# Patient Record
Sex: FEMALE | Race: BLACK OR AFRICAN AMERICAN | NOT HISPANIC OR LATINO | Employment: OTHER | URBAN - METROPOLITAN AREA
[De-identification: names, ages, dates, MRNs, and addresses within clinical notes are randomized per-mention and may not be internally consistent; named-entity substitution may affect disease eponyms.]

---

## 2017-01-24 ENCOUNTER — HOSPITAL ENCOUNTER (OUTPATIENT)
Dept: RADIOLOGY | Facility: HOSPITAL | Age: 67
Discharge: HOME | End: 2017-01-24
Attending: FAMILY MEDICINE
Payer: MEDICARE

## 2017-01-24 DIAGNOSIS — I26.90: ICD-10-CM

## 2017-01-24 PROCEDURE — 93971 EXTREMITY STUDY: CPT | Mod: 26,,, | Performed by: RADIOLOGY

## 2017-01-24 PROCEDURE — 93971 EXTREMITY STUDY: CPT | Mod: TC

## 2017-04-10 ENCOUNTER — HOSPITAL ENCOUNTER (INPATIENT)
Facility: HOSPITAL | Age: 67
LOS: 5 days | Discharge: HOME HEALTH | DRG: 208 | End: 2017-04-15
Attending: EMERGENCY MEDICINE | Admitting: HOSPITALIST
Payer: MEDICARE

## 2017-04-10 DIAGNOSIS — R10.9 FLANK PAIN: ICD-10-CM

## 2017-04-10 DIAGNOSIS — R06.02 SHORTNESS OF BREATH: ICD-10-CM

## 2017-04-10 DIAGNOSIS — J96.02 ACUTE RESPIRATORY FAILURE WITH HYPERCAPNIA: Primary | ICD-10-CM

## 2017-04-10 PROBLEM — Z99.11 ON MECHANICALLY ASSISTED VENTILATION: Status: ACTIVE | Noted: 2017-04-10

## 2017-04-10 PROBLEM — Z86.718 HISTORY OF DEEP VEIN THROMBOSIS: Chronic | Status: ACTIVE | Noted: 2017-04-10

## 2017-04-10 PROBLEM — Z86.711 HISTORY OF PULMONARY EMBOLISM: Chronic | Status: ACTIVE | Noted: 2017-04-10

## 2017-04-10 PROBLEM — Z79.01 CHRONIC ANTICOAGULATION: Chronic | Status: ACTIVE | Noted: 2017-04-10

## 2017-04-10 PROBLEM — J44.1 COPD WITH ACUTE EXACERBATION: Status: ACTIVE | Noted: 2017-04-10

## 2017-04-10 PROBLEM — J96.12 CHRONIC RESPIRATORY FAILURE WITH HYPOXIA AND HYPERCAPNIA: Chronic | Status: ACTIVE | Noted: 2017-04-10

## 2017-04-10 PROBLEM — J96.11 CHRONIC RESPIRATORY FAILURE WITH HYPOXIA AND HYPERCAPNIA: Chronic | Status: ACTIVE | Noted: 2017-04-10

## 2017-04-10 LAB
ALBUMIN SERPL BCP-MCNC: 3.8 G/DL
ALLENS TEST: ABNORMAL
ALLENS TEST: ABNORMAL
ALP SERPL-CCNC: 104 U/L
ALT SERPL W/O P-5'-P-CCNC: 13 U/L
ANION GAP SERPL CALC-SCNC: 11 MMOL/L
ANISOCYTOSIS BLD QL SMEAR: SLIGHT
APTT BLDCRRT: 28.5 SEC
AST SERPL-CCNC: 19 U/L
BACTERIA #/AREA URNS HPF: NORMAL /HPF
BASOPHILS # BLD AUTO: 0.03 K/UL
BASOPHILS NFR BLD: 0.2 %
BILIRUB SERPL-MCNC: 0.4 MG/DL
BILIRUB UR QL STRIP: NEGATIVE
BNP SERPL-MCNC: 1371 PG/ML
BUN SERPL-MCNC: 9 MG/DL
CALCIUM SERPL-MCNC: 10.1 MG/DL
CHLORIDE SERPL-SCNC: 100 MMOL/L
CLARITY UR: CLEAR
CO2 SERPL-SCNC: 32 MMOL/L
COLOR UR: YELLOW
CREAT SERPL-MCNC: 0.7 MG/DL
DELSYS: ABNORMAL
DELSYS: ABNORMAL
DIFFERENTIAL METHOD: ABNORMAL
EOSINOPHIL # BLD AUTO: 0.1 K/UL
EOSINOPHIL NFR BLD: 0.5 %
ERYTHROCYTE [DISTWIDTH] IN BLOOD BY AUTOMATED COUNT: 17.8 %
EST. GFR  (AFRICAN AMERICAN): >60 ML/MIN/1.73 M^2
EST. GFR  (NON AFRICAN AMERICAN): >60 ML/MIN/1.73 M^2
FLOW: 3
FLOW: 3
GLUCOSE SERPL-MCNC: 124 MG/DL
GLUCOSE UR QL STRIP: ABNORMAL
HCO3 UR-SCNC: 30.8 MMOL/L (ref 24–28)
HCO3 UR-SCNC: 32.8 MMOL/L (ref 24–28)
HCT VFR BLD AUTO: 45.1 %
HGB BLD-MCNC: 13.3 G/DL
HGB UR QL STRIP: NEGATIVE
HYALINE CASTS #/AREA URNS LPF: 1 /LPF
INR PPP: 1.1
KETONES UR QL STRIP: ABNORMAL
LEUKOCYTE ESTERASE UR QL STRIP: NEGATIVE
LYMPHOCYTES # BLD AUTO: 1.4 K/UL
LYMPHOCYTES NFR BLD: 10.8 %
MAGNESIUM SERPL-MCNC: 1.8 MG/DL
MCH RBC QN AUTO: 25.3 PG
MCHC RBC AUTO-ENTMCNC: 29.5 %
MCV RBC AUTO: 86 FL
MICROSCOPIC COMMENT: NORMAL
MODE: ABNORMAL
MODE: ABNORMAL
MONOCYTES # BLD AUTO: 0.9 K/UL
MONOCYTES NFR BLD: 7 %
NEUTROPHILS # BLD AUTO: 10.7 K/UL
NEUTROPHILS NFR BLD: 81.8 %
NITRITE UR QL STRIP: NEGATIVE
PCO2 BLDA: 62.5 MMHG (ref 35–45)
PCO2 BLDA: 72.8 MMHG (ref 35–45)
PH SMN: 7.23 [PH] (ref 7.35–7.45)
PH SMN: 7.33 [PH] (ref 7.35–7.45)
PH UR STRIP: 8 [PH] (ref 5–8)
PLATELET # BLD AUTO: 408 K/UL
PMV BLD AUTO: 9.5 FL
PO2 BLDA: 83 MMHG (ref 80–100)
PO2 BLDA: 87 MMHG (ref 80–100)
POC BE: 1 MMOL/L
POC BE: 5 MMOL/L
POC SATURATED O2: 93 % (ref 95–100)
POC SATURATED O2: 95 % (ref 95–100)
POC TCO2: 33 MMOL/L (ref 23–27)
POC TCO2: 35 MMOL/L (ref 23–27)
POCT GLUCOSE: 186 MG/DL (ref 70–110)
POLYCHROMASIA BLD QL SMEAR: ABNORMAL
POTASSIUM SERPL-SCNC: 3.9 MMOL/L
PROT SERPL-MCNC: 9.5 G/DL
PROT UR QL STRIP: ABNORMAL
PROTHROMBIN TIME: 11.6 SEC
RBC # BLD AUTO: 5.25 M/UL
RBC #/AREA URNS HPF: 2 /HPF (ref 0–4)
SAMPLE: ABNORMAL
SAMPLE: ABNORMAL
SITE: ABNORMAL
SITE: ABNORMAL
SODIUM SERPL-SCNC: 143 MMOL/L
SP GR UR STRIP: 1.01 (ref 1–1.03)
SP02: 95
SP02: 98
TROPONIN I SERPL DL<=0.01 NG/ML-MCNC: 0.04 NG/ML
URN SPEC COLLECT METH UR: ABNORMAL
UROBILINOGEN UR STRIP-ACNC: NEGATIVE EU/DL
WBC # BLD AUTO: 13.08 K/UL
WBC #/AREA URNS HPF: 2 /HPF (ref 0–5)

## 2017-04-10 PROCEDURE — 80053 COMPREHEN METABOLIC PANEL: CPT

## 2017-04-10 PROCEDURE — 25000242 PHARM REV CODE 250 ALT 637 W/ HCPCS: Performed by: HOSPITALIST

## 2017-04-10 PROCEDURE — 99900035 HC TECH TIME PER 15 MIN (STAT)

## 2017-04-10 PROCEDURE — 25000242 PHARM REV CODE 250 ALT 637 W/ HCPCS: Performed by: EMERGENCY MEDICINE

## 2017-04-10 PROCEDURE — 36600 WITHDRAWAL OF ARTERIAL BLOOD: CPT

## 2017-04-10 PROCEDURE — 25000003 PHARM REV CODE 250: Performed by: EMERGENCY MEDICINE

## 2017-04-10 PROCEDURE — 63600175 PHARM REV CODE 636 W HCPCS: Performed by: EMERGENCY MEDICINE

## 2017-04-10 PROCEDURE — 99291 CRITICAL CARE FIRST HOUR: CPT | Mod: 25

## 2017-04-10 PROCEDURE — 96376 TX/PRO/DX INJ SAME DRUG ADON: CPT

## 2017-04-10 PROCEDURE — 63600175 PHARM REV CODE 636 W HCPCS: Performed by: INTERNAL MEDICINE

## 2017-04-10 PROCEDURE — 25500020 PHARM REV CODE 255: Performed by: EMERGENCY MEDICINE

## 2017-04-10 PROCEDURE — 84484 ASSAY OF TROPONIN QUANT: CPT

## 2017-04-10 PROCEDURE — 96374 THER/PROPH/DIAG INJ IV PUSH: CPT

## 2017-04-10 PROCEDURE — 27000221 HC OXYGEN, UP TO 24 HOURS

## 2017-04-10 PROCEDURE — 20000000 HC ICU ROOM

## 2017-04-10 PROCEDURE — 85730 THROMBOPLASTIN TIME PARTIAL: CPT

## 2017-04-10 PROCEDURE — 93005 ELECTROCARDIOGRAM TRACING: CPT

## 2017-04-10 PROCEDURE — 5A1945Z RESPIRATORY VENTILATION, 24-96 CONSECUTIVE HOURS: ICD-10-PCS | Performed by: EMERGENCY MEDICINE

## 2017-04-10 PROCEDURE — 96375 TX/PRO/DX INJ NEW DRUG ADDON: CPT

## 2017-04-10 PROCEDURE — 31500 INSERT EMERGENCY AIRWAY: CPT

## 2017-04-10 PROCEDURE — 83735 ASSAY OF MAGNESIUM: CPT

## 2017-04-10 PROCEDURE — 94002 VENT MGMT INPAT INIT DAY: CPT

## 2017-04-10 PROCEDURE — 82803 BLOOD GASES ANY COMBINATION: CPT

## 2017-04-10 PROCEDURE — 85610 PROTHROMBIN TIME: CPT

## 2017-04-10 PROCEDURE — 81000 URINALYSIS NONAUTO W/SCOPE: CPT

## 2017-04-10 PROCEDURE — 94640 AIRWAY INHALATION TREATMENT: CPT

## 2017-04-10 PROCEDURE — 0BH17EZ INSERTION OF ENDOTRACHEAL AIRWAY INTO TRACHEA, VIA NATURAL OR ARTIFICIAL OPENING: ICD-10-PCS | Performed by: EMERGENCY MEDICINE

## 2017-04-10 PROCEDURE — 99900026 HC AIRWAY MAINTENANCE (STAT)

## 2017-04-10 PROCEDURE — 83880 ASSAY OF NATRIURETIC PEPTIDE: CPT

## 2017-04-10 PROCEDURE — 85025 COMPLETE CBC W/AUTO DIFF WBC: CPT

## 2017-04-10 PROCEDURE — 51702 INSERT TEMP BLADDER CATH: CPT

## 2017-04-10 PROCEDURE — 94761 N-INVAS EAR/PLS OXIMETRY MLT: CPT

## 2017-04-10 RX ORDER — HYDRALAZINE HYDROCHLORIDE 20 MG/ML
20 INJECTION INTRAMUSCULAR; INTRAVENOUS
Status: COMPLETED | OUTPATIENT
Start: 2017-04-10 | End: 2017-04-10

## 2017-04-10 RX ORDER — LISINOPRIL 20 MG/1
40 TABLET ORAL DAILY
Status: DISCONTINUED | OUTPATIENT
Start: 2017-04-11 | End: 2017-04-15 | Stop reason: HOSPADM

## 2017-04-10 RX ORDER — ZOLPIDEM TARTRATE 5 MG/1
5 TABLET ORAL NIGHTLY PRN
Status: ON HOLD | COMMUNITY
End: 2017-08-14 | Stop reason: HOSPADM

## 2017-04-10 RX ORDER — GLUCAGON 1 MG
1 KIT INJECTION
Status: DISCONTINUED | OUTPATIENT
Start: 2017-04-10 | End: 2017-04-15 | Stop reason: HOSPADM

## 2017-04-10 RX ORDER — SODIUM CHLORIDE 9 MG/ML
INJECTION, SOLUTION INTRAVENOUS CONTINUOUS
Status: DISCONTINUED | OUTPATIENT
Start: 2017-04-10 | End: 2017-04-10

## 2017-04-10 RX ORDER — IPRATROPIUM BROMIDE AND ALBUTEROL SULFATE 2.5; .5 MG/3ML; MG/3ML
3 SOLUTION RESPIRATORY (INHALATION)
Status: COMPLETED | OUTPATIENT
Start: 2017-04-10 | End: 2017-04-10

## 2017-04-10 RX ORDER — PROPOFOL 10 MG/ML
20 INJECTION, EMULSION INTRAVENOUS CONTINUOUS
Status: DISCONTINUED | OUTPATIENT
Start: 2017-04-10 | End: 2017-04-10

## 2017-04-10 RX ORDER — ACETAMINOPHEN 500 MG
500 TABLET ORAL EVERY 6 HOURS PRN
Status: DISCONTINUED | OUTPATIENT
Start: 2017-04-10 | End: 2017-04-15 | Stop reason: HOSPADM

## 2017-04-10 RX ORDER — CIPROFLOXACIN AND DEXAMETHASONE 3; 1 MG/ML; MG/ML
1 SUSPENSION/ DROPS AURICULAR (OTIC) EVERY 4 HOURS
COMMUNITY
End: 2017-08-16 | Stop reason: ALTCHOICE

## 2017-04-10 RX ORDER — IPRATROPIUM BROMIDE AND ALBUTEROL SULFATE 2.5; .5 MG/3ML; MG/3ML
3 SOLUTION RESPIRATORY (INHALATION) EVERY 4 HOURS
Status: DISCONTINUED | OUTPATIENT
Start: 2017-04-10 | End: 2017-04-11

## 2017-04-10 RX ORDER — HYDRALAZINE HYDROCHLORIDE 20 MG/ML
10 INJECTION INTRAMUSCULAR; INTRAVENOUS
Status: DISCONTINUED | OUTPATIENT
Start: 2017-04-10 | End: 2017-04-15 | Stop reason: HOSPADM

## 2017-04-10 RX ORDER — METHYLPREDNISOLONE SOD SUCC 125 MG
125 VIAL (EA) INJECTION EVERY 8 HOURS
Status: COMPLETED | OUTPATIENT
Start: 2017-04-11 | End: 2017-04-13

## 2017-04-10 RX ORDER — DIPHENHYDRAMINE HYDROCHLORIDE 50 MG/ML
25 INJECTION INTRAMUSCULAR; INTRAVENOUS
Status: COMPLETED | OUTPATIENT
Start: 2017-04-10 | End: 2017-04-10

## 2017-04-10 RX ORDER — PROPOFOL 10 MG/ML
20 INJECTION, EMULSION INTRAVENOUS
Status: COMPLETED | OUTPATIENT
Start: 2017-04-10 | End: 2017-04-10

## 2017-04-10 RX ORDER — INSULIN ASPART 100 [IU]/ML
1-10 INJECTION, SOLUTION INTRAVENOUS; SUBCUTANEOUS
Status: DISCONTINUED | OUTPATIENT
Start: 2017-04-10 | End: 2017-04-15 | Stop reason: HOSPADM

## 2017-04-10 RX ORDER — ONDANSETRON 2 MG/ML
4 INJECTION INTRAMUSCULAR; INTRAVENOUS
Status: COMPLETED | OUTPATIENT
Start: 2017-04-10 | End: 2017-04-10

## 2017-04-10 RX ORDER — HYDRALAZINE HYDROCHLORIDE 20 MG/ML
20 INJECTION INTRAMUSCULAR; INTRAVENOUS
Status: DISCONTINUED | OUTPATIENT
Start: 2017-04-10 | End: 2017-04-10

## 2017-04-10 RX ORDER — PROPOFOL 10 MG/ML
5 INJECTION, EMULSION INTRAVENOUS CONTINUOUS
Status: DISCONTINUED | OUTPATIENT
Start: 2017-04-10 | End: 2017-04-13

## 2017-04-10 RX ORDER — ALBUTEROL SULFATE 2.5 MG/.5ML
5 SOLUTION RESPIRATORY (INHALATION)
Status: COMPLETED | OUTPATIENT
Start: 2017-04-10 | End: 2017-04-10

## 2017-04-10 RX ORDER — CHLORHEXIDINE GLUCONATE ORAL RINSE 1.2 MG/ML
15 SOLUTION DENTAL 2 TIMES DAILY
Status: DISCONTINUED | OUTPATIENT
Start: 2017-04-11 | End: 2017-04-12

## 2017-04-10 RX ORDER — LORAZEPAM 2 MG/ML
1 INJECTION INTRAMUSCULAR
Status: COMPLETED | OUTPATIENT
Start: 2017-04-10 | End: 2017-04-10

## 2017-04-10 RX ORDER — LISINOPRIL 40 MG/1
40 TABLET ORAL DAILY
Status: ON HOLD | COMMUNITY
End: 2017-08-14

## 2017-04-10 RX ORDER — DILTIAZEM HYDROCHLORIDE 30 MG/1
90 TABLET, FILM COATED ORAL EVERY 8 HOURS
Status: DISCONTINUED | OUTPATIENT
Start: 2017-04-11 | End: 2017-04-15 | Stop reason: HOSPADM

## 2017-04-10 RX ORDER — ONDANSETRON 2 MG/ML
8 INJECTION INTRAMUSCULAR; INTRAVENOUS EVERY 8 HOURS PRN
Status: DISCONTINUED | OUTPATIENT
Start: 2017-04-11 | End: 2017-04-15 | Stop reason: HOSPADM

## 2017-04-10 RX ORDER — IPRATROPIUM BROMIDE AND ALBUTEROL SULFATE 2.5; .5 MG/3ML; MG/3ML
3 SOLUTION RESPIRATORY (INHALATION) EVERY 4 HOURS
Status: DISCONTINUED | OUTPATIENT
Start: 2017-04-10 | End: 2017-04-10 | Stop reason: ALTCHOICE

## 2017-04-10 RX ORDER — TRAZODONE HYDROCHLORIDE 50 MG/1
50 TABLET ORAL NIGHTLY
Status: ON HOLD | COMMUNITY
End: 2017-08-14 | Stop reason: HOSPADM

## 2017-04-10 RX ORDER — ONDANSETRON 2 MG/ML
4 INJECTION INTRAMUSCULAR; INTRAVENOUS
Status: DISCONTINUED | OUTPATIENT
Start: 2017-04-10 | End: 2017-04-10

## 2017-04-10 RX ORDER — NAPROXEN 375 MG/1
375 TABLET ORAL EVERY 12 HOURS PRN
Status: ON HOLD | COMMUNITY
End: 2018-07-05 | Stop reason: HOSPADM

## 2017-04-10 RX ORDER — PREDNISOLONE ACETATE 10 MG/ML
1 SUSPENSION/ DROPS OPHTHALMIC EVERY 4 HOURS
COMMUNITY
End: 2017-08-16 | Stop reason: ALTCHOICE

## 2017-04-10 RX ORDER — HYDROMORPHONE HYDROCHLORIDE 2 MG/ML
0.5 INJECTION, SOLUTION INTRAMUSCULAR; INTRAVENOUS; SUBCUTANEOUS
Status: COMPLETED | OUTPATIENT
Start: 2017-04-10 | End: 2017-04-10

## 2017-04-10 RX ORDER — PANTOPRAZOLE SODIUM 40 MG/1
40 FOR SUSPENSION ORAL DAILY
Status: DISCONTINUED | OUTPATIENT
Start: 2017-04-11 | End: 2017-04-15 | Stop reason: HOSPADM

## 2017-04-10 RX ORDER — INSULIN ASPART 100 [IU]/ML
1-10 INJECTION, SOLUTION INTRAVENOUS; SUBCUTANEOUS EVERY 6 HOURS PRN
Status: DISCONTINUED | OUTPATIENT
Start: 2017-04-10 | End: 2017-04-10

## 2017-04-10 RX ORDER — FLUMAZENIL 0.1 MG/ML
0.1 INJECTION INTRAVENOUS ONCE
Status: DISCONTINUED | OUTPATIENT
Start: 2017-04-10 | End: 2017-04-10

## 2017-04-10 RX ORDER — NALOXONE HCL 0.4 MG/ML
1 VIAL (ML) INJECTION
Status: DISCONTINUED | OUTPATIENT
Start: 2017-04-10 | End: 2017-04-10

## 2017-04-10 RX ORDER — CLONIDINE HYDROCHLORIDE 0.1 MG/1
0.2 TABLET ORAL 3 TIMES DAILY
Status: DISCONTINUED | OUTPATIENT
Start: 2017-04-11 | End: 2017-04-15 | Stop reason: HOSPADM

## 2017-04-10 RX ORDER — PRAVASTATIN SODIUM 40 MG/1
40 TABLET ORAL DAILY
COMMUNITY
End: 2018-02-12

## 2017-04-10 RX ORDER — ENOXAPARIN SODIUM 100 MG/ML
1 INJECTION SUBCUTANEOUS
Status: DISCONTINUED | OUTPATIENT
Start: 2017-04-10 | End: 2017-04-10

## 2017-04-10 RX ORDER — IBUPROFEN 200 MG
1 TABLET ORAL DAILY
Status: DISCONTINUED | OUTPATIENT
Start: 2017-04-11 | End: 2017-04-15 | Stop reason: HOSPADM

## 2017-04-10 RX ORDER — PROMETHAZINE HYDROCHLORIDE 12.5 MG/1
12.5 TABLET ORAL EVERY 6 HOURS PRN
Status: ON HOLD | COMMUNITY
End: 2017-08-14 | Stop reason: HOSPADM

## 2017-04-10 RX ADMIN — LORAZEPAM 1 MG: 2 INJECTION, SOLUTION INTRAMUSCULAR; INTRAVENOUS at 03:04

## 2017-04-10 RX ADMIN — PROPOFOL 25 MCG/KG/MIN: 10 INJECTION, EMULSION INTRAVENOUS at 08:04

## 2017-04-10 RX ADMIN — ALBUTEROL SULFATE 5 MG: 2.5 SOLUTION RESPIRATORY (INHALATION) at 11:04

## 2017-04-10 RX ADMIN — SODIUM CHLORIDE: 0.9 INJECTION, SOLUTION INTRAVENOUS at 06:04

## 2017-04-10 RX ADMIN — ENOXAPARIN SODIUM 90 MG: 100 INJECTION SUBCUTANEOUS at 04:04

## 2017-04-10 RX ADMIN — IPRATROPIUM BROMIDE AND ALBUTEROL SULFATE 3 ML: .5; 3 SOLUTION RESPIRATORY (INHALATION) at 07:04

## 2017-04-10 RX ADMIN — ONDANSETRON 4 MG: 2 INJECTION INTRAMUSCULAR; INTRAVENOUS at 01:04

## 2017-04-10 RX ADMIN — IOHEXOL 100 ML: 350 INJECTION, SOLUTION INTRAVENOUS at 04:04

## 2017-04-10 RX ADMIN — PROPOFOL 20 MCG/KG/MIN: 10 INJECTION, EMULSION INTRAVENOUS at 04:04

## 2017-04-10 RX ADMIN — PROPOFOL 25 MCG/KG/MIN: 10 INJECTION, EMULSION INTRAVENOUS at 11:04

## 2017-04-10 RX ADMIN — HYDRALAZINE HYDROCHLORIDE 20 MG: 20 INJECTION INTRAMUSCULAR; INTRAVENOUS at 12:04

## 2017-04-10 RX ADMIN — IPRATROPIUM BROMIDE AND ALBUTEROL SULFATE 3 ML: .5; 3 SOLUTION RESPIRATORY (INHALATION) at 11:04

## 2017-04-10 RX ADMIN — HYDROMORPHONE HYDROCHLORIDE 0.5 MG: 2 INJECTION INTRAMUSCULAR; INTRAVENOUS; SUBCUTANEOUS at 01:04

## 2017-04-10 RX ADMIN — HYDRALAZINE HYDROCHLORIDE 20 MG: 20 INJECTION INTRAMUSCULAR; INTRAVENOUS at 03:04

## 2017-04-10 RX ADMIN — DIPHENHYDRAMINE HYDROCHLORIDE 25 MG: 50 INJECTION, SOLUTION INTRAMUSCULAR; INTRAVENOUS at 03:04

## 2017-04-10 NOTE — NURSING
1850: Received patient from ER , Dx acute resp failure with hypercapnea. On portable vent for transport. Afsaneh RT at bedside to hook up servo vent. Settings unchanged from ER. PRVC 450 TV, 60 %, Rate 20, Peep 5. Sats 100%.  Placed on CM, SR-ST 90-100s. . Arrived on Diprivan at 20 mcg./kg/min from ER. NS at 75 ml/hr initiated per admit orders. Remains in soft wrist restraints for safety, treatment interference and pulling at lines in ER.  in waiting area, only belongings noted upon arrival are silver colored round hoop earrings.

## 2017-04-10 NOTE — ED PROVIDER NOTES
"Encounter Date: 4/10/2017    SCRIBE #1 NOTE: I, Joeyaldo Mccray, am scribing for, and in the presence of,  Braden Matos MD. I have scribed the following portions of the note - Other sections scribed: HPI, ROS.       History     Chief Complaint   Patient presents with    Shortness of Breath     pt states " I've been SOB and feeling bad for the last 3 days"     Review of patient's allergies indicates:  No Known Allergies  HPI Comments: CC: SOB    HPI: This 67 y.o. female with a PMHx of COPD, CHF, HTN, DM, GERD, depression presents to the ED c/o SOB, nausea, and a productive cough that began 3 days ago. Pt also c/o a slight fever and chills. Pt reports a mid-sternal chest pain that started last night. Pt states she is on 4 O2 L at home and uses her inhaler daily. Pt repots Anoro works well but Breo does not help. Pt also reports increased urinary frequency. No other symptoms reported. Pt denies vomiting, diarrhea, headache, abdominal pain, dysuria, or any other associated symptoms.       The history is provided by the patient.     Past Medical History:   Diagnosis Date    CHF (congestive heart failure)     COPD (chronic obstructive pulmonary disease)     Depression     Diabetes mellitus     GERD (gastroesophageal reflux disease)     Hypertension      Past Surgical History:   Procedure Laterality Date    ABDOMINAL SURGERY      CARDIAC SURGERY       Family History   Problem Relation Age of Onset    Heart disease Mother     Hypertension Mother     Diabetes Father      Social History   Substance Use Topics    Smoking status: Current Every Day Smoker     Packs/day: 0.50     Years: 25.00     Types: Cigarettes    Smokeless tobacco: Not on file    Alcohol use No     Review of Systems   Constitutional: Positive for chills and fever.   HENT: Negative for congestion and sore throat.    Eyes: Negative for visual disturbance.   Respiratory: Positive for cough and shortness of breath.    Cardiovascular: Positive for " chest pain.   Gastrointestinal: Negative for abdominal pain, diarrhea, nausea and vomiting.   Genitourinary: Positive for frequency. Negative for dysuria.   Musculoskeletal: Negative for back pain.   Skin: Negative for rash.   Neurological: Negative for weakness and headaches.   Hematological: Does not bruise/bleed easily.       Physical Exam   Initial Vitals   BP Pulse Resp Temp SpO2   04/10/17 0939 04/10/17 0939 04/10/17 0939 04/10/17 0939 04/10/17 0939   153/74 100 24 98.1 °F (36.7 °C) 84 %     Physical Exam  The patient was examined specifically for the following:   General:No significant distress, Good color, Warm and dry. Head and neck:Scalp atraumatic, Neck supple. Neurological:Appropriate conversation, Gross motor deficits. Eyes:Conjugate gaze, Clear corneas. ENT: No epistaxis. Cardiac: Regular rate and rhythm, Grossly normal heart tones. Pulmonary: Wheezing, Rales. Gastrointestinal: Abdominal tenderness, Abdominal distention. Musculoskeletal: Extremity deformity, Apparent pain with range of motion of the joints. Skin: Rash.   The findings on examination were normal except for the following: The patient's oxygen saturations are 99% on 3 L nasal cannula.  The lungs are clear and free wheezing rales of the rhonchi.  Heart tones remarkable for an irregularly irregular rhythm.  The patient's heart rate is 100.  There is no ankle edema.  The abdomen is soft.  The patient is awake alert bright.  She does look slightly breathless.  ED Course   Intubation  Date/Time: 4/10/2017 5:47 PM  Performed by: BALA ISAACS  Authorized by: BALA ISAACS   Indications: respiratory distress,  respiratory failure and  hypercapnia  Intubation method: direct  Patient status: awake  Preoxygenation: mask  Pretreatment medications: none  Laryngoscope size: Mac 4  Tube size: 7.5 mm  Tube type: cuffed  Number of attempts: 1  Cricoid pressure: no  Post-procedure assessment: CO2 detector  Cuff inflated: yes  Tube secured with: ETT  maher  Chest x-ray interpreted by me.  Chest x-ray findings: endotracheal tube in appropriate position  Patient tolerance: Patient tolerated the procedure well with no immediate complications  Complications: No    Critical Care  Date/Time: 4/10/2017 5:48 PM  Performed by: BALA ISAACS  Authorized by: BALA ISAACS   Direct patient critical care time: 30 minutes  Additional history critical care time: 11 minutes  Ordering / reviewing critical care time: 9 minutes  Documentation critical care time: 17 minutes  Consulting other physicians critical care time: 9 minutes  Consult with family critical care time: 5 minutes  Total critical care time (exclusive of procedural time) : 81 minutes  Critical care time was exclusive of separately billable procedures and treating other patients and teaching time.  Critical care was necessary to treat or prevent imminent or life-threatening deterioration of the following conditions: respiratory failure.  Critical care was time spent personally by me on the following activities: development of treatment plan with patient or surrogate, examination of patient, ordering and review of laboratory studies, re-evaluation of patient's condition, pulse oximetry, ordering and performing treatments and interventions, evaluation of patient's response to treatment, discussions with primary provider, review of old charts, ordering and review of radiographic studies and obtaining history from patient or surrogate.        Labs Reviewed   COMPREHENSIVE METABOLIC PANEL - Abnormal; Notable for the following:        Result Value    CO2 32 (*)     Glucose 124 (*)     Total Protein 9.5 (*)     All other components within normal limits   TROPONIN I - Abnormal; Notable for the following:     Troponin I 0.043 (*)     All other components within normal limits   B-TYPE NATRIURETIC PEPTIDE - Abnormal; Notable for the following:     BNP 1371 (*)     All other components within normal limits   URINALYSIS -  Abnormal; Notable for the following:     Protein, UA 2+ (*)     Glucose, UA 1+ (*)     Ketones, UA Trace (*)     All other components within normal limits   CBC W/ AUTO DIFFERENTIAL - Abnormal; Notable for the following:     WBC 13.08 (*)     MCH 25.3 (*)     MCHC 29.5 (*)     RDW 17.8 (*)     Platelets 408 (*)     Gran # 10.7 (*)     Gran% 81.8 (*)     Lymph% 10.8 (*)     All other components within normal limits   ISTAT PROCEDURE - Abnormal; Notable for the following:     POC PH 7.327 (*)     POC PCO2 62.5 (*)     POC HCO3 32.8 (*)     POC TCO2 35 (*)     All other components within normal limits   POCT GLUCOSE - Abnormal; Notable for the following:     POCT Glucose 186 (*)     All other components within normal limits   ISTAT PROCEDURE - Abnormal; Notable for the following:     POC PH 7.234 (*)     POC PCO2 72.8 (*)     POC HCO3 30.8 (*)     POC SATURATED O2 93 (*)     POC TCO2 33 (*)     All other components within normal limits   APTT   PROTIME-INR   MAGNESIUM   URINALYSIS MICROSCOPIC     EKG Readings: (Independently Interpreted)   This patient is in a normal sinus rhythm with a heart rate in 96.  The ND QRS and QT intervals are normal.  There is no evidence of acute myocardial infarction or malignant arrhythmia.       X-Rays:   Independently Interpreted Readings:   Other Readings:  CTA of the chest for PE reveals improvement in the patient's pulmonary emboli.  Chest x-ray failed to reveal evidence of pulmonary edema or pneumonia.  The endotracheal tube was in good position  CT of the abdomen fails to reveal peritonitis bowel obstruction or other significant abnormalities.    Medical decision making: Given the above, this patient presents to the emergency room with shortness of breath.  She was found to have a COPD exacerbation.  She was found to be hypercarbic.r flank.  She was treated for pain.  I believe hehypercarbia worsens after treatment with hydromorphone for flank pain.  She was also treated with  Phenergan for nausea.  She was diaphoretic.  Repeat blood gases revealed a more severe hypercarbia.  She had mental status changes.  She was intubated for hypoventilation.  She was sedated on propofol.  She'll be admitted to the ICU.  We will continue nebulizer treatments.  We will consult pulmonology.  We will hold to extubate this patient.  She continues to smoke cigarettes while she is on 4 L of home oxygen.               Scribe Attestation:   Scribe #1: I performed the above scribed service and the documentation accurately describes the services I performed. I attest to the accuracy of the note.    Attending Attestation:           Physician Attestation for Scribe:  Physician Attestation Statement for Scribe #1: I, Braden Matos MD, reviewed documentation, as scribed by Joey Mccray  in my presence, and it is both accurate and complete.                 ED Course     Clinical Impression:   The primary encounter diagnosis was Acute respiratory failure with hypercapnia. Diagnoses of Shortness of breath and Flank pain were also pertinent to this visit.          Braden Matos MD  04/10/17 9422

## 2017-04-10 NOTE — IP AVS SNAPSHOT
[place] [address] [place] [address]  Phone: [phone]           Patient Discharge Instructions   Our goal is to set you up for success. This packet includes information on your condition, medications, and your home care.  It will help you care for yourself to prevent having to return to the hospital.     Please ask your nurse if you have any questions.      There are many details to remember when preparing to leave the hospital. Here is what you will need to do:    1. Take your medicine. If you are prescribed medications, review your Medication List on the following pages. You may have new medications to  at the pharmacy and others that you'll need to stop taking. Review the instructions for how and when to take your medications. Talk with your doctor or nurses if you are unsure of what to do.     2. Go to your follow-up appointments. Specific follow-up information is listed in the following pages. Your may be contacted by a nurse or clinical provider about future appointments. Be sure we have all of the phone numbers to reach you. Please contact your provider's office if you are unable to make an appointment.     3. Watch for warning signs. Your doctor or nurse will give you detailed warning signs to watch for and when to call for assistance. These instructions may also include educational information about your condition. If you experience any of warning signs to your health, call your doctor.           [facility]  Unless otherwise directed by your provider, please   contact [facility], our nurse care line   that is available for 24/7 assistance.     [phone] (toll-free)     Registered nurses in the [facility]   provide: appointment scheduling, clinical advisement, health education, and other advisory services.                  ** Verify the list of medication(s) below is accurate and up to date. Carry this with you in case of emergency.  If your medications have changed, please notify your healthcare provider.             Medication List      START taking these medications        Additional Info                      predniSONE 20 MG tablet   Commonly known as:  DELTASONE   Quantity:  18 tablet   Refills:  0    Last time this was given:  60 mg on 4/15/2017  8:58 AM   Instructions:  3 tablets po daily for 3 days 2 tablets po daily for 3 days 1 tablet po daily for 3 days     Begin Date    AM    Noon    PM    Bedtime         CONTINUE taking these medications        Additional Info                      acetaminophen 500 MG tablet   Commonly known as:  TYLENOL   Refills:  0   Dose:  500 mg    Last time this was given:  500 mg on 4/14/2017  2:27 PM   Instructions:  Take 1 tablet (500 mg total) by mouth every 8 (eight) hours as needed.     Begin Date    AM    Noon    PM    Bedtime       aspirin 81 MG EC tablet   Commonly known as:  ECOTRIN   Refills:  0   Dose:  81 mg    Last time this was given:  81 mg on 4/15/2017  8:57 AM   Instructions:  Take 81 mg by mouth once daily.     Begin Date    AM    Noon    PM    Bedtime       BRINTELLIX 5 mg Tab   Refills:  0   Dose:  5 mg   Generic drug:  vortioxetine    Instructions:  Take 5 mg by mouth 2 (two) times daily.     Begin Date    AM    Noon    PM    Bedtime       ciprofloxacin-dexamethasone 0.3-0.1% 0.3-0.1 % Drps   Commonly known as:  CIPRODEX   Refills:  0   Dose:  1 drop    Instructions:  Place 1 drop into the right eye every 4 (four) hours.     Begin Date    AM    Noon    PM    Bedtime       cloNIDine 0.1 MG tablet   Commonly known as:  CATAPRES   Quantity:  180 tablet   Refills:  11   Dose:  0.2 mg    Last time this was given:  0.2 mg on 4/15/2017  1:33 PM   Instructions:  Take 2 tablets (0.2 mg total) by mouth 3 (three) times daily.     Begin Date    AM    Noon    PM    Bedtime       diltiaZEM 90 MG tablet   Commonly known as:  CARDIZEM   Refills:  0   Dose:  90 mg    Last time this was given:  90 mg on  4/15/2017  1:33 PM   Instructions:  Take 90 mg by mouth 3 (three) times daily.     Begin Date    AM    Noon    PM    Bedtime       furosemide 40 MG tablet   Commonly known as:  LASIX   Refills:  0   Dose:  40 mg    Last time this was given:  40 mg on 4/15/2017  8:57 AM   Instructions:  Take 40 mg by mouth once daily.     Begin Date    AM    Noon    PM    Bedtime       lisinopril 40 MG tablet   Commonly known as:  PRINIVIL,ZESTRIL   Refills:  0   Dose:  40 mg    Last time this was given:  40 mg on 4/15/2017  8:57 AM   Instructions:  Take 40 mg by mouth once daily.     Begin Date    AM    Noon    PM    Bedtime       metformin 500 MG tablet   Commonly known as:  GLUCOPHAGE   Refills:  0   Dose:  500 mg    Instructions:  Take 500 mg by mouth 2 (two) times daily with meals.     Begin Date    AM    Noon    PM    Bedtime       MYRBETRIQ 25 mg Tb24 ER tablet   Refills:  0   Dose:  25 mg   Generic drug:  mirabegron    Instructions:  Take 25 mg by mouth once daily.     Begin Date    AM    Noon    PM    Bedtime       naproxen 375 MG tablet   Commonly known as:  NAPROSYN   Refills:  0   Dose:  375 mg    Instructions:  Take 375 mg by mouth every 12 (twelve) hours as needed.     Begin Date    AM    Noon    PM    Bedtime       nitroGLYCERIN 0.3 MG SL tablet   Commonly known as:  NITROSTAT   Refills:  0   Dose:  0.3 mg    Instructions:  Place 0.3 mg under the tongue every 5 (five) minutes as needed for Chest pain.     Begin Date    AM    Noon    PM    Bedtime       pantoprazole 40 MG tablet   Commonly known as:  PROTONIX   Quantity:  45 tablet   Refills:  0   Dose:  40 mg    Instructions:  Take 1 tablet (40 mg total) by mouth once daily.     Begin Date    AM    Noon    PM    Bedtime       pravastatin 40 MG tablet   Commonly known as:  PRAVACHOL   Refills:  0   Dose:  40 mg    Instructions:  Take 40 mg by mouth once daily.     Begin Date    AM    Noon    PM    Bedtime       prednisoLONE acetate 1 % Drps   Commonly known as:  PRED  FORTE   Refills:  0   Dose:  1 drop    Instructions:  Place 1 drop into the right eye every 4 (four) hours.     Begin Date    AM    Noon    PM    Bedtime       pregabalin 50 MG capsule   Commonly known as:  LYRICA   Refills:  0   Dose:  50 mg    Last time this was given:  50 mg on 4/15/2017  1:33 PM   Instructions:  Take 50 mg by mouth 3 (three) times daily.     Begin Date    AM    Noon    PM    Bedtime       promethazine 12.5 MG Tab   Commonly known as:  PHENERGAN   Refills:  0   Dose:  12.5 mg    Instructions:  Take 12.5 mg by mouth every 6 (six) hours as needed.     Begin Date    AM    Noon    PM    Bedtime       sotalol 80 MG tablet   Commonly known as:  BETAPACE   Refills:  0   Dose:  80 mg    Last time this was given:  80 mg on 4/15/2017  8:57 AM   Instructions:  Take 80 mg by mouth 2 (two) times daily.     Begin Date    AM    Noon    PM    Bedtime       tramadol 50 mg tablet   Commonly known as:  ULTRAM   Quantity:  20 tablet   Refills:  0   Dose:  50 mg    Last time this was given:  50 mg on 4/15/2017  2:36 PM   Instructions:  Take 1 tablet (50 mg total) by mouth every 6 (six) hours as needed for Pain.     Begin Date    AM    Noon    PM    Bedtime       trazodone 50 MG tablet   Commonly known as:  DESYREL   Refills:  0   Dose:  50 mg    Last time this was given:  50 mg on 4/14/2017 10:00 PM   Instructions:  Take 50 mg by mouth every evening.     Begin Date    AM    Noon    PM    Bedtime       XARELTO 15 mg Tab   Refills:  0   Dose:  15 mg   Generic drug:  rivaroxaban    Instructions:  Take 15 mg by mouth once daily.     Begin Date    AM    Noon    PM    Bedtime       zolpidem 5 MG Tab   Commonly known as:  AMBIEN   Refills:  0   Dose:  5 mg    Instructions:  Take 5 mg by mouth nightly as needed. Take 1-2 tabs at bedtime     Begin Date    AM    Noon    PM    Bedtime         STOP taking these medications     warfarin 10 MG tablet   Commonly known as:  COUMADIN            Where to Get Your Medications      You  can get these medications from any pharmacy     Bring a paper prescription for each of these medications     predniSONE 20 MG tablet                  Please bring to all follow up appointments:    1. A copy of your discharge instructions.  2. All medicines you are currently taking in their original bottles.  3. Identification and insurance card.    Please arrive 15 minutes ahead of scheduled appointment time.    Please call 24 hours in advance if you must reschedule your appointment and/or time.        Follow-up Information     Follow up with Angel Orourke Jr, MD. Schedule an appointment as soon as possible for a visit in 1 week.    Specialty:  Family Medicine    Contact information:    [address]  [facility] [id number] [phone]          Follow up with [facility] On 4/16/2017.    Specialty:  Home Health Services    Why:  Home Health    Contact information:    [address] [address]  [phone]          Follow up with [facility].    Specialty:  DME Provider    Why:  Need portable oxygen tank for transport home, dropped to 86%.   to bring from home.    Contact information:    [address]  [place] [address] [phone]          Discharge Instructions     Future Orders    Activity as tolerated     Diet general     Questions:    Total calories:  1800 Calorie    Fat restriction, if any:      Protein restriction, if any:      Na restriction, if any:  2gNa    Fluid restriction:      Additional restrictions:          Primary Diagnosis     Your primary diagnosis was:  Chronic Bronchitis      Admission Information     Date & Time Provider Department CSN    4/10/2017 10:09 AM Tremayne Kong MD [facility] [id number]      Care Providers     Provider Role Specialty Primary office phone    Tremayne Kong MD Attending Provider Hospitalist [phone]      Your Vitals Were     BP Pulse Temp Resp Height  "Weight    140/67 75 97.3 °F (36.3 °C) (Axillary) 20 5' 7" (1.702 m) 86.8 kg (191 lb 5.8 oz)    SpO2 BMI             100% 29.97 kg/m2         Recent Lab Values        11/16/2010 12/15/2015 9/28/2016 4/11/2017                  5:25 AM  5:39 AM  5:32 AM  1:59 AM        A1C 7.6 (H) 7.2 (H) 7.5 (H) 7.1 (H)        Comment for A1C at  5:32 AM on 9/28/2016:  According to ADA guidelines, hemoglobin A1C <7.0% represents  optimal control in non-pregnant diabetic patients.  Different  metrics may apply to specific populations.   Standards of Medical Care in Diabetes - 2016.  For the purpose of screening for the presence of diabetes:  <5.7%     Consistent with the absence of diabetes  5.7-6.4%  Consistent with increasing risk for diabetes   (prediabetes)  >or=6.5%  Consistent with diabetes  Currently no consensus exists for use of hemoglobin A1C  for diagnosis of diabetes for children.      Comment for A1C at  1:59 AM on 4/11/2017:  According to ADA guidelines, hemoglobin A1C <7.0% represents  optimal control in non-pregnant diabetic patients.  Different  metrics may apply to specific populations.   Standards of Medical Care in Diabetes - 2016.  For the purpose of screening for the presence of diabetes:  <5.7%     Consistent with the absence of diabetes  5.7-6.4%  Consistent with increasing risk for diabetes   (prediabetes)  >or=6.5%  Consistent with diabetes  Currently no consensus exists for use of hemoglobin A1C  for diagnosis of diabetes for children.        Allergies as of 4/15/2017     No Known Allergies      Advance Directives     An advance directive is a document which, in the event you are no longer able to make decisions for yourself, tells your healthcare team what kind of treatment you do or do not want to receive, or who you would like to make those decisions for you.  If you do not currently have an advance directive, [facility] encourages you to create one.  For more information call:  [phone] ([phone]), " [phone] ([phone]),  or log on to [email or web address].        Smoking Cessation     If you would like to quit smoking:   You may be eligible for free services if you are a [place] resident and started smoking cigarettes before September 1, 1988.  Call the Smoking Cessation Trust (SCT) toll free at (557) 183-6800 or (076) 799-5387.   Call 1-800-QUIT-NOW if you do not meet the above criteria.   Contact us via email: [email or web address]   View our website for more information: [email or web address]        Language Assistance Services     ATTENTION: Language assistance services are available, free of charge. Please call [phone].      ATENCIÓN: Si habla español, tiene a ortez disposición servicios gratuitos de asistencia lingüística. Llame al [phone].     CHÚ Ý: N?u b?n nói Ti?ng Vi?t, có các d?ch v? h? tr? ngôn ng? mi?n phí dành cho b?n. G?i s? [phone].        Diabetes Discharge Instructions                                   [facility] Information           [facility] Sign-Up     Activating your [facility] account is as easy as 1-2-3!     1) Visit [email or web address], select Sign Up Now, enter this activation code and your date of birth, then select Next.  Activation code not generated  Current Patient Portal Status: Account disabled      2) Create a username and password to use when you visit [facility] in the future and select a security question in case you lose your password and select Next.    3) Enter your e-mail address and click Sign Up!    Additional Information  If you have questions, please e-mail [email or web address] or call [phone] to talk to our [facility] staff. Remember, [facility] is NOT to be used for urgent needs. For medical emergencies, dial 911.          [facility] complies with applicable Federal civil rights laws and does not discriminate on the basis of race, color, national origin, age, disability, or sex.

## 2017-04-10 NOTE — PROGRESS NOTES
ABG results reported to Dr. Matos   Results for ANSLEY RICHMOND (MRN [id number]) as of 4/10/2017 11:50   Ref. Range 4/10/2017 11:10   POC PH Latest Ref Range: 7.35 - 7.45  7.327 (L)   POC PCO2 Latest Ref Range: 35 - 45 mmHg 62.5 (HH)   POC PO2 Latest Ref Range: 80 - 100 mmHg 87   POC BE Latest Ref Range: -2 to 2 mmol/L 5   POC HCO3 Latest Ref Range: 24 - 28 mmol/L 32.8 (H)   POC SATURATED O2 Latest Ref Range: 95 - 100 % 95   POC TCO2 Latest Ref Range: 23 - 27 mmol/L 35 (H)   Flow Unknown 3   Sample Unknown ARTERIAL   DelSys Unknown Nasal Can   Allens Test Unknown N/A   Site Unknown RR   Mode Unknown SPONT

## 2017-04-10 NOTE — PROGRESS NOTES
Pt intubated and placed on the Servo-i vent # 7 on PRVC 20/ 450/ +5/ 60%. sats are 98%. will continue to monitor. 7.5 @ 23 at the lip secured on the right side of the mouth.

## 2017-04-10 NOTE — PROGRESS NOTES
ABG dine results reported to Dr. Matos. The decision was made to intubate.   Results for ANSLEY RICHMOND (MRN [id number]) as of 4/10/2017 16:14   Ref. Range 4/10/2017 15:54   POC PH Latest Ref Range: 7.35 - 7.45  7.234 (LL)   POC PCO2 Latest Ref Range: 35 - 45 mmHg 72.8 (HH)   POC PO2 Latest Ref Range: 80 - 100 mmHg 83   POC BE Latest Ref Range: -2 to 2 mmol/L 1   POC HCO3 Latest Ref Range: 24 - 28 mmol/L 30.8 (H)   POC SATURATED O2 Latest Ref Range: 95 - 100 % 93 (L)   POC TCO2 Latest Ref Range: 23 - 27 mmol/L 33 (H)   Flow Unknown 3   Sample Unknown ARTERIAL   DelSys Unknown Nasal Can   Allens Test Unknown Pass   Site Unknown RR   Mode Unknown SPONT

## 2017-04-10 NOTE — ED TRIAGE NOTES
Pt presents to ED c/o sob and chest pains with a  Hx of copd.  Pt states it gets worse upon exertion.  Denies any ha, nvd.

## 2017-04-11 LAB
ALBUMIN SERPL BCP-MCNC: 3.4 G/DL
ALLENS TEST: ABNORMAL
ALP SERPL-CCNC: 92 U/L
ALT SERPL W/O P-5'-P-CCNC: 10 U/L
ANION GAP SERPL CALC-SCNC: 17 MMOL/L
AST SERPL-CCNC: 19 U/L
BASOPHILS # BLD AUTO: 0.02 K/UL
BASOPHILS NFR BLD: 0.1 %
BILIRUB SERPL-MCNC: 0.5 MG/DL
BUN SERPL-MCNC: 16 MG/DL
CALCIUM SERPL-MCNC: 9.4 MG/DL
CHLORIDE SERPL-SCNC: 100 MMOL/L
CO2 SERPL-SCNC: 26 MMOL/L
CREAT SERPL-MCNC: 1.1 MG/DL
DELSYS: ABNORMAL
DIFFERENTIAL METHOD: ABNORMAL
EOSINOPHIL # BLD AUTO: 0 K/UL
EOSINOPHIL NFR BLD: 0 %
ERYTHROCYTE [DISTWIDTH] IN BLOOD BY AUTOMATED COUNT: 18.2 %
ERYTHROCYTE [SEDIMENTATION RATE] IN BLOOD BY WESTERGREN METHOD: 20 MM/H
EST. GFR  (AFRICAN AMERICAN): >60 ML/MIN/1.73 M^2
EST. GFR  (NON AFRICAN AMERICAN): 53 ML/MIN/1.73 M^2
ESTIMATED AVG GLUCOSE: 157 MG/DL
FIO2: 60
GLUCOSE SERPL-MCNC: 100 MG/DL
HBA1C MFR BLD HPLC: 7.1 %
HCO3 UR-SCNC: 29.3 MMOL/L (ref 24–28)
HCT VFR BLD AUTO: 42.6 %
HGB BLD-MCNC: 12.6 G/DL
LYMPHOCYTES # BLD AUTO: 1.5 K/UL
LYMPHOCYTES NFR BLD: 8.5 %
MAGNESIUM SERPL-MCNC: 1.5 MG/DL
MCH RBC QN AUTO: 25 PG
MCHC RBC AUTO-ENTMCNC: 29.6 %
MCV RBC AUTO: 85 FL
MODE: ABNORMAL
MONOCYTES # BLD AUTO: 2.1 K/UL
MONOCYTES NFR BLD: 12 %
NEUTROPHILS # BLD AUTO: 14 K/UL
NEUTROPHILS NFR BLD: 79.4 %
PCO2 BLDA: 43.3 MMHG (ref 35–45)
PEEP: 5
PH SMN: 7.44 [PH] (ref 7.35–7.45)
PHOSPHATE SERPL-MCNC: 3.1 MG/DL
PLATELET # BLD AUTO: 352 K/UL
PMV BLD AUTO: 10 FL
PO2 BLDA: 156 MMHG (ref 80–100)
POC BE: 5 MMOL/L
POC SATURATED O2: 99 % (ref 95–100)
POC TCO2: 31 MMOL/L (ref 23–27)
POCT GLUCOSE: 182 MG/DL (ref 70–110)
POCT GLUCOSE: 195 MG/DL (ref 70–110)
POCT GLUCOSE: 88 MG/DL (ref 70–110)
POCT GLUCOSE: 91 MG/DL (ref 70–110)
POTASSIUM SERPL-SCNC: 4 MMOL/L
PROT SERPL-MCNC: 7.6 G/DL
RBC # BLD AUTO: 5.04 M/UL
SAMPLE: ABNORMAL
SITE: ABNORMAL
SODIUM SERPL-SCNC: 143 MMOL/L
SP02: 100
VT: 450
WBC # BLD AUTO: 17.61 K/UL

## 2017-04-11 PROCEDURE — 63600175 PHARM REV CODE 636 W HCPCS: Performed by: INTERNAL MEDICINE

## 2017-04-11 PROCEDURE — 85025 COMPLETE CBC W/AUTO DIFF WBC: CPT

## 2017-04-11 PROCEDURE — 25000242 PHARM REV CODE 250 ALT 637 W/ HCPCS: Performed by: INTERNAL MEDICINE

## 2017-04-11 PROCEDURE — 36415 COLL VENOUS BLD VENIPUNCTURE: CPT

## 2017-04-11 PROCEDURE — 27200966 HC CLOSED SUCTION SYSTEM

## 2017-04-11 PROCEDURE — 94761 N-INVAS EAR/PLS OXIMETRY MLT: CPT

## 2017-04-11 PROCEDURE — 25000242 PHARM REV CODE 250 ALT 637 W/ HCPCS: Performed by: HOSPITALIST

## 2017-04-11 PROCEDURE — 80053 COMPREHEN METABOLIC PANEL: CPT

## 2017-04-11 PROCEDURE — 83735 ASSAY OF MAGNESIUM: CPT

## 2017-04-11 PROCEDURE — 94003 VENT MGMT INPAT SUBQ DAY: CPT

## 2017-04-11 PROCEDURE — 84100 ASSAY OF PHOSPHORUS: CPT

## 2017-04-11 PROCEDURE — 83036 HEMOGLOBIN GLYCOSYLATED A1C: CPT

## 2017-04-11 PROCEDURE — 99900035 HC TECH TIME PER 15 MIN (STAT)

## 2017-04-11 PROCEDURE — 94640 AIRWAY INHALATION TREATMENT: CPT

## 2017-04-11 PROCEDURE — 36600 WITHDRAWAL OF ARTERIAL BLOOD: CPT

## 2017-04-11 PROCEDURE — 25000003 PHARM REV CODE 250: Performed by: INTERNAL MEDICINE

## 2017-04-11 PROCEDURE — 99900026 HC AIRWAY MAINTENANCE (STAT)

## 2017-04-11 PROCEDURE — 27000221 HC OXYGEN, UP TO 24 HOURS

## 2017-04-11 PROCEDURE — 20000000 HC ICU ROOM

## 2017-04-11 RX ORDER — IPRATROPIUM BROMIDE AND ALBUTEROL SULFATE 2.5; .5 MG/3ML; MG/3ML
3 SOLUTION RESPIRATORY (INHALATION) EVERY 6 HOURS
Status: DISCONTINUED | OUTPATIENT
Start: 2017-04-11 | End: 2017-04-15 | Stop reason: HOSPADM

## 2017-04-11 RX ADMIN — CHLORHEXIDINE GLUCONATE 15 ML: 1.2 RINSE ORAL at 09:04

## 2017-04-11 RX ADMIN — PROPOFOL 30 MCG/KG/MIN: 10 INJECTION, EMULSION INTRAVENOUS at 04:04

## 2017-04-11 RX ADMIN — METHYLPREDNISOLONE SODIUM SUCCINATE 125 MG: 125 INJECTION, POWDER, FOR SOLUTION INTRAMUSCULAR; INTRAVENOUS at 01:04

## 2017-04-11 RX ADMIN — AZITHROMYCIN MONOHYDRATE 500 MG: 500 INJECTION, POWDER, LYOPHILIZED, FOR SOLUTION INTRAVENOUS at 12:04

## 2017-04-11 RX ADMIN — DILTIAZEM HYDROCHLORIDE 90 MG: 30 TABLET, FILM COATED ORAL at 09:04

## 2017-04-11 RX ADMIN — ACETAMINOPHEN 500 MG: 500 TABLET ORAL at 07:04

## 2017-04-11 RX ADMIN — DILTIAZEM HYDROCHLORIDE 90 MG: 30 TABLET, FILM COATED ORAL at 01:04

## 2017-04-11 RX ADMIN — IPRATROPIUM BROMIDE AND ALBUTEROL SULFATE 3 ML: .5; 3 SOLUTION RESPIRATORY (INHALATION) at 10:04

## 2017-04-11 RX ADMIN — ACETAMINOPHEN 500 MG: 500 TABLET ORAL at 03:04

## 2017-04-11 RX ADMIN — PANTOPRAZOLE SODIUM 40 MG: 40 GRANULE, DELAYED RELEASE ORAL at 09:04

## 2017-04-11 RX ADMIN — INSULIN DETEMIR 10 UNITS: 100 INJECTION, SOLUTION SUBCUTANEOUS at 09:04

## 2017-04-11 RX ADMIN — PROPOFOL 35 MCG/KG/MIN: 10 INJECTION, EMULSION INTRAVENOUS at 10:04

## 2017-04-11 RX ADMIN — CLONIDINE HYDROCHLORIDE 0.2 MG: 0.1 TABLET ORAL at 10:04

## 2017-04-11 RX ADMIN — INSULIN ASPART 2 UNITS: 100 INJECTION, SOLUTION INTRAVENOUS; SUBCUTANEOUS at 05:04

## 2017-04-11 RX ADMIN — IPRATROPIUM BROMIDE AND ALBUTEROL SULFATE 3 ML: .5; 3 SOLUTION RESPIRATORY (INHALATION) at 03:04

## 2017-04-11 RX ADMIN — METHYLPREDNISOLONE SODIUM SUCCINATE 125 MG: 125 INJECTION, POWDER, FOR SOLUTION INTRAMUSCULAR; INTRAVENOUS at 09:04

## 2017-04-11 RX ADMIN — DILTIAZEM HYDROCHLORIDE 90 MG: 30 TABLET, FILM COATED ORAL at 06:04

## 2017-04-11 RX ADMIN — CLONIDINE HYDROCHLORIDE 0.2 MG: 0.1 TABLET ORAL at 06:04

## 2017-04-11 RX ADMIN — PROPOFOL 25 MCG/KG/MIN: 10 INJECTION, EMULSION INTRAVENOUS at 03:04

## 2017-04-11 RX ADMIN — IPRATROPIUM BROMIDE AND ALBUTEROL SULFATE 3 ML: .5; 3 SOLUTION RESPIRATORY (INHALATION) at 07:04

## 2017-04-11 RX ADMIN — INSULIN ASPART 2 UNITS: 100 INJECTION, SOLUTION INTRAVENOUS; SUBCUTANEOUS at 12:04

## 2017-04-11 RX ADMIN — METHYLPREDNISOLONE SODIUM SUCCINATE 125 MG: 125 INJECTION, POWDER, FOR SOLUTION INTRAMUSCULAR; INTRAVENOUS at 06:04

## 2017-04-11 RX ADMIN — NICOTINE 1 PATCH: 21 PATCH, EXTENDED RELEASE TRANSDERMAL at 09:04

## 2017-04-11 RX ADMIN — PROPOFOL 25 MCG/KG/MIN: 10 INJECTION, EMULSION INTRAVENOUS at 10:04

## 2017-04-11 RX ADMIN — IPRATROPIUM BROMIDE AND ALBUTEROL SULFATE 3 ML: .5; 3 SOLUTION RESPIRATORY (INHALATION) at 01:04

## 2017-04-11 NOTE — PLAN OF CARE
04/11/17 1639   Discharge Assessment   Assessment Type Discharge Planning Assessment   Confirmed/corrected address and phone number on facesheet? Yes   Assessment information obtained from? Other  (Spouse - Getachewfelipa Palm)   Expected Length of Stay (days) [phone]   Prior to hospitilization cognitive status: Alert/Oriented   Prior to hospitalization functional status: Needs Assistance   Current cognitive status: Coma/Sedated/Intubated   Current Functional Status: Needs Assistance   Arrived From home or self-care   Lives With spouse   Able to Return to Prior Arrangements yes   Is patient able to care for self after discharge? Unable to determine at this time (comments)   How many people do you have in your home that can help with your care after discharge? 1   Who are your caregiver(s) and their phone number(s)? Spouse - Getachewfelipa Palm [phone] and lissette Montoya [phone]   Patient's perception of discharge disposition admitted as an inpatient   Readmission Within The Last 30 Days no previous admission in last 30 days   Patient currently being followed by outpatient case management? No   Patient currently receives home health services? Yes   Patient currently receives private duty nursing? No   Patient currently receives any other outside agency services? No   Equipment Currently Used at Home oxygen;shower chair  (Patient uses  nebulizer and O2)   Do you have any problems affording any of your prescribed medications? No   Is the patient taking medications as prescribed? yes   Do you have any financial concerns preventing you from receiving the healthcare you need? No   Does the patient have transportation to healthcare appointments? Yes   Transportation Available family or friend will provide;car   On Dialysis? No   Does the patient receive services at the [facility]? No   Are there any open cases? No   Discharge Plan A Home with family   Patient/Family In Agreement With Plan yes

## 2017-04-11 NOTE — PROGRESS NOTES
Pt remains on servoi vent.Pt weaned to SIMV rate 14/450 tv/+5 peep/10 ps/ and 40% fio2.Pt tolerated respiratory treatment with MARGARET.Will continue to wean as tolerated.

## 2017-04-11 NOTE — PLAN OF CARE
Attempted several times to call pt's home number [phone] and a relative Amado Montoya at [phone] to complete the discharge assessment and no answer. Left voice mail message with both phones. Also, went to pt's room. Pt has a oxygen mask on and unable to speak.)     04/11/17 1626   Discharge Assessment   Assessment Type Discharge Planning Assessment   Assessment information obtained from? Other

## 2017-04-11 NOTE — PLAN OF CARE
Problem: Patient Care Overview  Goal: Plan of Care Review  Outcome: Ongoing (interventions implemented as appropriate)  Pt remains on vent SIMV with FIO2 at 40 % rate 12 tidal volume 450 and PEEP 5. Pt remains on diprivan at 30 mcg. Pt has no new skin breakdown noted. Pt has remained safe and free of injury today. Pt has been repositioned q 2hrs. Pt has had low urinary out put thru out day. Dr. Olguin aware. Pt in NSR at times .

## 2017-04-11 NOTE — ASSESSMENT & PLAN NOTE
Patient will be counseled on smoking cessation and she will be provided a nicotine transdermal patch applied while inpatient.  Will provide additional smoking cessation counseling prior to discharge.

## 2017-04-11 NOTE — CONSULTS
[facility] [place]  Consult Note    SUMMARY     Recommendations    Recommendation/Intervention: 1) TF recs:  Peptamen VHP (Bariatric) @ 55 cc/hr, provides 1320 calories (1687 with propofol), 121 g protein, 1109 cc free water 2) Initiate at 15 cc/hr and advance by 10 cc Q4 hours 3) Flush with 100 cc Q4 hours or per MD. 4) Check residuals Q4 hours. Hold if > 250 cc. Use Reglan if needed.  Goals: 1) Patient will meet >=85 - 115% of EEN & EPN within 72 hours  Nutrition Goal Status: new  Communication of RD Recs:  (physician's sticky note)    Continuum of Care Plan    D/C Planning:  Too soon to determine. Will monitor/follow-up.     Diagnosis  1. Acute respiratory failure with hypercapnia    2. Shortness of breath    3. Flank pain      Reason for Assessment    Reason for Assessment: new tube feeding, physician consult  Relevent Medical History: DM, CHF, HTN   General Information Comments: Patient intubated and sedated. Patient's  at bedside. States patient lost a significant amount of weight over the past year due to high stress levels and decreased appetite.     Nutrition Prescription Ordered    Current Diet Order: NPO    Evaluation of Received Nutrients/Fluid Intake    Other Calories (kcal): 367 (propofol)  Energy Calories Required: not meeting needs    Protein Required: not meeting needs     Fluid Required: exceed needs (Net I/O +368.6)    Nutrition Risk Screen      no indicators present    Nutrition/Diet History    Patient Reported Diet/Restrictions/Preferences: general     Food Preferences: No cultural, Rastafarian or ethnic food preferences.    Factors Affecting Nutritional Intake: NPO, on mechanical ventilation    Labs/Tests/Procedures/Meds    Pertinent Labs Reviewed: reviewed, pertinent  Pertinent Labs Comments:  - 124  BMP  Lab Results   Component Value Date     04/11/2017    K 4.0 04/11/2017     04/11/2017    CO2 26 04/11/2017    BUN 16 04/11/2017    CREATININE  1.1 04/11/2017    CALCIUM 9.4 04/11/2017    ANIONGAP 17 (H) 04/11/2017    ESTGFRAFRICA >60 04/11/2017    EGFRNONAA 53 (A) 04/11/2017     Lab Results   Component Value Date    ALBUMIN 3.4 (L) 04/11/2017       Pertinent Medications Reviewed: reviewed, pertinent  Scheduled Meds:   albuterol-ipratropium 2.5mg-0.5mg/3mL  3 mL Nebulization Q6H    azithromycin  500 mg Intravenous Q24H    chlorhexidine  15 mL Mouth/Throat BID    cloNIDine  0.2 mg Oral TID    diltiaZEM  90 mg Oral Q8H    insulin detemir  10 Units Subcutaneous QHS    lisinopril  40 mg Oral Daily    methylPREDNISolone sodium succinate  125 mg Intravenous Q8H    nicotine  1 patch Transdermal Daily    pantoprazole  40 mg Per NG tube Daily     Continuous Infusions:   propofol 25 mcg/kg/min (04/11/17 1045)     PRN Meds:.acetaminophen, dextrose 50%, glucagon (human recombinant), hydrALAZINE, insulin aspart, ondansetron, promethazine (PHENERGAN) IVPB    Physical Findings    Overall Physical Appearance: obese, on ventilator support  Tubes:  (-)  Skin: intact    Anthropometrics    Height (inches): 67.01 in  Weight Method: Bed Scale  Weight (kg): 88.3 kg  Ideal Body Weight (IBW), Female: 135.05 lb  % Ideal Body Weight, Female (lb): 144.15 lb  BMI (kg/m2): 30.48  BMI Grade: 30 - 34.9- obesity - grade I    Weight Loss: unintentional    Estimated/Assessed Needs    Weight Used For Calorie Calculations: 88.3 kg (194 lb 10.7 oz)   Height (cm): 170.2 cm  Energy Need Method: [facility] (modified) (1676)  RMR (Hastings-St. Jeor Equation): 1465.07  Weight Used For Protein Calculations: 61.4 kg (135 lb 5.8 oz) (Ideal Body Weight )  Protein Requirements: 122.7 g/day  Fluid Need Method: RDA Method, other (see comments) (or per MD)  Fiber Requirement: CHO requirement = 225 - 250 g/day    Nutrition Diagnosis  Nutrition Problem: Inadequate energy intake  Etiology: No tube feeding orders   Signs/Symptoms: NPO, intubated  Status: new    Monitor and Evaluation    Food and  Nutrient Intake: energy intake, food and beverage intake, enteral nutrition intake  Food and Nutrient Adminstration: diet order, enteral and parenteral nutrition administration  Anthropometric Measurements: weight, weight change, body mass index  Biochemical Data, Medical Tests and Procedures: electrolyte and renal panel, gastrointestinal profile, glucose/endocrine profile, lipid profile  Nutrition-Focused Physical Findings: overall appearance    Nutrition Risk    Level of Risk: other (see comments) (f/u 2x weekly)    Nutrition Follow-Up    RD Follow-up?: Yes    Assessment and Plan    No new Assessment & Plan notes have been filed under this hospital service since the last note was generated.  Service: Nutrition

## 2017-04-11 NOTE — SUBJECTIVE & OBJECTIVE
Past Medical History:   Diagnosis Date    CHF (congestive heart failure)     COPD (chronic obstructive pulmonary disease)     Depression     Diabetes mellitus     GERD (gastroesophageal reflux disease)     Hypertension        Past Surgical History:   Procedure Laterality Date    ABDOMINAL SURGERY      CARDIAC SURGERY         Review of patient's allergies indicates:  No Known Allergies    No current facility-administered medications on file prior to encounter.      Current Outpatient Prescriptions on File Prior to Encounter   Medication Sig    acetaminophen (TYLENOL) 500 MG tablet Take 1 tablet (500 mg total) by mouth every 8 (eight) hours as needed.    aspirin (ECOTRIN) 81 MG EC tablet Take 81 mg by mouth once daily.    cloNIDine (CATAPRES) 0.1 MG tablet Take 2 tablets (0.2 mg total) by mouth 3 (three) times daily.    diltiazem (CARDIZEM) 90 MG tablet Take 90 mg by mouth 3 (three) times daily.    furosemide (LASIX) 40 MG tablet Take 40 mg by mouth 2 (two) times daily.    metformin (GLUCOPHAGE) 500 MG tablet Take 500 mg by mouth 2 (two) times daily with meals.    nitroGLYCERIN (NITROSTAT) 0.3 MG SL tablet Place 0.3 mg under the tongue every 5 (five) minutes as needed for Chest pain.    pantoprazole (PROTONIX) 40 MG tablet Take 1 tablet (40 mg total) by mouth once daily.    pregabalin (LYRICA) 50 MG capsule Take 50 mg by mouth 3 (three) times daily.    sotalol (BETAPACE) 80 MG tablet Take 80 mg by mouth 2 (two) times daily.    tramadol (ULTRAM) 50 mg tablet Take 1 tablet (50 mg total) by mouth every 6 (six) hours as needed for Pain.    warfarin (COUMADIN) 10 MG tablet Take 1 tablet (10 mg total) by mouth Daily.     Family History     Problem Relation (Age of Onset)    Diabetes Father    Heart disease Mother    Hypertension Mother        Social History Main Topics    Smoking status: Current Every Day Smoker     Packs/day: 0.50     Years: 25.00     Types: Cigarettes    Smokeless tobacco: Not on  file    Alcohol use No    Drug use: No    Sexual activity: Not Currently     Review of Systems   Unable to perform ROS: Intubated     Objective:     Vital Signs (Most Recent):  Temp: 97.1 °F (36.2 °C) (04/10/17 1850)  Pulse: 97 (04/10/17 1934)  Resp: 20 (04/10/17 1934)  BP: 130/64 (04/10/17 1915)  SpO2: 100 % (04/10/17 1934) Vital Signs (24h Range):  Temp:  [97.1 °F (36.2 °C)-99.1 °F (37.3 °C)] 97.1 °F (36.2 °C)  Pulse:  [] 97  Resp:  [10-24] 20  SpO2:  [84 %-100 %] 100 %  BP: ()/() 130/64     Weight: 86 kg (189 lb 9.5 oz)  Body mass index is 29.69 kg/(m^2).    Physical Exam   Constitutional: She appears well-developed and well-nourished. No distress.   HENT:   Head: Normocephalic and atraumatic.   Right Ear: External ear normal.   Left Ear: External ear normal.   Nose: Nose normal.   ETT & OGT in place   Eyes: Right eye exhibits no discharge. Left eye exhibits no discharge.   Neck: Normal range of motion.   Cardiovascular: Normal rate, regular rhythm, normal heart sounds and intact distal pulses.  Exam reveals no gallop and no friction rub.    No murmur heard.  Pulmonary/Chest:   Mechanically ventilated with prolonged expiratory phase but no appreciable wheezes or crackles   Abdominal: Soft. Bowel sounds are normal. She exhibits no distension. There is no tenderness. There is no rebound and no guarding.   Musculoskeletal: Normal range of motion. She exhibits no edema.   Neurological:   sedated   Skin: Skin is warm and dry. She is not diaphoretic. No erythema.   Nursing note and vitals reviewed.       Significant Labs: All pertinent labs within the past 24 hours have been reviewed.    Significant Imaging: I have reviewed and interpreted all pertinent imaging results/findings within the past 24 hours.

## 2017-04-11 NOTE — CONSULTS
Consult Note  Pulmonology    Consult Requested By: Meredith Olguin MD  Reason for Consult : vent management    SUBJECTIVE:admitted with acute respiratory failure     History of Present Illness:  Patient unresponsive;information obtained from chart      Review of patient's allergies indicates:  No Known Allergies    Past Medical History:   Diagnosis Date    CHF (congestive heart failure)     COPD (chronic obstructive pulmonary disease)     Depression     Diabetes mellitus     GERD (gastroesophageal reflux disease)     Hypertension      Past Surgical History:   Procedure Laterality Date    ABDOMINAL SURGERY      CARDIAC SURGERY       Family History   Problem Relation Age of Onset    Heart disease Mother     Hypertension Mother     Diabetes Father        Review of systems not obtained due to patient factors ; patient intubated and unable to communicate.    OBJECTIVE:     Vital Signs (Most Recent)  Temp: 100 °F (37.8 °C) (04/11/17 0430)  Pulse: 98 (04/11/17 0856)  Resp: 14 (04/11/17 0856)  BP: 136/74 (04/11/17 0700)  SpO2: 97 % (04/11/17 0856)    Vital Signs Range (Last 24H):  Temp:  [97.1 °F (36.2 °C)-100 °F (37.8 °C)]   Pulse:  []   Resp:  [10-23]   BP: ()/()   SpO2:  [91 %-100 %]     Physical Exam:    General: no distress,.on the ventilator  Neck: no jugular venous distention and no carotid bruit  Lungs:  diminished breath sounds bilaterally and 7.5 ett in place and rhonchi bilaterally  Heart: regular rate and rhythm and no murmur  Abdomen: soft, non-tender non-distended; bowel sounds normal and right NGT in place  Extremities: no cyanosis or edema, or clubbing  Neurologic: sedated.  Skin-warm, moist. No rash      Laboratory:  Recent Results (from the past 24 hour(s))   ISTAT PROCEDURE    Collection Time: 04/10/17 11:10 AM   Result Value Ref Range    POC PH 7.327 (L) 7.35 - 7.45    POC PCO2 62.5 (HH) 35 - 45 mmHg    POC PO2 87 80 - 100 mmHg    POC HCO3 32.8 (H) 24 - 28 mmol/L    POC BE 5 -2  to 2 mmol/L    POC SATURATED O2 95 95 - 100 %    POC TCO2 35 (H) 23 - 27 mmol/L    Sample ARTERIAL     Site RR     Allens Test N/A     DelSys Nasal Can     Mode SPONT     Flow 3     Sp02 98    Comprehensive metabolic panel    Collection Time: 04/10/17 11:29 AM   Result Value Ref Range    Sodium 143 136 - 145 mmol/L    Potassium 3.9 3.5 - 5.1 mmol/L    Chloride 100 95 - 110 mmol/L    CO2 32 (H) 23 - 29 mmol/L    Glucose 124 (H) 70 - 110 mg/dL    BUN, Bld 9 8 - 23 mg/dL    Creatinine 0.7 0.5 - 1.4 mg/dL    Calcium 10.1 8.7 - 10.5 mg/dL    Total Protein 9.5 (H) 6.0 - 8.4 g/dL    Albumin 3.8 3.5 - 5.2 g/dL    Total Bilirubin 0.4 0.1 - 1.0 mg/dL    Alkaline Phosphatase 104 55 - 135 U/L    AST 19 10 - 40 U/L    ALT 13 10 - 44 U/L    Anion Gap 11 8 - 16 mmol/L    eGFR if African American >60 >60 mL/min/1.73 m^2    eGFR if non African American >60 >60 mL/min/1.73 m^2   APTT    Collection Time: 04/10/17 11:29 AM   Result Value Ref Range    aPTT 28.5 21.0 - 32.0 sec   Protime-INR    Collection Time: 04/10/17 11:29 AM   Result Value Ref Range    Prothrombin Time 11.6 9.0 - 12.5 sec    INR 1.1 0.8 - 1.2   Troponin I    Collection Time: 04/10/17 11:29 AM   Result Value Ref Range    Troponin I 0.043 (H) 0.000 - 0.026 ng/mL   Brain natriuretic peptide    Collection Time: 04/10/17 11:29 AM   Result Value Ref Range    BNP 1371 (H) 0 - 99 pg/mL   Magnesium    Collection Time: 04/10/17 11:29 AM   Result Value Ref Range    Magnesium 1.8 1.6 - 2.6 mg/dL   CBC auto differential    Collection Time: 04/10/17 11:29 AM   Result Value Ref Range    WBC 13.08 (H) 3.90 - 12.70 K/uL    RBC 5.25 4.00 - 5.40 M/uL    Hemoglobin 13.3 12.0 - 16.0 g/dL    Hematocrit 45.1 37.0 - 48.5 %    MCV 86 82 - 98 fL    MCH 25.3 (L) 27.0 - 31.0 pg    MCHC 29.5 (L) 32.0 - 36.0 %    RDW 17.8 (H) 11.5 - 14.5 %    Platelets 408 (H) 150 - 350 K/uL    MPV 9.5 9.2 - 12.9 fL    Gran # 10.7 (H) 1.8 - 7.7 K/uL    Lymph # 1.4 1.0 - 4.8 K/uL    Mono # 0.9 0.3 - 1.0 K/uL     Eos # 0.1 0.0 - 0.5 K/uL    Baso # 0.03 0.00 - 0.20 K/uL    Gran% 81.8 (H) 38.0 - 73.0 %    Lymph% 10.8 (L) 18.0 - 48.0 %    Mono% 7.0 4.0 - 15.0 %    Eosinophil% 0.5 0.0 - 8.0 %    Basophil% 0.2 0.0 - 1.9 %    Aniso Slight     Poly Occasional     Differential Method Automated    Urinalysis    Collection Time: 04/10/17  3:15 PM   Result Value Ref Range    Specimen UA Urine, Clean Catch     Color, UA Yellow Yellow, Straw, Kerri    Appearance, UA Clear Clear    pH, UA 8.0 5.0 - 8.0    Specific Gravity, UA 1.015 1.005 - 1.030    Protein, UA 2+ (A) Negative    Glucose, UA 1+ (A) Negative    Ketones, UA Trace (A) Negative    Bilirubin (UA) Negative Negative    Occult Blood UA Negative Negative    Nitrite, UA Negative Negative    Urobilinogen, UA Negative <2.0 EU/dL    Leukocytes, UA Negative Negative   Urinalysis Microscopic    Collection Time: 04/10/17  3:15 PM   Result Value Ref Range    RBC, UA 2 0 - 4 /hpf    WBC, UA 2 0 - 5 /hpf    Bacteria, UA None None-Occ /hpf    Hyaline Casts, UA 1 0-1/lpf /lpf    Microscopic Comment SEE COMMENT    POCT glucose    Collection Time: 04/10/17  3:49 PM   Result Value Ref Range    POCT Glucose 186 (H) 70 - 110 mg/dL   ISTAT PROCEDURE    Collection Time: 04/10/17  3:54 PM   Result Value Ref Range    POC PH 7.234 (LL) 7.35 - 7.45    POC PCO2 72.8 (HH) 35 - 45 mmHg    POC PO2 83 80 - 100 mmHg    POC HCO3 30.8 (H) 24 - 28 mmol/L    POC BE 1 -2 to 2 mmol/L    POC SATURATED O2 93 (L) 95 - 100 %    POC TCO2 33 (H) 23 - 27 mmol/L    Sample ARTERIAL     Site RR     Allens Test Pass     DelSys Nasal Can     Mode SPONT     Flow 3     Sp02 95    POCT glucose    Collection Time: 04/10/17 11:58 PM   Result Value Ref Range    POCT Glucose 91 70 - 110 mg/dL   CBC auto differential    Collection Time: 04/11/17  1:59 AM   Result Value Ref Range    WBC 17.61 (H) 3.90 - 12.70 K/uL    RBC 5.04 4.00 - 5.40 M/uL    Hemoglobin 12.6 12.0 - 16.0 g/dL    Hematocrit 42.6 37.0 - 48.5 %    MCV 85 82 - 98 fL     MCH 25.0 (L) 27.0 - 31.0 pg    MCHC 29.6 (L) 32.0 - 36.0 %    RDW 18.2 (H) 11.5 - 14.5 %    Platelets 352 (H) 150 - 350 K/uL    MPV 10.0 9.2 - 12.9 fL    Gran # 14.0 (H) 1.8 - 7.7 K/uL    Lymph # 1.5 1.0 - 4.8 K/uL    Mono # 2.1 (H) 0.3 - 1.0 K/uL    Eos # 0.0 0.0 - 0.5 K/uL    Baso # 0.02 0.00 - 0.20 K/uL    Gran% 79.4 (H) 38.0 - 73.0 %    Lymph% 8.5 (L) 18.0 - 48.0 %    Mono% 12.0 4.0 - 15.0 %    Eosinophil% 0.0 0.0 - 8.0 %    Basophil% 0.1 0.0 - 1.9 %    Differential Method Automated    Magnesium    Collection Time: 04/11/17  2:00 AM   Result Value Ref Range    Magnesium 1.5 (L) 1.6 - 2.6 mg/dL   Phosphorus    Collection Time: 04/11/17  2:00 AM   Result Value Ref Range    Phosphorus 3.1 2.7 - 4.5 mg/dL   Comprehensive metabolic panel    Collection Time: 04/11/17  2:00 AM   Result Value Ref Range    Sodium 143 136 - 145 mmol/L    Potassium 4.0 3.5 - 5.1 mmol/L    Chloride 100 95 - 110 mmol/L    CO2 26 23 - 29 mmol/L    Glucose 100 70 - 110 mg/dL    BUN, Bld 16 8 - 23 mg/dL    Creatinine 1.1 0.5 - 1.4 mg/dL    Calcium 9.4 8.7 - 10.5 mg/dL    Total Protein 7.6 6.0 - 8.4 g/dL    Albumin 3.4 (L) 3.5 - 5.2 g/dL    Total Bilirubin 0.5 0.1 - 1.0 mg/dL    Alkaline Phosphatase 92 55 - 135 U/L    AST 19 10 - 40 U/L    ALT 10 10 - 44 U/L    Anion Gap 17 (H) 8 - 16 mmol/L    eGFR if African American >60 >60 mL/min/1.73 m^2    eGFR if non African American 53 (A) >60 mL/min/1.73 m^2   ISTAT PROCEDURE    Collection Time: 04/11/17  4:38 AM   Result Value Ref Range    POC PH 7.439 7.35 - 7.45    POC PCO2 43.3 35 - 45 mmHg    POC PO2 156 (H) 80 - 100 mmHg    POC HCO3 29.3 (H) 24 - 28 mmol/L    POC BE 5 -2 to 2 mmol/L    POC SATURATED O2 99 95 - 100 %    POC TCO2 31 (H) 23 - 27 mmol/L    Rate 20     Sample ARTERIAL     Site RR     Allens Test Pass     DelSys Adult Vent     Mode AC/PRVC     Vt 450     PEEP 5     FiO2 60     Sp02 100    POCT glucose    Collection Time: 04/11/17  5:54 AM   Result Value Ref Range    POCT Glucose 88 70 -  110 mg/dL     CBC:   Recent Labs  Lab 04/11/17  0159   WBC 17.61*   RBC 5.04   HGB 12.6   HCT 42.6   *   MCV 85   MCH 25.0*   MCHC 29.6*     BMP:   Recent Labs  Lab 04/11/17  0200         K 4.0      CO2 26   BUN 16   CREATININE 1.1   CALCIUM 9.4   MG 1.5*     CMP:   Recent Labs  Lab 04/11/17  0200      CALCIUM 9.4   ALBUMIN 3.4*   PROT 7.6      K 4.0   CO2 26      BUN 16   CREATININE 1.1   ALKPHOS 92   ALT 10   AST 19   BILITOT 0.5     LFTs:   Recent Labs  Lab 04/11/17  0200   ALT 10   AST 19   ALKPHOS 92   BILITOT 0.5   PROT 7.6   ALBUMIN 3.4*     Coagulation:   Recent Labs  Lab 04/10/17  1129   INR 1.1   APTT 28.5     Cardiac markers: No results for input(s): CKMB, TROPONINT, MYOGLOBIN in the last 168 hours.  Microbiology Results (last 7 days)     ** No results found for the last 168 hours. **        ABGs:   Recent Labs  Lab 04/11/17  0438   PH 7.439   PCO2 43.3   PO2 156*   HCO3 29.3*   POCSATURATED 99   BE 5       Ventilator Setting:  Vent Mode: PRVC  Oxygen Concentration (%):  [40-60] 40  Resp Rate Total:  [19 br/min-21 br/min] 20 br/min  Vt Set:  [450 mL] 450 mL  PEEP/CPAP:  [5 cmH20] 5 cmH20  Mean Airway Pressure:  [8.6 ayF90-12.3 cmH20] 9.5 cmH20     Diagnostic Results:   Imaging Results         X-Ray Chest AP Portable (Final result) Result time:  04/11/17 08:28:43    Final result by Shade Kennedy MD (04/11/17 08:28:43)    Impression:      ET tube and enteric tube remain in satisfactory position.  Overall, there has been little interval change when compared to 4/10/17.      Electronically signed by: SHADE KENNEDY MD  Date:     04/11/17  Time:    08:28     Narrative:    Comparison is made to prior examination dated 4/10/17.    A single portable AP radiograph of chest was obtained. The ET tube and enteric tube remain in satisfactory position. The cardiac silhouette and mediastinal structures are unremarkable. Atherosclerotic calcification is present within the  thoracic aorta. Pulmonary vasculature is within normal limits. The lungs are free of focal consolidations. There is no evidence for pneumothorax or large pleural effusions. Bony structures are grossly intact.            X-Ray Chest 1 View (Final result) Result time:  04/10/17 17:58:23    Final result by Chloe Castro MD (04/10/17 17:58:23)    Impression:        As above.      Electronically signed by: CHLOE CASTRO MD, MD  Date:     04/10/17  Time:    17:58     Narrative:    COMPARISON: Chest radiograph earlier same day at 1047 hrs and CT abdomen and pelvis earlier same day at 16 40 hours.    FINDINGS: AP portable supine view of the chest. Monitoring leads and tubing overlie the chest. Interval placement of endotracheal tube with tip terminating approximately 7.4 cm above the paco. Interval placement of enteric tube with port in tip below the diaphragm projected over the left upper quadrant likely within the proximal stomach. No large pneumothorax. Interval vascular crowding at the left lung base suggesting subsegmental atelectasis given the short time interval, with early aspiration or pneumonia not excluded. Otherwise, grossly stable radiographic appearance of the chest.            CTA Chest Non-Coronary (PE Study) (In process)         CT Abdomen Pelvis With Contrast (Final result)    Abnormal Result time:  04/10/17 17:24:30    Final result by Omaira Kirk MD (04/10/17 17:24:30)    Impression:        1.  Improvement in the appearance of pulmonary thromboemboli compared to previous exam.  Most of the emboli have resolved.  There is persistent nonfilling of a pulmonary artery branch supplying the superior segment of the left lower lobe, which no longer appears expanded, consistent with chronic thrombus.  2.  Peribronchial thickening suggesting bronchitis.  3.  Endotracheal tube in satisfactory position.  Dependent fluid within the distal trachea and proximal mainstem bronchi consistent with secretions or  aspirated material.  4.  3.3 cm cavitary lung lesion in the superior segment of the left upper lobe is somewhat peripheral and wedge-shaped and is favored to reflect evolving pulmonary infarct.  Followup is recommended.  5.  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  6.  Low density right renal lesion incompletely evaluated but most likely reflects a cyst.  Ultrasound could be considered for further evaluation.      Epic notification system activated.       Electronically signed by: KATHIE MORRIS MD  Date:     04/10/17  Time:    17:24     Narrative:    04/10/17 16:13:59    Accession # [id number]    CLINICAL INDICATION: 67 year old F with Flank pain     TECHNIQUE: Axial CT images obtained at the region of the chest, abdomen and pelvis following the administration of oral contrast.  Images of the chest were obtained using PE protocol.  Axial, sagittal and coronal reconstructions were performed.    COMPARISON:  Prior thoracic CT dated 9/27/16    FINDINGS: Study is limited by motion artifact.      Endotracheal tube is present in satisfactory position.  Dependent areas of opacity in the distal trachea and proximal mainstem bronchi with air or fluid levels consistent with secretions or aspirated material.  A nasogastric tube tip lies in the stomach.    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  A 2nd saccular aneurysm is noted in the descending thoracic aorta measuring approximately 3.9 cm in greatest transaxial dimension.      There is adequate opacification of the pulmonary arterial system to evaluate for thromboembolism to the level of the segmental vessels.  There is  been resolution of most of the previously described thromboemboli with persistent opacity within a vessel supplying the superior segment of the left lower lobe consistent with chronic thrombus.  The vessel is decreased in size compared to previous exam and no longer appears expanded.    The mediastinal structures are midline.  The heart is not enlarged.  There is no mediastinal or hilar lymph node enlargement.  No pericardial fluid is seen.    The airways are patent.  Bilateral peribronchial cuffing most pronounced in the right lower lobe suggest bronchitis.  There is bibasilar atelectasis.  A cavitary lung lesion is present in the superior segment of the left lower lobe measuring approximately 3.3 x 1.4 cm.  This lies in the area where previous sizable pulmonary emboli were seen and lesion is somewhat peripheral and wedge-shaped, raising the possibility of evolving pulmonary infarct..  No pleural fluid is seen.        The liver is normal in size and attenuation.  There is no evidence of biliary dilatation.  The gallbladder has normal appearance.  The portal vein is patent.    The pancreas, spleen, and adrenal glands have a normal appearance.    Stomach and small bowel are non-dilated.  There is no evidence of colonic thickening or obstruction.     The kidneys concentrate and excrete contrast appropriately.  A low density right adrenal lesion measuring approximately 3 cm in long axis is favored to reflect a cyst but is poorly evaluated due to motion artifact and may be septated.  Ultrasound should be considered for further evaluation.  The aorta tapers distally.    The bladder is collapsed around a Clark catheter.  Uterus and adnexa have a normal CT appearance.    There is no free air or free fluid in the abdomen or pelvis.    There is degenerative change of the spine..  There is postoperative change consistent with anterior abdominal wall hernia repair.            X-Ray Chest AP Portable (Final result) Result time:   04/10/17 11:42:34    Final result by Charles Marsh MD (04/10/17 11:42:34)    Impression:       No acute intrathoracic process.          Electronically signed by: CHARLES MARSH MD  Date:     04/10/17  Time:    11:42     Narrative:    [id number]  Accession # [id number]      Study:  XR CHEST AP PORTABLE    Indication: chest pain.    Comparison: The chest radiograph from 10/02/2016.    Findings:     XR CHEST AP PORTABLE.    Cardiac silhouette is normal in appearance.  Calcification of the aortic arch.  Pulmonary vasculature is within normal limits.    Lungs well-aerated.  No focal consolidation.   No pleural effusion.  Osseous structures demonstrate no significant abnormalities.                  ASSESSMENT/PLAN:     1. Acute respiratory failure with hypercapnia    2. Shortness of breath    3. Flank pain    4.     Acute respiratory failure  5.     Vent management  7.     Hypoxemia  8.     Hypercapnea      Plan:admitted with acute resp failure ; Wean vent as permitted by events

## 2017-04-11 NOTE — ASSESSMENT & PLAN NOTE
Patient was complaining of dyspnea, a productive cough, and chest pain for the past three days and was felt to be with an exacerbation of COPD, which she has had in the past.  Mrs. Palm was hypercapnic on arrival with an ABG significant for 7.327  62.5  87  32.8 on nasal cannula at 3 liters/minute.  I have reviewed the chest X-ray and it reveals no infiltrates or pleural effusions.  Per ED notes she was complaining of right flank pain and was given hydromorphone.  She became more lethargic with a repeat ABG revealing worsening hypercapnia: 7.234  72.8  83  30.8 on the same supplemental oxygen settings.  She was intubated for acute respiratory failure with hypercapnia, presumably worsened by the hydromorphone.  Her lung exam is clear at this time.  Will provide frequent nebulized JORDAN/LAMA; intravenous corticosteroids; empiric antibiotics; and consult Pulmonology for ventilator management.      Of note, she has a negative CT-A study for pulmonary embolism and a benign abdominal study.  There was mention of a lesion on the left upper lobe: 3.3 cm cavitary lung lesion in the superior segment of the left upper lobe is somewhat peripheral and wedge-shaped and is favored to reflect evolving pulmonary infarct.  Will await further recommendations from Pulmonology.

## 2017-04-11 NOTE — ASSESSMENT & PLAN NOTE
Patient's blood pressure is well-controlled; will continue home regimen of diltiazem, clonidine, furosemide, and lisinopril, and provide as-needed hydralazine.  Will hold sotalol for now.

## 2017-04-11 NOTE — PROGRESS NOTES
Pt on servoi vent in PRVC rate 20/450 tv/+5 peep/ and 40% fio2 sats 100%the patient has clear bs.Pt has 7.5 et tube taped 23 cm moved to left lip.Pt alarms on and functioning.Pt ambu bag and mask at [facility].Will continue to wean as tolerated.

## 2017-04-11 NOTE — PLAN OF CARE
Problem: Patient Care Overview  Goal: Plan of Care Review  04/11/2017     Recommendations     Recommendation/Intervention: 1) TF recs:  Peptamen VHP (Bariatric) @ 55 cc/hr, provides 1320 calories (1687 with propofol), 121 g protein, 1109 cc free water 2) Initiate at 15 cc/hr and advance by 10 cc Q4 hours 3) Flush with 100 cc Q4 hours or per MD. 4) Check residuals Q4 hours. Hold if > 250 cc. Use Reglan if needed.  Goals: 1) Patient will meet >=85 - 115% of EEN & EPN within 72 hours  Nutrition Goal Status: new  Communication of RD Recs:  (physician's sticky note)     Leny Iqbal, MPH, RD, LDN

## 2017-04-11 NOTE — H&P
[facility] Hospital Medicine  History & Physical    Patient Name: Yasmeen Palm  MRN: [id number]  Admission Date: 4/10/2017  Attending Physician: Meredith Olguin MD   Primary Care Provider: Angel Orourke Jr, MD         Patient information was obtained from patient's  and ER records.     Subjective:     Principal Problem:COPD with acute exacerbation    Chief Complaint: Shortness of breath and weakness for three days.    HPI: Mrs. Yasmeen Palm is a 67 y.o. female known to me with essential hypertension, type 2 diabetes mellitus (HbA1c 7.5% Sept 2016), COPD, chronic respiratory failure with hypoxia and hypercapnia, GERD, anemia of chronic disease, mild protein malnutrition, tobacco abuse, and history of PE/DVT on chronic anticoagulation who presents to [facility] ED with complaints of weakness for three days.  Her  reports that the weakness has been progressively worsening in that timeframe and was associated with nausea and vomiting.  She was also noted to be very lightheaded and had a couple episodes of near-syncope.  He also reports that she had been short of breath and continues to smoke.  She has also had a productive cough.  He also reports that she was complaining of chest pain.  He denies any new medications.  Further history is limited at this time.    Chart Review:  Previous Hospitalizations  Date Hospital Diagnosis   Sept 2016 [facility] PE/DVT   Dec 2015 [facility] Chest pain - normal nuclear stress test   Nov 2010 [facility] COPD exacerbation    Aug 2010 [facility] Chest pain - negative work-up     Past Medical History:   Diagnosis Date    CHF (congestive heart failure)     COPD (chronic obstructive pulmonary disease)     Depression     Diabetes mellitus     GERD (gastroesophageal reflux disease)     Hypertension        Past Surgical History:   Procedure Laterality Date    ABDOMINAL SURGERY      CARDIAC SURGERY         Review of patient's allergies indicates:  No Known  Allergies    No current facility-administered medications on file prior to encounter.      Current Outpatient Prescriptions on File Prior to Encounter   Medication Sig    acetaminophen (TYLENOL) 500 MG tablet Take 1 tablet (500 mg total) by mouth every 8 (eight) hours as needed.    aspirin (ECOTRIN) 81 MG EC tablet Take 81 mg by mouth once daily.    cloNIDine (CATAPRES) 0.1 MG tablet Take 2 tablets (0.2 mg total) by mouth 3 (three) times daily.    diltiazem (CARDIZEM) 90 MG tablet Take 90 mg by mouth 3 (three) times daily.    furosemide (LASIX) 40 MG tablet Take 40 mg by mouth 2 (two) times daily.    metformin (GLUCOPHAGE) 500 MG tablet Take 500 mg by mouth 2 (two) times daily with meals.    nitroGLYCERIN (NITROSTAT) 0.3 MG SL tablet Place 0.3 mg under the tongue every 5 (five) minutes as needed for Chest pain.    pantoprazole (PROTONIX) 40 MG tablet Take 1 tablet (40 mg total) by mouth once daily.    pregabalin (LYRICA) 50 MG capsule Take 50 mg by mouth 3 (three) times daily.    sotalol (BETAPACE) 80 MG tablet Take 80 mg by mouth 2 (two) times daily.    tramadol (ULTRAM) 50 mg tablet Take 1 tablet (50 mg total) by mouth every 6 (six) hours as needed for Pain.    warfarin (COUMADIN) 10 MG tablet Take 1 tablet (10 mg total) by mouth Daily.     Family History     Problem Relation (Age of Onset)    Diabetes Father    Heart disease Mother    Hypertension Mother        Social History Main Topics    Smoking status: Current Every Day Smoker     Packs/day: 0.50     Years: 25.00     Types: Cigarettes    Smokeless tobacco: Not on file    Alcohol use No    Drug use: No    Sexual activity: Not Currently     Review of Systems   Unable to perform ROS: Intubated     Objective:     Vital Signs (Most Recent):  Temp: 97.1 °F (36.2 °C) (04/10/17 1850)  Pulse: 97 (04/10/17 1934)  Resp: 20 (04/10/17 1934)  BP: 130/64 (04/10/17 1915)  SpO2: 100 % (04/10/17 1934) Vital Signs (24h Range):  Temp:  [97.1 °F (36.2 °C)-99.1 °F  (37.3 °C)] 97.1 °F (36.2 °C)  Pulse:  [] 97  Resp:  [10-24] 20  SpO2:  [84 %-100 %] 100 %  BP: ()/() 130/64     Weight: 86 kg (189 lb 9.5 oz)  Body mass index is 29.69 kg/(m^2).    Physical Exam   Constitutional: She appears well-developed and well-nourished. No distress.   HENT:   Head: Normocephalic and atraumatic.   Right Ear: External ear normal.   Left Ear: External ear normal.   Nose: Nose normal.   ETT & OGT in place   Eyes: Right eye exhibits no discharge. Left eye exhibits no discharge.   Neck: Normal range of motion.   Cardiovascular: Normal rate, regular rhythm, normal heart sounds and intact distal pulses.  Exam reveals no gallop and no friction rub.    No murmur heard.  Pulmonary/Chest:   Mechanically ventilated with prolonged expiratory phase but no appreciable wheezes or crackles   Abdominal: Soft. Bowel sounds are normal. She exhibits no distension. There is no tenderness. There is no rebound and no guarding.   Musculoskeletal: Normal range of motion. She exhibits no edema.   Neurological:   sedated   Skin: Skin is warm and dry. She is not diaphoretic. No erythema.   Nursing note and vitals reviewed.       Significant Labs: All pertinent labs within the past 24 hours have been reviewed.    Significant Imaging: I have reviewed and interpreted all pertinent imaging results/findings within the past 24 hours.    Assessment/Plan:     * COPD with acute exacerbation  Patient was complaining of dyspnea, a productive cough, and chest pain for the past three days and was felt to be with an exacerbation of COPD, which she has had in the past.  Mrs. Palm was hypercapnic on arrival with an ABG significant for 7.327  62.5  87  32.8 on nasal cannula at 3 liters/minute.  I have reviewed the chest X-ray and it reveals no infiltrates or pleural effusions.  Per ED notes she was complaining of right flank pain and was given hydromorphone.  She became more lethargic with a repeat ABG revealing  worsening hypercapnia: 7.234  72.8  83  30.8 on the same supplemental oxygen settings.  She was intubated for acute respiratory failure with hypercapnia, presumably worsened by the hydromorphone.  Her lung exam is clear at this time.  Will provide frequent nebulized JORDAN/LAMA; intravenous corticosteroids; empiric antibiotics; and consult Pulmonology for ventilator management.      Of note, she has a negative CT-A study for pulmonary embolism and a benign abdominal study.  There was mention of a lesion on the left upper lobe: 3.3 cm cavitary lung lesion in the superior segment of the left upper lobe is somewhat peripheral and wedge-shaped and is favored to reflect evolving pulmonary infarct.  Will await further recommendations from Pulmonology.      COPD (chronic obstructive pulmonary disease)  As addressed above.    Type 2 diabetes mellitus, controlled  Well-controlled on a home regimen of metformin; will provide basal insulin along with insulin sliding scale.    Gastroesophageal reflux disease without esophagitis  Will continue her home regimen of pantoprazole while she is inpatient.    Tobacco abuse  Patient will be counseled on smoking cessation and she will be provided a nicotine transdermal patch applied while inpatient.  Will provide additional smoking cessation counseling prior to discharge.    Essential hypertension  Patient's blood pressure is well-controlled; will continue home regimen of diltiazem, clonidine, furosemide, and lisinopril, and provide as-needed hydralazine.  Will hold sotalol for now.    Anemia of chronic disease  Her anemia is much improved at this time.  There are no acute issues.    Mild protein malnutrition  Will consult Dietary for tube feeding recommendations.    Acute respiratory failure with hypercapnia  As addressed above.    On mechanically assisted ventilation  As addressed above.    History of pulmonary embolism  Stable; will continue her home regimen of  rivaroxaban.    History of deep vein thrombosis  Stable; will continue her home regimen of rivaroxaban.    Chronic anticoagulation  Stable; will continue her home regimen of rivaroxaban.    Chronic respiratory failure with hypoxia and hypercapnia  As addressed above.    VTE Risk Mitigation         Ordered     Medium Risk of VTE  Once      04/10/17 1843            Critical Care Time: 60 minutes.        Gaby Childers M.D.  Staff Nocturnist  Department of Hospital Medicine  [facility]  Pager: [phone]

## 2017-04-12 LAB
ALLENS TEST: ABNORMAL
DELSYS: ABNORMAL
FIO2: 40
HCO3 UR-SCNC: 29.4 MMOL/L (ref 24–28)
HCT VFR BLD CALC: 41 %PCV (ref 36–54)
MIN VOL: 9.1
MODE: ABNORMAL
PCO2 BLDA: 53.2 MMHG (ref 35–45)
PEEP: 5
PH SMN: 7.35 [PH] (ref 7.35–7.45)
PO2 BLDA: 101 MMHG (ref 80–100)
POC BE: 3 MMOL/L
POC IONIZED CALCIUM: 1.16 MMOL/L (ref 1.06–1.42)
POC SATURATED O2: 97 % (ref 95–100)
POC TCO2: 31 MMOL/L (ref 23–27)
POCT GLUCOSE: 154 MG/DL (ref 70–110)
POCT GLUCOSE: 175 MG/DL (ref 70–110)
POCT GLUCOSE: 188 MG/DL (ref 70–110)
POCT GLUCOSE: 238 MG/DL (ref 70–110)
POCT GLUCOSE: 253 MG/DL (ref 70–110)
POTASSIUM BLD-SCNC: 3.5 MMOL/L (ref 3.5–5.1)
PS: 10
SAMPLE: ABNORMAL
SITE: ABNORMAL
SODIUM BLD-SCNC: 136 MMOL/L (ref 136–145)
SP02: 98
SPONT RATE: 22

## 2017-04-12 PROCEDURE — 94640 AIRWAY INHALATION TREATMENT: CPT

## 2017-04-12 PROCEDURE — 20000000 HC ICU ROOM

## 2017-04-12 PROCEDURE — 82803 BLOOD GASES ANY COMBINATION: CPT

## 2017-04-12 PROCEDURE — 25000242 PHARM REV CODE 250 ALT 637 W/ HCPCS: Performed by: INTERNAL MEDICINE

## 2017-04-12 PROCEDURE — 25000003 PHARM REV CODE 250: Performed by: INTERNAL MEDICINE

## 2017-04-12 PROCEDURE — 99900026 HC AIRWAY MAINTENANCE (STAT)

## 2017-04-12 PROCEDURE — 99900035 HC TECH TIME PER 15 MIN (STAT)

## 2017-04-12 PROCEDURE — 63600175 PHARM REV CODE 636 W HCPCS: Performed by: INTERNAL MEDICINE

## 2017-04-12 PROCEDURE — 94003 VENT MGMT INPAT SUBQ DAY: CPT

## 2017-04-12 PROCEDURE — 94761 N-INVAS EAR/PLS OXIMETRY MLT: CPT

## 2017-04-12 PROCEDURE — 36600 WITHDRAWAL OF ARTERIAL BLOOD: CPT

## 2017-04-12 PROCEDURE — 99900017 HC EXTUBATION W/PARAMETERS (STAT)

## 2017-04-12 PROCEDURE — 25000003 PHARM REV CODE 250: Performed by: HOSPITALIST

## 2017-04-12 PROCEDURE — 27000221 HC OXYGEN, UP TO 24 HOURS

## 2017-04-12 RX ORDER — ASPIRIN 81 MG/1
81 TABLET ORAL DAILY
Status: DISCONTINUED | OUTPATIENT
Start: 2017-04-13 | End: 2017-04-15 | Stop reason: HOSPADM

## 2017-04-12 RX ORDER — PREGABALIN 50 MG/1
50 CAPSULE ORAL 3 TIMES DAILY
Status: DISCONTINUED | OUTPATIENT
Start: 2017-04-12 | End: 2017-04-15 | Stop reason: HOSPADM

## 2017-04-12 RX ORDER — SOTALOL HYDROCHLORIDE 80 MG/1
80 TABLET ORAL 2 TIMES DAILY
Status: DISCONTINUED | OUTPATIENT
Start: 2017-04-12 | End: 2017-04-15 | Stop reason: HOSPADM

## 2017-04-12 RX ORDER — TRAZODONE HYDROCHLORIDE 50 MG/1
50 TABLET ORAL NIGHTLY
Status: DISCONTINUED | OUTPATIENT
Start: 2017-04-12 | End: 2017-04-15 | Stop reason: HOSPADM

## 2017-04-12 RX ORDER — TRAMADOL HYDROCHLORIDE 50 MG/1
50 TABLET ORAL EVERY 6 HOURS PRN
Status: DISCONTINUED | OUTPATIENT
Start: 2017-04-12 | End: 2017-04-15 | Stop reason: HOSPADM

## 2017-04-12 RX ORDER — FUROSEMIDE 40 MG/1
40 TABLET ORAL DAILY
Status: DISCONTINUED | OUTPATIENT
Start: 2017-04-13 | End: 2017-04-15 | Stop reason: HOSPADM

## 2017-04-12 RX ADMIN — DILTIAZEM HYDROCHLORIDE 90 MG: 30 TABLET, FILM COATED ORAL at 02:04

## 2017-04-12 RX ADMIN — INSULIN DETEMIR 10 UNITS: 100 INJECTION, SOLUTION SUBCUTANEOUS at 09:04

## 2017-04-12 RX ADMIN — INSULIN ASPART 1 UNITS: 100 INJECTION, SOLUTION INTRAVENOUS; SUBCUTANEOUS at 12:04

## 2017-04-12 RX ADMIN — CLONIDINE HYDROCHLORIDE 0.2 MG: 0.1 TABLET ORAL at 02:04

## 2017-04-12 RX ADMIN — INSULIN ASPART 4 UNITS: 100 INJECTION, SOLUTION INTRAVENOUS; SUBCUTANEOUS at 04:04

## 2017-04-12 RX ADMIN — DILTIAZEM HYDROCHLORIDE 90 MG: 30 TABLET, FILM COATED ORAL at 09:04

## 2017-04-12 RX ADMIN — INSULIN ASPART 3 UNITS: 100 INJECTION, SOLUTION INTRAVENOUS; SUBCUTANEOUS at 09:04

## 2017-04-12 RX ADMIN — PREGABALIN 50 MG: 50 CAPSULE ORAL at 09:04

## 2017-04-12 RX ADMIN — DILTIAZEM HYDROCHLORIDE 90 MG: 30 TABLET, FILM COATED ORAL at 05:04

## 2017-04-12 RX ADMIN — ACETAMINOPHEN 500 MG: 500 TABLET ORAL at 02:04

## 2017-04-12 RX ADMIN — SOTALOL HYDROCHLORIDE 80 MG: 80 TABLET ORAL at 10:04

## 2017-04-12 RX ADMIN — AZITHROMYCIN MONOHYDRATE 500 MG: 500 INJECTION, POWDER, LYOPHILIZED, FOR SOLUTION INTRAVENOUS at 11:04

## 2017-04-12 RX ADMIN — INSULIN ASPART 2 UNITS: 100 INJECTION, SOLUTION INTRAVENOUS; SUBCUTANEOUS at 12:04

## 2017-04-12 RX ADMIN — NICOTINE 1 PATCH: 21 PATCH, EXTENDED RELEASE TRANSDERMAL at 08:04

## 2017-04-12 RX ADMIN — CHLORHEXIDINE GLUCONATE 15 ML: 1.2 RINSE ORAL at 08:04

## 2017-04-12 RX ADMIN — METHYLPREDNISOLONE SODIUM SUCCINATE 125 MG: 125 INJECTION, POWDER, FOR SOLUTION INTRAMUSCULAR; INTRAVENOUS at 02:04

## 2017-04-12 RX ADMIN — TRAZODONE HYDROCHLORIDE 50 MG: 50 TABLET ORAL at 09:04

## 2017-04-12 RX ADMIN — IPRATROPIUM BROMIDE AND ALBUTEROL SULFATE 3 ML: .5; 3 SOLUTION RESPIRATORY (INHALATION) at 08:04

## 2017-04-12 RX ADMIN — CLONIDINE HYDROCHLORIDE 0.2 MG: 0.1 TABLET ORAL at 05:04

## 2017-04-12 RX ADMIN — IPRATROPIUM BROMIDE AND ALBUTEROL SULFATE 3 ML: .5; 3 SOLUTION RESPIRATORY (INHALATION) at 12:04

## 2017-04-12 RX ADMIN — PANTOPRAZOLE SODIUM 40 MG: 40 GRANULE, DELAYED RELEASE ORAL at 08:04

## 2017-04-12 RX ADMIN — METHYLPREDNISOLONE SODIUM SUCCINATE 125 MG: 125 INJECTION, POWDER, FOR SOLUTION INTRAMUSCULAR; INTRAVENOUS at 09:04

## 2017-04-12 RX ADMIN — PROPOFOL 35 MCG/KG/MIN: 10 INJECTION, EMULSION INTRAVENOUS at 03:04

## 2017-04-12 RX ADMIN — INSULIN ASPART 2 UNITS: 100 INJECTION, SOLUTION INTRAVENOUS; SUBCUTANEOUS at 05:04

## 2017-04-12 RX ADMIN — METHYLPREDNISOLONE SODIUM SUCCINATE 125 MG: 125 INJECTION, POWDER, FOR SOLUTION INTRAMUSCULAR; INTRAVENOUS at 05:04

## 2017-04-12 RX ADMIN — ACETAMINOPHEN 500 MG: 500 TABLET ORAL at 03:04

## 2017-04-12 RX ADMIN — IPRATROPIUM BROMIDE AND ALBUTEROL SULFATE 3 ML: .5; 3 SOLUTION RESPIRATORY (INHALATION) at 02:04

## 2017-04-12 RX ADMIN — AZITHROMYCIN MONOHYDRATE 500 MG: 500 INJECTION, POWDER, LYOPHILIZED, FOR SOLUTION INTRAVENOUS at 12:04

## 2017-04-12 NOTE — PLAN OF CARE
Problem: Patient Care Overview  Goal: Plan of Care Review  Outcome: Ongoing (interventions implemented as appropriate)  Pt remains free of falls/injury this shift, pt turned Q2 hrs, weaned to CPAP this AM tolerating well, VSS, afebrile, accu checks Q6, krishna intact urine output 20-30cc/hr

## 2017-04-12 NOTE — PLAN OF CARE
PATIENT IS MAINTAIN ON VENT ON CPAP OF +5/10/40%. PATIENT TOLERATED HER TREATMENTS WELL WITH NADR.

## 2017-04-12 NOTE — PLAN OF CARE
Problem: Patient Care Overview  Goal: Plan of Care Review  Outcome: Ongoing (interventions implemented as appropriate)  Pt extubated today at approx 1040. Pt now with O2 at 4l/nc with O2 sats 91-95%. Pt tolerating cardiac Diabetic diet this evening.Pt able to reposition herself with minimal assist. Pt has remained safe and free of injury today and has no skin breakdown noted.

## 2017-04-12 NOTE — PROGRESS NOTES
Progress Note  Pulmonology    Admit Date: 4/10/2017   LOS: 2 days       SUBJECTIVE: acute respiratory failure  On vent     History of Present Illness: 67 y.o. female known to me with essential hypertension, type 2 diabetes mellitus (HbA1c 7.5% Sept 2016), COPD, chronic respiratory failure with hypoxia and hypercapnia, GERD, anemia of chronic disease, mild protein malnutrition, tobacco abuse, and history of PE/DVT on chronic anticoagulation who presents to [facility] ED with complaints of weakness for three days. Her  reports that the weakness has been progressively worsening in that timeframe and was associated with nausea and vomiting. She was also noted to be very lightheaded and had a couple episodes of near-syncope. He also reports that she had been short of breath and continues to smoke. She has also had a productive cough. He also reports that she was complaining of chest pain. He denies any new medications. Further history is limited at this time.      Review of patient's allergies indicates:  No Known Allergies    Past Medical History:   Diagnosis Date    CHF (congestive heart failure)     COPD (chronic obstructive pulmonary disease)     Depression     Diabetes mellitus     GERD (gastroesophageal reflux disease)     Hypertension      Past Surgical History:   Procedure Laterality Date    ABDOMINAL SURGERY      CARDIAC SURGERY       Family History   Problem Relation Age of Onset    Heart disease Mother     Hypertension Mother     Diabetes Father        ROS:patient  unable to communicate because intubated, on vent, and sedated     OBJECTIVE:     Vital Signs (Most Recent)  Temp: 99.1 °F (37.3 °C) (04/12/17 0316)  Pulse: 80 (04/12/17 0834)  Resp: (!) 24 (04/12/17 0834)  BP: (!) 97/54 (04/12/17 0833)  SpO2: 99 % (04/12/17 0834)    Vital Signs Range (Last 24H):  Temp:  [98.5 °F (36.9 °C)-99.1 °F (37.3 °C)]   Pulse:  []   Resp:  [4-29]   BP: ()/(54-82)   SpO2:  [93 %-99 %]     Physical  Exam:  Intubated on vent  Head: normocephalic, atraumatic  Eyes:  conjunctivae/corneas clear. PERRL.  Throat: lips, mucosa, and tongue normal; teeth and gums normal and no throat erythema  Neck: no jugular venous distention, no adenopathy, no carotid bruit and thyroid not enlarged, symmetric, no tenderness/mass/nodules, trachea midline  Lungs:  rales bilaterally and rhonchi bilaterally  Chest Wall: no tenderness  Heart: regular rate and rhythm and no murmur  Abdomen: soft, non-tender non-distended; bowel sounds normal  Extremities: no cyanosis or edema, or clubbing  Skin: No rashes or lesions or good skin turgor  Neurologic: alert, oriented, thought content appropriate    Laboratory:  Recent Results (from the past 24 hour(s))   POCT glucose    Collection Time: 04/11/17 11:57 AM   Result Value Ref Range    POCT Glucose 182 (H) 70 - 110 mg/dL   POCT glucose    Collection Time: 04/11/17  5:55 PM   Result Value Ref Range    POCT Glucose 195 (H) 70 - 110 mg/dL   POCT glucose    Collection Time: 04/12/17 12:03 AM   Result Value Ref Range    POCT Glucose 175 (H) 70 - 110 mg/dL   ISTAT PROCEDURE    Collection Time: 04/12/17  4:26 AM   Result Value Ref Range    POC PH 7.351 7.35 - 7.45    POC PCO2 53.2 (H) 35 - 45 mmHg    POC PO2 101 (H) 80 - 100 mmHg    POC HCO3 29.4 (H) 24 - 28 mmol/L    POC BE 3 -2 to 2 mmol/L    POC SATURATED O2 97 95 - 100 %    POC Sodium 136 136 - 145 mmol/L    POC Potassium 3.5 3.5 - 5.1 mmol/L    POC TCO2 31 (H) 23 - 27 mmol/L    POC Ionized Calcium 1.16 1.06 - 1.42 mmol/L    POC Hematocrit 41 36 - 54 %PCV    Sample ARTERIAL     Site RR     Allens Test Pass     DelSys Adult Vent     Mode CPAP     PEEP 5     PS 10     FiO2 40     Spont Rate 22     Min Vol 9.1     Sp02 98    POCT glucose    Collection Time: 04/12/17  5:35 AM   Result Value Ref Range    POCT Glucose 188 (H) 70 - 110 mg/dL     CBC:   Recent Labs  Lab 04/11/17  0159 04/12/17  0426   WBC 17.61*  --    RBC 5.04  --    HGB 12.6  --    HCT  42.6 41   *  --    MCV 85  --    MCH 25.0*  --    MCHC 29.6*  --      BMP:   Recent Labs  Lab 04/11/17  0200         K 4.0      CO2 26   BUN 16   CREATININE 1.1   CALCIUM 9.4   MG 1.5*     CMP:   Recent Labs  Lab 04/11/17  0200      CALCIUM 9.4   ALBUMIN 3.4*   PROT 7.6      K 4.0   CO2 26      BUN 16   CREATININE 1.1   ALKPHOS 92   ALT 10   AST 19   BILITOT 0.5     LFTs:   Recent Labs  Lab 04/11/17  0200   ALT 10   AST 19   ALKPHOS 92   BILITOT 0.5   PROT 7.6   ALBUMIN 3.4*     Coagulation:   Recent Labs  Lab 04/10/17  1129   INR 1.1   APTT 28.5     Cardiac markers: No results for input(s): CKMB, TROPONINT, MYOGLOBIN in the last 168 hours.  Microbiology Results (last 7 days)     ** No results found for the last 168 hours. **        ABGs:   Recent Labs  Lab 04/12/17  0426   PH 7.351   PCO2 53.2*   PO2 101*   HCO3 29.4*   POCSATURATED 97   BE 3       Ventilator Setting:  Vent Mode: PS/CPAP  Oxygen Concentration (%):  [] 100  Resp Rate Total:  [10 br/min-28 br/min] 27 br/min  Vt Set:  [450 mL] 450 mL  PEEP/CPAP:  [5 cmH20-10 cmH20] 10 cmH20  Pressure Support:  [5 cmH20-10 cmH20] 5 cmH20  Mean Airway Pressure:  [7.5 xdM16-93.9 cmH20] 11.4 cmH20     Diagnostic Results:  Tip of the endotracheal tube remains in satisfactory position.  There is an enteric catheter the tip of which is not included on radiograph.  Lungs are well-inflated.  No pelvic consolidations or pneumothorax.  There is atherosclerosis of the aortic arch.             ASSESSMENT/PLAN:     1. Acute respiratory failure with hypercapnia    2. Shortness of breath    3. Flank pain    4.      Vent management    Plan:continue weaning as tolerated.

## 2017-04-13 LAB
POCT GLUCOSE: 211 MG/DL (ref 70–110)
POCT GLUCOSE: 226 MG/DL (ref 70–110)
POCT GLUCOSE: 306 MG/DL (ref 70–110)
POCT GLUCOSE: 363 MG/DL (ref 70–110)

## 2017-04-13 PROCEDURE — 94761 N-INVAS EAR/PLS OXIMETRY MLT: CPT

## 2017-04-13 PROCEDURE — 94640 AIRWAY INHALATION TREATMENT: CPT

## 2017-04-13 PROCEDURE — 25000242 PHARM REV CODE 250 ALT 637 W/ HCPCS: Performed by: INTERNAL MEDICINE

## 2017-04-13 PROCEDURE — 27000221 HC OXYGEN, UP TO 24 HOURS

## 2017-04-13 PROCEDURE — 63600175 PHARM REV CODE 636 W HCPCS: Performed by: INTERNAL MEDICINE

## 2017-04-13 PROCEDURE — 25000003 PHARM REV CODE 250: Performed by: HOSPITALIST

## 2017-04-13 PROCEDURE — 25000003 PHARM REV CODE 250: Performed by: INTERNAL MEDICINE

## 2017-04-13 PROCEDURE — 12000002 HC ACUTE/MED SURGE SEMI-PRIVATE ROOM

## 2017-04-13 RX ORDER — METHYLPREDNISOLONE SOD SUCC 125 MG
60 VIAL (EA) INJECTION EVERY 8 HOURS
Status: DISCONTINUED | OUTPATIENT
Start: 2017-04-14 | End: 2017-04-14

## 2017-04-13 RX ADMIN — INSULIN ASPART 10 UNITS: 100 INJECTION, SOLUTION INTRAVENOUS; SUBCUTANEOUS at 09:04

## 2017-04-13 RX ADMIN — IPRATROPIUM BROMIDE AND ALBUTEROL SULFATE 3 ML: .5; 3 SOLUTION RESPIRATORY (INHALATION) at 07:04

## 2017-04-13 RX ADMIN — FUROSEMIDE 40 MG: 40 TABLET ORAL at 08:04

## 2017-04-13 RX ADMIN — PANTOPRAZOLE SODIUM 40 MG: 40 GRANULE, DELAYED RELEASE ORAL at 08:04

## 2017-04-13 RX ADMIN — ONDANSETRON 8 MG: 2 INJECTION INTRAMUSCULAR; INTRAVENOUS at 01:04

## 2017-04-13 RX ADMIN — TRAZODONE HYDROCHLORIDE 50 MG: 50 TABLET ORAL at 09:04

## 2017-04-13 RX ADMIN — IPRATROPIUM BROMIDE AND ALBUTEROL SULFATE 3 ML: .5; 3 SOLUTION RESPIRATORY (INHALATION) at 01:04

## 2017-04-13 RX ADMIN — METHYLPREDNISOLONE SODIUM SUCCINATE 125 MG: 125 INJECTION, POWDER, FOR SOLUTION INTRAMUSCULAR; INTRAVENOUS at 05:04

## 2017-04-13 RX ADMIN — INSULIN ASPART 8 UNITS: 100 INJECTION, SOLUTION INTRAVENOUS; SUBCUTANEOUS at 11:04

## 2017-04-13 RX ADMIN — METHYLPREDNISOLONE SODIUM SUCCINATE 125 MG: 125 INJECTION, POWDER, FOR SOLUTION INTRAMUSCULAR; INTRAVENOUS at 09:04

## 2017-04-13 RX ADMIN — INSULIN ASPART 4 UNITS: 100 INJECTION, SOLUTION INTRAVENOUS; SUBCUTANEOUS at 06:04

## 2017-04-13 RX ADMIN — PREGABALIN 50 MG: 50 CAPSULE ORAL at 05:04

## 2017-04-13 RX ADMIN — CLONIDINE HYDROCHLORIDE 0.2 MG: 0.1 TABLET ORAL at 09:04

## 2017-04-13 RX ADMIN — INSULIN ASPART 4 UNITS: 100 INJECTION, SOLUTION INTRAVENOUS; SUBCUTANEOUS at 08:04

## 2017-04-13 RX ADMIN — ASPIRIN 81 MG: 81 TABLET, COATED ORAL at 08:04

## 2017-04-13 RX ADMIN — INSULIN ASPART 10 UNITS: 100 INJECTION, SOLUTION INTRAVENOUS; SUBCUTANEOUS at 06:04

## 2017-04-13 RX ADMIN — NICOTINE 1 PATCH: 21 PATCH, EXTENDED RELEASE TRANSDERMAL at 08:04

## 2017-04-13 RX ADMIN — PREGABALIN 50 MG: 50 CAPSULE ORAL at 09:04

## 2017-04-13 RX ADMIN — DILTIAZEM HYDROCHLORIDE 90 MG: 30 TABLET, FILM COATED ORAL at 01:04

## 2017-04-13 RX ADMIN — PREGABALIN 50 MG: 50 CAPSULE ORAL at 01:04

## 2017-04-13 RX ADMIN — METHYLPREDNISOLONE SODIUM SUCCINATE 125 MG: 125 INJECTION, POWDER, FOR SOLUTION INTRAMUSCULAR; INTRAVENOUS at 01:04

## 2017-04-13 RX ADMIN — LISINOPRIL 40 MG: 20 TABLET ORAL at 08:04

## 2017-04-13 RX ADMIN — DILTIAZEM HYDROCHLORIDE 90 MG: 30 TABLET, FILM COATED ORAL at 05:04

## 2017-04-13 RX ADMIN — CLONIDINE HYDROCHLORIDE 0.2 MG: 0.1 TABLET ORAL at 01:04

## 2017-04-13 RX ADMIN — DILTIAZEM HYDROCHLORIDE 90 MG: 30 TABLET, FILM COATED ORAL at 09:04

## 2017-04-13 RX ADMIN — INSULIN DETEMIR 10 UNITS: 100 INJECTION, SOLUTION SUBCUTANEOUS at 09:04

## 2017-04-13 RX ADMIN — CLONIDINE HYDROCHLORIDE 0.2 MG: 0.1 TABLET ORAL at 05:04

## 2017-04-13 NOTE — PLAN OF CARE
Problem: Patient Care Overview  Goal: Plan of Care Review  Outcome: Ongoing (interventions implemented as appropriate)  Patient transferred to [address]. Vitals stable, no apparent distress, oxygen 3LNC. Afebrile, 1800 ADA diet, accuchecks with s/s insulin. Plan of care discussed with patient and  at bedside.

## 2017-04-13 NOTE — PLAN OF CARE
Problem: Patient Care Overview  Goal: Plan of Care Review  Outcome: Ongoing (interventions implemented as appropriate)  Patient AAO x 4. Patient remains on O2 4L NC; sats >93%. Vitals stable. Urine output adequate.  No skin breakdown noted. Patient remains free from falls and other hospital related injuries this shift.

## 2017-04-13 NOTE — PROGRESS NOTES
Progress Note  Pulmonology    Admit Date: 4/10/2017   LOS: 3 days       SUBJECTIVE: acute respiratory failure  On vent     History of Present Illness: 67 y.o. female known to me with essential hypertension, type 2 diabetes mellitus (HbA1c 7.5% Sept 2016), COPD, chronic respiratory failure with hypoxia and hypercapnia, GERD, anemia of chronic disease, mild protein malnutrition, tobacco abuse, and history of PE/DVT on chronic anticoagulation who presents to [facility] ED with complaints of weakness for three days. Her  reports that the weakness has been progressively worsening in that timeframe and was associated with nausea and vomiting. She was also noted to be very lightheaded and had a couple episodes of near-syncope. He also reports that she had been short of breath and continues to smoke. She has also had a productive cough. He also reports that she was complaining of chest pain. He denies any new medications. Further history is limited at this time.      Review of patient's allergies indicates:  No Known Allergies    Past Medical History:   Diagnosis Date    CHF (congestive heart failure)     COPD (chronic obstructive pulmonary disease)     Depression     Diabetes mellitus     GERD (gastroesophageal reflux disease)     Hypertension      Past Surgical History:   Procedure Laterality Date    ABDOMINAL SURGERY      CARDIAC SURGERY       Family History   Problem Relation Age of Onset    Heart disease Mother     Hypertension Mother     Diabetes Father        ROS:patient  unable to communicate because intubated, on vent, and sedated     OBJECTIVE:     Vital Signs (Most Recent)  Temp: 97.7 °F (36.5 °C) (04/13/17 0815)  Pulse: 60 (04/13/17 0800)  Resp: 20 (04/13/17 0800)  BP: (!) 127/58 (04/13/17 0800)  SpO2: (!) 93 % (04/13/17 0800)    Vital Signs Range (Last 24H):  Temp:  [97.7 °F (36.5 °C)-98.6 °F (37 °C)]   Pulse:  [55-94]   Resp:  [10-39]   BP: (100-149)/(50-76)   SpO2:  [90 %-100 %]     Physical  Exam:  Intubated on vent  Head: normocephalic, atraumatic  Eyes:  conjunctivae/corneas clear. PERRL.  Throat: lips, mucosa, and tongue normal; teeth and gums normal and no throat erythema  Neck: no jugular venous distention, no adenopathy, no carotid bruit and thyroid not enlarged, symmetric, no tenderness/mass/nodules, trachea midline  Lungs:  rales bilaterally and rhonchi bilaterally  Chest Wall: no tenderness  Heart: regular rate and rhythm and no murmur  Abdomen: soft, non-tender non-distended; bowel sounds normal  Extremities: no cyanosis or edema, or clubbing  Skin: No rashes or lesions or good skin turgor  Neurologic: alert, oriented, thought content appropriate    Laboratory:  Recent Results (from the past 24 hour(s))   POCT glucose    Collection Time: 04/12/17 12:07 PM   Result Value Ref Range    POCT Glucose 154 (H) 70 - 110 mg/dL   POCT glucose    Collection Time: 04/12/17  4:12 PM   Result Value Ref Range    POCT Glucose 238 (H) 70 - 110 mg/dL   POCT glucose    Collection Time: 04/12/17  9:47 PM   Result Value Ref Range    POCT Glucose 253 (H) 70 - 110 mg/dL   POCT glucose    Collection Time: 04/13/17  6:23 AM   Result Value Ref Range    POCT Glucose 211 (H) 70 - 110 mg/dL     CBC:     Recent Labs  Lab 04/11/17  0159 04/12/17  0426   WBC 17.61*  --    RBC 5.04  --    HGB 12.6  --    HCT 42.6 41   *  --    MCV 85  --    MCH 25.0*  --    MCHC 29.6*  --      BMP:     Recent Labs  Lab 04/11/17  0200         K 4.0      CO2 26   BUN 16   CREATININE 1.1   CALCIUM 9.4   MG 1.5*     CMP:     Recent Labs  Lab 04/11/17  0200      CALCIUM 9.4   ALBUMIN 3.4*   PROT 7.6      K 4.0   CO2 26      BUN 16   CREATININE 1.1   ALKPHOS 92   ALT 10   AST 19   BILITOT 0.5     LFTs:     Recent Labs  Lab 04/11/17  0200   ALT 10   AST 19   ALKPHOS 92   BILITOT 0.5   PROT 7.6   ALBUMIN 3.4*     Coagulation:     Recent Labs  Lab 04/10/17  1129   INR 1.1   APTT 28.5     Cardiac markers: No  results for input(s): CKMB, TROPONINT, MYOGLOBIN in the last 168 hours.  Microbiology Results (last 7 days)     ** No results found for the last 168 hours. **        ABGs:     Recent Labs  Lab 04/12/17  0426   PH 7.351   PCO2 53.2*   PO2 101*   HCO3 29.4*   POCSATURATED 97   BE 3       Ventilator Setting:  Vent Mode: PS/CPAP  Oxygen Concentration (%):  [40] 40  Resp Rate Total:  [20 br/min-27 br/min] 25 br/min  PEEP/CPAP:  [10 cmH20] 10 cmH20  Pressure Support:  [5 cmH20] 5 cmH20  Mean Airway Pressure:  [11.6 cmH20] 11.6 cmH20     Diagnostic Results:  Tip of the endotracheal tube remains in satisfactory position.  There is an enteric catheter the tip of which is not included on radiograph.  Lungs are well-inflated.  No pelvic consolidations or pneumothorax.  There is atherosclerosis of the aortic arch.             ASSESSMENT/PLAN:     1. Acute respiratory failure with hypercapnia    2. Shortness of breath    3. Flank pain    4.      Vent management    Plan:niow extubated and doing well.

## 2017-04-13 NOTE — NURSING TRANSFER
Nursing Transfer Note      4/13/2017     Transfer To: [id number]    Transfer via bed    Transfer with O2    Transported by Eugenio, Transport    Medicines sent: Insulin pens, azithromycin, sotalol    Chart send with patient: Yes    Notified: attempted to call , [place] but no answer and no voicemail available

## 2017-04-14 PROBLEM — R53.1 WEAKNESS: Status: ACTIVE | Noted: 2017-04-14

## 2017-04-14 PROBLEM — E66.9 OBESITY: Status: ACTIVE | Noted: 2017-04-14

## 2017-04-14 LAB
POCT GLUCOSE: 252 MG/DL (ref 70–110)
POCT GLUCOSE: 303 MG/DL (ref 70–110)
POCT GLUCOSE: 305 MG/DL (ref 70–110)
POCT GLUCOSE: 334 MG/DL (ref 70–110)
POCT GLUCOSE: 424 MG/DL (ref 70–110)
POCT GLUCOSE: 469 MG/DL (ref 70–110)

## 2017-04-14 PROCEDURE — 97165 OT EVAL LOW COMPLEX 30 MIN: CPT

## 2017-04-14 PROCEDURE — 27000221 HC OXYGEN, UP TO 24 HOURS

## 2017-04-14 PROCEDURE — 25000242 PHARM REV CODE 250 ALT 637 W/ HCPCS: Performed by: INTERNAL MEDICINE

## 2017-04-14 PROCEDURE — G8978 MOBILITY CURRENT STATUS: HCPCS | Mod: CJ

## 2017-04-14 PROCEDURE — 25000003 PHARM REV CODE 250: Performed by: HOSPITALIST

## 2017-04-14 PROCEDURE — G8987 SELF CARE CURRENT STATUS: HCPCS | Mod: CJ

## 2017-04-14 PROCEDURE — G8988 SELF CARE GOAL STATUS: HCPCS | Mod: CI

## 2017-04-14 PROCEDURE — 94640 AIRWAY INHALATION TREATMENT: CPT

## 2017-04-14 PROCEDURE — 63600175 PHARM REV CODE 636 W HCPCS: Performed by: HOSPITALIST

## 2017-04-14 PROCEDURE — G8979 MOBILITY GOAL STATUS: HCPCS | Mod: CI

## 2017-04-14 PROCEDURE — G8989 SELF CARE D/C STATUS: HCPCS | Mod: CJ

## 2017-04-14 PROCEDURE — 12000002 HC ACUTE/MED SURGE SEMI-PRIVATE ROOM

## 2017-04-14 PROCEDURE — 97161 PT EVAL LOW COMPLEX 20 MIN: CPT

## 2017-04-14 PROCEDURE — 25000003 PHARM REV CODE 250: Performed by: INTERNAL MEDICINE

## 2017-04-14 RX ORDER — PREDNISONE 20 MG/1
60 TABLET ORAL DAILY
Status: DISCONTINUED | OUTPATIENT
Start: 2017-04-14 | End: 2017-04-15 | Stop reason: HOSPADM

## 2017-04-14 RX ADMIN — ACETAMINOPHEN 500 MG: 500 TABLET ORAL at 02:04

## 2017-04-14 RX ADMIN — DILTIAZEM HYDROCHLORIDE 90 MG: 30 TABLET, FILM COATED ORAL at 05:04

## 2017-04-14 RX ADMIN — INSULIN ASPART 8 UNITS: 100 INJECTION, SOLUTION INTRAVENOUS; SUBCUTANEOUS at 05:04

## 2017-04-14 RX ADMIN — TRAMADOL HYDROCHLORIDE 50 MG: 50 TABLET, FILM COATED ORAL at 06:04

## 2017-04-14 RX ADMIN — LISINOPRIL 40 MG: 20 TABLET ORAL at 08:04

## 2017-04-14 RX ADMIN — DILTIAZEM HYDROCHLORIDE 90 MG: 30 TABLET, FILM COATED ORAL at 01:04

## 2017-04-14 RX ADMIN — ASPIRIN 81 MG: 81 TABLET, COATED ORAL at 08:04

## 2017-04-14 RX ADMIN — NICOTINE 1 PATCH: 21 PATCH, EXTENDED RELEASE TRANSDERMAL at 08:04

## 2017-04-14 RX ADMIN — INSULIN ASPART 6 UNITS: 100 INJECTION, SOLUTION INTRAVENOUS; SUBCUTANEOUS at 10:04

## 2017-04-14 RX ADMIN — FUROSEMIDE 40 MG: 40 TABLET ORAL at 08:04

## 2017-04-14 RX ADMIN — IPRATROPIUM BROMIDE AND ALBUTEROL SULFATE 3 ML: .5; 3 SOLUTION RESPIRATORY (INHALATION) at 08:04

## 2017-04-14 RX ADMIN — PREGABALIN 50 MG: 50 CAPSULE ORAL at 01:04

## 2017-04-14 RX ADMIN — DILTIAZEM HYDROCHLORIDE 90 MG: 30 TABLET, FILM COATED ORAL at 09:04

## 2017-04-14 RX ADMIN — AZITHROMYCIN MONOHYDRATE 500 MG: 500 INJECTION, POWDER, LYOPHILIZED, FOR SOLUTION INTRAVENOUS at 01:04

## 2017-04-14 RX ADMIN — ACETAMINOPHEN 500 MG: 500 TABLET ORAL at 06:04

## 2017-04-14 RX ADMIN — IPRATROPIUM BROMIDE AND ALBUTEROL SULFATE 3 ML: .5; 3 SOLUTION RESPIRATORY (INHALATION) at 12:04

## 2017-04-14 RX ADMIN — INSULIN DETEMIR 10 UNITS: 100 INJECTION, SOLUTION SUBCUTANEOUS at 10:04

## 2017-04-14 RX ADMIN — IPRATROPIUM BROMIDE AND ALBUTEROL SULFATE 3 ML: .5; 3 SOLUTION RESPIRATORY (INHALATION) at 07:04

## 2017-04-14 RX ADMIN — PANTOPRAZOLE SODIUM 40 MG: 40 GRANULE, DELAYED RELEASE ORAL at 08:04

## 2017-04-14 RX ADMIN — CLONIDINE HYDROCHLORIDE 0.2 MG: 0.1 TABLET ORAL at 05:04

## 2017-04-14 RX ADMIN — CLONIDINE HYDROCHLORIDE 0.2 MG: 0.1 TABLET ORAL at 09:04

## 2017-04-14 RX ADMIN — CLONIDINE HYDROCHLORIDE 0.2 MG: 0.1 TABLET ORAL at 01:04

## 2017-04-14 RX ADMIN — SOTALOL HYDROCHLORIDE 80 MG: 80 TABLET ORAL at 08:04

## 2017-04-14 RX ADMIN — TRAZODONE HYDROCHLORIDE 50 MG: 50 TABLET ORAL at 10:04

## 2017-04-14 RX ADMIN — PREGABALIN 50 MG: 50 CAPSULE ORAL at 10:04

## 2017-04-14 RX ADMIN — INSULIN ASPART 8 UNITS: 100 INJECTION, SOLUTION INTRAVENOUS; SUBCUTANEOUS at 08:04

## 2017-04-14 RX ADMIN — SOTALOL HYDROCHLORIDE 80 MG: 80 TABLET ORAL at 09:04

## 2017-04-14 RX ADMIN — PREGABALIN 50 MG: 50 CAPSULE ORAL at 05:04

## 2017-04-14 RX ADMIN — PREDNISONE 60 MG: 20 TABLET ORAL at 08:04

## 2017-04-14 RX ADMIN — INSULIN ASPART 8 UNITS: 100 INJECTION, SOLUTION INTRAVENOUS; SUBCUTANEOUS at 01:04

## 2017-04-14 RX ADMIN — IPRATROPIUM BROMIDE AND ALBUTEROL SULFATE 3 ML: .5; 3 SOLUTION RESPIRATORY (INHALATION) at 01:04

## 2017-04-14 NOTE — ASSESSMENT & PLAN NOTE
Well-controlled on a home regimen of metformin; on basal insulin along with insulin sliding scale. Anticipate improving glucose control with transition to PO steroids.  No other known manifestations

## 2017-04-14 NOTE — NURSING
Dr. Childers phoned and notified of pt blood glucose level of 424 at this time. MD verbalized understanding. Pt is awake and alert. No distress noted. No new orders received. Will continue to monitor pt.

## 2017-04-14 NOTE — PROGRESS NOTES
[facility] Hospital Medicine  Progress Note    Patient Name: Yasmeen Palm  MRN: [id number]  Patient Class: IP- Inpatient   Admission Date: 4/10/2017  Length of Stay: 4 days  Attending Physician: Tremayne Kong MD  Primary Care Provider: Angel Orourke Jr, MD        Subjective:     Principal Problem:COPD with acute exacerbation    HPI:  Mrs. Yasmeen Palm is a 67 y.o. female known to me with essential hypertension, type 2 diabetes mellitus (HbA1c 7.5% Sept 2016), COPD, chronic respiratory failure with hypoxia and hypercapnia, GERD, anemia of chronic disease, mild protein malnutrition, tobacco abuse, and history of PE/DVT on chronic anticoagulation who presents to [facility] ED with complaints of weakness for three days.  Her  reports that the weakness has been progressively worsening in that timeframe and was associated with nausea and vomiting.  She was also noted to be very lightheaded and had a couple episodes of near-syncope.  He also reports that she had been short of breath and continues to smoke.  She has also had a productive cough.  He also reports that she was complaining of chest pain.  He denies any new medications.  Further history is limited at this time.    Hospital Course:  Ms. Palm was admitted with COPD exacerbation that resulted in acute respiratory failure that required intubation and vent support. Pt was treated with IV steroids, Azithromycin, NEBS and followed by Pulmonary. CTA was negative for PE and patient already on Xarelto. Pt did well and was extubated on 4/12. She was sent to the floor on 4/13. PT/OT eval on 4/14.     Interval History: Doing well from respiratory stand point. States she is unsteady on her feet.     Review of Systems   Constitutional: Positive for activity change.   Respiratory: Negative for cough, chest tightness, shortness of breath, wheezing and stridor.    Cardiovascular: Negative for chest pain.   Gastrointestinal: Negative for  abdominal pain.     Objective:     Vital Signs (Most Recent):  Temp: 97.9 °F (36.6 °C) (04/13/17 2339)  Pulse: 60 (04/14/17 0801)  Resp: 18 (04/14/17 0801)  BP: 121/77 (04/13/17 2339)  SpO2: 97 % (04/14/17 0801) Vital Signs (24h Range):  Temp:  [97.9 °F (36.6 °C)-98.2 °F (36.8 °C)] 97.9 °F (36.6 °C)  Pulse:  [60-82] 60  Resp:  [17-34] 18  SpO2:  [87 %-99 %] 97 %  BP: (115-150)/(56-77) 121/77     Weight: 86.8 kg (191 lb 5.8 oz)  Body mass index is 29.97 kg/(m^2).    Intake/Output Summary (Last 24 hours) at 04/14/17 0904  Last data filed at 04/14/17 0600   Gross per 24 hour   Intake              850 ml   Output              770 ml   Net               80 ml      Physical Exam   Constitutional: She is oriented to person, place, and time. She appears well-developed and well-nourished.   Cardiovascular: Normal rate.    Pulmonary/Chest: Breath sounds normal. No respiratory distress. She has no wheezes. She has no rales.   Abdominal: Bowel sounds are normal.   Neurological: She is alert and oriented to person, place, and time.   Vitals reviewed.      Significant Labs: BMP: No results for input(s): GLU, NA, K, CL, CO2, BUN, CREATININE, CALCIUM, MG in the last 48 hours.  CBC: No results for input(s): WBC, HGB, HCT, PLT in the last 48 hours.    Significant Imaging:     Assessment/Plan:      * COPD with acute exacerbation  Pt was hypercapnic on arrival with an ABG significant for 7.327  62.5  87  32.8 on nasal cannula at 3 liters/minute.  She became more lethargic with a repeat ABG revealing worsening hypercapnia: 7.234  72.8  83  30.8.  She was intubated for acute respiratory failure with hypercapnia, presumably worsened by the hydromorphone.  Pt was treated with frequent nebulized JORDAN/LAMA; intravenous corticosteroids; empiric antibiotics; Pulmonary followed and she as extubated on 4/12 without difficulty to NC.   Doing well. Does not wear 02 at home.      COPD (chronic obstructive pulmonary disease)  As addressed  above.    Type 2 diabetes mellitus, controlled  Well-controlled on a home regimen of metformin; on basal insulin along with insulin sliding scale. Anticipate improving glucose control with transition to PO steroids.  No other known manifestations    Gastroesophageal reflux disease without esophagitis  Will continue her home regimen of pantoprazole while she is inpatient.    Tobacco abuse  Patient will be counseled on smoking cessation and she will be provided a nicotine transdermal patch applied while inpatient.  Will provide additional smoking cessation counseling prior to discharge.    Essential hypertension  Patient's blood pressure is well-controlled; will continue home regimen of diltiazem, clonidine, furosemide, and lisinopril, and provide as-needed hydralazine.  Will hold sotalol for now.    Anemia of chronic disease  Her anemia is much improved at this time.  There are no acute issues.    Mild protein malnutrition  Advance to 1800 ADA.    Acute respiratory failure with hypercapnia  Resolved. As addressed above.    On mechanically assisted ventilation  Resolved. As addressed above.    History of pulmonary embolism  Stable; will continue her home regimen of rivaroxaban.    History of deep vein thrombosis  Stable; will continue her home regimen of rivaroxaban.    Chronic anticoagulation  Stable; will continue her home regimen of rivaroxaban.    Chronic respiratory failure with hypoxia and hypercapnia  As addressed above.    Obesity  Body mass index is 29.97 kg/(m^2).  Weight loss as out patient       Weakness  PT/OT consult pending. Likely will need H/H. (patient already had H/H with nurse/aid only).       VTE Risk Mitigation         Ordered     Medium Risk of VTE  Once      04/10/17 2302     Reason for No Pharmacological VTE Prophylaxis  Once      04/10/17 2302        PT/OT consult. Wean 02. Home hopefully on 4/15.     Tremayne Morin MD  Department of Hospital Medicine   [facility]

## 2017-04-14 NOTE — SUBJECTIVE & OBJECTIVE
Interval History: Pt did well overnight, breathing back to normal. No acute events overnight.    Review of Systems   Constitutional: Negative for chills.   Respiratory: Positive for cough. Negative for shortness of breath.    Cardiovascular: Negative for chest pain.     Objective:     Vital Signs (Most Recent):  Temp: 97.9 °F (36.6 °C) (04/13/17 2339)  Pulse: 66 (04/14/17 0044)  Resp: 18 (04/14/17 0044)  BP: 121/77 (04/13/17 2339)  SpO2: 95 % (04/14/17 0044) Vital Signs (24h Range):  Temp:  [97.7 °F (36.5 °C)-98.2 °F (36.8 °C)] 97.9 °F (36.6 °C)  Pulse:  [54-82] 66  Resp:  [14-34] 18  SpO2:  [87 %-100 %] 95 %  BP: (115-150)/(56-77) 121/77     Weight: 86.8 kg (191 lb 5.8 oz)  Body mass index is 29.97 kg/(m^2).    Intake/Output Summary (Last 24 hours) at 04/14/17 0103  Last data filed at 04/14/17 0000   Gross per 24 hour   Intake              180 ml   Output             1075 ml   Net             -895 ml      Physical Exam   Constitutional: She is oriented to person, place, and time. She appears well-developed and well-nourished.   HENT:   Head: Normocephalic and atraumatic.   Eyes: EOM are normal. Pupils are equal, round, and reactive to light.   Neck: Normal range of motion. Neck supple.   Cardiovascular: Normal rate and regular rhythm.    Pulmonary/Chest: Effort normal and breath sounds normal. She has no wheezes.   Prolonged expiratory time.   Abdominal: Soft. Bowel sounds are normal.   Musculoskeletal: Normal range of motion.   Neurological: She is alert and oriented to person, place, and time.   Skin: Skin is warm and dry.       Significant Labs: All pertinent labs within the past 24 hours have been reviewed.    Significant Imaging: I have reviewed and interpreted all pertinent imaging results/findings within the past 24 hours.

## 2017-04-14 NOTE — SUBJECTIVE & OBJECTIVE
Interval History: Doing well from respiratory stand point. States she is unsteady on her feet.     Review of Systems   Constitutional: Positive for activity change.   Respiratory: Negative for cough, chest tightness, shortness of breath, wheezing and stridor.    Cardiovascular: Negative for chest pain.   Gastrointestinal: Negative for abdominal pain.     Objective:     Vital Signs (Most Recent):  Temp: 97.9 °F (36.6 °C) (04/13/17 2339)  Pulse: 60 (04/14/17 0801)  Resp: 18 (04/14/17 0801)  BP: 121/77 (04/13/17 2339)  SpO2: 97 % (04/14/17 0801) Vital Signs (24h Range):  Temp:  [97.9 °F (36.6 °C)-98.2 °F (36.8 °C)] 97.9 °F (36.6 °C)  Pulse:  [60-82] 60  Resp:  [17-34] 18  SpO2:  [87 %-99 %] 97 %  BP: (115-150)/(56-77) 121/77     Weight: 86.8 kg (191 lb 5.8 oz)  Body mass index is 29.97 kg/(m^2).    Intake/Output Summary (Last 24 hours) at 04/14/17 0904  Last data filed at 04/14/17 0600   Gross per 24 hour   Intake              850 ml   Output              770 ml   Net               80 ml      Physical Exam   Constitutional: She is oriented to person, place, and time. She appears well-developed and well-nourished.   Cardiovascular: Normal rate.    Pulmonary/Chest: Breath sounds normal. No respiratory distress. She has no wheezes. She has no rales.   Abdominal: Bowel sounds are normal.   Neurological: She is alert and oriented to person, place, and time.   Vitals reviewed.      Significant Labs: BMP: No results for input(s): GLU, NA, K, CL, CO2, BUN, CREATININE, CALCIUM, MG in the last 48 hours.  CBC: No results for input(s): WBC, HGB, HCT, PLT in the last 48 hours.    Significant Imaging:

## 2017-04-14 NOTE — PROGRESS NOTES
[facility] Hospital Medicine  Progress Note    Patient Name: Yasmeen Palm  MRN: [id number]  Patient Class: IP- Inpatient   Admission Date: 4/10/2017  Length of Stay: 3 days  Attending Physician: Meredith Olguin MD  Primary Care Provider: Angel Orourke Jr, MD        Subjective:     Principal Problem:COPD with acute exacerbation    HPI:  Mrs. Yasmeen Palm is a 67 y.o. female known to me with essential hypertension, type 2 diabetes mellitus (HbA1c 7.5% Sept 2016), COPD, chronic respiratory failure with hypoxia and hypercapnia, GERD, anemia of chronic disease, mild protein malnutrition, tobacco abuse, and history of PE/DVT on chronic anticoagulation who presents to [facility] ED with complaints of weakness for three days.  Her  reports that the weakness has been progressively worsening in that timeframe and was associated with nausea and vomiting.  She was also noted to be very lightheaded and had a couple episodes of near-syncope.  He also reports that she had been short of breath and continues to smoke.  She has also had a productive cough.  He also reports that she was complaining of chest pain.  He denies any new medications.  Further history is limited at this time.    Hospital Course:  Ms. Palm was admitted with COPD exacerbation that resulted in acute respiratory failure that required intubation and vent support. Pt was treated with IV steroids, Azithromycin, NEBS and followed by Pulmonary. CTA was negative for PE and patient already on Xarelto. Pt did well and was extubated on 4/12.    Interval History: Pt did well overnight, breathing back to normal. No acute events overnight.    Review of Systems   Constitutional: Negative for chills.   Respiratory: Positive for cough. Negative for shortness of breath.    Cardiovascular: Negative for chest pain.     Objective:     Vital Signs (Most Recent):  Temp: 97.9 °F (36.6 °C) (04/13/17 2339)  Pulse: 82 (04/13/17 2339)  Resp: 17 (04/13/17  2339)  BP: 121/77 (04/13/17 2339)  SpO2: 95 % (04/13/17 2339) Vital Signs (24h Range):  Temp:  [97.7 °F (36.5 °C)-98.2 °F (36.8 °C)] 97.9 °F (36.6 °C)  Pulse:  [54-82] 82  Resp:  [14-34] 17  SpO2:  [87 %-100 %] 95 %  BP: (115-150)/(56-77) 121/77     Weight: 86.8 kg (191 lb 5.8 oz)  Body mass index is 29.97 kg/(m^2).    Intake/Output Summary (Last 24 hours) at 04/14/17 0031  Last data filed at 04/14/17 0000   Gross per 24 hour   Intake              180 ml   Output             1125 ml   Net             -945 ml      Physical Exam   Constitutional: She is oriented to person, place, and time. She appears well-developed and well-nourished.   HENT:   Head: Normocephalic and atraumatic.   Eyes: EOM are normal. Pupils are equal, round, and reactive to light.   Neck: Normal range of motion. Neck supple.   Cardiovascular: Normal rate and regular rhythm.    Pulmonary/Chest: Effort normal and breath sounds normal. She has no wheezes.   Prolonged expiratory time.   Abdominal: Soft. Bowel sounds are normal.   Musculoskeletal: Normal range of motion.   Neurological: She is alert and oriented to person, place, and time.   Skin: Skin is warm and dry.       Significant Labs: All pertinent labs within the past 24 hours have been reviewed.    Significant Imaging: I have reviewed and interpreted all pertinent imaging results/findings within the past 24 hours.    Assessment/Plan:      * COPD with acute exacerbation  Pt was hypercapnic on arrival with an ABG significant for 7.327  62.5  87  32.8 on nasal cannula at 3 liters/minute.  She became more lethargic with a repeat ABG revealing worsening hypercapnia: 7.234  72.8  83  30.8.  She was intubated for acute respiratory failure with hypercapnia, presumably worsened by the hydromorphone.  Pt was treated with frequent nebulized JORDAN/LAMA; intravenous corticosteroids; empiric antibiotics; Pulmonary followed and she as extubated on 4/12 without difficulty to NC. Her respiratory status  was stable and she is ready to transfer to the floor. Will plan to wean IV steroids to PO by tomorrow, initiate therapy. If patient does well, possible discharge tomorrow with steroid taper.    Acute respiratory failure with hypercapnia  Resolved. As addressed above.    COPD (chronic obstructive pulmonary disease)  As addressed above.    Type 2 diabetes mellitus, controlled  Well-controlled on a home regimen of metformin; on basal insulin along with insulin sliding scale. Anticipate improving glucose control with transition to PO steroids.    Gastroesophageal reflux disease without esophagitis  Will continue her home regimen of pantoprazole while she is inpatient.    Tobacco abuse  Patient will be counseled on smoking cessation and she will be provided a nicotine transdermal patch applied while inpatient.  Will provide additional smoking cessation counseling prior to discharge.    Essential hypertension  Patient's blood pressure is well-controlled; will continue home regimen of diltiazem, clonidine, furosemide, and lisinopril, and provide as-needed hydralazine.  Will hold sotalol for now.    Anemia of chronic disease  Her anemia is much improved at this time.  There are no acute issues.    Mild protein malnutrition  Advance to 1800 ADA.    On mechanically assisted ventilation  Resolved. As addressed above.    History of pulmonary embolism  Stable; will continue her home regimen of rivaroxaban.    History of deep vein thrombosis  Stable; will continue her home regimen of rivaroxaban.    Chronic anticoagulation  Stable; will continue her home regimen of rivaroxaban.    Chronic respiratory failure with hypoxia and hypercapnia  As addressed above.    VTE Risk Mitigation         Ordered     Medium Risk of VTE  Once      04/10/17 2302     Reason for No Pharmacological VTE Prophylaxis  Once      04/10/17 2302          Critical care time spent: 35 minutes.    Meredith Olguin MD  Department of Hospital Medicine   [facility] [place]

## 2017-04-14 NOTE — ASSESSMENT & PLAN NOTE
Well-controlled on a home regimen of metformin; on basal insulin along with insulin sliding scale. Anticipate improving glucose control with transition to PO steroids.

## 2017-04-14 NOTE — ASSESSMENT & PLAN NOTE
Pt was hypercapnic on arrival with an ABG significant for 7.327  62.5  87  32.8 on nasal cannula at 3 liters/minute.  She became more lethargic with a repeat ABG revealing worsening hypercapnia: 7.234  72.8  83  30.8.  She was intubated for acute respiratory failure with hypercapnia, presumably worsened by the hydromorphone.  Pt was treated with frequent nebulized JORDAN/LAMA; intravenous corticosteroids; empiric antibiotics; Pulmonary followed and she as extubated on 4/12 without difficulty to NC. Her respiratory status was stable and she is ready to transfer to the floor. Will plan to wean IV steroids to PO by tomorrow, initiate therapy. If patient does well, possible discharge tomorrow with steroid taper.

## 2017-04-14 NOTE — SUBJECTIVE & OBJECTIVE
Interval History: Pt did well overnight, breathing back to normal. No acute events overnight.    Review of Systems   Constitutional: Negative for chills.   Respiratory: Positive for cough. Negative for shortness of breath.    Cardiovascular: Negative for chest pain.     Objective:     Vital Signs (Most Recent):  Temp: 97.9 °F (36.6 °C) (04/13/17 2339)  Pulse: 82 (04/13/17 2339)  Resp: 17 (04/13/17 2339)  BP: 121/77 (04/13/17 2339)  SpO2: 95 % (04/13/17 2339) Vital Signs (24h Range):  Temp:  [97.7 °F (36.5 °C)-98.2 °F (36.8 °C)] 97.9 °F (36.6 °C)  Pulse:  [54-82] 82  Resp:  [14-34] 17  SpO2:  [87 %-100 %] 95 %  BP: (115-150)/(56-77) 121/77     Weight: 86.8 kg (191 lb 5.8 oz)  Body mass index is 29.97 kg/(m^2).    Intake/Output Summary (Last 24 hours) at 04/14/17 0031  Last data filed at 04/14/17 0000   Gross per 24 hour   Intake              180 ml   Output             1125 ml   Net             -945 ml      Physical Exam   Constitutional: She is oriented to person, place, and time. She appears well-developed and well-nourished.   HENT:   Head: Normocephalic and atraumatic.   Eyes: EOM are normal. Pupils are equal, round, and reactive to light.   Neck: Normal range of motion. Neck supple.   Cardiovascular: Normal rate and regular rhythm.    Pulmonary/Chest: Effort normal and breath sounds normal. She has no wheezes.   Prolonged expiratory time.   Abdominal: Soft. Bowel sounds are normal.   Musculoskeletal: Normal range of motion.   Neurological: She is alert and oriented to person, place, and time.   Skin: Skin is warm and dry.       Significant Labs: All pertinent labs within the past 24 hours have been reviewed.    Significant Imaging: I have reviewed and interpreted all pertinent imaging results/findings within the past 24 hours.

## 2017-04-14 NOTE — PROGRESS NOTES
[facility] Hospital Medicine  Progress Note     Patient Name: Yasmeen Palm  MRN: [id number]  Patient Class: IP- Inpatient   Admission Date: 4/10/2017  Attending Physician: Meredith Olguin MD  Primary Care Provider: Angel Orourke Jr, MD        Subjective:      Principal Problem:COPD with acute exacerbation     HPI:  Mrs. Yasmeen Palm is a 67 y.o. female known to me with essential hypertension, type 2 diabetes mellitus (HbA1c 7.5% Sept 2016), COPD, chronic respiratory failure with hypoxia and hypercapnia, GERD, anemia of chronic disease, mild protein malnutrition, tobacco abuse, and history of PE/DVT on chronic anticoagulation who presents to [facility] ED with complaints of weakness for three days. Her  reports that the weakness has been progressively worsening in that timeframe and was associated with nausea and vomiting. She was also noted to be very lightheaded and had a couple episodes of near-syncope. He also reports that she had been short of breath and continues to smoke. She has also had a productive cough. He also reports that she was complaining of chest pain. He denies any new medications. Further history is limited at this time.     Hospital Course:  Ms. Palm was admitted with COPD exacerbation that resulted in acute respiratory failure that required intubation and vent support. Pt was treated with IV steroids, Azithromycin, NEBS and followed by Pulmonary. CTA was negative for PE and patient already on Xarelto.      Interval History: Pt did well overnight, no acute changes, tolerating weaning of vent.     Review of Systems   Constitutional: Negative for chills.   Respiratory: Positive for cough. Negative for shortness of breath.   Cardiovascular: Negative for chest pain.      Objective:      Vital Signs: Reviewed  Weight: 86.8 kg (191 lb 5.8 oz)  Body mass index is 29.97 kg/(m^2).     Intake/Output Summary (Last 24 hours): Reviewed   Physical Exam   Constitutional: Sedated on  vent.  HENT: OG and ET in place  Head: Normocephalic and atraumatic.   Eyes: EOM are normal. Pupils are equal, round, and reactive to light.   Neck: Normal range of motion. Neck supple.   Cardiovascular: Normal rate and regular rhythm.   Pulmonary/Chest: Effort normal and breath sounds normal. She has no wheezes.   Prolonged expiratory time.   Abdominal: Soft. Bowel sounds are normal.   Musculoskeletal: Normal range of motion.   Neurological: Sedated.  Skin: Skin is warm and dry.         Significant Labs: All pertinent labs within the past 24 hours have been reviewed.     Significant Imaging: I have reviewed and interpreted all pertinent imaging results/findings within the past 24 hours.     Assessment/Plan:       * COPD with acute exacerbation  Pt was hypercapnic on arrival with an ABG significant for 7.327  62.5  87  32.8 on nasal cannula at 3 liters/minute. She became more lethargic with a repeat ABG revealing worsening hypercapnia: 7.234  72.8  83  30.8. She was intubated for acute respiratory failure with hypercapnia, presumably worsened by the hydromorphone. Pt was treated with frequent nebulized JORDAN/LAMA; intravenous corticosteroids; empiric antibiotics; Pulmonary following. and managing vent.     Acute respiratory failure with hypercapnia  As addressed above.     COPD (chronic obstructive pulmonary disease)  As addressed above.     Type 2 diabetes mellitus, controlled  Well-controlled on a home regimen of metformin; on basal insulin along with insulin sliding scale. Anticipate improving glucose control with weaning of steroids.     Gastroesophageal reflux disease without esophagitis  Will continue her home regimen of pantoprazole while she is inpatient.     Tobacco abuse  Patient will be counseled on smoking cessation and she will be provided a nicotine transdermal patch applied while inpatient. Will provide additional smoking cessation counseling prior to discharge.     Essential  hypertension  Patient's blood pressure is well-controlled; will continue home regimen of diltiazem, clonidine, furosemide, and lisinopril, and provide as-needed hydralazine. Will hold sotalol for now.     Anemia of chronic disease  Her anemia is much improved at this time. There are no acute issues.     Mild protein malnutrition  Tube feedings     On mechanically assisted ventilation  As addressed above.     History of pulmonary embolism  Stable; will continue her home regimen of rivaroxaban.     History of deep vein thrombosis  Stable; will continue her home regimen of rivaroxaban.     Chronic anticoagulation  Stable; will continue her home regimen of rivaroxaban.     Chronic respiratory failure with hypoxia and hypercapnia  As addressed above.     VTE Risk Mitigation           Ordered       Medium Risk of VTE Once      04/10/17 2302       Reason for No Pharmacological VTE Prophylaxis Once      04/10/17 2302         Critical care time spent: 35 minutes.     Meredith Olguin MD  Department of Hospital Medicine   [facility]

## 2017-04-14 NOTE — PROGRESS NOTES
[facility] Hospital Medicine  Progress Note    Patient Name: Yasmeen Palm  MRN: [id number]  Patient Class: IP- Inpatient   Admission Date: 4/10/2017  Attending Physician: Meredith Olguin MD  Primary Care Provider: Angel Orourke Jr, MD      Subjective:     Principal Problem:COPD with acute exacerbation    HPI:  Mrs. Yasmeen Palm is a 67 y.o. female known to me with essential hypertension, type 2 diabetes mellitus (HbA1c 7.5% Sept 2016), COPD, chronic respiratory failure with hypoxia and hypercapnia, GERD, anemia of chronic disease, mild protein malnutrition, tobacco abuse, and history of PE/DVT on chronic anticoagulation who presents to [facility] ED with complaints of weakness for three days.  Her  reports that the weakness has been progressively worsening in that timeframe and was associated with nausea and vomiting.  She was also noted to be very lightheaded and had a couple episodes of near-syncope.  He also reports that she had been short of breath and continues to smoke.  She has also had a productive cough.  He also reports that she was complaining of chest pain.  He denies any new medications.  Further history is limited at this time.    Hospital Course:  Ms. Palm was admitted with COPD exacerbation that resulted in acute respiratory failure that required intubation and vent support. Pt was treated with IV steroids, Azithromycin, NEBS and followed by Pulmonary. CTA was negative for PE and patient already on Xarelto. Pt did well and was extubated today.    Interval History: Pt did well overnight, extubated this morning.    Review of Systems   Constitutional: Negative for chills.   Respiratory: Positive for cough. Negative for shortness of breath.    Cardiovascular: Negative for chest pain.     Objective:     Vital Signs: Reviewed  Weight: 86.8 kg (191 lb 5.8 oz)  Body mass index is 29.97 kg/(m^2).    Intake/Output Summary (Last 24 hours): Reviewed    Physical Exam   Constitutional:  She is oriented to person, place, and time. She appears well-developed and well-nourished.   HENT:   Head: Normocephalic and atraumatic.   Eyes: EOM are normal. Pupils are equal, round, and reactive to light.   Neck: Normal range of motion. Neck supple.   Cardiovascular: Normal rate and regular rhythm.    Pulmonary/Chest: Effort normal and breath sounds normal. She has no wheezes.   Prolonged expiratory time.   Abdominal: Soft. Bowel sounds are normal.   Musculoskeletal: Normal range of motion.   Neurological: She is alert and oriented to person, place, and time.   Skin: Skin is warm and dry.       Significant Labs: All pertinent labs within the past 24 hours have been reviewed.    Significant Imaging: I have reviewed and interpreted all pertinent imaging results/findings within the past 24 hours.    Assessment/Plan:      * COPD with acute exacerbation  Pt was hypercapnic on arrival with an ABG significant for 7.327  62.5  87  32.8 on nasal cannula at 3 liters/minute.  She became more lethargic with a repeat ABG revealing worsening hypercapnia: 7.234  72.8  83  30.8.  She was intubated for acute respiratory failure with hypercapnia, presumably worsened by the hydromorphone.  Pt was treated with frequent nebulized JORDAN/LAMA; intravenous corticosteroids; empiric antibiotics; Pulmonary followed and she as extubated this morning. Plan to monitor in ICU for another day and possibly transfer to floor tomorrow.    Acute respiratory failure with hypercapnia  Resolved. As addressed above.    COPD (chronic obstructive pulmonary disease)  As addressed above.    Type 2 diabetes mellitus, controlled  Well-controlled on a home regimen of metformin; on basal insulin along with insulin sliding scale. Anticipate improving glucose control with weaning of steroids.    Gastroesophageal reflux disease without esophagitis  Will continue her home regimen of pantoprazole while she is inpatient.    Tobacco abuse  Patient will be  counseled on smoking cessation and she will be provided a nicotine transdermal patch applied while inpatient.  Will provide additional smoking cessation counseling prior to discharge.    Essential hypertension  Patient's blood pressure is well-controlled; will continue home regimen of diltiazem, clonidine, furosemide, and lisinopril, and provide as-needed hydralazine.  Will hold sotalol for now.    Anemia of chronic disease  Her anemia is much improved at this time.  There are no acute issues.    Mild protein malnutrition  Advance to 1800 ADA.    On mechanically assisted ventilation  Resolved. As addressed above.    History of pulmonary embolism  Stable; will continue her home regimen of rivaroxaban.    History of deep vein thrombosis  Stable; will continue her home regimen of rivaroxaban.    Chronic anticoagulation  Stable; will continue her home regimen of rivaroxaban.    Chronic respiratory failure with hypoxia and hypercapnia  As addressed above.    VTE Risk Mitigation         Ordered     Medium Risk of VTE  Once      04/10/17 2302     Reason for No Pharmacological VTE Prophylaxis  Once      04/10/17 2302        Critical care time spent: 35 minutes.    Meredith Olguin MD  Department of Hospital Medicine   [facility]

## 2017-04-14 NOTE — PLAN OF CARE
Problem: Patient Care Overview  Goal: Plan of Care Review  04/14/2017     Recommendations     Recommendation/Intervention: 1) Add Mechanical chopped/Dental Soft restriction to current diet 2) If po intake <50%, recommend oral supplement 3) Monitor po intake 4) Monitor tolerance  Goals: 1) Patient will consume >=85% of EEN 2) Patient will tolerate oral diet  Nutrition Goal Status: new  Communication of RD Recs: reviewed with FABY Iqbal, MPH, RD, LDN

## 2017-04-14 NOTE — PROGRESS NOTES
[facility]  Adult Nutrition  Progress Note    SUMMARY     Recommendations    Recommendation/Intervention: 1) Add Mechanical chopped/Dental Soft restriction to current diet 2) If po intake <50%, recommend oral supplement 3) Monitor po intake 4) Monitor tolerance  Goals: 1) Patient will consume >=85% of EEN 2) Patient will tolerate oral diet  Nutrition Goal Status: new  Communication of RD Recs: reviewed with RN    Continuum of Care Plan     D/C Planning:  Patient will discharge on a Diabetic, Cardiac, Dental soft Diet.     Reason for Assessment    Reason for Assessment: RD follow-up  Relevent Medical History: DM, CHF, HTN  General Information Comments: Patient extubated. Diet advanced. Patient states she is eating 25 - 30% of meal. Stated food she does not eat is because she has trouble swallowing. She typically has a good appetite.     Nutrition Prescription Ordered    Current Diet Order: Diabetic 1800 kcal, Cardiac    Evaluation of Received Nutrients/Fluid Intake    Energy Calories Required: meeting needs  Protein Required: meeting needs  Fluid Required: meeting needs     Tolerance: other (see comments) (tolerates soft, chopped foods)     Nutrition Risk Screen     Nutrition Risk Screen: no indicators present    Nutrition/Diet History    Patient Reported Diet/Restrictions/Preferences: general     Food Preferences: No cultural, Congregational or ethnic food preferences.    Factors Affecting Nutritional Intake: difficulty/impaired swallowing    Labs/Tests/Procedures/Meds       Pertinent Labs Reviewed: reviewed    Lab Results   Component Value Date    ALBUMIN 3.4 (L) 04/11/2017     Pertinent Medications Reviewed: reviewed, pertinent  Pertinent Medications Comments: insulin, lasix, aspirin, lisinopril    Physical Findings    Overall Physical Appearance: obese  Tubes:  (-)  Oral/Mouth Cavity: tooth/teeth missing  Skin: intact    Anthropometrics    Height (inches): 67.01 in  Weight Method: Bed Scale  Weight  (kg): 86.8 kg  Ideal Body Weight (IBW), Female: 135.05 lb  % Ideal Body Weight, Female (lb): 141.7 lb  BMI (kg/m2): 29.96  BMI Grade: 25 - 29.9 - overweight    Weight Loss: unintentional    Estimated/Assessed Needs    Weight Used For Calorie Calculations: 86.8 kg (191 lb 5.8 oz)   Height (cm): 170.2 cm  Energy Need Method: [place] Jeor (4834 - 8609 )  RMR ([place] Jeor Equation): 1450.08  Weight Used For Protein Calculations: 86.8 kg (191 lb 5.8 oz)  Protein Requirements: 69 - 86.8   Fluid Need Method: RDA Method, other (see comments) (or per MD)      CHO Requirement: CHO requirement = 225 - 250 g/day     Nutrition Diagnosis  Nutrition Problem:  Inadequate energy intake  Etiology:  Swallowing difficulty  Signs/Symptoms:  Pt states cannot tolerate regular foods, 25-30% meal intake  Status:  new    Monitor and Evaluation    Food and Nutrient Intake: energy intake, food and beverage intake  Food and Nutrient Adminstration: diet order  Anthropometric Measurements: weight, weight change, body mass index  Biochemical Data, Medical Tests and Procedures: electrolyte and renal panel, gastrointestinal profile, glucose/endocrine profile, lipid profile  Nutrition-Focused Physical Findings: overall appearance    Nutrition Risk    Level of Risk: other (see comments) (f/u 2x weekly)    Nutrition Follow-Up    RD Follow-up?: Yes

## 2017-04-14 NOTE — PLAN OF CARE
Problem: Physical Therapy Goal  Goal: Physical Therapy Goal  Goals to be met by: 17    Patient will increase functional independence with mobility by performin. Sit to stand transfer with Supervision  2. Gait x 250 feet with Supervision using Rolling Walker  3. Ascend/descend 4 stair with bilateral Handrails Supervision   4. Lower extremity exercise program x30 reps per handout, with supervision     Patient would benefit from PT while in the hospital in order to get back to PLOF.

## 2017-04-14 NOTE — PT/OT/SLP EVAL
"Occupational Therapy  Evaluation    Yasmeen Palm   MRN: [id number]   Admitting Diagnosis: COPD with acute exacerbation    OT Date of Treatment: 04/14/17   OT Start Time: 1106  OT Stop Time: 1122  OT Total Time (min): 16 min    Billable Minutes:  Evaluation 16 (co-tx with PT)    Diagnosis: COPD with acute exacerbation       Past Medical History:   Diagnosis Date    CHF (congestive heart failure)     COPD (chronic obstructive pulmonary disease)     Depression     Diabetes mellitus     GERD (gastroesophageal reflux disease)     Hypertension       Past Surgical History:   Procedure Laterality Date    ABDOMINAL SURGERY      CARDIAC SURGERY         Referring physician: Sharif  Date referred to OT: 4/14/17    General Precautions: Standard, fall  Orthopedic Precautions: N/A  Braces: N/A    Do you have any cultural, spiritual, Zoroastrianism conflicts, given your current situation?: none     Patient History:  Living Environment  Lives With: spouse  Living Arrangements: house  Home Accessibility: stairs to enter home  Number of Stairs to Enter Home: 4  Stair Railings at Home: outside, present at both sides  Transportation Available: family or friend will provide  Equipment Currently Used at Home: bedside commode, shower chair, rollator, wheelchair, walker, rolling    Prior level of function:   Bed Mobility/Transfers: needs device  Grooming: independent  Bathing: needs device  Upper Body Dressing: independent  Lower Body Dressing: independent  Toileting: needs device  Home Management Skills: needs assist  IADL Comments: The patient states she amb short distances in her house but gets weakfatigued..The patient states she cooks when she "feels like it". The patient has assist  from her niece to get groceries from the store.     Dominant hand: right    Subjective:  Communicated with nurseIsai prior to session.    Chief Complaint: hasn't been out of bed  Patient/Family stated goals: be able to do things again    Pain Rating: " 0/10                   Objective:  Patient found with: peripheral IV    Cognitive Exam:  Oriented to: Person, Place, Time and Situation  Follows Commands/attention: Follows two-step commands  Communication: clear/fluent  Memory:  No Deficits noted  Safety awareness/insight to disability: impaired  Coping skills/emotional control: Appropriate to situation    Visual/perceptual:  Intact    Physical Exam:  Postural examination/scapula alignment: No postural abnormalities identified  Skin integrity: Visible skin intact  Edema: None noted     Sensation:   Intact    Upper Extremity Range of Motion:  Right Upper Extremity: WFL  Left Upper Extremity: WFL    Upper Extremity Strength:  Right Upper Extremity: WFL  Left Upper Extremity: WFL   Strength: WFL    Fine motor coordination:   Intact    Gross motor coordination: WFL    Functional Mobility:  Bed Mobility:  Scooting/Bridging: Stand by Assistance  Supine to Sit: Stand by Assistance (with HOB elevated)    Transfers:  Sit <> Stand Assistance: Contact Guard Assistance  Sit <> Stand Assistive Device: Rolling Walker    Functional Ambulation:Patient amb using a RW with CGA    Activities of Daily Living:  UE Dressing Level of Assistance: Contact guard  LE Dressing Level of Assistance: Stand by assistance      Balance:   Static Sit: FAIR+: Able to take MINIMAL challenges from all directions  Dynamic Sit: FAIR+: Maintains balance through MINIMAL excursions of active trunk motion  Static Stand: FAIR+: Takes MINIMAL challenges from all directions  Dynamic stand: FAIR: Needs CONTACT GUARD during gait    Therapeutic Activities and Exercises:  Patient was educated re: need to request assist from nursing to amb to the bathroom.    AM-PAC 6 CLICK ADL  How much help from another person does this patient currently need?  1 = Unable, Total/Dependent Assistance  2 = A lot, Maximum/Moderate Assistance  3 = A little, Minimum/Contact Guard/Supervision  4 = None, Modified  "[place]/Independent    Putting on and taking off regular lower body clothing? : 3  Bathing (including washing, rinsing, drying)?: 3  Toileting, which includes using toilet, bedpan, or urinal? : 4  Putting on and taking off regular upper body clothing?: 4  Taking care of personal grooming such as brushing teeth?: 4  Eating meals?: 4  Total Score: 22    AM-PAC Raw Score CMS "G-Code Modifier Level of Impairment Assistance   6 % Total / Unable   7 - 9 CM 80 - 100% Maximal Assist   10 - 14 CL 60 - 80% Moderate Assist   15 - 19 CK 40 - 60% Moderate Assist   20 - 22 CJ 20 - 40% Minimal Assist   23 CI 1-20% SBA / CGA   24 CH 0% Independent/ Mod I       Patient left up in chair with all lines intact, call button in reach and nurseIsai notified    Assessment:  Yasmeen Palm is a 67 y.o. female with a medical diagnosis of COPD with acute exacerbation.    Rehab identified problem list/impairments: Rehab identified problem list/impairments: weakness, impaired endurance, impaired self care skills, impaired balance, impaired functional mobilty, gait instability, decreased safety awareness    Rehab potential is good.    Activity tolerance: Good    Discharge recommendations: Discharge Facility/Level Of Care Needs: home health OT (with family assist)     Barriers to discharge: Barriers to Discharge: Inaccessible home environment    Equipment recommendations: none     GOALS:   Occupational Therapy Goals        Problem: Occupational Therapy Goal    Goal Priority Disciplines Outcome Interventions   Occupational Therapy Goal     OT, PT/OT Ongoing (interventions implemented as appropriate)    Description:  Goals to be met by: 4/28/17    Patient will increase functional independence with ADLs by performing:    UE Dressing with Modified [place].  LE Dressing with Modified [place].  Grooming while standing at sink with Supervision.  Supine to sit with Modified [place].  Stand pivot transfers with Stand-by " Assistance.  Toilet transfer to toilet with Stand-by Assistance.  Upper extremity exercise program x10 reps per handout, with assistance as needed.                PLAN:  Patient to be seen 3 x/week to address the above listed problems via self-care/home management, therapeutic activities, therapeutic exercises  Plan of Care expires: 04/28/17  Plan of Care reviewed with: patient    OT G-codes  Functional Assessment Tool Used: AM PAC  Score: 22  Functional Limitation: Self care  Self Care Current Status (): ALICIA  Self Care Goal Status (): VINNY Barrios OT  04/14/2017

## 2017-04-14 NOTE — PLAN OF CARE
Problem: Occupational Therapy Goal  Goal: Occupational Therapy Goal  Goals to be met by: 4/28/17    Patient will increase functional independence with ADLs by performing:    UE Dressing with Modified [place].  LE Dressing with Modified [place].  Grooming while standing at sink with Supervision.  Supine to sit with Modified [place].  Stand pivot transfers with Stand-by Assistance.  Toilet transfer to toilet with Stand-by Assistance.  Upper extremity exercise program x10 reps per handout, with assistance as needed.  Outcome: Ongoing (interventions implemented as appropriate)  Patient will benefit from OT to address functional deficits.

## 2017-04-14 NOTE — PT/OT/SLP EVAL
Physical Therapy  Evaluation    Yasmeen Palm   MRN: [id number]   Admitting Diagnosis: COPD with acute exacerbation    PT Received On: 17  PT Start Time: 1106     PT Stop Time: 1122    PT Total Time (min): 16 min       Billable Minutes:  Evaluation  16 with OT present     Diagnosis: COPD with acute exacerbation    Past Medical History:   Diagnosis Date    CHF (congestive heart failure)     COPD (chronic obstructive pulmonary disease)     Depression     Diabetes mellitus     GERD (gastroesophageal reflux disease)     Hypertension       Past Surgical History:   Procedure Laterality Date    ABDOMINAL SURGERY      CARDIAC SURGERY       Referring physician: Sharif  Date referred to PT: 17    General Precautions: Standard, fall  Orthopedic Precautions: N/A   Braces: N/A       Patient History:  Lives With: spouse  Living Arrangements: house  Home Accessibility: stairs to enter home  Number of Stairs to Enter Home: 4  Stair Railings at Home: outside, present at both sides  Equipment Currently Used at Home: wheelchair, walker, rolling, rollator, shower chair, bedside commode  Patient has a home health nurse currently.     Previous Level of Function:  Ambulation Skills: needs device  Transfer Skills: needs device    Subjective:  Communicated with nurse Alex prior to session.  Patient agreeable to participate in PT session.   Chief Complaint: SOB with ambulation prior to coming into the hospital. Also, she had 2 near falls but never actually fell out.   Patient goals: To increase gait distance.     Pain Ratin/10     Objective:   Patient found with: peripheral IV     Cognitive Exam:  Oriented to: Person, Place and Situation    Follows Commands/attention: Follows one-step commands  Communication: clear/fluent  Safety awareness/insight to disability: impaired    Physical Exam:  Postural examination/scapula alignment: No postural abnormalities identified    Skin integrity: Visible skin intact  Edema: None  noted     Sensation: impaired in bilateral feet due to neuropathy     Lower Extremity Range of Motion:  Right Lower Extremity: WFL  Left Lower Extremity: WFL    Lower Extremity Strength:  Right Lower Extremity: WFL  Left Lower Extremity: WFL     Fine motor coordination:Intact    Gross motor coordination: WFL    Functional Mobility:  Bed Mobility:  Supine to Sit: Stand by Assistance, With side rail (HOB elevated)    Transfers:  Sit <> Stand Assistance: Contact Guard Assistance  Sit <> Stand Assistive Device: Rolling Walker    Gait:   Gait Distance: ~58ft   Assistance 1: Contact Guard Assistance  Gait Assistive Device: Rolling walker  Gait Pattern: 3-point gait  Gait Deviation(s): decreased dyllan, increased time in double stance, decreased step length    Balance:   Static Sit: GOOD+: Takes MAXIMAL challenges from all directions.    Dynamic Sit: GOOD: Maintains balance through MODERATE excursions of active trunk movement  Static Stand: GOOD-: Takes MODERATE challenges from all directions inconsistently  Dynamic stand: FAIR: Needs CONTACT GUARD during gait    AM-PAC 6 CLICK MOBILITY  How much help from another person does this patient currently need?   1 = Unable, Total/Dependent Assistance  2 = A lot, Maximum/Moderate Assistance  3 = A little, Minimum/Contact Guard/Supervision  4 = None, Modified [place]/Independent    Turning over in bed (including adjusting bedclothes, sheets and blankets)?: 4  Sitting down on and standing up from a chair with arms (e.g., wheelchair, bedside commode, etc.): 4  Moving from lying on back to sitting on the side of the bed?: 4  Moving to and from a bed to a chair (including a wheelchair)?: 3  Need to walk in hospital room?: 3  Climbing 3-5 steps with a railing?: 3  Total Score: 21     AM-PAC Raw Score CMS G-Code Modifier Level of Impairment Assistance   6 % Total / Unable   7 - 9 CM 80 - 100% Maximal Assist   10 - 14 CL 60 - 80% Moderate Assist   15 - 19 CK 40 - 60%  Moderate Assist   20 - 22 CJ 20 - 40% Minimal Assist   23 CI 1-20% SBA / CGA   24 CH 0% Independent/ Mod I     Patient left up in chair with all lines intact, call button in reach and nurse notified.    Assessment:   Yasmeen Palm is a 67 y.o. female with a medical diagnosis of COPD with acute exacerbation and presents with decreased strength and endurance for functional mobility. She is okay to ambulate with nursing staff assistance and rolling walker. She would continue to benefit from PT while in the hospital in order to address deficits listed below and get patient back to OF.     Rehab identified problem list/impairments: Rehab identified problem list/impairments: weakness, impaired endurance, impaired functional mobilty, gait instability, impaired balance, decreased safety awareness, decreased lower extremity function, impaired cardiopulmonary response to activity    Rehab potential is good.    Activity tolerance: Fair    Discharge recommendations: Discharge Facility/Level Of Care Needs: home health PT (with assistance from family as needed)     Barriers to discharge: Barriers to Discharge: Inaccessible home environment    Equipment recommendations: Equipment Needed After Discharge: none     GOALS:   Physical Therapy Goals        Problem: Physical Therapy Goal    Goal Priority Disciplines Outcome Goal Variances Interventions   Physical Therapy Goal     PT/OT, PT      Description:  Goals to be met by: 17    Patient will increase functional independence with mobility by performin. Sit to stand transfer with Supervision  2. Gait  x 250 feet with Supervision using Rolling Walker  3. Ascend/descend 4 stair with bilateral Handrails Supervision   4. Lower extremity exercise program x30 reps per handout, with supervision            PLAN:    Patient to be seen 6 x/week to address the above listed problems via gait training, therapeutic activities, therapeutic exercises  Plan of Care expires:  04/28/17  Plan of Care reviewed with: patient    Functional Assessment Tool Used: AM PAC   Score: 21  Functional Limitation: Mobility: Walking and moving around  Mobility: Walking and Moving Around Current Status (): ALICIA  Mobility: Walking and Moving Around Goal Status (): VINNY Wilson, PT, MOT  04/14/2017

## 2017-04-14 NOTE — ASSESSMENT & PLAN NOTE
Pt was hypercapnic on arrival with an ABG significant for 7.327  62.5  87  32.8 on nasal cannula at 3 liters/minute.  She became more lethargic with a repeat ABG revealing worsening hypercapnia: 7.234  72.8  83  30.8.  She was intubated for acute respiratory failure with hypercapnia, presumably worsened by the hydromorphone.  Pt was treated with frequent nebulized JORDAN/LAMA; intravenous corticosteroids; empiric antibiotics; Pulmonary followed and she as extubated on 4/12 without difficulty to NC.   Doing well. Does not wear 02 at home.

## 2017-04-14 NOTE — PROGRESS NOTES
Transfer Summary:    66 y/o female who was admitted with COPD exacerbation that resulted in acute respiratory failure that required intubation and vent support. Pt was treated with IV steroids, Azithromycin, NEBS and followed by Pulmonary. CTA was negative for PE and patient already on Xarelto. Pt did well and was extubated on 4/12. Pt to have steroid tapered to PO, therapy ordered to mobilize and SW for discharge planning. Pt likely can discharge tomorrow if she continues to do well.    FARRUKH Olguin MD

## 2017-04-15 VITALS
OXYGEN SATURATION: 100 % | DIASTOLIC BLOOD PRESSURE: 67 MMHG | WEIGHT: 191.38 LBS | RESPIRATION RATE: 20 BRPM | BODY MASS INDEX: 30.04 KG/M2 | HEIGHT: 67 IN | TEMPERATURE: 97 F | SYSTOLIC BLOOD PRESSURE: 140 MMHG | HEART RATE: 75 BPM

## 2017-04-15 PROBLEM — J96.02 ACUTE RESPIRATORY FAILURE WITH HYPERCAPNIA: Status: RESOLVED | Noted: 2017-04-10 | Resolved: 2017-04-15

## 2017-04-15 PROBLEM — Z99.11 ON MECHANICALLY ASSISTED VENTILATION: Status: RESOLVED | Noted: 2017-04-10 | Resolved: 2017-04-15

## 2017-04-15 PROBLEM — J44.1 COPD WITH ACUTE EXACERBATION: Status: RESOLVED | Noted: 2017-04-10 | Resolved: 2017-04-15

## 2017-04-15 LAB
POCT GLUCOSE: 168 MG/DL (ref 70–110)
POCT GLUCOSE: 251 MG/DL (ref 70–110)
POCT GLUCOSE: 299 MG/DL (ref 70–110)

## 2017-04-15 PROCEDURE — 94640 AIRWAY INHALATION TREATMENT: CPT

## 2017-04-15 PROCEDURE — 25000003 PHARM REV CODE 250: Performed by: INTERNAL MEDICINE

## 2017-04-15 PROCEDURE — 25000242 PHARM REV CODE 250 ALT 637 W/ HCPCS: Performed by: INTERNAL MEDICINE

## 2017-04-15 PROCEDURE — 25000003 PHARM REV CODE 250: Performed by: HOSPITALIST

## 2017-04-15 PROCEDURE — 94761 N-INVAS EAR/PLS OXIMETRY MLT: CPT

## 2017-04-15 PROCEDURE — 63600175 PHARM REV CODE 636 W HCPCS: Performed by: HOSPITALIST

## 2017-04-15 PROCEDURE — 63600175 PHARM REV CODE 636 W HCPCS: Performed by: INTERNAL MEDICINE

## 2017-04-15 PROCEDURE — 27000221 HC OXYGEN, UP TO 24 HOURS

## 2017-04-15 RX ORDER — PREDNISONE 20 MG/1
TABLET ORAL
Qty: 18 TABLET | Refills: 0 | Status: ON HOLD | OUTPATIENT
Start: 2017-04-15 | End: 2017-08-14 | Stop reason: HOSPADM

## 2017-04-15 RX ORDER — LACTULOSE 10 G/15ML
30 SOLUTION ORAL ONCE AS NEEDED
Status: COMPLETED | OUTPATIENT
Start: 2017-04-15 | End: 2017-04-15

## 2017-04-15 RX ADMIN — DILTIAZEM HYDROCHLORIDE 90 MG: 30 TABLET, FILM COATED ORAL at 06:04

## 2017-04-15 RX ADMIN — PREGABALIN 50 MG: 50 CAPSULE ORAL at 01:04

## 2017-04-15 RX ADMIN — PREGABALIN 50 MG: 50 CAPSULE ORAL at 06:04

## 2017-04-15 RX ADMIN — PREDNISONE 60 MG: 20 TABLET ORAL at 08:04

## 2017-04-15 RX ADMIN — IPRATROPIUM BROMIDE AND ALBUTEROL SULFATE 3 ML: .5; 3 SOLUTION RESPIRATORY (INHALATION) at 01:04

## 2017-04-15 RX ADMIN — TRAMADOL HYDROCHLORIDE 50 MG: 50 TABLET, FILM COATED ORAL at 02:04

## 2017-04-15 RX ADMIN — AZITHROMYCIN MONOHYDRATE 500 MG: 500 INJECTION, POWDER, LYOPHILIZED, FOR SOLUTION INTRAVENOUS at 12:04

## 2017-04-15 RX ADMIN — INSULIN ASPART 6 UNITS: 100 INJECTION, SOLUTION INTRAVENOUS; SUBCUTANEOUS at 04:04

## 2017-04-15 RX ADMIN — DILTIAZEM HYDROCHLORIDE 90 MG: 30 TABLET, FILM COATED ORAL at 01:04

## 2017-04-15 RX ADMIN — NICOTINE 1 PATCH: 21 PATCH, EXTENDED RELEASE TRANSDERMAL at 08:04

## 2017-04-15 RX ADMIN — LACTULOSE 30 G: 20 SOLUTION ORAL at 10:04

## 2017-04-15 RX ADMIN — ASPIRIN 81 MG: 81 TABLET, COATED ORAL at 08:04

## 2017-04-15 RX ADMIN — INSULIN ASPART 2 UNITS: 100 INJECTION, SOLUTION INTRAVENOUS; SUBCUTANEOUS at 08:04

## 2017-04-15 RX ADMIN — LISINOPRIL 40 MG: 20 TABLET ORAL at 08:04

## 2017-04-15 RX ADMIN — CLONIDINE HYDROCHLORIDE 0.2 MG: 0.1 TABLET ORAL at 01:04

## 2017-04-15 RX ADMIN — SOTALOL HYDROCHLORIDE 80 MG: 80 TABLET ORAL at 08:04

## 2017-04-15 RX ADMIN — IPRATROPIUM BROMIDE AND ALBUTEROL SULFATE 3 ML: .5; 3 SOLUTION RESPIRATORY (INHALATION) at 07:04

## 2017-04-15 RX ADMIN — PANTOPRAZOLE SODIUM 40 MG: 40 GRANULE, DELAYED RELEASE ORAL at 08:04

## 2017-04-15 RX ADMIN — INSULIN ASPART 6 UNITS: 100 INJECTION, SOLUTION INTRAVENOUS; SUBCUTANEOUS at 12:04

## 2017-04-15 RX ADMIN — CLONIDINE HYDROCHLORIDE 0.2 MG: 0.1 TABLET ORAL at 06:04

## 2017-04-15 RX ADMIN — IPRATROPIUM BROMIDE AND ALBUTEROL SULFATE 3 ML: .5; 3 SOLUTION RESPIRATORY (INHALATION) at 12:04

## 2017-04-15 RX ADMIN — FUROSEMIDE 40 MG: 40 TABLET ORAL at 08:04

## 2017-04-15 NOTE — PROGRESS NOTES
Patient has [employer] oxygen,  to bring portable tank from [facility] for discharged pt...Drea Rg RN, BSN, STN [facility]  4/15/2017

## 2017-04-15 NOTE — PLAN OF CARE
Problem: Patient Care Overview  Goal: Plan of Care Review  Outcome: Ongoing (interventions implemented as appropriate)  Pt is free from falls. AAOx4. Pt ambulates in room with standby assist.  at bedside early in shift and then left. No edema noted. No breakdown noted. PIV is patent and intact. POC reviewed with patient and pt verbalized understanding. Bed in low position and siderails up x2. Pt oriented to call light. Will continue to monitor pt.

## 2017-04-15 NOTE — PROGRESS NOTES
[facility] Hospital Medicine  Progress Note    Patient Name: Yasmeen Palm  MRN: [id number]  Patient Class: IP- Inpatient   Admission Date: 4/10/2017  Length of Stay: 5 days  Attending Physician: Tremayne Kong MD  Primary Care Provider: Angel Orourke Jr, MD        Subjective:     Principal Problem:COPD with acute exacerbation    HPI:  Mrs. Yasmeen Palm is a 67 y.o. female known to me with essential hypertension, type 2 diabetes mellitus (HbA1c 7.5% Sept 2016), COPD, chronic respiratory failure with hypoxia and hypercapnia, GERD, anemia of chronic disease, mild protein malnutrition, tobacco abuse, and history of PE/DVT on chronic anticoagulation who presents to [facility] ED with complaints of weakness for three days.  Her  reports that the weakness has been progressively worsening in that timeframe and was associated with nausea and vomiting.  She was also noted to be very lightheaded and had a couple episodes of near-syncope.  He also reports that she had been short of breath and continues to smoke.  She has also had a productive cough.  He also reports that she was complaining of chest pain.  He denies any new medications.  Further history is limited at this time.    Hospital Course:  Ms. Palm was admitted with COPD exacerbation that resulted in acute respiratory failure that required intubation and vent support. Pt was treated with IV steroids, Azithromycin, NEBS and followed by Pulmonary. CTA was negative for PE and patient already on Xarelto. Pt did well and was extubated on 4/12. She was sent to the floor on 4/13. PT/OT eval on 4/14 and recommend home with H/H. The rest of the hospital course was unremarkable. The patient's CC of SOB and weakness has resolved.  The patient is requesting discharge.      Interval History: Doing well from respiratory stand point.    Review of Systems   Constitutional: Positive for activity change.   Respiratory: Negative for cough, chest  tightness, shortness of breath, wheezing and stridor.    Cardiovascular: Negative for chest pain.   Gastrointestinal: Negative for abdominal pain.     Objective:     Vital Signs (Most Recent):  Temp: 98.3 °F (36.8 °C) (04/15/17 0001)  Pulse: 62 (04/15/17 0012)  Resp: 14 (04/15/17 0012)  BP: (!) 146/66 (04/15/17 0001)  SpO2: 96 % (04/15/17 0012) Vital Signs (24h Range):  Temp:  [97.8 °F (36.6 °C)-98.3 °F (36.8 °C)] 98.3 °F (36.8 °C)  Pulse:  [60-75] 62  Resp:  [14-20] 14  SpO2:  [94 %-97 %] 96 %  BP: (125-146)/(63-87) 146/66     Weight: 86.8 kg (191 lb 5.8 oz)  Body mass index is 29.97 kg/(m^2).    Intake/Output Summary (Last 24 hours) at 04/15/17 0647  Last data filed at 04/15/17 0600   Gross per 24 hour   Intake              830 ml   Output              400 ml   Net              430 ml      Physical Exam   Constitutional: She is oriented to person, place, and time. She appears well-developed and well-nourished.   Cardiovascular: Normal rate.    Pulmonary/Chest: Breath sounds normal. No respiratory distress. She has no wheezes. She has no rales.   Abdominal: Bowel sounds are normal.   Neurological: She is alert and oriented to person, place, and time.   Vitals reviewed.      Significant Labs: BMP: No results for input(s): GLU, NA, K, CL, CO2, BUN, CREATININE, CALCIUM, MG in the last 48 hours.  CBC: No results for input(s): WBC, HGB, HCT, PLT in the last 48 hours.    Significant Imaging:     Assessment/Plan:      * COPD with acute exacerbation  Pt was hypercapnic on arrival with an ABG significant for 7.327  62.5  87  32.8 on nasal cannula at 3 liters/minute.  She became more lethargic with a repeat ABG revealing worsening hypercapnia: 7.234  72.8  83  30.8.  She was intubated for acute respiratory failure with hypercapnia, presumably worsened by the hydromorphone.  Pt was treated with frequent nebulized JORDAN/LAMA; intravenous corticosteroids; empiric antibiotics; Pulmonary followed and she as extubated on 4/12  without difficulty to NC.   Doing well.   Would like to go home. Has 02 and nebulizer according to the patient     COPD (chronic obstructive pulmonary disease)  As addressed above.    Type 2 diabetes mellitus, controlled  Well-controlled on a home regimen of metformin; on basal insulin along with insulin sliding scale. Anticipate improving glucose control with transition to PO steroids.  No other known manifestations    Gastroesophageal reflux disease without esophagitis  Will continue her home regimen of pantoprazole while she is inpatient.    Tobacco abuse  Patient will be counseled on smoking cessation and she will be provided a nicotine transdermal patch applied while inpatient.  Will provide additional smoking cessation counseling prior to discharge.    Essential hypertension  Patient's blood pressure is well-controlled; will continue home regimen of diltiazem, clonidine, furosemide, and lisinopril, and provide as-needed hydralazine.  Will hold sotalol for now.    Anemia of chronic disease  Her anemia is much improved at this time.  There are no acute issues.    Mild protein malnutrition  Advance to 1800 ADA.    Acute respiratory failure with hypercapnia  Resolved. As addressed above.    On mechanically assisted ventilation  Resolved. As addressed above.    History of pulmonary embolism  Stable; will continue her home regimen of rivaroxaban.    History of deep vein thrombosis  Stable; will continue her home regimen of rivaroxaban.    Chronic anticoagulation  Stable; will continue her home regimen of rivaroxaban.    Chronic respiratory failure with hypoxia and hypercapnia  As addressed above.    Obesity  Body mass index is 29.97 kg/(m^2).  Weight loss as out patient       Weakness  PT/OT consult - H/H .       VTE Risk Mitigation         Ordered     Medium Risk of VTE  Once      04/10/17 2302     Reason for No Pharmacological VTE Prophylaxis  Once      04/10/17 2302        Will d/c to home.     Tremayne SPARROW  MD Mikel  Department of Hospital Medicine   [facility]

## 2017-04-15 NOTE — DISCHARGE SUMMARY
[facility] Hospital Medicine  Discharge Summary      Patient Name: Yasmeen Palm  MRN: [id number]  Admission Date: 4/10/2017  Hospital Length of Stay: 5 days  Discharge Date and Time:  04/15/2017 6:56 AM  Attending Physician: Tremayne Kong MD   Discharging Provider: Tremayne Kong MD  Primary Care Provider: Angel Orourke Jr, MD      HPI:   Mrs. Yasmeen Palm is a 67 y.o. female known to me with essential hypertension, type 2 diabetes mellitus (HbA1c 7.5% Sept 2016), COPD, chronic respiratory failure with hypoxia and hypercapnia, GERD, anemia of chronic disease, mild protein malnutrition, tobacco abuse, and history of PE/DVT on chronic anticoagulation who presents to [facility] ED with complaints of weakness for three days.  Her  reports that the weakness has been progressively worsening in that timeframe and was associated with nausea and vomiting.  She was also noted to be very lightheaded and had a couple episodes of near-syncope.  He also reports that she had been short of breath and continues to smoke.  She has also had a productive cough.  He also reports that she was complaining of chest pain.  He denies any new medications.  Further history is limited at this time.    * No surgery found *      Indwelling Lines/Drains at time of discharge:   Lines/Drains/Airways          No matching active lines, drains, or airways        Hospital Course:   Ms. Palm was admitted with COPD exacerbation that resulted in acute respiratory failure that required intubation and vent support. Pt was treated with IV steroids, Azithromycin, NEBS and followed by Pulmonary. CTA was negative for PE and patient already on Xarelto. Pt did well and was extubated on 4/12. She was sent to the floor on 4/13. PT/OT eval on 4/14 and recommend home with H/H. The rest of the hospital course was unremarkable. The patient's CC of SOB and weakness has resolved.  The patient is requesting discharge.  Activity as  tolerated. Diet- low NA, ADA 1800 amanda diet. Follow up with PCP in one week.     New medication:   Prednisone taper.        Consults:   Consults         Status Ordering Provider     Inpatient consult to Pulmonology  Once     Provider:  Ho Torres MD    Completed BALA ISAACS     Inpatient consult to Social Work/Case Management  Once     Provider:  (Not yet assigned)    Acknowledged ALLIE KIM     IP consult to dietary  Once     Provider:  (Not yet assigned)    Completed BARBIE HOFF          Significant Diagnostic Studies:     Pending Diagnostic Studies:     Procedure Component Value Units Date/Time    CTA Chest Non-Coronary (PE Study) [[id number]] Resulted:  04/10/17 1614    Order Status:  Sent Lab Status:  In process Updated:  04/10/17 1642        Final Active Diagnoses:    Diagnosis Date Noted POA    Obesity [E66.9] 04/14/2017 Yes    Weakness [R53.1] 04/14/2017 No    History of pulmonary embolism [Z86.711] 04/10/2017 Yes     Chronic    History of deep vein thrombosis [Z86.718] 04/10/2017 Not Applicable     Chronic    Chronic anticoagulation [Z79.01] 04/10/2017 Not Applicable     Chronic    Chronic respiratory failure with hypoxia and hypercapnia [J96.11, J96.12] 04/10/2017 Yes     Chronic    Essential hypertension [I10] 09/27/2016 Yes     Chronic    Anemia of chronic disease [D63.8] 09/27/2016 Yes     Chronic    Mild protein malnutrition [E44.1] 09/27/2016 Yes     Chronic    Type 2 diabetes mellitus, controlled [E11.9] 12/14/2015 Yes     Chronic    COPD (chronic obstructive pulmonary disease) [J44.9] 12/14/2015 Yes     Chronic    Gastroesophageal reflux disease without esophagitis [K21.9] 12/14/2015 Yes     Chronic    Tobacco abuse [Z72.0] 12/14/2015 Yes     Chronic      Problems Resolved During this Admission:    Diagnosis Date Noted Date Resolved POA    PRINCIPAL PROBLEM:  COPD with acute exacerbation [J44.1] 04/10/2017 04/15/2017 Yes    Acute respiratory failure with hypercapnia  [J96.02] 04/10/2017 04/15/2017 Yes    On mechanically assisted ventilation [Z99.11] 04/10/2017 04/15/2017 Not Applicable      No new Assessment & Plan notes have been filed under this hospital service since the last note was generated.  Service: Hospital Medicine      Discharged Condition: good    Disposition: Home or Self Care with H/H    Follow Up:  Follow-up Information     Follow up with Angel Orourke Jr, MD In 1 week.    Specialty:  Family Medicine    Contact information:    [address] [facility] [id number] [phone]          Patient Instructions:     Diet general   Order Specific Question Answer Comments   Total calories: 1800 Calorie    Na restriction, if any: 2gNa      Activity as tolerated       Medications:  Reconciled Home Medications:   Current Discharge Medication List      START taking these medications    Details   predniSONE (DELTASONE) 20 MG tablet 3 tablets po daily for 3 days  2 tablets po daily for 3 days  1 tablet po daily for 3 days  Qty: 18 tablet, Refills: 0         CONTINUE these medications which have NOT CHANGED    Details   aspirin (ECOTRIN) 81 MG EC tablet Take 81 mg by mouth once daily.      ciprofloxacin-dexamethasone 0.3-0.1% (CIPRODEX) 0.3-0.1 % DrpS Place 1 drop into the right eye every 4 (four) hours.      cloNIDine (CATAPRES) 0.1 MG tablet Take 2 tablets (0.2 mg total) by mouth 3 (three) times daily.  Qty: 180 tablet, Refills: 11      diltiazem (CARDIZEM) 90 MG tablet Take 90 mg by mouth 3 (three) times daily.      furosemide (LASIX) 40 MG tablet Take 40 mg by mouth once daily.       lisinopril (PRINIVIL,ZESTRIL) 40 MG tablet Take 40 mg by mouth once daily.      metformin (GLUCOPHAGE) 500 MG tablet Take 500 mg by mouth 2 (two) times daily with meals.      mirabegron (MYRBETRIQ) 25 mg Tb24 ER tablet Take 25 mg by mouth once daily.      naproxen (NAPROSYN) 375 MG tablet Take 375 mg by mouth every 12 (twelve) hours as needed.      prednisoLONE  acetate (PRED FORTE) 1 % DrpS Place 1 drop into the right eye every 4 (four) hours.      pregabalin (LYRICA) 50 MG capsule Take 50 mg by mouth 3 (three) times daily.      promethazine (PHENERGAN) 12.5 MG Tab Take 12.5 mg by mouth every 6 (six) hours as needed.      rivaroxaban (XARELTO) 15 mg Tab Take 15 mg by mouth once daily.      sotalol (BETAPACE) 80 MG tablet Take 80 mg by mouth 2 (two) times daily.      trazodone (DESYREL) 50 MG tablet Take 50 mg by mouth every evening.      vortioxetine (BRINTELLIX) 5 mg Tab Take 5 mg by mouth 2 (two) times daily.      zolpidem (AMBIEN) 5 MG Tab Take 5 mg by mouth nightly as needed. Take 1-2 tabs at bedtime      acetaminophen (TYLENOL) 500 MG tablet Take 1 tablet (500 mg total) by mouth every 8 (eight) hours as needed.  Refills: 0      nitroGLYCERIN (NITROSTAT) 0.3 MG SL tablet Place 0.3 mg under the tongue every 5 (five) minutes as needed for Chest pain.      pantoprazole (PROTONIX) 40 MG tablet Take 1 tablet (40 mg total) by mouth once daily.  Qty: 45 tablet, Refills: 0      pravastatin (PRAVACHOL) 40 MG tablet Take 40 mg by mouth once daily.      tramadol (ULTRAM) 50 mg tablet Take 1 tablet (50 mg total) by mouth every 6 (six) hours as needed for Pain.  Qty: 20 tablet, Refills: 0         STOP taking these medications       warfarin (COUMADIN) 10 MG tablet Comments:   Reason for Stopping:             Time spent on the discharge of patient:  > 30 minutes    Tremayne Morin MD  Department of Hospital Medicine  [facility]

## 2017-04-15 NOTE — ASSESSMENT & PLAN NOTE
Pt was hypercapnic on arrival with an ABG significant for 7.327  62.5  87  32.8 on nasal cannula at 3 liters/minute.  She became more lethargic with a repeat ABG revealing worsening hypercapnia: 7.234  72.8  83  30.8.  She was intubated for acute respiratory failure with hypercapnia, presumably worsened by the hydromorphone.  Pt was treated with frequent nebulized JORDAN/LAMA; intravenous corticosteroids; empiric antibiotics; Pulmonary followed and she as extubated on 4/12 without difficulty to NC.   Doing well.   Would like to go home. Has 02 and nebulizer according to the patient

## 2017-04-15 NOTE — PLAN OF CARE
[facility]    HOME HEALTH ORDERS  FACE TO FACE ENCOUNTER    Patient Name: Yasmeen Palm  YOB: 1950    PCP: Angel Orourke Jr, MD   PCP [facility] [address] [facility] [address]  PCP Phone Number: [phone]  PCP Fax: [phone]    Encounter Date: 04/15/2017    Admit to Home Health    Diagnoses:  Active Hospital Problems    Diagnosis  POA    Obesity [E66.9]  Yes    Weakness [R53.1]  No    History of pulmonary embolism [Z86.711]  Yes     Chronic    History of deep vein thrombosis [Z86.718]  Not Applicable     Chronic    Chronic anticoagulation [Z79.01]  Not Applicable     Chronic    Chronic respiratory failure with hypoxia and hypercapnia [J96.11, J96.12]  Yes     Chronic    Essential hypertension [I10]  Yes     Chronic    Anemia of chronic disease [D63.8]  Yes     Chronic    Mild protein malnutrition [E44.1]  Yes     Chronic    Type 2 diabetes mellitus, controlled [E11.9]  Yes     Chronic    COPD (chronic obstructive pulmonary disease) [J44.9]  Yes     Chronic    Gastroesophageal reflux disease without esophagitis [K21.9]  Yes     Chronic    Tobacco abuse [Z72.0]  Yes     Chronic      Resolved Hospital Problems    Diagnosis Date Resolved POA    *COPD with acute exacerbation [J44.1] 04/15/2017 Yes     Priority: 1 - High    Acute respiratory failure with hypercapnia [J96.02] 04/15/2017 Yes    On mechanically assisted ventilation [Z99.11] 04/15/2017 Not Applicable       No future appointments.  Follow-up Information     Follow up with Angel Orourke Jr, MD In 1 week.    Specialty:  Family Medicine    Contact information:    [facility] [address] [facility] [address]  [phone]              I have seen and examined this patient face to face today. My clinical findings that support the need for the home health skilled services and home bound status are the following:  Weakness/numbness causing balance and gait  disturbance due to Weakness/Debility making it taxing to leave home.    Allergies:Review of patient's allergies indicates:  No Known Allergies    Diet: diabetic diet: 1800 calorie and 2 gram sodium diet    Activities: activity as tolerated    Nursing:   SN to complete comprehensive assessment including routine vital signs. Instruct on disease process and s/s of complications to report to MD. Review/verify medication list sent home with the patient at time of discharge  and instruct patient/caregiver as needed. Frequency may be adjusted depending on start of care date.    Notify MD if SBP > 160 or < 90; DBP > 90 or < 50; HR > 120 or < 50; Temp > 101; Other:      CONSULTS:    Physical Therapy to evaluate and treat. Evaluate for home safety and equipment needs; Establish/upgrade home exercise program. Perform / instruct on therapeutic exercises, gait training, transfer training, and Range of Motion.  Occupational Therapy to evaluate and treat. Evaluate home environment for safety and equipment needs. Perform/Instruct on transfers, ADL training, ROM, and therapeutic exercises.  Aide to provide assistance with personal care, ADLs, and vital signs.      Medications: Review discharge medications with patient and family and provide education.      Current Discharge Medication List      START taking these medications    Details   predniSONE (DELTASONE) 20 MG tablet 3 tablets po daily for 3 days  2 tablets po daily for 3 days  1 tablet po daily for 3 days  Qty: 18 tablet, Refills: 0         CONTINUE these medications which have NOT CHANGED    Details   aspirin (ECOTRIN) 81 MG EC tablet Take 81 mg by mouth once daily.      ciprofloxacin-dexamethasone 0.3-0.1% (CIPRODEX) 0.3-0.1 % DrpS Place 1 drop into the right eye every 4 (four) hours.      cloNIDine (CATAPRES) 0.1 MG tablet Take 2 tablets (0.2 mg total) by mouth 3 (three) times daily.  Qty: 180 tablet, Refills: 11      diltiazem (CARDIZEM) 90 MG tablet Take 90 mg by mouth 3  (three) times daily.      furosemide (LASIX) 40 MG tablet Take 40 mg by mouth once daily.       lisinopril (PRINIVIL,ZESTRIL) 40 MG tablet Take 40 mg by mouth once daily.      metformin (GLUCOPHAGE) 500 MG tablet Take 500 mg by mouth 2 (two) times daily with meals.      mirabegron (MYRBETRIQ) 25 mg Tb24 ER tablet Take 25 mg by mouth once daily.      naproxen (NAPROSYN) 375 MG tablet Take 375 mg by mouth every 12 (twelve) hours as needed.      prednisoLONE acetate (PRED FORTE) 1 % DrpS Place 1 drop into the right eye every 4 (four) hours.      pregabalin (LYRICA) 50 MG capsule Take 50 mg by mouth 3 (three) times daily.      promethazine (PHENERGAN) 12.5 MG Tab Take 12.5 mg by mouth every 6 (six) hours as needed.      rivaroxaban (XARELTO) 15 mg Tab Take 15 mg by mouth once daily.      sotalol (BETAPACE) 80 MG tablet Take 80 mg by mouth 2 (two) times daily.      trazodone (DESYREL) 50 MG tablet Take 50 mg by mouth every evening.      vortioxetine (BRINTELLIX) 5 mg Tab Take 5 mg by mouth 2 (two) times daily.      zolpidem (AMBIEN) 5 MG Tab Take 5 mg by mouth nightly as needed. Take 1-2 tabs at bedtime      acetaminophen (TYLENOL) 500 MG tablet Take 1 tablet (500 mg total) by mouth every 8 (eight) hours as needed.  Refills: 0      nitroGLYCERIN (NITROSTAT) 0.3 MG SL tablet Place 0.3 mg under the tongue every 5 (five) minutes as needed for Chest pain.      pantoprazole (PROTONIX) 40 MG tablet Take 1 tablet (40 mg total) by mouth once daily.  Qty: 45 tablet, Refills: 0      pravastatin (PRAVACHOL) 40 MG tablet Take 40 mg by mouth once daily.      tramadol (ULTRAM) 50 mg tablet Take 1 tablet (50 mg total) by mouth every 6 (six) hours as needed for Pain.  Qty: 20 tablet, Refills: 0         STOP taking these medications       warfarin (COUMADIN) 10 MG tablet Comments:   Reason for Stopping:               I certify that this patient is confined to her home and needs intermittent skilled nursing care, physical therapy and  occupational therapy.

## 2017-04-15 NOTE — PLAN OF CARE
04/15/17 1609   Final Note   Assessment Type Final Discharge Note   Discharge Disposition Home   Discharge planning education complete? Yes   What phone number can be called within the next 1-3 days to see how you are doing after discharge? (See chart)   Hospital Follow Up  Appt(s) scheduled? Yes   Discharge plans and expectations educations in teach back method with documentation complete? Yes   Offered [facility] -- Bedside Delivery? n/a   Discharge/Hospital Encounter Summary to (non[facility]) PCP n/a   Referral to Outpatient Case Management complete? n/a   Referral to / orders for Home Health Complete? Yes   30 day supply of medicines given at discharge, if documented non-compliance / non-adherence? n/a   Any social issues identified prior to discharge? No   Did you assess the readiness or willingness of the family or caregiver to support self management of care? Yes   Right Care Referral Info   Post Acute Recommendation Home-care

## 2017-04-15 NOTE — PROGRESS NOTES
TN informed med surg nurse that pt is ready for d/c from [facility].Drea Rg RN, BSN, [facility]  4/15/2017  '

## 2017-04-15 NOTE — PROGRESS NOTES
O2 turned off and patient O2 sats dropped to 86%. O2 reapplied, sats back above 90% and care management notified that patient will need portable O2 tank to transport home. Patient doesn;t remember company name she uses and will try to find out but seems unsure that she will be able to do so.

## 2017-04-15 NOTE — PROGRESS NOTES
IV discontinued. Patient's  arrived with O2 tank and patient states that she is not leaving until after supper.

## 2017-04-15 NOTE — PROGRESS NOTES
Discharge instructions given to patient with new prescription. Patient verbalized an understanding that she needs to call her PCP on Monday to schedule follow-up appointment.

## 2017-04-15 NOTE — SUBJECTIVE & OBJECTIVE
Interval History: Doing well from respiratory stand point.    Review of Systems   Constitutional: Positive for activity change.   Respiratory: Negative for cough, chest tightness, shortness of breath, wheezing and stridor.    Cardiovascular: Negative for chest pain.   Gastrointestinal: Negative for abdominal pain.     Objective:     Vital Signs (Most Recent):  Temp: 98.3 °F (36.8 °C) (04/15/17 0001)  Pulse: 62 (04/15/17 0012)  Resp: 14 (04/15/17 0012)  BP: (!) 146/66 (04/15/17 0001)  SpO2: 96 % (04/15/17 0012) Vital Signs (24h Range):  Temp:  [97.8 °F (36.6 °C)-98.3 °F (36.8 °C)] 98.3 °F (36.8 °C)  Pulse:  [60-75] 62  Resp:  [14-20] 14  SpO2:  [94 %-97 %] 96 %  BP: (125-146)/(63-87) 146/66     Weight: 86.8 kg (191 lb 5.8 oz)  Body mass index is 29.97 kg/(m^2).    Intake/Output Summary (Last 24 hours) at 04/15/17 0647  Last data filed at 04/15/17 0600   Gross per 24 hour   Intake              830 ml   Output              400 ml   Net              430 ml      Physical Exam   Constitutional: She is oriented to person, place, and time. She appears well-developed and well-nourished.   Cardiovascular: Normal rate.    Pulmonary/Chest: Breath sounds normal. No respiratory distress. She has no wheezes. She has no rales.   Abdominal: Bowel sounds are normal.   Neurological: She is alert and oriented to person, place, and time.   Vitals reviewed.      Significant Labs: BMP: No results for input(s): GLU, NA, K, CL, CO2, BUN, CREATININE, CALCIUM, MG in the last 48 hours.  CBC: No results for input(s): WBC, HGB, HCT, PLT in the last 48 hours.    Significant Imaging:

## 2017-04-15 NOTE — PROGRESS NOTES
[facility]  WRITTEN DISCHARGE INFORMATION:  Follow-up Information     Follow up with Angel Orourke Jr, MD. Schedule an appointment as soon as possible for a visit in 1 week.    Specialty:  Family Medicine    Contact information:    [address]  [address]  [facility] [address]  [phone]          Follow up with [facility] On 4/16/2017.    Specialty:  Home Health Services    Why:  Home Health    Contact information:    [address] [address]  [phone]          Follow up with [facility].    Specialty:  DME Provider    Why:  Need portable oxygen tank for transport home, dropped to 86%.   to bring from home.    Contact information:    [address]  [place] [id number] [phone]                                                               Help at Home  After discharge for assistance [facility] On [facility] Line 24/7 assistance  [phone]     Thank you for choosing [facility] for your care.  Please answer any calls you may receive from [facility] we want to continue to support you as you manage your healthcare needs. [facility] is happy to have the opportunity to serve you.   Sincerely, Your [facility] Healthcare Manager is,  Drea Rg RN [facility] [phone]

## 2017-04-16 NOTE — PT/OT/SLP DISCHARGE
Physical Therapy Discharge Summary    Yasmeen Palm  MRN: [id number]   COPD with acute exacerbation   Patient Discharged from acute Physical Therapy on 2017.  Please refer to prior PT noted date on 04/15/2017 for functional status.     Assessment:   Patient appropriate for care in another setting.  GOALS:   Physical Therapy Goals     Not on file      Multidisciplinary Problems (Resolved)        Problem: Physical Therapy Goal    Goal Priority Disciplines Outcome Goal Variances Interventions   Physical Therapy Goal   (Resolved)     PT/OT, PT Outcome(s) achieved     Description:  Goals to be met by: 17    Patient will increase functional independence with mobility by performin. Sit to stand transfer with Supervision  2. Gait  x 250 feet with Supervision using Rolling Walker  3. Ascend/descend 4 stair with bilateral Handrails Supervision   4. Lower extremity exercise program x30 reps per handout, with supervision            Reasons for Discontinuation of Therapy Services  Transfer to alternate level of care.      Plan:  Patient Discharged to: Home with Home Health Service.    Devin Pollock,PT  2017

## 2017-08-12 ENCOUNTER — HOSPITAL ENCOUNTER (INPATIENT)
Facility: HOSPITAL | Age: 67
LOS: 2 days | Discharge: HOME | DRG: 189 | End: 2017-08-14
Attending: EMERGENCY MEDICINE | Admitting: INTERNAL MEDICINE
Payer: MEDICARE

## 2017-08-12 DIAGNOSIS — I50.9 HEART FAILURE: ICD-10-CM

## 2017-08-12 DIAGNOSIS — Z79.01 CHRONIC ANTICOAGULATION: Primary | Chronic | ICD-10-CM

## 2017-08-12 DIAGNOSIS — J96.11 CHRONIC RESPIRATORY FAILURE WITH HYPOXIA AND HYPERCAPNIA: Chronic | ICD-10-CM

## 2017-08-12 DIAGNOSIS — I50.9 CHF (CONGESTIVE HEART FAILURE): ICD-10-CM

## 2017-08-12 DIAGNOSIS — J96.12 CHRONIC RESPIRATORY FAILURE WITH HYPOXIA AND HYPERCAPNIA: Chronic | ICD-10-CM

## 2017-08-12 DIAGNOSIS — I10 ESSENTIAL HYPERTENSION: Chronic | ICD-10-CM

## 2017-08-12 PROBLEM — R79.89 ELEVATED BRAIN NATRIURETIC PEPTIDE (BNP) LEVEL: Status: ACTIVE | Noted: 2017-08-12

## 2017-08-12 PROBLEM — R79.89 ELEVATED TROPONIN I LEVEL: Status: ACTIVE | Noted: 2017-08-12

## 2017-08-12 PROBLEM — J96.20 ACUTE ON CHRONIC RESPIRATORY FAILURE: Status: ACTIVE | Noted: 2017-08-12

## 2017-08-12 PROBLEM — I70.0 THORACIC AORTA ATHEROSCLEROSIS: Status: ACTIVE | Noted: 2017-08-12

## 2017-08-12 PROBLEM — J96.21 ACUTE ON CHRONIC RESPIRATORY FAILURE WITH HYPOXIA AND HYPERCAPNIA: Status: ACTIVE | Noted: 2017-08-12

## 2017-08-12 PROBLEM — R06.02 SHORTNESS OF BREATH: Status: ACTIVE | Noted: 2017-08-12

## 2017-08-12 PROBLEM — J96.22 ACUTE ON CHRONIC RESPIRATORY FAILURE WITH HYPOXIA AND HYPERCAPNIA: Status: ACTIVE | Noted: 2017-08-12

## 2017-08-12 PROBLEM — I50.33 ACUTE ON CHRONIC DIASTOLIC CONGESTIVE HEART FAILURE: Status: ACTIVE | Noted: 2017-08-12

## 2017-08-12 PROBLEM — R93.89 ABNORMAL CT SCAN, CHEST: Status: ACTIVE | Noted: 2017-08-12

## 2017-08-12 PROBLEM — R07.1 CHEST PAIN ON BREATHING: Status: ACTIVE | Noted: 2017-08-12

## 2017-08-12 PROBLEM — E87.4 METABOLIC ALKALOSIS WITH RESPIRATORY ACIDOSIS: Status: ACTIVE | Noted: 2017-08-12

## 2017-08-12 LAB
ALBUMIN SERPL BCP-MCNC: 3.6 G/DL
ALLENS TEST: ABNORMAL
ALLENS TEST: ABNORMAL
ALP SERPL-CCNC: 93 U/L
ALT SERPL W/O P-5'-P-CCNC: 11 U/L
ANION GAP SERPL CALC-SCNC: 10 MMOL/L
AST SERPL-CCNC: 18 U/L
BASOPHILS # BLD AUTO: 0.03 K/UL
BASOPHILS NFR BLD: 0.3 %
BILIRUB SERPL-MCNC: 0.2 MG/DL
BNP SERPL-MCNC: 895 PG/ML
BUN SERPL-MCNC: 9 MG/DL
CALCIUM SERPL-MCNC: 9.9 MG/DL
CHLORIDE SERPL-SCNC: 102 MMOL/L
CHOLEST/HDLC SERPL: 5.7 {RATIO}
CO2 SERPL-SCNC: 32 MMOL/L
CREAT SERPL-MCNC: 0.7 MG/DL
DELSYS: ABNORMAL
DELSYS: ABNORMAL
DIFFERENTIAL METHOD: ABNORMAL
EOSINOPHIL # BLD AUTO: 0.2 K/UL
EOSINOPHIL NFR BLD: 2.1 %
EP: 5
ERYTHROCYTE [DISTWIDTH] IN BLOOD BY AUTOMATED COUNT: 17.8 %
ERYTHROCYTE [SEDIMENTATION RATE] IN BLOOD BY WESTERGREN METHOD: 14 MM/H
EST. GFR  (AFRICAN AMERICAN): >60 ML/MIN/1.73 M^2
EST. GFR  (NON AFRICAN AMERICAN): >60 ML/MIN/1.73 M^2
ESTIMATED AVG GLUCOSE: 134 MG/DL
ESTIMATED PA SYSTOLIC PRESSURE: 42.34
FIO2: 35
FLOW: 3
GLOBAL PERICARDIAL EFFUSION: ABNORMAL
GLUCOSE SERPL-MCNC: 147 MG/DL
HBA1C MFR BLD HPLC: 6.3 %
HCO3 UR-SCNC: 36.5 MMOL/L (ref 24–28)
HCO3 UR-SCNC: 37.2 MMOL/L (ref 24–28)
HCT VFR BLD AUTO: 41.5 %
HDL/CHOLESTEROL RATIO: 17.7 %
HDLC SERPL-MCNC: 243 MG/DL
HDLC SERPL-MCNC: 43 MG/DL
HGB BLD-MCNC: 12 G/DL
INR PPP: 1.2
IP: 10
LDLC SERPL CALC-MCNC: 181.4 MG/DL
LYMPHOCYTES # BLD AUTO: 1.7 K/UL
LYMPHOCYTES NFR BLD: 17 %
MCH RBC QN AUTO: 25.4 PG
MCHC RBC AUTO-ENTMCNC: 28.9 G/DL
MCV RBC AUTO: 88 FL
MITRAL VALVE MOBILITY: NORMAL
MODE: ABNORMAL
MODE: ABNORMAL
MONOCYTES # BLD AUTO: 0.9 K/UL
MONOCYTES NFR BLD: 9.1 %
NEUTROPHILS # BLD AUTO: 7 K/UL
NEUTROPHILS NFR BLD: 71.7 %
NONHDLC SERPL-MCNC: 200 MG/DL
PCO2 BLDA: 69.1 MMHG (ref 35–45)
PCO2 BLDA: 69.7 MMHG (ref 35–45)
PH SMN: 7.33 [PH] (ref 7.35–7.45)
PH SMN: 7.33 [PH] (ref 7.35–7.45)
PLATELET # BLD AUTO: 389 K/UL
PMV BLD AUTO: 10 FL
PO2 BLDA: 111 MMHG (ref 80–100)
PO2 BLDA: 74 MMHG (ref 80–100)
POC BE: 8 MMOL/L
POC BE: 8 MMOL/L
POC SATURATED O2: 93 % (ref 95–100)
POC SATURATED O2: 98 % (ref 95–100)
POC TCO2: 39 MMOL/L (ref 23–27)
POC TCO2: 39 MMOL/L (ref 23–27)
POCT GLUCOSE: 130 MG/DL (ref 70–110)
POCT GLUCOSE: 139 MG/DL (ref 70–110)
POCT GLUCOSE: 155 MG/DL (ref 70–110)
POCT GLUCOSE: 169 MG/DL (ref 70–110)
POTASSIUM SERPL-SCNC: 3.7 MMOL/L
PROT SERPL-MCNC: 8.1 G/DL
PROTHROMBIN TIME: 12.2 SEC
RBC # BLD AUTO: 4.73 M/UL
RETIRED EF AND QEF - SEE NOTES: 70 (ref 55–65)
SAMPLE: ABNORMAL
SAMPLE: ABNORMAL
SITE: ABNORMAL
SITE: ABNORMAL
SODIUM SERPL-SCNC: 144 MMOL/L
SPONT RATE: 24
TRICUSPID VALVE REGURGITATION: ABNORMAL
TRIGL SERPL-MCNC: 93 MG/DL
TROPONIN I SERPL DL<=0.01 NG/ML-MCNC: 0.03 NG/ML
TROPONIN I SERPL DL<=0.01 NG/ML-MCNC: 0.29 NG/ML
TROPONIN I SERPL DL<=0.01 NG/ML-MCNC: 0.29 NG/ML
WBC # BLD AUTO: 9.84 K/UL

## 2017-08-12 PROCEDURE — 99900035 HC TECH TIME PER 15 MIN (STAT)

## 2017-08-12 PROCEDURE — 36415 COLL VENOUS BLD VENIPUNCTURE: CPT

## 2017-08-12 PROCEDURE — 85025 COMPLETE CBC W/AUTO DIFF WBC: CPT

## 2017-08-12 PROCEDURE — 93306 TTE W/DOPPLER COMPLETE: CPT | Mod: 26,,, | Performed by: INTERNAL MEDICINE

## 2017-08-12 PROCEDURE — 93306 TTE W/DOPPLER COMPLETE: CPT

## 2017-08-12 PROCEDURE — 25000003 PHARM REV CODE 250: Performed by: INTERNAL MEDICINE

## 2017-08-12 PROCEDURE — 21400001 HC TELEMETRY ROOM

## 2017-08-12 PROCEDURE — 99285 EMERGENCY DEPT VISIT HI MDM: CPT | Mod: 25

## 2017-08-12 PROCEDURE — 96374 THER/PROPH/DIAG INJ IV PUSH: CPT

## 2017-08-12 PROCEDURE — 80053 COMPREHEN METABOLIC PANEL: CPT

## 2017-08-12 PROCEDURE — 82803 BLOOD GASES ANY COMBINATION: CPT

## 2017-08-12 PROCEDURE — 25000003 PHARM REV CODE 250: Performed by: EMERGENCY MEDICINE

## 2017-08-12 PROCEDURE — 84484 ASSAY OF TROPONIN QUANT: CPT

## 2017-08-12 PROCEDURE — 80061 LIPID PANEL: CPT

## 2017-08-12 PROCEDURE — 96375 TX/PRO/DX INJ NEW DRUG ADDON: CPT

## 2017-08-12 PROCEDURE — 84484 ASSAY OF TROPONIN QUANT: CPT | Mod: 91

## 2017-08-12 PROCEDURE — 36600 WITHDRAWAL OF ARTERIAL BLOOD: CPT

## 2017-08-12 PROCEDURE — 83880 ASSAY OF NATRIURETIC PEPTIDE: CPT

## 2017-08-12 PROCEDURE — 93005 ELECTROCARDIOGRAM TRACING: CPT

## 2017-08-12 PROCEDURE — 94640 AIRWAY INHALATION TREATMENT: CPT

## 2017-08-12 PROCEDURE — 85610 PROTHROMBIN TIME: CPT

## 2017-08-12 PROCEDURE — 25500020 PHARM REV CODE 255: Performed by: INTERNAL MEDICINE

## 2017-08-12 PROCEDURE — 82962 GLUCOSE BLOOD TEST: CPT

## 2017-08-12 PROCEDURE — 27000221 HC OXYGEN, UP TO 24 HOURS

## 2017-08-12 PROCEDURE — 63600175 PHARM REV CODE 636 W HCPCS: Performed by: EMERGENCY MEDICINE

## 2017-08-12 PROCEDURE — 93010 ELECTROCARDIOGRAM REPORT: CPT | Mod: ,,, | Performed by: INTERNAL MEDICINE

## 2017-08-12 PROCEDURE — 27000190 HC CPAP FULL FACE MASK W/VALVE

## 2017-08-12 PROCEDURE — 94660 CPAP INITIATION&MGMT: CPT

## 2017-08-12 PROCEDURE — 83036 HEMOGLOBIN GLYCOSYLATED A1C: CPT

## 2017-08-12 PROCEDURE — 25000242 PHARM REV CODE 250 ALT 637 W/ HCPCS: Performed by: INTERNAL MEDICINE

## 2017-08-12 RX ORDER — PREGABALIN 50 MG/1
50 CAPSULE ORAL 3 TIMES DAILY
Status: DISCONTINUED | OUTPATIENT
Start: 2017-08-12 | End: 2017-08-14 | Stop reason: HOSPADM

## 2017-08-12 RX ORDER — DILTIAZEM HYDROCHLORIDE 30 MG/1
120 TABLET, FILM COATED ORAL 3 TIMES DAILY
Status: DISCONTINUED | OUTPATIENT
Start: 2017-08-13 | End: 2017-08-12

## 2017-08-12 RX ORDER — ENALAPRILAT 1.25 MG/ML
1.25 INJECTION INTRAVENOUS
Status: COMPLETED | OUTPATIENT
Start: 2017-08-12 | End: 2017-08-12

## 2017-08-12 RX ORDER — SOTALOL HYDROCHLORIDE 80 MG/1
80 TABLET ORAL 2 TIMES DAILY
Status: DISCONTINUED | OUTPATIENT
Start: 2017-08-12 | End: 2017-08-12

## 2017-08-12 RX ORDER — GLUCAGON 1 MG
1 KIT INJECTION
Status: DISCONTINUED | OUTPATIENT
Start: 2017-08-12 | End: 2017-08-14 | Stop reason: HOSPADM

## 2017-08-12 RX ORDER — IBUPROFEN 200 MG
16 TABLET ORAL
Status: DISCONTINUED | OUTPATIENT
Start: 2017-08-12 | End: 2017-08-14 | Stop reason: HOSPADM

## 2017-08-12 RX ORDER — DILTIAZEM HYDROCHLORIDE 30 MG/1
90 TABLET, FILM COATED ORAL 3 TIMES DAILY
Status: DISCONTINUED | OUTPATIENT
Start: 2017-08-12 | End: 2017-08-12

## 2017-08-12 RX ORDER — DILTIAZEM HYDROCHLORIDE 30 MG/1
120 TABLET, FILM COATED ORAL 3 TIMES DAILY
Status: DISCONTINUED | OUTPATIENT
Start: 2017-08-12 | End: 2017-08-13

## 2017-08-12 RX ORDER — LORAZEPAM 2 MG/ML
INJECTION INTRAMUSCULAR
Status: DISCONTINUED
Start: 2017-08-12 | End: 2017-08-12 | Stop reason: WASHOUT

## 2017-08-12 RX ORDER — DIAZEPAM 5 MG/1
5 TABLET ORAL
Status: COMPLETED | OUTPATIENT
Start: 2017-08-12 | End: 2017-08-12

## 2017-08-12 RX ORDER — SOTALOL HYDROCHLORIDE 80 MG/1
80 TABLET ORAL 2 TIMES DAILY
Status: DISCONTINUED | OUTPATIENT
Start: 2017-08-12 | End: 2017-08-14 | Stop reason: HOSPADM

## 2017-08-12 RX ORDER — PRAVASTATIN SODIUM 40 MG/1
40 TABLET ORAL DAILY
Status: DISCONTINUED | OUTPATIENT
Start: 2017-08-12 | End: 2017-08-14 | Stop reason: HOSPADM

## 2017-08-12 RX ORDER — NITROGLYCERIN 0.4 MG/1
0.4 TABLET SUBLINGUAL EVERY 5 MIN PRN
Status: DISCONTINUED | OUTPATIENT
Start: 2017-08-12 | End: 2017-08-14 | Stop reason: HOSPADM

## 2017-08-12 RX ORDER — TRAMADOL HYDROCHLORIDE 50 MG/1
50 TABLET ORAL EVERY 6 HOURS PRN
Status: DISCONTINUED | OUTPATIENT
Start: 2017-08-12 | End: 2017-08-12

## 2017-08-12 RX ORDER — FUROSEMIDE 40 MG/1
40 TABLET ORAL DAILY
Status: DISCONTINUED | OUTPATIENT
Start: 2017-08-12 | End: 2017-08-14 | Stop reason: HOSPADM

## 2017-08-12 RX ORDER — CLONIDINE HYDROCHLORIDE 0.1 MG/1
0.2 TABLET ORAL
Status: COMPLETED | OUTPATIENT
Start: 2017-08-12 | End: 2017-08-12

## 2017-08-12 RX ORDER — NAPROXEN SODIUM 220 MG/1
162 TABLET, FILM COATED ORAL
Status: DISCONTINUED | OUTPATIENT
Start: 2017-08-12 | End: 2017-08-12

## 2017-08-12 RX ORDER — FAMOTIDINE 20 MG/1
20 TABLET, FILM COATED ORAL DAILY
Status: DISCONTINUED | OUTPATIENT
Start: 2017-08-13 | End: 2017-08-14 | Stop reason: HOSPADM

## 2017-08-12 RX ORDER — FUROSEMIDE 10 MG/ML
40 INJECTION INTRAMUSCULAR; INTRAVENOUS
Status: COMPLETED | OUTPATIENT
Start: 2017-08-12 | End: 2017-08-12

## 2017-08-12 RX ORDER — NITROGLYCERIN 0.3 MG/1
0.3 TABLET SUBLINGUAL EVERY 5 MIN PRN
Status: DISCONTINUED | OUTPATIENT
Start: 2017-08-12 | End: 2017-08-12

## 2017-08-12 RX ORDER — NICARDIPINE HYDROCHLORIDE 0.2 MG/ML
2.5 INJECTION INTRAVENOUS CONTINUOUS
Status: DISCONTINUED | OUTPATIENT
Start: 2017-08-12 | End: 2017-08-12

## 2017-08-12 RX ORDER — TIOTROPIUM BROMIDE 18 UG/1
1 CAPSULE ORAL; RESPIRATORY (INHALATION) DAILY
Status: DISCONTINUED | OUTPATIENT
Start: 2017-08-12 | End: 2017-08-14 | Stop reason: HOSPADM

## 2017-08-12 RX ORDER — DILTIAZEM HYDROCHLORIDE 30 MG/1
90 TABLET, FILM COATED ORAL
Status: COMPLETED | OUTPATIENT
Start: 2017-08-12 | End: 2017-08-12

## 2017-08-12 RX ORDER — IBUPROFEN 200 MG
24 TABLET ORAL
Status: DISCONTINUED | OUTPATIENT
Start: 2017-08-12 | End: 2017-08-14 | Stop reason: HOSPADM

## 2017-08-12 RX ORDER — CLONIDINE HYDROCHLORIDE 0.1 MG/1
0.2 TABLET ORAL 3 TIMES DAILY
Status: DISCONTINUED | OUTPATIENT
Start: 2017-08-12 | End: 2017-08-14 | Stop reason: HOSPADM

## 2017-08-12 RX ORDER — INSULIN ASPART 100 [IU]/ML
0-5 INJECTION, SOLUTION INTRAVENOUS; SUBCUTANEOUS
Status: DISCONTINUED | OUTPATIENT
Start: 2017-08-12 | End: 2017-08-14 | Stop reason: HOSPADM

## 2017-08-12 RX ORDER — ACETAMINOPHEN 325 MG/1
650 TABLET ORAL EVERY 8 HOURS PRN
Status: DISCONTINUED | OUTPATIENT
Start: 2017-08-12 | End: 2017-08-12

## 2017-08-12 RX ORDER — ASPIRIN 81 MG/1
81 TABLET ORAL DAILY
Status: DISCONTINUED | OUTPATIENT
Start: 2017-08-12 | End: 2017-08-14 | Stop reason: HOSPADM

## 2017-08-12 RX ADMIN — IOHEXOL 75 ML: 350 INJECTION, SOLUTION INTRAVENOUS at 07:08

## 2017-08-12 RX ADMIN — CLONIDINE HYDROCHLORIDE 0.2 MG: 0.1 TABLET ORAL at 03:08

## 2017-08-12 RX ADMIN — DILTIAZEM HYDROCHLORIDE 90 MG: 30 TABLET, FILM COATED ORAL at 03:08

## 2017-08-12 RX ADMIN — SOTALOL HYDROCHLORIDE 80 MG: 80 TABLET ORAL at 09:08

## 2017-08-12 RX ADMIN — PREGABALIN 50 MG: 50 CAPSULE ORAL at 09:08

## 2017-08-12 RX ADMIN — PRAVASTATIN SODIUM 40 MG: 40 TABLET ORAL at 08:08

## 2017-08-12 RX ADMIN — FUROSEMIDE 40 MG: 10 INJECTION, SOLUTION INTRAMUSCULAR; INTRAVENOUS at 02:08

## 2017-08-12 RX ADMIN — ASPIRIN 81 MG: 81 TABLET, COATED ORAL at 08:08

## 2017-08-12 RX ADMIN — ENALAPRILAT 1.25 MG: 1.25 INJECTION, SOLUTION INTRAVENOUS at 03:08

## 2017-08-12 RX ADMIN — RIVAROXABAN 15 MG: 15 TABLET, FILM COATED ORAL at 08:08

## 2017-08-12 RX ADMIN — TIOTROPIUM BROMIDE 18 MCG: 18 CAPSULE ORAL; RESPIRATORY (INHALATION) at 12:08

## 2017-08-12 RX ADMIN — CLONIDINE HYDROCHLORIDE 0.2 MG: 0.1 TABLET ORAL at 09:08

## 2017-08-12 RX ADMIN — DILTIAZEM HYDROCHLORIDE 120 MG: 30 TABLET, FILM COATED ORAL at 03:08

## 2017-08-12 RX ADMIN — CLONIDINE HYDROCHLORIDE 0.2 MG: 0.1 TABLET ORAL at 02:08

## 2017-08-12 RX ADMIN — DIAZEPAM 5 MG: 5 TABLET ORAL at 04:08

## 2017-08-12 RX ADMIN — DILTIAZEM HYDROCHLORIDE 120 MG: 30 TABLET, FILM COATED ORAL at 09:08

## 2017-08-12 RX ADMIN — FUROSEMIDE 40 MG: 40 TABLET ORAL at 08:08

## 2017-08-12 RX ADMIN — PREGABALIN 50 MG: 50 CAPSULE ORAL at 03:08

## 2017-08-12 NOTE — NURSING TRANSFER
Nursing Transfer Note      8/12/2017     Transfer To: 335    Transfer via wheelchair    Transfer with  to O2, cardiac monitoring    Transported by transport, RN Letty    Medicines sent: inhaler    Chart send with patient: Yes    Notified: spouse    Patient reassessed at:  (08/12/17, 1230)    Upon arrival to floor: cardiac monitor applied, patient oriented to room, call bell in reach and bed in lowest position

## 2017-08-12 NOTE — ASSESSMENT & PLAN NOTE
Well controlled at 7.1% and BG currently at goal. Is on metformin at home. Will hold off while inpatient and use low dose SSI instead. Goal < 180

## 2017-08-12 NOTE — ASSESSMENT & PLAN NOTE
Strongly encouraged cessation as it is negatively impacting her health. Patient aware. Will offer nicotine patch

## 2017-08-12 NOTE — HPI
"67 y.o. Female with uncontrolled essential hypertension, type 2 diabetes mellitus (HbA1c 7.1% April 2017), COPD, chronic respiratory failure with hypoxia and hypercapnia on supplemental O2 via NC (2L) PRN, GERD, anemia of chronic disease, mild protein malnutrition, tobacco abuse, and history of PE/DVT on chronic anticoagulation who presented with one day of worsening shortness of breath. Patient stated she had chest pain involving left upper chest just underneath shoulder and right upper chest just underneath shoulder. Pain is worse with deep inspiration. Reported no resolution with SL nitro. Reported daily cigarette smoking of 1-2 cigarettes. Also with a chronic cough, but not changed in frequency nor change in sputum quality or quantity. Also reported compliance with anticoagulation and antihypertensive regimen. Stated her blood pressure is usually high at "160s-170s". Has been intubated before.     In the ED, patient arrived in no distress with SaO2 of 88% at room air. Patient was placed on low flow nasal cannula at 3L. Shortly after patient complained of chest pain (same pattern as described above) and shortness of breath. Was placed on BiPAP, quickly becoming in distress. Was given diazepam and cardene infusion ordered for significant high blood pressure. Patient then became lethargic and relatively hypotensive. ABG showed a mild respiratory acidosis with partial metabolic compensation. Patient shortly after became alert, awake and oriented. Home antihypertensives given and de-escalated back to low flow nasal cannula at 3L. IV lasix given, resulting in unquantified output but per ED nurse, it was significant amount. Chest pain did not recur. Trop of 0.030 (0.043 four months ago), EKG with no T wave inversion, ST segment elevation/depression and with similar appearance to EKG done four months ago,  (rom 1300 four months ago). Is afebrile. No leukocytosis, severe anemia and renal/hepatic function stable. " Mild hyperglycemia of 147. Patient admitted to ICU for close monitoring. CTA ordered.

## 2017-08-12 NOTE — ED NOTES
Dr. Childers notified of pts status. Pt lethargic/sleepy, but easily aroused. BP dropped to normal limits. Ativan and cardene held. MD stated to continue with the ICU admit. In NAD, will continue to monitor.

## 2017-08-12 NOTE — PROGRESS NOTES
Patient placed on NC 3lpm for cat scan.  Patient tolerated well with no shortness of breath noted.  Upon arrival to ICU patient remains comfortable.  Left on NC 3lpm at this time

## 2017-08-12 NOTE — ED NOTES
Pt placed on BIPAP, pt anxious and continuously pulling mask off of face. Pt instructed on the importance of keeping the bipap on. Dr. Childers notified of pts status and elevated Bp. Orders given for Cardene drip and IV Ativan. Will continue to monitor.

## 2017-08-12 NOTE — H&P
"[facility] Medicine  History & Physical    Patient Name: Yasmeen Palm  MRN: [id number]  Admission Date: 8/12/2017  Attending Physician: Layla Villegas MD   Primary Care Provider: Angel Orourke Jr, MD         Patient information was obtained from patient and ER records.     Subjective:     Principal Problem:Acute on chronic respiratory failure with hypoxia and hypercapnia    Chief Complaint:   Chief Complaint   Patient presents with    Shortness of Breath     hx of CHF, has SOB associated with CP        HPI: 67 y.o. Female with uncontrolled essential hypertension, type 2 diabetes mellitus (HbA1c 7.1% April 2017), COPD, chronic respiratory failure with hypoxia and hypercapnia on supplemental O2 via NC (2L) PRN, GERD, anemia of chronic disease, mild protein malnutrition, tobacco abuse, and history of PE/DVT on chronic anticoagulation who presented with one day of worsening shortness of breath. Patient stated she had chest pain involving left upper chest just underneath shoulder and right upper chest just underneath shoulder. Pain is worse with deep inspiration. Reported no resolution with SL nitro. Reported daily cigarette smoking of 1-2 cigarettes. Also with a chronic cough, but not changed in frequency nor change in sputum quality or quantity. Also reported compliance with anticoagulation and antihypertensive regimen. Stated her blood pressure is usually high at "160s-170s". Has been intubated before.     In the ED, patient arrived in no distress with SaO2 of 88% at room air. Patient was placed on low flow nasal cannula at 3L. Shortly after patient complained of chest pain (same pattern as described above) and shortness of breath. Was placed on BiPAP, quickly becoming in distress. Was given diazepam and cardene infusion ordered for significant high blood pressure. Patient then became lethargic and relatively hypotensive. ABG showed a mild respiratory acidosis with partial metabolic " compensation. Patient shortly after became alert, awake and oriented. Home antihypertensives given and de-escalated back to low flow nasal cannula at 3L. IV lasix given, resulting in unquantified output but per ED nurse, it was significant amount. Chest pain did not recur. Trop of 0.030 (0.043 four months ago), EKG with no T wave inversion, ST segment elevation/depression and with similar appearance to EKG done four months ago,  (rom 1300 four months ago). Is afebrile. No leukocytosis, severe anemia and renal/hepatic function stable. Mild hyperglycemia of 147. Patient admitted to ICU for close monitoring. CTA ordered.    Past Medical History:   Diagnosis Date    CHF (congestive heart failure)     COPD (chronic obstructive pulmonary disease)     Depression     Diabetes mellitus     GERD (gastroesophageal reflux disease)     Hypertension        Past Surgical History:   Procedure Laterality Date    ABDOMINAL SURGERY      CARDIAC SURGERY         Review of patient's allergies indicates:  No Known Allergies    No current facility-administered medications on file prior to encounter.      Current Outpatient Prescriptions on File Prior to Encounter   Medication Sig    acetaminophen (TYLENOL) 500 MG tablet Take 1 tablet (500 mg total) by mouth every 8 (eight) hours as needed.    aspirin (ECOTRIN) 81 MG EC tablet Take 81 mg by mouth once daily.    ciprofloxacin-dexamethasone 0.3-0.1% (CIPRODEX) 0.3-0.1 % DrpS Place 1 drop into the right eye every 4 (four) hours.    cloNIDine (CATAPRES) 0.1 MG tablet Take 2 tablets (0.2 mg total) by mouth 3 (three) times daily.    diltiazem (CARDIZEM) 90 MG tablet Take 90 mg by mouth 3 (three) times daily.    furosemide (LASIX) 40 MG tablet Take 40 mg by mouth once daily.     lisinopril (PRINIVIL,ZESTRIL) 40 MG tablet Take 40 mg by mouth once daily.    metformin (GLUCOPHAGE) 500 MG tablet Take 500 mg by mouth 2 (two) times daily with meals.    mirabegron (MYRBETRIQ) 25  mg Tb24 ER tablet Take 25 mg by mouth once daily.    naproxen (NAPROSYN) 375 MG tablet Take 375 mg by mouth every 12 (twelve) hours as needed.    nitroGLYCERIN (NITROSTAT) 0.3 MG SL tablet Place 0.3 mg under the tongue every 5 (five) minutes as needed for Chest pain.    pantoprazole (PROTONIX) 40 MG tablet Take 1 tablet (40 mg total) by mouth once daily.    pravastatin (PRAVACHOL) 40 MG tablet Take 40 mg by mouth once daily.    prednisoLONE acetate (PRED FORTE) 1 % DrpS Place 1 drop into the right eye every 4 (four) hours.    predniSONE (DELTASONE) 20 MG tablet 3 tablets po daily for 3 days  2 tablets po daily for 3 days  1 tablet po daily for 3 days    pregabalin (LYRICA) 50 MG capsule Take 50 mg by mouth 3 (three) times daily.    promethazine (PHENERGAN) 12.5 MG Tab Take 12.5 mg by mouth every 6 (six) hours as needed.    rivaroxaban (XARELTO) 15 mg Tab Take 15 mg by mouth once daily.    sotalol (BETAPACE) 80 MG tablet Take 80 mg by mouth 2 (two) times daily.    tramadol (ULTRAM) 50 mg tablet Take 1 tablet (50 mg total) by mouth every 6 (six) hours as needed for Pain.    trazodone (DESYREL) 50 MG tablet Take 50 mg by mouth every evening.    vortioxetine (BRINTELLIX) 5 mg Tab Take 5 mg by mouth 2 (two) times daily.    zolpidem (AMBIEN) 5 MG Tab Take 5 mg by mouth nightly as needed. Take 1-2 tabs at bedtime     Family History     Problem Relation (Age of Onset)    Diabetes Father    Heart disease Mother    Hypertension Mother        Social History Main Topics    Smoking status: Current Every Day Smoker     Packs/day: 0.50     Years: 25.00     Types: Cigarettes    Smokeless tobacco: Never Used    Alcohol use No    Drug use: No    Sexual activity: Not Currently     Review of Systems   Constitutional: Negative.    HENT: Negative.    Eyes: Negative.    Respiratory: Positive for cough and shortness of breath. Negative for wheezing.    Cardiovascular: Positive for chest pain.   Gastrointestinal:  Negative.    Endocrine: Negative.    Genitourinary: Negative.    Musculoskeletal: Negative.    Skin: Negative.    Neurological: Negative.    Hematological: Negative.    Psychiatric/Behavioral: The patient is nervous/anxious.      Objective:     Vital Signs (Most Recent):  Temp: 98.6 °F (37 °C) (08/12/17 0800)  Pulse: 91 (08/12/17 0800)  Resp: 20 (08/12/17 0023)  BP: (!) 188/92 (08/12/17 0800)  SpO2: 98 % (08/12/17 0800) Vital Signs (24h Range):  Temp:  [98.6 °F (37 °C)-98.7 °F (37.1 °C)] 98.6 °F (37 °C)  Pulse:  [] 91  Resp:  [20] 20  SpO2:  [87 %-100 %] 98 %  BP: (102-271)/() 188/92     Weight: 92.5 kg (204 lb)  Body mass index is 31.95 kg/m².    Physical Exam   Constitutional: She is oriented to person, place, and time. She appears well-developed. No distress.   HENT:   Head: Normocephalic and atraumatic.   Mouth/Throat: Oropharynx is clear and moist.   Eyes: Conjunctivae and EOM are normal.   Neck: Normal range of motion. Neck supple. No thyromegaly present.   Cardiovascular: Normal rate, regular rhythm and intact distal pulses.    Murmur heard.  Pulmonary/Chest: Effort normal. No respiratory distress. She has no wheezes. She has no rales. She exhibits no tenderness.   On low flow nasal cannula 3L  Slightly using accessory muscles when speaking.   Productive cough of white sputum, moderate amount   Abdominal: Soft. Bowel sounds are normal. She exhibits no distension. There is no tenderness.   Musculoskeletal: Normal range of motion. She exhibits no edema, tenderness or deformity.   Neurological: She is alert and oriented to person, place, and time. No cranial nerve deficit. She exhibits normal muscle tone.   Skin: Skin is warm and dry. She is not diaphoretic.   Psychiatric: She has a normal mood and affect. Her behavior is normal. Judgment and thought content normal.   Nursing note and vitals reviewed.       Significant Labs: All pertinent labs within the past 24 hours have been  "reviewed.    Significant Imaging: I have reviewed all pertinent imaging results/findings within the past 24 hours.  I have reviewed and interpreted all pertinent imaging results/findings within the past 24 hours.    Assessment/Plan:     * Acute on chronic respiratory failure with hypoxia and hypercapnia    Although presenting with chest pain, ACS is less likely given initial workup, however I will continue to trend troponin and monitor closely for signs of ACS. On ASA and statin. Will need better blood pressure control fpr goal BP < 130/90 mmHg. Resume home furosemide for maintenance diuresis and repeat 2D Echo. Hold off on cardiology consult for now (patient follows with Dr Sheppard). CTA without signs of new or active pulmonary embolism but did show a " 1.6 cm noncalcified pulmonary nodule within the superior segment of the left upper lobe with some adjacent stranding.  Neoplasm is the diagnosis of exclusion.  No enlarged mediastinal or hilar lymph nodes" and "Focal outpouchings associated with the descending thoracic aorta.  The possibility of penetrating atherosclerotic ulcers cannot be excluded". Again, BP control, rate and maintenance anticoagulation are most important. Tobacco cessation, add tiotropium and continue low flow supplemental O2 for goal SaO2 88-92%.           Acute on chronic diastolic congestive heart failure    As above          Elevated troponin I level    As above          Chest pain on breathing    As above          Elevated brain natriuretic peptide (BNP) level    As above          Shortness of breath    As above          Metabolic alkalosis with respiratory acidosis    As above          Malignant hypertension    As above          Thoracic aorta atherosclerosis    As above          Tobacco abuse    Strongly encouraged cessation as it is negatively impacting her health. Patient aware. Will offer nicotine patch          History of pulmonary embolism    On chronic anticoagulation and compliant " with regimen          History of deep vein thrombosis    On chronic anticoagulation and compliant with regimen          Abnormal CT scan, chest    This was previously described as progression of pulmonary infarct secondary to pulmonary embolism at the time however neoplasm cannot be excluded given current use of cigarettes. This needs to be addressed as outpatient           Obesity    Needs weight loss. Outpatient nutrition consult.           Chronic respiratory failure with hypoxia and hypercapnia    As above          Chronic anticoagulation    As above           Anemia of chronic disease    Not severe anemia. Currently normocytic and at appropriate level at 12 g/dL. No signs of active bleed and no  indications for blood transfusion at this time.           Gastroesophageal reflux disease without esophagitis    Famotidine          Type 2 diabetes mellitus, controlled    Well controlled at 7.1% and BG currently at goal. Is on metformin at home. Will hold off while inpatient and use low dose SSI instead. Goal < 180          Coronary artery disease involving native coronary artery of native heart with angina pectoris    As above            VTE Risk Mitigation         Ordered     rivaroxaban tablet 15 mg  Daily     Route:  Oral        08/12/17 0800        Patient is stable for telemetry floor.     Critical care time spent on the evaluation and treatment of severe organ dysfunction, review of pertinent labs and imaging studies, discussions with consulting providers and discussions with patient/family: > 45 minutes.     Layla Linda MD  Department of Hospital Medicine   [facility]

## 2017-08-12 NOTE — SUBJECTIVE & OBJECTIVE
Past Medical History:   Diagnosis Date    CHF (congestive heart failure)     COPD (chronic obstructive pulmonary disease)     Depression     Diabetes mellitus     GERD (gastroesophageal reflux disease)     Hypertension        Past Surgical History:   Procedure Laterality Date    ABDOMINAL SURGERY      CARDIAC SURGERY         Review of patient's allergies indicates:  No Known Allergies    No current facility-administered medications on file prior to encounter.      Current Outpatient Prescriptions on File Prior to Encounter   Medication Sig    acetaminophen (TYLENOL) 500 MG tablet Take 1 tablet (500 mg total) by mouth every 8 (eight) hours as needed.    aspirin (ECOTRIN) 81 MG EC tablet Take 81 mg by mouth once daily.    ciprofloxacin-dexamethasone 0.3-0.1% (CIPRODEX) 0.3-0.1 % DrpS Place 1 drop into the right eye every 4 (four) hours.    cloNIDine (CATAPRES) 0.1 MG tablet Take 2 tablets (0.2 mg total) by mouth 3 (three) times daily.    diltiazem (CARDIZEM) 90 MG tablet Take 90 mg by mouth 3 (three) times daily.    furosemide (LASIX) 40 MG tablet Take 40 mg by mouth once daily.     lisinopril (PRINIVIL,ZESTRIL) 40 MG tablet Take 40 mg by mouth once daily.    metformin (GLUCOPHAGE) 500 MG tablet Take 500 mg by mouth 2 (two) times daily with meals.    mirabegron (MYRBETRIQ) 25 mg Tb24 ER tablet Take 25 mg by mouth once daily.    naproxen (NAPROSYN) 375 MG tablet Take 375 mg by mouth every 12 (twelve) hours as needed.    nitroGLYCERIN (NITROSTAT) 0.3 MG SL tablet Place 0.3 mg under the tongue every 5 (five) minutes as needed for Chest pain.    pantoprazole (PROTONIX) 40 MG tablet Take 1 tablet (40 mg total) by mouth once daily.    pravastatin (PRAVACHOL) 40 MG tablet Take 40 mg by mouth once daily.    prednisoLONE acetate (PRED FORTE) 1 % DrpS Place 1 drop into the right eye every 4 (four) hours.    predniSONE (DELTASONE) 20 MG tablet 3 tablets po daily for 3 days  2 tablets po daily for 3 days  1  tablet po daily for 3 days    pregabalin (LYRICA) 50 MG capsule Take 50 mg by mouth 3 (three) times daily.    promethazine (PHENERGAN) 12.5 MG Tab Take 12.5 mg by mouth every 6 (six) hours as needed.    rivaroxaban (XARELTO) 15 mg Tab Take 15 mg by mouth once daily.    sotalol (BETAPACE) 80 MG tablet Take 80 mg by mouth 2 (two) times daily.    tramadol (ULTRAM) 50 mg tablet Take 1 tablet (50 mg total) by mouth every 6 (six) hours as needed for Pain.    trazodone (DESYREL) 50 MG tablet Take 50 mg by mouth every evening.    vortioxetine (BRINTELLIX) 5 mg Tab Take 5 mg by mouth 2 (two) times daily.    zolpidem (AMBIEN) 5 MG Tab Take 5 mg by mouth nightly as needed. Take 1-2 tabs at bedtime     Family History     Problem Relation (Age of Onset)    Diabetes Father    Heart disease Mother    Hypertension Mother        Social History Main Topics    Smoking status: Current Every Day Smoker     Packs/day: 0.50     Years: 25.00     Types: Cigarettes    Smokeless tobacco: Never Used    Alcohol use No    Drug use: No    Sexual activity: Not Currently     Review of Systems   Constitutional: Negative.    HENT: Negative.    Eyes: Negative.    Respiratory: Positive for cough and shortness of breath. Negative for wheezing.    Cardiovascular: Positive for chest pain.   Gastrointestinal: Negative.    Endocrine: Negative.    Genitourinary: Negative.    Musculoskeletal: Negative.    Skin: Negative.    Neurological: Negative.    Hematological: Negative.    Psychiatric/Behavioral: The patient is nervous/anxious.      Objective:     Vital Signs (Most Recent):  Temp: 98.6 °F (37 °C) (08/12/17 0800)  Pulse: 91 (08/12/17 0800)  Resp: 20 (08/12/17 0023)  BP: (!) 188/92 (08/12/17 0800)  SpO2: 98 % (08/12/17 0800) Vital Signs (24h Range):  Temp:  [98.6 °F (37 °C)-98.7 °F (37.1 °C)] 98.6 °F (37 °C)  Pulse:  [] 91  Resp:  [20] 20  SpO2:  [87 %-100 %] 98 %  BP: (102-271)/() 188/92     Weight: 92.5 kg (204 lb)  Body mass  index is 31.95 kg/m².    Physical Exam   Constitutional: She is oriented to person, place, and time. She appears well-developed. No distress.   HENT:   Head: Normocephalic and atraumatic.   Mouth/Throat: Oropharynx is clear and moist.   Eyes: Conjunctivae and EOM are normal.   Neck: Normal range of motion. Neck supple. No thyromegaly present.   Cardiovascular: Normal rate, regular rhythm and intact distal pulses.    Murmur heard.  Pulmonary/Chest: Effort normal. No respiratory distress. She has no wheezes. She has no rales. She exhibits no tenderness.   On low flow nasal cannula 3L  Slightly using accessory muscles when speaking.   Productive cough of white sputum, moderate amount   Abdominal: Soft. Bowel sounds are normal. She exhibits no distension. There is no tenderness.   Musculoskeletal: Normal range of motion. She exhibits no edema, tenderness or deformity.   Neurological: She is alert and oriented to person, place, and time. No cranial nerve deficit. She exhibits normal muscle tone.   Skin: Skin is warm and dry. She is not diaphoretic.   Psychiatric: She has a normal mood and affect. Her behavior is normal. Judgment and thought content normal.   Nursing note and vitals reviewed.       Significant Labs: All pertinent labs within the past 24 hours have been reviewed.    Significant Imaging: I have reviewed all pertinent imaging results/findings within the past 24 hours.  I have reviewed and interpreted all pertinent imaging results/findings within the past 24 hours.

## 2017-08-12 NOTE — ED NOTES
Spoke with Dr. Linda. MD stated to continue with current treatment. MD wants CTA done before brought to floor. CAT notified, stated they will not be to get to it until 0700.

## 2017-08-12 NOTE — ED PROVIDER NOTES
"Encounter Date: 8/12/2017    SCRIBE #1 NOTE: I, Shivani Montes De Oca, am scribing for, and in the presence of,  Blank Hanson MD . I have scribed the following portions of the note - Other sections scribed: HPI and ROS .       History     Chief Complaint   Patient presents with    Shortness of Breath     hx of CHF, has SOB associated with CP     CC: Shortness of Breath.   History obtained from patient.  Pt arrived via personal transportation.     HPI: This 67 y.o. Female, who has COPD, CHF, Depression, DM, GERD, and HTN, presents to the ED for evaluation of worsening chronic SOB with associated intermittent and non-radiating L side chest pain. She also complains of nausea and a cough productive of "clear, white phlegm." Oxygen Saturation was 88% on RA upon arrival to the ED. The patient uses 3L home oxygen therapy, but notes that this provided no relief of SOB today. She denies vomiting or abdominal pain. She has pain in her L lower leg that is consistent with diagnosis of blood clot and is not worsening. She is anticoagulated on 20 Mg Xarelto and endorses full compliance with this medication. The patient has an IVC filter in place. The patient smokes cigarettes. She reports no further symptoms at this time.           Review of patient's allergies indicates:  No Known Allergies  Past Medical History:   Diagnosis Date    CHF (congestive heart failure)     COPD (chronic obstructive pulmonary disease)     Depression     Diabetes mellitus     GERD (gastroesophageal reflux disease)     Hypertension      Past Surgical History:   Procedure Laterality Date    ABDOMINAL SURGERY      CARDIAC SURGERY       Family History   Problem Relation Age of Onset    Heart disease Mother     Hypertension Mother     Diabetes Father      Social History   Substance Use Topics    Smoking status: Current Every Day Smoker     Packs/day: 0.50     Years: 25.00     Types: Cigarettes    Smokeless tobacco: Never Used    Alcohol use No "     Review of Systems   Constitutional: Negative for chills and fever.   HENT: Negative for rhinorrhea.    Eyes: Negative for redness.   Respiratory: Positive for cough and shortness of breath.    Cardiovascular: Positive for chest pain.   Gastrointestinal: Positive for nausea. Negative for abdominal pain and vomiting.   Genitourinary: Negative for dysuria.   Musculoskeletal: Negative for myalgias.   Skin: Negative for rash.   Neurological: Negative for headaches.       Physical Exam     Initial Vitals [08/12/17 0023]   BP Pulse Resp Temp SpO2   (!) 191/96 100 20 98.7 °F (37.1 °C) (!) 87 %      MAP       127.67         Physical Exam    Nursing note and vitals reviewed.  Constitutional: She appears well-developed and well-nourished. She is cooperative.  Non-toxic appearance. No distress.   HENT:   Head: Normocephalic and atraumatic.   Mouth/Throat: Oropharynx is clear and moist.   Eyes: Conjunctivae and EOM are normal. Pupils are equal, round, and reactive to light.   Neck: Normal range of motion and full passive range of motion without pain. Neck supple. No thyromegaly present. No JVD present.   Cardiovascular: Normal rate, regular rhythm, normal heart sounds and normal pulses.   No murmur heard.  There is no JVD.    Pulmonary/Chest: Effort normal and breath sounds normal. No respiratory distress.   There is a nasal cannula in place.    Abdominal: Soft. Normal appearance and bowel sounds are normal. She exhibits no distension and no mass. There is no tenderness.   Musculoskeletal: Normal range of motion. She exhibits edema (2+ pitting edema to left calf. ).   There is no edema to Right lower extremity.    Neurological: She is alert and oriented to person, place, and time. She has normal strength. No cranial nerve deficit or sensory deficit.   Skin: Skin is warm, dry and intact. No rash noted.   Psychiatric: She has a normal mood and affect. Her speech is normal and behavior is normal. Judgment and thought content  "normal.         ED Course   Procedures  Labs Reviewed   CBC W/ AUTO DIFFERENTIAL - Abnormal; Notable for the following:        Result Value    MCH 25.4 (*)     MCHC 28.9 (*)     RDW 17.8 (*)     Platelets 389 (*)     Lymph% 17.0 (*)     All other components within normal limits   COMPREHENSIVE METABOLIC PANEL - Abnormal; Notable for the following:     CO2 32 (*)     Glucose 147 (*)     All other components within normal limits   TROPONIN I - Abnormal; Notable for the following:     Troponin I 0.030 (*)     All other components within normal limits   B-TYPE NATRIURETIC PEPTIDE - Abnormal; Notable for the following:      (*)     All other components within normal limits   PROTIME-INR     EKG Readings: (Independently Interpreted)   Initial Reading: No STEMI. Previous EKG: Compared with most recent EKG   Sinus tachycardia, heart rate 101, left axis deviation, Q waves 3, aVF, V1 through V6       X-Rays:   Independently Interpreted Readings:   Chest X-Ray: No acute abnormalities. Cardiomegaly present.     Medical Decision Making:   Initial Assessment:   Urgent evaluation of 67-year-old female with diabetes, COPD- on home oxygen and known hypercapnia with prior intubation on last admission 4/15/17, tobacco abuse, anemia, and history of DVT on Coumadin with IVC filter in place (neg CTA during last admission) presenting with vague complaints of feeling "not right".  Patient denies chest pain, endorses white sputum production, endorses medication compliance. Pt has elevated bp 229/107 and L LE edema without resp distress on supplemental oxygen. Ddx copd exaccerbation, htn emergency, renal failure, PE.     Clinical Tests:   Lab Tests: Ordered and Reviewed  Radiological Study: Ordered and Reviewed  Medical Tests: Ordered and Reviewed  ED Management:  Pt admitted for diuresis and management of htn emergency. Asa given, lasix and clonidine, acei ordered. D/w Scot, awaiting response to meds and abg.             Scribe " Attestation:   Scribe #1: I performed the above scribed service and the documentation accurately describes the services I performed. I attest to the accuracy of the note.    Attending Attestation:           Physician Attestation for Scribe:  Physician Attestation Statement for Scribe #1: I, Blank Hanson MD, reviewed documentation, as scribed by Shivani Montes De Oca  in my presence, and it is both accurate and complete.                 ED Course     Clinical Impression:   The primary encounter diagnosis was Chronic anticoagulation. Diagnoses of Essential hypertension, Chronic respiratory failure with hypoxia and hypercapnia, and CHF (congestive heart failure) were also pertinent to this visit.    Disposition:   Disposition: Admitted  Condition: Fair                         Blank Hanson MD  08/12/17 0336

## 2017-08-12 NOTE — PLAN OF CARE
Problem: Hypertensive Disease/Crisis (Arterial) (Adult)  Goal: Signs and Symptoms of Listed Potential Problems Will be Absent, Minimized or Managed (Hypertensive Disease/Crisis)  Signs and symptoms of listed potential problems will be absent, minimized or managed by discharge/transition of care (reference Hypertensive Disease/Crisis (Arterial) (Adult) [facility]).    08/12/17 5417   Hypertensive Disease/Crisis (Arterial)   Problems Assessed (Hypertensive Disease/Crisis (Arterial)) chronic vascular complications;severe hypertension/hypertensive crisis;situational response

## 2017-08-12 NOTE — PLAN OF CARE
08/12/17 1630   Discharge Assessment   Assessment Type Discharge Planning Assessment   Confirmed/corrected address and phone number on facesheet? Yes   Assessment information obtained from? Patient   Communicated expected length of stay with patient/caregiver no   Type of Healthcare Directive Received Durable power of  for health care   If Healthcare Directive is received, is it scanned into Epic? no (comment)   Prior to hospitilization cognitive status: Alert/Oriented   Prior to hospitalization functional status: Independent  (Does uses walker as needed)   Current cognitive status: Alert/Oriented   Current Functional Status: Independent  (Does use walker)   Arrived From home or self-care   Lives With spouse   Able to Return to Prior Arrangements yes   Is patient able to care for self after discharge? Yes   How many people do you have in your home that can help with your care after discharge? 1   Who are your caregiver(s) and their phone number(s)? Spouse-Getachew: [phone]   Patient's perception of discharge disposition home or selfcare   Readmission Within The Last 30 Days no previous admission in last 30 days   Patient currently being followed by outpatient case management? No   Patient currently receives home health services? No   Does the patient currently use HME? Yes   Name and contact number for HME provider: Unknown   Patient currently receives private duty nursing? No   Patient currently receives any other outside agency services? No   Equipment Currently Used at Home walker, rolling   Do you have any problems affording any of your prescribed medications? No   Is the patient taking medications as prescribed? yes   Do you have any financial concerns preventing you from receiving the healthcare you need? No   Does the patient have transportation to healthcare appointments? Yes   Transportation Available car;family or friend will provide   On Dialysis? No   Does the patient receive services at the  [facility]? No   Are there any open cases? No   Discharge Plan A Home with family   Discharge Plan B Other  (TBD)   Patient/Family In Agreement With Plan yes     Pharmacy: [facility] on [address]    Patient independent; spouse to assist if/as needed; walker used as needed    Warning signs/symptoms reviewed with and provided to patient in blue folder

## 2017-08-12 NOTE — PLAN OF CARE
Problem: Patient Care Overview  Goal: Plan of Care Review  Outcome: Ongoing (interventions implemented as appropriate)  Pt arrived to unit on 3L NC. Satining in mid to high 90's no SOB reported. Pt BP high but MAP remains under 145 Bp gtt not started. Accu checks preformed and sliding scale used for insulin. All other vitals stable and safety maintained throughout shift.

## 2017-08-12 NOTE — ASSESSMENT & PLAN NOTE
This was previously described as progression of pulmonary infarct secondary to pulmonary embolism at the time however neoplasm cannot be excluded given current use of cigarettes. This needs to be addressed as outpatient

## 2017-08-12 NOTE — ASSESSMENT & PLAN NOTE
"Although presenting with chest pain, ACS is less likely given initial workup, however I will continue to trend troponin and monitor closely for signs of ACS. On ASA and statin. Will need better blood pressure control fpr goal BP < 130/90 mmHg. Resume home furosemide for maintenance diuresis and repeat 2D Echo. Hold off on cardiology consult for now (patient follows with Dr Sheppard). CTA without signs of new or active pulmonary embolism but did show a " 1.6 cm noncalcified pulmonary nodule within the superior segment of the left upper lobe with some adjacent stranding.  Neoplasm is the diagnosis of exclusion.  No enlarged mediastinal or hilar lymph nodes" and "Focal outpouchings associated with the descending thoracic aorta.  The possibility of penetrating atherosclerotic ulcers cannot be excluded". Again, BP control, rate and maintenance anticoagulation are most important. Tobacco cessation, add tiotropium and continue low flow supplemental O2 for goal SaO2 88-92%.     "

## 2017-08-12 NOTE — ASSESSMENT & PLAN NOTE
Not severe anemia. Currently normocytic and at appropriate level at 12 g/dL. No signs of active bleed and no  indications for blood transfusion at this time.

## 2017-08-13 PROBLEM — R07.1 CHEST PAIN ON BREATHING: Status: RESOLVED | Noted: 2017-08-12 | Resolved: 2017-08-13

## 2017-08-13 PROBLEM — J96.20 ACUTE ON CHRONIC RESPIRATORY FAILURE: Status: RESOLVED | Noted: 2017-08-12 | Resolved: 2017-08-13

## 2017-08-13 PROBLEM — R06.02 SHORTNESS OF BREATH: Status: RESOLVED | Noted: 2017-08-12 | Resolved: 2017-08-13

## 2017-08-13 LAB
ANION GAP SERPL CALC-SCNC: 10 MMOL/L
BUN SERPL-MCNC: 18 MG/DL
CALCIUM SERPL-MCNC: 10.1 MG/DL
CHLORIDE SERPL-SCNC: 98 MMOL/L
CO2 SERPL-SCNC: 32 MMOL/L
CREAT SERPL-MCNC: 0.8 MG/DL
EST. GFR  (AFRICAN AMERICAN): >60 ML/MIN/1.73 M^2
EST. GFR  (NON AFRICAN AMERICAN): >60 ML/MIN/1.73 M^2
GLUCOSE SERPL-MCNC: 114 MG/DL
POCT GLUCOSE: 145 MG/DL (ref 70–110)
POCT GLUCOSE: 178 MG/DL (ref 70–110)
POCT GLUCOSE: 181 MG/DL (ref 70–110)
POCT GLUCOSE: 181 MG/DL (ref 70–110)
POTASSIUM SERPL-SCNC: 4 MMOL/L
SODIUM SERPL-SCNC: 140 MMOL/L

## 2017-08-13 PROCEDURE — 21400001 HC TELEMETRY ROOM

## 2017-08-13 PROCEDURE — 25000003 PHARM REV CODE 250: Performed by: INTERNAL MEDICINE

## 2017-08-13 PROCEDURE — 94640 AIRWAY INHALATION TREATMENT: CPT

## 2017-08-13 PROCEDURE — 36415 COLL VENOUS BLD VENIPUNCTURE: CPT

## 2017-08-13 PROCEDURE — 25000003 PHARM REV CODE 250: Performed by: EMERGENCY MEDICINE

## 2017-08-13 PROCEDURE — 80048 BASIC METABOLIC PNL TOTAL CA: CPT

## 2017-08-13 PROCEDURE — 27000221 HC OXYGEN, UP TO 24 HOURS

## 2017-08-13 RX ORDER — ACETAMINOPHEN 500 MG
TABLET ORAL
Status: DISPENSED
Start: 2017-08-13 | End: 2017-08-13

## 2017-08-13 RX ORDER — DILTIAZEM HYDROCHLORIDE 30 MG/1
120 TABLET, FILM COATED ORAL EVERY 6 HOURS
Status: DISCONTINUED | OUTPATIENT
Start: 2017-08-13 | End: 2017-08-14

## 2017-08-13 RX ORDER — POLYETHYLENE GLYCOL 3350 17 G/17G
17 POWDER, FOR SOLUTION ORAL DAILY
Status: DISCONTINUED | OUTPATIENT
Start: 2017-08-13 | End: 2017-08-14 | Stop reason: HOSPADM

## 2017-08-13 RX ORDER — ACETAMINOPHEN 500 MG
500 TABLET ORAL EVERY 6 HOURS PRN
Status: DISCONTINUED | OUTPATIENT
Start: 2017-08-13 | End: 2017-08-14 | Stop reason: HOSPADM

## 2017-08-13 RX ORDER — DILTIAZEM HYDROCHLORIDE 120 MG/1
120 CAPSULE, COATED, EXTENDED RELEASE ORAL DAILY
Status: DISCONTINUED | OUTPATIENT
Start: 2017-08-13 | End: 2017-08-13

## 2017-08-13 RX ADMIN — TIOTROPIUM BROMIDE 18 MCG: 18 CAPSULE ORAL; RESPIRATORY (INHALATION) at 09:08

## 2017-08-13 RX ADMIN — POLYETHYLENE GLYCOL 3350 17 G: 17 POWDER, FOR SOLUTION ORAL at 08:08

## 2017-08-13 RX ADMIN — DILTIAZEM HYDROCHLORIDE 120 MG: 30 TABLET, FILM COATED ORAL at 05:08

## 2017-08-13 RX ADMIN — CLONIDINE HYDROCHLORIDE 0.2 MG: 0.1 TABLET ORAL at 05:08

## 2017-08-13 RX ADMIN — PREGABALIN 50 MG: 50 CAPSULE ORAL at 03:08

## 2017-08-13 RX ADMIN — PREGABALIN 50 MG: 50 CAPSULE ORAL at 09:08

## 2017-08-13 RX ADMIN — PREGABALIN 50 MG: 50 CAPSULE ORAL at 05:08

## 2017-08-13 RX ADMIN — ACETAMINOPHEN 500 MG: 500 TABLET ORAL at 12:08

## 2017-08-13 RX ADMIN — FAMOTIDINE 20 MG: 20 TABLET, FILM COATED ORAL at 08:08

## 2017-08-13 RX ADMIN — ACETAMINOPHEN 500 MG: 500 TABLET ORAL at 05:08

## 2017-08-13 RX ADMIN — SOTALOL HYDROCHLORIDE 80 MG: 80 TABLET ORAL at 09:08

## 2017-08-13 RX ADMIN — SOTALOL HYDROCHLORIDE 80 MG: 80 TABLET ORAL at 08:08

## 2017-08-13 RX ADMIN — CLONIDINE HYDROCHLORIDE 0.2 MG: 0.1 TABLET ORAL at 09:08

## 2017-08-13 RX ADMIN — ASPIRIN 81 MG: 81 TABLET, COATED ORAL at 08:08

## 2017-08-13 RX ADMIN — FUROSEMIDE 40 MG: 40 TABLET ORAL at 08:08

## 2017-08-13 RX ADMIN — DILTIAZEM HYDROCHLORIDE 120 MG: 30 TABLET, FILM COATED ORAL at 03:08

## 2017-08-13 RX ADMIN — PRAVASTATIN SODIUM 40 MG: 40 TABLET ORAL at 08:08

## 2017-08-13 RX ADMIN — CLONIDINE HYDROCHLORIDE 0.2 MG: 0.1 TABLET ORAL at 03:08

## 2017-08-13 NOTE — PROGRESS NOTES
"[facility] Medicine  Progress Note    Patient Name: Yasmeen Palm  MRN: [id number]  Patient Class: IP- Inpatient   Admission Date: 8/12/2017  Length of Stay: 1 days  Attending Physician: Layla Villegas MD  Primary Care Provider: Angel Orourke Jr, MD        Subjective:     Principal Problem:Acute on chronic respiratory failure with hypoxia and hypercapnia    HPI:  67 y.o. Female with uncontrolled essential hypertension, type 2 diabetes mellitus (HbA1c 7.1% April 2017), COPD, chronic respiratory failure with hypoxia and hypercapnia on supplemental O2 via NC (2L) PRN, GERD, anemia of chronic disease, mild protein malnutrition, tobacco abuse, and history of PE/DVT on chronic anticoagulation who presented with one day of worsening shortness of breath. Patient stated she had chest pain involving left upper chest just underneath shoulder and right upper chest just underneath shoulder. Pain is worse with deep inspiration. Reported no resolution with SL nitro. Reported daily cigarette smoking of 1-2 cigarettes. Also with a chronic cough, but not changed in frequency nor change in sputum quality or quantity. Also reported compliance with anticoagulation and antihypertensive regimen. Stated her blood pressure is usually high at "160s-170s". Has been intubated before.     In the ED, patient arrived in no distress with SaO2 of 88% at room air. Patient was placed on low flow nasal cannula at 3L. Shortly after patient complained of chest pain (same pattern as described above) and shortness of breath. Was placed on BiPAP, quickly becoming in distress. Was given diazepam and cardene infusion ordered for significant high blood pressure. Patient then became lethargic and relatively hypotensive. ABG showed a mild respiratory acidosis with partial metabolic compensation. Patient shortly after became alert, awake and oriented. Home antihypertensives given and de-escalated back to low flow nasal cannula at " 3L. IV lasix given, resulting in unquantified output but per ED nurse, it was significant amount. Chest pain did not recur. Trop of 0.030 (0.043 four months ago), EKG with no T wave inversion, ST segment elevation/depression and with similar appearance to EKG done four months ago,  (rom 1300 four months ago). Is afebrile. No leukocytosis, severe anemia and renal/hepatic function stable. Mild hyperglycemia of 147. Patient admitted to ICU for close monitoring. CTA ordered.    Hospital Course:  Ms Palm presented with gradual onset of atypical chest pain and worsening shortness of breath from baseline. Differential diagnosis broad as she is a current cigarette smoker with chronic resp failure with hypoxia/hypercapnia on supplemental O2 PRN, has coronary artery disease, diabetic, malignant hypertension, recent Hx of PE with lung infarct and significant aortic artery atherosclerosis. Endorsed dietary non compliance and still smoking. In the ED, patient arrived in no distress with SaO2 of 88% at room air. Patient was placed on low flow nasal cannula at 3L. Shortly after patient complained of chest pain (same pattern as described above) and shortness of breath. Was placed on BiPAP, quickly becoming in distress. Was given diazepam and cardene infusion ordered for significant high blood pressure. Patient then became lethargic and relatively hypotensive. Cardene infusion held. ABG showed a mild respiratory acidosis with partial metabolic compensation. Patient shortly after became alert, awake and oriented. Home antihypertensives given and de-escalated back to low flow nasal cannula at 3L. IV lasix given, resulting in unquantified output but per ED nurse, it was significant amount. Chest pain did not recur. Trop of 0.030 (0.043 four months ago), EKG with no T wave inversion, ST segment elevation/depression and with similar appearance to EKG done four months ago,  (rom 1300 four months ago). Mild hyperglycemia of  "147. Is afebrile. No leukocytosis or severe anemia and with stable renal/hepatic function. No further chest pain or shortness of breath since. Patient admitted briefly to ICU for close monitoring, then stepped down later that day. On ASA and statin. LDL elevated to 180s. Troponin remained grossly unchanged. CTA showed no signs of new or active pulmonary embolism but did show a " 1.6 cm noncalcified pulmonary nodule within the superior segment of the left upper lobe with some adjacent stranding.  Neoplasm is the diagnosis of exclusion.  No enlarged mediastinal or hilar lymph nodes" and "Focal outpouchings associated with the descending thoracic aorta. The possibility of penetrating atherosclerotic ulcers cannot be excluded". Again, BP control, rate and maintenance anticoagulation are most important. Tobacco cessation, add tiotropium and continue low flow supplemental O2 for goal SaO2 88-92%.       Interval History: feels well. Denied chest pain or shortness of breath. Complained of constipation. BP at goal    Review of Systems   Constitutional: Negative.    HENT: Negative.    Eyes: Negative.    Respiratory: Negative for cough, shortness of breath and wheezing.    Cardiovascular: Negative for chest pain.   Gastrointestinal: Negative.    Endocrine: Negative.    Genitourinary: Negative.    Musculoskeletal: Negative.    Skin: Negative.    Neurological: Negative.    Hematological: Negative.    Psychiatric/Behavioral: The patient is not nervous/anxious.      Objective:     Vital Signs (Most Recent):  Temp: 97.3 °F (36.3 °C) (08/13/17 0805)  Pulse: 64 (08/13/17 0907)  Resp: (!) 24 (08/13/17 0907)  BP: (!) 123/58 (08/13/17 0805)  SpO2: (!) 93 % (08/13/17 0907) Vital Signs (24h Range):  Temp:  [97.3 °F (36.3 °C)-98.7 °F (37.1 °C)] 97.3 °F (36.3 °C)  Pulse:  [63-87] 64  Resp:  [18-27] 24  SpO2:  [93 %-99 %] 93 %  BP: (105-196)/(58-89) 123/58     Weight: 89.8 kg (198 lb)  Body mass index is 33.99 kg/m².    Intake/Output Summary " (Last 24 hours) at 08/13/17 1110  Last data filed at 08/13/17 0900   Gross per 24 hour   Intake              570 ml   Output                3 ml   Net              567 ml      Physical Exam   Constitutional: She is oriented to person, place, and time. She appears well-developed. No distress.   HENT:   Head: Normocephalic and atraumatic.   Mouth/Throat: Oropharynx is clear and moist.   Eyes: Conjunctivae and EOM are normal.   Neck: Normal range of motion. Neck supple. No thyromegaly present.   Cardiovascular: Normal rate, regular rhythm and intact distal pulses.    Murmur heard.  Pulmonary/Chest: Effort normal. No respiratory distress. She has no wheezes. She has no rales. She exhibits no tenderness.   On low flow nasal cannula 3L     Abdominal: Soft. Bowel sounds are normal. She exhibits no distension. There is no tenderness.   Musculoskeletal: Normal range of motion. She exhibits no edema, tenderness or deformity.   Neurological: She is alert and oriented to person, place, and time. No cranial nerve deficit. She exhibits normal muscle tone.   Skin: Skin is warm and dry. She is not diaphoretic.   Psychiatric: She has a normal mood and affect. Her behavior is normal. Judgment and thought content normal.   Nursing note and vitals reviewed.      Significant Labs: All pertinent labs within the past 24 hours have been reviewed.    Significant Imaging: I have reviewed all pertinent imaging results/findings within the past 24 hours.  I have reviewed and interpreted all pertinent imaging results/findings within the past 24 hours.    Assessment/Plan:      * Acute on chronic respiratory failure with hypoxia and hypercapnia    Although presenting with chest pain, ACS is less likely given initial workup, however I will continue to trend troponin and monitor closely for signs of ACS. On ASA and statin. Will need better blood pressure control fpr goal BP < 130/90 mmHg. Resume home furosemide for maintenance diuresis and repeat 2D  "Echo. Hold off on cardiology consult for now (patient follows with Dr Sheppard). CTA without signs of new or active pulmonary embolism but did show a " 1.6 cm noncalcified pulmonary nodule within the superior segment of the left upper lobe with some adjacent stranding.  Neoplasm is the diagnosis of exclusion.  No enlarged mediastinal or hilar lymph nodes" and "Focal outpouchings associated with the descending thoracic aorta.  The possibility of penetrating atherosclerotic ulcers cannot be excluded". Again, BP control, rate and maintenance anticoagulation are most important. Increased dose of diltiazem to 120 mg TID. Will monitor for orthostatic symptoms. Tobacco cessation, add tiotropium and continue low flow supplemental O2 for goal SaO2 88-92%.           Acute on chronic diastolic congestive heart failure    As above          Elevated troponin I level    Grossly unchanged and no further chest pain. Unlikely ACS. On secondary prevention with ASA, statin and BP control.            Elevated brain natriuretic peptide (BNP) level    As above          Metabolic alkalosis with respiratory acidosis    As above          Malignant hypertension    As above          Thoracic aorta atherosclerosis    As above          Tobacco abuse    Strongly encouraged cessation as it is negatively impacting her health. Patient aware. Offered nicotine patch          History of pulmonary embolism    On chronic anticoagulation and compliant with regimen          History of deep vein thrombosis    On chronic anticoagulation and compliant with regimen          Abnormal CT scan, chest    This was previously described as progression of pulmonary infarct secondary to pulmonary embolism at the time however neoplasm cannot be excluded given current use of cigarettes. This needs to be addressed as outpatient           Obesity    Needs weight loss. Outpatient nutrition consult.           Chronic respiratory failure with hypoxia and hypercapnia    As " "above          Chronic anticoagulation    As above           Anemia of chronic disease    Not severe anemia. Currently normocytic and at appropriate level at 12 g/dL. No signs of active bleed and no  indications for blood transfusion at this time.           Gastroesophageal reflux disease without esophagitis    Famotidine          Type 2 diabetes mellitus, controlled    Well controlled at 7.1% and BG currently at goal. Is on metformin at home. Will hold off while inpatient and use low dose SSI instead. Goal < 180          Coronary artery disease involving native coronary artery of native heart with angina pectoris    As above            VTE Risk Mitigation         Ordered     rivaroxaban tablet 20 mg  with dinner     Route:  Oral        08/12/17 0920        Will need to monitor one more day for orthostatic signs on new anti-hypertensive regimen as patient needs good BP control in setting of significant aortic artery atherosclerosis, "outpouching" and possible ulceration, which increase risk for rupture. Patient also on anticoagulation. Continue cardiac monitoring. Miralax for constipation. Likely discharge tomorrow. Needs PCP f/u    Layla Linda MD  Department of Hospital Medicine   [facility]  "

## 2017-08-13 NOTE — HOSPITAL COURSE
"Ms Palm presented with gradual onset of atypical chest pain and worsening shortness of breath from baseline. Differential diagnosis broad as she is a current cigarette smoker with chronic resp failure with hypoxia/hypercapnia on supplemental O2 PRN, has coronary artery disease, diabetic, malignant hypertension, recent Hx of PE with lung infarct and significant aortic artery atherosclerosis. Endorsed dietary non compliance and still smoking. In the ED, patient arrived in no distress with SaO2 of 88% at room air. Patient was placed on low flow nasal cannula at 3L. Shortly after patient complained of chest pain (same pattern as described above) and shortness of breath. Was placed on BiPAP, quickly becoming in distress. Was given diazepam and cardene infusion ordered for significant high blood pressure. Patient then became lethargic and relatively hypotensive. Cardene infusion held. ABG showed a mild respiratory acidosis with partial metabolic compensation. Patient shortly after became alert, awake and oriented. Home antihypertensives given and de-escalated back to low flow nasal cannula at 3L. IV lasix given, resulting in unquantified output but per ED nurse, it was significant amount. Chest pain did not recur. Trop of 0.030 (0.043 four months ago), EKG with no T wave inversion, ST segment elevation/depression and with similar appearance to EKG done four months ago,  (rom 1300 four months ago). Mild hyperglycemia of 147. Is afebrile. No leukocytosis or severe anemia and with stable renal/hepatic function. No further chest pain or shortness of breath since. Patient admitted briefly to ICU for close monitoring, then stepped down later that day. On ASA and statin. LDL elevated to 180s. Troponin remained grossly unchanged. CTA showed no signs of new or active pulmonary embolism but did show a " 1.6 cm noncalcified pulmonary nodule within the superior segment of the left upper lobe with some adjacent stranding. " " Neoplasm is the diagnosis of exclusion.  No enlarged mediastinal or hilar lymph nodes" and "Focal outpouchings associated with the descending thoracic aorta. The possibility of penetrating atherosclerotic ulcers cannot be excluded". Again, BP control, rate and maintenance anticoagulation are most important. Tobacco cessation, add tiotropium and continue low flow supplemental O2 for goal SaO2 88-92%.     "

## 2017-08-13 NOTE — NURSING
Patient noted to present with an 11 beat run of Ventricular Tachycardia. Patient noted to be resting quietly in bed with eyes closed at time of assessment.  Easily aroused to verbal stimuli.  Patient denies pain at this time.  Dr. Childers notified.  No new orders received or noted at this time

## 2017-08-13 NOTE — SUBJECTIVE & OBJECTIVE
Interval History: feels well. Denied chest pain or shortness of breath. Complained of constipation. BP at goal    Review of Systems   Constitutional: Negative.    HENT: Negative.    Eyes: Negative.    Respiratory: Negative for cough, shortness of breath and wheezing.    Cardiovascular: Negative for chest pain.   Gastrointestinal: Negative.    Endocrine: Negative.    Genitourinary: Negative.    Musculoskeletal: Negative.    Skin: Negative.    Neurological: Negative.    Hematological: Negative.    Psychiatric/Behavioral: The patient is not nervous/anxious.      Objective:     Vital Signs (Most Recent):  Temp: 97.3 °F (36.3 °C) (08/13/17 0805)  Pulse: 64 (08/13/17 0907)  Resp: (!) 24 (08/13/17 0907)  BP: (!) 123/58 (08/13/17 0805)  SpO2: (!) 93 % (08/13/17 0907) Vital Signs (24h Range):  Temp:  [97.3 °F (36.3 °C)-98.7 °F (37.1 °C)] 97.3 °F (36.3 °C)  Pulse:  [63-87] 64  Resp:  [18-27] 24  SpO2:  [93 %-99 %] 93 %  BP: (105-196)/(58-89) 123/58     Weight: 89.8 kg (198 lb)  Body mass index is 33.99 kg/m².    Intake/Output Summary (Last 24 hours) at 08/13/17 1110  Last data filed at 08/13/17 0900   Gross per 24 hour   Intake              570 ml   Output                3 ml   Net              567 ml      Physical Exam   Constitutional: She is oriented to person, place, and time. She appears well-developed. No distress.   HENT:   Head: Normocephalic and atraumatic.   Mouth/Throat: Oropharynx is clear and moist.   Eyes: Conjunctivae and EOM are normal.   Neck: Normal range of motion. Neck supple. No thyromegaly present.   Cardiovascular: Normal rate, regular rhythm and intact distal pulses.    Murmur heard.  Pulmonary/Chest: Effort normal. No respiratory distress. She has no wheezes. She has no rales. She exhibits no tenderness.   On low flow nasal cannula 3L     Abdominal: Soft. Bowel sounds are normal. She exhibits no distension. There is no tenderness.   Musculoskeletal: Normal range of motion. She exhibits no edema,  tenderness or deformity.   Neurological: She is alert and oriented to person, place, and time. No cranial nerve deficit. She exhibits normal muscle tone.   Skin: Skin is warm and dry. She is not diaphoretic.   Psychiatric: She has a normal mood and affect. Her behavior is normal. Judgment and thought content normal.   Nursing note and vitals reviewed.      Significant Labs: All pertinent labs within the past 24 hours have been reviewed.    Significant Imaging: I have reviewed all pertinent imaging results/findings within the past 24 hours.  I have reviewed and interpreted all pertinent imaging results/findings within the past 24 hours.

## 2017-08-13 NOTE — PROGRESS NOTES
Shift-change report given to FABY aSpp. Patient sitting up in bed, awake, alert and fully oriented. Patient denies pain/distress. Will continue to f/u.

## 2017-08-13 NOTE — PLAN OF CARE
Problem: Fall Risk (Adult)  Intervention: Reduce Risk/Promote Restraint Free Environment   17   Safety Interventions   Environmental Safety Modification assistive device/personal items within reach;clutter free environment maintained;lighting adjusted   Prevent [place] Drop/Fall   Safety/Security Measures bed alarm set     Intervention: Review Medications/Identify Contributors to Fall Risk   17   Safety Interventions   Medication Review/Management medications reviewed     Intervention: Patient Rounds   17   Safety Interventions   Patient Rounds bed in low position;bed wheels locked;call light in reach;clutter free environment maintained;ID band on;placement of personal items at bedside;toileting offered;visualized patient     Intervention: Safety Promotion/Fall Prevention   17   Safety Interventions   Safety Promotion/Fall Prevention assistive device/personal item within reach;nonskid shoes/socks when out of bed       Goal: Identify Related Risk Factors and Signs and Symptoms  Related risk factors and signs and symptoms are identified upon initiation of Human Response Clinical Practice Guideline (CPG)   Outcome: Ongoing (interventions implemented as appropriate)   17   Fall Risk   Related Risk Factors (Fall Risk) age-related changes;gait/mobility problems;environment unfamiliar   Signs and Symptoms (Fall Risk) presence of risk factors     Goal: Absence of Falls  Patient will demonstrate the desired outcomes by discharge/transition of care.   Outcome: Ongoing (interventions implemented as appropriate)   17   Fall Risk (Adult)   Absence of Falls making progress toward outcome       Problem: Patient Care Overview  Goal: Plan of Care Review  Outcome: Ongoing (interventions implemented as appropriate)   17   Coping/Psychosocial   Plan Of Care Reviewed With patient     Pt noted to rest quietly in bed throughout this shift. Pain controlled by  current plan of care.  Pt noted to remain free from falls and trauma this shift.  Purposeful hourly rounding in progress.  Call bell within reach.  Will continue to monitor.

## 2017-08-13 NOTE — CONSULTS
Advanced Directive information and procedures reviewed with patient; AD packet provided to patient.

## 2017-08-13 NOTE — NURSING
Resting quietly in bed.  Able to make needs known. Denies pain at this time. Encouraged to call with PRN assist.  Verbalized understanding.  Call bell within reach.  Will continue to monitor.

## 2017-08-13 NOTE — PROGRESS NOTES
Shift-change report received from Rebecca floyd. Patient in bed with eyes closed, chest rise/fall. Awakened, denies pain/distress. C/O constipation and gas. Will inform MD for laxative/stool softener for relief. Will continue to f/u.

## 2017-08-13 NOTE — ASSESSMENT & PLAN NOTE
Strongly encouraged cessation as it is negatively impacting her health. Patient aware. Offered nicotine patch

## 2017-08-13 NOTE — ASSESSMENT & PLAN NOTE
"Although presenting with chest pain, ACS is less likely given initial workup, however I will continue to trend troponin and monitor closely for signs of ACS. On ASA and statin. Will need better blood pressure control fpr goal BP < 130/90 mmHg. Resume home furosemide for maintenance diuresis and repeat 2D Echo. Hold off on cardiology consult for now (patient follows with Dr Sheppard). CTA without signs of new or active pulmonary embolism but did show a " 1.6 cm noncalcified pulmonary nodule within the superior segment of the left upper lobe with some adjacent stranding.  Neoplasm is the diagnosis of exclusion.  No enlarged mediastinal or hilar lymph nodes" and "Focal outpouchings associated with the descending thoracic aorta.  The possibility of penetrating atherosclerotic ulcers cannot be excluded". Again, BP control, rate and maintenance anticoagulation are most important. Increased dose of diltiazem to 120 mg TID. Will monitor for orthostatic symptoms. Tobacco cessation, add tiotropium and continue low flow supplemental O2 for goal SaO2 88-92%.     "

## 2017-08-13 NOTE — ASSESSMENT & PLAN NOTE
Grossly unchanged and no further chest pain. Unlikely ACS. On secondary prevention with ASA, statin and BP control.

## 2017-08-14 VITALS
SYSTOLIC BLOOD PRESSURE: 156 MMHG | OXYGEN SATURATION: 100 % | BODY MASS INDEX: 33.8 KG/M2 | DIASTOLIC BLOOD PRESSURE: 70 MMHG | RESPIRATION RATE: 18 BRPM | HEIGHT: 64 IN | WEIGHT: 198 LBS | HEART RATE: 57 BPM | TEMPERATURE: 98 F

## 2017-08-14 LAB
POCT GLUCOSE: 169 MG/DL (ref 70–110)
POCT GLUCOSE: 171 MG/DL (ref 70–110)

## 2017-08-14 PROCEDURE — 25000003 PHARM REV CODE 250: Performed by: INTERNAL MEDICINE

## 2017-08-14 PROCEDURE — 25000003 PHARM REV CODE 250: Performed by: EMERGENCY MEDICINE

## 2017-08-14 RX ORDER — TIOTROPIUM BROMIDE 18 UG/1
1 CAPSULE ORAL; RESPIRATORY (INHALATION) DAILY
Qty: 90 CAPSULE | Refills: 3 | Status: SHIPPED | OUTPATIENT
Start: 2017-08-14 | End: 2018-02-12

## 2017-08-14 RX ORDER — DILTIAZEM HYDROCHLORIDE 120 MG/1
120 TABLET, FILM COATED ORAL EVERY 8 HOURS
Qty: 270 TABLET | Refills: 3 | Status: ON HOLD | OUTPATIENT
Start: 2017-08-14 | End: 2018-07-23 | Stop reason: HOSPADM

## 2017-08-14 RX ORDER — DILTIAZEM HYDROCHLORIDE 30 MG/1
120 TABLET, FILM COATED ORAL EVERY 8 HOURS
Status: DISCONTINUED | OUTPATIENT
Start: 2017-08-14 | End: 2017-08-14 | Stop reason: HOSPADM

## 2017-08-14 RX ORDER — LISINOPRIL 20 MG/1
40 TABLET ORAL DAILY
Status: DISCONTINUED | OUTPATIENT
Start: 2017-08-15 | End: 2017-08-14 | Stop reason: HOSPADM

## 2017-08-14 RX ORDER — LISINOPRIL 40 MG/1
40 TABLET ORAL DAILY
Start: 2017-08-14

## 2017-08-14 RX ADMIN — POLYETHYLENE GLYCOL 3350 17 G: 17 POWDER, FOR SOLUTION ORAL at 09:08

## 2017-08-14 RX ADMIN — ASPIRIN 81 MG: 81 TABLET, COATED ORAL at 09:08

## 2017-08-14 RX ADMIN — DILTIAZEM HYDROCHLORIDE 120 MG: 30 TABLET, FILM COATED ORAL at 12:08

## 2017-08-14 RX ADMIN — CLONIDINE HYDROCHLORIDE 0.2 MG: 0.1 TABLET ORAL at 05:08

## 2017-08-14 RX ADMIN — PREGABALIN 50 MG: 50 CAPSULE ORAL at 05:08

## 2017-08-14 RX ADMIN — DILTIAZEM HYDROCHLORIDE 120 MG: 30 TABLET, FILM COATED ORAL at 05:08

## 2017-08-14 RX ADMIN — ACETAMINOPHEN 500 MG: 500 TABLET ORAL at 12:08

## 2017-08-14 RX ADMIN — PRAVASTATIN SODIUM 40 MG: 40 TABLET ORAL at 09:08

## 2017-08-14 RX ADMIN — SOTALOL HYDROCHLORIDE 80 MG: 80 TABLET ORAL at 09:08

## 2017-08-14 RX ADMIN — FUROSEMIDE 40 MG: 40 TABLET ORAL at 09:08

## 2017-08-14 RX ADMIN — FAMOTIDINE 20 MG: 20 TABLET, FILM COATED ORAL at 09:08

## 2017-08-14 NOTE — PLAN OF CARE
Problem: Hypertensive Disease/Crisis (Arterial) (Adult)  Goal: Signs and Symptoms of Listed Potential Problems Will be Absent, Minimized or Managed (Hypertensive Disease/Crisis)  Signs and symptoms of listed potential problems will be absent, minimized or managed by discharge/transition of care (reference Hypertensive Disease/Crisis (Arterial) (Adult) CPG).    08/13/17 1565   Hypertensive Disease/Crisis (Arterial)   Problems Assessed (Hypertensive Disease/Crisis (Arterial)) situational response;severe hypertension/hypertensive crisis       Comments: BP monitored every two hours and antihypertensives given as scheduled. Also note that patient reports worrying about family situation. Allowed patient to vent frustration to decrease frustration.

## 2017-08-14 NOTE — PROGRESS NOTES
SW attempted to call pt's PCP office several times (9:30am, 9:54am, 10:01am, 10:14am). Left two messages with answering service. Awaiting Call Back.

## 2017-08-14 NOTE — PLAN OF CARE
08/14/17 1105   Final Note   Assessment Type Final Discharge Note   Discharge Disposition Home   Offered [facility] -- Bedside Delivery? n/a   Discharge/Hospital Encounter Summary to (non-[facility]) PCP n/a   Referral to Outpatient Case Management complete? n/a   Referral to / orders for Home Health Complete? n/a   30 day supply of medicines given at discharge, if documented non-compliance / non-adherence? n/a   Any social issues identified prior to discharge? n/a   Did you assess the readiness or willingness of the family or caregiver to support self management of care? n/a   Right Care Referral Info   Post Acute Recommendation No Care

## 2017-08-14 NOTE — NURSING
Evaluated general patient appearance/condition. Patient resting quietly, easily aroused per verbal stimuli.  No complaints of pain or distress at this time. No apparent distress noted at this time.

## 2017-08-14 NOTE — PLAN OF CARE
Problem: Fall Risk (Adult)  Intervention: Reduce Risk/Promote Restraint Free Environment   08/13/17 0543   Safety Interventions   Environmental Safety Modification assistive device/personal items within reach;clutter free environment maintained;lighting adjusted     Intervention: Review Medications/Identify Contributors to Fall Risk   08/14/17 0529   Safety Interventions   Medication Review/Management medications reviewed     Intervention: Patient Rounds   08/14/17 0500   Safety Interventions   Patient Rounds bed in low position;bed wheels locked;call light in reach;clutter free environment maintained;ID band on;placement of personal items at bedside;toileting offered;visualized patient     Intervention: Safety Promotion/Fall Prevention   08/14/17 0529   Safety Interventions   Safety Promotion/Fall Prevention assistive device/personal item within reach;Fall Risk reviewed with patient/family;nonskid shoes/socks when out of bed;side rails raised x 2       Goal: Identify Related Risk Factors and Signs and Symptoms  Related risk factors and signs and symptoms are identified upon initiation of Human Response Clinical Practice Guideline (CPG)    08/14/17 0529   Fall Risk   Related Risk Factors (Fall Risk) age-related changes;bladder function altered;environment unfamiliar   Signs and Symptoms (Fall Risk) presence of risk factors     Goal: Absence of Falls  Patient will demonstrate the desired outcomes by discharge/transition of care.   Outcome: Ongoing (interventions implemented as appropriate)   08/14/17 0529   Fall Risk (Adult)   Absence of Falls making progress toward outcome       Problem: Patient Care Overview  Goal: Plan of Care Review  Outcome: Ongoing (interventions implemented as appropriate)   08/14/17 0529   Coping/Psychosocial   Plan Of Care Reviewed With patient     Patient noted to rest quietly in bed with eyes closed throughout this shift. Easily aroused to verbal stimuli.  Pain controlled by current plan  of care. Patient noted to remain free from falls and trauma this shift.  Purposeful hourly rounding in progress. Call bell within reach.  Will continue to monitor.

## 2017-08-15 NOTE — DISCHARGE SUMMARY
"[facility]  Discharge Summary      Patient Name: Yasmeen Palm  MRN: [id number]  Admission Date: 8/12/2017  Hospital Length of Stay: 2 days  Discharge Date and Time:  08/15/2017 8:27 AM  Attending Physician: Raina att. providers found   Discharging Provider: Layla Linda MD  Primary Care Provider: Angel Orourke Jr, MD      HPI:   67 y.o. Female with uncontrolled essential hypertension, type 2 diabetes mellitus (HbA1c 7.1% April 2017), COPD, chronic respiratory failure with hypoxia and hypercapnia on supplemental O2 via NC (2L) PRN, GERD, anemia of chronic disease, mild protein malnutrition, tobacco abuse, and history of PE/DVT on chronic anticoagulation who presented with one day of worsening shortness of breath. Patient stated she had chest pain involving left upper chest just underneath shoulder and right upper chest just underneath shoulder. Pain is worse with deep inspiration. Reported no resolution with SL nitro. Reported daily cigarette smoking of 1-2 cigarettes. Also with a chronic cough, but not changed in frequency nor change in sputum quality or quantity. Also reported compliance with anticoagulation and antihypertensive regimen. Stated her blood pressure is usually high at "160s-170s". Has been intubated before.     In the ED, patient arrived in no distress with SaO2 of 88% at room air. Patient was placed on low flow nasal cannula at 3L. Shortly after patient complained of chest pain (same pattern as described above) and shortness of breath. Was placed on BiPAP, quickly becoming in distress. Was given diazepam and cardene infusion ordered for significant high blood pressure. Patient then became lethargic and relatively hypotensive. ABG showed a mild respiratory acidosis with partial metabolic compensation. Patient shortly after became alert, awake and oriented. Home antihypertensives given and de-escalated back to low flow nasal cannula at 3L. IV lasix given, resulting " in unquantified output but per ED nurse, it was significant amount. Chest pain did not recur. Trop of 0.030 (0.043 four months ago), EKG with no T wave inversion, ST segment elevation/depression and with similar appearance to EKG done four months ago,  (rom 1300 four months ago). Is afebrile. No leukocytosis, severe anemia and renal/hepatic function stable. Mild hyperglycemia of 147. Patient admitted to ICU for close monitoring. CTA ordered.    * No surgery found *      Indwelling Lines/Drains at time of discharge:   Lines/Drains/Airways          No matching active lines, drains, or airways        Hospital Course:   Ms Palm presented with gradual onset of atypical chest pain and worsening shortness of breath from baseline. Differential diagnosis broad as she is a current cigarette smoker with chronic resp failure with hypoxia/hypercapnia on supplemental O2 PRN, has coronary artery disease, diabetic, malignant hypertension, recent Hx of PE with lung infarct and significant aortic artery atherosclerosis. Endorsed dietary non compliance and still smoking. In the ED, patient arrived in no distress with SaO2 of 88% at room air. Patient was placed on low flow nasal cannula at 3L. Shortly after patient complained of chest pain (same pattern as described above) and shortness of breath. Was placed on BiPAP, quickly becoming in distress. Was given diazepam and cardene infusion ordered for significant high blood pressure. Patient then became lethargic and relatively hypotensive. Cardene infusion held. ABG showed a mild respiratory acidosis with partial metabolic compensation. Patient shortly after became alert, awake and oriented. Home antihypertensives given and de-escalated back to low flow nasal cannula at 3L. IV lasix given, resulting in unquantified output but per ED nurse, it was significant amount. Chest pain did not recur. Trop of 0.030 (0.043 four months ago), EKG with no T wave inversion, ST segment  "elevation/depression and with similar appearance to EKG done four months ago,  (rom 1300 four months ago). Mild hyperglycemia of 147. Is afebrile. No leukocytosis or severe anemia and with stable renal/hepatic function. No further chest pain or shortness of breath since. Patient admitted briefly to ICU for close monitoring, then stepped down later that day. On ASA and statin. LDL elevated to 180s. Troponin remained grossly unchanged. CTA showed no signs of new or active pulmonary embolism but did show a " 1.6 cm noncalcified pulmonary nodule within the superior segment of the left upper lobe with some adjacent stranding.  Neoplasm is the diagnosis of exclusion.  No enlarged mediastinal or hilar lymph nodes" and "Focal outpouchings associated with the descending thoracic aorta. The possibility of penetrating atherosclerotic ulcers cannot be excluded". Again, BP control, rate control and maintenance anticoagulation are most important. Tobacco cessation, added tiotropium for chronic bronchitis and continude low flow supplemental O2 as needed for goal SaO2 88-92%. Already has portable O2 at home. Remained chest pain free since admission. BP and rate well controlled by continuing home clonidine 0.2 mg TID, sotalol 80 mg BID and increasing diltiazem to 120 mg TID. Held lisinopril to be used PRN, but will likely benefit from a low dose as she is a diabetic (well controlled). Also on maintenance diuresis with furosemide 40 mg daily. Renal function remained stable. Also noted under therapeutic dose of rivaroxaban. This was adjusted to right dose of 20 mg daily. Patient had no signs of bleed and had no orthostatic symptoms with new antihypertensive regimen. Low sodium/cardiac/diabetic diet. Activity as tolerated. F/u with PCP within the next 7 days to readdress abnormal findings in chest CT and BP/HR control.        Consults:   Consults         Status Ordering Provider     Inpatient consult to Social Work/Case " Management  Once     Provider:  (Not yet assigned)    Completed KOJO MARRUFO           Pending Diagnostic Studies:     None        Final Active Diagnoses:    Diagnosis Date Noted POA    PRINCIPAL PROBLEM:  Acute on chronic respiratory failure with hypoxia and hypercapnia [J96.21, J96.22] 08/12/2017 Yes    Acute on chronic diastolic congestive heart failure [I50.33] 08/12/2017 Yes    Elevated troponin I level [R74.8] 08/12/2017 Yes    Elevated brain natriuretic peptide (BNP) level [R79.89] 08/12/2017 Yes    Metabolic alkalosis with respiratory acidosis [E87.4] 08/12/2017 Yes    Malignant hypertension [I10] 09/27/2016 Yes    Thoracic aorta atherosclerosis [I70.0] 08/12/2017 Yes    Tobacco abuse [Z72.0] 12/14/2015 Yes     Chronic    History of pulmonary embolism [Z86.711] 04/10/2017 Yes     Chronic    History of deep vein thrombosis [Z86.718] 04/10/2017 Not Applicable     Chronic    Abnormal CT scan, chest [R93.8] 08/12/2017 Yes    Obesity [E66.9] 04/14/2017 Yes    Chronic anticoagulation [Z79.01] 04/10/2017 Not Applicable     Chronic    Chronic respiratory failure with hypoxia and hypercapnia [J96.11, J96.12] 04/10/2017 Yes     Chronic    Anemia of chronic disease [D63.8] 09/27/2016 Yes     Chronic    Type 2 diabetes mellitus, controlled [E11.9] 12/14/2015 Yes     Chronic    Gastroesophageal reflux disease without esophagitis [K21.9] 12/14/2015 Yes     Chronic    Coronary artery disease involving native coronary artery of native heart with angina pectoris [I25.119] 12/14/2015 Yes      Problems Resolved During this Admission:    Diagnosis Date Noted Date Resolved POA    Chest pain on breathing [R07.1] 08/12/2017 08/13/2017 Yes    Shortness of breath [R06.02] 08/12/2017 08/13/2017 Yes    Acute on chronic respiratory failure [J96.20] 08/12/2017 08/13/2017 Yes        Discharged Condition: good    Disposition: Home or Self Care    Follow Up:  Follow-up Information     Angel Orourke Jr, MD.  Schedule an appointment as soon as possible for a visit in 3 days.    Specialty:  Family Medicine  Contact information:  [address] [facility] [id number]  [phone]                 Medications:  Reconciled Home Medications:   Discharge Medication List as of 8/14/2017 12:20 PM      START taking these medications    Details   tiotropium (SPIRIVA) 18 mcg inhalation capsule Inhale 1 capsule (18 mcg total) into the lungs once daily. Controller, Starting Mon 8/14/2017, Until Tue 8/14/2018, Print         CONTINUE these medications which have CHANGED    Details   diltiaZEM (CARDIZEM) 120 MG tablet Take 1 tablet (120 mg total) by mouth every 8 (eight) hours., Starting Mon 8/14/2017, Until Tue 8/14/2018, Print      lisinopril (PRINIVIL,ZESTRIL) 40 MG tablet Take 1 tablet (40 mg total) by mouth once daily. Do not take this medication if blood pressure is below 130/80 mmHg and/or feeling dizzy after taking all other blood pressure meds, Starting Mon 8/14/2017, No Print      rivaroxaban (XARELTO) 20 mg Tab Take 1 tablet (20 mg total) by mouth before dinner., Starting Mon 8/14/2017, Until Sun 11/12/2017, Print         CONTINUE these medications which have NOT CHANGED    Details   acetaminophen (TYLENOL) 500 MG tablet Take 1 tablet (500 mg total) by mouth every 8 (eight) hours as needed., Starting 10/7/2016, Until Discontinued, OTC      aspirin (ECOTRIN) 81 MG EC tablet Take 81 mg by mouth once daily., Until Discontinued, Historical Med      ciprofloxacin-dexamethasone 0.3-0.1% (CIPRODEX) 0.3-0.1 % DrpS Place 1 drop into the right eye every 4 (four) hours., Until Discontinued, Historical Med      cloNIDine (CATAPRES) 0.1 MG tablet Take 2 tablets (0.2 mg total) by mouth 3 (three) times daily., Starting 10/7/2016, Until Sat 10/7/17, Print      furosemide (LASIX) 40 MG tablet Take 40 mg by mouth once daily. , Until Discontinued, Historical Med      metformin (GLUCOPHAGE) 500 MG tablet Take 500 mg by mouth  2 (two) times daily with meals., Until Discontinued, Historical Med      mirabegron (MYRBETRIQ) 25 mg Tb24 ER tablet Take 25 mg by mouth once daily., Until Discontinued, Historical Med      naproxen (NAPROSYN) 375 MG tablet Take 375 mg by mouth every 12 (twelve) hours as needed., Until Discontinued, Historical Med      nitroGLYCERIN (NITROSTAT) 0.3 MG SL tablet Place 0.3 mg under the tongue every 5 (five) minutes as needed for Chest pain., Until Discontinued, Historical Med      pravastatin (PRAVACHOL) 40 MG tablet Take 40 mg by mouth once daily., Until Discontinued, Historical Med      prednisoLONE acetate (PRED FORTE) 1 % DrpS Place 1 drop into the right eye every 4 (four) hours., Until Discontinued, Historical Med      pregabalin (LYRICA) 50 MG capsule Take 50 mg by mouth 3 (three) times daily., Until Discontinued, Historical Med      sotalol (BETAPACE) 80 MG tablet Take 80 mg by mouth 2 (two) times daily., Until Discontinued, Historical Med      tramadol (ULTRAM) 50 mg tablet Take 1 tablet (50 mg total) by mouth every 6 (six) hours as needed for Pain., Starting 10/7/2016, Until Discontinued, Print      vortioxetine (BRINTELLIX) 5 mg Tab Take 5 mg by mouth 2 (two) times daily., Until Discontinued, Historical Med         STOP taking these medications       pantoprazole (PROTONIX) 40 MG tablet Comments:   Reason for Stopping:         predniSONE (DELTASONE) 20 MG tablet Comments:   Reason for Stopping:         promethazine (PHENERGAN) 12.5 MG Tab Comments:   Reason for Stopping:         trazodone (DESYREL) 50 MG tablet Comments:   Reason for Stopping:         zolpidem (AMBIEN) 5 MG Tab Comments:   Reason for Stopping:             Time spent on the discharge of patient: > 45 minutes    HOS POC IP DISCHARGE SUMMARY    Layla Linda MD  Department of Hospital Medicine  [facility]

## 2017-08-16 ENCOUNTER — PATIENT OUTREACH (OUTPATIENT)
Dept: ADMINISTRATIVE | Facility: CLINIC | Age: 67
End: 2017-08-16

## 2017-08-16 RX ORDER — GABAPENTIN 300 MG/1
300 CAPSULE ORAL 3 TIMES DAILY
Status: ON HOLD | COMMUNITY
End: 2018-07-23 | Stop reason: HOSPADM

## 2017-08-16 NOTE — PROGRESS NOTES
Please forward this important TCC information to your provider in order to maximize the post discharge care delivery of this patient.    C3 nurse spoke with Yasmeen Palm  for a TCC post hospital discharge follow up call. The patient has a scheduled HOSFU appointment with Angel Orourke Jr, MD on 8\21 or 8\22 @ 0900 or 1000. She states they will call and remind her.  Respectfully,  Omaira Victor RN  [facility]    [email or web address]       Please do not reply to this message, as this inbox is not routinely monitored.

## 2017-08-16 NOTE — PATIENT INSTRUCTIONS

## 2017-08-18 ENCOUNTER — TELEPHONE (OUTPATIENT)
Dept: PULMONOLOGY | Facility: CLINIC | Age: 67
End: 2017-08-18

## 2017-08-19 ENCOUNTER — HOSPITAL ENCOUNTER (EMERGENCY)
Facility: HOSPITAL | Age: 67
Discharge: HOME | End: 2017-08-19
Attending: EMERGENCY MEDICINE
Payer: MEDICARE

## 2017-08-19 VITALS
RESPIRATION RATE: 24 BRPM | HEART RATE: 102 BPM | HEIGHT: 64 IN | DIASTOLIC BLOOD PRESSURE: 75 MMHG | TEMPERATURE: 98 F | WEIGHT: 200 LBS | SYSTOLIC BLOOD PRESSURE: 148 MMHG | OXYGEN SATURATION: 95 % | BODY MASS INDEX: 34.15 KG/M2

## 2017-08-19 DIAGNOSIS — R00.0 TACHYCARDIA: ICD-10-CM

## 2017-08-19 DIAGNOSIS — R53.83 FATIGUE, UNSPECIFIED TYPE: Primary | ICD-10-CM

## 2017-08-19 DIAGNOSIS — R06.00 DYSPNEA, UNSPECIFIED TYPE: ICD-10-CM

## 2017-08-19 LAB
ALBUMIN SERPL BCP-MCNC: 3.2 G/DL
ALP SERPL-CCNC: 92 U/L
ALT SERPL W/O P-5'-P-CCNC: 7 U/L
ANION GAP SERPL CALC-SCNC: 11 MMOL/L
AST SERPL-CCNC: 15 U/L
BASOPHILS # BLD AUTO: 0.06 K/UL
BASOPHILS NFR BLD: 0.5 %
BILIRUB SERPL-MCNC: 0.2 MG/DL
BNP SERPL-MCNC: 399 PG/ML
BUN SERPL-MCNC: 16 MG/DL
CALCIUM SERPL-MCNC: 10.2 MG/DL
CHLORIDE SERPL-SCNC: 102 MMOL/L
CO2 SERPL-SCNC: 32 MMOL/L
CREAT SERPL-MCNC: 0.8 MG/DL
DIFFERENTIAL METHOD: ABNORMAL
EOSINOPHIL # BLD AUTO: 0.1 K/UL
EOSINOPHIL NFR BLD: 0.7 %
ERYTHROCYTE [DISTWIDTH] IN BLOOD BY AUTOMATED COUNT: 17.8 %
EST. GFR  (AFRICAN AMERICAN): >60 ML/MIN/1.73 M^2
EST. GFR  (NON AFRICAN AMERICAN): >60 ML/MIN/1.73 M^2
GLUCOSE SERPL-MCNC: 189 MG/DL
HCT VFR BLD AUTO: 41 %
HGB BLD-MCNC: 11.9 G/DL
LYMPHOCYTES # BLD AUTO: 2 K/UL
LYMPHOCYTES NFR BLD: 17.3 %
MAGNESIUM SERPL-MCNC: 1.4 MG/DL
MCH RBC QN AUTO: 25.2 PG
MCHC RBC AUTO-ENTMCNC: 29 G/DL
MCV RBC AUTO: 87 FL
MONOCYTES # BLD AUTO: 1 K/UL
MONOCYTES NFR BLD: 8.2 %
NEUTROPHILS # BLD AUTO: 8.5 K/UL
NEUTROPHILS NFR BLD: 73.1 %
PLATELET # BLD AUTO: 390 K/UL
PMV BLD AUTO: 10.3 FL
POTASSIUM SERPL-SCNC: 3.9 MMOL/L
PROT SERPL-MCNC: 7.8 G/DL
RBC # BLD AUTO: 4.73 M/UL
SODIUM SERPL-SCNC: 145 MMOL/L
TROPONIN I SERPL DL<=0.01 NG/ML-MCNC: 0.04 NG/ML
WBC # BLD AUTO: 11.6 K/UL

## 2017-08-19 PROCEDURE — 80053 COMPREHEN METABOLIC PANEL: CPT

## 2017-08-19 PROCEDURE — 83880 ASSAY OF NATRIURETIC PEPTIDE: CPT

## 2017-08-19 PROCEDURE — 93010 ELECTROCARDIOGRAM REPORT: CPT | Mod: ,,, | Performed by: INTERNAL MEDICINE

## 2017-08-19 PROCEDURE — 25000003 PHARM REV CODE 250: Performed by: EMERGENCY MEDICINE

## 2017-08-19 PROCEDURE — 96375 TX/PRO/DX INJ NEW DRUG ADDON: CPT

## 2017-08-19 PROCEDURE — 83735 ASSAY OF MAGNESIUM: CPT

## 2017-08-19 PROCEDURE — 85025 COMPLETE CBC W/AUTO DIFF WBC: CPT

## 2017-08-19 PROCEDURE — 96374 THER/PROPH/DIAG INJ IV PUSH: CPT

## 2017-08-19 PROCEDURE — 63600175 PHARM REV CODE 636 W HCPCS: Performed by: EMERGENCY MEDICINE

## 2017-08-19 PROCEDURE — 84484 ASSAY OF TROPONIN QUANT: CPT

## 2017-08-19 PROCEDURE — 99284 EMERGENCY DEPT VISIT MOD MDM: CPT | Mod: 25

## 2017-08-19 PROCEDURE — 93005 ELECTROCARDIOGRAM TRACING: CPT

## 2017-08-19 RX ORDER — ONDANSETRON 2 MG/ML
4 INJECTION INTRAMUSCULAR; INTRAVENOUS
Status: COMPLETED | OUTPATIENT
Start: 2017-08-19 | End: 2017-08-19

## 2017-08-19 RX ORDER — CLONIDINE HYDROCHLORIDE 0.1 MG/1
0.2 TABLET ORAL
Status: COMPLETED | OUTPATIENT
Start: 2017-08-19 | End: 2017-08-19

## 2017-08-19 RX ORDER — HYDRALAZINE HYDROCHLORIDE 20 MG/ML
10 INJECTION INTRAMUSCULAR; INTRAVENOUS
Status: COMPLETED | OUTPATIENT
Start: 2017-08-19 | End: 2017-08-19

## 2017-08-19 RX ORDER — DILTIAZEM HYDROCHLORIDE 5 MG/ML
10 INJECTION INTRAVENOUS
Status: COMPLETED | OUTPATIENT
Start: 2017-08-19 | End: 2017-08-19

## 2017-08-19 RX ADMIN — CLONIDINE HYDROCHLORIDE 0.2 MG: 0.1 TABLET ORAL at 03:08

## 2017-08-19 RX ADMIN — HYDRALAZINE HYDROCHLORIDE 10 MG: 20 INJECTION INTRAMUSCULAR; INTRAVENOUS at 02:08

## 2017-08-19 RX ADMIN — DILTIAZEM HYDROCHLORIDE 10 MG: 5 INJECTION INTRAVENOUS at 04:08

## 2017-08-19 RX ADMIN — ONDANSETRON 4 MG: 2 INJECTION INTRAMUSCULAR; INTRAVENOUS at 02:08

## 2017-08-19 NOTE — ED PROVIDER NOTES
"Encounter Date: 8/19/2017    SCRIBE #1 NOTE: I, Char Godwin, am scribing for, and in the presence of, Tato Alarcon MD. Other sections scribed: HPI/ROS.       History     Chief Complaint   Patient presents with    Shortness of Breath     Was admitted x 1 week ago, discharged x 5 days ago.  Felt ok for 1-2 days now with SOB, nausea, and "feeling bad".     CC: Shortness of breath    Pt is a 67 y.o. female smoker w/ COPD, CHF, HTN, DM, GERD and depression and hx of abdominal surgery presenting to the ED c/o SOB, nausea, and "feeling off." Pt was admitted to the ICU x 1 week ago, discharged x 5 days ago. Pt states she now feels similar to her previous symptoms, but now also has nausea. Pt reports compliance with her 20 mg Xarelto qd.    Pt states she takes O2 nasal cannulae at home PRN. Pt denies pain or vomiting. Pt reports no further symptoms. No prior attempted treatment. No alleviating or aggravating factors.         The history is provided by the patient.     Review of patient's allergies indicates:  No Known Allergies  Past Medical History:   Diagnosis Date    CHF (congestive heart failure)     COPD (chronic obstructive pulmonary disease)     Depression     Diabetes mellitus     GERD (gastroesophageal reflux disease)     Hypertension      Past Surgical History:   Procedure Laterality Date    ABDOMINAL SURGERY      CARDIAC SURGERY       Family History   Problem Relation Age of Onset    Heart disease Mother     Hypertension Mother     Diabetes Father      Social History   Substance Use Topics    Smoking status: Current Every Day Smoker     Packs/day: 0.50     Years: 25.00     Types: Cigarettes    Smokeless tobacco: Never Used    Alcohol use No     Review of Systems   Constitutional: Negative for fever.   HENT: Negative for nosebleeds and sore throat.    Eyes: Negative for visual disturbance.   Respiratory: Positive for shortness of breath.    Cardiovascular: Negative for chest pain. "   Gastrointestinal: Positive for nausea. Negative for abdominal pain and vomiting.   Genitourinary: Negative for dysuria and hematuria.   Musculoskeletal: Negative for back pain, joint swelling and myalgias.   Skin: Negative for rash and wound.   Neurological: Negative for weakness and headaches.   Hematological: Does not bruise/bleed easily.       Physical Exam     Initial Vitals [08/19/17 1343]   BP Pulse Resp Temp SpO2   (!) 184/114 (!) 124 19 98.7 °F (37.1 °C) (!) 90 %      MAP       137.33         Physical Exam    Nursing note and vitals reviewed.  HENT:   Head: Atraumatic.   Eyes: Conjunctivae and EOM are normal.   Neck: Normal range of motion.   Cardiovascular: Exam reveals no gallop and no friction rub.    No murmur heard.  tachycardia   Pulmonary/Chest: Breath sounds normal. No respiratory distress. She has no wheezes. She has no rales.   Abdominal: Soft. There is no tenderness.   Musculoskeletal: Normal range of motion. She exhibits no edema.   Neurological: She is alert and oriented to person, place, and time.   Psychiatric: She has a normal mood and affect.         ED Course   Procedures  Labs Reviewed   CBC W/ AUTO DIFFERENTIAL - Abnormal; Notable for the following:        Result Value    Hemoglobin 11.9 (*)     MCH 25.2 (*)     MCHC 29.0 (*)     RDW 17.8 (*)     Platelets 390 (*)     Gran # 8.5 (*)     Gran% 73.1 (*)     Lymph% 17.3 (*)     All other components within normal limits   COMPREHENSIVE METABOLIC PANEL - Abnormal; Notable for the following:     CO2 32 (*)     Glucose 189 (*)     Albumin 3.2 (*)     ALT 7 (*)     All other components within normal limits   TROPONIN I - Abnormal; Notable for the following:     Troponin I 0.041 (*)     All other components within normal limits   MAGNESIUM - Abnormal; Notable for the following:     Magnesium 1.4 (*)     All other components within normal limits   B-TYPE NATRIURETIC PEPTIDE - Abnormal; Notable for the following:      (*)     All other  components within normal limits             Medical Decision Making:   Initial Assessment:   67-year-old female with a history of pulmonary embolism, chf and copd presenting with feeling bad and nausea for the past one to 2 days.  Patient was admitted a week ago for similar symptoms.  She has been compliant with her meds.  She has an IVC in place.  On exam she is tachycardic and hypertensive.  She does not appear to be in distress.  Abdomen is benign.  Lungs are clear.  No peripheral edema.  EKG reviewed and interpreted myself shows no evidence of acute ischemia.    Chest x-ray reviewed and interpreted myself shows no evidence of pneumothorax, pneumonia or pulmonary edema.  Labs show decrease troponin from recent admission and appears close to baseline.  Heart rate remains elevated in the 130s to 140s.  EKG shows sinus tachycardia.  She is on diltiazem.  I will give her a dose of IV diltiazem now.  Heart improved to 100.  Blood pressure stable.  I think she is stable for discharge.  I do not suspect an acute life-threatening condition.  I doubt pulmonary embolism.  I doubt acute MI.  Return instructions given.  Patient verbalized understanding and agreement with the plan.              Scribe Attestation:   Scribe #1: I performed the above scribed service and the documentation accurately describes the services I performed. I attest to the accuracy of the note.    Attending Attestation:           Physician Attestation for Scribe:  Physician Attestation Statement for Scribe #1: I, Tato Alarcon MD, reviewed documentation, as scribed by Char Godwin in my presence, and it is both accurate and complete.                 ED Course     Clinical Impression:   The primary encounter diagnosis was Fatigue, unspecified type. Diagnoses of Dyspnea, unspecified type and Tachycardia were also pertinent to this visit.                           Tato Alarcon MD  08/19/17 0687

## 2017-11-02 ENCOUNTER — HOSPITAL ENCOUNTER (INPATIENT)
Facility: HOSPITAL | Age: 67
LOS: 3 days | Discharge: HOME HEALTH | DRG: 291 | End: 2017-11-06
Attending: EMERGENCY MEDICINE | Admitting: HOSPITALIST
Payer: MEDICARE

## 2017-11-02 DIAGNOSIS — R06.02 SHORTNESS OF BREATH: Primary | ICD-10-CM

## 2017-11-02 DIAGNOSIS — I10 HYPERTENSION, UNSPECIFIED TYPE: ICD-10-CM

## 2017-11-02 DIAGNOSIS — J44.1 COPD WITH ACUTE EXACERBATION: ICD-10-CM

## 2017-11-02 DIAGNOSIS — R06.89 HYPERCARBIA: ICD-10-CM

## 2017-11-02 DIAGNOSIS — R00.0 TACHYCARDIA: ICD-10-CM

## 2017-11-02 DIAGNOSIS — J41.0 SIMPLE CHRONIC BRONCHITIS: Chronic | ICD-10-CM

## 2017-11-02 LAB
ALBUMIN SERPL BCP-MCNC: 3.4 G/DL
ALP SERPL-CCNC: 77 U/L
ALT SERPL W/O P-5'-P-CCNC: 7 U/L
ANION GAP SERPL CALC-SCNC: 10 MMOL/L
APTT BLDCRRT: 25.5 SEC
AST SERPL-CCNC: 13 U/L
BASOPHILS # BLD AUTO: 0.04 K/UL
BASOPHILS NFR BLD: 0.4 %
BILIRUB SERPL-MCNC: 0.2 MG/DL
BNP SERPL-MCNC: 334 PG/ML
BUN SERPL-MCNC: 9 MG/DL
CALCIUM SERPL-MCNC: 9.5 MG/DL
CHLORIDE SERPL-SCNC: 99 MMOL/L
CO2 SERPL-SCNC: 34 MMOL/L
CREAT SERPL-MCNC: 0.7 MG/DL
D DIMER PPP IA.FEU-MCNC: 0.61 MG/L FEU
DIFFERENTIAL METHOD: ABNORMAL
EOSINOPHIL # BLD AUTO: 0.6 K/UL
EOSINOPHIL NFR BLD: 6.4 %
ERYTHROCYTE [DISTWIDTH] IN BLOOD BY AUTOMATED COUNT: 18.2 %
EST. GFR  (AFRICAN AMERICAN): >60 ML/MIN/1.73 M^2
EST. GFR  (NON AFRICAN AMERICAN): >60 ML/MIN/1.73 M^2
GLUCOSE SERPL-MCNC: 121 MG/DL
HCT VFR BLD AUTO: 40.1 %
HGB BLD-MCNC: 11.3 G/DL
INR PPP: 1
LYMPHOCYTES # BLD AUTO: 1.7 K/UL
LYMPHOCYTES NFR BLD: 16.9 %
MCH RBC QN AUTO: 24.9 PG
MCHC RBC AUTO-ENTMCNC: 28.2 G/DL
MCV RBC AUTO: 88 FL
MONOCYTES # BLD AUTO: 0.7 K/UL
MONOCYTES NFR BLD: 7 %
NEUTROPHILS # BLD AUTO: 6.8 K/UL
NEUTROPHILS NFR BLD: 69.5 %
PLATELET # BLD AUTO: 312 K/UL
PMV BLD AUTO: 9.4 FL
POTASSIUM SERPL-SCNC: 3.5 MMOL/L
PROT SERPL-MCNC: 7.4 G/DL
PROTHROMBIN TIME: 11 SEC
RBC # BLD AUTO: 4.54 M/UL
SODIUM SERPL-SCNC: 143 MMOL/L
TROPONIN I SERPL DL<=0.01 NG/ML-MCNC: 0.02 NG/ML
WBC # BLD AUTO: 9.78 K/UL

## 2017-11-02 PROCEDURE — 96376 TX/PRO/DX INJ SAME DRUG ADON: CPT

## 2017-11-02 PROCEDURE — 85610 PROTHROMBIN TIME: CPT

## 2017-11-02 PROCEDURE — 85730 THROMBOPLASTIN TIME PARTIAL: CPT

## 2017-11-02 PROCEDURE — 80053 COMPREHEN METABOLIC PANEL: CPT

## 2017-11-02 PROCEDURE — 85379 FIBRIN DEGRADATION QUANT: CPT

## 2017-11-02 PROCEDURE — 93005 ELECTROCARDIOGRAM TRACING: CPT

## 2017-11-02 PROCEDURE — 83880 ASSAY OF NATRIURETIC PEPTIDE: CPT

## 2017-11-02 PROCEDURE — 85025 COMPLETE CBC W/AUTO DIFF WBC: CPT

## 2017-11-02 PROCEDURE — 99285 EMERGENCY DEPT VISIT HI MDM: CPT | Mod: 25

## 2017-11-02 PROCEDURE — 84484 ASSAY OF TROPONIN QUANT: CPT

## 2017-11-02 PROCEDURE — 93010 ELECTROCARDIOGRAM REPORT: CPT | Mod: ,,, | Performed by: INTERNAL MEDICINE

## 2017-11-02 PROCEDURE — 96374 THER/PROPH/DIAG INJ IV PUSH: CPT

## 2017-11-02 PROCEDURE — 96375 TX/PRO/DX INJ NEW DRUG ADDON: CPT

## 2017-11-02 RX ORDER — FUROSEMIDE 10 MG/ML
40 INJECTION INTRAMUSCULAR; INTRAVENOUS
Status: COMPLETED | OUTPATIENT
Start: 2017-11-02 | End: 2017-11-03

## 2017-11-02 RX ORDER — IPRATROPIUM BROMIDE AND ALBUTEROL SULFATE 2.5; .5 MG/3ML; MG/3ML
3 SOLUTION RESPIRATORY (INHALATION)
Status: COMPLETED | OUTPATIENT
Start: 2017-11-02 | End: 2017-11-03

## 2017-11-02 RX ORDER — METHYLPREDNISOLONE SOD SUCC 125 MG
125 VIAL (EA) INJECTION
Status: COMPLETED | OUTPATIENT
Start: 2017-11-02 | End: 2017-11-03

## 2017-11-03 PROBLEM — R06.02 SHORTNESS OF BREATH: Status: ACTIVE | Noted: 2017-11-03

## 2017-11-03 LAB
ALLENS TEST: ABNORMAL
DELSYS: ABNORMAL
ESTIMATED AVG GLUCOSE: 134 MG/DL
ESTIMATED AVG GLUCOSE: 134 MG/DL
FIO2: 32
FLOW: 3
HBA1C MFR BLD HPLC: 6.3 %
HBA1C MFR BLD HPLC: 6.3 %
HCO3 UR-SCNC: 38.4 MMOL/L (ref 24–28)
HCT VFR BLD CALC: 38 %PCV (ref 36–54)
MODE: ABNORMAL
PCO2 BLDA: 76.2 MMHG (ref 35–45)
PH SMN: 7.31 [PH] (ref 7.35–7.45)
PO2 BLDA: 102 MMHG (ref 80–100)
POC BE: 9 MMOL/L
POC IONIZED CALCIUM: 1.25 MMOL/L (ref 1.06–1.42)
POC SATURATED O2: 97 % (ref 95–100)
POC TCO2: 41 MMOL/L (ref 23–27)
POCT GLUCOSE: 283 MG/DL (ref 70–110)
POCT GLUCOSE: 288 MG/DL (ref 70–110)
POCT GLUCOSE: 297 MG/DL (ref 70–110)
POCT GLUCOSE: 325 MG/DL (ref 70–110)
POTASSIUM BLD-SCNC: 3.5 MMOL/L (ref 3.5–5.1)
SAMPLE: ABNORMAL
SITE: ABNORMAL
SODIUM BLD-SCNC: 141 MMOL/L (ref 136–145)
SP02: 99

## 2017-11-03 PROCEDURE — 99900035 HC TECH TIME PER 15 MIN (STAT)

## 2017-11-03 PROCEDURE — 82803 BLOOD GASES ANY COMBINATION: CPT

## 2017-11-03 PROCEDURE — 36600 WITHDRAWAL OF ARTERIAL BLOOD: CPT

## 2017-11-03 PROCEDURE — 25000003 PHARM REV CODE 250: Performed by: EMERGENCY MEDICINE

## 2017-11-03 PROCEDURE — 27000221 HC OXYGEN, UP TO 24 HOURS

## 2017-11-03 PROCEDURE — 94660 CPAP INITIATION&MGMT: CPT

## 2017-11-03 PROCEDURE — 36415 COLL VENOUS BLD VENIPUNCTURE: CPT

## 2017-11-03 PROCEDURE — 27000190 HC CPAP FULL FACE MASK W/VALVE

## 2017-11-03 PROCEDURE — 63600175 PHARM REV CODE 636 W HCPCS: Performed by: HOSPITALIST

## 2017-11-03 PROCEDURE — 83036 HEMOGLOBIN GLYCOSYLATED A1C: CPT

## 2017-11-03 PROCEDURE — 25000242 PHARM REV CODE 250 ALT 637 W/ HCPCS: Performed by: EMERGENCY MEDICINE

## 2017-11-03 PROCEDURE — 63600175 PHARM REV CODE 636 W HCPCS: Performed by: INTERNAL MEDICINE

## 2017-11-03 PROCEDURE — 21400001 HC TELEMETRY ROOM

## 2017-11-03 PROCEDURE — 63600175 PHARM REV CODE 636 W HCPCS: Performed by: EMERGENCY MEDICINE

## 2017-11-03 PROCEDURE — 25000003 PHARM REV CODE 250: Performed by: HOSPITALIST

## 2017-11-03 PROCEDURE — 94640 AIRWAY INHALATION TREATMENT: CPT

## 2017-11-03 PROCEDURE — 25000003 PHARM REV CODE 250: Performed by: INTERNAL MEDICINE

## 2017-11-03 PROCEDURE — 94761 N-INVAS EAR/PLS OXIMETRY MLT: CPT

## 2017-11-03 RX ORDER — IBUPROFEN 200 MG
24 TABLET ORAL
Status: DISCONTINUED | OUTPATIENT
Start: 2017-11-03 | End: 2017-11-06 | Stop reason: HOSPADM

## 2017-11-03 RX ORDER — ONDANSETRON 2 MG/ML
4 INJECTION INTRAMUSCULAR; INTRAVENOUS EVERY 4 HOURS PRN
Status: DISCONTINUED | OUTPATIENT
Start: 2017-11-03 | End: 2017-11-03

## 2017-11-03 RX ORDER — FLUTICASONE FUROATE AND VILANTEROL 100; 25 UG/1; UG/1
1 POWDER RESPIRATORY (INHALATION) DAILY
Status: DISCONTINUED | OUTPATIENT
Start: 2017-11-03 | End: 2017-11-06 | Stop reason: HOSPADM

## 2017-11-03 RX ORDER — TIOTROPIUM BROMIDE 18 UG/1
1 CAPSULE ORAL; RESPIRATORY (INHALATION) DAILY
Status: DISCONTINUED | OUTPATIENT
Start: 2017-11-03 | End: 2017-11-06 | Stop reason: HOSPADM

## 2017-11-03 RX ORDER — DILTIAZEM HYDROCHLORIDE 5 MG/ML
10 INJECTION INTRAVENOUS
Status: COMPLETED | OUTPATIENT
Start: 2017-11-03 | End: 2017-11-03

## 2017-11-03 RX ORDER — ALPRAZOLAM 0.5 MG/1
0.5 TABLET ORAL 3 TIMES DAILY PRN
Status: DISCONTINUED | OUTPATIENT
Start: 2017-11-03 | End: 2017-11-06 | Stop reason: HOSPADM

## 2017-11-03 RX ORDER — FUROSEMIDE 40 MG/1
40 TABLET ORAL DAILY
Status: DISCONTINUED | OUTPATIENT
Start: 2017-11-03 | End: 2017-11-03

## 2017-11-03 RX ORDER — FUROSEMIDE 10 MG/ML
40 INJECTION INTRAMUSCULAR; INTRAVENOUS 2 TIMES DAILY
Status: DISCONTINUED | OUTPATIENT
Start: 2017-11-03 | End: 2017-11-03 | Stop reason: SDUPTHER

## 2017-11-03 RX ORDER — ONDANSETRON 2 MG/ML
8 INJECTION INTRAMUSCULAR; INTRAVENOUS EVERY 8 HOURS PRN
Status: DISCONTINUED | OUTPATIENT
Start: 2017-11-03 | End: 2017-11-06 | Stop reason: HOSPADM

## 2017-11-03 RX ORDER — POLYETHYLENE GLYCOL 3350 17 G/17G
17 POWDER, FOR SOLUTION ORAL DAILY
Status: DISCONTINUED | OUTPATIENT
Start: 2017-11-03 | End: 2017-11-06 | Stop reason: HOSPADM

## 2017-11-03 RX ORDER — LORAZEPAM 2 MG/ML
1 INJECTION INTRAMUSCULAR
Status: COMPLETED | OUTPATIENT
Start: 2017-11-03 | End: 2017-11-03

## 2017-11-03 RX ORDER — ASPIRIN 81 MG/1
81 TABLET ORAL DAILY
Status: DISCONTINUED | OUTPATIENT
Start: 2017-11-03 | End: 2017-11-06 | Stop reason: HOSPADM

## 2017-11-03 RX ORDER — DILTIAZEM HYDROCHLORIDE 5 MG/ML
10 INJECTION INTRAVENOUS EVERY 4 HOURS PRN
Status: DISCONTINUED | OUTPATIENT
Start: 2017-11-03 | End: 2017-11-03

## 2017-11-03 RX ORDER — GABAPENTIN 300 MG/1
300 CAPSULE ORAL 3 TIMES DAILY
Status: DISCONTINUED | OUTPATIENT
Start: 2017-11-03 | End: 2017-11-06 | Stop reason: HOSPADM

## 2017-11-03 RX ORDER — LORAZEPAM 2 MG/ML
1 INJECTION INTRAMUSCULAR
Status: DISCONTINUED | OUTPATIENT
Start: 2017-11-03 | End: 2017-11-03

## 2017-11-03 RX ORDER — FUROSEMIDE 10 MG/ML
40 INJECTION INTRAMUSCULAR; INTRAVENOUS DAILY
Status: DISCONTINUED | OUTPATIENT
Start: 2017-11-03 | End: 2017-11-06 | Stop reason: HOSPADM

## 2017-11-03 RX ORDER — DILTIAZEM HYDROCHLORIDE 30 MG/1
120 TABLET, FILM COATED ORAL EVERY 8 HOURS
Status: DISCONTINUED | OUTPATIENT
Start: 2017-11-03 | End: 2017-11-06 | Stop reason: HOSPADM

## 2017-11-03 RX ORDER — ACETAMINOPHEN 325 MG/1
650 TABLET ORAL EVERY 8 HOURS PRN
Status: DISCONTINUED | OUTPATIENT
Start: 2017-11-03 | End: 2017-11-06 | Stop reason: HOSPADM

## 2017-11-03 RX ORDER — PRAVASTATIN SODIUM 40 MG/1
40 TABLET ORAL NIGHTLY
Status: DISCONTINUED | OUTPATIENT
Start: 2017-11-03 | End: 2017-11-06 | Stop reason: HOSPADM

## 2017-11-03 RX ORDER — INSULIN ASPART 100 [IU]/ML
0-5 INJECTION, SOLUTION INTRAVENOUS; SUBCUTANEOUS
Status: DISCONTINUED | OUTPATIENT
Start: 2017-11-03 | End: 2017-11-06 | Stop reason: HOSPADM

## 2017-11-03 RX ORDER — SOTALOL HYDROCHLORIDE 80 MG/1
80 TABLET ORAL 2 TIMES DAILY
Status: DISCONTINUED | OUTPATIENT
Start: 2017-11-03 | End: 2017-11-06 | Stop reason: HOSPADM

## 2017-11-03 RX ORDER — METHYLPREDNISOLONE SOD SUCC 125 MG
80 VIAL (EA) INJECTION EVERY 8 HOURS
Status: DISCONTINUED | OUTPATIENT
Start: 2017-11-03 | End: 2017-11-05

## 2017-11-03 RX ORDER — DILTIAZEM HYDROCHLORIDE 5 MG/ML
10 INJECTION INTRAVENOUS
Status: DISCONTINUED | OUTPATIENT
Start: 2017-11-03 | End: 2017-11-03

## 2017-11-03 RX ORDER — FAMOTIDINE 20 MG/1
20 TABLET, FILM COATED ORAL DAILY
Status: DISCONTINUED | OUTPATIENT
Start: 2017-11-03 | End: 2017-11-06 | Stop reason: HOSPADM

## 2017-11-03 RX ORDER — IPRATROPIUM BROMIDE AND ALBUTEROL SULFATE 2.5; .5 MG/3ML; MG/3ML
3 SOLUTION RESPIRATORY (INHALATION) EVERY 4 HOURS
Status: COMPLETED | OUTPATIENT
Start: 2017-11-03 | End: 2017-11-03

## 2017-11-03 RX ORDER — IBUPROFEN 200 MG
16 TABLET ORAL
Status: DISCONTINUED | OUTPATIENT
Start: 2017-11-03 | End: 2017-11-06 | Stop reason: HOSPADM

## 2017-11-03 RX ORDER — CLONIDINE HYDROCHLORIDE 0.1 MG/1
0.1 TABLET ORAL EVERY 8 HOURS PRN
Status: DISCONTINUED | OUTPATIENT
Start: 2017-11-03 | End: 2017-11-05

## 2017-11-03 RX ORDER — METHYLPREDNISOLONE SOD SUCC 125 MG
125 VIAL (EA) INJECTION EVERY 8 HOURS
Status: DISCONTINUED | OUTPATIENT
Start: 2017-11-03 | End: 2017-11-03

## 2017-11-03 RX ORDER — ENOXAPARIN SODIUM 100 MG/ML
40 INJECTION SUBCUTANEOUS EVERY 24 HOURS
Status: DISCONTINUED | OUTPATIENT
Start: 2017-11-03 | End: 2017-11-03

## 2017-11-03 RX ORDER — GLUCAGON 1 MG
1 KIT INJECTION
Status: DISCONTINUED | OUTPATIENT
Start: 2017-11-03 | End: 2017-11-06 | Stop reason: HOSPADM

## 2017-11-03 RX ORDER — LISINOPRIL 20 MG/1
40 TABLET ORAL DAILY
Status: DISCONTINUED | OUTPATIENT
Start: 2017-11-03 | End: 2017-11-06 | Stop reason: HOSPADM

## 2017-11-03 RX ADMIN — DILTIAZEM HYDROCHLORIDE 120 MG: 30 TABLET, FILM COATED ORAL at 05:11

## 2017-11-03 RX ADMIN — METHYLPREDNISOLONE SODIUM SUCCINATE 80 MG: 125 INJECTION, POWDER, FOR SOLUTION INTRAMUSCULAR; INTRAVENOUS at 10:11

## 2017-11-03 RX ADMIN — DILTIAZEM HYDROCHLORIDE 120 MG: 30 TABLET, FILM COATED ORAL at 10:11

## 2017-11-03 RX ADMIN — IPRATROPIUM BROMIDE AND ALBUTEROL SULFATE 3 ML: .5; 3 SOLUTION RESPIRATORY (INHALATION) at 12:11

## 2017-11-03 RX ADMIN — IPRATROPIUM BROMIDE AND ALBUTEROL SULFATE 3 ML: .5; 3 SOLUTION RESPIRATORY (INHALATION) at 03:11

## 2017-11-03 RX ADMIN — ASPIRIN 81 MG: 81 TABLET, COATED ORAL at 08:11

## 2017-11-03 RX ADMIN — INSULIN ASPART 1 UNITS: 100 INJECTION, SOLUTION INTRAVENOUS; SUBCUTANEOUS at 10:11

## 2017-11-03 RX ADMIN — CLONIDINE HYDROCHLORIDE 0.1 MG: 0.1 TABLET ORAL at 08:11

## 2017-11-03 RX ADMIN — DILTIAZEM HYDROCHLORIDE 120 MG: 30 TABLET, FILM COATED ORAL at 01:11

## 2017-11-03 RX ADMIN — METHYLPREDNISOLONE SODIUM SUCCINATE 125 MG: 125 INJECTION, POWDER, FOR SOLUTION INTRAMUSCULAR; INTRAVENOUS at 05:11

## 2017-11-03 RX ADMIN — INSULIN DETEMIR 7 UNITS: 100 INJECTION, SOLUTION SUBCUTANEOUS at 10:11

## 2017-11-03 RX ADMIN — FUROSEMIDE 40 MG: 10 INJECTION, SOLUTION INTRAMUSCULAR; INTRAVENOUS at 12:11

## 2017-11-03 RX ADMIN — RIVAROXABAN 20 MG: 20 TABLET, FILM COATED ORAL at 04:11

## 2017-11-03 RX ADMIN — GABAPENTIN 300 MG: 300 CAPSULE ORAL at 01:11

## 2017-11-03 RX ADMIN — METHYLPREDNISOLONE SODIUM SUCCINATE 125 MG: 125 INJECTION, POWDER, FOR SOLUTION INTRAMUSCULAR; INTRAVENOUS at 12:11

## 2017-11-03 RX ADMIN — IPRATROPIUM BROMIDE AND ALBUTEROL SULFATE 3 ML: .5; 3 SOLUTION RESPIRATORY (INHALATION) at 11:11

## 2017-11-03 RX ADMIN — FAMOTIDINE 20 MG: 20 TABLET, FILM COATED ORAL at 08:11

## 2017-11-03 RX ADMIN — FUROSEMIDE 40 MG: 10 INJECTION, SOLUTION INTRAVENOUS at 01:11

## 2017-11-03 RX ADMIN — LORAZEPAM 1 MG: 2 INJECTION INTRAMUSCULAR; INTRAVENOUS at 01:11

## 2017-11-03 RX ADMIN — DILTIAZEM HYDROCHLORIDE 10 MG: 5 INJECTION INTRAVENOUS at 12:11

## 2017-11-03 RX ADMIN — INSULIN ASPART 5 UNITS: 100 INJECTION, SOLUTION INTRAVENOUS; SUBCUTANEOUS at 04:11

## 2017-11-03 RX ADMIN — SOTALOL HYDROCHLORIDE 80 MG: 80 TABLET ORAL at 08:11

## 2017-11-03 RX ADMIN — DOXYCYCLINE 100 MG: 100 INJECTION, POWDER, LYOPHILIZED, FOR SOLUTION INTRAVENOUS at 04:11

## 2017-11-03 RX ADMIN — PRAVASTATIN SODIUM 40 MG: 40 TABLET ORAL at 10:11

## 2017-11-03 RX ADMIN — GABAPENTIN 300 MG: 300 CAPSULE ORAL at 10:11

## 2017-11-03 RX ADMIN — ACETAMINOPHEN 650 MG: 325 TABLET ORAL at 04:11

## 2017-11-03 RX ADMIN — POLYETHYLENE GLYCOL 3350 17 G: 17 POWDER, FOR SOLUTION ORAL at 08:11

## 2017-11-03 RX ADMIN — LISINOPRIL 40 MG: 20 TABLET ORAL at 08:11

## 2017-11-03 RX ADMIN — METHYLPREDNISOLONE SODIUM SUCCINATE 80 MG: 125 INJECTION, POWDER, FOR SOLUTION INTRAMUSCULAR; INTRAVENOUS at 01:11

## 2017-11-03 RX ADMIN — IPRATROPIUM BROMIDE AND ALBUTEROL SULFATE 3 ML: .5; 3 SOLUTION RESPIRATORY (INHALATION) at 07:11

## 2017-11-03 RX ADMIN — PROMETHAZINE HYDROCHLORIDE 12.5 MG: 25 INJECTION INTRAMUSCULAR; INTRAVENOUS at 10:11

## 2017-11-03 RX ADMIN — DOXYCYCLINE 100 MG: 100 INJECTION, POWDER, LYOPHILIZED, FOR SOLUTION INTRAVENOUS at 06:11

## 2017-11-03 RX ADMIN — FUROSEMIDE 40 MG: 40 TABLET ORAL at 08:11

## 2017-11-03 RX ADMIN — TIOTROPIUM BROMIDE 18 MCG: 18 CAPSULE ORAL; RESPIRATORY (INHALATION) at 07:11

## 2017-11-03 RX ADMIN — ALPRAZOLAM 0.5 MG: 0.5 TABLET ORAL at 08:11

## 2017-11-03 RX ADMIN — SOTALOL HYDROCHLORIDE 80 MG: 80 TABLET ORAL at 10:11

## 2017-11-03 RX ADMIN — INSULIN ASPART 3 UNITS: 100 INJECTION, SOLUTION INTRAVENOUS; SUBCUTANEOUS at 01:11

## 2017-11-03 RX ADMIN — GABAPENTIN 300 MG: 300 CAPSULE ORAL at 05:11

## 2017-11-03 NOTE — PLAN OF CARE
Problem: Diabetes, Type 2 (Adult)  Intervention: Support/Optimize Psychosocial Response to Condition   11/03/17 1419   Coping/Psychosocial Interventions   Supportive Measures active listening utilized;verbalization of feelings encouraged   Environmental Support calm environment promoted       Goal: Signs and Symptoms of Listed Potential Problems Will be Absent, Minimized or Managed (Diabetes, Type 2)  Signs and symptoms of listed potential problems will be absent, minimized or managed by discharge/transition of care (reference Diabetes, Type 2 (Adult) CPG).    11/03/17 1419   Diabetes, Type 2   Problems Assessed (Type 2 Diabetes) hyperglycemia;situational response   Problems Present (Type 2 Diabetes) hyperglycemia;situational response

## 2017-11-03 NOTE — PROGRESS NOTES
Note that patient soiled sheets and gown with urine; I gave her a bath and changed her sheets; Will continue monitor needs;

## 2017-11-03 NOTE — PLAN OF CARE
"TN to patient's room to discuss Helping the patient manage care at home.   TN/SW roll explained to pt.  Teach back method used.  TN's name and contact info placed on white board.  Pt/family encouraged to call for any problems/concerns with DC. "Discharge planning begins on Admission" pamphlet discussed and placed in "My Health Packet" and placed at bedside.     Preferred Appointment time / days:  afternoon    Preferred pharmacy: [facility]       11/03/17 1327   Discharge Assessment   Assessment Type Discharge Planning Assessment   Confirmed/corrected address and phone number on facesheet? Yes   Assessment information obtained from? Patient   Expected Length of Stay (days) 3   Communicated expected length of stay with patient/caregiver yes   Prior to hospitilization cognitive status: Alert/Oriented   Prior to hospitalization functional status: Needs Assistance   Current cognitive status: Alert/Oriented   Current Functional Status: Needs Assistance   Lives With spouse   Able to Return to Prior Arrangements yes   Is patient able to care for self after discharge? Unable to determine at this time (comments)   Who are your caregiver(s) and their phone number(s)?  able to help at home   Patient's perception of discharge disposition home health   Readmission Within The Last 30 Days no previous admission in last 30 days   Patient currently being followed by outpatient case management? No   Patient currently receives any other outside agency services? No   Equipment Currently Used at Home walker, rolling;bedside commode;shower chair;oxygen;nebulizer;glucometer   Do you have any problems affording any of your prescribed medications? No   Is the patient taking medications as prescribed? yes   Does the patient have transportation home? Yes   Transportation Available family or friend will provide   Does the patient receive services at the [facility]mad[facility] Clinic? No   Discharge Plan A Home Health   Discharge Plan B (tbd) "   Patient/Family In Agreement With Plan yes

## 2017-11-03 NOTE — PROGRESS NOTES
Results reported to Dr Seaman.   Results for ANSLEY RICHMOND (MRN [id number]) as of 11/3/2017 05:49   Ref. Range 11/3/2017 00:04   POC Sodium Latest Ref Range: 136 - 145 mmol/L 141   POC Potassium Latest Ref Range: 3.5 - 5.1 mmol/L 3.5   POC Ionized Calcium Latest Ref Range: 1.06 - 1.42 mmol/L 1.25   POC Hematocrit Latest Ref Range: 36 - 54 %PCV 38   POC PH Latest Ref Range: 7.35 - 7.45  7.310 (L)   POC PCO2 Latest Ref Range: 35 - 45 mmHg 76.2 (HH)   POC PO2 Latest Ref Range: 80 - 100 mmHg 102 (H)   POC BE Latest Ref Range: -2 to 2 mmol/L 9   POC HCO3 Latest Ref Range: 24 - 28 mmol/L 38.4 (H)   POC SATURATED O2 Latest Ref Range: 95 - 100 % 97   POC TCO2 Latest Ref Range: 23 - 27 mmol/L 41 (H)   FiO2 Unknown 32   Flow Unknown 3   Sample Unknown ARTERIAL   DelSys Unknown Nasal Can   Allens Test Unknown Pass   Site Unknown LR   Mode Unknown SPONT

## 2017-11-03 NOTE — HPI
66 y/o female with chronic respiratory failure and home oxygen dependent presents with shortness of breath.  Patient states that she was doing well until 2 days ago when she started complaining of shortness of breath.  Dyspnea worsened with exertion.  She also complains of cough productive of whitish sputum.  Symptoms have worsened over past 2 days.  She was using her home breathing treatments frequently without any relief.  She could barely breathe last night and presented to ER.  Denies any fever or chills.  She also denies any chest pain.  Patient also complains of bilateral LE swelling.  Her BP has also been uncontrolled.  She had a similar presentation and admission to hospital 3 months ago.  She presented to ER where she was placed on Bipap.  She denies any other complaints.

## 2017-11-03 NOTE — ED PROVIDER NOTES
Encounter Date: 11/2/2017    SCRIBE #1 NOTE: I, Charlesdileep Lynn, am scribing for, and in the presence of, Katie Seaman MD. Other sections scribed: HPI, ROS, PE.       History     Chief Complaint   Patient presents with    Shortness of Breath     x 3 days. Pt has hx of COPD and CHF. Pt is usually on home o2 2-3L but did not bring it with her tonight. Pt also c/o of nausea. Pt denies chest pain, blurred vision, numbness/tingling.     CC: Shortness of Breath, Cough    HPI: This 67 y.o. female smoker with Hx of COPD, CHF, continuous O2 dependence, CAD s/p stent, DM II, HTN, chronic anticoagulation on Xarelto, PE, DVT, presents to the ED c/o shortness of breath and productive cough (white sputum) that began 3 days ago. Pt states that she has been using her albuterol at home as needed, but in the past day it has stopped helping; she reports using it 4-5 times today prior to coming here. Pt states that she is usually on 2-2.5 L O2 NC continuously, but she has been wearing 3-3.5 L for the past few days. Pt has been admitted twice (April 2017 and August 2017) for similar, mixed COPD/CHF. She reports that she was last on steroids (Prednisone) approximately 3 months ago. Pt denies any recent medication changes; she states her blood pressure is usually somewhat elevated. She states that her L leg has been swollen ever since she developed a blood clot approximately 6 months ago; she does not think she has had a PE before. She is unsure if she has any hx of coronary artery disease, but she states she had an angiogram about 20 years ago done by Dr. Sheppard. She denies fever, chest pain, abdominal pain, N/V.      The history is provided by the patient.     Review of patient's allergies indicates:  No Known Allergies  Past Medical History:   Diagnosis Date    CHF (congestive heart failure)     COPD (chronic obstructive pulmonary disease)     Depression     Diabetes mellitus     GERD (gastroesophageal reflux disease)      Hypertension      Past Surgical History:   Procedure Laterality Date    ABDOMINAL SURGERY      CARDIAC SURGERY       Family History   Problem Relation Age of Onset    Heart disease Mother     Hypertension Mother     Diabetes Father      Social History   Substance Use Topics    Smoking status: Current Every Day Smoker     Packs/day: 0.50     Years: 25.00     Types: Cigarettes    Smokeless tobacco: Never Used    Alcohol use No     Review of Systems   Constitutional: Negative for chills and fever.   HENT: Negative for rhinorrhea and sore throat.    Eyes: Negative for pain and visual disturbance.   Respiratory: Positive for cough (thick white sputum) and shortness of breath.    Cardiovascular: Positive for leg swelling (L leg). Negative for chest pain.   Gastrointestinal: Negative for abdominal pain, diarrhea, nausea and vomiting.   Genitourinary: Negative for dysuria and flank pain.   Musculoskeletal: Negative for neck stiffness.   Skin: Negative for rash and wound.   Neurological: Negative for syncope.       Physical Exam     Initial Vitals [11/02/17 2142]   BP Pulse Resp Temp SpO2   (!) 174/81 83 -- 98 °F (36.7 °C) (!) 85 %      MAP       112         Physical Exam    Nursing note and vitals reviewed.  Constitutional: She appears well-developed and well-nourished. She is not diaphoretic.   HENT:   Head: Normocephalic and atraumatic.   Mouth/Throat: Oropharynx is clear and moist.   Eyes: Conjunctivae and EOM are normal. Pupils are equal, round, and reactive to light.   Neck: Normal range of motion.   Cardiovascular: Normal rate, regular rhythm and normal heart sounds.   Pulmonary/Chest: Tachypnea noted. She is in respiratory distress (mild). She has rales.   Diminished breath sounds w/ rales in bilateral bases. Speaking in short sentences. Expiratory wheezing in upper lobes.   Abdominal: Soft. Bowel sounds are normal. She exhibits no distension. There is no tenderness. There is no rebound and no guarding.    Musculoskeletal: Normal range of motion. She exhibits edema.   3+ pitting edema to L lower extremity, no edema to R lower extremity.    Neurological: She is alert and oriented to person, place, and time. She has normal strength.   Skin: Skin is warm and dry.   Psychiatric: Thought content normal.         ED Course   Procedures  Labs Reviewed - No data to display  EKG Readings: (Independently Interpreted)   21:53: NSR, HR 68. L axis. No STEMI.       X-Rays:   Independently Interpreted Readings:   Other Readings:  CXR NAD    Medical Decision Making:   History:   Old Medical Records: I decided to obtain old medical records.  Old Records Summarized: records from previous admission(s) and records from clinic visits.  Initial Assessment:   This is an emergent evaluation of 67-year-old female who presents with acute on chronic shortness of breath.  She has a history of both pulmonary hypertension and COPD. LVEF on echocardiogram 8/12/17 was normal.  Differential Diagnosis:   Ddx includes COPD exac, bronchitis, PNA, ACS equivalent, CHF exac, symptomatic anemia, other.  Independently Interpreted Test(s):   I have ordered and independently interpreted X-rays - see prior notes.  I have ordered and independently interpreted EKG Reading(s) - see prior notes  Clinical Tests:   Lab Tests: Ordered and Reviewed  Radiological Study: Ordered and Reviewed  Medical Tests: Ordered and Reviewed  ED Management:  EKG no STEMI.    CXR NAD. No significant pulmonary edema, no infiltrate c/w PNA, no PTX.    Labs with  (c/w prior), negative troponin, stable renal function. PH 7.31 on ABG, pCO2 76.2. Ddimer 0.61, below age-adjusted cut-off (0.65).     Patient initially treated for mixed COPD and CHF picture with IV Lasix, IV SoluMedrol, DuoNeb's ×3.  She was intermittently significantly hypertensive and tachycardic and received IV diltiazem.  She was ordered for BiPAP, but refused it until she was given IV Ativan for anxiety; she then  tolerated BiPAP well.  She initially had improvement of her vitals with IV diltiazem, but later she became hypertensive and tachycardic again, and she required an additional dose.    Given that patient is requiring BiPAP while she usually only requires NC and has significant hypercarbia on ABG, she requires admission for continuous respiratory support, repeat troponin, telemetry monitoring, further tx as necessary.    Discussed with Dr. Childers, nocturnist, for admission. Dr. Childers recommended abx to cover bronchitis empirically due to COPD hx so I have added those to inpatient regimen. I have written courtesy orders.  Other:   I have discussed this case with another health care provider.            Scribe Attestation:   Scribe #1: I performed the above scribed service and the documentation accurately describes the services I performed. I attest to the accuracy of the note.    Attending Attestation:           Physician Attestation for Scribe:  Physician Attestation Statement for Scribe #1: I, Katie Seaman MD, reviewed documentation, as scribed by Charles Lynn in my presence, and it is both accurate and complete.                 ED Course      Clinical Impression:   The primary encounter diagnosis was Shortness of breath. Diagnoses of Simple chronic bronchitis, Hypertension, unspecified type, Tachycardia, COPD with acute exacerbation, and Hypercarbia were also pertinent to this visit.                           Katie Seaman MD  11/03/17 0459

## 2017-11-03 NOTE — PROGRESS NOTES
1220: Pt states that she needs something to relax her before she will allow BIPAP to be placed on her.     0200: Pt placed on V60 BIPAP per Dr Seaman. 10/5, R 14, FIO2 40%. Medium full face mask. Tolerating well.

## 2017-11-03 NOTE — H&P
[facility] Hospital Medicine  History & Physical    Patient Name: Yasmeen Palm  MRN: [id number]  Admission Date: 11/2/2017  Attending Physician: Zenon Alfredo MD   Primary Care Provider: Angel Orourke Jr, MD         Patient information was obtained from patient.     Subjective:     Principal Problem:Acute on chronic respiratory failure with hypoxia and hypercapnia    Chief Complaint:   Chief Complaint   Patient presents with    Shortness of Breath     x 3 days. Pt has hx of COPD and CHF. Pt is usually on home o2 2-3L but did not bring it with her tonight. Pt also c/o of nausea. Pt denies chest pain, blurred vision, numbness/tingling.        HPI: 66 y/o female with chronic respiratory failure and home oxygen dependent presents with shortness of breath.  Patient states that she was doing well until 2 days ago when she started complaining of shortness of breath.  Dyspnea worsened with exertion.  She also complains of cough productive of whitish sputum.  Symptoms have worsened over past 2 days.  She was using her home breathing treatments frequently without any relief.  She could barely breathe last night and presented to ER.  Denies any fever or chills.  She also denies any chest pain.  Patient also complains of bilateral LE swelling.  Her BP has also been uncontrolled.  She had a similar presentation and admission to hospital 3 months ago.  She presented to ER where she was placed on Bipap.  She denies any other complaints.    Past Medical History:   Diagnosis Date    CHF (congestive heart failure)     COPD (chronic obstructive pulmonary disease)     Depression     Diabetes mellitus     GERD (gastroesophageal reflux disease)     Hypertension        Past Surgical History:   Procedure Laterality Date    ABDOMINAL SURGERY      CARDIAC SURGERY         Review of patient's allergies indicates:  No Known Allergies    No current facility-administered medications on file prior to encounter.       Current Outpatient Prescriptions on File Prior to Encounter   Medication Sig    acetaminophen (TYLENOL) 500 MG tablet Take 1 tablet (500 mg total) by mouth every 8 (eight) hours as needed.    aspirin (ECOTRIN) 81 MG EC tablet Take 81 mg by mouth once daily.    diltiaZEM (CARDIZEM) 120 MG tablet Take 1 tablet (120 mg total) by mouth every 8 (eight) hours.    furosemide (LASIX) 40 MG tablet Take 40 mg by mouth once daily.     gabapentin (NEURONTIN) 300 MG capsule Take 300 mg by mouth 3 (three) times daily.    lisinopril (PRINIVIL,ZESTRIL) 40 MG tablet Take 1 tablet (40 mg total) by mouth once daily. Do not take this medication if blood pressure is below 130/80 mmHg and/or feeling dizzy after taking all other blood pressure meds    metformin (GLUCOPHAGE) 500 MG tablet Take 500 mg by mouth 2 (two) times daily with meals.    mirabegron (MYRBETRIQ) 25 mg Tb24 ER tablet Take 25 mg by mouth once daily.    naproxen (NAPROSYN) 375 MG tablet Take 375 mg by mouth every 12 (twelve) hours as needed.    nitroGLYCERIN (NITROSTAT) 0.3 MG SL tablet Place 0.3 mg under the tongue every 5 (five) minutes as needed for Chest pain.    pravastatin (PRAVACHOL) 40 MG tablet Take 40 mg by mouth once daily.    rivaroxaban (XARELTO) 20 mg Tab Take 1 tablet (20 mg total) by mouth before dinner.    sotalol (BETAPACE) 80 MG tablet Take 80 mg by mouth 2 (two) times daily.    tiotropium (SPIRIVA) 18 mcg inhalation capsule Inhale 1 capsule (18 mcg total) into the lungs once daily. Controller    tramadol (ULTRAM) 50 mg tablet Take 1 tablet (50 mg total) by mouth every 6 (six) hours as needed for Pain.    UMECLIDINIUM BRM/VILANTEROL TR (ANORO ELLIPTA INHL) Inhale into the lungs daily as needed.    vortioxetine (BRINTELLIX) 5 mg Tab Take 5 mg by mouth 2 (two) times daily.    cloNIDine (CATAPRES) 0.1 MG tablet Take 2 tablets (0.2 mg total) by mouth 3 (three) times daily.     Family History     Problem Relation (Age of Onset)     Diabetes Father    Heart disease Mother    Hypertension Mother        Social History Main Topics    Smoking status: Current Every Day Smoker     Packs/day: 0.50     Years: 25.00     Types: Cigarettes    Smokeless tobacco: Current User    Alcohol use No    Drug use: No    Sexual activity: No     Review of Systems   Constitutional: Negative for chills and fever.   HENT: Negative for ear discharge and ear pain.    Eyes: Negative for pain and itching.   Respiratory: Positive for cough and shortness of breath.    Cardiovascular: Negative for chest pain and palpitations.   Gastrointestinal: Negative for abdominal distention and abdominal pain.   Endocrine: Negative for polyphagia and polyuria.   Genitourinary: Negative for difficulty urinating and dysuria.   Musculoskeletal: Negative for neck pain and neck stiffness.   Skin: Negative for rash and wound.   Neurological: Negative for seizures and syncope.   Psychiatric/Behavioral: Negative for agitation and hallucinations.     Objective:     Vital Signs (Most Recent):  Temp: 98.2 °F (36.8 °C) (11/03/17 0729)  Pulse: 94 (11/03/17 0745)  Resp: 19 (11/03/17 0745)  BP: (!) 188/88 (11/03/17 0729)  SpO2: 96 % (11/03/17 0745) Vital Signs (24h Range):  Temp:  [97.8 °F (36.6 °C)-98.4 °F (36.9 °C)] 98.2 °F (36.8 °C)  Pulse:  [] 94  Resp:  [16-26] 19  SpO2:  [85 %-100 %] 96 %  BP: (143-238)/() 188/88     Weight: 86.6 kg (190 lb 14.7 oz)  Body mass index is 32.77 kg/m².    Physical Exam   Constitutional: She is oriented to person, place, and time. She appears well-developed. No distress.   HENT:   Head: Normocephalic and atraumatic.   Eyes: Conjunctivae are normal. Right eye exhibits no discharge. Left eye exhibits no discharge.   Neck: Neck supple.   Cardiovascular: Normal rate, regular rhythm and normal heart sounds.    Pulmonary/Chest: Effort normal. No stridor.   Decreased BS bilaterally.  No expiratory wheezing.   Abdominal: Soft. Bowel sounds are normal.    Musculoskeletal: Edema: Decreased edema of LE.   Neurological: She is alert and oriented to person, place, and time.   Skin: Skin is warm and dry.        Significant Labs:   BMP:   Recent Labs  Lab 11/02/17 2211   *      K 3.5   CL 99   CO2 34*   BUN 9   CREATININE 0.7   CALCIUM 9.5     CBC:   Recent Labs  Lab 11/02/17 2211 11/03/17  0004   WBC 9.78  --    HGB 11.3*  --    HCT 40.1 38     --        Significant Imaging: I have reviewed all pertinent imaging results/findings within the past 24 hours.    Assessment/Plan:     * Acute on chronic respiratory failure with hypoxia and hypercapnia    Probably multifactorial.  COPD with acute exacerbation--nebs, oxygen (wean off Bipap), IV steroids and Doxy.  Acute on chronic diastolic CHF--IV Lasix.  Continue ACEI and B blocker.  Malignant HTN--poorly controlled BP.  Will resume home medications and make adjustments as needed.  Prn Clonidine.  Low Na diet.  Also believe some degree of anxiety contributing to dyspnea.          Acute on chronic diastolic congestive heart failure    As above.          COPD (chronic obstructive pulmonary disease)    With acute exacerbation.  Treatment as above.          Malignant hypertension    As above.          Chronic anticoagulation    Hx of DVT and PE.  Continue home Xarelto.          Type 2 diabetes mellitus, controlled    She may become hyperglycemic secondary to steroids.  Hold home Metformin.  Start nightly Levemir.  Diabetic diet and insulin sliding scale.          Coronary artery disease involving native coronary artery of native heart with angina pectoris    No chest pain.  Negative Troponin.            VTE Risk Mitigation         Ordered     rivaroxaban tablet 20 mg  Before dinner     Route:  Oral        11/03/17 0458     Medium Risk of VTE  Once      11/03/17 0458             Zenon Alfredo MD  Department of Hospital Medicine   [facility]

## 2017-11-03 NOTE — ASSESSMENT & PLAN NOTE
Probably multifactorial.  COPD with acute exacerbation--nebs, oxygen (wean off Bipap), IV steroids and Doxy.  Acute on chronic diastolic CHF--IV Lasix.  Continue ACEI and B blocker.  Malignant HTN--poorly controlled BP.  Will resume home medications and make adjustments as needed.  Prn Clonidine.  Low Na diet.  Also believe some degree of anxiety contributing to dyspnea.

## 2017-11-03 NOTE — SUBJECTIVE & OBJECTIVE
Past Medical History:   Diagnosis Date    CHF (congestive heart failure)     COPD (chronic obstructive pulmonary disease)     Depression     Diabetes mellitus     GERD (gastroesophageal reflux disease)     Hypertension        Past Surgical History:   Procedure Laterality Date    ABDOMINAL SURGERY      CARDIAC SURGERY         Review of patient's allergies indicates:  No Known Allergies    No current facility-administered medications on file prior to encounter.      Current Outpatient Prescriptions on File Prior to Encounter   Medication Sig    acetaminophen (TYLENOL) 500 MG tablet Take 1 tablet (500 mg total) by mouth every 8 (eight) hours as needed.    aspirin (ECOTRIN) 81 MG EC tablet Take 81 mg by mouth once daily.    diltiaZEM (CARDIZEM) 120 MG tablet Take 1 tablet (120 mg total) by mouth every 8 (eight) hours.    furosemide (LASIX) 40 MG tablet Take 40 mg by mouth once daily.     gabapentin (NEURONTIN) 300 MG capsule Take 300 mg by mouth 3 (three) times daily.    lisinopril (PRINIVIL,ZESTRIL) 40 MG tablet Take 1 tablet (40 mg total) by mouth once daily. Do not take this medication if blood pressure is below 130/80 mmHg and/or feeling dizzy after taking all other blood pressure meds    metformin (GLUCOPHAGE) 500 MG tablet Take 500 mg by mouth 2 (two) times daily with meals.    mirabegron (MYRBETRIQ) 25 mg Tb24 ER tablet Take 25 mg by mouth once daily.    naproxen (NAPROSYN) 375 MG tablet Take 375 mg by mouth every 12 (twelve) hours as needed.    nitroGLYCERIN (NITROSTAT) 0.3 MG SL tablet Place 0.3 mg under the tongue every 5 (five) minutes as needed for Chest pain.    pravastatin (PRAVACHOL) 40 MG tablet Take 40 mg by mouth once daily.    rivaroxaban (XARELTO) 20 mg Tab Take 1 tablet (20 mg total) by mouth before dinner.    sotalol (BETAPACE) 80 MG tablet Take 80 mg by mouth 2 (two) times daily.    tiotropium (SPIRIVA) 18 mcg inhalation capsule Inhale 1 capsule (18 mcg total) into the lungs  once daily. Controller    tramadol (ULTRAM) 50 mg tablet Take 1 tablet (50 mg total) by mouth every 6 (six) hours as needed for Pain.    UMECLIDINIUM BRM/VILANTEROL TR (ANORO ELLIPTA INHL) Inhale into the lungs daily as needed.    vortioxetine (BRINTELLIX) 5 mg Tab Take 5 mg by mouth 2 (two) times daily.    cloNIDine (CATAPRES) 0.1 MG tablet Take 2 tablets (0.2 mg total) by mouth 3 (three) times daily.     Family History     Problem Relation (Age of Onset)    Diabetes Father    Heart disease Mother    Hypertension Mother        Social History Main Topics    Smoking status: Current Every Day Smoker     Packs/day: 0.50     Years: 25.00     Types: Cigarettes    Smokeless tobacco: Current User    Alcohol use No    Drug use: No    Sexual activity: No     Review of Systems   Constitutional: Negative for chills and fever.   HENT: Negative for ear discharge and ear pain.    Eyes: Negative for pain and itching.   Respiratory: Positive for cough and shortness of breath.    Cardiovascular: Negative for chest pain and palpitations.   Gastrointestinal: Negative for abdominal distention and abdominal pain.   Endocrine: Negative for polyphagia and polyuria.   Genitourinary: Negative for difficulty urinating and dysuria.   Musculoskeletal: Negative for neck pain and neck stiffness.   Skin: Negative for rash and wound.   Neurological: Negative for seizures and syncope.   Psychiatric/Behavioral: Negative for agitation and hallucinations.     Objective:     Vital Signs (Most Recent):  Temp: 98.2 °F (36.8 °C) (11/03/17 0729)  Pulse: 94 (11/03/17 0745)  Resp: 19 (11/03/17 0745)  BP: (!) 188/88 (11/03/17 0729)  SpO2: 96 % (11/03/17 0745) Vital Signs (24h Range):  Temp:  [97.8 °F (36.6 °C)-98.4 °F (36.9 °C)] 98.2 °F (36.8 °C)  Pulse:  [] 94  Resp:  [16-26] 19  SpO2:  [85 %-100 %] 96 %  BP: (143-238)/() 188/88     Weight: 86.6 kg (190 lb 14.7 oz)  Body mass index is 32.77 kg/m².    Physical Exam   Constitutional: She  is oriented to person, place, and time. She appears well-developed. No distress.   HENT:   Head: Normocephalic and atraumatic.   Eyes: Conjunctivae are normal. Right eye exhibits no discharge. Left eye exhibits no discharge.   Neck: Neck supple.   Cardiovascular: Normal rate, regular rhythm and normal heart sounds.    Pulmonary/Chest: Effort normal. No stridor.   Decreased BS bilaterally.  No expiratory wheezing.   Abdominal: Soft. Bowel sounds are normal.   Musculoskeletal: Edema: Decreased edema of LE.   Neurological: She is alert and oriented to person, place, and time.   Skin: Skin is warm and dry.        Significant Labs:   BMP:   Recent Labs  Lab 11/02/17 2211   *      K 3.5   CL 99   CO2 34*   BUN 9   CREATININE 0.7   CALCIUM 9.5     CBC:   Recent Labs  Lab 11/02/17 2211 11/03/17  0004   WBC 9.78  --    HGB 11.3*  --    HCT 40.1 38     --        Significant Imaging: I have reviewed all pertinent imaging results/findings within the past 24 hours.

## 2017-11-03 NOTE — PROGRESS NOTES
"Note that patient has not gotten up to use the BR this am; States,"I've been going in my diaper." Says that she wears diapers at home as well; Will continue to monitor;   "

## 2017-11-03 NOTE — ED TRIAGE NOTES
Pt states that she was feeling bad, could not breathe feeling nauseous,  Harder and harder to pull air into lungs after 3 or 4 home breathing treatments.  Pt states that she only took her home medications.

## 2017-11-03 NOTE — PROGRESS NOTES
"Note that patient c/o,"I feel woozy, maybe it was the nausea medicine."  Vs:192/89, HR 96, SpO2: 96%, 98.4; Patient denies Davis, Denies C P; Dr. Alfredo informed of patient's dizziness after taking promethazine; Will continue to f/u;   "

## 2017-11-03 NOTE — ASSESSMENT & PLAN NOTE
She may become hyperglycemic secondary to steroids.  Hold home Metformin.  Start nightly Levemir.  Diabetic diet and insulin sliding scale.

## 2017-11-03 NOTE — PROGRESS NOTES
Note that patient completed promethazine and denies nausea; Also denies SOB; Consumed 100% of lunch; Will continue to f/u;

## 2017-11-04 LAB
ANION GAP SERPL CALC-SCNC: 11 MMOL/L
BASOPHILS # BLD AUTO: 0 K/UL
BASOPHILS NFR BLD: 0 %
BUN SERPL-MCNC: 24 MG/DL
CALCIUM SERPL-MCNC: 10.1 MG/DL
CHLORIDE SERPL-SCNC: 98 MMOL/L
CO2 SERPL-SCNC: 33 MMOL/L
CREAT SERPL-MCNC: 0.8 MG/DL
DIFFERENTIAL METHOD: ABNORMAL
EOSINOPHIL # BLD AUTO: 0 K/UL
EOSINOPHIL NFR BLD: 0 %
ERYTHROCYTE [DISTWIDTH] IN BLOOD BY AUTOMATED COUNT: 18.2 %
EST. GFR  (AFRICAN AMERICAN): >60 ML/MIN/1.73 M^2
EST. GFR  (NON AFRICAN AMERICAN): >60 ML/MIN/1.73 M^2
GLUCOSE SERPL-MCNC: 184 MG/DL
HCT VFR BLD AUTO: 41.1 %
HGB BLD-MCNC: 11.9 G/DL
LYMPHOCYTES # BLD AUTO: 0.9 K/UL
LYMPHOCYTES NFR BLD: 5 %
MCH RBC QN AUTO: 25.4 PG
MCHC RBC AUTO-ENTMCNC: 29 G/DL
MCV RBC AUTO: 88 FL
MONOCYTES # BLD AUTO: 0.3 K/UL
MONOCYTES NFR BLD: 1.4 %
NEUTROPHILS # BLD AUTO: 17.3 K/UL
NEUTROPHILS NFR BLD: 93.6 %
PLATELET # BLD AUTO: 319 K/UL
PMV BLD AUTO: 9.8 FL
POCT GLUCOSE: 259 MG/DL (ref 70–110)
POCT GLUCOSE: 282 MG/DL (ref 70–110)
POCT GLUCOSE: 300 MG/DL (ref 70–110)
POCT GLUCOSE: 358 MG/DL (ref 70–110)
POTASSIUM SERPL-SCNC: 3.8 MMOL/L
RBC # BLD AUTO: 4.68 M/UL
SODIUM SERPL-SCNC: 142 MMOL/L
TROPONIN I SERPL DL<=0.01 NG/ML-MCNC: 0.02 NG/ML
WBC # BLD AUTO: 18.46 K/UL

## 2017-11-04 PROCEDURE — 25000242 PHARM REV CODE 250 ALT 637 W/ HCPCS: Performed by: INTERNAL MEDICINE

## 2017-11-04 PROCEDURE — 63600175 PHARM REV CODE 636 W HCPCS: Performed by: HOSPITALIST

## 2017-11-04 PROCEDURE — 94761 N-INVAS EAR/PLS OXIMETRY MLT: CPT

## 2017-11-04 PROCEDURE — 99900035 HC TECH TIME PER 15 MIN (STAT)

## 2017-11-04 PROCEDURE — 94640 AIRWAY INHALATION TREATMENT: CPT

## 2017-11-04 PROCEDURE — 84484 ASSAY OF TROPONIN QUANT: CPT

## 2017-11-04 PROCEDURE — 27000221 HC OXYGEN, UP TO 24 HOURS

## 2017-11-04 PROCEDURE — 80048 BASIC METABOLIC PNL TOTAL CA: CPT

## 2017-11-04 PROCEDURE — 94660 CPAP INITIATION&MGMT: CPT

## 2017-11-04 PROCEDURE — 85025 COMPLETE CBC W/AUTO DIFF WBC: CPT

## 2017-11-04 PROCEDURE — 21400001 HC TELEMETRY ROOM

## 2017-11-04 PROCEDURE — 25000003 PHARM REV CODE 250: Performed by: HOSPITALIST

## 2017-11-04 PROCEDURE — 36415 COLL VENOUS BLD VENIPUNCTURE: CPT

## 2017-11-04 PROCEDURE — 25000242 PHARM REV CODE 250 ALT 637 W/ HCPCS: Performed by: HOSPITALIST

## 2017-11-04 PROCEDURE — 25000003 PHARM REV CODE 250: Performed by: EMERGENCY MEDICINE

## 2017-11-04 RX ORDER — IPRATROPIUM BROMIDE AND ALBUTEROL SULFATE 2.5; .5 MG/3ML; MG/3ML
3 SOLUTION RESPIRATORY (INHALATION) EVERY 6 HOURS PRN
Status: DISCONTINUED | OUTPATIENT
Start: 2017-11-04 | End: 2017-11-06 | Stop reason: HOSPADM

## 2017-11-04 RX ADMIN — METHYLPREDNISOLONE SODIUM SUCCINATE 80 MG: 125 INJECTION, POWDER, FOR SOLUTION INTRAMUSCULAR; INTRAVENOUS at 09:11

## 2017-11-04 RX ADMIN — GABAPENTIN 300 MG: 300 CAPSULE ORAL at 02:11

## 2017-11-04 RX ADMIN — DILTIAZEM HYDROCHLORIDE 120 MG: 30 TABLET, FILM COATED ORAL at 06:11

## 2017-11-04 RX ADMIN — SOTALOL HYDROCHLORIDE 80 MG: 80 TABLET ORAL at 08:11

## 2017-11-04 RX ADMIN — POLYETHYLENE GLYCOL 3350 17 G: 17 POWDER, FOR SOLUTION ORAL at 08:11

## 2017-11-04 RX ADMIN — DILTIAZEM HYDROCHLORIDE 120 MG: 30 TABLET, FILM COATED ORAL at 02:11

## 2017-11-04 RX ADMIN — GABAPENTIN 300 MG: 300 CAPSULE ORAL at 09:11

## 2017-11-04 RX ADMIN — INSULIN ASPART 2 UNITS: 100 INJECTION, SOLUTION INTRAVENOUS; SUBCUTANEOUS at 09:11

## 2017-11-04 RX ADMIN — FAMOTIDINE 20 MG: 20 TABLET, FILM COATED ORAL at 08:11

## 2017-11-04 RX ADMIN — DOXYCYCLINE 100 MG: 100 INJECTION, POWDER, LYOPHILIZED, FOR SOLUTION INTRAVENOUS at 05:11

## 2017-11-04 RX ADMIN — IPRATROPIUM BROMIDE AND ALBUTEROL SULFATE 3 ML: .5; 3 SOLUTION RESPIRATORY (INHALATION) at 05:11

## 2017-11-04 RX ADMIN — CLONIDINE HYDROCHLORIDE 0.1 MG: 0.1 TABLET ORAL at 09:11

## 2017-11-04 RX ADMIN — ALPRAZOLAM 0.5 MG: 0.5 TABLET ORAL at 09:11

## 2017-11-04 RX ADMIN — CLONIDINE HYDROCHLORIDE 0.1 MG: 0.1 TABLET ORAL at 06:11

## 2017-11-04 RX ADMIN — ACETAMINOPHEN 650 MG: 325 TABLET ORAL at 02:11

## 2017-11-04 RX ADMIN — ASPIRIN 81 MG: 81 TABLET, COATED ORAL at 08:11

## 2017-11-04 RX ADMIN — PRAVASTATIN SODIUM 40 MG: 40 TABLET ORAL at 09:11

## 2017-11-04 RX ADMIN — DILTIAZEM HYDROCHLORIDE 120 MG: 30 TABLET, FILM COATED ORAL at 09:11

## 2017-11-04 RX ADMIN — FLUTICASONE FUROATE AND VILANTEROL TRIFENATATE 1 PUFF: 100; 25 POWDER RESPIRATORY (INHALATION) at 08:11

## 2017-11-04 RX ADMIN — INSULIN ASPART 3 UNITS: 100 INJECTION, SOLUTION INTRAVENOUS; SUBCUTANEOUS at 05:11

## 2017-11-04 RX ADMIN — LISINOPRIL 40 MG: 20 TABLET ORAL at 08:11

## 2017-11-04 RX ADMIN — RIVAROXABAN 20 MG: 20 TABLET, FILM COATED ORAL at 05:11

## 2017-11-04 RX ADMIN — FUROSEMIDE 40 MG: 10 INJECTION, SOLUTION INTRAVENOUS at 08:11

## 2017-11-04 RX ADMIN — SOTALOL HYDROCHLORIDE 80 MG: 80 TABLET ORAL at 09:11

## 2017-11-04 RX ADMIN — METHYLPREDNISOLONE SODIUM SUCCINATE 80 MG: 125 INJECTION, POWDER, FOR SOLUTION INTRAMUSCULAR; INTRAVENOUS at 06:11

## 2017-11-04 RX ADMIN — INSULIN ASPART 5 UNITS: 100 INJECTION, SOLUTION INTRAVENOUS; SUBCUTANEOUS at 08:11

## 2017-11-04 RX ADMIN — INSULIN ASPART 3 UNITS: 100 INJECTION, SOLUTION INTRAVENOUS; SUBCUTANEOUS at 12:11

## 2017-11-04 RX ADMIN — GABAPENTIN 300 MG: 300 CAPSULE ORAL at 06:11

## 2017-11-04 RX ADMIN — TIOTROPIUM BROMIDE 18 MCG: 18 CAPSULE ORAL; RESPIRATORY (INHALATION) at 08:11

## 2017-11-04 RX ADMIN — METHYLPREDNISOLONE SODIUM SUCCINATE 80 MG: 125 INJECTION, POWDER, FOR SOLUTION INTRAMUSCULAR; INTRAVENOUS at 02:11

## 2017-11-04 NOTE — PROGRESS NOTES
[facility] Hospital Medicine  Progress Note    Patient Name: Yasmeen Palm  MRN: [id number]  Patient Class: IP- Inpatient   Admission Date: 11/2/2017  Length of Stay: 1 days  Attending Physician: Zenon Alfredo MD  Primary Care Provider: Angel Orourke Jr, MD        Subjective:     Principal Problem:Acute on chronic respiratory failure with hypoxia and hypercapnia    HPI:  66 y/o female with chronic respiratory failure and home oxygen dependent presents with shortness of breath.  Patient states that she was doing well until 2 days ago when she started complaining of shortness of breath.  Dyspnea worsened with exertion.  She also complains of cough productive of whitish sputum.  Symptoms have worsened over past 2 days.  She was using her home breathing treatments frequently without any relief.  She could barely breathe last night and presented to ER.  Denies any fever or chills.  She also denies any chest pain.  Patient also complains of bilateral LE swelling.  Her BP has also been uncontrolled.  She had a similar presentation and admission to hospital 3 months ago.  She presented to ER where she was placed on Bipap.  She denies any other complaints.    Hospital Course:  66 y/o female presented with worsening SOB.  Probably multifactorial from COPD, CHF and uncontrolled HTN.    Interval History: Episode of dyspnea this morning.  Now feeling better.    Review of Systems   HENT: Negative for ear discharge and ear pain.    Eyes: Negative for pain and itching.   Cardiovascular: Negative for chest pain and palpitations.   Neurological: Negative for seizures and syncope.     Objective:     Vital Signs (Most Recent):  Temp: 98.5 °F (36.9 °C) (11/04/17 0749)  Pulse: 79 (11/04/17 0816)  Resp: 16 (11/04/17 0816)  BP: (!) 163/74 (11/04/17 0749)  SpO2: (!) 90 % (11/04/17 0816) Vital Signs (24h Range):  Temp:  [97.8 °F (36.6 °C)-98.5 °F (36.9 °C)] 98.5 °F (36.9 °C)  Pulse:  [70-96] 79  Resp:  [16-21]  16  SpO2:  [90 %-96 %] 90 %  BP: (163-193)/(74-96) 163/74     Weight: 86.9 kg (191 lb 9.3 oz)  Body mass index is 32.88 kg/m².    Intake/Output Summary (Last 24 hours) at 11/04/17 1046  Last data filed at 11/04/17 0454   Gross per 24 hour   Intake              240 ml   Output              350 ml   Net             -110 ml      Physical Exam   Constitutional: She is oriented to person, place, and time. She appears well-developed. No distress.   HENT:   Head: Normocephalic and atraumatic.   Eyes: Conjunctivae are normal. Right eye exhibits no discharge. Left eye exhibits no discharge.   Neck: Neck supple.   Cardiovascular: Normal rate, regular rhythm and normal heart sounds.    Pulmonary/Chest: Effort normal. No stridor.   Decreased BS bilaterally.  No expiratory wheezing.   Abdominal: Soft. Bowel sounds are normal.   Musculoskeletal: Edema: Decreased edema of LE.   Neurological: She is alert and oriented to person, place, and time.   Skin: Skin is warm and dry.       Significant Labs:   BMP:   Recent Labs  Lab 11/04/17  0440   *      K 3.8   CL 98   CO2 33*   BUN 24*   CREATININE 0.8   CALCIUM 10.1     CBC:   Recent Labs  Lab 11/02/17  2211 11/03/17  0004 11/04/17  0440   WBC 9.78  --  18.46*   HGB 11.3*  --  11.9*   HCT 40.1 38 41.1     --  319       Significant Imaging: I have reviewed all pertinent imaging results/findings within the past 24 hours.    Assessment/Plan:      * Acute on chronic respiratory failure with hypoxia and hypercapnia    Probably multifactorial.  COPD with acute exacerbation--nebs, oxygen (weaned off Bipap), IV steroids and Doxy.  Acute on chronic diastolic CHF--IV Lasix.  Continue ACEI and B blocker.  Malignant HTN--poorly controlled BP.  Will resume home medications and make adjustments as needed.  Prn Clonidine.  Low Na diet.  Also believe some degree of anxiety contributing to dyspnea.  Improving.  Continue current management.        Acute on chronic diastolic  congestive heart failure    As above.          COPD (chronic obstructive pulmonary disease)    With acute exacerbation.  Treatment as above.          Malignant hypertension    As above.          Chronic anticoagulation    Hx of DVT and PE.  Continue home Xarelto.          Type 2 diabetes mellitus, controlled    Worsening hyperglycemia secondary to steroids.  Increase Levemir.        Coronary artery disease involving native coronary artery of native heart with angina pectoris    No chest pain.  Negative Troponin.            VTE Risk Mitigation         Ordered     rivaroxaban tablet 20 mg  Before dinner     Route:  Oral        11/03/17 0458     Medium Risk of VTE  Once      11/03/17 0458              Zenon Alfredo MD  Department of Hospital Medicine   [facility]

## 2017-11-04 NOTE — SUBJECTIVE & OBJECTIVE
Interval History: Episode of dyspnea this morning.  Now feeling better.    Review of Systems   HENT: Negative for ear discharge and ear pain.    Eyes: Negative for pain and itching.   Cardiovascular: Negative for chest pain and palpitations.   Neurological: Negative for seizures and syncope.     Objective:     Vital Signs (Most Recent):  Temp: 98.5 °F (36.9 °C) (11/04/17 0749)  Pulse: 79 (11/04/17 0816)  Resp: 16 (11/04/17 0816)  BP: (!) 163/74 (11/04/17 0749)  SpO2: (!) 90 % (11/04/17 0816) Vital Signs (24h Range):  Temp:  [97.8 °F (36.6 °C)-98.5 °F (36.9 °C)] 98.5 °F (36.9 °C)  Pulse:  [70-96] 79  Resp:  [16-21] 16  SpO2:  [90 %-96 %] 90 %  BP: (163-193)/(74-96) 163/74     Weight: 86.9 kg (191 lb 9.3 oz)  Body mass index is 32.88 kg/m².    Intake/Output Summary (Last 24 hours) at 11/04/17 1046  Last data filed at 11/04/17 0454   Gross per 24 hour   Intake              240 ml   Output              350 ml   Net             -110 ml      Physical Exam   Constitutional: She is oriented to person, place, and time. She appears well-developed. No distress.   HENT:   Head: Normocephalic and atraumatic.   Eyes: Conjunctivae are normal. Right eye exhibits no discharge. Left eye exhibits no discharge.   Neck: Neck supple.   Cardiovascular: Normal rate, regular rhythm and normal heart sounds.    Pulmonary/Chest: Effort normal. No stridor.   Decreased BS bilaterally.  No expiratory wheezing.   Abdominal: Soft. Bowel sounds are normal.   Musculoskeletal: Edema: Decreased edema of LE.   Neurological: She is alert and oriented to person, place, and time.   Skin: Skin is warm and dry.       Significant Labs:   BMP:   Recent Labs  Lab 11/04/17 0440   *      K 3.8   CL 98   CO2 33*   BUN 24*   CREATININE 0.8   CALCIUM 10.1     CBC:   Recent Labs  Lab 11/02/17  2211 11/03/17  0004 11/04/17 0440   WBC 9.78  --  18.46*   HGB 11.3*  --  11.9*   HCT 40.1 38 41.1     --  319       Significant Imaging: I have reviewed  all pertinent imaging results/findings within the past 24 hours.

## 2017-11-04 NOTE — PLAN OF CARE
Problem: Diabetes, Type 2 (Adult)  Goal: Signs and Symptoms of Listed Potential Problems Will be Absent, Minimized or Managed (Diabetes, Type 2)  Signs and symptoms of listed potential problems will be absent, minimized or managed by discharge/transition of care (reference Diabetes, Type 2 (Adult) CPG).   Outcome: Ongoing (interventions implemented as appropriate)   11/03/17 1419   Diabetes, Type 2   Problems Assessed (Type 2 Diabetes) hyperglycemia;situational response   Problems Present (Type 2 Diabetes) hyperglycemia;situational response       Problem: Fall Risk (Adult)  Goal: Identify Related Risk Factors and Signs and Symptoms  Related risk factors and signs and symptoms are identified upon initiation of Human Response Clinical Practice Guideline (CPG)   Outcome: Ongoing (interventions implemented as appropriate)   11/04/17 1713   Fall Risk   Related Risk Factors (Fall Risk) age-related changes;polypharmacy;environment unfamiliar;gait/mobility problems   Signs and Symptoms (Fall Risk) presence of risk factors     Goal: Absence of Falls  Patient will demonstrate the desired outcomes by discharge/transition of care.   Outcome: Ongoing (interventions implemented as appropriate)   11/04/17 1713   Fall Risk (Adult)   Absence of Falls making progress toward outcome       Problem: Patient Care Overview  Goal: Plan of Care Review  Outcome: Ongoing (interventions implemented as appropriate)   11/04/17 1713   Coping/Psychosocial   Plan Of Care Reviewed With patient       Problem: Pressure Ulcer Risk (Kevin Scale) (Adult,Obstetrics,Pediatric)  Goal: Identify Related Risk Factors and Signs and Symptoms  Related risk factors and signs and symptoms are identified upon initiation of Human Response Clinical Practice Guideline (CPG)   Outcome: Ongoing (interventions implemented as appropriate)   11/04/17 1713   Pressure Ulcer Risk (Kevin Scale)   Related Risk Factors (Pressure Ulcer Risk (Kevin Scale)) age extremes;body  weight extremes;mobility impaired     Goal: Skin Integrity  Patient will demonstrate the desired outcomes by discharge/transition of care.   Outcome: Ongoing (interventions implemented as appropriate)   11/04/17 2073   Pressure Ulcer Risk (Kevin Scale) (Adult,Obstetrics,Pediatric)   Skin Integrity making progress toward outcome

## 2017-11-04 NOTE — ASSESSMENT & PLAN NOTE
Probably multifactorial.  COPD with acute exacerbation--nebs, oxygen (weaned off Bipap), IV steroids and Doxy.  Acute on chronic diastolic CHF--IV Lasix.  Continue ACEI and B blocker.  Malignant HTN--poorly controlled BP.  Will resume home medications and make adjustments as needed.  Prn Clonidine.  Low Na diet.  Also believe some degree of anxiety contributing to dyspnea.  Improving.  Continue current management.

## 2017-11-04 NOTE — HOSPITAL COURSE
66 y/o female presented with worsening SOB.  Probably multifactorial from COPD, CHF and uncontrolled HTN.  Patient was started on nebs, IV steroids, Doxy and continued on oxygen.  She initially required Bipap, which was later weaned off to NC.  She was also started on IV Lasix.  Patient's symptoms improved during hospital stay.  BP remained poorly controlled on home medications.  Hydralazine was added to BP medication regimen.  Patient currently afebrile and hemodynamically stable.  Symptoms much improved from presentation.  She will be discharged home to continue nebulizer treatment, Prednisone taper and a few more days of PO Doxy.  Patient to follow up with PCP.

## 2017-11-05 LAB
POCT GLUCOSE: 240 MG/DL (ref 70–110)
POCT GLUCOSE: 296 MG/DL (ref 70–110)
POCT GLUCOSE: 312 MG/DL (ref 70–110)
POCT GLUCOSE: 403 MG/DL (ref 70–110)

## 2017-11-05 PROCEDURE — 21400001 HC TELEMETRY ROOM

## 2017-11-05 PROCEDURE — 25000003 PHARM REV CODE 250: Performed by: EMERGENCY MEDICINE

## 2017-11-05 PROCEDURE — 25000003 PHARM REV CODE 250: Performed by: HOSPITALIST

## 2017-11-05 PROCEDURE — 99900035 HC TECH TIME PER 15 MIN (STAT)

## 2017-11-05 PROCEDURE — 94761 N-INVAS EAR/PLS OXIMETRY MLT: CPT

## 2017-11-05 PROCEDURE — 94640 AIRWAY INHALATION TREATMENT: CPT

## 2017-11-05 PROCEDURE — 63600175 PHARM REV CODE 636 W HCPCS: Performed by: HOSPITALIST

## 2017-11-05 PROCEDURE — 27000221 HC OXYGEN, UP TO 24 HOURS

## 2017-11-05 RX ORDER — PREDNISONE 20 MG/1
60 TABLET ORAL DAILY
Status: DISCONTINUED | OUTPATIENT
Start: 2017-11-06 | End: 2017-11-06 | Stop reason: HOSPADM

## 2017-11-05 RX ORDER — CLONIDINE HYDROCHLORIDE 0.1 MG/1
0.2 TABLET ORAL EVERY 8 HOURS PRN
Status: DISCONTINUED | OUTPATIENT
Start: 2017-11-05 | End: 2017-11-06 | Stop reason: HOSPADM

## 2017-11-05 RX ORDER — HYDRALAZINE HYDROCHLORIDE 25 MG/1
50 TABLET, FILM COATED ORAL EVERY 12 HOURS
Status: DISCONTINUED | OUTPATIENT
Start: 2017-11-05 | End: 2017-11-06

## 2017-11-05 RX ADMIN — DOXYCYCLINE 100 MG: 100 INJECTION, POWDER, LYOPHILIZED, FOR SOLUTION INTRAVENOUS at 04:11

## 2017-11-05 RX ADMIN — INSULIN ASPART 5 UNITS: 100 INJECTION, SOLUTION INTRAVENOUS; SUBCUTANEOUS at 12:11

## 2017-11-05 RX ADMIN — RIVAROXABAN 20 MG: 20 TABLET, FILM COATED ORAL at 04:11

## 2017-11-05 RX ADMIN — ASPIRIN 81 MG: 81 TABLET, COATED ORAL at 09:11

## 2017-11-05 RX ADMIN — GABAPENTIN 300 MG: 300 CAPSULE ORAL at 05:11

## 2017-11-05 RX ADMIN — CLONIDINE HYDROCHLORIDE 0.1 MG: 0.1 TABLET ORAL at 02:11

## 2017-11-05 RX ADMIN — ALPRAZOLAM 0.5 MG: 0.5 TABLET ORAL at 09:11

## 2017-11-05 RX ADMIN — GABAPENTIN 300 MG: 300 CAPSULE ORAL at 09:11

## 2017-11-05 RX ADMIN — GABAPENTIN 300 MG: 300 CAPSULE ORAL at 02:11

## 2017-11-05 RX ADMIN — LISINOPRIL 40 MG: 20 TABLET ORAL at 09:11

## 2017-11-05 RX ADMIN — FLUTICASONE FUROATE AND VILANTEROL TRIFENATATE 1 PUFF: 100; 25 POWDER RESPIRATORY (INHALATION) at 07:11

## 2017-11-05 RX ADMIN — SOTALOL HYDROCHLORIDE 80 MG: 80 TABLET ORAL at 04:11

## 2017-11-05 RX ADMIN — ACETAMINOPHEN 650 MG: 325 TABLET ORAL at 05:11

## 2017-11-05 RX ADMIN — INSULIN ASPART 1 UNITS: 100 INJECTION, SOLUTION INTRAVENOUS; SUBCUTANEOUS at 09:11

## 2017-11-05 RX ADMIN — INSULIN ASPART 4 UNITS: 100 INJECTION, SOLUTION INTRAVENOUS; SUBCUTANEOUS at 09:11

## 2017-11-05 RX ADMIN — PRAVASTATIN SODIUM 40 MG: 40 TABLET ORAL at 09:11

## 2017-11-05 RX ADMIN — FAMOTIDINE 20 MG: 20 TABLET, FILM COATED ORAL at 09:11

## 2017-11-05 RX ADMIN — ACETAMINOPHEN 650 MG: 325 TABLET ORAL at 02:11

## 2017-11-05 RX ADMIN — INSULIN ASPART 2 UNITS: 100 INJECTION, SOLUTION INTRAVENOUS; SUBCUTANEOUS at 04:11

## 2017-11-05 RX ADMIN — DILTIAZEM HYDROCHLORIDE 120 MG: 30 TABLET, FILM COATED ORAL at 05:11

## 2017-11-05 RX ADMIN — DILTIAZEM HYDROCHLORIDE 120 MG: 30 TABLET, FILM COATED ORAL at 02:11

## 2017-11-05 RX ADMIN — CLONIDINE HYDROCHLORIDE 0.1 MG: 0.1 TABLET ORAL at 05:11

## 2017-11-05 RX ADMIN — METHYLPREDNISOLONE SODIUM SUCCINATE 80 MG: 125 INJECTION, POWDER, FOR SOLUTION INTRAMUSCULAR; INTRAVENOUS at 05:11

## 2017-11-05 RX ADMIN — POLYETHYLENE GLYCOL 3350 17 G: 17 POWDER, FOR SOLUTION ORAL at 09:11

## 2017-11-05 RX ADMIN — SOTALOL HYDROCHLORIDE 80 MG: 80 TABLET ORAL at 09:11

## 2017-11-05 RX ADMIN — FUROSEMIDE 40 MG: 10 INJECTION, SOLUTION INTRAVENOUS at 09:11

## 2017-11-05 RX ADMIN — HYDRALAZINE HYDROCHLORIDE 50 MG: 25 TABLET ORAL at 09:11

## 2017-11-05 RX ADMIN — DILTIAZEM HYDROCHLORIDE 120 MG: 30 TABLET, FILM COATED ORAL at 09:11

## 2017-11-05 RX ADMIN — TIOTROPIUM BROMIDE 18 MCG: 18 CAPSULE ORAL; RESPIRATORY (INHALATION) at 07:11

## 2017-11-05 NOTE — ASSESSMENT & PLAN NOTE
Probably multifactorial.  COPD with acute exacerbation--nebs, oxygen (weaned off Bipap), IV steroids and Doxy.  Acute on chronic diastolic CHF--IV Lasix.  Continue ACEI and B blocker.  Malignant HTN--poorly controlled BP.  Will resume home medications and make adjustments as needed.  Prn Clonidine.  Low Na diet.  Remains uncontrolled and adding Hydralazine.  Also believe some degree of anxiety contributing to dyspnea.  Slow improvement.  Will give one more day of treatment and hopefully home tomorrow.

## 2017-11-05 NOTE — NURSING
Patient bradycardic, HR 47-51. Asymptomatic. Dr. Alfredo notified. New orders received : Hold AM sotalol and diltiazem. OK to give hydralazine and lisinopril. Will continue to monitor.

## 2017-11-05 NOTE — PT/OT/SLP PROGRESS
Occupational Therapy      Yasmeen Palm  MRN: [id number]    Patient not seen today secondary to patient found on bedside commode on 1st attempt(1:41pm)  and declined OT evaluation on 2nd attempt due to abdominal pain. Nurse, Stephanei was notified of the patient's request for pain meds. OT will follow-up again tomorrow    Kendra Barrios OT  11/5/2017

## 2017-11-05 NOTE — PROGRESS NOTES
[facility] Hospital Medicine  Progress Note    Patient Name: Yasmeen Palm  MRN: [id number]  Patient Class: IP- Inpatient   Admission Date: 11/2/2017  Length of Stay: 2 days  Attending Physician: Zenon Alfredo MD  Primary Care Provider: Angel Orourke Jr, MD        Subjective:     Principal Problem:Acute on chronic respiratory failure with hypoxia and hypercapnia    HPI:  68 y/o female with chronic respiratory failure and home oxygen dependent presents with shortness of breath.  Patient states that she was doing well until 2 days ago when she started complaining of shortness of breath.  Dyspnea worsened with exertion.  She also complains of cough productive of whitish sputum.  Symptoms have worsened over past 2 days.  She was using her home breathing treatments frequently without any relief.  She could barely breathe last night and presented to ER.  Denies any fever or chills.  She also denies any chest pain.  Patient also complains of bilateral LE swelling.  Her BP has also been uncontrolled.  She had a similar presentation and admission to hospital 3 months ago.  She presented to ER where she was placed on Bipap.  She denies any other complaints.    Hospital Course:  68 y/o female presented with worsening SOB.  Probably multifactorial from COPD, CHF and uncontrolled HTN.  BP remains uncontrolled and adding Hydralazine.    Interval History: Feeling anxious.    Review of Systems   HENT: Negative for ear discharge and ear pain.    Eyes: Negative for pain and itching.   Cardiovascular: Negative for chest pain and palpitations.   Neurological: Negative for seizures and syncope.     Objective:     Vital Signs (Most Recent):  Temp: 98.4 °F (36.9 °C) (11/05/17 0733)  Pulse: 65 (11/05/17 0741)  Resp: 18 (11/05/17 0741)  BP: (!) 176/77 (11/05/17 0733)  SpO2: 95 % (11/05/17 0741) Vital Signs (24h Range):  Temp:  [97.4 °F (36.3 °C)-98.6 °F (37 °C)] 98.4 °F (36.9 °C)  Pulse:  [60-87] 65  Resp:  [17-20]  18  SpO2:  [94 %-99 %] 95 %  BP: (157-219)/() 176/77     Weight: 86.9 kg (191 lb 9.3 oz)  Body mass index is 32.88 kg/m².    Intake/Output Summary (Last 24 hours) at 11/05/17 1028  Last data filed at 11/05/17 0600   Gross per 24 hour   Intake              860 ml   Output             1600 ml   Net             -740 ml      Physical Exam   Constitutional: She is oriented to person, place, and time. She appears well-developed. No distress.   HENT:   Head: Normocephalic and atraumatic.   Eyes: Conjunctivae are normal. Right eye exhibits no discharge. Left eye exhibits no discharge.   Neck: Neck supple.   Cardiovascular: Normal rate, regular rhythm and normal heart sounds.    Pulmonary/Chest: Effort normal. No stridor.   Decreased BS bilaterally.  No expiratory wheezing.   Abdominal: Soft. Bowel sounds are normal.   Musculoskeletal: Edema: Decreased edema of LE.   Neurological: She is alert and oriented to person, place, and time.   Skin: Skin is warm and dry.       Significant Labs:   BMP:     Recent Labs  Lab 11/04/17  0440   *      K 3.8   CL 98   CO2 33*   BUN 24*   CREATININE 0.8   CALCIUM 10.1     CBC:     Recent Labs  Lab 11/04/17  0440   WBC 18.46*   HGB 11.9*   HCT 41.1          Significant Imaging: I have reviewed all pertinent imaging results/findings within the past 24 hours.    Assessment/Plan:      * Acute on chronic respiratory failure with hypoxia and hypercapnia    Probably multifactorial.  COPD with acute exacerbation--nebs, oxygen (weaned off Bipap), IV steroids and Doxy.  Acute on chronic diastolic CHF--IV Lasix.  Continue ACEI and B blocker.  Malignant HTN--poorly controlled BP.  Will resume home medications and make adjustments as needed.  Prn Clonidine.  Low Na diet.  Remains uncontrolled and adding Hydralazine.  Also believe some degree of anxiety contributing to dyspnea.  Slow improvement.  Will give one more day of treatment and hopefully home tomorrow.        Acute on  chronic diastolic congestive heart failure    As above.          COPD (chronic obstructive pulmonary disease)    With acute exacerbation.  Treatment as above.          Malignant hypertension    As above.          Chronic anticoagulation    Hx of DVT and PE.  Continue home Xarelto.          Type 2 diabetes mellitus, controlled    Worsening hyperglycemia secondary to steroids.  Increase Levemir.        Coronary artery disease involving native coronary artery of native heart with angina pectoris    No chest pain.  Negative Troponin.            VTE Risk Mitigation         Ordered     rivaroxaban tablet 20 mg  Before dinner     Route:  Oral        11/03/17 0458     Medium Risk of VTE  Once      11/03/17 0458              Zenon Alfredo MD  Department of Hospital Medicine   [facility]

## 2017-11-05 NOTE — PT/OT/SLP PROGRESS
Physical Therapy      Yasmeen Palm  MRN: [id number]    Patient not seen today secondary to patient found on bedside commode on 1st attempt and declined PT evaluation on 2nd attempt due to abdominal pain. Nurse Stephanie notified. Will follow-up again tomorrow.    Viv Wilson, PT, MOT

## 2017-11-05 NOTE — SUBJECTIVE & OBJECTIVE
Interval History: Feeling anxious.    Review of Systems   HENT: Negative for ear discharge and ear pain.    Eyes: Negative for pain and itching.   Cardiovascular: Negative for chest pain and palpitations.   Neurological: Negative for seizures and syncope.     Objective:     Vital Signs (Most Recent):  Temp: 98.4 °F (36.9 °C) (11/05/17 0733)  Pulse: 65 (11/05/17 0741)  Resp: 18 (11/05/17 0741)  BP: (!) 176/77 (11/05/17 0733)  SpO2: 95 % (11/05/17 0741) Vital Signs (24h Range):  Temp:  [97.4 °F (36.3 °C)-98.6 °F (37 °C)] 98.4 °F (36.9 °C)  Pulse:  [60-87] 65  Resp:  [17-20] 18  SpO2:  [94 %-99 %] 95 %  BP: (157-219)/() 176/77     Weight: 86.9 kg (191 lb 9.3 oz)  Body mass index is 32.88 kg/m².    Intake/Output Summary (Last 24 hours) at 11/05/17 1028  Last data filed at 11/05/17 0600   Gross per 24 hour   Intake              860 ml   Output             1600 ml   Net             -740 ml      Physical Exam   Constitutional: She is oriented to person, place, and time. She appears well-developed. No distress.   HENT:   Head: Normocephalic and atraumatic.   Eyes: Conjunctivae are normal. Right eye exhibits no discharge. Left eye exhibits no discharge.   Neck: Neck supple.   Cardiovascular: Normal rate, regular rhythm and normal heart sounds.    Pulmonary/Chest: Effort normal. No stridor.   Decreased BS bilaterally.  No expiratory wheezing.   Abdominal: Soft. Bowel sounds are normal.   Musculoskeletal: Edema: Decreased edema of LE.   Neurological: She is alert and oriented to person, place, and time.   Skin: Skin is warm and dry.       Significant Labs:   BMP:     Recent Labs  Lab 11/04/17  0440   *      K 3.8   CL 98   CO2 33*   BUN 24*   CREATININE 0.8   CALCIUM 10.1     CBC:     Recent Labs  Lab 11/04/17  0440   WBC 18.46*   HGB 11.9*   HCT 41.1          Significant Imaging: I have reviewed all pertinent imaging results/findings within the past 24 hours.

## 2017-11-05 NOTE — NURSING
Patient's blood pressure elevated, Dr. Aflredo notified. New orders received to give Sotalol now and change Clonidine to 0.2mg Q 8hrs. Patient asymptomatic, resting comfortably, MARIBELL

## 2017-11-05 NOTE — PLAN OF CARE
Problem: Diabetes, Type 2 (Adult)  Goal: Signs and Symptoms of Listed Potential Problems Will be Absent, Minimized or Managed (Diabetes, Type 2)  Signs and symptoms of listed potential problems will be absent, minimized or managed by discharge/transition of care (reference Diabetes, Type 2 (Adult) CPG).   Outcome: Ongoing (interventions implemented as appropriate)   11/03/17 1419   Diabetes, Type 2   Problems Assessed (Type 2 Diabetes) hyperglycemia;situational response   Problems Present (Type 2 Diabetes) hyperglycemia;situational response       Problem: Fall Risk (Adult)  Goal: Identify Related Risk Factors and Signs and Symptoms  Related risk factors and signs and symptoms are identified upon initiation of Human Response Clinical Practice Guideline (CPG)   Outcome: Ongoing (interventions implemented as appropriate)   11/04/17 1713   Fall Risk   Related Risk Factors (Fall Risk) age-related changes;polypharmacy;environment unfamiliar;gait/mobility problems   Signs and Symptoms (Fall Risk) presence of risk factors     Goal: Absence of Falls  Patient will demonstrate the desired outcomes by discharge/transition of care.   Outcome: Ongoing (interventions implemented as appropriate)   11/05/17 1601   Fall Risk (Adult)   Absence of Falls making progress toward outcome       Problem: Patient Care Overview  Goal: Plan of Care Review  Outcome: Ongoing (interventions implemented as appropriate)   11/05/17 1601   Coping/Psychosocial   Plan Of Care Reviewed With patient       Problem: Pressure Ulcer Risk (Kevin Scale) (Adult,Obstetrics,Pediatric)  Goal: Identify Related Risk Factors and Signs and Symptoms  Related risk factors and signs and symptoms are identified upon initiation of Human Response Clinical Practice Guideline (CPG)   Outcome: Ongoing (interventions implemented as appropriate)   11/04/17 1713   Pressure Ulcer Risk (Kevin Scale)   Related Risk Factors (Pressure Ulcer Risk (Kevin Scale)) age extremes;body  weight extremes;mobility impaired     Goal: Skin Integrity  Patient will demonstrate the desired outcomes by discharge/transition of care.   Outcome: Ongoing (interventions implemented as appropriate)   11/05/17 1601   Pressure Ulcer Risk (Kevin Scale) (Adult,Obstetrics,Pediatric)   Skin Integrity making progress toward outcome

## 2017-11-06 VITALS
HEART RATE: 60 BPM | BODY MASS INDEX: 32.7 KG/M2 | WEIGHT: 191.56 LBS | RESPIRATION RATE: 20 BRPM | SYSTOLIC BLOOD PRESSURE: 135 MMHG | DIASTOLIC BLOOD PRESSURE: 65 MMHG | OXYGEN SATURATION: 95 % | HEIGHT: 64 IN | TEMPERATURE: 99 F

## 2017-11-06 PROBLEM — I50.33 ACUTE ON CHRONIC DIASTOLIC CONGESTIVE HEART FAILURE: Status: RESOLVED | Noted: 2017-08-12 | Resolved: 2017-11-06

## 2017-11-06 PROBLEM — J96.21 ACUTE ON CHRONIC RESPIRATORY FAILURE WITH HYPOXIA AND HYPERCAPNIA: Status: RESOLVED | Noted: 2017-08-12 | Resolved: 2017-11-06

## 2017-11-06 PROBLEM — J96.22 ACUTE ON CHRONIC RESPIRATORY FAILURE WITH HYPOXIA AND HYPERCAPNIA: Status: RESOLVED | Noted: 2017-08-12 | Resolved: 2017-11-06

## 2017-11-06 LAB
POCT GLUCOSE: 219 MG/DL (ref 70–110)
POCT GLUCOSE: 226 MG/DL (ref 70–110)

## 2017-11-06 PROCEDURE — 94640 AIRWAY INHALATION TREATMENT: CPT

## 2017-11-06 PROCEDURE — G8978 MOBILITY CURRENT STATUS: HCPCS | Mod: CJ

## 2017-11-06 PROCEDURE — 97165 OT EVAL LOW COMPLEX 30 MIN: CPT

## 2017-11-06 PROCEDURE — 63600175 PHARM REV CODE 636 W HCPCS: Performed by: HOSPITALIST

## 2017-11-06 PROCEDURE — G8988 SELF CARE GOAL STATUS: HCPCS | Mod: CH

## 2017-11-06 PROCEDURE — 94761 N-INVAS EAR/PLS OXIMETRY MLT: CPT

## 2017-11-06 PROCEDURE — 25000003 PHARM REV CODE 250: Performed by: EMERGENCY MEDICINE

## 2017-11-06 PROCEDURE — G8979 MOBILITY GOAL STATUS: HCPCS | Mod: CI

## 2017-11-06 PROCEDURE — G8989 SELF CARE D/C STATUS: HCPCS | Mod: CH

## 2017-11-06 PROCEDURE — G8987 SELF CARE CURRENT STATUS: HCPCS | Mod: CH

## 2017-11-06 PROCEDURE — 97161 PT EVAL LOW COMPLEX 20 MIN: CPT

## 2017-11-06 PROCEDURE — 97535 SELF CARE MNGMENT TRAINING: CPT

## 2017-11-06 PROCEDURE — 25000003 PHARM REV CODE 250: Performed by: HOSPITALIST

## 2017-11-06 PROCEDURE — 27000221 HC OXYGEN, UP TO 24 HOURS

## 2017-11-06 RX ORDER — DOXYCYCLINE HYCLATE 100 MG
100 TABLET ORAL 2 TIMES DAILY
Qty: 10 TABLET | Refills: 0 | Status: SHIPPED | OUTPATIENT
Start: 2017-11-06 | End: 2017-11-11

## 2017-11-06 RX ORDER — HYDRALAZINE HYDROCHLORIDE 50 MG/1
50 TABLET, FILM COATED ORAL EVERY 8 HOURS
Qty: 90 TABLET | Refills: 11 | Status: ON HOLD | OUTPATIENT
Start: 2017-11-06 | End: 2018-07-23 | Stop reason: HOSPADM

## 2017-11-06 RX ORDER — PREDNISONE 20 MG/1
TABLET ORAL
Qty: 12 TABLET | Refills: 0 | Status: ON HOLD | OUTPATIENT
Start: 2017-11-06 | End: 2017-12-08 | Stop reason: CLARIF

## 2017-11-06 RX ORDER — HYDRALAZINE HYDROCHLORIDE 25 MG/1
50 TABLET, FILM COATED ORAL EVERY 8 HOURS
Status: DISCONTINUED | OUTPATIENT
Start: 2017-11-06 | End: 2017-11-06 | Stop reason: HOSPADM

## 2017-11-06 RX ADMIN — FUROSEMIDE 40 MG: 10 INJECTION, SOLUTION INTRAVENOUS at 08:11

## 2017-11-06 RX ADMIN — LISINOPRIL 40 MG: 20 TABLET ORAL at 08:11

## 2017-11-06 RX ADMIN — HYDRALAZINE HYDROCHLORIDE 50 MG: 25 TABLET ORAL at 02:11

## 2017-11-06 RX ADMIN — CLONIDINE HYDROCHLORIDE 0.2 MG: 0.1 TABLET ORAL at 06:11

## 2017-11-06 RX ADMIN — TIOTROPIUM BROMIDE 18 MCG: 18 CAPSULE ORAL; RESPIRATORY (INHALATION) at 08:11

## 2017-11-06 RX ADMIN — GABAPENTIN 300 MG: 300 CAPSULE ORAL at 02:11

## 2017-11-06 RX ADMIN — POLYETHYLENE GLYCOL 3350 17 G: 17 POWDER, FOR SOLUTION ORAL at 08:11

## 2017-11-06 RX ADMIN — DILTIAZEM HYDROCHLORIDE 120 MG: 30 TABLET, FILM COATED ORAL at 02:11

## 2017-11-06 RX ADMIN — DILTIAZEM HYDROCHLORIDE 120 MG: 30 TABLET, FILM COATED ORAL at 06:11

## 2017-11-06 RX ADMIN — DOXYCYCLINE 100 MG: 100 INJECTION, POWDER, LYOPHILIZED, FOR SOLUTION INTRAVENOUS at 05:11

## 2017-11-06 RX ADMIN — HYDRALAZINE HYDROCHLORIDE 50 MG: 25 TABLET ORAL at 08:11

## 2017-11-06 RX ADMIN — ACETAMINOPHEN 650 MG: 325 TABLET ORAL at 03:11

## 2017-11-06 RX ADMIN — GABAPENTIN 300 MG: 300 CAPSULE ORAL at 06:11

## 2017-11-06 RX ADMIN — SOTALOL HYDROCHLORIDE 80 MG: 80 TABLET ORAL at 08:11

## 2017-11-06 RX ADMIN — ASPIRIN 81 MG: 81 TABLET, COATED ORAL at 08:11

## 2017-11-06 RX ADMIN — FLUTICASONE FUROATE AND VILANTEROL TRIFENATATE 1 PUFF: 100; 25 POWDER RESPIRATORY (INHALATION) at 08:11

## 2017-11-06 RX ADMIN — FAMOTIDINE 20 MG: 20 TABLET, FILM COATED ORAL at 08:11

## 2017-11-06 RX ADMIN — PREDNISONE 60 MG: 20 TABLET ORAL at 08:11

## 2017-11-06 NOTE — DISCHARGE SUMMARY
[facility] Hospital Medicine  Discharge Summary      Patient Name: Yasmeen Palm  MRN: [id number]  Admission Date: 11/2/2017  Hospital Length of Stay: 3 days  Discharge Date and Time:  11/06/2017 12:17 PM  Attending Physician: Zenon Alfredo MD   Discharging Provider: Zenon Alfredo MD  Primary Care Provider: Angel Orourke Jr, MD      HPI:   68 y/o female with chronic respiratory failure and home oxygen dependent presents with shortness of breath.  Patient states that she was doing well until 2 days ago when she started complaining of shortness of breath.  Dyspnea worsened with exertion.  She also complains of cough productive of whitish sputum.  Symptoms have worsened over past 2 days.  She was using her home breathing treatments frequently without any relief.  She could barely breathe last night and presented to ER.  Denies any fever or chills.  She also denies any chest pain.  Patient also complains of bilateral LE swelling.  Her BP has also been uncontrolled.  She had a similar presentation and admission to hospital 3 months ago.  She presented to ER where she was placed on Bipap.  She denies any other complaints.    * No surgery found *      Hospital Course:   68 y/o female presented with worsening SOB.  Probably multifactorial from COPD, CHF and uncontrolled HTN.  Patient was started on nebs, IV steroids, Doxy and continued on oxygen.  She initially required Bipap, which was later weaned off to NC.  She was also started on IV Lasix.  Patient's symptoms improved during hospital stay.  BP remained poorly controlled on home medications.  Hydralazine was added to BP medication regimen.  Patient currently afebrile and hemodynamically stable.  Symptoms much improved from presentation.  She will be discharged home to continue nebulizer treatment, Prednisone taper and a few more days of PO Doxy.  Patient to follow up with PCP.     Consults:     No new Assessment & Plan notes have been filed  under this hospital service since the last note was generated.  Service: Hospital Medicine    Final Active Diagnoses:    Diagnosis Date Noted POA    COPD (chronic obstructive pulmonary disease) [J44.9] 12/14/2015 Yes     Chronic    Malignant hypertension [I10] 09/27/2016 Yes    Obesity [E66.9] 04/14/2017 Yes    Chronic anticoagulation [Z79.01] 04/10/2017 Not Applicable     Chronic    Anemia of chronic disease [D63.8] 09/27/2016 Yes     Chronic    Coronary artery disease involving native coronary artery of native heart with angina pectoris [I25.119] 12/14/2015 Yes    Type 2 diabetes mellitus, controlled [E11.9] 12/14/2015 Yes     Chronic      Problems Resolved During this Admission:    Diagnosis Date Noted Date Resolved POA    PRINCIPAL PROBLEM:  Acute on chronic respiratory failure with hypoxia and hypercapnia [J96.21, J96.22] 08/12/2017 11/06/2017 Yes    Acute on chronic diastolic congestive heart failure [I50.33] 08/12/2017 11/06/2017 Yes       Discharged Condition: stable    Disposition: Home-Health Care [facility]    Follow Up:  Follow-up Information     Angel Orourke Jr, MD In 1 week.    Specialty:  Family Medicine  Contact information:  [id number]  [facility] [id number] [phone]                 Patient Instructions:     Diet Diabetic 2000 Calories     Diet Cardiac     Activity as tolerated     Call MD for:  temperature >100.4     Call MD for:  persistent nausea and vomiting or diarrhea     Call MD for:  difficulty breathing or increased cough     Call MD for:  persistent dizziness, light-headedness, or visual disturbances     Call MD for:  increased confusion or weakness           Pending Diagnostic Studies:     None         Medications:  Reconciled Home Medications:   Current Discharge Medication List      START taking these medications    Details   doxycycline (VIBRA-TABS) 100 MG tablet Take 1 tablet (100 mg total) by mouth 2 (two) times daily.  Qty: 10 tablet, Refills: 0       hydrALAZINE (APRESOLINE) 50 MG tablet Take 1 tablet (50 mg total) by mouth every 8 (eight) hours.  Qty: 90 tablet, Refills: 11      predniSONE (DELTASONE) 20 MG tablet Take 3 tabs for 2 days, then 2 tabs for 2 days, then 1 tab for 2 days, then stop.  Qty: 12 tablet, Refills: 0         CONTINUE these medications which have NOT CHANGED    Details   acetaminophen (TYLENOL) 500 MG tablet Take 1 tablet (500 mg total) by mouth every 8 (eight) hours as needed.  Refills: 0      aspirin (ECOTRIN) 81 MG EC tablet Take 81 mg by mouth once daily.      diltiaZEM (CARDIZEM) 120 MG tablet Take 1 tablet (120 mg total) by mouth every 8 (eight) hours.  Qty: 270 tablet, Refills: 3      furosemide (LASIX) 40 MG tablet Take 40 mg by mouth once daily.       gabapentin (NEURONTIN) 300 MG capsule Take 300 mg by mouth 3 (three) times daily.      lisinopril (PRINIVIL,ZESTRIL) 40 MG tablet Take 1 tablet (40 mg total) by mouth once daily. Do not take this medication if blood pressure is below 130/80 mmHg and/or feeling dizzy after taking all other blood pressure meds      metformin (GLUCOPHAGE) 500 MG tablet Take 500 mg by mouth 2 (two) times daily with meals.      mirabegron (MYRBETRIQ) 25 mg Tb24 ER tablet Take 25 mg by mouth once daily.      naproxen (NAPROSYN) 375 MG tablet Take 375 mg by mouth every 12 (twelve) hours as needed.      nitroGLYCERIN (NITROSTAT) 0.3 MG SL tablet Place 0.3 mg under the tongue every 5 (five) minutes as needed for Chest pain.      pravastatin (PRAVACHOL) 40 MG tablet Take 40 mg by mouth once daily.      rivaroxaban (XARELTO) 20 mg Tab Take 1 tablet (20 mg total) by mouth before dinner.  Qty: 90 tablet, Refills: 0      sotalol (BETAPACE) 80 MG tablet Take 80 mg by mouth 2 (two) times daily.      tiotropium (SPIRIVA) 18 mcg inhalation capsule Inhale 1 capsule (18 mcg total) into the lungs once daily. Controller  Qty: 90 capsule, Refills: 3      tramadol (ULTRAM) 50 mg tablet Take 1 tablet (50 mg total) by  mouth every 6 (six) hours as needed for Pain.  Qty: 20 tablet, Refills: 0      UMECLIDINIUM BRM/VILANTEROL TR (ANORO ELLIPTA INHL) Inhale into the lungs daily as needed.      vortioxetine (BRINTELLIX) 5 mg Tab Take 5 mg by mouth 2 (two) times daily.      cloNIDine (CATAPRES) 0.1 MG tablet Take 2 tablets (0.2 mg total) by mouth 3 (three) times daily.  Qty: 180 tablet, Refills: 11             Indwelling Lines/Drains at time of discharge:   Lines/Drains/Airways          No matching active lines, drains, or airways          Time spent on the discharge of patient: >30 minutes  Patient was seen and examined on the date of discharge and determined to be suitable for discharge.         Zenon Alfredo MD  Department of Hospital Medicine  [facility]

## 2017-11-06 NOTE — PLAN OF CARE
Problem: Occupational Therapy Goal  Goal: Occupational Therapy Goal  Outcome: Outcome(s) achieved Date Met: 11/06/17  OT eval is complete. The patient is at her functional baseline and no OT is recommended. The patient is aware of her need to rest as needed during self care and mobility. The patient has theraband at home and is verbalized understanding of HEP for BUE.

## 2017-11-06 NOTE — PLAN OF CARE
[facility]    HOME HEALTH ORDERS  FACE TO FACE ENCOUNTER    Patient Name: Yasmeen Palm  YOB: 1950    PCP: Angel Orourke Jr, MD   PCP Address: [facility] / [facility] [address]  PCP Phone Number: [phone]  PCP Fax: [phone]       Encounter Date: 11/06/2017    Admit to Home Health    Diagnoses:  Active Hospital Problems    Diagnosis  POA    COPD (chronic obstructive pulmonary disease) [J44.9]  Yes     Priority: 3      Chronic    Malignant hypertension [I10]  Yes     Priority: 4     Obesity [E66.9]  Yes    Chronic anticoagulation [Z79.01]  Not Applicable     Chronic    Anemia of chronic disease [D63.8]  Yes     Chronic    Coronary artery disease involving native coronary artery of native heart with angina pectoris [I25.119]  Yes    Type 2 diabetes mellitus, controlled [E11.9]  Yes     Chronic      Resolved Hospital Problems    Diagnosis Date Resolved POA    *Acute on chronic respiratory failure with hypoxia and hypercapnia [J96.21, J96.22] 11/06/2017 Yes     Priority: 1 - High    Acute on chronic diastolic congestive heart failure [I50.33] 11/06/2017 Yes     Priority: 2        No future appointments.  Follow-up Information     Angel Orourke Jr, MD In 1 week.    Specialty:  Family Medicine  Contact information:  [facility] [id number] [phone]                     I have seen and examined this patient face to face today. My clinical findings that support the need for the home health skilled services and home bound status are the following:  Weakness/numbness causing balance and gait disturbance due to Acute on chronic respiratory failure making it taxing to leave home.    Allergies:Review of patient's allergies indicates:  No Known Allergies    Diet: cardiac diet and diabetic diet: 2000 calorie    Activities: activity as tolerated    Nursing:   SN to complete comprehensive assessment including  routine vital signs. Instruct on disease process and s/s of complications to report to MD. Review/verify medication list sent home with the patient at time of discharge  and instruct patient/caregiver as needed. Frequency may be adjusted depending on start of care date.    Notify MD if SBP > 160 or < 90; DBP > 90 or < 50; HR > 120 or < 50; Temp > 101      CONSULTS:    Physical Therapy to evaluate and treat. Evaluate for home safety and equipment needs; Establish/upgrade home exercise program. Perform / instruct on therapeutic exercises, gait training, transfer training, and Range of Motion.  Occupational Therapy to evaluate and treat. Evaluate home environment for safety and equipment needs. Perform/Instruct on transfers, ADL training, ROM, and therapeutic exercises.    MISCELLANEOUS CARE:  Home Oxygen:  No change      Medications: Review discharge medications with patient and family and provide education.      Current Discharge Medication List      START taking these medications    Details   doxycycline (VIBRA-TABS) 100 MG tablet Take 1 tablet (100 mg total) by mouth 2 (two) times daily.  Qty: 10 tablet, Refills: 0      hydrALAZINE (APRESOLINE) 50 MG tablet Take 1 tablet (50 mg total) by mouth every 8 (eight) hours.  Qty: 90 tablet, Refills: 11      predniSONE (DELTASONE) 20 MG tablet Take 3 tabs for 2 days, then 2 tabs for 2 days, then 1 tab for 2 days, then stop.  Qty: 12 tablet, Refills: 0         CONTINUE these medications which have NOT CHANGED    Details   acetaminophen (TYLENOL) 500 MG tablet Take 1 tablet (500 mg total) by mouth every 8 (eight) hours as needed.  Refills: 0      aspirin (ECOTRIN) 81 MG EC tablet Take 81 mg by mouth once daily.      diltiaZEM (CARDIZEM) 120 MG tablet Take 1 tablet (120 mg total) by mouth every 8 (eight) hours.  Qty: 270 tablet, Refills: 3      furosemide (LASIX) 40 MG tablet Take 40 mg by mouth once daily.       gabapentin (NEURONTIN) 300 MG capsule Take 300 mg by mouth 3  (three) times daily.      lisinopril (PRINIVIL,ZESTRIL) 40 MG tablet Take 1 tablet (40 mg total) by mouth once daily. Do not take this medication if blood pressure is below 130/80 mmHg and/or feeling dizzy after taking all other blood pressure meds      metformin (GLUCOPHAGE) 500 MG tablet Take 500 mg by mouth 2 (two) times daily with meals.      mirabegron (MYRBETRIQ) 25 mg Tb24 ER tablet Take 25 mg by mouth once daily.      naproxen (NAPROSYN) 375 MG tablet Take 375 mg by mouth every 12 (twelve) hours as needed.      nitroGLYCERIN (NITROSTAT) 0.3 MG SL tablet Place 0.3 mg under the tongue every 5 (five) minutes as needed for Chest pain.      pravastatin (PRAVACHOL) 40 MG tablet Take 40 mg by mouth once daily.      rivaroxaban (XARELTO) 20 mg Tab Take 1 tablet (20 mg total) by mouth before dinner.  Qty: 90 tablet, Refills: 0      sotalol (BETAPACE) 80 MG tablet Take 80 mg by mouth 2 (two) times daily.      tiotropium (SPIRIVA) 18 mcg inhalation capsule Inhale 1 capsule (18 mcg total) into the lungs once daily. Controller  Qty: 90 capsule, Refills: 3      tramadol (ULTRAM) 50 mg tablet Take 1 tablet (50 mg total) by mouth every 6 (six) hours as needed for Pain.  Qty: 20 tablet, Refills: 0      UMECLIDINIUM BRM/VILANTEROL TR (ANORO ELLIPTA INHL) Inhale into the lungs daily as needed.      vortioxetine (BRINTELLIX) 5 mg Tab Take 5 mg by mouth 2 (two) times daily.      cloNIDine (CATAPRES) 0.1 MG tablet Take 2 tablets (0.2 mg total) by mouth 3 (three) times daily.  Qty: 180 tablet, Refills: 11             I certify that this patient is confined to her home and needs intermittent skilled nursing care, physical therapy and occupational therapy.

## 2017-11-06 NOTE — PT/OT/SLP EVAL
Occupational Therapy  Evaluation/Treatment/Discharge    Yasmeen Palm   MRN: [id number]   Admitting Diagnosis: Acute on chronic respiratory failure with hypoxia and hypercapnia    OT Date of Treatment: 11/06/17   OT Start Time: 1059  OT Stop Time: 1127  OT Total Time (min): 28 min    Billable Minutes:  Evaluation 15  Self Care/Home Management 13  Total Time 28 (co-tx with PT)    Diagnosis: Acute on chronic respiratory failure with hypoxia and hypercapnia       Past Medical History:   Diagnosis Date    CHF (congestive heart failure)     COPD (chronic obstructive pulmonary disease)     Depression     Diabetes mellitus     GERD (gastroesophageal reflux disease)     Hypertension       Past Surgical History:   Procedure Laterality Date    ABDOMINAL SURGERY      CARDIAC SURGERY         Referring physician: Juni  Date referred to OT: 11/5/17    General Precautions: Standard, fall, respiratory  Orthopedic Precautions: N/A  Braces: N/A    Do you have any cultural, spiritual, Catholic conflicts, given your current situation?: no     Patient History:  Living Environment  Lives With: spouse  Living Arrangements: house  Home Accessibility: stairs to enter home  Number of Stairs to Enter Home: 4  Stair Railings at Home: outside, present at both sides  Living Environment Comment: THE PATIENT USES A SCOOTER IN THE [employer].  The patient staes her shower chair is cracked and she needs a new shower chair.  Equipment Currently Used at Home: bedside commode, wheelchair, walker, rolling, rollator, shower chair    Prior level of function:   Bed Mobility/Transfers: needs device  Grooming: independent  Bathing: needs device and assist  Upper Body Dressing: independent  Lower Body Dressing: independent  Toileting: independent  Home Management Skills: needs assist  Driving License: No (Patient spouse drives)  IADL Comments: The patient states she amb around the house via holding onto walls. The patient states she cooks  when she  feels well and goes to the grocery store with her spouse.  The patient  uses a shower chair in the tub to bathe with her spouse assist.     Dominant hand: right    Subjective:  Communicated with nurseBlanca prior to session.    Chief Complaint: gets tired when ambulating and needs to reset  Patient/Family stated goals: return home    Pain/Comfort  Pain Rating 1: 0/10  Pain Addressed 1: Pre-medicate for activity    Objective:  Patient found with: telemetry, oxygen, peripheral IV    Cognitive Exam:  Oriented to: Person, Place, Time and Situation  Follows Commands/attention: Follows two-step commands  Communication: clear/fluent  Memory:  No Deficits noted  Safety awareness/insight to disability: intact  Coping skills/emotional control: Appropriate to situation    Visual/perceptual:  Intact    Physical Exam:  Postural examination/scapula alignment: No postural abnormalities identified  Skin integrity: Visible skin intact  Edema: The patient states her BLE edema is improved    Sensation:   Intact    Upper Extremity Range of Motion:  Right Upper Extremity: WFL  Left Upper Extremity: WFL    Upper Extremity Strength:  Right Upper Extremity: WFL  Left Upper Extremity: WFL   Strength: WFL    Fine motor coordination:   Intact    Gross motor coordination: WFL    Functional Mobility:  Bed Mobility:  Supine to Sit:  (The patient was found seated on the shower chair beside the bed.)    Transfers:  Sit <> Stand Assistance: Stand By Assistance  Sit <> Stand Assistive Device: Rolling Walker  Toilet Transfer Technique: Stand Pivot  Toilet Transfer Assistance: Stand By Assistance  Toilet Transfer Assistive Device: bedside commode    Functional Ambulation: The patient amb using a RW in the hallway with PT    Activities of Daily Living:  Feeding Level of Assistance: Activity did not occur  UE Dressing Level of Assistance: Modified independent  LE Dressing Level of Assistance: Activity did not occur  Grooming Position: Seated at  "sink  Grooming Level of Assistance: Modified independent       Balance:   Static Sit: GOOD+: Takes MAXIMAL challenges from all directions.    Dynamic Sit: GOOD: Maintains balance through MODERATE excursions of active trunk movement  Static Stand: FAIR+: Takes MINIMAL challenges from all directions  Dynamic stand: FAIR: Needs CONTACT GUARD during gait    Therapeutic Activities and Exercises:  The patient was educated re: OT role. The patient verbalized understanding of Energy Conservation principals and is aware of her need to pace and rest throughout the day. The patient has yellow and red theraband at home. The patient verbalized awareness of HEP and performs HEP ad krista at home.    AM-PAC 6 CLICK ADL  How much help from another person does this patient currently need?  1 = Unable, Total/Dependent Assistance  2 = A lot, Maximum/Moderate Assistance  3 = A little, Minimum/Contact Guard/Supervision  4 = None, Modified [place]/Independent    Putting on and taking off regular lower body clothing? : 4  Bathing (including washing, rinsing, drying)?: 4  Toileting, which includes using toilet, bedpan, or urinal? : 4  Putting on and taking off regular upper body clothing?: 4  Taking care of personal grooming such as brushing teeth?: 4  Eating meals?: 4  Total Score: 24    AM-PAC Raw Score CMS "G-Code Modifier Level of Impairment Assistance   6 % Total / Unable   7 - 9 CM 80 - 100% Maximal Assist   10-14 CL 60 - 80% Moderate Assist   15 - 19 CK 40 - 60% Moderate Assist   20 - 22 CJ 20 - 40% Minimal Assist   23 CI 1-20% SBA / CGA   24 CH 0% Independent/ Mod I       Patient left seated on a shower chair in front of the sink with all lines intact, call button in reach and nurse notified    Assessment:  Yasmeen Palm is a 67 y.o. female with a medical diagnosis of Acute on chronic respiratory failure with hypoxia and hypercapnia.  The patient is at her functional baseline and no OT is recommended. The patient is aware " of her need to rest as needed during self care and mobility. The patient has theraband at home and is verbalized understanding of HEP for BUE.       Rehab identified problem list/impairments: Rehab identified problem list/impairments: impaired endurance, impaired functional mobilty, impaired cardiopulmonary response to activity, impaired balance    Rehab potential is good.    Activity tolerance: Good    Discharge recommendations: Discharge Facility/Level Of Care Needs: home (with family assist)     Barriers to discharge: Barriers to Discharge: None    Equipment recommendations: shower chair     GOALS:    Occupational Therapy Goals     Not on file          Multidisciplinary Problems (Resolved)        Problem: Occupational Therapy Goal    Goal Priority Disciplines Outcome Interventions   Occupational Therapy Goal   (Resolved)     OT, PT/OT Outcome(s) achieved                    PLAN:    (No OT is recommended)  Plan of Care expires:   N/A  Plan of Care reviewed with: patient    OT G-codes  Functional Assessment Tool Used: AM PAC  Score: 24  Functional Limitation: Self care  Self Care Current Status ():   Self Care Goal Status ():   Self Care Discharge Status (): KEITH Barrios OT  11/06/2017

## 2017-11-06 NOTE — PLAN OF CARE
Problem: Physical Therapy Goal  Goal: Physical Therapy Goal  Goals to be met by: 17    Patient will increase functional independence with mobility by performin. Sit to stand transfer with Modified [place]  2. Gait  x 250 feet with Modified [place] using Rolling Walker  3. Lower extremity exercise program x30 reps per handout, with independence      Patient okay to ambulate with nursing staff supervision and RW. She would benefit from a shower chair at discharge.

## 2017-11-06 NOTE — PROGRESS NOTES
"AAOX3  Denies any pain, sob or discomfort.  VSS.  IV and tele dc'd.  DC and Rx instructions given verbally and written.  Pt stated "My  will be late getting me.  He is my ride.  They just called him and the family to [facility] on [address].  My brother in law has stage 4 cancer and isn't doing well".  Saleem, Charge RN aware.  "

## 2017-11-06 NOTE — PT/OT/SLP EVAL
Physical Therapy  Evaluation    Yasmeen Palm   MRN: [id number]   Admitting Diagnosis: Acute on chronic respiratory failure with hypoxia and hypercapnia    PT Received On: 11/06/17  PT Start Time: 1106     PT Stop Time: 1121    PT Total Time (min): 15 min       Billable Minutes:  Evaluation  15 with OT present    Diagnosis: Acute on chronic respiratory failure with hypoxia and hypercapnia    Past Medical History:   Diagnosis Date    CHF (congestive heart failure)     COPD (chronic obstructive pulmonary disease)     Depression     Diabetes mellitus     GERD (gastroesophageal reflux disease)     Hypertension       Past Surgical History:   Procedure Laterality Date    ABDOMINAL SURGERY      CARDIAC SURGERY       Referring physician: Juni  Date referred to PT: 11/5/17    General Precautions: Standard, fall, respiratory  Orthopedic Precautions: N/A   Braces: N/A       Patient History:  Lives With: spouse  Living Arrangements: house  Home Accessibility: stairs to enter home  Number of Stairs to Enter Home: 4  Stair Railings at Home: outside, present at both sides  Living Environment Comment: Patient uses a scooter when ambulating long distances in the grocery store.   Equipment Currently Used at Home: bedside commode, walker, rolling, rollator (shower chair is broken )   Patient is a retired nurse. She states she knows her limitations due to her SOB and she is able to ambulate when she needs to.     Previous Level of Function:  Ambulation Skills: needs device (patient holds onto the walls or furniture at home while ambulating)  Transfer Skills: needs device    Subjective:  Communicated with nurse Rios prior to session.  Patient agreeable to participate in PT evaluation.   Chief Complaint: Waiting to get a wash up.   Patient goals: To walk.     Pain/Comfort  Pain Rating 1: 0/10    Objective:   Patient found with: peripheral IV, telemetry, oxygen     Cognitive Exam:  Oriented to: Person, Place, Time and  Situation    Follows Commands/attention: Follows one-step commands  Communication: clear/fluent  Safety awareness/insight to disability: intact    Physical Exam:  Postural examination/scapula alignment: Rounded shoulder and Head forward    Skin integrity: Visible skin intact  Edema: None noted     Lower Extremity Range of Motion:  Right Lower Extremity: WFL  Left Lower Extremity: WFL    Lower Extremity Strength:  Right Lower Extremity: WFL  Left Lower Extremity: WFL     Gross motor coordination: WFL    Functional Mobility: Patient found seated on shower chair waiting to get a wash up.     Transfers:  Sit <> Stand Assistance: Stand By Assistance  Sit <> Stand Assistive Device: Rolling Walker    Gait:   Gait Distance: ~120ft with several standing rest breaks due to fatigue/SOB. She ambulated on 4L NC O2.   Assistance 1: Stand by Assistance  Gait Assistive Device: Rolling walker  Gait Pattern: 3-point gait  Gait Deviation(s): decreased dyllan, decreased velocity of limb motion, decreased step length    Balance:   Static Sit: GOOD+: Takes MAXIMAL challenges from all directions.    Dynamic Sit: GOOD: Maintains balance through MODERATE excursions of active trunk movement  Static Stand: FAIR+: Takes MINIMAL challenges from all directions  Dynamic stand: FAIR+: Needs CLOSE SUPERVISION during gait and is able to right self with minor LOB    AM-PAC 6 CLICK MOBILITY  How much help from another person does this patient currently need?   1 = Unable, Total/Dependent Assistance  2 = A lot, Maximum/Moderate Assistance  3 = A little, Minimum/Contact Guard/Supervision  4 = None, Modified [place]/Independent    Turning over in bed (including adjusting bedclothes, sheets and blankets)?: 4  Sitting down on and standing up from a chair with arms (e.g., wheelchair, bedside commode, etc.): 4  Moving from lying on back to sitting on the side of the bed?: 4  Moving to and from a bed to a chair (including a wheelchair)?: 4  Need to walk  in hospital room?: 3  Climbing 3-5 steps with a railing?: 3  Total Score: 22     AM-PAC Raw Score CMS G-Code Modifier Level of Impairment Assistance   6 % Total / Unable   7 - 9 CM 80 - 100% Maximal Assist   10 - 14 CL 60 - 80% Moderate Assist   15 - 19 CK 40 - 60% Moderate Assist   20 - 22 CJ 20 - 40% Minimal Assist   23 CI 1-20% SBA / CGA   24 CH 0% Independent/ Mod I     Patient left seated on shower chair with all lines intact, call button in reach, and nurse/PCT notified.    Assessment:   Yasmeen Palm is a 67 y.o. female with a medical diagnosis of Acute on chronic respiratory failure with hypoxia and hypercapnia and presents with decreased endurance for functional mobility. She states she knows her limitations at home and is able to function within them. She holds onto the walls and furniture at home while ambulating. She needed several standing rest breaks while ambulating in hallway . She is okay to ambulate with nursing staff supervision and RW. She would continue to benefit from PT while in the hospital.     Rehab identified problem list/impairments: Rehab identified problem list/impairments: weakness, impaired endurance, gait instability, impaired balance, impaired functional mobilty, impaired cardiopulmonary response to activity    Rehab potential is good.    Activity tolerance: Fair    Discharge recommendations: Discharge Facility/Level Of Care Needs: home health PT     Barriers to discharge: Barriers to Discharge: None    Equipment recommendations: Equipment Needed After Discharge: shower chair     GOALS:    Physical Therapy Goals        Problem: Physical Therapy Goal    Goal Priority Disciplines Outcome Goal Variances Interventions   Physical Therapy Goal     PT/OT, PT      Description:  Goals to be met by: 17    Patient will increase functional independence with mobility by performin. Sit to stand transfer with Modified [place]  2. Gait  x 250 feet with Modified  [place] using Rolling Walker  3. Lower extremity exercise program x30 reps per handout, with independence                    PLAN:    Patient to be seen 3 x/week to address the above listed problems via gait training, therapeutic activities, therapeutic exercises  Plan of Care expires: 11/20/17  Plan of Care reviewed with: patient    Functional Assessment Tool Used: AM PAC   Score: 22  Functional Limitation: Mobility: Walking and moving around  Mobility: Walking and Moving Around Current Status (): CJ  Mobility: Walking and Moving Around Goal Status (): VINNY Wilson, PT, MOT  11/06/2017

## 2017-11-06 NOTE — PROGRESS NOTES
[facility]    WRITTEN HEALTHCARE AND DISCHARGE INFORMATION     Follow-up Information     Angel Orourke Jr, MD. Schedule an appointment as soon as possible for a visit in 1 week.    Specialty:  Family Medicine  Why:  Officce should call you in am to schedule follow up.appointment  If you dont't hear from them in the am please call at the above number  Contact information:  [address]  [address]  [facility] [address]  [phone]             [facility] [place].    Specialty:  Home Health Services  Why:  Home Health  Contact information:  [address]  [address]  [place] [address]  [phone]                                    [facility]           [phone]  After discharge for assistance [facility] On Call Nurse Care Line 24/7  Assistance    Things You are responsible For To Manage Your Care At Home:  1.    Getting your prescriptions filled   2.    Taking your medications as directed, DO NOT MISS ANY DOSES!  3.    Going to your follow-up doctor appointment. This is important because it  allow the doctor to monitor your progress and determine if  any changes need to made to your treatment plan.     Thank you for choosing [facility] for your care.  Please answer any calls you may receive from [facility] we want to continue to support you as you manage your healthcare needs. [facility] is happy to have the opportunity to serve you.     Sincerely,  Your [facility] Team,  FABY Black, ACM-RN; Senior Transition Navigator [phone]    Above information printed and presented to patient who verbalized understanding

## 2017-11-06 NOTE — PLAN OF CARE
Problem: Diabetes, Type 2 (Adult)  Intervention: Optimize Glycemic Control   11/06/17 0600   Nutrition Interventions   Glycemic Management blood glucose monitoring;carbohydrate replacement provided;oral hydration promoted;supplemental insulin given

## 2017-11-06 NOTE — PLAN OF CARE
11/06/17 1725   Final Note   Assessment Type Final Discharge Note   Discharge Disposition Home-Health   What phone number can be called within the next 1-3 days to see how you are doing after discharge? ([phone])   Hospital Follow Up  Appt(s) scheduled? No  (Rceived message at MD office.  Patient to call tomorrow.)   Discharge plans and expectations educations in teach back method with documentation complete? Yes   Right Care Referral Info   Post Acute Recommendation Home-care   Facility Name [facility]

## 2017-11-09 NOTE — PT/OT/SLP DISCHARGE
Physical Therapy Discharge Summary    Yasmeen Paml  MRN: [id number]   Acute on chronic respiratory failure with hypoxia and hypercapnia   Patient Discharged from acute Physical Therapy on 17.  Please refer to prior PT noted date on 17 for functional status.     Assessment:   Patient was discharge unexpectedly.  Information required to complete and accurate discharge summary is unknown.  Refer to therapy initial evaluation and last progress note for initial and most recent functional status and goal achievement.  Recommendations made may be found in medical record.  GOALS:    Physical Therapy Goals     Not on file          Multidisciplinary Problems (Resolved)        Problem: Physical Therapy Goal    Goal Priority Disciplines Outcome Goal Variances Interventions   Physical Therapy Goal   (Resolved)     PT/OT, PT Outcome(s) achieved     Description:  Goals to be met by: 17    Patient will increase functional independence with mobility by performin. Sit to stand transfer with Modified [place]  2. Gait  x 250 feet with Modified [place] using Rolling Walker  3. Lower extremity exercise program x30 reps per handout, with independence                    Reasons for Discontinuation of Therapy Services  Transfer to alternate level of care.      Plan:  Patient Discharged to: Home with Home Health Service.

## 2017-12-08 ENCOUNTER — HOSPITAL ENCOUNTER (OUTPATIENT)
Facility: HOSPITAL | Age: 67
Discharge: HOME | End: 2017-12-09
Attending: EMERGENCY MEDICINE | Admitting: INTERNAL MEDICINE
Payer: MEDICARE

## 2017-12-08 DIAGNOSIS — R68.84 JAW PAIN: ICD-10-CM

## 2017-12-08 DIAGNOSIS — Z79.01 CHRONIC ANTICOAGULATION: Chronic | ICD-10-CM

## 2017-12-08 DIAGNOSIS — J44.1 ACUTE EXACERBATION OF CHRONIC OBSTRUCTIVE PULMONARY DISEASE (COPD): Primary | ICD-10-CM

## 2017-12-08 DIAGNOSIS — R06.02 SHORTNESS OF BREATH: ICD-10-CM

## 2017-12-08 DIAGNOSIS — J96.12 CHRONIC RESPIRATORY FAILURE WITH HYPOXIA AND HYPERCAPNIA: Chronic | ICD-10-CM

## 2017-12-08 DIAGNOSIS — J41.0 SIMPLE CHRONIC BRONCHITIS: Chronic | ICD-10-CM

## 2017-12-08 DIAGNOSIS — E11.9 CONTROLLED TYPE 2 DIABETES MELLITUS WITHOUT COMPLICATION, WITHOUT LONG-TERM CURRENT USE OF INSULIN: Chronic | ICD-10-CM

## 2017-12-08 DIAGNOSIS — J96.11 CHRONIC RESPIRATORY FAILURE WITH HYPOXIA AND HYPERCAPNIA: Chronic | ICD-10-CM

## 2017-12-08 DIAGNOSIS — I70.0 THORACIC AORTA ATHEROSCLEROSIS: ICD-10-CM

## 2017-12-08 DIAGNOSIS — Z86.718 HISTORY OF DEEP VEIN THROMBOSIS: Chronic | ICD-10-CM

## 2017-12-08 DIAGNOSIS — I25.119 CORONARY ARTERY DISEASE INVOLVING NATIVE CORONARY ARTERY OF NATIVE HEART WITH ANGINA PECTORIS: ICD-10-CM

## 2017-12-08 PROBLEM — R00.0 TACHYCARDIA: Chronic | Status: ACTIVE | Noted: 2017-12-08

## 2017-12-08 LAB
ALBUMIN SERPL BCP-MCNC: 3.8 G/DL
ALP SERPL-CCNC: 114 U/L
ALT SERPL W/O P-5'-P-CCNC: 8 U/L
ANION GAP SERPL CALC-SCNC: 16 MMOL/L
AST SERPL-CCNC: 26 U/L
BACTERIA #/AREA URNS HPF: NORMAL /HPF
BASOPHILS # BLD AUTO: 0.04 K/UL
BASOPHILS NFR BLD: 0.3 %
BILIRUB SERPL-MCNC: 0.3 MG/DL
BILIRUB UR QL STRIP: NEGATIVE
BNP SERPL-MCNC: 354 PG/ML
BUN SERPL-MCNC: 5 MG/DL
CALCIUM SERPL-MCNC: 10.3 MG/DL
CHLORIDE SERPL-SCNC: 100 MMOL/L
CLARITY UR: CLEAR
CO2 SERPL-SCNC: 26 MMOL/L
COLOR UR: ABNORMAL
CREAT SERPL-MCNC: 0.7 MG/DL
DIFFERENTIAL METHOD: ABNORMAL
EOSINOPHIL # BLD AUTO: 0.2 K/UL
EOSINOPHIL NFR BLD: 1.8 %
ERYTHROCYTE [DISTWIDTH] IN BLOOD BY AUTOMATED COUNT: 19.9 %
EST. GFR  (AFRICAN AMERICAN): >60 ML/MIN/1.73 M^2
EST. GFR  (NON AFRICAN AMERICAN): >60 ML/MIN/1.73 M^2
GLUCOSE SERPL-MCNC: 149 MG/DL
GLUCOSE UR QL STRIP: ABNORMAL
HCT VFR BLD AUTO: 43 %
HGB BLD-MCNC: 12.5 G/DL
HGB UR QL STRIP: NEGATIVE
HYALINE CASTS #/AREA URNS LPF: 0 /LPF
INR PPP: 1
KETONES UR QL STRIP: ABNORMAL
LACTATE SERPL-SCNC: 1 MMOL/L
LEUKOCYTE ESTERASE UR QL STRIP: NEGATIVE
LIPASE SERPL-CCNC: 11 U/L
LYMPHOCYTES # BLD AUTO: 1.7 K/UL
LYMPHOCYTES NFR BLD: 13 %
MCH RBC QN AUTO: 25.6 PG
MCHC RBC AUTO-ENTMCNC: 29.1 G/DL
MCV RBC AUTO: 88 FL
MICROSCOPIC COMMENT: NORMAL
MONOCYTES # BLD AUTO: 1 K/UL
MONOCYTES NFR BLD: 7.7 %
NEUTROPHILS # BLD AUTO: 10.3 K/UL
NEUTROPHILS NFR BLD: 76.9 %
NITRITE UR QL STRIP: NEGATIVE
PH UR STRIP: 8 [PH] (ref 5–8)
PLATELET # BLD AUTO: 602 K/UL
PMV BLD AUTO: 9.1 FL
POCT GLUCOSE: 102 MG/DL (ref 70–110)
POCT GLUCOSE: 147 MG/DL (ref 70–110)
POCT GLUCOSE: 191 MG/DL (ref 70–110)
POCT GLUCOSE: 345 MG/DL (ref 70–110)
POTASSIUM SERPL-SCNC: 4.9 MMOL/L
PROT SERPL-MCNC: 8.6 G/DL
PROT UR QL STRIP: ABNORMAL
PROTHROMBIN TIME: 10.8 SEC
RBC # BLD AUTO: 4.88 M/UL
RBC #/AREA URNS HPF: 0 /HPF (ref 0–4)
SODIUM SERPL-SCNC: 142 MMOL/L
SP GR UR STRIP: 1.01 (ref 1–1.03)
TROPONIN I SERPL DL<=0.01 NG/ML-MCNC: 0.02 NG/ML
TROPONIN I SERPL DL<=0.01 NG/ML-MCNC: 0.05 NG/ML
TROPONIN I SERPL DL<=0.01 NG/ML-MCNC: 0.07 NG/ML
TROPONIN I SERPL DL<=0.01 NG/ML-MCNC: 0.07 NG/ML
URN SPEC COLLECT METH UR: ABNORMAL
UROBILINOGEN UR STRIP-ACNC: NEGATIVE EU/DL
WBC # BLD AUTO: 13.41 K/UL
WBC #/AREA URNS HPF: 5 /HPF (ref 0–5)

## 2017-12-08 PROCEDURE — 84484 ASSAY OF TROPONIN QUANT: CPT | Mod: 91

## 2017-12-08 PROCEDURE — 82962 GLUCOSE BLOOD TEST: CPT

## 2017-12-08 PROCEDURE — 96376 TX/PRO/DX INJ SAME DRUG ADON: CPT

## 2017-12-08 PROCEDURE — 83605 ASSAY OF LACTIC ACID: CPT

## 2017-12-08 PROCEDURE — 96375 TX/PRO/DX INJ NEW DRUG ADDON: CPT

## 2017-12-08 PROCEDURE — 63600175 PHARM REV CODE 636 W HCPCS: Performed by: EMERGENCY MEDICINE

## 2017-12-08 PROCEDURE — 80053 COMPREHEN METABOLIC PANEL: CPT

## 2017-12-08 PROCEDURE — 84484 ASSAY OF TROPONIN QUANT: CPT

## 2017-12-08 PROCEDURE — 83690 ASSAY OF LIPASE: CPT

## 2017-12-08 PROCEDURE — 25000242 PHARM REV CODE 250 ALT 637 W/ HCPCS: Performed by: NURSE PRACTITIONER

## 2017-12-08 PROCEDURE — 85025 COMPLETE CBC W/AUTO DIFF WBC: CPT

## 2017-12-08 PROCEDURE — G0378 HOSPITAL OBSERVATION PER HR: HCPCS

## 2017-12-08 PROCEDURE — 63600175 PHARM REV CODE 636 W HCPCS: Performed by: NURSE PRACTITIONER

## 2017-12-08 PROCEDURE — 94761 N-INVAS EAR/PLS OXIMETRY MLT: CPT

## 2017-12-08 PROCEDURE — 93005 ELECTROCARDIOGRAM TRACING: CPT

## 2017-12-08 PROCEDURE — 25000003 PHARM REV CODE 250: Performed by: EMERGENCY MEDICINE

## 2017-12-08 PROCEDURE — 93010 ELECTROCARDIOGRAM REPORT: CPT | Mod: ,,, | Performed by: INTERNAL MEDICINE

## 2017-12-08 PROCEDURE — 81000 URINALYSIS NONAUTO W/SCOPE: CPT

## 2017-12-08 PROCEDURE — 25000242 PHARM REV CODE 250 ALT 637 W/ HCPCS: Performed by: EMERGENCY MEDICINE

## 2017-12-08 PROCEDURE — 36415 COLL VENOUS BLD VENIPUNCTURE: CPT

## 2017-12-08 PROCEDURE — 99285 EMERGENCY DEPT VISIT HI MDM: CPT | Mod: 25

## 2017-12-08 PROCEDURE — 82962 GLUCOSE BLOOD TEST: CPT | Mod: 91

## 2017-12-08 PROCEDURE — 83880 ASSAY OF NATRIURETIC PEPTIDE: CPT

## 2017-12-08 PROCEDURE — 96374 THER/PROPH/DIAG INJ IV PUSH: CPT

## 2017-12-08 PROCEDURE — 85610 PROTHROMBIN TIME: CPT

## 2017-12-08 PROCEDURE — 27000221 HC OXYGEN, UP TO 24 HOURS

## 2017-12-08 PROCEDURE — 94640 AIRWAY INHALATION TREATMENT: CPT

## 2017-12-08 RX ORDER — METOCLOPRAMIDE HYDROCHLORIDE 10 MG/2ML
5 INJECTION, SOLUTION INTRAMUSCULAR; INTRAVENOUS EVERY 6 HOURS PRN
Status: DISCONTINUED | OUTPATIENT
Start: 2017-12-08 | End: 2017-12-09 | Stop reason: HOSPADM

## 2017-12-08 RX ORDER — INSULIN ASPART 100 [IU]/ML
1-10 INJECTION, SOLUTION INTRAVENOUS; SUBCUTANEOUS
Status: DISCONTINUED | OUTPATIENT
Start: 2017-12-08 | End: 2017-12-09 | Stop reason: HOSPADM

## 2017-12-08 RX ORDER — POLYETHYLENE GLYCOL 3350 17 G/17G
17 POWDER, FOR SOLUTION ORAL DAILY
Status: DISCONTINUED | OUTPATIENT
Start: 2017-12-08 | End: 2017-12-09 | Stop reason: HOSPADM

## 2017-12-08 RX ORDER — IBUPROFEN 200 MG
1 TABLET ORAL DAILY
Status: DISCONTINUED | OUTPATIENT
Start: 2017-12-08 | End: 2017-12-09 | Stop reason: HOSPADM

## 2017-12-08 RX ORDER — MORPHINE SULFATE 8 MG/ML
3 INJECTION INTRAMUSCULAR; INTRAVENOUS; SUBCUTANEOUS EVERY 4 HOURS PRN
Status: DISCONTINUED | OUTPATIENT
Start: 2017-12-08 | End: 2017-12-09 | Stop reason: HOSPADM

## 2017-12-08 RX ORDER — METHYLPREDNISOLONE SOD SUCC 125 MG
125 VIAL (EA) INJECTION EVERY 6 HOURS
Status: DISCONTINUED | OUTPATIENT
Start: 2017-12-08 | End: 2017-12-08

## 2017-12-08 RX ORDER — IPRATROPIUM BROMIDE AND ALBUTEROL SULFATE 2.5; .5 MG/3ML; MG/3ML
3 SOLUTION RESPIRATORY (INHALATION) EVERY 4 HOURS
Status: DISCONTINUED | OUTPATIENT
Start: 2017-12-08 | End: 2017-12-08

## 2017-12-08 RX ORDER — HYDRALAZINE HYDROCHLORIDE 20 MG/ML
10 INJECTION INTRAMUSCULAR; INTRAVENOUS
Status: COMPLETED | OUTPATIENT
Start: 2017-12-08 | End: 2017-12-08

## 2017-12-08 RX ORDER — HYDRALAZINE HYDROCHLORIDE 20 MG/ML
10 INJECTION INTRAMUSCULAR; INTRAVENOUS
Status: DISCONTINUED | OUTPATIENT
Start: 2017-12-08 | End: 2017-12-08

## 2017-12-08 RX ORDER — ONDANSETRON 2 MG/ML
4 INJECTION INTRAMUSCULAR; INTRAVENOUS EVERY 6 HOURS PRN
Status: DISCONTINUED | OUTPATIENT
Start: 2017-12-08 | End: 2017-12-09 | Stop reason: HOSPADM

## 2017-12-08 RX ORDER — LISINOPRIL 20 MG/1
40 TABLET ORAL DAILY
Status: DISCONTINUED | OUTPATIENT
Start: 2017-12-08 | End: 2017-12-09 | Stop reason: HOSPADM

## 2017-12-08 RX ORDER — LEVALBUTEROL INHALATION SOLUTION 0.63 MG/3ML
0.63 SOLUTION RESPIRATORY (INHALATION) EVERY 8 HOURS
Status: DISCONTINUED | OUTPATIENT
Start: 2017-12-08 | End: 2017-12-09 | Stop reason: HOSPADM

## 2017-12-08 RX ORDER — ONDANSETRON 2 MG/ML
4 INJECTION INTRAMUSCULAR; INTRAVENOUS
Status: COMPLETED | OUTPATIENT
Start: 2017-12-08 | End: 2017-12-08

## 2017-12-08 RX ORDER — IBUPROFEN 200 MG
24 TABLET ORAL
Status: DISCONTINUED | OUTPATIENT
Start: 2017-12-08 | End: 2017-12-09 | Stop reason: HOSPADM

## 2017-12-08 RX ORDER — ONDANSETRON 2 MG/ML
8 INJECTION INTRAMUSCULAR; INTRAVENOUS
Status: DISCONTINUED | OUTPATIENT
Start: 2017-12-08 | End: 2017-12-08

## 2017-12-08 RX ORDER — MORPHINE SULFATE 8 MG/ML
3 INJECTION INTRAMUSCULAR; INTRAVENOUS; SUBCUTANEOUS
Status: COMPLETED | OUTPATIENT
Start: 2017-12-08 | End: 2017-12-08

## 2017-12-08 RX ORDER — PRAVASTATIN SODIUM 40 MG/1
40 TABLET ORAL DAILY
Status: DISCONTINUED | OUTPATIENT
Start: 2017-12-08 | End: 2017-12-09 | Stop reason: HOSPADM

## 2017-12-08 RX ORDER — HYDRALAZINE HYDROCHLORIDE 20 MG/ML
20 INJECTION INTRAMUSCULAR; INTRAVENOUS
Status: DISCONTINUED | OUTPATIENT
Start: 2017-12-08 | End: 2017-12-08

## 2017-12-08 RX ORDER — IPRATROPIUM BROMIDE 0.5 MG/2.5ML
0.5 SOLUTION RESPIRATORY (INHALATION) EVERY 4 HOURS
Status: DISCONTINUED | OUTPATIENT
Start: 2017-12-08 | End: 2017-12-09 | Stop reason: HOSPADM

## 2017-12-08 RX ORDER — IBUPROFEN 200 MG
16 TABLET ORAL
Status: DISCONTINUED | OUTPATIENT
Start: 2017-12-08 | End: 2017-12-08

## 2017-12-08 RX ORDER — CLONIDINE HYDROCHLORIDE 0.1 MG/1
0.2 TABLET ORAL
Status: DISCONTINUED | OUTPATIENT
Start: 2017-12-08 | End: 2017-12-08

## 2017-12-08 RX ORDER — ACETAMINOPHEN 500 MG
500 TABLET ORAL EVERY 8 HOURS PRN
Status: DISCONTINUED | OUTPATIENT
Start: 2017-12-08 | End: 2017-12-09 | Stop reason: HOSPADM

## 2017-12-08 RX ORDER — INSULIN ASPART 100 [IU]/ML
1-10 INJECTION, SOLUTION INTRAVENOUS; SUBCUTANEOUS
Status: DISCONTINUED | OUTPATIENT
Start: 2017-12-08 | End: 2017-12-08

## 2017-12-08 RX ORDER — IPRATROPIUM BROMIDE AND ALBUTEROL SULFATE 2.5; .5 MG/3ML; MG/3ML
3 SOLUTION RESPIRATORY (INHALATION)
Status: COMPLETED | OUTPATIENT
Start: 2017-12-08 | End: 2017-12-08

## 2017-12-08 RX ORDER — GLUCAGON 1 MG
1 KIT INJECTION
Status: DISCONTINUED | OUTPATIENT
Start: 2017-12-08 | End: 2017-12-08

## 2017-12-08 RX ORDER — IBUPROFEN 200 MG
24 TABLET ORAL
Status: DISCONTINUED | OUTPATIENT
Start: 2017-12-08 | End: 2017-12-08

## 2017-12-08 RX ORDER — SOTALOL HYDROCHLORIDE 80 MG/1
80 TABLET ORAL 2 TIMES DAILY
Status: DISCONTINUED | OUTPATIENT
Start: 2017-12-08 | End: 2017-12-09 | Stop reason: HOSPADM

## 2017-12-08 RX ORDER — HYDRALAZINE HYDROCHLORIDE 20 MG/ML
10 INJECTION INTRAMUSCULAR; INTRAVENOUS EVERY 12 HOURS PRN
Status: DISCONTINUED | OUTPATIENT
Start: 2017-12-08 | End: 2017-12-09 | Stop reason: HOSPADM

## 2017-12-08 RX ORDER — HYDRALAZINE HYDROCHLORIDE 25 MG/1
50 TABLET, FILM COATED ORAL
Status: COMPLETED | OUTPATIENT
Start: 2017-12-08 | End: 2017-12-08

## 2017-12-08 RX ORDER — HYDRALAZINE HYDROCHLORIDE 25 MG/1
50 TABLET, FILM COATED ORAL EVERY 8 HOURS
Status: DISCONTINUED | OUTPATIENT
Start: 2017-12-08 | End: 2017-12-09 | Stop reason: HOSPADM

## 2017-12-08 RX ORDER — METHYLPREDNISOLONE SOD SUCC 125 MG
80 VIAL (EA) INJECTION EVERY 8 HOURS
Status: DISCONTINUED | OUTPATIENT
Start: 2017-12-08 | End: 2017-12-09 | Stop reason: HOSPADM

## 2017-12-08 RX ORDER — LISINOPRIL 20 MG/1
40 TABLET ORAL
Status: COMPLETED | OUTPATIENT
Start: 2017-12-08 | End: 2017-12-08

## 2017-12-08 RX ORDER — DILTIAZEM HYDROCHLORIDE 30 MG/1
120 TABLET, FILM COATED ORAL EVERY 8 HOURS
Status: DISCONTINUED | OUTPATIENT
Start: 2017-12-08 | End: 2017-12-09 | Stop reason: HOSPADM

## 2017-12-08 RX ORDER — CLONIDINE HYDROCHLORIDE 0.1 MG/1
0.2 TABLET ORAL
Status: COMPLETED | OUTPATIENT
Start: 2017-12-08 | End: 2017-12-08

## 2017-12-08 RX ORDER — HYDROCODONE BITARTRATE AND ACETAMINOPHEN 5; 325 MG/1; MG/1
1 TABLET ORAL EVERY 4 HOURS PRN
Status: DISCONTINUED | OUTPATIENT
Start: 2017-12-08 | End: 2017-12-09 | Stop reason: HOSPADM

## 2017-12-08 RX ORDER — IBUPROFEN 200 MG
16 TABLET ORAL
Status: DISCONTINUED | OUTPATIENT
Start: 2017-12-08 | End: 2017-12-09 | Stop reason: HOSPADM

## 2017-12-08 RX ORDER — GLUCAGON 1 MG
1 KIT INJECTION
Status: DISCONTINUED | OUTPATIENT
Start: 2017-12-08 | End: 2017-12-09 | Stop reason: HOSPADM

## 2017-12-08 RX ORDER — HYDRALAZINE HYDROCHLORIDE 20 MG/ML
20 INJECTION INTRAMUSCULAR; INTRAVENOUS
Status: COMPLETED | OUTPATIENT
Start: 2017-12-08 | End: 2017-12-08

## 2017-12-08 RX ORDER — DIPHENHYDRAMINE HYDROCHLORIDE 50 MG/ML
25 INJECTION INTRAMUSCULAR; INTRAVENOUS
Status: COMPLETED | OUTPATIENT
Start: 2017-12-08 | End: 2017-12-08

## 2017-12-08 RX ORDER — METOCLOPRAMIDE HYDROCHLORIDE 10 MG/2ML
5 INJECTION, SOLUTION INTRAMUSCULAR; INTRAVENOUS
Status: COMPLETED | OUTPATIENT
Start: 2017-12-08 | End: 2017-12-08

## 2017-12-08 RX ORDER — FUROSEMIDE 40 MG/1
40 TABLET ORAL DAILY
Status: DISCONTINUED | OUTPATIENT
Start: 2017-12-08 | End: 2017-12-09 | Stop reason: HOSPADM

## 2017-12-08 RX ORDER — FUROSEMIDE 10 MG/ML
40 INJECTION INTRAMUSCULAR; INTRAVENOUS ONCE
Status: DISCONTINUED | OUTPATIENT
Start: 2017-12-08 | End: 2017-12-08

## 2017-12-08 RX ORDER — ASPIRIN 81 MG/1
81 TABLET ORAL DAILY
Status: DISCONTINUED | OUTPATIENT
Start: 2017-12-08 | End: 2017-12-09 | Stop reason: HOSPADM

## 2017-12-08 RX ORDER — TIOTROPIUM BROMIDE 18 UG/1
1 CAPSULE ORAL; RESPIRATORY (INHALATION) DAILY
Status: DISCONTINUED | OUTPATIENT
Start: 2017-12-08 | End: 2017-12-08

## 2017-12-08 RX ORDER — GABAPENTIN 300 MG/1
300 CAPSULE ORAL 3 TIMES DAILY
Status: DISCONTINUED | OUTPATIENT
Start: 2017-12-08 | End: 2017-12-09 | Stop reason: HOSPADM

## 2017-12-08 RX ORDER — CLONIDINE HYDROCHLORIDE 0.1 MG/1
0.2 TABLET ORAL 3 TIMES DAILY
Status: DISCONTINUED | OUTPATIENT
Start: 2017-12-08 | End: 2017-12-09 | Stop reason: HOSPADM

## 2017-12-08 RX ORDER — NITROGLYCERIN 0.4 MG/1
0.4 TABLET SUBLINGUAL EVERY 5 MIN PRN
Status: DISCONTINUED | OUTPATIENT
Start: 2017-12-08 | End: 2017-12-09 | Stop reason: HOSPADM

## 2017-12-08 RX ORDER — DILTIAZEM HYDROCHLORIDE 120 MG/1
120 CAPSULE, COATED, EXTENDED RELEASE ORAL
Status: COMPLETED | OUTPATIENT
Start: 2017-12-08 | End: 2017-12-08

## 2017-12-08 RX ADMIN — PRAVASTATIN SODIUM 40 MG: 40 TABLET ORAL at 10:12

## 2017-12-08 RX ADMIN — LISINOPRIL 40 MG: 20 TABLET ORAL at 06:12

## 2017-12-08 RX ADMIN — ONDANSETRON 4 MG: 2 INJECTION INTRAMUSCULAR; INTRAVENOUS at 06:12

## 2017-12-08 RX ADMIN — TIOTROPIUM BROMIDE 18 MCG: 18 CAPSULE ORAL; RESPIRATORY (INHALATION) at 01:12

## 2017-12-08 RX ADMIN — CLONIDINE HYDROCHLORIDE 0.2 MG: 0.1 TABLET ORAL at 09:12

## 2017-12-08 RX ADMIN — LEVALBUTEROL HYDROCHLORIDE 0.63 MG: 0.63 SOLUTION RESPIRATORY (INHALATION) at 01:12

## 2017-12-08 RX ADMIN — INSULIN ASPART 5 UNITS: 100 INJECTION, SOLUTION INTRAVENOUS; SUBCUTANEOUS at 09:12

## 2017-12-08 RX ADMIN — MORPHINE SULFATE 3.04 MG: 8 INJECTION, SOLUTION INTRAMUSCULAR; INTRAVENOUS at 07:12

## 2017-12-08 RX ADMIN — HYDRALAZINE HYDROCHLORIDE 50 MG: 25 TABLET ORAL at 06:12

## 2017-12-08 RX ADMIN — HYDRALAZINE HYDROCHLORIDE 20 MG: 20 INJECTION INTRAMUSCULAR; INTRAVENOUS at 06:12

## 2017-12-08 RX ADMIN — HYDRALAZINE HYDROCHLORIDE 50 MG: 25 TABLET ORAL at 09:12

## 2017-12-08 RX ADMIN — IPRATROPIUM BROMIDE 0.5 MG: 0.5 SOLUTION RESPIRATORY (INHALATION) at 08:12

## 2017-12-08 RX ADMIN — METOCLOPRAMIDE HYDROCHLORIDE 5 MG: 10 INJECTION, SOLUTION INTRAMUSCULAR; INTRAVENOUS at 07:12

## 2017-12-08 RX ADMIN — METHYLPREDNISOLONE SODIUM SUCCINATE 80 MG: 125 INJECTION, POWDER, FOR SOLUTION INTRAMUSCULAR; INTRAVENOUS at 09:12

## 2017-12-08 RX ADMIN — GABAPENTIN 300 MG: 300 CAPSULE ORAL at 01:12

## 2017-12-08 RX ADMIN — METHYLPREDNISOLONE SODIUM SUCCINATE 125 MG: 125 INJECTION, POWDER, FOR SOLUTION INTRAMUSCULAR; INTRAVENOUS at 11:12

## 2017-12-08 RX ADMIN — CLONIDINE HYDROCHLORIDE 0.2 MG: 0.1 TABLET ORAL at 01:12

## 2017-12-08 RX ADMIN — HYDROCODONE BITARTRATE AND ACETAMINOPHEN 1 TABLET: 5; 325 TABLET ORAL at 05:12

## 2017-12-08 RX ADMIN — IPRATROPIUM BROMIDE AND ALBUTEROL SULFATE 3 ML: .5; 3 SOLUTION RESPIRATORY (INHALATION) at 06:12

## 2017-12-08 RX ADMIN — DILTIAZEM HYDROCHLORIDE 120 MG: 30 TABLET, FILM COATED ORAL at 01:12

## 2017-12-08 RX ADMIN — ASPIRIN 81 MG: 81 TABLET, COATED ORAL at 10:12

## 2017-12-08 RX ADMIN — DILTIAZEM HYDROCHLORIDE 120 MG: 120 CAPSULE, EXTENDED RELEASE ORAL at 06:12

## 2017-12-08 RX ADMIN — INSULIN ASPART 2 UNITS: 100 INJECTION, SOLUTION INTRAVENOUS; SUBCUTANEOUS at 04:12

## 2017-12-08 RX ADMIN — IPRATROPIUM BROMIDE 0.5 MG: 0.5 SOLUTION RESPIRATORY (INHALATION) at 04:12

## 2017-12-08 RX ADMIN — HYDRALAZINE HYDROCHLORIDE 50 MG: 25 TABLET ORAL at 01:12

## 2017-12-08 RX ADMIN — DILTIAZEM HYDROCHLORIDE 120 MG: 30 TABLET, FILM COATED ORAL at 09:12

## 2017-12-08 RX ADMIN — CLONIDINE HYDROCHLORIDE 0.2 MG: 0.1 TABLET ORAL at 06:12

## 2017-12-08 RX ADMIN — LEVALBUTEROL HYDROCHLORIDE 0.63 MG: 0.63 SOLUTION RESPIRATORY (INHALATION) at 11:12

## 2017-12-08 RX ADMIN — IPRATROPIUM BROMIDE 0.5 MG: 0.5 SOLUTION RESPIRATORY (INHALATION) at 11:12

## 2017-12-08 RX ADMIN — INSULIN DETEMIR 21 UNITS: 100 INJECTION, SOLUTION SUBCUTANEOUS at 09:12

## 2017-12-08 RX ADMIN — SOTALOL HYDROCHLORIDE 80 MG: 80 TABLET ORAL at 11:12

## 2017-12-08 RX ADMIN — RIVAROXABAN 20 MG: 20 TABLET, FILM COATED ORAL at 08:12

## 2017-12-08 RX ADMIN — HYDRALAZINE HYDROCHLORIDE 10 MG: 20 INJECTION INTRAMUSCULAR; INTRAVENOUS at 06:12

## 2017-12-08 RX ADMIN — DIPHENHYDRAMINE HYDROCHLORIDE 25 MG: 50 INJECTION, SOLUTION INTRAMUSCULAR; INTRAVENOUS at 07:12

## 2017-12-08 RX ADMIN — IPRATROPIUM BROMIDE 0.5 MG: 0.5 SOLUTION RESPIRATORY (INHALATION) at 01:12

## 2017-12-08 RX ADMIN — GABAPENTIN 300 MG: 300 CAPSULE ORAL at 09:12

## 2017-12-08 RX ADMIN — FUROSEMIDE 40 MG: 40 TABLET ORAL at 10:12

## 2017-12-08 NOTE — SUBJECTIVE & OBJECTIVE
Past Medical History:   Diagnosis Date    Anticoagulant long-term use     Arthritis     Asthma     CHF (congestive heart failure)     COPD (chronic obstructive pulmonary disease)     Coronary artery disease     Depression     Diabetes mellitus     GERD (gastroesophageal reflux disease)     Hypertension        Past Surgical History:   Procedure Laterality Date    ABDOMINAL SURGERY      CARDIAC SURGERY         Review of patient's allergies indicates:  No Known Allergies    No current facility-administered medications on file prior to encounter.      Current Outpatient Prescriptions on File Prior to Encounter   Medication Sig    acetaminophen (TYLENOL) 500 MG tablet Take 1 tablet (500 mg total) by mouth every 8 (eight) hours as needed.    aspirin (ECOTRIN) 81 MG EC tablet Take 81 mg by mouth once daily.    cloNIDine (CATAPRES) 0.1 MG tablet Take 2 tablets (0.2 mg total) by mouth 3 (three) times daily.    diltiaZEM (CARDIZEM) 120 MG tablet Take 1 tablet (120 mg total) by mouth every 8 (eight) hours.    furosemide (LASIX) 40 MG tablet Take 40 mg by mouth once daily.     gabapentin (NEURONTIN) 300 MG capsule Take 300 mg by mouth 3 (three) times daily.    hydrALAZINE (APRESOLINE) 50 MG tablet Take 1 tablet (50 mg total) by mouth every 8 (eight) hours. (Patient taking differently: Take 50 mg by mouth every 12 (twelve) hours. )    lisinopril (PRINIVIL,ZESTRIL) 40 MG tablet Take 1 tablet (40 mg total) by mouth once daily. Do not take this medication if blood pressure is below 130/80 mmHg and/or feeling dizzy after taking all other blood pressure meds    metformin (GLUCOPHAGE) 500 MG tablet Take 500 mg by mouth 2 (two) times daily with meals.    naproxen (NAPROSYN) 375 MG tablet Take 375 mg by mouth every 12 (twelve) hours as needed.    nitroGLYCERIN (NITROSTAT) 0.3 MG SL tablet Place 0.3 mg under the tongue every 5 (five) minutes as needed for Chest pain.    pravastatin (PRAVACHOL) 40 MG tablet Take 40  mg by mouth once daily.    sotalol (BETAPACE) 80 MG tablet Take 80 mg by mouth 2 (two) times daily.    tiotropium (SPIRIVA) 18 mcg inhalation capsule Inhale 1 capsule (18 mcg total) into the lungs once daily. Controller    tramadol (ULTRAM) 50 mg tablet Take 1 tablet (50 mg total) by mouth every 6 (six) hours as needed for Pain.    UMECLIDINIUM BRM/VILANTEROL TR (ANORO ELLIPTA INHL) Inhale into the lungs daily as needed.    [DISCONTINUED] mirabegron (MYRBETRIQ) 25 mg Tb24 ER tablet Take 25 mg by mouth once daily.    [DISCONTINUED] predniSONE (DELTASONE) 20 MG tablet Take 3 tabs for 2 days, then 2 tabs for 2 days, then 1 tab for 2 days, then stop.    [DISCONTINUED] vortioxetine (BRINTELLIX) 5 mg Tab Take 5 mg by mouth 2 (two) times daily.     Family History     Problem Relation (Age of Onset)    Diabetes Father    Heart disease Mother    Hypertension Mother        Social History Main Topics    Smoking status: Former Smoker     Packs/day: 0.50     Years: 25.00     Types: Cigarettes     Quit date: 12/3/2017    Smokeless tobacco: Never Used    Alcohol use No    Drug use: No    Sexual activity: No     Review of Systems   Constitutional: Negative for appetite change, chills, diaphoresis and fever.   HENT: Negative for congestion, hearing loss, sore throat, tinnitus and trouble swallowing.    Eyes: Negative for photophobia, discharge, itching and visual disturbance.   Respiratory: Positive for cough and shortness of breath. Negative for apnea, wheezing and stridor.    Cardiovascular: Negative for chest pain, palpitations and leg swelling.   Gastrointestinal: Negative for abdominal distention, abdominal pain, blood in stool, constipation, diarrhea and nausea.   Endocrine: Negative for polydipsia, polyphagia and polyuria.   Genitourinary: Negative for difficulty urinating, dysuria, flank pain and frequency.   Musculoskeletal: Positive for arthralgias. Negative for joint swelling and neck stiffness.   Skin:  Negative for color change, rash and wound.   Neurological: Negative for dizziness, tremors, seizures, light-headedness, numbness and headaches.   Hematological: Negative for adenopathy.   Psychiatric/Behavioral: Negative for hallucinations and self-injury.     Objective:     Vital Signs (Most Recent):  Temp: 99.8 °F (37.7 °C) (12/08/17 0937)  Pulse: 96 (12/08/17 1105)  Resp: 18 (12/08/17 0937)  BP: 120/61 (12/08/17 0937)  SpO2: 95 % (12/08/17 0937) Vital Signs (24h Range):  Temp:  [98.5 °F (36.9 °C)-99.8 °F (37.7 °C)] 99.8 °F (37.7 °C)  Pulse:  [] 96  Resp:  [18-24] 18  SpO2:  [90 %-100 %] 95 %  BP: (119-251)/() 120/61     Weight: 84.4 kg (186 lb 1.1 oz)  Body mass index is 29.14 kg/m².    Physical Exam   Constitutional: She is oriented to person, place, and time. She appears well-developed and well-nourished. She is cooperative.   HENT:   Head: Normocephalic and atraumatic.   Eyes: Conjunctivae and lids are normal.   Neck: Full passive range of motion without pain. Neck supple. No JVD present. No edema present. No thyroid mass present.   Cardiovascular: S1 normal, S2 normal and intact distal pulses.    No murmur heard.  Pulmonary/Chest: Effort normal and breath sounds normal.   Abdominal: Soft. Bowel sounds are normal. She exhibits no distension and no abdominal bruit. There is no splenomegaly or hepatomegaly. There is no tenderness. There is no CVA tenderness.   Musculoskeletal: Normal range of motion. She exhibits no edema or tenderness.   Lymphadenopathy:     She has no cervical adenopathy.     She has no axillary adenopathy.   Neurological: She is alert and oriented to person, place, and time. She has normal reflexes. She displays no tremor. She displays no seizure activity.   Skin: Skin is warm, dry and intact.   Psychiatric: She has a normal mood and affect. Her speech is normal. Thought content normal. Cognition and memory are normal.           Significant Labs:   BMP:   Recent Labs  Lab  12/08/17  0545   *      K 4.9      CO2 26   BUN 5*   CREATININE 0.7   CALCIUM 10.3     CBC:   Recent Labs  Lab 12/08/17  0545   WBC 13.41*   HGB 12.5   HCT 43.0   *     Cardiac Markers:   Recent Labs  Lab 12/08/17  0545   *     Coagulation:   Recent Labs  Lab 12/08/17  0545   INR 1.0     Lactic Acid:   Recent Labs  Lab 12/08/17  0550   LACTATE 1.0     Lipase:   Recent Labs  Lab 12/08/17  0545   LIPASE 11     Lipid Panel: No results for input(s): CHOL, HDL, LDLCALC, TRIG, CHOLHDL in the last 48 hours.  Magnesium: No results for input(s): MG in the last 48 hours.  Respiratory Culture: No results for input(s): GSRESP, RESPIRATORYC in the last 48 hours.  Troponin:   Recent Labs  Lab 12/08/17  0545 12/08/17  0951   TROPONINI 0.021 0.049*     TSH: No results for input(s): TSH in the last 4320 hours.  Urine Culture: No results for input(s): LABURIN in the last 48 hours.  Urine Studies:   Recent Labs  Lab 12/08/17  0702   COLORU Straw   APPEARANCEUA Clear   PHUR 8.0   SPECGRAV 1.010   PROTEINUA 2+*   GLUCUA 1+*   KETONESU Trace*   BILIRUBINUA Negative   OCCULTUA Negative   NITRITE Negative   UROBILINOGEN Negative   LEUKOCYTESUR Negative   RBCUA 0   WBCUA 5   BACTERIA None   HYALINECASTS 0       Significant Imaging:   Imaging Results          X-Ray Chest AP Portable (Final result)  Result time 12/08/17 07:03:11    Final result by Bo Lorenzana MD (12/08/17 07:03:11)                 Impression:        No radiographic evidence of acute intrathoracic process or detrimental interval change.      Electronically signed by: BO LORENZANA  Date:     12/08/17  Time:    07:03              Narrative:    Comparison:11/2/2017    Technique: Single AP portable chest radiograph.    Findings:     Cardiac monitoring leads overlie the chest. The cardiomediastinal silhouette appears stable from prior examination. There is tortuosity of the thoracic aorta with associated calcific atherosclerosis. The lungs  appear symmetrically expanded without evidence of focal airspace consolidation or pleural effusion. The previously demonstrated left lower lobe superior segment pulmonary nodule is not well-visualized by conventional radiography. No evidence of pneumothorax. The osseous structure demonstrate stable degenerative changes.

## 2017-12-08 NOTE — DISCHARGE INSTRUCTIONS
Follow up with Dr. Lowry, Pulmonology for evaluation and surveillance of CT findings from 8/2017 - 1.6 cm noncalcified pulmonary nodule within the superior segment of the left upper lobe with some adjacent stranding.  Neoplasm is the diagnosis of exclusion.  No enlarged mediastinal or hilar lymph nodes.

## 2017-12-08 NOTE — ASSESSMENT & PLAN NOTE
Oxygen saturations remain stable on her usual home oxygen level - her fingers tingling likely 2/2 acute on chronic hypoxia +/- cigarette smoking -  she has mild leukocytosis and mild elevation in troponin one level and BNP about baseline in the low 300's. I do not appreciate LE swelling, pulmonary rales or crackles on physical assessment, mild JVD likely chronic - she appears more euvolemic/mild hypovolemic more so than FVO - likely acute on chronic bronchitis/allergic rhinitis +/- COPD component  -duo neb, change to xopenex Q8 & ipratropium Q4 2/2 tachycardia  -On Xarelto - continue  -IV steroids  -supplemental oxygen

## 2017-12-08 NOTE — HPI
Yasmeen Palm is a 67 y.o. black female former smoker w/ COPD, HTN, GERD, depression, DM and hx of DVT (on Xarelto 20 mg), abdominal surgery, and cardiac surgery. She presented for evaluation of progressive worsening SOB X 2-3 days and constant cough, with associated nausea without vomiting, post nasal drip, and intermittent diarrhea that has since resolved. Her SOB unrelieved by her usual albuterol nebulizer and Combivent inhaler at home. She complaints of sharp L sided thoracic pain that is worsened by cough and deep breaths. She tell me that she came to the hospital because she checked her number and they were all bad, meaning her BP, and tingling in her fingertips. Denies chest pressure, chest tightness, abdominal pain, dysuria/hematuria, rash, fever/chills, otalgia, eye pain, or current smoking. In ED, Respiratory therapist endorses O2 sats of 100% on 3 L. chest pain.

## 2017-12-08 NOTE — ASSESSMENT & PLAN NOTE
Hold home oral hyperglycemics if indicated - while hospitalized will use combined insulin therapy with basal and prandial insulin coverage, POCT glucose checks, hypoglycemic protocol and correction scale - HgA1c 6.3 911/2017)

## 2017-12-08 NOTE — ASSESSMENT & PLAN NOTE
Suspect 2/2 albuterol overuse at home - restart medications, Her heart rate appears to be stabilizing - she is already on Rivaroxaban - continue this dose, cardiac monitoring - switch albuterol to xopenex

## 2017-12-08 NOTE — PROGRESS NOTES
"EDUCATION:  TN provided with educational information on COPD.  Information reviewed and placed in :My Healthcare Packet" to be brought home for pt to use as resource after discharge.  Information included:  signs and symptoms to look for and call the doctor if experiencing, and symptoms that may indicate a medical emergency: CALL 911.    Reminded pt things she will be responsible for to manage her healthcare at home: getting Rx filled, attending follow up appointments, and taking medication as prescribed were discussed.   Teach back method used.  All questions answered.  Patient verbalized understanding of all information.      TN informed floor nurseMeggan, care management is complete and can proceed with discharge teaching when applicable.      "

## 2017-12-08 NOTE — PROGRESS NOTES
TN contacted Dr. Orourke's office at [phone] to schedule a PCP f/u appt; spoke with Ruthann who scheduled an appt for 12/12/17 at 11:30 AM.

## 2017-12-08 NOTE — PROGRESS NOTES
Follow-up Information     Angel Orourke Jr, MD On 12/12/2017.    Specialty:  Family Medicine  Why:  Outpatient Services PCP Follow-Up Tuesday at 11:30 AM  Contact information:  [address] [address] [phone]               PLEASE BRING TO ALL FOLLOW UP APPOINTMENTS:   A COPY YOUR DISCHARGE INSTRUCTIONS, Any new MEDICINES YOU ARE CURRENTLY TAKING IN THEIR ORIGINAL BOTTLES  And IDENTIFICATION AND INSURANCE CARD     **PLEASE ARRIVE 15 MINUTES AHEAD OF SCHEDULED APPOINTMENT TIME   ++PLEASE CALL 24 HOURS IN ADVANCE IF YOU MUST RESCHEDULE YOUR APPOINTMENT DAY AND/OR TIME     [facility]    WRITTEN HEALTHCARE AND DISCHARGE INFORMATION                        Help at Home           [phone]  After discharge for assistance [facility] On Call Nurse Care Line 24/7  Assistance    Things You are responsible For To Manage Your Care At Home:  1.    Getting your prescriptions filled   2.    Taking your medications as directed, DO NOT MISS ANY DOSES!  3.    Going to your follow-up doctor appointment. This is important because it  allow the doctor to monitor your progress and determine if  any changes need to made to your treatment plan.     Thank you for choosing [facility] for your care.  Please answer any calls you may receive from [facility] we want to continue to support you as you manage your healthcare needs. [facility] is happy to have the opportunity to serve you.     Sincerely,  Your [facility] Team,  Mag HOBBS;Transition Navigator [phone]

## 2017-12-08 NOTE — ASSESSMENT & PLAN NOTE
"Counseled on dangers of smoking with home oxygen she states, "Oh, I don't smoke around the oxygen". Patient counseled on the dangers of unhealthy lifestyle choices including smokingand unhealthy eating habits, long term risk of inappropriate choices, as well as advantages to behavior modification. Continue counseling per nursing  -Smoking cessation  -Offer nicotine patch      "

## 2017-12-08 NOTE — PLAN OF CARE
TN explained in detail the importance of who will help manage care at home; keeping all scheduled appts; and taking all meds as prescribed. Pt verbalized an understanding with teach back.      [id number]  [place], [place] - [address]  [address] [address]  Phone: [phone] Fax: [phone]       12/08/17 1310   Discharge Assessment   Assessment Type Discharge Planning Assessment   Confirmed/corrected address and phone number on facesheet? Yes   Assessment information obtained from? Patient   Expected Length of Stay (days) 1   Communicated expected length of stay with patient/caregiver yes   Prior to hospitilization cognitive status: Alert/Oriented   Prior to hospitalization functional status: Independent   Current cognitive status: Alert/Oriented   Current Functional Status: Assistive Equipment   Lives With spouse   Able to Return to Prior Arrangements yes   Is patient able to care for self after discharge? Yes   Who are your caregiver(s) and their phone number(s)? (Spouse, Getachew Palm [phone])   Patient's perception of discharge disposition home or selfcare   Readmission Within The Last 30 Days no previous admission in last 30 days   Patient currently being followed by outpatient case management? No   Patient currently receives any other outside agency services? No   Equipment Currently Used at Home bedside commode;shower chair;glucometer;wheelchair;walker, rolling;oxygen  (nebulizer; oxygen provided by [facility].)   Do you have any problems affording any of your prescribed medications? No   Is the patient taking medications as prescribed? yes   Does the patient have transportation home? Yes   Transportation Available car;family or friend will provide   Discharge Plan A Home with family   Patient/Family In Agreement With Plan yes

## 2017-12-08 NOTE — PLAN OF CARE
12/08/17 1324   Final Note   Assessment Type Final Discharge Note   Discharge Disposition Home   What phone number can be called within the next 1-3 days to see how you are doing after discharge? (Listed in chart)   Hospital Follow Up  Appt(s) scheduled? Yes   Discharge plans and expectations educations in teach back method with documentation complete? Yes   Right Care Referral Info   Post Acute Recommendation No Care

## 2017-12-08 NOTE — ED NOTES
Pt rounded on. In no acute distress. Bed rails up and call light in reach. Pt MD lora is aware. Pt states that she is comfortable on NC. I've updated pt on plan of care for the day.

## 2017-12-08 NOTE — H&P
[facility] Medicine  History & Physical    Patient Name: Yasmeen Palm  MRN: [id number]  Admission Date: 12/8/2017  Attending Physician: Viri Paredes MD   Primary Care Provider: Angel Orourke Jr, MD         Patient information was obtained from patient and ER records.     Subjective:     Principal Problem:Acute exacerbation of chronic obstructive pulmonary disease (COPD)    Chief Complaint:   Chief Complaint   Patient presents with    Shortness of Breath     hx of COPD; c/o SOB x 2 days; used nebulizer with albuterol and used combivent inhaler without relief; CP and bilateral side pain as well; also c/o nausea/vomiting        HPI: Yasmeen Palm is a 67 y.o. black female former smoker w/ COPD, HTN, GERD, depression, DM and hx of DVT (on Xarelto 20 mg), abdominal surgery, and cardiac surgery. She presented for evaluation of progressive worsening SOB X 2-3 days and constant cough, with associated nausea without vomiting, post nasal drip, and intermittent diarrhea that has since resolved. Her SOB unrelieved by her usual albuterol nebulizer and Combivent inhaler at home. She complaints of sharp L sided thoracic pain that is worsened by cough and deep breaths. She tell me that she came to the hospital because she checked her number and they were all bad, meaning her BP, and tingling in her fingertips. Denies chest pressure, chest tightness, abdominal pain, dysuria/hematuria, rash, fever/chills, otalgia, eye pain, or current smoking. In ED, Respiratory therapist endorses O2 sats of 100% on 3 L. chest pain.    Past Medical History:   Diagnosis Date    Anticoagulant long-term use     Arthritis     Asthma     CHF (congestive heart failure)     COPD (chronic obstructive pulmonary disease)     Coronary artery disease     Depression     Diabetes mellitus     GERD (gastroesophageal reflux disease)     Hypertension        Past Surgical History:   Procedure Laterality Date     ABDOMINAL SURGERY      CARDIAC SURGERY         Review of patient's allergies indicates:  No Known Allergies    No current facility-administered medications on file prior to encounter.      Current Outpatient Prescriptions on File Prior to Encounter   Medication Sig    acetaminophen (TYLENOL) 500 MG tablet Take 1 tablet (500 mg total) by mouth every 8 (eight) hours as needed.    aspirin (ECOTRIN) 81 MG EC tablet Take 81 mg by mouth once daily.    cloNIDine (CATAPRES) 0.1 MG tablet Take 2 tablets (0.2 mg total) by mouth 3 (three) times daily.    diltiaZEM (CARDIZEM) 120 MG tablet Take 1 tablet (120 mg total) by mouth every 8 (eight) hours.    furosemide (LASIX) 40 MG tablet Take 40 mg by mouth once daily.     gabapentin (NEURONTIN) 300 MG capsule Take 300 mg by mouth 3 (three) times daily.    hydrALAZINE (APRESOLINE) 50 MG tablet Take 1 tablet (50 mg total) by mouth every 8 (eight) hours. (Patient taking differently: Take 50 mg by mouth every 12 (twelve) hours. )    lisinopril (PRINIVIL,ZESTRIL) 40 MG tablet Take 1 tablet (40 mg total) by mouth once daily. Do not take this medication if blood pressure is below 130/80 mmHg and/or feeling dizzy after taking all other blood pressure meds    metformin (GLUCOPHAGE) 500 MG tablet Take 500 mg by mouth 2 (two) times daily with meals.    naproxen (NAPROSYN) 375 MG tablet Take 375 mg by mouth every 12 (twelve) hours as needed.    nitroGLYCERIN (NITROSTAT) 0.3 MG SL tablet Place 0.3 mg under the tongue every 5 (five) minutes as needed for Chest pain.    pravastatin (PRAVACHOL) 40 MG tablet Take 40 mg by mouth once daily.    sotalol (BETAPACE) 80 MG tablet Take 80 mg by mouth 2 (two) times daily.    tiotropium (SPIRIVA) 18 mcg inhalation capsule Inhale 1 capsule (18 mcg total) into the lungs once daily. Controller    tramadol (ULTRAM) 50 mg tablet Take 1 tablet (50 mg total) by mouth every 6 (six) hours as needed for Pain.    UMECLIDINIUM BRM/VILANTEROL TR (ANORO  ELLIPTA INHL) Inhale into the lungs daily as needed.    [DISCONTINUED] mirabegron (MYRBETRIQ) 25 mg Tb24 ER tablet Take 25 mg by mouth once daily.    [DISCONTINUED] predniSONE (DELTASONE) 20 MG tablet Take 3 tabs for 2 days, then 2 tabs for 2 days, then 1 tab for 2 days, then stop.    [DISCONTINUED] vortioxetine (BRINTELLIX) 5 mg Tab Take 5 mg by mouth 2 (two) times daily.     Family History     Problem Relation (Age of Onset)    Diabetes Father    Heart disease Mother    Hypertension Mother        Social History Main Topics    Smoking status: Former Smoker     Packs/day: 0.50     Years: 25.00     Types: Cigarettes     Quit date: 12/3/2017    Smokeless tobacco: Never Used    Alcohol use No    Drug use: No    Sexual activity: No     Review of Systems   Constitutional: Negative for appetite change, chills, diaphoresis and fever.   HENT: Negative for congestion, hearing loss, sore throat, tinnitus and trouble swallowing.    Eyes: Negative for photophobia, discharge, itching and visual disturbance.   Respiratory: Positive for cough and shortness of breath. Negative for apnea, wheezing and stridor.    Cardiovascular: Negative for chest pain, palpitations and leg swelling.   Gastrointestinal: Negative for abdominal distention, abdominal pain, blood in stool, constipation, diarrhea and nausea.   Endocrine: Negative for polydipsia, polyphagia and polyuria.   Genitourinary: Negative for difficulty urinating, dysuria, flank pain and frequency.   Musculoskeletal: Positive for arthralgias. Negative for joint swelling and neck stiffness.   Skin: Negative for color change, rash and wound.   Neurological: Negative for dizziness, tremors, seizures, light-headedness, numbness and headaches.   Hematological: Negative for adenopathy.   Psychiatric/Behavioral: Negative for hallucinations and self-injury.     Objective:     Vital Signs (Most Recent):  Temp: 99.8 °F (37.7 °C) (12/08/17 0937)  Pulse: 96 (12/08/17 1105)  Resp: 18  (12/08/17 0937)  BP: 120/61 (12/08/17 0937)  SpO2: 95 % (12/08/17 0937) Vital Signs (24h Range):  Temp:  [98.5 °F (36.9 °C)-99.8 °F (37.7 °C)] 99.8 °F (37.7 °C)  Pulse:  [] 96  Resp:  [18-24] 18  SpO2:  [90 %-100 %] 95 %  BP: (119-251)/() 120/61     Weight: 84.4 kg (186 lb 1.1 oz)  Body mass index is 29.14 kg/m².    Physical Exam   Constitutional: She is oriented to person, place, and time. She appears well-developed and well-nourished. She is cooperative.   HENT:   Head: Normocephalic and atraumatic.   Eyes: Conjunctivae and lids are normal.   Neck: Full passive range of motion without pain. Neck supple. No JVD present. No edema present. No thyroid mass present.   Cardiovascular: S1 normal, S2 normal and intact distal pulses.    No murmur heard.  Pulmonary/Chest: Effort normal and breath sounds normal.   Abdominal: Soft. Bowel sounds are normal. She exhibits no distension and no abdominal bruit. There is no splenomegaly or hepatomegaly. There is no tenderness. There is no CVA tenderness.   Musculoskeletal: Normal range of motion. She exhibits no edema or tenderness.   Lymphadenopathy:     She has no cervical adenopathy.     She has no axillary adenopathy.   Neurological: She is alert and oriented to person, place, and time. She has normal reflexes. She displays no tremor. She displays no seizure activity.   Skin: Skin is warm, dry and intact.   Psychiatric: She has a normal mood and affect. Her speech is normal. Thought content normal. Cognition and memory are normal.           Significant Labs:   BMP:   Recent Labs  Lab 12/08/17  0545   *      K 4.9      CO2 26   BUN 5*   CREATININE 0.7   CALCIUM 10.3     CBC:   Recent Labs  Lab 12/08/17  0545   WBC 13.41*   HGB 12.5   HCT 43.0   *     Cardiac Markers:   Recent Labs  Lab 12/08/17  0545   *     Coagulation:   Recent Labs  Lab 12/08/17 0545   INR 1.0     Lactic Acid:   Recent Labs  Lab 12/08/17  0550   LACTATE 1.0      Lipase:   Recent Labs  Lab 12/08/17  0545   LIPASE 11     Lipid Panel: No results for input(s): CHOL, HDL, LDLCALC, TRIG, CHOLHDL in the last 48 hours.  Magnesium: No results for input(s): MG in the last 48 hours.  Respiratory Culture: No results for input(s): GSRESP, RESPIRATORYC in the last 48 hours.  Troponin:   Recent Labs  Lab 12/08/17  0545 12/08/17  0951   TROPONINI 0.021 0.049*     TSH: No results for input(s): TSH in the last 4320 hours.  Urine Culture: No results for input(s): LABURIN in the last 48 hours.  Urine Studies:   Recent Labs  Lab 12/08/17  0702   COLORU Straw   APPEARANCEUA Clear   PHUR 8.0   SPECGRAV 1.010   PROTEINUA 2+*   GLUCUA 1+*   KETONESU Trace*   BILIRUBINUA Negative   OCCULTUA Negative   NITRITE Negative   UROBILINOGEN Negative   LEUKOCYTESUR Negative   RBCUA 0   WBCUA 5   BACTERIA None   HYALINECASTS 0       Significant Imaging:   Imaging Results          X-Ray Chest AP Portable (Final result)  Result time 12/08/17 07:03:11    Final result by Bo Lorenzana MD (12/08/17 07:03:11)                 Impression:        No radiographic evidence of acute intrathoracic process or detrimental interval change.      Electronically signed by: BO LORENZANA  Date:     12/08/17  Time:    07:03              Narrative:    Comparison:11/2/2017    Technique: Single AP portable chest radiograph.    Findings:     Cardiac monitoring leads overlie the chest. The cardiomediastinal silhouette appears stable from prior examination. There is tortuosity of the thoracic aorta with associated calcific atherosclerosis. The lungs appear symmetrically expanded without evidence of focal airspace consolidation or pleural effusion. The previously demonstrated left lower lobe superior segment pulmonary nodule is not well-visualized by conventional radiography. No evidence of pneumothorax. The osseous structure demonstrate stable degenerative changes.                                Assessment/Plan:     * Acute  "exacerbation of chronic obstructive pulmonary disease (COPD)    Oxygen saturations remain stable on her usual home oxygen level - her fingers tingling likely 2/2 acute on chronic hypoxia +/- cigarette smoking -  she has mild leukocytosis and mild elevation in troponin one level and BNP about baseline in the low 300's. I do not appreciate LE swelling, pulmonary rales or crackles on physical assessment, mild JVD likely chronic - she appears more euvolemic/mild hypovolemic more so than FVO - likely acute on chronic bronchitis/allergic rhinitis +/- COPD component  -duo neb, change to xopenex Q8 & ipratropium Q4 2/2 tachycardia  -On Xarelto - continue  -IV steroids  -supplemental oxygen          Tachycardia    Suspect 2/2 albuterol overuse at home - restart medications, Her heart rate appears to be stabilizing - she is already on Rivaroxaban - continue this dose, cardiac monitoring - switch albuterol to xopenex          Tobacco abuse    Counseled on dangers of smoking with home oxygen she states, "Oh, I don't smoke around the oxygen". Patient counseled on the dangers of unhealthy lifestyle choices including smokingand unhealthy eating habits, long term risk of inappropriate choices, as well as advantages to behavior modification. Continue counseling per nursing  -Smoking cessation  -Offer nicotine patch            Malignant hypertension    Restart home medications - decent control at this time          Gastroesophageal reflux disease without esophagitis    PPI          Type 2 diabetes mellitus, controlled    Hold home oral hyperglycemics if indicated - while hospitalized will use combined insulin therapy with basal and prandial insulin coverage, POCT glucose checks, hypoglycemic protocol and correction scale - HgA1c 6.3 911/2017)            VTE Risk Mitigation         Ordered     rivaroxaban tablet 20 mg  With dinner     Route:  Oral        12/08/17 0932     High Risk of VTE  Once      12/08/17 0932             Letha ZUNIGA" YOSVANY Bartholomew, GABRIELA-C  PA# [id number]  NPI# [id number]  Hospitalist - Department of Hospital Medicine  [address] [place] [address]  Office [phone]; Pager [phone]

## 2017-12-08 NOTE — ED TRIAGE NOTES
Pt seen in ED with complaints of SOB x 2 days.  Pt has a hx of COPD. Pt stated her home inhalers did not work. Pt wears 2 1/2-3 liters of home O2. Pt was complaining of chest pain and nausea with vomiting.

## 2017-12-08 NOTE — NURSING
Patient's 's BPM sustained ST while lying in bed. Puma OCHOA notified. She said she will place new orders.

## 2017-12-08 NOTE — CARE UPDATE
Notified by nursing staff that patient's HR was 135. Nurse did not however, report a BP. - Her last BP documented was 115/56 with HR 86. Ask nurse to recheck BP - before I will be able to suggest order. Repeat       97  --   112/55  --  --     No order requires

## 2017-12-08 NOTE — ED PROVIDER NOTES
"Encounter Date: 12/8/2017    SCRIBE #1 NOTE: I, Char Godwin, am scribing for, and in the presence of, Braden Matos MD. Other sections scribed: HPI/ROS.       History     Chief Complaint   Patient presents with    Shortness of Breath     hx of COPD; c/o SOB x 2 days; used nebulizer with albuterol and used combivent inhaler without relief; CP and bilateral side pain as well; also c/o nausea/vomiting     CC: Shortness of breath    Pt is a 67 y.o. female former smoker w/ COPD, HTN, GERD, depression, DM and hx of DVT (on Xarelto 20 mg), abdominal surgery, and cardiac surgery presenting to the ED c/o constant cough and SOB x 2 days with no relief from albuterol nebulizer and Combivent inhaler. Pt also c/o sharp, lateral thoracic pain bilaterally worsened with cough and deep inspiration. Pt c/o nausea with 4 episodes of vomiting and 3-4 episodes of diarrhea in the past 24 hours. Pt c/o bilateral hand numbness and "tingling in my fingertips."      Pt is on 2-3 L O2 at home. Respiratory therapist endorses O2 sats of 100% on 3 L. DuoNeb in process. Pt denies chest pain, chest pressure, chest tightness, abdominal pain, dysuria/hematuria, rash, fever/chills, otalgia, eye pain, or current smoking. Pt reports no further symptoms.      The history is provided by the patient.     Review of patient's allergies indicates:  No Known Allergies  Past Medical History:   Diagnosis Date    Anticoagulant long-term use     Arthritis     Asthma     CHF (congestive heart failure)     COPD (chronic obstructive pulmonary disease)     Coronary artery disease     Depression     Diabetes mellitus     GERD (gastroesophageal reflux disease)     Hypertension      Past Surgical History:   Procedure Laterality Date    ABDOMINAL SURGERY      CARDIAC SURGERY       Family History   Problem Relation Age of Onset    Heart disease Mother     Hypertension Mother     Diabetes Father      Social History   Substance Use Topics    Smoking " status: Former Smoker     Packs/day: 0.50     Years: 25.00     Types: Cigarettes     Quit date: 12/3/2017    Smokeless tobacco: Never Used    Alcohol use No     Review of Systems   Constitutional: Negative for chills and fever.   HENT: Negative for ear pain.    Eyes: Negative for pain.   Respiratory: Positive for cough and shortness of breath. Negative for chest tightness.    Cardiovascular: Negative for chest pain.        (-) chest pressure   Gastrointestinal: Positive for diarrhea, nausea and vomiting. Negative for abdominal pain.   Genitourinary: Negative for dysuria and hematuria.   Musculoskeletal: Negative for back pain.        (+) lateral thoracic pain   Skin: Negative for rash.   Neurological: Positive for numbness (+) bilateral hands.        (+) fingertip paresthesias       Physical Exam     Initial Vitals [12/08/17 0533]   BP Pulse Resp Temp SpO2   (!) 226/103 (!) 133 (!) 24 98.7 °F (37.1 °C) (!) 90 %      MAP       144         Physical Exam  The patient was examined specifically for the following:   General:No significant distress, Good color, Warm and dry. Head and neck:Scalp atraumatic, Neck supple. Neurological:Appropriate conversation, Gross motor deficits. Eyes:Conjugate gaze, Clear corneas. ENT: No epistaxis. Cardiac: Regular rate and rhythm, Grossly normal heart tones. Pulmonary: Wheezing, Rales. Gastrointestinal: Abdominal tenderness, Abdominal distention. Musculoskeletal: Extremity deformity, Apparent pain with range of motion of the joints. Skin: Rash.   The findings on examination were normal except for the following: The patient's lungs are fairly clear.  Her heart rate is 133.  The respiratory rate is 24.  The patient's temperatures 98.7.  Oxygen saturations on arrival were 90%.  The patient's blood pressure 226/103.  The patient's oxygen saturations are 100% on 3 L after her nebulizer treatment.  The patient is awake alert and bright.  She is vomiting into an emesis bag.  The scalp was  unremarkable the neck is supple.  She is appropriate in conversation.  There are no gross neurologic deficits.  The heart tones are remarkable for regular tachycardia the abdomen is nontender.  There is no extremity tenderness or pedal edema.  ED Course   Procedures  Labs Reviewed   CBC W/ AUTO DIFFERENTIAL - Abnormal; Notable for the following:        Result Value    WBC 13.41 (*)     MCH 25.6 (*)     MCHC 29.1 (*)     RDW 19.9 (*)     Platelets 602 (*)     MPV 9.1 (*)     Gran # 10.3 (*)     Gran% 76.9 (*)     Lymph% 13.0 (*)     All other components within normal limits   COMPREHENSIVE METABOLIC PANEL - Abnormal; Notable for the following:     Glucose 149 (*)     BUN, Bld 5 (*)     Total Protein 8.6 (*)     ALT 8 (*)     All other components within normal limits   B-TYPE NATRIURETIC PEPTIDE - Abnormal; Notable for the following:      (*)     All other components within normal limits   URINALYSIS - Abnormal; Notable for the following:     Protein, UA 2+ (*)     Glucose, UA 1+ (*)     Ketones, UA Trace (*)     All other components within normal limits   POCT GLUCOSE - Abnormal; Notable for the following:     POCT Glucose 147 (*)     All other components within normal limits   TROPONIN I   PROTIME-INR   LACTIC ACID, PLASMA   LIPASE   URINALYSIS MICROSCOPIC     EKG Readings: (Independently Interpreted)   Patient's EKG reveals a sinus tachycardia with a heart rate of 128.  There are nonspecific ST and T-wave changes.  There is poor R-wave progression across precordium.  There are nonspecific ST and T-wave changes.  This patient is a first-degree AV block.  There is no definite evidence of acute myocardial infarction.       X-Rays:   Independently Interpreted Readings:   Other Readings:  Chest x-ray fails to reveal significant abnormalities.    Medical decision making: Given the above this patient presents emergency with shortness of breath she had oxygen saturations of 89% on arrival.  She was treated with  nebulizers in the emergency room and her oxygen saturations improved to 100% on 3 L nasal cannula.  She continued to feel short of breath.  She had pain in the left axillary chest that was sharp and worse with deep breathing she remained tender there.  Her first troponin was negative.  She also has pain in her lateral thoraces, sharp and worse with deep breathing.  I don't believe that this is cardiac.  She complains of a dull heavy pain in her jaw.  I will admit her for observation trend her troponins treat her for COPD exacerbation and have her evaluated by hospital medicine.  I doubt pulmonary embolus.  The patient is on Xarelto chest x-ray fails to reveal pneumothorax pneumonia pleural effusion.              Scribe Attestation:   Scribe #1: I performed the above scribed service and the documentation accurately describes the services I performed. I attest to the accuracy of the note.    Attending Attestation:           Physician Attestation for Scribe:  Physician Attestation Statement for Scribe #1: I, Braden Matos MD, reviewed documentation, as scribed by Char Godwin in my presence, and it is both accurate and complete.                 ED Course      Clinical Impression:   The primary encounter diagnosis was Acute exacerbation of chronic obstructive pulmonary disease (COPD). Diagnoses of Shortness of breath, Jaw pain, Controlled type 2 diabetes mellitus without complication, without long-term current use of insulin, Coronary artery disease involving native coronary artery of native heart with angina pectoris, Simple chronic bronchitis, History of deep vein thrombosis, Chronic anticoagulation, Chronic respiratory failure with hypoxia and hypercapnia, and Thoracic aorta atherosclerosis were also pertinent to this visit.                           Braden Matos MD  12/11/17 8441

## 2017-12-09 VITALS
OXYGEN SATURATION: 92 % | TEMPERATURE: 98 F | DIASTOLIC BLOOD PRESSURE: 56 MMHG | HEART RATE: 71 BPM | HEIGHT: 67 IN | RESPIRATION RATE: 18 BRPM | BODY MASS INDEX: 29.97 KG/M2 | WEIGHT: 190.94 LBS | SYSTOLIC BLOOD PRESSURE: 115 MMHG

## 2017-12-09 LAB
ANION GAP SERPL CALC-SCNC: 10 MMOL/L
BASOPHILS # BLD AUTO: 0.01 K/UL
BASOPHILS NFR BLD: 0.1 %
BUN SERPL-MCNC: 18 MG/DL
CALCIUM SERPL-MCNC: 10.1 MG/DL
CHLORIDE SERPL-SCNC: 99 MMOL/L
CO2 SERPL-SCNC: 29 MMOL/L
CREAT SERPL-MCNC: 1.3 MG/DL
DIFFERENTIAL METHOD: ABNORMAL
EOSINOPHIL # BLD AUTO: 0 K/UL
EOSINOPHIL NFR BLD: 0 %
ERYTHROCYTE [DISTWIDTH] IN BLOOD BY AUTOMATED COUNT: 18.9 %
EST. GFR  (AFRICAN AMERICAN): 50 ML/MIN/1.73 M^2
EST. GFR  (NON AFRICAN AMERICAN): 43 ML/MIN/1.73 M^2
GLUCOSE SERPL-MCNC: 232 MG/DL
HCT VFR BLD AUTO: 38.3 %
HGB BLD-MCNC: 10.8 G/DL
LYMPHOCYTES # BLD AUTO: 1 K/UL
LYMPHOCYTES NFR BLD: 6.7 %
MCH RBC QN AUTO: 25.2 PG
MCHC RBC AUTO-ENTMCNC: 28.2 G/DL
MCV RBC AUTO: 89 FL
MONOCYTES # BLD AUTO: 0.1 K/UL
MONOCYTES NFR BLD: 0.5 %
NEUTROPHILS # BLD AUTO: 13.8 K/UL
NEUTROPHILS NFR BLD: 92.7 %
PLATELET # BLD AUTO: 523 K/UL
PMV BLD AUTO: 9.4 FL
POCT GLUCOSE: 235 MG/DL (ref 70–110)
POTASSIUM SERPL-SCNC: 4.3 MMOL/L
RBC # BLD AUTO: 4.29 M/UL
SODIUM SERPL-SCNC: 138 MMOL/L
WBC # BLD AUTO: 14.83 K/UL

## 2017-12-09 PROCEDURE — G0378 HOSPITAL OBSERVATION PER HR: HCPCS

## 2017-12-09 PROCEDURE — 25000003 PHARM REV CODE 250: Performed by: NURSE PRACTITIONER

## 2017-12-09 PROCEDURE — 94640 AIRWAY INHALATION TREATMENT: CPT

## 2017-12-09 PROCEDURE — 96376 TX/PRO/DX INJ SAME DRUG ADON: CPT

## 2017-12-09 PROCEDURE — 36415 COLL VENOUS BLD VENIPUNCTURE: CPT

## 2017-12-09 PROCEDURE — 96374 THER/PROPH/DIAG INJ IV PUSH: CPT

## 2017-12-09 PROCEDURE — 63600175 PHARM REV CODE 636 W HCPCS: Performed by: NURSE PRACTITIONER

## 2017-12-09 PROCEDURE — 25000003 PHARM REV CODE 250: Performed by: EMERGENCY MEDICINE

## 2017-12-09 PROCEDURE — 94761 N-INVAS EAR/PLS OXIMETRY MLT: CPT

## 2017-12-09 PROCEDURE — 25000242 PHARM REV CODE 250 ALT 637 W/ HCPCS: Performed by: NURSE PRACTITIONER

## 2017-12-09 PROCEDURE — 27000221 HC OXYGEN, UP TO 24 HOURS

## 2017-12-09 PROCEDURE — S4991 NICOTINE PATCH NONLEGEND: HCPCS | Performed by: NURSE PRACTITIONER

## 2017-12-09 PROCEDURE — 82962 GLUCOSE BLOOD TEST: CPT

## 2017-12-09 PROCEDURE — 99900035 HC TECH TIME PER 15 MIN (STAT)

## 2017-12-09 PROCEDURE — 85025 COMPLETE CBC W/AUTO DIFF WBC: CPT

## 2017-12-09 PROCEDURE — 80048 BASIC METABOLIC PNL TOTAL CA: CPT

## 2017-12-09 RX ORDER — RIVAROXABAN 20 MG/1
20 TABLET, FILM COATED ORAL
Status: ON HOLD | COMMUNITY
End: 2019-08-22 | Stop reason: HOSPADM

## 2017-12-09 RX ORDER — LEVALBUTEROL TARTRATE 45 UG/1
1-2 AEROSOL, METERED ORAL EVERY 6 HOURS PRN
Qty: 1 INHALER | Refills: 0 | Status: SHIPPED | OUTPATIENT
Start: 2017-12-09 | End: 2018-02-12

## 2017-12-09 RX ORDER — PREDNISONE 20 MG/1
TABLET ORAL
Qty: 12 TABLET | Refills: 0 | Status: SHIPPED | OUTPATIENT
Start: 2017-12-09 | End: 2018-02-12

## 2017-12-09 RX ADMIN — IPRATROPIUM BROMIDE 0.5 MG: 0.5 SOLUTION RESPIRATORY (INHALATION) at 08:12

## 2017-12-09 RX ADMIN — METHYLPREDNISOLONE SODIUM SUCCINATE 80 MG: 125 INJECTION, POWDER, FOR SOLUTION INTRAMUSCULAR; INTRAVENOUS at 06:12

## 2017-12-09 RX ADMIN — DILTIAZEM HYDROCHLORIDE 120 MG: 30 TABLET, FILM COATED ORAL at 06:12

## 2017-12-09 RX ADMIN — HYDRALAZINE HYDROCHLORIDE 50 MG: 25 TABLET ORAL at 06:12

## 2017-12-09 RX ADMIN — GABAPENTIN 300 MG: 300 CAPSULE ORAL at 06:12

## 2017-12-09 RX ADMIN — LEVALBUTEROL HYDROCHLORIDE 0.63 MG: 0.63 SOLUTION RESPIRATORY (INHALATION) at 08:12

## 2017-12-09 RX ADMIN — ASPIRIN 81 MG: 81 TABLET, COATED ORAL at 08:12

## 2017-12-09 RX ADMIN — PRAVASTATIN SODIUM 40 MG: 40 TABLET ORAL at 08:12

## 2017-12-09 RX ADMIN — NICOTINE 1 PATCH: 21 PATCH, EXTENDED RELEASE TRANSDERMAL at 09:12

## 2017-12-09 RX ADMIN — INSULIN ASPART 4 UNITS: 100 INJECTION, SOLUTION INTRAVENOUS; SUBCUTANEOUS at 09:12

## 2017-12-09 RX ADMIN — LISINOPRIL 40 MG: 20 TABLET ORAL at 08:12

## 2017-12-09 RX ADMIN — SOTALOL HYDROCHLORIDE 80 MG: 80 TABLET ORAL at 08:12

## 2017-12-09 RX ADMIN — CLONIDINE HYDROCHLORIDE 0.2 MG: 0.1 TABLET ORAL at 06:12

## 2017-12-09 RX ADMIN — FUROSEMIDE 40 MG: 40 TABLET ORAL at 08:12

## 2017-12-09 NOTE — DISCHARGE SUMMARY
[facility] Hospital Medicine  Discharge Summary      Patient Name: Yasmeen Palm  MRN: [id number]  Admission Date: 12/8/2017  Hospital Length of Stay: 0 days  Discharge Date and Time: 12/9/2017 12:46 PM  Attending Physician: Raina att. providers found   Discharging Provider: Mau Shultz Jr, NP  Primary Care Provider: Angel Orourke Jr, MD      HPI:   Yasmeen Palm is a 67 y.o. black female former smoker w/ COPD, HTN, GERD, depression, DM and hx of DVT (on Xarelto 20 mg), abdominal surgery, and cardiac surgery. She presented for evaluation of progressive worsening SOB X 2-3 days and constant cough, with associated nausea without vomiting, post nasal drip, and intermittent diarrhea that has since resolved. Her SOB unrelieved by her usual albuterol nebulizer and Combivent inhaler at home. She complaints of sharp L sided thoracic pain that is worsened by cough and deep breaths. She tell me that she came to the hospital because she checked her number and they were all bad, meaning her BP, and tingling in her fingertips. Denies chest pressure, chest tightness, abdominal pain, dysuria/hematuria, rash, fever/chills, otalgia, eye pain, or current smoking. In ED, Respiratory therapist endorses O2 sats of 100% on 3 L. chest pain.    * No surgery found *      Hospital Course:   Mrs. Palm was placed in observation for worsening shortness of breath due to COPD exacerbation.  Received nebulizer treatments and IV corticosteroids with improvement.  Initially she was tachycardic thought to be due to overuse of albuterol.  Switched to levalbuterol and heart rate returned to normal.  Her blood pressure was well controlled on her home medication regimen.  Troponin mildly elevated with flat trend attributed to poor renal clearance.  Mild leukocytosis of 13-14,000 attributed to corticosteroids.  Smoking cessation encouraged.  Discharged home in stable condition on her home dose of oxygen, with levalbuterol rescue  inhaler and prednisone taper to follow up with PCP.     Consults:   Consults         Status Ordering Provider     Inpatient consult to Social Work/Case Management  Once     Provider:  (Not yet assigned)    Acknowledged JEFFREY NAVARRETE          No new Assessment & Plan notes have been filed under this hospital service since the last note was generated.  Service: Hospital Medicine    Final Active Diagnoses:    Diagnosis Date Noted POA    PRINCIPAL PROBLEM:  Acute exacerbation of chronic obstructive pulmonary disease (COPD) [J44.1] 12/08/2017 Yes    Tachycardia [R00.0] 12/08/2017 Unknown     Chronic    Malignant hypertension [I10] 09/27/2016 Yes    Type 2 diabetes mellitus, controlled [E11.9] 12/14/2015 Yes     Chronic    Gastroesophageal reflux disease without esophagitis [K21.9] 12/14/2015 Yes     Chronic    Tobacco abuse [Z72.0] 12/14/2015 Yes     Chronic      Problems Resolved During this Admission:    Diagnosis Date Noted Date Resolved POA       Discharged Condition: stable    Disposition: Home or Self Care    Follow Up:  Follow-up Information     Angel Orourke Jr, MD On 12/12/2017.    Specialty:  Family Medicine  Why:  Outpatient Services PCP Follow-Up Tuesday at 11:30 AM  Contact information:  [address] [facility] [address]  [phone]             Jorge Lowry MD. Schedule an appointment as soon as possible for a visit in 1 week.    Specialties:  Pulmonary Disease, Sleep Medicine  Why:  Call the office for appointment  Contact information:  [address] [facility] [address]  [phone]                 Patient Instructions:     Diet Diabetic 1800 Calories     Diet Cardiac     Diet Low Sodium, 2gm     Activity as tolerated         Significant Diagnostic Studies: Labs:   CMP   Recent Labs  Lab 12/08/17  0545 12/09/17  0423    138   K 4.9 4.3    99   CO2 26 29   * 232*   BUN 5* 18   CREATININE 0.7 1.3   CALCIUM 10.3 10.1   PROT 8.6*  --    ALBUMIN  3.8  --    BILITOT 0.3  --    ALKPHOS 114  --    AST 26  --    ALT 8*  --    ANIONGAP 16 10   ESTGFRAFRICA >60 50*   EGFRNONAA >60 43*   , CBC   Recent Labs  Lab 12/08/17  0545 12/09/17  0423   WBC 13.41* 14.83*   HGB 12.5 10.8*   HCT 43.0 38.3   * 523*    and Troponin   Recent Labs  Lab 12/08/17  1633   TROPONINI 0.066*  0.066*     Pending Diagnostic Studies:     None         Medications:  Reconciled Home Medications:   Discharge Medication List as of 12/9/2017 11:17 AM      START taking these medications    Details   levalbuterol (XOPENEX HFA) 45 mcg/actuation inhaler Inhale 1-2 puffs into the lungs every 6 (six) hours as needed for Wheezing. Rescue, Starting Sat 12/9/2017, Until Sun 12/9/2018, Normal         CONTINUE these medications which have NOT CHANGED    Details   acetaminophen (TYLENOL) 500 MG tablet Take 1 tablet (500 mg total) by mouth every 8 (eight) hours as needed., Starting 10/7/2016, Until Discontinued, OTC      aspirin (ECOTRIN) 81 MG EC tablet Take 81 mg by mouth once daily., Until Discontinued, Historical Med      cloNIDine (CATAPRES) 0.1 MG tablet Take 2 tablets (0.2 mg total) by mouth 3 (three) times daily., Starting Fri 10/7/2016, Until Fri 12/8/2017, Print      diltiaZEM (CARDIZEM) 120 MG tablet Take 1 tablet (120 mg total) by mouth every 8 (eight) hours., Starting Mon 8/14/2017, Until Tue 8/14/2018, Print      furosemide (LASIX) 40 MG tablet Take 40 mg by mouth once daily. , Until Discontinued, Historical Med      gabapentin (NEURONTIN) 300 MG capsule Take 300 mg by mouth 3 (three) times daily., Historical Med      hydrALAZINE (APRESOLINE) 50 MG tablet Take 1 tablet (50 mg total) by mouth every 8 (eight) hours., Starting Mon 11/6/2017, Until Tue 11/6/2018, Normal      lisinopril (PRINIVIL,ZESTRIL) 40 MG tablet Take 1 tablet (40 mg total) by mouth once daily. Do not take this medication if blood pressure is below 130/80 mmHg and/or feeling dizzy after taking all other blood pressure  meds, Starting Mon 8/14/2017, No Print      metformin (GLUCOPHAGE) 500 MG tablet Take 500 mg by mouth 2 (two) times daily with meals., Until Discontinued, Historical Med      naproxen (NAPROSYN) 375 MG tablet Take 375 mg by mouth every 12 (twelve) hours as needed., Until Discontinued, Historical Med      nitroGLYCERIN (NITROSTAT) 0.3 MG SL tablet Place 0.3 mg under the tongue every 5 (five) minutes as needed for Chest pain., Until Discontinued, Historical Med      pravastatin (PRAVACHOL) 40 MG tablet Take 40 mg by mouth once daily., Until Discontinued, Historical Med      rivaroxaban (XARELTO) 20 mg Tab Take 20 mg by mouth daily with dinner or evening meal., Historical Med      sotalol (BETAPACE) 80 MG tablet Take 80 mg by mouth 2 (two) times daily., Until Discontinued, Historical Med      tiotropium (SPIRIVA) 18 mcg inhalation capsule Inhale 1 capsule (18 mcg total) into the lungs once daily. Controller, Starting Mon 8/14/2017, Until Tue 8/14/2018, Print      tramadol (ULTRAM) 50 mg tablet Take 1 tablet (50 mg total) by mouth every 6 (six) hours as needed for Pain., Starting 10/7/2016, Until Discontinued, Print      UMECLIDINIUM BRM/VILANTEROL TR (ANORO ELLIPTA INHL) Inhale into the lungs daily as needed., Historical Med             Indwelling Lines/Drains at time of discharge:   Lines/Drains/Airways          No matching active lines, drains, or airways          Time spent on the discharge of patient: 30 minutes  Patient was seen and examined on the date of discharge and determined to be suitable for discharge.    Mau Shultz Jr., APRN, [facility]  Hospitalist - Department of Hospital Medicine  [facility] [address] [place] [address]  Office #: [phone]; Pager #: [phone]

## 2017-12-09 NOTE — PROGRESS NOTES
Discharge instructions given to patient at bedside. Patient  verbalized understanding and states willingness to comply. Saline lock removed. Tele monitoring removed.

## 2017-12-09 NOTE — PLAN OF CARE
Problem: Diabetes, Type 2 (Adult)  Goal: Signs and Symptoms of Listed Potential Problems Will be Absent, Minimized or Managed (Diabetes, Type 2)  Signs and symptoms of listed potential problems will be absent, minimized or managed by discharge/transition of care (reference Diabetes, Type 2 (Adult) CPG).   Outcome: Outcome(s) achieved Date Met: 12/09/17 12/09/17 1111   Diabetes, Type 2   Problems Assessed (Type 2 Diabetes) all   Problems Present (Type 2 Diabetes) none       Problem: Patient Care Overview  Goal: Plan of Care Review  Outcome: Outcome(s) achieved Date Met: 12/09/17 12/09/17 1111   Coping/Psychosocial   Plan Of Care Reviewed With patient       Problem: Cardiac Output Decreased (Adult)  Goal: Adequate Cardiac Output/Effective Tissue Perfusion  Patient will demonstrate the desired outcomes by discharge/transition of care.   Outcome: Outcome(s) achieved Date Met: 12/09/17 12/09/17 1111   Cardiac Output Decreased (Adult)   Adequate Cardiac Output/Effective Tissue Perfusion achieves outcome

## 2017-12-09 NOTE — HOSPITAL COURSE
Mrs. Palm was placed in observation for worsening shortness of breath due to COPD exacerbation.  Received nebulizer treatments and IV corticosteroids with improvement.  Initially she was tachycardic thought to be due to overuse of albuterol.  Switched to levalbuterol and heart rate returned to normal.  Her blood pressure was well controlled on her home medication regimen.  Troponin mildly elevated with flat trend attributed to poor renal clearance.  Mild leukocytosis of 13-14,000 attributed to corticosteroids.  Smoking cessation encouraged.  Discharged home in stable condition on her home dose of oxygen, with levalbuterol rescue inhaler and prednisone taper to follow up with PCP.

## 2018-02-12 ENCOUNTER — HOSPITAL ENCOUNTER (EMERGENCY)
Facility: HOSPITAL | Age: 68
Discharge: HOME | End: 2018-02-12
Attending: EMERGENCY MEDICINE
Payer: MEDICARE

## 2018-02-12 VITALS
RESPIRATION RATE: 18 BRPM | HEIGHT: 67 IN | SYSTOLIC BLOOD PRESSURE: 192 MMHG | WEIGHT: 200 LBS | BODY MASS INDEX: 31.39 KG/M2 | TEMPERATURE: 98 F | DIASTOLIC BLOOD PRESSURE: 79 MMHG | OXYGEN SATURATION: 96 % | HEART RATE: 84 BPM

## 2018-02-12 DIAGNOSIS — R00.0 SINUS TACHYCARDIA: Primary | ICD-10-CM

## 2018-02-12 DIAGNOSIS — R06.02 SOB (SHORTNESS OF BREATH): ICD-10-CM

## 2018-02-12 DIAGNOSIS — I10 UNCONTROLLED HYPERTENSION: ICD-10-CM

## 2018-02-12 LAB
ALBUMIN SERPL BCP-MCNC: 3.5 G/DL
ALP SERPL-CCNC: 76 U/L
ALT SERPL W/O P-5'-P-CCNC: 7 U/L
ANION GAP SERPL CALC-SCNC: 10 MMOL/L
AST SERPL-CCNC: 13 U/L
BACTERIA #/AREA URNS HPF: ABNORMAL /HPF
BASOPHILS # BLD AUTO: 0.03 K/UL
BASOPHILS NFR BLD: 0.3 %
BILIRUB SERPL-MCNC: 0.2 MG/DL
BILIRUB UR QL STRIP: NEGATIVE
BNP SERPL-MCNC: 133 PG/ML
BUN SERPL-MCNC: 11 MG/DL
CALCIUM SERPL-MCNC: 9.8 MG/DL
CHLORIDE SERPL-SCNC: 102 MMOL/L
CLARITY UR: ABNORMAL
CO2 SERPL-SCNC: 30 MMOL/L
COLOR UR: ABNORMAL
CREAT SERPL-MCNC: 0.7 MG/DL
DIFFERENTIAL METHOD: ABNORMAL
EOSINOPHIL # BLD AUTO: 0.1 K/UL
EOSINOPHIL NFR BLD: 0.9 %
ERYTHROCYTE [DISTWIDTH] IN BLOOD BY AUTOMATED COUNT: 18.7 %
EST. GFR  (AFRICAN AMERICAN): >60 ML/MIN/1.73 M^2
EST. GFR  (NON AFRICAN AMERICAN): >60 ML/MIN/1.73 M^2
FLUAV AG SPEC QL IA: NEGATIVE
FLUBV AG SPEC QL IA: NEGATIVE
GLUCOSE SERPL-MCNC: 153 MG/DL
GLUCOSE UR QL STRIP: NEGATIVE
HCT VFR BLD AUTO: 40.1 %
HGB BLD-MCNC: 11.7 G/DL
HGB UR QL STRIP: NEGATIVE
HYALINE CASTS #/AREA URNS LPF: 0 /LPF
KETONES UR QL STRIP: ABNORMAL
LEUKOCYTE ESTERASE UR QL STRIP: ABNORMAL
LYMPHOCYTES # BLD AUTO: 0.8 K/UL
LYMPHOCYTES NFR BLD: 7 %
MAGNESIUM SERPL-MCNC: 1.5 MG/DL
MCH RBC QN AUTO: 26.1 PG
MCHC RBC AUTO-ENTMCNC: 29.2 G/DL
MCV RBC AUTO: 90 FL
MICROSCOPIC COMMENT: ABNORMAL
MONOCYTES # BLD AUTO: 0.1 K/UL
MONOCYTES NFR BLD: 1.2 %
NEUTROPHILS # BLD AUTO: 10.1 K/UL
NEUTROPHILS NFR BLD: 90.5 %
NITRITE UR QL STRIP: NEGATIVE
NON-SQ EPI CELLS #/AREA URNS HPF: 0 /HPF
PH UR STRIP: 5 [PH] (ref 5–8)
PLATELET # BLD AUTO: 396 K/UL
PMV BLD AUTO: 9.6 FL
POTASSIUM SERPL-SCNC: 3.8 MMOL/L
PROT SERPL-MCNC: 7.2 G/DL
PROT UR QL STRIP: ABNORMAL
RBC # BLD AUTO: 4.48 M/UL
RBC #/AREA URNS HPF: 1 /HPF (ref 0–4)
SODIUM SERPL-SCNC: 142 MMOL/L
SP GR UR STRIP: 1.03 (ref 1–1.03)
SPECIMEN SOURCE: NORMAL
SQUAMOUS #/AREA URNS HPF: 6 /HPF
TROPONIN I SERPL DL<=0.01 NG/ML-MCNC: 0.02 NG/ML
TROPONIN I SERPL DL<=0.01 NG/ML-MCNC: 0.02 NG/ML
URN SPEC COLLECT METH UR: ABNORMAL
UROBILINOGEN UR STRIP-ACNC: ABNORMAL EU/DL
WBC # BLD AUTO: 11.16 K/UL
WBC #/AREA URNS HPF: 7 /HPF (ref 0–5)

## 2018-02-12 PROCEDURE — 84484 ASSAY OF TROPONIN QUANT: CPT | Mod: 91

## 2018-02-12 PROCEDURE — 85025 COMPLETE CBC W/AUTO DIFF WBC: CPT

## 2018-02-12 PROCEDURE — 87400 INFLUENZA A/B EACH AG IA: CPT | Mod: 59

## 2018-02-12 PROCEDURE — 96366 THER/PROPH/DIAG IV INF ADDON: CPT

## 2018-02-12 PROCEDURE — 99285 EMERGENCY DEPT VISIT HI MDM: CPT | Mod: 25

## 2018-02-12 PROCEDURE — 81000 URINALYSIS NONAUTO W/SCOPE: CPT

## 2018-02-12 PROCEDURE — 96372 THER/PROPH/DIAG INJ SC/IM: CPT | Mod: 59

## 2018-02-12 PROCEDURE — 63600175 PHARM REV CODE 636 W HCPCS: Performed by: EMERGENCY MEDICINE

## 2018-02-12 PROCEDURE — 93010 ELECTROCARDIOGRAM REPORT: CPT | Mod: ,,, | Performed by: INTERNAL MEDICINE

## 2018-02-12 PROCEDURE — 96365 THER/PROPH/DIAG IV INF INIT: CPT

## 2018-02-12 PROCEDURE — 96375 TX/PRO/DX INJ NEW DRUG ADDON: CPT

## 2018-02-12 PROCEDURE — 80053 COMPREHEN METABOLIC PANEL: CPT

## 2018-02-12 PROCEDURE — 83735 ASSAY OF MAGNESIUM: CPT

## 2018-02-12 PROCEDURE — 93005 ELECTROCARDIOGRAM TRACING: CPT

## 2018-02-12 PROCEDURE — 25000003 PHARM REV CODE 250: Performed by: EMERGENCY MEDICINE

## 2018-02-12 PROCEDURE — 83880 ASSAY OF NATRIURETIC PEPTIDE: CPT

## 2018-02-12 RX ORDER — LORAZEPAM 2 MG/ML
1 INJECTION INTRAMUSCULAR
Status: COMPLETED | OUTPATIENT
Start: 2018-02-12 | End: 2018-02-12

## 2018-02-12 RX ORDER — METOPROLOL TARTRATE 1 MG/ML
5 INJECTION, SOLUTION INTRAVENOUS EVERY 5 MIN PRN
Status: DISCONTINUED | OUTPATIENT
Start: 2018-02-12 | End: 2018-02-12 | Stop reason: HOSPADM

## 2018-02-12 RX ORDER — SOTALOL HYDROCHLORIDE 80 MG/1
80 TABLET ORAL 2 TIMES DAILY
Status: DISCONTINUED | OUTPATIENT
Start: 2018-02-12 | End: 2018-02-12

## 2018-02-12 RX ORDER — ACETAMINOPHEN 500 MG
1000 TABLET ORAL
Status: COMPLETED | OUTPATIENT
Start: 2018-02-12 | End: 2018-02-12

## 2018-02-12 RX ORDER — LEVALBUTEROL TARTRATE 45 UG/1
2 AEROSOL, METERED ORAL EVERY 4 HOURS PRN
Qty: 1 INHALER | Refills: 3 | Status: ON HOLD | OUTPATIENT
Start: 2018-02-12 | End: 2019-07-01 | Stop reason: HOSPADM

## 2018-02-12 RX ORDER — MAGNESIUM SULFATE HEPTAHYDRATE 40 MG/ML
2 INJECTION, SOLUTION INTRAVENOUS
Status: DISCONTINUED | OUTPATIENT
Start: 2018-02-12 | End: 2018-02-12

## 2018-02-12 RX ORDER — LISINOPRIL 20 MG/1
40 TABLET ORAL
Status: COMPLETED | OUTPATIENT
Start: 2018-02-12 | End: 2018-02-12

## 2018-02-12 RX ORDER — METHOCARBAMOL 500 MG/1
1000 TABLET, FILM COATED ORAL
Status: DISCONTINUED | OUTPATIENT
Start: 2018-02-12 | End: 2018-02-12

## 2018-02-12 RX ORDER — PANTOPRAZOLE SODIUM 40 MG/1
80 TABLET, DELAYED RELEASE ORAL
Status: DISCONTINUED | OUTPATIENT
Start: 2018-02-12 | End: 2018-02-12

## 2018-02-12 RX ORDER — CLONIDINE HYDROCHLORIDE 0.1 MG/1
0.2 TABLET ORAL
Status: DISCONTINUED | OUTPATIENT
Start: 2018-02-12 | End: 2018-02-12

## 2018-02-12 RX ORDER — SOTALOL HYDROCHLORIDE 80 MG/1
80 TABLET ORAL 2 TIMES DAILY
Status: DISCONTINUED | OUTPATIENT
Start: 2018-02-12 | End: 2018-02-12 | Stop reason: HOSPADM

## 2018-02-12 RX ORDER — MAGNESIUM SULFATE HEPTAHYDRATE 40 MG/ML
2 INJECTION, SOLUTION INTRAVENOUS
Status: COMPLETED | OUTPATIENT
Start: 2018-02-12 | End: 2018-02-12

## 2018-02-12 RX ORDER — DICYCLOMINE HYDROCHLORIDE 10 MG/1
20 CAPSULE ORAL
Status: DISCONTINUED | OUTPATIENT
Start: 2018-02-12 | End: 2018-02-12

## 2018-02-12 RX ORDER — METOPROLOL TARTRATE 1 MG/ML
5 INJECTION, SOLUTION INTRAVENOUS EVERY 5 MIN PRN
Status: DISCONTINUED | OUTPATIENT
Start: 2018-02-12 | End: 2018-02-12

## 2018-02-12 RX ORDER — CLONIDINE HYDROCHLORIDE 0.1 MG/1
0.2 TABLET ORAL
Status: COMPLETED | OUTPATIENT
Start: 2018-02-12 | End: 2018-02-12

## 2018-02-12 RX ORDER — HYDRALAZINE HYDROCHLORIDE 25 MG/1
50 TABLET, FILM COATED ORAL
Status: COMPLETED | OUTPATIENT
Start: 2018-02-12 | End: 2018-02-12

## 2018-02-12 RX ORDER — ONDANSETRON 2 MG/ML
8 INJECTION INTRAMUSCULAR; INTRAVENOUS
Status: COMPLETED | OUTPATIENT
Start: 2018-02-12 | End: 2018-02-12

## 2018-02-12 RX ORDER — KETOROLAC TROMETHAMINE 30 MG/ML
30 INJECTION, SOLUTION INTRAMUSCULAR; INTRAVENOUS
Status: DISCONTINUED | OUTPATIENT
Start: 2018-02-12 | End: 2018-02-12

## 2018-02-12 RX ADMIN — CLONIDINE HYDROCHLORIDE 0.2 MG: 0.1 TABLET ORAL at 01:02

## 2018-02-12 RX ADMIN — HYDRALAZINE HYDROCHLORIDE 50 MG: 25 TABLET ORAL at 01:02

## 2018-02-12 RX ADMIN — LISINOPRIL 40 MG: 20 TABLET ORAL at 01:02

## 2018-02-12 RX ADMIN — ACETAMINOPHEN 1000 MG: 500 TABLET ORAL at 01:02

## 2018-02-12 RX ADMIN — SOTALOL HYDROCHLORIDE 80 MG: 80 TABLET ORAL at 04:02

## 2018-02-12 RX ADMIN — MAGNESIUM SULFATE IN WATER 2 G: 40 INJECTION, SOLUTION INTRAVENOUS at 06:02

## 2018-02-12 RX ADMIN — ONDANSETRON 8 MG: 2 INJECTION INTRAMUSCULAR; INTRAVENOUS at 02:02

## 2018-02-12 RX ADMIN — LORAZEPAM 1 MG: 2 INJECTION, SOLUTION INTRAMUSCULAR; INTRAVENOUS at 04:02

## 2018-02-12 RX ADMIN — RIVAROXABAN 20 MG: 20 TABLET, FILM COATED ORAL at 04:02

## 2018-02-12 NOTE — ED NOTES
Dr. Matos made aware of pts HR. Pt appears anxious, pt instructed to take slow deep breaths. In no acute distress. MD states sotalol should be ordered for now not later. MD will change order.

## 2018-02-12 NOTE — ED NOTES
Pt removed from bedpan, urine on sheets not in bedpan. Unable to collect urine specimen. Dr. Matos aware. No further orders.

## 2018-02-12 NOTE — ED NOTES
Dr. Matos made aware of pts BP. Verbalized understanding. Pt in no acute distress. Pt states she did not take BP medications today.

## 2018-02-12 NOTE — ED NOTES
"Pt aware of urine specimen needed and states "I don't need to go right now, I will let you know when I do".   "

## 2018-02-12 NOTE — ED PROVIDER NOTES
Encounter Date: 2/12/2018    SCRIBE #1 NOTE: I, Annita Nieto, am scribing for, and in the presence of,  Braden Matos MD. I have scribed the following portions of the note - Other sections scribed: ROS and HPI.       History     Chief Complaint   Patient presents with    Shortness of Breath     since yesterday. Unable to speak in full sentances. DuoNeb given and 2nd albuterol. 125mg solumedrol. hx of COPD. Tachypnea.      CC: Shortness of Breath  HPI: This 68 y.o. female with a past medical history of Anticoagulant long-term use; Arthritis; Asthma; CHF; COPD; CAD; Depression; Diabetes mellitus; GERD; and Hypertension, presents to the ED via EMS complaining of hypertension, tachycardia, nausea, productive cough with phlegm, rhinorrhea, nasal congestion for last 2-3 days. She also reports diarrhea 3 days ago lasted for 2 days. She also notes subjective fever last night. Patient was given duo neb and 125 MG Solumedrol en route. No new falls or back injury. She has not taken her regular medication this morning. She is on 3.5 L home oxygen. Denies SOB and/or chest pain.      The history is provided by the patient. No  was used.     Review of patient's allergies indicates:  No Known Allergies  Past Medical History:   Diagnosis Date    Anticoagulant long-term use     Arthritis     Asthma     CHF (congestive heart failure)     COPD (chronic obstructive pulmonary disease)     Coronary artery disease     Depression     Diabetes mellitus     GERD (gastroesophageal reflux disease)     Hypertension      Past Surgical History:   Procedure Laterality Date    ABDOMINAL SURGERY      CARDIAC SURGERY       Family History   Problem Relation Age of Onset    Heart disease Mother     Hypertension Mother     Diabetes Father      Social History   Substance Use Topics    Smoking status: Former Smoker     Packs/day: 0.50     Years: 25.00     Types: Cigarettes     Quit date: 12/3/2017    Smokeless tobacco:  Never Used    Alcohol use No     Review of Systems   Constitutional: Positive for chills. Negative for fever.   HENT: Positive for congestion and rhinorrhea. Negative for ear pain and sore throat.    Eyes: Negative for pain.   Respiratory: Positive for cough (productive w/phelgm). Negative for shortness of breath.    Cardiovascular: Negative for chest pain.   Gastrointestinal: Negative for abdominal pain, diarrhea, nausea and vomiting.   Genitourinary: Negative for dysuria.   Musculoskeletal: Positive for back pain (lower, chronic).        (-) arm or leg problems   Skin: Negative for rash.   Neurological: Negative for headaches.       Physical Exam     Initial Vitals [02/12/18 1227]   BP Pulse Resp Temp SpO2   (!) 172/92 (!) 135 (!) 26 99.5 °F (37.5 °C) 97 %      MAP       118.67         Physical Exam  The patient was examined specifically for the following:   General:No significant distress, Good color, Warm and dry. Head and neck:Scalp atraumatic, Neck supple. Neurological:Appropriate conversation, Gross motor deficits. Eyes:Conjugate gaze, Clear corneas. ENT: No epistaxis. Cardiac: Regular rate and rhythm, Grossly normal heart tones. Pulmonary: Wheezing, Rales. Gastrointestinal: Abdominal tenderness, Abdominal distention. Musculoskeletal: Extremity deformity, Apparent pain with range of motion of the joints. Skin: Rash.   The findings on examination were normal except for the following: Patient is tachycardic and hypertensive.  The lungs are clear.  The heart tones are normal.  The abdomen is soft.  Extremities are nontender.  There is an saturations are 97% on her 2L home oxygen.  ED Course   Procedures  Labs Reviewed   COMPREHENSIVE METABOLIC PANEL - Abnormal; Notable for the following:        Result Value    CO2 30 (*)     Glucose 153 (*)     ALT 7 (*)     All other components within normal limits   CBC W/ AUTO DIFFERENTIAL - Abnormal; Notable for the following:     Hemoglobin 11.7 (*)     MCH 26.1 (*)      MCHC 29.2 (*)     RDW 18.7 (*)     Platelets 396 (*)     Gran # (ANC) 10.1 (*)     Lymph # 0.8 (*)     Mono # 0.1 (*)     Gran% 90.5 (*)     Lymph% 7.0 (*)     Mono% 1.2 (*)     All other components within normal limits   URINALYSIS - Abnormal; Notable for the following:     Appearance, UA Hazy (*)     Protein, UA 1+ (*)     Ketones, UA Trace (*)     Urobilinogen, UA 2.0-3.0 (*)     Leukocytes, UA 2+ (*)     All other components within normal limits   B-TYPE NATRIURETIC PEPTIDE - Abnormal; Notable for the following:      (*)     All other components within normal limits   MAGNESIUM - Abnormal; Notable for the following:     Magnesium 1.5 (*)     All other components within normal limits   URINALYSIS MICROSCOPIC - Abnormal; Notable for the following:     WBC, UA 7 (*)     Bacteria, UA Many (*)     All other components within normal limits   TROPONIN I   INFLUENZA A AND B ANTIGEN   TROPONIN I     EKG Readings: (Independently Interpreted)   Patient's EKG a sinus tachycardia on 2 different occasions.  The patient seems to get very nervous at times.  At the time of her discharge.  She has a heart rate of 84.  She is in a sinus rhythm.  There is no evidence of acute ischemia.       X-Rays:   Independently Interpreted Readings:   Other Readings:  Chest x-ray fails to reveal pneumothorax pneumonia pleural effusion.    Medical decision making: Given the above, this patient presents to the emergency room with rapid heartbeat and shortness of breath and severe anxiety occurring in episodes.  Her evaluation fails to reveal significant abnormalities except for some mild hypomagnesemia which was treated with IV magnesium in the emergency room.  EKG fails to reveal evidence of ischemia.  The patient's BNP was normal.  2 sets of cardiac markers were negative.  I doubt ischemia.  The patient is on Xarelto.  I doubt pulmonary embolus.  The patient was treated for COPD with bronchodilators.  She is improved at the time of  discharge.  I will have her continue her usual medicines. No chest pain made that she did not take her medicines before coming to the emergency room and she is maintained on 3 L nasal cannula at home.  She has had good oxygen saturations on 3 L nasal cannula, particularly at discharge.                 Scribe Attestation:   Scribe #1: I performed the above scribed service and the documentation accurately describes the services I performed. I attest to the accuracy of the note.    Attending Attestation:           Physician Attestation for Scribe:  Physician Attestation Statement for Scribe #1: I, Braden Matos MD, reviewed documentation, as scribed by Annita Nieto in my presence, and it is both accurate and complete.                 ED Course      Clinical Impression:   The primary encounter diagnosis was Sinus tachycardia. Diagnoses of SOB (shortness of breath) and Uncontrolled hypertension were also pertinent to this visit.                           Braden Matos MD  02/12/18 [id number]

## 2018-02-12 NOTE — ED TRIAGE NOTES
"Pt arrived to ED via EMS from home for c/o SOB since yesterday. HX of COPD. Received duo neb and 125 solumedrol with EMS. Pt reports chronic lower back pain. Denies chest pain. Denies V/D. Reports some nausea and states "i know I have been running a fever".  REU. AAO x 4. Will continue to monitor.   "

## 2018-02-13 NOTE — DISCHARGE INSTRUCTIONS
Please follow-up with your primary care doctor this week.  Rest.  Please take your usual medicines.  Please continue your oxygen.  Continue your home nebulizer treatments as directed.  Please return immediately if you get worse or if new problems develop.

## 2018-06-24 ENCOUNTER — HOSPITAL ENCOUNTER (INPATIENT)
Facility: HOSPITAL | Age: 68
LOS: 33 days | Discharge: NURSING, REHAB OR LONG-TERM CARE | DRG: 871 | End: 2018-07-27
Attending: EMERGENCY MEDICINE | Admitting: INTERNAL MEDICINE
Payer: MEDICARE

## 2018-06-24 DIAGNOSIS — I25.119 ATHEROSCLEROTIC HEART DISEASE OF NATIVE CORONARY ARTERY WITH ANGINA PECTORIS: ICD-10-CM

## 2018-06-24 DIAGNOSIS — I50.31 ACUTE DIASTOLIC CHF (CONGESTIVE HEART FAILURE): ICD-10-CM

## 2018-06-24 DIAGNOSIS — I10 MALIGNANT HYPERTENSION: ICD-10-CM

## 2018-06-24 DIAGNOSIS — N18.1 CONTROLLED TYPE 2 DIABETES MELLITUS WITH STAGE 1 CHRONIC KIDNEY DISEASE, WITHOUT LONG-TERM CURRENT USE OF INSULIN: Chronic | ICD-10-CM

## 2018-06-24 DIAGNOSIS — R53.81 DEBILITY: ICD-10-CM

## 2018-06-24 DIAGNOSIS — E66.01 CLASS 2 SEVERE OBESITY DUE TO EXCESS CALORIES WITH SERIOUS COMORBIDITY AND BODY MASS INDEX (BMI) OF 37.0 TO 37.9 IN ADULT: ICD-10-CM

## 2018-06-24 DIAGNOSIS — E44.1 MILD PROTEIN MALNUTRITION: Chronic | ICD-10-CM

## 2018-06-24 DIAGNOSIS — J44.1 CHRONIC OBSTRUCTIVE PULMONARY DISEASE WITH ACUTE EXACERBATION: ICD-10-CM

## 2018-06-24 DIAGNOSIS — J96.12 CHRONIC RESPIRATORY FAILURE WITH HYPOXIA AND HYPERCAPNIA: Chronic | ICD-10-CM

## 2018-06-24 DIAGNOSIS — I50.9 ACUTE ON CHRONIC CONGESTIVE HEART FAILURE, UNSPECIFIED HEART FAILURE TYPE: ICD-10-CM

## 2018-06-24 DIAGNOSIS — R65.20 SEVERE SEPSIS WITH ACUTE ORGAN DYSFUNCTION: ICD-10-CM

## 2018-06-24 DIAGNOSIS — I48.91 ATRIAL FIBRILLATION WITH RVR: ICD-10-CM

## 2018-06-24 DIAGNOSIS — R07.9 CHEST PAIN: ICD-10-CM

## 2018-06-24 DIAGNOSIS — K21.9 GASTROESOPHAGEAL REFLUX DISEASE WITHOUT ESOPHAGITIS: Chronic | ICD-10-CM

## 2018-06-24 DIAGNOSIS — E66.2 OBESITY HYPOVENTILATION SYNDROME: ICD-10-CM

## 2018-06-24 DIAGNOSIS — R79.89 ELEVATED TROPONIN I LEVEL: ICD-10-CM

## 2018-06-24 DIAGNOSIS — E87.4 METABOLIC ALKALOSIS WITH RESPIRATORY ACIDOSIS: ICD-10-CM

## 2018-06-24 DIAGNOSIS — G62.9 NEUROPATHY: ICD-10-CM

## 2018-06-24 DIAGNOSIS — R93.89 ABNORMAL CT SCAN, CHEST: ICD-10-CM

## 2018-06-24 DIAGNOSIS — A41.9 SEPSIS: ICD-10-CM

## 2018-06-24 DIAGNOSIS — J96.02 ACUTE HYPERCAPNIC RESPIRATORY FAILURE: ICD-10-CM

## 2018-06-24 DIAGNOSIS — I48.0 PAF (PAROXYSMAL ATRIAL FIBRILLATION): ICD-10-CM

## 2018-06-24 DIAGNOSIS — Z86.711 HISTORY OF PULMONARY EMBOLISM: Chronic | ICD-10-CM

## 2018-06-24 DIAGNOSIS — Z72.0 TOBACCO ABUSE: Chronic | ICD-10-CM

## 2018-06-24 DIAGNOSIS — R79.89 ELEVATED BRAIN NATRIURETIC PEPTIDE (BNP) LEVEL: ICD-10-CM

## 2018-06-24 DIAGNOSIS — Z86.718 HISTORY OF DEEP VEIN THROMBOSIS: Chronic | ICD-10-CM

## 2018-06-24 DIAGNOSIS — R53.1 WEAKNESS: ICD-10-CM

## 2018-06-24 DIAGNOSIS — I21.4 NON-STEMI (NON-ST ELEVATED MYOCARDIAL INFARCTION): ICD-10-CM

## 2018-06-24 DIAGNOSIS — I50.9 CHF (CONGESTIVE HEART FAILURE): ICD-10-CM

## 2018-06-24 DIAGNOSIS — J96.11 CHRONIC RESPIRATORY FAILURE WITH HYPOXIA AND HYPERCAPNIA: Chronic | ICD-10-CM

## 2018-06-24 DIAGNOSIS — J44.1 ACUTE EXACERBATION OF CHRONIC OBSTRUCTIVE PULMONARY DISEASE (COPD): Primary | ICD-10-CM

## 2018-06-24 DIAGNOSIS — A41.9 SEVERE SEPSIS WITH ACUTE ORGAN DYSFUNCTION: ICD-10-CM

## 2018-06-24 DIAGNOSIS — D63.8 ANEMIA OF CHRONIC DISEASE: Chronic | ICD-10-CM

## 2018-06-24 DIAGNOSIS — E11.22 CONTROLLED TYPE 2 DIABETES MELLITUS WITH STAGE 1 CHRONIC KIDNEY DISEASE, WITHOUT LONG-TERM CURRENT USE OF INSULIN: Chronic | ICD-10-CM

## 2018-06-24 DIAGNOSIS — I25.119 CORONARY ARTERY DISEASE INVOLVING NATIVE CORONARY ARTERY OF NATIVE HEART WITH ANGINA PECTORIS: ICD-10-CM

## 2018-06-24 DIAGNOSIS — Z79.01 CHRONIC ANTICOAGULATION: Chronic | ICD-10-CM

## 2018-06-24 LAB
ALBUMIN SERPL BCP-MCNC: 3.6 G/DL
ALP SERPL-CCNC: 85 U/L
ALT SERPL W/O P-5'-P-CCNC: 7 U/L
ANION GAP SERPL CALC-SCNC: 13 MMOL/L
AST SERPL-CCNC: 20 U/L
BASOPHILS # BLD AUTO: 0.04 K/UL
BASOPHILS NFR BLD: 0.4 %
BILIRUB SERPL-MCNC: 0.4 MG/DL
BNP SERPL-MCNC: 2128 PG/ML
BUN SERPL-MCNC: 14 MG/DL
CALCIUM SERPL-MCNC: 10.2 MG/DL
CHLORIDE SERPL-SCNC: 101 MMOL/L
CO2 SERPL-SCNC: 29 MMOL/L
CREAT SERPL-MCNC: 0.8 MG/DL
DIFFERENTIAL METHOD: ABNORMAL
EOSINOPHIL # BLD AUTO: 0 K/UL
EOSINOPHIL NFR BLD: 0.1 %
ERYTHROCYTE [DISTWIDTH] IN BLOOD BY AUTOMATED COUNT: 17.5 %
EST. GFR  (AFRICAN AMERICAN): >60 ML/MIN/1.73 M^2
EST. GFR  (NON AFRICAN AMERICAN): >60 ML/MIN/1.73 M^2
GLUCOSE SERPL-MCNC: 119 MG/DL
HCT VFR BLD AUTO: 35.5 %
HGB BLD-MCNC: 10.6 G/DL
LYMPHOCYTES # BLD AUTO: 1 K/UL
LYMPHOCYTES NFR BLD: 8.4 %
MCH RBC QN AUTO: 25.2 PG
MCHC RBC AUTO-ENTMCNC: 29.9 G/DL
MCV RBC AUTO: 84 FL
MONOCYTES # BLD AUTO: 0.9 K/UL
MONOCYTES NFR BLD: 8 %
NEUTROPHILS # BLD AUTO: 9.4 K/UL
NEUTROPHILS NFR BLD: 83.1 %
PLATELET # BLD AUTO: 468 K/UL
PMV BLD AUTO: 9.7 FL
POTASSIUM SERPL-SCNC: 3.9 MMOL/L
PROT SERPL-MCNC: 7.8 G/DL
RBC # BLD AUTO: 4.21 M/UL
SODIUM SERPL-SCNC: 143 MMOL/L
TROPONIN I SERPL DL<=0.01 NG/ML-MCNC: 0.87 NG/ML
TROPONIN I SERPL DL<=0.01 NG/ML-MCNC: 0.9 NG/ML
WBC # BLD AUTO: 11.29 K/UL

## 2018-06-24 PROCEDURE — 63600175 PHARM REV CODE 636 W HCPCS: Performed by: EMERGENCY MEDICINE

## 2018-06-24 PROCEDURE — 94660 CPAP INITIATION&MGMT: CPT

## 2018-06-24 PROCEDURE — 84439 ASSAY OF FREE THYROXINE: CPT

## 2018-06-24 PROCEDURE — 21400001 HC TELEMETRY ROOM

## 2018-06-24 PROCEDURE — 99900035 HC TECH TIME PER 15 MIN (STAT)

## 2018-06-24 PROCEDURE — 84481 FREE ASSAY (FT-3): CPT

## 2018-06-24 PROCEDURE — 25500020 PHARM REV CODE 255: Performed by: EMERGENCY MEDICINE

## 2018-06-24 PROCEDURE — 96375 TX/PRO/DX INJ NEW DRUG ADDON: CPT

## 2018-06-24 PROCEDURE — 84443 ASSAY THYROID STIM HORMONE: CPT

## 2018-06-24 PROCEDURE — 25000003 PHARM REV CODE 250: Performed by: EMERGENCY MEDICINE

## 2018-06-24 PROCEDURE — 96374 THER/PROPH/DIAG INJ IV PUSH: CPT

## 2018-06-24 PROCEDURE — 99291 CRITICAL CARE FIRST HOUR: CPT | Mod: 25

## 2018-06-24 PROCEDURE — 27000190 HC CPAP FULL FACE MASK W/VALVE

## 2018-06-24 PROCEDURE — 86141 C-REACTIVE PROTEIN HS: CPT

## 2018-06-24 PROCEDURE — 84484 ASSAY OF TROPONIN QUANT: CPT | Mod: 91

## 2018-06-24 PROCEDURE — 93010 ELECTROCARDIOGRAM REPORT: CPT | Mod: 76,,, | Performed by: INTERNAL MEDICINE

## 2018-06-24 PROCEDURE — 93005 ELECTROCARDIOGRAM TRACING: CPT

## 2018-06-24 PROCEDURE — 80053 COMPREHEN METABOLIC PANEL: CPT

## 2018-06-24 PROCEDURE — 83880 ASSAY OF NATRIURETIC PEPTIDE: CPT

## 2018-06-24 PROCEDURE — 27000221 HC OXYGEN, UP TO 24 HOURS

## 2018-06-24 PROCEDURE — 85025 COMPLETE CBC W/AUTO DIFF WBC: CPT

## 2018-06-24 PROCEDURE — 84484 ASSAY OF TROPONIN QUANT: CPT

## 2018-06-24 RX ORDER — GABAPENTIN 300 MG/1
300 CAPSULE ORAL 3 TIMES DAILY
Status: DISCONTINUED | OUTPATIENT
Start: 2018-06-25 | End: 2018-07-13

## 2018-06-24 RX ORDER — INSULIN ASPART 100 [IU]/ML
0-5 INJECTION, SOLUTION INTRAVENOUS; SUBCUTANEOUS EVERY 6 HOURS PRN
Status: DISCONTINUED | OUTPATIENT
Start: 2018-06-24 | End: 2018-07-03

## 2018-06-24 RX ORDER — LISINOPRIL 20 MG/1
40 TABLET ORAL DAILY
Status: DISCONTINUED | OUTPATIENT
Start: 2018-06-25 | End: 2018-06-25

## 2018-06-24 RX ORDER — ENOXAPARIN SODIUM 150 MG/ML
1 INJECTION SUBCUTANEOUS
Status: DISCONTINUED | OUTPATIENT
Start: 2018-06-24 | End: 2018-06-25

## 2018-06-24 RX ORDER — NAPROXEN SODIUM 220 MG/1
81 TABLET, FILM COATED ORAL DAILY
Status: DISCONTINUED | OUTPATIENT
Start: 2018-06-25 | End: 2018-07-19

## 2018-06-24 RX ORDER — FUROSEMIDE 10 MG/ML
40 INJECTION INTRAMUSCULAR; INTRAVENOUS EVERY 6 HOURS
Status: DISCONTINUED | OUTPATIENT
Start: 2018-06-25 | End: 2018-06-25

## 2018-06-24 RX ORDER — GLUCAGON 1 MG
1 KIT INJECTION
Status: DISCONTINUED | OUTPATIENT
Start: 2018-06-24 | End: 2018-07-19 | Stop reason: SDUPTHER

## 2018-06-24 RX ORDER — FUROSEMIDE 10 MG/ML
80 INJECTION INTRAMUSCULAR; INTRAVENOUS
Status: COMPLETED | OUTPATIENT
Start: 2018-06-24 | End: 2018-06-24

## 2018-06-24 RX ORDER — HYDRALAZINE HYDROCHLORIDE 25 MG/1
50 TABLET, FILM COATED ORAL EVERY 8 HOURS
Status: DISCONTINUED | OUTPATIENT
Start: 2018-06-24 | End: 2018-06-25

## 2018-06-24 RX ORDER — NAPROXEN SODIUM 220 MG/1
81 TABLET, FILM COATED ORAL
Status: DISCONTINUED | OUTPATIENT
Start: 2018-06-24 | End: 2018-06-24

## 2018-06-24 RX ORDER — DILTIAZEM HYDROCHLORIDE 30 MG/1
120 TABLET, FILM COATED ORAL EVERY 8 HOURS
Status: DISCONTINUED | OUTPATIENT
Start: 2018-06-24 | End: 2018-06-25

## 2018-06-24 RX ORDER — MORPHINE SULFATE 4 MG/ML
2 INJECTION, SOLUTION INTRAMUSCULAR; INTRAVENOUS
Status: COMPLETED | OUTPATIENT
Start: 2018-06-24 | End: 2018-06-24

## 2018-06-24 RX ORDER — SOTALOL HYDROCHLORIDE 80 MG/1
80 TABLET ORAL 2 TIMES DAILY
Status: DISCONTINUED | OUTPATIENT
Start: 2018-06-24 | End: 2018-06-25

## 2018-06-24 RX ORDER — CLOPIDOGREL 300 MG/1
600 TABLET, FILM COATED ORAL
Status: COMPLETED | OUTPATIENT
Start: 2018-06-24 | End: 2018-06-24

## 2018-06-24 RX ORDER — TRAMADOL HYDROCHLORIDE 50 MG/1
50 TABLET ORAL EVERY 6 HOURS PRN
Status: DISCONTINUED | OUTPATIENT
Start: 2018-06-24 | End: 2018-06-25 | Stop reason: ALTCHOICE

## 2018-06-24 RX ADMIN — DILTIAZEM HYDROCHLORIDE 120 MG: 30 TABLET, FILM COATED ORAL at 11:06

## 2018-06-24 RX ADMIN — HYDRALAZINE HYDROCHLORIDE 50 MG: 25 TABLET ORAL at 11:06

## 2018-06-24 RX ADMIN — FUROSEMIDE 80 MG: 10 INJECTION, SOLUTION INTRAMUSCULAR; INTRAVENOUS at 03:06

## 2018-06-24 RX ADMIN — CLOPIDOGREL BISULFATE 600 MG: 300 TABLET, FILM COATED ORAL at 11:06

## 2018-06-24 RX ADMIN — FUROSEMIDE 40 MG: 10 INJECTION, SOLUTION INTRAMUSCULAR; INTRAVENOUS at 11:06

## 2018-06-24 RX ADMIN — NITROGLYCERIN 1 INCH: 20 OINTMENT TOPICAL at 11:06

## 2018-06-24 RX ADMIN — MORPHINE SULFATE 2 MG: 4 INJECTION INTRAVENOUS at 07:06

## 2018-06-24 RX ADMIN — IOHEXOL 100 ML: 350 INJECTION, SOLUTION INTRAVENOUS at 08:06

## 2018-06-24 NOTE — ED PROVIDER NOTES
Encounter Date: 6/24/2018    SCRIBE #1 NOTE: I, Leo Pike, am scribing for, and in the presence of,  Satinder Payne MD. I have scribed the following portions of the note - Other sections scribed: HPI, ROS, PE.       History     Chief Complaint   Patient presents with    Chest Pain     Pt reports chest pain x 2 days with increasing severity and increased shortness of breath.      CC: Chest Pain    HPI: This 68 y.o female with a PMHx of Anticoagulant long-term use; Arthritis; Asthma; CHF; COPD; CAD; Depression; DM; GERD; and HTN presents to the ED via EMS c/o acute onset, intermittent, gradual, and severe CP with associated gradual SOB that began x2 days. Pt reports CP begins whenever she experiences an adrenaline rush. She also notes tx administered by EMS gave her some relief. EMS states pt is compliant with Nebs tx, and noted systolic pressure of 94 mmHg, reasoning to no STEMI. No further complaints.    Per EMS initially marked dyspnea and diaphoresis. Better after bipap and ntg spray.         The history is provided by the patient and the EMS personnel. No  was used.     Review of patient's allergies indicates:  No Known Allergies  Past Medical History:   Diagnosis Date    Anticoagulant long-term use     Arthritis     Asthma     CHF (congestive heart failure)     COPD (chronic obstructive pulmonary disease)     Coronary artery disease     Depression     Diabetes mellitus     GERD (gastroesophageal reflux disease)     Hypertension      Past Surgical History:   Procedure Laterality Date    ABDOMINAL SURGERY      CARDIAC SURGERY       Family History   Problem Relation Age of Onset    Heart disease Mother     Hypertension Mother     Diabetes Father      Social History   Substance Use Topics    Smoking status: Former Smoker     Packs/day: 0.50     Years: 25.00     Types: Cigarettes     Quit date: 12/3/2017    Smokeless tobacco: Never Used    Alcohol use No     Review  of Systems   Constitutional: Negative for chills and fever.   HENT: Negative for congestion, ear pain, rhinorrhea and sore throat.    Eyes: Negative for pain and visual disturbance.   Respiratory: Positive for shortness of breath (+) gradual . Negative for cough.    Cardiovascular: Positive for chest pain (+) gradual.   Gastrointestinal: Negative for abdominal pain, diarrhea, nausea and vomiting.   Genitourinary: Negative for dysuria.   Musculoskeletal: Negative for back pain and neck pain.   Skin: Negative for rash.   Neurological: Negative for headaches.   All other systems reviewed and are negative.      Physical Exam     Initial Vitals   BP Pulse Resp Temp SpO2   06/24/18 1408 06/24/18 1404 06/24/18 1404 06/24/18 1404 06/24/18 1404   (!) 162/101 (!) 130 (!) 40 100.1 °F (37.8 °C) 100 %      MAP       --                Physical Exam    Nursing note and vitals reviewed.  Constitutional: She appears well-developed and well-nourished.  Non-toxic appearance. She does not appear ill.   HENT:   Head: Normocephalic and atraumatic.   Eyes: EOM are normal.   Neck: Neck supple.   Cardiovascular: Regular rhythm and normal heart sounds. Tachycardia present.  Exam reveals no gallop.    No murmur heard.  Tachycardia at 130   Pulmonary/Chest: Tachypnea noted. She has no rhonchi. She has rales.   On BiPAP   Abdominal: Soft. Normal appearance and bowel sounds are normal. She exhibits no distension. There is no tenderness.   Musculoskeletal: Normal range of motion. She exhibits edema.   Bilateral lower extremity edema 1+   Neurological: She is alert.   Skin: Skin is warm and dry. Capillary refill takes less than 2 seconds.   Psychiatric: She has a normal mood and affect.         ED Course   Critical Care  Date/Time: 6/24/2018 3:57 PM  Performed by: YULY WHITLEY  Authorized by: YULY WHITLEY   Direct patient critical care time: 20 minutes  Ordering / reviewing critical care time: 5 minutes  Documentation critical  care time: 5 minutes  Consulting other physicians critical care time: 5 minutes  Consult with family critical care time: 10 minutes  Total critical care time (exclusive of procedural time) : 45 minutes  Critical care time was exclusive of separately billable procedures and treating other patients and teaching time.  Critical care was necessary to treat or prevent imminent or life-threatening deterioration of the following conditions: cardiac failure, shock and respiratory failure.  Critical care was time spent personally by me on the following activities: discussions with consultants, evaluation of patient's response to treatment, obtaining history from patient or surrogate, ordering and review of laboratory studies, pulse oximetry, re-evaluation of patient's condition, ordering and review of radiographic studies, ordering and performing treatments and interventions and examination of patient.        Labs Reviewed   CBC W/ AUTO DIFFERENTIAL - Abnormal; Notable for the following:        Result Value    Hemoglobin 10.6 (*)     Hematocrit 35.5 (*)     MCH 25.2 (*)     MCHC 29.9 (*)     RDW 17.5 (*)     Platelets 468 (*)     Gran # (ANC) 9.4 (*)     Gran% 83.1 (*)     Lymph% 8.4 (*)     All other components within normal limits   COMPREHENSIVE METABOLIC PANEL - Abnormal; Notable for the following:     Glucose 119 (*)     ALT 7 (*)     All other components within normal limits   B-TYPE NATRIURETIC PEPTIDE - Abnormal; Notable for the following:     BNP 2,128 (*)     All other components within normal limits   TROPONIN I - Abnormal; Notable for the following:     Troponin I 0.896 (*)     All other components within normal limits     EKG Readings: (Independently Interpreted)   Initial Reading: No STEMI. Previous EKG Date: 2/12/2018. Rhythm: Sinus Tachycardia. Heart Rate: 106. Ectopy: No Ectopy. Conduction: LAFP. T Waves: Normal. Axis: Normal. Q Waves: V2, V3 and V4. Clinical Impression: Sinus Tachycardia Other Impression:  diffuse ant upsloping, upwardly concave st segment elevbation, anterior q waves (seen on previous ekg) no aflutter now.    Repeat ekg with rate of 130.       Imaging Results          X-Ray Chest AP Portable (Final result)  Result time 06/24/18 15:41:15    Final result by Aggie Wharton MD (06/24/18 15:41:15)                 Impression:      No acute abnormality.      Electronically signed by: Aggie Wharton MD  Date:    06/24/2018  Time:    15:41             Narrative:    EXAMINATION:  XR CHEST AP PORTABLE    CLINICAL HISTORY:  Chest pain, unspecified    TECHNIQUE:  Single frontal view of the chest was performed.    COMPARISON:  02/12/2018    FINDINGS:  The lungs are clear, with normal appearance of pulmonary vasculature and no pleural effusion or pneumothorax.    The cardiac silhouette is normal in size. The hilar and mediastinal contours are unremarkable.    Bones are intact.  Atherosclerotic plaque of the aorta.                                 Medical Decision Making:   ED Management:  Sx markedly better after ntg and o2. Pain free in ER from time of arrival. Suspected unstable angina, but with elevated trop, more likely nonstemi. Will admit and consult cardiology.     @1911 - pt had been taken off of bipap as breathing was much easier, but tachycardia had returned. Pt had c/o of pain in upper back c/w hx of chronic back pains. Noted repeat EKG showed slight change to morphology of t waves on v4-v5 to be more flat and less upsloping. Will resume bipap and recheck EKG. Noted she is on xarelto and additional anticoags now.     @1925 - Discussion with Dr. Pulido and review of EKG. No significant changes from 1407 to 1903.  Initial EKG not called stemi as pt was pain free from arrival. Will offer analgesics for back pain.             Scribe Attestation:   Scribe #1: I performed the above scribed service and the documentation accurately describes the services I performed. I attest to the accuracy of the  note.    Attending Attestation:           Physician Attestation for Scribe:  Physician Attestation Statement for Scribe #1: I, Satinder Payne MD, reviewed documentation, as scribed by Leo Pike in my presence, and it is both accurate and complete.                    Clinical Impression:   The primary encounter diagnosis was Non-STEMI (non-ST elevated myocardial infarction). Diagnoses of Chest pain and Acute on chronic congestive heart failure, unspecified heart failure type were also pertinent to this visit.                             Satinder Olivarez MD  06/24/18 1616       Satinder Olivarez MD  06/24/18 1913       Satinder Olivarez MD  06/24/18 1928

## 2018-06-24 NOTE — ED TRIAGE NOTES
Per EMS patient reports chest pain x 2 days with increased shortness of breath. Brought to ED by NO EMS. Reports pt had shortness of breath wearing home O2 with increased respirations >40 BPM. pt placed on CPAP for transport. BP initially 190/120. EMS also reports ST elevation on scene. Aspirin and nitro given prior to arrival. On arrival pt brought into room 8 and EKG performed. Pt placed on BiPAP.

## 2018-06-25 PROBLEM — I48.0 PAF (PAROXYSMAL ATRIAL FIBRILLATION): Status: ACTIVE | Noted: 2018-06-25

## 2018-06-25 PROBLEM — G62.9 NEUROPATHY: Status: ACTIVE | Noted: 2018-06-25

## 2018-06-25 LAB
ALBUMIN SERPL BCP-MCNC: 3.5 G/DL
ALLENS TEST: ABNORMAL
ALLENS TEST: ABNORMAL
ALP SERPL-CCNC: 80 U/L
ALT SERPL W/O P-5'-P-CCNC: 10 U/L
ANION GAP SERPL CALC-SCNC: 17 MMOL/L
AST SERPL-CCNC: 23 U/L
BACTERIA #/AREA URNS HPF: NORMAL /HPF
BILIRUB SERPL-MCNC: 0.3 MG/DL
BILIRUB UR QL STRIP: NEGATIVE
BUN SERPL-MCNC: 16 MG/DL
CALCIUM SERPL-MCNC: 10.1 MG/DL
CHLORIDE SERPL-SCNC: 98 MMOL/L
CLARITY UR: CLEAR
CO2 SERPL-SCNC: 29 MMOL/L
COLOR UR: YELLOW
CREAT SERPL-MCNC: 0.9 MG/DL
CRP SERPL-MCNC: 22.09 MG/L
DELSYS: ABNORMAL
DELSYS: ABNORMAL
DIASTOLIC DYSFUNCTION: YES
EP: 5
EP: 5
ERYTHROCYTE [SEDIMENTATION RATE] IN BLOOD BY WESTERGREN METHOD: 14 MM/H
ERYTHROCYTE [SEDIMENTATION RATE] IN BLOOD BY WESTERGREN METHOD: 14 MM/H
ERYTHROCYTE [SEDIMENTATION RATE] IN BLOOD BY WESTERGREN METHOD: 55 MM/HR
EST. GFR  (AFRICAN AMERICAN): >60 ML/MIN/1.73 M^2
EST. GFR  (NON AFRICAN AMERICAN): >60 ML/MIN/1.73 M^2
ESTIMATED AVG GLUCOSE: 134 MG/DL
ESTIMATED PA SYSTOLIC PRESSURE: 51.82
FIO2: 40
FIO2: 45
GLOBAL PERICARDIAL EFFUSION: ABNORMAL
GLUCOSE SERPL-MCNC: 167 MG/DL
GLUCOSE UR QL STRIP: NEGATIVE
HBA1C MFR BLD HPLC: 6.3 %
HCO3 UR-SCNC: 31.7 MMOL/L (ref 24–28)
HCO3 UR-SCNC: 34.7 MMOL/L (ref 24–28)
HGB UR QL STRIP: NEGATIVE
HYALINE CASTS #/AREA URNS LPF: 1 /LPF
IP: 10
IP: 12
KETONES UR QL STRIP: NEGATIVE
LEUKOCYTE ESTERASE UR QL STRIP: NEGATIVE
MAGNESIUM SERPL-MCNC: 1.7 MG/DL
MICROSCOPIC COMMENT: NORMAL
MIN VOL: 11.1
MIN VOL: 9.2
MITRAL VALVE MOBILITY: NORMAL
MODE: ABNORMAL
MODE: ABNORMAL
NITRITE UR QL STRIP: NEGATIVE
PCO2 BLDA: 48.8 MMHG (ref 35–45)
PCO2 BLDA: 57.6 MMHG (ref 35–45)
PH SMN: 7.39 [PH] (ref 7.35–7.45)
PH SMN: 7.42 [PH] (ref 7.35–7.45)
PH UR STRIP: 5 [PH] (ref 5–8)
PHOSPHATE SERPL-MCNC: 3.8 MG/DL
PO2 BLDA: 77 MMHG (ref 80–100)
PO2 BLDA: 81 MMHG (ref 80–100)
POC BE: 6 MMOL/L
POC BE: 8 MMOL/L
POC SATURATED O2: 95 % (ref 95–100)
POC SATURATED O2: 96 % (ref 95–100)
POC TCO2: 33 MMOL/L (ref 23–27)
POC TCO2: 36 MMOL/L (ref 23–27)
POCT GLUCOSE: 109 MG/DL (ref 70–110)
POCT GLUCOSE: 118 MG/DL (ref 70–110)
POCT GLUCOSE: 119 MG/DL (ref 70–110)
POCT GLUCOSE: 124 MG/DL (ref 70–110)
POCT GLUCOSE: 150 MG/DL (ref 70–110)
POCT GLUCOSE: 150 MG/DL (ref 70–110)
POTASSIUM SERPL-SCNC: 3.8 MMOL/L
PROT SERPL-MCNC: 7.8 G/DL
PROT UR QL STRIP: ABNORMAL
RBC #/AREA URNS HPF: 0 /HPF (ref 0–4)
RETIRED EF AND QEF - SEE NOTES: 50 (ref 55–65)
SAMPLE: ABNORMAL
SAMPLE: ABNORMAL
SITE: ABNORMAL
SITE: ABNORMAL
SODIUM SERPL-SCNC: 144 MMOL/L
SP GR UR STRIP: 1.02 (ref 1–1.03)
SP02: 95
SP02: 96
SPONT RATE: 20
SPONT RATE: 22
T3FREE SERPL-MCNC: 2.2 PG/ML
T4 FREE SERPL-MCNC: 1.09 NG/DL
TRICUSPID VALVE REGURGITATION: ABNORMAL
TROPONIN I SERPL DL<=0.01 NG/ML-MCNC: 0.67 NG/ML
TSH SERPL DL<=0.005 MIU/L-ACNC: 0.75 UIU/ML
URN SPEC COLLECT METH UR: ABNORMAL
UROBILINOGEN UR STRIP-ACNC: NEGATIVE EU/DL
WBC #/AREA URNS HPF: 0 /HPF (ref 0–5)

## 2018-06-25 PROCEDURE — 63600175 PHARM REV CODE 636 W HCPCS: Performed by: HOSPITALIST

## 2018-06-25 PROCEDURE — 36415 COLL VENOUS BLD VENIPUNCTURE: CPT

## 2018-06-25 PROCEDURE — 27000221 HC OXYGEN, UP TO 24 HOURS

## 2018-06-25 PROCEDURE — 63600175 PHARM REV CODE 636 W HCPCS: Performed by: EMERGENCY MEDICINE

## 2018-06-25 PROCEDURE — 25000003 PHARM REV CODE 250: Performed by: EMERGENCY MEDICINE

## 2018-06-25 PROCEDURE — 80053 COMPREHEN METABOLIC PANEL: CPT

## 2018-06-25 PROCEDURE — 25000003 PHARM REV CODE 250: Performed by: INTERNAL MEDICINE

## 2018-06-25 PROCEDURE — 99223 1ST HOSP IP/OBS HIGH 75: CPT | Mod: ,,, | Performed by: INTERNAL MEDICINE

## 2018-06-25 PROCEDURE — 94761 N-INVAS EAR/PLS OXIMETRY MLT: CPT

## 2018-06-25 PROCEDURE — 93306 TTE W/DOPPLER COMPLETE: CPT | Mod: 26,,, | Performed by: INTERNAL MEDICINE

## 2018-06-25 PROCEDURE — 25000003 PHARM REV CODE 250: Performed by: HOSPITALIST

## 2018-06-25 PROCEDURE — 84100 ASSAY OF PHOSPHORUS: CPT

## 2018-06-25 PROCEDURE — 85652 RBC SED RATE AUTOMATED: CPT

## 2018-06-25 PROCEDURE — 81000 URINALYSIS NONAUTO W/SCOPE: CPT

## 2018-06-25 PROCEDURE — 25000242 PHARM REV CODE 250 ALT 637 W/ HCPCS: Performed by: HOSPITALIST

## 2018-06-25 PROCEDURE — 63600175 PHARM REV CODE 636 W HCPCS: Performed by: INTERNAL MEDICINE

## 2018-06-25 PROCEDURE — 94644 CONT INHLJ TX 1ST HOUR: CPT

## 2018-06-25 PROCEDURE — 82803 BLOOD GASES ANY COMBINATION: CPT

## 2018-06-25 PROCEDURE — 84484 ASSAY OF TROPONIN QUANT: CPT

## 2018-06-25 PROCEDURE — 83036 HEMOGLOBIN GLYCOSYLATED A1C: CPT

## 2018-06-25 PROCEDURE — 99291 CRITICAL CARE FIRST HOUR: CPT | Mod: ,,, | Performed by: INTERNAL MEDICINE

## 2018-06-25 PROCEDURE — 99900035 HC TECH TIME PER 15 MIN (STAT)

## 2018-06-25 PROCEDURE — 94640 AIRWAY INHALATION TREATMENT: CPT

## 2018-06-25 PROCEDURE — 83735 ASSAY OF MAGNESIUM: CPT

## 2018-06-25 PROCEDURE — 94660 CPAP INITIATION&MGMT: CPT

## 2018-06-25 PROCEDURE — 36600 WITHDRAWAL OF ARTERIAL BLOOD: CPT

## 2018-06-25 PROCEDURE — 93306 TTE W/DOPPLER COMPLETE: CPT

## 2018-06-25 PROCEDURE — 21400001 HC TELEMETRY ROOM

## 2018-06-25 RX ORDER — HYDROCODONE BITARTRATE AND ACETAMINOPHEN 5; 325 MG/1; MG/1
1 TABLET ORAL EVERY 6 HOURS PRN
Status: DISCONTINUED | OUTPATIENT
Start: 2018-06-25 | End: 2018-06-26

## 2018-06-25 RX ORDER — AZITHROMYCIN 250 MG/1
250 TABLET, FILM COATED ORAL DAILY
Status: DISCONTINUED | OUTPATIENT
Start: 2018-06-26 | End: 2018-06-25

## 2018-06-25 RX ORDER — ALBUTEROL SULFATE 2.5 MG/.5ML
2.5 SOLUTION RESPIRATORY (INHALATION) EVERY 4 HOURS PRN
Status: DISCONTINUED | OUTPATIENT
Start: 2018-06-25 | End: 2018-06-25

## 2018-06-25 RX ORDER — GUAIFENESIN 600 MG/1
600 TABLET, EXTENDED RELEASE ORAL 2 TIMES DAILY
Status: DISCONTINUED | OUTPATIENT
Start: 2018-06-25 | End: 2018-07-15

## 2018-06-25 RX ORDER — AZITHROMYCIN 250 MG/1
500 TABLET, FILM COATED ORAL ONCE
Status: DISCONTINUED | OUTPATIENT
Start: 2018-06-25 | End: 2018-06-26

## 2018-06-25 RX ORDER — IPRATROPIUM BROMIDE AND ALBUTEROL SULFATE 2.5; .5 MG/3ML; MG/3ML
3 SOLUTION RESPIRATORY (INHALATION) EVERY 6 HOURS
Status: COMPLETED | OUTPATIENT
Start: 2018-06-25 | End: 2018-06-29

## 2018-06-25 RX ORDER — ENOXAPARIN SODIUM 100 MG/ML
1 INJECTION SUBCUTANEOUS
Status: COMPLETED | OUTPATIENT
Start: 2018-06-25 | End: 2018-06-26

## 2018-06-25 RX ORDER — HYDROCODONE BITARTRATE AND ACETAMINOPHEN 10; 325 MG/1; MG/1
1 TABLET ORAL EVERY 6 HOURS PRN
Status: DISCONTINUED | OUTPATIENT
Start: 2018-06-25 | End: 2018-06-26

## 2018-06-25 RX ORDER — ALBUTEROL SULFATE 2.5 MG/.5ML
SOLUTION RESPIRATORY (INHALATION)
Status: DISPENSED
Start: 2018-06-25 | End: 2018-06-25

## 2018-06-25 RX ORDER — SODIUM CHLORIDE 0.9 % (FLUSH) 0.9 %
3 SYRINGE (ML) INJECTION
Status: DISCONTINUED | OUTPATIENT
Start: 2018-06-25 | End: 2018-07-24

## 2018-06-25 RX ORDER — ACETAMINOPHEN 325 MG/1
650 TABLET ORAL EVERY 6 HOURS PRN
Status: DISCONTINUED | OUTPATIENT
Start: 2018-06-25 | End: 2018-07-24

## 2018-06-25 RX ORDER — FLUTICASONE FUROATE AND VILANTEROL 100; 25 UG/1; UG/1
1 POWDER RESPIRATORY (INHALATION) DAILY
Status: DISCONTINUED | OUTPATIENT
Start: 2018-06-25 | End: 2018-06-26

## 2018-06-25 RX ORDER — ALPRAZOLAM 0.5 MG/1
1 TABLET ORAL EVERY 8 HOURS PRN
Status: COMPLETED | OUTPATIENT
Start: 2018-06-25 | End: 2018-06-25

## 2018-06-25 RX ORDER — POLYETHYLENE GLYCOL 3350 17 G/17G
17 POWDER, FOR SOLUTION ORAL DAILY
Status: DISCONTINUED | OUTPATIENT
Start: 2018-06-25 | End: 2018-06-26

## 2018-06-25 RX ORDER — LISINOPRIL 20 MG/1
40 TABLET ORAL DAILY
Status: DISCONTINUED | OUTPATIENT
Start: 2018-06-25 | End: 2018-06-26

## 2018-06-25 RX ORDER — BISACODYL 10 MG
10 SUPPOSITORY, RECTAL RECTAL DAILY PRN
Status: DISCONTINUED | OUTPATIENT
Start: 2018-06-25 | End: 2018-07-24

## 2018-06-25 RX ORDER — CLOPIDOGREL BISULFATE 75 MG/1
75 TABLET ORAL DAILY
Status: DISCONTINUED | OUTPATIENT
Start: 2018-06-25 | End: 2018-06-27

## 2018-06-25 RX ORDER — FUROSEMIDE 10 MG/ML
40 INJECTION INTRAMUSCULAR; INTRAVENOUS 2 TIMES DAILY
Status: COMPLETED | OUTPATIENT
Start: 2018-06-25 | End: 2018-06-25

## 2018-06-25 RX ORDER — ATORVASTATIN CALCIUM 40 MG/1
80 TABLET, FILM COATED ORAL NIGHTLY
Status: DISCONTINUED | OUTPATIENT
Start: 2018-06-25 | End: 2018-07-24

## 2018-06-25 RX ORDER — AMOXICILLIN 250 MG
1 CAPSULE ORAL 2 TIMES DAILY
Status: DISCONTINUED | OUTPATIENT
Start: 2018-06-25 | End: 2018-06-26

## 2018-06-25 RX ORDER — PANTOPRAZOLE SODIUM 40 MG/1
40 TABLET, DELAYED RELEASE ORAL DAILY
Status: DISCONTINUED | OUTPATIENT
Start: 2018-06-25 | End: 2018-06-26

## 2018-06-25 RX ORDER — DILTIAZEM HYDROCHLORIDE 30 MG/1
120 TABLET, FILM COATED ORAL EVERY 8 HOURS
Status: DISCONTINUED | OUTPATIENT
Start: 2018-06-25 | End: 2018-06-26

## 2018-06-25 RX ORDER — HYDRALAZINE HYDROCHLORIDE 25 MG/1
50 TABLET, FILM COATED ORAL EVERY 8 HOURS
Status: DISCONTINUED | OUTPATIENT
Start: 2018-06-25 | End: 2018-06-26

## 2018-06-25 RX ORDER — SOTALOL HYDROCHLORIDE 80 MG/1
80 TABLET ORAL 2 TIMES DAILY
Status: DISCONTINUED | OUTPATIENT
Start: 2018-06-25 | End: 2018-07-13

## 2018-06-25 RX ORDER — ALBUTEROL SULFATE 2.5 MG/.5ML
10 SOLUTION RESPIRATORY (INHALATION) EVERY 4 HOURS PRN
Status: DISCONTINUED | OUTPATIENT
Start: 2018-06-25 | End: 2018-06-25

## 2018-06-25 RX ORDER — SODIUM CHLORIDE 9 MG/ML
INJECTION, SOLUTION INTRAVENOUS CONTINUOUS
Status: ACTIVE | OUTPATIENT
Start: 2018-06-26 | End: 2018-06-26

## 2018-06-25 RX ADMIN — IPRATROPIUM BROMIDE AND ALBUTEROL SULFATE 3 ML: .5; 2.5 SOLUTION RESPIRATORY (INHALATION) at 07:06

## 2018-06-25 RX ADMIN — ASPIRIN 81 MG CHEWABLE TABLET 81 MG: 81 TABLET CHEWABLE at 09:06

## 2018-06-25 RX ADMIN — DOCUSATE SODIUM AND SENNOSIDES 1 TABLET: 8.6; 5 TABLET, FILM COATED ORAL at 09:06

## 2018-06-25 RX ADMIN — ALPRAZOLAM 1 MG: 0.5 TABLET ORAL at 01:06

## 2018-06-25 RX ADMIN — CLOPIDOGREL BISULFATE 75 MG: 75 TABLET ORAL at 09:06

## 2018-06-25 RX ADMIN — ALBUTEROL SULFATE 10 MG: 2.5 SOLUTION RESPIRATORY (INHALATION) at 02:06

## 2018-06-25 RX ADMIN — IPRATROPIUM BROMIDE AND ALBUTEROL SULFATE 3 ML: .5; 2.5 SOLUTION RESPIRATORY (INHALATION) at 12:06

## 2018-06-25 RX ADMIN — DILTIAZEM HYDROCHLORIDE 120 MG: 30 TABLET, FILM COATED ORAL at 01:06

## 2018-06-25 RX ADMIN — HYDROCODONE BITARTRATE AND ACETAMINOPHEN 1 TABLET: 10; 325 TABLET ORAL at 10:06

## 2018-06-25 RX ADMIN — ATORVASTATIN CALCIUM 80 MG: 40 TABLET, FILM COATED ORAL at 08:06

## 2018-06-25 RX ADMIN — GABAPENTIN 300 MG: 300 CAPSULE ORAL at 09:06

## 2018-06-25 RX ADMIN — GUAIFENESIN 600 MG: 600 TABLET, EXTENDED RELEASE ORAL at 09:06

## 2018-06-25 RX ADMIN — DILTIAZEM HYDROCHLORIDE 120 MG: 30 TABLET, FILM COATED ORAL at 10:06

## 2018-06-25 RX ADMIN — GABAPENTIN 300 MG: 300 CAPSULE ORAL at 03:06

## 2018-06-25 RX ADMIN — ALPRAZOLAM 1 MG: 0.5 TABLET ORAL at 10:06

## 2018-06-25 RX ADMIN — SOTALOL HYDROCHLORIDE 80 MG: 80 TABLET ORAL at 09:06

## 2018-06-25 RX ADMIN — LISINOPRIL 40 MG: 20 TABLET ORAL at 09:06

## 2018-06-25 RX ADMIN — FUROSEMIDE 40 MG: 10 INJECTION, SOLUTION INTRAMUSCULAR; INTRAVENOUS at 09:06

## 2018-06-25 RX ADMIN — SOTALOL HYDROCHLORIDE 80 MG: 80 TABLET ORAL at 12:06

## 2018-06-25 RX ADMIN — HYDRALAZINE HYDROCHLORIDE 50 MG: 25 TABLET ORAL at 10:06

## 2018-06-25 RX ADMIN — ENOXAPARIN SODIUM 80 MG: 100 INJECTION SUBCUTANEOUS at 12:06

## 2018-06-25 RX ADMIN — SODIUM CHLORIDE: 0.9 INJECTION, SOLUTION INTRAVENOUS at 11:06

## 2018-06-25 RX ADMIN — GABAPENTIN 300 MG: 300 CAPSULE ORAL at 08:06

## 2018-06-25 RX ADMIN — PANTOPRAZOLE SODIUM 40 MG: 40 TABLET, DELAYED RELEASE ORAL at 09:06

## 2018-06-25 RX ADMIN — SOTALOL HYDROCHLORIDE 80 MG: 80 TABLET ORAL at 08:06

## 2018-06-25 RX ADMIN — HYDRALAZINE HYDROCHLORIDE 50 MG: 25 TABLET ORAL at 01:06

## 2018-06-25 RX ADMIN — GUAIFENESIN 600 MG: 600 TABLET, EXTENDED RELEASE ORAL at 08:06

## 2018-06-25 RX ADMIN — DOCUSATE SODIUM AND SENNOSIDES 1 TABLET: 8.6; 5 TABLET, FILM COATED ORAL at 08:06

## 2018-06-25 RX ADMIN — NITROGLYCERIN 2 INCH: 20 OINTMENT TOPICAL at 12:06

## 2018-06-25 RX ADMIN — AZITHROMYCIN MONOHYDRATE 500 MG: 500 INJECTION, POWDER, LYOPHILIZED, FOR SOLUTION INTRAVENOUS at 09:06

## 2018-06-25 RX ADMIN — ENOXAPARIN SODIUM 110 MG: 150 INJECTION SUBCUTANEOUS at 12:06

## 2018-06-25 RX ADMIN — INSULIN DETEMIR 15 UNITS: 100 INJECTION, SOLUTION SUBCUTANEOUS at 09:06

## 2018-06-25 NOTE — ASSESSMENT & PLAN NOTE
CP/elev trop c/w NSTEMI  Pt reports prior hx of CAD, but no recent isch eval or cath  Currently pain free and dyspnea improved.  Cont ASA/Plavix (loaded 6/24/18)  Hold Xarelto (last dose 6/23/18)  Check echo  Enox 1mg/kg bid x2 doses  Start atorva 80mg qhs  Check lipids/LFT 3 months (late Sept 2018)  Cath in am 6/26/18  Risks, benefits and alternatives of the catheterization procedure were discussed with the patient and her  in attendance, which include but are not limited to: bleeding, infection, death, heart attack, arrhythmia, kidney failure, stroke, need for emergency surgery, etc.  Patient understands and and agrees to proceed.  Consent was placed on the chart.

## 2018-06-25 NOTE — SUBJECTIVE & OBJECTIVE
Past Medical History:   Diagnosis Date    Anticoagulant long-term use     Arthritis     Asthma     CHF (congestive heart failure)     COPD (chronic obstructive pulmonary disease)     Coronary artery disease     Depression     Diabetes mellitus     GERD (gastroesophageal reflux disease)     Hypertension        Past Surgical History:   Procedure Laterality Date    ABDOMINAL SURGERY      CARDIAC SURGERY      HERNIA REPAIR         Review of patient's allergies indicates:  No Known Allergies    Family History     Problem Relation (Age of Onset)    Diabetes Father    Heart disease Mother    Hypertension Mother        Social History Main Topics    Smoking status: Current Some Day Smoker     Packs/day: 0.25     Years: 55.00     Types: Cigarettes    Smokeless tobacco: Never Used    Alcohol use 0.6 oz/week     1 Glasses of wine per week    Drug use: No    Sexual activity: No         Review of Systems   Constitutional: Negative for activity change and appetite change.   HENT: Negative for congestion and rhinorrhea.    Respiratory: Positive for cough, shortness of breath and wheezing. Negative for chest tightness.    Cardiovascular: Positive for leg swelling. Negative for chest pain and palpitations.   Gastrointestinal: Negative for abdominal distention and abdominal pain.   Endocrine: Negative for cold intolerance and heat intolerance.   Genitourinary: Negative for difficulty urinating and dyspareunia.   Musculoskeletal: Negative for arthralgias and back pain.   Allergic/Immunologic: Negative for environmental allergies.   Neurological: Negative for dizziness and facial asymmetry.   Hematological: Negative for adenopathy.   Psychiatric/Behavioral: Negative for agitation and behavioral problems.     Objective:     Vital Signs (Most Recent):  Temp: 97.9 °F (36.6 °C) (06/25/18 0714)  Pulse: 93 (06/25/18 0729)  Resp: 20 (06/25/18 0729)  BP: (!) 144/78 (06/25/18 0700)  SpO2: 96 % (06/25/18 0729) Vital Signs (24h  Range):  Temp:  [95.7 °F (35.4 °C)-100.1 °F (37.8 °C)] 97.9 °F (36.6 °C)  Pulse:  [] 93  Resp:  [14-44] 20  SpO2:  [86 %-100 %] 96 %  BP: (114-188)/() 144/78     Weight: 76.1 kg (167 lb 12.3 oz)  Body mass index is 26.28 kg/m².      Intake/Output Summary (Last 24 hours) at 06/25/18 0839  Last data filed at 06/25/18 0600   Gross per 24 hour   Intake               20 ml   Output              250 ml   Net             -230 ml       Physical Exam   Constitutional: She is oriented to person, place, and time. She appears well-developed and well-nourished.   HENT:   Head: Normocephalic and atraumatic.   Eyes: EOM are normal. Pupils are equal, round, and reactive to light.   Neck: Normal range of motion. Neck supple.   Pulmonary/Chest: Effort normal. She has wheezes. She has no rales.   Abdominal: Soft. Bowel sounds are normal.   Musculoskeletal: Normal range of motion. She exhibits edema.   Neurological: She is alert and oriented to person, place, and time.   Skin: Skin is warm and dry.   Psychiatric: She has a normal mood and affect. Her behavior is normal.   Nursing note and vitals reviewed.      Vents:  Oxygen Concentration (%): 45 (06/25/18 0729)    Lines/Drains/Airways     Drain                 Urethral Catheter 06/25/18 0315 Non-latex 16 Fr. less than 1 day          Peripheral Intravenous Line                 Peripheral IV - Single Lumen 06/24/18 Left Forearm 1 day         Peripheral IV - Single Lumen 06/24/18 Right Antecubital 1 day         Peripheral IV - Single Lumen 06/24/18 1413 Left Antecubital less than 1 day                Significant Labs:    CBC/Anemia Profile:    Recent Labs  Lab 06/24/18  1413   WBC 11.29   HGB 10.6*   HCT 35.5*   *   MCV 84   RDW 17.5*        Chemistries:    Recent Labs  Lab 06/24/18  1413 06/25/18  0506    144   K 3.9 3.8    98   CO2 29 29   BUN 14 16   CREATININE 0.8 0.9   CALCIUM 10.2 10.1   ALBUMIN 3.6 3.5   PROT 7.8 7.8   BILITOT 0.4 0.3   ALKPHOS 85 80    ALT 7* 10   AST 20 23   MG  --  1.7   PHOS  --  3.8       All pertinent labs within the past 24 hours have been reviewed.    Significant Imaging:   I have reviewed all pertinent imaging results/findings within the past 24 hours.

## 2018-06-25 NOTE — ASSESSMENT & PLAN NOTE
Mild expiratory wheezing this morning. No infiltrate on CXR.   -Continue bronchodilators.   -Not started on steroids. No need at this time.   -Continue Anoro on discharge.

## 2018-06-25 NOTE — NURSING TRANSFER
Nursing Transfer Note      6/25/2018  1630    Transfer to  [address]    Transfer via bed    Transfer with portable oxygen/cardiac monitor    Transported by ICU RN/transporter    Medicines sent: Yes    Chart send with patient: yes    Notified: pt to notify     Patient reassessed at: 06/25/2018  1600    Upon arrival to floor: cardiac monitor applied, patient oriented to room, call bell in reach and bed in lowest position/nurse shamir at bedside

## 2018-06-25 NOTE — CARE UPDATE
Reviewed H and P by my colleague and agree with A and P. Patient presenting with SOB and found to have an elevated trop of .87.  Sent to ICU on Bi-pap for acute on chronic COPD exacerbation (did not require steroids). The Bl-pap was weaned off and the patient will be transferred to the floor. Cards consulted and the patient will go for a LHC on 6/26.  Pulmonary noted that patient qualifies for home Trilogy bi-pap.

## 2018-06-25 NOTE — PLAN OF CARE
06/25/18 1605   Discharge Assessment   Assessment Type Discharge Planning Assessment   Confirmed/corrected address and phone number on facesheet? Yes   Assessment information obtained from? Patient;Medical Record   Expected Length of Stay (days) 4   Communicated expected length of stay with patient/caregiver yes   Prior to hospitilization cognitive status: Alert/Oriented   Prior to hospitalization functional status: Assistive Equipment;Needs Assistance   Current cognitive status: Alert/Oriented   Current Functional Status: Needs Assistance   Facility Arrived From: home   Lives With spouse;other relative(s)  (niece lives at the home, works)   Able to Return to Prior Arrangements yes   Is patient able to care for self after discharge? Yes   Who are your caregiver(s) and their phone number(s)? if needed , niece, daugher in law--demographics   Patient's perception of discharge disposition home health;home or selfcare   Readmission Within The Last 30 Days no previous admission in last 30 days   Patient currently being followed by outpatient case management? No   Patient currently receives any other outside agency services? Yes   Name and contact number of agency or person providing outside services [facility]--   Is it the patient/care giver preference to resume care with the current outside agency? Yes   Equipment Currently Used at Home bedside commode;glucometer;nebulizer;oxygen;shower chair;wheelchair;other (see comments)  (back brace)   Do you have any problems affording any of your prescribed medications? No   Is the patient taking medications as prescribed? yes   Does the patient have transportation home? Yes   Transportation Available family or friend will provide   Does the patient receive services at the [facility]? No   Discharge Plan A Home Health;Home with family   Discharge Plan B Other  (to be determined as patient progresses)   Patient/Family In Agreement With Plan yes   Does the  "patient have transportation to healthcare appointments? Yes   SW met with patient in ICU, explained role of SW/CM with treatment team, provided contact information with the "help at home" checklist handout.   Confirmed information in demographics: updated.   SW reviewed the discharge planning folder, explained to leave in room with patient so that team/patient can place all written discharge information throughout hospital stay. Provided education regarding the importance of using written discharge information to help manage health care at home.   SW provided education regarding the importance of obtaining, taking all medications at discharge. Preferred pharmacy is   [facility] [id number]  [address]  [address]  [place] [id number]  Phone: [phone] Fax: [phone]  .  SW provided education regarding importance of going to all follow up appointments to help manage health care at home. Patient prefers medical appointment discussed at later time as  borrows brother's truck to take her to appt's.   SW will follow in ICU and assist as needed.  "

## 2018-06-25 NOTE — PROGRESS NOTES
Results for ANSLEY RICHMOND (MRN [id number]) as of 6/25/2018 01:39   Ref. Range 6/25/2018 01:28   POC PH Latest Ref Range: 7.35 - 7.45  7.421   POC PCO2 Latest Ref Range: 35 - 45 mmHg 48.8 (H)   POC PO2 Latest Ref Range: 80 - 100 mmHg 81   POC BE Latest Ref Range: -2 to 2 mmol/L 6   POC HCO3 Latest Ref Range: 24 - 28 mmol/L 31.7 (H)   POC SATURATED O2 Latest Ref Range: 95 - 100 % 96   POC TCO2 Latest Ref Range: 23 - 27 mmol/L 33 (H)   FiO2 Unknown 40   Sample Unknown ARTERIAL   DelSys Unknown CPAP/BiPAP   Allens Test Unknown Pass   Site Unknown RR   Mode Unknown BiPAP

## 2018-06-25 NOTE — CONSULTS
[facility]  Pulmonology  Consult Note    Patient Name: Yasmeen Palm  MRN: [id number]  Admission Date: 6/24/2018  Hospital Length of Stay: 1 days  Code Status: Full Code  Attending Physician: Tremayne Kong MD  Primary Care Provider: Angel Orourke Jr, MD   Principal Problem: Acute exacerbation of chronic obstructive pulmonary disease (COPD)    Inpatient consult to Pulmonology  Consult performed by: HCUN GIBSON  Consult ordered by: KM PRESLEY    Inpatient consult to Pulmonology  Consult performed by: CHUN GIBSON  Consult ordered by: KM PRESLEY        Subjective:     HPI:  68 year old female with COPD(on home 3L NC) who presented with chest pain and SOB. Diagnosed with NSTEMI. Patient started on Bipap and transferred to the ICU. Patient currently off Bipap and chest pain free.   Reports taking Anoro at home.   Follows with Dr. Torres at [place].   Currently on 4 L NC.     Past Medical History:   Diagnosis Date    Anticoagulant long-term use     Arthritis     Asthma     CHF (congestive heart failure)     COPD (chronic obstructive pulmonary disease)     Coronary artery disease     Depression     Diabetes mellitus     GERD (gastroesophageal reflux disease)     Hypertension        Past Surgical History:   Procedure Laterality Date    ABDOMINAL SURGERY      CARDIAC SURGERY      HERNIA REPAIR         Review of patient's allergies indicates:  No Known Allergies    Family History     Problem Relation (Age of Onset)    Diabetes Father    Heart disease Mother    Hypertension Mother        Social History Main Topics    Smoking status: Current Some Day Smoker     Packs/day: 0.25     Years: 55.00     Types: Cigarettes    Smokeless tobacco: Never Used    Alcohol use 0.6 oz/week     1 Glasses of wine per week    Drug use: No    Sexual activity: No         Review of Systems   Constitutional: Negative for activity change and appetite change.    HENT: Negative for congestion and rhinorrhea.    Respiratory: Positive for cough, shortness of breath and wheezing. Negative for chest tightness.    Cardiovascular: Positive for leg swelling. Negative for chest pain and palpitations.   Gastrointestinal: Negative for abdominal distention and abdominal pain.   Endocrine: Negative for cold intolerance and heat intolerance.   Genitourinary: Negative for difficulty urinating and dyspareunia.   Musculoskeletal: Negative for arthralgias and back pain.   Allergic/Immunologic: Negative for environmental allergies.   Neurological: Negative for dizziness and facial asymmetry.   Hematological: Negative for adenopathy.   Psychiatric/Behavioral: Negative for agitation and behavioral problems.     Objective:     Vital Signs (Most Recent):  Temp: 97.9 °F (36.6 °C) (06/25/18 0714)  Pulse: 93 (06/25/18 0729)  Resp: 20 (06/25/18 0729)  BP: (!) 144/78 (06/25/18 0700)  SpO2: 96 % (06/25/18 0729) Vital Signs (24h Range):  Temp:  [95.7 °F (35.4 °C)-100.1 °F (37.8 °C)] 97.9 °F (36.6 °C)  Pulse:  [] 93  Resp:  [14-44] 20  SpO2:  [86 %-100 %] 96 %  BP: (114-188)/() 144/78     Weight: 76.1 kg (167 lb 12.3 oz)  Body mass index is 26.28 kg/m².      Intake/Output Summary (Last 24 hours) at 06/25/18 0839  Last data filed at 06/25/18 0600   Gross per 24 hour   Intake               20 ml   Output              250 ml   Net             -230 ml       Physical Exam   Constitutional: She is oriented to person, place, and time. She appears well-developed and well-nourished.   HENT:   Head: Normocephalic and atraumatic.   Eyes: EOM are normal. Pupils are equal, round, and reactive to light.   Neck: Normal range of motion. Neck supple.   Pulmonary/Chest: Effort normal. She has wheezes. She has no rales.   Abdominal: Soft. Bowel sounds are normal.   Musculoskeletal: Normal range of motion. She exhibits edema.   Neurological: She is alert and oriented to person, place, and time.   Skin: Skin is  warm and dry.   Psychiatric: She has a normal mood and affect. Her behavior is normal.   Nursing note and vitals reviewed.      Vents:  Oxygen Concentration (%): 45 (06/25/18 0729)    Lines/Drains/Airways     Drain                 Urethral Catheter 06/25/18 0315 Non-latex 16 Fr. less than 1 day          Peripheral Intravenous Line                 Peripheral IV - Single Lumen 06/24/18 Left Forearm 1 day         Peripheral IV - Single Lumen 06/24/18 Right Antecubital 1 day         Peripheral IV - Single Lumen 06/24/18 1413 Left Antecubital less than 1 day                Significant Labs:    CBC/Anemia Profile:    Recent Labs  Lab 06/24/18  1413   WBC 11.29   HGB 10.6*   HCT 35.5*   *   MCV 84   RDW 17.5*        Chemistries:    Recent Labs  Lab 06/24/18  1413 06/25/18  0506    144   K 3.9 3.8    98   CO2 29 29   BUN 14 16   CREATININE 0.8 0.9   CALCIUM 10.2 10.1   ALBUMIN 3.6 3.5   PROT 7.8 7.8   BILITOT 0.4 0.3   ALKPHOS 85 80   ALT 7* 10   AST 20 23   MG  --  1.7   PHOS  --  3.8       All pertinent labs within the past 24 hours have been reviewed.    Significant Imaging:   I have reviewed all pertinent imaging results/findings within the past 24 hours.    Assessment/Plan:     Non-STEMI (non-ST elevated myocardial infarction)    Per Cards. [facility] tomorrow.         Chronic respiratory failure with hypoxia and hypercapnia    Patient has chronic hypercapnia. PCO2 > 55 at rest.   -Consider home NIPPV(trilogy) at discharge.         Type 2 diabetes mellitus, controlled    BG goal 140-180.         COPD (chronic obstructive pulmonary disease)    Mild expiratory wheezing this morning. No infiltrate on CXR.   -Continue bronchodilators.   -Not started on steroids. No need at this time.   -Continue Anoro on discharge.               Thank you for your consult. I will sign off. Please contact us if you have any additional questions.     Vivian Ely MD  Pulmonology  [facility] [place]

## 2018-06-25 NOTE — ED NOTES
Pt continues with BiPap, continues to deny pain.  CT pending, waiting for Respiratory for transport with BiPap

## 2018-06-25 NOTE — PLAN OF CARE
Problem: Patient Care Overview  Goal: Plan of Care Review  Outcome: Ongoing (interventions implemented as appropriate)  Patient weaned off BiPAP to nasal cannula.  Continues to have expiratory wheeze most of shift/ resp treatments given throughout shift.  VSS.  Clark cath removed--pt instructed to tell nurses if unable to void.   at bedside most of day.

## 2018-06-25 NOTE — PROGRESS NOTES
Pt recieved intubated on V60 BiPap with the following settings;. 12/5, RR 14, Fi02 45%   Alarms are set and functioning, Ambu bag at bedside, Pt without distress RT will continue to monitor and wean as tolerated.     07:51  RT performed ABG   PH 7.388, C02 57.6, Pa02 77, BE 8, HC03, 34.7, Sa02 95%

## 2018-06-25 NOTE — ASSESSMENT & PLAN NOTE
Currently in SR  On sotalol, continue  Xarelto held for cath, will resume post cath  If PCI, will also need Plavix

## 2018-06-25 NOTE — H&P
[facility] Medicine  History & Physical    Patient Name: Yasmeen Palm  MRN: [id number]  Admission Date: 06/25/2018  Attending Physician: Laureano Gentile MD, MPH      PCP:     Angel Orourke Jr, MD    CC:     Chief Complaint   Patient presents with    Chest Pain     Pt reports chest pain x 2 days with increasing severity and increased shortness of breath.        HISTORY OF PRESENT ILLNESS:     Yasmeen Palm is a 68 y.o. female that (in part)  has a past medical history of Anticoagulant long-term use; Arthritis; Asthma; CHF (congestive heart failure); COPD (chronic obstructive pulmonary disease); Coronary artery disease; Depression; Diabetes mellitus; GERD (gastroesophageal reflux disease); and Hypertension.  has a past surgical history that includes Abdominal surgery; Cardiac surgery; and Hernia repair. Presents to [facility] Emergency Department complaining of chest pain .  She reports compliance with her home medication regimen, including Xarelto.     Description of symptoms    Location:  Substernal  Onset:  Acute onset 2 days ago  Character:  The patient remained; moderate severity  Frequency:  Daily  Duration:  each episode lasts several minutes at a time  Associated Symptoms:  Diaphoresis, shortness of breath, weakness and fatigue  Radiation:  Recent the chest  Exacerbating factors:  Worse on exertion  Relieving factors:  Minimal relief with supplemental oxygen       In the emergency department routine laboratory studies, chest x-ray, EKG, cardiac enzymes were obtained.  EKG findings were concerning the case was discussed with cardiologist, Dr. Pulido.  It was determined that her EKG was not consistent with STEMI and she has been chest pain-free since arrival.  She had been given Lovenox, aspirin, and plan was to continue trending troponins and monitor closely on telemetry.    Hospital medicine has been asked to admit for further evaluation and  treatment.       REVIEW OF SYSTEMS:     -- Constitutional:  Generalized fatigue and weakness.  No fever or chills.  -- Eyes: No visual changes, diplopia, pain, tearing, blind spots, or discharge.   -- Ears, nose, mouth, throat, and face: No congestion, sore throat, epistaxis, d/c, bleeding gums, neck stiffness masses, or dental issues.  -- Respiratory:  As above in the HPI.  -- Cardiovascular:  As above HPI.   -- Gastrointestinal: No vomiting, abdominal pain, hematemesis, melena, dyspepsia, or change in bowel habits.  -- Genitourinary: No hematuria, dysuria, frequency, urgency, nocturia, polyuria, stones, or incontinence.  -- Integument/breast: No rash, pruritis, pigmentation changes, dryness, or changes in hair  -- Hematologic/lymphatic:  Positive for easy bruising.  lymphadenopathy.   -- Musculoskeletal:  Chronic arthralgias.  Denies acute myalgias, joint swelling, acute limitations of ROM, or acute muscular weakness.  -- Neurological: No seizures, headaches, incoordination, paraesthesias, ataxia, vertigo, or tremors.  -- Behavioral/Psych: + anxiety.  No auditory or visual hallucinations, depression, or suicidal/homicidal ideations.  -- Endocrine: No heat or cold intolerance, polydipsia, or unintentional weight gain / loss.  -- Allergy/Immunologic: No recurrent infections or adverse reaction to food, insects, or difficulty breathing.      PAST MEDICAL / SURGICAL HISTORY:     Past Medical History:   Diagnosis Date    Anticoagulant long-term use     Arthritis     Asthma     CHF (congestive heart failure)     COPD (chronic obstructive pulmonary disease)     Coronary artery disease     Depression     Diabetes mellitus     GERD (gastroesophageal reflux disease)     Hypertension      Past Surgical History:   Procedure Laterality Date    ABDOMINAL SURGERY      CARDIAC SURGERY      HERNIA REPAIR           FAMILY HISTORY:     Family History   Problem Relation Age of Onset    Heart disease Mother      Hypertension Mother     Diabetes Father          SOCIAL HISTORY:     Social History     Social History    Marital status: Legally      Spouse name: N/A    Number of children: N/A    Years of education: N/A     Social History Main Topics    Smoking status: Current Some Day Smoker     Packs/day: 0.25     Years: 55.00     Types: Cigarettes    Smokeless tobacco: Never Used    Alcohol use 0.6 oz/week     1 Glasses of wine per week    Drug use: No    Sexual activity: No     Other Topics Concern    None     Social History Narrative    None         ALLERGIES:       Review of patient's allergies indicates:  No Known Allergies      HEALTH SCREENING:     Prevnar 13 pneumonia vaccine =  evidence of previous vaccination found in the medical record      HOME MEDICATIONS:     Prior to Admission medications    Medication Sig Start Date End Date Taking? Authorizing Provider   acetaminophen (TYLENOL) 500 MG tablet Take 1 tablet (500 mg total) by mouth every 8 (eight) hours as needed. 10/7/16   Zenon Alfredo MD   aspirin (ECOTRIN) 81 MG EC tablet Take 81 mg by mouth once daily.    Historical Provider, MD   cloNIDine (CATAPRES) 0.1 MG tablet Take 2 tablets (0.2 mg total) by mouth 3 (three) times daily. 10/7/16 2/12/18  Zenon Alfredo MD   diltiaZEM (CARDIZEM) 120 MG tablet Take 1 tablet (120 mg total) by mouth every 8 (eight) hours. 8/14/17 8/14/18  Layla Villegas MD   furosemide (LASIX) 40 MG tablet Take 40 mg by mouth once daily.     Historical Provider, MD   gabapentin (NEURONTIN) 300 MG capsule Take 300 mg by mouth 3 (three) times daily.    Historical Provider, MD   hydrALAZINE (APRESOLINE) 50 MG tablet Take 1 tablet (50 mg total) by mouth every 8 (eight) hours.  Patient taking differently: Take 50 mg by mouth every 12 (twelve) hours.  11/6/17 11/6/18  Zenon Alfredo MD   levalbuterol (XOPENEX HFA) 45 mcg/actuation inhaler Inhale 2 puffs into the lungs every 4 (four) hours as needed for Wheezing or  Shortness of Breath. Rescue 2/12/18 2/12/19  Shade Dejesus MD   lisinopril (PRINIVIL,ZESTRIL) 40 MG tablet Take 1 tablet (40 mg total) by mouth once daily. Do not take this medication if blood pressure is below 130/80 mmHg and/or feeling dizzy after taking all other blood pressure meds 8/14/17   Layla Villegas MD   metformin (GLUCOPHAGE) 500 MG tablet Take 500 mg by mouth 2 (two) times daily with meals.    Historical Provider, MD   naproxen (NAPROSYN) 375 MG tablet Take 375 mg by mouth every 12 (twelve) hours as needed.    Historical Provider, MD   rivaroxaban (XARELTO) 20 mg Tab Take 20 mg by mouth daily with dinner or evening meal.    Historical Provider, MD   sotalol (BETAPACE) 80 MG tablet Take 80 mg by mouth 2 (two) times daily.    Historical Provider, MD   tramadol (ULTRAM) 50 mg tablet Take 1 tablet (50 mg total) by mouth every 6 (six) hours as needed for Pain. 10/7/16   Zenon Alfredo MD   UMECLIDINIUM BRM/VILANTEROL TR (ANORO ELLIPTA INHL) Inhale into the lungs daily as needed.    Historical Provider, MD          HOSPITAL MEDICATIONS:     Scheduled Meds:    albuterol sulfate        albuterol-ipratropium  3 mL Nebulization Q6H    aspirin  81 mg Oral Daily    azithromycin  250 mg Oral Daily    azithromycin  500 mg Oral Once    diltiaZEM  120 mg Oral Q8H    fluticasone-vilanterol  1 puff Inhalation Daily    furosemide  40 mg Intravenous BID    gabapentin  300 mg Oral TID    guaiFENesin  600 mg Oral BID    hydrALAZINE  50 mg Oral Q8H    insulin detemir U-100  15 Units Subcutaneous Daily    lisinopril  40 mg Oral Daily    nitroGLYCERIN 2% TD oint  2 inch Topical (Top) Q6H    pantoprazole  40 mg Oral Daily    polyethylene glycol  17 g Oral Daily    rivaroxaban  20 mg Oral Daily with dinner    senna-docusate 8.6-50 mg  1 tablet Oral BID    sotalol  80 mg Oral BID     Continuous Infusions:   PRN Meds: acetaminophen, ALPRAZolam, bisacodyl, dextrose 50%, glucagon (human recombinant),  HYDROcodone-acetaminophen, HYDROcodone-acetaminophen, insulin aspart U-100, sodium chloride 0.9%, traMADol      PHYSICAL EXAM:     Wt Readings from Last 1 Encounters:   06/24/18 2216 109.7 kg (241 lb 13.5 oz)   06/24/18 1404 90.7 kg (200 lb)     Body mass index is 37.88 kg/m².  Vitals:    06/25/18 0315 06/25/18 0330 06/25/18 0345 06/25/18 0400   BP:  114/67  118/65   BP Location:       Patient Position:       Pulse: 77 70 69 71   Resp: (!) 29 (!) 26 (!) 23 (!) 23   Temp:  (!) 95.7 °F (35.4 °C)     TempSrc:  Axillary     SpO2: (!) 90% (!) 93% (!) 93% (!) 93%   Weight:       Height:              -- General appearance:  Ill-appearing elderly female who is well developed. appears slightly older than stated age   -- Head: normocephalic, atraumatic   -- Eyes: conjunctivae clear. Extraocular muscles intact  -- Nose: Nares normal. Septum midline.   -- Mouth/Throat:  Dry mucous membranes.   no throat erythema.   -- Neck: supple, symmetrical, trachea midline, no JVD and thyroid not grossly enlarged, appears symmetric  -- Lungs:  Decreased breath sounds to auscultation bilaterally with poor air excursion prolonged expiratory phase consistent with long-term smoker.  Currently on BiPAP. No use of accessory muscles.  Slightly increased work of breathing  -- Chest wall: no tenderness. equal bilateral chest rise   -- Heart:  Rapid rate and regular rhythm. S1, S2 normal.  no click, rub or gallop   -- Abdomen: soft, non-tender, non-distended, non-tympanic; bowel sounds normal; no masses  -- Extremities: no cyanosis, clubbing .  Trace peripheral bilateral lower extremity edema  -- Pulses: 2+ and symmetric   -- Skin: color normal, texture normal, turgor normal. No rashes or lesions.   -- Neurologic:  Globally decreased muscle strength and tone.  Somnolent.  GCS of 11      LABORATORY STUDIES:     Recent Results (from the past 36 hour(s))   CBC auto differential    Collection Time: 06/24/18  2:13 PM   Result Value Ref Range    WBC 11.29  3.90 - 12.70 K/uL    RBC 4.21 4.00 - 5.40 M/uL    Hemoglobin 10.6 (L) 12.0 - 16.0 g/dL    Hematocrit 35.5 (L) 37.0 - 48.5 %    MCV 84 82 - 98 fL    MCH 25.2 (L) 27.0 - 31.0 pg    MCHC 29.9 (L) 32.0 - 36.0 g/dL    RDW 17.5 (H) 11.5 - 14.5 %    Platelets 468 (H) 150 - 350 K/uL    MPV 9.7 9.2 - 12.9 fL    Gran # (ANC) 9.4 (H) 1.8 - 7.7 K/uL    Lymph # 1.0 1.0 - 4.8 K/uL    Mono # 0.9 0.3 - 1.0 K/uL    Eos # 0.0 0.0 - 0.5 K/uL    Baso # 0.04 0.00 - 0.20 K/uL    Gran% 83.1 (H) 38.0 - 73.0 %    Lymph% 8.4 (L) 18.0 - 48.0 %    Mono% 8.0 4.0 - 15.0 %    Eosinophil% 0.1 0.0 - 8.0 %    Basophil% 0.4 0.0 - 1.9 %    Differential Method Automated    Comprehensive metabolic panel    Collection Time: 06/24/18  2:13 PM   Result Value Ref Range    Sodium 143 136 - 145 mmol/L    Potassium 3.9 3.5 - 5.1 mmol/L    Chloride 101 95 - 110 mmol/L    CO2 29 23 - 29 mmol/L    Glucose 119 (H) 70 - 110 mg/dL    BUN, Bld 14 8 - 23 mg/dL    Creatinine 0.8 0.5 - 1.4 mg/dL    Calcium 10.2 8.7 - 10.5 mg/dL    Total Protein 7.8 6.0 - 8.4 g/dL    Albumin 3.6 3.5 - 5.2 g/dL    Total Bilirubin 0.4 0.1 - 1.0 mg/dL    Alkaline Phosphatase 85 55 - 135 U/L    AST 20 10 - 40 U/L    ALT 7 (L) 10 - 44 U/L    Anion Gap 13 8 - 16 mmol/L    eGFR if African American >60 >60 mL/min/1.73 m^2    eGFR if non African American >60 >60 mL/min/1.73 m^2   Brain natriuretic peptide    Collection Time: 06/24/18  2:13 PM   Result Value Ref Range    BNP 2,128 (H) 0 - 99 pg/mL   Troponin I    Collection Time: 06/24/18  2:13 PM   Result Value Ref Range    Troponin I 0.896 (H) 0.000 - 0.026 ng/mL   Troponin I    Collection Time: 06/24/18  9:53 PM   Result Value Ref Range    Troponin I 0.870 (H) 0.000 - 0.026 ng/mL   POCT glucose    Collection Time: 06/25/18  1:18 AM   Result Value Ref Range    POCT Glucose 150 (H) 70 - 110 mg/dL   ISTAT PROCEDURE    Collection Time: 06/25/18  1:28 AM   Result Value Ref Range    POC PH 7.421 7.35 - 7.45    POC PCO2 48.8 (H) 35 - 45 mmHg    POC  PO2 81 80 - 100 mmHg    POC HCO3 31.7 (H) 24 - 28 mmol/L    POC BE 6 -2 to 2 mmol/L    POC SATURATED O2 96 95 - 100 %    POC TCO2 33 (H) 23 - 27 mmol/L    Rate 14     Sample ARTERIAL     Site RR     Allens Test Pass     DelSys CPAP/BiPAP     Mode BiPAP     FiO2 40     Spont Rate 20     Min Vol 11.1     Sp02 95     IP 10     EP 5        Lab Results   Component Value Date    INR 1.0 12/08/2017    INR 1.0 11/02/2017    INR 1.2 08/12/2017     Lab Results   Component Value Date    HGBA1C 6.3 (H) 11/03/2017    HGBA1C 6.3 (H) 11/03/2017     Recent Labs      06/25/18   0118   POCTGLUCOSE  150*             IMAGING:     Imaging Results          CTA Chest Abdomen Non Coronary (Final result)  Result time 06/24/18 20:47:27    Final result by Davon Sandoval MD (06/24/18 20:47:27)                 Impression:      1. No evidence of aortic dissection.  2. Extensive calcified and noncalcified plaque seen throughout the aorta with small multifocal outpouchings seen involving the descending thoracic aorta suggestive for small penetrating ulcers.  Similar findings were seen on previous CT from August 2017.  3. 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  4. No evidence of PE.  5. Decreased size area of nodularity with scarring seen within the left lower lobe with resolution of previously visualized cavitation.  No evidence of new lung consolidation.  6. Mild to moderate centrilobular emphysema.  7. Additional findings as detailed above.      Electronically signed by: Davon Sandoval MD  Date:    06/24/2018  Time:    20:47             Narrative:    EXAMINATION:  CTA CHEST ABDOMEN NON CORONARY (XPD)    CLINICAL HISTORY:  ? aortic dissection, on xarelto with nonstemi, back pain;    TECHNIQUE:  CTA of the chest and abdomen was performed following administration of 100 cc Omnipaque 350 IV contrast.    COMPARISON:  CT chest from August 2017.    FINDINGS:  Structures at the base of  the neck are unremarkable.    No evidence of aortic dissection.  Prominent calcified and noncalcified plaque is seen throughout the thoracic and abdominal aorta.  Multifocal small outpouchings are seen involving the descending thoracic aorta which may represent small penetrating ulcers.  Similar findings are seen on previous exam.  Branch vessels of the arch are patent.  Ascending thoracic aorta measures 3.7 cm.  Descending aorta measures 3.4 cm.  Aorta measures 3.5 cm at the level of the diaphragmatic hiatus.  Distal abdominal aorta measures 2.3 cm proximal to the iliac bifurcation.  Celiac artery, SMA, bilateral renal arteries, and JEAN-PAUL are patent with mild plaquing seen at their origins.  Bilateral iliac arteries are patent.  There are two small adjacent saccular aneurysms arising from the proximal aspect of the right common iliac artery measuring 1.3 cm and 0.9 cm.    The heart is normal in size without pericardial effusion.  Coronary artery calcification is seen.  No intraluminal filling defects within the pulmonary arteries to suggest pulmonary thromboembolism.   Multiple prominent mediastinal lymph nodes are seen which do not meet CT criteria for enlargement.  The esophagus maintains a normal course and caliber.    The trachea and bronchi are patent.  The lungs are symmetrically expanded.  Mild to moderate centrilobular emphysematous changes are seen with upper zone predominance.  Minimal atelectasis seen within the right middle lobe.  There is decreased size region of scarring with nodularity measuring 1.0 cm within the posterior aspect of the left hepatic lobe.  Previously visualized small area of cavitation in this region has resolved.    No significant hepatic abnormalities are seen.  Gallbladder is unremarkable.  Stomach, spleen, pancreas, and adrenal glands show no significant abnormalities.  Kidneys enhance normally.  Right renal cyst is noted.  Visualized loops of small and large bowel show no evidence  of obstruction or inflammation.  No evidence of free air or free fluid.    Postsurgical changes are seen consistent with anterior abdominal wall hernia repair with mesh.  Osseous structures demonstrate degenerative change with stable remote mild superior endplate compression fracture seen of the T11 vertebral body.                               X-Ray Chest AP Portable (Final result)  Result time 06/24/18 15:41:15    Final result by Aggie Wharton MD (06/24/18 15:41:15)                 Impression:      No acute abnormality.      Electronically signed by: Aggie Wharton MD  Date:    06/24/2018  Time:    15:41             Narrative:    EXAMINATION:  XR CHEST AP PORTABLE    CLINICAL HISTORY:  Chest pain, unspecified    TECHNIQUE:  Single frontal view of the chest was performed.    COMPARISON:  02/12/2018    FINDINGS:  The lungs are clear, with normal appearance of pulmonary vasculature and no pleural effusion or pneumothorax.    The cardiac silhouette is normal in size. The hilar and mediastinal contours are unremarkable.    Bones are intact.  Atherosclerotic plaque of the aorta.                                  CONSULTS:     IP CONSULT TO CARDIOLOGY  IP CONSULT TO RESPIRATORY CARE  IP CONSULT TO PULMONOLOGY       ASSESSMENT & PLAN:     Primary Diagnosis:  Acute exacerbation of chronic obstructive pulmonary disease (COPD)    Active Hospital Problems    Diagnosis  POA    *Acute exacerbation of chronic obstructive pulmonary disease (COPD) [J44.1]  Yes     Priority: 1 - High    Neuropathy [G62.9]  Yes    Non-STEMI (non-ST elevated myocardial infarction) [I21.4]  Yes    Elevated troponin I level [R74.8]  Yes    Elevated brain natriuretic peptide (BNP) level [R79.89]  Yes    Obesity [E66.9]  Yes    Chronic anticoagulation [Z79.01]  Not Applicable     Chronic    History of deep vein thrombosis [Z86.718]  Not Applicable     Chronic    History of pulmonary embolism [Z86.711]  Yes     Chronic    Anemia of  chronic disease [D63.8]  Yes     Chronic    Type 2 diabetes mellitus, controlled [E11.9]  Yes     Chronic    Gastroesophageal reflux disease without esophagitis [K21.9]  Yes     Chronic    Tobacco abuse [Z72.0]  Yes     Chronic    Coronary artery disease involving native coronary artery of native heart with angina pectoris [I25.119]  Yes      Resolved Hospital Problems    Diagnosis Date Resolved POA   No resolved problems to display.         Elevated troponin with history of coronary artery disease- likely cardiac strain secondary to acute COPD exacerbation as noted above in the setting of CHF, rule-out myocardial infarction   · Intitial EKG findings shows Q-waves in leads V2, V3, and V4 is in sinus tachycardia.  Has a CC segment days consistent with cardiac strain but no definite STEMI; a shared with cardiologist by ED.  · Initial troponin is above normal limits, but overall flat trend  Recent Labs      06/24/18   1413  06/24/18   2153   TROPONINI  0.896*  0.870*     · Intermediate to high risk for MI;  hypertension, hyperlipidemia, aspirin use, diabetes, tobacco abuse  · Initiate ACS protocol to rule out MI, then explore noncardiac etiology  · Trend cardiac enzymes  · Aspirin  · Beta-blocker   · Statin  · Supplemental oxygen  · nitroglycerine and morphine PRN  · 2D echocardiogram  · TSH, FT3, FT4  · ESR and cardiac specific CRP   · PT, PTT, INR   · Tight glycemic control  · Monitor on telemetry unit  · Consult cardiologist  · Will admit for rule out acute MI and possible further provocative testing for risk stratification.    Acute COPD exacerbation  · Scheduled nebulizer treatments (albuterol/atrovent)  · Initiate Solumedrol 125mg IV q8, then taper as clinical course improves; convert to PO taper as outpatient  · PPI or H2 blocker concomitantly with steroids  · Titrate O2 sats between 88 to 93%.  No supplemental 02 for 02 saturation greater than 93% due to V/Q mismatch  · Breo, or similar, while  inpatient  · Add budesonide/formoterol or salmeterol/fluticasone propionate - at or before discharge - (Advair 500/50mg, Spiriva 18mcg, or Daliresp 500mcg)   · Mucolytics  · Consult pulmonology for further optimization  · Tobacco cessation counseling      Diabetes mellitus type 2  · BG in acceptable range at this time  · Maintain w/ subcutaneous insulin management order set  · Hold oral diabetic meds  · ADA 1800 kcal diet  · BG goal while in patient is <180mg/dL  · HgA1c = Pending    Hypertension  · Goal while inpatient is a systolic blood pressure less than 160mmHg  · BP in acceptable range at this time  · Continue current home regimen with hold parameters  · PRN antihypertensives available    Gastroesophageal reflux disease   · No acute issues  · Continue current home regimen    Tobacco abuse   · Chronic tobacco use  · Tobacco cessation counseling  · Nicotine patch offered; consider Wellbutrin or Chantix through PCP as an outpatient (will require closer monitoring)  · I discussed with the patient regarding the hazardous effects of smoking on increasing risk of heart attack and stroke, worsening lung functions, and increasing cancer risk.   Patient was urged to stop smoking now.  I also offered nicotine taper (such as nicotine patch and gum) to help ease the craving to smoke.    Morbid obesity  Body mass index is 37.88 kg/m².  · Order a cardiac diet  · Counseling given  · Nutrition consult as an outpatient        VTE Risk Mitigation         Ordered     rivaroxaban tablet 20 mg  With dinner      06/25/18 0426            Adult PRN medications available   DVT prophylaxis given       DISPOSITION:     Will admit to the Hospital Medicine service for further evaluation and treatment.    Chart reviewed and updated where applicable.    High Risk Conditions:  Patient has a condition that poses threat to life and bodily function: Severe Respiratory  Distress      ===============================================================    Laureano Gentile MD, MPH  Department of Hospital Medicine   [facility] [place]  [phone] pg  (7pm - 6am)          This note is dictated using Dragon Medical 360 voice recognition software.  There are word recognition mistakes that are occasionally missed on review.

## 2018-06-25 NOTE — NURSING TRANSFER
Nursing Transfer Note      6/25/2018     Transfer From ICU    Transfer via Bed    Transfer with Family    Transported by Patient escort Yoandy    Medicines sent: No     Chart send with patient: Yes    Notified: N/A    Patient reassessed at: 1700    Upon arrival to floor: Patient arrived to floor breathing deeply, sats 80-85% on 3L. Bumped up to 5L. Sats = 86%. Patient denies SOB, chest pain. Respiratory notified, will place patient on non rebreather and reaccess. Will continue to closely monitor.

## 2018-06-25 NOTE — ED NOTES
Pt transported with Resp and Primary Nurse to and from CT---pt back in ED Bed 8, no acute distress, no complaints of pain, vital signs stable

## 2018-06-25 NOTE — ASSESSMENT & PLAN NOTE
Patient has chronic hypercapnia. PCO2 > 55 at rest.   -Consider home NIPPV(trilogy) at discharge.

## 2018-06-25 NOTE — PLAN OF CARE
Problem: Patient Care Overview  Goal: Plan of Care Review  Outcome: Ongoing (interventions implemented as appropriate)  Rapid Response transfer from [facility] due to increasing respiratory distress. ABG/CXR done. Clark inserted. Lasix given. Bipap adjusted. Pulmonology consulted. Repeat ABG to be done @ 0800. Plan of care reviewed and discussed with spouse, who remains at bedside. No new falls, injuries or trauma. All safety precautions maintained. Turned Q2hrs.

## 2018-06-25 NOTE — ED NOTES
"Pt reports increased back pain and "just not feeling good"--Pt with increased heart rate, denies chest pain, denies sob--obtained repeat ekg--notified Dr. Olivarez who is now at bedside  "

## 2018-06-25 NOTE — SUBJECTIVE & OBJECTIVE
Past Medical History:   Diagnosis Date    Anticoagulant long-term use     Arthritis     Asthma     CHF (congestive heart failure)     COPD (chronic obstructive pulmonary disease)     Coronary artery disease     Depression     Diabetes mellitus     GERD (gastroesophageal reflux disease)     Hypertension        Past Surgical History:   Procedure Laterality Date    ABDOMINAL SURGERY      CARDIAC SURGERY      HERNIA REPAIR         Review of patient's allergies indicates:  No Known Allergies    No current facility-administered medications on file prior to encounter.      Current Outpatient Prescriptions on File Prior to Encounter   Medication Sig    acetaminophen (TYLENOL) 500 MG tablet Take 1 tablet (500 mg total) by mouth every 8 (eight) hours as needed.    aspirin (ECOTRIN) 81 MG EC tablet Take 81 mg by mouth once daily.    cloNIDine (CATAPRES) 0.1 MG tablet Take 2 tablets (0.2 mg total) by mouth 3 (three) times daily.    diltiaZEM (CARDIZEM) 120 MG tablet Take 1 tablet (120 mg total) by mouth every 8 (eight) hours.    furosemide (LASIX) 40 MG tablet Take 40 mg by mouth once daily.     gabapentin (NEURONTIN) 300 MG capsule Take 300 mg by mouth 3 (three) times daily.    hydrALAZINE (APRESOLINE) 50 MG tablet Take 1 tablet (50 mg total) by mouth every 8 (eight) hours. (Patient taking differently: Take 50 mg by mouth every 12 (twelve) hours. )    levalbuterol (XOPENEX HFA) 45 mcg/actuation inhaler Inhale 2 puffs into the lungs every 4 (four) hours as needed for Wheezing or Shortness of Breath. Rescue    lisinopril (PRINIVIL,ZESTRIL) 40 MG tablet Take 1 tablet (40 mg total) by mouth once daily. Do not take this medication if blood pressure is below 130/80 mmHg and/or feeling dizzy after taking all other blood pressure meds    metformin (GLUCOPHAGE) 500 MG tablet Take 500 mg by mouth 2 (two) times daily with meals.    naproxen (NAPROSYN) 375 MG tablet Take 375 mg by mouth every 12 (twelve) hours as  needed.    rivaroxaban (XARELTO) 20 mg Tab Take 20 mg by mouth daily with dinner or evening meal.    sotalol (BETAPACE) 80 MG tablet Take 80 mg by mouth 2 (two) times daily.    tramadol (ULTRAM) 50 mg tablet Take 1 tablet (50 mg total) by mouth every 6 (six) hours as needed for Pain.    UMECLIDINIUM BRM/VILANTEROL TR (ANORO ELLIPTA INHL) Inhale into the lungs daily as needed.     Family History     Problem Relation (Age of Onset)    Diabetes Father    Heart disease Mother    Hypertension Mother        Social History Main Topics    Smoking status: Current Some Day Smoker     Packs/day: 0.25     Years: 55.00     Types: Cigarettes    Smokeless tobacco: Never Used    Alcohol use 0.6 oz/week     1 Glasses of wine per week    Drug use: No    Sexual activity: No     Review of Systems   Constitution: Negative for chills, diaphoresis, fever, weakness and malaise/fatigue.   HENT: Negative for nosebleeds.    Eyes: Negative for blurred vision and double vision.   Cardiovascular: Positive for chest pain. Negative for claudication, cyanosis, dyspnea on exertion, leg swelling, orthopnea, palpitations, paroxysmal nocturnal dyspnea and syncope.   Respiratory: Positive for shortness of breath. Negative for cough and wheezing.    Skin: Negative for dry skin and poor wound healing.   Musculoskeletal: Negative for back pain, joint swelling and myalgias.   Gastrointestinal: Negative for abdominal pain, nausea and vomiting.   Genitourinary: Negative for hematuria.   Neurological: Negative for dizziness, headaches, numbness and seizures.   Psychiatric/Behavioral: Negative for altered mental status and depression.     Objective:     Vital Signs (Most Recent):  Temp: 97.9 °F (36.6 °C) (06/25/18 0714)  Pulse: 93 (06/25/18 0729)  Resp: 20 (06/25/18 0729)  BP: (!) 144/78 (06/25/18 0700)  SpO2: 96 % (06/25/18 0729) Vital Signs (24h Range):  Temp:  [95.7 °F (35.4 °C)-100.1 °F (37.8 °C)] 97.9 °F (36.6 °C)  Pulse:  [] 93  Resp:   [14-44] 20  SpO2:  [86 %-100 %] 96 %  BP: (114-188)/() 144/78     Weight: 76.1 kg (167 lb 12.3 oz)  Body mass index is 26.28 kg/m².    SpO2: 96 %  O2 Device (Oxygen Therapy): BiPAP      Intake/Output Summary (Last 24 hours) at 06/25/18 0747  Last data filed at 06/25/18 0600   Gross per 24 hour   Intake               20 ml   Output              250 ml   Net             -230 ml       Lines/Drains/Airways     Drain                 Urethral Catheter 06/25/18 0315 Non-latex 16 Fr. less than 1 day          Peripheral Intravenous Line                 Peripheral IV - Single Lumen 06/24/18 Left Forearm 1 day         Peripheral IV - Single Lumen 06/24/18 Right Antecubital 1 day         Peripheral IV - Single Lumen 06/24/18 1413 Left Antecubital less than 1 day                Physical Exam   Constitutional: She is oriented to person, place, and time. She appears well-developed and well-nourished. No distress.   HENT:   Head: Normocephalic and atraumatic.   Mouth/Throat: No oropharyngeal exudate.   Eyes: Conjunctivae and EOM are normal. Pupils are equal, round, and reactive to light. No scleral icterus.   Neck: Normal range of motion. Neck supple. No JVD present. No tracheal deviation present. No thyromegaly present.   Cardiovascular: Normal rate, regular rhythm, S1 normal and S2 normal.  Exam reveals no gallop and no friction rub.    No murmur heard.  Pulmonary/Chest: Effort normal. No respiratory distress. She has wheezes. She has no rales. She exhibits no tenderness.   Abdominal: Soft. She exhibits no distension.   Musculoskeletal: Normal range of motion. She exhibits no edema.   Neurological: She is alert and oriented to person, place, and time. No cranial nerve deficit.   Skin: Skin is warm and dry. She is not diaphoretic.   Psychiatric: She has a normal mood and affect. Her behavior is normal. Judgment normal.       Current Medications:   albuterol sulfate        albuterol-ipratropium  3 mL Nebulization Q6H     aspirin  81 mg Oral Daily    [START ON 6/26/2018] azithromycin  250 mg Intravenous Q24H    azithromycin  500 mg Intravenous Once    azithromycin  500 mg Oral Once    diltiaZEM  120 mg Oral Q8H    fluticasone-vilanterol  1 puff Inhalation Daily    furosemide  40 mg Intravenous BID    gabapentin  300 mg Oral TID    guaiFENesin  600 mg Oral BID    hydrALAZINE  50 mg Oral Q8H    insulin detemir U-100  15 Units Subcutaneous Daily    lisinopril  40 mg Oral Daily    nitroGLYCERIN 2% TD oint  2 inch Topical (Top) Q6H    pantoprazole  40 mg Oral Daily    polyethylene glycol  17 g Oral Daily    rivaroxaban  20 mg Oral Daily with dinner    senna-docusate 8.6-50 mg  1 tablet Oral BID    sotalol  80 mg Oral BID       acetaminophen, ALPRAZolam, bisacodyl, dextrose 50%, glucagon (human recombinant), HYDROcodone-acetaminophen, HYDROcodone-acetaminophen, insulin aspart U-100, sodium chloride 0.9%    Laboratory:  CBC:    Recent Labs  Lab 11/04/17  0440 12/08/17  0545 12/09/17  0423 02/12/18  1422 06/24/18  1413   WHITE BLOOD CELL COUNT 18.46 H 13.41 H 14.83 H 11.16 11.29   HEMOGLOBIN 11.9 L 12.5 10.8 L 11.7 L 10.6 L   HEMATOCRIT 41.1 43.0 38.3 40.1 35.5 L   PLATELETS 319 602 H 523 H 396 H 468 H       CHEMISTRIES:    Recent Labs  Lab 04/10/17  1129 04/11/17  0200  08/19/17  1430  12/08/17  0545 12/09/17  0423 02/12/18  1422 06/24/18  1413 06/25/18  0506   GLUCOSE 124 H 100  < > 189 H  < > 149 H 232 H 153 H 119 H 167 H   SODIUM 143 143  < > 145  < > 142 138 142 143 144   POTASSIUM 3.9 4.0  < > 3.9  < > 4.9 4.3 3.8 3.9 3.8   BUN BLD 9 16  < > 16  < > 5 L 18 11 14 16   CREATININE 0.7 1.1  < > 0.8  < > 0.7 1.3 0.7 0.8 0.9   EGFR IF  >60 >60  < > >60  < > >60 50 A >60 >60 >60   EGFR IF NON- >60 53 A  < > >60  < > >60 43 A >60 >60 >60   CALCIUM 10.1 9.4  < > 10.2  < > 10.3 10.1 9.8 10.2 10.1   MAGNESIUM 1.8 1.5 L  --  1.4 L  --   --   --  1.5 L  --  1.7   < > = values in this interval not  displayed.    CARDIAC BIOMARKERS:    Recent Labs  Lab 12/13/15  1932  02/12/18  1422 02/12/18  1808 06/24/18  1413 06/24/18  2153 06/25/18  0506   CPK 33  --   --   --   --   --   --    CPK MB 1.0  --   --   --   --   --   --    TROPONIN I <0.006  < > 0.021 0.021 0.896 H 0.870 H 0.667 H   < > = values in this interval not displayed.    COAGS:    Recent Labs  Lab 10/07/16  0523 04/10/17  1129 08/12/17  0036 11/02/17 2211 12/08/17  0545   INR 1.7 H 1.1 1.2 1.0 1.0       LIPIDS/LFTS:    Recent Labs  Lab 12/15/15  0540  08/12/17  1037  11/02/17 2211 12/08/17  0545 02/12/18  1422 06/24/18  1413 06/25/18  0506   CHOLESTEROL 170  --  243 H  --   --   --   --   --   --    TRIGLYCERIDES 94  --  93  --   --   --   --   --   --    HDL 33 L  --  43  --   --   --   --   --   --    LDL CHOLESTEROL 118.2  --  181.4 H  --   --   --   --   --   --    NON-HDL CHOLESTEROL 137  --  200  --   --   --   --   --   --    AST  --   < >  --   < > 13 26 13 20 23   ALT  --   < >  --   < > 7 L 8 L 7 L 7 L 10   < > = values in this interval not displayed.    BNP:    Recent Labs  Lab 08/19/17  1430 11/02/17 2211 12/08/17  0545 02/12/18  1422 06/24/18  1413    H 334 H 354 H 133 H 2,128 H       TSH:    Recent Labs  Lab 12/13/15  1932 06/24/18  1413   TSH 0.487 0.754       Free T4:    Recent Labs  Lab 06/24/18  1413   FREE T4 1.09       Diagnostic Results:  ECG (personally reviewed tracings):  6/24/18 1403 , LAD/PWRP    Chest X-Ray (personally reviewed image(s)): 6/25/18 NAD    CTA C/A/P 6/24/18  1. No evidence of aortic dissection.  2. Extensive calcified and noncalcified plaque seen throughout the aorta with small multifocal outpouchings seen involving the descending thoracic aorta suggestive for small penetrating ulcers.  Similar findings were seen on previous CT from August 2017.  3. 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  4. No evidence of PE.  5. Decreased size  area of nodularity with scarring seen within the left lower lobe with resolution of previously visualized cavitation.  No evidence of new lung consolidation.  6. Mild to moderate centrilobular emphysema.    Echo: 8/12/17    1 - Normal left ventricular systolic function (EF 65-70%).     2 - No wall motion abnormalities.     3 - Concentric hypertrophy.     4 - Mildly depressed right ventricular systolic function .     5 - Trivial to mild tricuspid regurgitation.     6 - Pulmonary hypertension. The estimated PA systolic pressure is 42 mmHg.     LLE venous US 1/24/17  Interval decrease in overall thrombus burden of the left lower extremity, although there is persistent deep venous thrombus present within the left femoral and popliteal veins.    Stress Test: L MPI 12/15/15  Nuclear Quantitative Functional Analysis:   LVEF: 66 %  Impression: NORMAL MYOCARDIAL PERFUSION  1. The perfusion scan is free of evidence for myocardial ischemia or injury.   2. Resting wall motion is physiologic.   3. Resting LV function is normal.   4. The ventricular volumes are normal at rest and stress.   5. The extracardiac distribution of radioactivity is normal.   6. There was no previous study available to compare.

## 2018-06-25 NOTE — PROGRESS NOTES
Patient transported patient to CT on bipap without incident. Patient returned to ER room 8 still on bipap.

## 2018-06-25 NOTE — PROGRESS NOTES
Patient on Bipap, resp rate 28 & diaphoretic. Dr Gentile notified, portable chest xray & ABG's ordered.

## 2018-06-25 NOTE — HPI
Yasmeen Palm is a 68 y.o. female that (in part)  has a past medical history of Anticoagulant long-term use; Arthritis; Asthma; CHF (congestive heart failure); COPD (chronic obstructive pulmonary disease); Coronary artery disease; Depression; Diabetes mellitus; GERD (gastroesophageal reflux disease); and Hypertension.  has a past surgical history that includes Abdominal surgery; Cardiac surgery; and Hernia repair. Presents to [facility] complaining of chest pain .  She reports compliance with her home medication regimen, including Xarelto.     Description of symptoms  Location:  Substernal  Onset:  Acute onset 2 days ago  Character:  The patient remained; moderate severity  Frequency:  Daily  Duration:  each episode lasts several minutes at a time  Associated Symptoms:  Diaphoresis, shortness of breath, weakness and fatigue  Radiation:  Recent the chest  Exacerbating factors:  Worse on exertion  Relieving factors:  Minimal relief with supplemental oxygen     In the emergency department routine laboratory studies, chest x-ray, EKG, cardiac enzymes were obtained.  EKG findings were concerning the case was discussed with cardiologist, Dr. Pulido.  It was determined that her EKG was not consistent with STEMI and she has been chest pain-free since arrival.  She had been given Lovenox, aspirin, and plan was to continue trending troponins and monitor closely on telemetry.

## 2018-06-25 NOTE — HPI
68 year old female with COPD(on home 3L NC) who presented with chest pain and SOB. Diagnosed with NSTEMI. Patient started on Bipap and transferred to the ICU. Patient currently off Bipap and chest pain free.   Reports taking Anoro at home.   Follows with Dr. Torres at [place].   Currently on 4 L NC.

## 2018-06-25 NOTE — HPI
68 y.o. female that (in part)  has a past medical history of Anticoagulant long-term use; Arthritis; Asthma; CHF (congestive heart failure); COPD (chronic obstructive pulmonary disease); Coronary artery disease; Depression; Diabetes mellitus; GERD (gastroesophageal reflux disease); and Hypertension.  has a past surgical history that includes Abdominal surgery; Cardiac surgery; and Hernia repair. Presents to [facility] complaining of chest pain .  She reports compliance with her home medication regimen, including Xarelto.                 Description of symptoms  Location:  Substernal  Onset:  Acute onset 2 days ago  Character:  The patient remained; moderate severity  Frequency:  Daily  Duration:  each episode lasts several minutes at a time  Associated Symptoms:  Diaphoresis, shortness of breath, weakness and fatigue  Radiation:  Recent the chest  Exacerbating factors:  Worse on exertion  Relieving factors:  Minimal relief with supplemental oxygen   In the emergency department routine laboratory studies, chest x-ray, EKG, cardiac enzymes were obtained.  EKG findings were concerning the case was discussed with cardiologist, Dr. Pulido.  It was determined that her EKG was not consistent with STEMI and she has been chest pain-free since arrival.  She had been given Lovenox, aspirin, and plan was to continue trending troponins and monitor closely on telemetry.    Last seen by Dr. Vasquez 12/15/15.  Follows with Dr. Sheppard at [facility].  Reports prior CAD, last MPI here in 2015 was normal.  She presents with CP unresponsive to 3 sl NTG as well as SOB.  She is on Xarelto (last dose on 6/23) for a hx of LLE DVT several yrs ago in setting of trauma.  Currently pain free and no LE edema.  She also reports a hx of AF and is on Sotalol (and Xarelto).

## 2018-06-25 NOTE — CONSULTS
[facility]  Cardiology  Consult Note    Patient Name: Yasmeen Palm  MRN: [id number]  Admission Date: 6/24/2018  Hospital Length of Stay: 1 days  Code Status: Full Code   Attending Provider: Tremayne Kong MD   Consulting Provider: Laureano Ordonez MD  Primary Care Physician: Angel Orourke Jr, MD  Principal Problem:Acute exacerbation of chronic obstructive pulmonary disease (COPD)    Patient information was obtained from patient and ER records.     Inpatient consult to Cardiology  Consult performed by: LAUREANO ORDONEZ  Consult ordered by: LAUREANO PRESLEY  Reason for consult: NSTEMI        Subjective:     Chief Complaint:  CP/SOB     HPI:   68 y.o. female that (in part)  has a past medical history of Anticoagulant long-term use; Arthritis; Asthma; CHF (congestive heart failure); COPD (chronic obstructive pulmonary disease); Coronary artery disease; Depression; Diabetes mellitus; GERD (gastroesophageal reflux disease); and Hypertension.  has a past surgical history that includes Abdominal surgery; Cardiac surgery; and Hernia repair. Presents to [facility] Emergency Department complaining of chest pain .  She reports compliance with her home medication regimen, including Xarelto.                 Description of symptoms  Location:  Substernal  Onset:  Acute onset 2 days ago  Character:  The patient remained; moderate severity  Frequency:  Daily  Duration:  each episode lasts several minutes at a time  Associated Symptoms:  Diaphoresis, shortness of breath, weakness and fatigue  Radiation:  Recent the chest  Exacerbating factors:  Worse on exertion  Relieving factors:  Minimal relief with supplemental oxygen   In the emergency department routine laboratory studies, chest x-ray, EKG, cardiac enzymes were obtained.  EKG findings were concerning the case was discussed with cardiologist, Dr. Ordonez.  It was determined that her EKG was not consistent with STEMI and  she has been chest pain-free since arrival.  She had been given Lovenox, aspirin, and plan was to continue trending troponins and monitor closely on telemetry.    Last seen by Dr. Vasquez 12/15/15.  Follows with Dr. Sheppard at [facility].  Reports prior CAD, last MPI here in 2015 was normal.  She presents with CP unresponsive to 3 sl NTG as well as SOB.  She is on Xarelto (last dose on 6/23) for a hx of LLE DVT several yrs ago in setting of trauma.  Currently pain free and no LE edema.  She also reports a hx of AF and is on Sotalol (and Xarelto).    Past Medical History:   Diagnosis Date    Anticoagulant long-term use     Arthritis     Asthma     CHF (congestive heart failure)     COPD (chronic obstructive pulmonary disease)     Coronary artery disease     Depression     Diabetes mellitus     GERD (gastroesophageal reflux disease)     Hypertension        Past Surgical History:   Procedure Laterality Date    ABDOMINAL SURGERY      CARDIAC SURGERY      HERNIA REPAIR         Review of patient's allergies indicates:  No Known Allergies    No current facility-administered medications on file prior to encounter.      Current Outpatient Prescriptions on File Prior to Encounter   Medication Sig    acetaminophen (TYLENOL) 500 MG tablet Take 1 tablet (500 mg total) by mouth every 8 (eight) hours as needed.    aspirin (ECOTRIN) 81 MG EC tablet Take 81 mg by mouth once daily.    cloNIDine (CATAPRES) 0.1 MG tablet Take 2 tablets (0.2 mg total) by mouth 3 (three) times daily.    diltiaZEM (CARDIZEM) 120 MG tablet Take 1 tablet (120 mg total) by mouth every 8 (eight) hours.    furosemide (LASIX) 40 MG tablet Take 40 mg by mouth once daily.     gabapentin (NEURONTIN) 300 MG capsule Take 300 mg by mouth 3 (three) times daily.    hydrALAZINE (APRESOLINE) 50 MG tablet Take 1 tablet (50 mg total) by mouth every 8 (eight) hours. (Patient taking differently: Take 50 mg by mouth every 12 (twelve) hours. )    levalbuterol  (XOPENEX HFA) 45 mcg/actuation inhaler Inhale 2 puffs into the lungs every 4 (four) hours as needed for Wheezing or Shortness of Breath. Rescue    lisinopril (PRINIVIL,ZESTRIL) 40 MG tablet Take 1 tablet (40 mg total) by mouth once daily. Do not take this medication if blood pressure is below 130/80 mmHg and/or feeling dizzy after taking all other blood pressure meds    metformin (GLUCOPHAGE) 500 MG tablet Take 500 mg by mouth 2 (two) times daily with meals.    naproxen (NAPROSYN) 375 MG tablet Take 375 mg by mouth every 12 (twelve) hours as needed.    rivaroxaban (XARELTO) 20 mg Tab Take 20 mg by mouth daily with dinner or evening meal.    sotalol (BETAPACE) 80 MG tablet Take 80 mg by mouth 2 (two) times daily.    tramadol (ULTRAM) 50 mg tablet Take 1 tablet (50 mg total) by mouth every 6 (six) hours as needed for Pain.    UMECLIDINIUM BRM/VILANTEROL TR (ANORO ELLIPTA INHL) Inhale into the lungs daily as needed.     Family History     Problem Relation (Age of Onset)    Diabetes Father    Heart disease Mother    Hypertension Mother        Social History Main Topics    Smoking status: Current Some Day Smoker     Packs/day: 0.25     Years: 55.00     Types: Cigarettes    Smokeless tobacco: Never Used    Alcohol use 0.6 oz/week     1 Glasses of wine per week    Drug use: No    Sexual activity: No     Review of Systems   Constitution: Negative for chills, diaphoresis, fever, weakness and malaise/fatigue.   HENT: Negative for nosebleeds.    Eyes: Negative for blurred vision and double vision.   Cardiovascular: Positive for chest pain. Negative for claudication, cyanosis, dyspnea on exertion, leg swelling, orthopnea, palpitations, paroxysmal nocturnal dyspnea and syncope.   Respiratory: Positive for shortness of breath. Negative for cough and wheezing.    Skin: Negative for dry skin and poor wound healing.   Musculoskeletal: Negative for back pain, joint swelling and myalgias.   Gastrointestinal: Negative for  abdominal pain, nausea and vomiting.   Genitourinary: Negative for hematuria.   Neurological: Negative for dizziness, headaches, numbness and seizures.   Psychiatric/Behavioral: Negative for altered mental status and depression.     Objective:     Vital Signs (Most Recent):  Temp: 97.9 °F (36.6 °C) (06/25/18 0714)  Pulse: 93 (06/25/18 0729)  Resp: 20 (06/25/18 0729)  BP: (!) 144/78 (06/25/18 0700)  SpO2: 96 % (06/25/18 0729) Vital Signs (24h Range):  Temp:  [95.7 °F (35.4 °C)-100.1 °F (37.8 °C)] 97.9 °F (36.6 °C)  Pulse:  [] 93  Resp:  [14-44] 20  SpO2:  [86 %-100 %] 96 %  BP: (114-188)/() 144/78     Weight: 76.1 kg (167 lb 12.3 oz)  Body mass index is 26.28 kg/m².    SpO2: 96 %  O2 Device (Oxygen Therapy): BiPAP      Intake/Output Summary (Last 24 hours) at 06/25/18 0747  Last data filed at 06/25/18 0600   Gross per 24 hour   Intake               20 ml   Output              250 ml   Net             -230 ml       Lines/Drains/Airways     Drain                 Urethral Catheter 06/25/18 0315 Non-latex 16 Fr. less than 1 day          Peripheral Intravenous Line                 Peripheral IV - Single Lumen 06/24/18 Left Forearm 1 day         Peripheral IV - Single Lumen 06/24/18 Right Antecubital 1 day         Peripheral IV - Single Lumen 06/24/18 1413 Left Antecubital less than 1 day                Physical Exam   Constitutional: She is oriented to person, place, and time. She appears well-developed and well-nourished. No distress.   HENT:   Head: Normocephalic and atraumatic.   Mouth/Throat: No oropharyngeal exudate.   Eyes: Conjunctivae and EOM are normal. Pupils are equal, round, and reactive to light. No scleral icterus.   Neck: Normal range of motion. Neck supple. No JVD present. No tracheal deviation present. No thyromegaly present.   Cardiovascular: Normal rate, regular rhythm, S1 normal and S2 normal.  Exam reveals no gallop and no friction rub.    No murmur heard.  Pulmonary/Chest: Effort normal.  No respiratory distress. She has wheezes. She has no rales. She exhibits no tenderness.   Abdominal: Soft. She exhibits no distension.   Musculoskeletal: Normal range of motion. She exhibits no edema.   Neurological: She is alert and oriented to person, place, and time. No cranial nerve deficit.   Skin: Skin is warm and dry. She is not diaphoretic.   Psychiatric: She has a normal mood and affect. Her behavior is normal. Judgment normal.       Current Medications:   albuterol sulfate        albuterol-ipratropium  3 mL Nebulization Q6H    aspirin  81 mg Oral Daily    [START ON 6/26/2018] azithromycin  250 mg Intravenous Q24H    azithromycin  500 mg Intravenous Once    azithromycin  500 mg Oral Once    diltiaZEM  120 mg Oral Q8H    fluticasone-vilanterol  1 puff Inhalation Daily    furosemide  40 mg Intravenous BID    gabapentin  300 mg Oral TID    guaiFENesin  600 mg Oral BID    hydrALAZINE  50 mg Oral Q8H    insulin detemir U-100  15 Units Subcutaneous Daily    lisinopril  40 mg Oral Daily    nitroGLYCERIN 2% TD oint  2 inch Topical (Top) Q6H    pantoprazole  40 mg Oral Daily    polyethylene glycol  17 g Oral Daily    rivaroxaban  20 mg Oral Daily with dinner    senna-docusate 8.6-50 mg  1 tablet Oral BID    sotalol  80 mg Oral BID       acetaminophen, ALPRAZolam, bisacodyl, dextrose 50%, glucagon (human recombinant), HYDROcodone-acetaminophen, HYDROcodone-acetaminophen, insulin aspart U-100, sodium chloride 0.9%    Laboratory:  CBC:    Recent Labs  Lab 11/04/17  0440 12/08/17  0545 12/09/17  0423 02/12/18  1422 06/24/18  1413   WHITE BLOOD CELL COUNT 18.46 H 13.41 H 14.83 H 11.16 11.29   HEMOGLOBIN 11.9 L 12.5 10.8 L 11.7 L 10.6 L   HEMATOCRIT 41.1 43.0 38.3 40.1 35.5 L   PLATELETS 319 602 H 523 H 396 H 468 H       CHEMISTRIES:    Recent Labs  Lab 04/10/17  1129 04/11/17  0200  08/19/17  1430  12/08/17  0545 12/09/17  0423 02/12/18  1422 06/24/18  1413 06/25/18  0506   GLUCOSE 124 H 100  < > 189  H  < > 149 H 232 H 153 H 119 H 167 H   SODIUM 143 143  < > 145  < > 142 138 142 143 144   POTASSIUM 3.9 4.0  < > 3.9  < > 4.9 4.3 3.8 3.9 3.8   BUN BLD 9 16  < > 16  < > 5 L 18 11 14 16   CREATININE 0.7 1.1  < > 0.8  < > 0.7 1.3 0.7 0.8 0.9   EGFR IF  >60 >60  < > >60  < > >60 50 A >60 >60 >60   EGFR IF NON- >60 53 A  < > >60  < > >60 43 A >60 >60 >60   CALCIUM 10.1 9.4  < > 10.2  < > 10.3 10.1 9.8 10.2 10.1   MAGNESIUM 1.8 1.5 L  --  1.4 L  --   --   --  1.5 L  --  1.7   < > = values in this interval not displayed.    CARDIAC BIOMARKERS:    Recent Labs  Lab 12/13/15  1932 02/12/18 1422 02/12/18  1808 06/24/18 1413 06/24/18 2153 06/25/18  0506   CPK 33  --   --   --   --   --   --    CPK MB 1.0  --   --   --   --   --   --    TROPONIN I <0.006  < > 0.021 0.021 0.896 H 0.870 H 0.667 H   < > = values in this interval not displayed.    COAGS:    Recent Labs  Lab 10/07/16  0523 04/10/17  1129 08/12/17  0036 11/02/17 2211 12/08/17  0545   INR 1.7 H 1.1 1.2 1.0 1.0       LIPIDS/LFTS:    Recent Labs  Lab 12/15/15  0540  08/12/17  1037  11/02/17 2211 12/08/17  0545 02/12/18  1422 06/24/18  1413 06/25/18  0506   CHOLESTEROL 170  --  243 H  --   --   --   --   --   --    TRIGLYCERIDES 94  --  93  --   --   --   --   --   --    HDL 33 L  --  43  --   --   --   --   --   --    LDL CHOLESTEROL 118.2  --  181.4 H  --   --   --   --   --   --    NON-HDL CHOLESTEROL 137  --  200  --   --   --   --   --   --    AST  --   < >  --   < > 13 26 13 20 23   ALT  --   < >  --   < > 7 L 8 L 7 L 7 L 10   < > = values in this interval not displayed.    BNP:    Recent Labs  Lab 08/19/17  1430 11/02/17  2211 12/08/17  0545 02/12/18  1422 06/24/18  1413    H 334 H 354 H 133 H 2,128 H       TSH:    Recent Labs  Lab 12/13/15  1932 06/24/18  1413   TSH 0.487 0.754       Free T4:    Recent Labs  Lab 06/24/18  1413   FREE T4 1.09       Diagnostic Results:  ECG (personally reviewed tracings):  6/24/18 1403  , LAD/PWRP    Chest X-Ray (personally reviewed image(s)): 6/25/18 NAD    CTA C/A/P 6/24/18  1. No evidence of aortic dissection.  2. Extensive calcified and noncalcified plaque seen throughout the aorta with small multifocal outpouchings seen involving the descending thoracic aorta suggestive for small penetrating ulcers.  Similar findings were seen on previous CT from August 2017.  3. 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  4. No evidence of PE.  5. Decreased size area of nodularity with scarring seen within the left lower lobe with resolution of previously visualized cavitation.  No evidence of new lung consolidation.  6. Mild to moderate centrilobular emphysema.    Echo: 8/12/17    1 - Normal left ventricular systolic function (EF 65-70%).     2 - No wall motion abnormalities.     3 - Concentric hypertrophy.     4 - Mildly depressed right ventricular systolic function .     5 - Trivial to mild tricuspid regurgitation.     6 - Pulmonary hypertension. The estimated PA systolic pressure is 42 mmHg.     LLE venous US 1/24/17  Interval decrease in overall thrombus burden of the left lower extremity, although there is persistent deep venous thrombus present within the left femoral and popliteal veins.    Stress Test: L MPI 12/15/15  Nuclear Quantitative Functional Analysis:   LVEF: 66 %  Impression: NORMAL MYOCARDIAL PERFUSION  1. The perfusion scan is free of evidence for myocardial ischemia or injury.   2. Resting wall motion is physiologic.   3. Resting LV function is normal.   4. The ventricular volumes are normal at rest and stress.   5. The extracardiac distribution of radioactivity is normal.   6. There was no previous study available to compare.      Assessment and Plan:     * Acute exacerbation of chronic obstructive pulmonary disease (COPD)    Per IM        Non-STEMI (non-ST elevated myocardial infarction)    CP/elev trop c/w NSTEMI  Pt  reports prior hx of CAD, but no recent isch eval or cath  Currently pain free and dyspnea improved.  Cont ASA/Plavix (loaded 6/24/18)  Hold Xarelto (last dose 6/23/18)  Check echo  Enox 1mg/kg bid x2 doses  Start atorva 80mg qhs  Check lipids/LFT 3 months (late Sept 2018)  Cath in am 6/26/18  Risks, benefits and alternatives of the catheterization procedure were discussed with the patient and her  in attendance, which include but are not limited to: bleeding, infection, death, heart attack, arrhythmia, kidney failure, stroke, need for emergency surgery, etc.  Patient understands and and agrees to proceed.  Consent was placed on the chart.          PAF (paroxysmal atrial fibrillation)    Currently in SR  On sotalol, continue  Xarelto held for cath, will resume post cath  If PCI, will also need Plavix        Chronic anticoagulation    For hx of PAF and DVT/PE        History of deep vein thrombosis    As above        Type 2 diabetes mellitus, controlled    Per IM            VTE Risk Mitigation         Ordered     enoxaparin injection 80 mg  Every 12 hours (non-standard times)      06/25/18 0817        Pt seen in ICU, critical care time 35min.    Thank you for your consult. I will follow-up with patient. Please contact us if you have any additional questions.    Laureano Pulido MD  Cardiology   [facility]

## 2018-06-26 PROBLEM — J96.11 CHRONIC RESPIRATORY FAILURE WITH HYPOXIA AND HYPERCAPNIA: Chronic | Status: RESOLVED | Noted: 2017-04-10 | Resolved: 2018-06-26

## 2018-06-26 PROBLEM — J96.02 ACUTE HYPERCAPNIC RESPIRATORY FAILURE: Status: ACTIVE | Noted: 2018-06-26

## 2018-06-26 PROBLEM — J96.12 CHRONIC RESPIRATORY FAILURE WITH HYPOXIA AND HYPERCAPNIA: Chronic | Status: RESOLVED | Noted: 2017-04-10 | Resolved: 2018-06-26

## 2018-06-26 LAB
ALLENS TEST: ABNORMAL
ANION GAP SERPL CALC-SCNC: 10 MMOL/L
ANION GAP SERPL CALC-SCNC: 15 MMOL/L
BACTERIA #/AREA URNS HPF: ABNORMAL /HPF
BASOPHILS # BLD AUTO: 0.02 K/UL
BASOPHILS NFR BLD: 0.1 %
BILIRUB UR QL STRIP: NEGATIVE
BUN SERPL-MCNC: 27 MG/DL
BUN SERPL-MCNC: 34 MG/DL
CALCIUM SERPL-MCNC: 9 MG/DL
CALCIUM SERPL-MCNC: 9.8 MG/DL
CHLORIDE SERPL-SCNC: 102 MMOL/L
CHLORIDE SERPL-SCNC: 99 MMOL/L
CLARITY UR: ABNORMAL
CO2 SERPL-SCNC: 27 MMOL/L
CO2 SERPL-SCNC: 33 MMOL/L
COLOR UR: ABNORMAL
CREAT SERPL-MCNC: 0.9 MG/DL
CREAT SERPL-MCNC: 1.3 MG/DL
DELSYS: ABNORMAL
DIFFERENTIAL METHOD: ABNORMAL
EOSINOPHIL # BLD AUTO: 0 K/UL
EOSINOPHIL NFR BLD: 0.2 %
EP: 10
EP: 8
ERYTHROCYTE [DISTWIDTH] IN BLOOD BY AUTOMATED COUNT: 18 %
ERYTHROCYTE [SEDIMENTATION RATE] IN BLOOD BY WESTERGREN METHOD: 16 MM/H
ERYTHROCYTE [SEDIMENTATION RATE] IN BLOOD BY WESTERGREN METHOD: 24 MM/H
EST. GFR  (AFRICAN AMERICAN): 49 ML/MIN/1.73 M^2
EST. GFR  (AFRICAN AMERICAN): >60 ML/MIN/1.73 M^2
EST. GFR  (NON AFRICAN AMERICAN): 43 ML/MIN/1.73 M^2
EST. GFR  (NON AFRICAN AMERICAN): >60 ML/MIN/1.73 M^2
FIO2: 100
FIO2: 40
FIO2: 50
FIO2: 55
FIO2: 60
GLUCOSE SERPL-MCNC: 146 MG/DL
GLUCOSE SERPL-MCNC: 190 MG/DL
GLUCOSE UR QL STRIP: NEGATIVE
HCO3 UR-SCNC: 30 MMOL/L (ref 24–28)
HCO3 UR-SCNC: 33.1 MMOL/L (ref 24–28)
HCO3 UR-SCNC: 37.3 MMOL/L (ref 24–28)
HCO3 UR-SCNC: 37.5 MMOL/L (ref 24–28)
HCO3 UR-SCNC: 38.3 MMOL/L (ref 24–28)
HCT VFR BLD AUTO: 39.2 %
HGB BLD-MCNC: 11 G/DL
HGB BLD-MCNC: 9.6 G/DL
HGB UR QL STRIP: ABNORMAL
HYALINE CASTS #/AREA URNS LPF: 6 /LPF
HYPOCHROMIA BLD QL SMEAR: ABNORMAL
IP: 12
IP: 20
KETONES UR QL STRIP: ABNORMAL
LEUKOCYTE ESTERASE UR QL STRIP: ABNORMAL
LYMPHOCYTES # BLD AUTO: 1.7 K/UL
LYMPHOCYTES NFR BLD: 10.7 %
MAGNESIUM SERPL-MCNC: 1.6 MG/DL
MAGNESIUM SERPL-MCNC: 2 MG/DL
MCH RBC QN AUTO: 24.7 PG
MCHC RBC AUTO-ENTMCNC: 28.1 G/DL
MCV RBC AUTO: 88 FL
MICROSCOPIC COMMENT: ABNORMAL
MIN VOL: 10.1
MIN VOL: 5
MIN VOL: 6.9
MIN VOL: 8
MODE: ABNORMAL
MONOCYTES # BLD AUTO: 1.7 K/UL
MONOCYTES NFR BLD: 10.6 %
NEUTROPHILS # BLD AUTO: 12.1 K/UL
NEUTROPHILS NFR BLD: 78.4 %
NITRITE UR QL STRIP: NEGATIVE
PCO2 BLDA: 105.8 MMHG (ref 35–45)
PCO2 BLDA: 49.9 MMHG (ref 35–45)
PCO2 BLDA: 59.3 MMHG (ref 35–45)
PCO2 BLDA: 88.7 MMHG (ref 35–45)
PCO2 BLDA: 97.7 MMHG (ref 35–45)
PEEP: 5
PEEP: 5
PH SMN: 7.16 [PH] (ref 7.35–7.45)
PH SMN: 7.19 [PH] (ref 7.35–7.45)
PH SMN: 7.24 [PH] (ref 7.35–7.45)
PH SMN: 7.31 [PH] (ref 7.35–7.45)
PH SMN: 7.43 [PH] (ref 7.35–7.45)
PH UR STRIP: 5 [PH] (ref 5–8)
PHOSPHATE SERPL-MCNC: 4.3 MG/DL
PIP: 29
PIP: 36
PLATELET # BLD AUTO: 452 K/UL
PLATELET BLD QL SMEAR: ABNORMAL
PMV BLD AUTO: 10.2 FL
PO2 BLDA: 135 MMHG (ref 80–100)
PO2 BLDA: 265 MMHG (ref 80–100)
PO2 BLDA: 50 MMHG (ref 80–100)
PO2 BLDA: 67 MMHG (ref 80–100)
PO2 BLDA: 77 MMHG (ref 80–100)
POC BE: 3 MMOL/L
POC BE: 5 MMOL/L
POC BE: 6 MMOL/L
POC BE: 7 MMOL/L
POC BE: 8 MMOL/L
POC SATURATED O2: 100 % (ref 95–100)
POC SATURATED O2: 76 % (ref 95–100)
POC SATURATED O2: 90 % (ref 95–100)
POC SATURATED O2: 93 % (ref 95–100)
POC SATURATED O2: 98 % (ref 95–100)
POC TCO2: 32 MMOL/L (ref 23–27)
POC TCO2: 35 MMOL/L (ref 23–27)
POC TCO2: 40 MMOL/L (ref 23–27)
POC TCO2: 40 MMOL/L (ref 23–27)
POC TCO2: 41 MMOL/L (ref 23–27)
POCT GLUCOSE: 150 MG/DL (ref 70–110)
POCT GLUCOSE: 201 MG/DL (ref 70–110)
POCT GLUCOSE: 210 MG/DL (ref 70–110)
POTASSIUM SERPL-SCNC: 4.1 MMOL/L
POTASSIUM SERPL-SCNC: 4.4 MMOL/L
PROT UR QL STRIP: ABNORMAL
RBC # BLD AUTO: 4.46 M/UL
RBC #/AREA URNS HPF: 2 /HPF (ref 0–4)
SAMPLE: ABNORMAL
SITE: ABNORMAL
SODIUM SERPL-SCNC: 142 MMOL/L
SODIUM SERPL-SCNC: 144 MMOL/L
SP GR UR STRIP: 1.01 (ref 1–1.03)
SP02: 100
SP02: 100
SP02: 95
SPONT RATE: 24
SPONT RATE: 24
SQUAMOUS #/AREA URNS HPF: 5 /HPF
URN SPEC COLLECT METH UR: ABNORMAL
UROBILINOGEN UR STRIP-ACNC: NEGATIVE EU/DL
VT: 420
VT: 450
WBC # BLD AUTO: 15.5 K/UL
WBC #/AREA URNS HPF: 2 /HPF (ref 0–5)

## 2018-06-26 PROCEDURE — 81000 URINALYSIS NONAUTO W/SCOPE: CPT

## 2018-06-26 PROCEDURE — 82803 BLOOD GASES ANY COMBINATION: CPT

## 2018-06-26 PROCEDURE — 80048 BASIC METABOLIC PNL TOTAL CA: CPT | Mod: 91

## 2018-06-26 PROCEDURE — 63600175 PHARM REV CODE 636 W HCPCS: Performed by: INTERNAL MEDICINE

## 2018-06-26 PROCEDURE — 27000190 HC CPAP FULL FACE MASK W/VALVE

## 2018-06-26 PROCEDURE — 25000003 PHARM REV CODE 250: Performed by: INTERNAL MEDICINE

## 2018-06-26 PROCEDURE — C9113 INJ PANTOPRAZOLE SODIUM, VIA: HCPCS | Performed by: INTERNAL MEDICINE

## 2018-06-26 PROCEDURE — 99900035 HC TECH TIME PER 15 MIN (STAT)

## 2018-06-26 PROCEDURE — 0BH17EZ INSERTION OF ENDOTRACHEAL AIRWAY INTO TRACHEA, VIA NATURAL OR ARTIFICIAL OPENING: ICD-10-PCS | Performed by: INTERNAL MEDICINE

## 2018-06-26 PROCEDURE — 99291 CRITICAL CARE FIRST HOUR: CPT | Mod: ,,, | Performed by: INTERNAL MEDICINE

## 2018-06-26 PROCEDURE — 5A1945Z RESPIRATORY VENTILATION, 24-96 CONSECUTIVE HOURS: ICD-10-PCS | Performed by: INTERNAL MEDICINE

## 2018-06-26 PROCEDURE — 02HV33Z INSERTION OF INFUSION DEVICE INTO SUPERIOR VENA CAVA, PERCUTANEOUS APPROACH: ICD-10-PCS | Performed by: INTERNAL MEDICINE

## 2018-06-26 PROCEDURE — 20000000 HC ICU ROOM

## 2018-06-26 PROCEDURE — 36556 INSERT NON-TUNNEL CV CATH: CPT | Mod: 59,,, | Performed by: INTERNAL MEDICINE

## 2018-06-26 PROCEDURE — 87070 CULTURE OTHR SPECIMN AEROBIC: CPT

## 2018-06-26 PROCEDURE — 36600 WITHDRAWAL OF ARTERIAL BLOOD: CPT

## 2018-06-26 PROCEDURE — 31500 INSERT EMERGENCY AIRWAY: CPT | Mod: ,,, | Performed by: INTERNAL MEDICINE

## 2018-06-26 PROCEDURE — 85018 HEMOGLOBIN: CPT

## 2018-06-26 PROCEDURE — 94640 AIRWAY INHALATION TREATMENT: CPT

## 2018-06-26 PROCEDURE — 80048 BASIC METABOLIC PNL TOTAL CA: CPT

## 2018-06-26 PROCEDURE — 25000003 PHARM REV CODE 250

## 2018-06-26 PROCEDURE — 63600175 PHARM REV CODE 636 W HCPCS: Performed by: EMERGENCY MEDICINE

## 2018-06-26 PROCEDURE — 25000003 PHARM REV CODE 250: Performed by: EMERGENCY MEDICINE

## 2018-06-26 PROCEDURE — 94002 VENT MGMT INPAT INIT DAY: CPT

## 2018-06-26 PROCEDURE — 36415 COLL VENOUS BLD VENIPUNCTURE: CPT

## 2018-06-26 PROCEDURE — 94761 N-INVAS EAR/PLS OXIMETRY MLT: CPT

## 2018-06-26 PROCEDURE — 83735 ASSAY OF MAGNESIUM: CPT | Mod: 91

## 2018-06-26 PROCEDURE — 87205 SMEAR GRAM STAIN: CPT

## 2018-06-26 PROCEDURE — 99292 CRITICAL CARE ADDL 30 MIN: CPT | Mod: 25,,, | Performed by: INTERNAL MEDICINE

## 2018-06-26 PROCEDURE — 85025 COMPLETE CBC W/AUTO DIFF WBC: CPT

## 2018-06-26 PROCEDURE — 63600175 PHARM REV CODE 636 W HCPCS

## 2018-06-26 PROCEDURE — 25000242 PHARM REV CODE 250 ALT 637 W/ HCPCS: Performed by: HOSPITALIST

## 2018-06-26 PROCEDURE — 99291 CRITICAL CARE FIRST HOUR: CPT | Mod: 25,,, | Performed by: INTERNAL MEDICINE

## 2018-06-26 PROCEDURE — 83735 ASSAY OF MAGNESIUM: CPT

## 2018-06-26 PROCEDURE — 94660 CPAP INITIATION&MGMT: CPT

## 2018-06-26 PROCEDURE — 84100 ASSAY OF PHOSPHORUS: CPT

## 2018-06-26 PROCEDURE — 99900026 HC AIRWAY MAINTENANCE (STAT)

## 2018-06-26 RX ORDER — SUCCINYLCHOLINE CHLORIDE 20 MG/ML
INJECTION INTRAMUSCULAR; INTRAVENOUS
Status: COMPLETED
Start: 2018-06-26 | End: 2018-06-26

## 2018-06-26 RX ORDER — NOREPINEPHRINE BITARTRATE/D5W 4MG/250ML
0.02 PLASTIC BAG, INJECTION (ML) INTRAVENOUS CONTINUOUS
Status: DISCONTINUED | OUTPATIENT
Start: 2018-06-26 | End: 2018-06-29

## 2018-06-26 RX ORDER — MIDAZOLAM HYDROCHLORIDE 1 MG/ML
4 INJECTION INTRAMUSCULAR; INTRAVENOUS ONCE
Status: COMPLETED | OUTPATIENT
Start: 2018-06-26 | End: 2018-06-26

## 2018-06-26 RX ORDER — DEXMEDETOMIDINE HYDROCHLORIDE 4 UG/ML
0.2 INJECTION, SOLUTION INTRAVENOUS CONTINUOUS
Status: DISCONTINUED | OUTPATIENT
Start: 2018-06-26 | End: 2018-06-29

## 2018-06-26 RX ORDER — FENTANYL CITRATE 50 UG/ML
50 INJECTION, SOLUTION INTRAMUSCULAR; INTRAVENOUS ONCE
Status: COMPLETED | OUTPATIENT
Start: 2018-06-26 | End: 2018-06-26

## 2018-06-26 RX ORDER — NOREPINEPHRINE BITARTRATE/D5W 4MG/250ML
PLASTIC BAG, INJECTION (ML) INTRAVENOUS
Status: COMPLETED
Start: 2018-06-26 | End: 2018-06-26

## 2018-06-26 RX ORDER — DOXYCYCLINE HYCLATE 100 MG
100 TABLET ORAL EVERY 12 HOURS
Status: DISCONTINUED | OUTPATIENT
Start: 2018-06-26 | End: 2018-06-29 | Stop reason: SDUPTHER

## 2018-06-26 RX ORDER — MAGNESIUM SULFATE HEPTAHYDRATE 500 MG/ML
1 INJECTION, SOLUTION INTRAMUSCULAR; INTRAVENOUS ONCE
Status: COMPLETED | OUTPATIENT
Start: 2018-06-26 | End: 2018-06-26

## 2018-06-26 RX ORDER — ENOXAPARIN SODIUM 100 MG/ML
1 INJECTION SUBCUTANEOUS
Status: COMPLETED | OUTPATIENT
Start: 2018-06-26 | End: 2018-06-27

## 2018-06-26 RX ORDER — SUCCINYLCHOLINE CHLORIDE 20 MG/ML
1.5 INJECTION INTRAMUSCULAR; INTRAVENOUS ONCE
Status: COMPLETED | OUTPATIENT
Start: 2018-06-26 | End: 2018-06-26

## 2018-06-26 RX ORDER — ATROPINE SULFATE 0.1 MG/ML
INJECTION INTRAVENOUS
Status: DISCONTINUED
Start: 2018-06-26 | End: 2018-06-26 | Stop reason: WASHOUT

## 2018-06-26 RX ORDER — MIDAZOLAM HYDROCHLORIDE 1 MG/ML
INJECTION INTRAMUSCULAR; INTRAVENOUS
Status: COMPLETED
Start: 2018-06-26 | End: 2018-06-26

## 2018-06-26 RX ORDER — FENTANYL CITRATE-0.9 % NACL/PF 10 MCG/ML
PLASTIC BAG, INJECTION (ML) INTRAVENOUS CONTINUOUS
Status: DISCONTINUED | OUTPATIENT
Start: 2018-06-26 | End: 2018-06-29

## 2018-06-26 RX ORDER — SODIUM CHLORIDE 9 MG/ML
INJECTION, SOLUTION INTRAVENOUS CONTINUOUS
Status: DISCONTINUED | OUTPATIENT
Start: 2018-06-26 | End: 2018-06-27

## 2018-06-26 RX ADMIN — CLOPIDOGREL BISULFATE 75 MG: 75 TABLET ORAL at 12:06

## 2018-06-26 RX ADMIN — INSULIN ASPART 2 UNITS: 100 INJECTION, SOLUTION INTRAVENOUS; SUBCUTANEOUS at 07:06

## 2018-06-26 RX ADMIN — ASPIRIN 81 MG CHEWABLE TABLET 81 MG: 81 TABLET CHEWABLE at 12:06

## 2018-06-26 RX ADMIN — SODIUM CHLORIDE 1000 ML: 0.9 INJECTION, SOLUTION INTRAVENOUS at 10:06

## 2018-06-26 RX ADMIN — MIDAZOLAM HYDROCHLORIDE 2 MG: 1 INJECTION, SOLUTION INTRAMUSCULAR; INTRAVENOUS at 08:06

## 2018-06-26 RX ADMIN — ENOXAPARIN SODIUM 80 MG: 100 INJECTION SUBCUTANEOUS at 12:06

## 2018-06-26 RX ADMIN — SUCCINYLCHOLINE CHLORIDE 114 MG: 20 INJECTION, SOLUTION INTRAMUSCULAR; INTRAVENOUS at 08:06

## 2018-06-26 RX ADMIN — FENTANYL CITRATE 50 MCG: 50 INJECTION, SOLUTION INTRAMUSCULAR; INTRAVENOUS at 08:06

## 2018-06-26 RX ADMIN — NOREPINEPHRINE-DEXTROSE IV SOLUTION 4 MG/250ML-5% 0.1 MCG/KG/MIN: 4-5/250 SOLUTION at 05:06

## 2018-06-26 RX ADMIN — IPRATROPIUM BROMIDE AND ALBUTEROL SULFATE 3 ML: .5; 2.5 SOLUTION RESPIRATORY (INHALATION) at 12:06

## 2018-06-26 RX ADMIN — ATORVASTATIN CALCIUM 80 MG: 40 TABLET, FILM COATED ORAL at 09:06

## 2018-06-26 RX ADMIN — IPRATROPIUM BROMIDE AND ALBUTEROL SULFATE 3 ML: .5; 2.5 SOLUTION RESPIRATORY (INHALATION) at 07:06

## 2018-06-26 RX ADMIN — SUCCINYLCHOLINE CHLORIDE 114 MG: 20 INJECTION INTRAMUSCULAR; INTRAVENOUS at 08:06

## 2018-06-26 RX ADMIN — MAGNESIUM SULFATE HEPTAHYDRATE 1 G: 500 INJECTION, SOLUTION INTRAMUSCULAR; INTRAVENOUS at 10:06

## 2018-06-26 RX ADMIN — GABAPENTIN 300 MG: 300 CAPSULE ORAL at 03:06

## 2018-06-26 RX ADMIN — IPRATROPIUM BROMIDE AND ALBUTEROL SULFATE 3 ML: .5; 2.5 SOLUTION RESPIRATORY (INHALATION) at 01:06

## 2018-06-26 RX ADMIN — METHYLPREDNISOLONE SODIUM SUCCINATE 80 MG: 40 INJECTION, POWDER, FOR SOLUTION INTRAMUSCULAR; INTRAVENOUS at 10:06

## 2018-06-26 RX ADMIN — GABAPENTIN 300 MG: 300 CAPSULE ORAL at 09:06

## 2018-06-26 RX ADMIN — NOREPINEPHRINE-DEXTROSE IV SOLUTION 4 MG/250ML-5% 0.02 MCG/KG/MIN: 4-5/250 SOLUTION at 08:06

## 2018-06-26 RX ADMIN — MIDAZOLAM HYDROCHLORIDE 2 MG: 1 INJECTION INTRAMUSCULAR; INTRAVENOUS at 08:06

## 2018-06-26 RX ADMIN — DEXTROSE 40 MG: 50 INJECTION, SOLUTION INTRAVENOUS at 01:06

## 2018-06-26 RX ADMIN — SODIUM CHLORIDE: 0.9 INJECTION, SOLUTION INTRAVENOUS at 05:06

## 2018-06-26 RX ADMIN — METHYLPREDNISOLONE SODIUM SUCCINATE 80 MG: 40 INJECTION, POWDER, FOR SOLUTION INTRAMUSCULAR; INTRAVENOUS at 12:06

## 2018-06-26 RX ADMIN — Medication 25 MCG/HR: at 09:06

## 2018-06-26 NOTE — PLAN OF CARE
Problem: Patient Care Overview  Goal: Plan of Care Review  Outcome: Ongoing (interventions implemented as appropriate)  Pt admitted to ICU as rescue this AM prior to shift, intubated after pt became too lethargic and hypercapneic and unable to correct on BIPAP. Now intubated, VSS/afebrile/Fio2 60%. Sedated on fentanyl for comfort, levophed started for mild blood pressure support, MAP maintained > 65. TLC placed this shift.  All line placement confirmed via xray. Pt follows commands, denies pain, soft restraints applied for safety after attempt to pull at lines.  Clark placed, urine output minimal and appeared milky, UA sent, IVF started. Cath postponed d/t resp decline this AM. Safety and skin integrity maintained with precautions in place. Family updated on POC, questions answered and support provided.

## 2018-06-26 NOTE — ASSESSMENT & PLAN NOTE
CP/elev trop c/w NSTEMI  Pt reports prior hx of CAD, but no recent isch eval or cath.  EF normal with LAD WMA (echo 6/25/18)  Currently with hypercarb resp failure, will need to delay cath pending improvement in resp status.  Cont ASA/Plavix (loaded 6/24/18)  Hold Xarelto (last dose 6/23/18)  Enox 1mg/kg bid x2 more doses to cover today  Cath in am 6/27/18 if resp issues quickly resolve  Start atorva 80mg qhs  Check lipids/LFT 3 months (late Sept 2018)

## 2018-06-26 NOTE — SUBJECTIVE & OBJECTIVE
Interval History: No new issues. Comfortable on vent.     Review of Systems   Unable to perform ROS: Intubated     Objective:     Vital Signs (Most Recent):  Temp: 97.4 °F (36.3 °C) (06/26/18 0715)  Pulse: (!) 111 (06/26/18 0854)  Resp: (!) 30 (06/26/18 0854)  BP: (!) 85/46 (06/26/18 0854)  SpO2: 100 % (06/26/18 0841) Vital Signs (24h Range):  Temp:  [97.4 °F (36.3 °C)-98.9 °F (37.2 °C)] 97.4 °F (36.3 °C)  Pulse:  [] 111  Resp:  [16-33] 30  SpO2:  [80 %-100 %] 100 %  BP: ()/(46-77) 85/46     Weight: 76.1 kg (167 lb 12.3 oz)  Body mass index is 26.28 kg/m².    Intake/Output Summary (Last 24 hours) at 06/26/18 1026  Last data filed at 06/26/18 0600   Gross per 24 hour   Intake            817.5 ml   Output              335 ml   Net            482.5 ml      Physical Exam   Constitutional: She appears well-developed and well-nourished.   HENT:   Head: Normocephalic and atraumatic.   Cardiovascular: Regular rhythm.    Pulmonary/Chest: Effort normal and breath sounds normal. She has no wheezes. She has no rales.   Abdominal: Soft. Bowel sounds are normal. She exhibits no distension. There is no guarding.       Significant Labs:   CBC:   Recent Labs  Lab 06/24/18  1413 06/26/18  0821   WBC 11.29 15.50*   HGB 10.6* 11.0*   HCT 35.5* 39.2   * 452*     CMP:   Recent Labs  Lab 06/24/18  1413 06/25/18  0506 06/26/18  0821    144 142   K 3.9 3.8 4.4    98 99   CO2 29 29 33*   * 167* 146*   BUN 14 16 27*   CREATININE 0.8 0.9 0.9   CALCIUM 10.2 10.1 9.8   PROT 7.8 7.8  --    ALBUMIN 3.6 3.5  --    BILITOT 0.4 0.3  --    ALKPHOS 85 80  --    AST 20 23  --    ALT 7* 10  --    ANIONGAP 13 17* 10   EGFRNONAA >60 >60 >60       Significant Imaging:

## 2018-06-26 NOTE — PROGRESS NOTES
Pt requesting to be put on the non-breather. States she feels like she is huffing. Pulse ox reads 92-94% on 5 lpm. Then she stated that it may be that she is worrying about tomorrow's procedure.  Administered xanax to help calm her. Turned lights off. Call light at pt's side.

## 2018-06-26 NOTE — EICU
E-alerted into room by Kerri HOBBS. Dr. Ely, respiratory and nurse at bedside. Time out performed. Versed 2 mg IVP given for sedation. Pt intubated 7.0 ET tube taped 25 @lips. Bilateral breath sounds auscultated, color change noted, xray ordered. NAD noted

## 2018-06-26 NOTE — PROGRESS NOTES
[facility]  Pulmonology  Progress Note    Patient Name: Yasmeen Palm  MRN: [id number]  Admission Date: 6/24/2018  Hospital Length of Stay: 2 days  Code Status: Full Code  Attending Provider: Tremayne Kong MD  Primary Care Provider: Angel Orourke Jr, MD   Principal Problem: Acute exacerbation of chronic obstructive pulmonary disease (COPD)    Subjective:     Transferred back to the unit for hypercapnic respiratory failure. Intubated this morning.     Review of Systems   Unable to perform ROS: Acuity of condition   HENT: Negative for congestion and rhinorrhea.    Respiratory: Positive for cough, shortness of breath and wheezing. Negative for chest tightness.    Cardiovascular: Positive for leg swelling. Negative for chest pain and palpitations.   Gastrointestinal: Negative for abdominal distention and abdominal pain.   Endocrine: Negative for cold intolerance and heat intolerance.   Genitourinary: Negative for difficulty urinating and dyspareunia.   Musculoskeletal: Negative for arthralgias and back pain.   Allergic/Immunologic: Negative for environmental allergies.   Hematological: Negative for adenopathy.   Psychiatric/Behavioral: Negative for agitation and behavioral problems.     Objective:     Vital Signs (Most Recent):  Temp: 97.4 °F (36.3 °C) (06/26/18 0715)  Pulse: (!) 111 (06/26/18 0854)  Resp: (!) 30 (06/26/18 0854)  BP: (!) 85/46 (06/26/18 0854)  SpO2: 100 % (06/26/18 0841) Vital Signs (24h Range):  Temp:  [97.4 °F (36.3 °C)-98.9 °F (37.2 °C)] 97.4 °F (36.3 °C)  Pulse:  [] 111  Resp:  [16-33] 30  SpO2:  [80 %-100 %] 100 %  BP: ()/(46-77) 85/46     Weight: 76.1 kg (167 lb 12.3 oz)  Body mass index is 26.28 kg/m².      Intake/Output Summary (Last 24 hours) at 06/26/18 0954  Last data filed at 06/26/18 0600   Gross per 24 hour   Intake            817.5 ml   Output              335 ml   Net            482.5 ml       Physical Exam   Constitutional: She appears  well-developed and well-nourished.   HENT:   Head: Normocephalic and atraumatic.   ETT in place   Eyes: EOM are normal. Pupils are equal, round, and reactive to light.   Neck: Normal range of motion. Neck supple.   Pulmonary/Chest: Effort normal. She has wheezes. She has no rales.   Abdominal: Soft. Bowel sounds are normal.   Musculoskeletal: Normal range of motion. She exhibits edema.   Neurological:   Sedated   Skin: Skin is warm and dry.   Psychiatric: She has a normal mood and affect. Her behavior is normal.   Nursing note and vitals reviewed.      Vents:  Vent Mode: PRVC (06/26/18 0841)  Ventilator Initiated: Yes (06/26/18 0841)  Set Rate: 24 bmp (06/26/18 0854)  Vt Set: 450 mL (06/26/18 0854)  PEEP/CPAP: 5 cmH20 (06/26/18 0854)  Oxygen Concentration (%): 40 (06/26/18 0854)  Peak Airway Pressure: 15.4 cmH2O (06/26/18 0854)  Total Ve: 4.3 mL (06/26/18 0854)  F/VT Ratio<105 (RSBI): 6000 (06/26/18 0854)    Lines/Drains/Airways     Airway                 Airway - Non-Surgical 06/26/18 0830 Endotracheal Tube less than 1 day          Peripheral Intravenous Line                 Peripheral IV - Single Lumen 06/24/18 Left Forearm 2 days         Peripheral IV - Single Lumen 06/24/18 Right Antecubital 2 days                Significant Labs:    CBC/Anemia Profile:    Recent Labs  Lab 06/24/18  1413 06/26/18  0821   WBC 11.29 15.50*   HGB 10.6* 11.0*   HCT 35.5* 39.2   * 452*   MCV 84 88   RDW 17.5* 18.0*        Chemistries:    Recent Labs  Lab 06/24/18  1413 06/25/18  0506 06/26/18  0821    144 142   K 3.9 3.8 4.4    98 99   CO2 29 29 33*   BUN 14 16 27*   CREATININE 0.8 0.9 0.9   CALCIUM 10.2 10.1 9.8   ALBUMIN 3.6 3.5  --    PROT 7.8 7.8  --    BILITOT 0.4 0.3  --    ALKPHOS 85 80  --    ALT 7* 10  --    AST 20 23  --    MG  --  1.7 2.0   PHOS  --  3.8 4.3       All pertinent labs within the past 24 hours have been reviewed.    Significant Imaging:   I have reviewed all pertinent imaging  results/findings within the past 24 hours.    Assessment/Plan:     Acute hypercapnic respiratory failure    Patient with COPD. Did not wear Bipap overnight and received benzos. This morning patient had pCO2> 100 on ABG. Intubated for hypercapnic respiratory failure  -Improvement in ABG on ventilator.   -Daily SBT and SAT  -No infiltrate on CXR.   -Continue bronchodilators.  -Start IV steroids.         Non-STEMI (non-ST elevated myocardial infarction)    Per Cards. [facility] on hold.         Type 2 diabetes mellitus, controlled    BG goal 140-180.           Critical Care time: 80 minutes. Excluding procedures.     Critical Care time for the evaluation and treatment of severe organ dysfunction, review of pertinent labs and imaging studies, discussions with primary team and discussions with family.     updated at bedside.     Continue ICU care.      Vivian Ely MD  Pulmonology  [facility]

## 2018-06-26 NOTE — ASSESSMENT & PLAN NOTE
Patient with COPD. Did not wear Bipap overnight and received benzos. This morning patient had pCO2> 100 on ABG. Intubated for hypercapnic respiratory failure  -Improvement in ABG on ventilator.   -Daily SBT and SAT  -No infiltrate on CXR.   -Continue bronchodilators.  -Start IV steroids.

## 2018-06-26 NOTE — SUBJECTIVE & OBJECTIVE
Past Medical History:   Diagnosis Date    Anticoagulant long-term use     Arthritis     Asthma     CHF (congestive heart failure)     COPD (chronic obstructive pulmonary disease)     Coronary artery disease     Depression     Diabetes mellitus     GERD (gastroesophageal reflux disease)     Hypertension        Past Surgical History:   Procedure Laterality Date    ABDOMINAL SURGERY      CARDIAC SURGERY      HERNIA REPAIR         Review of patient's allergies indicates:  No Known Allergies    No current facility-administered medications on file prior to encounter.      Current Outpatient Prescriptions on File Prior to Encounter   Medication Sig    acetaminophen (TYLENOL) 500 MG tablet Take 1 tablet (500 mg total) by mouth every 8 (eight) hours as needed.    aspirin (ECOTRIN) 81 MG EC tablet Take 81 mg by mouth once daily.    cloNIDine (CATAPRES) 0.1 MG tablet Take 2 tablets (0.2 mg total) by mouth 3 (three) times daily.    diltiaZEM (CARDIZEM) 120 MG tablet Take 1 tablet (120 mg total) by mouth every 8 (eight) hours.    furosemide (LASIX) 40 MG tablet Take 40 mg by mouth once daily.     gabapentin (NEURONTIN) 300 MG capsule Take 300 mg by mouth 3 (three) times daily.    hydrALAZINE (APRESOLINE) 50 MG tablet Take 1 tablet (50 mg total) by mouth every 8 (eight) hours. (Patient taking differently: Take 50 mg by mouth every 12 (twelve) hours. )    levalbuterol (XOPENEX HFA) 45 mcg/actuation inhaler Inhale 2 puffs into the lungs every 4 (four) hours as needed for Wheezing or Shortness of Breath. Rescue    lisinopril (PRINIVIL,ZESTRIL) 40 MG tablet Take 1 tablet (40 mg total) by mouth once daily. Do not take this medication if blood pressure is below 130/80 mmHg and/or feeling dizzy after taking all other blood pressure meds    metformin (GLUCOPHAGE) 500 MG tablet Take 500 mg by mouth 2 (two) times daily with meals.    naproxen (NAPROSYN) 375 MG tablet Take 375 mg by mouth every 12 (twelve) hours as  needed.    rivaroxaban (XARELTO) 20 mg Tab Take 20 mg by mouth daily with dinner or evening meal.    sotalol (BETAPACE) 80 MG tablet Take 80 mg by mouth 2 (two) times daily.    tramadol (ULTRAM) 50 mg tablet Take 1 tablet (50 mg total) by mouth every 6 (six) hours as needed for Pain.    UMECLIDINIUM BRM/VILANTEROL TR (ANORO ELLIPTA INHL) Inhale into the lungs daily as needed.     Family History     Problem Relation (Age of Onset)    Diabetes Father    Heart disease Mother    Hypertension Mother        Social History Main Topics    Smoking status: Current Some Day Smoker     Packs/day: 0.25     Years: 55.00     Types: Cigarettes    Smokeless tobacco: Never Used    Alcohol use 0.6 oz/week     1 Glasses of wine per week    Drug use: No    Sexual activity: No     Review of Systems   Unable to perform ROS: acuity of condition     Objective:     Vital Signs (Most Recent):  Temp: 97.4 °F (36.3 °C) (06/26/18 0715)  Pulse: 75 (06/26/18 0752)  Resp: (!) 24 (06/26/18 0752)  BP: 110/64 (06/26/18 0730)  SpO2: (!) 94 % (06/26/18 0730) Vital Signs (24h Range):  Temp:  [97.4 °F (36.3 °C)-98.9 °F (37.2 °C)] 97.4 °F (36.3 °C)  Pulse:  [66-99] 75  Resp:  [16-33] 24  SpO2:  [80 %-100 %] 94 %  BP: (110-168)/(58-79) 110/64     Weight: 76.1 kg (167 lb 12.3 oz)  Body mass index is 26.28 kg/m².    SpO2: (!) 94 %  O2 Device (Oxygen Therapy): BiPAP      Intake/Output Summary (Last 24 hours) at 06/26/18 0808  Last data filed at 06/26/18 0600   Gross per 24 hour   Intake           1347.5 ml   Output              490 ml   Net            857.5 ml       Lines/Drains/Airways     Peripheral Intravenous Line                 Peripheral IV - Single Lumen 06/24/18 Left Forearm 2 days         Peripheral IV - Single Lumen 06/24/18 Right Antecubital 2 days                Physical Exam   Constitutional: She appears well-developed and well-nourished. No distress.   HENT:   Head: Normocephalic and atraumatic.   Eyes: Conjunctivae and EOM are normal.  Pupils are equal, round, and reactive to light. No scleral icterus.   Neck: Normal range of motion. Neck supple. No JVD present. No tracheal deviation present. No thyromegaly present.   Cardiovascular: Normal rate, regular rhythm, S1 normal and S2 normal.  Exam reveals no gallop and no friction rub.    No murmur heard.  Pulmonary/Chest: Effort normal. No respiratory distress. She has no wheezes. She has no rales. She exhibits no tenderness.   Abdominal: Soft. She exhibits no distension.   Musculoskeletal: She exhibits no edema.   Neurological:   obtunded   Skin: Skin is warm and dry. She is not diaphoretic.   Psychiatric:   UTO       Current Medications:   albuterol-ipratropium  3 mL Nebulization Q6H    aspirin  81 mg Oral Daily    atorvastatin  80 mg Oral QHS    azithromycin  500 mg Oral Once    clopidogrel  75 mg Oral Daily    diltiaZEM  120 mg Oral Q8H    fluticasone-vilanterol  1 puff Inhalation Daily    gabapentin  300 mg Oral TID    guaiFENesin  600 mg Oral BID    hydrALAZINE  50 mg Oral Q8H    insulin detemir U-100  15 Units Subcutaneous Daily    lisinopril  40 mg Oral Daily    pantoprazole  40 mg Oral Daily    polyethylene glycol  17 g Oral Daily    senna-docusate 8.6-50 mg  1 tablet Oral BID    sotalol  80 mg Oral BID      sodium chloride 0.9% 50 mL/hr at 06/25/18 2300     acetaminophen, bisacodyl, dextrose 50%, glucagon (human recombinant), HYDROcodone-acetaminophen, HYDROcodone-acetaminophen, insulin aspart U-100, sodium chloride 0.9%    Laboratory:  CBC:    Recent Labs  Lab 11/04/17  0440 12/08/17  0545 12/09/17  0423 02/12/18  1422 06/24/18  1413   WHITE BLOOD CELL COUNT 18.46 H 13.41 H 14.83 H 11.16 11.29   HEMOGLOBIN 11.9 L 12.5 10.8 L 11.7 L 10.6 L   HEMATOCRIT 41.1 43.0 38.3 40.1 35.5 L   PLATELETS 319 602 H 523 H 396 H 468 H       CHEMISTRIES:    Recent Labs  Lab 04/10/17  1129 04/11/17  0200  08/19/17  1430  12/08/17  0545 12/09/17  0423 02/12/18  1422 06/24/18  1413  06/25/18  0506   GLUCOSE 124 H 100  < > 189 H  < > 149 H 232 H 153 H 119 H 167 H   SODIUM 143 143  < > 145  < > 142 138 142 143 144   POTASSIUM 3.9 4.0  < > 3.9  < > 4.9 4.3 3.8 3.9 3.8   BUN BLD 9 16  < > 16  < > 5 L 18 11 14 16   CREATININE 0.7 1.1  < > 0.8  < > 0.7 1.3 0.7 0.8 0.9   EGFR IF  >60 >60  < > >60  < > >60 50 A >60 >60 >60   EGFR IF NON- >60 53 A  < > >60  < > >60 43 A >60 >60 >60   CALCIUM 10.1 9.4  < > 10.2  < > 10.3 10.1 9.8 10.2 10.1   MAGNESIUM 1.8 1.5 L  --  1.4 L  --   --   --  1.5 L  --  1.7   < > = values in this interval not displayed.    CARDIAC BIOMARKERS:    Recent Labs  Lab 12/13/15  1932 02/12/18 1422 02/12/18  1808 06/24/18  1413 06/24/18  2153 06/25/18  0506   CPK 33  --   --   --   --   --   --    CPK MB 1.0  --   --   --   --   --   --    TROPONIN I <0.006  < > 0.021 0.021 0.896 H 0.870 H 0.667 H   < > = values in this interval not displayed.    COAGS:    Recent Labs  Lab 10/07/16  0523 04/10/17  1129 08/12/17  0036 11/02/17 2211 12/08/17  0545   INR 1.7 H 1.1 1.2 1.0 1.0       LIPIDS/LFTS:    Recent Labs  Lab 12/15/15  0540  08/12/17  1037  11/02/17 2211 12/08/17  0545 02/12/18  1422 06/24/18  1413 06/25/18  0506   CHOLESTEROL 170  --  243 H  --   --   --   --   --   --    TRIGLYCERIDES 94  --  93  --   --   --   --   --   --    HDL 33 L  --  43  --   --   --   --   --   --    LDL CHOLESTEROL 118.2  --  181.4 H  --   --   --   --   --   --    NON-HDL CHOLESTEROL 137  --  200  --   --   --   --   --   --    AST  --   < >  --   < > 13 26 13 20 23   ALT  --   < >  --   < > 7 L 8 L 7 L 7 L 10   < > = values in this interval not displayed.    BNP:    Recent Labs  Lab 08/19/17  1430 11/02/17  2211 12/08/17  0545 02/12/18  1422 06/24/18  1413    H 334 H 354 H 133 H 2,128 H       TSH:    Recent Labs  Lab 12/13/15  1932 06/24/18  1413   TSH 0.487 0.754       Free T4:    Recent Labs  Lab 06/24/18  1413   FREE T4 1.09     ABG    Recent Labs  Lab  06/26/18  0800   PH 7.192*   PO2 77*   PCO2 97.7*   HCO3 37.5*   BE 6         Diagnostic Results:  ECG (personally reviewed tracings):  6/24/18 1403 , LAD/PWRP    Chest X-Ray (personally reviewed image(s)): 6/25/18 NAD    CTA C/A/P 6/24/18  1. No evidence of aortic dissection.  2. Extensive calcified and noncalcified plaque seen throughout the aorta with small multifocal outpouchings seen involving the descending thoracic aorta suggestive for small penetrating ulcers.  Similar findings were seen on previous CT from August 2017.  3. 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  4. No evidence of PE.  5. Decreased size area of nodularity with scarring seen within the left lower lobe with resolution of previously visualized cavitation.  No evidence of new lung consolidation.  6. Mild to moderate centrilobular emphysema.    Echo: 6/25/18 (images pers rev)    1 - Low normal to mildly depressed left ventricular systolic function (EF 50-55%).  Distal LAD territory WMA.     2 - Impaired LV relaxation, elevated LAP (grade 2 diastolic dysfunction).     3 - Concentric hypertrophy.     4 - Trivial to mild tricuspid regurgitation.     5 - Pulmonary hypertension. The estimated PA systolic pressure is 52 mmHg.     LLE venous US 1/24/17  Interval decrease in overall thrombus burden of the left lower extremity, although there is persistent deep venous thrombus present within the left femoral and popliteal veins.    Stress Test: L MPI 12/15/15  Nuclear Quantitative Functional Analysis:   LVEF: 66 %  Impression: NORMAL MYOCARDIAL PERFUSION  1. The perfusion scan is free of evidence for myocardial ischemia or injury.   2. Resting wall motion is physiologic.   3. Resting LV function is normal.   4. The ventricular volumes are normal at rest and stress.   5. The extracardiac distribution of radioactivity is normal.   6. There was no previous study available to compare.

## 2018-06-26 NOTE — NURSING
ABG's done per RT, results reviewed and reported to Dr. Ely CO2 critical 105, pt on BIpap. MD updated on pt status. Pt awakens to voice and follows only minimal commands, VSS. Per MD leave pt on BIPAP for additional 30 minutes and recheck ABG, will intubate if no improvement.  Room readied for intubation if becomes emergent. Ambu @ [facility].  at bedside, updated on plan of care.

## 2018-06-26 NOTE — ASSESSMENT & PLAN NOTE
C02 > 100 this am. Intubated. Pulmonary following. Should be quick wean from vent. Will likely need home bi-pap on discharge.  (trilogy).

## 2018-06-26 NOTE — SUBJECTIVE & OBJECTIVE
Transferred back to the unit for hypercapnic respiratory failure. Intubated this morning.     Review of Systems   Unable to perform ROS: Acuity of condition   HENT: Negative for congestion and rhinorrhea.    Respiratory: Positive for cough, shortness of breath and wheezing. Negative for chest tightness.    Cardiovascular: Positive for leg swelling. Negative for chest pain and palpitations.   Gastrointestinal: Negative for abdominal distention and abdominal pain.   Endocrine: Negative for cold intolerance and heat intolerance.   Genitourinary: Negative for difficulty urinating and dyspareunia.   Musculoskeletal: Negative for arthralgias and back pain.   Allergic/Immunologic: Negative for environmental allergies.   Hematological: Negative for adenopathy.   Psychiatric/Behavioral: Negative for agitation and behavioral problems.     Objective:     Vital Signs (Most Recent):  Temp: 97.4 °F (36.3 °C) (06/26/18 0715)  Pulse: (!) 111 (06/26/18 0854)  Resp: (!) 30 (06/26/18 0854)  BP: (!) 85/46 (06/26/18 0854)  SpO2: 100 % (06/26/18 0841) Vital Signs (24h Range):  Temp:  [97.4 °F (36.3 °C)-98.9 °F (37.2 °C)] 97.4 °F (36.3 °C)  Pulse:  [] 111  Resp:  [16-33] 30  SpO2:  [80 %-100 %] 100 %  BP: ()/(46-77) 85/46     Weight: 76.1 kg (167 lb 12.3 oz)  Body mass index is 26.28 kg/m².      Intake/Output Summary (Last 24 hours) at 06/26/18 0954  Last data filed at 06/26/18 0600   Gross per 24 hour   Intake            817.5 ml   Output              335 ml   Net            482.5 ml       Physical Exam   Constitutional: She appears well-developed and well-nourished.   HENT:   Head: Normocephalic and atraumatic.   ETT in place   Eyes: EOM are normal. Pupils are equal, round, and reactive to light.   Neck: Normal range of motion. Neck supple.   Pulmonary/Chest: Effort normal. She has wheezes. She has no rales.   Abdominal: Soft. Bowel sounds are normal.   Musculoskeletal: Normal range of motion. She exhibits edema.    Neurological:   Sedated   Skin: Skin is warm and dry.   Psychiatric: She has a normal mood and affect. Her behavior is normal.   Nursing note and vitals reviewed.      Vents:  Vent Mode: PRVC (06/26/18 0841)  Ventilator Initiated: Yes (06/26/18 0841)  Set Rate: 24 bmp (06/26/18 0854)  Vt Set: 450 mL (06/26/18 0854)  PEEP/CPAP: 5 cmH20 (06/26/18 0854)  Oxygen Concentration (%): 40 (06/26/18 0854)  Peak Airway Pressure: 15.4 cmH2O (06/26/18 0854)  Total Ve: 4.3 mL (06/26/18 0854)  F/VT Ratio<105 (RSBI): 6000 (06/26/18 0854)    Lines/Drains/Airways     Airway                 Airway - Non-Surgical 06/26/18 0830 Endotracheal Tube less than 1 day          Peripheral Intravenous Line                 Peripheral IV - Single Lumen 06/24/18 Left Forearm 2 days         Peripheral IV - Single Lumen 06/24/18 Right Antecubital 2 days                Significant Labs:    CBC/Anemia Profile:    Recent Labs  Lab 06/24/18  1413 06/26/18  0821   WBC 11.29 15.50*   HGB 10.6* 11.0*   HCT 35.5* 39.2   * 452*   MCV 84 88   RDW 17.5* 18.0*        Chemistries:    Recent Labs  Lab 06/24/18  1413 06/25/18  0506 06/26/18  0821    144 142   K 3.9 3.8 4.4    98 99   CO2 29 29 33*   BUN 14 16 27*   CREATININE 0.8 0.9 0.9   CALCIUM 10.2 10.1 9.8   ALBUMIN 3.6 3.5  --    PROT 7.8 7.8  --    BILITOT 0.4 0.3  --    ALKPHOS 85 80  --    ALT 7* 10  --    AST 20 23  --    MG  --  1.7 2.0   PHOS  --  3.8 4.3       All pertinent labs within the past 24 hours have been reviewed.    Significant Imaging:   I have reviewed all pertinent imaging results/findings within the past 24 hours.

## 2018-06-26 NOTE — PROCEDURES
"Yasmeen Palm is a 68 y.o. female patient.    Temp: 97.4 °F (36.3 °C) (06/26/18 0715)  Pulse: (!) 111 (06/26/18 0854)  Resp: (!) 30 (06/26/18 0854)  BP: (!) 85/46 (06/26/18 0854)  SpO2: 100 % (06/26/18 0841)  Weight: 76.1 kg (167 lb 12.3 oz) (06/25/18 0315)  Height: 5' 7" (170.2 cm) (06/25/18 0315)       Intubation  Date/Time: 6/26/2018 9:52 AM  Location procedure was performed: [facility]  Performed by: VIVIAN GIBSON  Authorized by: VIVIAN GIBSON   Consent Done: Emergent Situation  Indications: respiratory failure and  hypercapnia  Intubation method: direct  Patient status: paralyzed (RSI)  Sedatives: midazolam  Paralytic: succinylcholine  Laryngoscope size: Mac 4  Tube size: 7.0 mm  Tube type: cuffed  Number of attempts: 1  Cricoid pressure: no  Cords visualized: no  Post-procedure assessment: CO2 detector and ETCO2 monitor  Breath sounds: wheezing  Cuff inflated: yes  Tube secured with: ETT maher  Chest x-ray interpreted by me.  Chest x-ray findings: endotracheal tube in appropriate position  Patient tolerance: Patient tolerated the procedure well with no immediate complications  Complications: No  Specimens: No  Implants: No          Vivian Gibson  6/26/2018  "

## 2018-06-26 NOTE — NURSING
0715- Dr. Ely called and updated on transfer of pt back to ICU and status as well as recent critical ABG results. Per MD will maintain on bipap and obtain another abg in 30 minutes.  0805- Dr. Ely called to notify of repeat ABG results, pt now slightly more responsive. Per MD will ready pt for intubation.   0850-Pt intubated (See EICU note)l. Fentanyl started for comfort and sedation. Pt became hypotensive, Dr. Kong updated on status of pt. Pt started on Levophed. Per Dr. Ely if pt continues to require prolonged pressor support will place central line   CXR done, ETT placement confirmed.   0917- BP now stable on levophed  1040-Dr. Ely at bedside evaluating pt for TLC placement. 1 L NS bolus ordered.  1050- MD at bedside TLC placed, pt tolerated well.

## 2018-06-26 NOTE — ASSESSMENT & PLAN NOTE
[facility] hopefully on 6/27.  Continue to follow cards recs.

## 2018-06-26 NOTE — PROCEDURES
"Yasmeen Palm is a 68 y.o. female patient.    Temp: 97.8 °F (36.6 °C) (06/26/18 1115)  Pulse: 79 (06/26/18 1230)  Resp: 16 (06/26/18 1230)  BP: (!) 103/53 (06/26/18 1230)  SpO2: 98 % (06/26/18 1230)  Weight: 76.1 kg (167 lb 12.3 oz) (06/25/18 0315)  Height: 5' 7" (170.2 cm) (06/25/18 0315)       Central Line  Date/Time: 6/26/2018 1:49 PM  Location procedure was performed: [facility]  Performed by: VIVIAN GIBSON  Consent Done: Yes  Time out: Immediately prior to procedure a "time out" was called to verify the correct patient, procedure, equipment, support staff and site/side marked as required.  Indications: med administration and vascular access  Anesthesia: local infiltration    Anesthesia:  Local Anesthetic: lidocaine 1% without epinephrine  Preparation: skin prepped with ChloraPrep  Skin prep agent dried: skin prep agent completely dried prior to procedure  Sterile barriers: all five maximum sterile barriers used - cap, mask, sterile gown, sterile gloves, and large sterile sheet  Hand hygiene: hand hygiene performed prior to central venous catheter insertion  Location details: right internal jugular  Catheter type: triple lumen  Catheter size: 7 Fr  Catheter Length: 15cm    Ultrasound guidance: yes  Vessel Caliber: medium, patentNeedle advanced into vessel with real time Ultrasound guidance.  Guidewire confirmed in vessel.  Image recorded and saved.  Sterile sheath used.  Manometry: Yes  Number of attempts: 1  Assessment: placement verified by x-ray  Complications: none  Specimens: No  Implants: No  Post-procedure: line sutured,  chlorhexidine patch,  sterile dressing applied and blood return through all ports  Complications: No          Vivian Gibson  6/26/2018  "

## 2018-06-26 NOTE — PROGRESS NOTES
[facility]  Cardiology  Progress Note    Patient Name: Yasmeen Palm  MRN: [id number]  Admission Date: 6/24/2018  Hospital Length of Stay: 2 days  Code Status: Full Code   Attending Physician: Tremayne Kong MD   Primary Care Physician: Angel Orourke Jr, MD  Expected Discharge Date:   Principal Problem:Acute exacerbation of chronic obstructive pulmonary disease (COPD)    Subjective:     Hospital Course:   6/25/18: adm with resp insuff and NSTEMI with plans for cath 6/26 6/26/18: transferred back to ICU with hypercarb resp failure    Interval Hx: pt seen in ICU, case d/w RN and husb at bedside.  Pt currently on bipap, obtunded.    Tele: SR (pers rev)      Past Medical History:   Diagnosis Date    Anticoagulant long-term use     Arthritis     Asthma     CHF (congestive heart failure)     COPD (chronic obstructive pulmonary disease)     Coronary artery disease     Depression     Diabetes mellitus     GERD (gastroesophageal reflux disease)     Hypertension        Past Surgical History:   Procedure Laterality Date    ABDOMINAL SURGERY      CARDIAC SURGERY      HERNIA REPAIR         Review of patient's allergies indicates:  No Known Allergies    No current facility-administered medications on file prior to encounter.      Current Outpatient Prescriptions on File Prior to Encounter   Medication Sig    acetaminophen (TYLENOL) 500 MG tablet Take 1 tablet (500 mg total) by mouth every 8 (eight) hours as needed.    aspirin (ECOTRIN) 81 MG EC tablet Take 81 mg by mouth once daily.    cloNIDine (CATAPRES) 0.1 MG tablet Take 2 tablets (0.2 mg total) by mouth 3 (three) times daily.    diltiaZEM (CARDIZEM) 120 MG tablet Take 1 tablet (120 mg total) by mouth every 8 (eight) hours.    furosemide (LASIX) 40 MG tablet Take 40 mg by mouth once daily.     gabapentin (NEURONTIN) 300 MG capsule Take 300 mg by mouth 3 (three) times daily.    hydrALAZINE (APRESOLINE) 50 MG tablet Take 1 tablet  (50 mg total) by mouth every 8 (eight) hours. (Patient taking differently: Take 50 mg by mouth every 12 (twelve) hours. )    levalbuterol (XOPENEX HFA) 45 mcg/actuation inhaler Inhale 2 puffs into the lungs every 4 (four) hours as needed for Wheezing or Shortness of Breath. Rescue    lisinopril (PRINIVIL,ZESTRIL) 40 MG tablet Take 1 tablet (40 mg total) by mouth once daily. Do not take this medication if blood pressure is below 130/80 mmHg and/or feeling dizzy after taking all other blood pressure meds    metformin (GLUCOPHAGE) 500 MG tablet Take 500 mg by mouth 2 (two) times daily with meals.    naproxen (NAPROSYN) 375 MG tablet Take 375 mg by mouth every 12 (twelve) hours as needed.    rivaroxaban (XARELTO) 20 mg Tab Take 20 mg by mouth daily with dinner or evening meal.    sotalol (BETAPACE) 80 MG tablet Take 80 mg by mouth 2 (two) times daily.    tramadol (ULTRAM) 50 mg tablet Take 1 tablet (50 mg total) by mouth every 6 (six) hours as needed for Pain.    UMECLIDINIUM BRM/VILANTEROL TR (ANORO ELLIPTA INHL) Inhale into the lungs daily as needed.     Family History     Problem Relation (Age of Onset)    Diabetes Father    Heart disease Mother    Hypertension Mother        Social History Main Topics    Smoking status: Current Some Day Smoker     Packs/day: 0.25     Years: 55.00     Types: Cigarettes    Smokeless tobacco: Never Used    Alcohol use 0.6 oz/week     1 Glasses of wine per week    Drug use: No    Sexual activity: No     Review of Systems   Unable to perform ROS: acuity of condition     Objective:     Vital Signs (Most Recent):  Temp: 97.4 °F (36.3 °C) (06/26/18 0715)  Pulse: 75 (06/26/18 0752)  Resp: (!) 24 (06/26/18 0752)  BP: 110/64 (06/26/18 0730)  SpO2: (!) 94 % (06/26/18 0730) Vital Signs (24h Range):  Temp:  [97.4 °F (36.3 °C)-98.9 °F (37.2 °C)] 97.4 °F (36.3 °C)  Pulse:  [66-99] 75  Resp:  [16-33] 24  SpO2:  [80 %-100 %] 94 %  BP: (110-168)/(58-79) 110/64     Weight: 76.1 kg (167 lb  12.3 oz)  Body mass index is 26.28 kg/m².    SpO2: (!) 94 %  O2 Device (Oxygen Therapy): BiPAP      Intake/Output Summary (Last 24 hours) at 06/26/18 0808  Last data filed at 06/26/18 0600   Gross per 24 hour   Intake           1347.5 ml   Output              490 ml   Net            857.5 ml       Lines/Drains/Airways     Peripheral Intravenous Line                 Peripheral IV - Single Lumen 06/24/18 Left Forearm 2 days         Peripheral IV - Single Lumen 06/24/18 Right Antecubital 2 days                Physical Exam   Constitutional: She appears well-developed and well-nourished. No distress.   HENT:   Head: Normocephalic and atraumatic.   Eyes: Conjunctivae and EOM are normal. Pupils are equal, round, and reactive to light. No scleral icterus.   Neck: Normal range of motion. Neck supple. No JVD present. No tracheal deviation present. No thyromegaly present.   Cardiovascular: Normal rate, regular rhythm, S1 normal and S2 normal.  Exam reveals no gallop and no friction rub.    No murmur heard.  Pulmonary/Chest: Effort normal. No respiratory distress. She has no wheezes. She has no rales. She exhibits no tenderness.   Abdominal: Soft. She exhibits no distension.   Musculoskeletal: She exhibits no edema.   Neurological:   obtunded   Skin: Skin is warm and dry. She is not diaphoretic.   Psychiatric:   UTO       Current Medications:   albuterol-ipratropium  3 mL Nebulization Q6H    aspirin  81 mg Oral Daily    atorvastatin  80 mg Oral QHS    azithromycin  500 mg Oral Once    clopidogrel  75 mg Oral Daily    diltiaZEM  120 mg Oral Q8H    fluticasone-vilanterol  1 puff Inhalation Daily    gabapentin  300 mg Oral TID    guaiFENesin  600 mg Oral BID    hydrALAZINE  50 mg Oral Q8H    insulin detemir U-100  15 Units Subcutaneous Daily    lisinopril  40 mg Oral Daily    pantoprazole  40 mg Oral Daily    polyethylene glycol  17 g Oral Daily    senna-docusate 8.6-50 mg  1 tablet Oral BID    sotalol  80 mg Oral  BID      sodium chloride 0.9% 50 mL/hr at 06/25/18 2300     acetaminophen, bisacodyl, dextrose 50%, glucagon (human recombinant), HYDROcodone-acetaminophen, HYDROcodone-acetaminophen, insulin aspart U-100, sodium chloride 0.9%    Laboratory:  CBC:    Recent Labs  Lab 11/04/17  0440 12/08/17  0545 12/09/17  0423 02/12/18  1422 06/24/18  1413   WHITE BLOOD CELL COUNT 18.46 H 13.41 H 14.83 H 11.16 11.29   HEMOGLOBIN 11.9 L 12.5 10.8 L 11.7 L 10.6 L   HEMATOCRIT 41.1 43.0 38.3 40.1 35.5 L   PLATELETS 319 602 H 523 H 396 H 468 H       CHEMISTRIES:    Recent Labs  Lab 04/10/17  1129 04/11/17  0200  08/19/17  1430  12/08/17  0545 12/09/17  0423 02/12/18  1422 06/24/18  1413 06/25/18  0506   GLUCOSE 124 H 100  < > 189 H  < > 149 H 232 H 153 H 119 H 167 H   SODIUM 143 143  < > 145  < > 142 138 142 143 144   POTASSIUM 3.9 4.0  < > 3.9  < > 4.9 4.3 3.8 3.9 3.8   BUN BLD 9 16  < > 16  < > 5 L 18 11 14 16   CREATININE 0.7 1.1  < > 0.8  < > 0.7 1.3 0.7 0.8 0.9   EGFR IF  >60 >60  < > >60  < > >60 50 A >60 >60 >60   EGFR IF NON- >60 53 A  < > >60  < > >60 43 A >60 >60 >60   CALCIUM 10.1 9.4  < > 10.2  < > 10.3 10.1 9.8 10.2 10.1   MAGNESIUM 1.8 1.5 L  --  1.4 L  --   --   --  1.5 L  --  1.7   < > = values in this interval not displayed.    CARDIAC BIOMARKERS:    Recent Labs  Lab 12/13/15  1932  02/12/18  1422 02/12/18  1808 06/24/18  1413 06/24/18  2153 06/25/18  0506   CPK 33  --   --   --   --   --   --    CPK MB 1.0  --   --   --   --   --   --    TROPONIN I <0.006  < > 0.021 0.021 0.896 H 0.870 H 0.667 H   < > = values in this interval not displayed.    COAGS:    Recent Labs  Lab 10/07/16  0523 04/10/17  1129 08/12/17  0036 11/02/17  2211 12/08/17  0545   INR 1.7 H 1.1 1.2 1.0 1.0       LIPIDS/LFTS:    Recent Labs  Lab 12/15/15  0540  08/12/17  1037  11/02/17  2211 12/08/17  0545 02/12/18  1422 06/24/18  1413 06/25/18  0506   CHOLESTEROL 170  --  243 H  --   --   --   --   --   --     TRIGLYCERIDES 94  --  93  --   --   --   --   --   --    HDL 33 L  --  43  --   --   --   --   --   --    LDL CHOLESTEROL 118.2  --  181.4 H  --   --   --   --   --   --    NON-HDL CHOLESTEROL 137  --  200  --   --   --   --   --   --    AST  --   < >  --   < > 13 26 13 20 23   ALT  --   < >  --   < > 7 L 8 L 7 L 7 L 10   < > = values in this interval not displayed.    BNP:    Recent Labs  Lab 08/19/17  1430 11/02/17  2211 12/08/17  0545 02/12/18  1422 06/24/18  1413    H 334 H 354 H 133 H 2,128 H       TSH:    Recent Labs  Lab 12/13/15  1932 06/24/18  1413   TSH 0.487 0.754       Free T4:    Recent Labs  Lab 06/24/18  1413   FREE T4 1.09     ABG    Recent Labs  Lab 06/26/18  0800   PH 7.192*   PO2 77*   PCO2 97.7*   HCO3 37.5*   BE 6         Diagnostic Results:  ECG (personally reviewed tracings):  6/24/18 1403 , LAD/PWRP    Chest X-Ray (personally reviewed image(s)): 6/25/18 NAD    CTA C/A/P 6/24/18  1. No evidence of aortic dissection.  2. Extensive calcified and noncalcified plaque seen throughout the aorta with small multifocal outpouchings seen involving the descending thoracic aorta suggestive for small penetrating ulcers.  Similar findings were seen on previous CT from August 2017.  3. 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  4. No evidence of PE.  5. Decreased size area of nodularity with scarring seen within the left lower lobe with resolution of previously visualized cavitation.  No evidence of new lung consolidation.  6. Mild to moderate centrilobular emphysema.    Echo: 6/25/18 (images pers rev)    1 - Low normal to mildly depressed left ventricular systolic function (EF 50-55%).  Distal LAD territory WMA.     2 - Impaired LV relaxation, elevated LAP (grade 2 diastolic dysfunction).     3 - Concentric hypertrophy.     4 - Trivial to mild tricuspid regurgitation.     5 - Pulmonary hypertension. The estimated PA systolic  pressure is 52 mmHg.     LLE venous US 1/24/17  Interval decrease in overall thrombus burden of the left lower extremity, although there is persistent deep venous thrombus present within the left femoral and popliteal veins.    Stress Test: L MPI 12/15/15  Nuclear Quantitative Functional Analysis:   LVEF: 66 %  Impression: NORMAL MYOCARDIAL PERFUSION  1. The perfusion scan is free of evidence for myocardial ischemia or injury.   2. Resting wall motion is physiologic.   3. Resting LV function is normal.   4. The ventricular volumes are normal at rest and stress.   5. The extracardiac distribution of radioactivity is normal.   6. There was no previous study available to compare.      Assessment and Plan:     * Acute exacerbation of chronic obstructive pulmonary disease (COPD)    Per IM  Now with hypercarb resp failure, pending intubation        Non-STEMI (non-ST elevated myocardial infarction)    CP/elev trop c/w NSTEMI  Pt reports prior hx of CAD, but no recent isch eval or cath.  EF normal with LAD WMA (echo 6/25/18)  Currently with hypercarb resp failure, will need to delay cath pending improvement in resp status.  Cont ASA/Plavix (loaded 6/24/18)  Hold Xarelto (last dose 6/23/18)  Enox 1mg/kg bid x2 more doses to cover today  Cath in am 6/27/18 if resp issues quickly resolve  Start atorva 80mg qhs  Check lipids/LFT 3 months (late Sept 2018)            PAF (paroxysmal atrial fibrillation)    Currently in SR  On sotalol, continue  Xarelto held for cath, will resume post cath  If PCI, will also need Plavix        Chronic anticoagulation    For hx of PAF and DVT/PE        History of deep vein thrombosis    As above        Type 2 diabetes mellitus, controlled    Per IM            VTE Risk Mitigation     None        Critical care time 30min    Laureano Pulido MD  Cardiology  [facility]

## 2018-06-26 NOTE — PROGRESS NOTES
[facility] Hospital Medicine  Progress Note    Patient Name: Yasmeen Palm  MRN: [id number]  Patient Class: IP- Inpatient   Admission Date: 6/24/2018  Length of Stay: 2 days  Attending Physician: Tremayne Kong MD  Primary Care Provider: Angel Orourke Jr, MD        Subjective:     Principal Problem:Acute exacerbation of chronic obstructive pulmonary disease (COPD)    HPI:  Yasmeen Palm is a 68 y.o. female that (in part)  has a past medical history of Anticoagulant long-term use; Arthritis; Asthma; CHF (congestive heart failure); COPD (chronic obstructive pulmonary disease); Coronary artery disease; Depression; Diabetes mellitus; GERD (gastroesophageal reflux disease); and Hypertension.  has a past surgical history that includes Abdominal surgery; Cardiac surgery; and Hernia repair. Presents to [facility] Emergency Department complaining of chest pain .  She reports compliance with her home medication regimen, including Xarelto.     Description of symptoms    Location:  Substernal  Onset:  Acute onset 2 days ago  Character:  The patient remained; moderate severity  Frequency:  Daily  Duration:  each episode lasts several minutes at a time  Associated Symptoms:  Diaphoresis, shortness of breath, weakness and fatigue  Radiation:  Recent the chest  Exacerbating factors:  Worse on exertion  Relieving factors:  Minimal relief with supplemental oxygen       In the emergency department routine laboratory studies, chest x-ray, EKG, cardiac enzymes were obtained.  EKG findings were concerning the case was discussed with cardiologist, Dr. Pulido.  It was determined that her EKG was not consistent with STEMI and she has been chest pain-free since arrival.  She had been given Lovenox, aspirin, and plan was to continue trending troponins and monitor closely on telemetry.    Hospital medicine has been asked to admit for further evaluation and treatment.     [facility]  Course:  The patient was admitted to the hospital originally on 6/24/18 for a CC of CP. She was sent to the ICU with acute hypercapnic respiratory failure the same day on Bi-pap and quickly improved and was sent to the floor on 6/25.  Cards was consulted for non-stemi with minimal trop elevation for plans for a LHC on 6/26. However, the patient was sent back to the ICU this morning with hypercapnic respiratory failure with a C02 of > 100 and was intubated. Pulmonary was consulted.     Interval History: No new issues. Comfortable on vent.     Review of Systems   Unable to perform ROS: Intubated     Objective:     Vital Signs (Most Recent):  Temp: 97.4 °F (36.3 °C) (06/26/18 0715)  Pulse: (!) 111 (06/26/18 0854)  Resp: (!) 30 (06/26/18 0854)  BP: (!) 85/46 (06/26/18 0854)  SpO2: 100 % (06/26/18 0841) Vital Signs (24h Range):  Temp:  [97.4 °F (36.3 °C)-98.9 °F (37.2 °C)] 97.4 °F (36.3 °C)  Pulse:  [] 111  Resp:  [16-33] 30  SpO2:  [80 %-100 %] 100 %  BP: ()/(46-77) 85/46     Weight: 76.1 kg (167 lb 12.3 oz)  Body mass index is 26.28 kg/m².    Intake/Output Summary (Last 24 hours) at 06/26/18 1026  Last data filed at 06/26/18 0600   Gross per 24 hour   Intake            817.5 ml   Output              335 ml   Net            482.5 ml      Physical Exam   Constitutional: She appears well-developed and well-nourished.   HENT:   Head: Normocephalic and atraumatic.   Cardiovascular: Regular rhythm.    Pulmonary/Chest: Effort normal and breath sounds normal. She has no wheezes. She has no rales.   Abdominal: Soft. Bowel sounds are normal. She exhibits no distension. There is no guarding.       Significant Labs:   CBC:   Recent Labs  Lab 06/24/18  1413 06/26/18  0821   WBC 11.29 15.50*   HGB 10.6* 11.0*   HCT 35.5* 39.2   * 452*     CMP:   Recent Labs  Lab 06/24/18  1413 06/25/18  0506 06/26/18  0821    144 142   K 3.9 3.8 4.4    98 99   CO2 29 29 33*   * 167* 146*   BUN 14 16 27*    CREATININE 0.8 0.9 0.9   CALCIUM 10.2 10.1 9.8   PROT 7.8 7.8  --    ALBUMIN 3.6 3.5  --    BILITOT 0.4 0.3  --    ALKPHOS 85 80  --    AST 20 23  --    ALT 7* 10  --    ANIONGAP 13 17* 10   EGFRNONAA >60 >60 >60       Significant Imaging:     Assessment/Plan:      * Acute exacerbation of chronic obstructive pulmonary disease (COPD)    C02 > 100 this am. Intubated. Pulmonary following. Should be quick wean from vent. Will likely need home bi-pap on discharge.  (trilogy).           Non-STEMI (non-ST elevated myocardial infarction)    As noted.  LHC on 6/27 if stable from resp. Stand point.     On full dose Lovenox for now.       Acute hypercapnic respiratory failure    As above.           Neuropathy    No acute issues           Chronic anticoagulation    Patient on Xarelto for PAF. This has been held for LHC that may occur on 6/27.            History of deep vein thrombosis    On NOAC          History of pulmonary embolism    On NOAC.        Type 2 diabetes mellitus, controlled    Complications of peripheral neuropathy. Well controlled.           COPD (chronic obstructive pulmonary disease)    As discussed           Coronary artery disease involving native coronary artery of native heart with angina pectoris    [facility] hopefully on 6/27.  Continue to follow cards recs.             VTE Risk Mitigation         Ordered     enoxaparin injection 80 mg  Every 12 hours (non-standard times)      06/26/18 0815          Critical care time spent on the evaluation and treatment of severe organ dysfunction, review of pertinent labs and imaging studies, discussions with consulting providers and discussions with patient/family:45 minutes.    Tremayne Morin MD  Department of Hospital Medicine   [facility]

## 2018-06-26 NOTE — HOSPITAL COURSE
6/25/18: adm with resp insuff and NSTEMI with plans for cath 6/26 6/26/18: transferred back to ICU with hypercarb resp failure  6/27/18: cath with nonobst CAD  7/13/18: returned to ICU with AF/RVR and ?sepsis  7/17/18: converted to SR    Interval Hx: pt seen in ICU. Pt currently without complaints of CP/SOB.  Feeling better.  Case d/w RN    Tele: SR 70s (pers rev)

## 2018-06-26 NOTE — PROGRESS NOTES
Pt very lethargic. Oxygen level 85% on 5 lpm. Repositioned and RT called. Placed on non-rebreather and O2 increased to 99%. Switch to Venti-mask 10 lpm, O2 still remains 79-81%. Stat ABG's ordered by Dr. Gentile after explaining pt's status. Pt remains AAO X4. Dr. Gentile also ordered Bipap cont. After RT reported ABG results. Orders initiated immediately but still no improvement with her O2 sat. Order put in to transfer pt to ICU. Reported to Ramirez HOBBS.  at bedside.

## 2018-06-26 NOTE — HOSPITAL COURSE
"Ms. Palm was admitted to the hospital originally on 6/24/18 for chest pain. She was later sent to the ICU with acute hypercapnic respiratory failure the same day on BIPAP.  She quickly improved and was sent to the floor on 6/25.  Cards was consulted for NSTEMI with noted troponin increase and was planning on LHC on 6/26. However, the patient was sent back to the ICU on 6/26 with hypercapnic respiratory failure with a CO2 of > 100 and was intubated. Pulmonary was consulted. Pt clinically improved from respiratory standpoint and was extubated on 6/27 to NC, but she did complain of chest discomfort. Cardiology was notified and patient was taken for LHC on 6/27 which was only remarkable for non-obstructive CAD and did not require intervention. Pt was given IVF post procedure and the next day developed increased SOB and required BIPAP. Pt was treated with IV lasix with good response with much improved respiratory status after output of 1L. Pt also treated for COPD exacerbation with IV steroids, NEBS and doxycycline. ECHO performed on 6/25/2018 remarkable for EF=50-55% + grade 2 diastolic dysfunction. Pt diuresed and changed to maintenance PO lasix regimen. She as started on BIPAP due to increased pCO2 on ABG and lethargy. Pt will need BIPAP/home ventilator for use at home and was approved for the device prior to discharge.    On 7/13, she became febrile, lethargic, hypercapnic, with SOB and was in AFib with RVR. Transferred to ICU again, on BiPAP and amiodarone infusion. Clinically improved over the course of 3 days with IV diuresis 40 mg daily, solumedrol 40 mg TID, BiPAP and empiric Zosyn. Other than UA consistent with UTI, CT chest/abdomen/pelvis with IV showed no other potential source for infection. There was mention of a pneumocele with "debri" however unlikely this to be cause for sepsis and invasive treatment would be too high risk (discussed with pulmonologist). She was weaned to low flow NC to remain in place " continuously. AFib with RVR was extremely difficult to control despite amiodarone infusion and treatment of sepsis. Metoprolol added and uptitrated, and currently on 100 mg BID with good results. She has remained without further event on telemetry. Her H&H did drop slowly throughout admission with no apparent bleed source other than frequent lab monitoring. She was transfused 1 unit RBCs on 7/21. She continued to be stable. BP meds adjusted. Prednisone taper completed prior to discharge. Due to her long hospitalization, PT/OT consulted and recommended SNF. Pt was finally discharged to SNF with orders for patien to use BIPAP QHS and PRN.

## 2018-06-27 LAB
ALLENS TEST: ABNORMAL
ANION GAP SERPL CALC-SCNC: 13 MMOL/L
BASOPHILS # BLD AUTO: 0.01 K/UL
BASOPHILS NFR BLD: 0.1 %
BUN SERPL-MCNC: 38 MG/DL
CALCIUM SERPL-MCNC: 8.9 MG/DL
CHLORIDE SERPL-SCNC: 104 MMOL/L
CO2 SERPL-SCNC: 27 MMOL/L
CREAT SERPL-MCNC: 1.1 MG/DL
DELSYS: ABNORMAL
DIFFERENTIAL METHOD: ABNORMAL
EOSINOPHIL # BLD AUTO: 0 K/UL
EOSINOPHIL NFR BLD: 0 %
ERYTHROCYTE [DISTWIDTH] IN BLOOD BY AUTOMATED COUNT: 17.9 %
ERYTHROCYTE [SEDIMENTATION RATE] IN BLOOD BY WESTERGREN METHOD: 16 MM/H
EST. GFR  (AFRICAN AMERICAN): >60 ML/MIN/1.73 M^2
EST. GFR  (NON AFRICAN AMERICAN): 52 ML/MIN/1.73 M^2
FIO2: 45
GLUCOSE SERPL-MCNC: 190 MG/DL
HCO3 UR-SCNC: 28.8 MMOL/L (ref 24–28)
HCT VFR BLD AUTO: 31.9 %
HGB BLD-MCNC: 9.5 G/DL
INR PPP: 1.1
LYMPHOCYTES # BLD AUTO: 0.9 K/UL
LYMPHOCYTES NFR BLD: 7.5 %
MCH RBC QN AUTO: 25.1 PG
MCHC RBC AUTO-ENTMCNC: 29.8 G/DL
MCV RBC AUTO: 84 FL
MIN VOL: 8
MODE: ABNORMAL
MONOCYTES # BLD AUTO: 0.3 K/UL
MONOCYTES NFR BLD: 2.2 %
NEUTROPHILS # BLD AUTO: 10.9 K/UL
NEUTROPHILS NFR BLD: 90.2 %
PCO2 BLDA: 59.4 MMHG (ref 35–45)
PEEP: 5
PH SMN: 7.29 [PH] (ref 7.35–7.45)
PIP: 32
PLATELET # BLD AUTO: 391 K/UL
PMV BLD AUTO: 9.4 FL
PO2 BLDA: 157 MMHG (ref 80–100)
POC BE: 1 MMOL/L
POC SATURATED O2: 99 % (ref 95–100)
POC TCO2: 31 MMOL/L (ref 23–27)
POCT GLUCOSE: 174 MG/DL (ref 70–110)
POCT GLUCOSE: 180 MG/DL (ref 70–110)
POCT GLUCOSE: 191 MG/DL (ref 70–110)
POCT GLUCOSE: 213 MG/DL (ref 70–110)
POCT GLUCOSE: 217 MG/DL (ref 70–110)
POTASSIUM SERPL-SCNC: 4 MMOL/L
PROTHROMBIN TIME: 11.6 SEC
RBC # BLD AUTO: 3.79 M/UL
SAMPLE: ABNORMAL
SITE: ABNORMAL
SODIUM SERPL-SCNC: 144 MMOL/L
SP02: 100
VT: 420
WBC # BLD AUTO: 12.11 K/UL

## 2018-06-27 PROCEDURE — 25000003 PHARM REV CODE 250: Performed by: HOSPITALIST

## 2018-06-27 PROCEDURE — 36415 COLL VENOUS BLD VENIPUNCTURE: CPT

## 2018-06-27 PROCEDURE — 25000242 PHARM REV CODE 250 ALT 637 W/ HCPCS: Performed by: HOSPITALIST

## 2018-06-27 PROCEDURE — 99152 MOD SED SAME PHYS/QHP 5/>YRS: CPT

## 2018-06-27 PROCEDURE — C9113 INJ PANTOPRAZOLE SODIUM, VIA: HCPCS | Performed by: INTERNAL MEDICINE

## 2018-06-27 PROCEDURE — 63600175 PHARM REV CODE 636 W HCPCS: Performed by: INTERNAL MEDICINE

## 2018-06-27 PROCEDURE — 94660 CPAP INITIATION&MGMT: CPT

## 2018-06-27 PROCEDURE — 99900035 HC TECH TIME PER 15 MIN (STAT)

## 2018-06-27 PROCEDURE — 25500020 PHARM REV CODE 255

## 2018-06-27 PROCEDURE — 85610 PROTHROMBIN TIME: CPT

## 2018-06-27 PROCEDURE — 99291 CRITICAL CARE FIRST HOUR: CPT | Mod: ,,, | Performed by: INTERNAL MEDICINE

## 2018-06-27 PROCEDURE — B2111ZZ FLUOROSCOPY OF MULTIPLE CORONARY ARTERIES USING LOW OSMOLAR CONTRAST: ICD-10-PCS | Performed by: INTERNAL MEDICINE

## 2018-06-27 PROCEDURE — 94761 N-INVAS EAR/PLS OXIMETRY MLT: CPT

## 2018-06-27 PROCEDURE — 63600175 PHARM REV CODE 636 W HCPCS

## 2018-06-27 PROCEDURE — 25000003 PHARM REV CODE 250: Performed by: INTERNAL MEDICINE

## 2018-06-27 PROCEDURE — 25000003 PHARM REV CODE 250: Performed by: EMERGENCY MEDICINE

## 2018-06-27 PROCEDURE — 93005 ELECTROCARDIOGRAM TRACING: CPT

## 2018-06-27 PROCEDURE — 25000003 PHARM REV CODE 250

## 2018-06-27 PROCEDURE — 94640 AIRWAY INHALATION TREATMENT: CPT

## 2018-06-27 PROCEDURE — 93458 L HRT ARTERY/VENTRICLE ANGIO: CPT | Mod: 26,,, | Performed by: INTERNAL MEDICINE

## 2018-06-27 PROCEDURE — 20000000 HC ICU ROOM

## 2018-06-27 PROCEDURE — 99900017 HC EXTUBATION W/PARAMETERS (STAT)

## 2018-06-27 PROCEDURE — 94003 VENT MGMT INPAT SUBQ DAY: CPT

## 2018-06-27 PROCEDURE — B3101ZZ FLUOROSCOPY OF THORACIC AORTA USING LOW OSMOLAR CONTRAST: ICD-10-PCS | Performed by: INTERNAL MEDICINE

## 2018-06-27 PROCEDURE — 85025 COMPLETE CBC W/AUTO DIFF WBC: CPT

## 2018-06-27 PROCEDURE — 99152 MOD SED SAME PHYS/QHP 5/>YRS: CPT | Mod: ,,, | Performed by: INTERNAL MEDICINE

## 2018-06-27 PROCEDURE — 80048 BASIC METABOLIC PNL TOTAL CA: CPT

## 2018-06-27 PROCEDURE — C1894 INTRO/SHEATH, NON-LASER: HCPCS

## 2018-06-27 PROCEDURE — 93458 L HRT ARTERY/VENTRICLE ANGIO: CPT

## 2018-06-27 PROCEDURE — 4A023N7 MEASUREMENT OF CARDIAC SAMPLING AND PRESSURE, LEFT HEART, PERCUTANEOUS APPROACH: ICD-10-PCS | Performed by: INTERNAL MEDICINE

## 2018-06-27 PROCEDURE — 82803 BLOOD GASES ANY COMBINATION: CPT

## 2018-06-27 PROCEDURE — 99291 CRITICAL CARE FIRST HOUR: CPT | Mod: 25,,, | Performed by: INTERNAL MEDICINE

## 2018-06-27 PROCEDURE — 27000221 HC OXYGEN, UP TO 24 HOURS

## 2018-06-27 PROCEDURE — 36600 WITHDRAWAL OF ARTERIAL BLOOD: CPT

## 2018-06-27 RX ORDER — ENOXAPARIN SODIUM 100 MG/ML
1 INJECTION SUBCUTANEOUS
Status: DISCONTINUED | OUTPATIENT
Start: 2018-06-27 | End: 2018-06-27

## 2018-06-27 RX ORDER — NITROGLYCERIN 20 MG/100ML
10 INJECTION INTRAVENOUS CONTINUOUS
Status: DISCONTINUED | OUTPATIENT
Start: 2018-06-27 | End: 2018-06-29

## 2018-06-27 RX ORDER — METOPROLOL TARTRATE 1 MG/ML
INJECTION, SOLUTION INTRAVENOUS
Status: COMPLETED
Start: 2018-06-27 | End: 2018-06-27

## 2018-06-27 RX ORDER — ACETAMINOPHEN 325 MG/1
650 TABLET ORAL EVERY 4 HOURS PRN
Status: DISCONTINUED | OUTPATIENT
Start: 2018-06-27 | End: 2018-06-29

## 2018-06-27 RX ORDER — ONDANSETRON 2 MG/ML
4 INJECTION INTRAMUSCULAR; INTRAVENOUS EVERY 12 HOURS PRN
Status: DISCONTINUED | OUTPATIENT
Start: 2018-06-27 | End: 2018-06-29

## 2018-06-27 RX ORDER — ATROPINE SULFATE 0.1 MG/ML
0.5 INJECTION INTRAVENOUS
Status: DISCONTINUED | OUTPATIENT
Start: 2018-06-27 | End: 2018-06-29

## 2018-06-27 RX ORDER — SODIUM CHLORIDE 9 MG/ML
INJECTION, SOLUTION INTRAVENOUS CONTINUOUS
Status: DISCONTINUED | OUTPATIENT
Start: 2018-06-27 | End: 2018-06-28

## 2018-06-27 RX ORDER — METOPROLOL TARTRATE 1 MG/ML
5 INJECTION, SOLUTION INTRAVENOUS
Status: COMPLETED | OUTPATIENT
Start: 2018-06-27 | End: 2018-06-27

## 2018-06-27 RX ORDER — HYDROCODONE BITARTRATE AND ACETAMINOPHEN 5; 325 MG/1; MG/1
1 TABLET ORAL EVERY 4 HOURS PRN
Status: DISCONTINUED | OUTPATIENT
Start: 2018-06-27 | End: 2018-06-29

## 2018-06-27 RX ORDER — MORPHINE SULFATE 4 MG/ML
2 INJECTION, SOLUTION INTRAMUSCULAR; INTRAVENOUS EVERY 10 MIN PRN
Status: DISCONTINUED | OUTPATIENT
Start: 2018-06-27 | End: 2018-06-29

## 2018-06-27 RX ORDER — LISINOPRIL 5 MG/1
10 TABLET ORAL DAILY
Status: DISCONTINUED | OUTPATIENT
Start: 2018-06-27 | End: 2018-07-13

## 2018-06-27 RX ADMIN — IPRATROPIUM BROMIDE AND ALBUTEROL SULFATE 3 ML: .5; 2.5 SOLUTION RESPIRATORY (INHALATION) at 08:06

## 2018-06-27 RX ADMIN — DOXYCYCLINE HYCLATE 100 MG: 100 TABLET, COATED ORAL at 08:06

## 2018-06-27 RX ADMIN — NITROGLYCERIN 10 MCG/MIN: 20 INJECTION INTRAVENOUS at 09:06

## 2018-06-27 RX ADMIN — SODIUM CHLORIDE: 0.9 INJECTION, SOLUTION INTRAVENOUS at 01:06

## 2018-06-27 RX ADMIN — IPRATROPIUM BROMIDE AND ALBUTEROL SULFATE 3 ML: .5; 2.5 SOLUTION RESPIRATORY (INHALATION) at 02:06

## 2018-06-27 RX ADMIN — GABAPENTIN 300 MG: 300 CAPSULE ORAL at 09:06

## 2018-06-27 RX ADMIN — CLOPIDOGREL BISULFATE 75 MG: 75 TABLET ORAL at 08:06

## 2018-06-27 RX ADMIN — INSULIN ASPART 2 UNITS: 100 INJECTION, SOLUTION INTRAVENOUS; SUBCUTANEOUS at 01:06

## 2018-06-27 RX ADMIN — DOXYCYCLINE HYCLATE 100 MG: 100 TABLET, COATED ORAL at 12:06

## 2018-06-27 RX ADMIN — ENOXAPARIN SODIUM 80 MG: 100 INJECTION SUBCUTANEOUS at 12:06

## 2018-06-27 RX ADMIN — SODIUM CHLORIDE: 0.9 INJECTION, SOLUTION INTRAVENOUS at 12:06

## 2018-06-27 RX ADMIN — Medication 1 EACH: at 06:06

## 2018-06-27 RX ADMIN — ACETAMINOPHEN 650 MG: 325 TABLET, FILM COATED ORAL at 03:06

## 2018-06-27 RX ADMIN — NOREPINEPHRINE-DEXTROSE IV SOLUTION 4 MG/250ML-5% 0.07 MCG/KG/MIN: 4-5/250 SOLUTION at 05:06

## 2018-06-27 RX ADMIN — RIVAROXABAN 20 MG: 20 TABLET, FILM COATED ORAL at 06:06

## 2018-06-27 RX ADMIN — ATORVASTATIN CALCIUM 80 MG: 40 TABLET, FILM COATED ORAL at 09:06

## 2018-06-27 RX ADMIN — METHYLPREDNISOLONE SODIUM SUCCINATE 40 MG: 40 INJECTION, POWDER, FOR SOLUTION INTRAMUSCULAR; INTRAVENOUS at 02:06

## 2018-06-27 RX ADMIN — METOPROLOL TARTRATE 5 MG: 5 INJECTION, SOLUTION INTRAVENOUS at 10:06

## 2018-06-27 RX ADMIN — SODIUM CHLORIDE: 0.9 INJECTION, SOLUTION INTRAVENOUS at 05:06

## 2018-06-27 RX ADMIN — METOPROLOL TARTRATE 5 MG: 5 INJECTION, SOLUTION INTRAVENOUS at 09:06

## 2018-06-27 RX ADMIN — INSULIN ASPART 2 UNITS: 100 INJECTION, SOLUTION INTRAVENOUS; SUBCUTANEOUS at 06:06

## 2018-06-27 RX ADMIN — SOTALOL HYDROCHLORIDE 80 MG: 80 TABLET ORAL at 09:06

## 2018-06-27 RX ADMIN — INSULIN DETEMIR 15 UNITS: 100 INJECTION, SOLUTION SUBCUTANEOUS at 09:06

## 2018-06-27 RX ADMIN — ASPIRIN 81 MG CHEWABLE TABLET 81 MG: 81 TABLET CHEWABLE at 08:06

## 2018-06-27 RX ADMIN — GUAIFENESIN 600 MG: 600 TABLET, EXTENDED RELEASE ORAL at 09:06

## 2018-06-27 RX ADMIN — METHYLPREDNISOLONE SODIUM SUCCINATE 40 MG: 40 INJECTION, POWDER, FOR SOLUTION INTRAMUSCULAR; INTRAVENOUS at 09:06

## 2018-06-27 RX ADMIN — DEXTROSE 40 MG: 50 INJECTION, SOLUTION INTRAVENOUS at 09:06

## 2018-06-27 RX ADMIN — IPRATROPIUM BROMIDE AND ALBUTEROL SULFATE 3 ML: .5; 2.5 SOLUTION RESPIRATORY (INHALATION) at 07:06

## 2018-06-27 RX ADMIN — DOXYCYCLINE HYCLATE 100 MG: 100 TABLET, COATED ORAL at 09:06

## 2018-06-27 RX ADMIN — IPRATROPIUM BROMIDE AND ALBUTEROL SULFATE 3 ML: .5; 2.5 SOLUTION RESPIRATORY (INHALATION) at 01:06

## 2018-06-27 RX ADMIN — METHYLPREDNISOLONE SODIUM SUCCINATE 80 MG: 40 INJECTION, POWDER, FOR SOLUTION INTRAMUSCULAR; INTRAVENOUS at 05:06

## 2018-06-27 NOTE — CARE UPDATE
Patient received on ventilator per ordered settings. Alarms on and operating. Ambu bag and mask at [facility]. ETT intact and secured.

## 2018-06-27 NOTE — NURSING
0845-Pt extubated to 3 L NC, tolerated very well, able to state name. Hoarse post extubation but oriented to self, place and situation.     0900-Post extubation pt tachycardic 's. Pt complained of 6/10 chest pain. Dr. Ely on unit and notified. Pt monitored, reported mild improvement to 3/10  0910 Dr. Pulido called to notify of HR and chest pain, new orders noted for ivp metoprolol and will start pt on nitro gtt  0922-EKG done, shown to Dr. Ely on unit.    Dr. Pulido came to bedside, evaluated pt and recent EKG. Pt will go to cath lab this afternoon. Pt now reports she is chest pain free.

## 2018-06-27 NOTE — PROGRESS NOTES
[facility]  Cardiology  Progress Note    Patient Name: Yasmeen Palm  MRN: [id number]  Admission Date: 6/24/2018  Hospital Length of Stay: 3 days  Code Status: Full Code   Attending Physician: Meredith Olguin MD   Primary Care Physician: Angel Orourke Jr, MD  Expected Discharge Date:   Principal Problem:Acute exacerbation of chronic obstructive pulmonary disease (COPD)    Subjective:     Hospital Course:   6/25/18: adm with resp insuff and NSTEMI with plans for cath 6/26 6/26/18: transferred back to ICU with hypercarb resp failure    Interval Hx: pt seen in ICU, case d/w RN and sister at bedside.  Intub and sedated.  Bradycardia and reported diaphoresis with CP noted overnight.  Still hypercarbic.  Pt awake and denies current sxs.    Tele: SR, occ junct rhythm (pers rev)      Past Medical History:   Diagnosis Date    Anticoagulant long-term use     Arthritis     Asthma     CHF (congestive heart failure)     COPD (chronic obstructive pulmonary disease)     Coronary artery disease     Depression     Diabetes mellitus     GERD (gastroesophageal reflux disease)     Hypertension        Past Surgical History:   Procedure Laterality Date    ABDOMINAL SURGERY      CARDIAC SURGERY      HERNIA REPAIR         Review of patient's allergies indicates:  No Known Allergies    No current facility-administered medications on file prior to encounter.      Current Outpatient Prescriptions on File Prior to Encounter   Medication Sig    acetaminophen (TYLENOL) 500 MG tablet Take 1 tablet (500 mg total) by mouth every 8 (eight) hours as needed.    aspirin (ECOTRIN) 81 MG EC tablet Take 81 mg by mouth once daily.    cloNIDine (CATAPRES) 0.1 MG tablet Take 2 tablets (0.2 mg total) by mouth 3 (three) times daily.    diltiaZEM (CARDIZEM) 120 MG tablet Take 1 tablet (120 mg total) by mouth every 8 (eight) hours.    furosemide (LASIX) 40 MG tablet Take 40 mg by mouth once daily.     gabapentin (NEURONTIN)  300 MG capsule Take 300 mg by mouth 3 (three) times daily.    hydrALAZINE (APRESOLINE) 50 MG tablet Take 1 tablet (50 mg total) by mouth every 8 (eight) hours. (Patient taking differently: Take 50 mg by mouth every 12 (twelve) hours. )    levalbuterol (XOPENEX HFA) 45 mcg/actuation inhaler Inhale 2 puffs into the lungs every 4 (four) hours as needed for Wheezing or Shortness of Breath. Rescue    lisinopril (PRINIVIL,ZESTRIL) 40 MG tablet Take 1 tablet (40 mg total) by mouth once daily. Do not take this medication if blood pressure is below 130/80 mmHg and/or feeling dizzy after taking all other blood pressure meds    metformin (GLUCOPHAGE) 500 MG tablet Take 500 mg by mouth 2 (two) times daily with meals.    naproxen (NAPROSYN) 375 MG tablet Take 375 mg by mouth every 12 (twelve) hours as needed.    rivaroxaban (XARELTO) 20 mg Tab Take 20 mg by mouth daily with dinner or evening meal.    sotalol (BETAPACE) 80 MG tablet Take 80 mg by mouth 2 (two) times daily.    tramadol (ULTRAM) 50 mg tablet Take 1 tablet (50 mg total) by mouth every 6 (six) hours as needed for Pain.    UMECLIDINIUM BRM/VILANTEROL TR (ANORO ELLIPTA INHL) Inhale into the lungs daily as needed.     Family History     Problem Relation (Age of Onset)    Diabetes Father    Heart disease Mother    Hypertension Mother        Social History Main Topics    Smoking status: Current Some Day Smoker     Packs/day: 0.25     Years: 55.00     Types: Cigarettes    Smokeless tobacco: Never Used    Alcohol use 0.6 oz/week     1 Glasses of wine per week    Drug use: No    Sexual activity: No     Review of Systems   Unable to perform ROS: intubated     Objective:     Vital Signs (Most Recent):  Temp: 99 °F (37.2 °C) (06/27/18 0315)  Pulse: 95 (06/27/18 0500)  Resp: 19 (06/27/18 0500)  BP: (!) 163/68 (06/27/18 0500)  SpO2: 100 % (06/27/18 0500) Vital Signs (24h Range):  Temp:  [97.4 °F (36.3 °C)-100.3 °F (37.9 °C)] 99 °F (37.2 °C)  Pulse:  []  95  Resp:  [2-46] 19  SpO2:  [82 %-100 %] 100 %  BP: ()/(34-77) 163/68     Weight: 78 kg (171 lb 15.3 oz)  Body mass index is 26.93 kg/m².    SpO2: 100 %  O2 Device (Oxygen Therapy): ventilator      Intake/Output Summary (Last 24 hours) at 06/27/18 0613  Last data filed at 06/27/18 0435   Gross per 24 hour   Intake          3380.34 ml   Output              388 ml   Net          2992.34 ml       Lines/Drains/Airways     Central Venous Catheter Line                 Percutaneous Central Line Insertion/Assessment - triple lumen  06/26/18 1120 right internal jugular less than 1 day          Drain                 Urethral Catheter 06/26/18 1100 Latex 16 Fr. less than 1 day          Airway                 Airway - Non-Surgical 06/26/18 0830 Endotracheal Tube less than 1 day          Peripheral Intravenous Line                 Peripheral IV - Single Lumen 06/24/18 Left Forearm 3 days         Peripheral IV - Single Lumen 06/24/18 Right Antecubital 3 days                Physical Exam   Constitutional: She appears well-developed and well-nourished. No distress.   HENT:   Head: Normocephalic and atraumatic.   Eyes: Conjunctivae and EOM are normal. Pupils are equal, round, and reactive to light. No scleral icterus.   Neck: Normal range of motion. Neck supple. No JVD present. No tracheal deviation present. No thyromegaly present.   Cardiovascular: Normal rate, regular rhythm, S1 normal and S2 normal.  Exam reveals no gallop and no friction rub.    No murmur heard.  Pulmonary/Chest: Effort normal. No respiratory distress.   intub   Abdominal: Soft. She exhibits no distension.   Musculoskeletal: Normal range of motion. She exhibits no edema.   Neurological: She is alert. No cranial nerve deficit.   Skin: Skin is warm and dry. She is not diaphoretic.   Psychiatric:   UTO       Current Medications:   albuterol-ipratropium  3 mL Nebulization Q6H    aspirin  81 mg Oral Daily    atorvastatin  80 mg Oral QHS    clopidogrel  75  mg Oral Daily    doxycycline  100 mg Per OG tube Q12H    gabapentin  300 mg Oral TID    guaiFENesin  600 mg Oral BID    insulin detemir U-100  15 Units Subcutaneous Daily    methylPREDNISolone sodium succinate  80 mg Intravenous Q8H    pantoprazole 40 mg in dextrose 5 % 100 mL infusion (ready to mix system)  40 mg Intravenous Daily    sotalol  80 mg Oral BID      sodium chloride 0.9% 150 mL/hr at 06/27/18 0557    dexmedetomidine (PRECEDEX) infusion Stopped (06/26/18 1116)    fentanyl 5 mL/hr at 06/27/18 0435    norepinephrine bitartrate-D5W 0.065 mcg/kg/min (06/27/18 0556)     acetaminophen, bisacodyl, dextrose 50%, glucagon (human recombinant), insulin aspart U-100, sodium chloride 0.9%    Laboratory:  CBC:    Recent Labs  Lab 12/08/17  0545 12/09/17  0423 02/12/18  1422 06/24/18  1413 06/26/18  0821 06/26/18  1941   WHITE BLOOD CELL COUNT 13.41 H 14.83 H 11.16 11.29 15.50 H  --    HEMOGLOBIN 12.5 10.8 L 11.7 L 10.6 L 11.0 L 9.6 L   HEMATOCRIT 43.0 38.3 40.1 35.5 L 39.2  --    PLATELETS 602 H 523 H 396 H 468 H 452 H  --        CHEMISTRIES:    Recent Labs  Lab 08/19/17  1430  02/12/18  1422 06/24/18  1413 06/25/18  0506 06/26/18  0821 06/26/18  1941 06/27/18  0220   GLUCOSE 189 H  < > 153 H 119 H 167 H 146 H 190 H 190 H   SODIUM 145  < > 142 143 144 142 144 144   POTASSIUM 3.9  < > 3.8 3.9 3.8 4.4 4.1 4.0   BUN BLD 16  < > 11 14 16 27 H 34 H 38 H   CREATININE 0.8  < > 0.7 0.8 0.9 0.9 1.3 1.1   EGFR IF  >60  < > >60 >60 >60 >60 49 A >60   EGFR IF NON- >60  < > >60 >60 >60 >60 43 A 52 A   CALCIUM 10.2  < > 9.8 10.2 10.1 9.8 9.0 8.9   MAGNESIUM 1.4 L  --  1.5 L  --  1.7 2.0 1.6  --    < > = values in this interval not displayed.    CARDIAC BIOMARKERS:    Recent Labs  Lab 12/13/15  1932  02/12/18  1422 02/12/18  1808 06/24/18  1413 06/24/18  2153 06/25/18  0506   CPK 33  --   --   --   --   --   --    CPK MB 1.0  --   --   --   --   --   --    TROPONIN I <0.006  < > 0.021 0.021  0.896 H 0.870 H 0.667 H   < > = values in this interval not displayed.    COAGS:    Recent Labs  Lab 10/07/16  0523 04/10/17  1129 08/12/17  0036 11/02/17 2211 12/08/17  0545   INR 1.7 H 1.1 1.2 1.0 1.0       LIPIDS/LFTS:    Recent Labs  Lab 12/15/15  0540  08/12/17  1037  11/02/17 2211 12/08/17  0545 02/12/18  1422 06/24/18  1413 06/25/18  0506   CHOLESTEROL 170  --  243 H  --   --   --   --   --   --    TRIGLYCERIDES 94  --  93  --   --   --   --   --   --    HDL 33 L  --  43  --   --   --   --   --   --    LDL CHOLESTEROL 118.2  --  181.4 H  --   --   --   --   --   --    NON-HDL CHOLESTEROL 137  --  200  --   --   --   --   --   --    AST  --   < >  --   < > 13 26 13 20 23   ALT  --   < >  --   < > 7 L 8 L 7 L 7 L 10   < > = values in this interval not displayed.    BNP:    Recent Labs  Lab 08/19/17  1430 11/02/17 2211 12/08/17  0545 02/12/18  1422 06/24/18  1413    H 334 H 354 H 133 H 2,128 H       TSH:    Recent Labs  Lab 12/13/15  1932 06/24/18  1413   TSH 0.487 0.754       Free T4:    Recent Labs  Lab 06/24/18  1413   FREE T4 1.09     ABG    Recent Labs  Lab 06/27/18  0448   PH 7.294*   PO2 157*   PCO2 59.4*   HCO3 28.8*   BE 1         Diagnostic Results:  ECG (personally reviewed tracings):  6/24/18 1403 , LAD/PWRP    Chest X-Ray (personally reviewed image(s)): 6/25/18 NAD    CTA C/A/P 6/24/18  1. No evidence of aortic dissection.  2. Extensive calcified and noncalcified plaque seen throughout the aorta with small multifocal outpouchings seen involving the descending thoracic aorta suggestive for small penetrating ulcers.  Similar findings were seen on previous CT from August 2017.  3. 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  4. No evidence of PE.  5. Decreased size area of nodularity with scarring seen within the left lower lobe with resolution of previously visualized cavitation.  No evidence of new lung consolidation.  6.  Mild to moderate centrilobular emphysema.    Echo: 6/25/18 (images pers rev)    1 - Low normal to mildly depressed left ventricular systolic function (EF 50-55%).  Distal LAD territory WMA.     2 - Impaired LV relaxation, elevated LAP (grade 2 diastolic dysfunction).     3 - Concentric hypertrophy.     4 - Trivial to mild tricuspid regurgitation.     5 - Pulmonary hypertension. The estimated PA systolic pressure is 52 mmHg.     LLE venous US 1/24/17  Interval decrease in overall thrombus burden of the left lower extremity, although there is persistent deep venous thrombus present within the left femoral and popliteal veins.    Stress Test: L MPI 12/15/15  Nuclear Quantitative Functional Analysis:   LVEF: 66 %  Impression: NORMAL MYOCARDIAL PERFUSION  1. The perfusion scan is free of evidence for myocardial ischemia or injury.   2. Resting wall motion is physiologic.   3. Resting LV function is normal.   4. The ventricular volumes are normal at rest and stress.   5. The extracardiac distribution of radioactivity is normal.   6. There was no previous study available to compare.      Assessment and Plan:     * Acute exacerbation of chronic obstructive pulmonary disease (COPD)    Per IM/pulm  Now with hypercarb resp failure, intubated        Non-STEMI (non-ST elevated myocardial infarction)    CP/elev trop c/w NSTEMI  Pt reports prior hx of CAD, but no recent isch eval or cath.  EF normal with LAD WMA (echo 6/25/18)  Currently with hypercarb resp failure, will need to delay cath pending improvement in resp status.  Cont ASA/Plavix (loaded 6/24/18)  Hold Xarelto (last dose 6/23/18)  Enox 1mg/kg bid x2 more doses to cover today  Cath in am 6/28/18 if resp issues resolve  Cont atorva 80mg qhs  Check lipids/LFT 3 months (late Sept 2018)            PAF (paroxysmal atrial fibrillation)    Currently in SR  On sotalol, continue  Xarelto held for cath, will resume post cath  If PCI, will also need Plavix        Chronic  anticoagulation    For hx of PAF and DVT/PE  Xarelto on hold for cath  Enox on board        History of deep vein thrombosis    As above        Type 2 diabetes mellitus, controlled    Per IM            VTE Risk Mitigation     None        Critical care time 30min    Laureano Pulido MD  Cardiology  [facility]    Addendum 10am:  Pt extubated but developed tachycardia and chest discomfort.  CP resolved with IV metoprolol.  resp status stable.  Will plan cath today.  Keep NPO and stop enox.  Cont ASA/Plavix.

## 2018-06-27 NOTE — PLAN OF CARE
Problem: Patient Care Overview  Goal: Individualization & Mutuality  Outcome: Ongoing (interventions implemented as appropriate)  Pt continues in ICU on vent set to PRVC 45% FiO2. VSS. Pt ran low grade fever overnight with tmax of 100.3F. Pt sedated on Fentanyl for comfort. Levophed gtt for BP MAP >65. Pt continues with soft restraints. Denies pain when asked. No new fall or injury noted. Pt shows no signs or symptoms of distress.

## 2018-06-27 NOTE — PROGRESS NOTES
06/27/18 0847   PRE-TX-O2-ETCO2   O2 Device (Oxygen Therapy) nasal cannula   $ Is the patient on Low Flow Oxygen? Yes   Flow (L/min) 3   SpO2 (!) 94 %   Pulse Oximetry Type Continuous   Pulse (!) 132   Resp 18   BP (!) 161/76   Ready to Wean Parameters   Extubated? Yes   Ventilator Discontinued Yes

## 2018-06-27 NOTE — PLAN OF CARE
Problem: Patient Care Overview  Goal: Plan of Care Review  Outcome: Ongoing (interventions implemented as appropriate)  Pt remains in ICU, VSS/afebrile. Pt extubated this am to 3 L NC and tolerated well, BIPAP at bedside and off only when alert and for meals.  Episode of tachycardia and chest pain post-extubation, briefly on tridil gtt now off (see RN note). Pt taken to cath lab without intervention.  Vasc band to R radial wrist removed and site without  bleeding/hematoma. Passed bedside nursing swallow, started on clear liquids and tolerating well. TLC dressing saturated x 2 today, MD aware, surgicel ordered. PT/INR checked, lovenox d/c and xarelto started today. Clark output greatly improved this shift. Pt denies pain/shortness of breath.  Interventions in placed for comfort, safety and skin integrity. Plan of care reviewed with patient and family at bedside, questions answered.

## 2018-06-27 NOTE — SUBJECTIVE & OBJECTIVE
Past Medical History:   Diagnosis Date    Anticoagulant long-term use     Arthritis     Asthma     CHF (congestive heart failure)     COPD (chronic obstructive pulmonary disease)     Coronary artery disease     Depression     Diabetes mellitus     GERD (gastroesophageal reflux disease)     Hypertension        Past Surgical History:   Procedure Laterality Date    ABDOMINAL SURGERY      CARDIAC SURGERY      HERNIA REPAIR         Review of patient's allergies indicates:  No Known Allergies    No current facility-administered medications on file prior to encounter.      Current Outpatient Prescriptions on File Prior to Encounter   Medication Sig    acetaminophen (TYLENOL) 500 MG tablet Take 1 tablet (500 mg total) by mouth every 8 (eight) hours as needed.    aspirin (ECOTRIN) 81 MG EC tablet Take 81 mg by mouth once daily.    cloNIDine (CATAPRES) 0.1 MG tablet Take 2 tablets (0.2 mg total) by mouth 3 (three) times daily.    diltiaZEM (CARDIZEM) 120 MG tablet Take 1 tablet (120 mg total) by mouth every 8 (eight) hours.    furosemide (LASIX) 40 MG tablet Take 40 mg by mouth once daily.     gabapentin (NEURONTIN) 300 MG capsule Take 300 mg by mouth 3 (three) times daily.    hydrALAZINE (APRESOLINE) 50 MG tablet Take 1 tablet (50 mg total) by mouth every 8 (eight) hours. (Patient taking differently: Take 50 mg by mouth every 12 (twelve) hours. )    levalbuterol (XOPENEX HFA) 45 mcg/actuation inhaler Inhale 2 puffs into the lungs every 4 (four) hours as needed for Wheezing or Shortness of Breath. Rescue    lisinopril (PRINIVIL,ZESTRIL) 40 MG tablet Take 1 tablet (40 mg total) by mouth once daily. Do not take this medication if blood pressure is below 130/80 mmHg and/or feeling dizzy after taking all other blood pressure meds    metformin (GLUCOPHAGE) 500 MG tablet Take 500 mg by mouth 2 (two) times daily with meals.    naproxen (NAPROSYN) 375 MG tablet Take 375 mg by mouth every 12 (twelve) hours as  needed.    rivaroxaban (XARELTO) 20 mg Tab Take 20 mg by mouth daily with dinner or evening meal.    sotalol (BETAPACE) 80 MG tablet Take 80 mg by mouth 2 (two) times daily.    tramadol (ULTRAM) 50 mg tablet Take 1 tablet (50 mg total) by mouth every 6 (six) hours as needed for Pain.    UMECLIDINIUM BRM/VILANTEROL TR (ANORO ELLIPTA INHL) Inhale into the lungs daily as needed.     Family History     Problem Relation (Age of Onset)    Diabetes Father    Heart disease Mother    Hypertension Mother        Social History Main Topics    Smoking status: Current Some Day Smoker     Packs/day: 0.25     Years: 55.00     Types: Cigarettes    Smokeless tobacco: Never Used    Alcohol use 0.6 oz/week     1 Glasses of wine per week    Drug use: No    Sexual activity: No     Review of Systems   Unable to perform ROS: intubated     Objective:     Vital Signs (Most Recent):  Temp: 99 °F (37.2 °C) (06/27/18 0315)  Pulse: 95 (06/27/18 0500)  Resp: 19 (06/27/18 0500)  BP: (!) 163/68 (06/27/18 0500)  SpO2: 100 % (06/27/18 0500) Vital Signs (24h Range):  Temp:  [97.4 °F (36.3 °C)-100.3 °F (37.9 °C)] 99 °F (37.2 °C)  Pulse:  [] 95  Resp:  [2-46] 19  SpO2:  [82 %-100 %] 100 %  BP: ()/(34-77) 163/68     Weight: 78 kg (171 lb 15.3 oz)  Body mass index is 26.93 kg/m².    SpO2: 100 %  O2 Device (Oxygen Therapy): ventilator      Intake/Output Summary (Last 24 hours) at 06/27/18 0613  Last data filed at 06/27/18 0435   Gross per 24 hour   Intake          3380.34 ml   Output              388 ml   Net          2992.34 ml       Lines/Drains/Airways     Central Venous Catheter Line                 Percutaneous Central Line Insertion/Assessment - triple lumen  06/26/18 1120 right internal jugular less than 1 day          Drain                 Urethral Catheter 06/26/18 1100 Latex 16 Fr. less than 1 day          Airway                 Airway - Non-Surgical 06/26/18 0830 Endotracheal Tube less than 1 day          Peripheral  Intravenous Line                 Peripheral IV - Single Lumen 06/24/18 Left Forearm 3 days         Peripheral IV - Single Lumen 06/24/18 Right Antecubital 3 days                Physical Exam   Constitutional: She appears well-developed and well-nourished. No distress.   HENT:   Head: Normocephalic and atraumatic.   Eyes: Conjunctivae and EOM are normal. Pupils are equal, round, and reactive to light. No scleral icterus.   Neck: Normal range of motion. Neck supple. No JVD present. No tracheal deviation present. No thyromegaly present.   Cardiovascular: Normal rate, regular rhythm, S1 normal and S2 normal.  Exam reveals no gallop and no friction rub.    No murmur heard.  Pulmonary/Chest: Effort normal. No respiratory distress.   intub   Abdominal: Soft. She exhibits no distension.   Musculoskeletal: Normal range of motion. She exhibits no edema.   Neurological: She is alert. No cranial nerve deficit.   Skin: Skin is warm and dry. She is not diaphoretic.   Psychiatric:   UTO       Current Medications:   albuterol-ipratropium  3 mL Nebulization Q6H    aspirin  81 mg Oral Daily    atorvastatin  80 mg Oral QHS    clopidogrel  75 mg Oral Daily    doxycycline  100 mg Per OG tube Q12H    gabapentin  300 mg Oral TID    guaiFENesin  600 mg Oral BID    insulin detemir U-100  15 Units Subcutaneous Daily    methylPREDNISolone sodium succinate  80 mg Intravenous Q8H    pantoprazole 40 mg in dextrose 5 % 100 mL infusion (ready to mix system)  40 mg Intravenous Daily    sotalol  80 mg Oral BID      sodium chloride 0.9% 150 mL/hr at 06/27/18 0557    dexmedetomidine (PRECEDEX) infusion Stopped (06/26/18 1116)    fentanyl 5 mL/hr at 06/27/18 0435    norepinephrine bitartrate-D5W 0.065 mcg/kg/min (06/27/18 0556)     acetaminophen, bisacodyl, dextrose 50%, glucagon (human recombinant), insulin aspart U-100, sodium chloride 0.9%    Laboratory:  CBC:    Recent Labs  Lab 12/08/17  0545 12/09/17  0423 02/12/18  1422  06/24/18  1413 06/26/18  0821 06/26/18  1941   WHITE BLOOD CELL COUNT 13.41 H 14.83 H 11.16 11.29 15.50 H  --    HEMOGLOBIN 12.5 10.8 L 11.7 L 10.6 L 11.0 L 9.6 L   HEMATOCRIT 43.0 38.3 40.1 35.5 L 39.2  --    PLATELETS 602 H 523 H 396 H 468 H 452 H  --        CHEMISTRIES:    Recent Labs  Lab 08/19/17  1430  02/12/18  1422 06/24/18  1413 06/25/18  0506 06/26/18  0821 06/26/18  1941 06/27/18  0220   GLUCOSE 189 H  < > 153 H 119 H 167 H 146 H 190 H 190 H   SODIUM 145  < > 142 143 144 142 144 144   POTASSIUM 3.9  < > 3.8 3.9 3.8 4.4 4.1 4.0   BUN BLD 16  < > 11 14 16 27 H 34 H 38 H   CREATININE 0.8  < > 0.7 0.8 0.9 0.9 1.3 1.1   EGFR IF  >60  < > >60 >60 >60 >60 49 A >60   EGFR IF NON- >60  < > >60 >60 >60 >60 43 A 52 A   CALCIUM 10.2  < > 9.8 10.2 10.1 9.8 9.0 8.9   MAGNESIUM 1.4 L  --  1.5 L  --  1.7 2.0 1.6  --    < > = values in this interval not displayed.    CARDIAC BIOMARKERS:    Recent Labs  Lab 12/13/15  1932  02/12/18  1422 02/12/18  1808 06/24/18  1413 06/24/18  2153 06/25/18  0506   CPK 33  --   --   --   --   --   --    CPK MB 1.0  --   --   --   --   --   --    TROPONIN I <0.006  < > 0.021 0.021 0.896 H 0.870 H 0.667 H   < > = values in this interval not displayed.    COAGS:    Recent Labs  Lab 10/07/16  0523 04/10/17  1129 08/12/17  0036 11/02/17 2211 12/08/17  0545   INR 1.7 H 1.1 1.2 1.0 1.0       LIPIDS/LFTS:    Recent Labs  Lab 12/15/15  0540  08/12/17  1037  11/02/17 2211 12/08/17  0545 02/12/18  1422 06/24/18  1413 06/25/18  0506   CHOLESTEROL 170  --  243 H  --   --   --   --   --   --    TRIGLYCERIDES 94  --  93  --   --   --   --   --   --    HDL 33 L  --  43  --   --   --   --   --   --    LDL CHOLESTEROL 118.2  --  181.4 H  --   --   --   --   --   --    NON-HDL CHOLESTEROL 137  --  200  --   --   --   --   --   --    AST  --   < >  --   < > 13 26 13 20 23   ALT  --   < >  --   < > 7 L 8 L 7 L 7 L 10   < > = values in this interval not  displayed.    BNP:    Recent Labs  Lab 08/19/17  1430 11/02/17  2211 12/08/17  0545 02/12/18  1422 06/24/18  1413    H 334 H 354 H 133 H 2,128 H       TSH:    Recent Labs  Lab 12/13/15  1932 06/24/18  1413   TSH 0.487 0.754       Free T4:    Recent Labs  Lab 06/24/18  1413   FREE T4 1.09     ABG    Recent Labs  Lab 06/27/18  0448   PH 7.294*   PO2 157*   PCO2 59.4*   HCO3 28.8*   BE 1         Diagnostic Results:  ECG (personally reviewed tracings):  6/24/18 1403 , LAD/PWRP    Chest X-Ray (personally reviewed image(s)): 6/25/18 NAD    CTA C/A/P 6/24/18  1. No evidence of aortic dissection.  2. Extensive calcified and noncalcified plaque seen throughout the aorta with small multifocal outpouchings seen involving the descending thoracic aorta suggestive for small penetrating ulcers.  Similar findings were seen on previous CT from August 2017.  3. 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  4. No evidence of PE.  5. Decreased size area of nodularity with scarring seen within the left lower lobe with resolution of previously visualized cavitation.  No evidence of new lung consolidation.  6. Mild to moderate centrilobular emphysema.    Echo: 6/25/18 (images pers rev)    1 - Low normal to mildly depressed left ventricular systolic function (EF 50-55%).  Distal LAD territory WMA.     2 - Impaired LV relaxation, elevated LAP (grade 2 diastolic dysfunction).     3 - Concentric hypertrophy.     4 - Trivial to mild tricuspid regurgitation.     5 - Pulmonary hypertension. The estimated PA systolic pressure is 52 mmHg.     LLE venous US 1/24/17  Interval decrease in overall thrombus burden of the left lower extremity, although there is persistent deep venous thrombus present within the left femoral and popliteal veins.    Stress Test: L MPI 12/15/15  Nuclear Quantitative Functional Analysis:   LVEF: 66 %  Impression: NORMAL MYOCARDIAL PERFUSION  1. The  perfusion scan is free of evidence for myocardial ischemia or injury.   2. Resting wall motion is physiologic.   3. Resting LV function is normal.   4. The ventricular volumes are normal at rest and stress.   5. The extracardiac distribution of radioactivity is normal.   6. There was no previous study available to compare.

## 2018-06-27 NOTE — EICU
Archana Marcelo:  Discussed with bed side RN and RT.  On Rx for NSTEMI, COPD exacerbation was supposed to go for [facility] but got re intubated.  Now hemodynamically stable sinus hardik at 30's.  Complaining of chest pain.  EKG at bed side zoomed in and reviewed.  Compared to old EKG few hrs ago.  Showing new Infero lateral T wave inversion with sinus hardik, .    Vent: PRVC 420/5/16/60%.  ABG just resulted Pao2 265, pH 7.31.  No auto peep.  P peak 36.     On fenta 50 mcg/hr now.  RASS 0 to -1.     A/P  1. Severe stable sinus hardik with new inferro lateral T wave inversion-injury infarct pattern /NSTEMI with Qtc prolongation.  Stable COPD exacerbation  - Cards on board.  Asked bed side RN to notify new changes in EKG to cardiology team.  - sta BMP, Mag  - HR now 99.  - on Lovenox AC.  - Diltiazem on hold since last night  - if gets hardik again , to consider glucogan  - follow ABG after 2 hrs ( fio2 decreased to 45%)  - [facility] scheduled for am , might need early?

## 2018-06-27 NOTE — PROGRESS NOTES
06/27/18 0752   Patient Assessment/Suction   Level of Consciousness (AVPU) alert   Respiratory Effort Normal;Unlabored   Expansion/Accessory Muscles/Retractions expansion symmetric   All Lung Fields Breath Sounds clear   Rhythm/Pattern, Respiratory artificial airway;ventilator assisted   PRE-TX-O2-ETCO2   O2 Device (Oxygen Therapy) ventilator   Oxygen Concentration (%) 35   SpO2 99 %   Pulse Oximetry Type Continuous   $ Pulse Oximetry - Multiple Charge Pulse Oximetry - Multiple   Pulse 86   Resp 16   /60   Aerosol Therapy   $ Aerosol Therapy Charges Aerosol Treatment   Respiratory Treatment Status given   SVN/Inhaler Treatment Route in-line   Position During Treatment HOB at 30 degrees   Patient Tolerance good   Post-Treatment   Post-treatment Heart Rate (beats/min) 82   Post-treatment Resp Rate (breaths/min) 16   All Fields Breath Sounds unchanged       Airway - Non-Surgical 06/26/18 0830 Endotracheal Tube   Placement Date/Time: 06/26/18 0830   Method of Intubation: Direct laryngoscopy  Inserted by: MD  Staff/Resident Name(s): Dr. Eyl  Airway Device: Endotracheal Tube  Airway Device Size: 7.0  Placement Verified By: Auscultation;Colorimetric EtCO2 device...   Secured at 25 cm   Measured At Lips   Secured Location Center  (moved from right to center)   Secured by Commercial tube maher   Bite Block none   Site Condition Cool;Dry   Status Intact;Secured;Patent   Site Assessment Clean;Dry   Cuff Pressure 32 cm H2O   Equipment Change   $ RT Equipment HME   Vent Select   Conventional Vent Y   $ Ventilator Subsequent 1   Charged w/in last 24h YES   Preset Conventional Ventilator Settings   Vent ID [id number]   Vent Type Servo i   Vent Mode PRVC   Humidity HME   Set Rate 16 bmp   Vt Set 420 mL   PEEP/CPAP 5 cmH20   Insp Time 0.7 Sec(s)   Insp Rise Time  0.15 %   Patient Ventilator Parameters   Resp Rate Total 16 br/min   Peak Airway Pressure 35.6 cmH2O   Mean Airway Pressure 10.2 cmH20   Exhaled Vt 410 mL   Total Ve 6.7  Assessment/Plan:    Cervical adenopathy   Patient has right anterior cervical node/nodule present for the past 10 years  She states that was imaged at that time but thinks it is more firm and possibly larger since then  Patient denies any B  Symptoms  Will send for repeat CT scan of soft tissue of neck  Will call with results    Acute non-recurrent maxillary sinusitis   Patient presents with ongoing sore throat cough congestion and drainage  She was evaluated by Josseline Liao  And given amoxicillin with some improvement, but symptoms hae  Persisted  Will give Rx for Ceftin 500 b i d  Times 10 days  Drink plenty fluids  Call further problems    Note:  Patient is leaving for [facility] tomorrow       Diagnoses and all orders for this visit:    Acute non-recurrent maxillary sinusitis  -     cefuroxime (CEFTIN) 500 mg tablet; Take 1 tablet (500 mg total) by mouth every 12 (twelve) hours for 10 days    Pharyngitis, unspecified etiology    Cervical adenopathy  -     CT soft tissue neck wo contrast; Future    Other orders  -     Discontinue: amoxicillin (AMOXIL) 875 mg tablet; Take 875 mg by mouth 2 (two) times a day  -     traMADol (ULTRAM) 50 mg tablet; Take 50 mg by mouth every 6 (six) hours as needed for moderate pain          Subjective:      Patient ID: Kemi Farooq is a 47 y o  female  Ongoing sore throat, cough, congestion, drainage  Pt was evaluated by RENEE hollis and given rx for amoxil w/ some imrpovement, but sxs have persisted  Low grade fevers  Poppy Aguayo also complains of lump  Right anterior cervical region  She states that approximately 10 years ago she had this imaged  She states lesion is very firm    She is not sure if it has gotten larger        The following portions of the patient's history were reviewed and updated as appropriate: allergies, current medications, past family history, past medical history, past social history, past surgical history and problem list     Review mL   Conventional Ventilator Alarms   Alarms On Y   Ve High Alarm 40 L/min   Ve Low Alarm 2 L/min   Resp Rate High Alarm 40 br/min   Resp Rate Low Alarm 5   Ready to Wean/Extubation Screen   FIO2<=50 (chart decimal) 0.35   MV<16L (chart vol.) 6.7   PEEP <=8 (chart #) 5   Ready to Wean Parameters   F/VT Ratio<105 (RSBI) (!) 39.02   Airway Safety   Ambu bag with the patient? Yes, Adult Ambu   Is mask with the patient? Yes, Adult Mask      of Systems   Constitutional: Negative for chills and fever  HENT: Positive for congestion and postnasal drip  Respiratory: Positive for cough  Negative for shortness of breath  Cardiovascular: Negative  Objective:      /70 (BP Location: Left arm, Patient Position: Sitting, Cuff Size: Standard)   Pulse 89   Temp 99 1 °F (37 3 °C) (Tympanic)   Resp 16   Ht 5' 2" (1 575 m)   Wt 57 6 kg (127 lb)   LMP 05/04/2018   SpO2 97%   BMI 23 23 kg/m²          Physical Exam   Constitutional: She appears well-developed and well-nourished  HENT:   Turbinates inflamed   Neck: Normal range of motion  Neck supple     Pulmonary/Chest: Effort normal and breath sounds normal    Skin:    Approximately 1 5 cm firm node/nodule right anterior cervical region

## 2018-06-27 NOTE — ASSESSMENT & PLAN NOTE
Patient with COPD. Intubated for hypercapnic respiratory failure  -Improvement in ABG on ventilator.   -Passed SBT. Extubated to NC.  -No infiltrate on CXR.   -Continue bronchodilators.  - IV steroids.   -Bipap PRN and qhs.

## 2018-06-27 NOTE — EICU
Called bed side RN.  As per cards no changes.  Mag 1.6.  On Azithromycin.  Has prolonged Qtc.    Plan:  - stop Azithro and start Doxy 100 mg via OG BID  - IV mag 1 gm stat.

## 2018-06-27 NOTE — PROGRESS NOTES
[facility]  Pulmonology  Progress Note    Patient Name: Yasmeen Palm  MRN: [id number]  Admission Date: 6/24/2018  Hospital Length of Stay: 3 days  Code Status: Full Code  Attending Provider: Meredith Olguin MD  Primary Care Provider: Angel Orourke Jr, MD   Principal Problem: Acute exacerbation of chronic obstructive pulmonary disease (COPD)    Subjective:     Passed SBT and extubated this morning.     Review of Systems   Unable to perform ROS: Acuity of condition   HENT: Negative for congestion and rhinorrhea.    Respiratory: Positive for cough, shortness of breath and wheezing. Negative for chest tightness.    Cardiovascular: Positive for leg swelling. Negative for chest pain and palpitations.   Gastrointestinal: Negative for abdominal distention and abdominal pain.   Endocrine: Negative for cold intolerance and heat intolerance.   Genitourinary: Negative for difficulty urinating and dyspareunia.   Musculoskeletal: Negative for arthralgias and back pain.   Allergic/Immunologic: Negative for environmental allergies.   Hematological: Negative for adenopathy.   Psychiatric/Behavioral: Negative for agitation and behavioral problems.     Objective:     Vital Signs (Most Recent):  Temp: 99 °F (37.2 °C) (06/27/18 0315)  Pulse: (!) 132 (06/27/18 0847)  Resp: 18 (06/27/18 0847)  BP: (!) 161/76 (06/27/18 0847)  SpO2: (!) 94 % (06/27/18 0847) Vital Signs (24h Range):  Temp:  [97.8 °F (36.6 °C)-100.3 °F (37.9 °C)] 99 °F (37.2 °C)  Pulse:  [] 132  Resp:  [2-46] 18  SpO2:  [92 %-100 %] 94 %  BP: ()/(40-79) 161/76     Weight: 78 kg (171 lb 15.3 oz)  Body mass index is 26.93 kg/m².      Intake/Output Summary (Last 24 hours) at 06/27/18 1019  Last data filed at 06/27/18 0600   Gross per 24 hour   Intake           3635.3 ml   Output              483 ml   Net           3152.3 ml       Physical Exam   Constitutional: She appears well-developed and well-nourished.   HENT:   Head: Normocephalic and  atraumatic.   ETT in place   Eyes: EOM are normal. Pupils are equal, round, and reactive to light.   Neck: Normal range of motion. Neck supple.   Pulmonary/Chest: Effort normal. She has wheezes. She has no rales.   Abdominal: Soft. Bowel sounds are normal.   Musculoskeletal: Normal range of motion. She exhibits edema.   Neurological:   Sedated   Skin: Skin is warm and dry.   Psychiatric: She has a normal mood and affect. Her behavior is normal.   Nursing note and vitals reviewed.      Vents:  Vent Mode: CPAP (06/27/18 0837)  Ventilator Initiated: Yes (06/26/18 0841)  Set Rate: 16 bmp (06/27/18 0752)  Vt Set: 420 mL (06/27/18 0752)  Pressure Support: 5 cmH20 (06/27/18 0837)  PEEP/CPAP: 5 cmH20 (06/27/18 0837)  Oxygen Concentration (%): 35 (06/27/18 0837)  Peak Airway Pressure: 10.7 cmH2O (06/27/18 0837)  Total Ve: 5.2 mL (06/27/18 0837)  F/VT Ratio<105 (RSBI): (!) 104.97 (06/27/18 0837)    Lines/Drains/Airways     Central Venous Catheter Line                 Percutaneous Central Line Insertion/Assessment - triple lumen  06/26/18 1120 right internal jugular less than 1 day          Drain                 Urethral Catheter 06/26/18 1100 Latex 16 Fr. less than 1 day          Airway                 Airway - Non-Surgical 06/26/18 0830 Endotracheal Tube 1 day          Peripheral Intravenous Line                 Peripheral IV - Single Lumen 06/24/18 Left Forearm 3 days         Peripheral IV - Single Lumen 06/24/18 Right Antecubital 3 days                Significant Labs:    CBC/Anemia Profile:    Recent Labs  Lab 06/26/18  0821 06/26/18 1941 06/27/18  0855   WBC 15.50*  --  12.11   HGB 11.0* 9.6* 9.5*   HCT 39.2  --  31.9*   *  --  391*   MCV 88  --  84   RDW 18.0*  --  17.9*        Chemistries:    Recent Labs  Lab 06/26/18  0821 06/26/18 1941 06/27/18  0220    144 144   K 4.4 4.1 4.0   CL 99 102 104   CO2 33* 27 27   BUN 27* 34* 38*   CREATININE 0.9 1.3 1.1   CALCIUM 9.8 9.0 8.9   MG 2.0 1.6  --    PHOS 4.3   --   --        All pertinent labs within the past 24 hours have been reviewed.    Significant Imaging:   I have reviewed all pertinent imaging results/findings within the past 24 hours.    Assessment/Plan:     Acute hypercapnic respiratory failure    Patient with COPD. Intubated for hypercapnic respiratory failure  -Improvement in ABG on ventilator.   -Passed SBT. Extubated to NC.  -No infiltrate on CXR.   -Continue bronchodilators.  - IV steroids.   -Bipap PRN and qhs.         COPD (chronic obstructive pulmonary disease)    See respiratory failure.   -Continue bronchodilators.   -Wean steroids.             Critical Care time: 35 minutes.     Critical Care for the evaluation and treatment of severe organ dysfunction, review of pertinent labs and imaging studies, discussions with primary team and discussions with family.     Family updated at bedside.     Continue ICU care.        Vivian Ely MD  Pulmonology  [facility]

## 2018-06-27 NOTE — PLAN OF CARE
06/27/18 1745   Discharge Reassessment   Assessment Type Discharge Planning Reassessment   Provided patient/caregiver education on the expected discharge date and the discharge plan No   Do you have any problems affording any of your prescribed medications? No   Discharge Plan A Home with family;Home Health   Discharge Plan B Other  (to be determined if condition changes)   Patient choice form signed by patient/caregiver No   Can the patient answer the patient profile reliably? Yes, cognitively intact   How does the patient rate their overall health at the present time? Poor   Describe the patient's ability to walk at the present time. Does not walk or unable to take any steps at all   Number of comorbid conditions (as recorded on the chart) Five or more   During the past month, has the patient often been bothered by feeling down, depressed or hopeless? No   During the past month, has the patient often been bothered by little interest or pleasure in doing things? No   patient transferred out of ICU then returned with increased breathing problems. She was on vent briefly, today when SW met with patient in room and 2 family members she was using bipap.      06/27/18 1745   Discharge Reassessment   Assessment Type Discharge Planning Reassessment   Provided patient/caregiver education on the expected discharge date and the discharge plan No   Do you have any problems affording any of your prescribed medications? No   Discharge Plan A Home with family;Home Health   Discharge Plan B Other  (to be determined if condition changes)   Patient choice form signed by patient/caregiver No   Can the patient answer the patient profile reliably? Yes, cognitively intact   How does the patient rate their overall health at the present time? Poor   Describe the patient's ability to walk at the present time. Does not walk or unable to take any steps at all   Number of comorbid conditions (as recorded on the chart) Five or more    During the past month, has the patient often been bothered by feeling down, depressed or hopeless? No   During the past month, has the patient often been bothered by little interest or pleasure in doing things? No   Patient in ICU, was transferred to floor briefly, returned with for increased respiratory needs, was on vent. Extubated yesterday. Was on BiPAP when SW met with patient and family members in room today. Family had inquired regarding insurance which is not in network with [facility] at this time. ESPERANZA spoke with  FABY Suarez who informs there is process to address this. ESPERANZA provided this information to patient and family members. ESPERANZA will continue to follow in ICU and assist as needed.

## 2018-06-27 NOTE — BRIEF OP NOTE
[facility]  Brief Operative Note     SUMMARY     Surgery Date: 6/27/2018     Surgeon(s) and Role:     * Laureano Pulido MD - Primary    Assisting Surgeon: None    Pre-op Diagnosis:  Non-STEMI (non-ST elevated myocardial infarction) [I21.4]    Post-op Diagnosis:  Same, nonobst CAD    Procedure(s) (LRB):  Left heart cath R rad access, not before 9am (Left)    Anesthesia: RN IV Sedation    Description of the findings of the procedure: see below    Findings/Key Components:  LVEDP: 9mmHg  LVEF: 50% by echo  Wall Motion: LAD WMA by echo    Dominance: Right  LM: MLI  LAD: MLI  LCx: MLI  RCA: dom, mid 40%    Hemostasis:  R Radial band    Impression:  NSTEMI  Nonobst CAD  Normal LV fxn  R rad vasband for hemostasis    Plan:  Cont med rx  Cont ASA 81mg qd  Stop Plavix  Restart Xarelto 20mg qd this pm  Pt to follow up with Dr. Pulido in 2 weeks    Estimated Blood Loss: <50cc         Specimens: None

## 2018-06-27 NOTE — NURSING
Bedside handoff given during nightshift RN, during report, pt hardik mid 40's. BP stable, pt remained alert and able to follow commands however very diaphoretic and nodded yes when asked if she had chest pain.  EICU called to room. Episode of bradycardia occurred x 3.  Dr. Azul called into room, new orders noted. Care transferred to night RN.

## 2018-06-27 NOTE — NURSING
1345-report given to Darrel, pt taken to Cath lab, transferred on NC with BIPAP.  1445-pt back from cath lab.  1700- TLC dressing saturated x 2 this shift, Dr. Olguin notified and surgicel ordered. Notified MD pt being transitioned to xarelto, pt/inr ordered and will hold med until resulted per Dr. Olguin.  1800- All air removed from Vasc band, site without complication, 2 + radial pulse. Free from hematoma, will continue to monitor.

## 2018-06-27 NOTE — PHYSICIAN QUERY
"PT Name: Yasmeen Palm  MR #: [id number]    Physician Query Form - Heart  Condition Clarification     CDS/: Brenda Haynes RN CDI       Contact information: [email or web address]  This form is a permanent document in the medical record.     Query Date: June 27, 2018    By submitting this query, we are merely seeking further clarification of documentation. Please utilize your independent clinical judgment when addressing the question(s) below.    The medical record contains the following   Indicators     Supporting Clinical Findings Location in Medical Record   X BNP 2,128 Lab 6/24   X EF 50-55% Echo 6/25   X Radiology findings Chest xray 6/24  The lungs are clear, with normal appearance of pulmonary vasculature and no pleural effusion or pneumothorax.  The cardiac silhouette is normal in size. The hilar and mediastinal contours are unremarkable.   Xray   X Echo Results CONCLUSIONS     1 - Low normal to mildly depressed left ventricular systolic function (EF 50-55%).  Distal LAD territory WMA.     2 - Impaired LV relaxation, elevated LAP (grade 2 diastolic dysfunction).     3 - Concentric hypertrophy.     4 - Trivial to mild tricuspid regurgitation.     5 - Pulmonary hypertension. The estimated PA systolic pressure is 52 mmHg.   Diastolic dysfunction - Yes   Echo 6/25    "Ascites" documented     X "SOB" or "MCFARLAND" documented Chest Pain  --  Pt reports chest pain x 2 days with increasing severity and increased shortness of breath.    ER MD Note   X "Hypoxia" documented Chronic respiratory failure with hypoxia and hypercapnia. Pulmonary consult 6/25 848 am   X Heart Failure documented HX CHF H&P   X "Edema" documented Bilateral lower extremity edema 1+  ER MD Note   X Diuretics/Meds Furosemide 80 mg IV 6/24 1559  Furosemide 40 mg IV every 6 hours 6/25 0426  Furosemide 40 mg IV 2 times daily 6/25 0902   Medication sheets    Treatment:      Other:          Provider, please specify diagnosis or diagnoses associated with " above clinical findings.                               [  ] Acute Systolic Heart Failure ( EF < 40)*    [  ] Acute on Chronic Systolic Heart Failure ( EF < 40)*    [  ] Chronic Systolic Heart Failure (EF < 40)*    [x  ] Acute Diastolic Heart Failure ( EF > 40)*    [  ] Acute on Chronic Diastolic Heart Failure( EF > 40)*    [  ] Chronic Diastolic Heart Failure (EF > 40)*    [  ] Acute Combined Systolic and Diastolic Heart Failure    [  ] Acute on Chronic Combined Systolic and Diastolic Heart Failure    [  ] Chronic Combined Systolic and Diastolic Heart Failure    [  ] Other (please specify): ___________________________________    [  ] Clinically Undetermined                *American [employer] Association                                                                                                          Please document in your progress notes daily for the duration of treatment until resolved and include in your discharge summary.

## 2018-06-27 NOTE — SUBJECTIVE & OBJECTIVE
Passed SBT and extubated this morning.     Review of Systems   Unable to perform ROS: Acuity of condition   HENT: Negative for congestion and rhinorrhea.    Respiratory: Positive for cough, shortness of breath and wheezing. Negative for chest tightness.    Cardiovascular: Positive for leg swelling. Negative for chest pain and palpitations.   Gastrointestinal: Negative for abdominal distention and abdominal pain.   Endocrine: Negative for cold intolerance and heat intolerance.   Genitourinary: Negative for difficulty urinating and dyspareunia.   Musculoskeletal: Negative for arthralgias and back pain.   Allergic/Immunologic: Negative for environmental allergies.   Hematological: Negative for adenopathy.   Psychiatric/Behavioral: Negative for agitation and behavioral problems.     Objective:     Vital Signs (Most Recent):  Temp: 99 °F (37.2 °C) (06/27/18 0315)  Pulse: (!) 132 (06/27/18 0847)  Resp: 18 (06/27/18 0847)  BP: (!) 161/76 (06/27/18 0847)  SpO2: (!) 94 % (06/27/18 0847) Vital Signs (24h Range):  Temp:  [97.8 °F (36.6 °C)-100.3 °F (37.9 °C)] 99 °F (37.2 °C)  Pulse:  [] 132  Resp:  [2-46] 18  SpO2:  [92 %-100 %] 94 %  BP: ()/(40-79) 161/76     Weight: 78 kg (171 lb 15.3 oz)  Body mass index is 26.93 kg/m².      Intake/Output Summary (Last 24 hours) at 06/27/18 1019  Last data filed at 06/27/18 0600   Gross per 24 hour   Intake           3635.3 ml   Output              483 ml   Net           3152.3 ml       Physical Exam   Constitutional: She appears well-developed and well-nourished.   HENT:   Head: Normocephalic and atraumatic.   ETT in place   Eyes: EOM are normal. Pupils are equal, round, and reactive to light.   Neck: Normal range of motion. Neck supple.   Pulmonary/Chest: Effort normal. She has wheezes. She has no rales.   Abdominal: Soft. Bowel sounds are normal.   Musculoskeletal: Normal range of motion. She exhibits edema.   Neurological:   Sedated   Skin: Skin is warm and dry.    Psychiatric: She has a normal mood and affect. Her behavior is normal.   Nursing note and vitals reviewed.      Vents:  Vent Mode: CPAP (06/27/18 0837)  Ventilator Initiated: Yes (06/26/18 0841)  Set Rate: 16 bmp (06/27/18 0752)  Vt Set: 420 mL (06/27/18 0752)  Pressure Support: 5 cmH20 (06/27/18 0837)  PEEP/CPAP: 5 cmH20 (06/27/18 0837)  Oxygen Concentration (%): 35 (06/27/18 0837)  Peak Airway Pressure: 10.7 cmH2O (06/27/18 0837)  Total Ve: 5.2 mL (06/27/18 0837)  F/VT Ratio<105 (RSBI): (!) 104.97 (06/27/18 0837)    Lines/Drains/Airways     Central Venous Catheter Line                 Percutaneous Central Line Insertion/Assessment - triple lumen  06/26/18 1120 right internal jugular less than 1 day          Drain                 Urethral Catheter 06/26/18 1100 Latex 16 Fr. less than 1 day          Airway                 Airway - Non-Surgical 06/26/18 0830 Endotracheal Tube 1 day          Peripheral Intravenous Line                 Peripheral IV - Single Lumen 06/24/18 Left Forearm 3 days         Peripheral IV - Single Lumen 06/24/18 Right Antecubital 3 days                Significant Labs:    CBC/Anemia Profile:    Recent Labs  Lab 06/26/18  0821 06/26/18 1941 06/27/18  0855   WBC 15.50*  --  12.11   HGB 11.0* 9.6* 9.5*   HCT 39.2  --  31.9*   *  --  391*   MCV 88  --  84   RDW 18.0*  --  17.9*        Chemistries:    Recent Labs  Lab 06/26/18  0821 06/26/18 1941 06/27/18  0220    144 144   K 4.4 4.1 4.0   CL 99 102 104   CO2 33* 27 27   BUN 27* 34* 38*   CREATININE 0.9 1.3 1.1   CALCIUM 9.8 9.0 8.9   MG 2.0 1.6  --    PHOS 4.3  --   --        All pertinent labs within the past 24 hours have been reviewed.    Significant Imaging:   I have reviewed all pertinent imaging results/findings within the past 24 hours.

## 2018-06-27 NOTE — ASSESSMENT & PLAN NOTE
CP/elev trop c/w NSTEMI  Pt reports prior hx of CAD, but no recent isch eval or cath.  EF normal with LAD WMA (echo 6/25/18)  Currently with hypercarb resp failure, will need to delay cath pending improvement in resp status.  Cont ASA/Plavix (loaded 6/24/18)  Hold Xarelto (last dose 6/23/18)  Enox 1mg/kg bid x2 more doses to cover today  Cath in am 6/28/18 if resp issues resolve  Cont atorva 80mg qhs  Check lipids/LFT 3 months (late Sept 2018)

## 2018-06-28 PROBLEM — I50.31 ACUTE DIASTOLIC CHF (CONGESTIVE HEART FAILURE): Status: ACTIVE | Noted: 2017-08-12

## 2018-06-28 LAB
ALLENS TEST: ABNORMAL
ANION GAP SERPL CALC-SCNC: 6 MMOL/L
ANISOCYTOSIS BLD QL SMEAR: SLIGHT
BASOPHILS # BLD AUTO: 0 K/UL
BASOPHILS NFR BLD: 0 %
BUN SERPL-MCNC: 26 MG/DL
CALCIUM SERPL-MCNC: 8.8 MG/DL
CHLORIDE SERPL-SCNC: 108 MMOL/L
CO2 SERPL-SCNC: 31 MMOL/L
CREAT SERPL-MCNC: 0.7 MG/DL
DELSYS: ABNORMAL
DIFFERENTIAL METHOD: ABNORMAL
EOSINOPHIL # BLD AUTO: 0 K/UL
EOSINOPHIL NFR BLD: 0 %
EP: 5
ERYTHROCYTE [DISTWIDTH] IN BLOOD BY AUTOMATED COUNT: 17.4 %
ERYTHROCYTE [SEDIMENTATION RATE] IN BLOOD BY WESTERGREN METHOD: 12 MM/H
EST. GFR  (AFRICAN AMERICAN): >60 ML/MIN/1.73 M^2
EST. GFR  (NON AFRICAN AMERICAN): >60 ML/MIN/1.73 M^2
FIO2: 35
GLUCOSE SERPL-MCNC: 158 MG/DL
HCO3 UR-SCNC: 34.6 MMOL/L (ref 24–28)
HCT VFR BLD AUTO: 31.8 %
HGB BLD-MCNC: 8.9 G/DL
HYPOCHROMIA BLD QL SMEAR: ABNORMAL
IP: 15
LYMPHOCYTES # BLD AUTO: 0.8 K/UL
LYMPHOCYTES NFR BLD: 6.7 %
MAGNESIUM SERPL-MCNC: 2.1 MG/DL
MCH RBC QN AUTO: 24.9 PG
MCHC RBC AUTO-ENTMCNC: 28 G/DL
MCV RBC AUTO: 89 FL
MIN VOL: 9
MODE: ABNORMAL
MONOCYTES # BLD AUTO: 0.6 K/UL
MONOCYTES NFR BLD: 4.7 %
NEUTROPHILS # BLD AUTO: 11.2 K/UL
NEUTROPHILS NFR BLD: 88.9 %
PCO2 BLDA: 59.3 MMHG (ref 35–45)
PH SMN: 7.37 [PH] (ref 7.35–7.45)
PHOSPHATE SERPL-MCNC: 2.1 MG/DL
PLATELET # BLD AUTO: 345 K/UL
PMV BLD AUTO: 9.5 FL
PO2 BLDA: 72 MMHG (ref 80–100)
POC BE: 8 MMOL/L
POC SATURATED O2: 93 % (ref 95–100)
POC TCO2: 36 MMOL/L (ref 23–27)
POCT GLUCOSE: 135 MG/DL (ref 70–110)
POCT GLUCOSE: 165 MG/DL (ref 70–110)
POCT GLUCOSE: 181 MG/DL (ref 70–110)
POCT GLUCOSE: 183 MG/DL (ref 70–110)
POLYCHROMASIA BLD QL SMEAR: ABNORMAL
POTASSIUM SERPL-SCNC: 4.4 MMOL/L
RBC # BLD AUTO: 3.57 M/UL
SAMPLE: ABNORMAL
SITE: ABNORMAL
SODIUM SERPL-SCNC: 145 MMOL/L
SP02: 96
SPONT RATE: 4
WBC # BLD AUTO: 12.63 K/UL

## 2018-06-28 PROCEDURE — 83735 ASSAY OF MAGNESIUM: CPT

## 2018-06-28 PROCEDURE — 25000003 PHARM REV CODE 250: Performed by: EMERGENCY MEDICINE

## 2018-06-28 PROCEDURE — 82803 BLOOD GASES ANY COMBINATION: CPT

## 2018-06-28 PROCEDURE — 36415 COLL VENOUS BLD VENIPUNCTURE: CPT

## 2018-06-28 PROCEDURE — 25000242 PHARM REV CODE 250 ALT 637 W/ HCPCS: Performed by: HOSPITALIST

## 2018-06-28 PROCEDURE — 63600175 PHARM REV CODE 636 W HCPCS: Performed by: INTERNAL MEDICINE

## 2018-06-28 PROCEDURE — 85025 COMPLETE CBC W/AUTO DIFF WBC: CPT

## 2018-06-28 PROCEDURE — 25000003 PHARM REV CODE 250: Performed by: INTERNAL MEDICINE

## 2018-06-28 PROCEDURE — 25000003 PHARM REV CODE 250: Performed by: HOSPITALIST

## 2018-06-28 PROCEDURE — 94640 AIRWAY INHALATION TREATMENT: CPT

## 2018-06-28 PROCEDURE — 20000000 HC ICU ROOM

## 2018-06-28 PROCEDURE — 84100 ASSAY OF PHOSPHORUS: CPT

## 2018-06-28 PROCEDURE — 63600175 PHARM REV CODE 636 W HCPCS: Performed by: HOSPITALIST

## 2018-06-28 PROCEDURE — 99233 SBSQ HOSP IP/OBS HIGH 50: CPT | Mod: ,,, | Performed by: INTERNAL MEDICINE

## 2018-06-28 PROCEDURE — 99900035 HC TECH TIME PER 15 MIN (STAT)

## 2018-06-28 PROCEDURE — 27000221 HC OXYGEN, UP TO 24 HOURS

## 2018-06-28 PROCEDURE — 80048 BASIC METABOLIC PNL TOTAL CA: CPT

## 2018-06-28 PROCEDURE — C9113 INJ PANTOPRAZOLE SODIUM, VIA: HCPCS | Performed by: INTERNAL MEDICINE

## 2018-06-28 PROCEDURE — 94660 CPAP INITIATION&MGMT: CPT

## 2018-06-28 PROCEDURE — 36600 WITHDRAWAL OF ARTERIAL BLOOD: CPT

## 2018-06-28 PROCEDURE — 99291 CRITICAL CARE FIRST HOUR: CPT | Mod: ,,, | Performed by: INTERNAL MEDICINE

## 2018-06-28 RX ORDER — FUROSEMIDE 10 MG/ML
40 INJECTION INTRAMUSCULAR; INTRAVENOUS 2 TIMES DAILY
Status: DISCONTINUED | OUTPATIENT
Start: 2018-06-28 | End: 2018-06-28

## 2018-06-28 RX ORDER — HYDRALAZINE HYDROCHLORIDE 20 MG/ML
10 INJECTION INTRAMUSCULAR; INTRAVENOUS EVERY 6 HOURS PRN
Status: DISCONTINUED | OUTPATIENT
Start: 2018-06-28 | End: 2018-07-24

## 2018-06-28 RX ORDER — LABETALOL HYDROCHLORIDE 5 MG/ML
20 INJECTION, SOLUTION INTRAVENOUS EVERY 4 HOURS PRN
Status: DISCONTINUED | OUTPATIENT
Start: 2018-06-28 | End: 2018-07-24

## 2018-06-28 RX ORDER — LORAZEPAM 2 MG/ML
1 INJECTION INTRAMUSCULAR ONCE
Status: COMPLETED | OUTPATIENT
Start: 2018-06-28 | End: 2018-06-28

## 2018-06-28 RX ORDER — FUROSEMIDE 10 MG/ML
60 INJECTION INTRAMUSCULAR; INTRAVENOUS ONCE
Status: COMPLETED | OUTPATIENT
Start: 2018-06-28 | End: 2018-06-28

## 2018-06-28 RX ORDER — LABETALOL HYDROCHLORIDE 5 MG/ML
10 INJECTION, SOLUTION INTRAVENOUS EVERY 4 HOURS PRN
Status: DISCONTINUED | OUTPATIENT
Start: 2018-06-28 | End: 2018-06-28

## 2018-06-28 RX ORDER — LABETALOL HYDROCHLORIDE 5 MG/ML
10 INJECTION, SOLUTION INTRAVENOUS ONCE
Status: COMPLETED | OUTPATIENT
Start: 2018-06-28 | End: 2018-06-28

## 2018-06-28 RX ADMIN — LISINOPRIL 10 MG: 5 TABLET ORAL at 10:06

## 2018-06-28 RX ADMIN — RIVAROXABAN 20 MG: 20 TABLET, FILM COATED ORAL at 05:06

## 2018-06-28 RX ADMIN — SODIUM CHLORIDE: 0.9 INJECTION, SOLUTION INTRAVENOUS at 05:06

## 2018-06-28 RX ADMIN — SOTALOL HYDROCHLORIDE 80 MG: 80 TABLET ORAL at 09:06

## 2018-06-28 RX ADMIN — LABETALOL HYDROCHLORIDE 10 MG: 5 INJECTION, SOLUTION INTRAVENOUS at 09:06

## 2018-06-28 RX ADMIN — GABAPENTIN 300 MG: 300 CAPSULE ORAL at 02:06

## 2018-06-28 RX ADMIN — LORAZEPAM 1 MG: 2 INJECTION INTRAMUSCULAR; INTRAVENOUS at 10:06

## 2018-06-28 RX ADMIN — MORPHINE SULFATE 2 MG: 4 INJECTION INTRAVENOUS at 05:06

## 2018-06-28 RX ADMIN — METHYLPREDNISOLONE SODIUM SUCCINATE 40 MG: 40 INJECTION, POWDER, FOR SOLUTION INTRAMUSCULAR; INTRAVENOUS at 05:06

## 2018-06-28 RX ADMIN — DEXTROSE 40 MG: 50 INJECTION, SOLUTION INTRAVENOUS at 09:06

## 2018-06-28 RX ADMIN — FUROSEMIDE 60 MG: 10 INJECTION, SOLUTION INTRAVENOUS at 07:06

## 2018-06-28 RX ADMIN — DOXYCYCLINE HYCLATE 100 MG: 100 TABLET, COATED ORAL at 09:06

## 2018-06-28 RX ADMIN — GABAPENTIN 300 MG: 300 CAPSULE ORAL at 09:06

## 2018-06-28 RX ADMIN — HYDRALAZINE HYDROCHLORIDE 10 MG: 20 INJECTION INTRAMUSCULAR; INTRAVENOUS at 05:06

## 2018-06-28 RX ADMIN — SOTALOL HYDROCHLORIDE 80 MG: 80 TABLET ORAL at 10:06

## 2018-06-28 RX ADMIN — IPRATROPIUM BROMIDE AND ALBUTEROL SULFATE 3 ML: .5; 2.5 SOLUTION RESPIRATORY (INHALATION) at 07:06

## 2018-06-28 RX ADMIN — ATORVASTATIN CALCIUM 80 MG: 40 TABLET, FILM COATED ORAL at 09:06

## 2018-06-28 RX ADMIN — IPRATROPIUM BROMIDE AND ALBUTEROL SULFATE 3 ML: .5; 2.5 SOLUTION RESPIRATORY (INHALATION) at 06:06

## 2018-06-28 RX ADMIN — METHYLPREDNISOLONE SODIUM SUCCINATE 40 MG: 40 INJECTION, POWDER, FOR SOLUTION INTRAMUSCULAR; INTRAVENOUS at 02:06

## 2018-06-28 RX ADMIN — IPRATROPIUM BROMIDE AND ALBUTEROL SULFATE 3 ML: .5; 2.5 SOLUTION RESPIRATORY (INHALATION) at 12:06

## 2018-06-28 RX ADMIN — METHYLPREDNISOLONE SODIUM SUCCINATE 40 MG: 40 INJECTION, POWDER, FOR SOLUTION INTRAMUSCULAR; INTRAVENOUS at 09:06

## 2018-06-28 RX ADMIN — IPRATROPIUM BROMIDE AND ALBUTEROL SULFATE 3 ML: .5; 2.5 SOLUTION RESPIRATORY (INHALATION) at 01:06

## 2018-06-28 RX ADMIN — GUAIFENESIN 600 MG: 600 TABLET, EXTENDED RELEASE ORAL at 09:06

## 2018-06-28 NOTE — ASSESSMENT & PLAN NOTE
CP/elev trop c/w NSTEMI  Pt reports prior hx of CAD, but no recent isch eval or cath.  EF normal on echo with LAD WMA (echo 6/25/18)  Cath 6/27/18 with nonobst CAD  Cont ASA 81mg qd  Resume Xarelto 20mg qd  Cont atorva 80mg qhs  Check lipids/LFT 3 months (late Sept 2018)

## 2018-06-28 NOTE — SUBJECTIVE & OBJECTIVE
Interval History: Increased SOB this morning. Placed on Bipap.     Review of Systems   Constitutional: Negative for chills and fever.   Respiratory: Positive for shortness of breath.    Cardiovascular: Negative for chest pain.     Objective:     Vital Signs (Most Recent):  Temp: 98.5 °F (36.9 °C) (06/28/18 0715)  Pulse: 95 (06/28/18 1015)  Resp: (!) 22 (06/28/18 1015)  BP: (!) 180/84 (06/28/18 1000)  SpO2: 100 % (06/28/18 1015) Vital Signs (24h Range):  Temp:  [97.7 °F (36.5 °C)-98.8 °F (37.1 °C)] 98.5 °F (36.9 °C)  Pulse:  [] 95  Resp:  [15-38] 22  SpO2:  [91 %-100 %] 100 %  BP: (138-218)/(64-96) 180/84     Weight: 80.7 kg (177 lb 14.6 oz)  Body mass index is 27.86 kg/m².    Intake/Output Summary (Last 24 hours) at 06/28/18 1148  Last data filed at 06/28/18 1000   Gross per 24 hour   Intake           2222.5 ml   Output             1890 ml   Net            332.5 ml      Physical Exam   Constitutional: She is oriented to person, place, and time. She appears well-developed. No distress.   HENT:   Head: Normocephalic and atraumatic.   Eyes: EOM are normal. Pupils are equal, round, and reactive to light.   Neck: Normal range of motion. Neck supple.   Cardiovascular: Normal rate and regular rhythm.    Pulmonary/Chest:   Course breath sounds with decrease at bases and crackles noted, diminished throughout   Abdominal: Soft. Bowel sounds are normal.   Musculoskeletal: Normal range of motion. She exhibits edema.   Neurological: She is alert and oriented to person, place, and time.   Skin: Skin is warm and dry. Capillary refill takes less than 2 seconds. She is not diaphoretic.   Psychiatric: She has a normal mood and affect. Her behavior is normal. Thought content normal.       Significant Labs: All pertinent labs within the past 24 hours have been reviewed.    Significant Imaging: I have reviewed and interpreted all pertinent imaging results/findings within the past 24 hours.

## 2018-06-28 NOTE — ASSESSMENT & PLAN NOTE
- C02 > 100 and s/p intubation  - Treated with NEBS and steroids  - Extubated on 6/27 and respiratory status with worsening due to volume overload issue today

## 2018-06-28 NOTE — ASSESSMENT & PLAN NOTE
- Due to volume overload  - Last ECHO with EF=50-55% + grade 2 diastolic dysfunction  - Will d/c IVF given after procedure and start IV lasix

## 2018-06-28 NOTE — PROGRESS NOTES
Pt set up on [employer] BIPAP for the night. 10/5, R 12, FIO2 35%. Medium full face mask. Tolerating well.

## 2018-06-28 NOTE — PROGRESS NOTES
[facility]  Pulmonology  Progress Note    Patient Name: Yasmeen Palm  MRN: [id number]  Admission Date: 6/24/2018  Hospital Length of Stay: 4 days  Code Status: Full Code  Attending Provider: Meredith Olguin MD  Primary Care Provider: Angel Orourke Jr, MD   Principal Problem: Acute hypercapnic respiratory failure    Subjective:     Interval History: Increased SOB this morning. Placed on Bipap.     Review of Systems   Constitutional: Negative for chills and fever.   Respiratory: Positive for shortness of breath.    Cardiovascular: Negative for chest pain.     Objective:     Vital Signs (Most Recent):  Temp: 98.5 °F (36.9 °C) (06/28/18 0715)  Pulse: 95 (06/28/18 1015)  Resp: (!) 22 (06/28/18 1015)  BP: (!) 180/84 (06/28/18 1000)  SpO2: 100 % (06/28/18 1015) Vital Signs (24h Range):  Temp:  [97.7 °F (36.5 °C)-98.8 °F (37.1 °C)] 98.5 °F (36.9 °C)  Pulse:  [] 95  Resp:  [15-38] 22  SpO2:  [91 %-100 %] 100 %  BP: (138-218)/(64-96) 180/84     Weight: 80.7 kg (177 lb 14.6 oz)  Body mass index is 27.86 kg/m².    Intake/Output Summary (Last 24 hours) at 06/28/18 1148  Last data filed at 06/28/18 1000   Gross per 24 hour   Intake           2222.5 ml   Output             1890 ml   Net            332.5 ml      Physical Exam   Constitutional: She is oriented to person, place, and time. She appears well-developed. No distress.   HENT:   Head: Normocephalic and atraumatic.   Eyes: EOM are normal. Pupils are equal, round, and reactive to light.   Neck: Normal range of motion. Neck supple.   Cardiovascular: Normal rate and regular rhythm.    Pulmonary/Chest:   Course breath sounds with decrease at bases and crackles noted, diminished throughout   Abdominal: Soft. Bowel sounds are normal.   Musculoskeletal: Normal range of motion. She exhibits edema.   Neurological: She is alert and oriented to person, place, and time.   Skin: Skin is warm and dry. Capillary refill takes less than 2 seconds. She is not  diaphoretic.   Psychiatric: She has a normal mood and affect. Her behavior is normal. Thought content normal.       Significant Labs: All pertinent labs within the past 24 hours have been reviewed.    Significant Imaging: I have reviewed and interpreted all pertinent imaging results/findings within the past 24 hours.    Assessment/Plan:     * Acute hypercapnic respiratory failure    Patient with COPD. Intubated for hypercapnic respiratory failure. Patient developed SOB again overnight. Noticeable crackles on exam and volume overload. +5L for the hospitalization.   -Bipap PRN and qhs  -Aggressive diuresis and BP control(consider IV BP gtt)  -Continue bronchodilators.  - IV steroids.           Non-STEMI (non-ST elevated myocardial infarction)    Per Cards. LHC performed yesterday.         Type 2 diabetes mellitus, controlled    BG goal 140-180.         COPD (chronic obstructive pulmonary disease)    See respiratory failure.   -Continue bronchodilators.   -Wean steroids.             Critical Care time: 35 minutes.    Critical Care time for the evaluation and treatment of severe organ dysfunction, review of pertinent labs and imaging studies, discussions with primary team and discussions with family/patient.     Continue ICU care.        Vivian Ely MD  Pulmonology  [facility] [place]

## 2018-06-28 NOTE — PROGRESS NOTES
[facility]  Cardiology  Progress Note    Patient Name: Yasmeen Palm  MRN: [id number]  Admission Date: 6/24/2018  Hospital Length of Stay: 4 days  Code Status: Full Code   Attending Physician: Meredith Olguin MD   Primary Care Physician: Angel Orourke Jr, MD  Expected Discharge Date:   Principal Problem:Acute hypercapnic respiratory failure    Subjective:     Hospital Course:   6/25/18: adm with resp insuff and NSTEMI with plans for cath 6/26 6/26/18: transferred back to ICU with hypercarb resp failure  6/27/18: cath with nonobst CAD    Interval Hx: pt seen in ICU, case d/w RN and Dr. Ely.  Pt on bipap, no cp.  Family at bedside.    Tele: SR/ST, ?parox flutter (pers rev)      Past Medical History:   Diagnosis Date    Anticoagulant long-term use     Arthritis     Asthma     CHF (congestive heart failure)     COPD (chronic obstructive pulmonary disease)     Coronary artery disease     Depression     Diabetes mellitus     GERD (gastroesophageal reflux disease)     Hypertension        Past Surgical History:   Procedure Laterality Date    ABDOMINAL SURGERY      CARDIAC SURGERY      HERNIA REPAIR         Review of patient's allergies indicates:  No Known Allergies    No current facility-administered medications on file prior to encounter.      Current Outpatient Prescriptions on File Prior to Encounter   Medication Sig    acetaminophen (TYLENOL) 500 MG tablet Take 1 tablet (500 mg total) by mouth every 8 (eight) hours as needed.    aspirin (ECOTRIN) 81 MG EC tablet Take 81 mg by mouth once daily.    cloNIDine (CATAPRES) 0.1 MG tablet Take 2 tablets (0.2 mg total) by mouth 3 (three) times daily.    diltiaZEM (CARDIZEM) 120 MG tablet Take 1 tablet (120 mg total) by mouth every 8 (eight) hours.    furosemide (LASIX) 40 MG tablet Take 40 mg by mouth once daily.     gabapentin (NEURONTIN) 300 MG capsule Take 300 mg by mouth 3 (three) times daily.    hydrALAZINE (APRESOLINE) 50 MG tablet  Take 1 tablet (50 mg total) by mouth every 8 (eight) hours. (Patient taking differently: Take 50 mg by mouth every 12 (twelve) hours. )    levalbuterol (XOPENEX HFA) 45 mcg/actuation inhaler Inhale 2 puffs into the lungs every 4 (four) hours as needed for Wheezing or Shortness of Breath. Rescue    lisinopril (PRINIVIL,ZESTRIL) 40 MG tablet Take 1 tablet (40 mg total) by mouth once daily. Do not take this medication if blood pressure is below 130/80 mmHg and/or feeling dizzy after taking all other blood pressure meds    metformin (GLUCOPHAGE) 500 MG tablet Take 500 mg by mouth 2 (two) times daily with meals.    naproxen (NAPROSYN) 375 MG tablet Take 375 mg by mouth every 12 (twelve) hours as needed.    rivaroxaban (XARELTO) 20 mg Tab Take 20 mg by mouth daily with dinner or evening meal.    sotalol (BETAPACE) 80 MG tablet Take 80 mg by mouth 2 (two) times daily.    tramadol (ULTRAM) 50 mg tablet Take 1 tablet (50 mg total) by mouth every 6 (six) hours as needed for Pain.    UMECLIDINIUM BRM/VILANTEROL TR (ANORO ELLIPTA INHL) Inhale into the lungs daily as needed.     Family History     Problem Relation (Age of Onset)    Diabetes Father    Heart disease Mother    Hypertension Mother        Social History Main Topics    Smoking status: Current Some Day Smoker     Packs/day: 0.25     Years: 55.00     Types: Cigarettes    Smokeless tobacco: Never Used    Alcohol use 0.6 oz/week     1 Glasses of wine per week    Drug use: No    Sexual activity: No     Review of Systems   Unable to perform ROS: other (on Bipap)     Objective:     Vital Signs (Most Recent):  Temp: 98.5 °F (36.9 °C) (06/28/18 0715)  Pulse: 95 (06/28/18 1015)  Resp: (!) 22 (06/28/18 1015)  BP: (!) 180/84 (06/28/18 1000)  SpO2: 100 % (06/28/18 1015) Vital Signs (24h Range):  Temp:  [97.7 °F (36.5 °C)-98.8 °F (37.1 °C)] 98.5 °F (36.9 °C)  Pulse:  [] 95  Resp:  [15-38] 22  SpO2:  [91 %-100 %] 100 %  BP: (138-218)/(64-96) 180/84     Weight:  80.7 kg (177 lb 14.6 oz)  Body mass index is 27.86 kg/m².    SpO2: 100 %  O2 Device (Oxygen Therapy): BiPAP      Intake/Output Summary (Last 24 hours) at 06/28/18 1153  Last data filed at 06/28/18 1000   Gross per 24 hour   Intake           2222.5 ml   Output             1890 ml   Net            332.5 ml       Lines/Drains/Airways     Central Venous Catheter Line                 Percutaneous Central Line Insertion/Assessment - triple lumen  06/26/18 1120 right internal jugular 2 days          Drain                 Urethral Catheter 06/26/18 1100 Latex 16 Fr. 2 days          Peripheral Intravenous Line                 Peripheral IV - Single Lumen 06/24/18 Left Forearm 4 days                Physical Exam   Constitutional: She appears well-developed and well-nourished. No distress.   HENT:   Head: Normocephalic and atraumatic.   Eyes: Conjunctivae and EOM are normal. Pupils are equal, round, and reactive to light. No scleral icterus.   Neck: Normal range of motion. Neck supple. No JVD present. No tracheal deviation present. No thyromegaly present.   Cardiovascular: Normal rate, regular rhythm, S1 normal and S2 normal.  Exam reveals no gallop and no friction rub.    No murmur heard.  R rad access site c/d/i   Pulmonary/Chest: Effort normal. No respiratory distress. She has rales.   Abdominal: Soft. She exhibits no distension.   Musculoskeletal: Normal range of motion. She exhibits no edema.   Neurological: She is alert. No cranial nerve deficit.   Skin: Skin is warm and dry. She is not diaphoretic.   Psychiatric: She has a normal mood and affect. Her behavior is normal.       Current Medications:   albuterol-ipratropium  3 mL Nebulization Q6H    aspirin  81 mg Oral Daily    atorvastatin  80 mg Oral QHS    doxycycline  100 mg Per OG tube Q12H    gabapentin  300 mg Oral TID    guaiFENesin  600 mg Oral BID    insulin detemir U-100  15 Units Subcutaneous Daily    lisinopril  10 mg Per NG tube Daily     methylPREDNISolone sodium succinate  40 mg Intravenous Q8H    pantoprazole 40 mg in dextrose 5 % 100 mL infusion (ready to mix system)  40 mg Intravenous Daily    rivaroxaban  20 mg Oral Daily with dinner    sotalol  80 mg Oral BID      dexmedetomidine (PRECEDEX) infusion Stopped (06/26/18 1116)    fentanyl Stopped (06/27/18 0730)    nitroGLYCERIN Stopped (06/27/18 1610)    norepinephrine bitartrate-D5W Stopped (06/27/18 0700)     acetaminophen, acetaminophen, atropine, bisacodyl, dextrose 50%, glucagon (human recombinant), hydrALAZINE, HYDROcodone-acetaminophen, insulin aspart U-100, labetalol, morphine, ondansetron, sodium chloride 0.9%, sodium chloride 0.9%    Laboratory:  CBC:    Recent Labs  Lab 02/12/18  1422 06/24/18  1413 06/26/18  0821 06/26/18  1941 06/27/18  0855 06/28/18  0245   WHITE BLOOD CELL COUNT 11.16 11.29 15.50 H  --  12.11 12.63   HEMOGLOBIN 11.7 L 10.6 L 11.0 L 9.6 L 9.5 L 8.9 L   HEMATOCRIT 40.1 35.5 L 39.2  --  31.9 L 31.8 L   PLATELETS 396 H 468 H 452 H  --  391 H 345       CHEMISTRIES:    Recent Labs  Lab 02/12/18  1422  06/25/18  0506 06/26/18  0821 06/26/18  1941 06/27/18  0220 06/28/18  0245   GLUCOSE 153 H  < > 167 H 146 H 190 H 190 H 158 H   SODIUM 142  < > 144 142 144 144 145   POTASSIUM 3.8  < > 3.8 4.4 4.1 4.0 4.4   BUN BLD 11  < > 16 27 H 34 H 38 H 26 H   CREATININE 0.7  < > 0.9 0.9 1.3 1.1 0.7   EGFR IF AFRICAN AMERICAN >60  < > >60 >60 49 A >60 >60   EGFR IF NON- >60  < > >60 >60 43 A 52 A >60   CALCIUM 9.8  < > 10.1 9.8 9.0 8.9 8.8   MAGNESIUM 1.5 L  --  1.7 2.0 1.6  --  2.1   < > = values in this interval not displayed.    CARDIAC BIOMARKERS:    Recent Labs  Lab 12/13/15  1932  02/12/18  1422 02/12/18  1808 06/24/18  1413 06/24/18  2153 06/25/18  0506   CPK 33  --   --   --   --   --   --    CPK MB 1.0  --   --   --   --   --   --    TROPONIN I <0.006  < > 0.021 0.021 0.896 H 0.870 H 0.667 H   < > = values in this interval not  displayed.    COAGS:    Recent Labs  Lab 04/10/17  1129 08/12/17  0036 11/02/17  2211 12/08/17  0545 06/27/18  1738   INR 1.1 1.2 1.0 1.0 1.1       LIPIDS/LFTS:    Recent Labs  Lab 12/15/15  0540  08/12/17  1037  11/02/17  2211 12/08/17  0545 02/12/18  1422 06/24/18  1413 06/25/18  0506   CHOLESTEROL 170  --  243 H  --   --   --   --   --   --    TRIGLYCERIDES 94  --  93  --   --   --   --   --   --    HDL 33 L  --  43  --   --   --   --   --   --    LDL CHOLESTEROL 118.2  --  181.4 H  --   --   --   --   --   --    NON-HDL CHOLESTEROL 137  --  200  --   --   --   --   --   --    AST  --   < >  --   < > 13 26 13 20 23   ALT  --   < >  --   < > 7 L 8 L 7 L 7 L 10   < > = values in this interval not displayed.    BNP:    Recent Labs  Lab 08/19/17  1430 11/02/17  2211 12/08/17  0545 02/12/18  1422 06/24/18  1413    H 334 H 354 H 133 H 2,128 H       TSH:    Recent Labs  Lab 12/13/15  1932 06/24/18  1413   TSH 0.487 0.754       Free T4:    Recent Labs  Lab 06/24/18  1413   FREE T4 1.09     ABG    Recent Labs  Lab 06/28/18  0955   PH 7.374   PO2 72*   PCO2 59.3*   HCO3 34.6*   BE 8         Diagnostic Results:  ECG (personally reviewed tracings):  6/24/18 1403 , LAD/PWRP    Chest X-Ray (personally reviewed image(s)): 6/25/18 NAD    CTA C/A/P 6/24/18  1. No evidence of aortic dissection.  2. Extensive calcified and noncalcified plaque seen throughout the aorta with small multifocal outpouchings seen involving the descending thoracic aorta suggestive for small penetrating ulcers.  Similar findings were seen on previous CT from August 2017.  3. 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  4. No evidence of PE.  5. Decreased size area of nodularity with scarring seen within the left lower lobe with resolution of previously visualized cavitation.  No evidence of new lung consolidation.  6. Mild to moderate centrilobular emphysema.    Cath 6/27/18 (images  pers rev)  LVEDP: 9mmHg  LVEF: 50% by echo  Wall Motion: LAD WMA by echo  Dominance: Right  LM: MLI  LAD: MLI  LCx: MLI  RCA: dom, mid 40%  Hemostasis:  R Radial band  Impression:  NSTEMI  Nonobst CAD  Normal LV fxn  R rad vasband for hemostasis  Plan:  Cont med rx  Cont ASA 81mg qd  Stop Plavix  Restart Xarelto 20mg qd this pm  Pt to follow up with Dr. Pulido in 2 weeks    Echo: 6/25/18 (images pers rev)    1 - Low normal to mildly depressed left ventricular systolic function (EF 50-55%).  Distal LAD territory WMA.     2 - Impaired LV relaxation, elevated LAP (grade 2 diastolic dysfunction).     3 - Concentric hypertrophy.     4 - Trivial to mild tricuspid regurgitation.     5 - Pulmonary hypertension. The estimated PA systolic pressure is 52 mmHg.     LLE venous US 1/24/17  Interval decrease in overall thrombus burden of the left lower extremity, although there is persistent deep venous thrombus present within the left femoral and popliteal veins.    Stress Test: L MPI 12/15/15  Nuclear Quantitative Functional Analysis:   LVEF: 66 %  Impression: NORMAL MYOCARDIAL PERFUSION  1. The perfusion scan is free of evidence for myocardial ischemia or injury.   2. Resting wall motion is physiologic.   3. Resting LV function is normal.   4. The ventricular volumes are normal at rest and stress.   5. The extracardiac distribution of radioactivity is normal.   6. There was no previous study available to compare.      Assessment and Plan:     Non-STEMI (non-ST elevated myocardial infarction)    CP/elev trop c/w NSTEMI  Pt reports prior hx of CAD, but no recent isch eval or cath.  EF normal on echo with LAD WMA (echo 6/25/18)  Cath 6/27/18 with nonobst CAD  Cont ASA 81mg qd  Resume Xarelto 20mg qd  Cont atorva 80mg qhs  Check lipids/LFT 3 months (late Sept 2018)            Acute exacerbation of chronic obstructive pulmonary disease (COPD)    Per IM/pulm        PAF (paroxysmal atrial fibrillation)    Currently in SR  On sotalol,  continue  Xarelto resumed        Chronic anticoagulation    For hx of PAF and DVT/PE  Xarelto resumed        History of deep vein thrombosis    As above        Type 2 diabetes mellitus, controlled    Per IM            VTE Risk Mitigation         Ordered     rivaroxaban tablet 20 mg  With dinner      06/27/18 8046        Cardiology will sign off, pls call with questions.    Laureano Pulido MD  Cardiology  [facility]

## 2018-06-28 NOTE — PLAN OF CARE
Problem: Patient Care Overview  Goal: Plan of Care Review  Outcome: Ongoing (interventions implemented as appropriate)  Pt remains in ICU. AAO x 4. Maintains O2 sats on 3LNC while awake; BiPAP placed @ night - tolerated well. Surgicel on TLC site; dsg CDI. Clark to gravity with adequate UOP. Tolerating CL diet at this time. No BM this shift. No c/o pain or SOB. Afebrile. VSS. Remains free of falls, injuries and new breakdown.

## 2018-06-28 NOTE — ASSESSMENT & PLAN NOTE
- Patient on Xarelto for PAF.   - This has been held for [facility], but will resume after procedure

## 2018-06-28 NOTE — SUBJECTIVE & OBJECTIVE
Interval History: Pt was extubated this morning, report some SOB and some chest discomfort earlier but now resolved.    Review of Systems   Constitutional: Negative for chills and fever.   Respiratory: Positive for shortness of breath.    Cardiovascular: Negative for chest pain.     Objective:     Vital Signs (Most Recent):  Temp: 98.5 °F (36.9 °C) (06/27/18 2000)  Pulse: 100 (06/27/18 2215)  Resp: 16 (06/27/18 2215)  BP: (!) 172/83 (06/27/18 2215)  SpO2: (!) 94 % (06/27/18 2215) Vital Signs (24h Range):  Temp:  [98.5 °F (36.9 °C)-99.3 °F (37.4 °C)] 98.5 °F (36.9 °C)  Pulse:  [] 100  Resp:  [15-32] 16  SpO2:  [92 %-100 %] 94 %  BP: (124-180)/(55-86) 172/83     Weight: 78 kg (171 lb 15.3 oz)  Body mass index is 26.93 kg/m².    Intake/Output Summary (Last 24 hours) at 06/27/18 2223  Last data filed at 06/27/18 2200   Gross per 24 hour   Intake           3851.8 ml   Output              698 ml   Net           3153.8 ml      Physical Exam   Constitutional: She is oriented to person, place, and time. She appears well-developed. No distress.   HENT:   Head: Normocephalic and atraumatic.   Eyes: EOM are normal. Pupils are equal, round, and reactive to light.   Neck: Normal range of motion. Neck supple.   Cardiovascular: Normal rate and regular rhythm.    Pulmonary/Chest:   Course breath sounds with fine expiratory wheezing   Abdominal: Soft. Bowel sounds are normal.   Musculoskeletal: Normal range of motion. She exhibits edema.   Neurological: She is alert and oriented to person, place, and time.   Skin: Skin is warm and dry. Capillary refill takes less than 2 seconds. She is not diaphoretic.   Psychiatric: She has a normal mood and affect. Her behavior is normal. Thought content normal.       Significant Labs: All pertinent labs within the past 24 hours have been reviewed.    Significant Imaging: I have reviewed and interpreted all pertinent imaging results/findings within the past 24 hours.

## 2018-06-28 NOTE — ASSESSMENT & PLAN NOTE
Patient with COPD. Intubated for hypercapnic respiratory failure. Patient developed SOB again overnight. Noticeable crackles on exam and volume overload. +5L for the hospitalization.   -Bipap PRN and qhs  -Aggressive diuresis and BP control(consider IV BP gtt)  -Continue bronchodilators.  - IV steroids.

## 2018-06-28 NOTE — PROGRESS NOTES
Results for ANSLEY RICHMOND (MRN [id number]) as of 6/28/2018 10:03   Ref. Range 6/28/2018 09:55   POC PH Latest Ref Range: 7.35 - 7.45  7.374   POC PCO2 Latest Ref Range: 35 - 45 mmHg 59.3 (HH)   POC PO2 Latest Ref Range: 80 - 100 mmHg 72 (L)   POC BE Latest Ref Range: -2 to 2 mmol/L 8   POC HCO3 Latest Ref Range: 24 - 28 mmol/L 34.6 (H)   POC SATURATED O2 Latest Ref Range: 95 - 100 % 93 (L)   POC TCO2 Latest Ref Range: 23 - 27 mmol/L 36 (H)   FiO2 Unknown 35   Sample Unknown ARTERIAL   DelSys Unknown CPAP/BiPAP   Allens Test Unknown Pass   Site Unknown RB   Mode Unknown BiPAP   Rate Unknown 12

## 2018-06-28 NOTE — PROGRESS NOTES
[facility] Hospital Medicine  Progress Note    Patient Name: Yasmeen Palm  MRN: [id number]  Patient Class: IP- Inpatient   Admission Date: 6/24/2018  Length of Stay: 4 days  Attending Physician: Meredith Olguin MD  Primary Care Provider: Angel Orourke Jr, MD        Subjective:     Principal Problem:Acute hypercapnic respiratory failure    HPI:  Yasmeen Palm is a 68 y.o. female that (in part)  has a past medical history of Anticoagulant long-term use; Arthritis; Asthma; CHF (congestive heart failure); COPD (chronic obstructive pulmonary disease); Coronary artery disease; Depression; Diabetes mellitus; GERD (gastroesophageal reflux disease); and Hypertension.  has a past surgical history that includes Abdominal surgery; Cardiac surgery; and Hernia repair. Presents to [facility] Emergency Department complaining of chest pain .  She reports compliance with her home medication regimen, including Xarelto.     Description of symptoms    Location:  Substernal  Onset:  Acute onset 2 days ago  Character:  The patient remained; moderate severity  Frequency:  Daily  Duration:  each episode lasts several minutes at a time  Associated Symptoms:  Diaphoresis, shortness of breath, weakness and fatigue  Radiation:  Recent the chest  Exacerbating factors:  Worse on exertion  Relieving factors:  Minimal relief with supplemental oxygen       In the emergency department routine laboratory studies, chest x-ray, EKG, cardiac enzymes were obtained.  EKG findings were concerning the case was discussed with cardiologist, Dr. Pulido.  It was determined that her EKG was not consistent with STEMI and she has been chest pain-free since arrival.  She had been given Lovenox, aspirin, and plan was to continue trending troponins and monitor closely on telemetry.    Hospital medicine has been asked to admit for further evaluation and treatment.     Hospital Course:  Ms. Palm was admitted to the hospital  originally on 6/24/18 for chest pain. She was later sent to the ICU with acute hypercapnic respiratory failure the same day on BIPAP.  She quickly improved and was sent to the floor on 6/25.  Cards was consulted for NSTEMI with noted troponin increase and was planning on LHC on 6/26. However, the patient was sent back to the ICU on 6/26 with hypercapnic respiratory failure with a C02 of > 100 and was intubated. Pulmonary was consulted. Pt clinically improved from respiratory standpoint and was extubated on 6/27 to NC, but did complain of chest discomfort. Cardiology was notified and patient was taken for LHC on 6/27 which was only remarkable for non-obstructive CAD and did not require intervention.    Interval History: Pt reported increase in SOB early this morning and required to be placed on BIPAP. No reported chest pain.    Review of Systems   Constitutional: Negative for chills and fever.   Respiratory: Positive for shortness of breath.    Cardiovascular: Negative for chest pain.     Objective:     Vital Signs (Most Recent):  Temp: 98.5 °F (36.9 °C) (06/28/18 0715)  Pulse: 90 (06/28/18 0745)  Resp: (!) 26 (06/28/18 0745)  BP: (!) 177/91 (06/28/18 0745)  SpO2: 96 % (06/28/18 0745) Vital Signs (24h Range):  Temp:  [97.7 °F (36.5 °C)-98.8 °F (37.1 °C)] 98.5 °F (36.9 °C)  Pulse:  [] 90  Resp:  [15-38] 26  SpO2:  [91 %-100 %] 96 %  BP: (128-200)/(61-96) 177/91     Weight: 80.7 kg (177 lb 14.6 oz)  Body mass index is 27.86 kg/m².    Intake/Output Summary (Last 24 hours) at 06/28/18 0822  Last data filed at 06/28/18 0730   Gross per 24 hour   Intake           2677.7 ml   Output              940 ml   Net           1737.7 ml      Physical Exam   Constitutional: She is oriented to person, place, and time. She appears well-developed. No distress.   HENT:   Head: Normocephalic and atraumatic.   Eyes: EOM are normal. Pupils are equal, round, and reactive to light.   Neck: Normal range of motion. Neck supple.    Cardiovascular: Normal rate and regular rhythm.    Pulmonary/Chest:   Course breath sounds with decrease at bases and crackles noted, diminished throughout   Abdominal: Soft. Bowel sounds are normal.   Musculoskeletal: Normal range of motion. She exhibits edema.   Neurological: She is alert and oriented to person, place, and time.   Skin: Skin is warm and dry. Capillary refill takes less than 2 seconds. She is not diaphoretic.   Psychiatric: She has a normal mood and affect. Her behavior is normal. Thought content normal.       Significant Labs: All pertinent labs within the past 24 hours have been reviewed.    Significant Imaging: I have reviewed and interpreted all pertinent imaging results/findings within the past 24 hours.    Assessment/Plan:      * Acute hypercapnic respiratory failure    - Due to COPD exacerbation  - s/p intubation and treatment with IV steroids, NEBS  - Pulmonary following and extubated on 6/27  - Respiratory status worse today likely due to volume overload  - Will stop IVF now and start IV lasix Q12         Non-STEMI (non-ST elevated myocardial infarction)    - Followed by Cardiology  - s/p [facility] on 6/27 only remarkable for non-obstructive CAD  - Continue medical management as per Cardiology        Acute diastolic CHF (congestive heart failure)    - Due to volume overload  - Last ECHO with EF=50-55% + grade 2 diastolic dysfunction  - Will d/c IVF given after procedure and start IV lasix          Coronary artery disease involving native coronary artery of native heart with angina pectoris    - [facility] on 6/27 as discussed above  - As per Cardiology        PAF (paroxysmal atrial fibrillation)    - Rate controlled and anticoagulation resumed with Xarelto on 6/27 post [facility]        Neuropathy    - No acute issues         Acute exacerbation of chronic obstructive pulmonary disease (COPD)    - C02 > 100 and s/p intubation  - Treated with NEBS and steroids  - Extubated on 6/27 and respiratory status with  worsening due to volume overload issue today        Elevated brain natriuretic peptide (BNP) level    - Diuresis resumed today with IV lasix        Elevated troponin I level    - As discussed under NSTEMI        Obesity    - Weight reduction as outpatient after all acute issues addressed        Chronic anticoagulation    - Patient on Xarelto for PAF which has been resumed        History of deep vein thrombosis    - Resumed anticoagulation         History of pulmonary embolism    - Xarelto resumed on 6/27 post [facility]        Anemia of chronic disease    - H/H with slight drop noted, but no bleeding  - Will continue to monitor        Tobacco abuse    - Smoking cessation when timing appropriate with respiratory status stable and prior to discharge        Gastroesophageal reflux disease without esophagitis    - Continue PPI        Type 2 diabetes mellitus, controlled    - Complications of peripheral neuropathy.   - Well controlled on current regimen        COPD (chronic obstructive pulmonary disease)    As discussed above          VTE Risk Mitigation         Ordered     rivaroxaban tablet 20 mg  With dinner      06/27/18 2373          Critical care time spent on the evaluation and treatment of severe organ dysfunction, review of pertinent labs and imaging studies, discussions with consulting providers and discussions with patient/family: 35 minutes.    Meredith Olguin MD  Department of Hospital Medicine   [facility]

## 2018-06-28 NOTE — SUBJECTIVE & OBJECTIVE
Past Medical History:   Diagnosis Date    Anticoagulant long-term use     Arthritis     Asthma     CHF (congestive heart failure)     COPD (chronic obstructive pulmonary disease)     Coronary artery disease     Depression     Diabetes mellitus     GERD (gastroesophageal reflux disease)     Hypertension        Past Surgical History:   Procedure Laterality Date    ABDOMINAL SURGERY      CARDIAC SURGERY      HERNIA REPAIR         Review of patient's allergies indicates:  No Known Allergies    No current facility-administered medications on file prior to encounter.      Current Outpatient Prescriptions on File Prior to Encounter   Medication Sig    acetaminophen (TYLENOL) 500 MG tablet Take 1 tablet (500 mg total) by mouth every 8 (eight) hours as needed.    aspirin (ECOTRIN) 81 MG EC tablet Take 81 mg by mouth once daily.    cloNIDine (CATAPRES) 0.1 MG tablet Take 2 tablets (0.2 mg total) by mouth 3 (three) times daily.    diltiaZEM (CARDIZEM) 120 MG tablet Take 1 tablet (120 mg total) by mouth every 8 (eight) hours.    furosemide (LASIX) 40 MG tablet Take 40 mg by mouth once daily.     gabapentin (NEURONTIN) 300 MG capsule Take 300 mg by mouth 3 (three) times daily.    hydrALAZINE (APRESOLINE) 50 MG tablet Take 1 tablet (50 mg total) by mouth every 8 (eight) hours. (Patient taking differently: Take 50 mg by mouth every 12 (twelve) hours. )    levalbuterol (XOPENEX HFA) 45 mcg/actuation inhaler Inhale 2 puffs into the lungs every 4 (four) hours as needed for Wheezing or Shortness of Breath. Rescue    lisinopril (PRINIVIL,ZESTRIL) 40 MG tablet Take 1 tablet (40 mg total) by mouth once daily. Do not take this medication if blood pressure is below 130/80 mmHg and/or feeling dizzy after taking all other blood pressure meds    metformin (GLUCOPHAGE) 500 MG tablet Take 500 mg by mouth 2 (two) times daily with meals.    naproxen (NAPROSYN) 375 MG tablet Take 375 mg by mouth every 12 (twelve) hours as  needed.    rivaroxaban (XARELTO) 20 mg Tab Take 20 mg by mouth daily with dinner or evening meal.    sotalol (BETAPACE) 80 MG tablet Take 80 mg by mouth 2 (two) times daily.    tramadol (ULTRAM) 50 mg tablet Take 1 tablet (50 mg total) by mouth every 6 (six) hours as needed for Pain.    UMECLIDINIUM BRM/VILANTEROL TR (ANORO ELLIPTA INHL) Inhale into the lungs daily as needed.     Family History     Problem Relation (Age of Onset)    Diabetes Father    Heart disease Mother    Hypertension Mother        Social History Main Topics    Smoking status: Current Some Day Smoker     Packs/day: 0.25     Years: 55.00     Types: Cigarettes    Smokeless tobacco: Never Used    Alcohol use 0.6 oz/week     1 Glasses of wine per week    Drug use: No    Sexual activity: No     Review of Systems   Unable to perform ROS: other (on Bipap)     Objective:     Vital Signs (Most Recent):  Temp: 98.5 °F (36.9 °C) (06/28/18 0715)  Pulse: 95 (06/28/18 1015)  Resp: (!) 22 (06/28/18 1015)  BP: (!) 180/84 (06/28/18 1000)  SpO2: 100 % (06/28/18 1015) Vital Signs (24h Range):  Temp:  [97.7 °F (36.5 °C)-98.8 °F (37.1 °C)] 98.5 °F (36.9 °C)  Pulse:  [] 95  Resp:  [15-38] 22  SpO2:  [91 %-100 %] 100 %  BP: (138-218)/(64-96) 180/84     Weight: 80.7 kg (177 lb 14.6 oz)  Body mass index is 27.86 kg/m².    SpO2: 100 %  O2 Device (Oxygen Therapy): BiPAP      Intake/Output Summary (Last 24 hours) at 06/28/18 1153  Last data filed at 06/28/18 1000   Gross per 24 hour   Intake           2222.5 ml   Output             1890 ml   Net            332.5 ml       Lines/Drains/Airways     Central Venous Catheter Line                 Percutaneous Central Line Insertion/Assessment - triple lumen  06/26/18 1120 right internal jugular 2 days          Drain                 Urethral Catheter 06/26/18 1100 Latex 16 Fr. 2 days          Peripheral Intravenous Line                 Peripheral IV - Single Lumen 06/24/18 Left Forearm 4 days                Physical  Exam   Constitutional: She appears well-developed and well-nourished. No distress.   HENT:   Head: Normocephalic and atraumatic.   Eyes: Conjunctivae and EOM are normal. Pupils are equal, round, and reactive to light. No scleral icterus.   Neck: Normal range of motion. Neck supple. No JVD present. No tracheal deviation present. No thyromegaly present.   Cardiovascular: Normal rate, regular rhythm, S1 normal and S2 normal.  Exam reveals no gallop and no friction rub.    No murmur heard.  R rad access site c/d/i   Pulmonary/Chest: Effort normal. No respiratory distress. She has rales.   Abdominal: Soft. She exhibits no distension.   Musculoskeletal: Normal range of motion. She exhibits no edema.   Neurological: She is alert. No cranial nerve deficit.   Skin: Skin is warm and dry. She is not diaphoretic.   Psychiatric: She has a normal mood and affect. Her behavior is normal.       Current Medications:   albuterol-ipratropium  3 mL Nebulization Q6H    aspirin  81 mg Oral Daily    atorvastatin  80 mg Oral QHS    doxycycline  100 mg Per OG tube Q12H    gabapentin  300 mg Oral TID    guaiFENesin  600 mg Oral BID    insulin detemir U-100  15 Units Subcutaneous Daily    lisinopril  10 mg Per NG tube Daily    methylPREDNISolone sodium succinate  40 mg Intravenous Q8H    pantoprazole 40 mg in dextrose 5 % 100 mL infusion (ready to mix system)  40 mg Intravenous Daily    rivaroxaban  20 mg Oral Daily with dinner    sotalol  80 mg Oral BID      dexmedetomidine (PRECEDEX) infusion Stopped (06/26/18 1116)    fentanyl Stopped (06/27/18 0730)    nitroGLYCERIN Stopped (06/27/18 1610)    norepinephrine bitartrate-D5W Stopped (06/27/18 0700)     acetaminophen, acetaminophen, atropine, bisacodyl, dextrose 50%, glucagon (human recombinant), hydrALAZINE, HYDROcodone-acetaminophen, insulin aspart U-100, labetalol, morphine, ondansetron, sodium chloride 0.9%, sodium chloride 0.9%    Laboratory:  CBC:    Recent Labs  Lab  02/12/18  1422 06/24/18  1413 06/26/18  0821 06/26/18  1941 06/27/18  0855 06/28/18  0245   WHITE BLOOD CELL COUNT 11.16 11.29 15.50 H  --  12.11 12.63   HEMOGLOBIN 11.7 L 10.6 L 11.0 L 9.6 L 9.5 L 8.9 L   HEMATOCRIT 40.1 35.5 L 39.2  --  31.9 L 31.8 L   PLATELETS 396 H 468 H 452 H  --  391 H 345       CHEMISTRIES:    Recent Labs  Lab 02/12/18  1422  06/25/18  0506 06/26/18  0821 06/26/18  1941 06/27/18  0220 06/28/18  0245   GLUCOSE 153 H  < > 167 H 146 H 190 H 190 H 158 H   SODIUM 142  < > 144 142 144 144 145   POTASSIUM 3.8  < > 3.8 4.4 4.1 4.0 4.4   BUN BLD 11  < > 16 27 H 34 H 38 H 26 H   CREATININE 0.7  < > 0.9 0.9 1.3 1.1 0.7   EGFR IF AFRICAN AMERICAN >60  < > >60 >60 49 A >60 >60   EGFR IF NON- >60  < > >60 >60 43 A 52 A >60   CALCIUM 9.8  < > 10.1 9.8 9.0 8.9 8.8   MAGNESIUM 1.5 L  --  1.7 2.0 1.6  --  2.1   < > = values in this interval not displayed.    CARDIAC BIOMARKERS:    Recent Labs  Lab 12/13/15  1932  02/12/18  1422 02/12/18  1808 06/24/18  1413 06/24/18  2153 06/25/18  0506   CPK 33  --   --   --   --   --   --    CPK MB 1.0  --   --   --   --   --   --    TROPONIN I <0.006  < > 0.021 0.021 0.896 H 0.870 H 0.667 H   < > = values in this interval not displayed.    COAGS:    Recent Labs  Lab 04/10/17  1129 08/12/17  0036 11/02/17  2211 12/08/17  0545 06/27/18  1738   INR 1.1 1.2 1.0 1.0 1.1       LIPIDS/LFTS:    Recent Labs  Lab 12/15/15  0540  08/12/17  1037  11/02/17  2211 12/08/17  0545 02/12/18  1422 06/24/18  1413 06/25/18  0506   CHOLESTEROL 170  --  243 H  --   --   --   --   --   --    TRIGLYCERIDES 94  --  93  --   --   --   --   --   --    HDL 33 L  --  43  --   --   --   --   --   --    LDL CHOLESTEROL 118.2  --  181.4 H  --   --   --   --   --   --    NON-HDL CHOLESTEROL 137  --  200  --   --   --   --   --   --    AST  --   < >  --   < > 13 26 13 20 23   ALT  --   < >  --   < > 7 L 8 L 7 L 7 L 10   < > = values in this interval not displayed.    BNP:    Recent Labs  Lab  08/19/17  1430 11/02/17  2211 12/08/17  0545 02/12/18  1422 06/24/18  1413    H 334 H 354 H 133 H 2,128 H       TSH:    Recent Labs  Lab 12/13/15  1932 06/24/18  1413   TSH 0.487 0.754       Free T4:    Recent Labs  Lab 06/24/18  1413   FREE T4 1.09     ABG    Recent Labs  Lab 06/28/18  0955   PH 7.374   PO2 72*   PCO2 59.3*   HCO3 34.6*   BE 8         Diagnostic Results:  ECG (personally reviewed tracings):  6/24/18 1403 , LAD/PWRP    Chest X-Ray (personally reviewed image(s)): 6/25/18 NAD    CTA C/A/P 6/24/18  1. No evidence of aortic dissection.  2. Extensive calcified and noncalcified plaque seen throughout the aorta with small multifocal outpouchings seen involving the descending thoracic aorta suggestive for small penetrating ulcers.  Similar findings were seen on previous CT from August 2017.  3. 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  4. No evidence of PE.  5. Decreased size area of nodularity with scarring seen within the left lower lobe with resolution of previously visualized cavitation.  No evidence of new lung consolidation.  6. Mild to moderate centrilobular emphysema.    Cath 6/27/18 (images pers rev)  LVEDP: 9mmHg  LVEF: 50% by echo  Wall Motion: LAD WMA by echo  Dominance: Right  LM: MLI  LAD: MLI  LCx: MLI  RCA: dom, mid 40%  Hemostasis:  R Radial band  Impression:  NSTEMI  Nonobst CAD  Normal LV fxn  R rad vasband for hemostasis  Plan:  Cont med rx  Cont ASA 81mg qd  Stop Plavix  Restart Xarelto 20mg qd this pm  Pt to follow up with Dr. Pulido in 2 weeks    Echo: 6/25/18 (images pers rev)    1 - Low normal to mildly depressed left ventricular systolic function (EF 50-55%).  Distal LAD territory WMA.     2 - Impaired LV relaxation, elevated LAP (grade 2 diastolic dysfunction).     3 - Concentric hypertrophy.     4 - Trivial to mild tricuspid regurgitation.     5 - Pulmonary hypertension. The estimated PA systolic  pressure is 52 mmHg.     LLE venous US 1/24/17  Interval decrease in overall thrombus burden of the left lower extremity, although there is persistent deep venous thrombus present within the left femoral and popliteal veins.    Stress Test: L MPI 12/15/15  Nuclear Quantitative Functional Analysis:   LVEF: 66 %  Impression: NORMAL MYOCARDIAL PERFUSION  1. The perfusion scan is free of evidence for myocardial ischemia or injury.   2. Resting wall motion is physiologic.   3. Resting LV function is normal.   4. The ventricular volumes are normal at rest and stress.   5. The extracardiac distribution of radioactivity is normal.   6. There was no previous study available to compare.

## 2018-06-28 NOTE — ASSESSMENT & PLAN NOTE
- Due to COPD exacerbation  - s/p intubation and treatment with IV steroids, NEBS  - Pulmonary following and extubated this morning  - Clinically improving as per Pulmonary

## 2018-06-28 NOTE — PROGRESS NOTES
"0715: report received from night shift nurse, pt reporting SOB, placed on BIPAP, HR 80-90s with O2 sats >95%, SBP 180s. Niswetha at bedside, MD Olguin at bedside, assessing pt, notified of pt's C/O SOB this AM, but denies CP.     0730: clarified with MD Olguin and MD Ely lasix ordered of 40 mg BID and 60 mg once. Per MD Olguin okay to D/C 40 mg lasix BID and give pt 60 mg lasix as ordered now.     0900: pt continues to C/O of SOB;  O2 sats 96%. Pt readjusted in bed, slight improvement stated per pt. Pt stated, "Can you take my anxiety away." MD Ely notified of pt continues to C/O SOB and of -200s. New orders given per MD Ely for ABG and 10 mg of labetaol IVP.     0950: pt continues to C/O of SOB and anxiety, MD Olguin notified. MD Olguin notified of PO meds held due to pt consistent C/O SOB and being on BIPAP.    1023: notified MD Olguin of pt C/O anxiety and of -200 despite PRN meds given    "

## 2018-06-28 NOTE — SUBJECTIVE & OBJECTIVE
Interval History: Pt reported increase in SOB early this morning and required to be placed on BIPAP. No reported chest pain.    Review of Systems   Constitutional: Negative for chills and fever.   Respiratory: Positive for shortness of breath.    Cardiovascular: Negative for chest pain.     Objective:     Vital Signs (Most Recent):  Temp: 98.5 °F (36.9 °C) (06/28/18 0715)  Pulse: 90 (06/28/18 0745)  Resp: (!) 26 (06/28/18 0745)  BP: (!) 177/91 (06/28/18 0745)  SpO2: 96 % (06/28/18 0745) Vital Signs (24h Range):  Temp:  [97.7 °F (36.5 °C)-98.8 °F (37.1 °C)] 98.5 °F (36.9 °C)  Pulse:  [] 90  Resp:  [15-38] 26  SpO2:  [91 %-100 %] 96 %  BP: (128-200)/(61-96) 177/91     Weight: 80.7 kg (177 lb 14.6 oz)  Body mass index is 27.86 kg/m².    Intake/Output Summary (Last 24 hours) at 06/28/18 0822  Last data filed at 06/28/18 0730   Gross per 24 hour   Intake           2677.7 ml   Output              940 ml   Net           1737.7 ml      Physical Exam   Constitutional: She is oriented to person, place, and time. She appears well-developed. No distress.   HENT:   Head: Normocephalic and atraumatic.   Eyes: EOM are normal. Pupils are equal, round, and reactive to light.   Neck: Normal range of motion. Neck supple.   Cardiovascular: Normal rate and regular rhythm.    Pulmonary/Chest:   Course breath sounds with decrease at bases and crackles noted, diminished throughout   Abdominal: Soft. Bowel sounds are normal.   Musculoskeletal: Normal range of motion. She exhibits edema.   Neurological: She is alert and oriented to person, place, and time.   Skin: Skin is warm and dry. Capillary refill takes less than 2 seconds. She is not diaphoretic.   Psychiatric: She has a normal mood and affect. Her behavior is normal. Thought content normal.       Significant Labs: All pertinent labs within the past 24 hours have been reviewed.    Significant Imaging: I have reviewed and interpreted all pertinent imaging results/findings within  the past 24 hours.

## 2018-06-28 NOTE — ASSESSMENT & PLAN NOTE
- Followed by Cardiology  - Reported chest discomfort  - Plan for [facility] today

## 2018-06-28 NOTE — PROGRESS NOTES
[facility] Hospital Medicine  Progress Note    Patient Name: Yasmeen Palm  MRN: [id number]  Patient Class: IP- Inpatient   Admission Date: 6/24/2018  Length of Stay: 3 days  Attending Physician: Meredith Olguin MD  Primary Care Provider: Angel Orourke Jr, MD        Subjective:     Principal Problem:Acute hypercapnic respiratory failure    HPI:  Yasmeen Palm is a 68 y.o. female that (in part)  has a past medical history of Anticoagulant long-term use; Arthritis; Asthma; CHF (congestive heart failure); COPD (chronic obstructive pulmonary disease); Coronary artery disease; Depression; Diabetes mellitus; GERD (gastroesophageal reflux disease); and Hypertension.  has a past surgical history that includes Abdominal surgery; Cardiac surgery; and Hernia repair. Presents to [facility] Emergency Department complaining of chest pain .  She reports compliance with her home medication regimen, including Xarelto.     Description of symptoms    Location:  Substernal  Onset:  Acute onset 2 days ago  Character:  The patient remained; moderate severity  Frequency:  Daily  Duration:  each episode lasts several minutes at a time  Associated Symptoms:  Diaphoresis, shortness of breath, weakness and fatigue  Radiation:  Recent the chest  Exacerbating factors:  Worse on exertion  Relieving factors:  Minimal relief with supplemental oxygen       In the emergency department routine laboratory studies, chest x-ray, EKG, cardiac enzymes were obtained.  EKG findings were concerning the case was discussed with cardiologist, Dr. Pulido.  It was determined that her EKG was not consistent with STEMI and she has been chest pain-free since arrival.  She had been given Lovenox, aspirin, and plan was to continue trending troponins and monitor closely on telemetry.    Hospital medicine has been asked to admit for further evaluation and treatment.     Hospital Course:  The patient was admitted to the  hospital originally on 6/24/18 for a CC of CP. She was sent to the ICU with acute hypercapnic respiratory failure the same day on Bi-pap and quickly improved and was sent to the floor on 6/25.  Cards was consulted for non-stemi with minimal trop elevation for plans for a LHC on 6/26. However, the patient was sent back to the ICU this morning with hypercapnic respiratory failure with a C02 of > 100 and was intubated. Pulmonary was consulted.     Interval History: Pt was extubated this morning, report some SOB and some chest discomfort earlier but now resolved.    Review of Systems   Constitutional: Negative for chills and fever.   Respiratory: Positive for shortness of breath.    Cardiovascular: Negative for chest pain.     Objective:     Vital Signs (Most Recent):  Temp: 98.5 °F (36.9 °C) (06/27/18 2000)  Pulse: 100 (06/27/18 2215)  Resp: 16 (06/27/18 2215)  BP: (!) 172/83 (06/27/18 2215)  SpO2: (!) 94 % (06/27/18 2215) Vital Signs (24h Range):  Temp:  [98.5 °F (36.9 °C)-99.3 °F (37.4 °C)] 98.5 °F (36.9 °C)  Pulse:  [] 100  Resp:  [15-32] 16  SpO2:  [92 %-100 %] 94 %  BP: (124-180)/(55-86) 172/83     Weight: 78 kg (171 lb 15.3 oz)  Body mass index is 26.93 kg/m².    Intake/Output Summary (Last 24 hours) at 06/27/18 2223  Last data filed at 06/27/18 2200   Gross per 24 hour   Intake           3851.8 ml   Output              698 ml   Net           3153.8 ml      Physical Exam   Constitutional: She is oriented to person, place, and time. She appears well-developed. No distress.   HENT:   Head: Normocephalic and atraumatic.   Eyes: EOM are normal. Pupils are equal, round, and reactive to light.   Neck: Normal range of motion. Neck supple.   Cardiovascular: Normal rate and regular rhythm.    Pulmonary/Chest:   Course breath sounds with fine expiratory wheezing   Abdominal: Soft. Bowel sounds are normal.   Musculoskeletal: Normal range of motion. She exhibits edema.   Neurological: She is alert and oriented to  person, place, and time.   Skin: Skin is warm and dry. Capillary refill takes less than 2 seconds. She is not diaphoretic.   Psychiatric: She has a normal mood and affect. Her behavior is normal. Thought content normal.       Significant Labs: All pertinent labs within the past 24 hours have been reviewed.    Significant Imaging: I have reviewed and interpreted all pertinent imaging results/findings within the past 24 hours.    Assessment/Plan:      * Acute hypercapnic respiratory failure    - Due to COPD exacerbation  - s/p intubation and treatment with IV steroids, NEBS  - Pulmonary following and extubated this morning  - Clinically improving as per Pulmonary        Non-STEMI (non-ST elevated myocardial infarction)    - Followed by Cardiology  - Reported chest discomfort  - Plan for [facility] today        Coronary artery disease involving native coronary artery of native heart with angina pectoris    - [facility] today  - As per Cardiology        PAF (paroxysmal atrial fibrillation)    - Rate controlled and anticoagulation held for procedure  - Will resume when able        Neuropathy    - No acute issues         Acute exacerbation of chronic obstructive pulmonary disease (COPD)    - C02 > 100 and s/p intubation  - Treated with NEBS and steroids  - Extubated today (6/27)        Elevated troponin I level    - As discussed under NSTEMI        Obesity    - Weight reduction as outpatient after all acute issues addressed        Chronic anticoagulation    - Patient on Xarelto for PAF.   - This has been held for [facility], but will resume after procedure        History of deep vein thrombosis    - Resume anticoagulation when able        History of pulmonary embolism    - Pt was on Xarelto but has been held for procedure  - Will resume when okay with Cardiology        Anemia of chronic disease    - H/H stable, monitor        Tobacco abuse    - Smoking cessation when timing appropriate and prior to discharge        Gastroesophageal reflux  disease without esophagitis    - Continue PPI        Type 2 diabetes mellitus, controlled    - Complications of peripheral neuropathy.   - Well controlled on current regimen        COPD (chronic obstructive pulmonary disease)    As discussed above          VTE Risk Mitigation         Ordered     rivaroxaban tablet 20 mg  With dinner      06/27/18 8284          Critical care time spent on the evaluation and treatment of severe organ dysfunction, review of pertinent labs and imaging studies, discussions with consulting providers and discussions with patient/family: 60 minutes.    Meredith Olguin MD  Department of Hospital Medicine   [facility]

## 2018-06-28 NOTE — PLAN OF CARE
Problem: Patient Care Overview  Goal: Plan of Care Review  Outcome: Ongoing (interventions implemented as appropriate)  Pt remains in the ICU this shift. C/O SOB this am, placed on BIPAP and ABG ordered. 60 mg of lasix IVP given x1, with good UOP response. -200s this AM, 10 mg labetalol IVP PRN given x1, slight improvement. Pt stated she was anxious, 1 mg ativan given x1. BP improvement this afternoon with no SOB reported. Pt able to tolerate diet this evening and oxygen weaned to 4 L NC while awake, BIPAP when sleeping. Son and significant other at bedside updated on POC. No falls, injuries, or new skin breakdown, precautions maintained for all.

## 2018-06-28 NOTE — ASSESSMENT & PLAN NOTE
- Followed by Cardiology  - s/p C on 6/27 only remarkable for non-obstructive CAD  - Continue medical management as per Cardiology

## 2018-06-28 NOTE — ASSESSMENT & PLAN NOTE
- Due to COPD exacerbation  - s/p intubation and treatment with IV steroids, NEBS  - Pulmonary following and extubated on 6/27  - Respiratory status worse today likely due to volume overload  - Will stop IVF now and start IV lasix Q12

## 2018-06-29 PROBLEM — R53.81 DEBILITY: Status: ACTIVE | Noted: 2018-06-29

## 2018-06-29 LAB
ANION GAP SERPL CALC-SCNC: 6 MMOL/L
BACTERIA SPEC AEROBE CULT: NORMAL
BUN SERPL-MCNC: 26 MG/DL
CALCIUM SERPL-MCNC: 9.3 MG/DL
CHLORIDE SERPL-SCNC: 104 MMOL/L
CO2 SERPL-SCNC: 35 MMOL/L
CREAT SERPL-MCNC: 0.7 MG/DL
EST. GFR  (AFRICAN AMERICAN): >60 ML/MIN/1.73 M^2
EST. GFR  (NON AFRICAN AMERICAN): >60 ML/MIN/1.73 M^2
GLUCOSE SERPL-MCNC: 171 MG/DL
GRAM STN SPEC: NORMAL
MAGNESIUM SERPL-MCNC: 2.1 MG/DL
PHOSPHATE SERPL-MCNC: 1.6 MG/DL
POCT GLUCOSE: 182 MG/DL (ref 70–110)
POCT GLUCOSE: 205 MG/DL (ref 70–110)
POCT GLUCOSE: 227 MG/DL (ref 70–110)
POCT GLUCOSE: 270 MG/DL (ref 70–110)
POTASSIUM SERPL-SCNC: 4.3 MMOL/L
SODIUM SERPL-SCNC: 145 MMOL/L

## 2018-06-29 PROCEDURE — 83735 ASSAY OF MAGNESIUM: CPT

## 2018-06-29 PROCEDURE — 99233 SBSQ HOSP IP/OBS HIGH 50: CPT | Mod: ,,, | Performed by: INTERNAL MEDICINE

## 2018-06-29 PROCEDURE — 25000003 PHARM REV CODE 250: Performed by: EMERGENCY MEDICINE

## 2018-06-29 PROCEDURE — 25000003 PHARM REV CODE 250: Performed by: HOSPITALIST

## 2018-06-29 PROCEDURE — 25000003 PHARM REV CODE 250: Performed by: INTERNAL MEDICINE

## 2018-06-29 PROCEDURE — 25000242 PHARM REV CODE 250 ALT 637 W/ HCPCS: Performed by: HOSPITALIST

## 2018-06-29 PROCEDURE — 63600175 PHARM REV CODE 636 W HCPCS: Performed by: HOSPITALIST

## 2018-06-29 PROCEDURE — 36415 COLL VENOUS BLD VENIPUNCTURE: CPT

## 2018-06-29 PROCEDURE — 63600175 PHARM REV CODE 636 W HCPCS: Performed by: INTERNAL MEDICINE

## 2018-06-29 PROCEDURE — C9113 INJ PANTOPRAZOLE SODIUM, VIA: HCPCS | Performed by: INTERNAL MEDICINE

## 2018-06-29 PROCEDURE — 84100 ASSAY OF PHOSPHORUS: CPT

## 2018-06-29 PROCEDURE — 94640 AIRWAY INHALATION TREATMENT: CPT

## 2018-06-29 PROCEDURE — 99900035 HC TECH TIME PER 15 MIN (STAT)

## 2018-06-29 PROCEDURE — 94660 CPAP INITIATION&MGMT: CPT

## 2018-06-29 PROCEDURE — 80048 BASIC METABOLIC PNL TOTAL CA: CPT

## 2018-06-29 PROCEDURE — 21400001 HC TELEMETRY ROOM

## 2018-06-29 RX ORDER — CLONIDINE HYDROCHLORIDE 0.1 MG/1
0.2 TABLET ORAL 3 TIMES DAILY
Status: DISCONTINUED | OUTPATIENT
Start: 2018-06-29 | End: 2018-07-01

## 2018-06-29 RX ORDER — HYDRALAZINE HYDROCHLORIDE 25 MG/1
50 TABLET, FILM COATED ORAL EVERY 8 HOURS
Status: DISCONTINUED | OUTPATIENT
Start: 2018-06-29 | End: 2018-07-13

## 2018-06-29 RX ORDER — PREDNISONE 20 MG/1
40 TABLET ORAL DAILY
Status: DISCONTINUED | OUTPATIENT
Start: 2018-06-30 | End: 2018-07-08

## 2018-06-29 RX ORDER — LORAZEPAM 2 MG/ML
1 INJECTION INTRAMUSCULAR ONCE
Status: COMPLETED | OUTPATIENT
Start: 2018-06-29 | End: 2018-06-29

## 2018-06-29 RX ORDER — IPRATROPIUM BROMIDE AND ALBUTEROL SULFATE 2.5; .5 MG/3ML; MG/3ML
3 SOLUTION RESPIRATORY (INHALATION) EVERY 6 HOURS
Status: DISCONTINUED | OUTPATIENT
Start: 2018-06-29 | End: 2018-06-29

## 2018-06-29 RX ORDER — DOXYCYCLINE HYCLATE 100 MG
100 TABLET ORAL EVERY 12 HOURS
Status: COMPLETED | OUTPATIENT
Start: 2018-06-29 | End: 2018-07-02

## 2018-06-29 RX ORDER — PANTOPRAZOLE SODIUM 40 MG/1
40 TABLET, DELAYED RELEASE ORAL DAILY
Status: DISCONTINUED | OUTPATIENT
Start: 2018-06-29 | End: 2018-07-24 | Stop reason: SDUPTHER

## 2018-06-29 RX ORDER — FUROSEMIDE 10 MG/ML
40 INJECTION INTRAMUSCULAR; INTRAVENOUS ONCE
Status: COMPLETED | OUTPATIENT
Start: 2018-06-29 | End: 2018-06-29

## 2018-06-29 RX ORDER — DILTIAZEM HYDROCHLORIDE 30 MG/1
120 TABLET, FILM COATED ORAL EVERY 8 HOURS
Status: DISCONTINUED | OUTPATIENT
Start: 2018-06-29 | End: 2018-07-13

## 2018-06-29 RX ORDER — IPRATROPIUM BROMIDE AND ALBUTEROL SULFATE 2.5; .5 MG/3ML; MG/3ML
3 SOLUTION RESPIRATORY (INHALATION) EVERY 6 HOURS
Status: COMPLETED | OUTPATIENT
Start: 2018-06-29 | End: 2018-07-03

## 2018-06-29 RX ADMIN — HYDRALAZINE HYDROCHLORIDE 50 MG: 25 TABLET ORAL at 09:06

## 2018-06-29 RX ADMIN — RIVAROXABAN 20 MG: 20 TABLET, FILM COATED ORAL at 05:06

## 2018-06-29 RX ADMIN — CLONIDINE HYDROCHLORIDE 0.2 MG: 0.1 TABLET ORAL at 08:06

## 2018-06-29 RX ADMIN — CLONIDINE HYDROCHLORIDE 0.2 MG: 0.1 TABLET ORAL at 02:06

## 2018-06-29 RX ADMIN — IPRATROPIUM BROMIDE AND ALBUTEROL SULFATE 3 ML: .5; 2.5 SOLUTION RESPIRATORY (INHALATION) at 01:06

## 2018-06-29 RX ADMIN — DILTIAZEM HYDROCHLORIDE 120 MG: 30 TABLET, FILM COATED ORAL at 09:06

## 2018-06-29 RX ADMIN — CLONIDINE HYDROCHLORIDE 0.2 MG: 0.1 TABLET ORAL at 09:06

## 2018-06-29 RX ADMIN — IPRATROPIUM BROMIDE AND ALBUTEROL SULFATE 3 ML: .5; 2.5 SOLUTION RESPIRATORY (INHALATION) at 08:06

## 2018-06-29 RX ADMIN — INSULIN ASPART 3 UNITS: 100 INJECTION, SOLUTION INTRAVENOUS; SUBCUTANEOUS at 05:06

## 2018-06-29 RX ADMIN — ASPIRIN 81 MG CHEWABLE TABLET 81 MG: 81 TABLET CHEWABLE at 08:06

## 2018-06-29 RX ADMIN — SOTALOL HYDROCHLORIDE 80 MG: 80 TABLET ORAL at 09:06

## 2018-06-29 RX ADMIN — GUAIFENESIN 600 MG: 600 TABLET, EXTENDED RELEASE ORAL at 08:06

## 2018-06-29 RX ADMIN — GABAPENTIN 300 MG: 300 CAPSULE ORAL at 02:06

## 2018-06-29 RX ADMIN — FUROSEMIDE 40 MG: 10 INJECTION, SOLUTION INTRAMUSCULAR; INTRAVENOUS at 10:06

## 2018-06-29 RX ADMIN — LORAZEPAM 1 MG: 2 INJECTION INTRAMUSCULAR; INTRAVENOUS at 08:06

## 2018-06-29 RX ADMIN — HYDRALAZINE HYDROCHLORIDE 50 MG: 25 TABLET ORAL at 02:06

## 2018-06-29 RX ADMIN — DILTIAZEM HYDROCHLORIDE 120 MG: 30 TABLET, FILM COATED ORAL at 02:06

## 2018-06-29 RX ADMIN — LABETALOL HYDROCHLORIDE 20 MG: 5 INJECTION, SOLUTION INTRAVENOUS at 05:06

## 2018-06-29 RX ADMIN — HYDRALAZINE HYDROCHLORIDE 10 MG: 20 INJECTION INTRAMUSCULAR; INTRAVENOUS at 12:06

## 2018-06-29 RX ADMIN — DOXYCYCLINE HYCLATE 100 MG: 100 TABLET, COATED ORAL at 08:06

## 2018-06-29 RX ADMIN — HYDRALAZINE HYDROCHLORIDE 50 MG: 25 TABLET ORAL at 08:06

## 2018-06-29 RX ADMIN — DILTIAZEM HYDROCHLORIDE 120 MG: 30 TABLET, FILM COATED ORAL at 08:06

## 2018-06-29 RX ADMIN — GABAPENTIN 300 MG: 300 CAPSULE ORAL at 09:06

## 2018-06-29 RX ADMIN — METHYLPREDNISOLONE SODIUM SUCCINATE 40 MG: 40 INJECTION, POWDER, FOR SOLUTION INTRAMUSCULAR; INTRAVENOUS at 05:06

## 2018-06-29 RX ADMIN — SOTALOL HYDROCHLORIDE 80 MG: 80 TABLET ORAL at 08:06

## 2018-06-29 RX ADMIN — INSULIN ASPART 1 UNITS: 100 INJECTION, SOLUTION INTRAVENOUS; SUBCUTANEOUS at 10:06

## 2018-06-29 RX ADMIN — DEXTROSE 40 MG: 50 INJECTION, SOLUTION INTRAVENOUS at 08:06

## 2018-06-29 RX ADMIN — GABAPENTIN 300 MG: 300 CAPSULE ORAL at 08:06

## 2018-06-29 RX ADMIN — INSULIN ASPART 2 UNITS: 100 INJECTION, SOLUTION INTRAVENOUS; SUBCUTANEOUS at 11:06

## 2018-06-29 RX ADMIN — DOXYCYCLINE HYCLATE 100 MG: 100 TABLET, COATED ORAL at 09:06

## 2018-06-29 RX ADMIN — LISINOPRIL 10 MG: 5 TABLET ORAL at 08:06

## 2018-06-29 RX ADMIN — IPRATROPIUM BROMIDE AND ALBUTEROL SULFATE 3 ML: .5; 2.5 SOLUTION RESPIRATORY (INHALATION) at 07:06

## 2018-06-29 RX ADMIN — INSULIN DETEMIR 15 UNITS: 100 INJECTION, SOLUTION SUBCUTANEOUS at 08:06

## 2018-06-29 RX ADMIN — ATORVASTATIN CALCIUM 80 MG: 40 TABLET, FILM COATED ORAL at 09:06

## 2018-06-29 RX ADMIN — GUAIFENESIN 600 MG: 600 TABLET, EXTENDED RELEASE ORAL at 09:06

## 2018-06-29 NOTE — PT/OT/SLP PROGRESS
Physical Therapy      Patient Name:  Yasmeen Palm   MRN:  [id number]    Patient not seen at this time for PT eval secondary to Other (Per nurse Yasmeen, pt was given Ativan this AM for anxiety.  Pt was arousable, however unable to maintain alertness.). Will follow-up as able.    Estephania Resendiz, PT

## 2018-06-29 NOTE — NURSING TRANSFER
Nursing Transfer Note      6/29/2018     Transfer From: ICU    Transfer via bed    Transfer with  to O2, cardiac monitoring    Transported by ICU Nurse and Transport    Medicines sent: Yes    Chart send with patient: Yes    Notified: son    Patient reassessed at:  6/29/2018, 1645     Upon arrival to floor: cardiac monitor applied, patient oriented to room, call bell in reach and bed in lowest position

## 2018-06-29 NOTE — PT/OT/SLP PROGRESS
Occupational Therapy      Patient Name:  Yasmeen Palm   MRN:  [id number]    Patient not seen today secondary to Unavailable (Comment) (nurse Yasmeen stated that patient given Ativan for anxiety at 8am, unable to awaken at this time). Will follow-up as able.    HEDY Wiley, MS  6/29/2018

## 2018-06-29 NOTE — PLAN OF CARE
Problem: Patient Care Overview  Goal: Plan of Care Review  Outcome: Ongoing (interventions implemented as appropriate)  Pt remains in ICU. AAO x 4. PRN meds given for elevated BP; relief noted. No c/o CP, SOB or pain. Maintains O2 sats on 4LNC; placed on BiPAP while sleeping. Clark to gravity with adequate UOP. No BM this shift. Son at bedside. VSS. Remains free of falls, injuries and new breakdown.

## 2018-06-29 NOTE — SUBJECTIVE & OBJECTIVE
Interval History: Pt slept with BIPAP and had no acute issues overnight. She was anxious and BP increased as well this am. No reported chest pain.    Review of Systems   Constitutional: Negative for chills and fever.   Respiratory: Positive for shortness of breath.    Cardiovascular: Negative for chest pain.     Objective:     Vital Signs (Most Recent):  Temp: 98.8 °F (37.1 °C) (06/29/18 0715)  Pulse: 80 (06/29/18 0900)  Resp: (!) 26 (06/29/18 0900)  BP: (!) 184/99 (06/29/18 0900)  SpO2: 98 % (06/29/18 0900) Vital Signs (24h Range):  Temp:  [98.7 °F (37.1 °C)-99.2 °F (37.3 °C)] 98.8 °F (37.1 °C)  Pulse:  [] 80  Resp:  [14-26] 26  SpO2:  [90 %-100 %] 98 %  BP: (127-212)/(58-99) 184/99     Weight: 77.5 kg (170 lb 13.7 oz)  Body mass index is 26.76 kg/m².    Intake/Output Summary (Last 24 hours) at 06/29/18 0936  Last data filed at 06/29/18 0800   Gross per 24 hour   Intake              220 ml   Output             1852 ml   Net            -1632 ml      Physical Exam   Constitutional: She is oriented to person, place, and time. She appears well-developed. No distress.   HENT:   Head: Normocephalic and atraumatic.   Eyes: EOM are normal. Pupils are equal, round, and reactive to light.   Neck: Normal range of motion. Neck supple.   Cardiovascular: Normal rate and regular rhythm.    Pulmonary/Chest:   Course breath sounds with decrease at bases but much improved from yesterday and good air movement. No wheezing   Abdominal: Soft. Bowel sounds are normal.   Musculoskeletal: Normal range of motion. She exhibits edema.   Neurological: She is alert and oriented to person, place, and time.   Skin: Skin is warm and dry. Capillary refill takes less than 2 seconds. She is not diaphoretic.   Psychiatric: She has a normal mood and affect. Her behavior is normal. Thought content normal.       Significant Labs: All pertinent labs within the past 24 hours have been reviewed.    Significant Imaging: I have reviewed and interpreted  all pertinent imaging results/findings within the past 24 hours.

## 2018-06-29 NOTE — ASSESSMENT & PLAN NOTE
- Due to COPD exacerbation   - s/p intubation and treatment with IV steroids, NEBS  - Pulmonary following and extubated on 6/27  - Respiratory status was worse on 6/28 due to volume overload  - s/p IV lasix with good output and now back down to NC  - Respiratory status stable for transfer to floor  - Will d/c IV steroids today and transition to prednisone taper tomorrow  - Pt to continue BIPAP QHS

## 2018-06-29 NOTE — ASSESSMENT & PLAN NOTE
- Due to volume overload  - Last ECHO with EF=50-55% + grade 2 diastolic dysfunction on 6/26  - Stopped IVF on 6/28 and s/p IV lasix x 1 yesterday  - Will give another pulse dose of IV lasix today, but patient almost fully compensated

## 2018-06-29 NOTE — PLAN OF CARE
Problem: Patient Care Overview  Goal: Plan of Care Review  Patient placed on bipap with charted settings. Will continue to monitor. RADHA

## 2018-06-29 NOTE — NURSING TRANSFER
Nursing Transfer Note      6/29/2018     Transfer To: 310    Transfer via bed    Transfer with  to O2, cardiac monitoring    Transported by rn and transport    Medicines sent: yes    Chart send with patient: Yes    Notified: spouse, son    Patient reassessed at: 6/29/18 1600     Upon arrival to floor: cardiac monitor applied, patient oriented to room, call bell in reach and bed in lowest position

## 2018-06-29 NOTE — ASSESSMENT & PLAN NOTE
- C02 > 100 and s/p intubation  - Treated with NEBS and steroids  - Extubated on 6/27 and respiratory status with worsening due to volume overload issue today  - Will wean off IV steroids to PO as discussed above

## 2018-06-29 NOTE — ASSESSMENT & PLAN NOTE
- Difficult to control BP  - Continue lisinopril, and restarted hydralazine, clonidine, diltiazem today (held due to previous hypotensive issues)  - PRN labetalol

## 2018-06-29 NOTE — PROGRESS NOTES
[facility] Hospital Medicine  Progress Note    Patient Name: Yasmeen Palm  MRN: [id number]  Patient Class: IP- Inpatient   Admission Date: 6/24/2018  Length of Stay: 5 days  Attending Physician: Meredith Olguin MD  Primary Care Provider: Angel Orourke Jr, MD        Subjective:     Principal Problem:Acute hypercapnic respiratory failure    HPI:  Yasmeen Palm is a 68 y.o. female that (in part)  has a past medical history of Anticoagulant long-term use; Arthritis; Asthma; CHF (congestive heart failure); COPD (chronic obstructive pulmonary disease); Coronary artery disease; Depression; Diabetes mellitus; GERD (gastroesophageal reflux disease); and Hypertension.  has a past surgical history that includes Abdominal surgery; Cardiac surgery; and Hernia repair. Presents to [facility] Emergency Department complaining of chest pain .  She reports compliance with her home medication regimen, including Xarelto.     Description of symptoms    Location:  Substernal  Onset:  Acute onset 2 days ago  Character:  The patient remained; moderate severity  Frequency:  Daily  Duration:  each episode lasts several minutes at a time  Associated Symptoms:  Diaphoresis, shortness of breath, weakness and fatigue  Radiation:  Recent the chest  Exacerbating factors:  Worse on exertion  Relieving factors:  Minimal relief with supplemental oxygen       In the emergency department routine laboratory studies, chest x-ray, EKG, cardiac enzymes were obtained.  EKG findings were concerning the case was discussed with cardiologist, Dr. Pulido.  It was determined that her EKG was not consistent with STEMI and she has been chest pain-free since arrival.  She had been given Lovenox, aspirin, and plan was to continue trending troponins and monitor closely on telemetry.    Hospital medicine has been asked to admit for further evaluation and treatment.     Hospital Course:  Ms. Palm was admitted to the hospital  originally on 6/24/18 for chest pain. She was later sent to the ICU with acute hypercapnic respiratory failure the same day on BIPAP.  She quickly improved and was sent to the floor on 6/25.  Cards was consulted for NSTEMI with noted troponin increase and was planning on LHC on 6/26. However, the patient was sent back to the ICU on 6/26 with hypercapnic respiratory failure with a C02 of > 100 and was intubated. Pulmonary was consulted. Pt clinically improved from respiratory standpoint and was extubated on 6/27 to NC, but she did complain of chest discomfort. Cardiology was notified and patient was taken for LHC on 6/27 which was only remarkable for non-obstructive CAD and did not require intervention. Pt was given IVF post procedure and the next day developed increased SOB and required BIPAP. Pt was treated with IV lasix with good response with much improved respiratory status after output of 1L. Pt also being treated for COPD exacerbation with IV steroids, NEBS and doxycycline. ECHO performed on 6/25/2018 remarkable for EF=50-55% + grade 2 diastolic dysfunction.     Interval History: Pt slept with BIPAP and had no acute issues overnight. She was anxious and BP increased as well this am. No reported chest pain.    Review of Systems   Constitutional: Negative for chills and fever.   Respiratory: Positive for shortness of breath.    Cardiovascular: Negative for chest pain.     Objective:     Vital Signs (Most Recent):  Temp: 98.8 °F (37.1 °C) (06/29/18 0715)  Pulse: 80 (06/29/18 0900)  Resp: (!) 26 (06/29/18 0900)  BP: (!) 184/99 (06/29/18 0900)  SpO2: 98 % (06/29/18 0900) Vital Signs (24h Range):  Temp:  [98.7 °F (37.1 °C)-99.2 °F (37.3 °C)] 98.8 °F (37.1 °C)  Pulse:  [] 80  Resp:  [14-26] 26  SpO2:  [90 %-100 %] 98 %  BP: (127-212)/(58-99) 184/99     Weight: 77.5 kg (170 lb 13.7 oz)  Body mass index is 26.76 kg/m².    Intake/Output Summary (Last 24 hours) at 06/29/18 0950  Last data filed at 06/29/18 0879    Gross per 24 hour   Intake              220 ml   Output             1852 ml   Net            -1632 ml      Physical Exam   Constitutional: She is oriented to person, place, and time. She appears well-developed. No distress.   HENT:   Head: Normocephalic and atraumatic.   Eyes: EOM are normal. Pupils are equal, round, and reactive to light.   Neck: Normal range of motion. Neck supple.   Cardiovascular: Normal rate and regular rhythm.    Pulmonary/Chest:   Course breath sounds with decrease at bases but much improved from yesterday and good air movement. No wheezing   Abdominal: Soft. Bowel sounds are normal.   Musculoskeletal: Normal range of motion. She exhibits edema.   Neurological: She is alert and oriented to person, place, and time.   Skin: Skin is warm and dry. Capillary refill takes less than 2 seconds. She is not diaphoretic.   Psychiatric: She has a normal mood and affect. Her behavior is normal. Thought content normal.       Significant Labs: All pertinent labs within the past 24 hours have been reviewed.    Significant Imaging: I have reviewed and interpreted all pertinent imaging results/findings within the past 24 hours.    Assessment/Plan:      * Acute hypercapnic respiratory failure    - Due to COPD exacerbation   - s/p intubation and treatment with IV steroids, NEBS  - Pulmonary following and extubated on 6/27  - Respiratory status was worse on 6/28 due to volume overload  - s/p IV lasix with good output and now back down to NC  - Respiratory status stable for transfer to floor  - Will d/c IV steroids today and transition to prednisone taper tomorrow  - Pt to continue BIPAP QHS         Non-STEMI (non-ST elevated myocardial infarction)    - Followed by Cardiology  - s/p LHC on 6/27 only remarkable for non-obstructive CAD  - Continue medical management as per Cardiology with ASA, statin, and patient will not be placed on plavix given she will be fully anticoagulated with Xarelto        Acute diastolic  CHF (congestive heart failure)    - Due to volume overload  - Last ECHO with EF=50-55% + grade 2 diastolic dysfunction on 6/26  - Stopped IVF on 6/28 and s/p IV lasix x 1 yesterday  - Will give another pulse dose of IV lasix today, but patient almost fully compensated        Coronary artery disease involving native coronary artery of native heart with angina pectoris    - [facility] on 6/27 as discussed above  - As per Cardiology        PAF (paroxysmal atrial fibrillation)    - Rate controlled and anticoagulation resumed with Xarelto on 6/27 post [facility]        Debility    - PT/OT        Neuropathy    - No acute issues         Acute exacerbation of chronic obstructive pulmonary disease (COPD)    - C02 > 100 and s/p intubation  - Treated with NEBS and steroids  - Extubated on 6/27 and respiratory status with worsening due to volume overload issue today  - Will wean off IV steroids to PO as discussed above        Elevated brain natriuretic peptide (BNP) level    - Diuresis resumed with IV lasix        Elevated troponin I level    - As discussed under NSTEMI        Obesity    - Weight reduction as outpatient after all acute issues addressed        Chronic anticoagulation    - Patient on Xarelto for PAF which has been resumed        History of deep vein thrombosis    - Resumed anticoagulation         History of pulmonary embolism    - Xarelto resumed on 6/27 post [facility]        Anemia of chronic disease    - H/H with slight drop noted, but no bleeding  - Will continue to monitor        Malignant hypertension    - Difficult to control BP  - Continue lisinopril, and restarted hydralazine, clonidine, diltiazem today (held due to previous hypotensive issues)  - PRN labetalol        Tobacco abuse    - Smoking cessation counseling done        Gastroesophageal reflux disease without esophagitis    - Continue PPI        Type 2 diabetes mellitus, controlled    - Complications of peripheral neuropathy.   - Well controlled on current regimen         COPD (chronic obstructive pulmonary disease)    - As discussed above          VTE Risk Mitigation         Ordered     rivaroxaban tablet 20 mg  With dinner      06/27/18 7549          Critical care time spent on the evaluation and treatment of severe organ dysfunction, review of pertinent labs and imaging studies, discussions with consulting providers and discussions with patient/family: 60 minutes.    Meredith Olguin MD  Department of Hospital Medicine   [facility]

## 2018-06-29 NOTE — PROGRESS NOTES
Patient transferred from ICU to tele unit with ICU nurse and transport. Tele monitor in place. o2 therapy in place at 4L per NC.

## 2018-06-29 NOTE — SUBJECTIVE & OBJECTIVE
Interval History: On NC this morning.   Review of Systems   Constitutional: Negative for chills and fever.   Respiratory: Positive for shortness of breath.    Cardiovascular: Negative for chest pain.     Objective:     Vital Signs (Most Recent):  Temp: 98.8 °F (37.1 °C) (06/29/18 0715)  Pulse: 69 (06/29/18 1015)  Resp: (!) 26 (06/29/18 1015)  BP: (!) 101/52 (06/29/18 1006)  SpO2: (!) 94 % (06/29/18 1015) Vital Signs (24h Range):  Temp:  [98.7 °F (37.1 °C)-99.2 °F (37.3 °C)] 98.8 °F (37.1 °C)  Pulse:  [] 69  Resp:  [14-26] 26  SpO2:  [90 %-100 %] 94 %  BP: (101-212)/(52-99) 101/52     Weight: 77.5 kg (170 lb 13.7 oz)  Body mass index is 26.76 kg/m².    Intake/Output Summary (Last 24 hours) at 06/29/18 1043  Last data filed at 06/29/18 1000   Gross per 24 hour   Intake              220 ml   Output             1587 ml   Net            -1367 ml      Physical Exam   Constitutional: She is oriented to person, place, and time. She appears well-developed. No distress.   HENT:   Head: Normocephalic and atraumatic.   Eyes: EOM are normal. Pupils are equal, round, and reactive to light.   Neck: Normal range of motion. Neck supple.   Cardiovascular: Normal rate and regular rhythm.    Pulmonary/Chest: Effort normal. She has rales.   Abdominal: Soft. Bowel sounds are normal.   Musculoskeletal: Normal range of motion. She exhibits edema.   Neurological: She is alert and oriented to person, place, and time.   Skin: Skin is warm and dry. Capillary refill takes less than 2 seconds. She is not diaphoretic.   Psychiatric: She has a normal mood and affect. Her behavior is normal. Thought content normal.       Significant Labs: All pertinent labs within the past 24 hours have been reviewed.    Significant Imaging: I have reviewed and interpreted all pertinent imaging results/findings within the past 24 hours.

## 2018-06-29 NOTE — PLAN OF CARE
Problem: Breathing Pattern Ineffective (Adult)  Goal: Effective Oxygenation/Ventilation  Patient will demonstrate the desired outcomes by discharge/transition of care.   Patient received on nasal cannula 4lpm. BIPAP on standby. Aerosol treatment given. Humidification added to oxygen. NARN

## 2018-06-29 NOTE — ASSESSMENT & PLAN NOTE
Patient with COPD. Intubated for hypercapnic respiratory failure. Patient developed SOB again overnight. Noticeable crackles on exam and volume overload. +5L for the hospitalization.   -Bipap PRN and qhs  -Aggressive diuresis and BP control.  -Continue bronchodilators.  -Switch to PO prednisone.

## 2018-06-29 NOTE — PLAN OF CARE
Problem: Breathing Pattern Ineffective (Adult)  Goal: Effective Oxygenation/Ventilation  Patient will demonstrate the desired outcomes by discharge/transition of care.   Patient remains on BIPAP with charted settings. FI02 increased 35% to maintain oxygen status greater than 92%

## 2018-06-29 NOTE — PROGRESS NOTES
[facility]  Pulmonology  Progress Note    Patient Name: Yasmeen Palm  MRN: [id number]  Admission Date: 6/24/2018  Hospital Length of Stay: 5 days  Code Status: Full Code  Attending Provider: Meredith Olguin MD  Primary Care Provider: Angel Orourke Jr, MD   Principal Problem: Acute hypercapnic respiratory failure    Subjective:     Interval History: On NC this morning.   Review of Systems   Constitutional: Negative for chills and fever.   Respiratory: Positive for shortness of breath.    Cardiovascular: Negative for chest pain.     Objective:     Vital Signs (Most Recent):  Temp: 98.8 °F (37.1 °C) (06/29/18 0715)  Pulse: 69 (06/29/18 1015)  Resp: (!) 26 (06/29/18 1015)  BP: (!) 101/52 (06/29/18 1006)  SpO2: (!) 94 % (06/29/18 1015) Vital Signs (24h Range):  Temp:  [98.7 °F (37.1 °C)-99.2 °F (37.3 °C)] 98.8 °F (37.1 °C)  Pulse:  [] 69  Resp:  [14-26] 26  SpO2:  [90 %-100 %] 94 %  BP: (101-212)/(52-99) 101/52     Weight: 77.5 kg (170 lb 13.7 oz)  Body mass index is 26.76 kg/m².    Intake/Output Summary (Last 24 hours) at 06/29/18 1043  Last data filed at 06/29/18 1000   Gross per 24 hour   Intake              220 ml   Output             1587 ml   Net            -1367 ml      Physical Exam   Constitutional: She is oriented to person, place, and time. She appears well-developed. No distress.   HENT:   Head: Normocephalic and atraumatic.   Eyes: EOM are normal. Pupils are equal, round, and reactive to light.   Neck: Normal range of motion. Neck supple.   Cardiovascular: Normal rate and regular rhythm.    Pulmonary/Chest: Effort normal. She has rales.   Abdominal: Soft. Bowel sounds are normal.   Musculoskeletal: Normal range of motion. She exhibits edema.   Neurological: She is alert and oriented to person, place, and time.   Skin: Skin is warm and dry. Capillary refill takes less than 2 seconds. She is not diaphoretic.   Psychiatric: She has a normal mood and affect. Her behavior is normal.  Thought content normal.       Significant Labs: All pertinent labs within the past 24 hours have been reviewed.    Significant Imaging: I have reviewed and interpreted all pertinent imaging results/findings within the past 24 hours.    Assessment/Plan:     * Acute hypercapnic respiratory failure    Patient with COPD. Intubated for hypercapnic respiratory failure. Patient developed SOB again overnight. Noticeable crackles on exam and volume overload. +5L for the hospitalization.   -Bipap PRN and qhs  -Aggressive diuresis and BP control.  -Continue bronchodilators.  -Switch to PO prednisone.          Non-STEMI (non-ST elevated myocardial infarction)    Per Cards. LHC performed. Non obstructive CAD.        Type 2 diabetes mellitus, controlled    BG goal 140-180.         COPD (chronic obstructive pulmonary disease)    See respiratory failure.   -Continue bronchodilators.   -Wean steroids. Po Prednisone.                 Vivian Ely MD  Pulmonology  [facility]

## 2018-06-29 NOTE — PLAN OF CARE
06/29/18 1103   Discharge Reassessment   Assessment Type Discharge Planning Reassessment   Provided patient/caregiver education on the expected discharge date and the discharge plan No   Do you have any problems affording any of your prescribed medications? No   Discharge Plan A Home with family;Home Health   Discharge Plan B Other  (to be determined as patinet progresses)   Patient choice form signed by patient/caregiver No   Can the patient answer the patient profile reliably? Yes, cognitively intact   How does the patient rate their overall health at the present time? Poor   Describe the patient's ability to walk at the present time. Does not walk or unable to take any steps at all   How often would a person be available to care for the patient? Whenever needed   Number of comorbid conditions (as recorded on the chart) Five or more   During the past month, has the patient often been bothered by feeling down, depressed or hopeless? No   During the past month, has the patient often been bothered by little interest or pleasure in doing things? No   patient remains in ICU. SW reviewed chart, reviewed briefly with Dr Olguin and Dr Ely. Dr Ely informs not likely to need Trilogy at discharge but will probably need sleep study. Currently is using n/c when awake, BiPAP at night in the ICU. Patient requests [facility] at discharge. SW will continue to follow in ICU and assist as needed.

## 2018-06-29 NOTE — ASSESSMENT & PLAN NOTE
- Followed by Cardiology  - s/p [facility] on 6/27 only remarkable for non-obstructive CAD  - Continue medical management as per Cardiology with ASA, statin, and patient will not be placed on plavix given she will be fully anticoagulated with Xarelto

## 2018-06-30 LAB
ALLENS TEST: ABNORMAL
ANION GAP SERPL CALC-SCNC: 7 MMOL/L
BUN SERPL-MCNC: 28 MG/DL
CALCIUM SERPL-MCNC: 9.2 MG/DL
CHLORIDE SERPL-SCNC: 101 MMOL/L
CO2 SERPL-SCNC: 38 MMOL/L
CREAT SERPL-MCNC: 0.8 MG/DL
DELSYS: ABNORMAL
EST. GFR  (AFRICAN AMERICAN): >60 ML/MIN/1.73 M^2
EST. GFR  (NON AFRICAN AMERICAN): >60 ML/MIN/1.73 M^2
FLOW: 3
GLUCOSE SERPL-MCNC: 173 MG/DL
HCO3 UR-SCNC: 40.7 MMOL/L (ref 24–28)
MAGNESIUM SERPL-MCNC: 2 MG/DL
MODE: ABNORMAL
PCO2 BLDA: 66.6 MMHG (ref 35–45)
PH SMN: 7.39 [PH] (ref 7.35–7.45)
PHOSPHATE SERPL-MCNC: 1.2 MG/DL
PO2 BLDA: 68 MMHG (ref 80–100)
POC BE: 13 MMOL/L
POC SATURATED O2: 92 % (ref 95–100)
POC TCO2: 43 MMOL/L (ref 23–27)
POCT GLUCOSE: 166 MG/DL (ref 70–110)
POCT GLUCOSE: 203 MG/DL (ref 70–110)
POCT GLUCOSE: 279 MG/DL (ref 70–110)
POCT GLUCOSE: 282 MG/DL (ref 70–110)
POTASSIUM SERPL-SCNC: 3.9 MMOL/L
SAMPLE: ABNORMAL
SITE: ABNORMAL
SODIUM SERPL-SCNC: 146 MMOL/L

## 2018-06-30 PROCEDURE — 82803 BLOOD GASES ANY COMBINATION: CPT

## 2018-06-30 PROCEDURE — 80048 BASIC METABOLIC PNL TOTAL CA: CPT

## 2018-06-30 PROCEDURE — 25000242 PHARM REV CODE 250 ALT 637 W/ HCPCS: Performed by: HOSPITALIST

## 2018-06-30 PROCEDURE — 25000003 PHARM REV CODE 250: Performed by: INTERNAL MEDICINE

## 2018-06-30 PROCEDURE — 25000003 PHARM REV CODE 250: Performed by: HOSPITALIST

## 2018-06-30 PROCEDURE — 84100 ASSAY OF PHOSPHORUS: CPT

## 2018-06-30 PROCEDURE — 25000003 PHARM REV CODE 250: Performed by: EMERGENCY MEDICINE

## 2018-06-30 PROCEDURE — 83735 ASSAY OF MAGNESIUM: CPT

## 2018-06-30 PROCEDURE — 21400001 HC TELEMETRY ROOM

## 2018-06-30 PROCEDURE — 63600175 PHARM REV CODE 636 W HCPCS: Performed by: HOSPITALIST

## 2018-06-30 PROCEDURE — 94640 AIRWAY INHALATION TREATMENT: CPT

## 2018-06-30 PROCEDURE — 36600 WITHDRAWAL OF ARTERIAL BLOOD: CPT

## 2018-06-30 PROCEDURE — 27000221 HC OXYGEN, UP TO 24 HOURS

## 2018-06-30 PROCEDURE — 94761 N-INVAS EAR/PLS OXIMETRY MLT: CPT

## 2018-06-30 PROCEDURE — 99900035 HC TECH TIME PER 15 MIN (STAT)

## 2018-06-30 RX ORDER — FUROSEMIDE 40 MG/1
40 TABLET ORAL DAILY
Status: DISCONTINUED | OUTPATIENT
Start: 2018-06-30 | End: 2018-07-14

## 2018-06-30 RX ORDER — ALPRAZOLAM 0.5 MG/1
0.5 TABLET ORAL 2 TIMES DAILY PRN
Status: DISCONTINUED | OUTPATIENT
Start: 2018-06-30 | End: 2018-07-13

## 2018-06-30 RX ADMIN — GABAPENTIN 300 MG: 300 CAPSULE ORAL at 02:06

## 2018-06-30 RX ADMIN — PANTOPRAZOLE SODIUM 40 MG: 40 TABLET, DELAYED RELEASE ORAL at 08:06

## 2018-06-30 RX ADMIN — CLONIDINE HYDROCHLORIDE 0.2 MG: 0.1 TABLET ORAL at 07:06

## 2018-06-30 RX ADMIN — CLONIDINE HYDROCHLORIDE 0.2 MG: 0.1 TABLET ORAL at 08:06

## 2018-06-30 RX ADMIN — HYDRALAZINE HYDROCHLORIDE 50 MG: 25 TABLET ORAL at 10:06

## 2018-06-30 RX ADMIN — IPRATROPIUM BROMIDE AND ALBUTEROL SULFATE 3 ML: .5; 2.5 SOLUTION RESPIRATORY (INHALATION) at 03:06

## 2018-06-30 RX ADMIN — DILTIAZEM HYDROCHLORIDE 120 MG: 30 TABLET, FILM COATED ORAL at 02:06

## 2018-06-30 RX ADMIN — ALPRAZOLAM 0.5 MG: 0.5 TABLET ORAL at 10:06

## 2018-06-30 RX ADMIN — DILTIAZEM HYDROCHLORIDE 120 MG: 30 TABLET, FILM COATED ORAL at 10:06

## 2018-06-30 RX ADMIN — SOTALOL HYDROCHLORIDE 80 MG: 80 TABLET ORAL at 07:06

## 2018-06-30 RX ADMIN — IPRATROPIUM BROMIDE AND ALBUTEROL SULFATE 3 ML: .5; 2.5 SOLUTION RESPIRATORY (INHALATION) at 07:06

## 2018-06-30 RX ADMIN — INSULIN ASPART 1 UNITS: 100 INJECTION, SOLUTION INTRAVENOUS; SUBCUTANEOUS at 09:06

## 2018-06-30 RX ADMIN — PREDNISONE 40 MG: 20 TABLET ORAL at 08:06

## 2018-06-30 RX ADMIN — ASPIRIN 81 MG CHEWABLE TABLET 81 MG: 81 TABLET CHEWABLE at 08:06

## 2018-06-30 RX ADMIN — GABAPENTIN 300 MG: 300 CAPSULE ORAL at 08:06

## 2018-06-30 RX ADMIN — DOXYCYCLINE HYCLATE 100 MG: 100 TABLET, COATED ORAL at 07:06

## 2018-06-30 RX ADMIN — LISINOPRIL 10 MG: 5 TABLET ORAL at 08:06

## 2018-06-30 RX ADMIN — FUROSEMIDE 40 MG: 40 TABLET ORAL at 12:06

## 2018-06-30 RX ADMIN — ATORVASTATIN CALCIUM 80 MG: 40 TABLET, FILM COATED ORAL at 07:06

## 2018-06-30 RX ADMIN — HYDRALAZINE HYDROCHLORIDE 50 MG: 25 TABLET ORAL at 07:06

## 2018-06-30 RX ADMIN — INSULIN ASPART 2 UNITS: 100 INJECTION, SOLUTION INTRAVENOUS; SUBCUTANEOUS at 05:06

## 2018-06-30 RX ADMIN — GUAIFENESIN 600 MG: 600 TABLET, EXTENDED RELEASE ORAL at 08:06

## 2018-06-30 RX ADMIN — GABAPENTIN 300 MG: 300 CAPSULE ORAL at 07:06

## 2018-06-30 RX ADMIN — HYDRALAZINE HYDROCHLORIDE 50 MG: 25 TABLET ORAL at 02:06

## 2018-06-30 RX ADMIN — DILTIAZEM HYDROCHLORIDE 120 MG: 30 TABLET, FILM COATED ORAL at 07:06

## 2018-06-30 RX ADMIN — IPRATROPIUM BROMIDE AND ALBUTEROL SULFATE 3 ML: .5; 2.5 SOLUTION RESPIRATORY (INHALATION) at 12:06

## 2018-06-30 RX ADMIN — INSULIN DETEMIR 15 UNITS: 100 INJECTION, SOLUTION SUBCUTANEOUS at 08:06

## 2018-06-30 RX ADMIN — ALPRAZOLAM 0.5 MG: 0.5 TABLET ORAL at 03:06

## 2018-06-30 RX ADMIN — GUAIFENESIN 600 MG: 600 TABLET, EXTENDED RELEASE ORAL at 07:06

## 2018-06-30 RX ADMIN — SODIUM PHOSPHATE, MONOBASIC, MONOHYDRATE 30 MMOL: 276; 142 INJECTION, SOLUTION INTRAVENOUS at 12:06

## 2018-06-30 RX ADMIN — DOXYCYCLINE HYCLATE 100 MG: 100 TABLET, COATED ORAL at 08:06

## 2018-06-30 RX ADMIN — RIVAROXABAN 20 MG: 20 TABLET, FILM COATED ORAL at 06:06

## 2018-06-30 RX ADMIN — SOTALOL HYDROCHLORIDE 80 MG: 80 TABLET ORAL at 08:06

## 2018-06-30 RX ADMIN — CLONIDINE HYDROCHLORIDE 0.2 MG: 0.1 TABLET ORAL at 02:06

## 2018-06-30 NOTE — ASSESSMENT & PLAN NOTE
- Due to volume overload  - Last ECHO with EF=50-55% + grade 2 diastolic dysfunction on 6/26  -improved with IV lasix.on maintained po lasix.

## 2018-06-30 NOTE — PT/OT/SLP PROGRESS
Occupational Therapy      Patient Name:  Yasmeen Palm   MRN:  [id number]    Patient not seen today secondary to Nursing care, Other (Comment) (The patient is currently on the BiPAP 2* low PSO2 and is receiving nursing care.). Will follow-up tomorrow.    Kendra Barrios OT  6/30/2018

## 2018-06-30 NOTE — ASSESSMENT & PLAN NOTE
- C02 > 100 and s/p intubation  - Treated with NEBS and steroids  - Extubated on 6/27 and respiratory status with worsening due to volume overload issue today  - on prednisone and nebulizer.\  Does not have home oxygen,ceck RA Spo2.

## 2018-06-30 NOTE — PROGRESS NOTES
[facility] Hospital Medicine  Progress Note    Patient Name: Yasmeen Palm  MRN: [id number]  Patient Class: IP- Inpatient   Admission Date: 6/24/2018  Length of Stay: 6 days  Attending Physician: Michelle Dumont MD  Primary Care Provider: Angel Orourke Jr, MD        Subjective:     Principal Problem:Acute hypercapnic respiratory failure    HPI:  Yasmeen Palm is a 68 y.o. female that (in part)  has a past medical history of Anticoagulant long-term use; Arthritis; Asthma; CHF (congestive heart failure); COPD (chronic obstructive pulmonary disease); Coronary artery disease; Depression; Diabetes mellitus; GERD (gastroesophageal reflux disease); and Hypertension.  has a past surgical history that includes Abdominal surgery; Cardiac surgery; and Hernia repair. Presents to [facility] Emergency Department complaining of chest pain .  She reports compliance with her home medication regimen, including Xarelto.     Description of symptoms    Location:  Substernal  Onset:  Acute onset 2 days ago  Character:  The patient remained; moderate severity  Frequency:  Daily  Duration:  each episode lasts several minutes at a time  Associated Symptoms:  Diaphoresis, shortness of breath, weakness and fatigue  Radiation:  Recent the chest  Exacerbating factors:  Worse on exertion  Relieving factors:  Minimal relief with supplemental oxygen       In the emergency department routine laboratory studies, chest x-ray, EKG, cardiac enzymes were obtained.  EKG findings were concerning the case was discussed with cardiologist, Dr. Pulido.  It was determined that her EKG was not consistent with STEMI and she has been chest pain-free since arrival.  She had been given Lovenox, aspirin, and plan was to continue trending troponins and monitor closely on telemetry.    Hospital medicine has been asked to admit for further evaluation and treatment.     Hospital Course:  Ms. Palm was admitted to the  hospital originally on 6/24/18 for chest pain. She was later sent to the ICU with acute hypercapnic respiratory failure the same day on BIPAP.  She quickly improved and was sent to the floor on 6/25.  Cards was consulted for NSTEMI with noted troponin increase and was planning on LHC on 6/26. However, the patient was sent back to the ICU on 6/26 with hypercapnic respiratory failure with a C02 of > 100 and was intubated. Pulmonary was consulted. Pt clinically improved from respiratory standpoint and was extubated on 6/27 to NC, but she did complain of chest discomfort. Cardiology was notified and patient was taken for LHC on 6/27 which was only remarkable for non-obstructive CAD and did not require intervention. Pt was given IVF post procedure and the next day developed increased SOB and required BIPAP. Pt was treated with IV lasix with good response with much improved respiratory status after output of 1L. Pt also being treated for COPD exacerbation with IV steroids, NEBS and doxycycline. ECHO performed on 6/25/2018 remarkable for EF=50-55% + grade 2 diastolic dysfunction. Off lasix at this time,stable on 4 liter NC O 2,does not have home oxygen or  CPAP,PT,OT is following.    Interval History: Pt is stable on 4 liter NC O 2    Review of Systems   Constitutional: Positive for fatigue. Negative for chills and fever.   HENT: Negative for congestion and dental problem.    Eyes: Negative for discharge.   Respiratory: Negative for apnea and shortness of breath.    Cardiovascular: Negative for chest pain.   Gastrointestinal: Negative for abdominal distention.   Endocrine: Negative for cold intolerance.   Genitourinary: Negative for difficulty urinating and dyspareunia.   Musculoskeletal: Negative for arthralgias and back pain.   Skin: Negative for color change and pallor.   Allergic/Immunologic: Negative for food allergies.   Neurological: Positive for weakness. Negative for dizziness and facial asymmetry.    Hematological: Negative for adenopathy. Does not bruise/bleed easily.   Psychiatric/Behavioral: Negative for agitation and behavioral problems.     Objective:     Vital Signs (Most Recent):  Temp: 97.7 °F (36.5 °C) (06/30/18 0709)  Pulse: 78 (06/30/18 0800)  Resp: 18 (06/30/18 0743)  BP: 121/62 (06/30/18 0709)  SpO2: (!) 94 % (06/30/18 0743) Vital Signs (24h Range):  Temp:  [97.5 °F (36.4 °C)-98.6 °F (37 °C)] 97.7 °F (36.5 °C)  Pulse:  [59-89] 78  Resp:  [16-26] 18  SpO2:  [91 %-99 %] 94 %  BP: ()/(53-65) 121/62     Weight: 76.7 kg (169 lb 1.5 oz)  Body mass index is 28.14 kg/m².    Intake/Output Summary (Last 24 hours) at 06/30/18 1041  Last data filed at 06/29/18 1844   Gross per 24 hour   Intake              100 ml   Output             1433 ml   Net            -1333 ml      Physical Exam   Constitutional: She is oriented to person, place, and time. She appears well-developed. No distress.   HENT:   Head: Normocephalic and atraumatic.   Eyes: EOM are normal. Pupils are equal, round, and reactive to light.   Neck: Normal range of motion. Neck supple.   Cardiovascular: Normal rate and regular rhythm.    Pulmonary/Chest:   Course breath sounds with decrease at bases but much improved from yesterday and good air movement. No wheezing   Abdominal: Soft. Bowel sounds are normal.   Musculoskeletal: Normal range of motion. She exhibits no edema.   Neurological: She is alert and oriented to person, place, and time.   Skin: Skin is warm and dry. Capillary refill takes less than 2 seconds. She is not diaphoretic.   Psychiatric: She has a normal mood and affect. Her behavior is normal. Thought content normal.       Significant Labs: All pertinent labs within the past 24 hours have been reviewed.    Significant Imaging: I have reviewed and interpreted all pertinent imaging results/findings within the past 24 hours.    Assessment/Plan:      * Acute hypercapnic respiratory failure    - Due to COPD exacerbation   - s/p  intubation and treatment with IV steroids, NEBS  - Pulmonary following and extubated on 6/27  - Respiratory status was worse on 6/28 due to volume overload  - s/p IV lasix with good output and now back down to NC   Pt to continue BIPAP QHS   She has Spo2 of 72% on RA,[place]abela home oxygen.        Debility    - PT/OT        PAF (paroxysmal atrial fibrillation)    - Rate controlled and anticoagulation resumed with Xarelto on 6/27 post [facility]        Neuropathy    - No acute issues         Non-STEMI (non-ST elevated myocardial infarction)    - Followed by Cardiology  - s/p [facility] on 6/27 only remarkable for non-obstructive CAD  - Continue medical management as per Cardiology with ASA, statin, and patient will not be placed on plavix given she will be fully anticoagulated with Xarelto        Acute exacerbation of chronic obstructive pulmonary disease (COPD)    - C02 > 100 and s/p intubation  - Treated with NEBS and steroids  - Extubated on 6/27 and respiratory status with worsening due to volume overload issue today  - on prednisone and nebulizer.\  Does not have home oxygen,ceck RA Spo2.        Elevated brain natriuretic peptide (BNP) level    - Diuresis resumed with IV lasix        Elevated troponin I level    - As discussed under NSTEMI        Acute diastolic CHF (congestive heart failure)    - Due to volume overload  - Last ECHO with EF=50-55% + grade 2 diastolic dysfunction on 6/26  -improved with IV lasix.on maintained po lasix.          Obesity    - Weight reduction as outpatient after all acute issues addressed        Chronic anticoagulation    - Patient on Xarelto for PAF which has been resumed        History of deep vein thrombosis    - Resumed anticoagulation         History of pulmonary embolism    - Xarelto resumed on 6/27 post [facility]        Anemia of chronic disease    - H/H with slight drop noted, but no bleeding  - Will continue to monitor        Malignant hypertension    - Difficult to control BP  - Continue  lisinopril, and restarted hydralazine, clonidine, diltiazem today (held due to previous hypotensive issues)  - PRN labetalol        Tobacco abuse    - Smoking cessation counseling done        Gastroesophageal reflux disease without esophagitis    - Continue PPI        Type 2 diabetes mellitus, controlled    - Complications of peripheral neuropathy.   - Well controlled on current regimen        Chronic obstructive pulmonary disease with acute exacerbation    - As discussed above        Coronary artery disease involving native coronary artery of native heart with angina pectoris    - [facility] on 6/27 as discussed above  - As per Cardiology          VTE Risk Mitigation         Ordered     rivaroxaban tablet 20 mg  With dinner      06/27/18 8880              Michelle Dumont MD  Department of Hospital Medicine   [facility]

## 2018-06-30 NOTE — CARE UPDATE
Transfer Summary:    69 y/o F was admitted to the hospital originally on 6/24/18 for chest pain. She was later sent to the ICU with acute hypercapnic respiratory failure the same day on BIPAP.  She quickly improved and was sent to the floor on 6/25.  Cards was consulted for NSTEMI with noted troponin increase and was planning on LHC on 6/26. However, the patient was sent back to the ICU on 6/26 with hypercapnic respiratory failure with a C02 of > 100 and was intubated. Pulmonary was consulted. Pt clinically improved from respiratory standpoint and was extubated on 6/27 to NC, but she did complain of chest discomfort. Cardiology was notified and patient was taken for LHC on 6/27 which was only remarkable for non-obstructive CAD and did not require intervention. Pt was given IVF post procedure and the next day developed increased SOB and required BIPAP. Pt was treated with IV lasix with good response with much improved respiratory status after output of 1L. Pt also being treated for COPD exacerbation with IV steroids, NEBS and doxycycline. ECHO performed on 6/25/2018 remarkable for EF=50-55% + grade 2 diastolic dysfunction. Pt to be weaned to PO steroids by tomorrow. PT/OT ordered and fully anticoagulated with Xarelto. She was counseled on smoking cessation and resumed on home BP medications with better control. Pt can likely discharge tomorrow if she continues to do well and likely will home health depending on rehab recommendations.    FARRUKH Olguin MD

## 2018-06-30 NOTE — PROGRESS NOTES
Patient will need be discharging with Home O2; patient's son signed Choice form for [facility] to provide home O2; SW will follow-up and set up services upon discharge.

## 2018-06-30 NOTE — PLAN OF CARE
Problem: Diabetes, Type 2 (Adult)  Goal: Signs and Symptoms of Listed Potential Problems Will be Absent, Minimized or Managed (Diabetes, Type 2)  Signs and symptoms of listed potential problems will be absent, minimized or managed by discharge/transition of care (reference Diabetes, Type 2 (Adult) CPG).   Outcome: Ongoing (interventions implemented as appropriate)   06/30/18 4522   Diabetes, Type 2   Problems Assessed (Type 2 Diabetes) hyperglycemia;hypoglycemia;situational response   Problems Present (Type 2 Diabetes) hyperglycemia

## 2018-06-30 NOTE — SUBJECTIVE & OBJECTIVE
Interval History: Pt is stable on 4 liter NC O 2    Review of Systems   Constitutional: Positive for fatigue. Negative for chills and fever.   HENT: Negative for congestion and dental problem.    Eyes: Negative for discharge.   Respiratory: Negative for apnea and shortness of breath.    Cardiovascular: Negative for chest pain.   Gastrointestinal: Negative for abdominal distention.   Endocrine: Negative for cold intolerance.   Genitourinary: Negative for difficulty urinating and dyspareunia.   Musculoskeletal: Negative for arthralgias and back pain.   Skin: Negative for color change and pallor.   Allergic/Immunologic: Negative for food allergies.   Neurological: Positive for weakness. Negative for dizziness and facial asymmetry.   Hematological: Negative for adenopathy. Does not bruise/bleed easily.   Psychiatric/Behavioral: Negative for agitation and behavioral problems.     Objective:     Vital Signs (Most Recent):  Temp: 97.7 °F (36.5 °C) (06/30/18 0709)  Pulse: 78 (06/30/18 0800)  Resp: 18 (06/30/18 0743)  BP: 121/62 (06/30/18 0709)  SpO2: (!) 94 % (06/30/18 0743) Vital Signs (24h Range):  Temp:  [97.5 °F (36.4 °C)-98.6 °F (37 °C)] 97.7 °F (36.5 °C)  Pulse:  [59-89] 78  Resp:  [16-26] 18  SpO2:  [91 %-99 %] 94 %  BP: ()/(53-65) 121/62     Weight: 76.7 kg (169 lb 1.5 oz)  Body mass index is 28.14 kg/m².    Intake/Output Summary (Last 24 hours) at 06/30/18 1041  Last data filed at 06/29/18 1844   Gross per 24 hour   Intake              100 ml   Output             1433 ml   Net            -1333 ml      Physical Exam   Constitutional: She is oriented to person, place, and time. She appears well-developed. No distress.   HENT:   Head: Normocephalic and atraumatic.   Eyes: EOM are normal. Pupils are equal, round, and reactive to light.   Neck: Normal range of motion. Neck supple.   Cardiovascular: Normal rate and regular rhythm.    Pulmonary/Chest:   Course breath sounds with decrease at bases but much improved  from yesterday and good air movement. No wheezing   Abdominal: Soft. Bowel sounds are normal.   Musculoskeletal: Normal range of motion. She exhibits no edema.   Neurological: She is alert and oriented to person, place, and time.   Skin: Skin is warm and dry. Capillary refill takes less than 2 seconds. She is not diaphoretic.   Psychiatric: She has a normal mood and affect. Her behavior is normal. Thought content normal.       Significant Labs: All pertinent labs within the past 24 hours have been reviewed.    Significant Imaging: I have reviewed and interpreted all pertinent imaging results/findings within the past 24 hours.

## 2018-06-30 NOTE — PT/OT/SLP PROGRESS
Physical Therapy      Patient Name:  Yasmeen Palm   MRN:  [id number]    Patient not seen today for PT eval secondary to Nursing care and pt on BiPAP. Will follow-up tomorrow.    Estephania Resendiz, PT

## 2018-06-30 NOTE — PROGRESS NOTES
Patient awake, alert, oriented resting comfortably. Report given to oncoming nurse, FABY Meredith. 12hour chart check complete.

## 2018-06-30 NOTE — PLAN OF CARE
Problem: Fall Risk (Adult)  Goal: Identify Related Risk Factors and Signs and Symptoms  Related risk factors and signs and symptoms are identified upon initiation of Human Response Clinical Practice Guideline (CPG)   Outcome: Ongoing (interventions implemented as appropriate)   06/30/18 3280   Fall Risk   Related Risk Factors (Fall Risk) age-related changes;bladder function altered;fatigue/slow reaction;gait/mobility problems;polypharmacy;sensory deficits   Signs and Symptoms (Fall Risk) presence of risk factors

## 2018-06-30 NOTE — ASSESSMENT & PLAN NOTE
- Due to COPD exacerbation   - s/p intubation and treatment with IV steroids, NEBS  - Pulmonary following and extubated on 6/27  - Respiratory status was worse on 6/28 due to volume overload  - s/p IV lasix with good output and now back down to NC   Pt to continue BIPAP QHS   She has Spo2 of 72% on RA,izabela home oxygen.

## 2018-07-01 LAB
ANION GAP SERPL CALC-SCNC: 7 MMOL/L
BUN SERPL-MCNC: 24 MG/DL
CALCIUM SERPL-MCNC: 9.2 MG/DL
CHLORIDE SERPL-SCNC: 99 MMOL/L
CO2 SERPL-SCNC: 39 MMOL/L
CREAT SERPL-MCNC: 0.7 MG/DL
EST. GFR  (AFRICAN AMERICAN): >60 ML/MIN/1.73 M^2
EST. GFR  (NON AFRICAN AMERICAN): >60 ML/MIN/1.73 M^2
GLUCOSE SERPL-MCNC: 177 MG/DL
MAGNESIUM SERPL-MCNC: 2 MG/DL
PHOSPHATE SERPL-MCNC: 2.1 MG/DL
POCT GLUCOSE: 150 MG/DL (ref 70–110)
POCT GLUCOSE: 208 MG/DL (ref 70–110)
POCT GLUCOSE: 274 MG/DL (ref 70–110)
POCT GLUCOSE: 317 MG/DL (ref 70–110)
POTASSIUM SERPL-SCNC: 3.7 MMOL/L
SODIUM SERPL-SCNC: 145 MMOL/L

## 2018-07-01 PROCEDURE — 27000221 HC OXYGEN, UP TO 24 HOURS

## 2018-07-01 PROCEDURE — 94640 AIRWAY INHALATION TREATMENT: CPT

## 2018-07-01 PROCEDURE — 83735 ASSAY OF MAGNESIUM: CPT

## 2018-07-01 PROCEDURE — 94660 CPAP INITIATION&MGMT: CPT

## 2018-07-01 PROCEDURE — 97166 OT EVAL MOD COMPLEX 45 MIN: CPT

## 2018-07-01 PROCEDURE — G8978 MOBILITY CURRENT STATUS: HCPCS | Mod: CK | Performed by: PHYSICAL THERAPIST

## 2018-07-01 PROCEDURE — 25000003 PHARM REV CODE 250: Performed by: EMERGENCY MEDICINE

## 2018-07-01 PROCEDURE — 63600175 PHARM REV CODE 636 W HCPCS: Performed by: HOSPITALIST

## 2018-07-01 PROCEDURE — 25000003 PHARM REV CODE 250: Performed by: INTERNAL MEDICINE

## 2018-07-01 PROCEDURE — 97162 PT EVAL MOD COMPLEX 30 MIN: CPT | Performed by: PHYSICAL THERAPIST

## 2018-07-01 PROCEDURE — G8988 SELF CARE GOAL STATUS: HCPCS | Mod: CK

## 2018-07-01 PROCEDURE — 99900035 HC TECH TIME PER 15 MIN (STAT)

## 2018-07-01 PROCEDURE — 25000003 PHARM REV CODE 250: Performed by: HOSPITALIST

## 2018-07-01 PROCEDURE — 25000242 PHARM REV CODE 250 ALT 637 W/ HCPCS: Performed by: HOSPITALIST

## 2018-07-01 PROCEDURE — G8987 SELF CARE CURRENT STATUS: HCPCS | Mod: CL

## 2018-07-01 PROCEDURE — G8979 MOBILITY GOAL STATUS: HCPCS | Mod: CI | Performed by: PHYSICAL THERAPIST

## 2018-07-01 PROCEDURE — A4216 STERILE WATER/SALINE, 10 ML: HCPCS | Performed by: HOSPITALIST

## 2018-07-01 PROCEDURE — 99233 SBSQ HOSP IP/OBS HIGH 50: CPT | Mod: ,,, | Performed by: INTERNAL MEDICINE

## 2018-07-01 PROCEDURE — 80048 BASIC METABOLIC PNL TOTAL CA: CPT

## 2018-07-01 PROCEDURE — 21400001 HC TELEMETRY ROOM

## 2018-07-01 PROCEDURE — 84100 ASSAY OF PHOSPHORUS: CPT

## 2018-07-01 RX ORDER — CLONIDINE HYDROCHLORIDE 0.1 MG/1
0.1 TABLET ORAL 3 TIMES DAILY
Status: DISCONTINUED | OUTPATIENT
Start: 2018-07-01 | End: 2018-07-13

## 2018-07-01 RX ADMIN — IPRATROPIUM BROMIDE AND ALBUTEROL SULFATE 3 ML: .5; 2.5 SOLUTION RESPIRATORY (INHALATION) at 07:07

## 2018-07-01 RX ADMIN — SOTALOL HYDROCHLORIDE 80 MG: 80 TABLET ORAL at 09:07

## 2018-07-01 RX ADMIN — ATORVASTATIN CALCIUM 80 MG: 40 TABLET, FILM COATED ORAL at 09:07

## 2018-07-01 RX ADMIN — ALPRAZOLAM 0.5 MG: 0.5 TABLET ORAL at 05:07

## 2018-07-01 RX ADMIN — HYDRALAZINE HYDROCHLORIDE 50 MG: 25 TABLET ORAL at 05:07

## 2018-07-01 RX ADMIN — POTASSIUM PHOSPHATE, MONOBASIC 500 MG: 500 TABLET, SOLUBLE ORAL at 12:07

## 2018-07-01 RX ADMIN — PREDNISONE 40 MG: 20 TABLET ORAL at 08:07

## 2018-07-01 RX ADMIN — DOXYCYCLINE HYCLATE 100 MG: 100 TABLET, COATED ORAL at 09:07

## 2018-07-01 RX ADMIN — SOTALOL HYDROCHLORIDE 80 MG: 80 TABLET ORAL at 08:07

## 2018-07-01 RX ADMIN — DILTIAZEM HYDROCHLORIDE 120 MG: 30 TABLET, FILM COATED ORAL at 01:07

## 2018-07-01 RX ADMIN — GABAPENTIN 300 MG: 300 CAPSULE ORAL at 08:07

## 2018-07-01 RX ADMIN — IPRATROPIUM BROMIDE AND ALBUTEROL SULFATE 3 ML: .5; 2.5 SOLUTION RESPIRATORY (INHALATION) at 12:07

## 2018-07-01 RX ADMIN — GABAPENTIN 300 MG: 300 CAPSULE ORAL at 02:07

## 2018-07-01 RX ADMIN — DILTIAZEM HYDROCHLORIDE 120 MG: 30 TABLET, FILM COATED ORAL at 05:07

## 2018-07-01 RX ADMIN — HYDRALAZINE HYDROCHLORIDE 50 MG: 25 TABLET ORAL at 02:07

## 2018-07-01 RX ADMIN — INSULIN DETEMIR 15 UNITS: 100 INJECTION, SOLUTION SUBCUTANEOUS at 08:07

## 2018-07-01 RX ADMIN — GUAIFENESIN 600 MG: 600 TABLET, EXTENDED RELEASE ORAL at 08:07

## 2018-07-01 RX ADMIN — IPRATROPIUM BROMIDE AND ALBUTEROL SULFATE 3 ML: .5; 2.5 SOLUTION RESPIRATORY (INHALATION) at 01:07

## 2018-07-01 RX ADMIN — DILTIAZEM HYDROCHLORIDE 120 MG: 30 TABLET, FILM COATED ORAL at 09:07

## 2018-07-01 RX ADMIN — GABAPENTIN 300 MG: 300 CAPSULE ORAL at 09:07

## 2018-07-01 RX ADMIN — ACETAMINOPHEN 650 MG: 325 TABLET, FILM COATED ORAL at 09:07

## 2018-07-01 RX ADMIN — LISINOPRIL 10 MG: 5 TABLET ORAL at 08:07

## 2018-07-01 RX ADMIN — INSULIN ASPART 4 UNITS: 100 INJECTION, SOLUTION INTRAVENOUS; SUBCUTANEOUS at 05:07

## 2018-07-01 RX ADMIN — PANTOPRAZOLE SODIUM 40 MG: 40 TABLET, DELAYED RELEASE ORAL at 08:07

## 2018-07-01 RX ADMIN — HYDRALAZINE HYDROCHLORIDE 50 MG: 25 TABLET ORAL at 09:07

## 2018-07-01 RX ADMIN — INSULIN ASPART 1 UNITS: 100 INJECTION, SOLUTION INTRAVENOUS; SUBCUTANEOUS at 09:07

## 2018-07-01 RX ADMIN — CLONIDINE HYDROCHLORIDE 0.1 MG: 0.1 TABLET ORAL at 09:07

## 2018-07-01 RX ADMIN — CLONIDINE HYDROCHLORIDE 0.1 MG: 0.1 TABLET ORAL at 02:07

## 2018-07-01 RX ADMIN — GUAIFENESIN 600 MG: 600 TABLET, EXTENDED RELEASE ORAL at 09:07

## 2018-07-01 RX ADMIN — RIVAROXABAN 20 MG: 20 TABLET, FILM COATED ORAL at 06:07

## 2018-07-01 RX ADMIN — SODIUM CHLORIDE, PRESERVATIVE FREE 3 ML: 5 INJECTION INTRAVENOUS at 05:07

## 2018-07-01 RX ADMIN — ASPIRIN 81 MG CHEWABLE TABLET 81 MG: 81 TABLET CHEWABLE at 08:07

## 2018-07-01 RX ADMIN — CLONIDINE HYDROCHLORIDE 0.2 MG: 0.1 TABLET ORAL at 08:07

## 2018-07-01 RX ADMIN — FUROSEMIDE 40 MG: 40 TABLET ORAL at 08:07

## 2018-07-01 RX ADMIN — INSULIN ASPART 2 UNITS: 100 INJECTION, SOLUTION INTRAVENOUS; SUBCUTANEOUS at 11:07

## 2018-07-01 RX ADMIN — DOXYCYCLINE HYCLATE 100 MG: 100 TABLET, COATED ORAL at 08:07

## 2018-07-01 NOTE — ASSESSMENT & PLAN NOTE
- Due to COPD exacerbation   - s/p intubation and treatment with IV steroids, NEBS  - Pulmonary following and extubated on 6/27  - Respiratory status was worse on 6/28 due to volume overload  - s/p IV lasix with good output and now back down to NC   Pt to continue BIPAP QHS   She has Spo2 of 72% on RA,need home oxygen.

## 2018-07-01 NOTE — PLAN OF CARE
Problem: Occupational Therapy Goal  Goal: Occupational Therapy Goal  Goals to be met by: 7/13/18     Patient will increase functional independence with ADLs by performing:    UE Dressing with Set-up Assistance.  LE Dressing with Minimal Assistance.  Grooming while seated with Supervision.  Toileting from bedside commode with Contact Guard Assistance for hygiene and clothing management.   Supine to sit with Set-up Assistance.  Toilet transfer to bedside commode with Contact Guard Assistance.  Upper extremity exercise program x10 reps per handout, with assistance as needed.    Outcome: Ongoing (interventions implemented as appropriate)  Pt able to tolerate sitting edge of bed.  Discharge recommendation in pending

## 2018-07-01 NOTE — PLAN OF CARE
Problem: Physical Therapy Goal  Goal: Physical Therapy Goal  Goals to be met by: 2018     Patient will increase functional independence with mobility by performin. Supine to sit with Modified [place]  2. Sit to supine with Modified [place]  3. Sit to stand transfer with Modified [place]  4. Gait  x 250 feet with Modified [place] using Rolling Walker.    Recommend: HHPT at time of discharge.     Devin Pollock, PT  2018

## 2018-07-01 NOTE — PROGRESS NOTES
[facility]  Pulmonology  Progress Note    Patient Name: Yasmeen Palm  MRN: [id number]  Admission Date: 6/24/2018  Hospital Length of Stay: 7 days  Code Status: Full Code  Attending Provider: Michelle Dumont MD  Primary Care Provider: Angel Orourke Jr, MD   Principal Problem: Acute hypercapnic respiratory failure    Subjective:     Interval History: On NC this morning.   Review of Systems   Constitutional: Negative for chills and fever.   Respiratory: Positive for shortness of breath.    Cardiovascular: Negative for chest pain.     Objective:     Vital Signs (Most Recent):  Temp: 98.2 °F (36.8 °C) (07/01/18 1547)  Pulse: 78 (07/01/18 1547)  Resp: 16 (07/01/18 1547)  BP: (!) 121/59 (07/01/18 1547)  SpO2: (!) 93 % (07/01/18 1547) Vital Signs (24h Range):  Temp:  [96.7 °F (35.9 °C)-98.8 °F (37.1 °C)] 98.2 °F (36.8 °C)  Pulse:  [62-78] 78  Resp:  [16-28] 16  SpO2:  [92 %-96 %] 93 %  BP: (115-144)/(59-68) 121/59     Weight: 76.7 kg (169 lb 1.5 oz)  Body mass index is 28.14 kg/m².    Intake/Output Summary (Last 24 hours) at 07/01/18 1646  Last data filed at 06/30/18 1700   Gross per 24 hour   Intake              180 ml   Output                0 ml   Net              180 ml      Physical Exam   Constitutional: She is oriented to person, place, and time. She appears well-developed. No distress.   HENT:   Head: Normocephalic and atraumatic.   Eyes: EOM are normal. Pupils are equal, round, and reactive to light.   Neck: Normal range of motion. Neck supple.   Cardiovascular: Normal rate and regular rhythm.    Pulmonary/Chest: Effort normal. She has rales.   Abdominal: Soft. Bowel sounds are normal.   Musculoskeletal: Normal range of motion. She exhibits edema.   Neurological: She is alert and oriented to person, place, and time.   Skin: Skin is warm and dry. Capillary refill takes less than 2 seconds. She is not diaphoretic.   Psychiatric: She has a normal mood and affect. Her behavior is normal.  Thought content normal.       Significant Labs: All pertinent labs within the past 24 hours have been reviewed.    Significant Imaging: I have reviewed and interpreted all pertinent imaging results/findings within the past 24 hours.    Assessment/Plan:     * Acute hypercapnic respiratory failure    Patient with COPD. Intubated for hypercapnic respiratory failure. Patient developed SOB again overnight. Noticeable crackles on exam and volume overload.  -Bipap PRN and qhs  -Aggressive diuresis and BP control.  -Continue bronchodilators.  -PO prednisone.    Patient can qualify for home NIPPV 40%. IPAP 12 ans EPAP 5.           Chronic obstructive pulmonary disease with acute exacerbation    See respiratory failure.   -Continue bronchodilators.   -Wean steroids. Po Prednisone.            Will sign off. Please call with questions.      Vivian Ely MD  Pulmonology  [facility]

## 2018-07-01 NOTE — PT/OT/SLP EVAL
Occupational Therapy   Evaluation    Name: Yasmeen Palm  MRN: [id number]  Admitting Diagnosis:  Acute hypercapnic respiratory failure      Recommendations:     Discharge Recommendations:  (to be determined)  Discharge Equipment Recommendations:  none  Barriers to discharge:  None    History:     Occupational Profile:  Living Environment: Pt lives in a house with her  with 3 steps  With  Bilateral rails that are not able to be used  At the same time. Currently there is a niece living with them but will be leaving soon  Previous level of function: Pt was able to provide care for self without concerns  Roles and Routines: home with family  Equipment Owned:  wheelchair, walker, rolling, rollator, bedside commode, shower chair  Assistance upon Discharge:  states that he will be available to help patient.    Past Medical History:   Diagnosis Date    Anticoagulant long-term use     Arthritis     Asthma     CHF (congestive heart failure)     COPD (chronic obstructive pulmonary disease)     Coronary artery disease     Depression     Diabetes mellitus     GERD (gastroesophageal reflux disease)     Hypertension        Past Surgical History:   Procedure Laterality Date    ABDOMINAL SURGERY      CARDIAC SURGERY      HERNIA REPAIR         Subjective     Chief Complaint: Pain in back after ambulation  Patient/Family stated goals: to get stronger and get back to getting around  Communicated with: nurse Antony prior to session.  Pain/Comfort:  · Pain Rating 1: 0/10  · Location 1: back  · Pain Rating Post-Intervention 1: 7/10  · Pain Addressed 2: Reposition, Distraction, Nurse notified (request for Pain medication)    Patients cultural, spiritual, Muslim conflicts given the current situation:      Objective:     Patient found with: central line    General Precautions: Standard, fall, respiratory   Orthopedic Precautions:N/A   Braces: N/A     Occupational Performance:    Bed Mobility:    · Patient completed  Scooting/Bridging with minimum assistance  · Patient completed Supine to Sit with minimum assistance    Functional Mobility/Transfers:  · Patient completed Sit <> Stand Transfer with minimum assistance  with  rolling walker   · Functional Mobility: Pt able to ambulate short distance with 5 liters of O2. Pt left sitting edge of bed with  in room and nurse Cassia aware.    Activities of Daily Living:  · Feeding:  minimum assistance    · Grooming: minimum assistance    · UB Dressing: moderate assistance weakness noted during dressing skills    Cognitive/Visual Perceptual:  Cognitive/Psychosocial Skills:     -       Oriented to: Person, Place, Time and Situation   -       Follows Commands/attention:Follows one-step commands and Follows two-step commands  -       Communication: clear/fluent  -       Memory: No Deficits noted  -       Safety awareness/insight to disability: intact   -       Mood/Affect/Coping skills/emotional control: Flat affect  Visual/Perceptual:      -Intact    Physical Exam:  Skin integrity: Visible skin intact  Edema:  Mild    Motor Planning:    -       decreased  Upper Extremity Range of Motion:     -       Right Upper Extremity: decreased shoulder flexion 60 degrees  -       Left Upper Extremity: 50 degrees shoulder flexion. elbow is functional  Upper Extremity Strength:    -       Right Upper Extremity: poor  -       Left Upper Extremity: poor   Strength:    -       Right Upper Extremity: poor  -       Left Upper Extremity: poor  Fine Motor Coordination:    -       Impaired     Gross motor coordination:   limited    Patient left edge of bed with  in room with all lines intact, call button in reach and  present    AMPA 6 Click:  AMPAC Total Score: 14      Assessment:     Yasmeen Palm is a 68 y.o. female with a medical diagnosis of Acute hypercapnic respiratory failure.  She presents with back pain after movement and decreased tolerance to treatment .  Performance deficits  "affecting function are weakness, impaired endurance, impaired self care skills, impaired functional mobilty, gait instability, impaired balance, decreased coordination, decreased upper extremity function, decreased lower extremity function, decreased ROM, pain, impaired cardiopulmonary response to activity, impaired joint extensibility, impaired muscle length.      Rehab Prognosis: good; patient would benefit from acute skilled OT services to address these deficits and reach maximum level of function.         Clinical Decision Makin.  OT Mod:  "Pt evaluation falls under moderate complexity for evaluation coding due to identification of 3-5 performance deficits noted as stated above. Eval required Min/Mod assistance to complete on this date and detailed assessment(s) were utilized. Moreover, an expanded review of history and occupational profile obtained with additional review of cognitive, physical and psychosocial hx."     Plan:     Patient to be seen 4 x/week to address the above listed problems via self-care/home management, therapeutic activities, therapeutic exercises  · Plan of Care Expires:    · Plan of Care Reviewed with: patient, spouse    This Plan of care has been discussed with the patient who was involved in its development and understands and is in agreement with the identified goals and treatment plan    GOALS:    Occupational Therapy Goals        Problem: Occupational Therapy Goal    Goal Priority Disciplines Outcome Interventions   Occupational Therapy Goal     OT, PT/OT Ongoing (interventions implemented as appropriate)    Description:  Goals to be met by: 18     Patient will increase functional independence with ADLs by performing:    UE Dressing with Set-up Assistance.  LE Dressing with Minimal Assistance.  Grooming while seated with Supervision.  Toileting from bedside commode with Contact Guard Assistance for hygiene and clothing management.   Supine to sit with Set-up " Assistance.  Toilet transfer to bedside commode with Contact Guard Assistance.  Upper extremity exercise program x10 reps per handout, with assistance as needed.                      Time Tracking:     OT Date of Treatment: 07/01/18  OT Start Time: 0855  OT Stop Time: 0917  OT Total Time (min): 22 min    Billable Minutes:Evaluation 22    María Willoughby OT  7/1/2018

## 2018-07-01 NOTE — PLAN OF CARE
Problem: Pressure Ulcer Risk (Kevin Scale) (Adult,Obstetrics,Pediatric)  Goal: Identify Related Risk Factors and Signs and Symptoms  Related risk factors and signs and symptoms are identified upon initiation of Human Response Clinical Practice Guideline (CPG)    06/30/18 2154   Pressure Ulcer Risk (Kevin Scale)   Related Risk Factors (Pressure Ulcer Risk (Kevin Scale)) age extremes;body weight extremes;critical care admission;excretions/secretions;hospitalization prolonged;mechanical forces;medication;mobility impaired

## 2018-07-01 NOTE — SUBJECTIVE & OBJECTIVE
Interval History:was on bIPAP in last 24 hours,try wean to NC O 2.    Review of Systems   Constitutional: Positive for fatigue. Negative for chills and fever.   HENT: Negative for congestion and dental problem.    Eyes: Negative for discharge.   Respiratory: Negative for apnea and shortness of breath.    Cardiovascular: Negative for chest pain.   Gastrointestinal: Negative for abdominal distention.   Endocrine: Negative for cold intolerance.   Genitourinary: Negative for difficulty urinating and dyspareunia.   Musculoskeletal: Negative for arthralgias and back pain.   Skin: Negative for color change and pallor.   Allergic/Immunologic: Negative for food allergies.   Neurological: Positive for weakness. Negative for dizziness and facial asymmetry.   Hematological: Negative for adenopathy. Does not bruise/bleed easily.   Psychiatric/Behavioral: Negative for agitation and behavioral problems.     Objective:     Vital Signs (Most Recent):  Temp: 98.1 °F (36.7 °C) (07/01/18 0719)  Pulse: 62 (07/01/18 0800)  Resp: 19 (07/01/18 0727)  BP: 138/63 (07/01/18 0719)  SpO2: (!) 93 % (07/01/18 0727) Vital Signs (24h Range):  Temp:  [97 °F (36.1 °C)-98.8 °F (37.1 °C)] 98.1 °F (36.7 °C)  Pulse:  [62-91] 62  Resp:  [16-28] 19  SpO2:  [92 %-95 %] 93 %  BP: (108-144)/(57-68) 138/63     Weight: 76.7 kg (169 lb 1.5 oz)  Body mass index is 28.14 kg/m².    Intake/Output Summary (Last 24 hours) at 07/01/18 1013  Last data filed at 06/30/18 1700   Gross per 24 hour   Intake              360 ml   Output                0 ml   Net              360 ml      Physical Exam   Constitutional: She is oriented to person, place, and time. She appears well-developed. No distress.   HENT:   Head: Normocephalic and atraumatic.   Eyes: EOM are normal. Pupils are equal, round, and reactive to light.   Neck: Normal range of motion. Neck supple.   Cardiovascular: Normal rate and regular rhythm.    Pulmonary/Chest:   Diminished breath sound,bilateral.    Abdominal: Soft. Bowel sounds are normal.   Musculoskeletal: Normal range of motion. She exhibits no edema.   Neurological: She is alert and oriented to person, place, and time.   Skin: Skin is warm and dry. Capillary refill takes less than 2 seconds. She is not diaphoretic.   Psychiatric: She has a normal mood and affect. Her behavior is normal. Thought content normal.       Significant Labs: All pertinent labs within the past 24 hours have been reviewed.    Significant Imaging: I have reviewed and interpreted all pertinent imaging results/findings within the past 24 hours.

## 2018-07-01 NOTE — ASSESSMENT & PLAN NOTE
- C02 > 100 and s/p intubation  - Treated with NEBS and steroids  - Extubated on 6/27 and respiratory status with worsening due to volume overload issue today  - on prednisone and nebulizer.\  Does not have home oxygen,checked RA Spo2.was 72 at rest,ordered home oxygen.  started on BIPAP yesterday duo to increased PCo2 on ABG,letahrgic condition,swoitch to Nc O 2 today,  Require home oxygen,was 72% on RA at rest,conslted SW.

## 2018-07-01 NOTE — ASSESSMENT & PLAN NOTE
Patient with COPD. Intubated for hypercapnic respiratory failure. Patient developed SOB again overnight. Noticeable crackles on exam and volume overload.  -Bipap PRN and qhs  -Aggressive diuresis and BP control.  -Continue bronchodilators.  -PO prednisone.    Patient can qualify for home NIPPV 40%. IPAP 12 ans EPAP 5.

## 2018-07-01 NOTE — PLAN OF CARE
Problem: Fall Risk (Adult)  Goal: Identify Related Risk Factors and Signs and Symptoms  Related risk factors and signs and symptoms are identified upon initiation of Human Response Clinical Practice Guideline (CPG)   Outcome: Ongoing (interventions implemented as appropriate)   07/01/18 1123   Fall Risk   Related Risk Factors (Fall Risk) age-related changes;bladder function altered;culprit medication(s);fatigue/slow reaction;depression/anxiety;polypharmacy   Signs and Symptoms (Fall Risk) presence of risk factors

## 2018-07-01 NOTE — PROGRESS NOTES
[facility] Hospital Medicine  Progress Note    Patient Name: Yasmeen Palm  MRN: [id number]  Patient Class: IP- Inpatient   Admission Date: 6/24/2018  Length of Stay: 7 days  Attending Physician: Michelle Dumont MD  Primary Care Provider: Angel Orourke Jr, MD        Subjective:     Principal Problem:Acute hypercapnic respiratory failure    HPI:  Yasmeen Palm is a 68 y.o. female that (in part)  has a past medical history of Anticoagulant long-term use; Arthritis; Asthma; CHF (congestive heart failure); COPD (chronic obstructive pulmonary disease); Coronary artery disease; Depression; Diabetes mellitus; GERD (gastroesophageal reflux disease); and Hypertension.  has a past surgical history that includes Abdominal surgery; Cardiac surgery; and Hernia repair. Presents to [facility] Emergency Department complaining of chest pain .  She reports compliance with her home medication regimen, including Xarelto.     Description of symptoms    Location:  Substernal  Onset:  Acute onset 2 days ago  Character:  The patient remained; moderate severity  Frequency:  Daily  Duration:  each episode lasts several minutes at a time  Associated Symptoms:  Diaphoresis, shortness of breath, weakness and fatigue  Radiation:  Recent the chest  Exacerbating factors:  Worse on exertion  Relieving factors:  Minimal relief with supplemental oxygen       In the emergency department routine laboratory studies, chest x-ray, EKG, cardiac enzymes were obtained.  EKG findings were concerning the case was discussed with cardiologist, Dr. Pulido.  It was determined that her EKG was not consistent with STEMI and she has been chest pain-free since arrival.  She had been given Lovenox, aspirin, and plan was to continue trending troponins and monitor closely on telemetry.    Hospital medicine has been asked to admit for further evaluation and treatment.     Hospital Course:  Ms. Palm was admitted to the  hospital originally on 6/24/18 for chest pain. She was later sent to the ICU with acute hypercapnic respiratory failure the same day on BIPAP.  She quickly improved and was sent to the floor on 6/25.  Cards was consulted for NSTEMI with noted troponin increase and was planning on LHC on 6/26. However, the patient was sent back to the ICU on 6/26 with hypercapnic respiratory failure with a C02 of > 100 and was intubated. Pulmonary was consulted. Pt clinically improved from respiratory standpoint and was extubated on 6/27 to NC, but she did complain of chest discomfort. Cardiology was notified and patient was taken for LHC on 6/27 which was only remarkable for non-obstructive CAD and did not require intervention. Pt was given IVF post procedure and the next day developed increased SOB and required BIPAP. Pt was treated with IV lasix with good response with much improved respiratory status after output of 1L. Pt also being treated for COPD exacerbation with IV steroids, NEBS and doxycycline. ECHO performed on 6/25/2018 remarkable for EF=50-55% + grade 2 diastolic dysfunction.on po lasix at this time,started on BIPAP yesterday duo to increased PCo2 n ABG,letahrgic condition,swoitch to Nc O 2 today,,PT,OT is   following.  Require home oxygen,was 72% on RA at rest,conslted SW.    Interval History:was on bIPAP in last 24 hours,try wean to NC O 2.    Review of Systems   Constitutional: Positive for fatigue. Negative for chills and fever.   HENT: Negative for congestion and dental problem.    Eyes: Negative for discharge.   Respiratory: Negative for apnea and shortness of breath.    Cardiovascular: Negative for chest pain.   Gastrointestinal: Negative for abdominal distention.   Endocrine: Negative for cold intolerance.   Genitourinary: Negative for difficulty urinating and dyspareunia.   Musculoskeletal: Negative for arthralgias and back pain.   Skin: Negative for color change and pallor.   Allergic/Immunologic: Negative  for food allergies.   Neurological: Positive for weakness. Negative for dizziness and facial asymmetry.   Hematological: Negative for adenopathy. Does not bruise/bleed easily.   Psychiatric/Behavioral: Negative for agitation and behavioral problems.     Objective:     Vital Signs (Most Recent):  Temp: 98.1 °F (36.7 °C) (07/01/18 0719)  Pulse: 62 (07/01/18 0800)  Resp: 19 (07/01/18 0727)  BP: 138/63 (07/01/18 0719)  SpO2: (!) 93 % (07/01/18 0727) Vital Signs (24h Range):  Temp:  [97 °F (36.1 °C)-98.8 °F (37.1 °C)] 98.1 °F (36.7 °C)  Pulse:  [62-91] 62  Resp:  [16-28] 19  SpO2:  [92 %-95 %] 93 %  BP: (108-144)/(57-68) 138/63     Weight: 76.7 kg (169 lb 1.5 oz)  Body mass index is 28.14 kg/m².    Intake/Output Summary (Last 24 hours) at 07/01/18 1013  Last data filed at 06/30/18 1700   Gross per 24 hour   Intake              360 ml   Output                0 ml   Net              360 ml      Physical Exam   Constitutional: She is oriented to person, place, and time. She appears well-developed. No distress.   HENT:   Head: Normocephalic and atraumatic.   Eyes: EOM are normal. Pupils are equal, round, and reactive to light.   Neck: Normal range of motion. Neck supple.   Cardiovascular: Normal rate and regular rhythm.    Pulmonary/Chest:   Diminished breath sound,bilateral.   Abdominal: Soft. Bowel sounds are normal.   Musculoskeletal: Normal range of motion. She exhibits no edema.   Neurological: She is alert and oriented to person, place, and time.   Skin: Skin is warm and dry. Capillary refill takes less than 2 seconds. She is not diaphoretic.   Psychiatric: She has a normal mood and affect. Her behavior is normal. Thought content normal.       Significant Labs: All pertinent labs within the past 24 hours have been reviewed.    Significant Imaging: I have reviewed and interpreted all pertinent imaging results/findings within the past 24 hours.    Assessment/Plan:      * Acute hypercapnic respiratory failure    - Due to  COPD exacerbation   - s/p intubation and treatment with IV steroids, NEBS  - Pulmonary following and extubated on 6/27  - Respiratory status was worse on 6/28 due to volume overload  - s/p IV lasix with good output and now back down to NC   Pt to continue BIPAP QHS   She has Spo2 of 72% on RA,need home oxygen.        Debility    - PT/OT        PAF (paroxysmal atrial fibrillation)    - Rate controlled and anticoagulation resumed with Xarelto on 6/27 post [facility]        Neuropathy    - No acute issues         Non-STEMI (non-ST elevated myocardial infarction)    - Followed by Cardiology  - s/p [facility] on 6/27 only remarkable for non-obstructive CAD  - Continue medical management as per Cardiology with ASA, statin, and patient will not be placed on plavix given she will be fully anticoagulated with Xarelto        Acute exacerbation of chronic obstructive pulmonary disease (COPD)    - C02 > 100 and s/p intubation  - Treated with NEBS and steroids  - Extubated on 6/27 and respiratory status with worsening due to volume overload issue today  - on prednisone and nebulizer.\  Does not have home oxygen,checked RA Spo2.was 72 at rest,ordered home oxygen.  started on BIPAP yesterday duo to increased PCo2 on ABG,letahrgic condition,swoitch to Nc O 2 today,  Require home oxygen,was 72% on RA at rest,conslted SW.        Elevated brain natriuretic peptide (BNP) level    - Diuresis resumed with IV lasix        Elevated troponin I level    - As discussed under NSTEMI        Acute diastolic CHF (congestive heart failure)    - Due to volume overload  - Last ECHO with EF=50-55% + grade 2 diastolic dysfunction on 6/26  -improved with IV lasix.on maintained po lasix.          Obesity    - Weight reduction as outpatient after all acute issues addressed        Chronic anticoagulation    - Patient on Xarelto for PAF which has been resumed        History of deep vein thrombosis    - Resumed anticoagulation         History of pulmonary embolism    -  Xarelto resumed on 6/27 post [facility]        Anemia of chronic disease    - H/H with slight drop noted, but no bleeding  - Will continue to monitor        Malignant hypertension    - Difficult to control BP  - Continue lisinopril, and restarted hydralazine, clonidine, diltiazem today (held due to previous hypotensive issues)  - PRN labetalol        Tobacco abuse    - Smoking cessation counseling done        Gastroesophageal reflux disease without esophagitis    - Continue PPI        Type 2 diabetes mellitus, controlled    - Complications of peripheral neuropathy.   - Well controlled on current regimen        Chronic obstructive pulmonary disease with acute exacerbation    - As discussed above        Coronary artery disease involving native coronary artery of native heart with angina pectoris    - [facility] on 6/27 as discussed above  - As per Cardiology          VTE Risk Mitigation         Ordered     rivaroxaban tablet 20 mg  With dinner      06/27/18 9475              Michelle Dumont MD  Department of Hospital Medicine   [facility]

## 2018-07-01 NOTE — PT/OT/SLP EVAL
Physical Therapy Evaluation    Patient Name:  Yasmeen Palm   MRN:  [id number]    Recommendations:     Discharge Recommendations:  home health PT   Discharge Equipment Recommendations: none   Barriers to discharge: None    Assessment:     Yasmeen Palm is a 68 y.o. female admitted with a medical diagnosis of Acute hypercapnic respiratory failure.  She presents with the following impairments/functional limitations:  weakness, impaired endurance, impaired functional mobilty, gait instability, impaired balance, decreased safety awareness, pain, impaired cardiopulmonary response to activity.    Rehab Prognosis:  Good; patient would benefit from acute skilled PT services to address these deficits and reach maximum level of function.      Recent Surgery: Procedure(s) (LRB):  Left heart cath R rad access, not before 9am (Left)      Plan:     During this hospitalization, patient to be seen 6 x/week to address the above listed problems via gait training, therapeutic activities, therapeutic exercises  · Plan of Care Expires:  07/14/18   Plan of Care Reviewed with: patient, spouse    Subjective     Communicated with Nurse Antony prior to session.  Patient found supine upon PT entry to room, agreeable to evaluation.      Chief Complaint: I need to be cleaned  Patient comments/goals: to return to PLOF  Pain/Comfort:  · Pain Rating 1: 7/10  · Location - Side 1: Bilateral  · Location - Orientation 1: lower  · Location 1: back (- reports only with positional changes and with weight bearing activities. )  · Pain Addressed 1: Cessation of Activity    Patients cultural, spiritual, Jainism conflicts given the current situation:      Living Environment:  Pt lives with  in a 1 story house with 3 ANGELIA and bilateral hand rails.    Prior to admission, patients level of function was Mod I.  Patient has the following equipment: cane, straight, wheelchair, walker, rolling, rollator, bedside commode, shower chair, oxygen (- reports only  requiring 3.0 Lpm of O2 via N.C. while at home. ).  DME owned (not currently used): none.  Upon discharge, patient will have assistance from Spouse.    Objective:     Patient found with: oxygen (- 5.0 Lpm of O2 via N.C. )     General Precautions: Standard, fall   Orthopedic Precautions:Full weight bearing   Braces: N/A     Exams:  · Cognitive Exam:  Patient is oriented to Person, Place and Situation and follows 75% of verbal commands   · Gross Motor Coordination:  WFL  · Postural Exam:  Patient presented with the following abnormalities:    · -       Rounded shoulders  · -       Forward head  · -       Abnormal trunk flexion  · Skin Integrity/Edema:      · -       Skin integrity: Visible skin intact  · -       Edema: None noted .  · RLE ROM: WFL  · RLE Strength: WFL  · LLE ROM: WFL  · LLE Strength: WFL    Functional Mobility:  · Bed Mobility:     · Supine to Sit: moderate assistance  · Sit to Supine: moderate assistance  · Transfers:     · Sit to Stand:  moderate assistance with rolling walker  · Gait: 15' with RW and Mod A and 5.0 Lpm of O2 via N.C.     AM-PAC 6 CLICK MOBILITY  Total Score:16     Patient left supine with all lines intact, call button in reach and spouse present.    GOALS:    Physical Therapy Goals        Problem: Physical Therapy Goal    Goal Priority Disciplines Outcome Goal Variances Interventions   Physical Therapy Goal     PT/OT, PT      Description:  Goals to be met by: 2018     Patient will increase functional independence with mobility by performin. Supine to sit with Modified [place]  2. Sit to supine with Modified [place]  3. Sit to stand transfer with Modified [place]  4. Gait  x 250 feet with Modified [place] using Rolling Walker.    Recommend: HHPT at time of discharge.                       History:     Past Medical History:   Diagnosis Date    Anticoagulant long-term use     Arthritis     Asthma     CHF (congestive heart failure)     COPD (chronic  obstructive pulmonary disease)     Coronary artery disease     Depression     Diabetes mellitus     GERD (gastroesophageal reflux disease)     Hypertension        Past Surgical History:   Procedure Laterality Date    ABDOMINAL SURGERY      CARDIAC SURGERY      HERNIA REPAIR       Time Tracking:     PT Received On: 07/01/18 (- co treated with OT.)  PT Start Time: 0850     PT Stop Time: 0915  PT Total Time (min): 25 min     Billable Minutes: Evaluation 25 min       Devin Pollock, PT  07/01/2018

## 2018-07-01 NOTE — SUBJECTIVE & OBJECTIVE
Interval History: On NC this morning.   Review of Systems   Constitutional: Negative for chills and fever.   Respiratory: Positive for shortness of breath.    Cardiovascular: Negative for chest pain.     Objective:     Vital Signs (Most Recent):  Temp: 98.2 °F (36.8 °C) (07/01/18 1547)  Pulse: 78 (07/01/18 1547)  Resp: 16 (07/01/18 1547)  BP: (!) 121/59 (07/01/18 1547)  SpO2: (!) 93 % (07/01/18 1547) Vital Signs (24h Range):  Temp:  [96.7 °F (35.9 °C)-98.8 °F (37.1 °C)] 98.2 °F (36.8 °C)  Pulse:  [62-78] 78  Resp:  [16-28] 16  SpO2:  [92 %-96 %] 93 %  BP: (115-144)/(59-68) 121/59     Weight: 76.7 kg (169 lb 1.5 oz)  Body mass index is 28.14 kg/m².    Intake/Output Summary (Last 24 hours) at 07/01/18 1646  Last data filed at 06/30/18 1700   Gross per 24 hour   Intake              180 ml   Output                0 ml   Net              180 ml      Physical Exam   Constitutional: She is oriented to person, place, and time. She appears well-developed. No distress.   HENT:   Head: Normocephalic and atraumatic.   Eyes: EOM are normal. Pupils are equal, round, and reactive to light.   Neck: Normal range of motion. Neck supple.   Cardiovascular: Normal rate and regular rhythm.    Pulmonary/Chest: Effort normal. She has rales.   Abdominal: Soft. Bowel sounds are normal.   Musculoskeletal: Normal range of motion. She exhibits edema.   Neurological: She is alert and oriented to person, place, and time.   Skin: Skin is warm and dry. Capillary refill takes less than 2 seconds. She is not diaphoretic.   Psychiatric: She has a normal mood and affect. Her behavior is normal. Thought content normal.       Significant Labs: All pertinent labs within the past 24 hours have been reviewed.    Significant Imaging: I have reviewed and interpreted all pertinent imaging results/findings within the past 24 hours.

## 2018-07-01 NOTE — PLAN OF CARE
07/01/18 1553   Medicare Message   Important Message from Medicare regarding Discharge Appeal Rights Given to patient/caregiver;Explained to patient/caregiver;Signed/date by patient/caregiver;Other (comments)   Patient expressed understanding of IMM Notice; initialed; dated

## 2018-07-01 NOTE — PLAN OF CARE
Problem: Diabetes, Type 2 (Adult)  Goal: Signs and Symptoms of Listed Potential Problems Will be Absent, Minimized or Managed (Diabetes, Type 2)  Signs and symptoms of listed potential problems will be absent, minimized or managed by discharge/transition of care (reference Diabetes, Type 2 (Adult) CPG).   Outcome: Ongoing (interventions implemented as appropriate)   07/01/18 1123   Diabetes, Type 2   Problems Assessed (Type 2 Diabetes) hyperglycemia;hypoglycemia;situational response   Problems Present (Type 2 Diabetes) hyperglycemia

## 2018-07-01 NOTE — PLAN OF CARE
Problem: Fall Risk (Adult)  Goal: Absence of Falls  Patient will demonstrate the desired outcomes by discharge/transition of care.    06/30/18 1458   Fall Risk (Adult)   Absence of Falls making progress toward outcome

## 2018-07-02 LAB
ANION GAP SERPL CALC-SCNC: 7 MMOL/L
BASOPHILS # BLD AUTO: 0.01 K/UL
BASOPHILS NFR BLD: 0.1 %
BUN SERPL-MCNC: 22 MG/DL
CALCIUM SERPL-MCNC: 9.3 MG/DL
CHLORIDE SERPL-SCNC: 99 MMOL/L
CO2 SERPL-SCNC: 38 MMOL/L
CREAT SERPL-MCNC: 0.7 MG/DL
DIFFERENTIAL METHOD: ABNORMAL
EOSINOPHIL # BLD AUTO: 0.1 K/UL
EOSINOPHIL NFR BLD: 0.4 %
ERYTHROCYTE [DISTWIDTH] IN BLOOD BY AUTOMATED COUNT: 17.6 %
EST. GFR  (AFRICAN AMERICAN): >60 ML/MIN/1.73 M^2
EST. GFR  (NON AFRICAN AMERICAN): >60 ML/MIN/1.73 M^2
GLUCOSE SERPL-MCNC: 194 MG/DL
HCT VFR BLD AUTO: 31.1 %
HGB BLD-MCNC: 8.9 G/DL
LYMPHOCYTES # BLD AUTO: 1.5 K/UL
LYMPHOCYTES NFR BLD: 9.9 %
MAGNESIUM SERPL-MCNC: 1.9 MG/DL
MCH RBC QN AUTO: 24.9 PG
MCHC RBC AUTO-ENTMCNC: 28.6 G/DL
MCV RBC AUTO: 87 FL
MONOCYTES # BLD AUTO: 1 K/UL
MONOCYTES NFR BLD: 6.8 %
NEUTROPHILS # BLD AUTO: 12.1 K/UL
NEUTROPHILS NFR BLD: 82.6 %
PHOSPHATE SERPL-MCNC: 2.2 MG/DL
PLATELET # BLD AUTO: 324 K/UL
PMV BLD AUTO: 10.9 FL
POCT GLUCOSE: 164 MG/DL (ref 70–110)
POCT GLUCOSE: 237 MG/DL (ref 70–110)
POCT GLUCOSE: 257 MG/DL (ref 70–110)
POTASSIUM SERPL-SCNC: 3.9 MMOL/L
RBC # BLD AUTO: 3.57 M/UL
SODIUM SERPL-SCNC: 144 MMOL/L
WBC # BLD AUTO: 14.64 K/UL

## 2018-07-02 PROCEDURE — 97530 THERAPEUTIC ACTIVITIES: CPT

## 2018-07-02 PROCEDURE — 36415 COLL VENOUS BLD VENIPUNCTURE: CPT

## 2018-07-02 PROCEDURE — 63600175 PHARM REV CODE 636 W HCPCS: Performed by: HOSPITALIST

## 2018-07-02 PROCEDURE — 83735 ASSAY OF MAGNESIUM: CPT

## 2018-07-02 PROCEDURE — 27000221 HC OXYGEN, UP TO 24 HOURS

## 2018-07-02 PROCEDURE — 25000003 PHARM REV CODE 250: Performed by: EMERGENCY MEDICINE

## 2018-07-02 PROCEDURE — 99900035 HC TECH TIME PER 15 MIN (STAT)

## 2018-07-02 PROCEDURE — 80048 BASIC METABOLIC PNL TOTAL CA: CPT

## 2018-07-02 PROCEDURE — 85025 COMPLETE CBC W/AUTO DIFF WBC: CPT

## 2018-07-02 PROCEDURE — 25000003 PHARM REV CODE 250: Performed by: HOSPITALIST

## 2018-07-02 PROCEDURE — 94640 AIRWAY INHALATION TREATMENT: CPT

## 2018-07-02 PROCEDURE — 25000003 PHARM REV CODE 250: Performed by: INTERNAL MEDICINE

## 2018-07-02 PROCEDURE — 97535 SELF CARE MNGMENT TRAINING: CPT

## 2018-07-02 PROCEDURE — 21400001 HC TELEMETRY ROOM

## 2018-07-02 PROCEDURE — 84100 ASSAY OF PHOSPHORUS: CPT

## 2018-07-02 PROCEDURE — 94761 N-INVAS EAR/PLS OXIMETRY MLT: CPT

## 2018-07-02 PROCEDURE — 25000242 PHARM REV CODE 250 ALT 637 W/ HCPCS: Performed by: HOSPITALIST

## 2018-07-02 RX ORDER — TRAMADOL HYDROCHLORIDE 50 MG/1
50 TABLET ORAL EVERY 6 HOURS PRN
Status: DISCONTINUED | OUTPATIENT
Start: 2018-07-02 | End: 2018-07-27 | Stop reason: HOSPADM

## 2018-07-02 RX ORDER — FUROSEMIDE 10 MG/ML
20 INJECTION INTRAMUSCULAR; INTRAVENOUS ONCE
Status: COMPLETED | OUTPATIENT
Start: 2018-07-02 | End: 2018-07-02

## 2018-07-02 RX ADMIN — TRAMADOL HYDROCHLORIDE 50 MG: 50 TABLET, FILM COATED ORAL at 12:07

## 2018-07-02 RX ADMIN — INSULIN DETEMIR 15 UNITS: 100 INJECTION, SOLUTION SUBCUTANEOUS at 09:07

## 2018-07-02 RX ADMIN — GABAPENTIN 300 MG: 300 CAPSULE ORAL at 03:07

## 2018-07-02 RX ADMIN — CLONIDINE HYDROCHLORIDE 0.1 MG: 0.1 TABLET ORAL at 09:07

## 2018-07-02 RX ADMIN — DOXYCYCLINE HYCLATE 100 MG: 100 TABLET, COATED ORAL at 09:07

## 2018-07-02 RX ADMIN — FUROSEMIDE 20 MG: 10 INJECTION, SOLUTION INTRAVENOUS at 12:07

## 2018-07-02 RX ADMIN — POTASSIUM PHOSPHATE, MONOBASIC 500 MG: 500 TABLET, SOLUBLE ORAL at 09:07

## 2018-07-02 RX ADMIN — HYDRALAZINE HYDROCHLORIDE 50 MG: 25 TABLET ORAL at 04:07

## 2018-07-02 RX ADMIN — ASPIRIN 81 MG CHEWABLE TABLET 81 MG: 81 TABLET CHEWABLE at 09:07

## 2018-07-02 RX ADMIN — RIVAROXABAN 20 MG: 20 TABLET, FILM COATED ORAL at 04:07

## 2018-07-02 RX ADMIN — IPRATROPIUM BROMIDE AND ALBUTEROL SULFATE 3 ML: .5; 2.5 SOLUTION RESPIRATORY (INHALATION) at 12:07

## 2018-07-02 RX ADMIN — HYDRALAZINE HYDROCHLORIDE 50 MG: 25 TABLET ORAL at 03:07

## 2018-07-02 RX ADMIN — IPRATROPIUM BROMIDE AND ALBUTEROL SULFATE 3 ML: .5; 2.5 SOLUTION RESPIRATORY (INHALATION) at 08:07

## 2018-07-02 RX ADMIN — IPRATROPIUM BROMIDE AND ALBUTEROL SULFATE 3 ML: .5; 2.5 SOLUTION RESPIRATORY (INHALATION) at 07:07

## 2018-07-02 RX ADMIN — GUAIFENESIN 600 MG: 600 TABLET, EXTENDED RELEASE ORAL at 09:07

## 2018-07-02 RX ADMIN — HYDRALAZINE HYDROCHLORIDE 50 MG: 25 TABLET ORAL at 09:07

## 2018-07-02 RX ADMIN — SOTALOL HYDROCHLORIDE 80 MG: 80 TABLET ORAL at 09:07

## 2018-07-02 RX ADMIN — INSULIN ASPART 2 UNITS: 100 INJECTION, SOLUTION INTRAVENOUS; SUBCUTANEOUS at 12:07

## 2018-07-02 RX ADMIN — LISINOPRIL 10 MG: 5 TABLET ORAL at 09:07

## 2018-07-02 RX ADMIN — GABAPENTIN 300 MG: 300 CAPSULE ORAL at 09:07

## 2018-07-02 RX ADMIN — DILTIAZEM HYDROCHLORIDE 120 MG: 30 TABLET, FILM COATED ORAL at 04:07

## 2018-07-02 RX ADMIN — FUROSEMIDE 40 MG: 40 TABLET ORAL at 09:07

## 2018-07-02 RX ADMIN — PANTOPRAZOLE SODIUM 40 MG: 40 TABLET, DELAYED RELEASE ORAL at 09:07

## 2018-07-02 RX ADMIN — PREDNISONE 40 MG: 20 TABLET ORAL at 09:07

## 2018-07-02 RX ADMIN — ATORVASTATIN CALCIUM 80 MG: 40 TABLET, FILM COATED ORAL at 09:07

## 2018-07-02 RX ADMIN — DILTIAZEM HYDROCHLORIDE 120 MG: 30 TABLET, FILM COATED ORAL at 09:07

## 2018-07-02 RX ADMIN — CLONIDINE HYDROCHLORIDE 0.1 MG: 0.1 TABLET ORAL at 03:07

## 2018-07-02 RX ADMIN — INSULIN ASPART 3 UNITS: 100 INJECTION, SOLUTION INTRAVENOUS; SUBCUTANEOUS at 04:07

## 2018-07-02 NOTE — ASSESSMENT & PLAN NOTE
- Due to COPD exacerbation   - s/p intubation and treatment with IV steroids, NEBS  - Pulmonary following and extubated on 6/27  - Respiratory status was worse on 6/28 due to volume overload  - s/p IV lasix with good output and now back down to NC   Pt to continue BIPAP QHS   ordered home [facility],,currentky stable on NC O 2.

## 2018-07-02 NOTE — SUBJECTIVE & OBJECTIVE
Interval History:stable on NC O 2.    Review of Systems   Constitutional: Positive for fatigue. Negative for chills and fever.   HENT: Negative for congestion and dental problem.    Eyes: Negative for discharge.   Respiratory: Negative for apnea and shortness of breath.    Cardiovascular: Negative for chest pain.   Gastrointestinal: Negative for abdominal distention.   Endocrine: Negative for cold intolerance.   Genitourinary: Negative for difficulty urinating and dyspareunia.   Musculoskeletal: Negative for arthralgias and back pain.   Skin: Negative for color change and pallor.   Allergic/Immunologic: Negative for food allergies.   Neurological: Positive for weakness. Negative for dizziness and facial asymmetry.   Hematological: Negative for adenopathy. Does not bruise/bleed easily.   Psychiatric/Behavioral: Negative for agitation and behavioral problems.     Objective:     Vital Signs (Most Recent):  Temp: 98.2 °F (36.8 °C) (07/02/18 1119)  Pulse: 76 (07/02/18 1119)  Resp: 18 (07/02/18 1119)  BP: 125/61 (07/02/18 1119)  SpO2: (!) 93 % (07/02/18 1119) Vital Signs (24h Range):  Temp:  [97.4 °F (36.3 °C)-98.4 °F (36.9 °C)] 98.2 °F (36.8 °C)  Pulse:  [62-78] 76  Resp:  [16-25] 18  SpO2:  [89 %-99 %] 93 %  BP: (121-132)/(59-65) 125/61     Weight: 80.2 kg (176 lb 12.9 oz)  Body mass index is 29.42 kg/m².    Intake/Output Summary (Last 24 hours) at 07/02/18 1203  Last data filed at 07/01/18 2200   Gross per 24 hour   Intake              240 ml   Output                0 ml   Net              240 ml      Physical Exam   Constitutional: She is oriented to person, place, and time. She appears well-developed. No distress.   HENT:   Head: Normocephalic and atraumatic.   Eyes: EOM are normal. Pupils are equal, round, and reactive to light.   Neck: Normal range of motion. Neck supple.   Cardiovascular: Normal rate and regular rhythm.    Pulmonary/Chest:   Diminished breath sound,bilateral.   Abdominal: Soft. Bowel sounds are  normal.   Musculoskeletal: Normal range of motion. She exhibits no edema.   Neurological: She is alert and oriented to person, place, and time.   Skin: Skin is warm and dry. Capillary refill takes less than 2 seconds. She is not diaphoretic.   Psychiatric: She has a normal mood and affect. Her behavior is normal. Thought content normal.       Significant Labs: All pertinent labs within the past 24 hours have been reviewed.    Significant Imaging: I have reviewed and interpreted all pertinent imaging results/findings within the past 24 hours.

## 2018-07-02 NOTE — PLAN OF CARE
Problem: Diabetes, Type 2 (Adult)  Intervention: Support/Optimize Psychosocial Response to Condition   07/02/18 1749   Coping/Psychosocial Interventions   Supportive Measures active listening utilized   Environmental Support calm environment promoted;rest periods encouraged     Intervention: Optimize Glycemic Control   07/02/18 1749   Nutrition Interventions   Glycemic Management blood glucose monitoring;supplemental insulin given       Goal: Signs and Symptoms of Listed Potential Problems Will be Absent, Minimized or Managed (Diabetes, Type 2)  Signs and symptoms of listed potential problems will be absent, minimized or managed by discharge/transition of care (reference Diabetes, Type 2 (Adult) CPG).   Outcome: Ongoing (interventions implemented as appropriate)   07/02/18 1749   Diabetes, Type 2   Problems Assessed (Type 2 Diabetes) all   Problems Present (Type 2 Diabetes) hyperglycemia       Problem: Fall Risk (Adult)  Goal: Identify Related Risk Factors and Signs and Symptoms  Related risk factors and signs and symptoms are identified upon initiation of Human Response Clinical Practice Guideline (CPG)   Outcome: Ongoing (interventions implemented as appropriate)   07/02/18 1749   Fall Risk   Related Risk Factors (Fall Risk) polypharmacy;environment unfamiliar;slipper/uneven surfaces;age-related changes   Signs and Symptoms (Fall Risk) presence of risk factors     Goal: Absence of Falls  Patient will demonstrate the desired outcomes by discharge/transition of care.   Outcome: Ongoing (interventions implemented as appropriate)   07/02/18 1749   Fall Risk (Adult)   Absence of Falls making progress toward outcome       Problem: Breathing Pattern Ineffective (Adult)  Goal: Identify Related Risk Factors and Signs and Symptoms  Related risk factors and signs and symptoms are identified upon initiation of Human Response Clinical Practice Guideline (CPG)   Outcome: Ongoing (interventions implemented as appropriate)    07/02/18 1749   Breathing Pattern Ineffective   Related Risk Factors (Breathing Pattern Ineffective) fatigue;immobility;underlying condition   Signs and Symptoms (Breathing Pattern Ineffective) activity intolerance;breath sounds abnormal

## 2018-07-02 NOTE — PLAN OF CARE
07/02/18 1532   Discharge Reassessment   Assessment Type Discharge Planning Reassessment   Provided patient/caregiver education on the expected discharge date and the discharge plan Yes   Do you have any problems affording any of your prescribed medications? No   Discharge Plan A Home;Home Health   Discharge Plan B Other   Patient choice form signed by patient/caregiver N/A   Can the patient answer the patient profile reliably? Yes, cognitively intact   How does the patient rate their overall health at the present time? Fair   Describe the patient's ability to walk at the present time. Walks with the help of equipment   How often would a person be available to care for the patient? Whenever needed   Number of comorbid conditions (as recorded on the chart) Five or more   During the past month, has the patient often been bothered by feeling down, depressed or hopeless? No   During the past month, has the patient often been bothered by little interest or pleasure in doing things? No

## 2018-07-02 NOTE — PT/OT/SLP PROGRESS
Occupational Therapy   Treatment    Name: Yasmeen Palm  MRN: [id number]  Admitting Diagnosis:  Acute hypercapnic respiratory failure       Recommendations:     Discharge Recommendations: home with home health, home health OT  Discharge Equipment Recommendations:  none  Barriers to discharge:  None    Subjective     Communicated with: nurse prior to session.  Pain/Comfort:  · Pain Rating 1: 7/10  · Location - Side 1: Bilateral  · Location - Orientation 1: lower  · Location 1: coccyx  · Pain Addressed 1: Cessation of Activity  · Pain Rating Post-Intervention 1: 7/10  · Pain Addressed 2: Pre-medicate for activity, Reposition, Cessation of Activity, Nurse notified    Patients cultural, spiritual, Presybeterian conflicts given the current situation: na    Objective:     Patient found with: telemetry, oxygen, bed alarm (3 LO2 NC)    General Precautions: Standard, fall, respiratory   Orthopedic Precautions:N/A   Braces: N/A     Occupational Performance:    Bed Mobility:    · Patient completed Scooting/Bridging with minimum assistance  · Patient completed Supine to Sit with minimum assistance and with side rail  · Patient completed Sit to Supine with moderate assistance     Functional Mobility/Transfers:  · Patient completed Sit <> Stand Transfer with minimum assistance  with  rolling walker   · Patient completed Toilet Transfer Step Transfer technique with minimum assistance with  bedside commode  · Functional Mobility: NA    Activities of Daily Living:  · Feeding:  total assistance HOB elevated  · Toileting: total assistance for hygiene and clothes management 2/2 SOB and exertion    Patient left HOB elevated with all lines intact, call button in reach, bed alarm on, nurse notified and nurse present    [facility] 6 Click:  [facility] Total Score: 14    Treatment & Education:  At rest, O2 sats 91% on 3 L O2 NC. Pt. Instructed Purse lip breathing 2/2 desatted to 83% with 3 L 2 NC with OOB transfer to [facility] [place] [place]. Increased time ~1.5 min to  recover to 90%.  After toileting and t/f back to sit EOB, O2 sats dropped to 86% on 3L required >2 min to recover to 90%.    Education:    Assessment:     Yasmeen Palm is a 68 y.o. female with a medical diagnosis of Acute hypercapnic respiratory failure.  She presents with slow progress 2/2 desats to mid-low 80's  and increase time to recovery with OOB activity.  Continue with OT POC  Performance deficits affecting function are weakness, impaired self care skills, decreased safety awareness, impaired cardiopulmonary response to activity, pain, impaired functional mobilty, impaired endurance, gait instability.      Rehab Prognosis:  Fair ; patient would benefit from acute skilled OT services to address these deficits and reach maximum level of function.       Plan:     Patient to be seen 4 x/week to address the above listed problems via self-care/home management, therapeutic activities, therapeutic exercises  · Plan of Care Expires: 08/01/18  · Plan of Care Reviewed with: patient    This Plan of care has been discussed with the patient who was involved in its development and understands and is in agreement with the identified goals and treatment plan    GOALS:    Occupational Therapy Goals        Problem: Occupational Therapy Goal    Goal Priority Disciplines Outcome Interventions   Occupational Therapy Goal     OT, PT/OT Ongoing (interventions implemented as appropriate)    Description:  Goals to be met by: 7/13/18     Patient will increase functional independence with ADLs by performing:    UE Dressing with Set-up Assistance.  LE Dressing with Minimal Assistance.  Grooming while seated with Supervision.  Toileting from bedside commode with Contact Guard Assistance for hygiene and clothing management.   Supine to sit with Set-up Assistance.  Toilet transfer to bedside commode with Contact Guard Assistance.  Upper extremity exercise program x10 reps per handout, with assistance as needed.                      Time  Tracking:     OT Date of Treatment: 07/02/18  OT Start Time: 1103  OT Stop Time: 1159  OT Total Time (min): 56 min    Billable Minutes:Self Care/Home Management 30  Therapeutic Activity 26  Total Time 56    Amira Duffy OT  7/2/2018

## 2018-07-02 NOTE — PT/OT/SLP PROGRESS
Physical Therapy      Patient Name:  Yasmeen Palm   MRN:  [id number]    Patient not seen today secondary to Unavailable (pt is working with OT ). Will follow-up as able later hour/day .  Second attempt 1404 PM, pt is on BiPAP. Will follow up tomorrow .   Kayleigh Henry, PTA

## 2018-07-02 NOTE — PLAN OF CARE
Problem: Occupational Therapy Goal  Goal: Occupational Therapy Goal  Goals to be met by: 7/13/18     Patient will increase functional independence with ADLs by performing:    UE Dressing with Set-up Assistance.  LE Dressing with Minimal Assistance.  Grooming while seated with Supervision.  Toileting from bedside commode with Contact Guard Assistance for hygiene and clothing management.   Supine to sit with Set-up Assistance.  Toilet transfer to bedside commode with Contact Guard Assistance.  Upper extremity exercise program x10 reps per handout, with assistance as needed.     Outcome: Ongoing (interventions implemented as appropriate)  Slow progress 2.2 desats to mid-low 80's  And increase time to recovery with OOB activity.  Continue with OT POC.

## 2018-07-02 NOTE — PROGRESS NOTES
"TN met with patient at bedside to introduce self and to explain duties of case management. TN inquired of patient's help at home. Patient stated that she lives with her  [place] whom helps her at home. Patient stated that she is independent however she does have a rollator and 4 prong cane at home. Patient stated that she has a nebulizer and home 02 (3L) that she uses sometimes at home. Patient stated that her oxygen is supplied by [employer]. Patient will bring home 02 from home when ready for discharge. Patient would like to resume home health with [facility].     Patient has a concentrator and home fill unit to use 2L for 12 hours a day verified by Dedra with [employer].     EDUCATION: TN reviewed follow up appointment information as well as  "COPD/Resp Failure discharge instructions" handout with patient using teach. Patient stated she will notify the doctor if she has trouble breathing and chest pain. Patient is in agreement and verbalized an understanding. Placed discharge information in blue discharge folder.  TN also reviewed patient responsibility checklist with her using teach back. Patient was able to verbalize her responsibilities after discharge to manage her care at home bein. Going to follow up appointments   2. Picking up rx from the pharmacy when discharged  3. Taking her medications as prescribed       "

## 2018-07-02 NOTE — NURSING
Report received from night nurseVania. Pt. resting quietly  Evaluated pt. general condition. O2 per 3L NC. Family at bedside. No apparent distress noted. No verbalization of pain or discomfort. Safety measures maintained.

## 2018-07-02 NOTE — PROGRESS NOTES
[facility] Hospital Medicine  Progress Note    Patient Name: Yasmeen Palm  MRN: [id number]  Patient Class: IP- Inpatient   Admission Date: 6/24/2018  Length of Stay: 8 days  Attending Physician: Michelle Dumont MD  Primary Care Provider: Angel Orourke Jr, MD        Subjective:     Principal Problem:Acute hypercapnic respiratory failure    HPI:  Yasmeen Palm is a 68 y.o. female that (in part)  has a past medical history of Anticoagulant long-term use; Arthritis; Asthma; CHF (congestive heart failure); COPD (chronic obstructive pulmonary disease); Coronary artery disease; Depression; Diabetes mellitus; GERD (gastroesophageal reflux disease); and Hypertension.  has a past surgical history that includes Abdominal surgery; Cardiac surgery; and Hernia repair. Presents to [facility] Emergency Department complaining of chest pain .  She reports compliance with her home medication regimen, including Xarelto.     Description of symptoms    Location:  Substernal  Onset:  Acute onset 2 days ago  Character:  The patient remained; moderate severity  Frequency:  Daily  Duration:  each episode lasts several minutes at a time  Associated Symptoms:  Diaphoresis, shortness of breath, weakness and fatigue  Radiation:  Recent the chest  Exacerbating factors:  Worse on exertion  Relieving factors:  Minimal relief with supplemental oxygen       In the emergency department routine laboratory studies, chest x-ray, EKG, cardiac enzymes were obtained.  EKG findings were concerning the case was discussed with cardiologist, Dr. Pulido.  It was determined that her EKG was not consistent with STEMI and she has been chest pain-free since arrival.  She had been given Lovenox, aspirin, and plan was to continue trending troponins and monitor closely on telemetry.    Hospital medicine has been asked to admit for further evaluation and treatment.     Hospital Course:  Ms. Palm was admitted to the  hospital originally on 6/24/18 for chest pain. She was later sent to the ICU with acute hypercapnic respiratory failure the same day on BIPAP.  She quickly improved and was sent to the floor on 6/25.  Cards was consulted for NSTEMI with noted troponin increase and was planning on LHC on 6/26. However, the patient was sent back to the ICU on 6/26 with hypercapnic respiratory failure with a C02 of > 100 and was intubated. Pulmonary was consulted. Pt clinically improved from respiratory standpoint and was extubated on 6/27 to NC, but she did complain of chest discomfort. Cardiology was notified and patient was taken for LHC on 6/27 which was only remarkable for non-obstructive CAD and did not require intervention. Pt was given IVF post procedure and the next day developed increased SOB and required BIPAP. Pt was treated with IV lasix with good response with much improved respiratory status after output of 1L. Pt also being treated for COPD exacerbation with IV steroids, NEBS and doxycycline. ECHO performed on 6/25/2018 remarkable for EF=50-55% + grade 2 diastolic dysfunction.on po lasix at this time,started on BIPAP yesterday duo to increased PCo2 n ABG,letahrgic condition,switch to Nc O 2 today,,PT,OT is   following.  ordered home triology.    Interval History:stable on NC O 2.    Review of Systems   Constitutional: Positive for fatigue. Negative for chills and fever.   HENT: Negative for congestion and dental problem.    Eyes: Negative for discharge.   Respiratory: Negative for apnea and shortness of breath.    Cardiovascular: Negative for chest pain.   Gastrointestinal: Negative for abdominal distention.   Endocrine: Negative for cold intolerance.   Genitourinary: Negative for difficulty urinating and dyspareunia.   Musculoskeletal: Negative for arthralgias and back pain.   Skin: Negative for color change and pallor.   Allergic/Immunologic: Negative for food allergies.   Neurological: Positive for weakness. Negative  for dizziness and facial asymmetry.   Hematological: Negative for adenopathy. Does not bruise/bleed easily.   Psychiatric/Behavioral: Negative for agitation and behavioral problems.     Objective:     Vital Signs (Most Recent):  Temp: 98.2 °F (36.8 °C) (07/02/18 1119)  Pulse: 76 (07/02/18 1119)  Resp: 18 (07/02/18 1119)  BP: 125/61 (07/02/18 1119)  SpO2: (!) 93 % (07/02/18 1119) Vital Signs (24h Range):  Temp:  [97.4 °F (36.3 °C)-98.4 °F (36.9 °C)] 98.2 °F (36.8 °C)  Pulse:  [62-78] 76  Resp:  [16-25] 18  SpO2:  [89 %-99 %] 93 %  BP: (121-132)/(59-65) 125/61     Weight: 80.2 kg (176 lb 12.9 oz)  Body mass index is 29.42 kg/m².    Intake/Output Summary (Last 24 hours) at 07/02/18 1203  Last data filed at 07/01/18 2200   Gross per 24 hour   Intake              240 ml   Output                0 ml   Net              240 ml      Physical Exam   Constitutional: She is oriented to person, place, and time. She appears well-developed. No distress.   HENT:   Head: Normocephalic and atraumatic.   Eyes: EOM are normal. Pupils are equal, round, and reactive to light.   Neck: Normal range of motion. Neck supple.   Cardiovascular: Normal rate and regular rhythm.    Pulmonary/Chest:   Diminished breath sound,bilateral.   Abdominal: Soft. Bowel sounds are normal.   Musculoskeletal: Normal range of motion. She exhibits no edema.   Neurological: She is alert and oriented to person, place, and time.   Skin: Skin is warm and dry. Capillary refill takes less than 2 seconds. She is not diaphoretic.   Psychiatric: She has a normal mood and affect. Her behavior is normal. Thought content normal.       Significant Labs: All pertinent labs within the past 24 hours have been reviewed.    Significant Imaging: I have reviewed and interpreted all pertinent imaging results/findings within the past 24 hours.    Assessment/Plan:      * Acute hypercapnic respiratory failure    - Due to COPD exacerbation   - s/p intubation and treatment with IV  steroids, NEBS  - Pulmonary following and extubated on 6/27  - Respiratory status was worse on 6/28 due to volume overload  - s/p IV lasix with good output and now back down to NC   Pt to continue BIPAP QHS   ordered home Triology,,currentky stable on NC O 2.        Debility    - PT/OT        PAF (paroxysmal atrial fibrillation)    - Rate controlled and anticoagulation resumed with Xarelto on 6/27 post [facility]        Neuropathy    - No acute issues         Non-STEMI (non-ST elevated myocardial infarction)    - Followed by Cardiology  - s/p [facility] on 6/27 only remarkable for non-obstructive CAD  - Continue medical management as per Cardiology with ASA, statin, and patient will not be placed on plavix given she will be fully anticoagulated with Xarelto        Acute exacerbation of chronic obstructive pulmonary disease (COPD)    - C02 > 100 and s/p intubation  - Treated with NEBS and steroids  - Extubated on 6/27 and respiratory status with worsening due to volume overload issue today  - on prednisone and nebulizer.\  Does not have home oxygen,checked RA Spo2.was 72 at rest,ordered home oxygen.  started on BIPAP yesterday duo to increased PCo2 on ABG,letahrgic condition,swoitch to Nc O 2 today,  Require home oxygen,was 72% on RA at rest,conslted SW.        Elevated brain natriuretic peptide (BNP) level    - Diuresis resumed with IV lasix        Elevated troponin I level    - As discussed under NSTEMI        Acute diastolic CHF (congestive heart failure)    - Due to volume overload  - Last ECHO with EF=50-55% + grade 2 diastolic dysfunction on 6/26  -improved with IV lasix.on maintained po lasix.          Obesity    - Weight reduction as outpatient after all acute issues addressed        Chronic anticoagulation    - Patient on Xarelto for PAF which has been resumed        History of deep vein thrombosis    - Resumed anticoagulation         History of pulmonary embolism    - Xarelto resumed on 6/27 post [facility]        Anemia of  chronic disease    - H/H with slight drop noted, but no bleeding  - Will continue to monitor        Malignant hypertension    - Difficult to control BP  - Continue lisinopril, and restarted hydralazine, clonidine, diltiazem today (held due to previous hypotensive issues)  - PRN labetalol        Tobacco abuse    - Smoking cessation counseling done        Gastroesophageal reflux disease without esophagitis    - Continue PPI        Type 2 diabetes mellitus, controlled    - Complications of peripheral neuropathy.   - Well controlled on current regimen        Chronic obstructive pulmonary disease with acute exacerbation    - As discussed above        Coronary artery disease involving native coronary artery of native heart with angina pectoris    - [facility] on 6/27 as discussed above  - As per Cardiology          VTE Risk Mitigation         Ordered     rivaroxaban tablet 20 mg  With dinner      06/27/18 0616              Michelle Dumont MD  Department of Hospital Medicine   [facility]

## 2018-07-02 NOTE — PLAN OF CARE
Problem: Diabetes, Type 2 (Adult)  Goal: Signs and Symptoms of Listed Potential Problems Will be Absent, Minimized or Managed (Diabetes, Type 2)  Signs and symptoms of listed potential problems will be absent, minimized or managed by discharge/transition of care (reference Diabetes, Type 2 (Adult) CPG).   Outcome: Ongoing (interventions implemented as appropriate)   07/02/18 0312   Diabetes, Type 2   Problems Assessed (Type 2 Diabetes) all   Problems Present (Type 2 Diabetes) none       Problem: Fall Risk (Adult)  Goal: Absence of Falls  Patient will demonstrate the desired outcomes by discharge/transition of care.   Outcome: Ongoing (interventions implemented as appropriate)   07/02/18 0312   Fall Risk (Adult)   Absence of Falls making progress toward outcome       Problem: Patient Care Overview  Goal: Plan of Care Review  Outcome: Ongoing (interventions implemented as appropriate)   07/02/18 0312   Coping/Psychosocial   Plan Of Care Reviewed With patient;spouse       Problem: Pressure Ulcer Risk (Kevin Scale) (Adult,Obstetrics,Pediatric)  Goal: Skin Integrity  Patient will demonstrate the desired outcomes by discharge/transition of care.   Outcome: Ongoing (interventions implemented as appropriate)   07/02/18 0312   Pressure Ulcer Risk (Kevin Scale) (Adult,Obstetrics,Pediatric)   Skin Integrity making progress toward outcome       Problem: Cardiac: ACS (Acute Coronary Syndrome) (Adult)  Goal: Signs and Symptoms of Listed Potential Problems Will be Absent, Minimized or Managed (Cardiac: ACS)  Signs and symptoms of listed potential problems will be absent, minimized or managed by discharge/transition of care (reference Cardiac: ACS (Acute Coronary Syndrome) (Adult) CPG).   Outcome: Ongoing (interventions implemented as appropriate)   07/02/18 0312   Cardiac: ACS (Acute Coronary Syndrome)   Problems Assessed (Acute Coronary Syndrome) all   Problems Present (Acute Coronary Syndrome) none       Problem: Breathing  Pattern Ineffective (Adult)  Goal: Effective Oxygenation/Ventilation  Patient will demonstrate the desired outcomes by discharge/transition of care.   Outcome: Ongoing (interventions implemented as appropriate)   07/02/18 0312   Breathing Pattern Ineffective (Adult)   Effective Oxygenation/Ventilation making progress toward outcome       Problem: Infection, Risk/Actual (Adult)  Goal: Infection Prevention/Resolution  Patient will demonstrate the desired outcomes by discharge/transition of care.   Outcome: Ongoing (interventions implemented as appropriate)   07/02/18 0312   Infection, Risk/Actual (Adult)   Infection Prevention/Resolution making progress toward outcome

## 2018-07-03 LAB
ANION GAP SERPL CALC-SCNC: 4 MMOL/L
BUN SERPL-MCNC: 20 MG/DL
CALCIUM SERPL-MCNC: 9.5 MG/DL
CHLORIDE SERPL-SCNC: 97 MMOL/L
CO2 SERPL-SCNC: 41 MMOL/L
CREAT SERPL-MCNC: 0.8 MG/DL
EST. GFR  (AFRICAN AMERICAN): >60 ML/MIN/1.73 M^2
EST. GFR  (NON AFRICAN AMERICAN): >60 ML/MIN/1.73 M^2
GLUCOSE SERPL-MCNC: 199 MG/DL
MAGNESIUM SERPL-MCNC: 1.9 MG/DL
PHOSPHATE SERPL-MCNC: 2.4 MG/DL
POCT GLUCOSE: 201 MG/DL (ref 70–110)
POCT GLUCOSE: 227 MG/DL (ref 70–110)
POCT GLUCOSE: 283 MG/DL (ref 70–110)
POCT GLUCOSE: 398 MG/DL (ref 70–110)
POTASSIUM SERPL-SCNC: 3.9 MMOL/L
SODIUM SERPL-SCNC: 142 MMOL/L

## 2018-07-03 PROCEDURE — 94799 UNLISTED PULMONARY SVC/PX: CPT

## 2018-07-03 PROCEDURE — 25000242 PHARM REV CODE 250 ALT 637 W/ HCPCS: Performed by: HOSPITALIST

## 2018-07-03 PROCEDURE — 25000003 PHARM REV CODE 250: Performed by: INTERNAL MEDICINE

## 2018-07-03 PROCEDURE — 84100 ASSAY OF PHOSPHORUS: CPT

## 2018-07-03 PROCEDURE — 97116 GAIT TRAINING THERAPY: CPT

## 2018-07-03 PROCEDURE — 25000003 PHARM REV CODE 250: Performed by: HOSPITALIST

## 2018-07-03 PROCEDURE — 83735 ASSAY OF MAGNESIUM: CPT

## 2018-07-03 PROCEDURE — 21400001 HC TELEMETRY ROOM

## 2018-07-03 PROCEDURE — 63600175 PHARM REV CODE 636 W HCPCS: Performed by: HOSPITALIST

## 2018-07-03 PROCEDURE — 94640 AIRWAY INHALATION TREATMENT: CPT

## 2018-07-03 PROCEDURE — 94761 N-INVAS EAR/PLS OXIMETRY MLT: CPT

## 2018-07-03 PROCEDURE — 36415 COLL VENOUS BLD VENIPUNCTURE: CPT

## 2018-07-03 PROCEDURE — 27000221 HC OXYGEN, UP TO 24 HOURS

## 2018-07-03 PROCEDURE — 97530 THERAPEUTIC ACTIVITIES: CPT

## 2018-07-03 PROCEDURE — 94660 CPAP INITIATION&MGMT: CPT

## 2018-07-03 PROCEDURE — 99900035 HC TECH TIME PER 15 MIN (STAT)

## 2018-07-03 PROCEDURE — 63600175 PHARM REV CODE 636 W HCPCS: Performed by: INTERNAL MEDICINE

## 2018-07-03 PROCEDURE — 80048 BASIC METABOLIC PNL TOTAL CA: CPT

## 2018-07-03 PROCEDURE — 25000003 PHARM REV CODE 250: Performed by: EMERGENCY MEDICINE

## 2018-07-03 RX ORDER — GLUCAGON 1 MG
1 KIT INJECTION
Status: DISCONTINUED | OUTPATIENT
Start: 2018-07-03 | End: 2018-07-27 | Stop reason: HOSPADM

## 2018-07-03 RX ORDER — INSULIN ASPART 100 [IU]/ML
0-5 INJECTION, SOLUTION INTRAVENOUS; SUBCUTANEOUS
Status: DISCONTINUED | OUTPATIENT
Start: 2018-07-03 | End: 2018-07-14

## 2018-07-03 RX ORDER — IBUPROFEN 200 MG
16 TABLET ORAL
Status: DISCONTINUED | OUTPATIENT
Start: 2018-07-03 | End: 2018-07-27 | Stop reason: HOSPADM

## 2018-07-03 RX ORDER — IBUPROFEN 200 MG
24 TABLET ORAL
Status: DISCONTINUED | OUTPATIENT
Start: 2018-07-03 | End: 2018-07-27 | Stop reason: HOSPADM

## 2018-07-03 RX ORDER — INSULIN ASPART 100 [IU]/ML
10 INJECTION, SOLUTION INTRAVENOUS; SUBCUTANEOUS ONCE
Status: COMPLETED | OUTPATIENT
Start: 2018-07-03 | End: 2018-07-03

## 2018-07-03 RX ADMIN — SOTALOL HYDROCHLORIDE 80 MG: 80 TABLET ORAL at 09:07

## 2018-07-03 RX ADMIN — RIVAROXABAN 20 MG: 20 TABLET, FILM COATED ORAL at 04:07

## 2018-07-03 RX ADMIN — GUAIFENESIN 600 MG: 600 TABLET, EXTENDED RELEASE ORAL at 08:07

## 2018-07-03 RX ADMIN — GUAIFENESIN 600 MG: 600 TABLET, EXTENDED RELEASE ORAL at 09:07

## 2018-07-03 RX ADMIN — GABAPENTIN 300 MG: 300 CAPSULE ORAL at 09:07

## 2018-07-03 RX ADMIN — HYDRALAZINE HYDROCHLORIDE 50 MG: 25 TABLET ORAL at 01:07

## 2018-07-03 RX ADMIN — DILTIAZEM HYDROCHLORIDE 120 MG: 30 TABLET, FILM COATED ORAL at 09:07

## 2018-07-03 RX ADMIN — SOTALOL HYDROCHLORIDE 80 MG: 80 TABLET ORAL at 08:07

## 2018-07-03 RX ADMIN — ATORVASTATIN CALCIUM 80 MG: 40 TABLET, FILM COATED ORAL at 09:07

## 2018-07-03 RX ADMIN — GABAPENTIN 300 MG: 300 CAPSULE ORAL at 08:07

## 2018-07-03 RX ADMIN — CLONIDINE HYDROCHLORIDE 0.1 MG: 0.1 TABLET ORAL at 08:07

## 2018-07-03 RX ADMIN — FUROSEMIDE 40 MG: 40 TABLET ORAL at 08:07

## 2018-07-03 RX ADMIN — HYDRALAZINE HYDROCHLORIDE 50 MG: 25 TABLET ORAL at 05:07

## 2018-07-03 RX ADMIN — INSULIN ASPART 3 UNITS: 100 INJECTION, SOLUTION INTRAVENOUS; SUBCUTANEOUS at 09:07

## 2018-07-03 RX ADMIN — INSULIN ASPART 10 UNITS: 100 INJECTION, SOLUTION INTRAVENOUS; SUBCUTANEOUS at 09:07

## 2018-07-03 RX ADMIN — INSULIN DETEMIR 15 UNITS: 100 INJECTION, SOLUTION SUBCUTANEOUS at 08:07

## 2018-07-03 RX ADMIN — ALPRAZOLAM 0.5 MG: 0.5 TABLET ORAL at 11:07

## 2018-07-03 RX ADMIN — PANTOPRAZOLE SODIUM 40 MG: 40 TABLET, DELAYED RELEASE ORAL at 08:07

## 2018-07-03 RX ADMIN — ASPIRIN 81 MG CHEWABLE TABLET 81 MG: 81 TABLET CHEWABLE at 08:07

## 2018-07-03 RX ADMIN — POTASSIUM PHOSPHATE, MONOBASIC 500 MG: 500 TABLET, SOLUBLE ORAL at 08:07

## 2018-07-03 RX ADMIN — HYDRALAZINE HYDROCHLORIDE 50 MG: 25 TABLET ORAL at 09:07

## 2018-07-03 RX ADMIN — IPRATROPIUM BROMIDE AND ALBUTEROL SULFATE 3 ML: .5; 2.5 SOLUTION RESPIRATORY (INHALATION) at 12:07

## 2018-07-03 RX ADMIN — SODIUM CHLORIDE 250 ML: 0.9 INJECTION, SOLUTION INTRAVENOUS at 09:07

## 2018-07-03 RX ADMIN — GABAPENTIN 300 MG: 300 CAPSULE ORAL at 04:07

## 2018-07-03 RX ADMIN — LISINOPRIL 10 MG: 5 TABLET ORAL at 08:07

## 2018-07-03 RX ADMIN — DILTIAZEM HYDROCHLORIDE 120 MG: 30 TABLET, FILM COATED ORAL at 01:07

## 2018-07-03 RX ADMIN — CLONIDINE HYDROCHLORIDE 0.1 MG: 0.1 TABLET ORAL at 04:07

## 2018-07-03 RX ADMIN — INSULIN ASPART 2 UNITS: 100 INJECTION, SOLUTION INTRAVENOUS; SUBCUTANEOUS at 12:07

## 2018-07-03 RX ADMIN — CLONIDINE HYDROCHLORIDE 0.1 MG: 0.1 TABLET ORAL at 09:07

## 2018-07-03 RX ADMIN — PREDNISONE 40 MG: 20 TABLET ORAL at 08:07

## 2018-07-03 RX ADMIN — TRAMADOL HYDROCHLORIDE 50 MG: 50 TABLET, FILM COATED ORAL at 03:07

## 2018-07-03 RX ADMIN — DILTIAZEM HYDROCHLORIDE 120 MG: 30 TABLET, FILM COATED ORAL at 05:07

## 2018-07-03 NOTE — PROGRESS NOTES
10:57-TN sent order, facesheet, and MD notes to [facility] for home ventilator. Awaiting feedback.     11:28- Spoke with Summer at [facility]. Patient's insurance doesn't cover equipment.    12:10-TN faxed clinicals to [facility].     1:06- Request received.  Processing time will take up to 4 hours. Spoke with Malathi.    3:15- Spoke with Rep at [facility]. Rep requesting correct settings for triology machine and ABG labs. Dr. Greenwood notified.     4:40- ABG labs faxed to [phone]. Awaiting correct settings for Triology machine.

## 2018-07-03 NOTE — PT/OT/SLP PROGRESS
Occupational Therapy   Treatment    Name: Yasmeen Palm  MRN: [id number]  Admitting Diagnosis:  Acute hypercapnic respiratory failure       Recommendations:     Discharge Recommendations: home health OT  Discharge Equipment Recommendations:  none  Barriers to discharge:  None    Subjective     Communicated with: Nurse Wilson prior to session.  Patient found sitting up in bedside chair. Patient reports fatigue after working with PT. Requesting pain meds and to return to bed.   Pain/Comfort:   · Pain Rating 1: 8/10  · Location - Orientation 1:  (knees and hips)  · Pain Addressed 1: Nurse notified, Reposition (Nurse at bedside giving pain meds)    Patients cultural, spiritual, Amish conflicts given the current situation: na    Objective:     Patient found with: telemetry, oxygen, pulse ox (continuous)    General Precautions: Standard, fall   Orthopedic Precautions:N/A   Braces: N/A     Occupational Performance:    Bed Mobility:    · Patient completed Sit to Supine with moderate assistance     Functional Mobility/Transfers:  · Patient completed Sit <> Stand Transfer with moderate assistance  with  no assistive device x1  and rolling walker x2 trials from bedside chair  · Patient completed Chair>Bed Transfer with moderate assistance with rolling walker  · Functional Mobility: Patient performed chair>bed transfer with moderate assistance and rolling walker. Patient limited secondary to decreased O2 sats and fatigue and pain from PT session. Patient required increased time to perform transfer/bed mobility with several rest breaks for recovery.    Patient left HOB elevated  With BLE elevated on pillow with all lines intact, call button in reach, bed alarm on and nurse notified    AMPAC 6 Click:  AMPAC Total Score: 14    Treatment & Education:  Patient performed bed mobility and functional transfers as above.   At rest patient O2 on 3L 93 HR 90. With bed>chair transfer, patient O2 sats decreased to 84% -112 bpm  with increased time to recover to 88-90%. Patient instructed on pursed lip breathing techniques.   Education:    Assessment:     Yasmeen Palm is a 68 y.o. female with a medical diagnosis of Acute hypercapnic respiratory failure.  She presents with the following performance deficits affecting function: weakness, impaired endurance, impaired functional mobilty, impaired self care skills, gait instability, impaired balance, impaired cardiopulmonary response to activity, decreased upper extremity function, decreased lower extremity function, pain, decreased safety awareness.  Patient tolerated ~40 mins sitting up in bedside chair today.  Patient required moderate assistance/RW to complete chair>bed transfer with increased time to perform secondary to fatigue, pain, SOB. Patient O2 sats 84% -112 on 3L O2 with transfer and increased time to recover ~88-90% with pursed lip breathing.    Rehab Prognosis:  fair; patient would benefit from acute skilled OT services to address these deficits and reach maximum level of function.       Plan:     Patient to be seen 4 x/week to address the above listed problems via self-care/home management, therapeutic activities, therapeutic exercises  · Plan of Care Expires: 08/01/18  · Plan of Care Reviewed with: patient    This Plan of care has been discussed with the patient who was involved in its development and understands and is in agreement with the identified goals and treatment plan    GOALS:    Occupational Therapy Goals        Problem: Occupational Therapy Goal    Goal Priority Disciplines Outcome Interventions   Occupational Therapy Goal     OT, PT/OT Ongoing (interventions implemented as appropriate)    Description:  Goals to be met by: 7/13/18     Patient will increase functional independence with ADLs by performing:    UE Dressing with Set-up Assistance.  LE Dressing with Minimal Assistance.  Grooming while seated with Supervision.  Toileting from bedside commode with  Contact Guard Assistance for hygiene and clothing management.   Supine to sit with Set-up Assistance.  Toilet transfer to bedside commode with Contact Guard Assistance.  Upper extremity exercise program x10 reps per handout, with assistance as needed.                      Time Tracking:     OT Date of Treatment: 07/03/18  OT Start Time: 1519  OT Stop Time: 1543  OT Total Time (min): 24 min    Billable Minutes:Therapeutic Activity 24    JOAQUIN Rockwell  7/3/2018

## 2018-07-03 NOTE — NURSING
Report given to night nursePeyton. Pt. Resting  Quietly easily aroused by voice. Respirations even and unlabored on 3L NC.. No apparent distress noted at this time.Side rails up x 2. Bed alarm set. Call light within patient's reach. Safety measures maintained.

## 2018-07-03 NOTE — SUBJECTIVE & OBJECTIVE
Interval History:stable on NC O 2.    Review of Systems   Constitutional: Positive for fatigue. Negative for chills and fever.   HENT: Negative for congestion and dental problem.    Eyes: Negative for discharge.   Respiratory: Negative for apnea and shortness of breath.    Cardiovascular: Negative for chest pain.   Gastrointestinal: Negative for abdominal distention.   Endocrine: Negative for cold intolerance.   Genitourinary: Negative for difficulty urinating and dyspareunia.   Musculoskeletal: Negative for arthralgias and back pain.   Skin: Negative for color change and pallor.   Allergic/Immunologic: Negative for food allergies.   Neurological: Positive for weakness. Negative for dizziness and facial asymmetry.   Hematological: Negative for adenopathy. Does not bruise/bleed easily.   Psychiatric/Behavioral: Negative for agitation and behavioral problems.     Objective:     Vital Signs (Most Recent):  Temp: 98.9 °F (37.2 °C) (07/03/18 0724)  Pulse: 87 (07/03/18 0724)  Resp: 18 (07/03/18 0724)  BP: (!) 158/72 (07/03/18 0724)  SpO2: 97 % (07/03/18 0806) Vital Signs (24h Range):  Temp:  [97.9 °F (36.6 °C)-98.9 °F (37.2 °C)] 98.9 °F (37.2 °C)  Pulse:  [75-95] 87  Resp:  [17-20] 18  SpO2:  [90 %-98 %] 97 %  BP: (125-158)/(61-72) 158/72     Weight: 80.2 kg (176 lb 12.9 oz)  Body mass index is 29.42 kg/m².  No intake or output data in the 24 hours ending 07/03/18 1106   Physical Exam   Constitutional: She is oriented to person, place, and time. She appears well-developed. No distress.   HENT:   Head: Normocephalic and atraumatic.   Eyes: EOM are normal. Pupils are equal, round, and reactive to light.   Neck: Normal range of motion. Neck supple.   Cardiovascular: Normal rate and regular rhythm.    Pulmonary/Chest:   Diminished breath sound,bilateral.   Abdominal: Soft. Bowel sounds are normal.   Musculoskeletal: Normal range of motion. She exhibits no edema.   Neurological: She is alert and oriented to person, place, and  time.   Skin: Skin is warm and dry. Capillary refill takes less than 2 seconds. She is not diaphoretic.   Psychiatric: She has a normal mood and affect. Her behavior is normal. Thought content normal.       Significant Labs: All pertinent labs within the past 24 hours have been reviewed.    Significant Imaging: I have reviewed and interpreted all pertinent imaging results/findings within the past 24 hours.

## 2018-07-03 NOTE — PROGRESS NOTES
07/03/18 0806   Patient Assessment/Suction   Level of Consciousness (AVPU) alert   PRE-TX-O2-ETCO2   O2 Device (Oxygen Therapy) nasal cannula   $ Is the patient on Low Flow Oxygen? Yes   Flow (L/min) 3   SpO2 97 %   Pulse Oximetry Type Intermittent   $ Pulse Oximetry - Multiple Charge Pulse Oximetry - Multiple   Preset CPAP/BiPAP Settings   Mode Of Delivery Standby

## 2018-07-03 NOTE — PT/OT/SLP PROGRESS
"Physical Therapy Treatment    Patient Name:  Yasmeen Palm   MRN:  [id number]    Recommendations:     Discharge Recommendations:  home health PT   Discharge Equipment Recommendations:   None  Barriers to discharge: None    Assessment:     Yasmeen Palm is a 68 y.o. female admitted with a medical diagnosis of Acute hypercapnic respiratory failure.  She presents with the following impairments/functional limitations:  weakness, decreased safety awareness, impaired cardiopulmonary response to activity, impaired endurance, gait instability, impaired balance, decreased lower extremity function .    Rehab Prognosis: Good ; patient would benefit from acute skilled PT services to address these deficits and reach maximum level of function.      Recent Surgery: Procedure(s) (LRB):  Left heart cath R rad access, not before 9am (Left)      Plan:     During this hospitalization, patient to be seen 6 x/week to address the above listed problems via gait training, therapeutic activities, therapeutic exercises  · Plan of Care Expires:  07/14/18   Plan of Care Reviewed with: patient    Subjective     Communicated with Katie prior to session.  Patient found HOB elevated upon PT entry to room, agreeable to treatment.      Chief Complaint: No complaint at this time.  Patient comments/goals: "I've been in here for 2 weeks".  Pain/Comfort:  · Pain Rating 1: 0/10    Patients cultural, spiritual, Confucianist conflicts given the current situation:      Objective:     Patient found with: telemetry, oxygen (3L O2 NC) , Continuous pulse O2    General Precautions: Standard, fall   Orthopedic Precautions:N/A   Braces: N/A     Functional Mobility:  · Bed Mobility:     · Supine to Sit: Minimal Assistance with VC's for proper hand placement and safety techniques  · Transfers:     · Sit to Stand:  contact guard assistance with rolling walker  · Gait:   Patient ambulated 12ft on level tile with 3L O2 NC, RW, CGA (followed by chair).  Pt with " demonstarting a  3-point gait with decreased dyllan, increased time in double stance, decreased velocity of limb motion, decreased step length and decreased weight-shifting ability.Impairments contributing to gait deviations include impaired balance and decreased strength. Limited to gait training 2* pt reports she feels out of breath and weak. Pt O2 sats remain 89%-96% on 3L. -121. Pt requires VC's for pulsed lip breathing techniques and safety awareness.     · Balance: Good in sitting, Fair in standing      AM-PAC 6 CLICK MOBILITY  Turning over in bed (including adjusting bedclothes, sheets and blankets)?: 4  Sitting down on and standing up from a chair with arms (e.g., wheelchair, bedside commode, etc.): 3  Moving from lying on back to sitting on the side of the bed?: 3  Moving to and from a bed to a chair (including a wheelchair)?: 3  Need to walk in hospital room?: 3  Climbing 3-5 steps with a railing?: 2  Basic Mobility Total Score: 18       Therapeutic Activities and Exercises:  Pt performed supine ankle pumps x15 reps.  Educated and demonstrates pt on proper techniques and sequence with seated BLE exercises: AP, hip flexion, hip ABD/ADD, LAQ, and heel slide. Pt demonstrates and verbalized understanding.    Patient left up in chair with all lines intact, call button in reach and Brinanna notified..    GOALS:    Physical Therapy Goals        Problem: Physical Therapy Goal    Goal Priority Disciplines Outcome Goal Variances Interventions   Physical Therapy Goal     PT/OT, PT      Description:  Goals to be met by: 2018     Patient will increase functional independence with mobility by performin. Supine to sit with Modified [place]  2. Sit to supine with Modified [place]  3. Sit to stand transfer with Modified [place]  4. Gait  x 250 feet with Modified [place] using Rolling Walker.    Recommend: HHPT at time of discharge.                       Time Tracking:     PT Received  On: 07/03/18  PT Start Time: 1423     PT Stop Time: 1449  PT Total Time (min): 26 min     Billable Minutes: Gait Training 16, Therapeutic Activity 10    Treatment Type: Treatment  PT/PTA: PTA     PTA Visit Number: 1     JESSICA So Mai  07/03/2018

## 2018-07-03 NOTE — PLAN OF CARE
Problem: Physical Therapy Goal  Goal: Physical Therapy Goal  Goals to be met by: 2018     Patient will increase functional independence with mobility by performin. Supine to sit with Modified [place]  2. Sit to supine with Modified [place]  3. Sit to stand transfer with Modified [place]  4. Gait  x 250 feet with Modified Perry using Rolling Walker.    Recommend: HHPT at time of discharge.      Outcome: Ongoing (interventions implemented as appropriate)  Pt will benefit from further therapy in order to get back to PLOF.

## 2018-07-03 NOTE — PLAN OF CARE
Problem: Diabetes, Type 2 (Adult)  Intervention: Optimize Glycemic Control   07/03/18 1137   Nutrition Interventions   Glycemic Management blood glucose monitoring;supplemental insulin given       Goal: Signs and Symptoms of Listed Potential Problems Will be Absent, Minimized or Managed (Diabetes, Type 2)  Signs and symptoms of listed potential problems will be absent, minimized or managed by discharge/transition of care (reference Diabetes, Type 2 (Adult) CPG).   Outcome: Ongoing (interventions implemented as appropriate)   07/03/18 1137   Diabetes, Type 2   Problems Assessed (Type 2 Diabetes) all   Problems Present (Type 2 Diabetes) hyperglycemia       Problem: Fall Risk (Adult)  Goal: Identify Related Risk Factors and Signs and Symptoms  Related risk factors and signs and symptoms are identified upon initiation of Human Response Clinical Practice Guideline (CPG)   Outcome: Ongoing (interventions implemented as appropriate)   07/03/18 1137   Fall Risk   Related Risk Factors (Fall Risk) environment unfamiliar;polypharmacy   Signs and Symptoms (Fall Risk) presence of risk factors     Goal: Absence of Falls  Patient will demonstrate the desired outcomes by discharge/transition of care.   Outcome: Ongoing (interventions implemented as appropriate)   07/03/18 1137   Fall Risk (Adult)   Absence of Falls making progress toward outcome       Problem: Breathing Pattern Ineffective (Adult)  Goal: Identify Related Risk Factors and Signs and Symptoms  Related risk factors and signs and symptoms are identified upon initiation of Human Response Clinical Practice Guideline (CPG)   Outcome: Ongoing (interventions implemented as appropriate)   07/03/18 1137   Breathing Pattern Ineffective   Related Risk Factors (Breathing Pattern Ineffective) immobility;fatigue   Signs and Symptoms (Breathing Pattern Ineffective) activity intolerance     Goal: Effective Oxygenation/Ventilation  Patient will demonstrate the desired outcomes by  discharge/transition of care.   Outcome: Ongoing (interventions implemented as appropriate)   07/03/18 1137   Breathing Pattern Ineffective (Adult)   Effective Oxygenation/Ventilation making progress toward outcome     Goal: Anxiety/Fear Reduction  Patient will demonstrate the desired outcomes by discharge/transition of care.   Outcome: Ongoing (interventions implemented as appropriate)   07/03/18 1137   Breathing Pattern Ineffective (Adult)   Anxiety/Fear Reduction making progress toward outcome

## 2018-07-03 NOTE — PROGRESS NOTES
[facility] Hospital Medicine  Progress Note    Patient Name: Yasmeen Palm  MRN: [id number]  Patient Class: IP- Inpatient   Admission Date: 6/24/2018  Length of Stay: 9 days  Attending Physician: Michelle Dumont MD  Primary Care Provider: Angel Orourke Jr, MD        Subjective:     Principal Problem:Acute hypercapnic respiratory failure    HPI:  Yasmeen Palm is a 68 y.o. female that (in part)  has a past medical history of Anticoagulant long-term use; Arthritis; Asthma; CHF (congestive heart failure); COPD (chronic obstructive pulmonary disease); Coronary artery disease; Depression; Diabetes mellitus; GERD (gastroesophageal reflux disease); and Hypertension.  has a past surgical history that includes Abdominal surgery; Cardiac surgery; and Hernia repair. Presents to [facility] Emergency Department complaining of chest pain .  She reports compliance with her home medication regimen, including Xarelto.     Description of symptoms    Location:  Substernal  Onset:  Acute onset 2 days ago  Character:  The patient remained; moderate severity  Frequency:  Daily  Duration:  each episode lasts several minutes at a time  Associated Symptoms:  Diaphoresis, shortness of breath, weakness and fatigue  Radiation:  Recent the chest  Exacerbating factors:  Worse on exertion  Relieving factors:  Minimal relief with supplemental oxygen       In the emergency department routine laboratory studies, chest x-ray, EKG, cardiac enzymes were obtained.  EKG findings were concerning the case was discussed with cardiologist, Dr. Pulido.  It was determined that her EKG was not consistent with STEMI and she has been chest pain-free since arrival.  She had been given Lovenox, aspirin, and plan was to continue trending troponins and monitor closely on telemetry.    Hospital medicine has been asked to admit for further evaluation and treatment.     Hospital Course:  Ms. Palm was admitted to the  hospital originally on 6/24/18 for chest pain. She was later sent to the ICU with acute hypercapnic respiratory failure the same day on BIPAP.  She quickly improved and was sent to the floor on 6/25.  Cards was consulted for NSTEMI with noted troponin increase and was planning on LHC on 6/26. However, the patient was sent back to the ICU on 6/26 with hypercapnic respiratory failure with a C02 of > 100 and was intubated. Pulmonary was consulted. Pt clinically improved from respiratory standpoint and was extubated on 6/27 to NC, but she did complain of chest discomfort. Cardiology was notified and patient was taken for LHC on 6/27 which was only remarkable for non-obstructive CAD and did not require intervention. Pt was given IVF post procedure and the next day developed increased SOB and required BIPAP. Pt was treated with IV lasix with good response with much improved respiratory status after output of 1L. Pt also being treated for COPD exacerbation with IV steroids, NEBS and doxycycline. ECHO performed on 6/25/2018 remarkable for EF=50-55% + grade 2 diastolic dysfunction.on po lasix at this time,started on BIPAP yesterday duo to increased PCo2 n ABG,letahrgic condition,switch to Nc O 2 today,,PT,OT is   following.  ordered home triology.added IS as well.    Interval History:stable on NC O 2.    Review of Systems   Constitutional: Positive for fatigue. Negative for chills and fever.   HENT: Negative for congestion and dental problem.    Eyes: Negative for discharge.   Respiratory: Negative for apnea and shortness of breath.    Cardiovascular: Negative for chest pain.   Gastrointestinal: Negative for abdominal distention.   Endocrine: Negative for cold intolerance.   Genitourinary: Negative for difficulty urinating and dyspareunia.   Musculoskeletal: Negative for arthralgias and back pain.   Skin: Negative for color change and pallor.   Allergic/Immunologic: Negative for food allergies.   Neurological: Positive for  weakness. Negative for dizziness and facial asymmetry.   Hematological: Negative for adenopathy. Does not bruise/bleed easily.   Psychiatric/Behavioral: Negative for agitation and behavioral problems.     Objective:     Vital Signs (Most Recent):  Temp: 98.9 °F (37.2 °C) (07/03/18 0724)  Pulse: 87 (07/03/18 0724)  Resp: 18 (07/03/18 0724)  BP: (!) 158/72 (07/03/18 0724)  SpO2: 97 % (07/03/18 0806) Vital Signs (24h Range):  Temp:  [97.9 °F (36.6 °C)-98.9 °F (37.2 °C)] 98.9 °F (37.2 °C)  Pulse:  [75-95] 87  Resp:  [17-20] 18  SpO2:  [90 %-98 %] 97 %  BP: (125-158)/(61-72) 158/72     Weight: 80.2 kg (176 lb 12.9 oz)  Body mass index is 29.42 kg/m².  No intake or output data in the 24 hours ending 07/03/18 1106   Physical Exam   Constitutional: She is oriented to person, place, and time. She appears well-developed. No distress.   HENT:   Head: Normocephalic and atraumatic.   Eyes: EOM are normal. Pupils are equal, round, and reactive to light.   Neck: Normal range of motion. Neck supple.   Cardiovascular: Normal rate and regular rhythm.    Pulmonary/Chest:   Diminished breath sound,bilateral.   Abdominal: Soft. Bowel sounds are normal.   Musculoskeletal: Normal range of motion. She exhibits no edema.   Neurological: She is alert and oriented to person, place, and time.   Skin: Skin is warm and dry. Capillary refill takes less than 2 seconds. She is not diaphoretic.   Psychiatric: She has a normal mood and affect. Her behavior is normal. Thought content normal.       Significant Labs: All pertinent labs within the past 24 hours have been reviewed.    Significant Imaging: I have reviewed and interpreted all pertinent imaging results/findings within the past 24 hours.    Assessment/Plan:      * Acute hypercapnic respiratory failure    - Due to COPD exacerbation   - s/p intubation and treatment with IV steroids, NEBS  - Pulmonary following and extubated on 6/27  - Respiratory status was worse on 6/28 due to volume  overload  - s/p IV lasix with good output and now back down to NC   Pt to continue BIPAP QHS   ordered home Triology,,currently stable on NC O 2.added IS as well.        Debility    - PT/OT        PAF (paroxysmal atrial fibrillation)    - Rate controlled and anticoagulation resumed with Xarelto on 6/27 post [facility]        Neuropathy    - No acute issues         Non-STEMI (non-ST elevated myocardial infarction)    - Followed by Cardiology  - s/p [facility] on 6/27 only remarkable for non-obstructive CAD  - Continue medical management as per Cardiology with ASA, statin, and patient will not be placed on plavix given she will be fully anticoagulated with Xarelto        Acute exacerbation of chronic obstructive pulmonary disease (COPD)    - C02 > 100 and s/p intubation  - Treated with NEBS and steroids  - Extubated on 6/27 and respiratory status with worsening due to volume overload issue today  - on prednisone and nebulizer.\  Does not have home oxygen,checked RA Spo2.was 72 at rest,ordered home oxygen.  started on BIPAP yesterday duo to increased PCo2 on ABG,letahrgic condition,swoitch to Nc O 2 today,  Require home oxygen,was 72% on RA at rest,conslted SW.        Elevated brain natriuretic peptide (BNP) level    - Diuresis resumed with IV lasix        Elevated troponin I level    - As discussed under NSTEMI        Acute diastolic CHF (congestive heart failure)    - Due to volume overload  - Last ECHO with EF=50-55% + grade 2 diastolic dysfunction on 6/26  -improved with IV lasix.on maintained po lasix.          Obesity    - Weight reduction as outpatient after all acute issues addressed        Chronic anticoagulation    - Patient on Xarelto for PAF which has been resumed        History of deep vein thrombosis    - Resumed anticoagulation         History of pulmonary embolism    - Xarelto resumed on 6/27 post [facility]        Anemia of chronic disease    - H/H with slight drop noted, but no bleeding  - Will continue to monitor         Malignant hypertension    - Difficult to control BP  - Continue lisinopril, and restarted hydralazine, clonidine, diltiazem today (held due to previous hypotensive issues)  - PRN labetalol        Tobacco abuse    - Smoking cessation counseling done        Gastroesophageal reflux disease without esophagitis    - Continue PPI        Type 2 diabetes mellitus, controlled    - Complications of peripheral neuropathy.   - Well controlled on current regimen        Chronic obstructive pulmonary disease with acute exacerbation    - As discussed above        Coronary artery disease involving native coronary artery of native heart with angina pectoris    - [facility] on 6/27 as discussed above  - As per Cardiology          VTE Risk Mitigation         Ordered     rivaroxaban tablet 20 mg  With dinner      06/27/18 2156              Michelle Dumont MD  Department of Hospital Medicine   [facility]

## 2018-07-03 NOTE — ASSESSMENT & PLAN NOTE
- Due to COPD exacerbation   - s/p intubation and treatment with IV steroids, NEBS  - Pulmonary following and extubated on 6/27  - Respiratory status was worse on 6/28 due to volume overload  - s/p IV lasix with good output and now back down to NC   Pt to continue BIPAP QHS   ordered home Triology,,currently stable on NC O 2.added IS as well.

## 2018-07-03 NOTE — NURSING
Tele monitor exibits 6 beat run of VT. Pt. AAOX3. Pt. complained of chest pain that she states is now subsiding. Dr. Greenwood notified of information as above.

## 2018-07-03 NOTE — PROGRESS NOTES
07/02/18 1925   Patient Assessment/Suction   Level of Consciousness (AVPU) alert   Respiratory Effort Normal;Unlabored   All Lung Fields Breath Sounds crackles coarse   PRE-TX-O2-ETCO2   O2 Device (Oxygen Therapy) nasal cannula   Flow (L/min) 3   Oxygen Concentration (%) 32   SpO2 96 %   Pulse Oximetry Type Continuous   Pulse 91   Resp 18   Aerosol Therapy   $ Aerosol Therapy Charges Aerosol Treatment   Respiratory Treatment Status given   SVN/Inhaler Treatment Route mask   Position During Treatment HOB at 45 degrees   Patient Tolerance good   Post-Treatment   Post-treatment Heart Rate (beats/min) 90   Post-treatment Resp Rate (breaths/min) 18   All Fields Breath Sounds unchanged   Ready to Wean/Extubation Screen   FIO2<=50 (chart decimal) 0.32   Preset CPAP/BiPAP Settings   Mode Of Delivery Standby

## 2018-07-03 NOTE — PLAN OF CARE
Problem: Occupational Therapy Goal  Goal: Occupational Therapy Goal  Goals to be met by: 7/13/18     Patient will increase functional independence with ADLs by performing:    UE Dressing with Set-up Assistance.  LE Dressing with Minimal Assistance.  Grooming while seated with Supervision.  Toileting from bedside commode with Contact Guard Assistance for hygiene and clothing management.   Supine to sit with Set-up Assistance.  Toilet transfer to bedside commode with Contact Guard Assistance.  Upper extremity exercise program x10 reps per handout, with assistance as needed.     Outcome: Ongoing (interventions implemented as appropriate)  Patient tolerated ~40 mins sitting up in bedside chair today.  Patient required moderate assistance/RW to complete chair>bed transfer with increased time to perform secondary to fatigue, pain, SOB. Patient O2 sats 84% -112 on 3L O2 with transfer and increased time to recover ~88-90% with pursed lip breathing. Patient will benefit from continued OT to address functional deficits.

## 2018-07-04 LAB
ANION GAP SERPL CALC-SCNC: 6 MMOL/L
BUN SERPL-MCNC: 18 MG/DL
CALCIUM SERPL-MCNC: 9.3 MG/DL
CHLORIDE SERPL-SCNC: 98 MMOL/L
CO2 SERPL-SCNC: 40 MMOL/L
CREAT SERPL-MCNC: 0.7 MG/DL
EST. GFR  (AFRICAN AMERICAN): >60 ML/MIN/1.73 M^2
EST. GFR  (NON AFRICAN AMERICAN): >60 ML/MIN/1.73 M^2
GLUCOSE SERPL-MCNC: 170 MG/DL
MAGNESIUM SERPL-MCNC: 1.8 MG/DL
PHOSPHATE SERPL-MCNC: 2.5 MG/DL
POCT GLUCOSE: 142 MG/DL (ref 70–110)
POCT GLUCOSE: 145 MG/DL (ref 70–110)
POCT GLUCOSE: 311 MG/DL (ref 70–110)
POCT GLUCOSE: 340 MG/DL (ref 70–110)
POCT GLUCOSE: 355 MG/DL (ref 70–110)
POTASSIUM SERPL-SCNC: 4 MMOL/L
SODIUM SERPL-SCNC: 144 MMOL/L

## 2018-07-04 PROCEDURE — 84100 ASSAY OF PHOSPHORUS: CPT

## 2018-07-04 PROCEDURE — 83735 ASSAY OF MAGNESIUM: CPT

## 2018-07-04 PROCEDURE — 27000221 HC OXYGEN, UP TO 24 HOURS

## 2018-07-04 PROCEDURE — 25000003 PHARM REV CODE 250: Performed by: INTERNAL MEDICINE

## 2018-07-04 PROCEDURE — 25000003 PHARM REV CODE 250: Performed by: EMERGENCY MEDICINE

## 2018-07-04 PROCEDURE — 94761 N-INVAS EAR/PLS OXIMETRY MLT: CPT

## 2018-07-04 PROCEDURE — 63600175 PHARM REV CODE 636 W HCPCS: Performed by: HOSPITALIST

## 2018-07-04 PROCEDURE — 99900035 HC TECH TIME PER 15 MIN (STAT)

## 2018-07-04 PROCEDURE — 25000003 PHARM REV CODE 250: Performed by: HOSPITALIST

## 2018-07-04 PROCEDURE — 80048 BASIC METABOLIC PNL TOTAL CA: CPT

## 2018-07-04 PROCEDURE — 21400001 HC TELEMETRY ROOM

## 2018-07-04 PROCEDURE — 94799 UNLISTED PULMONARY SVC/PX: CPT

## 2018-07-04 PROCEDURE — 94660 CPAP INITIATION&MGMT: CPT

## 2018-07-04 RX ORDER — POLYETHYLENE GLYCOL 3350 17 G/17G
17 POWDER, FOR SOLUTION ORAL 2 TIMES DAILY
Status: DISCONTINUED | OUTPATIENT
Start: 2018-07-04 | End: 2018-07-16

## 2018-07-04 RX ORDER — DOCUSATE SODIUM 100 MG/1
100 CAPSULE, LIQUID FILLED ORAL DAILY
Status: DISCONTINUED | OUTPATIENT
Start: 2018-07-04 | End: 2018-07-27 | Stop reason: HOSPADM

## 2018-07-04 RX ADMIN — CLONIDINE HYDROCHLORIDE 0.1 MG: 0.1 TABLET ORAL at 02:07

## 2018-07-04 RX ADMIN — PREDNISONE 40 MG: 20 TABLET ORAL at 08:07

## 2018-07-04 RX ADMIN — HYDRALAZINE HYDROCHLORIDE 50 MG: 25 TABLET ORAL at 02:07

## 2018-07-04 RX ADMIN — POLYETHYLENE GLYCOL 3350 17 G: 17 POWDER, FOR SOLUTION ORAL at 09:07

## 2018-07-04 RX ADMIN — FUROSEMIDE 40 MG: 40 TABLET ORAL at 08:07

## 2018-07-04 RX ADMIN — DOCUSATE SODIUM 100 MG: 100 CAPSULE, LIQUID FILLED ORAL at 09:07

## 2018-07-04 RX ADMIN — RIVAROXABAN 20 MG: 20 TABLET, FILM COATED ORAL at 04:07

## 2018-07-04 RX ADMIN — SOTALOL HYDROCHLORIDE 80 MG: 80 TABLET ORAL at 09:07

## 2018-07-04 RX ADMIN — DILTIAZEM HYDROCHLORIDE 120 MG: 30 TABLET, FILM COATED ORAL at 05:07

## 2018-07-04 RX ADMIN — GABAPENTIN 300 MG: 300 CAPSULE ORAL at 09:07

## 2018-07-04 RX ADMIN — ALPRAZOLAM 0.5 MG: 0.5 TABLET ORAL at 10:07

## 2018-07-04 RX ADMIN — GUAIFENESIN 600 MG: 600 TABLET, EXTENDED RELEASE ORAL at 09:07

## 2018-07-04 RX ADMIN — POTASSIUM PHOSPHATE, MONOBASIC 500 MG: 500 TABLET, SOLUBLE ORAL at 08:07

## 2018-07-04 RX ADMIN — DILTIAZEM HYDROCHLORIDE 120 MG: 30 TABLET, FILM COATED ORAL at 10:07

## 2018-07-04 RX ADMIN — CLONIDINE HYDROCHLORIDE 0.1 MG: 0.1 TABLET ORAL at 08:07

## 2018-07-04 RX ADMIN — HYDRALAZINE HYDROCHLORIDE 50 MG: 25 TABLET ORAL at 10:07

## 2018-07-04 RX ADMIN — GABAPENTIN 300 MG: 300 CAPSULE ORAL at 08:07

## 2018-07-04 RX ADMIN — HYDRALAZINE HYDROCHLORIDE 50 MG: 25 TABLET ORAL at 05:07

## 2018-07-04 RX ADMIN — INSULIN ASPART 4 UNITS: 100 INJECTION, SOLUTION INTRAVENOUS; SUBCUTANEOUS at 12:07

## 2018-07-04 RX ADMIN — ASPIRIN 81 MG CHEWABLE TABLET 81 MG: 81 TABLET CHEWABLE at 08:07

## 2018-07-04 RX ADMIN — ATORVASTATIN CALCIUM 80 MG: 40 TABLET, FILM COATED ORAL at 09:07

## 2018-07-04 RX ADMIN — PANTOPRAZOLE SODIUM 40 MG: 40 TABLET, DELAYED RELEASE ORAL at 08:07

## 2018-07-04 RX ADMIN — GUAIFENESIN 600 MG: 600 TABLET, EXTENDED RELEASE ORAL at 08:07

## 2018-07-04 RX ADMIN — SODIUM PHOSPHATE, DIBASIC AND SODIUM PHOSPHATE, MONOBASIC 1 ENEMA: 7; 19 ENEMA RECTAL at 09:07

## 2018-07-04 RX ADMIN — INSULIN ASPART 4 UNITS: 100 INJECTION, SOLUTION INTRAVENOUS; SUBCUTANEOUS at 05:07

## 2018-07-04 RX ADMIN — INSULIN ASPART 5 UNITS: 100 INJECTION, SOLUTION INTRAVENOUS; SUBCUTANEOUS at 10:07

## 2018-07-04 RX ADMIN — DILTIAZEM HYDROCHLORIDE 120 MG: 30 TABLET, FILM COATED ORAL at 02:07

## 2018-07-04 RX ADMIN — SOTALOL HYDROCHLORIDE 80 MG: 80 TABLET ORAL at 08:07

## 2018-07-04 RX ADMIN — CLONIDINE HYDROCHLORIDE 0.1 MG: 0.1 TABLET ORAL at 09:07

## 2018-07-04 RX ADMIN — INSULIN DETEMIR 15 UNITS: 100 INJECTION, SOLUTION SUBCUTANEOUS at 08:07

## 2018-07-04 RX ADMIN — LISINOPRIL 10 MG: 5 TABLET ORAL at 08:07

## 2018-07-04 RX ADMIN — GABAPENTIN 300 MG: 300 CAPSULE ORAL at 02:07

## 2018-07-04 NOTE — ASSESSMENT & PLAN NOTE
- Difficult to control BP  - Continue lisinopril, and restarted hydralazine, clonidine, diltiazem today (held due to previous hypotensive issues)  - PRN labetalol  Much improved.

## 2018-07-04 NOTE — PLAN OF CARE
Problem: Diabetes, Type 2 (Adult)  Goal: Signs and Symptoms of Listed Potential Problems Will be Absent, Minimized or Managed (Diabetes, Type 2)  Signs and symptoms of listed potential problems will be absent, minimized or managed by discharge/transition of care (reference Diabetes, Type 2 (Adult) CPG).   Outcome: Ongoing (interventions implemented as appropriate)   07/04/18 0415   Diabetes, Type 2   Problems Assessed (Type 2 Diabetes) hyperglycemia;hypoglycemia;situational response   Problems Present (Type 2 Diabetes) hyperglycemia

## 2018-07-04 NOTE — ASSESSMENT & PLAN NOTE
- C02 > 100 and s/p intubation  - Treated with NEBS and steroids  - Extubated on 6/27 and respiratory status with worsening due to volume overload issue   - on prednisone and nebulizer.\  Does have home oxygen,  started on BIPAP  duo to increased PCo2 on ABG,letahrgic condition,swoitch to Nc O 2,improved.BIPAP QHS and prn   need triology before DC.

## 2018-07-04 NOTE — ASSESSMENT & PLAN NOTE
- Followed by Cardiology  - s/p [facility] on 6/27 only remarkable for non-obstructive CAD  - Continue medical management as per Cardiology with ASA, statin, and patient will not be placed on plavix given she will be fully anticoagulated with Xarelto

## 2018-07-04 NOTE — NURSING
Report given to night nurseRome RN. Pt. resting quietly, easily aroused by voice. Pt. on BiPAP. No apparent distress noted at this time. Side rails up x 2. Bed alarm set. Call light within patient's reach. Safety measures maintained.

## 2018-07-04 NOTE — PLAN OF CARE
Problem: Pressure Ulcer Risk (Kevin Scale) (Adult,Obstetrics,Pediatric)  Goal: Identify Related Risk Factors and Signs and Symptoms  Related risk factors and signs and symptoms are identified upon initiation of Human Response Clinical Practice Guideline (CPG)   Outcome: Ongoing (interventions implemented as appropriate)   07/04/18 1400   Pressure Ulcer Risk (Kevin Scale)   Related Risk Factors (Pressure Ulcer Risk (Kevin Scale)) age extremes;body weight extremes;hospitalization prolonged

## 2018-07-04 NOTE — PLAN OF CARE
Problem: Fall Risk (Adult)  Goal: Identify Related Risk Factors and Signs and Symptoms  Related risk factors and signs and symptoms are identified upon initiation of Human Response Clinical Practice Guideline (CPG)   Outcome: Ongoing (interventions implemented as appropriate)   07/04/18 9180   Fall Risk   Related Risk Factors (Fall Risk) age-related changes;bladder function altered;culprit medication(s);depression/anxiety;fatigue/slow reaction;gait/mobility problems;polypharmacy   Signs and Symptoms (Fall Risk) presence of risk factors

## 2018-07-04 NOTE — PLAN OF CARE
Problem: Breathing Pattern Ineffective (Adult)  Goal: Effective Oxygenation/Ventilation  Patient will demonstrate the desired outcomes by discharge/transition of care.   Patient on nasal cannula 3 lpm. bipap on standby at this time.

## 2018-07-04 NOTE — PROGRESS NOTES
[facility] Hospital Medicine  Progress Note    Patient Name: Yasmeen Palm  MRN: [id number]  Patient Class: IP- Inpatient   Admission Date: 6/24/2018  Length of Stay: 10 days  Attending Physician: Michelle Dumont MD  Primary Care Provider: Angel Orourke Jr, MD        Subjective:     Principal Problem:Acute hypercapnic respiratory failure    HPI:  Yasmeen Palm is a 68 y.o. female that (in part)  has a past medical history of Anticoagulant long-term use; Arthritis; Asthma; CHF (congestive heart failure); COPD (chronic obstructive pulmonary disease); Coronary artery disease; Depression; Diabetes mellitus; GERD (gastroesophageal reflux disease); and Hypertension.  has a past surgical history that includes Abdominal surgery; Cardiac surgery; and Hernia repair. Presents to [facility] Emergency Department complaining of chest pain .  She reports compliance with her home medication regimen, including Xarelto.     Description of symptoms    Location:  Substernal  Onset:  Acute onset 2 days ago  Character:  The patient remained; moderate severity  Frequency:  Daily  Duration:  each episode lasts several minutes at a time  Associated Symptoms:  Diaphoresis, shortness of breath, weakness and fatigue  Radiation:  Recent the chest  Exacerbating factors:  Worse on exertion  Relieving factors:  Minimal relief with supplemental oxygen       In the emergency department routine laboratory studies, chest x-ray, EKG, cardiac enzymes were obtained.  EKG findings were concerning the case was discussed with cardiologist, Dr. Pulido.  It was determined that her EKG was not consistent with STEMI and she has been chest pain-free since arrival.  She had been given Lovenox, aspirin, and plan was to continue trending troponins and monitor closely on telemetry.    Hospital medicine has been asked to admit for further evaluation and treatment.     Hospital Course:  Ms. Palm was admitted to  the hospital originally on 6/24/18 for chest pain. She was later sent to the ICU with acute hypercapnic respiratory failure the same day on BIPAP.  She quickly improved and was sent to the floor on 6/25.  Cards was consulted for NSTEMI with noted troponin increase and was planning on LHC on 6/26. However, the patient was sent back to the ICU on 6/26 with hypercapnic respiratory failure with a C02 of > 100 and was intubated. Pulmonary was consulted. Pt clinically improved from respiratory standpoint and was extubated on 6/27 to NC, but she did complain of chest discomfort. Cardiology was notified and patient was taken for LHC on 6/27 which was only remarkable for non-obstructive CAD and did not require intervention. Pt was given IVF post procedure and the next day developed increased SOB and required BIPAP. Pt was treated with IV lasix with good response with much improved respiratory status after output of 1L. Pt also being treated for COPD exacerbation with IV steroids, NEBS and doxycycline. ECHO performed on 6/25/2018 remarkable for EF=50-55% + grade 2 diastolic dysfunction.on po lasix at this time,started on BIPAP duo to increased PCo2 on ABG,letahrgic condition,switch to Nc O 2 ,BIPAP QHS,,PT,OT is following[facility] at DC time.  ordered home triology.added IS as well.  Has constipation,started on enema,stoool softner.    Interval History:stable on NC O 2.    Review of Systems   Constitutional: Positive for fatigue. Negative for chills and fever.   HENT: Negative for congestion and dental problem.    Eyes: Negative for discharge.   Respiratory: Negative for apnea and shortness of breath.    Cardiovascular: Negative for chest pain.   Gastrointestinal: Negative for abdominal distention.   Endocrine: Negative for cold intolerance.   Genitourinary: Negative for difficulty urinating and dyspareunia.   Musculoskeletal: Negative for arthralgias and back pain.   Skin: Negative for color change and pallor.    Allergic/Immunologic: Negative for food allergies.   Neurological: Positive for weakness. Negative for dizziness and facial asymmetry.   Hematological: Negative for adenopathy. Does not bruise/bleed easily.   Psychiatric/Behavioral: Negative for agitation and behavioral problems.     Objective:     Vital Signs (Most Recent):  Temp: 98.3 °F (36.8 °C) (07/04/18 0741)  Pulse: 78 (07/04/18 0741)  Resp: 18 (07/04/18 0741)  BP: (!) 151/72 (07/04/18 0741)  SpO2: 100 % (07/04/18 0741) Vital Signs (24h Range):  Temp:  [97.9 °F (36.6 °C)-99.3 °F (37.4 °C)] 98.3 °F (36.8 °C)  Pulse:  [73-93] 78  Resp:  [18] 18  SpO2:  [92 %-100 %] 100 %  BP: (137-171)/(63-75) 151/72     Weight: 80.2 kg (176 lb 12.9 oz)  Body mass index is 29.42 kg/m².    Intake/Output Summary (Last 24 hours) at 07/04/18 0823  Last data filed at 07/04/18 0600   Gross per 24 hour   Intake              970 ml   Output                0 ml   Net              970 ml      Physical Exam   Constitutional: She is oriented to person, place, and time. She appears well-developed. No distress.   HENT:   Head: Normocephalic and atraumatic.   Eyes: EOM are normal. Pupils are equal, round, and reactive to light.   Neck: Normal range of motion. Neck supple.   Cardiovascular: Normal rate and regular rhythm.    Pulmonary/Chest:   Diminished breath sound,bilateral.   Abdominal: Soft. Bowel sounds are normal.   Musculoskeletal: Normal range of motion. She exhibits no edema.   Neurological: She is alert and oriented to person, place, and time.   Skin: Skin is warm and dry. Capillary refill takes less than 2 seconds. She is not diaphoretic.   Psychiatric: She has a normal mood and affect. Her behavior is normal. Thought content normal.       Significant Labs: All pertinent labs within the past 24 hours have been reviewed.    Significant Imaging: I have reviewed and interpreted all pertinent imaging results/findings within the past 24 hours.    Assessment/Plan:      * Acute  hypercapnic respiratory failure    - Due to COPD exacerbation   - s/p intubation and treatment with IV steroids, NEBS  - Pulmonary following and extubated on 6/27  - Respiratory status was worse on 6/28 due to volume overload  - s/p IV lasix with good output and now back down to NC   Pt to continue BIPAP QHS   ordered home Triology,,currently stable on NC O 2.added IS as well.        Debility    - PT/OT,need HH at DC time.        PAF (paroxysmal atrial fibrillation)    - Rate controlled and anticoagulation resumed with Xarelto on 6/27 post [facility]        Neuropathy    - No acute issues         Non-STEMI (non-ST elevated myocardial infarction)    - Followed by Cardiology  - s/p C on 6/27 only remarkable for non-obstructive CAD  - Continue medical management as per Cardiology with ASA, statin, and patient will not be placed on plavix given she will be fully anticoagulated with Xarelto        Acute exacerbation of chronic obstructive pulmonary disease (COPD)    - C02 > 100 and s/p intubation  - Treated with NEBS and steroids  - Extubated on 6/27 and respiratory status with worsening due to volume overload issue   - on prednisone and nebulizer.\  Does have home oxygen,  started on BIPAP  duo to increased PCo2 on ABG,letahrgic condition,swoitch to Nc O 2,improved.BIPAP QHS and prn   need triology before DC.          Elevated brain natriuretic peptide (BNP) level    - Diuresis resumed         Elevated troponin I level    - As discussed under NSTEMI,no sign of obstruction on [facility].        Acute diastolic CHF (congestive heart failure)    - Due to volume overload  - Last ECHO with EF=50-55% + grade 2 diastolic dysfunction on 6/26  -improved with IV lasix.on maintained po lasix.          Obesity    - Weight reduction as outpatient after all acute issues addressed        Chronic anticoagulation    - Patient on Xarelto for PAF which has been resumed        History of deep vein thrombosis    - Resumed anticoagulation          History of pulmonary embolism    - Xarelto resumed .        Anemia of chronic disease    - H/H with slight drop noted, but no bleeding  - Will continue to monitor        Malignant hypertension    - Difficult to control BP  - Continue lisinopril, and restarted hydralazine, clonidine, diltiazem today (held due to previous hypotensive issues)  - PRN labetalol  Much improved.        Tobacco abuse    - Smoking cessation counseling done        Gastroesophageal reflux disease without esophagitis    - Continue PPI        Type 2 diabetes mellitus, controlled    - Complications of peripheral neuropathy.   - Well controlled on current regimen        Chronic obstructive pulmonary disease with acute exacerbation    - As discussed above        Coronary artery disease involving native coronary artery of native heart with angina pectoris    - [facility] on 6/27 as discussed above  - As per Cardiology          VTE Risk Mitigation         Ordered     rivaroxaban tablet 20 mg  With dinner      06/27/18 3424              Michelle Dumont MD  Department of Hospital Medicine   [facility] [place], [place]

## 2018-07-04 NOTE — SUBJECTIVE & OBJECTIVE
Interval History:stable on NC O 2.    Review of Systems   Constitutional: Positive for fatigue. Negative for chills and fever.   HENT: Negative for congestion and dental problem.    Eyes: Negative for discharge.   Respiratory: Negative for apnea and shortness of breath.    Cardiovascular: Negative for chest pain.   Gastrointestinal: Negative for abdominal distention.   Endocrine: Negative for cold intolerance.   Genitourinary: Negative for difficulty urinating and dyspareunia.   Musculoskeletal: Negative for arthralgias and back pain.   Skin: Negative for color change and pallor.   Allergic/Immunologic: Negative for food allergies.   Neurological: Positive for weakness. Negative for dizziness and facial asymmetry.   Hematological: Negative for adenopathy. Does not bruise/bleed easily.   Psychiatric/Behavioral: Negative for agitation and behavioral problems.     Objective:     Vital Signs (Most Recent):  Temp: 98.3 °F (36.8 °C) (07/04/18 0741)  Pulse: 78 (07/04/18 0741)  Resp: 18 (07/04/18 0741)  BP: (!) 151/72 (07/04/18 0741)  SpO2: 100 % (07/04/18 0741) Vital Signs (24h Range):  Temp:  [97.9 °F (36.6 °C)-99.3 °F (37.4 °C)] 98.3 °F (36.8 °C)  Pulse:  [73-93] 78  Resp:  [18] 18  SpO2:  [92 %-100 %] 100 %  BP: (137-171)/(63-75) 151/72     Weight: 80.2 kg (176 lb 12.9 oz)  Body mass index is 29.42 kg/m².    Intake/Output Summary (Last 24 hours) at 07/04/18 0823  Last data filed at 07/04/18 0600   Gross per 24 hour   Intake              970 ml   Output                0 ml   Net              970 ml      Physical Exam   Constitutional: She is oriented to person, place, and time. She appears well-developed. No distress.   HENT:   Head: Normocephalic and atraumatic.   Eyes: EOM are normal. Pupils are equal, round, and reactive to light.   Neck: Normal range of motion. Neck supple.   Cardiovascular: Normal rate and regular rhythm.    Pulmonary/Chest:   Diminished breath sound,bilateral.   Abdominal: Soft. Bowel sounds are  normal.   Musculoskeletal: Normal range of motion. She exhibits no edema.   Neurological: She is alert and oriented to person, place, and time.   Skin: Skin is warm and dry. Capillary refill takes less than 2 seconds. She is not diaphoretic.   Psychiatric: She has a normal mood and affect. Her behavior is normal. Thought content normal.       Significant Labs: All pertinent labs within the past 24 hours have been reviewed.    Significant Imaging: I have reviewed and interpreted all pertinent imaging results/findings within the past 24 hours.

## 2018-07-05 LAB
ANION GAP SERPL CALC-SCNC: 3 MMOL/L
BUN SERPL-MCNC: 18 MG/DL
CALCIUM SERPL-MCNC: 9.5 MG/DL
CHLORIDE SERPL-SCNC: 95 MMOL/L
CO2 SERPL-SCNC: 42 MMOL/L
CREAT SERPL-MCNC: 0.8 MG/DL
EST. GFR  (AFRICAN AMERICAN): >60 ML/MIN/1.73 M^2
EST. GFR  (NON AFRICAN AMERICAN): >60 ML/MIN/1.73 M^2
GLUCOSE SERPL-MCNC: 251 MG/DL
MAGNESIUM SERPL-MCNC: 2.1 MG/DL
PHOSPHATE SERPL-MCNC: 2.9 MG/DL
POCT GLUCOSE: 222 MG/DL (ref 70–110)
POCT GLUCOSE: 339 MG/DL (ref 70–110)
POTASSIUM SERPL-SCNC: 4.4 MMOL/L
SODIUM SERPL-SCNC: 140 MMOL/L

## 2018-07-05 PROCEDURE — 83735 ASSAY OF MAGNESIUM: CPT

## 2018-07-05 PROCEDURE — 84100 ASSAY OF PHOSPHORUS: CPT

## 2018-07-05 PROCEDURE — 94761 N-INVAS EAR/PLS OXIMETRY MLT: CPT

## 2018-07-05 PROCEDURE — 25000003 PHARM REV CODE 250: Performed by: HOSPITALIST

## 2018-07-05 PROCEDURE — 94799 UNLISTED PULMONARY SVC/PX: CPT

## 2018-07-05 PROCEDURE — 27000221 HC OXYGEN, UP TO 24 HOURS

## 2018-07-05 PROCEDURE — 21400001 HC TELEMETRY ROOM

## 2018-07-05 PROCEDURE — 63600175 PHARM REV CODE 636 W HCPCS: Performed by: HOSPITALIST

## 2018-07-05 PROCEDURE — 25000003 PHARM REV CODE 250: Performed by: EMERGENCY MEDICINE

## 2018-07-05 PROCEDURE — 36415 COLL VENOUS BLD VENIPUNCTURE: CPT

## 2018-07-05 PROCEDURE — 99900035 HC TECH TIME PER 15 MIN (STAT)

## 2018-07-05 PROCEDURE — 80048 BASIC METABOLIC PNL TOTAL CA: CPT

## 2018-07-05 PROCEDURE — 25000003 PHARM REV CODE 250: Performed by: INTERNAL MEDICINE

## 2018-07-05 RX ORDER — DOCUSATE SODIUM 100 MG/1
100 CAPSULE, LIQUID FILLED ORAL DAILY
Refills: 0 | Status: ON HOLD | COMMUNITY
Start: 2018-07-05 | End: 2018-12-11 | Stop reason: HOSPADM

## 2018-07-05 RX ORDER — ATORVASTATIN CALCIUM 80 MG/1
80 TABLET, FILM COATED ORAL NIGHTLY
Qty: 30 TABLET | Refills: 1 | Status: ON HOLD | OUTPATIENT
Start: 2018-07-05 | End: 2018-12-11 | Stop reason: HOSPADM

## 2018-07-05 RX ORDER — PREDNISONE 10 MG/1
TABLET ORAL
Qty: 12 TABLET | Refills: 0 | Status: SHIPPED | OUTPATIENT
Start: 2018-07-05 | End: 2018-07-09 | Stop reason: HOSPADM

## 2018-07-05 RX ADMIN — POTASSIUM PHOSPHATE, MONOBASIC 500 MG: 500 TABLET, SOLUBLE ORAL at 10:07

## 2018-07-05 RX ADMIN — ATORVASTATIN CALCIUM 80 MG: 40 TABLET, FILM COATED ORAL at 09:07

## 2018-07-05 RX ADMIN — ACETAMINOPHEN 650 MG: 325 TABLET, FILM COATED ORAL at 11:07

## 2018-07-05 RX ADMIN — GABAPENTIN 300 MG: 300 CAPSULE ORAL at 10:07

## 2018-07-05 RX ADMIN — CLONIDINE HYDROCHLORIDE 0.1 MG: 0.1 TABLET ORAL at 09:07

## 2018-07-05 RX ADMIN — GABAPENTIN 300 MG: 300 CAPSULE ORAL at 03:07

## 2018-07-05 RX ADMIN — INSULIN DETEMIR 15 UNITS: 100 INJECTION, SOLUTION SUBCUTANEOUS at 10:07

## 2018-07-05 RX ADMIN — SOTALOL HYDROCHLORIDE 80 MG: 80 TABLET ORAL at 10:07

## 2018-07-05 RX ADMIN — CLONIDINE HYDROCHLORIDE 0.1 MG: 0.1 TABLET ORAL at 10:07

## 2018-07-05 RX ADMIN — CLONIDINE HYDROCHLORIDE 0.1 MG: 0.1 TABLET ORAL at 03:07

## 2018-07-05 RX ADMIN — PREDNISONE 40 MG: 20 TABLET ORAL at 10:07

## 2018-07-05 RX ADMIN — GABAPENTIN 300 MG: 300 CAPSULE ORAL at 09:07

## 2018-07-05 RX ADMIN — POLYETHYLENE GLYCOL 3350 17 G: 17 POWDER, FOR SOLUTION ORAL at 09:07

## 2018-07-05 RX ADMIN — HYDRALAZINE HYDROCHLORIDE 50 MG: 25 TABLET ORAL at 09:07

## 2018-07-05 RX ADMIN — RIVAROXABAN 20 MG: 20 TABLET, FILM COATED ORAL at 06:07

## 2018-07-05 RX ADMIN — ACETAMINOPHEN 650 MG: 325 TABLET, FILM COATED ORAL at 06:07

## 2018-07-05 RX ADMIN — HYDRALAZINE HYDROCHLORIDE 50 MG: 25 TABLET ORAL at 01:07

## 2018-07-05 RX ADMIN — DOCUSATE SODIUM 100 MG: 100 CAPSULE, LIQUID FILLED ORAL at 10:07

## 2018-07-05 RX ADMIN — PANTOPRAZOLE SODIUM 40 MG: 40 TABLET, DELAYED RELEASE ORAL at 10:07

## 2018-07-05 RX ADMIN — ASPIRIN 81 MG CHEWABLE TABLET 81 MG: 81 TABLET CHEWABLE at 10:07

## 2018-07-05 RX ADMIN — GUAIFENESIN 600 MG: 600 TABLET, EXTENDED RELEASE ORAL at 09:07

## 2018-07-05 RX ADMIN — LISINOPRIL 10 MG: 5 TABLET ORAL at 10:07

## 2018-07-05 RX ADMIN — ALPRAZOLAM 0.5 MG: 0.5 TABLET ORAL at 11:07

## 2018-07-05 RX ADMIN — GUAIFENESIN 600 MG: 600 TABLET, EXTENDED RELEASE ORAL at 10:07

## 2018-07-05 RX ADMIN — HYDRALAZINE HYDROCHLORIDE 50 MG: 25 TABLET ORAL at 05:07

## 2018-07-05 RX ADMIN — SOTALOL HYDROCHLORIDE 80 MG: 80 TABLET ORAL at 09:07

## 2018-07-05 RX ADMIN — INSULIN ASPART 2 UNITS: 100 INJECTION, SOLUTION INTRAVENOUS; SUBCUTANEOUS at 10:07

## 2018-07-05 RX ADMIN — DILTIAZEM HYDROCHLORIDE 120 MG: 30 TABLET, FILM COATED ORAL at 01:07

## 2018-07-05 RX ADMIN — FUROSEMIDE 40 MG: 40 TABLET ORAL at 10:07

## 2018-07-05 RX ADMIN — INSULIN ASPART 5 UNITS: 100 INJECTION, SOLUTION INTRAVENOUS; SUBCUTANEOUS at 06:07

## 2018-07-05 RX ADMIN — DILTIAZEM HYDROCHLORIDE 120 MG: 30 TABLET, FILM COATED ORAL at 05:07

## 2018-07-05 RX ADMIN — POLYETHYLENE GLYCOL 3350 17 G: 17 POWDER, FOR SOLUTION ORAL at 10:07

## 2018-07-05 RX ADMIN — DILTIAZEM HYDROCHLORIDE 120 MG: 30 TABLET, FILM COATED ORAL at 09:07

## 2018-07-05 NOTE — PLAN OF CARE
[facility]    HOME HEALTH ORDERS  FACE TO FACE ENCOUNTER    Patient Name: Yasmeen Palm  YOB: 1950    PCP: Angel Orourke Jr, MD   PCP Address: [address]  PCP Phone Number: [phone]  PCP Fax: [phone]    Encounter Date: 07/05/2018    Admit to Home Health    Diagnoses:  Active Hospital Problems    Diagnosis  POA    *Acute hypercapnic respiratory failure [J96.02]  Yes     Priority: 1 - High    Non-STEMI (non-ST elevated myocardial infarction) [I21.4]  Yes     Priority: 2     Acute diastolic CHF (congestive heart failure) [I50.31]  Yes     Priority: 3     Coronary artery disease involving native coronary artery of native heart with angina pectoris [I25.119]  Yes     Priority: 3     PAF (paroxysmal atrial fibrillation) [I48.0]  Yes     Priority: 4     Debility [R53.81]  Yes    Neuropathy [G62.9]  Yes    Acute exacerbation of chronic obstructive pulmonary disease (COPD) [J44.1]  Yes    Elevated troponin I level [R74.8]  Yes    Elevated brain natriuretic peptide (BNP) level [R79.89]  Yes    Obesity [E66.9]  Yes    Chronic anticoagulation [Z79.01]  Not Applicable     Chronic    History of deep vein thrombosis [Z86.718]  Not Applicable     Chronic    History of pulmonary embolism [Z86.711]  Yes     Chronic    Anemia of chronic disease [D63.8]  Yes     Chronic    Malignant hypertension [I10]  Yes    Type 2 diabetes mellitus, controlled [E11.9]  Yes     Chronic    Gastroesophageal reflux disease without esophagitis [K21.9]  Yes     Chronic    Tobacco abuse [Z72.0]  Yes     Chronic    Chronic obstructive pulmonary disease with acute exacerbation [J44.1]  Yes      Resolved Hospital Problems    Diagnosis Date Resolved POA    Chronic respiratory failure with hypoxia and hypercapnia [J96.11, J96.12] 06/26/2018 Yes     Chronic       No future appointments.  Follow-up Information     Angel Orourke Jr, MD On  7/11/2018.    Specialty:  Family Medicine  Why:  On Wednesday @ 11:00am, outpatient services  Contact information:  [address]  [address]  [place] [address]  [phone]             Saulo Pollack MD In 1 week.    Specialties:  INTERVENTIONAL CARDIOLOGY, Cardiology  Contact information:  [address]  [address]  [place] [address]  [phone]                     I have seen and examined this patient face to face today. My clinical findings that support the need for the home health skilled services and home bound status are the following:  Weakness/numbness causing balance and gait disturbance due to Heart Failure, Coronary Heart Disease, COPD Exacerbation and Weakness/Debility making it taxing to leave home.    Allergies:Review of patient's allergies indicates:  No Known Allergies    Diet: cardiac diet and diabetic diet: 1800 calorie    Activities: activity as tolerated    Nursing:   SN to complete comprehensive assessment including routine vital signs. Instruct on disease process and s/s of complications to report to MD. Review/verify medication list sent home with the patient at time of discharge  and instruct patient/caregiver as needed. Frequency may be adjusted depending on start of care date.    Notify MD if SBP > 160 or < 90; DBP > 90 or < 50; HR > 120 or < 50; Temp > 101.      CONSULTS:    Physical Therapy to evaluate and treat. Evaluate for home safety and equipment needs; Establish/upgrade home exercise program. Perform / instruct on therapeutic exercises, gait training, transfer training, and Range of Motion.  Occupational Therapy to evaluate and treat. Evaluate home environment for safety and equipment needs. Perform/Instruct on transfers, ADL training, ROM, and therapeutic exercises.   to evaluate for community resources/long-range planning.    MISCELLANEOUS:  Home O2 at 2 L via NC continuously    BIPAP: setting 10 / 5, use QHS and as  needed for SOB      Medications: Review discharge medications with patient and family and provide education.      Current Discharge Medication List      START taking these medications    Details   atorvastatin (LIPITOR) 80 MG tablet Take 1 tablet (80 mg total) by mouth every evening.  Qty: 30 tablet, Refills: 1      docusate sodium (COLACE) 100 MG capsule Take 1 capsule (100 mg total) by mouth once daily.  Refills: 0      predniSONE (DELTASONE) 10 MG tablet 3 tabs PO daily x 2 days, then 2 tabs PO daily x 2 days, then 1 tab PO daily x 2 days, then stop.  Qty: 12 tablet, Refills: 0         CONTINUE these medications which have NOT CHANGED    Details   acetaminophen (TYLENOL) 500 MG tablet Take 1 tablet (500 mg total) by mouth every 8 (eight) hours as needed.  Refills: 0      aspirin (ECOTRIN) 81 MG EC tablet Take 81 mg by mouth once daily.      cloNIDine (CATAPRES) 0.1 MG tablet Take 2 tablets (0.2 mg total) by mouth 3 (three) times daily.  Qty: 180 tablet, Refills: 11      diltiaZEM (CARDIZEM) 120 MG tablet Take 1 tablet (120 mg total) by mouth every 8 (eight) hours.  Qty: 270 tablet, Refills: 3      furosemide (LASIX) 40 MG tablet Take 40 mg by mouth once daily.       gabapentin (NEURONTIN) 300 MG capsule Take 300 mg by mouth 3 (three) times daily.      hydrALAZINE (APRESOLINE) 50 MG tablet Take 1 tablet (50 mg total) by mouth every 8 (eight) hours.  Qty: 90 tablet, Refills: 11      levalbuterol (XOPENEX HFA) 45 mcg/actuation inhaler Inhale 2 puffs into the lungs every 4 (four) hours as needed for Wheezing or Shortness of Breath. Rescue  Qty: 1 Inhaler, Refills: 3      lisinopril (PRINIVIL,ZESTRIL) 40 MG tablet Take 1 tablet (40 mg total) by mouth once daily. Do not take this medication if blood pressure is below 130/80 mmHg and/or feeling dizzy after taking all other blood pressure meds      metformin (GLUCOPHAGE) 500 MG tablet Take 500 mg by mouth 2 (two) times daily with meals.      rivaroxaban (XARELTO) 20 mg  Tab Take 20 mg by mouth daily with dinner or evening meal.      sotalol (BETAPACE) 80 MG tablet Take 80 mg by mouth 2 (two) times daily.      tramadol (ULTRAM) 50 mg tablet Take 1 tablet (50 mg total) by mouth every 6 (six) hours as needed for Pain.  Qty: 20 tablet, Refills: 0      UMECLIDINIUM BRM/VILANTEROL TR (ANORO ELLIPTA INHL) Inhale into the lungs daily as needed.         STOP taking these medications       naproxen (NAPROSYN) 375 MG tablet Comments:   Reason for Stopping:               I certify that this patient is confined to her home and needs intermittent skilled nursing care, physical therapy and occupational therapy.

## 2018-07-05 NOTE — NURSING
Pt vomitted up pills after taking PM meds, unable to tell which pills were vomitted.  Pulse ox = 82% on 3.5 L O2 via nasal cannula.  Respiratory therapist, Jose, called to bedside.  BiPaP applied, pulse ox came up to 92%.  Will request nausea/vomitting med from physician.  NO distress noted.

## 2018-07-05 NOTE — PROGRESS NOTES
TN sent clinicals (POC,Packet, MD note, PT/OT notes, and MAR) to [facility] for resumption of care. Awaiting feedback.     Patient accepted by [facility].    11:25- New Bipap ordered faxed to [facility] @ [phone]. Awaiting feedback.    3:58- Spoke with Ayah with [facility]. [facility] is waiting for insurance approval for the Bipap without the need of a sleep study. Will follow up tomorrow.

## 2018-07-05 NOTE — PROGRESS NOTES
Pt triggered to be seen by nutr services 2/2 LOS >/=10 days.  Discharge orders noted.  Should pt remain hospitalized, RD to f/u & complete full eval.

## 2018-07-05 NOTE — PROGRESS NOTES
07/05/18 0745   Patient Assessment/Suction   Level of Consciousness (AVPU) alert   PRE-TX-O2-ETCO2   O2 Device (Oxygen Therapy) nasal cannula   $ Is the patient on Low Flow Oxygen? Yes   Flow (L/min) 3   SpO2 97 %   Pulse Oximetry Type Intermittent   $ Pulse Oximetry - Multiple Charge Pulse Oximetry - Multiple   Incentive Spirometer   $ Incentive Spirometer Charges done with encouragement   Administration (Incentive Spirometer) done with encouragement   Number of Repetitions (Incentive Spirometer) 8   Level (mL) (Incentive Spirometer) 500   Patient Tolerance poor   Preset CPAP/BiPAP Settings   Mode Of Delivery Standby      87

## 2018-07-05 NOTE — PROGRESS NOTES
WRITTEN HEALTHCARE DISCHARGE INFORMATION     Things that YOU are responsible for to Manage Your Care At Home:  1. Getting your prescriptions filled.  2. Taking you medications as directed. DO NOT MISS ANY DOSES!  3. Going to your follow-up doctor appointments. This is important because it allows the doctor to monitor your progress and to determine if any changes need to be made to your treatment plan.    If you are unable to make your follow up appointments, please call the number listed and reschedule this appointment.     After discharge, if you need assistance, you can call [facility] Line for 24/7 assistance at [phone]    Thank you for choosing [facility] and allowing us to care for you.   From your care manager: Roxie CAR,TN @ [phone]     You should receive a call from [facility] within 48-72 hours to help manage your care after discharge. Please try to make sure that you answer your phone for this important phone call.     Follow-up Information     Angel Orourke Jr, MD On 7/11/2018.    Specialty:  Family Medicine  Why:  On Wednesday @ 11:00am, outpatient services  Contact information:  [address]  [place] [address]  [phone]             Magdiel Khan MD On 7/16/2018.    Specialty:  Cardiology  Why:  On Monday @ 3:00pm  for Cardiology appt   Contact information:  [address]  [address]  [place] [address]  [phone]             [facility].    Specialties:  DME Provider, Home Health Services  Contact information:  [address]  [address]  [place] [address]  [phone]             [facility]    Specialties:  DME Provider, Wound Care  Contact information:  [address]  [address]  [place] [facility] [address]  [phone]

## 2018-07-05 NOTE — PT/OT/SLP PROGRESS
Occupational Therapy      Patient Name:  Yasmeen Palm   MRN:  [id number]    Patient not seen today secondary to Unavailable (patient off unit for testing). Will follow-up as able.    JOAQUIN Rockwell  7/5/2018
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
## 2018-07-05 NOTE — PT/OT/SLP PROGRESS
Physical Therapy      Patient Name:  Yasmeen Palm   MRN:  [id number]    Patient not seen today secondary to Unavailable (pt is off the unit for testing) . Will follow-up as able later hour/day .    Kayleigh Henry, PTA

## 2018-07-06 LAB
ANION GAP SERPL CALC-SCNC: 9 MMOL/L
BUN SERPL-MCNC: 18 MG/DL
CALCIUM SERPL-MCNC: 9.8 MG/DL
CHLORIDE SERPL-SCNC: 94 MMOL/L
CO2 SERPL-SCNC: 39 MMOL/L
CREAT SERPL-MCNC: 0.8 MG/DL
EST. GFR  (AFRICAN AMERICAN): >60 ML/MIN/1.73 M^2
EST. GFR  (NON AFRICAN AMERICAN): >60 ML/MIN/1.73 M^2
GLUCOSE SERPL-MCNC: 269 MG/DL
MAGNESIUM SERPL-MCNC: 2 MG/DL
PHOSPHATE SERPL-MCNC: 3.5 MG/DL
POCT GLUCOSE: 214 MG/DL (ref 70–110)
POCT GLUCOSE: 270 MG/DL (ref 70–110)
POCT GLUCOSE: 287 MG/DL (ref 70–110)
POCT GLUCOSE: 359 MG/DL (ref 70–110)
POTASSIUM SERPL-SCNC: 4.7 MMOL/L
SODIUM SERPL-SCNC: 142 MMOL/L

## 2018-07-06 PROCEDURE — 80048 BASIC METABOLIC PNL TOTAL CA: CPT

## 2018-07-06 PROCEDURE — 25000003 PHARM REV CODE 250: Performed by: HOSPITALIST

## 2018-07-06 PROCEDURE — 83735 ASSAY OF MAGNESIUM: CPT

## 2018-07-06 PROCEDURE — 94761 N-INVAS EAR/PLS OXIMETRY MLT: CPT

## 2018-07-06 PROCEDURE — 36415 COLL VENOUS BLD VENIPUNCTURE: CPT

## 2018-07-06 PROCEDURE — 94660 CPAP INITIATION&MGMT: CPT

## 2018-07-06 PROCEDURE — 94799 UNLISTED PULMONARY SVC/PX: CPT

## 2018-07-06 PROCEDURE — 25000003 PHARM REV CODE 250: Performed by: EMERGENCY MEDICINE

## 2018-07-06 PROCEDURE — 63600175 PHARM REV CODE 636 W HCPCS: Performed by: HOSPITALIST

## 2018-07-06 PROCEDURE — 97535 SELF CARE MNGMENT TRAINING: CPT

## 2018-07-06 PROCEDURE — 25000003 PHARM REV CODE 250: Performed by: INTERNAL MEDICINE

## 2018-07-06 PROCEDURE — 84100 ASSAY OF PHOSPHORUS: CPT

## 2018-07-06 PROCEDURE — 97530 THERAPEUTIC ACTIVITIES: CPT

## 2018-07-06 PROCEDURE — 27000221 HC OXYGEN, UP TO 24 HOURS

## 2018-07-06 PROCEDURE — 21400001 HC TELEMETRY ROOM

## 2018-07-06 PROCEDURE — 99900035 HC TECH TIME PER 15 MIN (STAT)

## 2018-07-06 RX ADMIN — ALPRAZOLAM 0.5 MG: 0.5 TABLET ORAL at 10:07

## 2018-07-06 RX ADMIN — INSULIN ASPART 3 UNITS: 100 INJECTION, SOLUTION INTRAVENOUS; SUBCUTANEOUS at 12:07

## 2018-07-06 RX ADMIN — TRAMADOL HYDROCHLORIDE 50 MG: 50 TABLET, FILM COATED ORAL at 05:07

## 2018-07-06 RX ADMIN — POLYETHYLENE GLYCOL 3350 17 G: 17 POWDER, FOR SOLUTION ORAL at 09:07

## 2018-07-06 RX ADMIN — HYDRALAZINE HYDROCHLORIDE 50 MG: 25 TABLET ORAL at 06:07

## 2018-07-06 RX ADMIN — CLONIDINE HYDROCHLORIDE 0.1 MG: 0.1 TABLET ORAL at 04:07

## 2018-07-06 RX ADMIN — DILTIAZEM HYDROCHLORIDE 120 MG: 30 TABLET, FILM COATED ORAL at 09:07

## 2018-07-06 RX ADMIN — HYDRALAZINE HYDROCHLORIDE 50 MG: 25 TABLET ORAL at 09:07

## 2018-07-06 RX ADMIN — GUAIFENESIN 600 MG: 600 TABLET, EXTENDED RELEASE ORAL at 09:07

## 2018-07-06 RX ADMIN — DOCUSATE SODIUM 100 MG: 100 CAPSULE, LIQUID FILLED ORAL at 09:07

## 2018-07-06 RX ADMIN — CLONIDINE HYDROCHLORIDE 0.1 MG: 0.1 TABLET ORAL at 10:07

## 2018-07-06 RX ADMIN — GUAIFENESIN 600 MG: 600 TABLET, EXTENDED RELEASE ORAL at 10:07

## 2018-07-06 RX ADMIN — PREDNISONE 40 MG: 20 TABLET ORAL at 09:07

## 2018-07-06 RX ADMIN — LISINOPRIL 10 MG: 5 TABLET ORAL at 09:07

## 2018-07-06 RX ADMIN — GABAPENTIN 300 MG: 300 CAPSULE ORAL at 04:07

## 2018-07-06 RX ADMIN — SOTALOL HYDROCHLORIDE 80 MG: 80 TABLET ORAL at 09:07

## 2018-07-06 RX ADMIN — PANTOPRAZOLE SODIUM 40 MG: 40 TABLET, DELAYED RELEASE ORAL at 09:07

## 2018-07-06 RX ADMIN — ASPIRIN 81 MG CHEWABLE TABLET 81 MG: 81 TABLET CHEWABLE at 09:07

## 2018-07-06 RX ADMIN — RIVAROXABAN 20 MG: 20 TABLET, FILM COATED ORAL at 05:07

## 2018-07-06 RX ADMIN — TRAMADOL HYDROCHLORIDE 50 MG: 50 TABLET, FILM COATED ORAL at 01:07

## 2018-07-06 RX ADMIN — GABAPENTIN 300 MG: 300 CAPSULE ORAL at 09:07

## 2018-07-06 RX ADMIN — INSULIN DETEMIR 15 UNITS: 100 INJECTION, SOLUTION SUBCUTANEOUS at 09:07

## 2018-07-06 RX ADMIN — DILTIAZEM HYDROCHLORIDE 120 MG: 30 TABLET, FILM COATED ORAL at 04:07

## 2018-07-06 RX ADMIN — DILTIAZEM HYDROCHLORIDE 120 MG: 30 TABLET, FILM COATED ORAL at 06:07

## 2018-07-06 RX ADMIN — FUROSEMIDE 40 MG: 40 TABLET ORAL at 09:07

## 2018-07-06 RX ADMIN — ALPRAZOLAM 0.5 MG: 0.5 TABLET ORAL at 01:07

## 2018-07-06 RX ADMIN — POTASSIUM PHOSPHATE, MONOBASIC 500 MG: 500 TABLET, SOLUBLE ORAL at 09:07

## 2018-07-06 RX ADMIN — ATORVASTATIN CALCIUM 80 MG: 40 TABLET, FILM COATED ORAL at 09:07

## 2018-07-06 RX ADMIN — CLONIDINE HYDROCHLORIDE 0.1 MG: 0.1 TABLET ORAL at 09:07

## 2018-07-06 RX ADMIN — HYDRALAZINE HYDROCHLORIDE 50 MG: 25 TABLET ORAL at 04:07

## 2018-07-06 RX ADMIN — INSULIN ASPART 3 UNITS: 100 INJECTION, SOLUTION INTRAVENOUS; SUBCUTANEOUS at 06:07

## 2018-07-06 RX ADMIN — INSULIN ASPART 3 UNITS: 100 INJECTION, SOLUTION INTRAVENOUS; SUBCUTANEOUS at 10:07

## 2018-07-06 RX ADMIN — GABAPENTIN 300 MG: 300 CAPSULE ORAL at 10:07

## 2018-07-06 NOTE — ASSESSMENT & PLAN NOTE
- Followed by Cardiology  - s/p [facility] on 6/27 only remarkable for non-obstructive CAD  - Continue medical management as per Cardiology with ASA, statin, and patient will not be placed on plavix given she will be fully anticoagulated with Xarelto

## 2018-07-06 NOTE — PLAN OF CARE
Problem: Occupational Therapy Goal  Goal: Occupational Therapy Goal  Goals to be met by: 7/13/18     Patient will increase functional independence with ADLs by performing:    UE Dressing with Set-up Assistance.  LE Dressing with Minimal Assistance.  Grooming while seated with Supervision.  Toileting from bedside commode with Contact Guard Assistance for hygiene and clothing management.   Supine to sit with Set-up Assistance.  Toilet transfer to bedside commode with Contact Guard Assistance.  Upper extremity exercise program x10 reps per handout, with assistance as needed.     Outcome: Ongoing (interventions implemented as appropriate)  Patient fatigues easily and requires increased time to perform all functional mobility.  Pt with limited tolerance for therapy today; only agreeable OOB to chair. Patient will benefit from continued OT to address functional deficits.

## 2018-07-06 NOTE — ASSESSMENT & PLAN NOTE
- Due to COPD exacerbation   - s/p intubation and treatment with IV steroids, NEBS  - Pulmonary following and extubated on 6/27  - Respiratory status was worse on 6/28 due to volume overload  - s/p IV lasix with good output and now back down to NC  - Pt to continue BIPAP QHS and PRN  - Will need home BIPAP to treat patient for high pCO2

## 2018-07-06 NOTE — PT/OT/SLP PROGRESS
Occupational Therapy      Patient Name:  Yasmeen Palm   MRN:  [id number]    Patient not seen today secondary to Other (Per patient's nurse, patient is currently still on BIPAP). Will follow-up as able.    JOAQUIN Rockwell  7/6/2018

## 2018-07-06 NOTE — PROGRESS NOTES
[facility] Hospital Medicine  Progress Note    Patient Name: Yasmeen Palm  MRN: [id number]  Patient Class: IP- Inpatient   Admission Date: 6/24/2018  Length of Stay: 11 days  Attending Physician: Meredith Olguin MD  Primary Care Provider: Angel Orourke Jr, MD        Subjective:     Principal Problem:Acute hypercapnic respiratory failure    HPI:  Yasmeen Palm is a 68 y.o. female that (in part)  has a past medical history of Anticoagulant long-term use; Arthritis; Asthma; CHF (congestive heart failure); COPD (chronic obstructive pulmonary disease); Coronary artery disease; Depression; Diabetes mellitus; GERD (gastroesophageal reflux disease); and Hypertension.  has a past surgical history that includes Abdominal surgery; Cardiac surgery; and Hernia repair. Presents to [facility] Emergency Department complaining of chest pain .  She reports compliance with her home medication regimen, including Xarelto.     Description of symptoms  Location:  Substernal  Onset:  Acute onset 2 days ago  Character:  The patient remained; moderate severity  Frequency:  Daily  Duration:  each episode lasts several minutes at a time  Associated Symptoms:  Diaphoresis, shortness of breath, weakness and fatigue  Radiation:  Recent the chest  Exacerbating factors:  Worse on exertion  Relieving factors:  Minimal relief with supplemental oxygen     In the emergency department routine laboratory studies, chest x-ray, EKG, cardiac enzymes were obtained.  EKG findings were concerning the case was discussed with cardiologist, Dr. Pulido.  It was determined that her EKG was not consistent with STEMI and she has been chest pain-free since arrival.  She had been given Lovenox, aspirin, and plan was to continue trending troponins and monitor closely on telemetry.    Hospital Course:  Ms. Palm was admitted to the hospital originally on 6/24/18 for chest pain. She was later sent to the ICU with acute  hypercapnic respiratory failure the same day on BIPAP.  She quickly improved and was sent to the floor on 6/25.  Cards was consulted for NSTEMI with noted troponin increase and was planning on LHC on 6/26. However, the patient was sent back to the ICU on 6/26 with hypercapnic respiratory failure with a C02 of > 100 and was intubated. Pulmonary was consulted. Pt clinically improved from respiratory standpoint and was extubated on 6/27 to NC, but she did complain of chest discomfort. Cardiology was notified and patient was taken for LHC on 6/27 which was only remarkable for non-obstructive CAD and did not require intervention. Pt was given IVF post procedure and the next day developed increased SOB and required BIPAP. Pt was treated with IV lasix with good response with much improved respiratory status after output of 1L. Pt also being treated for COPD exacerbation with IV steroids, NEBS and doxycycline. ECHO performed on 6/25/2018 remarkable for EF=50-55% + grade 2 diastolic dysfunction. Pt diuresed and changed to maintenance PO lasix regimen. She as started on BIPAP due to increased pCO2 on ABG and lethargy. Pt will need BIPAP for use at home.    Interval History: Pt slept with BIPAP and had no acute issues overnight. Breathing at baseline.    Review of Systems   Constitutional: Negative for chills and fever.   Respiratory: Negative for shortness of breath.    Cardiovascular: Negative for chest pain.     Objective:     Vital Signs (Most Recent):  Temp: 97.3 °F (36.3 °C) (07/05/18 1933)  Pulse: 72 (07/05/18 1933)  Resp: 18 (07/05/18 1933)  BP: 133/63 (07/05/18 1933)  SpO2: 100 % (07/05/18 2100) Vital Signs (24h Range):  Temp:  [97.3 °F (36.3 °C)-98.6 °F (37 °C)] 97.3 °F (36.3 °C)  Pulse:  [66-84] 72  Resp:  [18-22] 18  SpO2:  [95 %-100 %] 100 %  BP: (101-162)/(48-80) 133/63     Weight: 77.5 kg (170 lb 13.7 oz)  Body mass index is 28.43 kg/m².    Intake/Output Summary (Last 24 hours) at 07/05/18 5403  Last data filed  at 07/05/18 1902   Gross per 24 hour   Intake              480 ml   Output              901 ml   Net             -421 ml      Physical Exam   Constitutional: She is oriented to person, place, and time. She appears well-developed. No distress.   HENT:   Head: Normocephalic and atraumatic.   Eyes: EOM are normal. Pupils are equal, round, and reactive to light.   Neck: Normal range of motion. Neck supple.   Cardiovascular: Normal rate and regular rhythm.    Pulmonary/Chest:   Slight decrease at bases, no wheezing   Abdominal: Soft. Bowel sounds are normal.   Musculoskeletal: Normal range of motion. She exhibits edema.   Neurological: She is alert and oriented to person, place, and time.   Skin: Skin is warm and dry. Capillary refill takes less than 2 seconds. She is not diaphoretic.   Psychiatric: She has a normal mood and affect. Her behavior is normal. Thought content normal.       Significant Labs: All pertinent labs within the past 24 hours have been reviewed.    Significant Imaging: I have reviewed and interpreted all pertinent imaging results/findings within the past 24 hours.    Assessment/Plan:      * Acute hypercapnic respiratory failure    - Due to COPD exacerbation   - s/p intubation and treatment with IV steroids, NEBS  - Pulmonary following and extubated on 6/27  - Respiratory status was worse on 6/28 due to volume overload  - s/p IV lasix with good output and now back down to NC  - Pt to continue BIPAP QHS and PRN  - Will need home ventilator to treat patient for high pCO2  - CRT linked to COPD        Non-STEMI (non-ST elevated myocardial infarction)    - Followed by Cardiology  - s/p [facility] on 6/27 only remarkable for non-obstructive CAD  - Continue medical management as per Cardiology with ASA, statin, and patient will not be placed on plavix given she will be fully anticoagulated with Xarelto        Acute diastolic CHF (congestive heart failure)    - Due to volume overload  - Last ECHO with EF=50-55% +  grade 2 diastolic dysfunction on 6/26  - Improved with IV lasix and now compensated and on PO lasix regimen        Coronary artery disease involving native coronary artery of native heart with angina pectoris    - [facility] on 6/27 as discussed above  - As per Cardiology        PAF (paroxysmal atrial fibrillation)    - Rate controlled and anticoagulation resumed with Xarelto on 6/27 post [facility]        Debility    - PT/OT, need HH at DC time.        Neuropathy    - No acute issues         Acute exacerbation of chronic obstructive pulmonary disease (COPD)    - C02 > 100 and s/p intubation  - Treated with NEBS and steroids  - Extubated on 6/27 and respiratory status with worsening due to volume overload issue   - On prednisone and nebulizer.  - Does have home oxygen,  - On BIPAP due to increased PCO2 on ABG, lethargic condition  - Arranging for home ventilator for discharge   - CRT due to COPD        Elevated brain natriuretic peptide (BNP) level    - Diuresis resumed         Elevated troponin I level    - As discussed under NSTEMI,no sign of obstruction on [facility].        Obesity    - Weight reduction as outpatient after all acute issues addressed        Chronic anticoagulation    - Patient on Xarelto for PAF which has been resumed        History of deep vein thrombosis    - Resumed anticoagulation         History of pulmonary embolism    - Xarelto resumed .        Anemia of chronic disease    - H/H with slight drop noted, but no bleeding  - Will continue to monitor        Malignant hypertension    - Difficult to control BP  - Continue lisinopril, and restarted hydralazine, clonidine, diltiazem today (held due to previous hypotensive issues)  - PRN labetalol  - Much improved.        Tobacco abuse    - Smoking cessation counseling done        Gastroesophageal reflux disease without esophagitis    - Continue PPI        Type 2 diabetes mellitus, controlled    - Complications of peripheral neuropathy.   - Well controlled on current  regimen        Chronic obstructive pulmonary disease with acute exacerbation    - As discussed above          VTE Risk Mitigation         Ordered     rivaroxaban tablet 20 mg  With dinner      06/27/18 4649              Meredith Olguin MD  Department of Hospital Medicine   [facility]

## 2018-07-06 NOTE — PT/OT/SLP PROGRESS
"Physical Therapy Treatment    Patient Name:  Yasmeen Palm   MRN:  [id number]    Recommendations:     Discharge Recommendations:  home health PT   Discharge Equipment Recommendations:   None  Barriers to discharge: None    Assessment:     Yasmeen Palm is a 68 y.o. female admitted with a medical diagnosis of Acute hypercapnic respiratory failure.  She presents with the following impairments/functional limitations:  weakness, decreased safety awareness, impaired cardiopulmonary response to activity, impaired endurance, gait instability, impaired balance, decreased lower extremity function. Pt requires extra time to complete bed mobility and transfers. Pt states, "Don't hernandez me". Pt requires encouragement to participate in therapy.     Rehab Prognosis: Good ; patient would benefit from acute skilled PT services to address these deficits and reach maximum level of function.      Recent Surgery: Procedure(s) (LRB):  Left heart cath R rad access, not before 9am (Left)      Plan:     During this hospitalization, patient to be seen 6 x/week to address the above listed problems via gait training, therapeutic activities, therapeutic exercises  · Plan of Care Expires:  07/14/18   Plan of Care Reviewed with: patient    Subjective     Communicated with Chely prior to session.  Patient found HOB elevated upon PT entry to room, agreeable to treatment.      Chief Complaint: No complaint at this time.  Patient comments/goals: Pt did not state.  Pain/Comfort:  · Pain Rating 1: 0/10    Patients cultural, spiritual, Quaker conflicts given the current situation:      Objective:     Patient found with: telemetry, pulse ox (continuous), oxygen (3L O2 NC)    General Precautions: Standard, fall   Orthopedic Precautions:N/A   Braces: N/A     Functional Mobility:  · Bed Mobility:     · Supine to Sit: contact guard assistance  · Transfers:     · Sit to Stand:  contact guard assistance x 2 trials (from bed) with rolling walker  · Gait:   Pt " took 3-4 steps from bed to BSChair with RW, CGA. VC's for proper techniques and safety awareness.   · Balance: Good in sitting, Fair+ in standing.       AM-PAC 6 CLICK MOBILITY  Turning over in bed (including adjusting bedclothes, sheets and blankets)?: 4  Sitting down on and standing up from a chair with arms (e.g., wheelchair, bedside commode, etc.): 3  Moving from lying on back to sitting on the side of the bed?: 4  Moving to and from a bed to a chair (including a wheelchair)?: 3  Need to walk in hospital room?: 3  Climbing 3-5 steps with a railing?: 2  Basic Mobility Total Score: 19       Therapeutic Activities and Exercises:  Performed don and doff diaper in standing for pericare, CGA.     Patient left up in chair with table set up for lunch, all lines intact, call button in reach and Chely notified.    GOALS:    Physical Therapy Goals        Problem: Physical Therapy Goal    Goal Priority Disciplines Outcome Goal Variances Interventions   Physical Therapy Goal     PT/OT, PT Ongoing (interventions implemented as appropriate)     Description:  Goals to be met by: 2018     Patient will increase functional independence with mobility by performin. Supine to sit with Modified [place]  2. Sit to supine with Modified [place]  3. Sit to stand transfer with Modified [place]  4. Gait  x 250 feet with Modified [place] using Rolling Walker.    Recommend: HHPT at time of discharge.                       Time Tracking:     PT Received On: 18  PT Start Time: 1136     PT Stop Time: 1159  PT Total Time (min): 23 min     Billable Minutes: Therapeutic Activity 12 of 23 min total (Co-treat with DAVIS)    Treatment Type: Treatment  PT/PTA: PTA     PTA Visit Number: 2     JESSICA So Mai  2018

## 2018-07-06 NOTE — PT/OT/SLP PROGRESS
Occupational Therapy   Treatment    Name: Yasmeen Palm  MRN: [id number]  Admitting Diagnosis:  Acute hypercapnic respiratory failure       Recommendations:     Discharge Recommendations: home health OT  Discharge Equipment Recommendations:  none  Barriers to discharge:  None    Subjective     Communicated with: Nurse Mo prior to session.  Patient agreeable to OOB mobility with encouragement.  Pain/Comfort:  · Pain Rating 1: 0/10    Patients cultural, spiritual, Scientologist conflicts given the current situation: na    Objective:     Patient found with: telemetry, pulse ox (continuous), oxygen    General Precautions: Standard, fall   Orthopedic Precautions:N/A   Braces: N/A     Occupational Performance:    Bed Mobility:    · Patient completed Scooting/Bridging with contact guard assistance  · Patient completed Supine to Sit with contact guard assistance     Functional Mobility/Transfers:  · Patient completed Sit <> Stand Transfer with contact guard assistance  with  rolling walker x2 trials  · Patient completed Bed <> Chair Transfer using Step Transfer technique with contact guard assistance  with rolling walker  · Functional Mobility: Patient took steps bed>chair with CGA/RW.  Patient with decreased safety awareness requires verbal/tactile cues for technique. Patient sits abruptly.     Activities of Daily Living:  · Upper Body Dressing: moderate assistance    · Toileting: Patient with soiled brief; tolerated standing balance with CGA to don/doff brief      Patient left up in chair with lunch tray set up all lines intact, call button in reach and nurse notified    [facility] 6 Click:  AMPA Total Score: 14    Treatment & Education:  Patient performed bed mobility, functional transfers, and ADL's as above.   Patient declined BUE therex.   Education:    Assessment:     Yasmeen Palm is a 68 y.o. female with a medical diagnosis of Acute hypercapnic respiratory failure.  She presents with the following performance deficits  affecting function: weakness, impaired endurance, impaired functional mobilty, impaired self care skills, gait instability, impaired balance, decreased upper extremity function, decreased lower extremity function, pain, decreased safety awareness, impaired cardiopulmonary response to activity. Patient fatigues easily and requires increased time to perform all functional mobility.  Pt with limited tolerance for therapy today; only agreeable OOB to chair.     Rehab Prognosis:  fair; patient would benefit from acute skilled OT services to address these deficits and reach maximum level of function.       Plan:     Patient to be seen 4 x/week to address the above listed problems via self-care/home management, therapeutic activities, therapeutic exercises  · Plan of Care Expires: 08/01/18  · Plan of Care Reviewed with: patient    This Plan of care has been discussed with the patient who was involved in its development and understands and is in agreement with the identified goals and treatment plan    GOALS:    Occupational Therapy Goals        Problem: Occupational Therapy Goal    Goal Priority Disciplines Outcome Interventions   Occupational Therapy Goal     OT, PT/OT Ongoing (interventions implemented as appropriate)    Description:  Goals to be met by: 7/13/18     Patient will increase functional independence with ADLs by performing:    UE Dressing with Set-up Assistance.  LE Dressing with Minimal Assistance.  Grooming while seated with Supervision.  Toileting from bedside commode with Contact Guard Assistance for hygiene and clothing management.   Supine to sit with Set-up Assistance.  Toilet transfer to bedside commode with Contact Guard Assistance.  Upper extremity exercise program x10 reps per handout, with assistance as needed.                      Time Tracking:     OT Date of Treatment: 07/06/18  OT Start Time: 1136  OT Stop Time: 1159  OT Total Time (min): 23 min    Billable Minutes:Self Care/Home Management 11  (co-tx with PT)    Kaye Astorga, JOAQUIN  7/6/2018

## 2018-07-06 NOTE — PLAN OF CARE
Problem: Fall Risk (Adult)  Goal: Identify Related Risk Factors and Signs and Symptoms  Related risk factors and signs and symptoms are identified upon initiation of Human Response Clinical Practice Guideline (CPG)   Outcome: Ongoing (interventions implemented as appropriate)   07/05/18 1938   Fall Risk   Related Risk Factors (Fall Risk) age-related changes;bladder function altered;culprit medication(s);fatigue/slow reaction;depression/anxiety;gait/mobility problems;history of falls;polypharmacy   Signs and Symptoms (Fall Risk) presence of risk factors

## 2018-07-06 NOTE — PLAN OF CARE
Recommendations     Recommendation/Intervention:   1. Aid with BM   2. Provided diet education to aid with home diet   3. Continue current diet   4. RD to monitor     Goals: Meet >85% EEN  Nutrition Goal Status: new  Communication of RD Recs:  (plan of care)     D/C planning: ADA/low sodium diet

## 2018-07-06 NOTE — SUBJECTIVE & OBJECTIVE
Interval History: Pt slept with BIPAP and had no acute issues overnight. Breathing at baseline.    Review of Systems   Constitutional: Negative for chills and fever.   Respiratory: Negative for shortness of breath.    Cardiovascular: Negative for chest pain.     Objective:     Vital Signs (Most Recent):  Temp: 97.3 °F (36.3 °C) (07/05/18 1933)  Pulse: 72 (07/05/18 1933)  Resp: 18 (07/05/18 1933)  BP: 133/63 (07/05/18 1933)  SpO2: 100 % (07/05/18 2100) Vital Signs (24h Range):  Temp:  [97.3 °F (36.3 °C)-98.6 °F (37 °C)] 97.3 °F (36.3 °C)  Pulse:  [66-84] 72  Resp:  [18-22] 18  SpO2:  [95 %-100 %] 100 %  BP: (101-162)/(48-80) 133/63     Weight: 77.5 kg (170 lb 13.7 oz)  Body mass index is 28.43 kg/m².    Intake/Output Summary (Last 24 hours) at 07/05/18 2332  Last data filed at 07/05/18 1902   Gross per 24 hour   Intake              480 ml   Output              901 ml   Net             -421 ml      Physical Exam   Constitutional: She is oriented to person, place, and time. She appears well-developed. No distress.   HENT:   Head: Normocephalic and atraumatic.   Eyes: EOM are normal. Pupils are equal, round, and reactive to light.   Neck: Normal range of motion. Neck supple.   Cardiovascular: Normal rate and regular rhythm.    Pulmonary/Chest:   Slight decrease at bases, no wheezing   Abdominal: Soft. Bowel sounds are normal.   Musculoskeletal: Normal range of motion. She exhibits edema.   Neurological: She is alert and oriented to person, place, and time.   Skin: Skin is warm and dry. Capillary refill takes less than 2 seconds. She is not diaphoretic.   Psychiatric: She has a normal mood and affect. Her behavior is normal. Thought content normal.       Significant Labs: All pertinent labs within the past 24 hours have been reviewed.    Significant Imaging: I have reviewed and interpreted all pertinent imaging results/findings within the past 24 hours.

## 2018-07-06 NOTE — PLAN OF CARE
Problem: Physical Therapy Goal  Goal: Physical Therapy Goal  Goals to be met by: 2018     Patient will increase functional independence with mobility by performin. Supine to sit with Modified [place]  2. Sit to supine with Modified [place]  3. Sit to stand transfer with Modified [place]  4. Gait  x 250 feet with Modified [place] using Rolling Walker.    Recommend: HHPT at time of discharge.      Outcome: Ongoing (interventions implemented as appropriate)  Pt required encouragement to participated in therapy today . Pt will benefit from further therapy in order to get back to PLOF.

## 2018-07-06 NOTE — ASSESSMENT & PLAN NOTE
- Due to volume overload  - Last ECHO with EF=50-55% + grade 2 diastolic dysfunction on 6/26  - Improved with IV lasix and now compensated and on PO lasix regimen

## 2018-07-06 NOTE — PROGRESS NOTES
[place] faxed home ventilator orders to [employer] @ [phone]. Awaiting feedback.     Awaiting authorization for [employer] machine. Will follow up on Monday.

## 2018-07-06 NOTE — PROGRESS NOTES
" [facility]  Adult Nutrition  Progress Note    SUMMARY       Recommendations    Recommendation/Intervention:   1. Aid with BM   2. Provided diet education to aid with home diet   3. Continue current diet   4. RD to monitor    Goals: Meet >85% EEN  Nutrition Goal Status: new  Communication of RD Recs:  (plan of care)    D/C planning: ADA/low sodium diet    Reason for Assessment    Reason for Assessment: length of stay  Relevant Medical History: CHF, COPD, DM    General Information Comments: Pt denies issues with n/v. Reports some difficulty chewing tough meats, but does not want diet texture changed. Reports weight loss over past few year; noted decrease in adm weight from 5 months ago of 13 kg. Pt reports she does utilize supplements. Currently tolerating meals. Pt appears adequate in fat mass, mild to moderate loss of lean body mass (upper arm area, clavicle) Pt eating 50-75% of meals. Provided handouts on CHF and nutrition; DM and nutrition. Reviewed with pt and provided f/u resources. Pt reports good blood sugar control at home and says she follows a low sodium diet.     Nutrition Risk Screen    Nutrition Risk Screen: no indicators present    Nutrition/Diet History    Food Preferences: Denies cultural, Jainism food preferences.   Do you have any cultural, spiritual, Jainism conflicts, given your current situation?: na  Factors Affecting Nutritional Intake: decreased appetite    Anthropometrics    Temp: 98.7 °F (37.1 °C)  Height Method: Stated  Height: 5' 5" (165.1 cm)  Height (inches): 65 in  Weight Method: Bed Scale  Weight: 77.5 kg (170 lb 13.7 oz)  Weight (lb): 170.86 lb  Ideal Body Weight (IBW), Female: 125 lb  % Ideal Body Weight, Female (lb): 136.69 lb  BMI (Calculated): 28.5  BMI Grade: 25 - 29.9 - overweight  Weight Loss: unintentional  Usual Body Weight (UBW), k.7 kg (2018)  % Usual Body Weight: 85.63  % Weight Change From Usual Weight: -14.55 %   "     Lab/Procedures/Meds    Pertinent Labs Reviewed: reviewed  Pertinent Labs Comments: HgbA1c 6.3  Pertinent Medications Reviewed: reviewed  Pertinent Medications Comments: furosemide, insulin    Physical Findings/Assessment    Overall Physical Appearance: loss of muscle mass, obese  Oral/Mouth Cavity: tooth/teeth missing  Skin: intact    Estimated/Assessed Needs    Weight Used For Calorie Calculations: 77.5 kg (170 lb 13.7 oz)  Energy Calorie Requirements (kcal): 2122-2790 kcal  Energy Need Method: [place]  Protein Requirements: 75-95g  Weight Used For Protein Calculations: 77.5 kg (170 lb 13.7 oz)     Fluid Need Method: RDA Method  RDA Method (mL): 1650  CHO Requirement: 225g      Nutrition Prescription Ordered    Current Diet Order: 2000 ADA/Cardiac    Evaluation of Received Nutrient/Fluid Intake    I/O: 240/990  Energy Calories Required: meeting needs  Protein Required: meeting needs  Fluid Required:  (per MD)  Comments: LBM: 6/23, enemas given  Tolerance: tolerating  % Intake of Estimated Energy Needs: 75 - 100 %  % Meal Intake: 75 - 100 %    Nutrition Risk    Level of Risk/Frequency of Follow-up:  (1 x week)     Assessment and Plan    Nutrition Dx; Inadequate energy intake PTA r/t COPD as evidenced by: weight loss PTA, evidence of lean body mass loss  Nutrition Dx Status: new       Monitor and Evaluation    Food and Nutrient Intake: energy intake, food and beverage intake  Food and Nutrient Adminstration: diet order  Knowledge/Beliefs/Attitudes: food and nutrition knowledge/skill  Anthropometric Measurements: weight, weight change  Biochemical Data, Medical Tests and Procedures: electrolyte and renal panel, glucose/endocrine profile  Nutrition-Focused Physical Findings: overall appearance     Nutrition Follow-Up    RD Follow-up?: Yes
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
## 2018-07-07 LAB
ANION GAP SERPL CALC-SCNC: 4 MMOL/L
BUN SERPL-MCNC: 23 MG/DL
CALCIUM SERPL-MCNC: 9.8 MG/DL
CHLORIDE SERPL-SCNC: 91 MMOL/L
CO2 SERPL-SCNC: 44 MMOL/L
CREAT SERPL-MCNC: 0.8 MG/DL
EST. GFR  (AFRICAN AMERICAN): >60 ML/MIN/1.73 M^2
EST. GFR  (NON AFRICAN AMERICAN): >60 ML/MIN/1.73 M^2
GLUCOSE SERPL-MCNC: 343 MG/DL
MAGNESIUM SERPL-MCNC: 1.9 MG/DL
PHOSPHATE SERPL-MCNC: 3.1 MG/DL
POCT GLUCOSE: 268 MG/DL (ref 70–110)
POCT GLUCOSE: 316 MG/DL (ref 70–110)
POCT GLUCOSE: 378 MG/DL (ref 70–110)
POCT GLUCOSE: 450 MG/DL (ref 70–110)
POTASSIUM SERPL-SCNC: 5.2 MMOL/L
SODIUM SERPL-SCNC: 139 MMOL/L

## 2018-07-07 PROCEDURE — 25000003 PHARM REV CODE 250: Performed by: EMERGENCY MEDICINE

## 2018-07-07 PROCEDURE — 27000221 HC OXYGEN, UP TO 24 HOURS

## 2018-07-07 PROCEDURE — 63600175 PHARM REV CODE 636 W HCPCS: Performed by: HOSPITALIST

## 2018-07-07 PROCEDURE — 84100 ASSAY OF PHOSPHORUS: CPT

## 2018-07-07 PROCEDURE — 83735 ASSAY OF MAGNESIUM: CPT

## 2018-07-07 PROCEDURE — 99900035 HC TECH TIME PER 15 MIN (STAT)

## 2018-07-07 PROCEDURE — 25000003 PHARM REV CODE 250: Performed by: HOSPITALIST

## 2018-07-07 PROCEDURE — 21400001 HC TELEMETRY ROOM

## 2018-07-07 PROCEDURE — 80048 BASIC METABOLIC PNL TOTAL CA: CPT

## 2018-07-07 PROCEDURE — 94761 N-INVAS EAR/PLS OXIMETRY MLT: CPT

## 2018-07-07 PROCEDURE — 25000003 PHARM REV CODE 250: Performed by: INTERNAL MEDICINE

## 2018-07-07 PROCEDURE — 94799 UNLISTED PULMONARY SVC/PX: CPT

## 2018-07-07 PROCEDURE — 36415 COLL VENOUS BLD VENIPUNCTURE: CPT

## 2018-07-07 PROCEDURE — 94660 CPAP INITIATION&MGMT: CPT

## 2018-07-07 RX ADMIN — SOTALOL HYDROCHLORIDE 80 MG: 80 TABLET ORAL at 09:07

## 2018-07-07 RX ADMIN — GUAIFENESIN 600 MG: 600 TABLET, EXTENDED RELEASE ORAL at 09:07

## 2018-07-07 RX ADMIN — FUROSEMIDE 40 MG: 40 TABLET ORAL at 09:07

## 2018-07-07 RX ADMIN — POTASSIUM PHOSPHATE, MONOBASIC 500 MG: 500 TABLET, SOLUBLE ORAL at 09:07

## 2018-07-07 RX ADMIN — INSULIN ASPART 3 UNITS: 100 INJECTION, SOLUTION INTRAVENOUS; SUBCUTANEOUS at 10:07

## 2018-07-07 RX ADMIN — HYDRALAZINE HYDROCHLORIDE 50 MG: 25 TABLET ORAL at 01:07

## 2018-07-07 RX ADMIN — DILTIAZEM HYDROCHLORIDE 120 MG: 30 TABLET, FILM COATED ORAL at 09:07

## 2018-07-07 RX ADMIN — CLONIDINE HYDROCHLORIDE 0.1 MG: 0.1 TABLET ORAL at 09:07

## 2018-07-07 RX ADMIN — RIVAROXABAN 20 MG: 20 TABLET, FILM COATED ORAL at 04:07

## 2018-07-07 RX ADMIN — PANTOPRAZOLE SODIUM 40 MG: 40 TABLET, DELAYED RELEASE ORAL at 09:07

## 2018-07-07 RX ADMIN — DOCUSATE SODIUM 100 MG: 100 CAPSULE, LIQUID FILLED ORAL at 09:07

## 2018-07-07 RX ADMIN — LISINOPRIL 10 MG: 5 TABLET ORAL at 09:07

## 2018-07-07 RX ADMIN — INSULIN ASPART 5 UNITS: 100 INJECTION, SOLUTION INTRAVENOUS; SUBCUTANEOUS at 04:07

## 2018-07-07 RX ADMIN — GABAPENTIN 300 MG: 300 CAPSULE ORAL at 09:07

## 2018-07-07 RX ADMIN — GABAPENTIN 300 MG: 300 CAPSULE ORAL at 04:07

## 2018-07-07 RX ADMIN — HYDRALAZINE HYDROCHLORIDE 50 MG: 25 TABLET ORAL at 06:07

## 2018-07-07 RX ADMIN — INSULIN ASPART 3 UNITS: 100 INJECTION, SOLUTION INTRAVENOUS; SUBCUTANEOUS at 09:07

## 2018-07-07 RX ADMIN — DILTIAZEM HYDROCHLORIDE 120 MG: 30 TABLET, FILM COATED ORAL at 06:07

## 2018-07-07 RX ADMIN — ATORVASTATIN CALCIUM 80 MG: 40 TABLET, FILM COATED ORAL at 09:07

## 2018-07-07 RX ADMIN — CLONIDINE HYDROCHLORIDE 0.1 MG: 0.1 TABLET ORAL at 04:07

## 2018-07-07 RX ADMIN — INSULIN ASPART 4 UNITS: 100 INJECTION, SOLUTION INTRAVENOUS; SUBCUTANEOUS at 01:07

## 2018-07-07 RX ADMIN — ASPIRIN 81 MG CHEWABLE TABLET 81 MG: 81 TABLET CHEWABLE at 09:07

## 2018-07-07 RX ADMIN — HYDRALAZINE HYDROCHLORIDE 50 MG: 25 TABLET ORAL at 09:07

## 2018-07-07 RX ADMIN — PREDNISONE 40 MG: 20 TABLET ORAL at 09:07

## 2018-07-07 RX ADMIN — DILTIAZEM HYDROCHLORIDE 120 MG: 30 TABLET, FILM COATED ORAL at 01:07

## 2018-07-07 RX ADMIN — POLYETHYLENE GLYCOL 3350 17 G: 17 POWDER, FOR SOLUTION ORAL at 09:07

## 2018-07-07 RX ADMIN — INSULIN DETEMIR 15 UNITS: 100 INJECTION, SOLUTION SUBCUTANEOUS at 10:07

## 2018-07-07 NOTE — PLAN OF CARE
Problem: Diabetes, Type 2 (Adult)  Intervention: Support/Optimize Psychosocial Response to Condition   07/05/18 2100   Coping/Psychosocial Interventions   Supportive Measures active listening utilized;self-care encouraged   Environmental Support calm environment promoted;distractions minimized;rest periods encouraged     Intervention: Optimize Glycemic Control   07/05/18 0727   Nutrition Interventions   Glycemic Management blood glucose monitoring         Problem: Fall Risk (Adult)  Intervention: Monitor/Assist with Self Care   07/05/18 2100 07/06/18 1950 07/06/18 2000   Functional Level Current   Ambulation 3 - assistive equipment and person --  --    Transferring 3 - assistive equipment and person --  --    Toileting 3 - assistive equipment and person --  --    Bathing 2 - assistive person --  --    Dressing 2 - assistive person --  --    Eating 0 - independent --  --    Communication 0 - understands/communicates without difficulty --  --    Swallowing 0 - swallows foods/liquids without difficulty --  --    Daily Care Interventions   Self-Care Promotion --  independence encouraged --    Activity   Activity Assistance Provided --  --  assistance, 1 person     Intervention: Reduce Risk/Promote Restraint Free Environment   07/07/18 0000   Safety Interventions   Safety Precautions emergency equipment at bedside      07/07/18 0000   Safety Interventions   Safety Precautions emergency equipment at bedside     Intervention: Review Medications/Identify Contributors to Fall Risk   07/07/18 0449   Safety Interventions   Medication Review/Management medications reviewed;high risk medications identified      07/07/18 0449   Safety Interventions   Medication Review/Management medications reviewed;high risk medications identified     Intervention: Patient Rounds   07/06/18 1753   Safety Interventions   Patient Rounds bed in low position;bed wheels locked;call light in reach;clutter free environment maintained;ID band  on;placement of personal items at bedside;toileting offered;visualized patient     Intervention: Safety Promotion/Fall Prevention   07/07/18 0000   Safety Interventions   Safety Promotion/Fall Prevention assistive device/personal item within reach;bed alarm set;medications reviewed;nonskid shoes/socks when out of bed;side rails raised x 2     Intervention: Safety Precautions   07/07/18 0000   Safety Interventions   Safety Precautions emergency equipment at bedside         Problem: Patient Care Overview  Goal: Plan of Care Review   07/06/18 1136   Coping/Psychosocial   Plan Of Care Reviewed With patient     Goal: Discharge Needs Assessment   06/25/18 1605 07/02/18 1407   Activity/Self Care ROS   Equipment Currently Used at Home --  cane, straight;wheelchair;rollator;bedside commode;shower chair;oxygen   Living Environment   Transportation Available family or friend will provide --    Social Work Plan   Patient's perception of discharge disposition home health;home or selfcare --    Patient/Family In Agreement With Plan yes --    Living Environment   Able to Return to Prior Arrangements yes --    Current Health   Expected Length of Stay (days) 4 --    Discharge Needs Assessment   Readmission Within The Last 30 Days no previous admission in last 30 days --    OTHER   Communicated expected length of stay with patient/caregiver yes --    Is patient able to care for self after discharge? Yes --    Who are your caregiver(s) and their phone number(s)? if needed , niece, daugher in law--demographics --      Goal: Interdisciplinary Rounds/Family Conf  Outcome: Ongoing (interventions implemented as appropriate)   07/07/18 0449   Interdisciplinary Rounds/Family Conf   Participants respiratory therapy       Problem: Pressure Ulcer Risk (Kevin Scale) (Adult,Obstetrics,Pediatric)  Intervention: Prevent/Manage Excess Moisture   07/04/18 0800 07/04/18 1400 07/05/18 2100   Hygiene Care   Perineal Care absorbent pad  changed;perineum cleansed;diaper changed --  --    Bathing/Skin Care --  linen changed;dressed/undressed;bath, complete --    Skin Interventions   Skin Protection --  --  Adhesive use limited;Incontinence pads     Intervention: Maintain Head of Bed Elevation Less Than 30 Degrees as Tolerated   07/07/18 0000   Positioning   Head of Bed (HOB) HOB at 45 degrees     Intervention: Prevent/Minimize Sheer/Friction Injuries   07/03/18 2010 07/05/18 2100   Skin Interventions   Pressure Reduction Devices --  Pressure-redistributing mattress utilized   Pressure Reduction Techniques --  frequent weight shift encouraged;pressure points protected   Positioning   Positioning/Transfer Devices pillows --      Intervention: Turn/Reposition Often   07/05/18 2100 07/07/18 0000   Skin Interventions   Pressure Reduction Techniques frequent weight shift encouraged;pressure points protected --    Positioning   Body Position --  positioned/repositioned independently      07/05/18 2100 07/07/18 0000   Skin Interventions   Pressure Reduction Techniques frequent weight shift encouraged;pressure points protected --    Positioning   Body Position --  positioned/repositioned independently       Goal: Identify Related Risk Factors and Signs and Symptoms  Related risk factors and signs and symptoms are identified upon initiation of Human Response Clinical Practice Guideline (CPG)   Outcome: Ongoing (interventions implemented as appropriate)   07/04/18 1400   Pressure Ulcer Risk (Kevin Scale)   Related Risk Factors (Pressure Ulcer Risk (Kevin Scale)) age extremes;body weight extremes;hospitalization prolonged

## 2018-07-07 NOTE — PROGRESS NOTES
[facility] Hospital Medicine  Progress Note    Patient Name: Yasmeen Palm  MRN: [id number]  Patient Class: IP- Inpatient   Admission Date: 6/24/2018  Length of Stay: 13 days  Attending Physician: Meredith Olguin MD  Primary Care Provider: Angel Orourke Jr, MD        Subjective:     Principal Problem:Acute hypercapnic respiratory failure    HPI:  Yasmeen Palm is a 68 y.o. female that (in part)  has a past medical history of Anticoagulant long-term use; Arthritis; Asthma; CHF (congestive heart failure); COPD (chronic obstructive pulmonary disease); Coronary artery disease; Depression; Diabetes mellitus; GERD (gastroesophageal reflux disease); and Hypertension.  has a past surgical history that includes Abdominal surgery; Cardiac surgery; and Hernia repair. Presents to [facility] Emergency Department complaining of chest pain .  She reports compliance with her home medication regimen, including Xarelto.     Description of symptoms  Location:  Substernal  Onset:  Acute onset 2 days ago  Character:  The patient remained; moderate severity  Frequency:  Daily  Duration:  each episode lasts several minutes at a time  Associated Symptoms:  Diaphoresis, shortness of breath, weakness and fatigue  Radiation:  Recent the chest  Exacerbating factors:  Worse on exertion  Relieving factors:  Minimal relief with supplemental oxygen     In the emergency department routine laboratory studies, chest x-ray, EKG, cardiac enzymes were obtained.  EKG findings were concerning the case was discussed with cardiologist, Dr. Pulido.  It was determined that her EKG was not consistent with STEMI and she has been chest pain-free since arrival.  She had been given Lovenox, aspirin, and plan was to continue trending troponins and monitor closely on telemetry.    Hospital Course:  Ms. Palm was admitted to the hospital originally on 6/24/18 for chest pain. She was later sent to the ICU with acute  hypercapnic respiratory failure the same day on BIPAP.  She quickly improved and was sent to the floor on 6/25.  Cards was consulted for NSTEMI with noted troponin increase and was planning on LHC on 6/26. However, the patient was sent back to the ICU on 6/26 with hypercapnic respiratory failure with a C02 of > 100 and was intubated. Pulmonary was consulted. Pt clinically improved from respiratory standpoint and was extubated on 6/27 to NC, but she did complain of chest discomfort. Cardiology was notified and patient was taken for LHC on 6/27 which was only remarkable for non-obstructive CAD and did not require intervention. Pt was given IVF post procedure and the next day developed increased SOB and required BIPAP. Pt was treated with IV lasix with good response with much improved respiratory status after output of 1L. Pt also being treated for COPD exacerbation with IV steroids, NEBS and doxycycline. ECHO performed on 6/25/2018 remarkable for EF=50-55% + grade 2 diastolic dysfunction. Pt diuresed and changed to maintenance PO lasix regimen. She as started on BIPAP due to increased pCO2 on ABG and lethargy. Pt will need BIPAP/home ventilator for use at home.    Interval History: Pt had no acute issues overnight. Breathing at baseline.    Review of Systems   Constitutional: Negative for chills and fever.   Respiratory: Negative for shortness of breath.    Cardiovascular: Negative for chest pain.     Objective:     Vital Signs (Most Recent):  Temp: 97.8 °F (36.6 °C) (07/07/18 0717)  Pulse: 78 (07/07/18 0717)  Resp: 18 (07/07/18 0717)  BP: (!) 145/65 (07/07/18 0717)  SpO2: 98 % (07/07/18 0757) Vital Signs (24h Range):  Temp:  [97.6 °F (36.4 °C)-98.7 °F (37.1 °C)] 97.8 °F (36.6 °C)  Pulse:  [70-92] 78  Resp:  [18] 18  SpO2:  [96 %-100 %] 98 %  BP: (111-178)/(55-77) 145/65     Weight: 78.9 kg (173 lb 15.1 oz)  Body mass index is 28.95 kg/m².    Intake/Output Summary (Last 24 hours) at 07/07/18 0966  Last data filed at  07/06/18 1730   Gross per 24 hour   Intake              480 ml   Output                0 ml   Net              480 ml      Physical Exam   Constitutional: She is oriented to person, place, and time. She appears well-developed. No distress.   HENT:   Head: Normocephalic and atraumatic.   Eyes: EOM are normal. Pupils are equal, round, and reactive to light.   Neck: Normal range of motion. Neck supple.   Cardiovascular: Normal rate and regular rhythm.    Pulmonary/Chest:   Slight decrease at bases, no wheezing   Abdominal: Soft. Bowel sounds are normal.   Musculoskeletal: Normal range of motion. She exhibits edema.   Neurological: She is alert and oriented to person, place, and time.   Skin: Skin is warm and dry. Capillary refill takes less than 2 seconds. She is not diaphoretic.   Psychiatric: She has a normal mood and affect. Her behavior is normal. Thought content normal.       Significant Labs: All pertinent labs within the past 24 hours have been reviewed.    Significant Imaging: I have reviewed and interpreted all pertinent imaging results/findings within the past 24 hours.    Assessment/Plan:      * Acute hypercapnic respiratory failure    - Due to COPD exacerbation   - s/p intubation and treatment with IV steroids, NEBS  - Pulmonary following and extubated on 6/27  - Respiratory status was worse on 6/28 due to volume overload  - s/p IV lasix with good output and now back down to NC  - Pt to continue BIPAP QHS and PRN  - Will need home BIPAP/home ventilator to treat patient for high pCO2  - CRT linked to COPD  - Pending home ventilator approval         Non-STEMI (non-ST elevated myocardial infarction)    - Followed by Cardiology  - s/p [facility] on 6/27 only remarkable for non-obstructive CAD  - Continue medical management as per Cardiology with ASA, statin, and patient will not be placed on plavix given she will be fully anticoagulated with Xarelto        Acute diastolic CHF (congestive heart failure)    - Due to  volume overload  - Last ECHO with EF=50-55% + grade 2 diastolic dysfunction on 6/26  - Improved with IV lasix and now compensated and on PO lasix regimen        Coronary artery disease involving native coronary artery of native heart with angina pectoris    - [facility] on 6/27 as discussed above  - As per Cardiology        PAF (paroxysmal atrial fibrillation)    - Rate controlled and anticoagulation resumed with Xarelto on 6/27 post [facility]        Debility    - PT/OT, will arrange for home health at time of discharge        Neuropathy    - No acute issues         Acute exacerbation of chronic obstructive pulmonary disease (COPD)    - C02 > 100 and s/p intubation  - Treated with NEBS and steroids  - Extubated on 6/27 and respiratory status with worsening due to volume overload issue   - On prednisone and nebulizer  - Does have home oxygen  - On BIPAP due to increased PCO2 on ABG, lethargic condition  - Arranging for home ventilator for discharge   - CRT due to COPD        Elevated brain natriuretic peptide (BNP) level    - Diuresis resumed         Elevated troponin I level    - As discussed under NSTEMI,no sign of obstruction on [facility].        Obesity    - Weight reduction as outpatient after all acute issues addressed        Chronic anticoagulation    - Patient on Xarelto for PAF which has been resumed        History of deep vein thrombosis    - Resumed anticoagulation         History of pulmonary embolism    - Xarelto resumed .        Anemia of chronic disease    - H/H with slight drop noted, but no bleeding  - Will continue to monitor        Malignant hypertension    - Difficult to control BP  - Continue lisinopril, and restarted hydralazine, clonidine, diltiazem today (held due to previous hypotensive issues)  - PRN labetalol  Much improved.        Tobacco abuse    - Smoking cessation counseling done        Gastroesophageal reflux disease without esophagitis    - Continue PPI        Type 2 diabetes mellitus, controlled    -  Complications of peripheral neuropathy.   - Well controlled on current regimen        Chronic obstructive pulmonary disease with acute exacerbation    - As discussed above          VTE Risk Mitigation         Ordered     rivaroxaban tablet 20 mg  With dinner      06/27/18 3050              Meredith Olguin MD  Department of Hospital Medicine   [facility]

## 2018-07-07 NOTE — PROGRESS NOTES
[facility] Hospital Medicine  Progress Note    Patient Name: Yasmeen Palm  MRN: [id number]  Patient Class: IP- Inpatient   Admission Date: 6/24/2018  Attending Physician: Meredith Olguin MD  Primary Care Provider: Angel Orourke Jr, MD        Subjective:     Principal Problem:Acute hypercapnic respiratory failure    HPI:  Yasmeen Palm is a 68 y.o. female that (in part)  has a past medical history of Anticoagulant long-term use; Arthritis; Asthma; CHF (congestive heart failure); COPD (chronic obstructive pulmonary disease); Coronary artery disease; Depression; Diabetes mellitus; GERD (gastroesophageal reflux disease); and Hypertension.  has a past surgical history that includes Abdominal surgery; Cardiac surgery; and Hernia repair. Presents to [facility] Emergency Department complaining of chest pain .  She reports compliance with her home medication regimen, including Xarelto.     Description of symptoms  Location:  Substernal  Onset:  Acute onset 2 days ago  Character:  The patient remained; moderate severity  Frequency:  Daily  Duration:  each episode lasts several minutes at a time  Associated Symptoms:  Diaphoresis, shortness of breath, weakness and fatigue  Radiation:  Recent the chest  Exacerbating factors:  Worse on exertion  Relieving factors:  Minimal relief with supplemental oxygen     In the emergency department routine laboratory studies, chest x-ray, EKG, cardiac enzymes were obtained.  EKG findings were concerning the case was discussed with cardiologist, Dr. Pulido.  It was determined that her EKG was not consistent with STEMI and she has been chest pain-free since arrival.  She had been given Lovenox, aspirin, and plan was to continue trending troponins and monitor closely on telemetry.    Hospital Course:  Ms. Palm was admitted to the hospital originally on 6/24/18 for chest pain. She was later sent to the ICU with acute hypercapnic respiratory failure  the same day on BIPAP.  She quickly improved and was sent to the floor on 6/25.  Cards was consulted for NSTEMI with noted troponin increase and was planning on LHC on 6/26. However, the patient was sent back to the ICU on 6/26 with hypercapnic respiratory failure with a C02 of > 100 and was intubated. Pulmonary was consulted. Pt clinically improved from respiratory standpoint and was extubated on 6/27 to NC, but she did complain of chest discomfort. Cardiology was notified and patient was taken for LHC on 6/27 which was only remarkable for non-obstructive CAD and did not require intervention. Pt was given IVF post procedure and the next day developed increased SOB and required BIPAP. Pt was treated with IV lasix with good response with much improved respiratory status after output of 1L. Pt also being treated for COPD exacerbation with IV steroids, NEBS and doxycycline. ECHO performed on 6/25/2018 remarkable for EF=50-55% + grade 2 diastolic dysfunction. Pt diuresed and changed to maintenance PO lasix regimen. She as started on BIPAP due to increased pCO2 on ABG and lethargy. Pt will need BIPAP/home ventilator for use at home.    Interval History: Pt had no acute issues overnight. Breathing at baseline. Pt had fall yesterday when she slipped but all imaging (head CT, x-rays were negative).    Review of Systems   Constitutional: Negative for chills and fever.   Respiratory: Negative for shortness of breath.    Cardiovascular: Negative for chest pain.     Objective:     Vital signs: Reviewed  Weight: 78.9 kg (173 lb 15.1 oz)  Body mass index is 28.95 kg/m².    Intake/Output Summary (Last 24 hours): Reviewed     Physical Exam   Constitutional: She is oriented to person, place, and time. She appears well-developed. No distress.   HENT:   Head: Normocephalic and atraumatic.   Eyes: EOM are normal. Pupils are equal, round, and reactive to light.   Neck: Normal range of motion. Neck supple.   Cardiovascular: Normal rate  and regular rhythm.    Pulmonary/Chest:   Slight decrease at bases, no wheezing   Abdominal: Soft. Bowel sounds are normal.   Musculoskeletal: Normal range of motion. She exhibits edema.   Neurological: She is alert and oriented to person, place, and time.   Skin: Skin is warm and dry. Capillary refill takes less than 2 seconds. She is not diaphoretic.   Psychiatric: She has a normal mood and affect. Her behavior is normal. Thought content normal.       Significant Labs: All pertinent labs within the past 24 hours have been reviewed.    Significant Imaging: I have reviewed and interpreted all pertinent imaging results/findings within the past 24 hours.    Assessment/Plan:      * Acute hypercapnic respiratory failure    - Due to COPD exacerbation   - s/p intubation and treatment with IV steroids, NEBS  - Pulmonary following and extubated on 6/27  - Respiratory status was worse on 6/28 due to volume overload  - s/p IV lasix with good output and now back down to NC  - Pt to continue BIPAP QHS and PRN  - Will need home BIPAP/home ventilator to treat patient for high pCO2  - CRT linked to COPD  - Pending home ventilator approval         Non-STEMI (non-ST elevated myocardial infarction)    - Followed by Cardiology  - s/p [facility] on 6/27 only remarkable for non-obstructive CAD  - Continue medical management as per Cardiology with ASA, statin, and patient will not be placed on plavix given she will be fully anticoagulated with Xarelto        Acute diastolic CHF (congestive heart failure)    - Due to volume overload  - Last ECHO with EF=50-55% + grade 2 diastolic dysfunction on 6/26  - Improved with IV lasix and now compensated and on PO lasix regimen        Coronary artery disease involving native coronary artery of native heart with angina pectoris    - [facility] on 6/27 as discussed above  - As per Cardiology        PAF (paroxysmal atrial fibrillation)    - Rate controlled and anticoagulation resumed with Xarelto on 6/27 post [facility]         Fall    - Slipped while getting up yesterday  - Head CT and all x-ray are negative        Debility    - PT/OT, will arrange for home health at time of discharge        Neuropathy    - No acute issues         Acute exacerbation of chronic obstructive pulmonary disease (COPD)    - C02 > 100 and s/p intubation  - Treated with NEBS and steroids  - Extubated on 6/27 and respiratory status with worsening due to volume overload issue   - On prednisone and nebulizer  - Does have home oxygen  - On BIPAP due to increased PCO2 on ABG, lethargic condition  - Arranging for home ventilator for discharge   - CRT due to COPD        Elevated brain natriuretic peptide (BNP) level    - Diuresis resumed         Elevated troponin I level    - As discussed under NSTEMI,no sign of obstruction on [facility].        Obesity    - Weight reduction as outpatient after all acute issues addressed        Chronic anticoagulation    - Patient on Xarelto for PAF which has been resumed        History of deep vein thrombosis    - Resumed anticoagulation         History of pulmonary embolism    - Xarelto resumed .        Anemia of chronic disease    - H/H with slight drop noted, but no bleeding  - Will continue to monitor        Malignant hypertension    - Difficult to control BP  - Continue lisinopril, and restarted hydralazine, clonidine, diltiazem today (held due to previous hypotensive issues)  - PRN labetalol  Much improved.        Tobacco abuse    - Smoking cessation counseling done        Gastroesophageal reflux disease without esophagitis    - Continue PPI        Type 2 diabetes mellitus, controlled    - Complications of peripheral neuropathy.   - Well controlled on current regimen        Chronic obstructive pulmonary disease with acute exacerbation    - As discussed above          VTE Risk Mitigation         Ordered     rivaroxaban tablet 20 mg  With dinner      06/27/18 3778              Meredith Olguin MD  Department of Hospital Medicine    [facility]

## 2018-07-07 NOTE — ASSESSMENT & PLAN NOTE
- Followed by Cardiology  - s/p [facility] on 6/27 only remarkable for non-obstructive CAD  - Continue medical management as per Cardiology with ASA, statin, and patient will not be placed on plavix given she will be fully anticoagulated with Xarelto

## 2018-07-07 NOTE — ASSESSMENT & PLAN NOTE
- Due to COPD exacerbation   - s/p intubation and treatment with IV steroids, NEBS  - Pulmonary following and extubated on 6/27  - Respiratory status was worse on 6/28 due to volume overload  - s/p IV lasix with good output and now back down to NC  - Pt to continue BIPAP QHS and PRN  - Will need home BIPAP/home ventilator to treat patient for high pCO2  - CRT linked to COPD  - Pending home ventilator approval

## 2018-07-07 NOTE — PLAN OF CARE
Problem: Diabetes, Type 2 (Adult)  Goal: Signs and Symptoms of Listed Potential Problems Will be Absent, Minimized or Managed (Diabetes, Type 2)  Signs and symptoms of listed potential problems will be absent, minimized or managed by discharge/transition of care (reference Diabetes, Type 2 (Adult) CPG).   Outcome: Ongoing (interventions implemented as appropriate)   07/07/18 1157   Diabetes, Type 2   Problems Assessed (Type 2 Diabetes) all   Problems Present (Type 2 Diabetes) hyperglycemia       Problem: Fall Risk (Adult)  Goal: Absence of Falls  Patient will demonstrate the desired outcomes by discharge/transition of care.   Outcome: Ongoing (interventions implemented as appropriate)   07/07/18 1157   Fall Risk (Adult)   Absence of Falls making progress toward outcome       Problem: Patient Care Overview  Goal: Plan of Care Review  Outcome: Ongoing (interventions implemented as appropriate)   07/07/18 1157   Coping/Psychosocial   Plan Of Care Reviewed With patient       Problem: Pressure Ulcer Risk (Kevin Scale) (Adult,Obstetrics,Pediatric)  Goal: Skin Integrity  Patient will demonstrate the desired outcomes by discharge/transition of care.   Outcome: Ongoing (interventions implemented as appropriate)   07/07/18 1157   Pressure Ulcer Risk (Kevin Scale) (Adult,Obstetrics,Pediatric)   Skin Integrity making progress toward outcome       Problem: Cardiac: ACS (Acute Coronary Syndrome) (Adult)  Goal: Signs and Symptoms of Listed Potential Problems Will be Absent, Minimized or Managed (Cardiac: ACS)  Signs and symptoms of listed potential problems will be absent, minimized or managed by discharge/transition of care (reference Cardiac: ACS (Acute Coronary Syndrome) (Adult) CPG).   Outcome: Ongoing (interventions implemented as appropriate)   07/07/18 1157   Cardiac: ACS (Acute Coronary Syndrome)   Problems Assessed (Acute Coronary Syndrome) all   Problems Present (Acute Coronary Syndrome) none       Problem: Breathing  Pattern Ineffective (Adult)  Goal: Effective Oxygenation/Ventilation  Patient will demonstrate the desired outcomes by discharge/transition of care.   Outcome: Ongoing (interventions implemented as appropriate)   07/07/18 1157   Breathing Pattern Ineffective (Adult)   Effective Oxygenation/Ventilation making progress toward outcome     Goal: Anxiety/Fear Reduction  Patient will demonstrate the desired outcomes by discharge/transition of care.   Outcome: Ongoing (interventions implemented as appropriate)   07/07/18 1157   Breathing Pattern Ineffective (Adult)   Anxiety/Fear Reduction making progress toward outcome       Problem: Infection, Risk/Actual (Adult)  Goal: Infection Prevention/Resolution  Patient will demonstrate the desired outcomes by discharge/transition of care.   Outcome: Ongoing (interventions implemented as appropriate)   07/07/18 1157   Infection, Risk/Actual (Adult)   Infection Prevention/Resolution making progress toward outcome

## 2018-07-07 NOTE — NURSING
Report received from night nurse. Visualized patient and assessed patient's overall condition and appearance. NAD noted. Spouse at bedside. Will continue to monitor.

## 2018-07-07 NOTE — PT/OT/SLP PROGRESS
Physical Therapy      Patient Name:  Yasmeen Palm   MRN:  [id number]    Patient not seen today secondary to Patient unwilling to participate. Will follow-up on tomorrow.      Brent Vieira, PTA

## 2018-07-07 NOTE — SUBJECTIVE & OBJECTIVE
Interval History: Pt had no acute issues overnight. Breathing at baseline. Pt had fall yesterday when she slipped but all imaging (head CT, x-rays were negative).    Review of Systems   Constitutional: Negative for chills and fever.   Respiratory: Negative for shortness of breath.    Cardiovascular: Negative for chest pain.     Objective:     Vital Signs (Most Recent):  Temp: 97.8 °F (36.6 °C) (07/07/18 0717)  Pulse: 78 (07/07/18 0717)  Resp: 18 (07/07/18 0717)  BP: (!) 145/65 (07/07/18 0717)  SpO2: 98 % (07/07/18 0757) Vital Signs (24h Range):  Temp:  [97.6 °F (36.4 °C)-98.7 °F (37.1 °C)] 97.8 °F (36.6 °C)  Pulse:  [70-92] 78  Resp:  [18] 18  SpO2:  [96 %-100 %] 98 %  BP: (111-178)/(55-77) 145/65     Weight: 78.9 kg (173 lb 15.1 oz)  Body mass index is 28.95 kg/m².    Intake/Output Summary (Last 24 hours) at 07/07/18 0838  Last data filed at 07/06/18 1730   Gross per 24 hour   Intake              480 ml   Output                0 ml   Net              480 ml      Physical Exam   Constitutional: She is oriented to person, place, and time. She appears well-developed. No distress.   HENT:   Head: Normocephalic and atraumatic.   Eyes: EOM are normal. Pupils are equal, round, and reactive to light.   Neck: Normal range of motion. Neck supple.   Cardiovascular: Normal rate and regular rhythm.    Pulmonary/Chest:   Slight decrease at bases, no wheezing   Abdominal: Soft. Bowel sounds are normal.   Musculoskeletal: Normal range of motion. She exhibits edema.   Neurological: She is alert and oriented to person, place, and time.   Skin: Skin is warm and dry. Capillary refill takes less than 2 seconds. She is not diaphoretic.   Psychiatric: She has a normal mood and affect. Her behavior is normal. Thought content normal.       Significant Labs: All pertinent labs within the past 24 hours have been reviewed.    Significant Imaging: I have reviewed and interpreted all pertinent imaging results/findings within the past 24  hours.

## 2018-07-07 NOTE — SUBJECTIVE & OBJECTIVE
Interval History: Pt had no acute issues overnight. Breathing at baseline.    Review of Systems   Constitutional: Negative for chills and fever.   Respiratory: Negative for shortness of breath.    Cardiovascular: Negative for chest pain.     Objective:     Vital Signs (Most Recent):  Temp: 97.8 °F (36.6 °C) (07/07/18 0717)  Pulse: 78 (07/07/18 0717)  Resp: 18 (07/07/18 0717)  BP: (!) 145/65 (07/07/18 0717)  SpO2: 98 % (07/07/18 0757) Vital Signs (24h Range):  Temp:  [97.6 °F (36.4 °C)-98.7 °F (37.1 °C)] 97.8 °F (36.6 °C)  Pulse:  [70-92] 78  Resp:  [18] 18  SpO2:  [96 %-100 %] 98 %  BP: (111-178)/(55-77) 145/65     Weight: 78.9 kg (173 lb 15.1 oz)  Body mass index is 28.95 kg/m².    Intake/Output Summary (Last 24 hours) at 07/07/18 0829  Last data filed at 07/06/18 1730   Gross per 24 hour   Intake              480 ml   Output                0 ml   Net              480 ml      Physical Exam   Constitutional: She is oriented to person, place, and time. She appears well-developed. No distress.   HENT:   Head: Normocephalic and atraumatic.   Eyes: EOM are normal. Pupils are equal, round, and reactive to light.   Neck: Normal range of motion. Neck supple.   Cardiovascular: Normal rate and regular rhythm.    Pulmonary/Chest:   Slight decrease at bases, no wheezing   Abdominal: Soft. Bowel sounds are normal.   Musculoskeletal: Normal range of motion. She exhibits edema.   Neurological: She is alert and oriented to person, place, and time.   Skin: Skin is warm and dry. Capillary refill takes less than 2 seconds. She is not diaphoretic.   Psychiatric: She has a normal mood and affect. Her behavior is normal. Thought content normal.       Significant Labs: All pertinent labs within the past 24 hours have been reviewed.    Significant Imaging: I have reviewed and interpreted all pertinent imaging results/findings within the past 24 hours.

## 2018-07-07 NOTE — ASSESSMENT & PLAN NOTE
- C02 > 100 and s/p intubation  - Treated with NEBS and steroids  - Extubated on 6/27 and respiratory status with worsening due to volume overload issue   - On prednisone and nebulizer  - Does have home oxygen  - On BIPAP due to increased PCO2 on ABG, lethargic condition  - Arranging for home ventilator for discharge   - CRT due to COPD

## 2018-07-08 LAB
ANION GAP SERPL CALC-SCNC: 5 MMOL/L
BUN SERPL-MCNC: 28 MG/DL
CALCIUM SERPL-MCNC: 10.3 MG/DL
CHLORIDE SERPL-SCNC: 93 MMOL/L
CO2 SERPL-SCNC: 42 MMOL/L
CREAT SERPL-MCNC: 0.9 MG/DL
EST. GFR  (AFRICAN AMERICAN): >60 ML/MIN/1.73 M^2
EST. GFR  (NON AFRICAN AMERICAN): >60 ML/MIN/1.73 M^2
GLUCOSE SERPL-MCNC: 356 MG/DL
MAGNESIUM SERPL-MCNC: 2.1 MG/DL
PHOSPHATE SERPL-MCNC: 3.2 MG/DL
POCT GLUCOSE: 285 MG/DL (ref 70–110)
POCT GLUCOSE: 306 MG/DL (ref 70–110)
POCT GLUCOSE: 321 MG/DL (ref 70–110)
POTASSIUM SERPL-SCNC: 4.8 MMOL/L
SODIUM SERPL-SCNC: 140 MMOL/L

## 2018-07-08 PROCEDURE — 36415 COLL VENOUS BLD VENIPUNCTURE: CPT

## 2018-07-08 PROCEDURE — 25000003 PHARM REV CODE 250: Performed by: INTERNAL MEDICINE

## 2018-07-08 PROCEDURE — 25000003 PHARM REV CODE 250: Performed by: HOSPITALIST

## 2018-07-08 PROCEDURE — 97110 THERAPEUTIC EXERCISES: CPT

## 2018-07-08 PROCEDURE — 25000003 PHARM REV CODE 250: Performed by: EMERGENCY MEDICINE

## 2018-07-08 PROCEDURE — 84100 ASSAY OF PHOSPHORUS: CPT

## 2018-07-08 PROCEDURE — 27000221 HC OXYGEN, UP TO 24 HOURS

## 2018-07-08 PROCEDURE — 99900035 HC TECH TIME PER 15 MIN (STAT)

## 2018-07-08 PROCEDURE — 83735 ASSAY OF MAGNESIUM: CPT

## 2018-07-08 PROCEDURE — 21400001 HC TELEMETRY ROOM

## 2018-07-08 PROCEDURE — 94660 CPAP INITIATION&MGMT: CPT

## 2018-07-08 PROCEDURE — 97530 THERAPEUTIC ACTIVITIES: CPT

## 2018-07-08 PROCEDURE — 63600175 PHARM REV CODE 636 W HCPCS: Performed by: HOSPITALIST

## 2018-07-08 PROCEDURE — 80048 BASIC METABOLIC PNL TOTAL CA: CPT

## 2018-07-08 RX ORDER — PREDNISONE 5 MG/1
10 TABLET ORAL DAILY
Status: COMPLETED | OUTPATIENT
Start: 2018-07-09 | End: 2018-07-09

## 2018-07-08 RX ORDER — PREDNISONE 20 MG/1
20 TABLET ORAL DAILY
Status: COMPLETED | OUTPATIENT
Start: 2018-07-08 | End: 2018-07-08

## 2018-07-08 RX ORDER — PREDNISONE 20 MG/1
20 TABLET ORAL DAILY
Status: DISCONTINUED | OUTPATIENT
Start: 2018-07-08 | End: 2018-07-08

## 2018-07-08 RX ORDER — INSULIN ASPART 100 [IU]/ML
8 INJECTION, SOLUTION INTRAVENOUS; SUBCUTANEOUS
Status: DISCONTINUED | OUTPATIENT
Start: 2018-07-08 | End: 2018-07-14

## 2018-07-08 RX ADMIN — DILTIAZEM HYDROCHLORIDE 120 MG: 30 TABLET, FILM COATED ORAL at 01:07

## 2018-07-08 RX ADMIN — PANTOPRAZOLE SODIUM 40 MG: 40 TABLET, DELAYED RELEASE ORAL at 09:07

## 2018-07-08 RX ADMIN — INSULIN ASPART 8 UNITS: 100 INJECTION, SOLUTION INTRAVENOUS; SUBCUTANEOUS at 09:07

## 2018-07-08 RX ADMIN — GUAIFENESIN 600 MG: 600 TABLET, EXTENDED RELEASE ORAL at 09:07

## 2018-07-08 RX ADMIN — GABAPENTIN 300 MG: 300 CAPSULE ORAL at 09:07

## 2018-07-08 RX ADMIN — CLONIDINE HYDROCHLORIDE 0.1 MG: 0.1 TABLET ORAL at 09:07

## 2018-07-08 RX ADMIN — ASPIRIN 81 MG CHEWABLE TABLET 81 MG: 81 TABLET CHEWABLE at 09:07

## 2018-07-08 RX ADMIN — INSULIN ASPART 8 UNITS: 100 INJECTION, SOLUTION INTRAVENOUS; SUBCUTANEOUS at 04:07

## 2018-07-08 RX ADMIN — DOCUSATE SODIUM 100 MG: 100 CAPSULE, LIQUID FILLED ORAL at 09:07

## 2018-07-08 RX ADMIN — INSULIN ASPART 4 UNITS: 100 INJECTION, SOLUTION INTRAVENOUS; SUBCUTANEOUS at 12:07

## 2018-07-08 RX ADMIN — SOTALOL HYDROCHLORIDE 80 MG: 80 TABLET ORAL at 09:07

## 2018-07-08 RX ADMIN — PREDNISONE 20 MG: 20 TABLET ORAL at 09:07

## 2018-07-08 RX ADMIN — DILTIAZEM HYDROCHLORIDE 120 MG: 30 TABLET, FILM COATED ORAL at 05:07

## 2018-07-08 RX ADMIN — INSULIN ASPART 3 UNITS: 100 INJECTION, SOLUTION INTRAVENOUS; SUBCUTANEOUS at 04:07

## 2018-07-08 RX ADMIN — HYDRALAZINE HYDROCHLORIDE 50 MG: 25 TABLET ORAL at 09:07

## 2018-07-08 RX ADMIN — CLONIDINE HYDROCHLORIDE 0.1 MG: 0.1 TABLET ORAL at 04:07

## 2018-07-08 RX ADMIN — TRAMADOL HYDROCHLORIDE 50 MG: 50 TABLET, FILM COATED ORAL at 09:07

## 2018-07-08 RX ADMIN — HYDRALAZINE HYDROCHLORIDE 50 MG: 25 TABLET ORAL at 05:07

## 2018-07-08 RX ADMIN — LISINOPRIL 10 MG: 5 TABLET ORAL at 09:07

## 2018-07-08 RX ADMIN — POTASSIUM PHOSPHATE, MONOBASIC 500 MG: 500 TABLET, SOLUBLE ORAL at 09:07

## 2018-07-08 RX ADMIN — HYDRALAZINE HYDROCHLORIDE 50 MG: 25 TABLET ORAL at 01:07

## 2018-07-08 RX ADMIN — ALPRAZOLAM 0.5 MG: 0.5 TABLET ORAL at 09:07

## 2018-07-08 RX ADMIN — ATORVASTATIN CALCIUM 80 MG: 40 TABLET, FILM COATED ORAL at 09:07

## 2018-07-08 RX ADMIN — INSULIN ASPART 4 UNITS: 100 INJECTION, SOLUTION INTRAVENOUS; SUBCUTANEOUS at 09:07

## 2018-07-08 RX ADMIN — FUROSEMIDE 40 MG: 40 TABLET ORAL at 09:07

## 2018-07-08 RX ADMIN — DILTIAZEM HYDROCHLORIDE 120 MG: 30 TABLET, FILM COATED ORAL at 09:07

## 2018-07-08 RX ADMIN — POLYETHYLENE GLYCOL 3350 17 G: 17 POWDER, FOR SOLUTION ORAL at 09:07

## 2018-07-08 RX ADMIN — RIVAROXABAN 20 MG: 20 TABLET, FILM COATED ORAL at 04:07

## 2018-07-08 RX ADMIN — GABAPENTIN 300 MG: 300 CAPSULE ORAL at 04:07

## 2018-07-08 RX ADMIN — INSULIN ASPART 8 UNITS: 100 INJECTION, SOLUTION INTRAVENOUS; SUBCUTANEOUS at 12:07

## 2018-07-08 NOTE — PLAN OF CARE
Problem: Fall Risk (Adult)  Goal: Absence of Falls  Patient will demonstrate the desired outcomes by discharge/transition of care.   Outcome: Ongoing (interventions implemented as appropriate)   07/08/18 0437   Fall Risk (Adult)   Absence of Falls making progress toward outcome       Problem: Patient Care Overview  Goal: Plan of Care Review  Outcome: Ongoing (interventions implemented as appropriate)   07/08/18 0437   Coping/Psychosocial   Plan Of Care Reviewed With patient       Problem: Pressure Ulcer Risk (Kevin Scale) (Adult,Obstetrics,Pediatric)  Goal: Skin Integrity  Patient will demonstrate the desired outcomes by discharge/transition of care.   Outcome: Ongoing (interventions implemented as appropriate)   07/08/18 0437   Pressure Ulcer Risk (Kevin Scale) (Adult,Obstetrics,Pediatric)   Skin Integrity making progress toward outcome       Problem: Breathing Pattern Ineffective (Adult)  Goal: Anxiety/Fear Reduction  Patient will demonstrate the desired outcomes by discharge/transition of care.   Outcome: Ongoing (interventions implemented as appropriate)   07/08/18 0437   Breathing Pattern Ineffective (Adult)   Anxiety/Fear Reduction making progress toward outcome

## 2018-07-08 NOTE — PLAN OF CARE
Problem: Physical Therapy Goal  Goal: Physical Therapy Goal  Goals to be met by: 2018     Patient will increase functional independence with mobility by performin. Supine to sit with Modified [place]  2. Sit to supine with Modified [place]  3. Sit to stand transfer with Modified [place]  4. Gait  x 250 feet with Modified [place] using Rolling Walker.    Recommend: HHPT at time of discharge.      Outcome: Ongoing (interventions implemented as appropriate)  Limited with gait training today 2* pt c/o fatigue. Pt required extra time to complete tasks( Pt fatigue easily) .

## 2018-07-08 NOTE — ASSESSMENT & PLAN NOTE
- Complications of peripheral neuropathy.   - Will increase basal and prandial insulin  - Glucose control should improve with increase and weaning off prednisone

## 2018-07-08 NOTE — ASSESSMENT & PLAN NOTE
- C02 > 100 and s/p intubation  - Treated with NEBS and steroids  - Extubated on 6/27 and respiratory status with worsening due to volume overload issue   - On prednisone and nebulizer  - Does have home oxygen  - On BIPAP due to increased PCO2 on ABG, lethargic condition  - Arranging for home ventilator for discharge   - CRT due to COPD  - Pt to be weaned off all prednisone by tomorrow

## 2018-07-08 NOTE — ASSESSMENT & PLAN NOTE
- Followed by Cardiology  - s/p [facility] on 6/27 only remarkable for non-obstructive CAD  - Continue medical management as per Cardiology with ASA, statin, and patient will not be placed on plavix given she will be fully anticoagulated with Xarelto

## 2018-07-08 NOTE — PROGRESS NOTES
[facility] Hospital Medicine  Progress Note    Patient Name: Yasmeen Palm  MRN: [id number]  Patient Class: IP- Inpatient   Admission Date: 6/24/2018  Length of Stay: 14 days  Attending Physician: Meredith Olguin MD  Primary Care Provider: Angel Orourke Jr, MD        Subjective:     Principal Problem:Acute hypercapnic respiratory failure    HPI:  Yasmeen Palm is a 68 y.o. female that (in part)  has a past medical history of Anticoagulant long-term use; Arthritis; Asthma; CHF (congestive heart failure); COPD (chronic obstructive pulmonary disease); Coronary artery disease; Depression; Diabetes mellitus; GERD (gastroesophageal reflux disease); and Hypertension.  has a past surgical history that includes Abdominal surgery; Cardiac surgery; and Hernia repair. Presents to [facility] Emergency Department complaining of chest pain .  She reports compliance with her home medication regimen, including Xarelto.     Description of symptoms  Location:  Substernal  Onset:  Acute onset 2 days ago  Character:  The patient remained; moderate severity  Frequency:  Daily  Duration:  each episode lasts several minutes at a time  Associated Symptoms:  Diaphoresis, shortness of breath, weakness and fatigue  Radiation:  Recent the chest  Exacerbating factors:  Worse on exertion  Relieving factors:  Minimal relief with supplemental oxygen     In the emergency department routine laboratory studies, chest x-ray, EKG, cardiac enzymes were obtained.  EKG findings were concerning the case was discussed with cardiologist, Dr. Pulido.  It was determined that her EKG was not consistent with STEMI and she has been chest pain-free since arrival.  She had been given Lovenox, aspirin, and plan was to continue trending troponins and monitor closely on telemetry.    Hospital Course:  Ms. Palm was admitted to the hospital originally on 6/24/18 for chest pain. She was later sent to the ICU with acute  hypercapnic respiratory failure the same day on BIPAP.  She quickly improved and was sent to the floor on 6/25.  Cards was consulted for NSTEMI with noted troponin increase and was planning on LHC on 6/26. However, the patient was sent back to the ICU on 6/26 with hypercapnic respiratory failure with a C02 of > 100 and was intubated. Pulmonary was consulted. Pt clinically improved from respiratory standpoint and was extubated on 6/27 to NC, but she did complain of chest discomfort. Cardiology was notified and patient was taken for LHC on 6/27 which was only remarkable for non-obstructive CAD and did not require intervention. Pt was given IVF post procedure and the next day developed increased SOB and required BIPAP. Pt was treated with IV lasix with good response with much improved respiratory status after output of 1L. Pt also being treated for COPD exacerbation with IV steroids, NEBS and doxycycline. ECHO performed on 6/25/2018 remarkable for EF=50-55% + grade 2 diastolic dysfunction. Pt diuresed and changed to maintenance PO lasix regimen. She as started on BIPAP due to increased pCO2 on ABG and lethargy. Pt will need BIPAP/home ventilator for use at home.    Interval History: Pt had no acute issues overnight. Breathing at baseline and getting used to using BIPAP machine at night. Reports being cold, otherwise feels well     Review of Systems   Constitutional: Negative for chills and fever.   Respiratory: Negative for shortness of breath.    Cardiovascular: Negative for chest pain.     Objective:     Vital Signs (Most Recent):  Temp: 98.8 °F (37.1 °C) (07/08/18 0533)  Pulse: 72 (07/08/18 0533)  Resp: 18 (07/08/18 0533)  BP: (!) 145/66 (07/08/18 0533)  SpO2: 100 % (07/08/18 0533) Vital Signs (24h Range):  Temp:  [97.8 °F (36.6 °C)-98.9 °F (37.2 °C)] 98.8 °F (37.1 °C)  Pulse:  [72-81] 72  Resp:  [18-19] 18  SpO2:  [98 %-100 %] 100 %  BP: (121-145)/(58-66) 145/66     Weight: 81.7 kg (180 lb 1.9 oz)  Body mass index  is 29.97 kg/m².    Intake/Output Summary (Last 24 hours) at 07/08/18 0718  Last data filed at 07/07/18 1200   Gross per 24 hour   Intake              480 ml   Output                0 ml   Net              480 ml      Physical Exam   Constitutional: She is oriented to person, place, and time. She appears well-developed. No distress.   HENT:   Head: Normocephalic and atraumatic.   Eyes: EOM are normal. Pupils are equal, round, and reactive to light.   Neck: Normal range of motion. Neck supple.   Cardiovascular: Normal rate and regular rhythm.    Pulmonary/Chest:   Slight decrease at bases, no wheezing   Abdominal: Soft. Bowel sounds are normal.   Musculoskeletal: Normal range of motion. She exhibits edema.   Neurological: She is alert and oriented to person, place, and time.   Skin: Skin is warm and dry. Capillary refill takes less than 2 seconds. She is not diaphoretic.   Psychiatric: She has a normal mood and affect. Her behavior is normal. Thought content normal.       Significant Labs: All pertinent labs within the past 24 hours have been reviewed.    Significant Imaging: I have reviewed and interpreted all pertinent imaging results/findings within the past 24 hours.    Assessment/Plan:      * Acute hypercapnic respiratory failure    - Due to COPD exacerbation   - s/p intubation and treatment with IV steroids, NEBS  - Pulmonary following and extubated on 6/27  - Respiratory status was worse on 6/28 due to volume overload  - s/p IV lasix with good output and now back down to NC  - Pt to continue BIPAP QHS and PRN  - Will need home BIPAP/home ventilator to treat patient for high pCO2  - CRT linked to COPD  - Pending home ventilator approval         Non-STEMI (non-ST elevated myocardial infarction)    - Followed by Cardiology  - s/p LHC on 6/27 only remarkable for non-obstructive CAD  - Continue medical management as per Cardiology with ASA, statin, and patient will not be placed on plavix given she will be fully  anticoagulated with Xarelto        Acute diastolic CHF (congestive heart failure)    - Due to volume overload  - Last ECHO with EF=50-55% + grade 2 diastolic dysfunction on 6/26  - Improved with IV lasix and now compensated and on PO lasix regimen        Coronary artery disease involving native coronary artery of native heart with angina pectoris    - [facility] on 6/27 as discussed above  - As per Cardiology        PAF (paroxysmal atrial fibrillation)    - Rate controlled and anticoagulation resumed with Xarelto on 6/27 post [facility]        Fall    - Slipped while getting up on 7/6  - Head CT and all x-ray are negative  - Resolved        Debility    - PT/OT, will arrange for home health at time of discharge        Neuropathy    - No acute issues         Acute exacerbation of chronic obstructive pulmonary disease (COPD)    - C02 > 100 and s/p intubation  - Treated with NEBS and steroids  - Extubated on 6/27 and respiratory status with worsening due to volume overload issue   - On prednisone and nebulizer  - Does have home oxygen  - On BIPAP due to increased PCO2 on ABG, lethargic condition  - Arranging for home ventilator for discharge   - CRT due to COPD  - Pt to be weaned off all prednisone by tomorrow        Elevated brain natriuretic peptide (BNP) level    - Diuresis resumed         Elevated troponin I level    - As discussed under NSTEMI,no sign of obstruction on [facility].        Obesity    - Weight reduction as outpatient after all acute issues addressed        Chronic anticoagulation    - Patient on Xarelto for PAF which has been resumed        History of deep vein thrombosis    - Resumed anticoagulation         History of pulmonary embolism    - Xarelto resumed .        Anemia of chronic disease    - H/H with slight drop noted, but no bleeding  - Will continue to monitor        Malignant hypertension    - Difficult to control BP  - Continue lisinopril, and restarted hydralazine, clonidine, diltiazem today (held due to  previous hypotensive issues)  - PRN labetalol  Much improved.        Tobacco abuse    - Smoking cessation counseling done        Gastroesophageal reflux disease without esophagitis    - Continue PPI        Type 2 diabetes mellitus, controlled    - Complications of peripheral neuropathy.   - Will increase basal and prandial insulin  - Glucose control should improve with increase and weaning off prednisone        Chronic obstructive pulmonary disease with acute exacerbation    - As discussed above          VTE Risk Mitigation         Ordered     rivaroxaban tablet 20 mg  With dinner      06/27/18 9726              Meredith Olguin MD  Department of Hospital Medicine   [facility]

## 2018-07-08 NOTE — PLAN OF CARE
Problem: Diabetes, Type 2 (Adult)  Goal: Signs and Symptoms of Listed Potential Problems Will be Absent, Minimized or Managed (Diabetes, Type 2)  Signs and symptoms of listed potential problems will be absent, minimized or managed by discharge/transition of care (reference Diabetes, Type 2 (Adult) CPG).   Outcome: Ongoing (interventions implemented as appropriate)   07/08/18 1004   Diabetes, Type 2   Problems Assessed (Type 2 Diabetes) all   Problems Present (Type 2 Diabetes) hyperglycemia       Problem: Fall Risk (Adult)  Goal: Absence of Falls  Patient will demonstrate the desired outcomes by discharge/transition of care.   Outcome: Ongoing (interventions implemented as appropriate)   07/08/18 1004   Fall Risk (Adult)   Absence of Falls making progress toward outcome       Problem: Patient Care Overview  Goal: Plan of Care Review  Outcome: Ongoing (interventions implemented as appropriate)   07/08/18 1004   Coping/Psychosocial   Plan Of Care Reviewed With patient       Problem: Pressure Ulcer Risk (Kevin Scale) (Adult,Obstetrics,Pediatric)  Goal: Skin Integrity  Patient will demonstrate the desired outcomes by discharge/transition of care.   Outcome: Ongoing (interventions implemented as appropriate)   07/08/18 1004   Pressure Ulcer Risk (Kevin Scale) (Adult,Obstetrics,Pediatric)   Skin Integrity making progress toward outcome       Problem: Cardiac: ACS (Acute Coronary Syndrome) (Adult)  Goal: Signs and Symptoms of Listed Potential Problems Will be Absent, Minimized or Managed (Cardiac: ACS)  Signs and symptoms of listed potential problems will be absent, minimized or managed by discharge/transition of care (reference Cardiac: ACS (Acute Coronary Syndrome) (Adult) CPG).   Outcome: Ongoing (interventions implemented as appropriate)   07/08/18 1004   Cardiac: ACS (Acute Coronary Syndrome)   Problems Assessed (Acute Coronary Syndrome) all   Problems Present (Acute Coronary Syndrome) none       Problem: Breathing  Pattern Ineffective (Adult)  Goal: Effective Oxygenation/Ventilation  Patient will demonstrate the desired outcomes by discharge/transition of care.   Outcome: Ongoing (interventions implemented as appropriate)   07/08/18 1004   Breathing Pattern Ineffective (Adult)   Effective Oxygenation/Ventilation making progress toward outcome     Goal: Anxiety/Fear Reduction  Patient will demonstrate the desired outcomes by discharge/transition of care.   Outcome: Ongoing (interventions implemented as appropriate)   07/08/18 1004   Breathing Pattern Ineffective (Adult)   Anxiety/Fear Reduction making progress toward outcome       Problem: Infection, Risk/Actual (Adult)  Goal: Infection Prevention/Resolution  Patient will demonstrate the desired outcomes by discharge/transition of care.   Outcome: Ongoing (interventions implemented as appropriate)   07/08/18 1004   Infection, Risk/Actual (Adult)   Infection Prevention/Resolution making progress toward outcome

## 2018-07-08 NOTE — PT/OT/SLP PROGRESS
"Physical Therapy Treatment    Patient Name:  Yasmeen Palm   MRN:  [id number]    Recommendations:     Discharge Recommendations:  home health PT   Discharge Equipment Recommendations:  none   Barriers to discharge: None    Assessment:     Yasmeen Palm is a 68 y.o. female admitted with a medical diagnosis of Acute hypercapnic respiratory failure.  She presents with the following impairments/functional limitations:  weakness, impaired endurance, impaired self care skills, gait instability, decreased upper extremity function, decreased lower extremity function, decreased safety awareness, impaired balance, impaired cardiopulmonary response to activity . Pt required extra time to complete tasks.     Rehab Prognosis:  Fair + ; patient would benefit from acute skilled PT services to address these deficits and reach maximum level of function.      Recent Surgery: Procedure(s) (LRB):  Left heart cath R rad access, not before 9am (Left)      Plan:     During this hospitalization, patient to be seen 6 x/week to address the above listed problems via gait training, therapeutic activities, therapeutic exercises  · Plan of Care Expires:  07/14/18   Plan of Care Reviewed with: patient    Subjective     Communicated with nurse Long prior to session.  Patient found supine in bed upon PT entry to room, agreeable to treatment.      Chief Complaint: fatigue and weakness  Patient comments/goals: " I couldn't sleep "   Pain/Comfort:  · Pain Rating 1: 0/10  · Pain Rating Post-Intervention 1: 0/10    Patients cultural, spiritual, Muslim conflicts given the current situation:      Objective:     Patient found with: bed alarm, oxygen, telemetry     General Precautions: Standard, fall, respiratory   Orthopedic Precautions:N/A   Braces: N/A     Functional Mobility:  · Bed Mobility:     · Scooting: stand by assistance  · Supine to Sit: stand by assistance, HOB elevated.   · Sit to Supine: stand by assistance  · Transfers:     · Sit to " Stand:  contact guard assistance with rolling walker  · Gait:  Pt ambulated ~ 3-4 side steps to HOB, RW CGA. Pt declined further ambulation despite encouragement.   · Balance:  Good in sitting, fair + in standing.       AM-PAC 6 CLICK MOBILITY  Turning over in bed (including adjusting bedclothes, sheets and blankets)?: 4  Sitting down on and standing up from a chair with arms (e.g., wheelchair, bedside commode, etc.): 3  Moving from lying on back to sitting on the side of the bed?: 4  Moving to and from a bed to a chair (including a wheelchair)?: 3  Need to walk in hospital room?: 3  Climbing 3-5 steps with a railing?: 3  Basic Mobility Total Score: 20       Therapeutic Activities and Exercises:   pt performed seated BLE x 20 reps : AP, LAQ, HS curls , hip flexion and pillow squeezes. VC's for proper and sequence.     Patient left with bed in chair position with all lines intact, call button in reach, bed alarm on and nurse notified..    GOALS:    Physical Therapy Goals        Problem: Physical Therapy Goal    Goal Priority Disciplines Outcome Goal Variances Interventions   Physical Therapy Goal     PT/OT, PT Ongoing (interventions implemented as appropriate)     Description:  Goals to be met by: 2018     Patient will increase functional independence with mobility by performin. Supine to sit with Modified [place]  2. Sit to supine with Modified [place]  3. Sit to stand transfer with Modified [place]  4. Gait  x 250 feet with Modified [place] using Rolling Walker.    Recommend: HHPT at time of discharge.                       Time Tracking:     PT Received On: 18  PT Start Time: 1039     PT Stop Time: 1104  PT Total Time (min): 25 min     Billable Minutes: Therapeutic Activity 12 and Therapeutic Exercise 13    Treatment Type: Treatment  PT/PTA: PTA     PTA Visit Number: 3     Kayleigh Henry, PTA  2018

## 2018-07-08 NOTE — SUBJECTIVE & OBJECTIVE
Interval History: Pt had no acute issues overnight. Breathing at baseline and getting used to using BIPAP machine at night. Reports being cold, otherwise feels well     Review of Systems   Constitutional: Negative for chills and fever.   Respiratory: Negative for shortness of breath.    Cardiovascular: Negative for chest pain.     Objective:     Vital Signs (Most Recent):  Temp: 98.8 °F (37.1 °C) (07/08/18 0533)  Pulse: 72 (07/08/18 0533)  Resp: 18 (07/08/18 0533)  BP: (!) 145/66 (07/08/18 0533)  SpO2: 100 % (07/08/18 0533) Vital Signs (24h Range):  Temp:  [97.8 °F (36.6 °C)-98.9 °F (37.2 °C)] 98.8 °F (37.1 °C)  Pulse:  [72-81] 72  Resp:  [18-19] 18  SpO2:  [98 %-100 %] 100 %  BP: (121-145)/(58-66) 145/66     Weight: 81.7 kg (180 lb 1.9 oz)  Body mass index is 29.97 kg/m².    Intake/Output Summary (Last 24 hours) at 07/08/18 0718  Last data filed at 07/07/18 1200   Gross per 24 hour   Intake              480 ml   Output                0 ml   Net              480 ml      Physical Exam   Constitutional: She is oriented to person, place, and time. She appears well-developed. No distress.   HENT:   Head: Normocephalic and atraumatic.   Eyes: EOM are normal. Pupils are equal, round, and reactive to light.   Neck: Normal range of motion. Neck supple.   Cardiovascular: Normal rate and regular rhythm.    Pulmonary/Chest:   Slight decrease at bases, no wheezing   Abdominal: Soft. Bowel sounds are normal.   Musculoskeletal: Normal range of motion. She exhibits edema.   Neurological: She is alert and oriented to person, place, and time.   Skin: Skin is warm and dry. Capillary refill takes less than 2 seconds. She is not diaphoretic.   Psychiatric: She has a normal mood and affect. Her behavior is normal. Thought content normal.       Significant Labs: All pertinent labs within the past 24 hours have been reviewed.    Significant Imaging: I have reviewed and interpreted all pertinent imaging results/findings within the past 24  hours.

## 2018-07-09 PROBLEM — E87.5 HYPERKALEMIA: Status: ACTIVE | Noted: 2018-07-09

## 2018-07-09 LAB
ANION GAP SERPL CALC-SCNC: 9 MMOL/L
BUN SERPL-MCNC: 27 MG/DL
CALCIUM SERPL-MCNC: 9.4 MG/DL
CHLORIDE SERPL-SCNC: 97 MMOL/L
CO2 SERPL-SCNC: 32 MMOL/L
CREAT SERPL-MCNC: 0.8 MG/DL
EST. GFR  (AFRICAN AMERICAN): >60 ML/MIN/1.73 M^2
EST. GFR  (NON AFRICAN AMERICAN): >60 ML/MIN/1.73 M^2
GLUCOSE SERPL-MCNC: 238 MG/DL
MAGNESIUM SERPL-MCNC: 2.2 MG/DL
PHOSPHATE SERPL-MCNC: 3.5 MG/DL
POCT GLUCOSE: 223 MG/DL (ref 70–110)
POCT GLUCOSE: 246 MG/DL (ref 70–110)
POCT GLUCOSE: 267 MG/DL (ref 70–110)
POCT GLUCOSE: 357 MG/DL (ref 70–110)
POCT GLUCOSE: 500 MG/DL (ref 70–110)
POTASSIUM SERPL-SCNC: 5.4 MMOL/L
SODIUM SERPL-SCNC: 138 MMOL/L

## 2018-07-09 PROCEDURE — 25000003 PHARM REV CODE 250: Performed by: INTERNAL MEDICINE

## 2018-07-09 PROCEDURE — 94761 N-INVAS EAR/PLS OXIMETRY MLT: CPT

## 2018-07-09 PROCEDURE — 36415 COLL VENOUS BLD VENIPUNCTURE: CPT

## 2018-07-09 PROCEDURE — 25000003 PHARM REV CODE 250: Performed by: EMERGENCY MEDICINE

## 2018-07-09 PROCEDURE — 25000003 PHARM REV CODE 250: Performed by: HOSPITALIST

## 2018-07-09 PROCEDURE — 94660 CPAP INITIATION&MGMT: CPT

## 2018-07-09 PROCEDURE — 80048 BASIC METABOLIC PNL TOTAL CA: CPT

## 2018-07-09 PROCEDURE — 63600175 PHARM REV CODE 636 W HCPCS: Performed by: HOSPITALIST

## 2018-07-09 PROCEDURE — 84100 ASSAY OF PHOSPHORUS: CPT

## 2018-07-09 PROCEDURE — 27000221 HC OXYGEN, UP TO 24 HOURS

## 2018-07-09 PROCEDURE — 21400001 HC TELEMETRY ROOM

## 2018-07-09 PROCEDURE — 83735 ASSAY OF MAGNESIUM: CPT

## 2018-07-09 PROCEDURE — 99900035 HC TECH TIME PER 15 MIN (STAT)

## 2018-07-09 RX ADMIN — HYDRALAZINE HYDROCHLORIDE 50 MG: 25 TABLET ORAL at 02:07

## 2018-07-09 RX ADMIN — ASPIRIN 81 MG CHEWABLE TABLET 81 MG: 81 TABLET CHEWABLE at 08:07

## 2018-07-09 RX ADMIN — PANTOPRAZOLE SODIUM 40 MG: 40 TABLET, DELAYED RELEASE ORAL at 08:07

## 2018-07-09 RX ADMIN — TRAMADOL HYDROCHLORIDE 50 MG: 50 TABLET, FILM COATED ORAL at 09:07

## 2018-07-09 RX ADMIN — PREDNISONE 10 MG: 5 TABLET ORAL at 08:07

## 2018-07-09 RX ADMIN — INSULIN ASPART 2 UNITS: 100 INJECTION, SOLUTION INTRAVENOUS; SUBCUTANEOUS at 09:07

## 2018-07-09 RX ADMIN — DILTIAZEM HYDROCHLORIDE 120 MG: 30 TABLET, FILM COATED ORAL at 05:07

## 2018-07-09 RX ADMIN — ATORVASTATIN CALCIUM 80 MG: 40 TABLET, FILM COATED ORAL at 09:07

## 2018-07-09 RX ADMIN — GABAPENTIN 300 MG: 300 CAPSULE ORAL at 09:07

## 2018-07-09 RX ADMIN — HYDRALAZINE HYDROCHLORIDE 50 MG: 25 TABLET ORAL at 05:07

## 2018-07-09 RX ADMIN — ALPRAZOLAM 0.5 MG: 0.5 TABLET ORAL at 09:07

## 2018-07-09 RX ADMIN — GABAPENTIN 300 MG: 300 CAPSULE ORAL at 08:07

## 2018-07-09 RX ADMIN — LISINOPRIL 10 MG: 5 TABLET ORAL at 08:07

## 2018-07-09 RX ADMIN — INSULIN ASPART 8 UNITS: 100 INJECTION, SOLUTION INTRAVENOUS; SUBCUTANEOUS at 08:07

## 2018-07-09 RX ADMIN — GUAIFENESIN 600 MG: 600 TABLET, EXTENDED RELEASE ORAL at 09:07

## 2018-07-09 RX ADMIN — HYDRALAZINE HYDROCHLORIDE 50 MG: 25 TABLET ORAL at 09:07

## 2018-07-09 RX ADMIN — RIVAROXABAN 20 MG: 20 TABLET, FILM COATED ORAL at 04:07

## 2018-07-09 RX ADMIN — GUAIFENESIN 600 MG: 600 TABLET, EXTENDED RELEASE ORAL at 08:07

## 2018-07-09 RX ADMIN — GABAPENTIN 300 MG: 300 CAPSULE ORAL at 02:07

## 2018-07-09 RX ADMIN — DILTIAZEM HYDROCHLORIDE 120 MG: 30 TABLET, FILM COATED ORAL at 09:07

## 2018-07-09 RX ADMIN — INSULIN ASPART 8 UNITS: 100 INJECTION, SOLUTION INTRAVENOUS; SUBCUTANEOUS at 04:07

## 2018-07-09 RX ADMIN — INSULIN ASPART 8 UNITS: 100 INJECTION, SOLUTION INTRAVENOUS; SUBCUTANEOUS at 12:07

## 2018-07-09 RX ADMIN — CLONIDINE HYDROCHLORIDE 0.1 MG: 0.1 TABLET ORAL at 09:07

## 2018-07-09 RX ADMIN — SOTALOL HYDROCHLORIDE 80 MG: 80 TABLET ORAL at 08:07

## 2018-07-09 RX ADMIN — FUROSEMIDE 40 MG: 40 TABLET ORAL at 08:07

## 2018-07-09 RX ADMIN — CLONIDINE HYDROCHLORIDE 0.1 MG: 0.1 TABLET ORAL at 02:07

## 2018-07-09 RX ADMIN — SODIUM POLYSTYRENE SULFONATE 30 G: 15 SUSPENSION ORAL; RECTAL at 08:07

## 2018-07-09 RX ADMIN — SOTALOL HYDROCHLORIDE 80 MG: 80 TABLET ORAL at 09:07

## 2018-07-09 RX ADMIN — DILTIAZEM HYDROCHLORIDE 120 MG: 30 TABLET, FILM COATED ORAL at 02:07

## 2018-07-09 NOTE — PLAN OF CARE
Problem: Fall Risk (Adult)  Intervention: Patient Rounds   07/09/18 0335   Safety Interventions   Patient Rounds bed in low position;bed wheels locked;call light in reach;clutter free environment maintained;ID band on;placement of personal items at bedside;toileting offered;visualized patient       Goal: Identify Related Risk Factors and Signs and Symptoms  Related risk factors and signs and symptoms are identified upon initiation of Human Response Clinical Practice Guideline (CPG)   Outcome: Ongoing (interventions implemented as appropriate)   07/09/18 0335   Fall Risk   Related Risk Factors (Fall Risk) history of falls;polypharmacy   Signs and Symptoms (Fall Risk) presence of risk factors      07/09/18 0335   Fall Risk   Related Risk Factors (Fall Risk) history of falls;polypharmacy   Signs and Symptoms (Fall Risk) presence of risk factors     Goal: Absence of Falls  Patient will demonstrate the desired outcomes by discharge/transition of care.   Outcome: Ongoing (interventions implemented as appropriate)   07/09/18 0335   Fall Risk (Adult)   Absence of Falls making progress toward outcome       Problem: Breathing Pattern Ineffective (Adult)  Intervention: Optimize Oxygenation/Ventilation/Perfusion   07/09/18 0335   Respiratory Interventions   Airway/Ventilation Management airway patency maintained   Breathing Techniques/Airway Clearance deep/controlled cough encouraged   Positioning   Head of Bed (HOB) HOB at 30-45 degrees       Goal: Identify Related Risk Factors and Signs and Symptoms  Related risk factors and signs and symptoms are identified upon initiation of Human Response Clinical Practice Guideline (CPG)   Outcome: Ongoing (interventions implemented as appropriate)   07/09/18 0335   Breathing Pattern Ineffective   Related Risk Factors (Breathing Pattern Ineffective) immobility;infection   Signs and Symptoms (Breathing Pattern Ineffective) anxiousness;breathlessness     Goal: Effective  Oxygenation/Ventilation  Patient will demonstrate the desired outcomes by discharge/transition of care.   Outcome: Ongoing (interventions implemented as appropriate)   07/09/18 0335   Breathing Pattern Ineffective (Adult)   Effective Oxygenation/Ventilation making progress toward outcome     Goal: Anxiety/Fear Reduction  Patient will demonstrate the desired outcomes by discharge/transition of care.   Outcome: Ongoing (interventions implemented as appropriate)   07/09/18 0335   Breathing Pattern Ineffective (Adult)   Anxiety/Fear Reduction making progress toward outcome

## 2018-07-09 NOTE — PLAN OF CARE
Patient awaiting authorization for Bipap machine. Patient informed.        07/09/18 1242   Discharge Reassessment   Assessment Type Discharge Planning Reassessment   Provided patient/caregiver education on the expected discharge date and the discharge plan Yes   Do you have any problems affording any of your prescribed medications? No   Discharge Plan A Home;Home Health;Home with family   Discharge Plan B Home;Home with family;Home Health   Patient choice form signed by patient/caregiver No   Can the patient answer the patient profile reliably? Yes, cognitively intact   How does the patient rate their overall health at the present time? Fair   Describe the patient's ability to walk at the present time. Walks with the help of equipment   How often would a person be available to care for the patient? Whenever needed   Number of comorbid conditions (as recorded on the chart) Five or more   During the past month, has the patient often been bothered by little interest or pleasure in doing things? No

## 2018-07-09 NOTE — PROGRESS NOTES
TN taught S&S for Chest Pain home care with pt and  with teach back:  1. Nausea, 2.Cold sweat, chest pressure. TN placed education sheet in [facility]..     Help at home will be from spouse,[place], assisting in pt's recovery.     TN reviewed things pt is responsible for when discharged:  To Manage her Care At Home:  1. Getting her prescriptions filled.  2. Taking her medications as directed. DO NOT MISS ANY DOSES!  3. Going to her follow-up doctor appointments.   .  TN checked [employer] for cm/sw name, spectra link #, pt contact, and d/c disposition....Drea Rg RN, BSN, STN [facility] 7/9/2018

## 2018-07-09 NOTE — PT/OT/SLP PROGRESS
Physical Therapy      Patient Name:  Yasmeen Palm   MRN:  [id number]    Patient not seen today secondary to Other (pt is on BiPAP)  . Will follow-up as able later hour/day .    Kayleigh Henry, PTA

## 2018-07-09 NOTE — NURSING
Report received from night nurseNadia. Pt. resting quietly on BIPAP, easily aroused per verbal stimuli. Evaluated pt. general condition. Respirations even and unlabored. No apparent distress noted.  No verbalization of pain or discomfort. Safety measures maintained.

## 2018-07-09 NOTE — PROGRESS NOTES
TN contacted [facility] to inquire about authorization. Authorization still pending. Spoke with Ami. Will follow up tomorrow.

## 2018-07-09 NOTE — UM SECONDARY REVIEW
[facility] Medical Affairs    Discharge Planning Concern  Patient at her Baseline and Requires  Home Trilogy, Awaiting Auth from Insurance    Avoidable days captured  Approved Inpatient     Approved Via  Committee [facility]OS/ Approved Criteria

## 2018-07-10 LAB
ANION GAP SERPL CALC-SCNC: 5 MMOL/L
BASOPHILS # BLD AUTO: 0.01 K/UL
BASOPHILS NFR BLD: 0.1 %
BUN SERPL-MCNC: 18 MG/DL
CALCIUM SERPL-MCNC: 9.3 MG/DL
CHLORIDE SERPL-SCNC: 95 MMOL/L
CO2 SERPL-SCNC: 43 MMOL/L
CREAT SERPL-MCNC: 0.8 MG/DL
DIFFERENTIAL METHOD: ABNORMAL
EOSINOPHIL # BLD AUTO: 0.2 K/UL
EOSINOPHIL NFR BLD: 0.9 %
ERYTHROCYTE [DISTWIDTH] IN BLOOD BY AUTOMATED COUNT: 18.7 %
EST. GFR  (AFRICAN AMERICAN): >60 ML/MIN/1.73 M^2
EST. GFR  (NON AFRICAN AMERICAN): >60 ML/MIN/1.73 M^2
GLUCOSE SERPL-MCNC: 149 MG/DL
HCT VFR BLD AUTO: 32.3 %
HGB BLD-MCNC: 9.2 G/DL
LYMPHOCYTES # BLD AUTO: 2.7 K/UL
LYMPHOCYTES NFR BLD: 13.7 %
MAGNESIUM SERPL-MCNC: 1.9 MG/DL
MCH RBC QN AUTO: 24.7 PG
MCHC RBC AUTO-ENTMCNC: 28.5 G/DL
MCV RBC AUTO: 87 FL
MONOCYTES # BLD AUTO: 1.6 K/UL
MONOCYTES NFR BLD: 8.1 %
NEUTROPHILS # BLD AUTO: 15.1 K/UL
NEUTROPHILS NFR BLD: 76.9 %
PHOSPHATE SERPL-MCNC: 3.4 MG/DL
PLATELET # BLD AUTO: 342 K/UL
PMV BLD AUTO: 10.6 FL
POCT GLUCOSE: 183 MG/DL (ref 70–110)
POCT GLUCOSE: 190 MG/DL (ref 70–110)
POCT GLUCOSE: 203 MG/DL (ref 70–110)
POCT GLUCOSE: 249 MG/DL (ref 70–110)
POCT GLUCOSE: 263 MG/DL (ref 70–110)
POTASSIUM SERPL-SCNC: 4.4 MMOL/L
RBC # BLD AUTO: 3.73 M/UL
SODIUM SERPL-SCNC: 143 MMOL/L
WBC # BLD AUTO: 19.64 K/UL

## 2018-07-10 PROCEDURE — 97110 THERAPEUTIC EXERCISES: CPT

## 2018-07-10 PROCEDURE — 21400001 HC TELEMETRY ROOM

## 2018-07-10 PROCEDURE — 99900035 HC TECH TIME PER 15 MIN (STAT)

## 2018-07-10 PROCEDURE — 84100 ASSAY OF PHOSPHORUS: CPT

## 2018-07-10 PROCEDURE — G8978 MOBILITY CURRENT STATUS: HCPCS | Mod: CK

## 2018-07-10 PROCEDURE — 94799 UNLISTED PULMONARY SVC/PX: CPT

## 2018-07-10 PROCEDURE — 94761 N-INVAS EAR/PLS OXIMETRY MLT: CPT

## 2018-07-10 PROCEDURE — 27000221 HC OXYGEN, UP TO 24 HOURS

## 2018-07-10 PROCEDURE — 25000003 PHARM REV CODE 250: Performed by: HOSPITALIST

## 2018-07-10 PROCEDURE — 80048 BASIC METABOLIC PNL TOTAL CA: CPT

## 2018-07-10 PROCEDURE — 36415 COLL VENOUS BLD VENIPUNCTURE: CPT

## 2018-07-10 PROCEDURE — G8979 MOBILITY GOAL STATUS: HCPCS | Mod: CI

## 2018-07-10 PROCEDURE — 97162 PT EVAL MOD COMPLEX 30 MIN: CPT

## 2018-07-10 PROCEDURE — 94660 CPAP INITIATION&MGMT: CPT

## 2018-07-10 PROCEDURE — 25000003 PHARM REV CODE 250: Performed by: EMERGENCY MEDICINE

## 2018-07-10 PROCEDURE — 85025 COMPLETE CBC W/AUTO DIFF WBC: CPT

## 2018-07-10 PROCEDURE — 25000003 PHARM REV CODE 250: Performed by: INTERNAL MEDICINE

## 2018-07-10 PROCEDURE — 83735 ASSAY OF MAGNESIUM: CPT

## 2018-07-10 PROCEDURE — 97530 THERAPEUTIC ACTIVITIES: CPT

## 2018-07-10 RX ADMIN — DILTIAZEM HYDROCHLORIDE 120 MG: 30 TABLET, FILM COATED ORAL at 09:07

## 2018-07-10 RX ADMIN — INSULIN ASPART 8 UNITS: 100 INJECTION, SOLUTION INTRAVENOUS; SUBCUTANEOUS at 04:07

## 2018-07-10 RX ADMIN — INSULIN ASPART 8 UNITS: 100 INJECTION, SOLUTION INTRAVENOUS; SUBCUTANEOUS at 09:07

## 2018-07-10 RX ADMIN — LISINOPRIL 10 MG: 5 TABLET ORAL at 09:07

## 2018-07-10 RX ADMIN — INSULIN ASPART 8 UNITS: 100 INJECTION, SOLUTION INTRAVENOUS; SUBCUTANEOUS at 12:07

## 2018-07-10 RX ADMIN — CLONIDINE HYDROCHLORIDE 0.1 MG: 0.1 TABLET ORAL at 09:07

## 2018-07-10 RX ADMIN — GABAPENTIN 300 MG: 300 CAPSULE ORAL at 02:07

## 2018-07-10 RX ADMIN — PANTOPRAZOLE SODIUM 40 MG: 40 TABLET, DELAYED RELEASE ORAL at 09:07

## 2018-07-10 RX ADMIN — INSULIN ASPART 2 UNITS: 100 INJECTION, SOLUTION INTRAVENOUS; SUBCUTANEOUS at 04:07

## 2018-07-10 RX ADMIN — GUAIFENESIN 600 MG: 600 TABLET, EXTENDED RELEASE ORAL at 09:07

## 2018-07-10 RX ADMIN — INSULIN ASPART 3 UNITS: 100 INJECTION, SOLUTION INTRAVENOUS; SUBCUTANEOUS at 12:07

## 2018-07-10 RX ADMIN — HYDRALAZINE HYDROCHLORIDE 50 MG: 25 TABLET ORAL at 05:07

## 2018-07-10 RX ADMIN — DILTIAZEM HYDROCHLORIDE 120 MG: 30 TABLET, FILM COATED ORAL at 05:07

## 2018-07-10 RX ADMIN — ATORVASTATIN CALCIUM 80 MG: 40 TABLET, FILM COATED ORAL at 09:07

## 2018-07-10 RX ADMIN — POTASSIUM PHOSPHATE, MONOBASIC 500 MG: 500 TABLET, SOLUBLE ORAL at 09:07

## 2018-07-10 RX ADMIN — GABAPENTIN 300 MG: 300 CAPSULE ORAL at 09:07

## 2018-07-10 RX ADMIN — RIVAROXABAN 20 MG: 20 TABLET, FILM COATED ORAL at 06:07

## 2018-07-10 RX ADMIN — DOCUSATE SODIUM 100 MG: 100 CAPSULE, LIQUID FILLED ORAL at 09:07

## 2018-07-10 RX ADMIN — SOTALOL HYDROCHLORIDE 80 MG: 80 TABLET ORAL at 09:07

## 2018-07-10 RX ADMIN — ASPIRIN 81 MG CHEWABLE TABLET 81 MG: 81 TABLET CHEWABLE at 09:07

## 2018-07-10 RX ADMIN — FUROSEMIDE 40 MG: 40 TABLET ORAL at 09:07

## 2018-07-10 RX ADMIN — POLYETHYLENE GLYCOL 3350 17 G: 17 POWDER, FOR SOLUTION ORAL at 09:07

## 2018-07-10 NOTE — ASSESSMENT & PLAN NOTE
- Due to COPD exacerbation   - s/p intubation and treatment with IV steroids, NEBS  - Pulmonary following and extubated on 6/27  - Respiratory status was worse on 6/28 due to volume overload  - s/p IV lasix with good output and now back down to NC  - Pt to continue BIPAP QHS and PRN  - Will need home BIPAP/home ventilator to treat patient for high pCO2  - CRT linked to COPD  - Pending home ventilator approval for discharge home with home health

## 2018-07-10 NOTE — SUBJECTIVE & OBJECTIVE
Interval History: Pt had no acute issues overnight. Breathing at baseline and getting used to using BIPAP machine at night.     Review of Systems   Constitutional: Negative for chills and fever.   Respiratory: Negative for shortness of breath.    Cardiovascular: Negative for chest pain.     Objective:     Vital Signs (Most Recent):  Temp: 98.9 °F (37.2 °C) (07/10/18 0811)  Pulse: 82 (07/10/18 0811)  Resp: 18 (07/10/18 0811)  BP: 129/68 (07/10/18 0811)  SpO2: (!) 91 % (07/10/18 0811) Vital Signs (24h Range):  Temp:  [97.7 °F (36.5 °C)-98.9 °F (37.2 °C)] 98.9 °F (37.2 °C)  Pulse:  [] 82  Resp:  [18-20] 18  SpO2:  [91 %-100 %] 91 %  BP: (127-162)/(61-77) 129/68     Weight: 81.7 kg (180 lb 1.9 oz)  Body mass index is 29.97 kg/m².    Intake/Output Summary (Last 24 hours) at 07/10/18 1002  Last data filed at 07/10/18 0500   Gross per 24 hour   Intake               60 ml   Output                0 ml   Net               60 ml      Physical Exam   Constitutional: She is oriented to person, place, and time. She appears well-developed. No distress.   HENT:   Head: Normocephalic and atraumatic.   Eyes: EOM are normal. Pupils are equal, round, and reactive to light.   Neck: Normal range of motion. Neck supple.   Cardiovascular: Normal rate and regular rhythm.    Pulmonary/Chest:   Slight decrease at bases, no wheezing   Abdominal: Soft. Bowel sounds are normal.   Musculoskeletal: Normal range of motion. She exhibits edema.   Neurological: She is alert and oriented to person, place, and time.   Skin: Skin is warm and dry. Capillary refill takes less than 2 seconds. She is not diaphoretic.   Psychiatric: She has a normal mood and affect. Her behavior is normal. Thought content normal.       Significant Labs: All pertinent labs within the past 24 hours have been reviewed.    Significant Imaging: I have reviewed and interpreted all pertinent imaging results/findings within the past 24 hours.

## 2018-07-10 NOTE — ASSESSMENT & PLAN NOTE
- Complications of peripheral neuropathy.   - Increased basal and prandial insulin  - Glucose control improving with insulin adjustment and weaned off prednisone by today

## 2018-07-10 NOTE — PT/OT/SLP PROGRESS
"Physical Therapy Treatment    Patient Name:  Yasmeen Palm   MRN:  [id number]    Recommendations:     Discharge Recommendations:  home health PT   Discharge Equipment Recommendations: none   Barriers to discharge: None    Assessment:     Yasmeen Palm is a 68 y.o. female admitted with a medical diagnosis of Acute hypercapnic respiratory failure.  She presents with the following impairments/functional limitations:  weakness, impaired endurance, impaired self care skills, decreased upper extremity function, decreased lower extremity function, decreased safety awareness, impaired balance, impaired cardiopulmonary response to activity.    Rehab Prognosis:  Fair + ; patient would benefit from acute skilled PT services to address these deficits and reach maximum level of function.      Recent Surgery: Procedure(s) (LRB):  Left heart cath R rad access, not before 9am (Left)      Plan:     During this hospitalization, patient to be seen 6 x/week to address the above listed problems via gait training, therapeutic activities, therapeutic exercises  · Plan of Care Expires:  07/14/18   Plan of Care Reviewed with: patient    Subjective     Communicated with nurse prior to session.  Patient found up in bed nurse present giving medication upon PT entry to room, agreeable to treatment.      Chief Complaint: " Im still hurting" "I won't promise anything but I will try my best"  Patient comments/goals: I want to go back to sleep  Pain/Comfort:  · Pain Rating 1: 4/10  · Location - Side 1: Bilateral  · Location 1: coccyx  · Pain Addressed 1: Cessation of Activity, Nurse notified    Patients cultural, spiritual, Voodoo conflicts given the current situation: none    Objective:     Patient found with: telemetry, oxygen, bed alarm     General Precautions: Standard, respiratory, fall   Orthopedic Precautions:N/A   Braces: N/A     Functional Mobility:  · Bed Mobility:     · Scooting: stand by assistance  · Supine to Sit: stand by " assistance  · Sit to Supine: contact guard assistance  · Transfers:     · Sit to Stand:  contact guard assistance with rolling walker  · Balance: Good in sitting , fair + standing      AM-PAC 6 CLICK MOBILITY  Turning over in bed (including adjusting bedclothes, sheets and blankets)?: 4  Sitting down on and standing up from a chair with arms (e.g., wheelchair, bedside commode, etc.): 3  Moving from lying on back to sitting on the side of the bed?: 4  Moving to and from a bed to a chair (including a wheelchair)?: 3  Need to walk in hospital room?: 3  Climbing 3-5 steps with a railing?: 3  Basic Mobility Total Score: 20       Therapeutic Activities and Exercises:  Pt performed seated BLE x 20 AP, HS, Hip Flex,   Sit to stand x 2 trials with CGA and RW. Pt declined to do more said she was to tired    Patient left HOB elevated with all lines intact, call button in reach and nurse notified..    GOALS:    Physical Therapy Goals     Not on file          Multidisciplinary Problems (Resolved)        Problem: Physical Therapy Goal    Goal Priority Disciplines Outcome Goal Variances Interventions   Physical Therapy Goal   (Resolved)     PT/OT, PT Outcome(s) achieved     Description:  Goals to be met by: 2018     Patient will increase functional independence with mobility by performin. Supine to sit with Modified [place]  2. Sit to supine with Modified [place]  3. Sit to stand transfer with Modified [place]  4. Gait  x 250 feet with Modified [place] using Rolling Walker.    Recommend: HHPT at time of discharge.                       Time Tracking:     PT Received On: 07/10/18  PT Start Time: 944     PT Stop Time: 1018  PT Total Time (min): 34 min     Billable Minutes: Therapeutic Activity 20 and Therapeutic Exercise 14       PT/PTA: PTA     PTA Visit Number: 4     Guido East, PTA  07/10/2018

## 2018-07-10 NOTE — NURSING
Pt had orders to d/c home earlier today but canceled pending insurance approval for home bipap. Telemetry monitor reapplied. Will continue to monitor.

## 2018-07-10 NOTE — ASSESSMENT & PLAN NOTE
- C02 > 100 and s/p intubation  - Treated with NEBS and steroids  - Extubated on 6/27 and respiratory status with worsening due to volume overload issue   - On prednisone and nebulizer  - Does have home oxygen  - On BIPAP due to increased PCO2 on ABG, lethargic condition  - Arranging for home ventilator for discharge   - CRT due to COPD  - Pt to be weaned off all prednisone by today (7/9)

## 2018-07-10 NOTE — NURSING
Report given to night nurse, Nadia HOBBS. Pt. AAOX3. Respirations even and unlabored on 3L NC. No apparent distress noted at this time.Side rails up x 2. Bed alarm set. Call light within patient's reach. Safety measures maintained.

## 2018-07-10 NOTE — PROGRESS NOTES
[facility] Hospital Medicine  Progress Note    Patient Name: Yasmeen Palm  MRN: [id number]  Patient Class: IP- Inpatient   Admission Date: 6/24/2018  Length of Stay: 16 days  Attending Physician: Michelle Dumont MD  Primary Care Provider: Angel Orourke Jr, MD        Subjective:     Principal Problem:Acute hypercapnic respiratory failure    HPI:  Yasmeen Palm is a 68 y.o. female that (in part)  has a past medical history of Anticoagulant long-term use; Arthritis; Asthma; CHF (congestive heart failure); COPD (chronic obstructive pulmonary disease); Coronary artery disease; Depression; Diabetes mellitus; GERD (gastroesophageal reflux disease); and Hypertension.  has a past surgical history that includes Abdominal surgery; Cardiac surgery; and Hernia repair. Presents to [facility] Emergency Department complaining of chest pain .  She reports compliance with her home medication regimen, including Xarelto.     Description of symptoms  Location:  Substernal  Onset:  Acute onset 2 days ago  Character:  The patient remained; moderate severity  Frequency:  Daily  Duration:  each episode lasts several minutes at a time  Associated Symptoms:  Diaphoresis, shortness of breath, weakness and fatigue  Radiation:  Recent the chest  Exacerbating factors:  Worse on exertion  Relieving factors:  Minimal relief with supplemental oxygen     In the emergency department routine laboratory studies, chest x-ray, EKG, cardiac enzymes were obtained.  EKG findings were concerning the case was discussed with cardiologist, Dr. Pulido.  It was determined that her EKG was not consistent with STEMI and she has been chest pain-free since arrival.  She had been given Lovenox, aspirin, and plan was to continue trending troponins and monitor closely on telemetry.    Hospital Course:  Ms. Palm was admitted to the hospital originally on 6/24/18 for chest pain. She was later sent to the ICU with  acute hypercapnic respiratory failure the same day on BIPAP.  She quickly improved and was sent to the floor on 6/25.  Cards was consulted for NSTEMI with noted troponin increase and was planning on LHC on 6/26. However, the patient was sent back to the ICU on 6/26 with hypercapnic respiratory failure with a C02 of > 100 and was intubated. Pulmonary was consulted. Pt clinically improved from respiratory standpoint and was extubated on 6/27 to NC, but she did complain of chest discomfort. Cardiology was notified and patient was taken for LHC on 6/27 which was only remarkable for non-obstructive CAD and did not require intervention. Pt was given IVF post procedure and the next day developed increased SOB and required BIPAP. Pt was treated with IV lasix with good response with much improved respiratory status after output of 1L. Pt also being treated for COPD exacerbation with IV steroids, NEBS and doxycycline. ECHO performed on 6/25/2018 remarkable for EF=50-55% + grade 2 diastolic dysfunction. Pt diuresed and changed to maintenance PO lasix regimen. She as started on BIPAP due to increased pCO2 on ABG and lethargy. Pt will need BIPAP/home ventilator for use at home and pending approval of device for discharge home with home health.    Interval History: Pt had no acute issues overnight. Breathing at baseline and getting used to using BIPAP machine at night.     Review of Systems   Constitutional: Negative for chills and fever.   Respiratory: Negative for shortness of breath.    Cardiovascular: Negative for chest pain.     Objective:     Vital Signs (Most Recent):  Temp: 98.9 °F (37.2 °C) (07/10/18 0811)  Pulse: 82 (07/10/18 0811)  Resp: 18 (07/10/18 0811)  BP: 129/68 (07/10/18 0811)  SpO2: (!) 91 % (07/10/18 0811) Vital Signs (24h Range):  Temp:  [97.7 °F (36.5 °C)-98.9 °F (37.2 °C)] 98.9 °F (37.2 °C)  Pulse:  [] 82  Resp:  [18-20] 18  SpO2:  [91 %-100 %] 91 %  BP: (127-162)/(61-77) 129/68     Weight: 81.7 kg  (180 lb 1.9 oz)  Body mass index is 29.97 kg/m².    Intake/Output Summary (Last 24 hours) at 07/10/18 1002  Last data filed at 07/10/18 0500   Gross per 24 hour   Intake               60 ml   Output                0 ml   Net               60 ml      Physical Exam   Constitutional: She is oriented to person, place, and time. She appears well-developed. No distress.   HENT:   Head: Normocephalic and atraumatic.   Eyes: EOM are normal. Pupils are equal, round, and reactive to light.   Neck: Normal range of motion. Neck supple.   Cardiovascular: Normal rate and regular rhythm.    Pulmonary/Chest:   Slight decrease at bases, no wheezing   Abdominal: Soft. Bowel sounds are normal.   Musculoskeletal: Normal range of motion. She exhibits edema.   Neurological: She is alert and oriented to person, place, and time.   Skin: Skin is warm and dry. Capillary refill takes less than 2 seconds. She is not diaphoretic.   Psychiatric: She has a normal mood and affect. Her behavior is normal. Thought content normal.       Significant Labs: All pertinent labs within the past 24 hours have been reviewed.    Significant Imaging: I have reviewed and interpreted all pertinent imaging results/findings within the past 24 hours.    Assessment/Plan:      * Acute hypercapnic respiratory failure    - Due to COPD exacerbation   - s/p intubation and treatment with IV steroids, NEBS  - Pulmonary following and extubated on 6/27  - Respiratory status was worse on 6/28 due to volume overload  - s/p IV lasix with good output and now back down to NC  - Pt to continue BIPAP QHS and PRN  - Will need home BIPAP/home ventilator to treat patient for high pCO2  - CRT linked to COPD  - Pending home ventilator approval for discharge home with home health        Hyperkalemia    - Mild elevation  - Treated with kayexalate x 1, will repeat lab in am          Fall    - Slipped while getting up on 7/6  - Head CT and all x-ray are negative  - Resolved        Debility     - PT/OT, will arrange for home health at time of discharge        PAF (paroxysmal atrial fibrillation)    - Rate controlled and anticoagulation resumed with Xarelto on 6/27 post [facility]        Neuropathy    - No acute issues         Non-STEMI (non-ST elevated myocardial infarction)    - Followed by Cardiology  - s/p LHC on 6/27 only remarkable for non-obstructive CAD  - Continue medical management as per Cardiology with ASA, statin, and patient will not be placed on plavix given she will be fully anticoagulated with Xarelto        Acute exacerbation of chronic obstructive pulmonary disease (COPD)    - C02 > 100 and s/p intubation  - Treated with NEBS and steroids  - Extubated on 6/27 and respiratory status with worsening due to volume overload issue   - On prednisone and nebulizer  - Does have home oxygen  - On BIPAP due to increased PCO2 on ABG, lethargic condition  - Arranging for home ventilator for discharge   - CRT due to COPD  - Pt to be weaned off all prednisone by today (7/9)        Elevated brain natriuretic peptide (BNP) level    - Diuresis resumed         Elevated troponin I level    - As discussed under NSTEMI  - No sign of obstruction on [facility].        Acute diastolic CHF (congestive heart failure)    - Due to volume overload  - Last ECHO with EF=50-55% + grade 2 diastolic dysfunction on 6/26  - Improved with IV lasix and now compensated and on PO lasix regimen  - Stable        Obesity    - Weight reduction as outpatient after all acute issues addressed        Chronic anticoagulation    - Patient on Xarelto for PAF which has been resumed        History of deep vein thrombosis    - Resumed anticoagulation         History of pulmonary embolism    - Xarelto resumed .        Anemia of chronic disease    - H/H with slight drop noted, but no bleeding  - Will continue to monitor        Malignant hypertension    - Difficult to control BP  - Continue lisinopril, and restarted hydralazine, clonidine, diltiazem today  (held due to previous hypotensive issues)  - PRN labetalol but overall much improved control        Tobacco abuse    - Smoking cessation counseling done        Gastroesophageal reflux disease without esophagitis    - Continue PPI        Type 2 diabetes mellitus, controlled    - Complications of peripheral neuropathy.   - Increased basal and prandial insulin  - Glucose control improving with insulin adjustment and weaned off prednisone by today        Chronic obstructive pulmonary disease with acute exacerbation    - As discussed above        Coronary artery disease involving native coronary artery of native heart with angina pectoris    - [facility] on 6/27 as discussed above  - As per Cardiology          VTE Risk Mitigation         Ordered     rivaroxaban tablet 20 mg  With dinner      06/27/18 6405              Michelle Dumont MD  Department of Hospital Medicine   [facility]

## 2018-07-10 NOTE — PROGRESS NOTES
[facility] Hospital Medicine  Progress Note    Patient Name: Yasmeen Palm  MRN: [id number]  Patient Class: IP- Inpatient   Admission Date: 6/24/2018  Length of Stay: 15 days  Attending Physician: Meredith Ogluin MD  Primary Care Provider: Angel Orourke Jr, MD        Subjective:     Principal Problem:Acute hypercapnic respiratory failure    HPI:  Yasmeen Palm is a 68 y.o. female that (in part)  has a past medical history of Anticoagulant long-term use; Arthritis; Asthma; CHF (congestive heart failure); COPD (chronic obstructive pulmonary disease); Coronary artery disease; Depression; Diabetes mellitus; GERD (gastroesophageal reflux disease); and Hypertension.  has a past surgical history that includes Abdominal surgery; Cardiac surgery; and Hernia repair. Presents to [facility] Emergency Department complaining of chest pain .  She reports compliance with her home medication regimen, including Xarelto.     Description of symptoms  Location:  Substernal  Onset:  Acute onset 2 days ago  Character:  The patient remained; moderate severity  Frequency:  Daily  Duration:  each episode lasts several minutes at a time  Associated Symptoms:  Diaphoresis, shortness of breath, weakness and fatigue  Radiation:  Recent the chest  Exacerbating factors:  Worse on exertion  Relieving factors:  Minimal relief with supplemental oxygen     In the emergency department routine laboratory studies, chest x-ray, EKG, cardiac enzymes were obtained.  EKG findings were concerning the case was discussed with cardiologist, Dr. Pulido.  It was determined that her EKG was not consistent with STEMI and she has been chest pain-free since arrival.  She had been given Lovenox, aspirin, and plan was to continue trending troponins and monitor closely on telemetry.    Hospital Course:  Ms. Palm was admitted to the hospital originally on 6/24/18 for chest pain. She was later sent to the ICU with acute  hypercapnic respiratory failure the same day on BIPAP.  She quickly improved and was sent to the floor on 6/25.  Cards was consulted for NSTEMI with noted troponin increase and was planning on LHC on 6/26. However, the patient was sent back to the ICU on 6/26 with hypercapnic respiratory failure with a C02 of > 100 and was intubated. Pulmonary was consulted. Pt clinically improved from respiratory standpoint and was extubated on 6/27 to NC, but she did complain of chest discomfort. Cardiology was notified and patient was taken for LHC on 6/27 which was only remarkable for non-obstructive CAD and did not require intervention. Pt was given IVF post procedure and the next day developed increased SOB and required BIPAP. Pt was treated with IV lasix with good response with much improved respiratory status after output of 1L. Pt also being treated for COPD exacerbation with IV steroids, NEBS and doxycycline. ECHO performed on 6/25/2018 remarkable for EF=50-55% + grade 2 diastolic dysfunction. Pt diuresed and changed to maintenance PO lasix regimen. She as started on BIPAP due to increased pCO2 on ABG and lethargy. Pt will need BIPAP/home ventilator for use at home and pending approval of device for discharge home with home health.    Interval History: Pt had no acute issues overnight. Breathing at baseline and getting used to using BIPAP machine at night.     Review of Systems   Constitutional: Negative for chills and fever.   Respiratory: Negative for shortness of breath.    Cardiovascular: Negative for chest pain.     Objective:     Vital Signs (Most Recent):  Temp: 98.6 °F (37 °C) (07/09/18 1957)  Pulse: 87 (07/09/18 1957)  Resp: 18 (07/09/18 1957)  BP: (!) 140/64 (07/09/18 1957)  SpO2: (!) 94 % (07/09/18 2222) Vital Signs (24h Range):  Temp:  [97.6 °F (36.4 °C)-98.6 °F (37 °C)] 98.6 °F (37 °C)  Pulse:  [77-88] 87  Resp:  [18-20] 18  SpO2:  [94 %-100 %] 94 %  BP: (116-166)/(56-77) 140/64     Weight: 81.7 kg (180 lb 1.9  oz)  Body mass index is 29.97 kg/m².  No intake or output data in the 24 hours ending 07/09/18 4298   Physical Exam   Constitutional: She is oriented to person, place, and time. She appears well-developed. No distress.   HENT:   Head: Normocephalic and atraumatic.   Eyes: EOM are normal. Pupils are equal, round, and reactive to light.   Neck: Normal range of motion. Neck supple.   Cardiovascular: Normal rate and regular rhythm.    Pulmonary/Chest:   Slight decrease at bases, no wheezing   Abdominal: Soft. Bowel sounds are normal.   Musculoskeletal: Normal range of motion. She exhibits edema.   Neurological: She is alert and oriented to person, place, and time.   Skin: Skin is warm and dry. Capillary refill takes less than 2 seconds. She is not diaphoretic.   Psychiatric: She has a normal mood and affect. Her behavior is normal. Thought content normal.       Significant Labs: All pertinent labs within the past 24 hours have been reviewed.    Significant Imaging: I have reviewed and interpreted all pertinent imaging results/findings within the past 24 hours.    Assessment/Plan:      * Acute hypercapnic respiratory failure    - Due to COPD exacerbation   - s/p intubation and treatment with IV steroids, NEBS  - Pulmonary following and extubated on 6/27  - Respiratory status was worse on 6/28 due to volume overload  - s/p IV lasix with good output and now back down to NC  - Pt to continue BIPAP QHS and PRN  - Will need home BIPAP/home ventilator to treat patient for high pCO2  - CRT linked to COPD  - Pending home ventilator approval for discharge home with home health        Non-STEMI (non-ST elevated myocardial infarction)    - Followed by Cardiology  - s/p [facility] on 6/27 only remarkable for non-obstructive CAD  - Continue medical management as per Cardiology with ASA, statin, and patient will not be placed on plavix given she will be fully anticoagulated with Xarelto        Acute diastolic CHF (congestive heart failure)     - Due to volume overload  - Last ECHO with EF=50-55% + grade 2 diastolic dysfunction on 6/26  - Improved with IV lasix and now compensated and on PO lasix regimen  - Stable        Coronary artery disease involving native coronary artery of native heart with angina pectoris    - [facility] on 6/27 as discussed above  - As per Cardiology        PAF (paroxysmal atrial fibrillation)    - Rate controlled and anticoagulation resumed with Xarelto on 6/27 post [facility]        Hyperkalemia    - Mild elevation  - Treated with kayexalate x 1, will repeat lab in am        Fall    - Slipped while getting up on 7/6  - Head CT and all x-ray are negative  - Resolved        Debility    - PT/OT, will arrange for home health at time of discharge        Neuropathy    - No acute issues         Acute exacerbation of chronic obstructive pulmonary disease (COPD)    - C02 > 100 and s/p intubation  - Treated with NEBS and steroids  - Extubated on 6/27 and respiratory status with worsening due to volume overload issue   - On prednisone and nebulizer  - Does have home oxygen  - On BIPAP due to increased PCO2 on ABG, lethargic condition  - Arranging for home ventilator for discharge   - CRT due to COPD  - Pt to be weaned off all prednisone by today (7/9)        Elevated brain natriuretic peptide (BNP) level    - Diuresis resumed         Elevated troponin I level    - As discussed under NSTEMI  - No sign of obstruction on [facility].        Obesity    - Weight reduction as outpatient after all acute issues addressed        Chronic anticoagulation    - Patient on Xarelto for PAF which has been resumed        History of deep vein thrombosis    - Resumed anticoagulation         History of pulmonary embolism    - Xarelto resumed .        Anemia of chronic disease    - H/H with slight drop noted, but no bleeding  - Will continue to monitor        Malignant hypertension    - Difficult to control BP  - Continue lisinopril, and restarted hydralazine, clonidine,  diltiazem today (held due to previous hypotensive issues)  - PRN labetalol but overall much improved control        Tobacco abuse    - Smoking cessation counseling done        Gastroesophageal reflux disease without esophagitis    - Continue PPI        Type 2 diabetes mellitus, controlled    - Complications of peripheral neuropathy.   - Increased basal and prandial insulin  - Glucose control improving with insulin adjustment and weaned off prednisone by today        Chronic obstructive pulmonary disease with acute exacerbation    - As discussed above          VTE Risk Mitigation         Ordered     rivaroxaban tablet 20 mg  With dinner      06/27/18 1332              Meredith Olguin MD  Department of Hospital Medicine   [facility]

## 2018-07-10 NOTE — ASSESSMENT & PLAN NOTE
- Difficult to control BP  - Continue lisinopril, and restarted hydralazine, clonidine, diltiazem today (held due to previous hypotensive issues)  - PRN labetalol but overall much improved control

## 2018-07-10 NOTE — PROGRESS NOTES
1:50-TN contacted [facility] to inquire of pending  authorization for patient's Trilogy machine. Spoke with Erika. Erika stated that clinicals are being reviewed.   Ref- [id number]    2:12- TN returned call to Aileen Narvaez in Prior Authorization at [facility]. Aileen stated that patient will need documentation stating the BiPAP with back- up rates have been tried and failed in order to approve the trilogy. Aileen suggested ordering the BiPAP for home usage if it's effective in the hospital. Aileen also stated that a sleep study to approve the home Bipap may not be necessary if supporting documentation can be provided showing patient improvement while on BiPaP and multiple hospital stays for the same problem.     2:57- TN spoke with Aileen. Aileen will switch request from Trilogy to Bipap with back up and send clinicals to medical director today. Aileen also stated that once approved patient will have to follow up with a pulse ox symmetry test for continuation of home Bipap beyond the 3 month auth approval timeframe. Will follow up with Aileen on tomorrow.

## 2018-07-10 NOTE — PT/OT/SLP PROGRESS
Occupational Therapy      Patient Name:  Yasmeen Palm   MRN:  [id number]    Patient not seen today secondary to Patient unwilling to participate despite encouragement. Patient stated that she participated with PT already and was not going to get up again. Encouraged patient to sit up in chair for lunch, however, patient declined. Will follow-up as able.     JOAQUIN Rockwell  7/10/2018

## 2018-07-10 NOTE — ASSESSMENT & PLAN NOTE
- Due to volume overload  - Last ECHO with EF=50-55% + grade 2 diastolic dysfunction on 6/26  - Improved with IV lasix and now compensated and on PO lasix regimen  - Stable

## 2018-07-10 NOTE — SUBJECTIVE & OBJECTIVE
Interval History: Pt had no acute issues overnight. Breathing at baseline and getting used to using BIPAP machine at night.     Review of Systems   Constitutional: Negative for chills and fever.   Respiratory: Negative for shortness of breath.    Cardiovascular: Negative for chest pain.     Objective:     Vital Signs (Most Recent):  Temp: 98.6 °F (37 °C) (07/09/18 1957)  Pulse: 87 (07/09/18 1957)  Resp: 18 (07/09/18 1957)  BP: (!) 140/64 (07/09/18 1957)  SpO2: (!) 94 % (07/09/18 2222) Vital Signs (24h Range):  Temp:  [97.6 °F (36.4 °C)-98.6 °F (37 °C)] 98.6 °F (37 °C)  Pulse:  [77-88] 87  Resp:  [18-20] 18  SpO2:  [94 %-100 %] 94 %  BP: (116-166)/(56-77) 140/64     Weight: 81.7 kg (180 lb 1.9 oz)  Body mass index is 29.97 kg/m².  No intake or output data in the 24 hours ending 07/09/18 2256   Physical Exam   Constitutional: She is oriented to person, place, and time. She appears well-developed. No distress.   HENT:   Head: Normocephalic and atraumatic.   Eyes: EOM are normal. Pupils are equal, round, and reactive to light.   Neck: Normal range of motion. Neck supple.   Cardiovascular: Normal rate and regular rhythm.    Pulmonary/Chest:   Slight decrease at bases, no wheezing   Abdominal: Soft. Bowel sounds are normal.   Musculoskeletal: Normal range of motion. She exhibits edema.   Neurological: She is alert and oriented to person, place, and time.   Skin: Skin is warm and dry. Capillary refill takes less than 2 seconds. She is not diaphoretic.   Psychiatric: She has a normal mood and affect. Her behavior is normal. Thought content normal.       Significant Labs: All pertinent labs within the past 24 hours have been reviewed.    Significant Imaging: I have reviewed and interpreted all pertinent imaging results/findings within the past 24 hours.

## 2018-07-10 NOTE — ASSESSMENT & PLAN NOTE
- Followed by Cardiology  - s/p [facility] on 6/27 only remarkable for non-obstructive CAD  - Continue medical management as per Cardiology with ASA, statin, and patient will not be placed on plavix given she will be fully anticoagulated with Xarelto

## 2018-07-11 LAB
ANION GAP SERPL CALC-SCNC: 8 MMOL/L
BUN SERPL-MCNC: 24 MG/DL
CALCIUM SERPL-MCNC: 9.3 MG/DL
CHLORIDE SERPL-SCNC: 95 MMOL/L
CO2 SERPL-SCNC: 38 MMOL/L
CREAT SERPL-MCNC: 0.8 MG/DL
EST. GFR  (AFRICAN AMERICAN): >60 ML/MIN/1.73 M^2
EST. GFR  (NON AFRICAN AMERICAN): >60 ML/MIN/1.73 M^2
GLUCOSE SERPL-MCNC: 228 MG/DL
MAGNESIUM SERPL-MCNC: 1.9 MG/DL
PHOSPHATE SERPL-MCNC: 3.1 MG/DL
POCT GLUCOSE: 118 MG/DL (ref 70–110)
POCT GLUCOSE: 166 MG/DL (ref 70–110)
POCT GLUCOSE: 218 MG/DL (ref 70–110)
POCT GLUCOSE: 240 MG/DL (ref 70–110)
POTASSIUM SERPL-SCNC: 4.1 MMOL/L
SODIUM SERPL-SCNC: 141 MMOL/L

## 2018-07-11 PROCEDURE — 25000003 PHARM REV CODE 250: Performed by: HOSPITALIST

## 2018-07-11 PROCEDURE — 25000003 PHARM REV CODE 250: Performed by: INTERNAL MEDICINE

## 2018-07-11 PROCEDURE — 36415 COLL VENOUS BLD VENIPUNCTURE: CPT

## 2018-07-11 PROCEDURE — 21400001 HC TELEMETRY ROOM

## 2018-07-11 PROCEDURE — 27000221 HC OXYGEN, UP TO 24 HOURS

## 2018-07-11 PROCEDURE — 80048 BASIC METABOLIC PNL TOTAL CA: CPT

## 2018-07-11 PROCEDURE — 94660 CPAP INITIATION&MGMT: CPT

## 2018-07-11 PROCEDURE — 99900035 HC TECH TIME PER 15 MIN (STAT)

## 2018-07-11 PROCEDURE — 84100 ASSAY OF PHOSPHORUS: CPT

## 2018-07-11 PROCEDURE — 94761 N-INVAS EAR/PLS OXIMETRY MLT: CPT

## 2018-07-11 PROCEDURE — 94799 UNLISTED PULMONARY SVC/PX: CPT

## 2018-07-11 PROCEDURE — 83735 ASSAY OF MAGNESIUM: CPT

## 2018-07-11 PROCEDURE — 25000003 PHARM REV CODE 250: Performed by: EMERGENCY MEDICINE

## 2018-07-11 RX ADMIN — GABAPENTIN 300 MG: 300 CAPSULE ORAL at 10:07

## 2018-07-11 RX ADMIN — POLYETHYLENE GLYCOL 3350 17 G: 17 POWDER, FOR SOLUTION ORAL at 10:07

## 2018-07-11 RX ADMIN — DOCUSATE SODIUM 100 MG: 100 CAPSULE, LIQUID FILLED ORAL at 10:07

## 2018-07-11 RX ADMIN — INSULIN ASPART 8 UNITS: 100 INJECTION, SOLUTION INTRAVENOUS; SUBCUTANEOUS at 01:07

## 2018-07-11 RX ADMIN — INSULIN ASPART 2 UNITS: 100 INJECTION, SOLUTION INTRAVENOUS; SUBCUTANEOUS at 01:07

## 2018-07-11 RX ADMIN — SOTALOL HYDROCHLORIDE 80 MG: 80 TABLET ORAL at 10:07

## 2018-07-11 RX ADMIN — PANTOPRAZOLE SODIUM 40 MG: 40 TABLET, DELAYED RELEASE ORAL at 10:07

## 2018-07-11 RX ADMIN — FUROSEMIDE 40 MG: 40 TABLET ORAL at 10:07

## 2018-07-11 RX ADMIN — LISINOPRIL 10 MG: 5 TABLET ORAL at 10:07

## 2018-07-11 RX ADMIN — ALPRAZOLAM 0.5 MG: 0.5 TABLET ORAL at 09:07

## 2018-07-11 RX ADMIN — GABAPENTIN 300 MG: 300 CAPSULE ORAL at 09:07

## 2018-07-11 RX ADMIN — GUAIFENESIN 600 MG: 600 TABLET, EXTENDED RELEASE ORAL at 10:07

## 2018-07-11 RX ADMIN — ATORVASTATIN CALCIUM 80 MG: 40 TABLET, FILM COATED ORAL at 09:07

## 2018-07-11 RX ADMIN — INSULIN ASPART 8 UNITS: 100 INJECTION, SOLUTION INTRAVENOUS; SUBCUTANEOUS at 08:07

## 2018-07-11 RX ADMIN — INSULIN ASPART 3 UNITS: 100 INJECTION, SOLUTION INTRAVENOUS; SUBCUTANEOUS at 08:07

## 2018-07-11 RX ADMIN — GABAPENTIN 300 MG: 300 CAPSULE ORAL at 03:07

## 2018-07-11 RX ADMIN — ASPIRIN 81 MG CHEWABLE TABLET 81 MG: 81 TABLET CHEWABLE at 10:07

## 2018-07-11 RX ADMIN — DILTIAZEM HYDROCHLORIDE 120 MG: 30 TABLET, FILM COATED ORAL at 03:07

## 2018-07-11 RX ADMIN — TRAMADOL HYDROCHLORIDE 50 MG: 50 TABLET, FILM COATED ORAL at 10:07

## 2018-07-11 RX ADMIN — SOTALOL HYDROCHLORIDE 80 MG: 80 TABLET ORAL at 09:07

## 2018-07-11 RX ADMIN — HYDRALAZINE HYDROCHLORIDE 50 MG: 25 TABLET ORAL at 09:07

## 2018-07-11 RX ADMIN — INSULIN ASPART 8 UNITS: 100 INJECTION, SOLUTION INTRAVENOUS; SUBCUTANEOUS at 05:07

## 2018-07-11 RX ADMIN — DILTIAZEM HYDROCHLORIDE 120 MG: 30 TABLET, FILM COATED ORAL at 09:07

## 2018-07-11 RX ADMIN — RIVAROXABAN 20 MG: 20 TABLET, FILM COATED ORAL at 05:07

## 2018-07-11 RX ADMIN — CLONIDINE HYDROCHLORIDE 0.1 MG: 0.1 TABLET ORAL at 10:07

## 2018-07-11 RX ADMIN — HYDRALAZINE HYDROCHLORIDE 50 MG: 25 TABLET ORAL at 05:07

## 2018-07-11 RX ADMIN — CLONIDINE HYDROCHLORIDE 0.1 MG: 0.1 TABLET ORAL at 09:07

## 2018-07-11 RX ADMIN — DILTIAZEM HYDROCHLORIDE 120 MG: 30 TABLET, FILM COATED ORAL at 05:07

## 2018-07-11 RX ADMIN — POTASSIUM PHOSPHATE, MONOBASIC 500 MG: 500 TABLET, SOLUBLE ORAL at 10:07

## 2018-07-11 NOTE — UM SECONDARY REVIEW
[facility]    IP Extended Stay > 10    Hospital Day # 18  Patient equiring BIPAP for Home use, waiting on Auth from Insurance for   Home BIPAP      LOS: approved an agreement with D/C plan     Avoidable days captured

## 2018-07-11 NOTE — PROGRESS NOTES
SECOND TIME:  TN taught S&S for CHEST PAIN home care with pt and with teach back:  1. Pain in chest, 2.Pressure in chest. TN placed education sheet in [employer]..     Help at home will be from [employer], LINO, assisting in pt's recovery.      .  TN checked [employer] for cm/sw name, spectra link #, pt contact, and d/c disposition....Drea Rg RN, BSN, STN CCM

## 2018-07-11 NOTE — PROGRESS NOTES
[facility] Hospital Medicine  Progress Note    Patient Name: Yasmeen Palm  MRN: [id number]  Patient Class: IP- Inpatient   Admission Date: 6/24/2018  Length of Stay: 17 days  Attending Physician: Michelle Dumont MD  Primary Care Provider: Angel Orourke Jr, MD        Subjective:     Principal Problem:Acute hypercapnic respiratory failure    HPI:  Yasmeen Palm is a 68 y.o. female that (in part)  has a past medical history of Anticoagulant long-term use; Arthritis; Asthma; CHF (congestive heart failure); COPD (chronic obstructive pulmonary disease); Coronary artery disease; Depression; Diabetes mellitus; GERD (gastroesophageal reflux disease); and Hypertension.  has a past surgical history that includes Abdominal surgery; Cardiac surgery; and Hernia repair. Presents to [facility] Department complaining of chest pain .  She reports compliance with her home medication regimen, including Xarelto.     Description of symptoms  Location:  Substernal  Onset:  Acute onset 2 days ago  Character:  The patient remained; moderate severity  Frequency:  Daily  Duration:  each episode lasts several minutes at a time  Associated Symptoms:  Diaphoresis, shortness of breath, weakness and fatigue  Radiation:  Recent the chest  Exacerbating factors:  Worse on exertion  Relieving factors:  Minimal relief with supplemental oxygen     In the emergency department routine laboratory studies, chest x-ray, EKG, cardiac enzymes were obtained.  EKG findings were concerning the case was discussed with cardiologist, Dr. Pulido.  It was determined that her EKG was not consistent with STEMI and she has been chest pain-free since arrival.  She had been given Lovenox, aspirin, and plan was to continue trending troponins and monitor closely on telemetry.    Hospital Course:  Ms. Palm was admitted to the hospital originally on 6/24/18 for chest pain. She was later sent to the ICU with  acute hypercapnic respiratory failure the same day on BIPAP.  She quickly improved and was sent to the floor on 6/25.  Cards was consulted for NSTEMI with noted troponin increase and was planning on LHC on 6/26. However, the patient was sent back to the ICU on 6/26 with hypercapnic respiratory failure with a C02 of > 100 and was intubated. Pulmonary was consulted. Pt clinically improved from respiratory standpoint and was extubated on 6/27 to NC, but she did complain of chest discomfort. Cardiology was notified and patient was taken for LHC on 6/27 which was only remarkable for non-obstructive CAD and did not require intervention. Pt was given IVF post procedure and the next day developed increased SOB and required BIPAP. Pt was treated with IV lasix with good response with much improved respiratory status after output of 1L. Pt also being treated for COPD exacerbation with IV steroids, NEBS and doxycycline. ECHO performed on 6/25/2018 remarkable for EF=50-55% + grade 2 diastolic dysfunction. Pt diuresed and changed to maintenance PO lasix regimen. She as started on BIPAP due to increased pCO2 on ABG and lethargy. Pt will need BIPAP/home ventilator for use at home and pending approval of device for discharge home with home health.    Interval History: Pt had no acute issues overnight. Breathing at baseline and getting used to using BIPAP machine at night.     Review of Systems   Constitutional: Negative for chills and fever.   Respiratory: Negative for shortness of breath.    Cardiovascular: Negative for chest pain.     Objective:     Vital Signs (Most Recent):  Temp: 98 °F (36.7 °C) (07/11/18 0729)  Pulse: 91 (07/11/18 0729)  Resp: 16 (07/11/18 0729)  BP: 127/60 (07/11/18 0729)  SpO2: 97 % (07/11/18 0900) Vital Signs (24h Range):  Temp:  [97.4 °F (36.3 °C)-98.7 °F (37.1 °C)] 98 °F (36.7 °C)  Pulse:  [] 91  Resp:  [16-20] 16  SpO2:  [96 %-100 %] 97 %  BP: ()/(52-66) 127/60     Weight: 81.7 kg (180 lb 1.9  oz)  Body mass index is 29.97 kg/m².  No intake or output data in the 24 hours ending 07/11/18 0910   Physical Exam   Constitutional: She is oriented to person, place, and time. She appears well-developed. No distress.   HENT:   Head: Normocephalic and atraumatic.   Eyes: EOM are normal. Pupils are equal, round, and reactive to light.   Neck: Normal range of motion. Neck supple.   Cardiovascular: Normal rate and regular rhythm.    Pulmonary/Chest:   Slight decrease at bases, no wheezing   Abdominal: Soft. Bowel sounds are normal.   Musculoskeletal: Normal range of motion. She exhibits edema.   Neurological: She is alert and oriented to person, place, and time.   Skin: Skin is warm and dry. Capillary refill takes less than 2 seconds. She is not diaphoretic.   Psychiatric: She has a normal mood and affect. Her behavior is normal. Thought content normal.       Significant Labs: All pertinent labs within the past 24 hours have been reviewed.    Significant Imaging: I have reviewed and interpreted all pertinent imaging results/findings within the past 24 hours.    Assessment/Plan:      * Acute hypercapnic respiratory failure    - Due to COPD exacerbation   - s/p intubation and treatment with IV steroids, NEBS  - Pulmonary following and extubated on 6/27  - Respiratory status was worse on 6/28 due to volume overload  - s/p IV lasix with good output and now back down to NC  - Pt to continue BIPAP QHS and PRN  - Will need home BIPAP/home ventilator to treat patient for high pCO2  - CRT linked to COPD  - Pending home ventilator approval for discharge home with home health        Hyperkalemia    - Mild elevation  - Treated with kayexalate x 1, will repeat lab in am          Fall    - Slipped while getting up on 7/6  - Head CT and all x-ray are negative  - Resolved        Debility    - PT/OT, will arrange for home health at time of discharge        PAF (paroxysmal atrial fibrillation)    - Rate controlled and anticoagulation  resumed with Xarelto on 6/27 post C        Neuropathy    - No acute issues         Non-STEMI (non-ST elevated myocardial infarction)    - Followed by Cardiology  - s/p [facility] on 6/27 only remarkable for non-obstructive CAD  - Continue medical management as per Cardiology with ASA, statin, and patient will not be placed on plavix given she will be fully anticoagulated with Xarelto        Acute exacerbation of chronic obstructive pulmonary disease (COPD)    - C02 > 100 and s/p intubation  - Treated with NEBS and steroids  - Extubated on 6/27 and respiratory status with worsening due to volume overload issue   - was On prednisone.on nebulizer  - Does have home oxygen  - On BIPAP due to increased PCO2 on ABG, lethargic condition  - Arranging for home ventilator for discharge   - CRT due to COPD  Off prednisone.        Elevated brain natriuretic peptide (BNP) level    - Diuresis resumed         Elevated troponin I level    - As discussed under NSTEMI  - No sign of obstruction on [facility].        Acute diastolic CHF (congestive heart failure)    - Due to volume overload  - Last ECHO with EF=50-55% + grade 2 diastolic dysfunction on 6/26  - Improved with IV lasix and now compensated and on PO lasix regimen  - Stable        Obesity    - Weight reduction as outpatient after all acute issues addressed        Chronic anticoagulation    - Patient on Xarelto for PAF which has been resumed        History of deep vein thrombosis    - Resumed anticoagulation         History of pulmonary embolism    - Xarelto resumed .        Anemia of chronic disease    - H/H with slight drop noted, but no bleeding  - Will continue to monitor        Malignant hypertension    - Difficult to control BP  - Continue lisinopril, and restarted hydralazine, clonidine, diltiazem today (held due to previous hypotensive issues)  - PRN labetalol but overall much improved control        Tobacco abuse    - Smoking cessation counseling done        Gastroesophageal  reflux disease without esophagitis    - Continue PPI        Type 2 diabetes mellitus, controlled    - Complications of peripheral neuropathy.   - Increased basal and prandial insulin  - Glucose control improving with insulin adjustment and weaned off prednisone by today        Chronic obstructive pulmonary disease with acute exacerbation    - As discussed above        Coronary artery disease involving native coronary artery of native heart with angina pectoris    - [facility] on 6/27 as discussed above  - As per Cardiology          VTE Risk Mitigation         Ordered     rivaroxaban tablet 20 mg  With dinner      06/27/18 7180              Michelle Dumont MD  Department of Hospital Medicine   [facility]

## 2018-07-11 NOTE — SUBJECTIVE & OBJECTIVE
Interval History: Pt had no acute issues overnight. Breathing at baseline and getting used to using BIPAP machine at night.     Review of Systems   Constitutional: Negative for chills and fever.   Respiratory: Negative for shortness of breath.    Cardiovascular: Negative for chest pain.     Objective:     Vital Signs (Most Recent):  Temp: 98 °F (36.7 °C) (07/11/18 0729)  Pulse: 91 (07/11/18 0729)  Resp: 16 (07/11/18 0729)  BP: 127/60 (07/11/18 0729)  SpO2: 97 % (07/11/18 0900) Vital Signs (24h Range):  Temp:  [97.4 °F (36.3 °C)-98.7 °F (37.1 °C)] 98 °F (36.7 °C)  Pulse:  [] 91  Resp:  [16-20] 16  SpO2:  [96 %-100 %] 97 %  BP: ()/(52-66) 127/60     Weight: 81.7 kg (180 lb 1.9 oz)  Body mass index is 29.97 kg/m².  No intake or output data in the 24 hours ending 07/11/18 0910   Physical Exam   Constitutional: She is oriented to person, place, and time. She appears well-developed. No distress.   HENT:   Head: Normocephalic and atraumatic.   Eyes: EOM are normal. Pupils are equal, round, and reactive to light.   Neck: Normal range of motion. Neck supple.   Cardiovascular: Normal rate and regular rhythm.    Pulmonary/Chest:   Slight decrease at bases, no wheezing   Abdominal: Soft. Bowel sounds are normal.   Musculoskeletal: Normal range of motion. She exhibits edema.   Neurological: She is alert and oriented to person, place, and time.   Skin: Skin is warm and dry. Capillary refill takes less than 2 seconds. She is not diaphoretic.   Psychiatric: She has a normal mood and affect. Her behavior is normal. Thought content normal.       Significant Labs: All pertinent labs within the past 24 hours have been reviewed.    Significant Imaging: I have reviewed and interpreted all pertinent imaging results/findings within the past 24 hours.

## 2018-07-11 NOTE — ASSESSMENT & PLAN NOTE
- C02 > 100 and s/p intubation  - Treated with NEBS and steroids  - Extubated on 6/27 and respiratory status with worsening due to volume overload issue   - was On prednisone.on nebulizer  - Does have home oxygen  - On BIPAP due to increased PCO2 on ABG, lethargic condition  - Arranging for home ventilator for discharge   - CRT due to COPD  Off prednisone.

## 2018-07-11 NOTE — PROGRESS NOTES
TN attempted to contact Aileen ([facility]). No answer. Awaiting call back.     3:57- TN received a call from Althea with [facility] inquiring if an update was provided. TN informed Althea that Aileen was contacted twice but no answer. Althea will contact her supervisor. Awaiting call back.

## 2018-07-12 PROBLEM — R50.9 FEVER: Status: ACTIVE | Noted: 2018-07-12

## 2018-07-12 LAB
ANION GAP SERPL CALC-SCNC: 5 MMOL/L
ANISOCYTOSIS BLD QL SMEAR: SLIGHT
BASOPHILS # BLD AUTO: 0.01 K/UL
BASOPHILS NFR BLD: 0 %
BILIRUB UR QL STRIP: NEGATIVE
BUN SERPL-MCNC: 23 MG/DL
CALCIUM SERPL-MCNC: 9.2 MG/DL
CHLORIDE SERPL-SCNC: 94 MMOL/L
CLARITY UR: ABNORMAL
CO2 SERPL-SCNC: 40 MMOL/L
COLOR UR: YELLOW
CREAT SERPL-MCNC: 0.8 MG/DL
DIFFERENTIAL METHOD: ABNORMAL
EOSINOPHIL # BLD AUTO: 0.1 K/UL
EOSINOPHIL NFR BLD: 0.6 %
ERYTHROCYTE [DISTWIDTH] IN BLOOD BY AUTOMATED COUNT: 19 %
EST. GFR  (AFRICAN AMERICAN): >60 ML/MIN/1.73 M^2
EST. GFR  (NON AFRICAN AMERICAN): >60 ML/MIN/1.73 M^2
GLUCOSE SERPL-MCNC: 155 MG/DL
GLUCOSE UR QL STRIP: NEGATIVE
HCT VFR BLD AUTO: 29.8 %
HGB BLD-MCNC: 8.5 G/DL
HGB UR QL STRIP: NEGATIVE
HYPOCHROMIA BLD QL SMEAR: ABNORMAL
KETONES UR QL STRIP: NEGATIVE
LEUKOCYTE ESTERASE UR QL STRIP: ABNORMAL
LYMPHOCYTES # BLD AUTO: 1.3 K/UL
LYMPHOCYTES NFR BLD: 5.5 %
MAGNESIUM SERPL-MCNC: 1.8 MG/DL
MCH RBC QN AUTO: 25.4 PG
MCHC RBC AUTO-ENTMCNC: 28.5 G/DL
MCV RBC AUTO: 89 FL
MICROSCOPIC COMMENT: ABNORMAL
MONOCYTES # BLD AUTO: 2.7 K/UL
MONOCYTES NFR BLD: 10.9 %
NEUTROPHILS # BLD AUTO: 20.1 K/UL
NEUTROPHILS NFR BLD: 83 %
NITRITE UR QL STRIP: NEGATIVE
PH UR STRIP: 5 [PH] (ref 5–8)
PHOSPHATE SERPL-MCNC: 3 MG/DL
PLATELET # BLD AUTO: 250 K/UL
PMV BLD AUTO: 12.1 FL
POCT GLUCOSE: 146 MG/DL (ref 70–110)
POCT GLUCOSE: 178 MG/DL (ref 70–110)
POCT GLUCOSE: 202 MG/DL (ref 70–110)
POCT GLUCOSE: 60 MG/DL (ref 70–110)
POCT GLUCOSE: 71 MG/DL (ref 70–110)
POCT GLUCOSE: 73 MG/DL (ref 70–110)
POCT GLUCOSE: 79 MG/DL (ref 70–110)
POTASSIUM SERPL-SCNC: 3.8 MMOL/L
PROT UR QL STRIP: NEGATIVE
RBC # BLD AUTO: 3.34 M/UL
RBC #/AREA URNS HPF: 0 /HPF (ref 0–4)
SODIUM SERPL-SCNC: 139 MMOL/L
SP GR UR STRIP: 1.01 (ref 1–1.03)
SQUAMOUS #/AREA URNS HPF: 5 /HPF
URN SPEC COLLECT METH UR: ABNORMAL
UROBILINOGEN UR STRIP-ACNC: NEGATIVE EU/DL
WBC # BLD AUTO: 24.27 K/UL
WBC #/AREA URNS HPF: 2 /HPF (ref 0–5)
YEAST URNS QL MICRO: ABNORMAL

## 2018-07-12 PROCEDURE — 25000003 PHARM REV CODE 250: Performed by: HOSPITALIST

## 2018-07-12 PROCEDURE — 84100 ASSAY OF PHOSPHORUS: CPT

## 2018-07-12 PROCEDURE — 94660 CPAP INITIATION&MGMT: CPT

## 2018-07-12 PROCEDURE — 83735 ASSAY OF MAGNESIUM: CPT

## 2018-07-12 PROCEDURE — 25000003 PHARM REV CODE 250: Performed by: EMERGENCY MEDICINE

## 2018-07-12 PROCEDURE — 36415 COLL VENOUS BLD VENIPUNCTURE: CPT

## 2018-07-12 PROCEDURE — 21400001 HC TELEMETRY ROOM

## 2018-07-12 PROCEDURE — 63600175 PHARM REV CODE 636 W HCPCS: Performed by: HOSPITALIST

## 2018-07-12 PROCEDURE — 97535 SELF CARE MNGMENT TRAINING: CPT

## 2018-07-12 PROCEDURE — 94799 UNLISTED PULMONARY SVC/PX: CPT

## 2018-07-12 PROCEDURE — 63600175 PHARM REV CODE 636 W HCPCS: Performed by: INTERNAL MEDICINE

## 2018-07-12 PROCEDURE — 94761 N-INVAS EAR/PLS OXIMETRY MLT: CPT

## 2018-07-12 PROCEDURE — 25000003 PHARM REV CODE 250: Performed by: INTERNAL MEDICINE

## 2018-07-12 PROCEDURE — 80048 BASIC METABOLIC PNL TOTAL CA: CPT

## 2018-07-12 PROCEDURE — 85025 COMPLETE CBC W/AUTO DIFF WBC: CPT

## 2018-07-12 PROCEDURE — 27000221 HC OXYGEN, UP TO 24 HOURS

## 2018-07-12 PROCEDURE — 99900035 HC TECH TIME PER 15 MIN (STAT)

## 2018-07-12 PROCEDURE — 94640 AIRWAY INHALATION TREATMENT: CPT

## 2018-07-12 PROCEDURE — 25000242 PHARM REV CODE 250 ALT 637 W/ HCPCS: Performed by: HOSPITALIST

## 2018-07-12 PROCEDURE — 87040 BLOOD CULTURE FOR BACTERIA: CPT

## 2018-07-12 PROCEDURE — 81000 URINALYSIS NONAUTO W/SCOPE: CPT

## 2018-07-12 RX ORDER — CLINDAMYCIN PHOSPHATE 600 MG/50ML
600 INJECTION, SOLUTION INTRAVENOUS
Status: DISCONTINUED | OUTPATIENT
Start: 2018-07-12 | End: 2018-07-12

## 2018-07-12 RX ORDER — ONDANSETRON 2 MG/ML
4 INJECTION INTRAMUSCULAR; INTRAVENOUS EVERY 6 HOURS PRN
Status: DISCONTINUED | OUTPATIENT
Start: 2018-07-12 | End: 2018-07-27 | Stop reason: HOSPADM

## 2018-07-12 RX ORDER — FUROSEMIDE 10 MG/ML
40 INJECTION INTRAMUSCULAR; INTRAVENOUS ONCE
Status: COMPLETED | OUTPATIENT
Start: 2018-07-12 | End: 2018-07-12

## 2018-07-12 RX ORDER — IPRATROPIUM BROMIDE AND ALBUTEROL SULFATE 2.5; .5 MG/3ML; MG/3ML
3 SOLUTION RESPIRATORY (INHALATION)
Status: DISCONTINUED | OUTPATIENT
Start: 2018-07-12 | End: 2018-07-13

## 2018-07-12 RX ADMIN — SOTALOL HYDROCHLORIDE 80 MG: 80 TABLET ORAL at 08:07

## 2018-07-12 RX ADMIN — GABAPENTIN 300 MG: 300 CAPSULE ORAL at 03:07

## 2018-07-12 RX ADMIN — LISINOPRIL 10 MG: 5 TABLET ORAL at 08:07

## 2018-07-12 RX ADMIN — POLYETHYLENE GLYCOL 3350 17 G: 17 POWDER, FOR SOLUTION ORAL at 08:07

## 2018-07-12 RX ADMIN — GABAPENTIN 300 MG: 300 CAPSULE ORAL at 08:07

## 2018-07-12 RX ADMIN — Medication 16 G: at 04:07

## 2018-07-12 RX ADMIN — GUAIFENESIN 600 MG: 600 TABLET, EXTENDED RELEASE ORAL at 08:07

## 2018-07-12 RX ADMIN — PIPERACILLIN AND TAZOBACTAM 4.5 G: 4; .5 INJECTION, POWDER, LYOPHILIZED, FOR SOLUTION INTRAVENOUS; PARENTERAL at 05:07

## 2018-07-12 RX ADMIN — INSULIN ASPART 8 UNITS: 100 INJECTION, SOLUTION INTRAVENOUS; SUBCUTANEOUS at 12:07

## 2018-07-12 RX ADMIN — ONDANSETRON 4 MG: 2 INJECTION INTRAMUSCULAR; INTRAVENOUS at 02:07

## 2018-07-12 RX ADMIN — DILTIAZEM HYDROCHLORIDE 120 MG: 30 TABLET, FILM COATED ORAL at 05:07

## 2018-07-12 RX ADMIN — INSULIN ASPART 2 UNITS: 100 INJECTION, SOLUTION INTRAVENOUS; SUBCUTANEOUS at 12:07

## 2018-07-12 RX ADMIN — GLUCAGON 1 MG: KIT at 08:07

## 2018-07-12 RX ADMIN — IPRATROPIUM BROMIDE AND ALBUTEROL SULFATE 3 ML: .5; 2.5 SOLUTION RESPIRATORY (INHALATION) at 02:07

## 2018-07-12 RX ADMIN — ATORVASTATIN CALCIUM 80 MG: 40 TABLET, FILM COATED ORAL at 08:07

## 2018-07-12 RX ADMIN — DILTIAZEM HYDROCHLORIDE 120 MG: 30 TABLET, FILM COATED ORAL at 09:07

## 2018-07-12 RX ADMIN — ASPIRIN 81 MG CHEWABLE TABLET 81 MG: 81 TABLET CHEWABLE at 08:07

## 2018-07-12 RX ADMIN — FUROSEMIDE 40 MG: 10 INJECTION, SOLUTION INTRAMUSCULAR; INTRAVENOUS at 05:07

## 2018-07-12 RX ADMIN — POTASSIUM PHOSPHATE, MONOBASIC 500 MG: 500 TABLET, SOLUBLE ORAL at 08:07

## 2018-07-12 RX ADMIN — FUROSEMIDE 40 MG: 40 TABLET ORAL at 08:07

## 2018-07-12 RX ADMIN — PANTOPRAZOLE SODIUM 40 MG: 40 TABLET, DELAYED RELEASE ORAL at 08:07

## 2018-07-12 RX ADMIN — IPRATROPIUM BROMIDE AND ALBUTEROL SULFATE 3 ML: .5; 2.5 SOLUTION RESPIRATORY (INHALATION) at 08:07

## 2018-07-12 RX ADMIN — DOCUSATE SODIUM 100 MG: 100 CAPSULE, LIQUID FILLED ORAL at 08:07

## 2018-07-12 RX ADMIN — INSULIN ASPART 8 UNITS: 100 INJECTION, SOLUTION INTRAVENOUS; SUBCUTANEOUS at 08:07

## 2018-07-12 RX ADMIN — HYDRALAZINE HYDROCHLORIDE 50 MG: 25 TABLET ORAL at 05:07

## 2018-07-12 RX ADMIN — CLONIDINE HYDROCHLORIDE 0.1 MG: 0.1 TABLET ORAL at 08:07

## 2018-07-12 RX ADMIN — ACETAMINOPHEN 650 MG: 325 TABLET, FILM COATED ORAL at 03:07

## 2018-07-12 NOTE — PROGRESS NOTES
patient hard to arouse. bp 87/47, p 80, r 18, temp 99.8, o2 sat 97%. Called Dr. Olguin and stated to put patient on bipap due to possible low CO2 levels. Will continue to monitor.

## 2018-07-12 NOTE — PLAN OF CARE
Problem: Occupational Therapy Goal  Goal: Occupational Therapy Goal  Goals to be met by: 7/13/18     Patient will increase functional independence with ADLs by performing:    UE Dressing with Set-up Assistance.  LE Dressing with Minimal Assistance.  Grooming while seated with Supervision.  Toileting from bedside commode with Contact Guard Assistance for hygiene and clothing management.   Supine to sit with Set-up Assistance.  Toilet transfer to bedside commode with Contact Guard Assistance.  Upper extremity exercise program x10 reps per handout, with assistance as needed.      Outcome: Ongoing (interventions implemented as appropriate)  Patient educated on OT role and goals today. Patient expresses that OT goals still remain appropriate and she would like to continue to work toward them. Continue current plan of care.

## 2018-07-12 NOTE — PROGRESS NOTES
Report received from off going nurse, FABY Reaves. Patient AAO. No signs of distress noted. Call light in reach. Bed low and locked. Will continue to monitor.

## 2018-07-12 NOTE — PROGRESS NOTES
TN spoke with Aileen with [facility]. Aileen stated that she emailed her Medical Director this morning to review patient's clinicals and that a response should be given today.    8:38- Called received from Aileen ([facility]). Authorization approved for Bipap. Auth #- [id number].      8:47- TN contacted [facility] to inform of patient's discharge today. Spoke with Tyron. Tyron will have a nurse visit patient's home on tomorrow.     8:50- Sangeeta beth [facility] informed that patient's authorization for Bipap has been granted.     9:48- Sangeeta informed that patient will not discharge today. Sangeeta will have equipment delivered to hospital either today or tomorrow. Awaiting call back.     9:51- Spoke with Kourtney with [facility]. Kourtney informed that patient will not discharge today. Kourtney will inform Steven.

## 2018-07-12 NOTE — PLAN OF CARE
Authorization approved for Bipap. Patient will discharge when temperature and WBC is WNL. Patient informed.        07/12/18 8386   Discharge Reassessment   Assessment Type Discharge Planning Reassessment   Provided patient/caregiver education on the expected discharge date and the discharge plan Yes   Do you have any problems affording any of your prescribed medications? No   Discharge Plan A Home Health;Home with family;Home   Discharge Plan B Home;Home with family;Home Health   Patient choice form signed by patient/caregiver No   Can the patient answer the patient profile reliably? Yes, cognitively intact   How does the patient rate their overall health at the present time? Fair   Describe the patient's ability to walk at the present time. Walks with the help of equipment   How often would a person be available to care for the patient? Often   Number of comorbid conditions (as recorded on the chart) Five or more

## 2018-07-12 NOTE — PLAN OF CARE
Problem: Infection, Risk/Actual (Adult)  Intervention: Manage Suspected/Actual Infection   07/12/18 1723   Safety Interventions   Isolation Precautions environmental surveillance   Prevent/Manage Colorectal Surgical Infection   Fever Reduction/Comfort Measures medication administered     Intervention: Prevent Infection/Maximize Resistance   07/12/18 1723   Nutrition Interventions   Glycemic Management blood glucose monitoring   Oral Nutrition Promotion calorie dense foods provided   Respiratory Interventions   Airway/Ventilation Management airway patency maintained       Goal: Identify Related Risk Factors and Signs and Symptoms  Related risk factors and signs and symptoms are identified upon initiation of Human Response Clinical Practice Guideline (CPG)   Outcome: Ongoing (interventions implemented as appropriate)   07/12/18 1723   Infection, Risk/Actual   Related Risk Factors (Infection, Risk/Actual) chronic illness/condition   Signs and Symptoms (Infection, Risk/Actual) blood glucose changes;body temperature changes      07/12/18 1723   Infection, Risk/Actual   Related Risk Factors (Infection, Risk/Actual) chronic illness/condition   Signs and Symptoms (Infection, Risk/Actual) blood glucose changes;body temperature changes;heart rate increase;lab value changes     Goal: Infection Prevention/Resolution  Patient will demonstrate the desired outcomes by discharge/transition of care.   Outcome: Ongoing (interventions implemented as appropriate)   07/12/18 1723   Infection, Risk/Actual (Adult)   Infection Prevention/Resolution making progress toward outcome

## 2018-07-12 NOTE — PROGRESS NOTES
"   07/12/18 1343   Vital Signs   Temp (!) 102.5 °F (39.2 °C)   Temp src Oral   Pulse 94   Heart Rate Source Monitor   Resp 17   SpO2 96 %   BP (!) 118/57   BP Location Left arm   Patient Position Lying   Assessments (Pre/Post)   Level of Consciousness (AVPU) alert    Pt complaints of not feeling well. Pt. states "I feel like I'm leaving y'all". Pt displays an occasional involuntary movement. Pt.  AAOx3. V/s obtained as above. Information as above reported to primary nurse, David HOBBS.  "

## 2018-07-12 NOTE — PT/OT/SLP PROGRESS
Occupational Therapy   Treatment    Name: Yasmeen Palm  MRN: [id number]  Admitting Diagnosis:  Acute hypercapnic respiratory failure       Recommendations:     Discharge Recommendations: home health OT  Discharge Equipment Recommendations:  none  Barriers to discharge:  None    Subjective     Pain/Comfort:  · Pain Rating 1: 0/10    Patients cultural, spiritual, Sikh conflicts given the current situation: na    Objective:     Patient found with: telemetry, oxygen, bed alarm, pulse ox (continuous)    General Precautions: Standard, fall, respiratory   Orthopedic Precautions:N/A   Braces: N/A     Occupational Performance:    Patient declined OOB mobility at this time secondary to fatigue. OT education provided today.      Patient left HOB elevated with all lines intact, call button in reach, bed alarm on and nurse notified    [facility] 6 Click:  [facility] Total Score: 14    Treatment & Education:  Patient thoroughly educated on acute OT services as bedside.  Discussed OT role, goals, and benefits of OOB mobility today secondary to patient's decreased participation and limited tolerance for therapy.  Patient expressed that current OT goals were appropriate and that she would like to continue OT services to work toward these goals as able.    Education:    Assessment:     Yasmeen Palm is a 68 y.o. female with a medical diagnosis of Acute hypercapnic respiratory failure.  She presents with the following performance deficits affecting function:weakness, impaired endurance, impaired self care skills, impaired functional mobilty, gait instability, impaired balance, decreased upper extremity function, decreased lower extremity function, decreased safety awareness, impaired cardiopulmonary response to activity.  Patient educated on OT role and goals today. Patient expresses that OT goals still remain appropriate and she would like to continue to work toward them. Continue current plan of care.     Rehab Prognosis:  fair; patient  would benefit from acute skilled OT services to address these deficits and reach maximum level of function.       Plan:     Patient to be seen 4 x/week to address the above listed problems via self-care/home management, therapeutic activities, therapeutic exercises  · Plan of Care Expires: 08/01/18  · Plan of Care Reviewed with: patient    This Plan of care has been discussed with the patient who was involved in its development and understands and is in agreement with the identified goals and treatment plan    GOALS:    Occupational Therapy Goals     Not on file          Multidisciplinary Problems (Resolved)        Problem: Occupational Therapy Goal    Goal Priority Disciplines Outcome Interventions   Occupational Therapy Goal   (Resolved)     OT, PT/OT Outcome(s) achieved    Description:  Goals to be met by: 7/13/18     Patient will increase functional independence with ADLs by performing:    UE Dressing with Set-up Assistance.  LE Dressing with Minimal Assistance.  Grooming while seated with Supervision.  Toileting from bedside commode with Contact Guard Assistance for hygiene and clothing management.   Supine to sit with Set-up Assistance.  Toilet transfer to bedside commode with Contact Guard Assistance.  Upper extremity exercise program x10 reps per handout, with assistance as needed.                      Time Tracking:     OT Date of Treatment: 07/12/18  OT Start Time: 1114  OT Stop Time: 1122  OT Total Time (min): 8 min    Billable Minutes:Self Care/Home Management 8    JOAQUIN Rockwell  7/12/2018

## 2018-07-12 NOTE — PROGRESS NOTES
Rapid respond called on patient at 1:46pm. Dr. Olguin called and informed. Few orders in place. Increase temp noted. bp and blood glucose in normal range. Will following through with stat orders. Will continue to monitor patient.

## 2018-07-12 NOTE — PROGRESS NOTES
Report to nurse that  called a nurse to the room, patient was less responsive. /59, P87, R 20, Temp 100.7, o2 sat 97% on 3l per nc. Blood glucose 60. Prn tylenol was given earlier for elevated temp. Prn glucose tabs given per orders and tolerated crushed. Will continue to monitor.

## 2018-07-12 NOTE — PT/OT/SLP PROGRESS
"Physical Therapy      Patient Name:  Yasmeen Palm   MRN:  [id number].      Patient not seen today secondary to Patient ill (Comment).  Once entering pt's room, pt displayed increased lethargy with increased mucous covering chin and chest.  Pt easy to arouse and able to give Birthday and full name. Demonstrates increased BUE, face, and eye twitching with difficulty making eye contact. Pt declined therapy services at this time due to not feeling well. Pt states " I feel like something is wrong with my body."  Therapist and PCT assisted pt to HOB with Max A x2 and rolling to R with max A.  Pt left R sidelying with pillow b/w B knees, call button in reach, and Collette PCT present.  Pt's nurse, Niharika, at lunch.  Katie HOBBS covering for Niharika, notified of pt's symptoms.  Will follow-up as able.    Medina [facility] PTA    "

## 2018-07-13 PROBLEM — R07.9 CHEST PAIN: Status: ACTIVE | Noted: 2018-07-13

## 2018-07-13 PROBLEM — A41.9 SEPSIS: Status: ACTIVE | Noted: 2018-07-12

## 2018-07-13 LAB
ALLENS TEST: ABNORMAL
ANION GAP SERPL CALC-SCNC: 12 MMOL/L
BASOPHILS # BLD AUTO: 0.01 K/UL
BASOPHILS NFR BLD: 0 %
BUN SERPL-MCNC: 31 MG/DL
CALCIUM SERPL-MCNC: 9.5 MG/DL
CHLORIDE SERPL-SCNC: 96 MMOL/L
CO2 SERPL-SCNC: 35 MMOL/L
CREAT SERPL-MCNC: 0.9 MG/DL
DELSYS: ABNORMAL
DIFFERENTIAL METHOD: ABNORMAL
EOSINOPHIL # BLD AUTO: 0 K/UL
EOSINOPHIL NFR BLD: 0 %
EP: 5
ERYTHROCYTE [DISTWIDTH] IN BLOOD BY AUTOMATED COUNT: 18.6 %
ERYTHROCYTE [SEDIMENTATION RATE] IN BLOOD BY WESTERGREN METHOD: 10 MM/H
EST. GFR  (AFRICAN AMERICAN): >60 ML/MIN/1.73 M^2
EST. GFR  (NON AFRICAN AMERICAN): >60 ML/MIN/1.73 M^2
FIO2: 40
GLUCOSE SERPL-MCNC: 108 MG/DL
HCO3 UR-SCNC: 42.9 MMOL/L (ref 24–28)
HCT VFR BLD AUTO: 28.3 %
HGB BLD-MCNC: 8.2 G/DL
IP: 10
LYMPHOCYTES # BLD AUTO: 1 K/UL
LYMPHOCYTES NFR BLD: 4.1 %
MAGNESIUM SERPL-MCNC: 1.9 MG/DL
MCH RBC QN AUTO: 25.7 PG
MCHC RBC AUTO-ENTMCNC: 29 G/DL
MCV RBC AUTO: 89 FL
MIN VOL: 7
MODE: ABNORMAL
MONOCYTES # BLD AUTO: 2.6 K/UL
MONOCYTES NFR BLD: 10.3 %
NEUTROPHILS # BLD AUTO: 21.3 K/UL
NEUTROPHILS NFR BLD: 86 %
PCO2 BLDA: 54.9 MMHG (ref 35–45)
PH SMN: 7.5 [PH] (ref 7.35–7.45)
PHOSPHATE SERPL-MCNC: 3.6 MG/DL
PLATELET # BLD AUTO: 227 K/UL
PMV BLD AUTO: 11.6 FL
PO2 BLDA: 65 MMHG (ref 80–100)
POC BE: 18 MMOL/L
POC SATURATED O2: 93 % (ref 95–100)
POC TCO2: 45 MMOL/L (ref 23–27)
POCT GLUCOSE: 138 MG/DL (ref 70–110)
POCT GLUCOSE: 168 MG/DL (ref 70–110)
POCT GLUCOSE: 171 MG/DL (ref 70–110)
POCT GLUCOSE: 215 MG/DL (ref 70–110)
POCT GLUCOSE: 66 MG/DL (ref 70–110)
POTASSIUM SERPL-SCNC: 4.4 MMOL/L
RBC # BLD AUTO: 3.19 M/UL
SAMPLE: ABNORMAL
SITE: ABNORMAL
SODIUM SERPL-SCNC: 143 MMOL/L
SP02: 95
WBC # BLD AUTO: 24.89 K/UL

## 2018-07-13 PROCEDURE — 27000221 HC OXYGEN, UP TO 24 HOURS

## 2018-07-13 PROCEDURE — 63600175 PHARM REV CODE 636 W HCPCS: Performed by: HOSPITALIST

## 2018-07-13 PROCEDURE — 94761 N-INVAS EAR/PLS OXIMETRY MLT: CPT

## 2018-07-13 PROCEDURE — 25000003 PHARM REV CODE 250: Performed by: INTERNAL MEDICINE

## 2018-07-13 PROCEDURE — 25000003 PHARM REV CODE 250: Performed by: HOSPITALIST

## 2018-07-13 PROCEDURE — 20000000 HC ICU ROOM

## 2018-07-13 PROCEDURE — 99291 CRITICAL CARE FIRST HOUR: CPT | Mod: ,,, | Performed by: INTERNAL MEDICINE

## 2018-07-13 PROCEDURE — 94640 AIRWAY INHALATION TREATMENT: CPT

## 2018-07-13 PROCEDURE — 25000003 PHARM REV CODE 250: Performed by: EMERGENCY MEDICINE

## 2018-07-13 PROCEDURE — 82803 BLOOD GASES ANY COMBINATION: CPT

## 2018-07-13 PROCEDURE — 80048 BASIC METABOLIC PNL TOTAL CA: CPT

## 2018-07-13 PROCEDURE — 99900035 HC TECH TIME PER 15 MIN (STAT)

## 2018-07-13 PROCEDURE — 36415 COLL VENOUS BLD VENIPUNCTURE: CPT

## 2018-07-13 PROCEDURE — 27000190 HC CPAP FULL FACE MASK W/VALVE

## 2018-07-13 PROCEDURE — 84100 ASSAY OF PHOSPHORUS: CPT

## 2018-07-13 PROCEDURE — 85025 COMPLETE CBC W/AUTO DIFF WBC: CPT

## 2018-07-13 PROCEDURE — 25000242 PHARM REV CODE 250 ALT 637 W/ HCPCS: Performed by: HOSPITALIST

## 2018-07-13 PROCEDURE — 83735 ASSAY OF MAGNESIUM: CPT

## 2018-07-13 PROCEDURE — 94660 CPAP INITIATION&MGMT: CPT

## 2018-07-13 RX ORDER — METOPROLOL TARTRATE 1 MG/ML
5 INJECTION, SOLUTION INTRAVENOUS EVERY 5 MIN PRN
Status: DISCONTINUED | OUTPATIENT
Start: 2018-07-13 | End: 2018-07-13

## 2018-07-13 RX ADMIN — RIVAROXABAN 20 MG: 20 TABLET, FILM COATED ORAL at 04:07

## 2018-07-13 RX ADMIN — AMIODARONE HYDROCHLORIDE 0.5 MG/MIN: 1.8 INJECTION, SOLUTION INTRAVENOUS at 09:07

## 2018-07-13 RX ADMIN — SODIUM CHLORIDE 500 ML: 0.9 INJECTION, SOLUTION INTRAVENOUS at 05:07

## 2018-07-13 RX ADMIN — DILTIAZEM HYDROCHLORIDE 120 MG: 30 TABLET, FILM COATED ORAL at 05:07

## 2018-07-13 RX ADMIN — IPRATROPIUM BROMIDE AND ALBUTEROL SULFATE 3 ML: .5; 2.5 SOLUTION RESPIRATORY (INHALATION) at 11:07

## 2018-07-13 RX ADMIN — POLYETHYLENE GLYCOL 3350 17 G: 17 POWDER, FOR SOLUTION ORAL at 09:07

## 2018-07-13 RX ADMIN — AMIODARONE HYDROCHLORIDE 1 MG/MIN: 1.8 INJECTION, SOLUTION INTRAVENOUS at 02:07

## 2018-07-13 RX ADMIN — AMIODARONE HYDROCHLORIDE 150 MG: 1.5 INJECTION, SOLUTION INTRAVENOUS at 02:07

## 2018-07-13 RX ADMIN — PIPERACILLIN AND TAZOBACTAM 4.5 G: 4; .5 INJECTION, POWDER, LYOPHILIZED, FOR SOLUTION INTRAVENOUS; PARENTERAL at 02:07

## 2018-07-13 RX ADMIN — GUAIFENESIN 600 MG: 600 TABLET, EXTENDED RELEASE ORAL at 09:07

## 2018-07-13 RX ADMIN — HYDRALAZINE HYDROCHLORIDE 50 MG: 25 TABLET ORAL at 05:07

## 2018-07-13 RX ADMIN — IPRATROPIUM BROMIDE AND ALBUTEROL SULFATE 3 ML: .5; 2.5 SOLUTION RESPIRATORY (INHALATION) at 07:07

## 2018-07-13 RX ADMIN — SODIUM CHLORIDE 500 ML: 0.9 INJECTION, SOLUTION INTRAVENOUS at 09:07

## 2018-07-13 RX ADMIN — INSULIN ASPART 1 UNITS: 100 INJECTION, SOLUTION INTRAVENOUS; SUBCUTANEOUS at 09:07

## 2018-07-13 RX ADMIN — ATORVASTATIN CALCIUM 80 MG: 40 TABLET, FILM COATED ORAL at 09:07

## 2018-07-13 RX ADMIN — PIPERACILLIN AND TAZOBACTAM 4.5 G: 4; .5 INJECTION, POWDER, LYOPHILIZED, FOR SOLUTION INTRAVENOUS; PARENTERAL at 09:07

## 2018-07-13 RX ADMIN — PIPERACILLIN AND TAZOBACTAM 4.5 G: 4; .5 INJECTION, POWDER, LYOPHILIZED, FOR SOLUTION INTRAVENOUS; PARENTERAL at 05:07

## 2018-07-13 RX ADMIN — INSULIN ASPART 2 UNITS: 100 INJECTION, SOLUTION INTRAVENOUS; SUBCUTANEOUS at 04:07

## 2018-07-13 RX ADMIN — ACETAMINOPHEN 650 MG: 325 TABLET, FILM COATED ORAL at 02:07

## 2018-07-13 NOTE — PROGRESS NOTES
Report received from off going nurse, FABY Meredith. Patient resting with eyes closed. Rise and fall of chest noted. No signs of distress noted. Bipap in place. Call light in reach. Bed low and locked. Will continue to monitor.

## 2018-07-13 NOTE — PROGRESS NOTES
Pt received to room from tele as a rescue with a fib, rvr.  Speech is not clear and she is lethargic upon admit.  Temp 101.3 and a fib with RVR per monitor.  Amiodarone order set to be started when available from pharmacy.  Tylenol given for fever.  BP borderline low, 90's systolic.

## 2018-07-13 NOTE — PROGRESS NOTES
Dr. Greenwood notified of hypotension, new orders noted and fluid bolus begun.  Dr. Pulido at bedside to see pt and is aware of hypotension

## 2018-07-13 NOTE — PROGRESS NOTES
Pt in bed resting; responds to verbal stimuli; ; no distress noted;  at bedside; cont pulse ox 95% on Bipap; bed in lowest position with bed alarm activated for safety; call light and personal belongings within reach; will continue to monitor.

## 2018-07-13 NOTE — PROGRESS NOTES
Notified by monitor tech that patient is in AFIB. Went to observed patient. Patient more alert than earlier. Denies any discomfort at this time. No signs of distress observed. Patient has history of AFIB. bipap remains in place.  at bedside. bedside teaching for home bipap in place. Will notify Dr. Greenwood.

## 2018-07-13 NOTE — PROGRESS NOTES
ICU nurse called for update, currently in patietns room. Will give a call back, number and name left

## 2018-07-13 NOTE — ASSESSMENT & PLAN NOTE
- Pt developed fever last night and WBC has trended up the past few days, and she had high temperature that caused distress  - Workup with CXR with no definitive infiltrate, blood culture pending and UA remarkable mainly for yeast.   - She displayed some respiratory distress and increased lethargy  - Possible micro-aspiration as a source of fever  - Will treat empirically with Zosyn and follow up cultures  - Will quickly de-escalate to Augmentin if WBC trend down, she remains AF, and cultures return negative

## 2018-07-13 NOTE — PROGRESS NOTES
[facility] Hospital Medicine  Progress Note    Patient Name: Yasmeen Palm  MRN: [id number]  Patient Class: IP- Inpatient   Admission Date: 6/24/2018  Length of Stay: 19 days  Attending Physician: Michelle Dumont MD  Primary Care Provider: Angel Orourke Jr, MD        Subjective:     Principal Problem:Fever    HPI:  Yasmeen Palm is a 68 y.o. female that (in part)  has a past medical history of Anticoagulant long-term use; Arthritis; Asthma; CHF (congestive heart failure); COPD (chronic obstructive pulmonary disease); Coronary artery disease; Depression; Diabetes mellitus; GERD (gastroesophageal reflux disease); and Hypertension.  has a past surgical history that includes Abdominal surgery; Cardiac surgery; and Hernia repair. Presents to [facility] Emergency Department complaining of chest pain .  She reports compliance with her home medication regimen, including Xarelto.     Description of symptoms  Location:  Substernal  Onset:  Acute onset 2 days ago  Character:  The patient remained; moderate severity  Frequency:  Daily  Duration:  each episode lasts several minutes at a time  Associated Symptoms:  Diaphoresis, shortness of breath, weakness and fatigue  Radiation:  Recent the chest  Exacerbating factors:  Worse on exertion  Relieving factors:  Minimal relief with supplemental oxygen     In the emergency department routine laboratory studies, chest x-ray, EKG, cardiac enzymes were obtained.  EKG findings were concerning the case was discussed with cardiologist, Dr. Pulido.  It was determined that her EKG was not consistent with STEMI and she has been chest pain-free since arrival.  She had been given Lovenox, aspirin, and plan was to continue trending troponins and monitor closely on telemetry.    Hospital Course:  Ms. Palm was admitted to the hospital originally on 6/24/18 for chest pain. She was later sent to the ICU with acute hypercapnic respiratory  failure the same day on BIPAP.  She quickly improved and was sent to the floor on 6/25.  Cards was consulted for NSTEMI with noted troponin increase and was planning on LHC on 6/26. However, the patient was sent back to the ICU on 6/26 with hypercapnic respiratory failure with a C02 of > 100 and was intubated. Pulmonary was consulted. Pt clinically improved from respiratory standpoint and was extubated on 6/27 to NC, but she did complain of chest discomfort. Cardiology was notified and patient was taken for LHC on 6/27 which was only remarkable for non-obstructive CAD and did not require intervention. Pt was given IVF post procedure and the next day developed increased SOB and required BIPAP. Pt was treated with IV lasix with good response with much improved respiratory status after output of 1L. Pt also being treated for COPD exacerbation with IV steroids, NEBS and doxycycline. ECHO performed on 6/25/2018 remarkable for EF=50-55% + grade 2 diastolic dysfunction. Pt diuresed and changed to maintenance PO lasix regimen. She as started on BIPAP due to increased pCO2 on ABG and lethargy. Pt will need BIPAP/home ventilator for use at home and pending approval of device for discharge home with home health.  She was febrile yesterday,possible aspiration,she has still leucocytosis,but she was also on steroid,bloOd culture is negative sofar,fever is currently improved,,letahrgic,will do ABG.    Interval History: Pt. Is lethargic,fever is improved.    Review of Systems   Constitutional: Negative for chills and fever.   Respiratory: Negative for shortness of breath.    Cardiovascular: Negative for chest pain.     Objective:     Vital Signs (Most Recent):  Temp: 98.1 °F (36.7 °C) (07/13/18 0716)  Pulse: 86 (07/13/18 0736)  Resp: (!) 26 (07/13/18 0736)  BP: (!) 89/55 (07/13/18 0716)  SpO2: 95 % (07/13/18 0918) Vital Signs (24h Range):  Temp:  [97.5 °F (36.4 °C)-102.5 °F (39.2 °C)] 98.1 °F (36.7 °C)  Pulse:  [78-94] 86  Resp:   [16-26] 26  SpO2:  [92 %-98 %] 95 %  BP: ()/(46-74) 89/55     Weight: 81.7 kg (180 lb 1.9 oz)  Body mass index is 29.97 kg/m².    Intake/Output Summary (Last 24 hours) at 07/13/18 1013  Last data filed at 07/13/18 0500   Gross per 24 hour   Intake              320 ml   Output                0 ml   Net              320 ml      Physical Exam   Constitutional: She is oriented to person, place, and time. She appears well-developed. No distress.   HENT:   Head: Normocephalic and atraumatic.   Eyes: EOM are normal. Pupils are equal, round, and reactive to light.   Neck: Normal range of motion. Neck supple.   Cardiovascular: Normal rate and regular rhythm.    Pulmonary/Chest:   Slight decrease at bases, no wheezing   Abdominal: Soft. Bowel sounds are normal.   Musculoskeletal: Normal range of motion. She exhibits edema.   Neurological: She is alert and oriented to person, place, and time.   Skin: Skin is warm and dry. Capillary refill takes less than 2 seconds. She is not diaphoretic.   Psychiatric: She has a normal mood and affect. Her behavior is normal. Thought content normal.       Significant Labs: All pertinent labs within the past 24 hours have been reviewed.    Significant Imaging: I have reviewed and interpreted all pertinent imaging results/findings within the past 24 hours.    Assessment/Plan:      * Fever    - Pt developed fever last night and WBC has trended up the past few days, and she had high temperature that caused distress  - Workup with CXR with no definitive infiltrate, blood culture pending and UA remarkable mainly for yeast.   - She displayed some respiratory distress and increased lethargy  - Possible micro-aspiration as a source of fever  - Will treat empirically with Zosyn and follow up cultures  Patient is uncontrolled diabetic who ate strawberries short cake with with whip cream last night          Hyperkalemia    - Resolved            Fall    - Slipped while getting up on 7/6  - Head CT  and all x-ray are negative  - Resolved        Debility    - PT/OT, will arrange for home health at time of discharge        Acute hypercapnic respiratory failure    - Due to COPD exacerbation   - s/p intubation and treatment with IV steroids, NEBS  - Pulmonary following and extubated on 6/27  - Respiratory status was worse on 6/28 due to volume overload  - s/p IV lasix with good output and now back down to NC  - Pt to continue BIPAP QHS and PRN  - Will need home BIPAP/home ventilator to treat patient for high pCO2  - CRT linked to COPD  - Pending home ventilator approval for discharge home with home health        PAF (paroxysmal atrial fibrillation)    - Rate controlled and anticoagulation resumed with Xarelto on 6/27 post [facility]        Neuropathy    - No acute issues         Non-STEMI (non-ST elevated myocardial infarction)    - Followed by Cardiology  - s/p [facility] on 6/27 only remarkable for non-obstructive CAD  - Continue medical management as per Cardiology with ASA, statin, and patient will not be placed on plavix given she will be fully anticoagulated with Xarelto        Acute exacerbation of chronic obstructive pulmonary disease (COPD)    - C02 > 100 and s/p intubation  - Treated with NEBS and steroids  - Extubated on 6/27 and respiratory status with worsening due to volume overload issue   - Pt has been weaned off prednisone and on nebulizer  - Does have home oxygen  - On BIPAP due to increased PCO2 on ABG, lethargic condition  - Arranging for home ventilator/BIPAP for discharge   - CRT due to COPD          Elevated brain natriuretic peptide (BNP) level    - Diuresis resumed         Elevated troponin I level    - As discussed under NSTEMI  - No sign of obstruction on [facility].        Acute diastolic CHF (congestive heart failure)    - Due to volume overload  - Last ECHO with EF=50-55% + grade 2 diastolic dysfunction on 6/26  - Improved with IV lasix and now compensated and on PO lasix regimen  - Stable        Obesity     - Weight reduction as outpatient after all acute issues addressed        Chronic anticoagulation    - Patient on Xarelto for PAF which has been resumed        History of deep vein thrombosis    - Resumed anticoagulation         History of pulmonary embolism    - Xarelto resumed .        Anemia of chronic disease    - H/H with slight drop noted, but no bleeding  - Will continue to monitor        Malignant hypertension    BP is on low side today,all BP med's on hold.  -  lisinopril, and restarted hydralazine, clonidine, diltiazem  (held due to previous hypotensive issues)          Tobacco abuse    - Smoking cessation counseling done        Gastroesophageal reflux disease without esophagitis    - Continue PPI        Type 2 diabetes mellitus, controlled    - Complications of peripheral neuropathy.   - Increased basal and prandial insulin  - Glucose control improving with insulin adjustment and weaned off prednisone several days ago        Chronic obstructive pulmonary disease with acute exacerbation    - As discussed above        Coronary artery disease involving native coronary artery of native heart with angina pectoris    - [facility] on 6/27 as discussed above  - As per Cardiology          VTE Risk Mitigation         Ordered     rivaroxaban tablet 20 mg  With dinner      06/27/18 5843              Michelle Dumont MD  Department of Hospital Medicine   [facility]

## 2018-07-13 NOTE — PT/OT/SLP PROGRESS
Occupational Therapy      Patient Name:  Yasmeen Palm   MRN:  [id number]    Patient not seen today secondary to Patient ill (Comment). Will follow-up when new orders are received. Pt transferred to ICU after rapid response called.     María Willoughby OT  7/13/2018

## 2018-07-13 NOTE — NURSING
Pt CBG 66; admin 1 mg glucagon as ordered PRN for glucose <70; no distress noted; will continue to monitor.

## 2018-07-13 NOTE — PLAN OF CARE
Problem: Patient Care Overview  Goal: Plan of Care Review  Outcome: Ongoing (interventions implemented as appropriate)  Atrial fib on amiodarone per protocol.  Confused.  Hypotensive receiving fluid bolus.  On BIPAP while sleeping.  No appetite.

## 2018-07-13 NOTE — PROGRESS NOTES
Patient bp 89/55. All meds not given this morning due to patient being hard to arouse and not swallowing. Insulin also held because patient did not eat. bipap in place at this time. Dr. Greenwood currently at desk and information was communicated, putting new orders in.

## 2018-07-13 NOTE — PROGRESS NOTES
" [facility]  Adult Nutrition  Progress Note    SUMMARY       Recommendations    Recommendation/Intervention:   1. Boost glucose tid   2. RD to monitor    Goals: Meet >85% EEN  Nutrition Goal Status: progressing towards goal  Communication of RD Recs: reviewed with RN (sign and hold)    D/C planning: ADA/Cardiac with supplements as needed.     Reason for Assessment    Reason for Assessment: length of stay  Relevant Medical History: CHF, COPD, DM    General Information Comments: Pt with increased lethargy and requiring Bipap most of the day. Removes for po. PO intake decreased (25-70% with occassional skipping of meals). Spoke with  about adding a supplement to aid with intake: states vanilla or strawberry preference.     Nutrition Risk Screen    Nutrition Risk Screen: no indicators present    Nutrition/Diet History    Food Preferences: Denies cultural, Church food preferences.   Do you have any cultural, spiritual, Church conflicts, given your current situation?: none  Factors Affecting Nutritional Intake: decreased appetite    Anthropometrics    Temp: 98.1 °F (36.7 °C)  Height Method: Stated  Height: 5' 5" (165.1 cm)  Height (inches): 65 in  Weight Method: Bed Scale  Weight: 81.7 kg (180 lb 1.9 oz)  Weight (lb): 180.12 lb  Ideal Body Weight (IBW), Female: 125 lb  % Ideal Body Weight, Female (lb): 136.69 lb  BMI (Calculated): 28.5  BMI Grade: 25 - 29.9 - overweight  Weight Loss: unintentional  Usual Body Weight (UBW), k.7 kg (2018)  % Usual Body Weight: 85.63  % Weight Change From Usual Weight: -14.55 %       Lab/Procedures/Meds    Pertinent Labs Reviewed: reviewed  Pertinent Labs Comments: HgbA1c 6.3  Pertinent Medications Reviewed: reviewed  Pertinent Medications Comments: furosemide, insulin    Physical Findings/Assessment    Overall Physical Appearance: loss of muscle mass, obese  Oral/Mouth Cavity: tooth/teeth missing  Skin: intact    Estimated/Assessed Needs    Weight Used " For Calorie Calculations: 77.5 kg (170 lb 13.7 oz)  Energy Calorie Requirements (kcal): 8490-2984 kcal  Energy Need Method: [place]-St Jeor  Protein Requirements: 75-95g  Weight Used For Protein Calculations: 77.5 kg (170 lb 13.7 oz)     Fluid Need Method: RDA Method  RDA Method (mL): 1650  CHO Requirement: 225g      Nutrition Prescription Ordered    Current Diet Order: 2000 ADA/Cardiac    Evaluation of Received Nutrient/Fluid Intake    I/O: not fully recorded  Energy Calories Required: meeting needs  Protein Required: not meeting needs  Fluid Required:  (COURTNEY)  Comments: LBM: 7/12  Tolerance: tolerating  % Intake of Estimated Energy Needs: 25 - 50 %  % Meal Intake: 25 - 50 %    Nutrition Risk    Level of Risk/Frequency of Follow-up:  (2 x week)     Assessment and Plan    Nutrition Dx: Inadequate energy intake r/t respiratory distress/lethargy as evidenced by po intake 25-50% with meals  Nutrition Dx Status: New    Monitor and Evaluation    Food and Nutrient Intake: energy intake, food and beverage intake  Food and Nutrient Adminstration: diet order  Knowledge/Beliefs/Attitudes: food and nutrition knowledge/skill  Anthropometric Measurements: weight, weight change  Biochemical Data, Medical Tests and Procedures: electrolyte and renal panel, glucose/endocrine profile  Nutrition-Focused Physical Findings: overall appearance     Nutrition Follow-Up    RD Follow-up?: Yes

## 2018-07-13 NOTE — SUBJECTIVE & OBJECTIVE
Interval History: Pt. Is lethargic,fever is improved.    Review of Systems   Constitutional: Negative for chills and fever.   Respiratory: Negative for shortness of breath.    Cardiovascular: Negative for chest pain.     Objective:     Vital Signs (Most Recent):  Temp: 98.1 °F (36.7 °C) (07/13/18 0716)  Pulse: 86 (07/13/18 0736)  Resp: (!) 26 (07/13/18 0736)  BP: (!) 89/55 (07/13/18 0716)  SpO2: 95 % (07/13/18 0918) Vital Signs (24h Range):  Temp:  [97.5 °F (36.4 °C)-102.5 °F (39.2 °C)] 98.1 °F (36.7 °C)  Pulse:  [78-94] 86  Resp:  [16-26] 26  SpO2:  [92 %-98 %] 95 %  BP: ()/(46-74) 89/55     Weight: 81.7 kg (180 lb 1.9 oz)  Body mass index is 29.97 kg/m².    Intake/Output Summary (Last 24 hours) at 07/13/18 1013  Last data filed at 07/13/18 0500   Gross per 24 hour   Intake              320 ml   Output                0 ml   Net              320 ml      Physical Exam   Constitutional: She is oriented to person, place, and time. She appears well-developed. No distress.   HENT:   Head: Normocephalic and atraumatic.   Eyes: EOM are normal. Pupils are equal, round, and reactive to light.   Neck: Normal range of motion. Neck supple.   Cardiovascular: Normal rate and regular rhythm.    Pulmonary/Chest:   Slight decrease at bases, no wheezing   Abdominal: Soft. Bowel sounds are normal.   Musculoskeletal: Normal range of motion. She exhibits edema.   Neurological: She is alert and oriented to person, place, and time.   Skin: Skin is warm and dry. Capillary refill takes less than 2 seconds. She is not diaphoretic.   Psychiatric: She has a normal mood and affect. Her behavior is normal. Thought content normal.       Significant Labs: All pertinent labs within the past 24 hours have been reviewed.    Significant Imaging: I have reviewed and interpreted all pertinent imaging results/findings within the past 24 hours.

## 2018-07-13 NOTE — ASSESSMENT & PLAN NOTE
- Complications of peripheral neuropathy.   - Increased basal and prandial insulin  - Glucose control improving with insulin adjustment and weaned off prednisone several days ago

## 2018-07-13 NOTE — ASSESSMENT & PLAN NOTE
Back in AF/RVR  ?driven by fever/leukocytosis/sepsis  On sotalol, continue (can titrate to 120mg bid)  Cont Xarelto  DCCV +/- amio as a contingency

## 2018-07-13 NOTE — ASSESSMENT & PLAN NOTE
- Difficult to control BP  - Continue lisinopril, and restarted hydralazine, clonidine, diltiazem  (held due to previous hypotensive issues)  - PRN labetalol but overall much improved control

## 2018-07-13 NOTE — ASSESSMENT & PLAN NOTE
BP is on low side today,all BP med's on hold.  -  lisinopril, and restarted hydralazine, clonidine, diltiazem  (held due to previous hypotensive issues)

## 2018-07-13 NOTE — PROGRESS NOTES
Patient awake, alert, oriented resting comfortably. Report given to oncoming nurse, FABY Hathaway. 12hour chart check complete.

## 2018-07-13 NOTE — ASSESSMENT & PLAN NOTE
- C02 > 100 and s/p intubation  - Treated with NEBS and steroids  - Extubated on 6/27 and respiratory status with worsening due to volume overload issue   - Pt has been weaned off prednisone and on nebulizer  - Does have home oxygen  - On BIPAP due to increased PCO2 on ABG, lethargic condition  - Arranging for home ventilator/BIPAP for discharge   - CRT due to COPD

## 2018-07-13 NOTE — ASSESSMENT & PLAN NOTE
- Pt developed fever last night and WBC has trended up the past few days, and she had high temperature that caused distress  - Workup with CXR with no definitive infiltrate, blood culture pending and UA remarkable mainly for yeast.   - She displayed some respiratory distress and increased lethargy  - Possible micro-aspiration as a source of fever  - Will treat empirically with Zosyn and follow up cultures  Patient is uncontrolled diabetic who ate strawberries short cake with with whip cream last night

## 2018-07-13 NOTE — PLAN OF CARE
Problem: Diabetes, Type 2 (Adult)  Intervention: Optimize Glycemic Control   07/13/18 0118   Nutrition Interventions   Glycemic Management blood glucose monitoring;oral hydration promoted

## 2018-07-13 NOTE — ASSESSMENT & PLAN NOTE
- Followed by Cardiology  - s/p [facility] on 6/27 only remarkable for non-obstructive CAD  - Continue medical management as per Cardiology with ASA, statin, and patient will not be placed on plavix given she will be fully anticoagulated with Xarelto

## 2018-07-13 NOTE — PROGRESS NOTES
Patient in AFIB with RVR bp 110/58, P 145 elevating to 151, o2 sat 96% on 3l nc. Dr. Greenwood notified and new orders are in place.

## 2018-07-13 NOTE — PROGRESS NOTES
[facility]  Cardiology  Progress Note    Patient Name: Yasmeen Palm  MRN: [id number]  Admission Date: 6/24/2018  Hospital Length of Stay: 19 days  Code Status: Full Code   Attending Physician: Michelle Dumont MD   Primary Care Physician: Angel Orourke Jr, MD  Expected Discharge Date: 7/9/2018  Principal Problem:Non-STEMI (non-ST elevated myocardial infarction)    Subjective:     Hospital Course:   6/25/18: adm with resp insuff and NSTEMI with plans for cath 6/26 6/26/18: transferred back to ICU with hypercarb resp failure  6/27/18: cath with nonobst CAD  7/13/18: returned to ICU with AF/RVR and ?sepsis    Interval Hx: pt seen in ICU, case d/w RN.  Resonsulted for AF/RVR.  Pt currently without complaints of CP/SOB.  HR currently in the 110s.  Fever noted over past few days.    Tele: AF/RVR (pers rev)      Past Medical History:   Diagnosis Date    Anticoagulant long-term use     Arthritis     Asthma     CHF (congestive heart failure)     COPD (chronic obstructive pulmonary disease)     Coronary artery disease     Depression     Diabetes mellitus     GERD (gastroesophageal reflux disease)     Hypertension        Past Surgical History:   Procedure Laterality Date    ABDOMINAL SURGERY      CARDIAC SURGERY      HERNIA REPAIR         Review of patient's allergies indicates:  No Known Allergies    No current facility-administered medications on file prior to encounter.      Current Outpatient Prescriptions on File Prior to Encounter   Medication Sig    acetaminophen (TYLENOL) 500 MG tablet Take 1 tablet (500 mg total) by mouth every 8 (eight) hours as needed.    aspirin (ECOTRIN) 81 MG EC tablet Take 81 mg by mouth once daily.    cloNIDine (CATAPRES) 0.1 MG tablet Take 2 tablets (0.2 mg total) by mouth 3 (three) times daily.    diltiaZEM (CARDIZEM) 120 MG tablet Take 1 tablet (120 mg total) by mouth every 8 (eight) hours.    furosemide (LASIX) 40 MG tablet Take 40 mg by mouth  once daily.     gabapentin (NEURONTIN) 300 MG capsule Take 300 mg by mouth 3 (three) times daily.    hydrALAZINE (APRESOLINE) 50 MG tablet Take 1 tablet (50 mg total) by mouth every 8 (eight) hours. (Patient taking differently: Take 50 mg by mouth every 12 (twelve) hours. )    levalbuterol (XOPENEX HFA) 45 mcg/actuation inhaler Inhale 2 puffs into the lungs every 4 (four) hours as needed for Wheezing or Shortness of Breath. Rescue    lisinopril (PRINIVIL,ZESTRIL) 40 MG tablet Take 1 tablet (40 mg total) by mouth once daily. Do not take this medication if blood pressure is below 130/80 mmHg and/or feeling dizzy after taking all other blood pressure meds    metformin (GLUCOPHAGE) 500 MG tablet Take 500 mg by mouth 2 (two) times daily with meals.    rivaroxaban (XARELTO) 20 mg Tab Take 20 mg by mouth daily with dinner or evening meal.    sotalol (BETAPACE) 80 MG tablet Take 80 mg by mouth 2 (two) times daily.    tramadol (ULTRAM) 50 mg tablet Take 1 tablet (50 mg total) by mouth every 6 (six) hours as needed for Pain.    UMECLIDINIUM BRM/VILANTEROL TR (ANORO ELLIPTA INHL) Inhale into the lungs daily as needed.     Family History     Problem Relation (Age of Onset)    Diabetes Father    Heart disease Mother    Hypertension Mother        Social History Main Topics    Smoking status: Current Some Day Smoker     Packs/day: 0.25     Years: 55.00     Types: Cigarettes    Smokeless tobacco: Never Used    Alcohol use 0.6 oz/week     1 Glasses of wine per week    Drug use: No    Sexual activity: No     Review of Systems   Gastrointestinal: Negative for melena.   Genitourinary: Negative for hematuria.     Objective:     Vital Signs (Most Recent):  Temp: 100.2 °F (37.9 °C) (07/13/18 1640)  Pulse: 106 (07/13/18 1640)  Resp: (!) 33 (07/13/18 1640)  BP: (!) 87/51 (07/13/18 1640)  SpO2: 97 % (07/13/18 1640) Vital Signs (24h Range):  Temp:  [97.5 °F (36.4 °C)-101.3 °F (38.5 °C)] 100.2 °F (37.9 °C)  Pulse:  []  106  Resp:  [17-41] 33  SpO2:  [92 %-99 %] 97 %  BP: ()/(38-74) 87/51     Weight: 81.7 kg (180 lb 1.9 oz)  Body mass index is 29.97 kg/m².    SpO2: 97 %  O2 Device (Oxygen Therapy): BiPAP      Intake/Output Summary (Last 24 hours) at 07/13/18 1715  Last data filed at 07/13/18 1200   Gross per 24 hour   Intake              420 ml   Output                0 ml   Net              420 ml       Lines/Drains/Airways     Peripheral Intravenous Line                 Peripheral IV - Single Lumen 07/13/18 1300 Left Hand less than 1 day         Peripheral IV - Single Lumen 07/13/18 1530 Right Forearm less than 1 day                Physical Exam   Constitutional: She appears well-developed and well-nourished. No distress.   HENT:   Head: Normocephalic and atraumatic.   Eyes: Conjunctivae and EOM are normal. Pupils are equal, round, and reactive to light. No scleral icterus.   Neck: Normal range of motion. Neck supple. No JVD present. No tracheal deviation present. No thyromegaly present.   Cardiovascular: S1 normal and S2 normal.  An irregularly irregular rhythm present. Tachycardia present.  Exam reveals distant heart sounds. Exam reveals no gallop and no friction rub.    No murmur heard.  Pulmonary/Chest: Effort normal. No respiratory distress. She has no rales.   Abdominal: Soft. She exhibits no distension.   Musculoskeletal: Normal range of motion. She exhibits no edema.   Neurological: She is alert. No cranial nerve deficit.   Skin: Skin is warm and dry. She is not diaphoretic.   Psychiatric: She has a normal mood and affect. Her behavior is normal.       Current Medications:   aspirin  81 mg Oral Daily    atorvastatin  80 mg Oral QHS    docusate sodium  100 mg Oral Daily    furosemide  40 mg Oral Daily    guaiFENesin  600 mg Oral BID    insulin aspart U-100  8 Units Subcutaneous TIDWM    pantoprazole  40 mg Oral Daily    piperacillin-tazobactam (ZOSYN) IVPB  4.5 g Intravenous Q8H    polyethylene glycol  17 g  Oral BID    potassium phosphate (monobasic)  500 mg Oral Daily    rivaroxaban  20 mg Oral Daily with dinner    sodium chloride 0.9%  500 mL Intravenous Once      amiodarone in dextrose 5% 1 mg/min (07/13/18 1456)    amiodarone in dextrose 5%       acetaminophen, bisacodyl, dextrose 50%, dextrose 50%, dextrose 50%, glucagon (human recombinant), glucagon (human recombinant), glucose, glucose, hydrALAZINE, insulin aspart U-100, labetalol, ondansetron, sodium chloride 0.9%, traMADol    Laboratory:  CBC:    Recent Labs  Lab 06/28/18  0245 07/02/18  0513 07/10/18  0931 07/12/18  0438 07/13/18  0452   WHITE BLOOD CELL COUNT 12.63 14.64 H 19.64 H 24.27 H 24.89 H   HEMOGLOBIN 8.9 L 8.9 L 9.2 L 8.5 L 8.2 L   HEMATOCRIT 31.8 L 31.1 L 32.3 L 29.8 L 28.3 L   PLATELETS 345 324 342 250 227       CHEMISTRIES:    Recent Labs  Lab 07/09/18  0545 07/10/18  0431 07/11/18  0445 07/12/18  0438 07/13/18  0452   GLUCOSE 238 H 149 H 228 H 155 H 108   SODIUM 138 143 141 139 143   POTASSIUM 5.4 H 4.4 4.1 3.8 4.4   BUN BLD 27 H 18 24 H 23 31 H   CREATININE 0.8 0.8 0.8 0.8 0.9   EGFR IF  >60 >60 >60 >60 >60   EGFR IF NON- >60 >60 >60 >60 >60   CALCIUM 9.4 9.3 9.3 9.2 9.5   MAGNESIUM 2.2 1.9 1.9 1.8 1.9       CARDIAC BIOMARKERS:    Recent Labs  Lab 12/13/15  1932  02/12/18  1422 02/12/18  1808 06/24/18  1413 06/24/18  2153 06/25/18  0506   CPK 33  --   --   --   --   --   --    CPK MB 1.0  --   --   --   --   --   --    TROPONIN I <0.006  < > 0.021 0.021 0.896 H 0.870 H 0.667 H   < > = values in this interval not displayed.    COAGS:    Recent Labs  Lab 04/10/17  1129 08/12/17  0036 11/02/17 2211 12/08/17  0545 06/27/18  1738   INR 1.1 1.2 1.0 1.0 1.1       LIPIDS/LFTS:    Recent Labs  Lab 12/15/15  0540  08/12/17  1037  11/02/17 2211 12/08/17  0545 02/12/18  1422 06/24/18  1413 06/25/18  0506   CHOLESTEROL 170  --  243 H  --   --   --   --   --   --    TRIGLYCERIDES 94  --  93  --   --   --   --   --   --     HDL 33 L  --  43  --   --   --   --   --   --    LDL CHOLESTEROL 118.2  --  181.4 H  --   --   --   --   --   --    NON-HDL CHOLESTEROL 137  --  200  --   --   --   --   --   --    AST  --   < >  --   < > 13 26 13 20 23   ALT  --   < >  --   < > 7 L 8 L 7 L 7 L 10   < > = values in this interval not displayed.    BNP:    Recent Labs  Lab 08/19/17  1430 11/02/17  2211 12/08/17  0545 02/12/18  1422 06/24/18  1413    H 334 H 354 H 133 H 2,128 H       TSH:    Recent Labs  Lab 12/13/15  1932 06/24/18  1413   TSH 0.487 0.754       Free T4:    Recent Labs  Lab 06/24/18  1413   FREE T4 1.09     ABG    Recent Labs  Lab 07/13/18  1121   PH 7.501*   PO2 65*   PCO2 54.9*   HCO3 42.9*   BE 18         Diagnostic Results:  ECG (personally reviewed tracings):  6/24/18 1403 , LAD/PWRP    Chest X-Ray (personally reviewed image(s)): 7/12/18 NAD    CTA C/A/P 6/24/18  1. No evidence of aortic dissection.  2. Extensive calcified and noncalcified plaque seen throughout the aorta with small multifocal outpouchings seen involving the descending thoracic aorta suggestive for small penetrating ulcers.  Similar findings were seen on previous CT from August 2017.  3. 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  4. No evidence of PE.  5. Decreased size area of nodularity with scarring seen within the left lower lobe with resolution of previously visualized cavitation.  No evidence of new lung consolidation.  6. Mild to moderate centrilobular emphysema.    Cath 6/27/18   LVEDP: 9mmHg  LVEF: 50% by echo  Wall Motion: LAD WMA by echo  Dominance: Right  LM: MLI  LAD: MLI  LCx: MLI  RCA: dom, mid 40%  Hemostasis:  R Radial band  Impression:  NSTEMI  Nonobst CAD  Normal LV fxn  R rad vasband for hemostasis  Plan:  Cont med rx  Cont ASA 81mg qd  Stop Plavix  Restart Xarelto 20mg qd this pm  Pt to follow up with Dr. Pulido in 2 weeks    Echo: 6/25/18     1 - Low normal to  mildly depressed left ventricular systolic function (EF 50-55%).  Distal LAD territory WMA.     2 - Impaired LV relaxation, elevated LAP (grade 2 diastolic dysfunction).     3 - Concentric hypertrophy.     4 - Trivial to mild tricuspid regurgitation.     5 - Pulmonary hypertension. The estimated PA systolic pressure is 52 mmHg.     LLE venous US 1/24/17  Interval decrease in overall thrombus burden of the left lower extremity, although there is persistent deep venous thrombus present within the left femoral and popliteal veins.    Stress Test: L MPI 12/15/15  Nuclear Quantitative Functional Analysis:   LVEF: 66 %  Impression: NORMAL MYOCARDIAL PERFUSION  1. The perfusion scan is free of evidence for myocardial ischemia or injury.   2. Resting wall motion is physiologic.   3. Resting LV function is normal.   4. The ventricular volumes are normal at rest and stress.   5. The extracardiac distribution of radioactivity is normal.   6. There was no previous study available to compare.      Assessment and Plan:     * Non-STEMI (non-ST elevated myocardial infarction)    EF normal on echo with LAD WMA (echo 6/25/18)  Cath 6/27/18 with nonobst CAD  Cont ASA 81mg qd  Cont Xarelto 20mg qd  Cont atorva 80mg qhs  Check lipids/LFT 3 months (late Sept 2018)            Sepsis    Per IM  ?source of fever/WBC  Abx ordered per IM        PAF (paroxysmal atrial fibrillation)    Back in AF/RVR  ?driven by fever/leukocytosis/sepsis  On sotalol, continue (can titrate to 120mg bid)  Cont Xarelto  DCCV +/- amio as a contingency        Acute exacerbation of chronic obstructive pulmonary disease (COPD)    Per IM/pulm        Chronic anticoagulation    For hx of PAF and DVT/PE  Xarelto resumed        History of deep vein thrombosis    As above        Type 2 diabetes mellitus, controlled    Per IM            VTE Risk Mitigation         Ordered     rivaroxaban tablet 20 mg  With dinner      06/27/18 1458        Critical care time 30min    Laureano  SHABBIR Pulido MD  Cardiology  [facility]

## 2018-07-13 NOTE — PROGRESS NOTES
[facility] Hospital Medicine  Progress Note    Patient Name: Yasmeen Palm  MRN: [id number]  Patient Class: IP- Inpatient   Admission Date: 6/24/2018  Length of Stay: 18 days  Attending Physician: Meredith Olguin MD  Primary Care Provider: Angel Orourke Jr, MD        Subjective:     Principal Problem:Fever    HPI:  Yasmeen Palm is a 68 y.o. female that (in part)  has a past medical history of Anticoagulant long-term use; Arthritis; Asthma; CHF (congestive heart failure); COPD (chronic obstructive pulmonary disease); Coronary artery disease; Depression; Diabetes mellitus; GERD (gastroesophageal reflux disease); and Hypertension.  has a past surgical history that includes Abdominal surgery; Cardiac surgery; and Hernia repair. Presents to [facility] Emergency Department complaining of chest pain .  She reports compliance with her home medication regimen, including Xarelto.     Description of symptoms  Location:  Substernal  Onset:  Acute onset 2 days ago  Character:  The patient remained; moderate severity  Frequency:  Daily  Duration:  each episode lasts several minutes at a time  Associated Symptoms:  Diaphoresis, shortness of breath, weakness and fatigue  Radiation:  Recent the chest  Exacerbating factors:  Worse on exertion  Relieving factors:  Minimal relief with supplemental oxygen     In the emergency department routine laboratory studies, chest x-ray, EKG, cardiac enzymes were obtained.  EKG findings were concerning the case was discussed with cardiologist, Dr. Pulido.  It was determined that her EKG was not consistent with STEMI and she has been chest pain-free since arrival.  She had been given Lovenox, aspirin, and plan was to continue trending troponins and monitor closely on telemetry.    Hospital Course:  Ms. Palm was admitted to the hospital originally on 6/24/18 for chest pain. She was later sent to the ICU with acute hypercapnic respiratory failure the  same day on BIPAP.  She quickly improved and was sent to the floor on 6/25.  Cards was consulted for NSTEMI with noted troponin increase and was planning on LHC on 6/26. However, the patient was sent back to the ICU on 6/26 with hypercapnic respiratory failure with a C02 of > 100 and was intubated. Pulmonary was consulted. Pt clinically improved from respiratory standpoint and was extubated on 6/27 to NC, but she did complain of chest discomfort. Cardiology was notified and patient was taken for LHC on 6/27 which was only remarkable for non-obstructive CAD and did not require intervention. Pt was given IVF post procedure and the next day developed increased SOB and required BIPAP. Pt was treated with IV lasix with good response with much improved respiratory status after output of 1L. Pt also being treated for COPD exacerbation with IV steroids, NEBS and doxycycline. ECHO performed on 6/25/2018 remarkable for EF=50-55% + grade 2 diastolic dysfunction. Pt diuresed and changed to maintenance PO lasix regimen. She as started on BIPAP due to increased pCO2 on ABG and lethargy. Pt will need BIPAP/home ventilator for use at home and pending approval of device for discharge home with home health.    Interval History: Pt had rough night, had low grade temp and then developed high fever in the afternoon and was more in distress.     Review of Systems   Constitutional: Positive for fever. Negative for chills.   Respiratory: Positive for shortness of breath.    Cardiovascular: Negative for chest pain.     Objective:     Vital Signs (Most Recent):  Temp: 97.5 °F (36.4 °C) (07/12/18 1908)  Pulse: 78 (07/12/18 1908)  Resp: 18 (07/12/18 1908)  BP: (!) 81/46 (07/12/18 1908)  SpO2: 98 % (07/12/18 1908) Vital Signs (24h Range):  Temp:  [97.5 °F (36.4 °C)-102.5 °F (39.2 °C)] 97.5 °F (36.4 °C)  Pulse:  [78-94] 78  Resp:  [16-20] 18  SpO2:  [94 %-98 %] 98 %  BP: ()/(46-64) 81/46     Weight: 81.7 kg (180 lb 1.9 oz)  Body mass index  is 29.97 kg/m².    Intake/Output Summary (Last 24 hours) at 07/12/18 2156  Last data filed at 07/12/18 1814   Gross per 24 hour   Intake              580 ml   Output                0 ml   Net              580 ml      Physical Exam   Constitutional: She is oriented to person, place, and time. She appears well-developed. No distress.   HENT:   Head: Normocephalic and atraumatic.   Eyes: EOM are normal. Pupils are equal, round, and reactive to light.   Neck: Normal range of motion. Neck supple.   Cardiovascular: Normal rate and regular rhythm.    Pulmonary/Chest:   Slight decrease at bases, no wheezing   Abdominal: Soft. Bowel sounds are normal.   Musculoskeletal: Normal range of motion. She exhibits edema.   Neurological: She is alert and oriented to person, place, and time.   Skin: Skin is warm and dry. Capillary refill takes less than 2 seconds. She is not diaphoretic.   Psychiatric: She has a normal mood and affect. Her behavior is normal. Thought content normal.       Significant Labs: All pertinent labs within the past 24 hours have been reviewed.    Significant Imaging: I have reviewed and interpreted all pertinent imaging results/findings within the past 24 hours.    Assessment/Plan:      * Fever    - Pt developed fever last night and WBC has trended up the past few days, and she had high temperature that caused distress  - Workup with CXR with no definitive infiltrate, blood culture pending and UA remarkable mainly for yeast.   - She displayed some respiratory distress and increased lethargy  - Possible micro-aspiration as a source of fever  - Will treat empirically with Zosyn and follow up cultures  - Will quickly de-escalate to Augmentin if WBC trend down, she remains AF, and cultures return negative        Acute hypercapnic respiratory failure    - Due to COPD exacerbation   - s/p intubation and treatment with IV steroids, NEBS  - Pulmonary following and extubated on 6/27  - Respiratory status was worse on  6/28 due to volume overload  - s/p IV lasix with good output and now back down to NC  - Pt to continue BIPAP QHS and PRN  - Will need home BIPAP/home ventilator to treat patient for high pCO2  - CRT linked to COPD  - Pending home ventilator approval for discharge home with home health        Non-STEMI (non-ST elevated myocardial infarction)    - Followed by Cardiology  - s/p C on 6/27 only remarkable for non-obstructive CAD  - Continue medical management as per Cardiology with ASA, statin, and patient will not be placed on plavix given she will be fully anticoagulated with Xarelto        Acute diastolic CHF (congestive heart failure)    - Due to volume overload  - Last ECHO with EF=50-55% + grade 2 diastolic dysfunction on 6/26  - Improved with IV lasix and now compensated and on PO lasix regimen  - Stable        Coronary artery disease involving native coronary artery of native heart with angina pectoris    - [facility] on 6/27 as discussed above  - As per Cardiology        PAF (paroxysmal atrial fibrillation)    - Rate controlled and anticoagulation resumed with Xarelto on 6/27 post [facility]        Hyperkalemia    - Resolved        Fall    - Slipped while getting up on 7/6  - Head CT and all x-ray are negative  - Resolved        Debility    - PT/OT, will arrange for home health at time of discharge        Neuropathy    - No acute issues         Acute exacerbation of chronic obstructive pulmonary disease (COPD)    - C02 > 100 and s/p intubation  - Treated with NEBS and steroids  - Extubated on 6/27 and respiratory status with worsening due to volume overload issue   - Pt has been weaned off prednisone and on nebulizer  - Does have home oxygen  - On BIPAP due to increased PCO2 on ABG, lethargic condition  - Arranging for home ventilator/BIPAP for discharge   - CRT due to COPD        Elevated brain natriuretic peptide (BNP) level    - Diuresis resumed         Elevated troponin I level    - As discussed under NSTEMI  - No sign  of obstruction on [facility].        Obesity    - Weight reduction as outpatient after all acute issues addressed        Chronic anticoagulation    - Patient on Xarelto for PAF which has been resumed        History of deep vein thrombosis    - Resumed anticoagulation         History of pulmonary embolism    - Xarelto resumed .        Anemia of chronic disease    - H/H with slight drop noted, but no bleeding  - Will continue to monitor        Malignant hypertension    - Difficult to control BP  - Continue lisinopril, and restarted hydralazine, clonidine, diltiazem  (held due to previous hypotensive issues)  - PRN labetalol but overall much improved control        Tobacco abuse    - Smoking cessation counseling done        Gastroesophageal reflux disease without esophagitis    - Continue PPI        Type 2 diabetes mellitus, controlled    - Complications of peripheral neuropathy.   - Increased basal and prandial insulin  - Glucose control improving with insulin adjustment and weaned off prednisone several days ago        Chronic obstructive pulmonary disease with acute exacerbation    - As discussed above          VTE Risk Mitigation         Ordered     rivaroxaban tablet 20 mg  With dinner      06/27/18 5678              Meredith Olguin MD  Department of Hospital Medicine   [facility]

## 2018-07-13 NOTE — SUBJECTIVE & OBJECTIVE
Interval History: Pt had rough night, had low grade temp and then developed high fever in the afternoon and was more in distress.     Review of Systems   Constitutional: Positive for fever. Negative for chills.   Respiratory: Positive for shortness of breath.    Cardiovascular: Negative for chest pain.     Objective:     Vital Signs (Most Recent):  Temp: 97.5 °F (36.4 °C) (07/12/18 1908)  Pulse: 78 (07/12/18 1908)  Resp: 18 (07/12/18 1908)  BP: (!) 81/46 (07/12/18 1908)  SpO2: 98 % (07/12/18 1908) Vital Signs (24h Range):  Temp:  [97.5 °F (36.4 °C)-102.5 °F (39.2 °C)] 97.5 °F (36.4 °C)  Pulse:  [78-94] 78  Resp:  [16-20] 18  SpO2:  [94 %-98 %] 98 %  BP: ()/(46-64) 81/46     Weight: 81.7 kg (180 lb 1.9 oz)  Body mass index is 29.97 kg/m².    Intake/Output Summary (Last 24 hours) at 07/12/18 2156  Last data filed at 07/12/18 1814   Gross per 24 hour   Intake              580 ml   Output                0 ml   Net              580 ml      Physical Exam   Constitutional: She is oriented to person, place, and time. She appears well-developed. No distress.   HENT:   Head: Normocephalic and atraumatic.   Eyes: EOM are normal. Pupils are equal, round, and reactive to light.   Neck: Normal range of motion. Neck supple.   Cardiovascular: Normal rate and regular rhythm.    Pulmonary/Chest:   Slight decrease at bases, no wheezing   Abdominal: Soft. Bowel sounds are normal.   Musculoskeletal: Normal range of motion. She exhibits edema.   Neurological: She is alert and oriented to person, place, and time.   Skin: Skin is warm and dry. Capillary refill takes less than 2 seconds. She is not diaphoretic.   Psychiatric: She has a normal mood and affect. Her behavior is normal. Thought content normal.       Significant Labs: All pertinent labs within the past 24 hours have been reviewed.    Significant Imaging: I have reviewed and interpreted all pertinent imaging results/findings within the past 24 hours.

## 2018-07-13 NOTE — PT/OT/SLP DISCHARGE
Physical Therapy Discharge Summary    Name: Yasmeen Palm  MRN: [id number]   Principal Problem: Fever     Patient Discharged from acute Physical Therapy on 18.  Please refer to prior PT noted date on 7/10/18 for functional status.     Assessment:     Patient transferred to lower level of care secondary to rapid response called    Objective:     GOALS:    Physical Therapy Goals     Not on file          Multidisciplinary Problems (Resolved)        Problem: Physical Therapy Goal    Goal Priority Disciplines Outcome Goal Variances Interventions   Physical Therapy Goal   (Resolved)     PT/OT, PT Outcome(s) achieved     Description:  Goals to be met by: 2018     Patient will increase functional independence with mobility by performin. Supine to sit with Modified Groom  2. Sit to supine with Modified Groom  3. Sit to stand transfer with Modified Groom  4. Gait  x 250 feet with Modified Groom using Rolling Walker.    Recommend: HHPT at time of discharge.                       Reasons for Discontinuation of Therapy Services  Patient is unable to continue work toward goals because of medical or psychosocial complications.      Plan:     Patient Discharged to: ICU. Please re-consult when patient medically able to participate in PT again.    Viv Wilson, PT  2018

## 2018-07-13 NOTE — PROGRESS NOTES
Patient in AFIB at this time. Dr. Greenwood informed and stated that it is okay because patient has a history for PAF. No new orders in place. Will continue to monitor.

## 2018-07-13 NOTE — ASSESSMENT & PLAN NOTE
EF normal on echo with LAD WMA (echo 6/25/18)  Cath 6/27/18 with nonobst CAD  Cont ASA 81mg qd  Cont Xarelto 20mg qd  Cont atorva 80mg qhs  Check lipids/LFT 3 months (late Sept 2018)

## 2018-07-13 NOTE — PROGRESS NOTES
Results for ANSLEY RICHMOND (MRN [id number]) as of 7/13/2018 11:09.  Placed patient on 3 LPM nasal cannula per Dr. Greenwood and instructed to keep sats 88-92% per Dr. Greenwood.   Ref. Range 7/13/2018 11:21   POC PH Latest Ref Range: 7.35 - 7.45  7.501 (H)   POC PCO2 Latest Ref Range: 35 - 45 mmHg 54.9 (H)   POC PO2 Latest Ref Range: 80 - 100 mmHg 65 (L)   POC BE Latest Ref Range: -2 to 2 mmol/L 18   POC HCO3 Latest Ref Range: 24 - 28 mmol/L 42.9 (H)   POC SATURATED O2 Latest Ref Range: 95 - 100 % 93 (L)   POC TCO2 Latest Ref Range: 23 - 27 mmol/L 45 (H)   FiO2 Unknown 40   Sample Unknown ARTERIAL   DelSys Unknown CPAP/BiPAP   Allens Test Unknown Pass   Site Unknown RR   Mode Unknown BiPAP   Rate Unknown 10

## 2018-07-13 NOTE — SUBJECTIVE & OBJECTIVE
Past Medical History:   Diagnosis Date    Anticoagulant long-term use     Arthritis     Asthma     CHF (congestive heart failure)     COPD (chronic obstructive pulmonary disease)     Coronary artery disease     Depression     Diabetes mellitus     GERD (gastroesophageal reflux disease)     Hypertension        Past Surgical History:   Procedure Laterality Date    ABDOMINAL SURGERY      CARDIAC SURGERY      HERNIA REPAIR         Review of patient's allergies indicates:  No Known Allergies    No current facility-administered medications on file prior to encounter.      Current Outpatient Prescriptions on File Prior to Encounter   Medication Sig    acetaminophen (TYLENOL) 500 MG tablet Take 1 tablet (500 mg total) by mouth every 8 (eight) hours as needed.    aspirin (ECOTRIN) 81 MG EC tablet Take 81 mg by mouth once daily.    cloNIDine (CATAPRES) 0.1 MG tablet Take 2 tablets (0.2 mg total) by mouth 3 (three) times daily.    diltiaZEM (CARDIZEM) 120 MG tablet Take 1 tablet (120 mg total) by mouth every 8 (eight) hours.    furosemide (LASIX) 40 MG tablet Take 40 mg by mouth once daily.     gabapentin (NEURONTIN) 300 MG capsule Take 300 mg by mouth 3 (three) times daily.    hydrALAZINE (APRESOLINE) 50 MG tablet Take 1 tablet (50 mg total) by mouth every 8 (eight) hours. (Patient taking differently: Take 50 mg by mouth every 12 (twelve) hours. )    levalbuterol (XOPENEX HFA) 45 mcg/actuation inhaler Inhale 2 puffs into the lungs every 4 (four) hours as needed for Wheezing or Shortness of Breath. Rescue    lisinopril (PRINIVIL,ZESTRIL) 40 MG tablet Take 1 tablet (40 mg total) by mouth once daily. Do not take this medication if blood pressure is below 130/80 mmHg and/or feeling dizzy after taking all other blood pressure meds    metformin (GLUCOPHAGE) 500 MG tablet Take 500 mg by mouth 2 (two) times daily with meals.    rivaroxaban (XARELTO) 20 mg Tab Take 20 mg by mouth daily with dinner or evening  meal.    sotalol (BETAPACE) 80 MG tablet Take 80 mg by mouth 2 (two) times daily.    tramadol (ULTRAM) 50 mg tablet Take 1 tablet (50 mg total) by mouth every 6 (six) hours as needed for Pain.    UMECLIDINIUM BRM/VILANTEROL TR (ANORO ELLIPTA INHL) Inhale into the lungs daily as needed.     Family History     Problem Relation (Age of Onset)    Diabetes Father    Heart disease Mother    Hypertension Mother        Social History Main Topics    Smoking status: Current Some Day Smoker     Packs/day: 0.25     Years: 55.00     Types: Cigarettes    Smokeless tobacco: Never Used    Alcohol use 0.6 oz/week     1 Glasses of wine per week    Drug use: No    Sexual activity: No     Review of Systems   Gastrointestinal: Negative for melena.   Genitourinary: Negative for hematuria.     Objective:     Vital Signs (Most Recent):  Temp: 100.2 °F (37.9 °C) (07/13/18 1640)  Pulse: 106 (07/13/18 1640)  Resp: (!) 33 (07/13/18 1640)  BP: (!) 87/51 (07/13/18 1640)  SpO2: 97 % (07/13/18 1640) Vital Signs (24h Range):  Temp:  [97.5 °F (36.4 °C)-101.3 °F (38.5 °C)] 100.2 °F (37.9 °C)  Pulse:  [] 106  Resp:  [17-41] 33  SpO2:  [92 %-99 %] 97 %  BP: ()/(38-74) 87/51     Weight: 81.7 kg (180 lb 1.9 oz)  Body mass index is 29.97 kg/m².    SpO2: 97 %  O2 Device (Oxygen Therapy): BiPAP      Intake/Output Summary (Last 24 hours) at 07/13/18 1715  Last data filed at 07/13/18 1200   Gross per 24 hour   Intake              420 ml   Output                0 ml   Net              420 ml       Lines/Drains/Airways     Peripheral Intravenous Line                 Peripheral IV - Single Lumen 07/13/18 1300 Left Hand less than 1 day         Peripheral IV - Single Lumen 07/13/18 1530 Right Forearm less than 1 day                Physical Exam   Constitutional: She appears well-developed and well-nourished. No distress.   HENT:   Head: Normocephalic and atraumatic.   Eyes: Conjunctivae and EOM are normal. Pupils are equal, round, and reactive  to light. No scleral icterus.   Neck: Normal range of motion. Neck supple. No JVD present. No tracheal deviation present. No thyromegaly present.   Cardiovascular: S1 normal and S2 normal.  An irregularly irregular rhythm present. Tachycardia present.  Exam reveals distant heart sounds. Exam reveals no gallop and no friction rub.    No murmur heard.  Pulmonary/Chest: Effort normal. No respiratory distress. She has no rales.   Abdominal: Soft. She exhibits no distension.   Musculoskeletal: Normal range of motion. She exhibits no edema.   Neurological: She is alert. No cranial nerve deficit.   Skin: Skin is warm and dry. She is not diaphoretic.   Psychiatric: She has a normal mood and affect. Her behavior is normal.       Current Medications:   aspirin  81 mg Oral Daily    atorvastatin  80 mg Oral QHS    docusate sodium  100 mg Oral Daily    furosemide  40 mg Oral Daily    guaiFENesin  600 mg Oral BID    insulin aspart U-100  8 Units Subcutaneous TIDWM    pantoprazole  40 mg Oral Daily    piperacillin-tazobactam (ZOSYN) IVPB  4.5 g Intravenous Q8H    polyethylene glycol  17 g Oral BID    potassium phosphate (monobasic)  500 mg Oral Daily    rivaroxaban  20 mg Oral Daily with dinner    sodium chloride 0.9%  500 mL Intravenous Once      amiodarone in dextrose 5% 1 mg/min (07/13/18 1456)    amiodarone in dextrose 5%       acetaminophen, bisacodyl, dextrose 50%, dextrose 50%, dextrose 50%, glucagon (human recombinant), glucagon (human recombinant), glucose, glucose, hydrALAZINE, insulin aspart U-100, labetalol, ondansetron, sodium chloride 0.9%, traMADol    Laboratory:  CBC:    Recent Labs  Lab 06/28/18  0245 07/02/18  0513 07/10/18  0931 07/12/18  0438 07/13/18  0452   WHITE BLOOD CELL COUNT 12.63 14.64 H 19.64 H 24.27 H 24.89 H   HEMOGLOBIN 8.9 L 8.9 L 9.2 L 8.5 L 8.2 L   HEMATOCRIT 31.8 L 31.1 L 32.3 L 29.8 L 28.3 L   PLATELETS 345 324 342 250 227       CHEMISTRIES:    Recent Labs  Lab 07/09/18  0563  07/10/18  0431 07/11/18  0445 07/12/18  0438 07/13/18  0452   GLUCOSE 238 H 149 H 228 H 155 H 108   SODIUM 138 143 141 139 143   POTASSIUM 5.4 H 4.4 4.1 3.8 4.4   BUN BLD 27 H 18 24 H 23 31 H   CREATININE 0.8 0.8 0.8 0.8 0.9   EGFR IF  >60 >60 >60 >60 >60   EGFR IF NON- >60 >60 >60 >60 >60   CALCIUM 9.4 9.3 9.3 9.2 9.5   MAGNESIUM 2.2 1.9 1.9 1.8 1.9       CARDIAC BIOMARKERS:    Recent Labs  Lab 12/13/15  1932  02/12/18  1422 02/12/18  1808 06/24/18  1413 06/24/18  2153 06/25/18  0506   CPK 33  --   --   --   --   --   --    CPK MB 1.0  --   --   --   --   --   --    TROPONIN I <0.006  < > 0.021 0.021 0.896 H 0.870 H 0.667 H   < > = values in this interval not displayed.    COAGS:    Recent Labs  Lab 04/10/17  1129 08/12/17  0036 11/02/17 2211 12/08/17  0545 06/27/18  1738   INR 1.1 1.2 1.0 1.0 1.1       LIPIDS/LFTS:    Recent Labs  Lab 12/15/15  0540  08/12/17  1037  11/02/17  2211 12/08/17  0545 02/12/18  1422 06/24/18  1413 06/25/18  0506   CHOLESTEROL 170  --  243 H  --   --   --   --   --   --    TRIGLYCERIDES 94  --  93  --   --   --   --   --   --    HDL 33 L  --  43  --   --   --   --   --   --    LDL CHOLESTEROL 118.2  --  181.4 H  --   --   --   --   --   --    NON-HDL CHOLESTEROL 137  --  200  --   --   --   --   --   --    AST  --   < >  --   < > 13 26 13 20 23   ALT  --   < >  --   < > 7 L 8 L 7 L 7 L 10   < > = values in this interval not displayed.    BNP:    Recent Labs  Lab 08/19/17  1430 11/02/17  2211 12/08/17  0545 02/12/18  1422 06/24/18  1413    H 334 H 354 H 133 H 2,128 H       TSH:    Recent Labs  Lab 12/13/15  1932 06/24/18  1413   TSH 0.487 0.754       Free T4:    Recent Labs  Lab 06/24/18  1413   FREE T4 1.09     ABG    Recent Labs  Lab 07/13/18  1121   PH 7.501*   PO2 65*   PCO2 54.9*   HCO3 42.9*   BE 18         Diagnostic Results:  ECG (personally reviewed tracings):  6/24/18 1403 , LAD/PWRP    Chest X-Ray (personally reviewed image(s)):  7/12/18 NAD    CTA C/A/P 6/24/18  1. No evidence of aortic dissection.  2. Extensive calcified and noncalcified plaque seen throughout the aorta with small multifocal outpouchings seen involving the descending thoracic aorta suggestive for small penetrating ulcers.  Similar findings were seen on previous CT from August 2017.  3. 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  4. No evidence of PE.  5. Decreased size area of nodularity with scarring seen within the left lower lobe with resolution of previously visualized cavitation.  No evidence of new lung consolidation.  6. Mild to moderate centrilobular emphysema.    Cath 6/27/18   LVEDP: 9mmHg  LVEF: 50% by echo  Wall Motion: LAD WMA by echo  Dominance: Right  LM: MLI  LAD: MLI  LCx: MLI  RCA: dom, mid 40%  Hemostasis:  R Radial band  Impression:  NSTEMI  Nonobst CAD  Normal LV fxn  R rad vasband for hemostasis  Plan:  Cont med rx  Cont ASA 81mg qd  Stop Plavix  Restart Xarelto 20mg qd this pm  Pt to follow up with Dr. Pulido in 2 weeks    Echo: 6/25/18     1 - Low normal to mildly depressed left ventricular systolic function (EF 50-55%).  Distal LAD territory WMA.     2 - Impaired LV relaxation, elevated LAP (grade 2 diastolic dysfunction).     3 - Concentric hypertrophy.     4 - Trivial to mild tricuspid regurgitation.     5 - Pulmonary hypertension. The estimated PA systolic pressure is 52 mmHg.     LLE venous US 1/24/17  Interval decrease in overall thrombus burden of the left lower extremity, although there is persistent deep venous thrombus present within the left femoral and popliteal veins.    Stress Test: L MPI 12/15/15  Nuclear Quantitative Functional Analysis:   LVEF: 66 %  Impression: NORMAL MYOCARDIAL PERFUSION  1. The perfusion scan is free of evidence for myocardial ischemia or injury.   2. Resting wall motion is physiologic.   3. Resting LV function is normal.   4. The ventricular volumes  are normal at rest and stress.   5. The extracardiac distribution of radioactivity is normal.   6. There was no previous study available to compare.

## 2018-07-14 PROBLEM — I48.91 ATRIAL FIBRILLATION WITH RVR: Status: ACTIVE | Noted: 2018-07-14

## 2018-07-14 PROBLEM — R65.20 SEVERE SEPSIS WITH ACUTE ORGAN DYSFUNCTION: Status: ACTIVE | Noted: 2018-07-12

## 2018-07-14 LAB
ANION GAP SERPL CALC-SCNC: 9 MMOL/L
ANISOCYTOSIS BLD QL SMEAR: SLIGHT
BACTERIA #/AREA URNS HPF: ABNORMAL /HPF
BASOPHILS # BLD AUTO: 0.29 K/UL
BASOPHILS NFR BLD: 1.1 %
BILIRUB UR QL STRIP: NEGATIVE
BUN SERPL-MCNC: 30 MG/DL
CALCIUM SERPL-MCNC: 8.3 MG/DL
CHLORIDE SERPL-SCNC: 102 MMOL/L
CLARITY UR: ABNORMAL
CO2 SERPL-SCNC: 28 MMOL/L
COLOR UR: ABNORMAL
CREAT SERPL-MCNC: 0.8 MG/DL
DIFFERENTIAL METHOD: ABNORMAL
EOSINOPHIL # BLD AUTO: 0.1 K/UL
EOSINOPHIL NFR BLD: 0.4 %
ERYTHROCYTE [DISTWIDTH] IN BLOOD BY AUTOMATED COUNT: 18.6 %
EST. GFR  (AFRICAN AMERICAN): >60 ML/MIN/1.73 M^2
EST. GFR  (NON AFRICAN AMERICAN): >60 ML/MIN/1.73 M^2
GLUCOSE SERPL-MCNC: 157 MG/DL
GLUCOSE UR QL STRIP: ABNORMAL
HCT VFR BLD AUTO: 28.6 %
HGB BLD-MCNC: 8.5 G/DL
HGB UR QL STRIP: ABNORMAL
HYALINE CASTS #/AREA URNS LPF: 0 /LPF
HYPOCHROMIA BLD QL SMEAR: ABNORMAL
KETONES UR QL STRIP: ABNORMAL
LEUKOCYTE ESTERASE UR QL STRIP: ABNORMAL
LYMPHOCYTES # BLD AUTO: 1.7 K/UL
LYMPHOCYTES NFR BLD: 6.7 %
MAGNESIUM SERPL-MCNC: 2.1 MG/DL
MCH RBC QN AUTO: 25.4 PG
MCHC RBC AUTO-ENTMCNC: 29.7 G/DL
MCV RBC AUTO: 85 FL
MICROSCOPIC COMMENT: ABNORMAL
MONOCYTES # BLD AUTO: 2.8 K/UL
MONOCYTES NFR BLD: 10.9 %
NEUTROPHILS # BLD AUTO: 20.9 K/UL
NEUTROPHILS NFR BLD: 84.6 %
NITRITE UR QL STRIP: NEGATIVE
PH UR STRIP: 5 [PH] (ref 5–8)
PHOSPHATE SERPL-MCNC: 2.3 MG/DL
PLATELET # BLD AUTO: 194 K/UL
PLATELET BLD QL SMEAR: ABNORMAL
PMV BLD AUTO: 11.3 FL
POCT GLUCOSE: 209 MG/DL (ref 70–110)
POCT GLUCOSE: 215 MG/DL (ref 70–110)
POCT GLUCOSE: 236 MG/DL (ref 70–110)
POCT GLUCOSE: 249 MG/DL (ref 70–110)
POCT GLUCOSE: 300 MG/DL (ref 70–110)
POLYCHROMASIA BLD QL SMEAR: ABNORMAL
POTASSIUM SERPL-SCNC: 4.3 MMOL/L
PROT UR QL STRIP: ABNORMAL
RBC # BLD AUTO: 3.35 M/UL
RBC #/AREA URNS HPF: 4 /HPF (ref 0–4)
SODIUM SERPL-SCNC: 139 MMOL/L
SP GR UR STRIP: 1.02 (ref 1–1.03)
SQUAMOUS #/AREA URNS HPF: 4 /HPF
URN SPEC COLLECT METH UR: ABNORMAL
UROBILINOGEN UR STRIP-ACNC: ABNORMAL EU/DL
WBC # BLD AUTO: 25.82 K/UL
WBC #/AREA URNS HPF: 20 /HPF (ref 0–5)
YEAST URNS QL MICRO: ABNORMAL

## 2018-07-14 PROCEDURE — 87086 URINE CULTURE/COLONY COUNT: CPT

## 2018-07-14 PROCEDURE — 36569 INSJ PICC 5 YR+ W/O IMAGING: CPT

## 2018-07-14 PROCEDURE — 87088 URINE BACTERIA CULTURE: CPT

## 2018-07-14 PROCEDURE — 25000003 PHARM REV CODE 250: Performed by: INTERNAL MEDICINE

## 2018-07-14 PROCEDURE — 36415 COLL VENOUS BLD VENIPUNCTURE: CPT

## 2018-07-14 PROCEDURE — 63600175 PHARM REV CODE 636 W HCPCS: Performed by: HOSPITALIST

## 2018-07-14 PROCEDURE — 85025 COMPLETE CBC W/AUTO DIFF WBC: CPT

## 2018-07-14 PROCEDURE — 25000003 PHARM REV CODE 250: Performed by: HOSPITALIST

## 2018-07-14 PROCEDURE — A4216 STERILE WATER/SALINE, 10 ML: HCPCS | Performed by: INTERNAL MEDICINE

## 2018-07-14 PROCEDURE — 25000003 PHARM REV CODE 250: Performed by: EMERGENCY MEDICINE

## 2018-07-14 PROCEDURE — 83735 ASSAY OF MAGNESIUM: CPT

## 2018-07-14 PROCEDURE — 81000 URINALYSIS NONAUTO W/SCOPE: CPT

## 2018-07-14 PROCEDURE — 02HV33Z INSERTION OF INFUSION DEVICE INTO SUPERIOR VENA CAVA, PERCUTANEOUS APPROACH: ICD-10-PCS | Performed by: RADIOLOGY

## 2018-07-14 PROCEDURE — 80048 BASIC METABOLIC PNL TOTAL CA: CPT

## 2018-07-14 PROCEDURE — 99233 SBSQ HOSP IP/OBS HIGH 50: CPT | Mod: ,,, | Performed by: INTERNAL MEDICINE

## 2018-07-14 PROCEDURE — 99900035 HC TECH TIME PER 15 MIN (STAT)

## 2018-07-14 PROCEDURE — 94660 CPAP INITIATION&MGMT: CPT

## 2018-07-14 PROCEDURE — 20000000 HC ICU ROOM

## 2018-07-14 PROCEDURE — 84100 ASSAY OF PHOSPHORUS: CPT

## 2018-07-14 PROCEDURE — 94799 UNLISTED PULMONARY SVC/PX: CPT

## 2018-07-14 PROCEDURE — 25500020 PHARM REV CODE 255: Performed by: INTERNAL MEDICINE

## 2018-07-14 PROCEDURE — 87106 FUNGI IDENTIFICATION YEAST: CPT

## 2018-07-14 PROCEDURE — 63600175 PHARM REV CODE 636 W HCPCS: Performed by: INTERNAL MEDICINE

## 2018-07-14 RX ORDER — NYSTATIN 100000 [USP'U]/ML
500000 SUSPENSION ORAL
Status: DISCONTINUED | OUTPATIENT
Start: 2018-07-14 | End: 2018-07-16

## 2018-07-14 RX ORDER — INSULIN ASPART 100 [IU]/ML
1-10 INJECTION, SOLUTION INTRAVENOUS; SUBCUTANEOUS
Status: DISCONTINUED | OUTPATIENT
Start: 2018-07-14 | End: 2018-07-27 | Stop reason: HOSPADM

## 2018-07-14 RX ORDER — FUROSEMIDE 10 MG/ML
40 INJECTION INTRAMUSCULAR; INTRAVENOUS ONCE
Status: COMPLETED | OUTPATIENT
Start: 2018-07-14 | End: 2018-07-14

## 2018-07-14 RX ORDER — SODIUM CHLORIDE 0.9 % (FLUSH) 0.9 %
10 SYRINGE (ML) INJECTION EVERY 6 HOURS
Status: DISCONTINUED | OUTPATIENT
Start: 2018-07-14 | End: 2018-07-24

## 2018-07-14 RX ORDER — SODIUM CHLORIDE 0.9 % (FLUSH) 0.9 %
10 SYRINGE (ML) INJECTION
Status: DISCONTINUED | OUTPATIENT
Start: 2018-07-14 | End: 2018-07-24

## 2018-07-14 RX ORDER — METOPROLOL TARTRATE 25 MG/1
25 TABLET, FILM COATED ORAL 2 TIMES DAILY
Status: DISCONTINUED | OUTPATIENT
Start: 2018-07-14 | End: 2018-07-15

## 2018-07-14 RX ADMIN — INSULIN DETEMIR 20 UNITS: 100 INJECTION, SOLUTION SUBCUTANEOUS at 09:07

## 2018-07-14 RX ADMIN — PIPERACILLIN AND TAZOBACTAM 4.5 G: 4; .5 INJECTION, POWDER, LYOPHILIZED, FOR SOLUTION INTRAVENOUS; PARENTERAL at 02:07

## 2018-07-14 RX ADMIN — TRAMADOL HYDROCHLORIDE 50 MG: 50 TABLET, FILM COATED ORAL at 09:07

## 2018-07-14 RX ADMIN — GUAIFENESIN 600 MG: 600 TABLET, EXTENDED RELEASE ORAL at 09:07

## 2018-07-14 RX ADMIN — AMIODARONE HYDROCHLORIDE 0.5 MG/MIN: 1.8 INJECTION, SOLUTION INTRAVENOUS at 10:07

## 2018-07-14 RX ADMIN — INSULIN ASPART 2 UNITS: 100 INJECTION, SOLUTION INTRAVENOUS; SUBCUTANEOUS at 11:07

## 2018-07-14 RX ADMIN — PIPERACILLIN AND TAZOBACTAM 4.5 G: 4; .5 INJECTION, POWDER, LYOPHILIZED, FOR SOLUTION INTRAVENOUS; PARENTERAL at 09:07

## 2018-07-14 RX ADMIN — INSULIN ASPART 2 UNITS: 100 INJECTION, SOLUTION INTRAVENOUS; SUBCUTANEOUS at 06:07

## 2018-07-14 RX ADMIN — INSULIN ASPART 2 UNITS: 100 INJECTION, SOLUTION INTRAVENOUS; SUBCUTANEOUS at 09:07

## 2018-07-14 RX ADMIN — NYSTATIN 500000 UNITS: 100000 SUSPENSION ORAL at 05:07

## 2018-07-14 RX ADMIN — POLYETHYLENE GLYCOL 3350 17 G: 17 POWDER, FOR SOLUTION ORAL at 09:07

## 2018-07-14 RX ADMIN — PIPERACILLIN AND TAZOBACTAM 4.5 G: 4; .5 INJECTION, POWDER, LYOPHILIZED, FOR SOLUTION INTRAVENOUS; PARENTERAL at 05:07

## 2018-07-14 RX ADMIN — PANTOPRAZOLE SODIUM 40 MG: 40 TABLET, DELAYED RELEASE ORAL at 09:07

## 2018-07-14 RX ADMIN — DOCUSATE SODIUM 100 MG: 100 CAPSULE, LIQUID FILLED ORAL at 09:07

## 2018-07-14 RX ADMIN — IOHEXOL 85 ML: 350 INJECTION, SOLUTION INTRAVENOUS at 05:07

## 2018-07-14 RX ADMIN — RIVAROXABAN 20 MG: 20 TABLET, FILM COATED ORAL at 04:07

## 2018-07-14 RX ADMIN — ATORVASTATIN CALCIUM 80 MG: 40 TABLET, FILM COATED ORAL at 09:07

## 2018-07-14 RX ADMIN — TRAMADOL HYDROCHLORIDE 50 MG: 50 TABLET, FILM COATED ORAL at 06:07

## 2018-07-14 RX ADMIN — Medication 10 ML: at 05:07

## 2018-07-14 RX ADMIN — ASPIRIN 81 MG CHEWABLE TABLET 81 MG: 81 TABLET CHEWABLE at 09:07

## 2018-07-14 RX ADMIN — METOPROLOL TARTRATE 25 MG: 25 TABLET ORAL at 03:07

## 2018-07-14 RX ADMIN — FUROSEMIDE 40 MG: 10 INJECTION, SOLUTION INTRAMUSCULAR; INTRAVENOUS at 01:07

## 2018-07-14 RX ADMIN — NYSTATIN 500000 UNITS: 100000 SUSPENSION ORAL at 09:07

## 2018-07-14 RX ADMIN — INSULIN ASPART 4 UNITS: 100 INJECTION, SOLUTION INTRAVENOUS; SUBCUTANEOUS at 04:07

## 2018-07-14 RX ADMIN — POTASSIUM PHOSPHATE, MONOBASIC 500 MG: 500 TABLET, SOLUBLE ORAL at 09:07

## 2018-07-14 NOTE — SUBJECTIVE & OBJECTIVE
Interval History: AAO today. Feels better with BiPAP in place. With right sided chest pain that is reproducible with tactile stimuli. Still in AFib with RVR. Sats are good. BP much improved.     Review of Systems   Constitutional: Negative for fever.   Respiratory: Positive for shortness of breath.    Cardiovascular: Positive for chest pain.   Gastrointestinal: Negative.    Genitourinary: Negative.    Musculoskeletal: Negative.    Skin: Negative.    Neurological: Negative.    Hematological: Negative.    Psychiatric/Behavioral: Negative.      Objective:     Vital Signs (Most Recent):  Temp: 99.5 °F (37.5 °C) (07/14/18 0730)  Pulse: (!) 131 (07/14/18 0812)  Resp: (!) 27 (07/14/18 0812)  BP: (!) 142/70 (07/14/18 0630)  SpO2: 100 % (07/14/18 0812) Vital Signs (24h Range):  Temp:  [97.5 °F (36.4 °C)-101.3 °F (38.5 °C)] 99.5 °F (37.5 °C)  Pulse:  [] 131  Resp:  [14-41] 27  SpO2:  [92 %-100 %] 100 %  BP: ()/(38-75) 142/70     Weight: 81.7 kg (180 lb 1.9 oz)  Body mass index is 29.97 kg/m².    Intake/Output Summary (Last 24 hours) at 07/14/18 0902  Last data filed at 07/14/18 0600   Gross per 24 hour   Intake          1267.62 ml   Output                0 ml   Net          1267.62 ml      Physical Exam   Constitutional: She is oriented to person, place, and time. She appears well-developed. No distress.   Pulmonary/Chest: No respiratory distress. She has no wheezes. She has rales (bibasilar). She exhibits tenderness (right ).   Breathing comfortably on BiPAP   Abdominal: Soft. Bowel sounds are normal. She exhibits no distension. There is no tenderness.   Musculoskeletal: Normal range of motion. She exhibits no edema.   Neurological: She is alert and oriented to person, place, and time.   Skin: Skin is warm and dry. Capillary refill takes less than 2 seconds. She is not diaphoretic.   Psychiatric: She has a normal mood and affect. Her behavior is normal. Judgment and thought content normal.   Nursing note and  vitals reviewed.      Significant Labs: All pertinent labs within the past 24 hours have been reviewed.    Significant Imaging: I have reviewed all pertinent imaging results/findings within the past 24 hours.  I have reviewed and interpreted all pertinent imaging results/findings within the past 24 hours.

## 2018-07-14 NOTE — ASSESSMENT & PLAN NOTE
- Due to volume overload  - Last ECHO with EF=50-55% + grade 2 diastolic dysfunction on 6/26  - Improved with IV lasix and was stable on PO lasix regimen. Dose not given yesterday due to low BP. Is hypoxic. Pending CTA of chest/abdomen to further evaluate source of sepsis  - on BiPAP and stable. Wean to nasal cannula as tolerated.

## 2018-07-14 NOTE — PROCEDURES
"Yasmeen Palm is a 68 y.o. female patient.    Temp: (P) 98.9 °F (37.2 °C) (07/14/18 1500)  Pulse: (!) 121 (07/14/18 1500)  Resp: (!) 31 (07/14/18 1500)  BP: (!) 153/71 (07/14/18 1500)  SpO2: 100 % (07/14/18 1500)  Weight: 81.7 kg (180 lb 1.9 oz) (07/08/18 0533)  Height: 5' 5" (165.1 cm) (07/06/18 1600)    PICC  Date/Time: 7/14/2018 3:40 PM  Performed by: BALA PAN JR  Consent Done: Yes  Time out: Immediately prior to procedure a time out was called to verify the correct patient, procedure, equipment, support staff and site/side marked as required  Indications: med administration  Local anesthetic: lidocaine 1% without epinephrine  Anesthetic Total (mL): 2  Preparation: skin prepped with ChloraPrep  Skin prep agent dried: skin prep agent completely dried prior to procedure  Sterile barriers: all five maximum sterile barriers used - cap, mask, sterile gown, sterile gloves, and large sterile sheet  Hand hygiene: hand hygiene performed prior to central venous catheter insertion  Location details: right basilic  Catheter type: double lumen  Catheter size: 5 Fr  Catheter Length: 38cm    Ultrasound guidance: yes  Vascular Doppler: not done  Needle advanced into vessel with real time Ultrasound guidance.  Guidewire confirmed in vessel.  Sterile sheath used.  Manometry: esophageal manometry  Number of attempts: 1  Post-procedure: blood return through all ports, chlorhexidine patch and sterile dressing applied  Technical procedures used: modified seldinger    Assessment: placement verified by x-ray          Bala Pan Jr  7/14/2018  "

## 2018-07-14 NOTE — PROGRESS NOTES
07/14/18 0812   Patient Assessment/Suction   Level of Consciousness (AVPU) responds to voice   Respiratory Effort Normal;Unlabored   Expansion/Accessory Muscles/Retractions expansion symmetric   All Lung Fields Breath Sounds diminished   Rhythm/Pattern, Respiratory pattern regular   PRE-TX-O2-ETCO2   O2 Device (Oxygen Therapy) BiPAP   Oxygen Concentration (%) 40   SpO2 100 %   Pulse Oximetry Type Continuous   Pulse (!) 131   Resp (!) 27   Ready to Wean/Extubation Screen   FIO2<=50 (chart decimal) 0.4   Preset CPAP/BiPAP Settings   Mode Of Delivery BiPAP   $ Initial CPAP/BiPAP Setup? No   $ Is patient using? Yes   Equipment Type Vision   Airway Device Type large full face mask  (no skin break down)   Ipap 10   EPAP (cm H2O) 5   Pressure Support (cm H2O) 5   Set Rate (Breaths/Min) 10   ITime (sec) 1   Rise Time (sec) 0.4   Patient CPAP/BiPAP Settings   RR Total (Breaths/Min) 27   Tidal Volume (mL) 323   VE Minute Ventilation (L/min) 6 L/min   Peak Inspiratory Pressure (cm H2O) 9   TiTOT (%) 32   Total Leak (L/Min) 20   Patient Trigger - ST Mode Only (%) 93   CPAP/BiPAP Alarms   High Pressure (cm H2O) 45   Low Pressure (cm H2O) 5   High RR (breaths/min) 45   Low RR (breaths/min) 12

## 2018-07-14 NOTE — PLAN OF CARE
Problem: Patient Care Overview  Goal: Plan of Care Review  Outcome: Ongoing (interventions implemented as appropriate)  Pt remains in ICU overnight. Pt oriented to person, place, and time. Pt much more awake and appropriate after 2300. Pt on BiPAP overnight. Pt remains in a-fib, amio infusing. Pt voided twice. No bowel movement overnight. Pt and family updated on plan of care. Pt free of hospital acquired injuries and falls.

## 2018-07-14 NOTE — ASSESSMENT & PLAN NOTE
-  lisinopril, and restarted hydralazine, clonidine, diltiazem  (held due to previous hypotensive issues)

## 2018-07-14 NOTE — ASSESSMENT & PLAN NOTE
- Followed by Cardiology  - s/p [facility] on 6/27 only remarkable for non-obstructive CAD  - Continue medical management as per Cardiology with ASA, statin, and patient will not be placed on plavix as she is also on Xarelto  - stable

## 2018-07-14 NOTE — PROGRESS NOTES
[facility]  Cardiology  Progress Note    Patient Name: Yasmeen Palm  MRN: [id number]  Admission Date: 6/24/2018  Hospital Length of Stay: 20 days  Code Status: Full Code   Attending Physician: Layla Villegas MD   Primary Care Physician: Angel Orourke Jr, MD  Expected Discharge Date: 7/9/2018  Principal Problem:Non-STEMI (non-ST elevated myocardial infarction)    Subjective:     Hospital Course:   6/25/18: adm with resp insuff and NSTEMI with plans for cath 6/26 6/26/18: transferred back to ICU with hypercarb resp failure  6/27/18: cath with nonobst CAD  7/13/18: returned to ICU with AF/RVR and ?sepsis    Interval Hx: pt seen in ICU, case d/w RN. Pt currently without complaints of CP/SOB.  Fever noted over past few days now with significant leukocytosis.  Pt has not been receiving any PO meds.    Tele: -130s (pers rev)      Past Medical History:   Diagnosis Date    Anticoagulant long-term use     Arthritis     Asthma     CHF (congestive heart failure)     COPD (chronic obstructive pulmonary disease)     Coronary artery disease     Depression     Diabetes mellitus     GERD (gastroesophageal reflux disease)     Hypertension        Past Surgical History:   Procedure Laterality Date    ABDOMINAL SURGERY      CARDIAC SURGERY      HERNIA REPAIR         Review of patient's allergies indicates:  No Known Allergies    No current facility-administered medications on file prior to encounter.      Current Outpatient Prescriptions on File Prior to Encounter   Medication Sig    acetaminophen (TYLENOL) 500 MG tablet Take 1 tablet (500 mg total) by mouth every 8 (eight) hours as needed.    aspirin (ECOTRIN) 81 MG EC tablet Take 81 mg by mouth once daily.    cloNIDine (CATAPRES) 0.1 MG tablet Take 2 tablets (0.2 mg total) by mouth 3 (three) times daily.    diltiaZEM (CARDIZEM) 120 MG tablet Take 1 tablet (120 mg total) by mouth every 8 (eight) hours.    furosemide (LASIX) 40 MG tablet  Take 40 mg by mouth once daily.     gabapentin (NEURONTIN) 300 MG capsule Take 300 mg by mouth 3 (three) times daily.    hydrALAZINE (APRESOLINE) 50 MG tablet Take 1 tablet (50 mg total) by mouth every 8 (eight) hours. (Patient taking differently: Take 50 mg by mouth every 12 (twelve) hours. )    levalbuterol (XOPENEX HFA) 45 mcg/actuation inhaler Inhale 2 puffs into the lungs every 4 (four) hours as needed for Wheezing or Shortness of Breath. Rescue    lisinopril (PRINIVIL,ZESTRIL) 40 MG tablet Take 1 tablet (40 mg total) by mouth once daily. Do not take this medication if blood pressure is below 130/80 mmHg and/or feeling dizzy after taking all other blood pressure meds    metformin (GLUCOPHAGE) 500 MG tablet Take 500 mg by mouth 2 (two) times daily with meals.    rivaroxaban (XARELTO) 20 mg Tab Take 20 mg by mouth daily with dinner or evening meal.    sotalol (BETAPACE) 80 MG tablet Take 80 mg by mouth 2 (two) times daily.    tramadol (ULTRAM) 50 mg tablet Take 1 tablet (50 mg total) by mouth every 6 (six) hours as needed for Pain.    UMECLIDINIUM BRM/VILANTEROL TR (ANORO ELLIPTA INHL) Inhale into the lungs daily as needed.     Family History     Problem Relation (Age of Onset)    Diabetes Father    Heart disease Mother    Hypertension Mother        Social History Main Topics    Smoking status: Current Some Day Smoker     Packs/day: 0.25     Years: 55.00     Types: Cigarettes    Smokeless tobacco: Never Used    Alcohol use 0.6 oz/week     1 Glasses of wine per week    Drug use: No    Sexual activity: No     Review of Systems   Gastrointestinal: Negative for melena.   Genitourinary: Negative for hematuria.     Objective:     Vital Signs (Most Recent):  Temp: 99.5 °F (37.5 °C) (07/14/18 0730)  Pulse: (!) 131 (07/14/18 0812)  Resp: (!) 27 (07/14/18 0812)  BP: (!) 142/70 (07/14/18 0630)  SpO2: 100 % (07/14/18 0812) Vital Signs (24h Range):  Temp:  [97.5 °F (36.4 °C)-101.3 °F (38.5 °C)] 99.5 °F (37.5  °C)  Pulse:  [] 131  Resp:  [14-41] 27  SpO2:  [92 %-100 %] 100 %  BP: ()/(38-75) 142/70     Weight: 81.7 kg (180 lb 1.9 oz)  Body mass index is 29.97 kg/m².    SpO2: 100 %  O2 Device (Oxygen Therapy): BiPAP      Intake/Output Summary (Last 24 hours) at 07/14/18 0848  Last data filed at 07/14/18 0600   Gross per 24 hour   Intake          1267.62 ml   Output                0 ml   Net          1267.62 ml       Lines/Drains/Airways     Peripheral Intravenous Line                 Peripheral IV - Single Lumen 07/13/18 1300 Left Hand less than 1 day         Peripheral IV - Single Lumen 07/13/18 1530 Right Forearm less than 1 day                Physical Exam   Constitutional: She appears well-developed and well-nourished. No distress.   HENT:   Head: Normocephalic and atraumatic.   Eyes: Conjunctivae and EOM are normal. Pupils are equal, round, and reactive to light. No scleral icterus.   Neck: Normal range of motion. Neck supple. No JVD present. No tracheal deviation present. No thyromegaly present.   Cardiovascular: S1 normal and S2 normal.  An irregularly irregular rhythm present. Tachycardia present.  Exam reveals distant heart sounds. Exam reveals no gallop and no friction rub.    No murmur heard.  Pulmonary/Chest: Effort normal. No respiratory distress. She has no rales.   Abdominal: Soft. She exhibits no distension.   Musculoskeletal: Normal range of motion. She exhibits no edema.   Neurological: She is alert. No cranial nerve deficit.   Skin: Skin is warm and dry. She is not diaphoretic.   Psychiatric: She has a normal mood and affect. Her behavior is normal.       Current Medications:   aspirin  81 mg Oral Daily    atorvastatin  80 mg Oral QHS    docusate sodium  100 mg Oral Daily    guaiFENesin  600 mg Oral BID    insulin aspart U-100  8 Units Subcutaneous TIDWM    pantoprazole  40 mg Oral Daily    piperacillin-tazobactam (ZOSYN) IVPB  4.5 g Intravenous Q8H    polyethylene glycol  17 g Oral  BID    potassium phosphate (monobasic)  500 mg Oral Daily    rivaroxaban  20 mg Oral Daily with dinner      amiodarone in dextrose 5% 0.5 mg/min (07/14/18 0600)     acetaminophen, bisacodyl, dextrose 50%, dextrose 50%, dextrose 50%, glucagon (human recombinant), glucagon (human recombinant), glucose, glucose, hydrALAZINE, insulin aspart U-100, labetalol, ondansetron, sodium chloride 0.9%, traMADol    Laboratory:  CBC:    Recent Labs  Lab 07/02/18  0513 07/10/18  0931 07/12/18  0438 07/13/18  0452 07/14/18  0330   WHITE BLOOD CELL COUNT 14.64 H 19.64 H 24.27 H 24.89 H 25.82 H   HEMOGLOBIN 8.9 L 9.2 L 8.5 L 8.2 L 8.5 L   HEMATOCRIT 31.1 L 32.3 L 29.8 L 28.3 L 28.6 L   PLATELETS 324 342 250 227 194       CHEMISTRIES:    Recent Labs  Lab 07/10/18  0431 07/11/18  0445 07/12/18  0438 07/13/18  0452 07/14/18  0330   GLUCOSE 149 H 228 H 155 H 108 157 H   SODIUM 143 141 139 143 139   POTASSIUM 4.4 4.1 3.8 4.4 4.3   BUN BLD 18 24 H 23 31 H 30 H   CREATININE 0.8 0.8 0.8 0.9 0.8   EGFR IF AFRICAN AMERICAN >60 >60 >60 >60 >60   EGFR IF NON- >60 >60 >60 >60 >60   CALCIUM 9.3 9.3 9.2 9.5 8.3 L   MAGNESIUM 1.9 1.9 1.8 1.9 2.1       CARDIAC BIOMARKERS:    Recent Labs  Lab 12/13/15  1932  02/12/18  1422 02/12/18  1808 06/24/18  1413 06/24/18  2153 06/25/18  0506   CPK 33  --   --   --   --   --   --    CPK MB 1.0  --   --   --   --   --   --    TROPONIN I <0.006  < > 0.021 0.021 0.896 H 0.870 H 0.667 H   < > = values in this interval not displayed.    COAGS:    Recent Labs  Lab 04/10/17  1129 08/12/17  0036 11/02/17  2211 12/08/17  0545 06/27/18  1738   INR 1.1 1.2 1.0 1.0 1.1       LIPIDS/LFTS:    Recent Labs  Lab 12/15/15  0540  08/12/17  1037  11/02/17  2211 12/08/17  0545 02/12/18  1422 06/24/18  1413 06/25/18  0506   CHOLESTEROL 170  --  243 H  --   --   --   --   --   --    TRIGLYCERIDES 94  --  93  --   --   --   --   --   --    HDL 33 L  --  43  --   --   --   --   --   --    LDL CHOLESTEROL 118.2  --   181.4 H  --   --   --   --   --   --    NON-HDL CHOLESTEROL 137  --  200  --   --   --   --   --   --    AST  --   < >  --   < > 13 26 13 20 23   ALT  --   < >  --   < > 7 L 8 L 7 L 7 L 10   < > = values in this interval not displayed.    BNP:    Recent Labs  Lab 08/19/17  1430 11/02/17  2211 12/08/17  0545 02/12/18  1422 06/24/18  1413    H 334 H 354 H 133 H 2,128 H       TSH:    Recent Labs  Lab 12/13/15  1932 06/24/18  1413   TSH 0.487 0.754       Free T4:    Recent Labs  Lab 06/24/18  1413   FREE T4 1.09     ABG    Recent Labs  Lab 07/13/18  1121   PH 7.501*   PO2 65*   PCO2 54.9*   HCO3 42.9*   BE 18         Diagnostic Results:  ECG (personally reviewed tracings):  6/24/18 1403 , LAD/PWRP    Chest X-Ray (personally reviewed image(s)): 7/12/18 NAD    CTA C/A/P 6/24/18  1. No evidence of aortic dissection.  2. Extensive calcified and noncalcified plaque seen throughout the aorta with small multifocal outpouchings seen involving the descending thoracic aorta suggestive for small penetrating ulcers.  Similar findings were seen on previous CT from August 2017.  3. 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  4. No evidence of PE.  5. Decreased size area of nodularity with scarring seen within the left lower lobe with resolution of previously visualized cavitation.  No evidence of new lung consolidation.  6. Mild to moderate centrilobular emphysema.    Cath 6/27/18   LVEDP: 9mmHg  LVEF: 50% by echo  Wall Motion: LAD WMA by echo  Dominance: Right  LM: MLI  LAD: MLI  LCx: MLI  RCA: dom, mid 40%  Hemostasis:  R Radial band  Impression:  NSTEMI  Nonobst CAD  Normal LV fxn  R rad vasband for hemostasis  Plan:  Cont med rx  Cont ASA 81mg qd  Stop Plavix  Restart Xarelto 20mg qd this pm  Pt to follow up with Dr. Pulido in 2 weeks    Echo: 6/25/18     1 - Low normal to mildly depressed left ventricular systolic function (EF 50-55%).  Distal LAD  territory WMA.     2 - Impaired LV relaxation, elevated LAP (grade 2 diastolic dysfunction).     3 - Concentric hypertrophy.     4 - Trivial to mild tricuspid regurgitation.     5 - Pulmonary hypertension. The estimated PA systolic pressure is 52 mmHg.     LLE venous US 1/24/17  Interval decrease in overall thrombus burden of the left lower extremity, although there is persistent deep venous thrombus present within the left femoral and popliteal veins.    Stress Test: L MPI 12/15/15  Nuclear Quantitative Functional Analysis:   LVEF: 66 %  Impression: NORMAL MYOCARDIAL PERFUSION  1. The perfusion scan is free of evidence for myocardial ischemia or injury.   2. Resting wall motion is physiologic.   3. Resting LV function is normal.   4. The ventricular volumes are normal at rest and stress.   5. The extracardiac distribution of radioactivity is normal.   6. There was no previous study available to compare.      Assessment and Plan:     * Non-STEMI (non-ST elevated myocardial infarction)    EF normal on echo with LAD WMA (echo 6/25/18)  Cath 6/27/18 with nonobst CAD  Cont ASA 81mg qd  Cont Xarelto 20mg qd  Cont atorva 80mg qhs  Check lipids/LFT 3 months (late Sept 2018)            Sepsis    Per IM  ?source of fever/WBC  Abx ordered per IM        PAF (paroxysmal atrial fibrillation)    Back in AF/RVR  ?driven by fever/leukocytosis/sepsis  Prev on sotalol, but hasn't received any in past 36 hrs  Cont Xarelto  Cont IV amio  DCCV as a contingency        Acute exacerbation of chronic obstructive pulmonary disease (COPD)    Per IM/pulm        Chronic anticoagulation    For hx of PAF and DVT/PE  Xarelto resumed        History of deep vein thrombosis    As above        Type 2 diabetes mellitus, controlled    Per IM            VTE Risk Mitigation         Ordered     rivaroxaban tablet 20 mg  With dinner      06/27/18 8393          Laureano Pulido MD  Cardiology  [facility]

## 2018-07-14 NOTE — SUBJECTIVE & OBJECTIVE
Past Medical History:   Diagnosis Date    Anticoagulant long-term use     Arthritis     Asthma     CHF (congestive heart failure)     COPD (chronic obstructive pulmonary disease)     Coronary artery disease     Depression     Diabetes mellitus     GERD (gastroesophageal reflux disease)     Hypertension        Past Surgical History:   Procedure Laterality Date    ABDOMINAL SURGERY      CARDIAC SURGERY      HERNIA REPAIR         Review of patient's allergies indicates:  No Known Allergies    No current facility-administered medications on file prior to encounter.      Current Outpatient Prescriptions on File Prior to Encounter   Medication Sig    acetaminophen (TYLENOL) 500 MG tablet Take 1 tablet (500 mg total) by mouth every 8 (eight) hours as needed.    aspirin (ECOTRIN) 81 MG EC tablet Take 81 mg by mouth once daily.    cloNIDine (CATAPRES) 0.1 MG tablet Take 2 tablets (0.2 mg total) by mouth 3 (three) times daily.    diltiaZEM (CARDIZEM) 120 MG tablet Take 1 tablet (120 mg total) by mouth every 8 (eight) hours.    furosemide (LASIX) 40 MG tablet Take 40 mg by mouth once daily.     gabapentin (NEURONTIN) 300 MG capsule Take 300 mg by mouth 3 (three) times daily.    hydrALAZINE (APRESOLINE) 50 MG tablet Take 1 tablet (50 mg total) by mouth every 8 (eight) hours. (Patient taking differently: Take 50 mg by mouth every 12 (twelve) hours. )    levalbuterol (XOPENEX HFA) 45 mcg/actuation inhaler Inhale 2 puffs into the lungs every 4 (four) hours as needed for Wheezing or Shortness of Breath. Rescue    lisinopril (PRINIVIL,ZESTRIL) 40 MG tablet Take 1 tablet (40 mg total) by mouth once daily. Do not take this medication if blood pressure is below 130/80 mmHg and/or feeling dizzy after taking all other blood pressure meds    metformin (GLUCOPHAGE) 500 MG tablet Take 500 mg by mouth 2 (two) times daily with meals.    rivaroxaban (XARELTO) 20 mg Tab Take 20 mg by mouth daily with dinner or evening  meal.    sotalol (BETAPACE) 80 MG tablet Take 80 mg by mouth 2 (two) times daily.    tramadol (ULTRAM) 50 mg tablet Take 1 tablet (50 mg total) by mouth every 6 (six) hours as needed for Pain.    UMECLIDINIUM BRM/VILANTEROL TR (ANORO ELLIPTA INHL) Inhale into the lungs daily as needed.     Family History     Problem Relation (Age of Onset)    Diabetes Father    Heart disease Mother    Hypertension Mother        Social History Main Topics    Smoking status: Current Some Day Smoker     Packs/day: 0.25     Years: 55.00     Types: Cigarettes    Smokeless tobacco: Never Used    Alcohol use 0.6 oz/week     1 Glasses of wine per week    Drug use: No    Sexual activity: No     Review of Systems   Gastrointestinal: Negative for melena.   Genitourinary: Negative for hematuria.     Objective:     Vital Signs (Most Recent):  Temp: 99.5 °F (37.5 °C) (07/14/18 0730)  Pulse: (!) 131 (07/14/18 0812)  Resp: (!) 27 (07/14/18 0812)  BP: (!) 142/70 (07/14/18 0630)  SpO2: 100 % (07/14/18 0812) Vital Signs (24h Range):  Temp:  [97.5 °F (36.4 °C)-101.3 °F (38.5 °C)] 99.5 °F (37.5 °C)  Pulse:  [] 131  Resp:  [14-41] 27  SpO2:  [92 %-100 %] 100 %  BP: ()/(38-75) 142/70     Weight: 81.7 kg (180 lb 1.9 oz)  Body mass index is 29.97 kg/m².    SpO2: 100 %  O2 Device (Oxygen Therapy): BiPAP      Intake/Output Summary (Last 24 hours) at 07/14/18 0848  Last data filed at 07/14/18 0600   Gross per 24 hour   Intake          1267.62 ml   Output                0 ml   Net          1267.62 ml       Lines/Drains/Airways     Peripheral Intravenous Line                 Peripheral IV - Single Lumen 07/13/18 1300 Left Hand less than 1 day         Peripheral IV - Single Lumen 07/13/18 1530 Right Forearm less than 1 day                Physical Exam   Constitutional: She appears well-developed and well-nourished. No distress.   HENT:   Head: Normocephalic and atraumatic.   Eyes: Conjunctivae and EOM are normal. Pupils are equal, round, and  reactive to light. No scleral icterus.   Neck: Normal range of motion. Neck supple. No JVD present. No tracheal deviation present. No thyromegaly present.   Cardiovascular: S1 normal and S2 normal.  An irregularly irregular rhythm present. Tachycardia present.  Exam reveals distant heart sounds. Exam reveals no gallop and no friction rub.    No murmur heard.  Pulmonary/Chest: Effort normal. No respiratory distress. She has no rales.   Abdominal: Soft. She exhibits no distension.   Musculoskeletal: Normal range of motion. She exhibits no edema.   Neurological: She is alert. No cranial nerve deficit.   Skin: Skin is warm and dry. She is not diaphoretic.   Psychiatric: She has a normal mood and affect. Her behavior is normal.       Current Medications:   aspirin  81 mg Oral Daily    atorvastatin  80 mg Oral QHS    docusate sodium  100 mg Oral Daily    guaiFENesin  600 mg Oral BID    insulin aspart U-100  8 Units Subcutaneous TIDWM    pantoprazole  40 mg Oral Daily    piperacillin-tazobactam (ZOSYN) IVPB  4.5 g Intravenous Q8H    polyethylene glycol  17 g Oral BID    potassium phosphate (monobasic)  500 mg Oral Daily    rivaroxaban  20 mg Oral Daily with dinner      amiodarone in dextrose 5% 0.5 mg/min (07/14/18 0600)     acetaminophen, bisacodyl, dextrose 50%, dextrose 50%, dextrose 50%, glucagon (human recombinant), glucagon (human recombinant), glucose, glucose, hydrALAZINE, insulin aspart U-100, labetalol, ondansetron, sodium chloride 0.9%, traMADol    Laboratory:  CBC:    Recent Labs  Lab 07/02/18  0513 07/10/18  0931 07/12/18  0438 07/13/18  0452 07/14/18  0330   WHITE BLOOD CELL COUNT 14.64 H 19.64 H 24.27 H 24.89 H 25.82 H   HEMOGLOBIN 8.9 L 9.2 L 8.5 L 8.2 L 8.5 L   HEMATOCRIT 31.1 L 32.3 L 29.8 L 28.3 L 28.6 L   PLATELETS 324 342 250 227 194       CHEMISTRIES:    Recent Labs  Lab 07/10/18  0431 07/11/18  0445 07/12/18  0438 07/13/18  0452 07/14/18  0330   GLUCOSE 149 H 228 H 155 H 108 157 H   SODIUM  143 141 139 143 139   POTASSIUM 4.4 4.1 3.8 4.4 4.3   BUN BLD 18 24 H 23 31 H 30 H   CREATININE 0.8 0.8 0.8 0.9 0.8   EGFR IF [place]FR[place]ICAN >60 >60 >60 >60 >60   EGFR IF NON- >60 >60 >60 >60 >60   CALCIUM 9.3 9.3 9.2 9.5 8.3 L   MAGNESIUM 1.9 1.9 1.8 1.9 2.1       CARDIAC BIOMARKERS:    Recent Labs  Lab 12/13/15  1932  02/12/18  1422 02/12/18  1808 06/24/18  1413 06/24/18  2153 06/25/18  0506   CPK 33  --   --   --   --   --   --    CPK MB 1.0  --   --   --   --   --   --    TROPONIN I <0.006  < > 0.021 0.021 0.896 H 0.870 H 0.667 H   < > = values in this interval not displayed.    COAGS:    Recent Labs  Lab 04/10/17  1129 08/12/17  0036 11/02/17 2211 12/08/17  0545 06/27/18  1738   INR 1.1 1.2 1.0 1.0 1.1       LIPIDS/LFTS:    Recent Labs  Lab 12/15/15  0540  08/12/17  1037  11/02/17 2211 12/08/17  0545 02/12/18  1422 06/24/18  1413 06/25/18  0506   CHOLESTEROL 170  --  243 H  --   --   --   --   --   --    TRIGLYCERIDES 94  --  93  --   --   --   --   --   --    HDL 33 L  --  43  --   --   --   --   --   --    LDL CHOLESTEROL 118.2  --  181.4 H  --   --   --   --   --   --    NON-HDL CHOLESTEROL 137  --  200  --   --   --   --   --   --    AST  --   < >  --   < > 13 26 13 20 23   ALT  --   < >  --   < > 7 L 8 L 7 L 7 L 10   < > = values in this interval not displayed.    BNP:    Recent Labs  Lab 08/19/17  1430 11/02/17 2211 12/08/17  0545 02/12/18  1422 06/24/18  1413    H 334 H 354 H 133 H 2,128 H       TSH:    Recent Labs  Lab 12/13/15  1932 06/24/18  1413   TSH 0.487 0.754       Free T4:    Recent Labs  Lab 06/24/18  1413   FREE T4 1.09     ABG    Recent Labs  Lab 07/13/18  1121   PH 7.501*   PO2 65*   PCO2 54.9*   HCO3 42.9*   BE 18         Diagnostic Results:  ECG (personally reviewed tracings):  6/24/18 1403 , LAD/PWRP    Chest X-Ray (personally reviewed image(s)): 7/12/18 NAD    CTA C/A/P 6/24/18  1. No evidence of aortic dissection.  2. Extensive calcified and  noncalcified plaque seen throughout the aorta with small multifocal outpouchings seen involving the descending thoracic aorta suggestive for small penetrating ulcers.  Similar findings were seen on previous CT from August 2017.  3. 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  4. No evidence of PE.  5. Decreased size area of nodularity with scarring seen within the left lower lobe with resolution of previously visualized cavitation.  No evidence of new lung consolidation.  6. Mild to moderate centrilobular emphysema.    Cath 6/27/18   LVEDP: 9mmHg  LVEF: 50% by echo  Wall Motion: LAD WMA by echo  Dominance: Right  LM: MLI  LAD: MLI  LCx: MLI  RCA: dom, mid 40%  Hemostasis:  R Radial band  Impression:  NSTEMI  Nonobst CAD  Normal LV fxn  R rad vasband for hemostasis  Plan:  Cont med rx  Cont ASA 81mg qd  Stop Plavix  Restart Xarelto 20mg qd this pm  Pt to follow up with Dr. Pulido in 2 weeks    Echo: 6/25/18     1 - Low normal to mildly depressed left ventricular systolic function (EF 50-55%).  Distal LAD territory WMA.     2 - Impaired LV relaxation, elevated LAP (grade 2 diastolic dysfunction).     3 - Concentric hypertrophy.     4 - Trivial to mild tricuspid regurgitation.     5 - Pulmonary hypertension. The estimated PA systolic pressure is 52 mmHg.     LLE venous US 1/24/17  Interval decrease in overall thrombus burden of the left lower extremity, although there is persistent deep venous thrombus present within the left femoral and popliteal veins.    Stress Test: L MPI 12/15/15  Nuclear Quantitative Functional Analysis:   LVEF: 66 %  Impression: NORMAL MYOCARDIAL PERFUSION  1. The perfusion scan is free of evidence for myocardial ischemia or injury.   2. Resting wall motion is physiologic.   3. Resting LV function is normal.   4. The ventricular volumes are normal at rest and stress.   5. The extracardiac distribution of radioactivity is normal.   6.  There was no previous study available to compare.

## 2018-07-14 NOTE — PROGRESS NOTES
[facility] Hospital Medicine  Progress Note    Patient Name: Yasmeen Palm  MRN: [id number]  Patient Class: IP- Inpatient   Admission Date: 6/24/2018  Length of Stay: 20 days  Attending Physician: Layla Villegas MD  Primary Care Provider: Angel Orourke Jr, MD        Subjective:     Principal Problem:Non-STEMI (non-ST elevated myocardial infarction)    HPI:  Yasmeen Palm is a 68 y.o. female that (in part)  has a past medical history of Anticoagulant long-term use; Arthritis; Asthma; CHF (congestive heart failure); COPD (chronic obstructive pulmonary disease); Coronary artery disease; Depression; Diabetes mellitus; GERD (gastroesophageal reflux disease); and Hypertension.  has a past surgical history that includes Abdominal surgery; Cardiac surgery; and Hernia repair. Presents to [facility] Emergency Department complaining of chest pain .  She reports compliance with her home medication regimen, including Xarelto.     Description of symptoms  Location:  Substernal  Onset:  Acute onset 2 days ago  Character:  The patient remained; moderate severity  Frequency:  Daily  Duration:  each episode lasts several minutes at a time  Associated Symptoms:  Diaphoresis, shortness of breath, weakness and fatigue  Radiation:  Recent the chest  Exacerbating factors:  Worse on exertion  Relieving factors:  Minimal relief with supplemental oxygen     In the emergency department routine laboratory studies, chest x-ray, EKG, cardiac enzymes were obtained.  EKG findings were concerning the case was discussed with cardiologist, Dr. Pulido.  It was determined that her EKG was not consistent with STEMI and she has been chest pain-free since arrival.  She had been given Lovenox, aspirin, and plan was to continue trending troponins and monitor closely on telemetry.    Hospital Course:  Ms. Palm was admitted to the hospital originally on 6/24/18 for chest pain. She was later sent to the ICU  with acute hypercapnic respiratory failure the same day on BIPAP.  She quickly improved and was sent to the floor on 6/25.  Cards was consulted for NSTEMI with noted troponin increase and was planning on LHC on 6/26. However, the patient was sent back to the ICU on 6/26 with hypercapnic respiratory failure with a C02 of > 100 and was intubated. Pulmonary was consulted. Pt clinically improved from respiratory standpoint and was extubated on 6/27 to NC, but she did complain of chest discomfort. Cardiology was notified and patient was taken for LHC on 6/27 which was only remarkable for non-obstructive CAD and did not require intervention. Pt was given IVF post procedure and the next day developed increased SOB and required BIPAP. Pt was treated with IV lasix with good response with much improved respiratory status after output of 1L. Pt also being treated for COPD exacerbation with IV steroids, NEBS and doxycycline. ECHO performed on 6/25/2018 remarkable for EF=50-55% + grade 2 diastolic dysfunction. Pt diuresed and changed to maintenance PO lasix regimen. She as started on BIPAP due to increased pCO2 on ABG and lethargy. Pt will need BIPAP/home ventilator for use at home and pending approval of device for discharge home with home health.  Became febrile on febrile, lethargic, in AFib with RVR and SOB on 7/13. Transferred to ICU again, on BiPAP and amiodarone infusion.     Interval History: AAO today. Feels better with BiPAP in place. With right sided chest pain that is reproducible with tactile stimuli. Still in AFib with RVR. Sats are good. BP much improved.     Review of Systems   Constitutional: Negative for fever.   Respiratory: Positive for shortness of breath.    Cardiovascular: Positive for chest pain.   Gastrointestinal: Negative.    Genitourinary: Negative.    Musculoskeletal: Negative.    Skin: Negative.    Neurological: Negative.    Hematological: Negative.    Psychiatric/Behavioral: Negative.       Objective:     Vital Signs (Most Recent):  Temp: 99.5 °F (37.5 °C) (07/14/18 0730)  Pulse: (!) 131 (07/14/18 0812)  Resp: (!) 27 (07/14/18 0812)  BP: (!) 142/70 (07/14/18 0630)  SpO2: 100 % (07/14/18 0812) Vital Signs (24h Range):  Temp:  [97.5 °F (36.4 °C)-101.3 °F (38.5 °C)] 99.5 °F (37.5 °C)  Pulse:  [] 131  Resp:  [14-41] 27  SpO2:  [92 %-100 %] 100 %  BP: ()/(38-75) 142/70     Weight: 81.7 kg (180 lb 1.9 oz)  Body mass index is 29.97 kg/m².    Intake/Output Summary (Last 24 hours) at 07/14/18 0902  Last data filed at 07/14/18 0600   Gross per 24 hour   Intake          1267.62 ml   Output                0 ml   Net          1267.62 ml      Physical Exam   Constitutional: She is oriented to person, place, and time. She appears well-developed. No distress.   Pulmonary/Chest: No respiratory distress. She has no wheezes. She has rales (bibasilar). She exhibits tenderness (right ).   Breathing comfortably on BiPAP   Abdominal: Soft. Bowel sounds are normal. She exhibits no distension. There is no tenderness.   Musculoskeletal: Normal range of motion. She exhibits no edema.   Neurological: She is alert and oriented to person, place, and time.   Skin: Skin is warm and dry. Capillary refill takes less than 2 seconds. She is not diaphoretic.   Psychiatric: She has a normal mood and affect. Her behavior is normal. Judgment and thought content normal.   Nursing note and vitals reviewed.      Significant Labs: All pertinent labs within the past 24 hours have been reviewed.    Significant Imaging: I have reviewed all pertinent imaging results/findings within the past 24 hours.  I have reviewed and interpreted all pertinent imaging results/findings within the past 24 hours.    Assessment/Plan:      * Severe sepsis with acute organ dysfunction    Source still unclear. UA is abnormal and very cloudy appearing urine. I will obtain CTA of chest/abdomen to clarify no other potential source. Appears to be responding  to pip-tazo. Will continue as so. BCx one bottle thus far negative. Pending second bottle and urine culture.           Atrial fibrillation with RVR    May be 2/2 to development of severe sepsis and subsequent resp failure. On amiodarone infusion. Treat underlying infection          Elevated troponin I level    - As discussed under NSTEMI  - No sign of obstruction on [facility].        Acute diastolic CHF (congestive heart failure)    - Initially due to volume overload. Now likely due to sepsis  - Last ECHO with EF=50-55% + grade 2 diastolic dysfunction on 6/26  - Improved with IV lasix and was stable on PO lasix regimen. Dose not given yesterday due to low BP. Is hypoxic. Pending CTA of chest/abdomen to further evaluate source of sepsis  - on BiPAP and stable. Wean to nasal cannula as tolerated.          Non-STEMI (non-ST elevated myocardial infarction)    - Followed by Cardiology  - s/p C on 6/27 only remarkable for non-obstructive CAD  - Continue medical management as per Cardiology with ASA, statin, and patient will not be placed on plavix as she is also on Xarelto  - stable        Elevated brain natriuretic peptide (BNP) level    - Diuresis resumed         Malignant hypertension      -  lisinopril, and restarted hydralazine, clonidine, diltiazem  (held due to previous hypotensive issues)          Tobacco abuse    - Smoking cessation counseling done        History of pulmonary embolism    - Xarelto resumed   - CTA pending        History of deep vein thrombosis    - Resumed anticoagulation         Chest pain    Likely MSK as it is reproducible           Hyperkalemia    - Resolved            Fall    - Slipped while getting up on 7/6  - Head CT and all x-ray are negative  - Resolved        Debility    - PT/OT, will arrange for home health at time of discharge        Acute hypercapnic respiratory failure    A recurrent issue during this admission. Management as above        PAF (paroxysmal atrial fibrillation)    - Rate  controlled and anticoagulation resumed with Xarelto on 6/27 post [facility]        Neuropathy    - No acute issues         Acute exacerbation of chronic obstructive pulmonary disease (COPD)    Has been back and forth to ICU for hypercapnic respiratory failure. Suspected underlying COPD but also with diastolic HF. Was intubated at one point. Again with respiratory compromise but stable on BiPAP. Wean as tolerated to NC. Will not use steroids as patient has good airflow on exam and I think this would only cause issues with fluid retention. CTA ordered to better evaluate for PE (although on anticoagulation) vs infection vs other acute lung pathology. BiPAP QHS. Diurese now.           Obesity    - Weight reduction as outpatient after all acute issues addressed        Chronic anticoagulation    - Patient on Xarelto for PAF which has been resumed        Anemia of chronic disease    Low but stable.         Gastroesophageal reflux disease without esophagitis    - Continue PPI        Type 2 diabetes mellitus, controlled    - Complications of peripheral neuropathy.   - Increased basal and prandial insulin  - Glucose control improving with insulin adjustment and weaned off prednisone several days ago        Coronary artery disease involving native coronary artery of native heart with angina pectoris    - [facility] on 6/27 as discussed above  - As per Cardiology          VTE Risk Mitigation         Ordered     rivaroxaban tablet 20 mg  With dinner      06/27/18 1458        Discussed with patient and patient's . All questions answered to satisfaction.     Critical care time spent on the evaluation and treatment of severe organ dysfunction, review of pertinent labs and imaging studies, discussions with consulting providers and discussions with patient/family: > 35 minutes.    Layla Linda MD  Department of Hospital Medicine   [facility]

## 2018-07-14 NOTE — ASSESSMENT & PLAN NOTE
- Initially due to volume overload. Now likely due to sepsis  - Last ECHO with EF=50-55% + grade 2 diastolic dysfunction on 6/26  - Improved with IV lasix and was stable on PO lasix regimen. Dose not given yesterday due to low BP. Is hypoxic. Pending CTA of chest/abdomen to further evaluate source of sepsis  - on BiPAP and stable. Wean to nasal cannula as tolerated.

## 2018-07-14 NOTE — PLAN OF CARE
Problem: Patient Care Overview  Goal: Plan of Care Review  Outcome: Ongoing (interventions implemented as appropriate)  Cardiac rhythm A-Fib w/ RVR.  Remains on Amio gtt.  Lopressor po admin for ; converted to ST briefly then back to A -Fib.  ANSON PICC placed this afternoon.  CT chest chest, abd, pelvis pending.  O2 @ 3L NC; denies SOB.  Poor appetite.  Clark cath placed, UA sent, Lasix admin with good diuresis. Remains free from hospital acquired injury.

## 2018-07-14 NOTE — ASSESSMENT & PLAN NOTE
Source still unclear. UA is abnormal and very cloudy appearing urine. I will obtain CTA of chest/abdomen to clarify no other potential source. Appears to be responding to pip-tazo. Will continue as so. BCx one bottle thus far negative. Pending second bottle and urine culture.

## 2018-07-14 NOTE — ASSESSMENT & PLAN NOTE
May be 2/2 to development of severe sepsis and subsequent resp failure. On amiodarone infusion + metoprolol added (dose increased to 50 mg BID today). Treat underlying infection

## 2018-07-14 NOTE — NURSING
0745   Dr Linda notified of A-fib RVR 110s - 140s    0825  Dr Pulido notified that patients HR is sustained 110s-150s; no new orders.    1535  Dr Linda notified HR 120s - 160.  Dr Linda states she will place new orders.    1538  Lopressor po admin.    1605  Monitor now shows ST    1825  CT report pending.

## 2018-07-14 NOTE — ASSESSMENT & PLAN NOTE
Has been back and forth to ICU for hypercapnic respiratory failure. Suspected underlying COPD but also with diastolic HF. Was intubated at one point. Again with respiratory compromise but stable on BiPAP. Wean as tolerated to NC. Will not use steroids as patient has good airflow on exam and I think this would only cause issues with fluid retention. CTA ordered to better evaluate for PE (although on anticoagulation) vs infection vs other acute lung pathology. BiPAP QHS. Diurese now.

## 2018-07-14 NOTE — ASSESSMENT & PLAN NOTE
Back in AF/RVR  ?driven by fever/leukocytosis/sepsis  On sotalol, titrate to 120mg bid  Cont Xarelto  DCCV +/- amio as a contingency

## 2018-07-15 LAB
ALBUMIN SERPL BCP-MCNC: 2 G/DL
ALP SERPL-CCNC: 89 U/L
ALT SERPL W/O P-5'-P-CCNC: 24 U/L
ANION GAP SERPL CALC-SCNC: 7 MMOL/L
AST SERPL-CCNC: 26 U/L
BASOPHILS # BLD AUTO: 0.01 K/UL
BASOPHILS NFR BLD: 0.1 %
BILIRUB SERPL-MCNC: 0.4 MG/DL
BUN SERPL-MCNC: 17 MG/DL
CALCIUM SERPL-MCNC: 9.2 MG/DL
CHLORIDE SERPL-SCNC: 95 MMOL/L
CO2 SERPL-SCNC: 41 MMOL/L
CREAT SERPL-MCNC: 0.7 MG/DL
DIFFERENTIAL METHOD: ABNORMAL
EOSINOPHIL # BLD AUTO: 0 K/UL
EOSINOPHIL NFR BLD: 0.2 %
ERYTHROCYTE [DISTWIDTH] IN BLOOD BY AUTOMATED COUNT: 18.6 %
EST. GFR  (AFRICAN AMERICAN): >60 ML/MIN/1.73 M^2
EST. GFR  (NON AFRICAN AMERICAN): >60 ML/MIN/1.73 M^2
GLUCOSE SERPL-MCNC: 193 MG/DL
HCT VFR BLD AUTO: 25.8 %
HGB BLD-MCNC: 7.5 G/DL
LYMPHOCYTES # BLD AUTO: 1.7 K/UL
LYMPHOCYTES NFR BLD: 8.8 %
MAGNESIUM SERPL-MCNC: 1.8 MG/DL
MCH RBC QN AUTO: 25.4 PG
MCHC RBC AUTO-ENTMCNC: 29.1 G/DL
MCV RBC AUTO: 88 FL
MONOCYTES # BLD AUTO: 1.2 K/UL
MONOCYTES NFR BLD: 6.5 %
NEUTROPHILS # BLD AUTO: 16.1 K/UL
NEUTROPHILS NFR BLD: 84.4 %
PHOSPHATE SERPL-MCNC: 2.2 MG/DL
PLATELET # BLD AUTO: 174 K/UL
PMV BLD AUTO: 10.3 FL
POCT GLUCOSE: 110 MG/DL (ref 70–110)
POCT GLUCOSE: 212 MG/DL (ref 70–110)
POCT GLUCOSE: 234 MG/DL (ref 70–110)
POCT GLUCOSE: 239 MG/DL (ref 70–110)
POTASSIUM SERPL-SCNC: 3.4 MMOL/L
PROT SERPL-MCNC: 6.2 G/DL
RBC # BLD AUTO: 2.95 M/UL
SODIUM SERPL-SCNC: 143 MMOL/L
WBC # BLD AUTO: 19.03 K/UL

## 2018-07-15 PROCEDURE — 63600175 PHARM REV CODE 636 W HCPCS: Performed by: HOSPITALIST

## 2018-07-15 PROCEDURE — 94761 N-INVAS EAR/PLS OXIMETRY MLT: CPT

## 2018-07-15 PROCEDURE — 99233 SBSQ HOSP IP/OBS HIGH 50: CPT | Mod: ,,, | Performed by: INTERNAL MEDICINE

## 2018-07-15 PROCEDURE — 25000003 PHARM REV CODE 250: Performed by: INTERNAL MEDICINE

## 2018-07-15 PROCEDURE — 25000003 PHARM REV CODE 250: Performed by: EMERGENCY MEDICINE

## 2018-07-15 PROCEDURE — 25000003 PHARM REV CODE 250: Performed by: HOSPITALIST

## 2018-07-15 PROCEDURE — 94660 CPAP INITIATION&MGMT: CPT

## 2018-07-15 PROCEDURE — 94799 UNLISTED PULMONARY SVC/PX: CPT

## 2018-07-15 PROCEDURE — A4216 STERILE WATER/SALINE, 10 ML: HCPCS | Performed by: INTERNAL MEDICINE

## 2018-07-15 PROCEDURE — 83735 ASSAY OF MAGNESIUM: CPT

## 2018-07-15 PROCEDURE — 84100 ASSAY OF PHOSPHORUS: CPT

## 2018-07-15 PROCEDURE — 99900035 HC TECH TIME PER 15 MIN (STAT)

## 2018-07-15 PROCEDURE — 27000221 HC OXYGEN, UP TO 24 HOURS

## 2018-07-15 PROCEDURE — 85025 COMPLETE CBC W/AUTO DIFF WBC: CPT

## 2018-07-15 PROCEDURE — 63600175 PHARM REV CODE 636 W HCPCS: Performed by: INTERNAL MEDICINE

## 2018-07-15 PROCEDURE — 20000000 HC ICU ROOM

## 2018-07-15 PROCEDURE — 80053 COMPREHEN METABOLIC PANEL: CPT

## 2018-07-15 RX ORDER — RAMELTEON 8 MG/1
8 TABLET ORAL NIGHTLY PRN
Status: DISCONTINUED | OUTPATIENT
Start: 2018-07-15 | End: 2018-07-27 | Stop reason: HOSPADM

## 2018-07-15 RX ORDER — POTASSIUM CHLORIDE 20 MEQ/15ML
40 SOLUTION ORAL ONCE
Status: COMPLETED | OUTPATIENT
Start: 2018-07-15 | End: 2018-07-15

## 2018-07-15 RX ORDER — LIDOCAINE 50 MG/G
1 PATCH TOPICAL
Status: DISCONTINUED | OUTPATIENT
Start: 2018-07-15 | End: 2018-07-27 | Stop reason: HOSPADM

## 2018-07-15 RX ORDER — METOPROLOL TARTRATE 50 MG/1
50 TABLET ORAL 2 TIMES DAILY
Status: DISCONTINUED | OUTPATIENT
Start: 2018-07-15 | End: 2018-07-16

## 2018-07-15 RX ORDER — FUROSEMIDE 10 MG/ML
40 INJECTION INTRAMUSCULAR; INTRAVENOUS ONCE
Status: COMPLETED | OUTPATIENT
Start: 2018-07-15 | End: 2018-07-15

## 2018-07-15 RX ORDER — FUROSEMIDE 40 MG/1
40 TABLET ORAL DAILY
Status: DISCONTINUED | OUTPATIENT
Start: 2018-07-16 | End: 2018-07-27 | Stop reason: HOSPADM

## 2018-07-15 RX ADMIN — METHYLPREDNISOLONE SODIUM SUCCINATE 40 MG: 40 INJECTION, POWDER, FOR SOLUTION INTRAMUSCULAR; INTRAVENOUS at 09:07

## 2018-07-15 RX ADMIN — METHYLPREDNISOLONE SODIUM SUCCINATE 40 MG: 40 INJECTION, POWDER, FOR SOLUTION INTRAMUSCULAR; INTRAVENOUS at 02:07

## 2018-07-15 RX ADMIN — POLYETHYLENE GLYCOL 3350 17 G: 17 POWDER, FOR SOLUTION ORAL at 08:07

## 2018-07-15 RX ADMIN — METOPROLOL TARTRATE 50 MG: 50 TABLET ORAL at 09:07

## 2018-07-15 RX ADMIN — NYSTATIN 500000 UNITS: 100000 SUSPENSION ORAL at 09:07

## 2018-07-15 RX ADMIN — PIPERACILLIN AND TAZOBACTAM 4.5 G: 4; .5 INJECTION, POWDER, LYOPHILIZED, FOR SOLUTION INTRAVENOUS; PARENTERAL at 09:07

## 2018-07-15 RX ADMIN — METOPROLOL TARTRATE 50 MG: 50 TABLET ORAL at 08:07

## 2018-07-15 RX ADMIN — DOCUSATE SODIUM 100 MG: 100 CAPSULE, LIQUID FILLED ORAL at 08:07

## 2018-07-15 RX ADMIN — AMIODARONE HYDROCHLORIDE 0.5 MG/MIN: 1.8 INJECTION, SOLUTION INTRAVENOUS at 08:07

## 2018-07-15 RX ADMIN — INSULIN DETEMIR 20 UNITS: 100 INJECTION, SOLUTION SUBCUTANEOUS at 09:07

## 2018-07-15 RX ADMIN — PIPERACILLIN AND TAZOBACTAM 4.5 G: 4; .5 INJECTION, POWDER, LYOPHILIZED, FOR SOLUTION INTRAVENOUS; PARENTERAL at 01:07

## 2018-07-15 RX ADMIN — POTASSIUM CHLORIDE 40 MEQ: 20 SOLUTION ORAL at 05:07

## 2018-07-15 RX ADMIN — POTASSIUM PHOSPHATE, MONOBASIC 1000 MG: 500 TABLET, SOLUBLE ORAL at 08:07

## 2018-07-15 RX ADMIN — PIPERACILLIN AND TAZOBACTAM 4.5 G: 4; .5 INJECTION, POWDER, LYOPHILIZED, FOR SOLUTION INTRAVENOUS; PARENTERAL at 05:07

## 2018-07-15 RX ADMIN — POLYETHYLENE GLYCOL 3350 17 G: 17 POWDER, FOR SOLUTION ORAL at 09:07

## 2018-07-15 RX ADMIN — ASPIRIN 81 MG CHEWABLE TABLET 81 MG: 81 TABLET CHEWABLE at 08:07

## 2018-07-15 RX ADMIN — FUROSEMIDE 40 MG: 10 INJECTION, SOLUTION INTRAMUSCULAR; INTRAVENOUS at 09:07

## 2018-07-15 RX ADMIN — INSULIN ASPART 4 UNITS: 100 INJECTION, SOLUTION INTRAVENOUS; SUBCUTANEOUS at 11:07

## 2018-07-15 RX ADMIN — Medication 10 ML: at 05:07

## 2018-07-15 RX ADMIN — INSULIN ASPART 4 UNITS: 100 INJECTION, SOLUTION INTRAVENOUS; SUBCUTANEOUS at 05:07

## 2018-07-15 RX ADMIN — METHYLPREDNISOLONE SODIUM SUCCINATE 40 MG: 40 INJECTION, POWDER, FOR SOLUTION INTRAMUSCULAR; INTRAVENOUS at 08:07

## 2018-07-15 RX ADMIN — ATORVASTATIN CALCIUM 80 MG: 40 TABLET, FILM COATED ORAL at 09:07

## 2018-07-15 RX ADMIN — RIVAROXABAN 20 MG: 20 TABLET, FILM COATED ORAL at 05:07

## 2018-07-15 RX ADMIN — RAMELTEON 8 MG: 8 TABLET, FILM COATED ORAL at 09:07

## 2018-07-15 RX ADMIN — Medication 10 ML: at 12:07

## 2018-07-15 RX ADMIN — INSULIN ASPART 2 UNITS: 100 INJECTION, SOLUTION INTRAVENOUS; SUBCUTANEOUS at 09:07

## 2018-07-15 RX ADMIN — LIDOCAINE 1 PATCH: 50 PATCH TOPICAL at 09:07

## 2018-07-15 RX ADMIN — PANTOPRAZOLE SODIUM 40 MG: 40 TABLET, DELAYED RELEASE ORAL at 08:07

## 2018-07-15 RX ADMIN — NYSTATIN 500000 UNITS: 100000 SUSPENSION ORAL at 08:07

## 2018-07-15 NOTE — PROGRESS NOTES
[facility] Hospital Medicine  Progress Note    Patient Name: Yasmeen Palm  MRN: [id number]  Patient Class: IP- Inpatient   Admission Date: 6/24/2018  Length of Stay: 21 days  Attending Physician: Layla Villegas MD  Primary Care Provider: Angel Orourke Jr, MD        Subjective:     Principal Problem:Severe sepsis with acute organ dysfunction    HPI:  Yasmeen Palm is a 68 y.o. female that (in part)  has a past medical history of Anticoagulant long-term use; Arthritis; Asthma; CHF (congestive heart failure); COPD (chronic obstructive pulmonary disease); Coronary artery disease; Depression; Diabetes mellitus; GERD (gastroesophageal reflux disease); and Hypertension.  has a past surgical history that includes Abdominal surgery; Cardiac surgery; and Hernia repair. Presents to [facility] Emergency Department complaining of chest pain .  She reports compliance with her home medication regimen, including Xarelto.     Description of symptoms  Location:  Substernal  Onset:  Acute onset 2 days ago  Character:  The patient remained; moderate severity  Frequency:  Daily  Duration:  each episode lasts several minutes at a time  Associated Symptoms:  Diaphoresis, shortness of breath, weakness and fatigue  Radiation:  Recent the chest  Exacerbating factors:  Worse on exertion  Relieving factors:  Minimal relief with supplemental oxygen     In the emergency department routine laboratory studies, chest x-ray, EKG, cardiac enzymes were obtained.  EKG findings were concerning the case was discussed with cardiologist, Dr. Pulido.  It was determined that her EKG was not consistent with STEMI and she has been chest pain-free since arrival.  She had been given Lovenox, aspirin, and plan was to continue trending troponins and monitor closely on telemetry.    Hospital Course:  Ms. Palm was admitted to the hospital originally on 6/24/18 for chest pain. She was later sent to the ICU with  acute hypercapnic respiratory failure the same day on BIPAP.  She quickly improved and was sent to the floor on 6/25.  Cards was consulted for NSTEMI with noted troponin increase and was planning on LHC on 6/26. However, the patient was sent back to the ICU on 6/26 with hypercapnic respiratory failure with a C02 of > 100 and was intubated. Pulmonary was consulted. Pt clinically improved from respiratory standpoint and was extubated on 6/27 to NC, but she did complain of chest discomfort. Cardiology was notified and patient was taken for LHC on 6/27 which was only remarkable for non-obstructive CAD and did not require intervention. Pt was given IVF post procedure and the next day developed increased SOB and required BIPAP. Pt was treated with IV lasix with good response with much improved respiratory status after output of 1L. Pt also being treated for COPD exacerbation with IV steroids, NEBS and doxycycline. ECHO performed on 6/25/2018 remarkable for EF=50-55% + grade 2 diastolic dysfunction. Pt diuresed and changed to maintenance PO lasix regimen. She as started on BIPAP due to increased pCO2 on ABG and lethargy. Pt will need BIPAP/home ventilator for use at home and pending approval of device for discharge home with home health.  Became febrile on febrile, lethargic, in AFib with RVR and SOB on 7/13. Transferred to ICU again, on BiPAP and amiodarone infusion.     Interval History: Still in AFib with RVR but appears to respond to metoprolol. She feels better but still SOB, using accessory muscles when speaking and requiring supplemental O2. Is not sleeping at night. Hgb 7.5 today. No bleeding noted. Appears depressed    Review of Systems   Constitutional: Negative for fever.   Respiratory: Positive for shortness of breath.    Cardiovascular: Negative for chest pain.   Gastrointestinal: Negative.    Genitourinary: Negative.    Musculoskeletal: Positive for back pain.   Skin: Negative.    Neurological: Negative.     Hematological: Negative.    Psychiatric/Behavioral: Negative.      Objective:     Vital Signs (Most Recent):  Temp: 98.5 °F (36.9 °C) (07/15/18 0730)  Pulse: (!) 122 (07/15/18 0756)  Resp: 20 (07/15/18 0756)  BP: 124/72 (07/15/18 0702)  SpO2: 100 % (07/15/18 0756) Vital Signs (24h Range):  Temp:  [98.2 °F (36.8 °C)-99.1 °F (37.3 °C)] 98.5 °F (36.9 °C)  Pulse:  [102-148] 122  Resp:  [15-40] 31  SpO2:  [97 %-100 %] 100 %  BP: ()/(55-90) 124/72     Weight: 81.7 kg (180 lb 1.9 oz)  Body mass index is 29.97 kg/m².    Intake/Output Summary (Last 24 hours) at 07/15/18 0822  Last data filed at 07/15/18 0600   Gross per 24 hour   Intake           1557.4 ml   Output             1996 ml   Net           -438.6 ml      Physical Exam   Constitutional: She is oriented to person, place, and time. She appears well-developed. No distress.   Pulmonary/Chest: No respiratory distress. She has no wheezes. She has no rales. She exhibits no tenderness.   Using accessory muscles while speaking  Decreased breath sounds throughout.    Abdominal: Soft. Bowel sounds are normal. She exhibits no distension. There is no tenderness.   Musculoskeletal: Normal range of motion. She exhibits no edema.   Neurological: She is alert and oriented to person, place, and time.   Skin: Skin is warm and dry. Capillary refill takes less than 2 seconds. She is not diaphoretic.   Psychiatric: Judgment and thought content normal. Her speech is delayed. She is slowed. She exhibits a depressed mood.   Nursing note and vitals reviewed.      Significant Labs: All pertinent labs within the past 24 hours have been reviewed.    Significant Imaging: I have reviewed all pertinent imaging results/findings within the past 24 hours.  I have reviewed and interpreted all pertinent imaging results/findings within the past 24 hours.    Assessment/Plan:      * Severe sepsis with acute organ dysfunction    Source still unclear. UA is abnormal and very cloudy appearing urine.  CTA of chest/abdomen obtained at 4pm yesterday has not been read yet. I will call radiology department now. Overall, appears to be responding to pip-tazo, but respiratory issues and AFib with RVR still difficult to treat. BCx one bottle thus far negative. Pending second bottle and urine culture.           Atrial fibrillation with RVR    May be 2/2 to development of severe sepsis and subsequent resp failure. On amiodarone infusion + metoprolol added (dose increased to 50 mg BID today). Treat underlying infection          Acute exacerbation of chronic obstructive pulmonary disease (COPD)    Has been back and forth to ICU for hypercapnic respiratory failure. Underlying COPD (centrilobular emphysema seen on CT chest 6/2018) but also with diastolic HF. Was intubated at one point. Although feeling somewhat better, airflow not optimal on my exam. Will do trial of steroids along with IV lasix. Wean as tolerated to NC with goal sats 88-93%. Will not use steroids as patient has good airflow on exam and I think this would only cause issues with fluid retention. CTA ordered to better evaluate for PE (although on anticoagulation) vs infection vs other acute lung pathology is pending. Hgb low at 7.5 g/dL without evidence of bleeding. Will consider transfusion if no clinical improvement. Change CBC to every other day to avoid anemia from multiple blood draws. BiPAP QHS.            Elevated troponin I level    - As discussed under NSTEMI  - No sign of obstruction on LHC.        Acute diastolic CHF (congestive heart failure)    - Initially due to volume overload. Now likely due to sepsis  - Last ECHO with EF=50-55% + grade 2 diastolic dysfunction on 6/26  - Improved with IV lasix and was stable on PO lasix regimen. Dose not given yesterday due to low BP. Is hypoxic. Pending CTA of chest/abdomen to further evaluate source of sepsis  - on BiPAP and stable. Wean to nasal cannula as tolerated.          Non-STEMI (non-ST elevated  myocardial infarction)    - Followed by Cardiology  - s/p [facility] on 6/27 only remarkable for non-obstructive CAD  - Continue medical management as per Cardiology with ASA, statin, and patient will not be placed on plavix as she is also on Xarelto  - stable        Elevated brain natriuretic peptide (BNP) level    - Diuresis resumed         Malignant hypertension      -  lisinopril, and restarted hydralazine, clonidine, diltiazem  (held due to previous hypotensive issues)          Tobacco abuse    - Smoking cessation counseling done        History of pulmonary embolism    - Xarelto resumed   - CTA pending        History of deep vein thrombosis    - Resumed anticoagulation         Chest pain    Likely MSK as it is reproducible           Hyperkalemia    - Resolved            Fall    - Slipped while getting up on 7/6  - Head CT and all x-ray are negative  - Resolved        Debility    - PT/OT, will arrange for home health at time of discharge        Acute hypercapnic respiratory failure    A recurrent issue during this admission. Management as above        PAF (paroxysmal atrial fibrillation)    - Rate controlled and anticoagulation resumed with Xarelto on 6/27 post [facility]        Neuropathy    - No acute issues         Obesity    - Weight reduction as outpatient after all acute issues addressed        Chronic anticoagulation    - Patient on Xarelto for PAF which has been resumed        Anemia of chronic disease    Low but stable.         Gastroesophageal reflux disease without esophagitis    - Continue PPI        Type 2 diabetes mellitus, controlled    - Complications of peripheral neuropathy.   - Increased basal and prandial insulin  - Glucose control improving with insulin adjustment and weaned off prednisone several days ago        Coronary artery disease involving native coronary artery of native heart with angina pectoris    - [facility] on 6/27 as discussed above  - As per Cardiology          VTE Risk Mitigation         Ordered  "    rivaroxaban tablet 20 mg  With dinner      06/27/18 7207        Discussed with patient and  at bedside    CT results pending. Will call radiologist    Critical care time spent on the evaluation and treatment of severe organ dysfunction, review of pertinent labs and imaging studies, discussions with consulting providers and discussions with patient/family: > 35 minutes.    Addendum: discussed CT with radiology resident on call. No evidence of VTE, consolidation, fluid collection or inflammation (including abd) but a lung base "debri" changed from a prior study, plus atelectasis. Will add incentive spirometer and await radiology staff's impression of these findings.     Layla Linda MD  Department of Hospital Medicine   [facility]  "

## 2018-07-15 NOTE — PROGRESS NOTES
[facility]  Cardiology  Progress Note    Patient Name: Yasmeen Palm  MRN: [id number]  Admission Date: 6/24/2018  Hospital Length of Stay: 21 days  Code Status: Full Code   Attending Physician: Layla Villegas MD   Primary Care Physician: Angel Orourke Jr, MD  Expected Discharge Date: 7/9/2018  Principal Problem:Severe sepsis with acute organ dysfunction    Subjective:     Hospital Course:   6/25/18: adm with resp insuff and NSTEMI with plans for cath 6/26 6/26/18: transferred back to ICU with hypercarb resp failure  6/27/18: cath with nonobst CAD  7/13/18: returned to ICU with AF/RVR and ?sepsis    Interval Hx: pt seen in ICU, case d/w RN. Pt currently without complaints of CP.  On bipap.    Tele: AF 120s  Was in  at 1554 on 7/14/18 (pers rev)      Past Medical History:   Diagnosis Date    Anticoagulant long-term use     Arthritis     Asthma     CHF (congestive heart failure)     COPD (chronic obstructive pulmonary disease)     Coronary artery disease     Depression     Diabetes mellitus     GERD (gastroesophageal reflux disease)     Hypertension        Past Surgical History:   Procedure Laterality Date    ABDOMINAL SURGERY      CARDIAC SURGERY      HERNIA REPAIR         Review of patient's allergies indicates:  No Known Allergies    No current facility-administered medications on file prior to encounter.      Current Outpatient Prescriptions on File Prior to Encounter   Medication Sig    acetaminophen (TYLENOL) 500 MG tablet Take 1 tablet (500 mg total) by mouth every 8 (eight) hours as needed.    aspirin (ECOTRIN) 81 MG EC tablet Take 81 mg by mouth once daily.    cloNIDine (CATAPRES) 0.1 MG tablet Take 2 tablets (0.2 mg total) by mouth 3 (three) times daily.    diltiaZEM (CARDIZEM) 120 MG tablet Take 1 tablet (120 mg total) by mouth every 8 (eight) hours.    furosemide (LASIX) 40 MG tablet Take 40 mg by mouth once daily.     gabapentin (NEURONTIN) 300 MG capsule  Take 300 mg by mouth 3 (three) times daily.    hydrALAZINE (APRESOLINE) 50 MG tablet Take 1 tablet (50 mg total) by mouth every 8 (eight) hours. (Patient taking differently: Take 50 mg by mouth every 12 (twelve) hours. )    levalbuterol (XOPENEX HFA) 45 mcg/actuation inhaler Inhale 2 puffs into the lungs every 4 (four) hours as needed for Wheezing or Shortness of Breath. Rescue    lisinopril (PRINIVIL,ZESTRIL) 40 MG tablet Take 1 tablet (40 mg total) by mouth once daily. Do not take this medication if blood pressure is below 130/80 mmHg and/or feeling dizzy after taking all other blood pressure meds    metformin (GLUCOPHAGE) 500 MG tablet Take 500 mg by mouth 2 (two) times daily with meals.    rivaroxaban (XARELTO) 20 mg Tab Take 20 mg by mouth daily with dinner or evening meal.    sotalol (BETAPACE) 80 MG tablet Take 80 mg by mouth 2 (two) times daily.    tramadol (ULTRAM) 50 mg tablet Take 1 tablet (50 mg total) by mouth every 6 (six) hours as needed for Pain.    UMECLIDINIUM BRM/VILANTEROL TR (ANORO ELLIPTA INHL) Inhale into the lungs daily as needed.     Family History     Problem Relation (Age of Onset)    Diabetes Father    Heart disease Mother    Hypertension Mother        Social History Main Topics    Smoking status: Current Some Day Smoker     Packs/day: 0.25     Years: 55.00     Types: Cigarettes    Smokeless tobacco: Never Used    Alcohol use 0.6 oz/week     1 Glasses of wine per week    Drug use: No    Sexual activity: No     Review of Systems   Gastrointestinal: Negative for melena.   Genitourinary: Negative for hematuria.     Objective:     Vital Signs (Most Recent):  Temp: 98.2 °F (36.8 °C) (07/15/18 0315)  Pulse: (!) 127 (07/15/18 0610)  Resp: (!) 23 (07/15/18 0610)  BP: (!) 152/76 (07/15/18 0602)  SpO2: 100 % (07/15/18 0610) Vital Signs (24h Range):  Temp:  [98.2 °F (36.8 °C)-99.5 °F (37.5 °C)] 98.2 °F (36.8 °C)  Pulse:  [102-148] 127  Resp:  [15-40] 23  SpO2:  [97 %-100 %] 100 %  BP:  ()/(55-90) 152/76     Weight: 81.7 kg (180 lb 1.9 oz)  Body mass index is 29.97 kg/m².    SpO2: 100 %  O2 Device (Oxygen Therapy): BiPAP      Intake/Output Summary (Last 24 hours) at 07/15/18 0634  Last data filed at 07/15/18 0600   Gross per 24 hour   Intake           1590.8 ml   Output             1996 ml   Net           -405.2 ml       Lines/Drains/Airways     Peripherally Inserted Central Catheter Line                 PICC Double Lumen 07/14/18 1539 right basilic less than 1 day          Drain                 Urethral Catheter 07/14/18 1015 less than 1 day          Peripheral Intravenous Line                 Peripheral IV - Single Lumen 07/13/18 1300 Left Hand 1 day         Peripheral IV - Single Lumen 07/13/18 1530 Right Forearm 1 day                Physical Exam   Constitutional: She appears well-developed and well-nourished. No distress.   HENT:   Head: Normocephalic and atraumatic.   Eyes: Conjunctivae and EOM are normal. Pupils are equal, round, and reactive to light. No scleral icterus.   Neck: Normal range of motion. Neck supple. No JVD present. No tracheal deviation present. No thyromegaly present.   Cardiovascular: S1 normal and S2 normal.  An irregularly irregular rhythm present. Tachycardia present.  Exam reveals distant heart sounds. Exam reveals no gallop and no friction rub.    No murmur heard.  Pulmonary/Chest: Effort normal. No respiratory distress. She has no rales.   Abdominal: Soft. She exhibits no distension.   Musculoskeletal: Normal range of motion. She exhibits no edema.   Neurological: She is alert. No cranial nerve deficit.   Skin: Skin is warm and dry. She is not diaphoretic.   Psychiatric: She has a normal mood and affect. Her behavior is normal.       Current Medications:   aspirin  81 mg Oral Daily    atorvastatin  80 mg Oral QHS    docusate sodium  100 mg Oral Daily    guaiFENesin  600 mg Oral BID    insulin detemir U-100  20 Units Subcutaneous QHS    metoprolol tartrate   25 mg Oral BID    nystatin  500,000 Units Oral QID (WM & HS)    pantoprazole  40 mg Oral Daily    piperacillin-tazobactam (ZOSYN) IVPB  4.5 g Intravenous Q8H    polyethylene glycol  17 g Oral BID    potassium phosphate (monobasic)  500 mg Oral Daily    rivaroxaban  20 mg Oral Daily with dinner    sodium chloride 0.9%  10 mL Intravenous Q6H      amiodarone in dextrose 5% 0.5 mg/min (07/15/18 0600)     acetaminophen, bisacodyl, dextrose 50%, dextrose 50%, dextrose 50%, glucagon (human recombinant), glucagon (human recombinant), glucose, glucose, hydrALAZINE, insulin aspart U-100, labetalol, ondansetron, Flushing PICC Protocol **AND** sodium chloride 0.9% **AND** sodium chloride 0.9%, sodium chloride 0.9%, traMADol    Laboratory:  CBC:    Recent Labs  Lab 07/10/18  0931 07/12/18  0438 07/13/18  0452 07/14/18  0330 07/15/18  0145   WHITE BLOOD CELL COUNT 19.64 H 24.27 H 24.89 H 25.82 H 19.03 H   HEMOGLOBIN 9.2 L 8.5 L 8.2 L 8.5 L 7.5 L   HEMATOCRIT 32.3 L 29.8 L 28.3 L 28.6 L 25.8 L   PLATELETS 342 250 227 194 174       CHEMISTRIES:    Recent Labs  Lab 07/11/18  0445 07/12/18  0438 07/13/18  0452 07/14/18  0330 07/15/18  0145   GLUCOSE 228 H 155 H 108 157 H 193 H   SODIUM 141 139 143 139 143   POTASSIUM 4.1 3.8 4.4 4.3 3.4 L   BUN BLD 24 H 23 31 H 30 H 17   CREATININE 0.8 0.8 0.9 0.8 0.7   EGFR IF  >60 >60 >60 >60 >60   EGFR IF NON- >60 >60 >60 >60 >60   CALCIUM 9.3 9.2 9.5 8.3 L 9.2   MAGNESIUM 1.9 1.8 1.9 2.1 1.8       CARDIAC BIOMARKERS:    Recent Labs  Lab 12/13/15  1932  02/12/18  1422 02/12/18  1808 06/24/18  1413 06/24/18  2153 06/25/18  0506   CPK 33  --   --   --   --   --   --    CPK MB 1.0  --   --   --   --   --   --    TROPONIN I <0.006  < > 0.021 0.021 0.896 H 0.870 H 0.667 H   < > = values in this interval not displayed.    COAGS:    Recent Labs  Lab 04/10/17  1129 08/12/17  0036 11/02/17  2211 12/08/17  0545 06/27/18  1738   INR 1.1 1.2 1.0 1.0 1.1        LIPIDS/LFTS:    Recent Labs  Lab 12/15/15  0540  08/12/17  1037  12/08/17  0545 02/12/18  1422 06/24/18  1413 06/25/18  0506 07/15/18  0145   CHOLESTEROL 170  --  243 H  --   --   --   --   --   --    TRIGLYCERIDES 94  --  93  --   --   --   --   --   --    HDL 33 L  --  43  --   --   --   --   --   --    LDL CHOLESTEROL 118.2  --  181.4 H  --   --   --   --   --   --    NON-HDL CHOLESTEROL 137  --  200  --   --   --   --   --   --    AST  --   < >  --   < > 26 13 20 23 26   ALT  --   < >  --   < > 8 L 7 L 7 L 10 24   < > = values in this interval not displayed.    BNP:    Recent Labs  Lab 08/19/17  1430 11/02/17  2211 12/08/17  0545 02/12/18  1422 06/24/18  1413    H 334 H 354 H 133 H 2,128 H       TSH:    Recent Labs  Lab 12/13/15  1932 06/24/18  1413   TSH 0.487 0.754       Free T4:    Recent Labs  Lab 06/24/18  1413   FREE T4 1.09     ABG    Recent Labs  Lab 07/13/18  1121   PH 7.501*   PO2 65*   PCO2 54.9*   HCO3 42.9*   BE 18         Diagnostic Results:  ECG (personally reviewed tracings):  6/24/18 1403 , LAD/PWRP    Chest X-Ray (personally reviewed image(s)): 7/12/18 NAD    CTA C/A/P 6/24/18  1. No evidence of aortic dissection.  2. Extensive calcified and noncalcified plaque seen throughout the aorta with small multifocal outpouchings seen involving the descending thoracic aorta suggestive for small penetrating ulcers.  Similar findings were seen on previous CT from August 2017.  3. 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  4. No evidence of PE.  5. Decreased size area of nodularity with scarring seen within the left lower lobe with resolution of previously visualized cavitation.  No evidence of new lung consolidation.  6. Mild to moderate centrilobular emphysema.    Cath 6/27/18   LVEDP: 9mmHg  LVEF: 50% by echo  Wall Motion: LAD WMA by echo  Dominance: Right  LM: MLI  LAD: MLI  LCx: MLI  RCA: dom, mid 40%  Hemostasis:  R  Radial band  Impression:  NSTEMI  Nonobst CAD  Normal LV fxn  R rad vasband for hemostasis  Plan:  Cont med rx  Cont ASA 81mg qd  Stop Plavix  Restart Xarelto 20mg qd this pm  Pt to follow up with Dr. Pulido in 2 weeks    Echo: 6/25/18     1 - Low normal to mildly depressed left ventricular systolic function (EF 50-55%).  Distal LAD territory WMA.     2 - Impaired LV relaxation, elevated LAP (grade 2 diastolic dysfunction).     3 - Concentric hypertrophy.     4 - Trivial to mild tricuspid regurgitation.     5 - Pulmonary hypertension. The estimated PA systolic pressure is 52 mmHg.     LLE venous US 1/24/17  Interval decrease in overall thrombus burden of the left lower extremity, although there is persistent deep venous thrombus present within the left femoral and popliteal veins.    Stress Test: L MPI 12/15/15  Nuclear Quantitative Functional Analysis:   LVEF: 66 %  Impression: NORMAL MYOCARDIAL PERFUSION  1. The perfusion scan is free of evidence for myocardial ischemia or injury.   2. Resting wall motion is physiologic.   3. Resting LV function is normal.   4. The ventricular volumes are normal at rest and stress.   5. The extracardiac distribution of radioactivity is normal.   6. There was no previous study available to compare.      Assessment and Plan:     * Severe sepsis with acute organ dysfunction    Per IM  ?source of fever/WBC  Abx ordered per IM  CTA C/A/P done, awaiting report        PAF (paroxysmal atrial fibrillation)    Back in AF/RVR, briefly in SR at 1554 on 7/14/18 (documented on tele)  ?driven by fever/leukocytosis/sepsis  Currently on amio gtt (prev on sotalol)  Inc metoprolol to 50mg bid  Cont Xarelto  DCCV as a contingency (no NUNO needed given ongoing xarelto rx and recent documentation of SR on 7/14/18)        Non-STEMI (non-ST elevated myocardial infarction)    EF normal on echo with LAD WMA (echo 6/25/18)  Cath 6/27/18 with nonobst CAD  Cont ASA 81mg qd  Cont Xarelto 20mg qd  Cont atorva  80mg qhs  Check lipids/LFT 3 months (late Sept 2018)            Acute exacerbation of chronic obstructive pulmonary disease (COPD)    Per IM/pulm        Chronic anticoagulation    For hx of PAF and DVT/PE  Xarelto resumed        History of deep vein thrombosis    As above        Type 2 diabetes mellitus, controlled    Per IM            VTE Risk Mitigation         Ordered     rivaroxaban tablet 20 mg  With dinner      06/27/18 4995          Laureano Pulido MD  Cardiology  [facility]

## 2018-07-15 NOTE — ASSESSMENT & PLAN NOTE
Back in AF/RVR, briefly in SR at 1554 on 7/14/18 (documented on tele)  ?driven by fever/leukocytosis/sepsis  Currently on amio gtt (prev on sotalol)  Inc metoprolol to 50mg bid  Cont Xarelto  DCCV as a contingency (no NUNO needed given ongoing xarelto rx and recent documentation of SR on 7/14/18)

## 2018-07-15 NOTE — SUBJECTIVE & OBJECTIVE
Interval History: Still in AFib with RVR but appears to respond to metoprolol. She feels better but still SOB, using accessory muscles when speaking and requiring supplemental O2. Is not sleeping at night. Hgb 7.5 today. No bleeding noted. Appears depressed    Review of Systems   Constitutional: Negative for fever.   Respiratory: Positive for shortness of breath.    Cardiovascular: Negative for chest pain.   Gastrointestinal: Negative.    Genitourinary: Negative.    Musculoskeletal: Positive for back pain.   Skin: Negative.    Neurological: Negative.    Hematological: Negative.    Psychiatric/Behavioral: Negative.      Objective:     Vital Signs (Most Recent):  Temp: 98.5 °F (36.9 °C) (07/15/18 0730)  Pulse: (!) 122 (07/15/18 0756)  Resp: 20 (07/15/18 0756)  BP: 124/72 (07/15/18 0702)  SpO2: 100 % (07/15/18 0756) Vital Signs (24h Range):  Temp:  [98.2 °F (36.8 °C)-99.1 °F (37.3 °C)] 98.5 °F (36.9 °C)  Pulse:  [102-148] 122  Resp:  [15-40] 31  SpO2:  [97 %-100 %] 100 %  BP: ()/(55-90) 124/72     Weight: 81.7 kg (180 lb 1.9 oz)  Body mass index is 29.97 kg/m².    Intake/Output Summary (Last 24 hours) at 07/15/18 0822  Last data filed at 07/15/18 0600   Gross per 24 hour   Intake           1557.4 ml   Output             1996 ml   Net           -438.6 ml      Physical Exam   Constitutional: She is oriented to person, place, and time. She appears well-developed. No distress.   Pulmonary/Chest: No respiratory distress. She has no wheezes. She has no rales. She exhibits no tenderness.   Using accessory muscles while speaking  Decreased breath sounds throughout.    Abdominal: Soft. Bowel sounds are normal. She exhibits no distension. There is no tenderness.   Musculoskeletal: Normal range of motion. She exhibits no edema.   Neurological: She is alert and oriented to person, place, and time.   Skin: Skin is warm and dry. Capillary refill takes less than 2 seconds. She is not diaphoretic.   Psychiatric: Judgment and  thought content normal. Her speech is delayed. She is slowed. She exhibits a depressed mood.   Nursing note and vitals reviewed.      Significant Labs: All pertinent labs within the past 24 hours have been reviewed.    Significant Imaging: I have reviewed all pertinent imaging results/findings within the past 24 hours.  I have reviewed and interpreted all pertinent imaging results/findings within the past 24 hours.

## 2018-07-15 NOTE — ASSESSMENT & PLAN NOTE
Has been back and forth to ICU for hypercapnic respiratory failure. Suspected underlying COPD but also with diastolic HF. Was intubated at one point. Although feeling somewhat better, airflow not optimal on my exam. Will do trial of steroids along with IV lasix. Wean as tolerated to NC with goal sats 88-93%. Will not use steroids as patient has good airflow on exam and I think this would only cause issues with fluid retention. CTA ordered to better evaluate for PE (although on anticoagulation) vs infection vs other acute lung pathology is pending. BiPAP QHS.

## 2018-07-15 NOTE — SUBJECTIVE & OBJECTIVE
Past Medical History:   Diagnosis Date    Anticoagulant long-term use     Arthritis     Asthma     CHF (congestive heart failure)     COPD (chronic obstructive pulmonary disease)     Coronary artery disease     Depression     Diabetes mellitus     GERD (gastroesophageal reflux disease)     Hypertension        Past Surgical History:   Procedure Laterality Date    ABDOMINAL SURGERY      CARDIAC SURGERY      HERNIA REPAIR         Review of patient's allergies indicates:  No Known Allergies    No current facility-administered medications on file prior to encounter.      Current Outpatient Prescriptions on File Prior to Encounter   Medication Sig    acetaminophen (TYLENOL) 500 MG tablet Take 1 tablet (500 mg total) by mouth every 8 (eight) hours as needed.    aspirin (ECOTRIN) 81 MG EC tablet Take 81 mg by mouth once daily.    cloNIDine (CATAPRES) 0.1 MG tablet Take 2 tablets (0.2 mg total) by mouth 3 (three) times daily.    diltiaZEM (CARDIZEM) 120 MG tablet Take 1 tablet (120 mg total) by mouth every 8 (eight) hours.    furosemide (LASIX) 40 MG tablet Take 40 mg by mouth once daily.     gabapentin (NEURONTIN) 300 MG capsule Take 300 mg by mouth 3 (three) times daily.    hydrALAZINE (APRESOLINE) 50 MG tablet Take 1 tablet (50 mg total) by mouth every 8 (eight) hours. (Patient taking differently: Take 50 mg by mouth every 12 (twelve) hours. )    levalbuterol (XOPENEX HFA) 45 mcg/actuation inhaler Inhale 2 puffs into the lungs every 4 (four) hours as needed for Wheezing or Shortness of Breath. Rescue    lisinopril (PRINIVIL,ZESTRIL) 40 MG tablet Take 1 tablet (40 mg total) by mouth once daily. Do not take this medication if blood pressure is below 130/80 mmHg and/or feeling dizzy after taking all other blood pressure meds    metformin (GLUCOPHAGE) 500 MG tablet Take 500 mg by mouth 2 (two) times daily with meals.    rivaroxaban (XARELTO) 20 mg Tab Take 20 mg by mouth daily with dinner or evening  meal.    sotalol (BETAPACE) 80 MG tablet Take 80 mg by mouth 2 (two) times daily.    tramadol (ULTRAM) 50 mg tablet Take 1 tablet (50 mg total) by mouth every 6 (six) hours as needed for Pain.    UMECLIDINIUM BRM/VILANTEROL TR (ANORO ELLIPTA INHL) Inhale into the lungs daily as needed.     Family History     Problem Relation (Age of Onset)    Diabetes Father    Heart disease Mother    Hypertension Mother        Social History Main Topics    Smoking status: Current Some Day Smoker     Packs/day: 0.25     Years: 55.00     Types: Cigarettes    Smokeless tobacco: Never Used    Alcohol use 0.6 oz/week     1 Glasses of wine per week    Drug use: No    Sexual activity: No     Review of Systems   Gastrointestinal: Negative for melena.   Genitourinary: Negative for hematuria.     Objective:     Vital Signs (Most Recent):  Temp: 98.2 °F (36.8 °C) (07/15/18 0315)  Pulse: (!) 127 (07/15/18 0610)  Resp: (!) 23 (07/15/18 0610)  BP: (!) 152/76 (07/15/18 0602)  SpO2: 100 % (07/15/18 0610) Vital Signs (24h Range):  Temp:  [98.2 °F (36.8 °C)-99.5 °F (37.5 °C)] 98.2 °F (36.8 °C)  Pulse:  [102-148] 127  Resp:  [15-40] 23  SpO2:  [97 %-100 %] 100 %  BP: ()/(55-90) 152/76     Weight: 81.7 kg (180 lb 1.9 oz)  Body mass index is 29.97 kg/m².    SpO2: 100 %  O2 Device (Oxygen Therapy): BiPAP      Intake/Output Summary (Last 24 hours) at 07/15/18 0634  Last data filed at 07/15/18 0600   Gross per 24 hour   Intake           1590.8 ml   Output             1996 ml   Net           -405.2 ml       Lines/Drains/Airways     Peripherally Inserted Central Catheter Line                 PICC Double Lumen 07/14/18 1539 right basilic less than 1 day          Drain                 Urethral Catheter 07/14/18 1015 less than 1 day          Peripheral Intravenous Line                 Peripheral IV - Single Lumen 07/13/18 1300 Left Hand 1 day         Peripheral IV - Single Lumen 07/13/18 1530 Right Forearm 1 day                Physical Exam    Constitutional: She appears well-developed and well-nourished. No distress.   HENT:   Head: Normocephalic and atraumatic.   Eyes: Conjunctivae and EOM are normal. Pupils are equal, round, and reactive to light. No scleral icterus.   Neck: Normal range of motion. Neck supple. No JVD present. No tracheal deviation present. No thyromegaly present.   Cardiovascular: S1 normal and S2 normal.  An irregularly irregular rhythm present. Tachycardia present.  Exam reveals distant heart sounds. Exam reveals no gallop and no friction rub.    No murmur heard.  Pulmonary/Chest: Effort normal. No respiratory distress. She has no rales.   Abdominal: Soft. She exhibits no distension.   Musculoskeletal: Normal range of motion. She exhibits no edema.   Neurological: She is alert. No cranial nerve deficit.   Skin: Skin is warm and dry. She is not diaphoretic.   Psychiatric: She has a normal mood and affect. Her behavior is normal.       Current Medications:   aspirin  81 mg Oral Daily    atorvastatin  80 mg Oral QHS    docusate sodium  100 mg Oral Daily    guaiFENesin  600 mg Oral BID    insulin detemir U-100  20 Units Subcutaneous QHS    metoprolol tartrate  25 mg Oral BID    nystatin  500,000 Units Oral QID (WM & HS)    pantoprazole  40 mg Oral Daily    piperacillin-tazobactam (ZOSYN) IVPB  4.5 g Intravenous Q8H    polyethylene glycol  17 g Oral BID    potassium phosphate (monobasic)  500 mg Oral Daily    rivaroxaban  20 mg Oral Daily with dinner    sodium chloride 0.9%  10 mL Intravenous Q6H      amiodarone in dextrose 5% 0.5 mg/min (07/15/18 0600)     acetaminophen, bisacodyl, dextrose 50%, dextrose 50%, dextrose 50%, glucagon (human recombinant), glucagon (human recombinant), glucose, glucose, hydrALAZINE, insulin aspart U-100, labetalol, ondansetron, Flushing PICC Protocol **AND** sodium chloride 0.9% **AND** sodium chloride 0.9%, sodium chloride 0.9%, traMADol    Laboratory:  CBC:    Recent Labs  Lab  07/10/18  0931 07/12/18  0438 07/13/18  0452 07/14/18  0330 07/15/18  0145   WHITE BLOOD CELL COUNT 19.64 H 24.27 H 24.89 H 25.82 H 19.03 H   HEMOGLOBIN 9.2 L 8.5 L 8.2 L 8.5 L 7.5 L   HEMATOCRIT 32.3 L 29.8 L 28.3 L 28.6 L 25.8 L   PLATELETS 342 250 227 194 174       CHEMISTRIES:    Recent Labs  Lab 07/11/18  0445 07/12/18  0438 07/13/18  0452 07/14/18  0330 07/15/18  0145   GLUCOSE 228 H 155 H 108 157 H 193 H   SODIUM 141 139 143 139 143   POTASSIUM 4.1 3.8 4.4 4.3 3.4 L   BUN BLD 24 H 23 31 H 30 H 17   CREATININE 0.8 0.8 0.9 0.8 0.7   EGFR IF  >60 >60 >60 >60 >60   EGFR IF NON- >60 >60 >60 >60 >60   CALCIUM 9.3 9.2 9.5 8.3 L 9.2   MAGNESIUM 1.9 1.8 1.9 2.1 1.8       CARDIAC BIOMARKERS:    Recent Labs  Lab 12/13/15  1932  02/12/18  1422 02/12/18  1808 06/24/18  1413 06/24/18  2153 06/25/18  0506   CPK 33  --   --   --   --   --   --    CPK MB 1.0  --   --   --   --   --   --    TROPONIN I <0.006  < > 0.021 0.021 0.896 H 0.870 H 0.667 H   < > = values in this interval not displayed.    COAGS:    Recent Labs  Lab 04/10/17  1129 08/12/17  0036 11/02/17  2211 12/08/17  0545 06/27/18  1738   INR 1.1 1.2 1.0 1.0 1.1       LIPIDS/LFTS:    Recent Labs  Lab 12/15/15  0540  08/12/17  1037  12/08/17  0545 02/12/18  1422 06/24/18  1413 06/25/18  0506 07/15/18  0145   CHOLESTEROL 170  --  243 H  --   --   --   --   --   --    TRIGLYCERIDES 94  --  93  --   --   --   --   --   --    HDL 33 L  --  43  --   --   --   --   --   --    LDL CHOLESTEROL 118.2  --  181.4 H  --   --   --   --   --   --    NON-HDL CHOLESTEROL 137  --  200  --   --   --   --   --   --    AST  --   < >  --   < > 26 13 20 23 26   ALT  --   < >  --   < > 8 L 7 L 7 L 10 24   < > = values in this interval not displayed.    BNP:    Recent Labs  Lab 08/19/17  1430 11/02/17  2211 12/08/17  0545 02/12/18  1422 06/24/18  1413    H 334 H 354 H 133 H 2,128 H       TSH:    Recent Labs  Lab 12/13/15  1932 06/24/18  1413   TSH  0.487 0.754       Free T4:    Recent Labs  Lab 06/24/18  1413   FREE T4 1.09     ABG    Recent Labs  Lab 07/13/18  1121   PH 7.501*   PO2 65*   PCO2 54.9*   HCO3 42.9*   BE 18         Diagnostic Results:  ECG (personally reviewed tracings):  6/24/18 1403 , LAD/PWRP    Chest X-Ray (personally reviewed image(s)): 7/12/18 NAD    CTA C/A/P 6/24/18  1. No evidence of aortic dissection.  2. Extensive calcified and noncalcified plaque seen throughout the aorta with small multifocal outpouchings seen involving the descending thoracic aorta suggestive for small penetrating ulcers.  Similar findings were seen on previous CT from August 2017.  3. 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  4. No evidence of PE.  5. Decreased size area of nodularity with scarring seen within the left lower lobe with resolution of previously visualized cavitation.  No evidence of new lung consolidation.  6. Mild to moderate centrilobular emphysema.    Cath 6/27/18   LVEDP: 9mmHg  LVEF: 50% by echo  Wall Motion: LAD WMA by echo  Dominance: Right  LM: MLI  LAD: MLI  LCx: MLI  RCA: dom, mid 40%  Hemostasis:  R Radial band  Impression:  NSTEMI  Nonobst CAD  Normal LV fxn  R rad vasband for hemostasis  Plan:  Cont med rx  Cont ASA 81mg qd  Stop Plavix  Restart Xarelto 20mg qd this pm  Pt to follow up with Dr. Pulido in 2 weeks    Echo: 6/25/18     1 - Low normal to mildly depressed left ventricular systolic function (EF 50-55%).  Distal LAD territory WMA.     2 - Impaired LV relaxation, elevated LAP (grade 2 diastolic dysfunction).     3 - Concentric hypertrophy.     4 - Trivial to mild tricuspid regurgitation.     5 - Pulmonary hypertension. The estimated PA systolic pressure is 52 mmHg.     LLE venous US 1/24/17  Interval decrease in overall thrombus burden of the left lower extremity, although there is persistent deep venous thrombus present within the left femoral and popliteal  veins.    Stress Test: L MPI 12/15/15  Nuclear Quantitative Functional Analysis:   LVEF: 66 %  Impression: NORMAL MYOCARDIAL PERFUSION  1. The perfusion scan is free of evidence for myocardial ischemia or injury.   2. Resting wall motion is physiologic.   3. Resting LV function is normal.   4. The ventricular volumes are normal at rest and stress.   5. The extracardiac distribution of radioactivity is normal.   6. There was no previous study available to compare.

## 2018-07-15 NOTE — ASSESSMENT & PLAN NOTE
Source still unclear. UA is abnormal and very cloudy appearing urine. CTA of chest/abdomen obtained at 4pm yesterday has not been read yet. I will call radiology department now. Overall, appears to be responding to pip-tazo, but respiratory issues and AFib with RVR still difficult to treat. BCx one bottle thus far negative. Pending second bottle and urine culture.

## 2018-07-15 NOTE — PLAN OF CARE
Problem: Patient Care Overview  Goal: Plan of Care Review  Outcome: Ongoing (interventions implemented as appropriate)  Cardiac rhythm A-Fib w/ RVR.  Remains on Amio gtt.  Lopressor dose increased, HR remains elevated @ 100s - 130s.   O2 @ 3L NC; denies SOB.  Poor appetite.  Clark cath with adequate output. Flat affect noted.  Patient's  apparently called all family members indicating that patient was near death and asked them to all come in today.  Multiple family members in to visit, patients affect improved after family visits. Remains free from hospital acquired injury.

## 2018-07-15 NOTE — ASSESSMENT & PLAN NOTE
Has been back and forth to ICU for hypercapnic respiratory failure. Underlying COPD (centrilobular emphysema seen on CT chest 6/2018) but also with diastolic HF. Was intubated at one point. Overall improved with trial of solumedrol 40 mg TID and another dose of IV lasix. Will continue with solumedrol 40 mg TID for today then de-esc to prednisone 20 mg daily starting tomorrow. De-esc furosemide to PO for maintenance diuresis. Add tiotropium. Continuous supplemental O2 via low flow NC for goal sats 88-93%, incentive spirometer and BiPAP QHS. Hgb low at 7.5 g/dL without evidence of bleeding. Will consider transfusion if patient worsens. Changed CBC to every other day to avoid anemia from multiple blood draws.

## 2018-07-16 LAB
ALBUMIN SERPL BCP-MCNC: 1.9 G/DL
ANION GAP SERPL CALC-SCNC: 11 MMOL/L
BUN SERPL-MCNC: 18 MG/DL
CALCIUM SERPL-MCNC: 9.6 MG/DL
CHLORIDE SERPL-SCNC: 94 MMOL/L
CO2 SERPL-SCNC: 37 MMOL/L
CREAT SERPL-MCNC: 0.8 MG/DL
EST. GFR  (AFRICAN AMERICAN): >60 ML/MIN/1.73 M^2
EST. GFR  (NON AFRICAN AMERICAN): >60 ML/MIN/1.73 M^2
GLUCOSE SERPL-MCNC: 204 MG/DL
MAGNESIUM SERPL-MCNC: 1.9 MG/DL
PHOSPHATE SERPL-MCNC: 2.3 MG/DL
POCT GLUCOSE: 202 MG/DL (ref 70–110)
POCT GLUCOSE: 282 MG/DL (ref 70–110)
POCT GLUCOSE: 371 MG/DL (ref 70–110)
POCT GLUCOSE: 450 MG/DL (ref 70–110)
POTASSIUM SERPL-SCNC: 3.5 MMOL/L
SODIUM SERPL-SCNC: 142 MMOL/L

## 2018-07-16 PROCEDURE — 27000221 HC OXYGEN, UP TO 24 HOURS

## 2018-07-16 PROCEDURE — 94660 CPAP INITIATION&MGMT: CPT

## 2018-07-16 PROCEDURE — 94799 UNLISTED PULMONARY SVC/PX: CPT

## 2018-07-16 PROCEDURE — 99900035 HC TECH TIME PER 15 MIN (STAT)

## 2018-07-16 PROCEDURE — 83735 ASSAY OF MAGNESIUM: CPT

## 2018-07-16 PROCEDURE — 25000003 PHARM REV CODE 250: Performed by: INTERNAL MEDICINE

## 2018-07-16 PROCEDURE — 94761 N-INVAS EAR/PLS OXIMETRY MLT: CPT

## 2018-07-16 PROCEDURE — 25000003 PHARM REV CODE 250: Performed by: HOSPITALIST

## 2018-07-16 PROCEDURE — 25000003 PHARM REV CODE 250: Performed by: EMERGENCY MEDICINE

## 2018-07-16 PROCEDURE — 25000242 PHARM REV CODE 250 ALT 637 W/ HCPCS: Performed by: INTERNAL MEDICINE

## 2018-07-16 PROCEDURE — 63600175 PHARM REV CODE 636 W HCPCS: Performed by: INTERNAL MEDICINE

## 2018-07-16 PROCEDURE — 94640 AIRWAY INHALATION TREATMENT: CPT

## 2018-07-16 PROCEDURE — A4216 STERILE WATER/SALINE, 10 ML: HCPCS | Performed by: INTERNAL MEDICINE

## 2018-07-16 PROCEDURE — 99233 SBSQ HOSP IP/OBS HIGH 50: CPT | Mod: ,,, | Performed by: INTERNAL MEDICINE

## 2018-07-16 PROCEDURE — 80069 RENAL FUNCTION PANEL: CPT

## 2018-07-16 PROCEDURE — 20000000 HC ICU ROOM

## 2018-07-16 PROCEDURE — 63600175 PHARM REV CODE 636 W HCPCS: Performed by: HOSPITALIST

## 2018-07-16 RX ORDER — NYSTATIN 100000 [USP'U]/ML
500000 SUSPENSION ORAL
Status: COMPLETED | OUTPATIENT
Start: 2018-07-16 | End: 2018-07-16

## 2018-07-16 RX ORDER — INSULIN ASPART 100 [IU]/ML
10 INJECTION, SOLUTION INTRAVENOUS; SUBCUTANEOUS ONCE
Status: COMPLETED | OUTPATIENT
Start: 2018-07-16 | End: 2018-07-16

## 2018-07-16 RX ORDER — POLYETHYLENE GLYCOL 3350 17 G/17G
17 POWDER, FOR SOLUTION ORAL DAILY
Status: DISCONTINUED | OUTPATIENT
Start: 2018-07-17 | End: 2018-07-16

## 2018-07-16 RX ORDER — PREDNISONE 20 MG/1
20 TABLET ORAL DAILY
Status: DISCONTINUED | OUTPATIENT
Start: 2018-07-17 | End: 2018-07-19

## 2018-07-16 RX ORDER — METOPROLOL TARTRATE 50 MG/1
100 TABLET ORAL 2 TIMES DAILY
Status: DISCONTINUED | OUTPATIENT
Start: 2018-07-16 | End: 2018-07-27 | Stop reason: HOSPADM

## 2018-07-16 RX ORDER — TIOTROPIUM BROMIDE 18 UG/1
1 CAPSULE ORAL; RESPIRATORY (INHALATION) DAILY
Status: DISCONTINUED | OUTPATIENT
Start: 2018-07-16 | End: 2018-07-27 | Stop reason: HOSPADM

## 2018-07-16 RX ADMIN — INSULIN ASPART 10 UNITS: 100 INJECTION, SOLUTION INTRAVENOUS; SUBCUTANEOUS at 09:07

## 2018-07-16 RX ADMIN — NYSTATIN 500000 UNITS: 100000 SUSPENSION ORAL at 08:07

## 2018-07-16 RX ADMIN — FUROSEMIDE 40 MG: 40 TABLET ORAL at 09:07

## 2018-07-16 RX ADMIN — Medication 10 ML: at 12:07

## 2018-07-16 RX ADMIN — Medication 10 ML: at 06:07

## 2018-07-16 RX ADMIN — POTASSIUM PHOSPHATE, MONOBASIC 1000 MG: 500 TABLET, SOLUBLE ORAL at 09:07

## 2018-07-16 RX ADMIN — DOCUSATE SODIUM 100 MG: 100 CAPSULE, LIQUID FILLED ORAL at 09:07

## 2018-07-16 RX ADMIN — INSULIN ASPART 4 UNITS: 100 INJECTION, SOLUTION INTRAVENOUS; SUBCUTANEOUS at 06:07

## 2018-07-16 RX ADMIN — Medication 10 ML: at 05:07

## 2018-07-16 RX ADMIN — PIPERACILLIN AND TAZOBACTAM 4.5 G: 4; .5 INJECTION, POWDER, LYOPHILIZED, FOR SOLUTION INTRAVENOUS; PARENTERAL at 02:07

## 2018-07-16 RX ADMIN — INSULIN ASPART 6 UNITS: 100 INJECTION, SOLUTION INTRAVENOUS; SUBCUTANEOUS at 11:07

## 2018-07-16 RX ADMIN — PIPERACILLIN AND TAZOBACTAM 4.5 G: 4; .5 INJECTION, POWDER, LYOPHILIZED, FOR SOLUTION INTRAVENOUS; PARENTERAL at 05:07

## 2018-07-16 RX ADMIN — LIDOCAINE 1 PATCH: 50 PATCH TOPICAL at 08:07

## 2018-07-16 RX ADMIN — TIOTROPIUM BROMIDE 18 MCG: 18 CAPSULE ORAL; RESPIRATORY (INHALATION) at 02:07

## 2018-07-16 RX ADMIN — AMIODARONE HYDROCHLORIDE 0.5 MG/MIN: 1.8 INJECTION, SOLUTION INTRAVENOUS at 12:07

## 2018-07-16 RX ADMIN — Medication 10 ML: at 11:07

## 2018-07-16 RX ADMIN — METHYLPREDNISOLONE SODIUM SUCCINATE 40 MG: 40 INJECTION, POWDER, FOR SOLUTION INTRAMUSCULAR; INTRAVENOUS at 06:07

## 2018-07-16 RX ADMIN — RIVAROXABAN 20 MG: 20 TABLET, FILM COATED ORAL at 05:07

## 2018-07-16 RX ADMIN — METHYLPREDNISOLONE SODIUM SUCCINATE 40 MG: 40 INJECTION, POWDER, FOR SOLUTION INTRAMUSCULAR; INTRAVENOUS at 02:07

## 2018-07-16 RX ADMIN — NYSTATIN 500000 UNITS: 100000 SUSPENSION ORAL at 11:07

## 2018-07-16 RX ADMIN — POLYETHYLENE GLYCOL 3350 17 G: 17 POWDER, FOR SOLUTION ORAL at 09:07

## 2018-07-16 RX ADMIN — ASPIRIN 81 MG CHEWABLE TABLET 81 MG: 81 TABLET CHEWABLE at 09:07

## 2018-07-16 RX ADMIN — NYSTATIN 500000 UNITS: 100000 SUSPENSION ORAL at 05:07

## 2018-07-16 RX ADMIN — METHYLPREDNISOLONE SODIUM SUCCINATE 40 MG: 40 INJECTION, POWDER, FOR SOLUTION INTRAMUSCULAR; INTRAVENOUS at 09:07

## 2018-07-16 RX ADMIN — SODIUM CHLORIDE 500 ML: 0.9 INJECTION, SOLUTION INTRAVENOUS at 09:07

## 2018-07-16 RX ADMIN — RAMELTEON 8 MG: 8 TABLET, FILM COATED ORAL at 09:07

## 2018-07-16 RX ADMIN — METOPROLOL TARTRATE 100 MG: 50 TABLET ORAL at 08:07

## 2018-07-16 RX ADMIN — PIPERACILLIN AND TAZOBACTAM 4.5 G: 4; .5 INJECTION, POWDER, LYOPHILIZED, FOR SOLUTION INTRAVENOUS; PARENTERAL at 09:07

## 2018-07-16 RX ADMIN — METOPROLOL TARTRATE 100 MG: 50 TABLET ORAL at 09:07

## 2018-07-16 RX ADMIN — INSULIN ASPART 6 UNITS: 100 INJECTION, SOLUTION INTRAVENOUS; SUBCUTANEOUS at 04:07

## 2018-07-16 RX ADMIN — PANTOPRAZOLE SODIUM 40 MG: 40 TABLET, DELAYED RELEASE ORAL at 09:07

## 2018-07-16 RX ADMIN — ATORVASTATIN CALCIUM 80 MG: 40 TABLET, FILM COATED ORAL at 08:07

## 2018-07-16 NOTE — SUBJECTIVE & OBJECTIVE
Interval History: In better spirits and breathing more comfortably with nasal cannula today. It appears she has been in NSR for about 2 hours now. Had increased metoprolol to 100 mg BID prior to this.     Review of Systems   Constitutional: Negative for fever.   Respiratory: Positive for shortness of breath.    Cardiovascular: Negative for chest pain.   Gastrointestinal: Negative.    Genitourinary: Negative.    Musculoskeletal: Negative for back pain.   Skin: Negative.    Neurological: Negative.    Hematological: Negative.    Psychiatric/Behavioral: Negative.      Objective:     Vital Signs (Most Recent):  Temp: 98.5 °F (36.9 °C) (07/15/18 1200)  Pulse: 87 (07/15/18 1200)  Resp: 18(07/15/18 1200)  BP: 124/72 (07/15/18 1200)  SpO2: 98% (07/15/18 1200) Vital Signs (24h Range):  Temp:  [98.2 °F (36.8 °C)-98.6 °F (37 °C)] 98.4 °F (36.9 °C)  Pulse:  [] 86  Resp:  [18-35] 22  SpO2:  [96 %-100 %] 100 %  BP: (107-189)/() 155/79     Weight: 81.7 kg (180 lb 1.9 oz)  Body mass index is 29.97 kg/m².    Intake/Output Summary (Last 24 hours) at 07/16/18 1215  Last data filed at 07/16/18 1000   Gross per 24 hour   Intake            580.6 ml   Output              906 ml   Net           -325.4 ml      Physical Exam   Constitutional: She is oriented to person, place, and time. She appears well-developed. No distress.   Cardiovascular: Normal rate and regular rhythm.    Pulmonary/Chest: No respiratory distress. She has no wheezes. She has no rales. She exhibits no tenderness.   Using accessory muscles while speaking (much less when compared to yesterday)  Decreased breath sounds throughout.    Abdominal: Soft. Bowel sounds are normal. She exhibits no distension. There is no tenderness.   Musculoskeletal: Normal range of motion. She exhibits no edema.   Neurological: She is alert and oriented to person, place, and time.   Skin: Skin is warm and dry. Capillary refill takes less than 2 seconds. She is not diaphoretic.    Psychiatric: She has a normal mood and affect. Her speech is normal and behavior is normal. Judgment and thought content normal.   Nursing note and vitals reviewed.      Significant Labs: All pertinent labs within the past 24 hours have been reviewed.    Significant Imaging: I have reviewed all pertinent imaging results/findings within the past 24 hours.  I have reviewed and interpreted all pertinent imaging results/findings within the past 24 hours.

## 2018-07-16 NOTE — ASSESSMENT & PLAN NOTE
- [facility] on 6/27. Non obstructive CAD (40% RCA)  - On adequate cardioprotective regimen

## 2018-07-16 NOTE — ASSESSMENT & PLAN NOTE
- Complications of peripheral neuropathy.   - Increased basal and prandial insulin in setting of steroids  - Goal < 180

## 2018-07-16 NOTE — PLAN OF CARE
07/16/18 0158   Patient Assessment/Suction   Level of Consciousness (AVPU) responds to voice   Respiratory Effort Unlabored   Expansion/Accessory Muscles/Retractions no use of accessory muscles   All Lung Fields Breath Sounds diminished   Preset CPAP/BiPAP Settings   Mode Of Delivery BiPAP   $ CPAP/BiPAP Daily Charge BiPAP/CPAP Daily   $ Initial CPAP/BiPAP Setup? No   $ Is patient using? Yes   Equipment Type Vision   Airway Device Type large full face mask  (Skin is intact)   Ipap 10   EPAP (cm H2O) 5   Pressure Support (cm H2O) 5   Set Rate (Breaths/Min) 10   ITime (sec) 1   Rise Time (sec) 0.4   Patient CPAP/BiPAP Settings   RR Total (Breaths/Min) 18   Tidal Volume (mL) 393   VE Minute Ventilation (L/min) 6 L/min   Peak Inspiratory Pressure (cm H2O) 10   TiTOT (%) 32   Total Leak (L/Min) 26   Patient Trigger - ST Mode Only (%) 95   CPAP/BiPAP Alarms   High Pressure (cm H2O) 40   Low Pressure (cm H2O) 2   Low Pressure Delay (Sec) 40   High RR (breaths/min) 40   Low RR (breaths/min) 10   Apnea (Sec) 40   Pt is currently on BiPAP with cord to red outlet, and  alarms on and functioning.

## 2018-07-16 NOTE — ASSESSMENT & PLAN NOTE
Back in AF/RVR, briefly in SR at 0627 on 7/16/18 (documented on tele)  ?driven by fever/leukocytosis/sepsis  Currently on amio gtt (prev on sotalol)  Inc metoprolol to 100mg bid  Cont Xarelto  DCCV as a contingency (no NUNO needed given ongoing xarelto rx and recent documentation of SR on 7/16/18)

## 2018-07-16 NOTE — PROGRESS NOTES
[facility] Hospital Medicine  Progress Note    Patient Name: Yasmeen Palm  MRN: [id number]  Patient Class: IP- Inpatient   Admission Date: 6/24/2018  Length of Stay: 22 days  Attending Physician: Layla Villegas MD  Primary Care Provider: Angel Orourke Jr, MD        Subjective:     Principal Problem:Severe sepsis with acute organ dysfunction    HPI:  Yasmeen Palm is a 68 y.o. female that (in part)  has a past medical history of Anticoagulant long-term use; Arthritis; Asthma; CHF (congestive heart failure); COPD (chronic obstructive pulmonary disease); Coronary artery disease; Depression; Diabetes mellitus; GERD (gastroesophageal reflux disease); and Hypertension.  has a past surgical history that includes Abdominal surgery; Cardiac surgery; and Hernia repair. Presents to [facility] Emergency Department complaining of chest pain .  She reports compliance with her home medication regimen, including Xarelto.     Description of symptoms  Location:  Substernal  Onset:  Acute onset 2 days ago  Character:  The patient remained; moderate severity  Frequency:  Daily  Duration:  each episode lasts several minutes at a time  Associated Symptoms:  Diaphoresis, shortness of breath, weakness and fatigue  Radiation:  Recent the chest  Exacerbating factors:  Worse on exertion  Relieving factors:  Minimal relief with supplemental oxygen     In the emergency department routine laboratory studies, chest x-ray, EKG, cardiac enzymes were obtained.  EKG findings were concerning the case was discussed with cardiologist, Dr. Pulido.  It was determined that her EKG was not consistent with STEMI and she has been chest pain-free since arrival.  She had been given Lovenox, aspirin, and plan was to continue trending troponins and monitor closely on telemetry.    Hospital Course:  Ms. Palm was admitted to the hospital originally on 6/24/18 for chest pain. She was later sent to the ICU with  "acute hypercapnic respiratory failure the same day on BIPAP.  She quickly improved and was sent to the floor on 6/25.  Cards was consulted for NSTEMI with noted troponin increase and was planning on LHC on 6/26. However, the patient was sent back to the ICU on 6/26 with hypercapnic respiratory failure with a C02 of > 100 and was intubated. Pulmonary was consulted. Pt clinically improved from respiratory standpoint and was extubated on 6/27 to NC, but she did complain of chest discomfort. Cardiology was notified and patient was taken for LHC on 6/27 which was only remarkable for non-obstructive CAD and did not require intervention. Pt was given IVF post procedure and the next day developed increased SOB and required BIPAP. Pt was treated with IV lasix with good response with much improved respiratory status after output of 1L. Pt also being treated for COPD exacerbation with IV steroids, NEBS and doxycycline. ECHO performed on 6/25/2018 remarkable for EF=50-55% + grade 2 diastolic dysfunction. Pt diuresed and changed to maintenance PO lasix regimen. She as started on BIPAP due to increased pCO2 on ABG and lethargy. Pt will need BIPAP/home ventilator for use at home and pending approval of device for discharge home with home health.    On 7/13, became febrile on febrile, lethargic, hypercapnic, SOB and with AFib with RVR. Transferred to ICU again, on BiPAP and amiodarone infusion. Clinically improved over the course of 3 days with IV diuresis 40 mg daily, solumedrol 40 mg TID, BiPAP and empiric pip-tazo. Other than UA consistent with UTI, CT chest/abdomen/pelvis with IV showed no other potential source for infection. Did mention a pneumocele with "debri" however unlikely this to be cause for severe sepsis and invasive treatment would be too high risk (discussed with pulmonologist). Weaned to low flow NC to remain in place continuously. AFib with RVR extremely difficult to control despite amio infusion and treatment of " sepsis. Metoprolol added and uptitrated. Now on 100 mg BID with positive effect.     Interval History: In better spirits and breathing more comfortably with nasal cannula today. It appears she has been in NSR for about 2 hours now. Had increased metoprolol to 100 mg BID prior to this.     Review of Systems   Constitutional: Negative for fever.   Respiratory: Positive for shortness of breath.    Cardiovascular: Negative for chest pain.   Gastrointestinal: Negative.    Genitourinary: Negative.    Musculoskeletal: Negative for back pain.   Skin: Negative.    Neurological: Negative.    Hematological: Negative.    Psychiatric/Behavioral: Negative.      Objective:     Vital Signs (Most Recent):  Temp: 98.5 °F (36.9 °C) (07/15/18 1200)  Pulse: 87 (07/15/18 1200)  Resp: 18(07/15/18 1200)  BP: 124/72 (07/15/18 1200)  SpO2: 98% (07/15/18 1200) Vital Signs (24h Range):  Temp:  [98.2 °F (36.8 °C)-98.6 °F (37 °C)] 98.4 °F (36.9 °C)  Pulse:  [] 86  Resp:  [18-35] 22  SpO2:  [96 %-100 %] 100 %  BP: (107-189)/() 155/79     Weight: 81.7 kg (180 lb 1.9 oz)  Body mass index is 29.97 kg/m².    Intake/Output Summary (Last 24 hours) at 07/16/18 1215  Last data filed at 07/16/18 1000   Gross per 24 hour   Intake            580.6 ml   Output              906 ml   Net           -325.4 ml      Physical Exam   Constitutional: She is oriented to person, place, and time. She appears well-developed. No distress.   Cardiovascular: Normal rate and regular rhythm.    Pulmonary/Chest: No respiratory distress. She has no wheezes. She has no rales. She exhibits no tenderness.   Using accessory muscles while speaking (much less when compared to yesterday)  Decreased breath sounds throughout.    Abdominal: Soft. Bowel sounds are normal. She exhibits no distension. There is no tenderness.   Musculoskeletal: Normal range of motion. She exhibits no edema.   Neurological: She is alert and oriented to person, place, and time.   Skin: Skin is warm  "and dry. Capillary refill takes less than 2 seconds. She is not diaphoretic.   Psychiatric: She has a normal mood and affect. Her speech is normal and behavior is normal. Judgment and thought content normal.   Nursing note and vitals reviewed.      Significant Labs: All pertinent labs within the past 24 hours have been reviewed.    Significant Imaging: I have reviewed all pertinent imaging results/findings within the past 24 hours.  I have reviewed and interpreted all pertinent imaging results/findings within the past 24 hours.    Assessment/Plan:      * Severe sepsis with acute organ dysfunction    Source likely urine as she is incontinent and UA is abnormal with very cloudy appearing urine. UCx with yeast which I think is more so a colonizer but patient did improve with pip-tazo. CTA of chest/abdomen showed a right pneumocele with "debri" which is unlikely to cause degree of clinical instability and invasive diagnosis/treatment would be of very high risk for this patient. No other potential source identified. Will continue with pip-tazo for 1-3 more days depending on further clinical status.           Atrial fibrillation with RVR    May be 2/2 to development of severe sepsis and subsequent resp failure. Improving from sepsis standpoint and HR has greatly improved by increasing dose of metoprolol to 100 mg BID. Is to continue on amiodarone infusion for now. Appreciate further input from Cardiology          Acute exacerbation of chronic obstructive pulmonary disease (COPD)    Has been back and forth to ICU for hypercapnic respiratory failure. Underlying COPD (centrilobular emphysema seen on CT chest 6/2018) but also with diastolic HF. Was intubated at one point. Overall improved with trial of solumedrol 40 mg TID and another dose of IV lasix. Will continue with solumedrol 40 mg TID for today then de-esc to prednisone 20 mg daily starting tomorrow. De-esc furosemide to PO for maintenance diuresis. Add tiotropium. " Continuous supplemental O2 via low flow NC for goal sats 88-93%, incentive spirometer and BiPAP QHS. Hgb low at 7.5 g/dL without evidence of bleeding. Will consider transfusion if patient worsens. Changed CBC to every other day to avoid anemia from multiple blood draws.           Acute diastolic CHF (congestive heart failure)    - Initially due to volume overload. Now likely due to sepsis + Afib RVR  - Last ECHO with EF=50-55% + grade 2 diastolic dysfunction on 6/26  - now stable and well compensated. Management as above          Non-STEMI (non-ST elevated myocardial infarction)    - Followed by Cardiology  - s/p LHC on 6/27 only remarkable for non-obstructive CAD (40% RCA)  - Continue medical management as per Cardiology with ASA, statin, and patient will not be placed on plavix as she is also on Xarelto. On metoprolol          Malignant hypertension    BP stable without antihypertensive therapy. Restart when appropriate          Tobacco abuse    - Smoking cessation counseling done        History of pulmonary embolism    - Xarelto resumed   - CTA negative for PE        History of deep vein thrombosis    - Resumed anticoagulation         Chest pain    Likely MSK as it is reproducible . Resolved           Hyperkalemia    - Resolved            Fall    - Slipped while getting up on 7/6  - Head CT and all x-ray are negative  - Resolved        Debility    - Resume PT/OT once more stable from resp standpoint        Acute hypercapnic respiratory failure    A recurrent issue during this admission. Management as above        PAF (paroxysmal atrial fibrillation)    As above        Neuropathy    - No acute issues         Elevated brain natriuretic peptide (BNP) level    - stable with diuresis        Elevated troponin I level    - As discussed above          Obesity    - Weight reduction as outpatient after all acute issues addressed        Chronic anticoagulation    - Patient on Xarelto for PAF which has been resumed         Anemia of chronic disease    Low but stable. CBC in AM        Gastroesophageal reflux disease without esophagitis    - Continue PPI        Type 2 diabetes mellitus, controlled    - Complications of peripheral neuropathy.   - Increased basal and prandial insulin in setting of steroids  - Goal < 180        Coronary artery disease involving native coronary artery of native heart with angina pectoris    - [facility] on 6/27. Non obstructive CAD (40% RCA)  - On adequate cardioprotective regimen          VTE Risk Mitigation         Ordered     rivaroxaban tablet 20 mg  With dinner      06/27/18 8952           plan of care discussed with patient, son and . Continue ICU care      Critical care time spent on the evaluation and treatment of severe organ dysfunction, review of pertinent labs and imaging studies, discussions with consulting providers and discussions with patient/family: > 35 minutes.    Layla Linda MD  Department of Hospital Medicine   [facility]

## 2018-07-16 NOTE — SUBJECTIVE & OBJECTIVE
Past Medical History:   Diagnosis Date    Anticoagulant long-term use     Arthritis     Asthma     CHF (congestive heart failure)     COPD (chronic obstructive pulmonary disease)     Coronary artery disease     Depression     Diabetes mellitus     GERD (gastroesophageal reflux disease)     Hypertension        Past Surgical History:   Procedure Laterality Date    ABDOMINAL SURGERY      CARDIAC SURGERY      HERNIA REPAIR         Review of patient's allergies indicates:  No Known Allergies    No current facility-administered medications on file prior to encounter.      Current Outpatient Prescriptions on File Prior to Encounter   Medication Sig    acetaminophen (TYLENOL) 500 MG tablet Take 1 tablet (500 mg total) by mouth every 8 (eight) hours as needed.    aspirin (ECOTRIN) 81 MG EC tablet Take 81 mg by mouth once daily.    cloNIDine (CATAPRES) 0.1 MG tablet Take 2 tablets (0.2 mg total) by mouth 3 (three) times daily.    diltiaZEM (CARDIZEM) 120 MG tablet Take 1 tablet (120 mg total) by mouth every 8 (eight) hours.    furosemide (LASIX) 40 MG tablet Take 40 mg by mouth once daily.     gabapentin (NEURONTIN) 300 MG capsule Take 300 mg by mouth 3 (three) times daily.    hydrALAZINE (APRESOLINE) 50 MG tablet Take 1 tablet (50 mg total) by mouth every 8 (eight) hours. (Patient taking differently: Take 50 mg by mouth every 12 (twelve) hours. )    levalbuterol (XOPENEX HFA) 45 mcg/actuation inhaler Inhale 2 puffs into the lungs every 4 (four) hours as needed for Wheezing or Shortness of Breath. Rescue    lisinopril (PRINIVIL,ZESTRIL) 40 MG tablet Take 1 tablet (40 mg total) by mouth once daily. Do not take this medication if blood pressure is below 130/80 mmHg and/or feeling dizzy after taking all other blood pressure meds    metformin (GLUCOPHAGE) 500 MG tablet Take 500 mg by mouth 2 (two) times daily with meals.    rivaroxaban (XARELTO) 20 mg Tab Take 20 mg by mouth daily with dinner or evening  meal.    sotalol (BETAPACE) 80 MG tablet Take 80 mg by mouth 2 (two) times daily.    tramadol (ULTRAM) 50 mg tablet Take 1 tablet (50 mg total) by mouth every 6 (six) hours as needed for Pain.    UMECLIDINIUM BRM/VILANTEROL TR (ANORO ELLIPTA INHL) Inhale into the lungs daily as needed.     Family History     Problem Relation (Age of Onset)    Diabetes Father    Heart disease Mother    Hypertension Mother        Social History Main Topics    Smoking status: Current Some Day Smoker     Packs/day: 0.25     Years: 55.00     Types: Cigarettes    Smokeless tobacco: Never Used    Alcohol use 0.6 oz/week     1 Glasses of wine per week    Drug use: No    Sexual activity: No     Review of Systems   Gastrointestinal: Negative for melena.   Genitourinary: Negative for hematuria.     Objective:     Vital Signs (Most Recent):  Temp: 98.2 °F (36.8 °C) (07/16/18 0315)  Pulse: 109 (07/16/18 0615)  Resp: (!) 22 (07/16/18 0615)  BP: (!) 155/79 (07/16/18 0615)  SpO2: 100 % (07/16/18 0740) Vital Signs (24h Range):  Temp:  [98.2 °F (36.8 °C)-98.6 °F (37 °C)] 98.2 °F (36.8 °C)  Pulse:  [] 109  Resp:  [18-35] 22  SpO2:  [96 %-100 %] 100 %  BP: (107-189)/() 155/79     Weight: 81.7 kg (180 lb 1.9 oz)  Body mass index is 29.97 kg/m².    SpO2: 100 %  O2 Device (Oxygen Therapy): nasal cannula      Intake/Output Summary (Last 24 hours) at 07/16/18 0859  Last data filed at 07/16/18 0600   Gross per 24 hour   Intake            967.4 ml   Output             1325 ml   Net           -357.6 ml       Lines/Drains/Airways     Peripherally Inserted Central Catheter Line                 PICC Double Lumen 07/14/18 1539 right basilic 1 day          Drain                 Urethral Catheter 07/14/18 1015 1 day                Physical Exam   Constitutional: She appears well-developed and well-nourished. No distress.   HENT:   Head: Normocephalic and atraumatic.   Eyes: Conjunctivae and EOM are normal. Pupils are equal, round, and reactive to  light. No scleral icterus.   Neck: Normal range of motion. Neck supple. No JVD present. No tracheal deviation present. No thyromegaly present.   Cardiovascular: S1 normal and S2 normal.  An irregularly irregular rhythm present. Tachycardia present.  Exam reveals distant heart sounds. Exam reveals no gallop and no friction rub.    No murmur heard.  Pulmonary/Chest: Effort normal. No respiratory distress. She has no rales.   Abdominal: Soft. She exhibits no distension.   Musculoskeletal: Normal range of motion. She exhibits no edema.   Neurological: She is alert. No cranial nerve deficit.   Skin: Skin is warm and dry. She is not diaphoretic.   Psychiatric: She has a normal mood and affect. Her behavior is normal.       Current Medications:   aspirin  81 mg Oral Daily    atorvastatin  80 mg Oral QHS    docusate sodium  100 mg Oral Daily    furosemide  40 mg Oral Daily    insulin detemir U-100  25 Units Subcutaneous QHS    lidocaine  1 patch Transdermal Q24H    methylPREDNISolone sodium succinate  40 mg Intravenous Q8H    metoprolol tartrate  100 mg Oral BID    nystatin  500,000 Units Oral QID (WM & HS)    pantoprazole  40 mg Oral Daily    piperacillin-tazobactam (ZOSYN) IVPB  4.5 g Intravenous Q8H    polyethylene glycol  17 g Oral BID    potassium phosphate (monobasic)  1,000 mg Oral Daily    [START ON 7/17/2018] predniSONE  20 mg Oral Daily    rivaroxaban  20 mg Oral Daily with dinner    sodium chloride 0.9%  10 mL Intravenous Q6H      amiodarone in dextrose 5% 0.5 mg/min (07/16/18 0600)     acetaminophen, bisacodyl, dextrose 50%, dextrose 50%, dextrose 50%, glucagon (human recombinant), glucagon (human recombinant), glucose, glucose, hydrALAZINE, insulin aspart U-100, labetalol, ondansetron, ramelteon, Flushing PICC Protocol **AND** sodium chloride 0.9% **AND** sodium chloride 0.9%, sodium chloride 0.9%, traMADol    Laboratory:  CBC:    Recent Labs  Lab 07/10/18  0931 07/12/18  0438 07/13/18  9886  07/14/18  0330 07/15/18  0145   WHITE BLOOD CELL COUNT 19.64 H 24.27 H 24.89 H 25.82 H 19.03 H   HEMOGLOBIN 9.2 L 8.5 L 8.2 L 8.5 L 7.5 L   HEMATOCRIT 32.3 L 29.8 L 28.3 L 28.6 L 25.8 L   PLATELETS 342 250 227 194 174       CHEMISTRIES:    Recent Labs  Lab 07/12/18  0438 07/13/18  0452 07/14/18  0330 07/15/18  0145 07/16/18  0210   GLUCOSE 155 H 108 157 H 193 H 204 H   SODIUM 139 143 139 143 142   POTASSIUM 3.8 4.4 4.3 3.4 L 3.5   BUN BLD 23 31 H 30 H 17 18   CREATININE 0.8 0.9 0.8 0.7 0.8   EGFR IF [place] >60 >60 >60 >60 >60   EGFR IF NON- >60 >60 >60 >60 >60   CALCIUM 9.2 9.5 8.3 L 9.2 9.6   MAGNESIUM 1.8 1.9 2.1 1.8 1.9       CARDIAC BIOMARKERS:    Recent Labs  Lab 12/13/15  1932  02/12/18  1422 02/12/18  1808 06/24/18  1413 06/24/18  2153 06/25/18  0506   CPK 33  --   --   --   --   --   --    CPK MB 1.0  --   --   --   --   --   --    TROPONIN I <0.006  < > 0.021 0.021 0.896 H 0.870 H 0.667 H   < > = values in this interval not displayed.    COAGS:    Recent Labs  Lab 04/10/17  1129 08/12/17  0036 11/02/17  2211 12/08/17  0545 06/27/18  1738   INR 1.1 1.2 1.0 1.0 1.1       LIPIDS/LFTS:    Recent Labs  Lab 12/15/15  0540  08/12/17  1037  12/08/17  0545 02/12/18  1422 06/24/18  1413 06/25/18  0506 07/15/18  0145   CHOLESTEROL 170  --  243 H  --   --   --   --   --   --    TRIGLYCERIDES 94  --  93  --   --   --   --   --   --    HDL 33 L  --  43  --   --   --   --   --   --    LDL CHOLESTEROL 118.2  --  181.4 H  --   --   --   --   --   --    NON-HDL CHOLESTEROL 137  --  200  --   --   --   --   --   --    AST  --   < >  --   < > 26 13 20 23 26   ALT  --   < >  --   < > 8 L 7 L 7 L 10 24   < > = values in this interval not displayed.    BNP:    Recent Labs  Lab 08/19/17  1430 11/02/17  2211 12/08/17  0545 02/12/18  1422 06/24/18  1413    H 334 H 354 H 133 H 2,128 H       TSH:    Recent Labs  Lab 12/13/15  1932 06/24/18  1413   TSH 0.487 0.754       Free T4:    Recent Labs  Lab  06/24/18  1413   FREE T4 1.09     ABG    Recent Labs  Lab 07/13/18  1121   PH 7.501*   PO2 65*   PCO2 54.9*   HCO3 42.9*   BE 18         Diagnostic Results:  ECG (personally reviewed tracings):  6/24/18 1403 , LAD/PWRP    Chest X-Ray (personally reviewed image(s)): 7/12/18 NAD    CTA C/A/P 6/24/18  1. No evidence of aortic dissection.  2. Extensive calcified and noncalcified plaque seen throughout the aorta with small multifocal outpouchings seen involving the descending thoracic aorta suggestive for small penetrating ulcers.  Similar findings were seen on previous CT from August 2017.  3. 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  4. No evidence of PE.  5. Decreased size area of nodularity with scarring seen within the left lower lobe with resolution of previously visualized cavitation.  No evidence of new lung consolidation.  6. Mild to moderate centrilobular emphysema.    Cath 6/27/18   LVEDP: 9mmHg  LVEF: 50% by echo  Wall Motion: LAD WMA by echo  Dominance: Right  LM: MLI  LAD: MLI  LCx: MLI  RCA: dom, mid 40%  Hemostasis:  R Radial band  Impression:  NSTEMI  Nonobst CAD  Normal LV fxn  R rad vasband for hemostasis  Plan:  Cont med rx  Cont ASA 81mg qd  Stop Plavix  Restart Xarelto 20mg qd this pm  Pt to follow up with Dr. Pulido in 2 weeks    Echo: 6/25/18     1 - Low normal to mildly depressed left ventricular systolic function (EF 50-55%).  Distal LAD territory WMA.     2 - Impaired LV relaxation, elevated LAP (grade 2 diastolic dysfunction).     3 - Concentric hypertrophy.     4 - Trivial to mild tricuspid regurgitation.     5 - Pulmonary hypertension. The estimated PA systolic pressure is 52 mmHg.     LLE venous US 1/24/17  Interval decrease in overall thrombus burden of the left lower extremity, although there is persistent deep venous thrombus present within the left femoral and popliteal veins.    Stress Test: L MPI 12/15/15  Nuclear  Quantitative Functional Analysis:   LVEF: 66 %  Impression: NORMAL MYOCARDIAL PERFUSION  1. The perfusion scan is free of evidence for myocardial ischemia or injury.   2. Resting wall motion is physiologic.   3. Resting LV function is normal.   4. The ventricular volumes are normal at rest and stress.   5. The extracardiac distribution of radioactivity is normal.   6. There was no previous study available to compare.

## 2018-07-16 NOTE — ASSESSMENT & PLAN NOTE
- Followed by Cardiology  - s/p [facility] on 6/27 only remarkable for non-obstructive CAD (40% RCA)  - Continue medical management as per Cardiology with ASA, statin, and patient will not be placed on plavix as she is also on Xarelto. On metoprolol

## 2018-07-16 NOTE — PHYSICIAN QUERY
"+PT Name: Yasmeen Palm  MR #: [id number]    Physician Query Form - CardioPulmonary Clarification      CDS/: Brenda Haynes RN CDI   Contact information: [email or web address]     This form is a permanent document in the medical record.    Query Date: July 16, 2018    By submitting this query, we are merely seeking further clarification of documentation. Please utilize your independent clinical judgment when addressing the question(s) below.    The Medical record contains the following:   Indicators   Supporting Clinical Findings Location in Medical Record   X Pulmonary Hypertension documented Pulmonary hypertension. Cardiology note "Echo report"   X Acute/Chronic Illness COPD   Acute Diastolic Heart Failure  CAD  DM II  NSTEMI  Elevated BNP  Anemia of chronic diseases  GERD  Obesity   H&P   X   Echo and/or Heart Cath Findings Echo: 6/25/18 (images pers rev)    1 - Low normal to mildly depressed left ventricular systolic function (EF 50-55%).  Distal LAD territory WMA.     2 - Impaired LV relaxation, elevated LAP (grade 2 diastolic dysfunction).     3 - Concentric hypertrophy.     4 - Trivial to mild tricuspid regurgitation.     5 - Pulmonary hypertension. The estimated PA systolic pressure is 52 mmHg.     Echo 6/25    X BiPAP/Intubation/Supplemental O2 Per EMS patient reports chest pain x 2 days with increased shortness of breath. Brought to ED by NO EMS. Reports pt had shortness of breath wearing home O2 with increased respirations >40 BPM. pt placed on CPAP for transport. BP initially 190/120. EMS also reports ST elevation on scene. Aspirin and nitro given prior to arrival. On arrival pt brought into room 8 and EKG performed. Pt placed on BiPAP.     ER MD Note   X SOB, [place], Fatigue, Dizziness, LE Edema, Cyanosis, Chest Pain, Respiratory Distress, Hypoxia, etc. Pt reports chest pain x 2 days with increasing severity and increased shortness of breath.     High Risk Conditions:  Patient has a condition that " poses threat to life and bodily function: Severe Respiratory Distress     ER MD Note        H&P   X Treatment         Medication Acute exacerbation of chronic obstructive pulmonary disease (COPD)  Has been back and forth to ICU for hypercapnic respiratory failure. Underlying COPD (centrilobular emphysema seen on CT chest 6/2018) but also with diastolic HF.   Was intubated at one point. Although feeling somewhat better, airflow not optimal on my exam.  Will do trial of steroids along with IV lasix.   Furosemide 80 mg IV 6/24 1559  Furosemide 40 mg IV every 6 hours 6/25 0426  Furosemide 40 mg IV 2 times daily 6/25 0912  Metoprolol 100 mg po 2 times daily7/16 0900                    50 mg 2 times daily 7/15 0900 7/16 0840  Methylprednisolone 40 mg IV every 8 hours 7/15 0830 - 7/17 0559 after 6 doses   Hospital medicine note 7/15 836 am                Med sheets    Other       Provider, please specify the type of pulmonary hypertension:    [   ]  Group 1:  Pulmonary Arterial Hypertension - includes Primary, Idiopathic, Inheritable, and Secondary (due to drugs, toxins, congenital heart diease, HIV infection, etc.)    [   ]  Group 2:  Pulmonary Hypertension due to Left Heart Disease, including left heart failure and/or left heart valve disease    [   ]  Group 3:  Pulmonary Hypertension due to Lung Disease    [  x ]  Group 5:  Pumonary Hypertension due to other, multifactorial, or unclear mechanisms    [   ]  Pulmonary Hypertension, unspecified    [   ]  Other Cardiopulmonary Condition (please specify):  _____________________________________    [   ]  Clinically Undetermined      Please document in your progress notes daily for the duration of treatment, until resolved, and include in your discharge summary.

## 2018-07-16 NOTE — ASSESSMENT & PLAN NOTE
- Initially due to volume overload. Now likely due to sepsis + Afib RVR  - Last ECHO with EF=50-55% + grade 2 diastolic dysfunction on 6/26  - now stable and well compensated. Management as above

## 2018-07-16 NOTE — NURSING
Alert et cooperative this shift. Appetite better and increased intake of fluids. Meds tolerated well.     NSR noted this am around 1030AM .      Accu check 282 and 260 with sliding scale coverage tolerated well. . Continued Solumedrol 40 and no adverse reactions noted. To start p.o. steriod therapy in AM.     4 BMs noted this shift and softner order decreased to once daily.       HR  NSR  BETWEEN  .

## 2018-07-16 NOTE — PLAN OF CARE
Problem: Patient Care Overview  Goal: Plan of Care Review  Outcome: Ongoing (interventions implemented as appropriate)  Pt remains in ICU overnight. Pt awake, alert, and oriented to person, place, and time. Vital signs stable. Pt converted to NSR at approximately 0400. Pt on BiPAP overnight. Clark with adequate urine output. No bowel movement overnight. Pt and family updated on plan of care. Pt free of hospital acquired injuries and falls.

## 2018-07-16 NOTE — PROGRESS NOTES
[facility]  Cardiology  Progress Note    Patient Name: Yasmeen Palm  MRN: [id number]  Admission Date: 6/24/2018  Hospital Length of Stay: 22 days  Code Status: Full Code   Attending Physician: Layla Villegas MD   Primary Care Physician: Angel Orourke Jr, MD  Expected Discharge Date: 7/9/2018  Principal Problem:Severe sepsis with acute organ dysfunction    Subjective:     Hospital Course:   6/25/18: adm with resp insuff and NSTEMI with plans for cath 6/26 6/26/18: transferred back to ICU with hypercarb resp failure  6/27/18: cath with nonobst CAD  7/13/18: returned to ICU with AF/RVR and ?sepsis    Interval Hx: pt seen in ICU. Pt currently without complaints of CP/SOB.  Feeling better.    Tele: AF 120s  Was in SR 88 at 0627 on 7/16/18 (pers rev)      Past Medical History:   Diagnosis Date    Anticoagulant long-term use     Arthritis     Asthma     CHF (congestive heart failure)     COPD (chronic obstructive pulmonary disease)     Coronary artery disease     Depression     Diabetes mellitus     GERD (gastroesophageal reflux disease)     Hypertension        Past Surgical History:   Procedure Laterality Date    ABDOMINAL SURGERY      CARDIAC SURGERY      HERNIA REPAIR         Review of patient's allergies indicates:  No Known Allergies    No current facility-administered medications on file prior to encounter.      Current Outpatient Prescriptions on File Prior to Encounter   Medication Sig    acetaminophen (TYLENOL) 500 MG tablet Take 1 tablet (500 mg total) by mouth every 8 (eight) hours as needed.    aspirin (ECOTRIN) 81 MG EC tablet Take 81 mg by mouth once daily.    cloNIDine (CATAPRES) 0.1 MG tablet Take 2 tablets (0.2 mg total) by mouth 3 (three) times daily.    diltiaZEM (CARDIZEM) 120 MG tablet Take 1 tablet (120 mg total) by mouth every 8 (eight) hours.    furosemide (LASIX) 40 MG tablet Take 40 mg by mouth once daily.     gabapentin (NEURONTIN) 300 MG capsule Take  300 mg by mouth 3 (three) times daily.    hydrALAZINE (APRESOLINE) 50 MG tablet Take 1 tablet (50 mg total) by mouth every 8 (eight) hours. (Patient taking differently: Take 50 mg by mouth every 12 (twelve) hours. )    levalbuterol (XOPENEX HFA) 45 mcg/actuation inhaler Inhale 2 puffs into the lungs every 4 (four) hours as needed for Wheezing or Shortness of Breath. Rescue    lisinopril (PRINIVIL,ZESTRIL) 40 MG tablet Take 1 tablet (40 mg total) by mouth once daily. Do not take this medication if blood pressure is below 130/80 mmHg and/or feeling dizzy after taking all other blood pressure meds    metformin (GLUCOPHAGE) 500 MG tablet Take 500 mg by mouth 2 (two) times daily with meals.    rivaroxaban (XARELTO) 20 mg Tab Take 20 mg by mouth daily with dinner or evening meal.    sotalol (BETAPACE) 80 MG tablet Take 80 mg by mouth 2 (two) times daily.    tramadol (ULTRAM) 50 mg tablet Take 1 tablet (50 mg total) by mouth every 6 (six) hours as needed for Pain.    UMECLIDINIUM BRM/VILANTEROL TR (ANORO ELLIPTA INHL) Inhale into the lungs daily as needed.     Family History     Problem Relation (Age of Onset)    Diabetes Father    Heart disease Mother    Hypertension Mother        Social History Main Topics    Smoking status: Current Some Day Smoker     Packs/day: 0.25     Years: 55.00     Types: Cigarettes    Smokeless tobacco: Never Used    Alcohol use 0.6 oz/week     1 Glasses of wine per week    Drug use: No    Sexual activity: No     Review of Systems   Gastrointestinal: Negative for melena.   Genitourinary: Negative for hematuria.     Objective:     Vital Signs (Most Recent):  Temp: 98.2 °F (36.8 °C) (07/16/18 0315)  Pulse: 109 (07/16/18 0615)  Resp: (!) 22 (07/16/18 0615)  BP: (!) 155/79 (07/16/18 0615)  SpO2: 100 % (07/16/18 0740) Vital Signs (24h Range):  Temp:  [98.2 °F (36.8 °C)-98.6 °F (37 °C)] 98.2 °F (36.8 °C)  Pulse:  [] 109  Resp:  [18-35] 22  SpO2:  [96 %-100 %] 100 %  BP:  (107-189)/() 155/79     Weight: 81.7 kg (180 lb 1.9 oz)  Body mass index is 29.97 kg/m².    SpO2: 100 %  O2 Device (Oxygen Therapy): nasal cannula      Intake/Output Summary (Last 24 hours) at 07/16/18 0859  Last data filed at 07/16/18 0600   Gross per 24 hour   Intake            967.4 ml   Output             1325 ml   Net           -357.6 ml       Lines/Drains/Airways     Peripherally Inserted Central Catheter Line                 PICC Double Lumen 07/14/18 1539 right basilic 1 day          Drain                 Urethral Catheter 07/14/18 1015 1 day                Physical Exam   Constitutional: She appears well-developed and well-nourished. No distress.   HENT:   Head: Normocephalic and atraumatic.   Eyes: Conjunctivae and EOM are normal. Pupils are equal, round, and reactive to light. No scleral icterus.   Neck: Normal range of motion. Neck supple. No JVD present. No tracheal deviation present. No thyromegaly present.   Cardiovascular: S1 normal and S2 normal.  An irregularly irregular rhythm present. Tachycardia present.  Exam reveals distant heart sounds. Exam reveals no gallop and no friction rub.    No murmur heard.  Pulmonary/Chest: Effort normal. No respiratory distress. She has no rales.   Abdominal: Soft. She exhibits no distension.   Musculoskeletal: Normal range of motion. She exhibits no edema.   Neurological: She is alert. No cranial nerve deficit.   Skin: Skin is warm and dry. She is not diaphoretic.   Psychiatric: She has a normal mood and affect. Her behavior is normal.       Current Medications:   aspirin  81 mg Oral Daily    atorvastatin  80 mg Oral QHS    docusate sodium  100 mg Oral Daily    furosemide  40 mg Oral Daily    insulin detemir U-100  25 Units Subcutaneous QHS    lidocaine  1 patch Transdermal Q24H    methylPREDNISolone sodium succinate  40 mg Intravenous Q8H    metoprolol tartrate  100 mg Oral BID    nystatin  500,000 Units Oral QID (WM & HS)    pantoprazole  40 mg  Oral Daily    piperacillin-tazobactam (ZOSYN) IVPB  4.5 g Intravenous Q8H    polyethylene glycol  17 g Oral BID    potassium phosphate (monobasic)  1,000 mg Oral Daily    [START ON 7/17/2018] predniSONE  20 mg Oral Daily    rivaroxaban  20 mg Oral Daily with dinner    sodium chloride 0.9%  10 mL Intravenous Q6H      amiodarone in dextrose 5% 0.5 mg/min (07/16/18 0600)     acetaminophen, bisacodyl, dextrose 50%, dextrose 50%, dextrose 50%, glucagon (human recombinant), glucagon (human recombinant), glucose, glucose, hydrALAZINE, insulin aspart U-100, labetalol, ondansetron, ramelteon, Flushing PICC Protocol **AND** sodium chloride 0.9% **AND** sodium chloride 0.9%, sodium chloride 0.9%, traMADol    Laboratory:  CBC:    Recent Labs  Lab 07/10/18  0931 07/12/18  0438 07/13/18  0452 07/14/18  0330 07/15/18  0145   WHITE BLOOD CELL COUNT 19.64 H 24.27 H 24.89 H 25.82 H 19.03 H   HEMOGLOBIN 9.2 L 8.5 L 8.2 L 8.5 L 7.5 L   HEMATOCRIT 32.3 L 29.8 L 28.3 L 28.6 L 25.8 L   PLATELETS 342 250 227 194 174       CHEMISTRIES:    Recent Labs  Lab 07/12/18  0438 07/13/18  0452 07/14/18  0330 07/15/18  0145 07/16/18  0210   GLUCOSE 155 H 108 157 H 193 H 204 H   SODIUM 139 143 139 143 142   POTASSIUM 3.8 4.4 4.3 3.4 L 3.5   BUN BLD 23 31 H 30 H 17 18   CREATININE 0.8 0.9 0.8 0.7 0.8   EGFR IF [place] >60 >60 >60 >60 >60   EGFR IF NON- >60 >60 >60 >60 >60   CALCIUM 9.2 9.5 8.3 L 9.2 9.6   MAGNESIUM 1.8 1.9 2.1 1.8 1.9       CARDIAC BIOMARKERS:    Recent Labs  Lab 12/13/15  1932  02/12/18  1422 02/12/18  1808 06/24/18  1413 06/24/18  2153 06/25/18  0506   CPK 33  --   --   --   --   --   --    CPK MB 1.0  --   --   --   --   --   --    TROPONIN I <0.006  < > 0.021 0.021 0.896 H 0.870 H 0.667 H   < > = values in this interval not displayed.    COAGS:    Recent Labs  Lab 04/10/17  1129 08/12/17  0036 11/02/17  2211 12/08/17  0545 06/27/18  1738   INR 1.1 1.2 1.0 1.0 1.1       LIPIDS/LFTS:    Recent  Labs  Lab 12/15/15  0540  08/12/17  1037  12/08/17  0545 02/12/18  1422 06/24/18  1413 06/25/18  0506 07/15/18  0145   CHOLESTEROL 170  --  243 H  --   --   --   --   --   --    TRIGLYCERIDES 94  --  93  --   --   --   --   --   --    HDL 33 L  --  43  --   --   --   --   --   --    LDL CHOLESTEROL 118.2  --  181.4 H  --   --   --   --   --   --    NON-HDL CHOLESTEROL 137  --  200  --   --   --   --   --   --    AST  --   < >  --   < > 26 13 20 23 26   ALT  --   < >  --   < > 8 L 7 L 7 L 10 24   < > = values in this interval not displayed.    BNP:    Recent Labs  Lab 08/19/17  1430 11/02/17  2211 12/08/17  0545 02/12/18  1422 06/24/18  1413    H 334 H 354 H 133 H 2,128 H       TSH:    Recent Labs  Lab 12/13/15  1932 06/24/18  1413   TSH 0.487 0.754       Free T4:    Recent Labs  Lab 06/24/18  1413   FREE T4 1.09     ABG    Recent Labs  Lab 07/13/18  1121   PH 7.501*   PO2 65*   PCO2 54.9*   HCO3 42.9*   BE 18         Diagnostic Results:  ECG (personally reviewed tracings):  6/24/18 1403 , LAD/PWRP    Chest X-Ray (personally reviewed image(s)): 7/12/18 NAD    CTA C/A/P 6/24/18  1. No evidence of aortic dissection.  2. Extensive calcified and noncalcified plaque seen throughout the aorta with small multifocal outpouchings seen involving the descending thoracic aorta suggestive for small penetrating ulcers.  Similar findings were seen on previous CT from August 2017.  3. 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  4. No evidence of PE.  5. Decreased size area of nodularity with scarring seen within the left lower lobe with resolution of previously visualized cavitation.  No evidence of new lung consolidation.  6. Mild to moderate centrilobular emphysema.    Cath 6/27/18   LVEDP: 9mmHg  LVEF: 50% by echo  Wall Motion: LAD WMA by echo  Dominance: Right  LM: MLI  LAD: MLI  LCx: MLI  RCA: dom, mid 40%  Hemostasis:  R Radial  band  Impression:  NSTEMI  Nonobst CAD  Normal LV fxn  R rad vasband for hemostasis  Plan:  Cont med rx  Cont ASA 81mg qd  Stop Plavix  Restart Xarelto 20mg qd this pm  Pt to follow up with Dr. Pulido in 2 weeks    Echo: 6/25/18     1 - Low normal to mildly depressed left ventricular systolic function (EF 50-55%).  Distal LAD territory WMA.     2 - Impaired LV relaxation, elevated LAP (grade 2 diastolic dysfunction).     3 - Concentric hypertrophy.     4 - Trivial to mild tricuspid regurgitation.     5 - Pulmonary hypertension. The estimated PA systolic pressure is 52 mmHg.     LLE venous US 1/24/17  Interval decrease in overall thrombus burden of the left lower extremity, although there is persistent deep venous thrombus present within the left femoral and popliteal veins.    Stress Test: L MPI 12/15/15  Nuclear Quantitative Functional Analysis:   LVEF: 66 %  Impression: NORMAL MYOCARDIAL PERFUSION  1. The perfusion scan is free of evidence for myocardial ischemia or injury.   2. Resting wall motion is physiologic.   3. Resting LV function is normal.   4. The ventricular volumes are normal at rest and stress.   5. The extracardiac distribution of radioactivity is normal.   6. There was no previous study available to compare.      Assessment and Plan:     * Severe sepsis with acute organ dysfunction    Per IM  ?source of fever/WBC  Abx ordered per IM  CTA C/A/P done, no definitive source noted        PAF (paroxysmal atrial fibrillation)    Back in AF/RVR, briefly in SR at 0627 on 7/16/18 (documented on tele)  ?driven by fever/leukocytosis/sepsis  Currently on amio gtt (prev on sotalol)  Inc metoprolol to 100mg bid  Cont Xarelto  DCCV as a contingency (no NUNO needed given ongoing xarelto rx and recent documentation of SR on 7/16/18)        Non-STEMI (non-ST elevated myocardial infarction)    EF normal on echo with LAD WMA (echo 6/25/18)  Cath 6/27/18 with nonobst CAD  Cont ASA 81mg qd  Cont Xarelto 20mg qd  Cont  atorva 80mg qhs  Check lipids/LFT 3 months (late Sept 2018)            Acute exacerbation of chronic obstructive pulmonary disease (COPD)    Per IM/pulm        Chronic anticoagulation    For hx of PAF and DVT/PE  Xarelto resumed        History of deep vein thrombosis    As above        Type 2 diabetes mellitus, controlled    Per IM            VTE Risk Mitigation         Ordered     rivaroxaban tablet 20 mg  With dinner      06/27/18 3259          Laureano Pulido MD  Cardiology  [facility]

## 2018-07-16 NOTE — ASSESSMENT & PLAN NOTE
May be 2/2 to development of severe sepsis and subsequent resp failure. Improving from sepsis standpoint and HR has greatly improved by increasing dose of metoprolol to 100 mg BID. Is to continue on amiodarone infusion for now. Appreciate further input from Cardiology

## 2018-07-17 LAB
BACTERIA BLD CULT: NORMAL
BACTERIA BLD CULT: NORMAL
BACTERIA UR CULT: NORMAL
BASOPHILS # BLD AUTO: 0 K/UL
BASOPHILS NFR BLD: 0 %
DIFFERENTIAL METHOD: ABNORMAL
EOSINOPHIL # BLD AUTO: 0 K/UL
EOSINOPHIL NFR BLD: 0 %
ERYTHROCYTE [DISTWIDTH] IN BLOOD BY AUTOMATED COUNT: 18.6 %
HCT VFR BLD AUTO: 23.4 %
HGB BLD-MCNC: 6.8 G/DL
LYMPHOCYTES # BLD AUTO: 0.5 K/UL
LYMPHOCYTES NFR BLD: 2.6 %
MCH RBC QN AUTO: 25 PG
MCHC RBC AUTO-ENTMCNC: 29.1 G/DL
MCV RBC AUTO: 86 FL
MONOCYTES # BLD AUTO: 0.8 K/UL
MONOCYTES NFR BLD: 4.4 %
NEUTROPHILS # BLD AUTO: 16.2 K/UL
NEUTROPHILS NFR BLD: 93 %
PLATELET # BLD AUTO: 210 K/UL
PMV BLD AUTO: 10.2 FL
POCT GLUCOSE: 166 MG/DL (ref 70–110)
POCT GLUCOSE: 221 MG/DL (ref 70–110)
POCT GLUCOSE: 249 MG/DL (ref 70–110)
POCT GLUCOSE: 260 MG/DL (ref 70–110)
POCT GLUCOSE: 305 MG/DL (ref 70–110)
RBC # BLD AUTO: 2.72 M/UL
WBC # BLD AUTO: 17.41 K/UL

## 2018-07-17 PROCEDURE — 25000003 PHARM REV CODE 250: Performed by: HOSPITALIST

## 2018-07-17 PROCEDURE — 25000003 PHARM REV CODE 250: Performed by: EMERGENCY MEDICINE

## 2018-07-17 PROCEDURE — 21400001 HC TELEMETRY ROOM

## 2018-07-17 PROCEDURE — 25000003 PHARM REV CODE 250: Performed by: INTERNAL MEDICINE

## 2018-07-17 PROCEDURE — 63600175 PHARM REV CODE 636 W HCPCS: Performed by: INTERNAL MEDICINE

## 2018-07-17 PROCEDURE — 94640 AIRWAY INHALATION TREATMENT: CPT

## 2018-07-17 PROCEDURE — 63600175 PHARM REV CODE 636 W HCPCS: Performed by: HOSPITALIST

## 2018-07-17 PROCEDURE — A4216 STERILE WATER/SALINE, 10 ML: HCPCS | Performed by: INTERNAL MEDICINE

## 2018-07-17 PROCEDURE — 94761 N-INVAS EAR/PLS OXIMETRY MLT: CPT

## 2018-07-17 PROCEDURE — 99233 SBSQ HOSP IP/OBS HIGH 50: CPT | Mod: ,,, | Performed by: INTERNAL MEDICINE

## 2018-07-17 PROCEDURE — 99900035 HC TECH TIME PER 15 MIN (STAT)

## 2018-07-17 PROCEDURE — 85025 COMPLETE CBC W/AUTO DIFF WBC: CPT

## 2018-07-17 PROCEDURE — 27000221 HC OXYGEN, UP TO 24 HOURS

## 2018-07-17 PROCEDURE — 94660 CPAP INITIATION&MGMT: CPT

## 2018-07-17 RX ORDER — LISINOPRIL 20 MG/1
20 TABLET ORAL DAILY
Status: DISCONTINUED | OUTPATIENT
Start: 2018-07-17 | End: 2018-07-21

## 2018-07-17 RX ORDER — AMIODARONE HYDROCHLORIDE 200 MG/1
200 TABLET ORAL DAILY
Status: DISCONTINUED | OUTPATIENT
Start: 2018-08-14 | End: 2018-07-27 | Stop reason: HOSPADM

## 2018-07-17 RX ORDER — AMIODARONE HYDROCHLORIDE 200 MG/1
400 TABLET ORAL 2 TIMES DAILY
Status: DISCONTINUED | OUTPATIENT
Start: 2018-07-17 | End: 2018-07-27 | Stop reason: HOSPADM

## 2018-07-17 RX ORDER — AMIODARONE HYDROCHLORIDE 200 MG/1
200 TABLET ORAL 2 TIMES DAILY
Status: DISCONTINUED | OUTPATIENT
Start: 2018-07-31 | End: 2018-07-27 | Stop reason: HOSPADM

## 2018-07-17 RX ADMIN — Medication 10 ML: at 06:07

## 2018-07-17 RX ADMIN — PANTOPRAZOLE SODIUM 40 MG: 40 TABLET, DELAYED RELEASE ORAL at 08:07

## 2018-07-17 RX ADMIN — PIPERACILLIN AND TAZOBACTAM 4.5 G: 4; .5 INJECTION, POWDER, LYOPHILIZED, FOR SOLUTION INTRAVENOUS; PARENTERAL at 01:07

## 2018-07-17 RX ADMIN — METOPROLOL TARTRATE 100 MG: 50 TABLET ORAL at 08:07

## 2018-07-17 RX ADMIN — RAMELTEON 8 MG: 8 TABLET, FILM COATED ORAL at 09:07

## 2018-07-17 RX ADMIN — LISINOPRIL 20 MG: 20 TABLET ORAL at 09:07

## 2018-07-17 RX ADMIN — INSULIN ASPART 8 UNITS: 100 INJECTION, SOLUTION INTRAVENOUS; SUBCUTANEOUS at 04:07

## 2018-07-17 RX ADMIN — ASPIRIN 81 MG CHEWABLE TABLET 81 MG: 81 TABLET CHEWABLE at 08:07

## 2018-07-17 RX ADMIN — INSULIN ASPART 2 UNITS: 100 INJECTION, SOLUTION INTRAVENOUS; SUBCUTANEOUS at 06:07

## 2018-07-17 RX ADMIN — Medication 10 ML: at 05:07

## 2018-07-17 RX ADMIN — LIDOCAINE 1 PATCH: 50 PATCH TOPICAL at 08:07

## 2018-07-17 RX ADMIN — INSULIN ASPART 2 UNITS: 100 INJECTION, SOLUTION INTRAVENOUS; SUBCUTANEOUS at 08:07

## 2018-07-17 RX ADMIN — PIPERACILLIN AND TAZOBACTAM 4.5 G: 4; .5 INJECTION, POWDER, LYOPHILIZED, FOR SOLUTION INTRAVENOUS; PARENTERAL at 09:07

## 2018-07-17 RX ADMIN — AMIODARONE HYDROCHLORIDE 400 MG: 200 TABLET ORAL at 08:07

## 2018-07-17 RX ADMIN — FUROSEMIDE 40 MG: 40 TABLET ORAL at 08:07

## 2018-07-17 RX ADMIN — Medication 10 ML: at 11:07

## 2018-07-17 RX ADMIN — Medication 10 ML: at 12:07

## 2018-07-17 RX ADMIN — TIOTROPIUM BROMIDE 18 MCG: 18 CAPSULE ORAL; RESPIRATORY (INHALATION) at 08:07

## 2018-07-17 RX ADMIN — ATORVASTATIN CALCIUM 80 MG: 40 TABLET, FILM COATED ORAL at 08:07

## 2018-07-17 RX ADMIN — AMIODARONE HYDROCHLORIDE 0.5 MG/MIN: 1.8 INJECTION, SOLUTION INTRAVENOUS at 01:07

## 2018-07-17 RX ADMIN — TRAMADOL HYDROCHLORIDE 50 MG: 50 TABLET, FILM COATED ORAL at 09:07

## 2018-07-17 RX ADMIN — AMIODARONE HYDROCHLORIDE 400 MG: 200 TABLET ORAL at 09:07

## 2018-07-17 RX ADMIN — POTASSIUM PHOSPHATE, MONOBASIC 1000 MG: 500 TABLET, SOLUBLE ORAL at 08:07

## 2018-07-17 RX ADMIN — PREDNISONE 20 MG: 20 TABLET ORAL at 08:07

## 2018-07-17 RX ADMIN — INSULIN ASPART 4 UNITS: 100 INJECTION, SOLUTION INTRAVENOUS; SUBCUTANEOUS at 11:07

## 2018-07-17 RX ADMIN — PIPERACILLIN AND TAZOBACTAM 4.5 G: 4; .5 INJECTION, POWDER, LYOPHILIZED, FOR SOLUTION INTRAVENOUS; PARENTERAL at 05:07

## 2018-07-17 RX ADMIN — RIVAROXABAN 20 MG: 20 TABLET, FILM COATED ORAL at 04:07

## 2018-07-17 NOTE — PLAN OF CARE
"Problem: Patient Care Overview  Goal: Plan of Care Review  Outcome: Ongoing (interventions implemented as appropriate)  Patient remains in ICU. NSR on monitor. Tolerating BIPAP 30% 10/5 throughout night. TMAX 99.1. States she is "feeling much better". H/H 6.8/23.4 this AM. VSS. No signs of bleeding. MD Childers notified. No new orders received. Clark intact with adequate urine output. No falls or new skin breakdown throughout shift. Plan of care reviewed with patient and spouse.       "

## 2018-07-17 NOTE — CONSULTS
Discharge planning--ESPERANZA spoke with Brunilda at [facility] at 8 a:30 am ([phone]). She confirms BIPAP was delivered to hospital on 7/13/18.  ESPERANZA spoke with Steven at [facility] with update. Will need new orders and updated clinicals. Requested progress note. Will send via [facility] today.

## 2018-07-17 NOTE — ASSESSMENT & PLAN NOTE
- [facility] on 6/27. Non obstructive CAD (40% RCA)  - On adequate cardioprotective regimen

## 2018-07-17 NOTE — ASSESSMENT & PLAN NOTE
- May have been secondary to development of sepsis and subsequent respiratory failure.   - Pt has been on amiodarone gtt and improving from sepsis standpoint and HR has greatly improved by increasing dose of metoprolol to 100 mg BID.   - Pt spontaneously converted to NSR last night and case discussed with Cardiology  - Amiodarone converted to PO today  - Pt stable for transfer to telemetry today and she is already fully anticoagulated

## 2018-07-17 NOTE — PROGRESS NOTES
" [facility]  Adult Nutrition  Progress Note    SUMMARY       Recommendations    Recommendation/Intervention:   1. Cont w/ current diet order unless texture change/liquids consistency change rec'd by ST; I amended diet order to have chopped meats per pt request to help w/ chewing difficulty    2. Cont w/ Boost Glu Control TID as this alone is meeting 50% of pt's protein needs    Goals: Meet >85% EEN  Nutrition Goal Status: goal met  Communication of RD Recs: reviewed with RN    Reason for Assessment    Reason for Assessment: RD follow-up  Diagnosis: infection/sepsis  Relevant Medical History: CHF, COPD, DM  Interdisciplinary Rounds: did not attend  General Information Comments: Spoke to pt this afternoon shortly after lunch. Reports chewing problems 2/2 poor dentition/missing teeth on upper jaw. Had some swallowing issues w/ meat at lunch.  RN reports ST consulted to eval swallow function. Pt reports she has had to be in thickened liquids in the past. Drinking 3 Boost Glu Control daily. Enc'd her to cont to do so since she has only been eating ~50% of meals. Followed cardiac diet pta. Endorses a 35# wt loss over the past few yrs. Pt w/ no s/s of malnutrition. She does have mild loss of muscle mass, which is appropriate given her PMH & age.  Nutrition Discharge Planning: Adequate intake of meals to meet nutr needs.    Nutrition Risk Screen    Nutrition Risk Screen: no indicators present    Nutrition/Diet History    Patient Reported Diet/Restrictions/Preferences: heart healthy  Food Preferences: Denies cultural, Voodoo food preferences.   Do you have any cultural, spiritual, Voodoo conflicts, given your current situation?: none  Factors Affecting Nutritional Intake: chewing difficulties/inability to chew food    Anthropometrics    Temp: 98.8 °F (37.1 °C)  Height Method: Stated  Height: 5' 5" (165.1 cm)  Height (inches): 65 in  Weight Method: Bed Scale  Weight: 81.7 kg (180 lb 1.9 oz) (taken by " RN on )  Weight (lb): 180.12 lb  Ideal Body Weight (IBW), Female: 125 lb  % Ideal Body Weight, Female (lb): 144.1 lb  BMI (Calculated): 30  BMI Grade: 30 - 34.9- obesity - grade I  Weight Loss: unintentional  Usual Body Weight (UBW), k.7 kg (2018)  % Usual Body Weight: 90.27  % Weight Change From Usual Weight: -9.92 %       Lab/Procedures/Meds    Pertinent Labs Reviewed: reviewed  Pertinent Labs Comments: A1C 6.3, Cl 94, CO2 37, Glu 204, POCT glu 166-450, WBC 17.41, Alb 1.9  Pertinent Medications Reviewed: reviewed  Pertinent Medications Comments: abx, amiodarone, statin, insulin, lasix, methylprednisone, pantoprazole    Physical Findings/Assessment    Overall Physical Appearance: loss of muscle mass, obese  Oral/Mouth Cavity: tooth/teeth missing  Skin: intact    Estimated/Assessed Needs    Weight Used For Calorie Calculations: 81.7 kg (180 lb 1.9 oz)  Energy Calorie Requirements (kcal): 1697  Energy Need Method: [place]-St Jeor (x 1.25 (PAL))  Protein Requirements: 82-98 gms (1-1.2 gms/kg)  Weight Used For Protein Calculations: 81.7 kg (180 lb 1.9 oz)     Fluid Need Method: RDA Method  RDA Method (mL): 1697  CHO Requirement: 225g      Nutrition Prescription Ordered    Current Diet Order:  ADA/Cardiac  Oral Nutrition Supplement: Boost Glu Control TID    Evaluation of Received Nutrient/Fluid Intake    I/O: +1L x 24 hrs; +4.757L since admit  Energy Calories Required: meeting needs  Protein Required: meeting needs  Fluid Required:  (COURTNEY)  Comments: LBM ; good uop  Tolerance: tolerating  % Intake of Estimated Energy Needs: 75 - 100 % b/w intake of meals & Boost Glu Control TID  % Meal Intake: 50 - 75 %    Nutrition Risk    Level of Risk/Frequency of Follow-up:  (F/u 1 x weekly)     Assessment and Plan    Nutrition Problem  Inadequate energy intake    Related to (etiology):   resp distress/lethargy    Signs and Symptoms (as evidenced by):   <85% of EEN/EPN being met w/ 25-50% intake of meals      Interventions/Recommendations (treatment strategy):  See recs    Nutrition Diagnosis Status:   Resolved         Monitor and Evaluation    Food and Nutrient Intake: energy intake, food and beverage intake  Food and Nutrient Adminstration: diet order  Knowledge/Beliefs/Attitudes: food and nutrition knowledge/skill  Anthropometric Measurements: weight, weight change  Biochemical Data, Medical Tests and Procedures: electrolyte and renal panel, glucose/endocrine profile  Nutrition-Focused Physical Findings: overall appearance     Nutrition Follow-Up    RD Follow-up?: Yes

## 2018-07-17 NOTE — ASSESSMENT & PLAN NOTE
PAF now in SR, ?driven by fever/leukocytosis/sepsis  Cont metoprolol to 100mg bid  Cont Xarelto  Stop amio gtt and convert to PO (in place of sotalol)  OK for transfer to tele

## 2018-07-17 NOTE — PLAN OF CARE
Problem: Patient Care Overview  Goal: Plan of Care Review  Recommendations     Recommendation/Intervention:   1. Cont w/ current diet order unless texture change/liquids consistency change rec'd by ST; I amended diet order to have chopped meats per pt request to help w/ chewing difficulty    2. Cont w/ Boost Glu Control TID as this alone is meeting 50% of pt's protein needs     Goals: Meet >85% EEN  Nutrition Goal Status: goal met

## 2018-07-17 NOTE — PROGRESS NOTES
[facility] Hospital Medicine  Progress Note    Patient Name: Yasmeen Palm  MRN: [id number]  Patient Class: IP- Inpatient   Admission Date: 6/24/2018  Length of Stay: 23 days  Attending Physician: Meredith Olguin MD  Primary Care Provider: Angel Orourke Jr, MD        Subjective:     Principal Problem:Sepsis    HPI:  Yasmeen Palm is a 68 y.o. female that (in part)  has a past medical history of Anticoagulant long-term use; Arthritis; Asthma; CHF (congestive heart failure); COPD (chronic obstructive pulmonary disease); Coronary artery disease; Depression; Diabetes mellitus; GERD (gastroesophageal reflux disease); and Hypertension.  has a past surgical history that includes Abdominal surgery; Cardiac surgery; and Hernia repair. Presents to [facility] Emergency Department complaining of chest pain .  She reports compliance with her home medication regimen, including Xarelto.     Description of symptoms  Location:  Substernal  Onset:  Acute onset 2 days ago  Character:  The patient remained; moderate severity  Frequency:  Daily  Duration:  each episode lasts several minutes at a time  Associated Symptoms:  Diaphoresis, shortness of breath, weakness and fatigue  Radiation:  Recent the chest  Exacerbating factors:  Worse on exertion  Relieving factors:  Minimal relief with supplemental oxygen     In the emergency department routine laboratory studies, chest x-ray, EKG, cardiac enzymes were obtained.  EKG findings were concerning the case was discussed with cardiologist, Dr. Pulido.  It was determined that her EKG was not consistent with STEMI and she has been chest pain-free since arrival.  She had been given Lovenox, aspirin, and plan was to continue trending troponins and monitor closely on telemetry.    Hospital Course:  Ms. Palm was admitted to the hospital originally on 6/24/18 for chest pain. She was later sent to the ICU with acute hypercapnic respiratory failure the  "same day on BIPAP.  She quickly improved and was sent to the floor on 6/25.  Cards was consulted for NSTEMI with noted troponin increase and was planning on LHC on 6/26. However, the patient was sent back to the ICU on 6/26 with hypercapnic respiratory failure with a C02 of > 100 and was intubated. Pulmonary was consulted. Pt clinically improved from respiratory standpoint and was extubated on 6/27 to NC, but she did complain of chest discomfort. Cardiology was notified and patient was taken for LHC on 6/27 which was only remarkable for non-obstructive CAD and did not require intervention. Pt was given IVF post procedure and the next day developed increased SOB and required BIPAP. Pt was treated with IV lasix with good response with much improved respiratory status after output of 1L. Pt also being treated for COPD exacerbation with IV steroids, NEBS and doxycycline. ECHO performed on 6/25/2018 remarkable for EF=50-55% + grade 2 diastolic dysfunction. Pt diuresed and changed to maintenance PO lasix regimen. She as started on BIPAP due to increased pCO2 on ABG and lethargy. Pt will need BIPAP/home ventilator for use at home and was pending approval of device for discharge home with home health.    On 7/13, she became febrile, lethargic, hypercapnic, SOB and was in AFib with RVR. Transferred to ICU again, on BiPAP and amiodarone infusion. Clinically improved over the course of 3 days with IV diuresis 40 mg daily, solumedrol 40 mg TID, BiPAP and empiric Zosyn. Other than UA consistent with UTI, CT chest/abdomen/pelvis with IV showed no other potential source for infection. There was mention of a pneumocele with "debri" however unlikely this to be cause for sepsis and invasive treatment would be too high risk (discussed with pulmonologist). She was weaned to low flow NC to remain in place continuously. AFib with RVR has been extremely difficult to control despite amiodarone infusion and treatment of sepsis. Metoprolol " added and uptitrated, and currently on 100 mg BID with positive effect.     Interval History: Pt spontaneously converted to NSR overnight today. Pt reports feeling much better, and has no new c/o.     Review of Systems   Constitutional: Negative for chills and fever.   Respiratory: Negative for shortness of breath.    Cardiovascular: Negative for chest pain.     Objective:     Vital Signs (Most Recent):  Temp: 99 °F (37.2 °C) (07/17/18 0340)  Pulse: 72 (07/17/18 1000)  Resp: (!) 37 (07/17/18 1000)  BP: (!) 150/69 (07/17/18 1000)  SpO2: 100 % (07/17/18 1000) Vital Signs (24h Range):  Temp:  [99 °F (37.2 °C)-99.1 °F (37.3 °C)] 99 °F (37.2 °C)  Pulse:  [] 72  Resp:  [19-37] 37  SpO2:  [93 %-100 %] 100 %  BP: (111-186)/(57-88) 150/69     Weight: 81.7 kg (180 lb 1.9 oz)  Body mass index is 29.97 kg/m².    Intake/Output Summary (Last 24 hours) at 07/17/18 1237  Last data filed at 07/17/18 1100   Gross per 24 hour   Intake          1896.15 ml   Output             1011 ml   Net           885.15 ml      Physical Exam   Constitutional: She is oriented to person, place, and time. She appears well-developed. No distress.   HENT:   Head: Normocephalic and atraumatic.   Eyes: EOM are normal. Pupils are equal, round, and reactive to light.   Neck: Normal range of motion. Neck supple.   Cardiovascular: Normal rate and regular rhythm.    Pulmonary/Chest:   Slight decrease at bases, no wheezing   Abdominal: Soft. Bowel sounds are normal.   Musculoskeletal: Normal range of motion. She exhibits edema.   Neurological: She is alert and oriented to person, place, and time.   Skin: Skin is warm and dry. Capillary refill takes less than 2 seconds. She is not diaphoretic.   Psychiatric: She has a normal mood and affect. Her behavior is normal. Thought content normal.       Significant Labs: All pertinent labs within the past 24 hours have been reviewed.    Significant Imaging: I have reviewed and interpreted all pertinent imaging  "results/findings within the past 24 hours.    Assessment/Plan:      * Sepsis    - Source likely urine as she is incontinent and UA was abnormal with very cloudy appearing urine.   - UCx with yeast which was thought to be more so a colonizer, since patient did improve with Zosyn which will be continued for now  - CTA of chest/abdomen showed a right pneumocele with "debri" which was unlikely to cause degree of clinical instability and invasive diagnosis/treatment would be of very high risk for this patient.   - No other potential source identified. Will continue with pip-tazo for another day, and will plan for stopping tomorrow depending on further clinical improvement.         Atrial fibrillation with RVR    - May have been secondary to development of sepsis and subsequent respiratory failure.   - Pt has been on amiodarone gtt and improving from sepsis standpoint and HR has greatly improved by increasing dose of metoprolol to 100 mg BID.   - Pt spontaneously converted to NSR last night and case discussed with Cardiology  - Amiodarone converted to PO today  - Pt stable for transfer to telemetry today and she is already fully anticoagulated        Non-STEMI (non-ST elevated myocardial infarction)    - Followed by Cardiology  - s/p LHC on 6/27 only remarkable for non-obstructive CAD (40% RCA)  - Continue medical management as per Cardiology with ASA, statin, metoprolol and patient will not be placed on plavix as she is also on Xarelto        Acute hypercapnic respiratory failure    - A recurrent issue during this admission, management as above        Acute exacerbation of chronic obstructive pulmonary disease (COPD)    - Pt has been back and forth to ICU for hypercapnic respiratory failure.   - Underlying COPD (centrilobular emphysema seen on CT chest 6/2018) but also complicated with diastolic HF and Afib with RVR  - Pt was intubated at one point, but overall improved with trial of solumedrol 40 mg TID and another dose " of IV lasix.   - Will continue with solumedrol 40 mg TID for today then de-escalate to prednisone 20 mg daily starting tomorrow.   - De-escalated furosemide to PO for maintenance diuresis.   - Continuous supplemental O2 via low flow NC for goal sats 88-93%, incentive spirometer and BiPAP QHS  - Pt has been approved for home BIPAP when ready for discharge        Acute diastolic CHF (congestive heart failure)    - Initially due to volume overload, but now likely due to sepsis + Afib RVR  - Last ECHO with EF=50-55% + grade 2 diastolic dysfunction on 6/26  - Stable and well compensated. Management as above        Coronary artery disease involving native coronary artery of native heart with angina pectoris    - [facility] on 6/27. Non obstructive CAD (40% RCA)  - On adequate cardioprotective regimen        PAF (paroxysmal atrial fibrillation)    - As discussed above        Chest pain    - Likely MSK as it is reproducible  - Resolved           Hyperkalemia    - Resolved        Fall    - Slipped while getting up on 7/6  - Head CT and all x-ray are negative  - Resolved        Debility    - Resume PT/OT         Neuropathy    - No acute issues         Elevated brain natriuretic peptide (BNP) level    - Stable with diuresis        Elevated troponin I level    - As discussed above          Obesity    - Weight reduction as outpatient after all acute issues addressed        Chronic anticoagulation    - Patient on Xarelto for PAF which has been resumed        History of deep vein thrombosis    - Resumed anticoagulation         History of pulmonary embolism    - Xarelto resumed   - CTA negative for PE        Anemia of chronic disease    - Pt with gradual trend down, but no overt source of bleeding  - Possible iatrogenic given multiple blood draws over prolonged hospital stay daily  - Will transfuse 1 U PRBC today and repeat CBC in am        Malignant hypertension    - Pt on metoprolol for HR and BP control  - Will make further adjustments  as needed        Tobacco abuse    - Smoking cessation counseling done        Gastroesophageal reflux disease without esophagitis    - Continue PPI        Type 2 diabetes mellitus, controlled    - Complications of peripheral neuropathy.   - Increased basal and prandial insulin in setting of steroids  - Goal < 180          VTE Risk Mitigation         Ordered     rivaroxaban tablet 20 mg  With dinner      06/27/18 8560          Critical care time spent on the evaluation and treatment of severe organ dysfunction, review of pertinent labs and imaging studies, discussions with consulting providers and discussions with patient/family: 35 minutes.    Meredith Olguin MD  Department of Hospital Medicine   [facility]

## 2018-07-17 NOTE — SUBJECTIVE & OBJECTIVE
Past Medical History:   Diagnosis Date    Anticoagulant long-term use     Arthritis     Asthma     CHF (congestive heart failure)     COPD (chronic obstructive pulmonary disease)     Coronary artery disease     Depression     Diabetes mellitus     GERD (gastroesophageal reflux disease)     Hypertension        Past Surgical History:   Procedure Laterality Date    ABDOMINAL SURGERY      CARDIAC SURGERY      HERNIA REPAIR         Review of patient's allergies indicates:  No Known Allergies    No current facility-administered medications on file prior to encounter.      Current Outpatient Prescriptions on File Prior to Encounter   Medication Sig    acetaminophen (TYLENOL) 500 MG tablet Take 1 tablet (500 mg total) by mouth every 8 (eight) hours as needed.    aspirin (ECOTRIN) 81 MG EC tablet Take 81 mg by mouth once daily.    cloNIDine (CATAPRES) 0.1 MG tablet Take 2 tablets (0.2 mg total) by mouth 3 (three) times daily.    diltiaZEM (CARDIZEM) 120 MG tablet Take 1 tablet (120 mg total) by mouth every 8 (eight) hours.    furosemide (LASIX) 40 MG tablet Take 40 mg by mouth once daily.     gabapentin (NEURONTIN) 300 MG capsule Take 300 mg by mouth 3 (three) times daily.    hydrALAZINE (APRESOLINE) 50 MG tablet Take 1 tablet (50 mg total) by mouth every 8 (eight) hours. (Patient taking differently: Take 50 mg by mouth every 12 (twelve) hours. )    levalbuterol (XOPENEX HFA) 45 mcg/actuation inhaler Inhale 2 puffs into the lungs every 4 (four) hours as needed for Wheezing or Shortness of Breath. Rescue    lisinopril (PRINIVIL,ZESTRIL) 40 MG tablet Take 1 tablet (40 mg total) by mouth once daily. Do not take this medication if blood pressure is below 130/80 mmHg and/or feeling dizzy after taking all other blood pressure meds    metformin (GLUCOPHAGE) 500 MG tablet Take 500 mg by mouth 2 (two) times daily with meals.    rivaroxaban (XARELTO) 20 mg Tab Take 20 mg by mouth daily with dinner or evening  meal.    sotalol (BETAPACE) 80 MG tablet Take 80 mg by mouth 2 (two) times daily.    tramadol (ULTRAM) 50 mg tablet Take 1 tablet (50 mg total) by mouth every 6 (six) hours as needed for Pain.    UMECLIDINIUM BRM/VILANTEROL TR (ANORO ELLIPTA INHL) Inhale into the lungs daily as needed.     Family History     Problem Relation (Age of Onset)    Diabetes Father    Heart disease Mother    Hypertension Mother        Social History Main Topics    Smoking status: Current Some Day Smoker     Packs/day: 0.25     Years: 55.00     Types: Cigarettes    Smokeless tobacco: Never Used    Alcohol use 0.6 oz/week     1 Glasses of wine per week    Drug use: No    Sexual activity: No     Review of Systems   Gastrointestinal: Negative for melena.   Genitourinary: Negative for hematuria.     Objective:     Vital Signs (Most Recent):  Temp: 99 °F (37.2 °C) (07/17/18 0340)  Pulse: 79 (07/17/18 0845)  Resp: (!) 27 (07/17/18 0845)  BP: (!) 173/84 (07/17/18 0610)  SpO2: 98 % (07/17/18 0845) Vital Signs (24h Range):  Temp:  [99 °F (37.2 °C)-99.1 °F (37.3 °C)] 99 °F (37.2 °C)  Pulse:  [] 79  Resp:  [19-40] 27  SpO2:  [93 %-100 %] 98 %  BP: (111-182)/(57-86) 173/84     Weight: 81.7 kg (180 lb 1.9 oz)  Body mass index is 29.97 kg/m².    SpO2: 98 %  O2 Device (Oxygen Therapy): BiPAP      Intake/Output Summary (Last 24 hours) at 07/17/18 0924  Last data filed at 07/17/18 0600   Gross per 24 hour   Intake          1812.85 ml   Output              963 ml   Net           849.85 ml       Lines/Drains/Airways     Peripherally Inserted Central Catheter Line                 PICC Double Lumen 07/14/18 1539 right basilic 2 days          Drain                 Urethral Catheter 07/14/18 1015 2 days                Physical Exam   Constitutional: She appears well-developed and well-nourished. No distress.   HENT:   Head: Normocephalic and atraumatic.   Eyes: Conjunctivae and EOM are normal. Pupils are equal, round, and reactive to light. No  scleral icterus.   Neck: Normal range of motion. Neck supple. No JVD present. No tracheal deviation present. No thyromegaly present.   Cardiovascular: Normal rate, regular rhythm, S1 normal and S2 normal.  Exam reveals distant heart sounds. Exam reveals no gallop and no friction rub.    No murmur heard.  Pulmonary/Chest: Effort normal. No respiratory distress. She has no rales.   Abdominal: Soft. She exhibits no distension.   Musculoskeletal: Normal range of motion. She exhibits no edema.   Neurological: She is alert. No cranial nerve deficit.   Skin: Skin is warm and dry. She is not diaphoretic.   Psychiatric: She has a normal mood and affect. Her behavior is normal.       Current Medications:   aspirin  81 mg Oral Daily    atorvastatin  80 mg Oral QHS    docusate sodium  100 mg Oral Daily    furosemide  40 mg Oral Daily    lidocaine  1 patch Transdermal Q24H    metoprolol tartrate  100 mg Oral BID    pantoprazole  40 mg Oral Daily    piperacillin-tazobactam (ZOSYN) IVPB  4.5 g Intravenous Q8H    potassium phosphate (monobasic)  1,000 mg Oral Daily    predniSONE  20 mg Oral Daily    rivaroxaban  20 mg Oral Daily with dinner    sodium chloride 0.9%  10 mL Intravenous Q6H    tiotropium  1 capsule Inhalation Daily      amiodarone in dextrose 5% 0.5 mg/min (07/17/18 0600)     acetaminophen, bisacodyl, dextrose 50%, dextrose 50%, dextrose 50%, glucagon (human recombinant), glucagon (human recombinant), glucose, glucose, hydrALAZINE, insulin aspart U-100, labetalol, ondansetron, ramelteon, Flushing PICC Protocol **AND** sodium chloride 0.9% **AND** sodium chloride 0.9%, sodium chloride 0.9%, traMADol    Laboratory:  CBC:    Recent Labs  Lab 07/12/18  0438 07/13/18  0452 07/14/18  0330 07/15/18  0145 07/17/18  0200   WHITE BLOOD CELL COUNT 24.27 H 24.89 H 25.82 H 19.03 H 17.41 H   HEMOGLOBIN 8.5 L 8.2 L 8.5 L 7.5 L 6.8 L   HEMATOCRIT 29.8 L 28.3 L 28.6 L 25.8 L 23.4 L   PLATELETS 250 227 194 174 210        CHEMISTRIES:    Recent Labs  Lab 07/12/18  0438 07/13/18  0452 07/14/18  0330 07/15/18  0145 07/16/18  0210   GLUCOSE 155 H 108 157 H 193 H 204 H   SODIUM 139 143 139 143 142   POTASSIUM 3.8 4.4 4.3 3.4 L 3.5   BUN BLD 23 31 H 30 H 17 18   CREATININE 0.8 0.9 0.8 0.7 0.8   EGFR IF [place] >60 >60 >60 >60 >60   EGFR IF NON- >60 >60 >60 >60 >60   CALCIUM 9.2 9.5 8.3 L 9.2 9.6   MAGNESIUM 1.8 1.9 2.1 1.8 1.9       CARDIAC BIOMARKERS:    Recent Labs  Lab 12/13/15  1932  02/12/18  1422 02/12/18  1808 06/24/18  1413 06/24/18  2153 06/25/18  0506   CPK 33  --   --   --   --   --   --    CPK MB 1.0  --   --   --   --   --   --    TROPONIN I <0.006  < > 0.021 0.021 0.896 H 0.870 H 0.667 H   < > = values in this interval not displayed.    COAGS:    Recent Labs  Lab 04/10/17  1129 08/12/17  0036 11/02/17  2211 12/08/17  0545 06/27/18  1738   INR 1.1 1.2 1.0 1.0 1.1       LIPIDS/LFTS:    Recent Labs  Lab 12/15/15  0540  08/12/17  1037  12/08/17  0545 02/12/18  1422 06/24/18  1413 06/25/18  0506 07/15/18  0145   CHOLESTEROL 170  --  243 H  --   --   --   --   --   --    TRIGLYCERIDES 94  --  93  --   --   --   --   --   --    HDL 33 L  --  43  --   --   --   --   --   --    LDL CHOLESTEROL 118.2  --  181.4 H  --   --   --   --   --   --    NON-HDL CHOLESTEROL 137  --  200  --   --   --   --   --   --    AST  --   < >  --   < > 26 13 20 23 26   ALT  --   < >  --   < > 8 L 7 L 7 L 10 24   < > = values in this interval not displayed.    BNP:    Recent Labs  Lab 08/19/17  1430 11/02/17  2211 12/08/17  0545 02/12/18  1422 06/24/18  1413    H 334 H 354 H 133 H 2,128 H       TSH:    Recent Labs  Lab 12/13/15  1932 06/24/18  1413   TSH 0.487 0.754       Free T4:    Recent Labs  Lab 06/24/18  1413   FREE T4 1.09     ABG    Recent Labs  Lab 07/13/18  1121   PH 7.501*   PO2 65*   PCO2 54.9*   HCO3 42.9*   BE 18         Diagnostic Results:  ECG (personally reviewed tracings):  6/24/18 1403 ,  LAD/PWRP    Chest X-Ray (personally reviewed image(s)): 7/12/18 NAD    CTA C/A/P 6/24/18  1. No evidence of aortic dissection.  2. Extensive calcified and noncalcified plaque seen throughout the aorta with small multifocal outpouchings seen involving the descending thoracic aorta suggestive for small penetrating ulcers.  Similar findings were seen on previous CT from August 2017.  3. 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  4. No evidence of PE.  5. Decreased size area of nodularity with scarring seen within the left lower lobe with resolution of previously visualized cavitation.  No evidence of new lung consolidation.  6. Mild to moderate centrilobular emphysema.    Cath 6/27/18   LVEDP: 9mmHg  LVEF: 50% by echo  Wall Motion: LAD WMA by echo  Dominance: Right  LM: MLI  LAD: MLI  LCx: MLI  RCA: dom, mid 40%  Hemostasis:  R Radial band  Impression:  NSTEMI  Nonobst CAD  Normal LV fxn  R rad vasband for hemostasis  Plan:  Cont med rx  Cont ASA 81mg qd  Stop Plavix  Restart Xarelto 20mg qd this pm  Pt to follow up with Dr. Pulido in 2 weeks    Echo: 6/25/18     1 - Low normal to mildly depressed left ventricular systolic function (EF 50-55%).  Distal LAD territory WMA.     2 - Impaired LV relaxation, elevated LAP (grade 2 diastolic dysfunction).     3 - Concentric hypertrophy.     4 - Trivial to mild tricuspid regurgitation.     5 - Pulmonary hypertension. The estimated PA systolic pressure is 52 mmHg.     LLE venous US 1/24/17  Interval decrease in overall thrombus burden of the left lower extremity, although there is persistent deep venous thrombus present within the left femoral and popliteal veins.    Stress Test: L MPI 12/15/15  Nuclear Quantitative Functional Analysis:   LVEF: 66 %  Impression: NORMAL MYOCARDIAL PERFUSION  1. The perfusion scan is free of evidence for myocardial ischemia or injury.   2. Resting wall motion is physiologic.   3.  Resting LV function is normal.   4. The ventricular volumes are normal at rest and stress.   5. The extracardiac distribution of radioactivity is normal.   6. There was no previous study available to compare.

## 2018-07-17 NOTE — NURSING TRANSFER
Nursing Transfer Note      7/17/2018     Transfer to  Telemetry    Transfer via bed    Transfer with O2, cardiac monitoring    Transported by transporter with RN  Medicines sent: Yes: 3 Insulin Pens   (2 Novolg and 1 Levemir.)  Potassium phosphate 500mg x2 pills, Glucose tabs x6 and Labetalol 100/20( 5mg/ml) IV vial.     Chart send with patient: Yes    Notified  Getachew via wife call    Patient reassessed at: 07/17/2018    1100    Upon arrival to floor:  Kristina received patient with vital signs, bed in low position, cardiac monitor in place and O2@ 2L pnc. Cheerful and cooperative.

## 2018-07-17 NOTE — ASSESSMENT & PLAN NOTE
- Pt has been back and forth to ICU for hypercapnic respiratory failure.   - Underlying COPD (centrilobular emphysema seen on CT chest 6/2018) but also complicated with diastolic HF and Afib with RVR  - Pt was intubated at one point, but overall improved with trial of solumedrol 40 mg TID and another dose of IV lasix.   - Will continue with solumedrol 40 mg TID for today then de-escalate to prednisone 20 mg daily starting tomorrow.   - De-escalated furosemide to PO for maintenance diuresis.   - Continuous supplemental O2 via low flow NC for goal sats 88-93%, incentive spirometer and BiPAP QHS  - Pt has been approved for home BIPAP when ready for discharge

## 2018-07-17 NOTE — ASSESSMENT & PLAN NOTE
- Followed by Cardiology  - s/p [facility] on 6/27 only remarkable for non-obstructive CAD (40% RCA)  - Continue medical management as per Cardiology with ASA, statin, metoprolol and patient will not be placed on plavix as she is also on Xarelto

## 2018-07-17 NOTE — CARE UPDATE
"Transfer Summary:    69 y/o female admitted to the hospital originally on 6/24/18 for chest pain. She was later sent to the ICU with acute hypercapnic respiratory failure the same day on BIPAP.  She quickly improved and was sent to the floor on 6/25.  Cards was consulted for NSTEMI with noted troponin increase and was planning on LHC on 6/26. However, the patient was sent back to the ICU on 6/26 with hypercapnic respiratory failure with a C02 of > 100 and was intubated. Pulmonary was consulted. Pt clinically improved from respiratory standpoint and was extubated on 6/27 to NC, but she did complain of chest discomfort. Cardiology was notified and patient was taken for LHC on 6/27 which was only remarkable for non-obstructive CAD and did not require intervention. Pt was given IVF post procedure and the next day developed increased SOB and required BIPAP. Pt was treated with IV lasix with good response with much improved respiratory status after output of 1L. Pt also being treated for COPD exacerbation with IV steroids, NEBS and doxycycline. ECHO performed on 6/25/2018 remarkable for EF=50-55% + grade 2 diastolic dysfunction. Pt diuresed and changed to maintenance PO lasix regimen. She as started on BIPAP due to increased pCO2 on ABG and lethargy. On 7/13, she became febrile, lethargic, hypercapnic, SOB and was in AFib with RVR. Transferred to ICU again, on BiPAP and amiodarone infusion. Clinically improved over the course of 3 days with IV diuresis 40 mg daily, solumedrol 40 mg TID, BiPAP and empiric Zosyn. Other than UA consistent with UTI, CT chest/abdomen/pelvis with IV showed no other potential source for infection. There was mention of a pneumocele with "debri" however unlikely this to be cause for sepsis and invasive treatment would be too high risk (discussed with pulmonologist). She was weaned to low flow NC to remain in place continuously. AFib with RVR has been extremely difficult to control despite " amiodarone infusion and treatment of sepsis. Metoprolol added and up titrated, and currently on 100 mg BID with positive effect. Pt spontaneously converted to NSR overnight and now converted to PO amiodarone today and Cardiology okay with transfer to telemetry. Pt with reported possible dysphagia this afternoon and speech therapy consulted today. Continue Zosyn for 1-2 more days with transition to PO Augmentin possibly tomorrow depending on clinical course. Will remove krishna catheter today and restart PT/OT again. Pt will likely at least need home health with therapy vs SNF depending on therapy recommendations. She will need BIPAP QHS and this has been approved and delivered to the house already when she gets home.     FARRUKH Olguin MD

## 2018-07-17 NOTE — SUBJECTIVE & OBJECTIVE
Interval History: Pt spontaneously converted to NSR overnight today. Pt reports feeling much better, and has no new c/o.     Review of Systems   Constitutional: Negative for chills and fever.   Respiratory: Negative for shortness of breath.    Cardiovascular: Negative for chest pain.     Objective:     Vital Signs (Most Recent):  Temp: 99 °F (37.2 °C) (07/17/18 0340)  Pulse: 72 (07/17/18 1000)  Resp: (!) 37 (07/17/18 1000)  BP: (!) 150/69 (07/17/18 1000)  SpO2: 100 % (07/17/18 1000) Vital Signs (24h Range):  Temp:  [99 °F (37.2 °C)-99.1 °F (37.3 °C)] 99 °F (37.2 °C)  Pulse:  [] 72  Resp:  [19-37] 37  SpO2:  [93 %-100 %] 100 %  BP: (111-186)/(57-88) 150/69     Weight: 81.7 kg (180 lb 1.9 oz)  Body mass index is 29.97 kg/m².    Intake/Output Summary (Last 24 hours) at 07/17/18 1237  Last data filed at 07/17/18 1100   Gross per 24 hour   Intake          1896.15 ml   Output             1011 ml   Net           885.15 ml      Physical Exam   Constitutional: She is oriented to person, place, and time. She appears well-developed. No distress.   HENT:   Head: Normocephalic and atraumatic.   Eyes: EOM are normal. Pupils are equal, round, and reactive to light.   Neck: Normal range of motion. Neck supple.   Cardiovascular: Normal rate and regular rhythm.    Pulmonary/Chest:   Slight decrease at bases, no wheezing   Abdominal: Soft. Bowel sounds are normal.   Musculoskeletal: Normal range of motion. She exhibits edema.   Neurological: She is alert and oriented to person, place, and time.   Skin: Skin is warm and dry. Capillary refill takes less than 2 seconds. She is not diaphoretic.   Psychiatric: She has a normal mood and affect. Her behavior is normal. Thought content normal.       Significant Labs: All pertinent labs within the past 24 hours have been reviewed.    Significant Imaging: I have reviewed and interpreted all pertinent imaging results/findings within the past 24 hours.

## 2018-07-17 NOTE — ASSESSMENT & PLAN NOTE
- Initially due to volume overload, but now likely due to sepsis + Afib RVR  - Last ECHO with EF=50-55% + grade 2 diastolic dysfunction on 6/26  - Stable and well compensated. Management as above

## 2018-07-17 NOTE — PROGRESS NOTES
Report received from [facility].  Patient received from ICU.  Telemetry monitor continued.  Patient in bed.  Patient is AAOx4. Patient is resting comfortably. Patient has no denies signs of SOB, difficulty breathing. Patient IV is clean dry intact no sign of redness.

## 2018-07-17 NOTE — ASSESSMENT & PLAN NOTE
- Pt with gradual trend down, but no overt source of bleeding  - Possible iatrogenic given multiple blood draws over prolonged hospital stay daily  - Will transfuse 1 U PRBC today and repeat CBC in am

## 2018-07-17 NOTE — ASSESSMENT & PLAN NOTE
"- Source likely urine as she is incontinent and UA was abnormal with very cloudy appearing urine.   - UCx with yeast which was thought to be more so a colonizer, since patient did improve with Zosyn which will be continued for now  - CTA of chest/abdomen showed a right pneumocele with "debri" which was unlikely to cause degree of clinical instability and invasive diagnosis/treatment would be of very high risk for this patient.   - No other potential source identified. Will continue with pip-tazo for another day, and will plan for stopping tomorrow depending on further clinical improvement.   "

## 2018-07-17 NOTE — PROGRESS NOTES
[facility]  Cardiology  Progress Note    Patient Name: Yasmeen Palm  MRN: [id number]  Admission Date: 6/24/2018  Hospital Length of Stay: 23 days  Code Status: Full Code   Attending Physician: Meredith Olguin MD   Primary Care Physician: Angel Orourke Jr, MD  Expected Discharge Date: 7/9/2018  Principal Problem:Severe sepsis with acute organ dysfunction    Subjective:     Hospital Course:   6/25/18: adm with resp insuff and NSTEMI with plans for cath 6/26 6/26/18: transferred back to ICU with hypercarb resp failure  6/27/18: cath with nonobst CAD  7/13/18: returned to ICU with AF/RVR and ?sepsis  7/17/18: converted to SR    Interval Hx: pt seen in ICU. Pt currently without complaints of CP/SOB.  Feeling better.  Case d/w RN    Tele: SR 70s (pers rev)      Past Medical History:   Diagnosis Date    Anticoagulant long-term use     Arthritis     Asthma     CHF (congestive heart failure)     COPD (chronic obstructive pulmonary disease)     Coronary artery disease     Depression     Diabetes mellitus     GERD (gastroesophageal reflux disease)     Hypertension        Past Surgical History:   Procedure Laterality Date    ABDOMINAL SURGERY      CARDIAC SURGERY      HERNIA REPAIR         Review of patient's allergies indicates:  No Known Allergies    No current facility-administered medications on file prior to encounter.      Current Outpatient Prescriptions on File Prior to Encounter   Medication Sig    acetaminophen (TYLENOL) 500 MG tablet Take 1 tablet (500 mg total) by mouth every 8 (eight) hours as needed.    aspirin (ECOTRIN) 81 MG EC tablet Take 81 mg by mouth once daily.    cloNIDine (CATAPRES) 0.1 MG tablet Take 2 tablets (0.2 mg total) by mouth 3 (three) times daily.    diltiaZEM (CARDIZEM) 120 MG tablet Take 1 tablet (120 mg total) by mouth every 8 (eight) hours.    furosemide (LASIX) 40 MG tablet Take 40 mg by mouth once daily.     gabapentin (NEURONTIN) 300 MG capsule Take  300 mg by mouth 3 (three) times daily.    hydrALAZINE (APRESOLINE) 50 MG tablet Take 1 tablet (50 mg total) by mouth every 8 (eight) hours. (Patient taking differently: Take 50 mg by mouth every 12 (twelve) hours. )    levalbuterol (XOPENEX HFA) 45 mcg/actuation inhaler Inhale 2 puffs into the lungs every 4 (four) hours as needed for Wheezing or Shortness of Breath. Rescue    lisinopril (PRINIVIL,ZESTRIL) 40 MG tablet Take 1 tablet (40 mg total) by mouth once daily. Do not take this medication if blood pressure is below 130/80 mmHg and/or feeling dizzy after taking all other blood pressure meds    metformin (GLUCOPHAGE) 500 MG tablet Take 500 mg by mouth 2 (two) times daily with meals.    rivaroxaban (XARELTO) 20 mg Tab Take 20 mg by mouth daily with dinner or evening meal.    sotalol (BETAPACE) 80 MG tablet Take 80 mg by mouth 2 (two) times daily.    tramadol (ULTRAM) 50 mg tablet Take 1 tablet (50 mg total) by mouth every 6 (six) hours as needed for Pain.    UMECLIDINIUM BRM/VILANTEROL TR (ANORO ELLIPTA INHL) Inhale into the lungs daily as needed.     Family History     Problem Relation (Age of Onset)    Diabetes Father    Heart disease Mother    Hypertension Mother        Social History Main Topics    Smoking status: Current Some Day Smoker     Packs/day: 0.25     Years: 55.00     Types: Cigarettes    Smokeless tobacco: Never Used    Alcohol use 0.6 oz/week     1 Glasses of wine per week    Drug use: No    Sexual activity: No     Review of Systems   Gastrointestinal: Negative for melena.   Genitourinary: Negative for hematuria.     Objective:     Vital Signs (Most Recent):  Temp: 99 °F (37.2 °C) (07/17/18 0340)  Pulse: 79 (07/17/18 0845)  Resp: (!) 27 (07/17/18 0845)  BP: (!) 173/84 (07/17/18 0610)  SpO2: 98 % (07/17/18 0845) Vital Signs (24h Range):  Temp:  [99 °F (37.2 °C)-99.1 °F (37.3 °C)] 99 °F (37.2 °C)  Pulse:  [] 79  Resp:  [19-40] 27  SpO2:  [93 %-100 %] 98 %  BP: (111-182)/(57-86)  173/84     Weight: 81.7 kg (180 lb 1.9 oz)  Body mass index is 29.97 kg/m².    SpO2: 98 %  O2 Device (Oxygen Therapy): BiPAP      Intake/Output Summary (Last 24 hours) at 07/17/18 0924  Last data filed at 07/17/18 0600   Gross per 24 hour   Intake          1812.85 ml   Output              963 ml   Net           849.85 ml       Lines/Drains/Airways     Peripherally Inserted Central Catheter Line                 PICC Double Lumen 07/14/18 1539 right basilic 2 days          Drain                 Urethral Catheter 07/14/18 1015 2 days                Physical Exam   Constitutional: She appears well-developed and well-nourished. No distress.   HENT:   Head: Normocephalic and atraumatic.   Eyes: Conjunctivae and EOM are normal. Pupils are equal, round, and reactive to light. No scleral icterus.   Neck: Normal range of motion. Neck supple. No JVD present. No tracheal deviation present. No thyromegaly present.   Cardiovascular: Normal rate, regular rhythm, S1 normal and S2 normal.  Exam reveals distant heart sounds. Exam reveals no gallop and no friction rub.    No murmur heard.  Pulmonary/Chest: Effort normal. No respiratory distress. She has no rales.   Abdominal: Soft. She exhibits no distension.   Musculoskeletal: Normal range of motion. She exhibits no edema.   Neurological: She is alert. No cranial nerve deficit.   Skin: Skin is warm and dry. She is not diaphoretic.   Psychiatric: She has a normal mood and affect. Her behavior is normal.       Current Medications:   aspirin  81 mg Oral Daily    atorvastatin  80 mg Oral QHS    docusate sodium  100 mg Oral Daily    furosemide  40 mg Oral Daily    lidocaine  1 patch Transdermal Q24H    metoprolol tartrate  100 mg Oral BID    pantoprazole  40 mg Oral Daily    piperacillin-tazobactam (ZOSYN) IVPB  4.5 g Intravenous Q8H    potassium phosphate (monobasic)  1,000 mg Oral Daily    predniSONE  20 mg Oral Daily    rivaroxaban  20 mg Oral Daily with dinner    sodium  chloride 0.9%  10 mL Intravenous Q6H    tiotropium  1 capsule Inhalation Daily      amiodarone in dextrose 5% 0.5 mg/min (07/17/18 0600)     acetaminophen, bisacodyl, dextrose 50%, dextrose 50%, dextrose 50%, glucagon (human recombinant), glucagon (human recombinant), glucose, glucose, hydrALAZINE, insulin aspart U-100, labetalol, ondansetron, ramelteon, Flushing PICC Protocol **AND** sodium chloride 0.9% **AND** sodium chloride 0.9%, sodium chloride 0.9%, traMADol    Laboratory:  CBC:    Recent Labs  Lab 07/12/18  0438 07/13/18  0452 07/14/18  0330 07/15/18  0145 07/17/18  0200   WHITE BLOOD CELL COUNT 24.27 H 24.89 H 25.82 H 19.03 H 17.41 H   HEMOGLOBIN 8.5 L 8.2 L 8.5 L 7.5 L 6.8 L   HEMATOCRIT 29.8 L 28.3 L 28.6 L 25.8 L 23.4 L   PLATELETS 250 227 194 174 210       CHEMISTRIES:    Recent Labs  Lab 07/12/18  0438 07/13/18  0452 07/14/18  0330 07/15/18  0145 07/16/18  0210   GLUCOSE 155 H 108 157 H 193 H 204 H   SODIUM 139 143 139 143 142   POTASSIUM 3.8 4.4 4.3 3.4 L 3.5   BUN BLD 23 31 H 30 H 17 18   CREATININE 0.8 0.9 0.8 0.7 0.8   EGFR IF A[place] AMERICAN >60 >60 >60 >60 >60   EGFR IF NON- >60 >60 >60 >60 >60   CALCIUM 9.2 9.5 8.3 L 9.2 9.6   MAGNESIUM 1.8 1.9 2.1 1.8 1.9       CARDIAC BIOMARKERS:    Recent Labs  Lab 12/13/15  1932  02/12/18  1422 02/12/18  1808 06/24/18  1413 06/24/18  2153 06/25/18  0506   CPK 33  --   --   --   --   --   --    CPK MB 1.0  --   --   --   --   --   --    TROPONIN I <0.006  < > 0.021 0.021 0.896 H 0.870 H 0.667 H   < > = values in this interval not displayed.    COAGS:    Recent Labs  Lab 04/10/17  1129 08/12/17  0036 11/02/17  2211 12/08/17  0545 06/27/18  1738   INR 1.1 1.2 1.0 1.0 1.1       LIPIDS/LFTS:    Recent Labs  Lab 12/15/15  0540  08/12/17  1037  12/08/17  0545 02/12/18  1422 06/24/18  1413 06/25/18  0506 07/15/18  0145   CHOLESTEROL 170  --  243 H  --   --   --   --   --   --    TRIGLYCERIDES 94  --  93  --   --   --   --   --   --    HDL 33 L  --   43  --   --   --   --   --   --    LDL CHOLESTEROL 118.2  --  181.4 H  --   --   --   --   --   --    NON-HDL CHOLESTEROL 137  --  200  --   --   --   --   --   --    AST  --   < >  --   < > 26 13 20 23 26   ALT  --   < >  --   < > 8 L 7 L 7 L 10 24   < > = values in this interval not displayed.    BNP:    Recent Labs  Lab 08/19/17  1430 11/02/17  2211 12/08/17  0545 02/12/18  1422 06/24/18  1413    H 334 H 354 H 133 H 2,128 H       TSH:    Recent Labs  Lab 12/13/15  1932 06/24/18  1413   TSH 0.487 0.754       Free T4:    Recent Labs  Lab 06/24/18  1413   FREE T4 1.09     ABG    Recent Labs  Lab 07/13/18  1121   PH 7.501*   PO2 65*   PCO2 54.9*   HCO3 42.9*   BE 18         Diagnostic Results:  ECG (personally reviewed tracings):  6/24/18 1403 , LAD/PWRP    Chest X-Ray (personally reviewed image(s)): 7/12/18 NAD    CTA C/A/P 6/24/18  1. No evidence of aortic dissection.  2. Extensive calcified and noncalcified plaque seen throughout the aorta with small multifocal outpouchings seen involving the descending thoracic aorta suggestive for small penetrating ulcers.  Similar findings were seen on previous CT from August 2017.  3. 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  4. No evidence of PE.  5. Decreased size area of nodularity with scarring seen within the left lower lobe with resolution of previously visualized cavitation.  No evidence of new lung consolidation.  6. Mild to moderate centrilobular emphysema.    Cath 6/27/18   LVEDP: 9mmHg  LVEF: 50% by echo  Wall Motion: LAD WMA by echo  Dominance: Right  LM: MLI  LAD: MLI  LCx: MLI  RCA: dom, mid 40%  Hemostasis:  R Radial band  Impression:  NSTEMI  Nonobst CAD  Normal LV fxn  R rad vasband for hemostasis  Plan:  Cont med rx  Cont ASA 81mg qd  Stop Plavix  Restart Xarelto 20mg qd this pm  Pt to follow up with Dr. Pulido in 2 weeks    Echo: 6/25/18     1 - Low normal to mildly depressed left  ventricular systolic function (EF 50-55%).  Distal LAD territory WMA.     2 - Impaired LV relaxation, elevated LAP (grade 2 diastolic dysfunction).     3 - Concentric hypertrophy.     4 - Trivial to mild tricuspid regurgitation.     5 - Pulmonary hypertension. The estimated PA systolic pressure is 52 mmHg.     LLE venous US 1/24/17  Interval decrease in overall thrombus burden of the left lower extremity, although there is persistent deep venous thrombus present within the left femoral and popliteal veins.    Stress Test: L MPI 12/15/15  Nuclear Quantitative Functional Analysis:   LVEF: 66 %  Impression: NORMAL MYOCARDIAL PERFUSION  1. The perfusion scan is free of evidence for myocardial ischemia or injury.   2. Resting wall motion is physiologic.   3. Resting LV function is normal.   4. The ventricular volumes are normal at rest and stress.   5. The extracardiac distribution of radioactivity is normal.   6. There was no previous study available to compare.      Assessment and Plan:     * Severe sepsis with acute organ dysfunction    Per IM  ?source of fever/WBC  Abx ordered per IM  CTA C/A/P done, no definitive source noted        PAF (paroxysmal atrial fibrillation)    PAF now in SR, ?driven by fever/leukocytosis/sepsis  Cont metoprolol to 100mg bid  Cont Xarelto  Stop amio gtt and convert to PO (in place of sotalol)  OK for transfer to tele        Non-STEMI (non-ST elevated myocardial infarction)    EF normal on echo with LAD WMA (echo 6/25/18)  Cath 6/27/18 with nonobst CAD  Cont ASA 81mg qd  Cont Xarelto 20mg qd  Cont atorva 80mg qhs  Check lipids/LFT 3 months (late Sept 2018)  Resume lisinopril            Acute exacerbation of chronic obstructive pulmonary disease (COPD)    Per IM/pulm        Chronic anticoagulation    For hx of PAF and DVT/PE  Xarelto resumed  Monitor CBC        History of deep vein thrombosis    As above        Type 2 diabetes mellitus, controlled    Per IM            VTE Risk Mitigation          Ordered     rivaroxaban tablet 20 mg  With dinner      06/27/18 3170        Cardiology will sign off, pls call with questions    Laureano Pulido MD  Cardiology  [facility]

## 2018-07-17 NOTE — NURSING TRANSFER
Nursing Transfer Note      7/17/2018     Transfer From: ICU    Transfer via bed    Transfer with  to O2, cardiac monitoring    Transported by Transport and ICU nurse    Medicines sent: yes    Chart send with patient: Yes    Notified: spouse    Patient reassessed at: 7/17/2018 16:08   Upon arrival to floor: cardiac monitor applied, patient oriented to room, call bell in reach and bed in lowest position

## 2018-07-18 PROBLEM — H10.31 ACUTE BACTERIAL CONJUNCTIVITIS OF RIGHT EYE: Status: ACTIVE | Noted: 2018-07-18

## 2018-07-18 PROBLEM — G62.9 NEUROPATHY: Chronic | Status: ACTIVE | Noted: 2018-06-25

## 2018-07-18 PROBLEM — E66.9 OBESITY: Chronic | Status: ACTIVE | Noted: 2017-04-14

## 2018-07-18 LAB
POCT GLUCOSE: 164 MG/DL (ref 70–110)
POCT GLUCOSE: 245 MG/DL (ref 70–110)
POCT GLUCOSE: 333 MG/DL (ref 70–110)
POCT GLUCOSE: 345 MG/DL (ref 70–110)

## 2018-07-18 PROCEDURE — A4216 STERILE WATER/SALINE, 10 ML: HCPCS | Performed by: INTERNAL MEDICINE

## 2018-07-18 PROCEDURE — 97535 SELF CARE MNGMENT TRAINING: CPT

## 2018-07-18 PROCEDURE — 25000003 PHARM REV CODE 250: Performed by: HOSPITALIST

## 2018-07-18 PROCEDURE — 25000003 PHARM REV CODE 250: Performed by: INTERNAL MEDICINE

## 2018-07-18 PROCEDURE — 92610 EVALUATE SWALLOWING FUNCTION: CPT

## 2018-07-18 PROCEDURE — 27000221 HC OXYGEN, UP TO 24 HOURS

## 2018-07-18 PROCEDURE — 25000003 PHARM REV CODE 250: Performed by: EMERGENCY MEDICINE

## 2018-07-18 PROCEDURE — 63600175 PHARM REV CODE 636 W HCPCS: Performed by: INTERNAL MEDICINE

## 2018-07-18 PROCEDURE — 94640 AIRWAY INHALATION TREATMENT: CPT

## 2018-07-18 PROCEDURE — 63600175 PHARM REV CODE 636 W HCPCS: Performed by: HOSPITALIST

## 2018-07-18 PROCEDURE — 21400001 HC TELEMETRY ROOM

## 2018-07-18 PROCEDURE — 25000242 PHARM REV CODE 250 ALT 637 W/ HCPCS: Performed by: INTERNAL MEDICINE

## 2018-07-18 PROCEDURE — G8996 SWALLOW CURRENT STATUS: HCPCS | Mod: CI

## 2018-07-18 PROCEDURE — G8997 SWALLOW GOAL STATUS: HCPCS | Mod: CI

## 2018-07-18 PROCEDURE — 94761 N-INVAS EAR/PLS OXIMETRY MLT: CPT

## 2018-07-18 RX ORDER — ERYTHROMYCIN 5 MG/G
OINTMENT OPHTHALMIC EVERY 6 HOURS
Status: DISPENSED | OUTPATIENT
Start: 2018-07-18 | End: 2018-07-24

## 2018-07-18 RX ADMIN — PIPERACILLIN AND TAZOBACTAM 4.5 G: 4; .5 INJECTION, POWDER, LYOPHILIZED, FOR SOLUTION INTRAVENOUS; PARENTERAL at 01:07

## 2018-07-18 RX ADMIN — ATORVASTATIN CALCIUM 80 MG: 40 TABLET, FILM COATED ORAL at 07:07

## 2018-07-18 RX ADMIN — ERYTHROMYCIN: 5 OINTMENT OPHTHALMIC at 10:07

## 2018-07-18 RX ADMIN — INSULIN ASPART 2 UNITS: 100 INJECTION, SOLUTION INTRAVENOUS; SUBCUTANEOUS at 09:07

## 2018-07-18 RX ADMIN — AMIODARONE HYDROCHLORIDE 400 MG: 200 TABLET ORAL at 09:07

## 2018-07-18 RX ADMIN — FUROSEMIDE 40 MG: 40 TABLET ORAL at 09:07

## 2018-07-18 RX ADMIN — ERYTHROMYCIN: 5 OINTMENT OPHTHALMIC at 05:07

## 2018-07-18 RX ADMIN — METOPROLOL TARTRATE 100 MG: 50 TABLET ORAL at 07:07

## 2018-07-18 RX ADMIN — METOPROLOL TARTRATE 100 MG: 50 TABLET ORAL at 09:07

## 2018-07-18 RX ADMIN — LISINOPRIL 20 MG: 20 TABLET ORAL at 09:07

## 2018-07-18 RX ADMIN — TIOTROPIUM BROMIDE 18 MCG: 18 CAPSULE ORAL; RESPIRATORY (INHALATION) at 08:07

## 2018-07-18 RX ADMIN — ACETAMINOPHEN 650 MG: 325 TABLET, FILM COATED ORAL at 09:07

## 2018-07-18 RX ADMIN — INSULIN ASPART 8 UNITS: 100 INJECTION, SOLUTION INTRAVENOUS; SUBCUTANEOUS at 05:07

## 2018-07-18 RX ADMIN — PIPERACILLIN AND TAZOBACTAM 4.5 G: 4; .5 INJECTION, POWDER, LYOPHILIZED, FOR SOLUTION INTRAVENOUS; PARENTERAL at 05:07

## 2018-07-18 RX ADMIN — Medication 10 ML: at 09:07

## 2018-07-18 RX ADMIN — Medication 10 ML: at 06:07

## 2018-07-18 RX ADMIN — Medication 10 ML: at 12:07

## 2018-07-18 RX ADMIN — RAMELTEON 8 MG: 8 TABLET, FILM COATED ORAL at 11:07

## 2018-07-18 RX ADMIN — TRAMADOL HYDROCHLORIDE 50 MG: 50 TABLET, FILM COATED ORAL at 11:07

## 2018-07-18 RX ADMIN — PIPERACILLIN AND TAZOBACTAM 4.5 G: 4; .5 INJECTION, POWDER, LYOPHILIZED, FOR SOLUTION INTRAVENOUS; PARENTERAL at 09:07

## 2018-07-18 RX ADMIN — POTASSIUM PHOSPHATE, MONOBASIC 1000 MG: 500 TABLET, SOLUBLE ORAL at 09:07

## 2018-07-18 RX ADMIN — PREDNISONE 20 MG: 20 TABLET ORAL at 09:07

## 2018-07-18 RX ADMIN — ERYTHROMYCIN: 5 OINTMENT OPHTHALMIC at 11:07

## 2018-07-18 RX ADMIN — LIDOCAINE 1 PATCH: 50 PATCH TOPICAL at 09:07

## 2018-07-18 RX ADMIN — RIVAROXABAN 20 MG: 20 TABLET, FILM COATED ORAL at 05:07

## 2018-07-18 RX ADMIN — ASPIRIN 81 MG CHEWABLE TABLET 81 MG: 81 TABLET CHEWABLE at 09:07

## 2018-07-18 RX ADMIN — PANTOPRAZOLE SODIUM 40 MG: 40 TABLET, DELAYED RELEASE ORAL at 09:07

## 2018-07-18 RX ADMIN — INSULIN ASPART 4 UNITS: 100 INJECTION, SOLUTION INTRAVENOUS; SUBCUTANEOUS at 01:07

## 2018-07-18 RX ADMIN — AMIODARONE HYDROCHLORIDE 400 MG: 200 TABLET ORAL at 07:07

## 2018-07-18 RX ADMIN — Medication 10 ML: at 05:07

## 2018-07-18 RX ADMIN — Medication 10 ML: at 01:07

## 2018-07-18 RX ADMIN — INSULIN ASPART 4 UNITS: 100 INJECTION, SOLUTION INTRAVENOUS; SUBCUTANEOUS at 08:07

## 2018-07-18 NOTE — PLAN OF CARE
TN met with patient to discuss discharge plan. TN informed patient that SNF has been recommended by PT/OT. Patient is in agreement. Patient stated that she would like a facility close to her home so her  could visit often and stay over night at times. [facility] selected [facility], and [facility] as first preferences. Contact information added to white board.        07/18/18 7109   Discharge Reassessment   Assessment Type Discharge Planning Reassessment   Provided patient/caregiver education on the expected discharge date and the discharge plan Yes   Do you have any problems affording any of your prescribed medications? No   Discharge Plan A Skilled Nursing Facility   Discharge Plan B Skilled Nursing Facility   Patient choice form signed by patient/caregiver No   Can the patient answer the patient profile reliably? Yes, cognitively intact   How does the patient rate their overall health at the present time? Fair   Describe the patient's ability to walk at the present time. Walks with the help of equipment   How often would a person be available to care for the patient? Often   During the past month, has the patient often been bothered by feeling down, depressed or hopeless? No   During the past month, has the patient often been bothered by little interest or pleasure in doing things? No

## 2018-07-18 NOTE — PLAN OF CARE
Problem: SLP Goal  Goal: SLP Goal  Short Term Goals:   1. Pt will tolerate a regular diet and thin liquids with no overt s/s of aspiration.   2. Pt will demonstrate 3 safe swallowing precautions with no cues.  Outcome: Ongoing (interventions implemented as appropriate)  Bedside swallow study completed. Recommend: Regular diet, thin liquids, no straws, eliminate distractions, no talking while eating, single bites/sips at a time, standard aspiration precautions. Patient requesting diet f/u next scheduled treatment date to ensure tolerance of diet with precautions in place.   GRACE Bhatt., CCC-SLP  07/18/2018

## 2018-07-18 NOTE — PT/OT/SLP PROGRESS
Occupational Therapy   Treatment/RE-Evaluation    Name: Yasmeen Palm  MRN: [id number]  Admitting Diagnosis:  Sepsis       Recommendations:     Discharge Recommendations: nursing facility, skilled  Discharge Equipment Recommendations:  none  Barriers to discharge:  Decreased caregiver support    Subjective     Communicated with: nurse prior to session.  Pain/Comfort:  · Pain Rating 1: 0/10  · Pain Rating Post-Intervention 1: 0/10    Patients cultural, spiritual, [employer]ian conflicts given the current situation: na    Objective:     Patient found with: telemetry, oxygen, bed alarm    General Precautions: Standard, fall, respiratory   Orthopedic Precautions:N/A   Braces: N/A     Occupational Performance:    Bed Mobility:    · Patient completed Scooting/Bridging with supervision  · Patient completed Supine to Sit with supervision  With head of bed elevated    Functional Mobility/Transfers:  · Patient completed Sit <> Stand Transfer with maximal assistance  with  rolling walker   assisted with sit to stand  · Functional Mobility: pt only able to stand times 20 seconds    Activities of Daily Living:  · Feeding:  modified independence    · Grooming: supervision    · Upper Body Dressing: minimum assistance      Patient left edge of bed with all lines intact, call button in reach, nurse notified and  present    [facility] 6 Click:  [facility] Total Score:      Treatment & Education:  Edge of bed activity  Education:    Assessment:     Yasmeen Palm is a 68 y.o. female with a medical diagnosis of Sepsis.  She presents with decreased strength in bilateral LEs.  Performance deficits affecting function are weakness, impaired endurance, impaired self care skills, impaired functional mobilty, gait instability, impaired balance, decreased lower extremity function, impaired cardiopulmonary response to activity, impaired muscle length.      Rehab Prognosis:  limited; patient would benefit from acute skilled OT services to address  these deficits and reach maximum level of function.       Plan:     Patient to be seen 5 x/week to address the above listed problems via self-care/home management, therapeutic activities, therapeutic exercises  · Plan of Care Expires: 08/03/18  · Plan of Care Reviewed with: patient    This Plan of care has been discussed with the patient who was involved in its development and understands and is in agreement with the identified goals and treatment plan    GOALS:    Occupational Therapy Goals        Problem: Occupational Therapy Goal    Goal Priority Disciplines Outcome Interventions   Occupational Therapy Goal     OT, PT/OT Revised    Description:  Goals to be met by: 7/13/18     Patient will increase functional independence with ADLs by performing:    UE Dressing with Set-up Assistance.  LE Dressing with Minimal Assistance.  Grooming while seated with Supervision.  Toileting from bedside commode with Contact Guard Assistance for hygiene and clothing management.   Supine to sit with Set-up Assistance.  Toilet transfer to bedside commode with Contact Guard Assistance.  Upper extremity exercise program x10 reps per handout, with assistance as needed.                       Time Tracking:     OT Date of Treatment: 07/18/18  OT Start Time: 1210  OT Stop Time: 1235  OT Total Time (min): 25 min    Billable Minutes:Self Care/Home Management 25    María Willoughby OT  7/18/2018

## 2018-07-18 NOTE — ASSESSMENT & PLAN NOTE
- May have been secondary to development of sepsis and subsequent respiratory failure.   - Pt has been on amiodarone gtt and improving from sepsis standpoint and HR has greatly improved by increasing dose of metoprolol to 100 mg BID.   - Pt spontaneously converted to NSR last night and case discussed with Cardiology  - Amiodarone converted to PO 7/17/2018  - Pt stable for transfer to telemetry and she is already fully anticoagulated

## 2018-07-18 NOTE — PT/OT/SLP EVAL
Speech Language Pathology Evaluation  Bedside Swallow    Patient Name:  Yasmeen Palm   MRN:  [id number]   W301/W301 A    Admitting Diagnosis: Sepsis    Recommendations:                 General Recommendations:  diet f/u per request  Diet recommendations:  Regular, Thin   Aspiration Precautions: 1 bite/sip at a time, Avoid talking while eating, Eliminate distractions, Monitor for s/s of aspiration, No straws, Small bites/sips and Standard aspiration precautions   General Precautions: Standard, fall, respiratory  Communication strategies:  none    History:     Past Medical History:   Diagnosis Date    Anticoagulant long-term use     Arthritis     Asthma     CHF (congestive heart failure)     COPD (chronic obstructive pulmonary disease)     Coronary artery disease     Depression     Diabetes mellitus     GERD (gastroesophageal reflux disease)     Hypertension        Past Surgical History:   Procedure Laterality Date    ABDOMINAL SURGERY      CARDIAC SURGERY      HERNIA REPAIR         Social History: Patient lives with spouse.    Prior Intubation HX: 6/26-6/27; patient reports being intubated x2 this hospital admission, SLP unable to determine when second intubation was per chart review. Patient reports both intubations were weeks ago.    Modified Barium Swallow: none    Chest X-Rays:   Results for orders placed or performed during the hospital encounter of 06/24/18   X-Ray Chest 1 View for PICC_Central line    Narrative    EXAMINATION:  XR CHEST 1 VIEW    CLINICAL HISTORY:  Evaluate PICC line placement;    TECHNIQUE:  Single frontal view of the chest was performed.    COMPARISON:  07/14/2018    FINDINGS:  Right PICC catheter tip projects over the distal SVC.  There is no pneumothorax or significant interval detrimental change in the cardiopulmonary status since the previous exam.      Impression    As above      Electronically signed by: Kleber Mcdonough MD  Date:    07/14/2018  Time:    16:05       Prior  diet: regular/thin per patient. She reports h/o requiring thickener ~30 years ago s/p an extubation      Subjective     Patient awake and cooperative with family member present in room. Patient reporting getting choked often. She reports it is not every meal, but she has noticed it is occurring everyday. She is unable to recall if it is more on liquids or solids.   Patient goals: to get better     Pain/Comfort:  · Pain Rating 1: 0/10    Objective:     Oral Musculature Evaluation  · Oral Musculature: WFL  · Dentition: scattered dentition, rarely or never uses dentures to eat  · Mucosal Quality: good  · Mandibular Strength and Mobility: WFL  · Oral Labial Strength and Mobility: WFL  · Lingual Strength and Mobility: WFL  · Velar Elevation: WFL  · Buccal Strength and Mobility: WFL  · Volitional Cough: elicited  · Volitional Swallow: timely  · Voice Prior to PO Intake: clear    Bedside Swallow Eval:   Consistencies Assessed:  · Thin liquids sips of juice via straw  · Puree bites of grits  · Soft solids bites of eggs  · Solids bites of sausage     Oral Phase:   · WFL    Pharyngeal Phase:   · no overt clinical signs/symptoms of aspiration  · no overt clinical signs/symptoms of pharyngeal dysphagia    Compensatory Strategies  · None    Treatment: SLP provided education on SLP role, s/s and risks of aspiraiton, safe swallow precautions, and POC. SLP discussed importance of small/single bite sand sips, sitting upright, eliminating distractions, and not talking while eating and drinking. SLP also recommended avoiding straws to ensure small single sips. Patient verbalized understanding of all discussed an dis in agreement with POC. Patient requesting SLP follow-up to ensure tolerance of diet with swallowing precautions.       Assessment:     Yasmeen Palm is a 68 y.o. female with no overt s/s of aspiration or pharyngeal dysphagia. Patient reporting frequent choking episodes during meals this hospital admission. SLP to follow  up to ensure tolerance of diet with swallowing precautions.     Goals:    SLP Goals        Problem: SLP Goal    Goal Priority Disciplines Outcome   SLP Goal     SLP Ongoing (interventions implemented as appropriate)   Description:  Short Term Goals:   1. Pt will tolerate a regular diet and thin liquids with no overt s/s of aspiration.   2. Pt will demonstrate 3 safe swallowing precautions with no cues.                    Plan:     · Patient to be seen:  3 x/week   · Plan of Care expires:  08/16/18  · Plan of Care reviewed with:  patient   · SLP Follow-Up:  Yes       Discharge recommendations:   (likely no ST needs)   Barriers to Discharge:  None    Time Tracking:     SLP Treatment Date:   07/18/18  Speech Start Time:  0800  Speech Stop Time:  0815     Speech Total Time (min):  15 min    Billable Minutes: Eval Swallow and Oral Function 15    CARRIE Hicks, CCC-SLP  07/18/2018

## 2018-07-18 NOTE — NURSING
Bedside rounding report received from khari sheth rn on patients progress and updated handoff report sheet received too. Assessment completed and plan of care reviewed with patient .

## 2018-07-18 NOTE — NURSING
No acute distress or changes on telemetry . Denies pain . Second dose of eye ointment in use . Tolerated well.  at bedside covered with correction dose insulin per md order. Good appetite for supper meal.

## 2018-07-18 NOTE — NURSING
Physical therapy worked with patient she is sitting up on side of her bed . Denies needs. picc line dressing clean and dry . No changes on telemetry . Head of bed elevated side rail up x 2 . Denies pain when asked.

## 2018-07-18 NOTE — NURSING
No acute changes on telemetry . Denies needs  at bedside call light in reach head of bed elevated side rails up x 3. Continue to monitor.

## 2018-07-18 NOTE — PLAN OF CARE
Problem: Diabetes, Type 2 (Adult)  Goal: Signs and Symptoms of Listed Potential Problems Will be Absent, Minimized or Managed (Diabetes, Type 2)  Signs and symptoms of listed potential problems will be absent, minimized or managed by discharge/transition of care (reference Diabetes, Type 2 (Adult) CPG).    07/17/18 2495   Diabetes, Type 2   Problems Assessed (Type 2 Diabetes) all   Problems Present (Type 2 Diabetes) hyperglycemia

## 2018-07-18 NOTE — ASSESSMENT & PLAN NOTE
- Pt with gradual trend down, but no overt source of bleeding  - Suspect iatrogenic given multiple blood draws over prolonged hospital stay daily

## 2018-07-18 NOTE — CONSULTS
TN sent patient's clinicals (3 day Packet, OT notes, MD note, Cards note, and MAR) to patient's first preferences; [facility], [facility], and [facility]. Awaiting feedback for SNF placement.

## 2018-07-18 NOTE — PROGRESS NOTES
[facility] added to treatment team. Cherri at [facility] informed.

## 2018-07-18 NOTE — UM SECONDARY REVIEW
[facility]    IP Extended Stay > 10         68 y.o. female history of Anticoagulant long-term use, Arthritis, Asthma, CHF ,COPD ,Coronary artery disease; Depression,Diabetes mellitus; GERD ,and Hypertension.  has a past surgical history that includes Abdominal surgery, Cardiac surgery, and Hernia rep    admitted to the hospital originally on 6/24/18 for chest pain. She was later sent to the ICU with acute hypercapnic respiratory failure the same day on BIPAP.  She quickly improved and was sent to the floor on 6/25.  Cards was consulted for NSTEMI with noted troponin increase and was planning on LHC on 6/26. However, the patient was sent back to the ICU on 6/26 with hypercapnic respiratory failure with a CO2 of > 100 and was intubated. Pulmonary was consulted. Pt clinically improved from respiratory standpoint and was extubated on 6/27 to NC, but she did complain of chest discomfort. Cardiology was notified and patient was taken for LHC on 6/27 which was only remarkable for non-obstructive CAD and did not require intervention. Pt was given IVF post procedure and the next day developed increased SOB and required BIPAP. Pt was treated with IV lasix with good response with much improved respiratory status after output of 1L. Pt also treated for COPD exacerbation with IV steroids, NEBS and doxycycline. ECHO performed on 6/25/2018 remarkable for EF=50-55% + grade 2 diastolic dysfunction. Pt diuresed and changed to maintenance PO lasix regimen. She as started on BIPAP due to increased pCO2 on ABG and lethargy. Pt will need BIPAP/home ventilator for use at home and was approved for the device prior to discharge.     On 7/13, she became febrile, lethargic, hypercapnic, with SOB and was in AFib with RVR. Transferred to ICU again, on BiPAP and amiodarone infusion. Clinically improved over the course of 3 days with IV diuresis 40 mg daily, solumedrol 40 mg TID, BiPAP and empiric Zosyn. Other than UA consistent with UTI,  "CT chest/abdomen/pelvis with IV showed no other potential source for infection. There was mention of a pneumocele with "debri" however unlikely this to be cause for sepsis and invasive treatment would be too high risk (discussed with pulmonologist). She was weaned to low flow NC to remain in place continuously. AFib with RVR was extremely difficult to control despite amiodarone infusion and treatment of sepsis. Metoprolol added and uptitrated, and currently on 100 mg BID with positive effect    7/18:  amiodarone gtt stopped yesterday, now on PO. Transferred out of ICU  yesterday . awaiting PT/OT/ST consult     Today H/H: 6.8/23.4  was 7.5/25.8 yesterday     Part of Dr. Perez note:  Anemia of chronic disease    - Pt with gradual trend down, but no overt source of bleeding  - Suspect iatrogenic given multiple blood draws over prolonged hospital stay daily       {OHS CM Review Outcome:39854}  "

## 2018-07-18 NOTE — PLAN OF CARE
Problem: Fall Risk (Adult)  Goal: Identify Related Risk Factors and Signs and Symptoms  Related risk factors and signs and symptoms are identified upon initiation of Human Response Clinical Practice Guideline (CPG)    07/17/18 6978   Fall Risk   Related Risk Factors (Fall Risk) age-related changes;culprit medication(s);fatigue/slow reaction;gait/mobility problems   Signs and Symptoms (Fall Risk) presence of risk factors

## 2018-07-18 NOTE — PLAN OF CARE
Problem: Occupational Therapy Goal  Goal: Occupational Therapy Goal  Goals to be met by: 7/13/18     Patient will increase functional independence with ADLs by performing:    UE Dressing with Set-up Assistance.  LE Dressing with Minimal Assistance.  Grooming while seated with Supervision.  Toileting from bedside commode with Contact Guard Assistance for hygiene and clothing management.   Supine to sit with Set-up Assistance.  Toilet transfer to bedside commode with Contact Guard Assistance.  Upper extremity exercise program x10 reps per handout, with assistance as needed.      Outcome: Revised  OT completed re-evaluation with decreased ability to complete transfer.    REC: SNF

## 2018-07-18 NOTE — PLAN OF CARE
Problem: Cardiac Output Decreased (Adult)  Goal: Identify Related Risk Factors and Signs and Symptoms  Related risk factors and signs and symptoms are identified upon initiation of Human Response Clinical Practice Guideline (CPG)   Outcome: Ongoing (interventions implemented as appropriate)   07/17/18 1926   Cardiac Output Decreased   Related Risk Factors (Cardiac Output Decreased) disease process     Goal: Adequate Cardiac Output/Effective Tissue Perfusion  Patient will demonstrate the desired outcomes by discharge/transition of care.   Outcome: Ongoing (interventions implemented as appropriate)   07/17/18 1926   Cardiac Output Decreased (Adult)   Adequate Cardiac Output/Effective Tissue Perfusion making progress toward outcome

## 2018-07-18 NOTE — SUBJECTIVE & OBJECTIVE
Interval History: No acute events     Review of Systems   Constitutional: Negative for chills and fever.   Respiratory: Negative for shortness of breath.    Cardiovascular: Negative for chest pain.     Objective:     Vital Signs (Most Recent):  Temp: 97.4 °F (36.3 °C) (07/18/18 0714)  Pulse: 71 (07/18/18 0857)  Resp: 20 (07/18/18 0857)  BP: (!) 157/76 (07/18/18 0714)  SpO2: 99 % (07/18/18 0857) Vital Signs (24h Range):  Temp:  [97.1 °F (36.2 °C)-99.2 °F (37.3 °C)] 97.4 °F (36.3 °C)  Pulse:  [63-88] 71  Resp:  [18-20] 20  SpO2:  [96 %-99 %] 99 %  BP: (118-159)/(56-78) 157/76     Weight: 79.1 kg (174 lb 6.1 oz)  Body mass index is 29.02 kg/m².    Intake/Output Summary (Last 24 hours) at 07/18/18 1017  Last data filed at 07/18/18 0300   Gross per 24 hour   Intake              400 ml   Output               40 ml   Net              360 ml      Physical Exam   Constitutional: She is oriented to person, place, and time. She appears well-developed. No distress.   HENT:   Head: Normocephalic and atraumatic.   Eyes: EOM are normal. Pupils are equal, round, and reactive to light.   Right eye injected with mild discharge   Neck: Normal range of motion. Neck supple.   Cardiovascular: Normal rate and regular rhythm.    Pulmonary/Chest:   Slight decrease at bases, no wheezing   Abdominal: Soft. Bowel sounds are normal.   Musculoskeletal: Normal range of motion. She exhibits no edema.   Neurological: She is alert and oriented to person, place, and time.   Skin: Skin is warm and dry. Capillary refill takes less than 2 seconds. She is not diaphoretic.   Psychiatric: She has a normal mood and affect. Her behavior is normal. Thought content normal.       Significant Labs: All pertinent labs within the past 24 hours have been reviewed.    Significant Imaging: I have reviewed and interpreted all pertinent imaging results/findings within the past 24 hours.

## 2018-07-19 PROBLEM — R07.9 CHEST PAIN: Status: RESOLVED | Noted: 2018-07-13 | Resolved: 2018-07-19

## 2018-07-19 PROBLEM — E87.5 HYPERKALEMIA: Status: RESOLVED | Noted: 2018-07-09 | Resolved: 2018-07-19

## 2018-07-19 PROBLEM — R79.89 ELEVATED TROPONIN I LEVEL: Status: RESOLVED | Noted: 2017-08-12 | Resolved: 2018-07-19
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
## 2018-07-19 PROBLEM — I48.0 PAF (PAROXYSMAL ATRIAL FIBRILLATION): Chronic | Status: ACTIVE | Noted: 2018-06-25

## 2018-07-19 LAB
ANION GAP SERPL CALC-SCNC: 9 MMOL/L
BASOPHILS # BLD AUTO: 0.01 K/UL
BASOPHILS NFR BLD: 0.1 %
BUN SERPL-MCNC: 17 MG/DL
CALCIUM SERPL-MCNC: 9.1 MG/DL
CHLORIDE SERPL-SCNC: 97 MMOL/L
CO2 SERPL-SCNC: 37 MMOL/L
CREAT SERPL-MCNC: 0.8 MG/DL
DIFFERENTIAL METHOD: ABNORMAL
EOSINOPHIL # BLD AUTO: 0.2 K/UL
EOSINOPHIL NFR BLD: 1.1 %
ERYTHROCYTE [DISTWIDTH] IN BLOOD BY AUTOMATED COUNT: 18.8 %
EST. GFR  (AFRICAN AMERICAN): >60 ML/MIN/1.73 M^2
EST. GFR  (NON AFRICAN AMERICAN): >60 ML/MIN/1.73 M^2
GLUCOSE SERPL-MCNC: 264 MG/DL
HCT VFR BLD AUTO: 25 %
HGB BLD-MCNC: 7.1 G/DL
LYMPHOCYTES # BLD AUTO: 1.2 K/UL
LYMPHOCYTES NFR BLD: 8.5 %
MCH RBC QN AUTO: 25.3 PG
MCHC RBC AUTO-ENTMCNC: 28.4 G/DL
MCV RBC AUTO: 89 FL
MONOCYTES # BLD AUTO: 1 K/UL
MONOCYTES NFR BLD: 7.1 %
NEUTROPHILS # BLD AUTO: 11.7 K/UL
NEUTROPHILS NFR BLD: 83.2 %
PLATELET # BLD AUTO: 313 K/UL
PMV BLD AUTO: 10.4 FL
POCT GLUCOSE: 229 MG/DL (ref 70–110)
POCT GLUCOSE: 247 MG/DL (ref 70–110)
POCT GLUCOSE: 295 MG/DL (ref 70–110)
POCT GLUCOSE: 352 MG/DL (ref 70–110)
POTASSIUM SERPL-SCNC: 3.4 MMOL/L
RBC # BLD AUTO: 2.81 M/UL
SODIUM SERPL-SCNC: 143 MMOL/L
WBC # BLD AUTO: 14.1 K/UL

## 2018-07-19 PROCEDURE — 94640 AIRWAY INHALATION TREATMENT: CPT

## 2018-07-19 PROCEDURE — 25000003 PHARM REV CODE 250: Performed by: HOSPITALIST

## 2018-07-19 PROCEDURE — 86580 TB INTRADERMAL TEST: CPT | Performed by: INTERNAL MEDICINE

## 2018-07-19 PROCEDURE — 25000003 PHARM REV CODE 250: Performed by: EMERGENCY MEDICINE

## 2018-07-19 PROCEDURE — 97164 PT RE-EVAL EST PLAN CARE: CPT

## 2018-07-19 PROCEDURE — 92526 ORAL FUNCTION THERAPY: CPT

## 2018-07-19 PROCEDURE — 25000003 PHARM REV CODE 250: Performed by: INTERNAL MEDICINE

## 2018-07-19 PROCEDURE — 94761 N-INVAS EAR/PLS OXIMETRY MLT: CPT

## 2018-07-19 PROCEDURE — 21400001 HC TELEMETRY ROOM

## 2018-07-19 PROCEDURE — 80048 BASIC METABOLIC PNL TOTAL CA: CPT

## 2018-07-19 PROCEDURE — 27000221 HC OXYGEN, UP TO 24 HOURS

## 2018-07-19 PROCEDURE — A4216 STERILE WATER/SALINE, 10 ML: HCPCS | Performed by: INTERNAL MEDICINE

## 2018-07-19 PROCEDURE — 94660 CPAP INITIATION&MGMT: CPT

## 2018-07-19 PROCEDURE — G8998 SWALLOW D/C STATUS: HCPCS | Mod: CH

## 2018-07-19 PROCEDURE — G8997 SWALLOW GOAL STATUS: HCPCS | Mod: CI

## 2018-07-19 PROCEDURE — 63600175 PHARM REV CODE 636 W HCPCS: Performed by: HOSPITALIST

## 2018-07-19 PROCEDURE — 63600175 PHARM REV CODE 636 W HCPCS: Performed by: INTERNAL MEDICINE

## 2018-07-19 PROCEDURE — 99900035 HC TECH TIME PER 15 MIN (STAT)

## 2018-07-19 PROCEDURE — 85025 COMPLETE CBC W/AUTO DIFF WBC: CPT

## 2018-07-19 RX ORDER — PREDNISONE 5 MG/1
10 TABLET ORAL DAILY
Status: COMPLETED | OUTPATIENT
Start: 2018-07-20 | End: 2018-07-25

## 2018-07-19 RX ORDER — NAPROXEN SODIUM 220 MG/1
81 TABLET, FILM COATED ORAL DAILY
Status: DISCONTINUED | OUTPATIENT
Start: 2018-07-20 | End: 2018-07-27 | Stop reason: HOSPADM

## 2018-07-19 RX ADMIN — LISINOPRIL 20 MG: 20 TABLET ORAL at 09:07

## 2018-07-19 RX ADMIN — INSULIN ASPART 4 UNITS: 100 INJECTION, SOLUTION INTRAVENOUS; SUBCUTANEOUS at 01:07

## 2018-07-19 RX ADMIN — AMIODARONE HYDROCHLORIDE 400 MG: 200 TABLET ORAL at 09:07

## 2018-07-19 RX ADMIN — POTASSIUM PHOSPHATE, MONOBASIC 1000 MG: 500 TABLET, SOLUBLE ORAL at 09:07

## 2018-07-19 RX ADMIN — Medication 10 ML: at 06:07

## 2018-07-19 RX ADMIN — PREDNISONE 20 MG: 20 TABLET ORAL at 09:07

## 2018-07-19 RX ADMIN — FUROSEMIDE 40 MG: 40 TABLET ORAL at 09:07

## 2018-07-19 RX ADMIN — PANTOPRAZOLE SODIUM 40 MG: 40 TABLET, DELAYED RELEASE ORAL at 09:07

## 2018-07-19 RX ADMIN — METOPROLOL TARTRATE 100 MG: 50 TABLET ORAL at 09:07

## 2018-07-19 RX ADMIN — ACETAMINOPHEN 650 MG: 325 TABLET, FILM COATED ORAL at 12:07

## 2018-07-19 RX ADMIN — PIPERACILLIN AND TAZOBACTAM 4.5 G: 4; .5 INJECTION, POWDER, LYOPHILIZED, FOR SOLUTION INTRAVENOUS; PARENTERAL at 09:07

## 2018-07-19 RX ADMIN — ERYTHROMYCIN: 5 OINTMENT OPHTHALMIC at 06:07

## 2018-07-19 RX ADMIN — Medication 5 UNITS: at 01:07

## 2018-07-19 RX ADMIN — PIPERACILLIN AND TAZOBACTAM 4.5 G: 4; .5 INJECTION, POWDER, LYOPHILIZED, FOR SOLUTION INTRAVENOUS; PARENTERAL at 01:07

## 2018-07-19 RX ADMIN — ERYTHROMYCIN: 5 OINTMENT OPHTHALMIC at 01:07

## 2018-07-19 RX ADMIN — ASPIRIN 81 MG CHEWABLE TABLET 81 MG: 81 TABLET CHEWABLE at 09:07

## 2018-07-19 RX ADMIN — TIOTROPIUM BROMIDE 18 MCG: 18 CAPSULE ORAL; RESPIRATORY (INHALATION) at 08:07

## 2018-07-19 RX ADMIN — INSULIN ASPART 3 UNITS: 100 INJECTION, SOLUTION INTRAVENOUS; SUBCUTANEOUS at 09:07

## 2018-07-19 RX ADMIN — ATORVASTATIN CALCIUM 80 MG: 40 TABLET, FILM COATED ORAL at 09:07

## 2018-07-19 RX ADMIN — RAMELTEON 8 MG: 8 TABLET, FILM COATED ORAL at 10:07

## 2018-07-19 RX ADMIN — LIDOCAINE 1 PATCH: 50 PATCH TOPICAL at 09:07

## 2018-07-19 RX ADMIN — Medication 10 ML: at 12:07

## 2018-07-19 RX ADMIN — ERYTHROMYCIN: 5 OINTMENT OPHTHALMIC at 04:07

## 2018-07-19 RX ADMIN — Medication 10 ML: at 04:07

## 2018-07-19 RX ADMIN — LABETALOL HYDROCHLORIDE 20 MG: 5 INJECTION, SOLUTION INTRAVENOUS at 06:07

## 2018-07-19 RX ADMIN — INSULIN ASPART 10 UNITS: 100 INJECTION, SOLUTION INTRAVENOUS; SUBCUTANEOUS at 04:07

## 2018-07-19 RX ADMIN — RIVAROXABAN 20 MG: 20 TABLET, FILM COATED ORAL at 04:07

## 2018-07-19 RX ADMIN — INSULIN ASPART 4 UNITS: 100 INJECTION, SOLUTION INTRAVENOUS; SUBCUTANEOUS at 09:07

## 2018-07-19 NOTE — NURSING
0900- patient ate all of her breakfast meal . Covered with moderate correction dose spouse at bedside. Flushed both ports on picc line with good return . Zosyn antibiotic infusing at this time. Call light in reach head of bed elevated side rail up x 3 bed alarm on.       1039-patient denies pain or other needs. Tolerating oxygen by nasal canula cough present non productive of sputum. No changes on telemetry . picc line dressing is clean and dry site is without inflammation or streaking.

## 2018-07-19 NOTE — PROGRESS NOTES
TN attempted to complete LOCET. Office experiencing high call volume. Awaiting call back.    4:53- LOCET completed and PASSR faxed. Awaiting 142.

## 2018-07-19 NOTE — NURSING
Bedside rounding report given to khari sheth rn on patients progress and updated handoff report sheet given to him. No acute events. picc line clean dry and intact flushes easily with good blood return. Call light in reach.

## 2018-07-19 NOTE — PROGRESS NOTES
[facility] Hospital Medicine  Progress Note    Patient Name: Yasmeen Palm  MRN: [id number]  Patient Class: IP- Inpatient   Admission Date: 6/24/2018  Length of Stay: 25 days  Attending Physician: Viri Paredes MD  Primary Care Provider: Angel Orourke Jr, MD        Subjective:     Principal Problem:Sepsis    HPI:  Yasmeen Palm is a 68 y.o. female that (in part)  has a past medical history of Anticoagulant long-term use; Arthritis; Asthma; CHF (congestive heart failure); COPD (chronic obstructive pulmonary disease); Coronary artery disease; Depression; Diabetes mellitus; GERD (gastroesophageal reflux disease); and Hypertension.  has a past surgical history that includes Abdominal surgery; Cardiac surgery; and Hernia repair. Presents to [facility] Emergency Department complaining of chest pain .  She reports compliance with her home medication regimen, including Xarelto.     Description of symptoms  Location:  Substernal  Onset:  Acute onset 2 days ago  Character:  The patient remained; moderate severity  Frequency:  Daily  Duration:  each episode lasts several minutes at a time  Associated Symptoms:  Diaphoresis, shortness of breath, weakness and fatigue  Radiation:  Recent the chest  Exacerbating factors:  Worse on exertion  Relieving factors:  Minimal relief with supplemental oxygen     In the emergency department routine laboratory studies, chest x-ray, EKG, cardiac enzymes were obtained.  EKG findings were concerning the case was discussed with cardiologist, Dr. Pulido.  It was determined that her EKG was not consistent with STEMI and she has been chest pain-free since arrival.  She had been given Lovenox, aspirin, and plan was to continue trending troponins and monitor closely on telemetry.    Hospital Course:  Ms. Palm was admitted to the hospital originally on 6/24/18 for chest pain. She was later sent to the ICU with acute hypercapnic respiratory failure  "the same day on BIPAP.  She quickly improved and was sent to the floor on 6/25.  Cards was consulted for NSTEMI with noted troponin increase and was planning on LHC on 6/26. However, the patient was sent back to the ICU on 6/26 with hypercapnic respiratory failure with a CO2 of > 100 and was intubated. Pulmonary was consulted. Pt clinically improved from respiratory standpoint and was extubated on 6/27 to NC, but she did complain of chest discomfort. Cardiology was notified and patient was taken for LHC on 6/27 which was only remarkable for non-obstructive CAD and did not require intervention. Pt was given IVF post procedure and the next day developed increased SOB and required BIPAP. Pt was treated with IV lasix with good response with much improved respiratory status after output of 1L. Pt also treated for COPD exacerbation with IV steroids, NEBS and doxycycline. ECHO performed on 6/25/2018 remarkable for EF=50-55% + grade 2 diastolic dysfunction. Pt diuresed and changed to maintenance PO lasix regimen. She as started on BIPAP due to increased pCO2 on ABG and lethargy. Pt will need BIPAP/home ventilator for use at home and was approved for the device prior to discharge.    On 7/13, she became febrile, lethargic, hypercapnic, with SOB and was in AFib with RVR. Transferred to ICU again, on BiPAP and amiodarone infusion. Clinically improved over the course of 3 days with IV diuresis 40 mg daily, solumedrol 40 mg TID, BiPAP and empiric Zosyn. Other than UA consistent with UTI, CT chest/abdomen/pelvis with IV showed no other potential source for infection. There was mention of a pneumocele with "debri" however unlikely this to be cause for sepsis and invasive treatment would be too high risk (discussed with pulmonologist). She was weaned to low flow NC to remain in place continuously. AFib with RVR was extremely difficult to control despite amiodarone infusion and treatment of sepsis. Metoprolol added and uptitrated, " and currently on 100 mg BID with positive effect.     PT/OT consulted and recommending SNF. PPD placed and SW consulted.     Interval History: No acute events     Review of Systems   Constitutional: Negative for chills and fever.   Respiratory: Negative for shortness of breath.    Cardiovascular: Negative for chest pain.     Objective:     Vital Signs (Most Recent):  Temp: 97.6 °F (36.4 °C) (07/19/18 0724)  Pulse: 74 (07/19/18 0825)  Resp: 18 (07/19/18 0825)  BP: (!) 148/65 (07/19/18 0724)  SpO2: 97 % (07/19/18 0825) Vital Signs (24h Range):  Temp:  [97.6 °F (36.4 °C)-99 °F (37.2 °C)] 97.6 °F (36.4 °C)  Pulse:  [63-74] 74  Resp:  [18-20] 18  SpO2:  [95 %-99 %] 97 %  BP: (139-179)/(65-82) 148/65     Weight: 79.1 kg (174 lb 6.1 oz)  Body mass index is 29.02 kg/m².    Intake/Output Summary (Last 24 hours) at 07/19/18 1128  Last data filed at 07/19/18 1100   Gross per 24 hour   Intake             2020 ml   Output              300 ml   Net             1720 ml      Physical Exam   Constitutional: She is oriented to person, place, and time. She appears well-developed. No distress.   HENT:   Head: Normocephalic and atraumatic.   Eyes: EOM are normal. Pupils are equal, round, and reactive to light.   Right eye less injected, no drainage   Neck: Normal range of motion. Neck supple.   Cardiovascular: Normal rate and regular rhythm.    Pulmonary/Chest:   Slight decrease at bases, no wheezing   Abdominal: Soft. Bowel sounds are normal.   Musculoskeletal: Normal range of motion. She exhibits no edema.   Neurological: She is alert and oriented to person, place, and time.   Skin: Skin is warm and dry. Capillary refill takes less than 2 seconds. She is not diaphoretic.   Psychiatric: She has a normal mood and affect. Her behavior is normal. Thought content normal.       Significant Labs: All pertinent labs within the past 24 hours have been reviewed.    Significant Imaging: I have reviewed and interpreted all pertinent imaging  "results/findings within the past 24 hours.    Assessment/Plan:      * Sepsis    - Source likely urine as she is incontinent and UA was abnormal with very cloudy appearing urine.   - UCx with yeast which was thought to be more so a colonizer, since patient did improve with Zosyn which was continued  - CTA of chest/abdomen showed a right pneumocele with "debri" which was unlikely to cause degree of clinical instability and invasive diagnosis/treatment would be of very high risk for this patient.   - No other potential source identified. Continued with pip-tazo until 7/19 then stopped after completed 7 day course.        Acute bacterial conjunctivitis of right eye    Erythromycin ointment to right eye started 7/18/2018. Improved. Continue x 5-7 days.        Atrial fibrillation with RVR    - May have been secondary to development of sepsis and subsequent respiratory failure.   - Pt has been on amiodarone gtt and improving from sepsis standpoint and HR has greatly improved by increasing dose of metoprolol to 100 mg BID.   - Pt spontaneously converted to NSR 7/16 and case discussed with Cardiology  - Amiodarone converted to PO 7/17/2018  - Pt was stable for transfer to telemetry and she is already fully anticoagulated        Debility    - Resumed PT/OT  - Recommending SNF        Acute hypercapnic respiratory failure    - A recurrent issue during this admission        PAF (paroxysmal atrial fibrillation)    - As discussed above        Neuropathy    - No acute issues         Non-STEMI (non-ST elevated myocardial infarction)    - Followed by Cardiology  - s/p LHC on 6/27 only remarkable for non-obstructive CAD (40% RCA)  - Continue medical management as per Cardiology with ASA, statin, metoprolol and patient will not be placed on plavix as she is also on Xarelto        Acute exacerbation of chronic obstructive pulmonary disease (COPD)    - Pt has been back and forth to ICU for hypercapnic respiratory failure.   - Underlying " COPD (centrilobular emphysema seen on CT chest 6/2018) but also complicated with diastolic HF and Afib with RVR  - Pt was intubated at one point, but overall improved with trial of solumedrol 40 mg TID and another dose of IV lasix.   - Will continue with solumedrol 40 mg TID for today then de-escalate to prednisone 20 mg daily starting tomorrow.   - De-escalated furosemide to PO for maintenance diuresis.   - Continuous supplemental O2 via low flow NC for goal sats 88-93%, incentive spirometer and BiPAP QHS  - Pt has been approved for home BIPAP when ready for discharge        Elevated brain natriuretic peptide (BNP) level    - Stable with diuresis        Acute diastolic CHF (congestive heart failure)    - Initially due to volume overload, but now likely due to sepsis + Afib RVR  - Last ECHO with EF=50-55% + grade 2 diastolic dysfunction on 6/26  - Stable and well compensated. Management as above        Obesity    - Weight reduction as outpatient after all acute issues resolved        Chronic anticoagulation    - Patient on Xarelto for PAF which has been resumed        History of deep vein thrombosis    - Resumed anticoagulation         History of pulmonary embolism    - Xarelto resumed   - CTA negative for PE        Anemia of chronic disease    - Pt with gradual trend down, but no overt source of bleeding  - Suspect iatrogenic given multiple blood draws over prolonged hospital stay daily        Malignant hypertension    - Pt on metoprolol for HR and BP control  - Will make further adjustments as needed        Tobacco abuse    - Smoking cessation counseling done        Gastroesophageal reflux disease without esophagitis    - Continue PPI        Type 2 diabetes mellitus, controlled    - Complications of peripheral neuropathy.   - Increased basal and prandial insulin in setting of steroids  - Goal < 180        Coronary artery disease involving native coronary artery of native heart with angina pectoris    - [facility] on  6/27. Non obstructive CAD (40% RCA)  - On adequate cardioprotective regimen          VTE Risk Mitigation         Ordered     rivaroxaban tablet 20 mg  With dinner      06/27/18 8241              Viri Paredes MD  Department of Hospital Medicine   [facility]

## 2018-07-19 NOTE — PLAN OF CARE
Problem: Infection, Risk/Actual (Adult)  Goal: Infection Prevention/Resolution  Patient will demonstrate the desired outcomes by discharge/transition of care.     07/18/18 5470   Infection, Risk/Actual (Adult)   Infection Prevention/Resolution making progress toward outcome

## 2018-07-19 NOTE — PLAN OF CARE
Problem: Infection, Risk/Actual (Adult)  Goal: Identify Related Risk Factors and Signs and Symptoms  Related risk factors and signs and symptoms are identified upon initiation of Human Response Clinical Practice Guideline (CPG)     07/18/18 3780   Infection, Risk/Actual   Related Risk Factors (Infection, Risk/Actual) age extremes;exposure to microbes;prolonged hospitalization   Signs and Symptoms (Infection, Risk/Actual) blood glucose changes;lab value changes

## 2018-07-19 NOTE — SUBJECTIVE & OBJECTIVE
Interval History: No acute events     Review of Systems   Constitutional: Negative for chills and fever.   Respiratory: Negative for shortness of breath.    Cardiovascular: Negative for chest pain.     Objective:     Vital Signs (Most Recent):  Temp: 97.6 °F (36.4 °C) (07/19/18 0724)  Pulse: 74 (07/19/18 0825)  Resp: 18 (07/19/18 0825)  BP: (!) 148/65 (07/19/18 0724)  SpO2: 97 % (07/19/18 0825) Vital Signs (24h Range):  Temp:  [97.6 °F (36.4 °C)-99 °F (37.2 °C)] 97.6 °F (36.4 °C)  Pulse:  [63-74] 74  Resp:  [18-20] 18  SpO2:  [95 %-99 %] 97 %  BP: (139-179)/(65-82) 148/65     Weight: 79.1 kg (174 lb 6.1 oz)  Body mass index is 29.02 kg/m².    Intake/Output Summary (Last 24 hours) at 07/19/18 1128  Last data filed at 07/19/18 1100   Gross per 24 hour   Intake             2020 ml   Output              300 ml   Net             1720 ml      Physical Exam   Constitutional: She is oriented to person, place, and time. She appears well-developed. No distress.   HENT:   Head: Normocephalic and atraumatic.   Eyes: EOM are normal. Pupils are equal, round, and reactive to light.   Right eye less injected, no drainage   Neck: Normal range of motion. Neck supple.   Cardiovascular: Normal rate and regular rhythm.    Pulmonary/Chest:   Slight decrease at bases, no wheezing   Abdominal: Soft. Bowel sounds are normal.   Musculoskeletal: Normal range of motion. She exhibits no edema.   Neurological: She is alert and oriented to person, place, and time.   Skin: Skin is warm and dry. Capillary refill takes less than 2 seconds. She is not diaphoretic.   Psychiatric: She has a normal mood and affect. Her behavior is normal. Thought content normal.       Significant Labs: All pertinent labs within the past 24 hours have been reviewed.    Significant Imaging: I have reviewed and interpreted all pertinent imaging results/findings within the past 24 hours.

## 2018-07-19 NOTE — PROGRESS NOTES
Patient denied by [facility] due to insurance coverage. [place] sent clinicals to other facilities. Awaiting feedback.     10:58- TN contacted patient's insurance to inquire of in-network SNF facilities. Spoke with Katelyn Arriola, [facility], [facility] and [facility] are in network.     1:00- TN met with patient and informed her of denial to selected facilities. TN also informed patient that her clinicals were submitted to facilities  within network with her insurance.

## 2018-07-19 NOTE — PLAN OF CARE
Problem: SLP Goal  Goal: SLP Goal  Short Term Goals:   1. Pt will tolerate a regular diet and thin liquids with no overt s/s of aspiration. - MET 7/19  2. Pt will demonstrate 3 safe swallowing precautions with no cues. - MET 7/19  Outcome: Outcome(s) achieved Date Met: 07/19/18 7/19: Pt participated in dysphagia tx this AM. Pt continues to tolerate thin liquids, puree, and hard solid textures with no overt s/s of aspiration. See note for details. SLP recs: regular/thin liquids po diet with universal swallow precautions. ST services no longer required as pt has met all ST goals. SLP notified RN Meron of results/recs. Please re-consult should pt experience any change in status.   GRACE Farr., CF-SLP  Speech-Language Pathologist

## 2018-07-19 NOTE — PLAN OF CARE
Problem: Occupational Therapy Goal  Goal: Occupational Therapy Goal  Goals to be met by: 7/13/18     Patient will increase functional independence with ADLs by performing:    UE Dressing with Set-up Assistance.  LE Dressing with Minimal Assistance.  Grooming while seated with Supervision.  Toileting from bedside commode with Contact Guard Assistance for hygiene and clothing management.   Supine to sit with Set-up Assistance.  Toilet transfer to bedside commode with Contact Guard Assistance.  Upper extremity exercise program x10 reps per handout, with assistance as needed.      Outcome: Ongoing (interventions implemented as appropriate)  Pt able to increase functional mobility with SOB noted.    REC: SNF

## 2018-07-19 NOTE — ASSESSMENT & PLAN NOTE
"- Source likely urine as she is incontinent and UA was abnormal with very cloudy appearing urine.   - UCx with yeast which was thought to be more so a colonizer, since patient did improve with Zosyn which was continued  - CTA of chest/abdomen showed a right pneumocele with "debri" which was unlikely to cause degree of clinical instability and invasive diagnosis/treatment would be of very high risk for this patient.   - No other potential source identified. Continued with pip-tazo until 7/19 then stopped after completed 7 day course.  "

## 2018-07-19 NOTE — ASSESSMENT & PLAN NOTE
- May have been secondary to development of sepsis and subsequent respiratory failure.   - Pt has been on amiodarone gtt and improving from sepsis standpoint and HR has greatly improved by increasing dose of metoprolol to 100 mg BID.   - Pt spontaneously converted to NSR 7/16 and case discussed with Cardiology  - Amiodarone converted to PO 7/17/2018  - Pt was stable for transfer to telemetry and she is already fully anticoagulated

## 2018-07-19 NOTE — PLAN OF CARE
Problem: Physical Therapy Goal  Goal: Physical Therapy Goal  Goals to be met by: 2018     Patient will increase functional independence with mobility by performin. Supine to sit with Modified [place]  2. Sit to supine with Modified [place]  3. Sit to stand transfer with Modified [place]  4. Gait  x 250 feet with Modified [place] using Rolling Walker.  5. B LE therex x30 reps with modified independence.          Patient would benefit from SNF placement at discharge.

## 2018-07-19 NOTE — NURSING
Bedside rounding report received from khari sheth rn on patients progress and updated handoff report sheet received too. Patient awake sitting upright bed call light in reach head of bed elevated side rail up x 3 bed alarm on .  also at bedside too sleeping. Patient denies needs

## 2018-07-19 NOTE — NURSING
2920- patient denies needs. Ppd planted to left inner forearm.  present. Call light in reach head of bed elevated.

## 2018-07-20 LAB
POCT GLUCOSE: 146 MG/DL (ref 70–110)
POCT GLUCOSE: 220 MG/DL (ref 70–110)
POCT GLUCOSE: 265 MG/DL (ref 70–110)
POCT GLUCOSE: 347 MG/DL (ref 70–110)

## 2018-07-20 PROCEDURE — 27000221 HC OXYGEN, UP TO 24 HOURS

## 2018-07-20 PROCEDURE — 94799 UNLISTED PULMONARY SVC/PX: CPT

## 2018-07-20 PROCEDURE — 99900035 HC TECH TIME PER 15 MIN (STAT)

## 2018-07-20 PROCEDURE — 25000003 PHARM REV CODE 250: Performed by: HOSPITALIST

## 2018-07-20 PROCEDURE — 94640 AIRWAY INHALATION TREATMENT: CPT

## 2018-07-20 PROCEDURE — 94660 CPAP INITIATION&MGMT: CPT

## 2018-07-20 PROCEDURE — 25000003 PHARM REV CODE 250: Performed by: INTERNAL MEDICINE

## 2018-07-20 PROCEDURE — 21400001 HC TELEMETRY ROOM

## 2018-07-20 PROCEDURE — 94761 N-INVAS EAR/PLS OXIMETRY MLT: CPT

## 2018-07-20 PROCEDURE — 97116 GAIT TRAINING THERAPY: CPT

## 2018-07-20 PROCEDURE — A4216 STERILE WATER/SALINE, 10 ML: HCPCS | Performed by: INTERNAL MEDICINE

## 2018-07-20 PROCEDURE — 63600175 PHARM REV CODE 636 W HCPCS: Performed by: INTERNAL MEDICINE

## 2018-07-20 PROCEDURE — 97535 SELF CARE MNGMENT TRAINING: CPT

## 2018-07-20 RX ORDER — IBUPROFEN 600 MG/1
600 TABLET ORAL EVERY 8 HOURS
Status: DISPENSED | OUTPATIENT
Start: 2018-07-20 | End: 2018-07-22

## 2018-07-20 RX ADMIN — AMIODARONE HYDROCHLORIDE 400 MG: 200 TABLET ORAL at 08:07

## 2018-07-20 RX ADMIN — METOPROLOL TARTRATE 100 MG: 50 TABLET ORAL at 09:07

## 2018-07-20 RX ADMIN — ASPIRIN 81 MG CHEWABLE TABLET 81 MG: 81 TABLET CHEWABLE at 09:07

## 2018-07-20 RX ADMIN — INSULIN ASPART 4 UNITS: 100 INJECTION, SOLUTION INTRAVENOUS; SUBCUTANEOUS at 11:07

## 2018-07-20 RX ADMIN — Medication 10 ML: at 05:07

## 2018-07-20 RX ADMIN — Medication 10 ML: at 01:07

## 2018-07-20 RX ADMIN — LABETALOL HYDROCHLORIDE 20 MG: 5 INJECTION, SOLUTION INTRAVENOUS at 01:07

## 2018-07-20 RX ADMIN — AMIODARONE HYDROCHLORIDE 400 MG: 200 TABLET ORAL at 09:07

## 2018-07-20 RX ADMIN — LABETALOL HYDROCHLORIDE 20 MG: 5 INJECTION, SOLUTION INTRAVENOUS at 06:07

## 2018-07-20 RX ADMIN — RAMELTEON 8 MG: 8 TABLET, FILM COATED ORAL at 11:07

## 2018-07-20 RX ADMIN — IBUPROFEN 600 MG: 600 TABLET ORAL at 09:07

## 2018-07-20 RX ADMIN — ERYTHROMYCIN: 5 OINTMENT OPHTHALMIC at 12:07

## 2018-07-20 RX ADMIN — INSULIN ASPART 8 UNITS: 100 INJECTION, SOLUTION INTRAVENOUS; SUBCUTANEOUS at 05:07

## 2018-07-20 RX ADMIN — POTASSIUM PHOSPHATE, MONOBASIC 1000 MG: 500 TABLET, SOLUBLE ORAL at 09:07

## 2018-07-20 RX ADMIN — ERYTHROMYCIN: 5 OINTMENT OPHTHALMIC at 11:07

## 2018-07-20 RX ADMIN — TIOTROPIUM BROMIDE 18 MCG: 18 CAPSULE ORAL; RESPIRATORY (INHALATION) at 07:07

## 2018-07-20 RX ADMIN — METOPROLOL TARTRATE 100 MG: 50 TABLET ORAL at 08:07

## 2018-07-20 RX ADMIN — ACETAMINOPHEN 650 MG: 325 TABLET, FILM COATED ORAL at 11:07

## 2018-07-20 RX ADMIN — PREDNISONE 10 MG: 5 TABLET ORAL at 09:07

## 2018-07-20 RX ADMIN — IBUPROFEN 600 MG: 600 TABLET ORAL at 08:07

## 2018-07-20 RX ADMIN — LISINOPRIL 20 MG: 20 TABLET ORAL at 09:07

## 2018-07-20 RX ADMIN — ATORVASTATIN CALCIUM 80 MG: 40 TABLET, FILM COATED ORAL at 08:07

## 2018-07-20 RX ADMIN — ERYTHROMYCIN: 5 OINTMENT OPHTHALMIC at 05:07

## 2018-07-20 RX ADMIN — INSULIN ASPART 3 UNITS: 100 INJECTION, SOLUTION INTRAVENOUS; SUBCUTANEOUS at 09:07

## 2018-07-20 RX ADMIN — RIVAROXABAN 20 MG: 20 TABLET, FILM COATED ORAL at 05:07

## 2018-07-20 RX ADMIN — LIDOCAINE 1 PATCH: 50 PATCH TOPICAL at 09:07

## 2018-07-20 RX ADMIN — PANTOPRAZOLE SODIUM 40 MG: 40 TABLET, DELAYED RELEASE ORAL at 09:07

## 2018-07-20 RX ADMIN — Medication 10 ML: at 11:07

## 2018-07-20 RX ADMIN — FUROSEMIDE 40 MG: 40 TABLET ORAL at 09:07

## 2018-07-20 NOTE — PT/OT/SLP PROGRESS
Physical Therapy Treatment    Patient Name:  Yasmeen Palm   MRN:  [id number]    Recommendations:     Discharge Recommendations:  nursing facility, skilled   Discharge Equipment Recommendations:  (TBD based on progress)   Barriers to discharge: limited endurance for functional mobility     Assessment:     Yasmeen Palm is a 68 y.o. female admitted with a medical diagnosis of Sepsis.  She presents with the following impairments/functional limitations:  weakness, impaired endurance, impaired functional mobilty, gait instability, impaired balance, decreased lower extremity function, decreased safety awareness, impaired cardiopulmonary response to activity. She was able to increase ambulation distance but was limited by SOB.     Rehab Prognosis:  fair; patient would benefit from acute skilled PT services to address these deficits and reach maximum level of function.      Recent Surgery: Procedure(s) (LRB):  Left heart cath R rad access, not before 9am (Left)      Plan:     During this hospitalization, patient to be seen daily to address the above listed problems via gait training, therapeutic activities, therapeutic exercises  · Plan of Care Expires:  08/02/18   Plan of Care Reviewed with: patient, spouse    Subjective     Communicated with nurse Johnson prior to session.  Patient found seated in bedside chair upon PT entry to room, agreeable to treatment.      Chief Complaint: SOB with activity.   Patient comments/goals: To walk and get back to PLOF.   Pain/Comfort:  · Pain Rating 1: 0/10    Objective:     Patient found with: telemetry, oxygen, PICC line     General Precautions: Standard, fall, respiratory   Orthopedic Precautions:N/A   Braces: N/A     Functional Mobility:  · Transfers:     · Sit to Stand:  moderate assistance x2 trials from bedside chair with rolling walker  · Gait: Patient ambulated 10ft x2 trials with Rolling Walker and CGA using 3-point gait. Patient demonstrated decreased dyllan, decreased velocity  of limb motion and decreased step length during gait due to impaired balance, decreased strength and decreased endurance.    AM-PAC 6 CLICK MOBILITY  Turning over in bed (including adjusting bedclothes, sheets and blankets)?: 4  Sitting down on and standing up from a chair with arms (e.g., wheelchair, bedside commode, etc.): 2  Moving from lying on back to sitting on the side of the bed?: 4  Moving to and from a bed to a chair (including a wheelchair)?: 2  Need to walk in hospital room?: 2  Climbing 3-5 steps with a railing?: 1  Basic Mobility Total Score: 15     Patient left up in chair on green air cushion with all lines intact, call button in reach, nurse Elizabeth notified, spouse present and setup to eat lunch.    GOALS:    Physical Therapy Goals        Problem: Physical Therapy Goal    Goal Priority Disciplines Outcome Goal Variances Interventions   Physical Therapy Goal     PT/OT, PT      Description:  Goals to be met by: 2018     Patient will increase functional independence with mobility by performin. Supine to sit with Modified [place]  2. Sit to supine with Modified [place]  3. Sit to stand transfer with Modified [place]  4. Gait  x 250 feet with Modified [place] using Rolling Walker.  5. B LE therex x30 reps with modified independence.                          Time Tracking:     PT Received On: 18  PT Start Time: 1116     PT Stop Time: 1128  PT Total Time (min): 12 min     Billable Minutes: Gait Training  12    Treatment Type: Treatment  PT/PTA: PT     PTA Visit Number: 0     Viv Wilson, PT  2018

## 2018-07-20 NOTE — PT/OT/SLP PROGRESS
Occupational Therapy   Treatment    Name: Yasmeen Palm  MRN: [id number]  Admitting Diagnosis:  Sepsis       Recommendations:     Discharge Recommendations: nursing facility, skilled  Discharge Equipment Recommendations:  none  Barriers to discharge:  Decreased caregiver support    Subjective     Communicated with: nurse Johnson prior to session.  Pain/Comfort:  · Pain Rating 1: 0/10  · Pain Rating Post-Intervention 1: 0/10    Patients cultural, spiritual, Latter day conflicts given the current situation: na    Objective:     Patient found with: PICC line, telemetry, oxygen    General Precautions: Standard, fall, respiratory   Orthopedic Precautions:N/A   Braces: N/A     Occupational Performance:    Bed Mobility:    · Patient completed Scooting/Bridging with stand by assistance  · Patient completed Supine to Sit with stand by assistance     Functional Mobility/Transfers:  · Patient completed Sit <> Stand Transfer with minimum assistance  with  rolling walker   · Patient completed Bed <> Chair Transfer using Stand Pivot technique with minimum assistance with rolling walker  · Functional Mobility: Pt able to ambulate 7 steps to bedside chair    Activities of Daily Living:  · Feeding:  supervision    · Grooming: supervision    · Upper Body Dressing: stand by assistance    · Lower Body Dressing: moderate assistance      Patient left up in chair with all lines intact, call button in reach and nurse and nurse aware that pt is siting in bedside chair present    [facility] 6 Click:  AMPA Total Score: 17    Education:    Assessment:     Yasmeen Palm is a 68 y.o. female with a medical diagnosis of Sepsis.  She presents with improvement with  Self care and mobility.  Performance deficits affecting function are weakness, impaired endurance, impaired self care skills, impaired functional mobilty, impaired balance, decreased coordination, decreased lower extremity function, decreased ROM, impaired cardiopulmonary response to activity,  impaired joint extensibility.      Rehab Prognosis:  good; patient would benefit from acute skilled OT services to address these deficits and reach maximum level of function.       Plan:     Patient to be seen 5 x/week to address the above listed problems via self-care/home management, therapeutic activities, therapeutic exercises  · Plan of Care Expires: 08/03/18  · Plan of Care Reviewed with: patient, spouse    This Plan of care has been discussed with the patient who was involved in its development and understands and is in agreement with the identified goals and treatment plan    GOALS:    Occupational Therapy Goals        Problem: Occupational Therapy Goal    Goal Priority Disciplines Outcome Interventions   Occupational Therapy Goal     OT, PT/OT Ongoing (interventions implemented as appropriate)    Description:  Goals to be met by: 7/13/18     Patient will increase functional independence with ADLs by performing:    UE Dressing with Set-up Assistance.  LE Dressing with Minimal Assistance.  Grooming while seated with Supervision.  Toileting from bedside commode with Contact Guard Assistance for hygiene and clothing management.   Supine to sit with Set-up Assistance.  Toilet transfer to bedside commode with Contact Guard Assistance.  Upper extremity exercise program x10 reps per handout, with assistance as needed.                       Time Tracking:     OT Date of Treatment: 07/20/18  OT Start Time: 1030  OT Stop Time: 1057  OT Total Time (min): 27 min    Billable Minutes:Self Care/Home Management 27    María Willoughby OT  7/20/2018

## 2018-07-20 NOTE — PROGRESS NOTES
ESPERANZA spoke to Kristin (admissions coordinator) with [facility] to follow-up on SNF. Kristin informed ESPERANZA patient medically clear and accepted for SNF. Kristin informed ESPERANZA facility will reach out to family to come in to tour facility and complete paperwork. Kristin requesting for updated labs and information for abx. ESPERANZA forwarded labs and MD note to [facility] via [facility]. MD note indicates patient last day of abx was on 7/19/18.

## 2018-07-20 NOTE — PROGRESS NOTES
[facility] Hospital Medicine  Progress Note    Patient Name: Yasmeen Palm  MRN: [id number]  Patient Class: IP- Inpatient   Admission Date: 6/24/2018  Length of Stay: 26 days  Attending Physician: Viri Paredes MD  Primary Care Provider: Angel Orourke Jr, MD        Subjective:     Principal Problem:Sepsis    HPI:  Yasmeen Palm is a 68 y.o. female that (in part)  has a past medical history of Anticoagulant long-term use; Arthritis; Asthma; CHF (congestive heart failure); COPD (chronic obstructive pulmonary disease); Coronary artery disease; Depression; Diabetes mellitus; GERD (gastroesophageal reflux disease); and Hypertension.  has a past surgical history that includes Abdominal surgery; Cardiac surgery; and Hernia repair. Presents to [facility] Emergency Department complaining of chest pain .  She reports compliance with her home medication regimen, including Xarelto.     Description of symptoms  Location:  Substernal  Onset:  Acute onset 2 days ago  Character:  The patient remained; moderate severity  Frequency:  Daily  Duration:  each episode lasts several minutes at a time  Associated Symptoms:  Diaphoresis, shortness of breath, weakness and fatigue  Radiation:  Recent the chest  Exacerbating factors:  Worse on exertion  Relieving factors:  Minimal relief with supplemental oxygen     In the emergency department routine laboratory studies, chest x-ray, EKG, cardiac enzymes were obtained.  EKG findings were concerning the case was discussed with cardiologist, Dr. Pulido.  It was determined that her EKG was not consistent with STEMI and she has been chest pain-free since arrival.  She had been given Lovenox, aspirin, and plan was to continue trending troponins and monitor closely on telemetry.    Hospital Course:  Ms. Palm was admitted to the hospital originally on 6/24/18 for chest pain. She was later sent to the ICU with acute hypercapnic respiratory failure  "the same day on BIPAP.  She quickly improved and was sent to the floor on 6/25.  Cards was consulted for NSTEMI with noted troponin increase and was planning on LHC on 6/26. However, the patient was sent back to the ICU on 6/26 with hypercapnic respiratory failure with a CO2 of > 100 and was intubated. Pulmonary was consulted. Pt clinically improved from respiratory standpoint and was extubated on 6/27 to NC, but she did complain of chest discomfort. Cardiology was notified and patient was taken for LHC on 6/27 which was only remarkable for non-obstructive CAD and did not require intervention. Pt was given IVF post procedure and the next day developed increased SOB and required BIPAP. Pt was treated with IV lasix with good response with much improved respiratory status after output of 1L. Pt also treated for COPD exacerbation with IV steroids, NEBS and doxycycline. ECHO performed on 6/25/2018 remarkable for EF=50-55% + grade 2 diastolic dysfunction. Pt diuresed and changed to maintenance PO lasix regimen. She as started on BIPAP due to increased pCO2 on ABG and lethargy. Pt will need BIPAP/home ventilator for use at home and was approved for the device prior to discharge.    On 7/13, she became febrile, lethargic, hypercapnic, with SOB and was in AFib with RVR. Transferred to ICU again, on BiPAP and amiodarone infusion. Clinically improved over the course of 3 days with IV diuresis 40 mg daily, solumedrol 40 mg TID, BiPAP and empiric Zosyn. Other than UA consistent with UTI, CT chest/abdomen/pelvis with IV showed no other potential source for infection. There was mention of a pneumocele with "debri" however unlikely this to be cause for sepsis and invasive treatment would be too high risk (discussed with pulmonologist). She was weaned to low flow NC to remain in place continuously. AFib with RVR was extremely difficult to control despite amiodarone infusion and treatment of sepsis. Metoprolol added and uptitrated, " and currently on 100 mg BID with good results. She has remained without further event on telemetry.     PT/OT consulted and recommending SNF. PPD placed and SW consulted.     Interval History: No acute events. Complaining of chest pain but no change on telemetry. Suspect MSK and started on ibuprofen.    Review of Systems   Constitutional: Negative for chills and fever.   Respiratory: Negative for shortness of breath.    Cardiovascular: Negative for chest pain.     Objective:     Vital Signs (Most Recent):  Temp: 98.6 °F (37 °C) (07/20/18 0737)  Pulse: 91 (07/20/18 1113)  Resp: 18 (07/20/18 1113)  BP: (!) 141/67 (07/20/18 1113)  SpO2: 95 % (07/20/18 1113) Vital Signs (24h Range):  Temp:  [98.4 °F (36.9 °C)-99.6 °F (37.6 °C)] 98.6 °F (37 °C)  Pulse:  [64-91] 91  Resp:  [17-20] 18  SpO2:  [94 %-99 %] 95 %  BP: (135-195)/(63-85) 141/67     Weight: 79.1 kg (174 lb 6.1 oz)  Body mass index is 29.02 kg/m².    Intake/Output Summary (Last 24 hours) at 07/20/18 1411  Last data filed at 07/20/18 1226   Gross per 24 hour   Intake             1440 ml   Output                0 ml   Net             1440 ml      Physical Exam   Constitutional: She is oriented to person, place, and time. She appears well-developed. No distress.   HENT:   Head: Normocephalic and atraumatic.   Eyes: EOM are normal. Pupils are equal, round, and reactive to light.   Neck: Normal range of motion. Neck supple.   Cardiovascular: Normal rate and regular rhythm.    Pulmonary/Chest:   Slight decrease at bases, no wheezing   Abdominal: Soft. Bowel sounds are normal.   Musculoskeletal: Normal range of motion. She exhibits no edema.   Neurological: She is alert and oriented to person, place, and time.   Skin: Skin is warm and dry. Capillary refill takes less than 2 seconds. She is not diaphoretic.   Psychiatric: She has a normal mood and affect. Her behavior is normal. Thought content normal.       Significant Labs: All pertinent labs within the past 24 hours  "have been reviewed.    Significant Imaging: I have reviewed and interpreted all pertinent imaging results/findings within the past 24 hours.    Assessment/Plan:      * Sepsis    - Source likely urine as she is incontinent and UA was abnormal with very cloudy appearing urine.   - UCx with yeast which was thought to be more so a colonizer, since patient did improve with Zosyn which was continued  - CTA of chest/abdomen showed a right pneumocele with "debri" which was unlikely to cause degree of clinical instability and invasive diagnosis/treatment would be of very high risk for this patient.   - No other potential source identified. Continued with pip-tazo until 7/19 then stopped after completed 7 day course.        Acute bacterial conjunctivitis of right eye    Erythromycin ointment to right eye started 7/18/2018. Improved. Continue x 5-7 days.        Atrial fibrillation with RVR    - May have been secondary to development of sepsis and subsequent respiratory failure.   - Pt has been on amiodarone gtt and improving from sepsis standpoint and HR has greatly improved by increasing dose of metoprolol to 100 mg BID.   - Pt spontaneously converted to NSR 7/16 and case discussed with Cardiology  - Amiodarone converted to PO 7/17/2018  - Pt was stable for transfer to telemetry and she is already fully anticoagulated        Debility    - Resumed PT/OT  - Recommending SNF        Acute hypercapnic respiratory failure    - A recurrent issue during this admission        PAF (paroxysmal atrial fibrillation)    - As discussed above        Neuropathy    - No acute issues         Non-STEMI (non-ST elevated myocardial infarction)    - Followed by Cardiology  - s/p LHC on 6/27 only remarkable for non-obstructive CAD (40% RCA)  - Continue medical management as per Cardiology with ASA, statin, metoprolol and patient will not be placed on plavix as she is also on Xarelto        Acute exacerbation of chronic obstructive pulmonary disease " (COPD)    - Pt has been back and forth to ICU for hypercapnic respiratory failure.   - Underlying COPD (centrilobular emphysema seen on CT chest 6/2018) but also complicated with diastolic HF and Afib with RVR  - Pt was intubated at one point, but overall improved with trial of solumedrol 40 mg TID and another dose of IV lasix.   - Will continue with solumedrol 40 mg TID for today then de-escalate to prednisone 20 mg daily starting tomorrow.   - De-escalated furosemide to PO for maintenance diuresis.   - Continuous supplemental O2 via low flow NC for goal sats 88-93%, incentive spirometer and BiPAP QHS  - Pt has been approved for home BIPAP when ready for discharge        Elevated brain natriuretic peptide (BNP) level    - Stable with diuresis        Acute diastolic CHF (congestive heart failure)    - Initially due to volume overload, but now likely due to sepsis + Afib RVR  - Last ECHO with EF=50-55% + grade 2 diastolic dysfunction on 6/26  - Stable and well compensated. Management as above        Obesity    - Weight reduction as outpatient after all acute issues resolved        Chronic anticoagulation    - Patient on Xarelto for PAF which has been resumed        History of deep vein thrombosis    - Resumed anticoagulation         History of pulmonary embolism    - Xarelto resumed   - CTA negative for PE        Anemia of chronic disease    - Pt with gradual trend down, but no overt source of bleeding  - Suspect iatrogenic given multiple blood draws over prolonged hospital stay daily        Malignant hypertension    - Pt on metoprolol for HR and BP control  - Will make further adjustments as needed        Tobacco abuse    - Smoking cessation counseling done        Gastroesophageal reflux disease without esophagitis    - Continue PPI        Type 2 diabetes mellitus, controlled    - Complications of peripheral neuropathy.   - Increased basal and prandial insulin in setting of steroids  - Goal < 180        Coronary  artery disease involving native coronary artery of native heart with angina pectoris    - [facility] on 6/27. Non obstructive CAD (40% RCA)  - On adequate cardioprotective regimen          VTE Risk Mitigation         Ordered     rivaroxaban tablet 20 mg  With dinner      06/27/18 7694              Viri Paredes MD  Department of Hospital Medicine   [facility]

## 2018-07-20 NOTE — SUBJECTIVE & OBJECTIVE
Interval History: No acute events.     Review of Systems   Constitutional: Negative for chills and fever.   Respiratory: Negative for shortness of breath.    Cardiovascular: Negative for chest pain.     Objective:     Vital Signs (Most Recent):  Temp: 98.6 °F (37 °C) (07/20/18 0737)  Pulse: 91 (07/20/18 1113)  Resp: 18 (07/20/18 1113)  BP: (!) 141/67 (07/20/18 1113)  SpO2: 95 % (07/20/18 1113) Vital Signs (24h Range):  Temp:  [98.4 °F (36.9 °C)-99.6 °F (37.6 °C)] 98.6 °F (37 °C)  Pulse:  [64-91] 91  Resp:  [17-20] 18  SpO2:  [94 %-99 %] 95 %  BP: (135-195)/(63-85) 141/67     Weight: 79.1 kg (174 lb 6.1 oz)  Body mass index is 29.02 kg/m².    Intake/Output Summary (Last 24 hours) at 07/20/18 1517  Last data filed at 07/20/18 1226   Gross per 24 hour   Intake             1440 ml   Output                0 ml   Net             1440 ml      Physical Exam   Constitutional: She is oriented to person, place, and time. She appears well-developed. No distress.   HENT:   Head: Normocephalic and atraumatic.   Eyes: EOM are normal. Pupils are equal, round, and reactive to light.   Neck: Normal range of motion. Neck supple.   Cardiovascular: Normal rate and regular rhythm.    Pulmonary/Chest:   Slight decrease at bases, no wheezing   Abdominal: Soft. Bowel sounds are normal.   Musculoskeletal: Normal range of motion. She exhibits no edema.   Neurological: She is alert and oriented to person, place, and time.   Skin: Skin is warm and dry. Capillary refill takes less than 2 seconds. She is not diaphoretic.   Psychiatric: She has a normal mood and affect. Her behavior is normal. Thought content normal.       Significant Labs: All pertinent labs within the past 24 hours have been reviewed.    Significant Imaging: I have reviewed and interpreted all pertinent imaging results/findings within the past 24 hours.

## 2018-07-20 NOTE — PT/OT/SLP RE-EVAL
Physical Therapy Re-evaluation    Patient Name:  Yasmeen Palm   MRN:  [id number]    Recommendations:     Discharge Recommendations:  nursing facility, skilled   Discharge Equipment Recommendations:  (TBD based on progress)   Barriers to discharge: limited endurance for functional mobility     Assessment:     Yasmeen Palm is a 68 y.o. female admitted with a medical diagnosis of Sepsis.  She presents with the following impairments/functional limitations:  weakness, impaired endurance, impaired functional mobilty, gait instability, impaired balance, decreased lower extremity function, decreased safety awareness, impaired cardiopulmonary response to activity.    Rehab Prognosis:  good; patient would benefit from acute skilled PT services to address these deficits and reach maximum level of function.      Recent Surgery: Procedure(s) (LRB):  Left heart cath R rad access, not before 9am (Left)      Plan:     During this hospitalization, patient to be seen daily to address the above listed problems via gait training, therapeutic activities, therapeutic exercises  · Plan of Care Expires:  08/02/18   Plan of Care Reviewed with: patient, spouse    Subjective     Communicated with nurse Chance prior to session.  Patient found supine in bed upon PT entry to room, agreeable to evaluation.      Chief Complaint: I feel much better than last week.   Patient comments/goals: To walk.   Pain/Comfort:  ·  no pain    Objective:     Patient found with: telemetry, oxygen, PICC line     General Precautions: Standard, fall   Orthopedic Precautions:N/A     Exams:  · Cognitive Exam:  Patient is oriented to Person and Place and follows 100% of simple commands   · Gross Motor Coordination:  WFL  · Skin Integrity/Edema:      · -       Skin integrity: Visible skin intact  · RLE ROM: WFL  · RLE Strength: WFL  · LLE ROM: WFL  · LLE Strength: WFL    Functional Mobility:  · Bed Mobility:     · Supine to Sit: contact guard assistance  · Transfers:      · Sit to Stand:  moderate assistance with rolling walker  · Gait:  Patient ambulated 6 side steps with Rolling Walker and minimal assistance using 3-point gait. Patient demonstrated decreased dyllan, decreased velocity of limb motion, decreased step length and decreased toe-to-floor clearance during gait due to impaired balance, decreased strength and decreased endurance.    Patient left seated on edge of bed eating lunch with all lines intact, call button in reach, nurse Meron notified and spouse present.    GOALS:    Physical Therapy Goals        Problem: Physical Therapy Goal    Goal Priority Disciplines Outcome Goal Variances Interventions   Physical Therapy Goal     PT/OT, PT      Description:  Goals to be met by: 2018     Patient will increase functional independence with mobility by performin. Supine to sit with Modified [place]  2. Sit to supine with Modified [place]  3. Sit to stand transfer with Modified [place]  4. Gait  x 250 feet with Modified [place] using Rolling Walker.  5. B LE therex x30 reps with modified independence.                          History:     Past Medical History:   Diagnosis Date    Anticoagulant long-term use     Arthritis     Asthma     CHF (congestive heart failure)     COPD (chronic obstructive pulmonary disease)     Coronary artery disease     Depression     Diabetes mellitus     GERD (gastroesophageal reflux disease)     Hypertension        Past Surgical History:   Procedure Laterality Date    ABDOMINAL SURGERY      CARDIAC SURGERY      HERNIA REPAIR         Time Tracking:     PT Received On: 18  PT Start Time: 1135     PT Stop Time: 1153  PT Total Time (min): 18 min     Billable Minutes: Paolo  18     Viv Wilson, PT  2018

## 2018-07-20 NOTE — SUBJECTIVE & OBJECTIVE
Interval History: No acute events. Complaining of chest pain but no change on telemetry. Suspect MSK and started on ibuprofen.    Review of Systems   Constitutional: Negative for chills and fever.   Respiratory: Negative for shortness of breath.    Cardiovascular: Negative for chest pain.     Objective:     Vital Signs (Most Recent):  Temp: 98.6 °F (37 °C) (07/20/18 0737)  Pulse: 91 (07/20/18 1113)  Resp: 18 (07/20/18 1113)  BP: (!) 141/67 (07/20/18 1113)  SpO2: 95 % (07/20/18 1113) Vital Signs (24h Range):  Temp:  [98.4 °F (36.9 °C)-99.6 °F (37.6 °C)] 98.6 °F (37 °C)  Pulse:  [64-91] 91  Resp:  [17-20] 18  SpO2:  [94 %-99 %] 95 %  BP: (135-195)/(63-85) 141/67     Weight: 79.1 kg (174 lb 6.1 oz)  Body mass index is 29.02 kg/m².    Intake/Output Summary (Last 24 hours) at 07/20/18 1411  Last data filed at 07/20/18 1226   Gross per 24 hour   Intake             1440 ml   Output                0 ml   Net             1440 ml      Physical Exam   Constitutional: She is oriented to person, place, and time. She appears well-developed. No distress.   HENT:   Head: Normocephalic and atraumatic.   Eyes: EOM are normal. Pupils are equal, round, and reactive to light.   Neck: Normal range of motion. Neck supple.   Cardiovascular: Normal rate and regular rhythm.    Pulmonary/Chest:   Slight decrease at bases, no wheezing   Abdominal: Soft. Bowel sounds are normal.   Musculoskeletal: Normal range of motion. She exhibits no edema.   Neurological: She is alert and oriented to person, place, and time.   Skin: Skin is warm and dry. Capillary refill takes less than 2 seconds. She is not diaphoretic.   Psychiatric: She has a normal mood and affect. Her behavior is normal. Thought content normal.       Significant Labs: All pertinent labs within the past 24 hours have been reviewed.    Significant Imaging: I have reviewed and interpreted all pertinent imaging results/findings within the past 24 hours.

## 2018-07-20 NOTE — NURSING
1730- patient covered with moderate correction scale dose of insulin for elevated blood sugar. Has had two bowel movements today . Good appetite . Call light in reach head of bed elevated side     1944- bedside rounding report given to judit warner rn on patients progress and updated handoff report sheet given to her no acute events today.

## 2018-07-20 NOTE — PLAN OF CARE
Problem: Patient Care Overview  Goal: Plan of Care Review  Plan of care reviewed with patient and  at bedside. All questions answered. Fall precautions are in place. Call light within reach. Bed in lowest position.  Patient had an episode of dull chest pain. Blood pressure was elevated and prn labetalol given. Blood pressure decreased and pain resolved per patient. PIV intact. NSR. Bipap on patient. Will continue to monitor.

## 2018-07-20 NOTE — PLAN OF CARE
Problem: Physical Therapy Goal  Goal: Physical Therapy Goal  Goals to be met by: 2018     Patient will increase functional independence with mobility by performin. Supine to sit with Modified [place]  2. Sit to supine with Modified [place]  3. Sit to stand transfer with Modified [place]  4. Gait  x 250 feet with Modified [place] using Rolling Walker.  5. B LE therex x30 reps with modified independence.         Outcome: Ongoing (interventions implemented as appropriate)    Patient increased ambulation distance today. She would continue to benefit from PT while in the hospital.

## 2018-07-20 NOTE — PLAN OF CARE
Problem: Occupational Therapy Goal  Goal: Occupational Therapy Goal  Goals to be met by: 7/13/18     Patient will increase functional independence with ADLs by performing:    UE Dressing with Set-up Assistance.  LE Dressing with Minimal Assistance.  Grooming while seated with Supervision.  Toileting from bedside commode with Contact Guard Assistance for hygiene and clothing management.   Supine to sit with Set-up Assistance.  Toilet transfer to bedside commode with Contact Guard Assistance.  Upper extremity exercise program x10 reps per handout, with assistance as needed.      Outcome: Ongoing (interventions implemented as appropriate)  Pt able to tolerate OOB activity with minimal assist.    REC: SNF

## 2018-07-21 LAB
ANISOCYTOSIS BLD QL SMEAR: SLIGHT
BASOPHILS # BLD AUTO: 0 K/UL
BASOPHILS NFR BLD: 0 %
BLD GP AB SCN CELLS X3 SERPL QL: NORMAL
BLD PROD TYP BPU: NORMAL
BLOOD UNIT EXPIRATION DATE: NORMAL
BLOOD UNIT TYPE CODE: 6200
BLOOD UNIT TYPE: NORMAL
CODING SYSTEM: NORMAL
DIFFERENTIAL METHOD: ABNORMAL
DISPENSE STATUS: NORMAL
EOSINOPHIL # BLD AUTO: 0.3 K/UL
EOSINOPHIL NFR BLD: 2 %
ERYTHROCYTE [DISTWIDTH] IN BLOOD BY AUTOMATED COUNT: 19.5 %
HCT VFR BLD AUTO: 25.6 %
HGB BLD-MCNC: 7.1 G/DL
HYPOCHROMIA BLD QL SMEAR: ABNORMAL
LYMPHOCYTES # BLD AUTO: 1.7 K/UL
LYMPHOCYTES NFR BLD: 13.6 %
MCH RBC QN AUTO: 24.9 PG
MCHC RBC AUTO-ENTMCNC: 27.7 G/DL
MCV RBC AUTO: 90 FL
MONOCYTES # BLD AUTO: 0.8 K/UL
MONOCYTES NFR BLD: 6 %
NEUTROPHILS # BLD AUTO: 9.9 K/UL
NEUTROPHILS NFR BLD: 78.4 %
PLATELET # BLD AUTO: 361 K/UL
PMV BLD AUTO: 10.1 FL
POCT GLUCOSE: 171 MG/DL (ref 70–110)
POCT GLUCOSE: 273 MG/DL (ref 70–110)
POCT GLUCOSE: 283 MG/DL (ref 70–110)
POCT GLUCOSE: 317 MG/DL (ref 70–110)
POIKILOCYTOSIS BLD QL SMEAR: SLIGHT
POLYCHROMASIA BLD QL SMEAR: ABNORMAL
RBC # BLD AUTO: 2.85 M/UL
TRANS ERYTHROCYTES VOL PATIENT: NORMAL ML
WBC # BLD AUTO: 12.68 K/UL

## 2018-07-21 PROCEDURE — 25000003 PHARM REV CODE 250: Performed by: HOSPITALIST

## 2018-07-21 PROCEDURE — 25000003 PHARM REV CODE 250: Performed by: INTERNAL MEDICINE

## 2018-07-21 PROCEDURE — 85025 COMPLETE CBC W/AUTO DIFF WBC: CPT

## 2018-07-21 PROCEDURE — 97530 THERAPEUTIC ACTIVITIES: CPT

## 2018-07-21 PROCEDURE — 97110 THERAPEUTIC EXERCISES: CPT

## 2018-07-21 PROCEDURE — 27000221 HC OXYGEN, UP TO 24 HOURS

## 2018-07-21 PROCEDURE — 63600175 PHARM REV CODE 636 W HCPCS: Performed by: INTERNAL MEDICINE

## 2018-07-21 PROCEDURE — 36415 COLL VENOUS BLD VENIPUNCTURE: CPT

## 2018-07-21 PROCEDURE — 94761 N-INVAS EAR/PLS OXIMETRY MLT: CPT

## 2018-07-21 PROCEDURE — 21400001 HC TELEMETRY ROOM

## 2018-07-21 PROCEDURE — 86920 COMPATIBILITY TEST SPIN: CPT

## 2018-07-21 PROCEDURE — 94660 CPAP INITIATION&MGMT: CPT

## 2018-07-21 PROCEDURE — 36430 TRANSFUSION BLD/BLD COMPNT: CPT

## 2018-07-21 PROCEDURE — A4216 STERILE WATER/SALINE, 10 ML: HCPCS | Performed by: INTERNAL MEDICINE

## 2018-07-21 PROCEDURE — 94640 AIRWAY INHALATION TREATMENT: CPT

## 2018-07-21 PROCEDURE — 99900035 HC TECH TIME PER 15 MIN (STAT)

## 2018-07-21 PROCEDURE — P9021 RED BLOOD CELLS UNIT: HCPCS

## 2018-07-21 PROCEDURE — 94799 UNLISTED PULMONARY SVC/PX: CPT

## 2018-07-21 PROCEDURE — 86850 RBC ANTIBODY SCREEN: CPT

## 2018-07-21 RX ORDER — FUROSEMIDE 10 MG/ML
20 INJECTION INTRAMUSCULAR; INTRAVENOUS ONCE
Status: DISCONTINUED | OUTPATIENT
Start: 2018-07-21 | End: 2018-07-21

## 2018-07-21 RX ORDER — ACETAMINOPHEN 325 MG/1
650 TABLET ORAL ONCE
Status: DISCONTINUED | OUTPATIENT
Start: 2018-07-21 | End: 2018-07-21

## 2018-07-21 RX ORDER — HYDROCODONE BITARTRATE AND ACETAMINOPHEN 500; 5 MG/1; MG/1
TABLET ORAL
Status: DISCONTINUED | OUTPATIENT
Start: 2018-07-21 | End: 2018-07-27 | Stop reason: HOSPADM

## 2018-07-21 RX ORDER — ISOSORBIDE MONONITRATE 30 MG/1
30 TABLET, EXTENDED RELEASE ORAL DAILY
Status: DISCONTINUED | OUTPATIENT
Start: 2018-07-21 | End: 2018-07-22

## 2018-07-21 RX ORDER — FUROSEMIDE 10 MG/ML
20 INJECTION INTRAMUSCULAR; INTRAVENOUS ONCE
Status: COMPLETED | OUTPATIENT
Start: 2018-07-21 | End: 2018-07-21

## 2018-07-21 RX ORDER — LISINOPRIL 20 MG/1
40 TABLET ORAL DAILY
Status: DISCONTINUED | OUTPATIENT
Start: 2018-07-22 | End: 2018-07-27 | Stop reason: HOSPADM

## 2018-07-21 RX ORDER — DIPHENHYDRAMINE HCL 25 MG
25 CAPSULE ORAL ONCE
Status: DISCONTINUED | OUTPATIENT
Start: 2018-07-21 | End: 2018-07-21

## 2018-07-21 RX ORDER — DIPHENHYDRAMINE HCL 25 MG
25 CAPSULE ORAL ONCE
Status: COMPLETED | OUTPATIENT
Start: 2018-07-21 | End: 2018-07-21

## 2018-07-21 RX ORDER — ACETAMINOPHEN 325 MG/1
650 TABLET ORAL ONCE
Status: COMPLETED | OUTPATIENT
Start: 2018-07-21 | End: 2018-07-21

## 2018-07-21 RX ADMIN — INSULIN ASPART 2 UNITS: 100 INJECTION, SOLUTION INTRAVENOUS; SUBCUTANEOUS at 08:07

## 2018-07-21 RX ADMIN — Medication 10 ML: at 12:07

## 2018-07-21 RX ADMIN — INSULIN ASPART 8 UNITS: 100 INJECTION, SOLUTION INTRAVENOUS; SUBCUTANEOUS at 01:07

## 2018-07-21 RX ADMIN — ISOSORBIDE MONONITRATE 30 MG: 30 TABLET, EXTENDED RELEASE ORAL at 12:07

## 2018-07-21 RX ADMIN — DIPHENHYDRAMINE HYDROCHLORIDE 25 MG: 25 CAPSULE ORAL at 05:07

## 2018-07-21 RX ADMIN — POTASSIUM PHOSPHATE, MONOBASIC 1000 MG: 500 TABLET, SOLUBLE ORAL at 08:07

## 2018-07-21 RX ADMIN — ERYTHROMYCIN 1 INCH: 5 OINTMENT OPHTHALMIC at 12:07

## 2018-07-21 RX ADMIN — INSULIN ASPART 6 UNITS: 100 INJECTION, SOLUTION INTRAVENOUS; SUBCUTANEOUS at 04:07

## 2018-07-21 RX ADMIN — ASPIRIN 81 MG CHEWABLE TABLET 81 MG: 81 TABLET CHEWABLE at 09:07

## 2018-07-21 RX ADMIN — Medication 10 ML: at 05:07

## 2018-07-21 RX ADMIN — LIDOCAINE 1 PATCH: 50 PATCH TOPICAL at 08:07

## 2018-07-21 RX ADMIN — FUROSEMIDE 20 MG: 10 INJECTION, SOLUTION INTRAMUSCULAR; INTRAVENOUS at 05:07

## 2018-07-21 RX ADMIN — Medication 10 ML: at 06:07

## 2018-07-21 RX ADMIN — LISINOPRIL 20 MG: 20 TABLET ORAL at 08:07

## 2018-07-21 RX ADMIN — ERYTHROMYCIN: 5 OINTMENT OPHTHALMIC at 06:07

## 2018-07-21 RX ADMIN — METOPROLOL TARTRATE 100 MG: 50 TABLET ORAL at 08:07

## 2018-07-21 RX ADMIN — FUROSEMIDE 40 MG: 40 TABLET ORAL at 08:07

## 2018-07-21 RX ADMIN — RAMELTEON 8 MG: 8 TABLET, FILM COATED ORAL at 11:07

## 2018-07-21 RX ADMIN — RIVAROXABAN 20 MG: 20 TABLET, FILM COATED ORAL at 04:07

## 2018-07-21 RX ADMIN — TIOTROPIUM BROMIDE 18 MCG: 18 CAPSULE ORAL; RESPIRATORY (INHALATION) at 08:07

## 2018-07-21 RX ADMIN — PANTOPRAZOLE SODIUM 40 MG: 40 TABLET, DELAYED RELEASE ORAL at 08:07

## 2018-07-21 RX ADMIN — Medication 10 ML: at 11:07

## 2018-07-21 RX ADMIN — ACETAMINOPHEN 650 MG: 325 TABLET, FILM COATED ORAL at 05:07

## 2018-07-21 RX ADMIN — ATORVASTATIN CALCIUM 80 MG: 40 TABLET, FILM COATED ORAL at 08:07

## 2018-07-21 RX ADMIN — AMIODARONE HYDROCHLORIDE 400 MG: 200 TABLET ORAL at 08:07

## 2018-07-21 RX ADMIN — ERYTHROMYCIN: 5 OINTMENT OPHTHALMIC at 11:07

## 2018-07-21 RX ADMIN — IBUPROFEN 600 MG: 600 TABLET ORAL at 08:07

## 2018-07-21 RX ADMIN — PREDNISONE 10 MG: 5 TABLET ORAL at 08:07

## 2018-07-21 NOTE — PROGRESS NOTES
[facility] Hospital Medicine  Progress Note    Patient Name: Yasmeen Palm  MRN: [id number]  Patient Class: IP- Inpatient   Admission Date: 6/24/2018  Length of Stay: 27 days  Attending Physician: Viri Paredes MD  Primary Care Provider: Angel Orourke Jr, MD        Subjective:     Principal Problem:Sepsis    HPI:  Yasmeen Palm is a 68 y.o. female that (in part)  has a past medical history of Anticoagulant long-term use; Arthritis; Asthma; CHF (congestive heart failure); COPD (chronic obstructive pulmonary disease); Coronary artery disease; Depression; Diabetes mellitus; GERD (gastroesophageal reflux disease); and Hypertension.  has a past surgical history that includes Abdominal surgery; Cardiac surgery; and Hernia repair. Presents to [facility] Emergency Department complaining of chest pain .  She reports compliance with her home medication regimen, including Xarelto.     Description of symptoms  Location:  Substernal  Onset:  Acute onset 2 days ago  Character:  The patient remained; moderate severity  Frequency:  Daily  Duration:  each episode lasts several minutes at a time  Associated Symptoms:  Diaphoresis, shortness of breath, weakness and fatigue  Radiation:  Recent the chest  Exacerbating factors:  Worse on exertion  Relieving factors:  Minimal relief with supplemental oxygen     In the emergency department routine laboratory studies, chest x-ray, EKG, cardiac enzymes were obtained.  EKG findings were concerning the case was discussed with cardiologist, Dr. Pulido.  It was determined that her EKG was not consistent with STEMI and she has been chest pain-free since arrival.  She had been given Lovenox, aspirin, and plan was to continue trending troponins and monitor closely on telemetry.    Hospital Course:  Ms. Palm was admitted to the hospital originally on 6/24/18 for chest pain. She was later sent to the ICU with acute hypercapnic respiratory failure  "the same day on BIPAP.  She quickly improved and was sent to the floor on 6/25.  Cards was consulted for NSTEMI with noted troponin increase and was planning on LHC on 6/26. However, the patient was sent back to the ICU on 6/26 with hypercapnic respiratory failure with a CO2 of > 100 and was intubated. Pulmonary was consulted. Pt clinically improved from respiratory standpoint and was extubated on 6/27 to NC, but she did complain of chest discomfort. Cardiology was notified and patient was taken for LHC on 6/27 which was only remarkable for non-obstructive CAD and did not require intervention. Pt was given IVF post procedure and the next day developed increased SOB and required BIPAP. Pt was treated with IV lasix with good response with much improved respiratory status after output of 1L. Pt also treated for COPD exacerbation with IV steroids, NEBS and doxycycline. ECHO performed on 6/25/2018 remarkable for EF=50-55% + grade 2 diastolic dysfunction. Pt diuresed and changed to maintenance PO lasix regimen. She as started on BIPAP due to increased pCO2 on ABG and lethargy. Pt will need BIPAP/home ventilator for use at home and was approved for the device prior to discharge.    On 7/13, she became febrile, lethargic, hypercapnic, with SOB and was in AFib with RVR. Transferred to ICU again, on BiPAP and amiodarone infusion. Clinically improved over the course of 3 days with IV diuresis 40 mg daily, solumedrol 40 mg TID, BiPAP and empiric Zosyn. Other than UA consistent with UTI, CT chest/abdomen/pelvis with IV showed no other potential source for infection. There was mention of a pneumocele with "debri" however unlikely this to be cause for sepsis and invasive treatment would be too high risk (discussed with pulmonologist). She was weaned to low flow NC to remain in place continuously. AFib with RVR was extremely difficult to control despite amiodarone infusion and treatment of sepsis. Metoprolol added and uptitrated, " and currently on 100 mg BID with good results. She has remained without further event on telemetry. Her H&H did drop slowly throughout admission with no apparent bleed source other than frequent lab monitoring. She was transfused 1 unit RBCs on 7/21.    PT/OT consulted and recommending SNF. PPD placed and SW consulted.     Interval History: No acute events.     Review of Systems   Constitutional: Negative for chills and fever.   Respiratory: Negative for shortness of breath.    Cardiovascular: Negative for chest pain.     Objective:     Vital Signs (Most Recent):  Temp: 98.6 °F (37 °C) (07/20/18 0737)  Pulse: 91 (07/20/18 1113)  Resp: 18 (07/20/18 1113)  BP: (!) 141/67 (07/20/18 1113)  SpO2: 95 % (07/20/18 1113) Vital Signs (24h Range):  Temp:  [98.4 °F (36.9 °C)-99.6 °F (37.6 °C)] 98.6 °F (37 °C)  Pulse:  [64-91] 91  Resp:  [17-20] 18  SpO2:  [94 %-99 %] 95 %  BP: (135-195)/(63-85) 141/67     Weight: 79.1 kg (174 lb 6.1 oz)  Body mass index is 29.02 kg/m².    Intake/Output Summary (Last 24 hours) at 07/20/18 1517  Last data filed at 07/20/18 1226   Gross per 24 hour   Intake             1440 ml   Output                0 ml   Net             1440 ml      Physical Exam   Constitutional: She is oriented to person, place, and time. She appears well-developed. No distress.   HENT:   Head: Normocephalic and atraumatic.   Eyes: EOM are normal. Pupils are equal, round, and reactive to light.   Neck: Normal range of motion. Neck supple.   Cardiovascular: Normal rate and regular rhythm.    Pulmonary/Chest:   Slight decrease at bases, no wheezing   Abdominal: Soft. Bowel sounds are normal.   Musculoskeletal: Normal range of motion. She exhibits no edema.   Neurological: She is alert and oriented to person, place, and time.   Skin: Skin is warm and dry. Capillary refill takes less than 2 seconds. She is not diaphoretic.   Psychiatric: She has a normal mood and affect. Her behavior is normal. Thought content normal.  "      Significant Labs: All pertinent labs within the past 24 hours have been reviewed.    Significant Imaging: I have reviewed and interpreted all pertinent imaging results/findings within the past 24 hours.    Assessment/Plan:      * Sepsis    - Source likely urine as she is incontinent and UA was abnormal with very cloudy appearing urine.   - UCx with yeast which was thought to be more so a colonizer, since patient did improve with Zosyn which was continued  - CTA of chest/abdomen showed a right pneumocele with "debri" which was unlikely to cause degree of clinical instability and invasive diagnosis/treatment would be of very high risk for this patient.   - No other potential source identified. Continued with pip-tazo until 7/19 then stopped after completed 7 day course.        Acute bacterial conjunctivitis of right eye    Erythromycin ointment to right eye started 7/18/2018. Improved. Continue x 5-7 days.        Atrial fibrillation with RVR    - May have been secondary to development of sepsis and subsequent respiratory failure.   - Pt has been on amiodarone gtt and improving from sepsis standpoint and HR has greatly improved by increasing dose of metoprolol to 100 mg BID.   - Pt spontaneously converted to NSR 7/16 and case discussed with Cardiology  - Amiodarone converted to PO 7/17/2018  - Pt was stable for transfer to telemetry and she is already fully anticoagulated        Debility    - Resumed PT/OT  - Recommending SNF        Acute hypercapnic respiratory failure    - A recurrent issue during this admission        PAF (paroxysmal atrial fibrillation)    - As discussed above        Neuropathy    - No acute issues         Non-STEMI (non-ST elevated myocardial infarction)    - Followed by Cardiology  - s/p [facility] on 6/27 only remarkable for non-obstructive CAD (40% RCA)  - Continue medical management as per Cardiology with ASA, statin, metoprolol and patient will not be placed on plavix as she is also on " Xarelto        Acute exacerbation of chronic obstructive pulmonary disease (COPD)    - Pt has been back and forth to ICU for hypercapnic respiratory failure.   - Underlying COPD (centrilobular emphysema seen on CT chest 6/2018) but also complicated with diastolic HF and Afib with RVR  - Pt was intubated at one point, but overall improved with trial of solumedrol 40 mg TID and another dose of IV lasix.   - Will continue with solumedrol 40 mg TID for today then de-escalate to prednisone 20 mg daily starting tomorrow.   - De-escalated furosemide to PO for maintenance diuresis.   - Continuous supplemental O2 via low flow NC for goal sats 88-93%, incentive spirometer and BiPAP QHS  - Pt has been approved for home BIPAP when ready for discharge        Elevated brain natriuretic peptide (BNP) level    - Stable with diuresis        Acute diastolic CHF (congestive heart failure)    - Initially due to volume overload, but now likely due to sepsis + Afib RVR  - Last ECHO with EF=50-55% + grade 2 diastolic dysfunction on 6/26  - Stable and well compensated. Management as above        Obesity    - Weight reduction as outpatient after all acute issues resolved        Chronic anticoagulation    - Patient on Xarelto for PAF which has been resumed        History of deep vein thrombosis    - Resumed anticoagulation         History of pulmonary embolism    - Xarelto resumed   - CTA negative for PE        Anemia of chronic disease    - Pt with gradual trend down, but no overt source of bleeding  - Suspect iatrogenic given multiple blood draws over prolonged hospital stay daily  - Transfused 1 unit RBCs 7/21.        Malignant hypertension    - Pt on metoprolol for HR and BP control  - Will make further adjustments as needed        Tobacco abuse    - Smoking cessation counseling done        Gastroesophageal reflux disease without esophagitis    - Continue PPI        Type 2 diabetes mellitus, controlled    - Complications of peripheral  neuropathy.   - Increased basal and prandial insulin in setting of steroids  - Goal < 180        Coronary artery disease involving native coronary artery of native heart with angina pectoris    - [facility] on 6/27. Non obstructive CAD (40% RCA)  - On adequate cardioprotective regimen          VTE Risk Mitigation         Ordered     rivaroxaban tablet 20 mg  With dinner      06/27/18 4223              Viri Paredes MD  Department of Hospital Medicine   [facility]

## 2018-07-21 NOTE — NURSING
Report given to night nurseJelly. Pt. resting quietly. Respirations even and unlabored on 3L NC.  1u PRBC infusing at 75mL/hr to ANSON PICC. No apparent distress noted at this time. Side rails up x 2. Bed alarm set. Call light within patient's reach. Safety measures maintained.

## 2018-07-21 NOTE — PLAN OF CARE
Problem: Physical Therapy Goal  Goal: Physical Therapy Goal  Goals to be met by: 2018     Patient will increase functional independence with mobility by performin. Supine to sit with Modified [place]  2. Sit to supine with Modified [place]  3. Sit to stand transfer with Modified [place]  4. Gait  x 250 feet with Modified [place] using Rolling Walker.  5. B LE therex x30 reps with modified independence.         Outcome: Ongoing (interventions implemented as appropriate)   Pt performed sit to stand x 2 trials from EOB with symptoms of dizziness after her 1st stand.  Pt ambulated 5 ft to the bedside chair requiring CGA and verbal cueing on proper walker management when ambulating to prevent LOB.  Pt performed B LE there ex's seated in bedside chair x 15 reps requiring verbal cueing on proper exercise form:  LAQ's, Seated hip flexion, ankle pumps, seated hip abd/add.

## 2018-07-21 NOTE — PLAN OF CARE
Problem: Diabetes, Type 2 (Adult)  Intervention: Support/Optimize Psychosocial Response to Condition   18 115   Coping/Psychosocial Interventions   Supportive Measures active listening utilized;counseling provided;verbalization of feelings encouraged;relaxation techniques promoted   Environmental Support rest periods encouraged;calm environment promoted     Intervention: Optimize Glycemic Control   18 1549   Nutrition Interventions   Glycemic Management blood glucose monitoring;supplemental insulin given       Goal: Signs and Symptoms of Listed Potential Problems Will be Absent, Minimized or Managed (Diabetes, Type 2)  Signs and symptoms of listed potential problems will be absent, minimized or managed by discharge/transition of care (reference Diabetes, Type 2 (Adult) CPG).   Outcome: Ongoing (interventions implemented as appropriate)   18 7829   Diabetes, Type 2   Problems Assessed (Type 2 Diabetes) all   Problems Present (Type 2 Diabetes) hyperglycemia       Problem: Fall Risk (Adult)  Intervention: Reduce Risk/Promote Restraint Free Environment   18 7076   Safety Interventions   Environmental Safety Modification assistive device/personal items within reach;clutter free environment maintained;room near unit station   Safety Interventions   Safety Precautions emergency equipment at bedside   Prevent [place] Drop/Fall   Safety/Security Measures bed alarm set     Intervention: Review Medications/Identify Contributors to Fall Risk   18 7260   Safety Interventions   Medication Review/Management medications reviewed     Intervention: Patient Rounds   18 6184   Safety Interventions   Patient Rounds visualized patient;placement of personal items at bedside;toileting offered;ID band on;call light in reach;bed wheels locked;bed in low position;clutter free environment maintained     Intervention: Safety Promotion/Fall Prevention   18 9053   Safety Interventions   Safety  Promotion/Fall Prevention assistive device/personal item within reach;bed alarm set;instructed to call staff for mobility;side rails raised x 2;room near unit station;Fall Risk signage in place;family to remain at bedside;Fall Risk reviewed with patient/family;commode/urinal/bedpan at bedside;nonskid shoes/socks when out of bed       Goal: Identify Related Risk Factors and Signs and Symptoms  Related risk factors and signs and symptoms are identified upon initiation of Human Response Clinical Practice Guideline (CPG)   Outcome: Ongoing (interventions implemented as appropriate)   07/21/18 1155   Fall Risk   Related Risk Factors (Fall Risk) environment unfamiliar;age-related changes;bladder function altered;confusion/agitation;gait/mobility problems;polypharmacy   Signs and Symptoms (Fall Risk) presence of risk factors

## 2018-07-21 NOTE — NURSING
Return call received from Dr. Paredes. Orders for tylenol 650mg PO, Benadryl 25mg PO, and Furosemide 20mg IV  Clarified. MD states above medications to be adminstered prior to blood transfusion.

## 2018-07-21 NOTE — ASSESSMENT & PLAN NOTE
- Pt with gradual trend down, but no overt source of bleeding  - Suspect iatrogenic given multiple blood draws over prolonged hospital stay daily  - Transfused 1 unit RBCs 7/21.

## 2018-07-21 NOTE — PT/OT/SLP PROGRESS
"Physical Therapy Treatment    Patient Name:  Yasmeen Palm   MRN:  [id number]    Recommendations:     Discharge Recommendations:  nursing facility, skilled   Discharge Equipment Recommendations:  (TBD)   Barriers to discharge: Limited endurance for functional mobility.     Assessment:     Yasmeen Palm is a 68 y.o. female admitted with a medical diagnosis of Sepsis.  She presents with the following impairments/functional limitations:  weakness, impaired endurance, impaired self care skills, impaired functional mobilty, impaired balance, decreased coordination, decreased lower extremity function, decreased ROM, impaired cardiopulmonary response to activity, impaired joint extensibility.  Pt performed sit to stand x 2 trials from EOB with symptoms of dizziness after her 1st stand.  Pt ambulated 5 ft to the bedside chair requiring CGA and verbal cueing on proper walker management when ambulating to prevent LOB.  Pt performed B LE there ex's seated in bedside chair x 15 reps requiring verbal cueing on proper exercise form:  LAQ's, Seated hip flexion, ankle pumps, seated hip abd/add.      Rehab Prognosis:  Fair; patient would benefit from acute skilled PT services to address these deficits and reach maximum level of function.      Recent Surgery: Procedure(s) (LRB):  Left heart cath R rad access, not before 9am (Left)      Plan:     During this hospitalization, patient to be seen daily to address the above listed problems via gait training, therapeutic activities, therapeutic exercises  · Plan of Care Expires:  08/02/18   Plan of Care Reviewed with: patient    Subjective     Communicated with FABY Wilson prior to session.  Patient found supine with HOB elevated and housekeeping present upon PT entry to room, agreeable to treatment.      Chief Complaint: Pt reports no complaints or concerns at this time.   Patient comments/goals: Pt states " My air conditioning isn't working properly".    Pain/Comfort:  · Pain Rating 1: " 0/10    Patients cultural, spiritual, Adventism conflicts given the current situation: none    Objective:     Patient found with: PICC line, telemetry, oxygen     General Precautions: Standard, fall, respiratory   Orthopedic Precautions:N/A   Braces: N/A     Functional Mobility:  · Bed Mobility:     · Scooting: stand by assistance  · Supine to Sit: stand by assistance  · Transfers:     · Sit to Stand:  minimum assistance with rolling walker  · Gait: Pt ambulated 5 ft to the bedside chair requiring CGA and verbal cueing on proper walker management when ambulating to prevent LOB.  · Balance: Pt with good sitting and fair standing balance.       AM-PAC 6 CLICK MOBILITY  Turning over in bed (including adjusting bedclothes, sheets and blankets)?: 4  Sitting down on and standing up from a chair with arms (e.g., wheelchair, bedside commode, etc.): 3  Moving from lying on back to sitting on the side of the bed?: 4  Moving to and from a bed to a chair (including a wheelchair)?: 3  Need to walk in hospital room?: 3  Climbing 3-5 steps with a railing?: 2  Basic Mobility Total Score: 19       Therapeutic Activities and Exercises:    Pt performed B LE there ex's seated in bedside chair x 15 reps requiring verbal cueing on proper exercise form:  LAQ's, Seated hip flexion, ankle pumps, seated hip abd/add.          Patient left up in chair with all lines intact, call button in reach and FABY Wilson notified..    GOALS:    Physical Therapy Goals        Problem: Physical Therapy Goal    Goal Priority Disciplines Outcome Goal Variances Interventions   Physical Therapy Goal     PT/OT, PT Ongoing (interventions implemented as appropriate)     Description:  Goals to be met by: 2018     Patient will increase functional independence with mobility by performin. Supine to sit with Modified [place]  2. Sit to supine with Modified [place]  3. Sit to stand transfer with Modified [place]  4. Gait  x 250 feet with  Modified [place] using Rolling Walker.  5. B LE therex x30 reps with modified independence.                          Time Tracking:     PT Received On: 07/21/18  PT Start Time: 0907     PT Stop Time: 0935  PT Total Time (min): 28 min     Billable Minutes: Therapeutic Activity 14 and Therapeutic Exercise 14    Treatment Type: Treatment  PT/PTA: PTA     PTA Visit Number: 1     Brent Vieira, PTA  07/21/2018

## 2018-07-21 NOTE — NURSING
Report received from night nurseBright. Pt. AAOx3, laying in bed. Evaluated pt. general condition. Respirations even and unlabored. No apparent distress noted.  No verbalization of pain or discomfort. Safety measures maintained.

## 2018-07-22 LAB
ANION GAP SERPL CALC-SCNC: 8 MMOL/L
BASOPHILS # BLD AUTO: 0.01 K/UL
BASOPHILS NFR BLD: 0.1 %
BUN SERPL-MCNC: 16 MG/DL
CALCIUM SERPL-MCNC: 9 MG/DL
CHLORIDE SERPL-SCNC: 98 MMOL/L
CO2 SERPL-SCNC: 36 MMOL/L
CREAT SERPL-MCNC: 0.7 MG/DL
DIFFERENTIAL METHOD: ABNORMAL
EOSINOPHIL # BLD AUTO: 0.3 K/UL
EOSINOPHIL NFR BLD: 2.3 %
ERYTHROCYTE [DISTWIDTH] IN BLOOD BY AUTOMATED COUNT: 18.6 %
EST. GFR  (AFRICAN AMERICAN): >60 ML/MIN/1.73 M^2
EST. GFR  (NON AFRICAN AMERICAN): >60 ML/MIN/1.73 M^2
GLUCOSE SERPL-MCNC: 211 MG/DL
HCT VFR BLD AUTO: 25.1 %
HGB BLD-MCNC: 7.2 G/DL
LYMPHOCYTES # BLD AUTO: 1.6 K/UL
LYMPHOCYTES NFR BLD: 13.6 %
MCH RBC QN AUTO: 25.7 PG
MCHC RBC AUTO-ENTMCNC: 28.7 G/DL
MCV RBC AUTO: 90 FL
MONOCYTES # BLD AUTO: 0.8 K/UL
MONOCYTES NFR BLD: 6.8 %
NEUTROPHILS # BLD AUTO: 9 K/UL
NEUTROPHILS NFR BLD: 77.2 %
PLATELET # BLD AUTO: 346 K/UL
PMV BLD AUTO: 9.9 FL
POCT GLUCOSE: 177 MG/DL (ref 70–110)
POCT GLUCOSE: 231 MG/DL (ref 70–110)
POCT GLUCOSE: 290 MG/DL (ref 70–110)
POCT GLUCOSE: 351 MG/DL (ref 70–110)
POTASSIUM SERPL-SCNC: 3.9 MMOL/L
RBC # BLD AUTO: 2.8 M/UL
SODIUM SERPL-SCNC: 142 MMOL/L
WBC # BLD AUTO: 11.65 K/UL

## 2018-07-22 PROCEDURE — 97530 THERAPEUTIC ACTIVITIES: CPT

## 2018-07-22 PROCEDURE — 85025 COMPLETE CBC W/AUTO DIFF WBC: CPT

## 2018-07-22 PROCEDURE — 97116 GAIT TRAINING THERAPY: CPT

## 2018-07-22 PROCEDURE — A4216 STERILE WATER/SALINE, 10 ML: HCPCS | Performed by: INTERNAL MEDICINE

## 2018-07-22 PROCEDURE — 25000003 PHARM REV CODE 250: Performed by: INTERNAL MEDICINE

## 2018-07-22 PROCEDURE — 21400001 HC TELEMETRY ROOM

## 2018-07-22 PROCEDURE — 94640 AIRWAY INHALATION TREATMENT: CPT

## 2018-07-22 PROCEDURE — 63600175 PHARM REV CODE 636 W HCPCS: Performed by: INTERNAL MEDICINE

## 2018-07-22 PROCEDURE — 99900035 HC TECH TIME PER 15 MIN (STAT)

## 2018-07-22 PROCEDURE — 27000221 HC OXYGEN, UP TO 24 HOURS

## 2018-07-22 PROCEDURE — 25000003 PHARM REV CODE 250: Performed by: HOSPITALIST

## 2018-07-22 PROCEDURE — 80048 BASIC METABOLIC PNL TOTAL CA: CPT

## 2018-07-22 PROCEDURE — 94660 CPAP INITIATION&MGMT: CPT

## 2018-07-22 RX ORDER — ISOSORBIDE MONONITRATE 30 MG/1
60 TABLET, EXTENDED RELEASE ORAL DAILY
Status: DISCONTINUED | OUTPATIENT
Start: 2018-07-22 | End: 2018-07-23

## 2018-07-22 RX ADMIN — INSULIN ASPART 2 UNITS: 100 INJECTION, SOLUTION INTRAVENOUS; SUBCUTANEOUS at 08:07

## 2018-07-22 RX ADMIN — POTASSIUM PHOSPHATE, MONOBASIC 1000 MG: 500 TABLET, SOLUBLE ORAL at 08:07

## 2018-07-22 RX ADMIN — TIOTROPIUM BROMIDE 18 MCG: 18 CAPSULE ORAL; RESPIRATORY (INHALATION) at 08:07

## 2018-07-22 RX ADMIN — INSULIN ASPART 3 UNITS: 100 INJECTION, SOLUTION INTRAVENOUS; SUBCUTANEOUS at 10:07

## 2018-07-22 RX ADMIN — LISINOPRIL 40 MG: 20 TABLET ORAL at 08:07

## 2018-07-22 RX ADMIN — INSULIN ASPART 10 UNITS: 100 INJECTION, SOLUTION INTRAVENOUS; SUBCUTANEOUS at 04:07

## 2018-07-22 RX ADMIN — FUROSEMIDE 40 MG: 40 TABLET ORAL at 08:07

## 2018-07-22 RX ADMIN — ISOSORBIDE MONONITRATE 60 MG: 30 TABLET, EXTENDED RELEASE ORAL at 08:07

## 2018-07-22 RX ADMIN — ASPIRIN 81 MG CHEWABLE TABLET 81 MG: 81 TABLET CHEWABLE at 08:07

## 2018-07-22 RX ADMIN — ATORVASTATIN CALCIUM 80 MG: 40 TABLET, FILM COATED ORAL at 09:07

## 2018-07-22 RX ADMIN — RAMELTEON 8 MG: 8 TABLET, FILM COATED ORAL at 09:07

## 2018-07-22 RX ADMIN — PANTOPRAZOLE SODIUM 40 MG: 40 TABLET, DELAYED RELEASE ORAL at 08:07

## 2018-07-22 RX ADMIN — ERYTHROMYCIN: 5 OINTMENT OPHTHALMIC at 11:07

## 2018-07-22 RX ADMIN — ERYTHROMYCIN: 5 OINTMENT OPHTHALMIC at 05:07

## 2018-07-22 RX ADMIN — ACETAMINOPHEN 650 MG: 325 TABLET, FILM COATED ORAL at 04:07

## 2018-07-22 RX ADMIN — LABETALOL HYDROCHLORIDE 20 MG: 5 INJECTION, SOLUTION INTRAVENOUS at 05:07

## 2018-07-22 RX ADMIN — Medication 10 ML: at 03:07

## 2018-07-22 RX ADMIN — AMIODARONE HYDROCHLORIDE 400 MG: 200 TABLET ORAL at 08:07

## 2018-07-22 RX ADMIN — AMIODARONE HYDROCHLORIDE 400 MG: 200 TABLET ORAL at 09:07

## 2018-07-22 RX ADMIN — METOPROLOL TARTRATE 100 MG: 50 TABLET ORAL at 08:07

## 2018-07-22 RX ADMIN — INSULIN ASPART 4 UNITS: 100 INJECTION, SOLUTION INTRAVENOUS; SUBCUTANEOUS at 12:07

## 2018-07-22 RX ADMIN — Medication 10 ML: at 05:07

## 2018-07-22 RX ADMIN — PREDNISONE 10 MG: 5 TABLET ORAL at 08:07

## 2018-07-22 RX ADMIN — LIDOCAINE 1 PATCH: 50 PATCH TOPICAL at 08:07

## 2018-07-22 RX ADMIN — RIVAROXABAN 20 MG: 20 TABLET, FILM COATED ORAL at 04:07

## 2018-07-22 RX ADMIN — Medication 10 ML: at 11:07

## 2018-07-22 RX ADMIN — METOPROLOL TARTRATE 100 MG: 50 TABLET ORAL at 09:07

## 2018-07-22 RX ADMIN — Medication 10 ML: at 06:07

## 2018-07-22 NOTE — NURSING
Report given to night nurseJelly. Pt. resting quietly in bed, easily aroused by voice. Respirations even and unlabored on 3L NC. No apparent distress noted at this time.Side rails up x 2. Bed alarm set.  at bedside. Call light within patient's reach. Safety measures maintained.

## 2018-07-22 NOTE — SUBJECTIVE & OBJECTIVE
Interval History: No acute events.     Review of Systems   Constitutional: Negative for chills and fever.   Respiratory: Negative for shortness of breath.    Cardiovascular: Negative for chest pain.     Objective:     Vital Signs (Most Recent):  Temp: 97.3 °F (36.3 °C) (07/22/18 0438)  Pulse: 70 (07/22/18 0520)  Resp: 16 (07/22/18 0438)  BP: (!) 154/77 (07/22/18 0520)  SpO2: 98 % (07/21/18 2300) Vital Signs (24h Range):  Temp:  [97.3 °F (36.3 °C)-99.9 °F (37.7 °C)] 97.3 °F (36.3 °C)  Pulse:  [63-73] 70  Resp:  [16-20] 16  SpO2:  [90 %-99 %] 98 %  BP: (128-185)/(61-84) 154/77     Weight: 83.2 kg (183 lb 6.8 oz)  Body mass index is 30.52 kg/m².    Intake/Output Summary (Last 24 hours) at 07/22/18 0612  Last data filed at 07/22/18 0438   Gross per 24 hour   Intake              756 ml   Output                0 ml   Net              756 ml      Physical Exam   Constitutional: She is oriented to person, place, and time. She appears well-developed. No distress.   HENT:   Head: Normocephalic and atraumatic.   Eyes: EOM are normal. Pupils are equal, round, and reactive to light.   Neck: Normal range of motion. Neck supple.   Cardiovascular: Normal rate and regular rhythm.    Pulmonary/Chest:   Improved air movement, no wheezing   Abdominal: Soft. Bowel sounds are normal.   Musculoskeletal: Normal range of motion. She exhibits no edema.   Neurological: She is alert and oriented to person, place, and time.   Skin: Skin is warm and dry. Capillary refill takes less than 2 seconds. She is not diaphoretic.   Psychiatric: She has a normal mood and affect. Her behavior is normal. Thought content normal.       Significant Labs: All pertinent labs within the past 24 hours have been reviewed.    Significant Imaging: I have reviewed and interpreted all pertinent imaging results/findings within the past 24 hours.

## 2018-07-22 NOTE — NURSING
Report received from night nurseJelly. Pt. on BIPAP. MD at bedside. Evaluated pt. general condition. Respirations even and unlabored. No apparent distress noted.  No verbalization of pain or discomfort. Safety measures maintained.

## 2018-07-22 NOTE — PT/OT/SLP PROGRESS
Physical Therapy Treatment    Patient Name:  Yasmeen Palm   MRN:  [id number]    Recommendations:     Discharge Recommendations:  nursing facility, skilled   Discharge Equipment Recommendations:  TBD  Barriers to discharge:  limited endurance for functional mobility     Assessment:     Yasmeen Palm is a 68 y.o. female admitted with a medical diagnosis of Sepsis.  She presents with the following impairments/functional limitations:  weakness, impaired endurance, impaired self care skills, impaired balance, gait instability, decreased lower extremity function, decreased upper extremity function, decreased safety awareness, impaired cardiopulmonary response to activity . Pt is fatigue very easily. Pt required extra time and frequent rest breaks throughout therapy treatment.     Rehab Prognosis:  good; patient would benefit from acute skilled PT services to address these deficits and reach maximum level of function.      Recent Surgery: Procedure(s) (LRB):  Left heart cath R rad access, not before 9am (Left)      Plan:     During this hospitalization, patient to be seen daily to address the above listed problems via gait training, therapeutic activities, therapeutic exercises  · Plan of Care Expires:  08/02/18   Plan of Care Reviewed with: patient    Subjective     Communicated with nurse Wilson prior to session.  Patient found in bed upon PT entry to room, agreeable to treatment.      Chief Complaint: fatigue   Patient comments/goals: to get back to PLOF.  Pain/Comfort:  · Pain Rating 1: 0/10  · Pain Rating Post-Intervention 1: 0/10    Patients cultural, spiritual, Shinto conflicts given the current situation: none    Objective:     Patient found with: oxygen, telemetry     General Precautions: Standard, fall, respiratory   Orthopedic Precautions:N/A   Braces: N/A     Functional Mobility:  · Bed Mobility:     · Scooting: stand by assistance  · Supine to Sit: stand by assistance, HOB elevated, bedside  rail.  · Transfers:     · Sit to Stand: 1 trial from bed and 2 trials from chair,  contact guard assistance from bed and mod A from bedside chair with rolling walker. V/T cues for safety technique and walker management.   · Bedside chair to bed contact guard assistance with  rolling walker . Pt ambulated ~ 3 ft from chair to bed   · Gait:   Patient ambulated ~ 10 ft and 3 ft on level tile with Rolling Walker( 3 L O2 NC )  with Contact Guard Assistance ( bedside chair followed) .  Pt with demonstarting a  3-point gait with decreased dyllan, decreased velocity of limb motion and decreased step length.Impairments contributing to gait deviations include impaired balance and decreased strength.      AM-PAC 6 CLICK MOBILITY  Turning over in bed (including adjusting bedclothes, sheets and blankets)?: 4  Sitting down on and standing up from a chair with arms (e.g., wheelchair, bedside commode, etc.): 2  Moving from lying on back to sitting on the side of the bed?: 4  Moving to and from a bed to a chair (including a wheelchair)?: 3  Need to walk in hospital room?: 3  Climbing 3-5 steps with a railing?: 3  Basic Mobility Total Score: 19       Therapeutic Activities and Exercises:   pt performed bed mobility, transfer and gait training as above.  Pt performed seated BLE x 12 reps : AP, LAQ, HS.     Patient left seated EOB  with all lines intact, call button in reach and nurse   Notified.    GOALS:    Physical Therapy Goals        Problem: Physical Therapy Goal    Goal Priority Disciplines Outcome Goal Variances Interventions   Physical Therapy Goal     PT/OT, PT Ongoing (interventions implemented as appropriate)     Description:  Goals to be met by: 2018     Patient will increase functional independence with mobility by performin. Supine to sit with Modified [place]  2. Sit to supine with Modified [place]  3. Sit to stand transfer with Modified [place]  4. Gait  x 250 feet with Modified [place]  using Rolling Walker.  5. B LE therex x30 reps with modified independence.                          Time Tracking:     PT Received On: 07/22/18  PT Start Time: 0939     PT Stop Time: 1005  PT Total Time (min): 26 min     Billable Minutes: Gait Training 12 and Therapeutic Activity 14    Treatment Type: Treatment  PT/PTA: PTA     PTA Visit Number: 2     Kayleigh Rolleh, PTA  07/22/2018

## 2018-07-22 NOTE — ASSESSMENT & PLAN NOTE
- Pt has been back and forth to ICU for hypercapnic respiratory failure.   - Underlying COPD (centrilobular emphysema seen on CT chest 6/2018) but also complicated with diastolic HF and Afib with RVR  - Pt was intubated at one point, but overall improved with trial of solumedrol 40 mg TID and another dose of IV lasix.   - Started with solumedrol 40 mg TID then de-escalate to prednisone and tapered.  - De-escalated furosemide to PO for maintenance diuresis.   - Continuous supplemental O2 via low flow NC for goal sats 88-93%, incentive spirometer and BiPAP QHS  - Pt has been approved for home BIPAP when ready for discharge

## 2018-07-22 NOTE — NURSING
Bedside report given to Rebecca Wilson. Patient resting well. Call light within reach. Side rails up x 2. Fall precautions maintained.

## 2018-07-22 NOTE — PLAN OF CARE
Problem: Physical Therapy Goal  Goal: Physical Therapy Goal  Goals to be met by: 2018     Patient will increase functional independence with mobility by performin. Supine to sit with Modified [place]  2. Sit to supine with Modified [place]  3. Sit to stand transfer with Modified [place]  4. Gait  x 250 feet with Modified [place] using Rolling Walker.  5. B LE therex x30 reps with modified independence.         Outcome: Ongoing (interventions implemented as appropriate)  Pt is making good progress toward treatment goals. Pt will benefit from further skilled therapy in order to get back to PLOF.

## 2018-07-22 NOTE — PROGRESS NOTES
[facility] Hospital Medicine  Progress Note    Patient Name: Yasmeen Palm  MRN: [id number]  Patient Class: IP- Inpatient   Admission Date: 6/24/2018  Length of Stay: 28 days  Attending Physician: Viri Paredes MD  Primary Care Provider: Angel Orourke Jr, MD        Subjective:     Principal Problem:Sepsis    HPI:  Yasmeen Palm is a 68 y.o. female that (in part)  has a past medical history of Anticoagulant long-term use; Arthritis; Asthma; CHF (congestive heart failure); COPD (chronic obstructive pulmonary disease); Coronary artery disease; Depression; Diabetes mellitus; GERD (gastroesophageal reflux disease); and Hypertension.  has a past surgical history that includes Abdominal surgery; Cardiac surgery; and Hernia repair. Presents to [facility] Emergency Department complaining of chest pain .  She reports compliance with her home medication regimen, including Xarelto.     Description of symptoms  Location:  Substernal  Onset:  Acute onset 2 days ago  Character:  The patient remained; moderate severity  Frequency:  Daily  Duration:  each episode lasts several minutes at a time  Associated Symptoms:  Diaphoresis, shortness of breath, weakness and fatigue  Radiation:  Recent the chest  Exacerbating factors:  Worse on exertion  Relieving factors:  Minimal relief with supplemental oxygen     In the emergency department routine laboratory studies, chest x-ray, EKG, cardiac enzymes were obtained.  EKG findings were concerning the case was discussed with cardiologist, Dr. Pulido.  It was determined that her EKG was not consistent with STEMI and she has been chest pain-free since arrival.  She had been given Lovenox, aspirin, and plan was to continue trending troponins and monitor closely on telemetry.    Hospital Course:  Ms. Palm was admitted to the hospital originally on 6/24/18 for chest pain. She was later sent to the ICU with acute hypercapnic respiratory failure  "the same day on BIPAP.  She quickly improved and was sent to the floor on 6/25.  Cards was consulted for NSTEMI with noted troponin increase and was planning on LHC on 6/26. However, the patient was sent back to the ICU on 6/26 with hypercapnic respiratory failure with a CO2 of > 100 and was intubated. Pulmonary was consulted. Pt clinically improved from respiratory standpoint and was extubated on 6/27 to NC, but she did complain of chest discomfort. Cardiology was notified and patient was taken for LHC on 6/27 which was only remarkable for non-obstructive CAD and did not require intervention. Pt was given IVF post procedure and the next day developed increased SOB and required BIPAP. Pt was treated with IV lasix with good response with much improved respiratory status after output of 1L. Pt also treated for COPD exacerbation with IV steroids, NEBS and doxycycline. ECHO performed on 6/25/2018 remarkable for EF=50-55% + grade 2 diastolic dysfunction. Pt diuresed and changed to maintenance PO lasix regimen. She as started on BIPAP due to increased pCO2 on ABG and lethargy. Pt will need BIPAP/home ventilator for use at home and was approved for the device prior to discharge.    On 7/13, she became febrile, lethargic, hypercapnic, with SOB and was in AFib with RVR. Transferred to ICU again, on BiPAP and amiodarone infusion. Clinically improved over the course of 3 days with IV diuresis 40 mg daily, solumedrol 40 mg TID, BiPAP and empiric Zosyn. Other than UA consistent with UTI, CT chest/abdomen/pelvis with IV showed no other potential source for infection. There was mention of a pneumocele with "debri" however unlikely this to be cause for sepsis and invasive treatment would be too high risk (discussed with pulmonologist). She was weaned to low flow NC to remain in place continuously. AFib with RVR was extremely difficult to control despite amiodarone infusion and treatment of sepsis. Metoprolol added and uptitrated, " and currently on 100 mg BID with good results. She has remained without further event on telemetry. Her H&H did drop slowly throughout admission with no apparent bleed source other than frequent lab monitoring. She was transfused 1 unit RBCs on 7/21.    PT/OT consulted and recommending SNF. PPD placed and SW consulted.     Interval History: No acute events.     Review of Systems   Constitutional: Negative for chills and fever.   Respiratory: Negative for shortness of breath.    Cardiovascular: Negative for chest pain.     Objective:     Vital Signs (Most Recent):  Temp: 97.3 °F (36.3 °C) (07/22/18 0438)  Pulse: 70 (07/22/18 0520)  Resp: 16 (07/22/18 0438)  BP: (!) 154/77 (07/22/18 0520)  SpO2: 98 % (07/21/18 2300) Vital Signs (24h Range):  Temp:  [97.3 °F (36.3 °C)-99.9 °F (37.7 °C)] 97.3 °F (36.3 °C)  Pulse:  [63-73] 70  Resp:  [16-20] 16  SpO2:  [90 %-99 %] 98 %  BP: (128-185)/(61-84) 154/77     Weight: 83.2 kg (183 lb 6.8 oz)  Body mass index is 30.52 kg/m².    Intake/Output Summary (Last 24 hours) at 07/22/18 0612  Last data filed at 07/22/18 0438   Gross per 24 hour   Intake              756 ml   Output                0 ml   Net              756 ml      Physical Exam   Constitutional: She is oriented to person, place, and time. She appears well-developed. No distress.   HENT:   Head: Normocephalic and atraumatic.   Eyes: EOM are normal. Pupils are equal, round, and reactive to light.   Neck: Normal range of motion. Neck supple.   Cardiovascular: Normal rate and regular rhythm.    Pulmonary/Chest:   Improved air movement, no wheezing   Abdominal: Soft. Bowel sounds are normal.   Musculoskeletal: Normal range of motion. She exhibits no edema.   Neurological: She is alert and oriented to person, place, and time.   Skin: Skin is warm and dry. Capillary refill takes less than 2 seconds. She is not diaphoretic.   Psychiatric: She has a normal mood and affect. Her behavior is normal. Thought content normal.  "      Significant Labs: All pertinent labs within the past 24 hours have been reviewed.    Significant Imaging: I have reviewed and interpreted all pertinent imaging results/findings within the past 24 hours.    Assessment/Plan:      * Sepsis    - Source likely urine as she is incontinent and UA was abnormal with very cloudy appearing urine.   - UCx with yeast which was thought to be more so a colonizer, since patient did improve with Zosyn which was continued  - CTA of chest/abdomen showed a right pneumocele with "debri" which was unlikely to cause degree of clinical instability and invasive diagnosis/treatment would be of very high risk for this patient.   - No other potential source identified. Continued with pip-tazo until 7/19 then stopped after completed 7 day course.        Acute bacterial conjunctivitis of right eye    Erythromycin ointment to right eye started 7/18/2018. Improved. Continue x 5-7 days.        Atrial fibrillation with RVR    - May have been secondary to development of sepsis and subsequent respiratory failure.   - Pt has been on amiodarone gtt and improving from sepsis standpoint and HR has greatly improved by increasing dose of metoprolol to 100 mg BID.   - Pt spontaneously converted to NSR 7/16 and case discussed with Cardiology  - Amiodarone converted to PO 7/17/2018  - Pt was stable for transfer to telemetry and she is already fully anticoagulated        Debility    - Resumed PT/OT  - Recommending SNF        Acute hypercapnic respiratory failure    - A recurrent issue during this admission        PAF (paroxysmal atrial fibrillation)    - As discussed above        Neuropathy    - No acute issues         Non-STEMI (non-ST elevated myocardial infarction)    - Followed by Cardiology  - s/p [facility] on 6/27 only remarkable for non-obstructive CAD (40% RCA)  - Continue medical management as per Cardiology with ASA, statin, metoprolol and patient will not be placed on plavix as she is also on " Xarelto        Acute exacerbation of chronic obstructive pulmonary disease (COPD)    - Pt has been back and forth to ICU for hypercapnic respiratory failure.   - Underlying COPD (centrilobular emphysema seen on CT chest 6/2018) but also complicated with diastolic HF and Afib with RVR  - Pt was intubated at one point, but overall improved with trial of solumedrol 40 mg TID and another dose of IV lasix.   - Started with solumedrol 40 mg TID then de-escalate to prednisone and tapered.  - De-escalated furosemide to PO for maintenance diuresis.   - Continuous supplemental O2 via low flow NC for goal sats 88-93%, incentive spirometer and BiPAP QHS  - Pt has been approved for home BIPAP when ready for discharge        Elevated brain natriuretic peptide (BNP) level    - Stable with diuresis        Acute diastolic CHF (congestive heart failure)    - Initially due to volume overload, but now likely due to sepsis + Afib RVR  - Last ECHO with EF=50-55% + grade 2 diastolic dysfunction on 6/26  - Stable and well compensated. Management as above        Obesity    - Weight reduction as outpatient after all acute issues resolved        Chronic anticoagulation    - Patient on Xarelto for PAF which has been resumed        History of deep vein thrombosis    - Resumed anticoagulation         History of pulmonary embolism    - Xarelto resumed   - CTA negative for PE        Anemia of chronic disease    - Pt with gradual trend down, but no overt source of bleeding  - Suspect iatrogenic given multiple blood draws over prolonged hospital stay daily  - Transfused 1 unit RBCs 7/21.        Malignant hypertension    - Pt on metoprolol for HR and BP control  - Will make further adjustments as needed        Tobacco abuse    - Smoking cessation counseling done        Gastroesophageal reflux disease without esophagitis    - Continue PPI        Type 2 diabetes mellitus, controlled    - Complications of peripheral neuropathy.   - Increased basal and  prandial insulin in setting of steroids  - Goal < 180        Coronary artery disease involving native coronary artery of native heart with angina pectoris    - [facility] on 6/27. Non obstructive CAD (40% RCA)  - On adequate cardioprotective regimen          VTE Risk Mitigation         Ordered     rivaroxaban tablet 20 mg  With dinner      06/27/18 8057              Viri Paredes MD  Department of Hospital Medicine   [facility]

## 2018-07-22 NOTE — PLAN OF CARE
07/22/18 1135   Medicare Message   Important Message from Medicare regarding Discharge Appeal Rights Given to patient/caregiver   Date IMM was signed 07/22/18   Time IMM was signed 1125

## 2018-07-23 LAB
BASOPHILS # BLD AUTO: 0.01 K/UL
BASOPHILS NFR BLD: 0.1 %
DIFFERENTIAL METHOD: ABNORMAL
EOSINOPHIL # BLD AUTO: 0.2 K/UL
EOSINOPHIL NFR BLD: 1.8 %
ERYTHROCYTE [DISTWIDTH] IN BLOOD BY AUTOMATED COUNT: 19.5 %
HCT VFR BLD AUTO: 26.8 %
HGB BLD-MCNC: 7.6 G/DL
HYPOCHROMIA BLD QL SMEAR: ABNORMAL
LYMPHOCYTES # BLD AUTO: 1.5 K/UL
LYMPHOCYTES NFR BLD: 12.9 %
MCH RBC QN AUTO: 25.6 PG
MCHC RBC AUTO-ENTMCNC: 28.4 G/DL
MCV RBC AUTO: 90 FL
MONOCYTES # BLD AUTO: 0.9 K/UL
MONOCYTES NFR BLD: 7.7 %
NEUTROPHILS # BLD AUTO: 9.2 K/UL
NEUTROPHILS NFR BLD: 77.5 %
PLATELET # BLD AUTO: 388 K/UL
PMV BLD AUTO: 10.1 FL
POCT GLUCOSE: 184 MG/DL (ref 70–110)
POCT GLUCOSE: 231 MG/DL (ref 70–110)
POCT GLUCOSE: 264 MG/DL (ref 70–110)
POCT GLUCOSE: 290 MG/DL (ref 70–110)
POLYCHROMASIA BLD QL SMEAR: ABNORMAL
RBC # BLD AUTO: 2.97 M/UL
WBC # BLD AUTO: 11.9 K/UL

## 2018-07-23 PROCEDURE — 25000003 PHARM REV CODE 250: Performed by: HOSPITALIST

## 2018-07-23 PROCEDURE — A4216 STERILE WATER/SALINE, 10 ML: HCPCS | Performed by: INTERNAL MEDICINE

## 2018-07-23 PROCEDURE — 27000221 HC OXYGEN, UP TO 24 HOURS

## 2018-07-23 PROCEDURE — 85025 COMPLETE CBC W/AUTO DIFF WBC: CPT

## 2018-07-23 PROCEDURE — 25000003 PHARM REV CODE 250: Performed by: INTERNAL MEDICINE

## 2018-07-23 PROCEDURE — 94640 AIRWAY INHALATION TREATMENT: CPT

## 2018-07-23 PROCEDURE — 63600175 PHARM REV CODE 636 W HCPCS: Performed by: INTERNAL MEDICINE

## 2018-07-23 PROCEDURE — 94799 UNLISTED PULMONARY SVC/PX: CPT

## 2018-07-23 PROCEDURE — 94761 N-INVAS EAR/PLS OXIMETRY MLT: CPT

## 2018-07-23 PROCEDURE — 97530 THERAPEUTIC ACTIVITIES: CPT

## 2018-07-23 PROCEDURE — 21400001 HC TELEMETRY ROOM

## 2018-07-23 PROCEDURE — 94660 CPAP INITIATION&MGMT: CPT

## 2018-07-23 PROCEDURE — 97116 GAIT TRAINING THERAPY: CPT

## 2018-07-23 PROCEDURE — 99900035 HC TECH TIME PER 15 MIN (STAT)

## 2018-07-23 RX ORDER — AMIODARONE HYDROCHLORIDE 200 MG/1
200 TABLET ORAL DAILY
Qty: 30 TABLET | Refills: 11 | Status: ON HOLD | OUTPATIENT
Start: 2018-08-14 | End: 2019-11-04 | Stop reason: CLARIF

## 2018-07-23 RX ORDER — SIMETHICONE 80 MG
1 TABLET,CHEWABLE ORAL 3 TIMES DAILY PRN
Status: DISCONTINUED | OUTPATIENT
Start: 2018-07-23 | End: 2018-07-27 | Stop reason: HOSPADM

## 2018-07-23 RX ORDER — ISOSORBIDE MONONITRATE 120 MG/1
120 TABLET, EXTENDED RELEASE ORAL DAILY
Qty: 30 TABLET | Refills: 11 | Status: ON HOLD | OUTPATIENT
Start: 2018-07-23 | End: 2018-12-11 | Stop reason: HOSPADM

## 2018-07-23 RX ORDER — AMIODARONE HYDROCHLORIDE 400 MG/1
400 TABLET ORAL 2 TIMES DAILY
Qty: 14 TABLET | Refills: 0 | Status: SHIPPED | OUTPATIENT
Start: 2018-07-23 | End: 2018-07-30

## 2018-07-23 RX ORDER — METOPROLOL TARTRATE 100 MG/1
100 TABLET ORAL 2 TIMES DAILY
Qty: 60 TABLET | Refills: 11 | Status: ON HOLD | OUTPATIENT
Start: 2018-07-23 | End: 2018-12-11 | Stop reason: SDUPTHER

## 2018-07-23 RX ORDER — ISOSORBIDE MONONITRATE 30 MG/1
120 TABLET, EXTENDED RELEASE ORAL DAILY
Status: DISCONTINUED | OUTPATIENT
Start: 2018-07-23 | End: 2018-07-27 | Stop reason: HOSPADM

## 2018-07-23 RX ORDER — TIOTROPIUM BROMIDE 18 UG/1
1 CAPSULE ORAL; RESPIRATORY (INHALATION) DAILY
Qty: 30 CAPSULE | Refills: 11 | Status: ON HOLD | OUTPATIENT
Start: 2018-07-23 | End: 2019-07-01 | Stop reason: HOSPADM

## 2018-07-23 RX ORDER — LIDOCAINE 50 MG/G
1 PATCH TOPICAL DAILY
Qty: 15 PATCH | Refills: 0 | Status: SHIPPED | OUTPATIENT
Start: 2018-07-23 | End: 2019-06-07

## 2018-07-23 RX ORDER — AMIODARONE HYDROCHLORIDE 200 MG/1
200 TABLET ORAL 2 TIMES DAILY
Qty: 26 TABLET | Refills: 0 | Status: SHIPPED | OUTPATIENT
Start: 2018-07-31 | End: 2018-08-13

## 2018-07-23 RX ADMIN — LIDOCAINE 1 PATCH: 50 PATCH TOPICAL at 10:07

## 2018-07-23 RX ADMIN — ERYTHROMYCIN 1 INCH: 5 OINTMENT OPHTHALMIC at 06:07

## 2018-07-23 RX ADMIN — INSULIN ASPART 1 UNITS: 100 INJECTION, SOLUTION INTRAVENOUS; SUBCUTANEOUS at 09:07

## 2018-07-23 RX ADMIN — RIVAROXABAN 20 MG: 20 TABLET, FILM COATED ORAL at 06:07

## 2018-07-23 RX ADMIN — Medication 10 ML: at 05:07

## 2018-07-23 RX ADMIN — Medication 10 ML: at 06:07

## 2018-07-23 RX ADMIN — INSULIN ASPART 6 UNITS: 100 INJECTION, SOLUTION INTRAVENOUS; SUBCUTANEOUS at 12:07

## 2018-07-23 RX ADMIN — PANTOPRAZOLE SODIUM 40 MG: 40 TABLET, DELAYED RELEASE ORAL at 08:07

## 2018-07-23 RX ADMIN — AMIODARONE HYDROCHLORIDE 400 MG: 200 TABLET ORAL at 08:07

## 2018-07-23 RX ADMIN — LABETALOL HYDROCHLORIDE 20 MG: 5 INJECTION, SOLUTION INTRAVENOUS at 05:07

## 2018-07-23 RX ADMIN — ACETAMINOPHEN 650 MG: 325 TABLET, FILM COATED ORAL at 08:07

## 2018-07-23 RX ADMIN — ATORVASTATIN CALCIUM 80 MG: 40 TABLET, FILM COATED ORAL at 09:07

## 2018-07-23 RX ADMIN — ASPIRIN 81 MG CHEWABLE TABLET 81 MG: 81 TABLET CHEWABLE at 08:07

## 2018-07-23 RX ADMIN — POTASSIUM PHOSPHATE, MONOBASIC 1000 MG: 500 TABLET, SOLUBLE ORAL at 08:07

## 2018-07-23 RX ADMIN — SIMETHICONE CHEW TAB 80 MG 80 MG: 80 TABLET ORAL at 05:07

## 2018-07-23 RX ADMIN — PREDNISONE 10 MG: 5 TABLET ORAL at 08:07

## 2018-07-23 RX ADMIN — TIOTROPIUM BROMIDE 18 MCG: 18 CAPSULE ORAL; RESPIRATORY (INHALATION) at 08:07

## 2018-07-23 RX ADMIN — INSULIN ASPART 6 UNITS: 100 INJECTION, SOLUTION INTRAVENOUS; SUBCUTANEOUS at 04:07

## 2018-07-23 RX ADMIN — AMIODARONE HYDROCHLORIDE 400 MG: 200 TABLET ORAL at 09:07

## 2018-07-23 RX ADMIN — ISOSORBIDE MONONITRATE 120 MG: 30 TABLET, EXTENDED RELEASE ORAL at 08:07

## 2018-07-23 RX ADMIN — METOPROLOL TARTRATE 100 MG: 50 TABLET ORAL at 09:07

## 2018-07-23 RX ADMIN — LISINOPRIL 40 MG: 20 TABLET ORAL at 08:07

## 2018-07-23 RX ADMIN — FUROSEMIDE 40 MG: 40 TABLET ORAL at 08:07

## 2018-07-23 RX ADMIN — ERYTHROMYCIN 1 INCH: 5 OINTMENT OPHTHALMIC at 05:07

## 2018-07-23 RX ADMIN — INSULIN ASPART 2 UNITS: 100 INJECTION, SOLUTION INTRAVENOUS; SUBCUTANEOUS at 08:07

## 2018-07-23 RX ADMIN — Medication 10 ML: at 01:07

## 2018-07-23 RX ADMIN — METOPROLOL TARTRATE 100 MG: 50 TABLET ORAL at 08:07

## 2018-07-23 RX ADMIN — ERYTHROMYCIN 1 INCH: 5 OINTMENT OPHTHALMIC at 11:07

## 2018-07-23 RX ADMIN — ACETAMINOPHEN 650 MG: 325 TABLET, FILM COATED ORAL at 04:07

## 2018-07-23 RX ADMIN — TRAMADOL HYDROCHLORIDE 50 MG: 50 TABLET, FILM COATED ORAL at 10:07

## 2018-07-23 NOTE — PT/OT/SLP PROGRESS
Occupational Therapy      Patient Name:  Yasmeen Palm   MRN:  [id number]    Patient not seen today secondary to c/o severe back pain and declined OT at this time. Patient's nurse notified. Will follow-up as able.     2nd attempt 15:55 Patient just finished working with PT. Patient c/o fatigue and continued back pain. Will follow-up tomorrow.        JOAQUIN Rockwell  7/23/2018

## 2018-07-23 NOTE — PLAN OF CARE
Problem: Patient Care Overview  Goal: Plan of Care Review  Plan of care reviewed with patient and  at bedside. Fall precautions are in place. Call light within reach. Bed in lowest position.  No complaints throughout the night. PICC intact. NSR. Patient encouraged to to shift weight. Will continue to monitor.

## 2018-07-23 NOTE — PT/OT/SLP PROGRESS
Physical Therapy Treatment    Patient Name:  Yasmeen Palm   MRN:  [id number]    Recommendations:     Discharge Recommendations:  nursing facility, skilled   Discharge Equipment Recommendations:  (TBD)   Barriers to discharge: limited endurance for functional mobility     Assessment:     Yasmeen Palm is a 68 y.o. female admitted with a medical diagnosis of Sepsis.  She presents with the following impairments/functional limitations:  weakness, impaired endurance, impaired functional mobilty, gait instability, impaired balance, decreased lower extremity function, decreased safety awareness, pain, impaired cardiopulmonary response to activity.    Rehab Prognosis:  good; patient would benefit from acute skilled PT services to address these deficits and reach maximum level of function.      Recent Surgery: Procedure(s) (LRB):  Left heart cath R rad access, not before 9am (Left)      Plan:     During this hospitalization, patient to be seen daily to address the above listed problems via gait training, therapeutic activities, therapeutic exercises  · Plan of Care Expires:  08/02/18   Plan of Care Reviewed with: patient    Subjective     Communicated with nurse Valdes prior to session.  Patient found supine in bed upon PT entry to room, agreeable to treatment.      Chief Complaint: Back pain.   Patient comments/goals: To get back to PLOF.   Pain/Comfort:  · Pain Rating 1:  (did not rate with number)  · Location 1: back  · Pain Addressed 1: Pre-medicate for activity, Distraction, Cessation of Activity, Nurse notified    Objective:     Patient found with: oxygen, telemetry, PICC line     General Precautions: Standard, fall, respiratory   Orthopedic Precautions:N/A   Braces: N/A     Functional Mobility:  · Bed Mobility:     · Supine to Sit: supervision  · Sit to Supine: supervision  · Transfers:     · Sit to Stand:  minimum assistance from bed and moderate assistance from bedside chair with rolling walker  · Gait:  Patient  ambulated 20ft x2 trials with seated rest break between trials, Rolling Walker, and SBA using 3-point gait. Patient demonstrated decreased dyllan, decreased velocity of limb motion and decreased step length during gait due to impaired balance, decreased strength and decreased endurance.    AM-PAC 6 CLICK MOBILITY  Turning over in bed (including adjusting bedclothes, sheets and blankets)?: 4  Sitting down on and standing up from a chair with arms (e.g., wheelchair, bedside commode, etc.): 3  Moving from lying on back to sitting on the side of the bed?: 4  Moving to and from a bed to a chair (including a wheelchair)?: 3  Need to walk in hospital room?: 3  Climbing 3-5 steps with a railing?: 3  Basic Mobility Total Score: 20     Patient left supine with all lines intact, call button in reach and nurse Keisha notified. Patient declined sitting in bedside chair due to back pain.     GOALS:    Physical Therapy Goals        Problem: Physical Therapy Goal    Goal Priority Disciplines Outcome Goal Variances Interventions   Physical Therapy Goal     PT/OT, PT Ongoing (interventions implemented as appropriate)     Description:  Goals to be met by: 2018     Patient will increase functional independence with mobility by performin. Supine to sit with Modified [place]  2. Sit to supine with Modified [place]  3. Sit to stand transfer with Modified [place]  4. Gait  x 250 feet with Modified [place] using Rolling Walker.  5. B LE therex x30 reps with modified independence.                          Time Tracking:     PT Received On: 18  PT Start Time: 1523     PT Stop Time: 1546  PT Total Time (min): 23 min     Billable Minutes: Gait Training  13 and Therapeutic Activity 10    Treatment Type: Treatment  PT/PTA: PT     PTA Visit Number: 0     Viv Wilson, PT   2018

## 2018-07-23 NOTE — PLAN OF CARE
Problem: Patient Care Overview  Goal: Plan of Care Review  Patient on nasal cannula 3 lpm. BIPAP on standby at this time

## 2018-07-23 NOTE — PLAN OF CARE
Problem: Physical Therapy Goal  Goal: Physical Therapy Goal  Goals to be met by: 2018     Patient will increase functional independence with mobility by performin. Supine to sit with Modified [place]  2. Sit to supine with Modified [place]  3. Sit to stand transfer with Modified [place]  4. Gait  x 250 feet with Modified [place] using Rolling Walker.  5. B LE therex x30 reps with modified independence.         Outcome: Ongoing (interventions implemented as appropriate)    Patient increased gait distance but continues to be limited by SOB.

## 2018-07-23 NOTE — SUBJECTIVE & OBJECTIVE
Interval History: No acute events. Back pain but improved with lidocaine patch.    Review of Systems   Constitutional: Negative for chills and fever.   Respiratory: Negative for shortness of breath.    Cardiovascular: Negative for chest pain.     Objective:     Vital Signs (Most Recent):  Temp: 99.2 °F (37.3 °C) (07/23/18 1227)  Pulse: 68 (07/23/18 1227)  Resp: 18 (07/23/18 1227)  BP: (!) 117/58 (07/23/18 1227)  SpO2: 96 % (07/23/18 1227) Vital Signs (24h Range):  Temp:  [97.5 °F (36.4 °C)-99.4 °F (37.4 °C)] 99.2 °F (37.3 °C)  Pulse:  [68-85] 68  Resp:  [16-19] 18  SpO2:  [94 %-99 %] 96 %  BP: (117-190)/(57-88) 117/58     Weight: 83.2 kg (183 lb 6.8 oz)  Body mass index is 30.52 kg/m².    Intake/Output Summary (Last 24 hours) at 07/23/18 1353  Last data filed at 07/22/18 1700   Gross per 24 hour   Intake              240 ml   Output                0 ml   Net              240 ml      Physical Exam   Constitutional: She is oriented to person, place, and time. She appears well-developed. No distress.   HENT:   Head: Normocephalic and atraumatic.   Eyes: EOM are normal. Pupils are equal, round, and reactive to light.   Neck: Normal range of motion. Neck supple.   Cardiovascular: Normal rate and regular rhythm.    Pulmonary/Chest:   Improved air movement, no wheezing   Abdominal: Soft. Bowel sounds are normal.   Musculoskeletal: Normal range of motion. She exhibits no edema.   Neurological: She is alert and oriented to person, place, and time.   Skin: Skin is warm and dry. Capillary refill takes less than 2 seconds. She is not diaphoretic.   Psychiatric: She has a normal mood and affect. Her behavior is normal. Thought content normal.       Significant Labs: All pertinent labs within the past 24 hours have been reviewed.    Significant Imaging: I have reviewed and interpreted all pertinent imaging results/findings within the past 24 hours.

## 2018-07-23 NOTE — PLAN OF CARE
[facility]  [address] [place] [address]    Facility Transfer Orders                        07/23/2018     Admit to: SNF    Diagnoses:  Active Hospital Problems    Diagnosis  POA    *Sepsis [A41.9]  Yes    Acute bacterial conjunctivitis of right eye [H10.31]  No    Atrial fibrillation with RVR [I48.91]  No    Debility [R53.81]  Yes    Acute hypercapnic respiratory failure [J96.02]  Yes    Neuropathy [G62.9]  Yes     Chronic    PAF (paroxysmal atrial fibrillation) [I48.0]  Yes     Chronic    Non-STEMI (non-ST elevated myocardial infarction) [I21.4]  Yes    Acute exacerbation of chronic obstructive pulmonary disease (COPD) [J44.1]  Yes    Elevated brain natriuretic peptide (BNP) level [R79.89]  Yes    Acute diastolic CHF (congestive heart failure) [I50.31]  Yes    Obesity [E66.9]  Yes     Chronic    Chronic anticoagulation [Z79.01]  Not Applicable     Chronic    History of deep vein thrombosis [Z86.718]  Not Applicable     Chronic    History of pulmonary embolism [Z86.711]  Yes     Chronic    Anemia of chronic disease [D63.8]  Yes     Chronic    Malignant hypertension [I10]  Yes    Type 2 diabetes mellitus, controlled [E11.9]  Yes     Chronic    Gastroesophageal reflux disease without esophagitis [K21.9]  Yes     Chronic    Tobacco abuse [Z72.0]  Yes     Chronic    Coronary artery disease involving native coronary artery of native heart with angina pectoris [I25.119]  Yes      Resolved Hospital Problems    Diagnosis Date Resolved POA    Chest pain [R07.9] 07/19/2018 Yes    Hyperkalemia [E87.5] 07/19/2018 Yes    Fall [W19.XXXA] 07/19/2018 No    Elevated troponin I level [R74.8] 07/19/2018 Yes    Chronic respiratory failure with hypoxia and hypercapnia [J96.11, J96.12] 06/26/2018 Yes     Chronic       Allergies:Review of patient's allergies indicates:  No Known Allergies    Vitals: Every shift (Skilled Nursing patients)    Diet: Cardiac, 2000 amanda ADA      Activity:     - Up in a chair each morning as tolerated    Nursing Precautions:     - Aspiration precautions:             -  Upright 90 degrees befor during and after meals    - Fall precautions   - Decubitus precautions:        -  for positioning   - Pressure reducing foam mattress   - Turn patient every two hours. Use wedge pillows to anchor patient      CONSULTS:      PT to evaluate and treat - five times a week     OT to evaluate and treat - five times a week        Medications: Discontinue all previous medication orders, if any. See new list below.     Yasmeen Palm   Home Medication Instructions XAVIER:[id number]    Printed on:07/23/18 1535   Medication Information                      acetaminophen (TYLENOL) 500 MG tablet  Take 1 tablet (500 mg total) by mouth every 8 (eight) hours as needed.             amiodarone (PACERONE) 200 MG Tab  Take 1 tablet (200 mg total) by mouth 2 (two) times daily. for 13 days from 7/31/2018 until 8/13/2018             amiodarone (PACERONE) 200 MG Tab  Take 1 tablet (200 mg total) by mouth once daily starting 8/14/2018            amiodarone (PACERONE) 400 MG tablet  Take 1 tablet (400 mg total) by mouth 2 (two) times daily. for 7 days until 7/30/2018             aspirin (ECOTRIN) 81 MG EC tablet  Take 81 mg by mouth once daily.             atorvastatin (LIPITOR) 80 MG tablet  Take 1 tablet (80 mg total) by mouth every evening.             docusate sodium (COLACE) 100 MG capsule  Take 1 capsule (100 mg total) by mouth once daily.             furosemide (LASIX) 40 MG tablet  Take 40 mg by mouth once daily.              isosorbide mononitrate (IMDUR) 120 MG 24 hr tablet  Take 1 tablet (120 mg total) by mouth once daily.             levalbuterol (XOPENEX HFA) 45 mcg/actuation inhaler  Inhale 2 puffs into the lungs every 4 (four) hours as needed for Wheezing or Shortness of Breath. Rescue             lidocaine (LIDODERM) 5 %  Place 1 patch onto the skin once daily. Remove  & Discard patch within 12 hours or as directed by MD             lisinopril (PRINIVIL,ZESTRIL) 40 MG tablet  Take 1 tablet (40 mg total) by mouth once daily. Do not take this medication if blood pressure is below 130/80 mmHg and/or feeling dizzy after taking all other blood pressure meds             metformin (GLUCOPHAGE) 500 MG tablet  Take 500 mg by mouth 2 (two) times daily with meals.             metoprolol tartrate (LOPRESSOR) 100 MG tablet  Take 1 tablet (100 mg total) by mouth 2 (two) times daily.             potassium phosphate, monobasic, (K-PHOS) 500 mg TbSO  Take 2 tablets (1,000 mg total) by mouth once daily.             rivaroxaban (XARELTO) 20 mg Tab  Take 20 mg by mouth daily with dinner or evening meal.             tiotropium (SPIRIVA) 18 mcg inhalation capsule  Inhale 1 capsule (18 mcg total) into the lungs once daily. Controller             UMECLIDINIUM BRM/VILANTEROL TR (ANORO ELLIPTA INHL)  Inhale into the lungs daily as needed.                       _________________________________  Viri Paredes MD  07/23/2018

## 2018-07-23 NOTE — PROGRESS NOTES
TN contacted Kristin at [facility] at [phone]; left a message. Kristin returned the call stating pt is not finally accepted until the family member comes to complete the financials. Pt's niece has been called several times by [facility] and no return calls.    TN contacted pt's niece, Amado Motnoya, to inform pt is ready for discharge and [place] is waiting for her to complete financials, left a message to contact [facility] and [place].    1045 Received a call from pt's niece, Ms Montoya, explained pt is discharged and she needs to meet with [facility]; provided name and contact number.    1532 Pt's niece is working on acquiring financial paperwork; stated she has to get some of it from her brother. She continues to work on it.

## 2018-07-23 NOTE — PLAN OF CARE
Problem: Patient Care Overview  Goal: Plan of Care Review  Patient remains on bipap 10/5, rate 10,30%. Will continue to monitor.

## 2018-07-24 PROBLEM — R79.89 ELEVATED BRAIN NATRIURETIC PEPTIDE (BNP) LEVEL: Status: RESOLVED | Noted: 2017-08-12 | Resolved: 2018-07-24

## 2018-07-24 LAB
POCT GLUCOSE: 179 MG/DL (ref 70–110)
POCT GLUCOSE: 201 MG/DL (ref 70–110)
POCT GLUCOSE: 202 MG/DL (ref 70–110)
POCT GLUCOSE: 295 MG/DL (ref 70–110)

## 2018-07-24 PROCEDURE — 94640 AIRWAY INHALATION TREATMENT: CPT

## 2018-07-24 PROCEDURE — 25000242 PHARM REV CODE 250 ALT 637 W/ HCPCS: Performed by: INTERNAL MEDICINE

## 2018-07-24 PROCEDURE — 94761 N-INVAS EAR/PLS OXIMETRY MLT: CPT

## 2018-07-24 PROCEDURE — 63600175 PHARM REV CODE 636 W HCPCS: Performed by: HOSPITALIST

## 2018-07-24 PROCEDURE — A4216 STERILE WATER/SALINE, 10 ML: HCPCS | Performed by: INTERNAL MEDICINE

## 2018-07-24 PROCEDURE — 99900035 HC TECH TIME PER 15 MIN (STAT)

## 2018-07-24 PROCEDURE — 63600175 PHARM REV CODE 636 W HCPCS: Performed by: INTERNAL MEDICINE

## 2018-07-24 PROCEDURE — 25000003 PHARM REV CODE 250: Performed by: INTERNAL MEDICINE

## 2018-07-24 PROCEDURE — 21400001 HC TELEMETRY ROOM

## 2018-07-24 PROCEDURE — 94799 UNLISTED PULMONARY SVC/PX: CPT

## 2018-07-24 PROCEDURE — 97530 THERAPEUTIC ACTIVITIES: CPT

## 2018-07-24 PROCEDURE — 27000221 HC OXYGEN, UP TO 24 HOURS

## 2018-07-24 PROCEDURE — 25000003 PHARM REV CODE 250: Performed by: HOSPITALIST

## 2018-07-24 PROCEDURE — 94660 CPAP INITIATION&MGMT: CPT

## 2018-07-24 RX ORDER — SODIUM CHLORIDE 0.9 % (FLUSH) 0.9 %
3 SYRINGE (ML) INJECTION
Status: DISCONTINUED | OUTPATIENT
Start: 2018-07-24 | End: 2018-07-27 | Stop reason: HOSPADM

## 2018-07-24 RX ORDER — PANTOPRAZOLE SODIUM 40 MG/1
40 TABLET, DELAYED RELEASE ORAL DAILY
Status: DISCONTINUED | OUTPATIENT
Start: 2018-07-25 | End: 2018-07-27 | Stop reason: HOSPADM

## 2018-07-24 RX ORDER — SODIUM CHLORIDE 0.9 % (FLUSH) 0.9 %
10 SYRINGE (ML) INJECTION EVERY 6 HOURS
Status: DISCONTINUED | OUTPATIENT
Start: 2018-07-24 | End: 2018-07-27 | Stop reason: HOSPADM

## 2018-07-24 RX ORDER — ACETAMINOPHEN 325 MG/1
650 TABLET ORAL EVERY 6 HOURS PRN
Status: DISCONTINUED | OUTPATIENT
Start: 2018-07-24 | End: 2018-07-27 | Stop reason: HOSPADM

## 2018-07-24 RX ORDER — LABETALOL HYDROCHLORIDE 5 MG/ML
20 INJECTION, SOLUTION INTRAVENOUS EVERY 4 HOURS PRN
Status: DISCONTINUED | OUTPATIENT
Start: 2018-07-24 | End: 2018-07-27 | Stop reason: HOSPADM

## 2018-07-24 RX ORDER — FUROSEMIDE 10 MG/ML
40 INJECTION INTRAMUSCULAR; INTRAVENOUS ONCE
Status: COMPLETED | OUTPATIENT
Start: 2018-07-24 | End: 2018-07-24

## 2018-07-24 RX ORDER — ATORVASTATIN CALCIUM 40 MG/1
80 TABLET, FILM COATED ORAL NIGHTLY
Status: DISCONTINUED | OUTPATIENT
Start: 2018-07-24 | End: 2018-07-27 | Stop reason: HOSPADM

## 2018-07-24 RX ORDER — SODIUM CHLORIDE 0.9 % (FLUSH) 0.9 %
10 SYRINGE (ML) INJECTION
Status: DISCONTINUED | OUTPATIENT
Start: 2018-07-24 | End: 2018-07-27 | Stop reason: HOSPADM

## 2018-07-24 RX ORDER — HYDRALAZINE HYDROCHLORIDE 20 MG/ML
10 INJECTION INTRAMUSCULAR; INTRAVENOUS EVERY 6 HOURS PRN
Status: DISCONTINUED | OUTPATIENT
Start: 2018-07-24 | End: 2018-07-27 | Stop reason: HOSPADM

## 2018-07-24 RX ORDER — IPRATROPIUM BROMIDE AND ALBUTEROL SULFATE 2.5; .5 MG/3ML; MG/3ML
3 SOLUTION RESPIRATORY (INHALATION) EVERY 4 HOURS PRN
Status: DISCONTINUED | OUTPATIENT
Start: 2018-07-24 | End: 2018-07-27 | Stop reason: HOSPADM

## 2018-07-24 RX ORDER — BISACODYL 10 MG
10 SUPPOSITORY, RECTAL RECTAL DAILY PRN
Status: DISCONTINUED | OUTPATIENT
Start: 2018-07-24 | End: 2018-07-27 | Stop reason: HOSPADM

## 2018-07-24 RX ADMIN — IPRATROPIUM BROMIDE AND ALBUTEROL SULFATE 3 ML: .5; 2.5 SOLUTION RESPIRATORY (INHALATION) at 08:07

## 2018-07-24 RX ADMIN — INSULIN ASPART 2 UNITS: 100 INJECTION, SOLUTION INTRAVENOUS; SUBCUTANEOUS at 09:07

## 2018-07-24 RX ADMIN — INSULIN ASPART 2 UNITS: 100 INJECTION, SOLUTION INTRAVENOUS; SUBCUTANEOUS at 11:07

## 2018-07-24 RX ADMIN — Medication 10 ML: at 05:07

## 2018-07-24 RX ADMIN — ISOSORBIDE MONONITRATE 120 MG: 30 TABLET, EXTENDED RELEASE ORAL at 09:07

## 2018-07-24 RX ADMIN — AMIODARONE HYDROCHLORIDE 400 MG: 200 TABLET ORAL at 09:07

## 2018-07-24 RX ADMIN — Medication 10 ML: at 12:07

## 2018-07-24 RX ADMIN — LISINOPRIL 40 MG: 20 TABLET ORAL at 09:07

## 2018-07-24 RX ADMIN — HYDRALAZINE HYDROCHLORIDE 10 MG: 20 INJECTION INTRAMUSCULAR; INTRAVENOUS at 06:07

## 2018-07-24 RX ADMIN — PANTOPRAZOLE SODIUM 40 MG: 40 TABLET, DELAYED RELEASE ORAL at 09:07

## 2018-07-24 RX ADMIN — ERYTHROMYCIN 1 INCH: 5 OINTMENT OPHTHALMIC at 12:07

## 2018-07-24 RX ADMIN — POTASSIUM PHOSPHATE, MONOBASIC 1000 MG: 500 TABLET, SOLUBLE ORAL at 09:07

## 2018-07-24 RX ADMIN — FUROSEMIDE 40 MG: 40 TABLET ORAL at 09:07

## 2018-07-24 RX ADMIN — Medication 10 ML: at 06:07

## 2018-07-24 RX ADMIN — METOPROLOL TARTRATE 100 MG: 50 TABLET ORAL at 08:07

## 2018-07-24 RX ADMIN — HYDRALAZINE HYDROCHLORIDE 10 MG: 20 INJECTION INTRAMUSCULAR; INTRAVENOUS at 05:07

## 2018-07-24 RX ADMIN — HYDRALAZINE HYDROCHLORIDE 10 MG: 20 INJECTION INTRAMUSCULAR; INTRAVENOUS at 12:07

## 2018-07-24 RX ADMIN — SIMETHICONE CHEW TAB 80 MG 80 MG: 80 TABLET ORAL at 06:07

## 2018-07-24 RX ADMIN — ATORVASTATIN CALCIUM 80 MG: 40 TABLET, FILM COATED ORAL at 08:07

## 2018-07-24 RX ADMIN — ACETAMINOPHEN 650 MG: 325 TABLET, FILM COATED ORAL at 09:07

## 2018-07-24 RX ADMIN — SIMETHICONE CHEW TAB 80 MG 80 MG: 80 TABLET ORAL at 05:07

## 2018-07-24 RX ADMIN — ASPIRIN 81 MG CHEWABLE TABLET 81 MG: 81 TABLET CHEWABLE at 09:07

## 2018-07-24 RX ADMIN — ACETAMINOPHEN 650 MG: 325 TABLET, FILM COATED ORAL at 05:07

## 2018-07-24 RX ADMIN — ERYTHROMYCIN 1 INCH: 5 OINTMENT OPHTHALMIC at 05:07

## 2018-07-24 RX ADMIN — PREDNISONE 10 MG: 5 TABLET ORAL at 09:07

## 2018-07-24 RX ADMIN — RIVAROXABAN 20 MG: 20 TABLET, FILM COATED ORAL at 05:07

## 2018-07-24 RX ADMIN — ERYTHROMYCIN 1 INCH: 5 OINTMENT OPHTHALMIC at 01:07

## 2018-07-24 RX ADMIN — LIDOCAINE 1 PATCH: 50 PATCH TOPICAL at 09:07

## 2018-07-24 RX ADMIN — RAMELTEON 8 MG: 8 TABLET, FILM COATED ORAL at 09:07

## 2018-07-24 RX ADMIN — TIOTROPIUM BROMIDE 18 MCG: 18 CAPSULE ORAL; RESPIRATORY (INHALATION) at 08:07

## 2018-07-24 RX ADMIN — AMIODARONE HYDROCHLORIDE 400 MG: 200 TABLET ORAL at 08:07

## 2018-07-24 RX ADMIN — METOPROLOL TARTRATE 100 MG: 50 TABLET ORAL at 09:07

## 2018-07-24 RX ADMIN — INSULIN ASPART 6 UNITS: 100 INJECTION, SOLUTION INTRAVENOUS; SUBCUTANEOUS at 05:07

## 2018-07-24 RX ADMIN — FUROSEMIDE 40 MG: 10 INJECTION, SOLUTION INTRAMUSCULAR; INTRAVENOUS at 09:07

## 2018-07-24 NOTE — PT/OT/SLP PROGRESS
Physical Therapy      Patient Name:  Yasmeen Palm   MRN:  [id number]    Patient not seen today secondary to patient fatigue after having a rough night and morning. Will follow-up again tomorrow.    Viv Wilson, PT

## 2018-07-24 NOTE — PLAN OF CARE
Problem: Infection, Risk/Actual (Adult)  Goal: Identify Related Risk Factors and Signs and Symptoms  Related risk factors and signs and symptoms are identified upon initiation of Human Response Clinical Practice Guideline (CPG)    Outcome: Ongoing (interventions implemented as appropriate)   07/18/18 2312   Infection, Risk/Actual   Related Risk Factors (Infection, Risk/Actual) age extremes;exposure to microbes;prolonged hospitalization       Problem: Diabetes, Type 2 (Adult)  Goal: Signs and Symptoms of Listed Potential Problems Will be Absent, Minimized or Managed (Diabetes, Type 2)  Signs and symptoms of listed potential problems will be absent, minimized or managed by discharge/transition of care (reference Diabetes, Type 2 (Adult) CPG).   Outcome: Ongoing (interventions implemented as appropriate)   07/23/18 1955   Diabetes, Type 2   Problems Assessed (Type 2 Diabetes) all   Problems Present (Type 2 Diabetes) hyperglycemia       Problem: Fall Risk (Adult)  Goal: Identify Related Risk Factors and Signs and Symptoms  Related risk factors and signs and symptoms are identified upon initiation of Human Response Clinical Practice Guideline (CPG)   Outcome: Ongoing (interventions implemented as appropriate)   07/21/18 1155   Fall Risk   Related Risk Factors (Fall Risk) environment unfamiliar;age-related changes;bladder function altered;confusion/agitation;gait/mobility problems;polypharmacy   Signs and Symptoms (Fall Risk) presence of risk factors

## 2018-07-24 NOTE — PROGRESS NOTES
0900 TN contacted [facility] at [phone];  was not in earlier; came in at 9:30 AM, left message to contact [place].    1300 [place] contacted Brian at [facility] to inquire of status update; left a message.    TN contacted pt's niece, Amado Montoya; at [phone] to inquire of an update on financials; left message on voicemail.

## 2018-07-24 NOTE — PT/OT/SLP PROGRESS
"Occupational Therapy   Treatment    Name: Yasmeen Palm  MRN: [id number]  Admitting Diagnosis:  Sepsis       Recommendations:     Discharge Recommendations: nursing facility, skilled  Discharge Equipment Recommendations:  none  Barriers to discharge:  Decreased caregiver support    Subjective     Communicated with: Nurse Valdes prior to session.  " I will sit on the side of the bed for lunch." Patient agreeable to therapy.     Pain/Comfort:  ·  Patient c/o back pain; did not rate.    Patients cultural, spiritual, Mosque conflicts given the current situation: na    Objective:     Patient found with: telemetry, PICC line, oxygen    General Precautions: Standard, fall, respiratory   Orthopedic Precautions:N/A   Braces: N/A     Occupational Performance:    Bed Mobility:    · Patient completed Rolling/Turning to Left with  supervision  · Patient completed Rolling/Turning to Right with supervision  · Patient completed Scooting/Bridging with supervision  · Patient completed Supine to Sit with supervision     Functional Mobility/Transfers:  Functional Mobility: Patient agreeable to sit EOB for lunch; declined OOB to chair.    Activities of Daily Living:  · Feeding:  modified independence    · Upper Body Dressing: set up assistance to don back gown    · Toileting: total assistance pt with soiled brief (urinating frequently from lasiks per nurse)    Patient left seated EOB with lunch tray with all lines intact, call button in reach and nurse notified    AMPA 6 Click:  AMPA Total Score: 17    Treatment & Education:  Patient performed bed mobility and ADL's as above.  Patient educated on BUE ROM therex x10 reps in all available planes via demonstration. Patient verbalizes understanding.      Assessment:     Yasmeen Palm is a 68 y.o. female with a medical diagnosis of Sepsis.  She presents with the following performance deficits affecting function: weakness, impaired endurance, impaired functional mobilty, impaired self " care skills, gait instability, impaired balance, impaired cardiopulmonary response to activity, decreased lower extremity function, decreased upper extremity function, pain, decreased safety awareness.      Rehab Prognosis:  Fair+; patient would benefit from acute skilled OT services to address these deficits and reach maximum level of function.       Plan:     Patient to be seen 5 x/week to address the above listed problems via self-care/home management, therapeutic activities, therapeutic exercises  · Plan of Care Expires: 08/03/18  · Plan of Care Reviewed with: patient, spouse    This Plan of care has been discussed with the patient who was involved in its development and understands and is in agreement with the identified goals and treatment plan    GOALS:    Occupational Therapy Goals        Problem: Occupational Therapy Goal    Goal Priority Disciplines Outcome Interventions   Occupational Therapy Goal     OT, PT/OT Ongoing (interventions implemented as appropriate)    Description:  Goals to be met by: 7/13/18     Patient will increase functional independence with ADLs by performing:    UE Dressing with Set-up Assistance. Met 7/24  LE Dressing with Minimal Assistance.  Grooming while seated with Supervision.  Toileting from bedside commode with Contact Guard Assistance for hygiene and clothing management.   Supine to sit with Set-up Assistance. Met 7/24  Toilet transfer to bedside commode with Contact Guard Assistance.  Upper extremity exercise program x10 reps per handout, with assistance as needed.                       Time Tracking:     OT Date of Treatment: 07/24/18  OT Start Time: 1150  OT Stop Time: 1205  OT Total Time (min): 15 min    Billable Minutes:Therapeutic Activity 15    JOAQUIN Rockwell  7/24/2018

## 2018-07-24 NOTE — PLAN OF CARE
Problem: Occupational Therapy Goal  Goal: Occupational Therapy Goal  Goals to be met by: 7/13/18     Patient will increase functional independence with ADLs by performing:    UE Dressing with Set-up Assistance. Met 7/24  LE Dressing with Minimal Assistance.  Grooming while seated with Supervision.  Toileting from bedside commode with Contact Guard Assistance for hygiene and clothing management.   Supine to sit with Set-up Assistance. Met 7/24  Toilet transfer to bedside commode with Contact Guard Assistance.  Upper extremity exercise program x10 reps per handout, with assistance as needed.      Outcome: Ongoing (interventions implemented as appropriate)  Patient will benefit from continued OT to address functional deficits.

## 2018-07-24 NOTE — ASSESSMENT & PLAN NOTE
- Pt has been back and forth to ICU for hypercapnic respiratory failure.   - Underlying COPD (centrilobular emphysema seen on CT chest 6/2018) but also complicated with diastolic HF and Afib with RVR  - Pt was intubated at one point, but overall improved with trial of solumedrol 40 mg TID and another dose of IV lasix.   - Started with solumedrol 40 mg TID then de-escalate to prednisone PO and slowly tapered. Prednisone 10mg last day 7/25 and stopped.  - De-escalated furosemide to PO for maintenance diuresis.   - Continuous supplemental O2 via low flow NC for goal sats 88-93%, incentive spirometer and BiPAP QHS  - Pt has been approved for home BIPAP and has the machine at home for when she is discharged.

## 2018-07-24 NOTE — ASSESSMENT & PLAN NOTE
- May have been secondary to development of sepsis and subsequent respiratory failure.   - Pt has been on amiodarone gtt and improving from sepsis standpoint and HR has greatly improved by increasing dose of metoprolol to 100 mg BID.   - Pt spontaneously converted to NSR 7/16 and case discussed with Cardiology  - Amiodarone converted to PO 7/17/2018. Slow taper given how difficult it was to control.  - Pt was stable for transfer to telemetry and she is already fully anticoagulated

## 2018-07-24 NOTE — SUBJECTIVE & OBJECTIVE
Interval History: No acute events.     Review of Systems   Constitutional: Negative for chills and fever.   Respiratory: Negative for shortness of breath.    Cardiovascular: Negative for chest pain.     Objective:     Vital Signs (Most Recent):  Temp: 98.3 °F (36.8 °C) (07/24/18 1106)  Pulse: 66 (07/24/18 1106)  Resp: 18 (07/24/18 1106)  BP: (!) 141/65 (07/24/18 1106)  SpO2: 99 % (07/24/18 1106) Vital Signs (24h Range):  Temp:  [97.4 °F (36.3 °C)-99.4 °F (37.4 °C)] 98.3 °F (36.8 °C)  Pulse:  [62-75] 66  Resp:  [18-20] 18  SpO2:  [94 %-99 %] 99 %  BP: (141-195)/(65-88) 141/65     Weight: 83.2 kg (183 lb 6.8 oz)  Body mass index is 30.52 kg/m².  No intake or output data in the 24 hours ending 07/24/18 1444   Physical Exam   Constitutional: She is oriented to person, place, and time. She appears well-developed. No distress.   HENT:   Head: Normocephalic and atraumatic.   Eyes: EOM are normal. Pupils are equal, round, and reactive to light.   Neck: Normal range of motion. Neck supple.   Cardiovascular: Normal rate and regular rhythm.    Pulmonary/Chest: Effort normal and breath sounds normal. No respiratory distress. She has no wheezes.   Faint crackles at bases   Abdominal: Soft. Bowel sounds are normal.   Musculoskeletal: Normal range of motion. She exhibits no edema.   Neurological: She is alert and oriented to person, place, and time.   Skin: Skin is warm and dry. Capillary refill takes less than 2 seconds. She is not diaphoretic.   Psychiatric: She has a normal mood and affect. Her behavior is normal. Thought content normal.

## 2018-07-24 NOTE — UM SECONDARY REVIEW
[facility]    Level of Care Issue     awaiting family to sign SNF paperwork and get financials in order     LOS: approved an agreement with D/C plan     Approved per  list

## 2018-07-24 NOTE — PLAN OF CARE
Problem: Patient Care Overview  Goal: Plan of Care Review  Outcome: Ongoing (interventions implemented as appropriate)   07/24/18 9087   Coping/Psychosocial   Plan Of Care Reviewed With patient

## 2018-07-24 NOTE — PROGRESS NOTES
[facility] Hospital Medicine  Progress Note    Patient Name: Yasmeen Palm  MRN: [id number]  Patient Class: IP- Inpatient   Admission Date: 6/24/2018  Length of Stay: 30 days  Attending Physician: Viri Paredes MD  Primary Care Provider: Angel Orourke Jr, MD        Subjective:     Principal Problem:Sepsis    HPI:  Yasmeen Palm is a 68 y.o. female that (in part)  has a past medical history of Anticoagulant long-term use; Arthritis; Asthma; CHF (congestive heart failure); COPD (chronic obstructive pulmonary disease); Coronary artery disease; Depression; Diabetes mellitus; GERD (gastroesophageal reflux disease); and Hypertension.  has a past surgical history that includes Abdominal surgery; Cardiac surgery; and Hernia repair. Presents to [facility] Emergency Department complaining of chest pain .  She reports compliance with her home medication regimen, including Xarelto.     Description of symptoms  Location:  Substernal  Onset:  Acute onset 2 days ago  Character:  The patient remained; moderate severity  Frequency:  Daily  Duration:  each episode lasts several minutes at a time  Associated Symptoms:  Diaphoresis, shortness of breath, weakness and fatigue  Radiation:  Recent the chest  Exacerbating factors:  Worse on exertion  Relieving factors:  Minimal relief with supplemental oxygen     In the emergency department routine laboratory studies, chest x-ray, EKG, cardiac enzymes were obtained.  EKG findings were concerning the case was discussed with cardiologist, Dr. Pulido.  It was determined that her EKG was not consistent with STEMI and she has been chest pain-free since arrival.  She had been given Lovenox, aspirin, and plan was to continue trending troponins and monitor closely on telemetry.    Hospital Course:  Ms. Palm was admitted to the hospital originally on 6/24/18 for chest pain. She was later sent to the ICU with acute hypercapnic respiratory failure  "the same day on BIPAP.  She quickly improved and was sent to the floor on 6/25.  Cards was consulted for NSTEMI with noted troponin increase and was planning on LHC on 6/26. However, the patient was sent back to the ICU on 6/26 with hypercapnic respiratory failure with a CO2 of > 100 and was intubated. Pulmonary was consulted. Pt clinically improved from respiratory standpoint and was extubated on 6/27 to NC, but she did complain of chest discomfort. Cardiology was notified and patient was taken for LHC on 6/27 which was only remarkable for non-obstructive CAD and did not require intervention. Pt was given IVF post procedure and the next day developed increased SOB and required BIPAP. Pt was treated with IV lasix with good response with much improved respiratory status after output of 1L. Pt also treated for COPD exacerbation with IV steroids, NEBS and doxycycline. ECHO performed on 6/25/2018 remarkable for EF=50-55% + grade 2 diastolic dysfunction. Pt diuresed and changed to maintenance PO lasix regimen. She as started on BIPAP due to increased pCO2 on ABG and lethargy. Pt will need BIPAP/home ventilator for use at home and was approved for the device prior to discharge.    On 7/13, she became febrile, lethargic, hypercapnic, with SOB and was in AFib with RVR. Transferred to ICU again, on BiPAP and amiodarone infusion. Clinically improved over the course of 3 days with IV diuresis 40 mg daily, solumedrol 40 mg TID, BiPAP and empiric Zosyn. Other than UA consistent with UTI, CT chest/abdomen/pelvis with IV showed no other potential source for infection. There was mention of a pneumocele with "debri" however unlikely this to be cause for sepsis and invasive treatment would be too high risk (discussed with pulmonologist). She was weaned to low flow NC to remain in place continuously. AFib with RVR was extremely difficult to control despite amiodarone infusion and treatment of sepsis. Metoprolol added and uptitrated, " and currently on 100 mg BID with good results. She has remained without further event on telemetry. Her H&H did drop slowly throughout admission with no apparent bleed source other than frequent lab monitoring. She was transfused 1 unit RBCs on 7/21. She continued to be stable.     PT/OT consulted and recommended SNF. PPD placed and SW consulted.     Interval History: No acute events.     Review of Systems   Constitutional: Negative for chills and fever.   Respiratory: Negative for shortness of breath.    Cardiovascular: Negative for chest pain.     Objective:     Vital Signs (Most Recent):  Temp: 98.3 °F (36.8 °C) (07/24/18 1106)  Pulse: 66 (07/24/18 1106)  Resp: 18 (07/24/18 1106)  BP: (!) 141/65 (07/24/18 1106)  SpO2: 99 % (07/24/18 1106) Vital Signs (24h Range):  Temp:  [97.4 °F (36.3 °C)-99.4 °F (37.4 °C)] 98.3 °F (36.8 °C)  Pulse:  [62-75] 66  Resp:  [18-20] 18  SpO2:  [94 %-99 %] 99 %  BP: (141-195)/(65-88) 141/65     Weight: 83.2 kg (183 lb 6.8 oz)  Body mass index is 30.52 kg/m².  No intake or output data in the 24 hours ending 07/24/18 1444   Physical Exam   Constitutional: She is oriented to person, place, and time. She appears well-developed. No distress.   HENT:   Head: Normocephalic and atraumatic.   Eyes: EOM are normal. Pupils are equal, round, and reactive to light.   Neck: Normal range of motion. Neck supple.   Cardiovascular: Normal rate and regular rhythm.    Pulmonary/Chest: Effort normal and breath sounds normal. No respiratory distress. She has no wheezes.   Faint crackles at bases   Abdominal: Soft. Bowel sounds are normal.   Musculoskeletal: Normal range of motion. She exhibits no edema.   Neurological: She is alert and oriented to person, place, and time.   Skin: Skin is warm and dry. Capillary refill takes less than 2 seconds. She is not diaphoretic.   Psychiatric: She has a normal mood and affect. Her behavior is normal. Thought content normal.         Assessment/Plan:      * Sepsis    -  "Source likely urine as she is incontinent and UA was abnormal with very cloudy appearing urine.   - UCx with yeast which was thought to be more so a colonizer, since patient did improve with Zosyn which was continued  - CTA of chest/abdomen showed a right pneumocele with "debri" which was unlikely to cause degree of clinical instability and invasive diagnosis/treatment would be of very high risk for this patient.   - No other potential source identified. Continued with pip-tazo until 7/19 then stopped after completed 7 day course.        Acute bacterial conjunctivitis of right eye    Erythromycin ointment to right eye started 7/18/2018. Improved. Continue x 7 days then stopped.        Atrial fibrillation with RVR    - May have been secondary to development of sepsis and subsequent respiratory failure.   - Pt has been on amiodarone gtt and improving from sepsis standpoint and HR has greatly improved by increasing dose of metoprolol to 100 mg BID.   - Pt spontaneously converted to NSR 7/16 and case discussed with Cardiology  - Amiodarone converted to PO 7/17/2018. Slow taper given how difficult it was to control.  - Pt was stable for transfer to telemetry and she is already fully anticoagulated        Debility    - Resumed PT/OT  - Recommending SNF        Acute hypercapnic respiratory failure    - A recurrent issue during this admission        PAF (paroxysmal atrial fibrillation)    - As discussed above        Neuropathy    - No acute issues         Non-STEMI (non-ST elevated myocardial infarction)    - Followed by Cardiology  - s/p LHC on 6/27 only remarkable for non-obstructive CAD (40% RCA)  - Continue medical management as per Cardiology with ASA, statin, metoprolol and patient will not be placed on plavix as she is also on Xarelto        Acute exacerbation of chronic obstructive pulmonary disease (COPD)    - Pt has been back and forth to ICU for hypercapnic respiratory failure.   - Underlying COPD (centrilobular " emphysema seen on CT chest 6/2018) but also complicated with diastolic HF and Afib with RVR  - Pt was intubated at one point, but overall improved with trial of solumedrol 40 mg TID and another dose of IV lasix.   - Started with solumedrol 40 mg TID then de-escalate to prednisone PO and slowly tapered. Prednisone 10mg last day 7/25 and stopped.  - De-escalated furosemide to PO for maintenance diuresis.   - Continuous supplemental O2 via low flow NC for goal sats 88-93%, incentive spirometer and BiPAP QHS  - Pt has been approved for home BIPAP and has the machine at home for when she is discharged.        Acute diastolic CHF (congestive heart failure)    - Initially due to volume overload, but now likely due to sepsis + Afib RVR  - Last ECHO with EF=50-55% + grade 2 diastolic dysfunction on 6/26  - Stable and well compensated. Management as above        Obesity    - Weight reduction as outpatient after all acute issues resolved        Chronic anticoagulation    - Patient on Xarelto for PAF which has been resumed        History of deep vein thrombosis    - Resumed anticoagulation         History of pulmonary embolism    - Xarelto resumed   - CTA negative for PE        Anemia of chronic disease    - Pt with gradual trend down, but no overt source of bleeding  - Suspect iatrogenic given multiple blood draws over prolonged hospital stay daily  - Transfused 1 unit RBCs 7/21.        Malignant hypertension    - Pt on metoprolol for HR and BP control  - Will make further adjustments as needed        Tobacco abuse    - Smoking cessation counseling done        Gastroesophageal reflux disease without esophagitis    - Continue PPI        Type 2 diabetes mellitus, controlled    - Complications of peripheral neuropathy.   - Increased basal and prandial insulin in setting of steroids  - Goal < 180        Coronary artery disease involving native coronary artery of native heart with angina pectoris    - [facility] on 6/27. Non obstructive  CAD (40% RCA)  - On adequate cardioprotective regimen          VTE Risk Mitigation         Ordered     rivaroxaban tablet 20 mg  With dinner      07/24/18 1656              Viri Paredes MD  Department of Hospital Medicine   [facility]

## 2018-07-25 LAB
POCT GLUCOSE: 152 MG/DL (ref 70–110)
POCT GLUCOSE: 222 MG/DL (ref 70–110)
POCT GLUCOSE: 229 MG/DL (ref 70–110)
POCT GLUCOSE: 319 MG/DL (ref 70–110)

## 2018-07-25 PROCEDURE — 25000003 PHARM REV CODE 250: Performed by: INTERNAL MEDICINE

## 2018-07-25 PROCEDURE — 21400001 HC TELEMETRY ROOM

## 2018-07-25 PROCEDURE — 63600175 PHARM REV CODE 636 W HCPCS: Performed by: INTERNAL MEDICINE

## 2018-07-25 PROCEDURE — 94761 N-INVAS EAR/PLS OXIMETRY MLT: CPT

## 2018-07-25 PROCEDURE — 94660 CPAP INITIATION&MGMT: CPT

## 2018-07-25 PROCEDURE — 97530 THERAPEUTIC ACTIVITIES: CPT

## 2018-07-25 PROCEDURE — 27000221 HC OXYGEN, UP TO 24 HOURS

## 2018-07-25 PROCEDURE — 97116 GAIT TRAINING THERAPY: CPT

## 2018-07-25 PROCEDURE — 94799 UNLISTED PULMONARY SVC/PX: CPT

## 2018-07-25 PROCEDURE — 99900035 HC TECH TIME PER 15 MIN (STAT)

## 2018-07-25 PROCEDURE — 94640 AIRWAY INHALATION TREATMENT: CPT

## 2018-07-25 PROCEDURE — A4216 STERILE WATER/SALINE, 10 ML: HCPCS | Performed by: INTERNAL MEDICINE

## 2018-07-25 PROCEDURE — 97535 SELF CARE MNGMENT TRAINING: CPT

## 2018-07-25 RX ORDER — CLONIDINE 0.2 MG/24H
1 PATCH, EXTENDED RELEASE TRANSDERMAL
Status: DISCONTINUED | OUTPATIENT
Start: 2018-07-25 | End: 2018-07-27 | Stop reason: HOSPADM

## 2018-07-25 RX ADMIN — INSULIN ASPART 4 UNITS: 100 INJECTION, SOLUTION INTRAVENOUS; SUBCUTANEOUS at 11:07

## 2018-07-25 RX ADMIN — POTASSIUM PHOSPHATE, MONOBASIC 1000 MG: 500 TABLET, SOLUBLE ORAL at 08:07

## 2018-07-25 RX ADMIN — METOPROLOL TARTRATE 100 MG: 50 TABLET ORAL at 08:07

## 2018-07-25 RX ADMIN — Medication 10 ML: at 12:07

## 2018-07-25 RX ADMIN — Medication 10 ML: at 11:07

## 2018-07-25 RX ADMIN — Medication 10 ML: at 06:07

## 2018-07-25 RX ADMIN — ATORVASTATIN CALCIUM 80 MG: 40 TABLET, FILM COATED ORAL at 08:07

## 2018-07-25 RX ADMIN — INSULIN ASPART 8 UNITS: 100 INJECTION, SOLUTION INTRAVENOUS; SUBCUTANEOUS at 04:07

## 2018-07-25 RX ADMIN — AMIODARONE HYDROCHLORIDE 400 MG: 200 TABLET ORAL at 08:07

## 2018-07-25 RX ADMIN — CLONIDINE 1 PATCH: 0.2 PATCH TRANSDERMAL at 08:07

## 2018-07-25 RX ADMIN — INSULIN ASPART 2 UNITS: 100 INJECTION, SOLUTION INTRAVENOUS; SUBCUTANEOUS at 10:07

## 2018-07-25 RX ADMIN — LIDOCAINE 1 PATCH: 50 PATCH TOPICAL at 08:07

## 2018-07-25 RX ADMIN — TIOTROPIUM BROMIDE 18 MCG: 18 CAPSULE ORAL; RESPIRATORY (INHALATION) at 08:07

## 2018-07-25 RX ADMIN — RIVAROXABAN 20 MG: 20 TABLET, FILM COATED ORAL at 05:07

## 2018-07-25 RX ADMIN — PREDNISONE 10 MG: 5 TABLET ORAL at 08:07

## 2018-07-25 RX ADMIN — ISOSORBIDE MONONITRATE 120 MG: 30 TABLET, EXTENDED RELEASE ORAL at 08:07

## 2018-07-25 RX ADMIN — LISINOPRIL 40 MG: 20 TABLET ORAL at 08:07

## 2018-07-25 RX ADMIN — RAMELTEON 8 MG: 8 TABLET, FILM COATED ORAL at 10:07

## 2018-07-25 RX ADMIN — ASPIRIN 81 MG CHEWABLE TABLET 81 MG: 81 TABLET CHEWABLE at 08:07

## 2018-07-25 RX ADMIN — PANTOPRAZOLE SODIUM 40 MG: 40 TABLET, DELAYED RELEASE ORAL at 08:07

## 2018-07-25 RX ADMIN — ACETAMINOPHEN 650 MG: 325 TABLET, FILM COATED ORAL at 05:07

## 2018-07-25 RX ADMIN — FUROSEMIDE 40 MG: 40 TABLET ORAL at 08:07

## 2018-07-25 RX ADMIN — ACETAMINOPHEN 650 MG: 325 TABLET, FILM COATED ORAL at 11:07

## 2018-07-25 NOTE — PROGRESS NOTES
" [facility]  Adult Nutrition  Progress Note    SUMMARY       Recommendations    Recommendation/Intervention: No nutr recs @ this time; cont w/ current nutr POC/diet & oral nutr suppl order  Goals: Meet >85% EEN  Nutrition Goal Status: goal met  Communication of RD Recs: reviewed with RN    Reason for Assessment    Reason for Assessment: RD follow-up  Diagnosis: infection/sepsis  Relevant Medical History: CHF, COPD, DM  Interdisciplinary Rounds: did not attend  General Information Comments: Pt reports a cont'd good appetite w/ improved tolerance since ordering chopped meats for pt. Is drinking less Boost daily. Possibly tx to SNF today.   Nutrition Discharge Planning: Adequate intake of meals to meet nutr needs.    Nutrition Risk Screen    Nutrition Risk Screen: no indicators present    Nutrition/Diet History    Patient Reported Diet/Restrictions/Preferences: heart healthy  Food Preferences: Denies cultural, Mormonism food preferences.   Do you have any cultural, spiritual, Mormonism conflicts, given your current situation?:  (none)  Factors Affecting Nutritional Intake: chewing difficulties/inability to chew food    Anthropometrics    Temp: 98.6 °F (37 °C)  Height Method: Stated  Height: 5' 5" (165.1 cm)  Height (inches): 65 in  Weight Method: Bed Scale  Weight: 83.2 kg (183 lb 6.8 oz) (taken by RN on )  Weight (lb): 183.42 lb  Ideal Body Weight (IBW), Female: 125 lb  % Ideal Body Weight, Female (lb): 146.74 lb  BMI (Calculated): 30.6  BMI Grade: 30 - 34.9- obesity - grade I  Weight Loss: unintentional  Usual Body Weight (UBW), k.7 kg  % Usual Body Weight: 91.92  % Weight Change From Usual Weight: -8.27 %       Lab/Procedures/Meds    Pertinent Labs Reviewed: reviewed  Pertinent Labs Comments: CO2 36, Glu 211, POCT glu 179-295  Pertinent Medications Reviewed: reviewed  Pertinent Medications Comments: lasix, amiodarone, docusate, statin, pantoprazole, K Phos, prednisone    Physical " Findings/Assessment    Overall Physical Appearance: loss of muscle mass, obese  Oral/Mouth Cavity: tooth/teeth missing  Skin: intact    Estimated/Assessed Needs    Weight Used For Calorie Calculations: 83.2 kg (183 lb 6.8 oz)  Energy Calorie Requirements (kcal): 1716  Energy Need Method: Bradford-St Jeor (x 1.25 (PAL))  Protein Requirements: 83 gms (1gm/kg)  Weight Used For Protein Calculations: 83.2 kg (183 lb 6.8 oz)     Fluid Need Method: RDA Method  RDA Method (mL): 1716  CHO Requirement: 225g      Nutrition Prescription Ordered    Current Diet Order: 2000 ADA/Cardiac  Nutrition Order Comments: w/ finely chopped meats  Oral Nutrition Supplement: Boost Glu Control TID    Evaluation of Received Nutrient/Fluid Intake    I/O: reviewed  Energy Calories Required: meeting needs  Protein Required: meeting needs  Fluid Required: meeting needs  Comments: :BM 7/24; good uop  Tolerance: tolerating  % Intake of Estimated Energy Needs: 75 - 100 %  % Meal Intake: 75 - 100 %    Nutrition Risk    Level of Risk/Frequency of Follow-up:  (F/u 1 x weekly)     Assessment and Plan    Nutrition Problem  Inadequate energy intake     Related to (etiology):   resp distress/lethargy     Signs and Symptoms (as evidenced by):   <85% of EEN/EPN being met w/ 25-50% intake of meals      Interventions/Recommendations (treatment strategy):  See recs     Nutrition Diagnosis Status:   Resolved     Monitor and Evaluation    Food and Nutrient Intake: energy intake, food and beverage intake  Food and Nutrient Adminstration: diet order  Knowledge/Beliefs/Attitudes: food and nutrition knowledge/skill  Anthropometric Measurements: weight, weight change  Biochemical Data, Medical Tests and Procedures: electrolyte and renal panel, glucose/endocrine profile  Nutrition-Focused Physical Findings: overall appearance     Nutrition Follow-Up    RD Follow-up?: Yes

## 2018-07-25 NOTE — PLAN OF CARE
Problem: Patient Care Overview  Goal: Plan of Care Review  Outcome: Ongoing (interventions implemented as appropriate)   07/25/18 1409   Coping/Psychosocial   Plan Of Care Reviewed With patient   Gen weakness noted Ambulated in room with walker to bedside commode then to chair. Weakness noted with transfer from sitting to standing. No falls or injuries noted. Diabetes control with sliding scale with novolog. Good appetite noted % diet consumed    Problem: Diabetes, Type 2 (Adult)  Goal: Signs and Symptoms of Listed Potential Problems Will be Absent, Minimized or Managed (Diabetes, Type 2)  Signs and symptoms of listed potential problems will be absent, minimized or managed by discharge/transition of care (reference Diabetes, Type 2 (Adult) CPG).   Outcome: Ongoing (interventions implemented as appropriate)   07/25/18 1409   Diabetes, Type 2   Problems Assessed (Type 2 Diabetes) all   Problems Present (Type 2 Diabetes) none

## 2018-07-25 NOTE — PLAN OF CARE
Problem: Occupational Therapy Goal  Goal: Occupational Therapy Goal  Goals to be met by: 7/13/18     Patient will increase functional independence with ADLs by performing:    UE Dressing with Set-up Assistance. Met 7/24  LE Dressing with Minimal Assistance.  Grooming while seated with Supervision.  Toileting from bedside commode with Contact Guard Assistance for hygiene and clothing management.   Supine to sit with Set-up Assistance. Met 7/24  Toilet transfer to bedside commode with Contact Guard Assistance.  Upper extremity exercise program x10 reps per handout, with assistance as needed.       Outcome: Ongoing (interventions implemented as appropriate)  Patient with good motivation and participation in therapy today.  Patient able [place] [facility] [place]GA-Min A/RW. Patient is progressing toward OT goals.

## 2018-07-25 NOTE — PLAN OF CARE
07/24/18 2010   Patient Assessment/Suction   Level of Consciousness (AVPU) alert   Respiratory Effort Unlabored   PRE-TX-O2-ETCO2   O2 Device (Oxygen Therapy) nasal cannula   $ Is the patient on Low Flow Oxygen? Yes   Flow (L/min) 3   SpO2 97 %   Pulse Oximetry Type Intermittent   $ Pulse Oximetry - Multiple Charge Pulse Oximetry - Multiple   Aerosol Therapy   $ Aerosol Therapy Charges PRN treatment not required;Refused   Respiratory Treatment Status PRN treatment not required;refused   Incentive Spirometer   $ Incentive Spirometer Charges done with encouragement   Administration (Incentive Spirometer) done with encouragement   Number of Repetitions (Incentive Spirometer) 8   Level (mL) (Incentive Spirometer) 750   Patient Tolerance good   Preset CPAP/BiPAP Settings   Mode Of Delivery Standby   pt will wear around 10pm bipap machine tonight.

## 2018-07-25 NOTE — ASSESSMENT & PLAN NOTE
- Complications of peripheral neuropathy.   - Increased basal and prandial insulin in setting of steroids  - Steroids stopped 7/25. SSI PRN with accuchecks.  - Goal < 180

## 2018-07-25 NOTE — PLAN OF CARE
Problem: Physical Therapy Goal  Goal: Physical Therapy Goal  Goals to be met by: 2018     Patient will increase functional independence with mobility by performin. Supine to sit with Modified [place]  2. Sit to supine with Modified [place]  3. Sit to stand transfer with Modified [place]  4. Gait  x 250 feet with Modified [place] using Rolling Walker.  5. B LE therex x30 reps with modified independence.         Outcome: Ongoing (interventions implemented as appropriate)  Pt ambulated ~ 40 ft and 20 ft RW, CGA ( 3 L O2 NC bedside chair followed ). Pt will benefit from further skilled therapy in order to get back to PLOF.

## 2018-07-25 NOTE — ASSESSMENT & PLAN NOTE
- Pt on metoprolol for HR and BP control  - Home meds gradually restarted as BP climbed. Lisinopril 40, Imdur 120. Added clonidine patch 0.2 pm 7/25.  - Monitor and adjust as needed.

## 2018-07-25 NOTE — SUBJECTIVE & OBJECTIVE
Interval History: No acute events.     Review of Systems   Constitutional: Negative for chills and fever.   Respiratory: Negative for shortness of breath.    Cardiovascular: Negative for chest pain.     Objective:     Vital Signs (Most Recent):  Temp: 98.6 °F (37 °C) (07/25/18 1100)  Pulse: 74 (07/25/18 1100)  Resp: 16 (07/25/18 1100)  BP: (!) 168/72 (07/25/18 1100)  SpO2: 96 % (07/25/18 1100) Vital Signs (24h Range):  Temp:  [97 °F (36.1 °C)-99.5 °F (37.5 °C)] 98.6 °F (37 °C)  Pulse:  [66-84] 74  Resp:  [14-20] 16  SpO2:  [91 %-99 %] 96 %  BP: (136-185)/(60-75) 168/72     Weight: 83.2 kg (183 lb 6.8 oz) (taken by RN on 7/21)  Body mass index is 30.52 kg/m².  No intake or output data in the 24 hours ending 07/25/18 1241   Physical Exam   Constitutional: She is oriented to person, place, and time. She appears well-developed. No distress.   HENT:   Head: Normocephalic and atraumatic.   Eyes: EOM are normal. Pupils are equal, round, and reactive to light.   Neck: Normal range of motion. Neck supple.   Cardiovascular: Normal rate and regular rhythm.    Pulmonary/Chest: Effort normal and breath sounds normal. No respiratory distress. She has no wheezes.   Abdominal: Soft. Bowel sounds are normal.   Musculoskeletal: Normal range of motion. She exhibits no edema.   Neurological: She is alert and oriented to person, place, and time.   Skin: Skin is warm and dry. Capillary refill takes less than 2 seconds. She is not diaphoretic.   Psychiatric: She has a normal mood and affect. Her behavior is normal. Thought content normal.

## 2018-07-25 NOTE — PT/OT/SLP PROGRESS
Physical Therapy Treatment    Patient Name:  Yasmeen Palm   MRN:  [id number]    Recommendations:     Discharge Recommendations:  nursing facility, skilled   Discharge Equipment Recommendations:  TBD   Barriers to discharge: LIMITED ENDURANCE FOR FUNCTIONAL MOBILITY     Assessment:     Yasmeen Palm is a 68 y.o. female admitted with a medical diagnosis of Sepsis.  She presents with the following impairments/functional limitations:  weakness, impaired endurance, impaired self care skills, gait instability, impaired balance, impaired functional mobilty, decreased safety awareness, pain, impaired cardiopulmonary response to activity, decreased lower extremity function, decreased upper extremity function .    Rehab Prognosis:  good; patient would benefit from acute skilled PT services to address these deficits and reach maximum level of function.      Recent Surgery: Procedure(s) (LRB):  Left heart cath R rad access, not before 9am (Left)      Plan:     During this hospitalization, patient to be seen daily to address the above listed problems via gait training, therapeutic activities, therapeutic exercises  · Plan of Care Expires:  08/02/18   Plan of Care Reviewed with: patient    Subjective     Communicated with nurse Zuluaga prior to session.  Patient found seated in chair with DAVIS present upon PT entry to room, agreeable to treatment.      Chief Complaint: none   Patient comments/goals: to get back to PLOF.   Pain/Comfort:  · Pain Rating 1: 0/10  · Pain Rating Post-Intervention 1: 0/10    Patients cultural, spiritual, Holiness conflicts given the current situation: none    Objective:     Patient found with: telemetry, oxygen     General Precautions: Standard, fall, respiratory   Orthopedic Precautions:N/A   Braces: N/A     Functional Mobility:  · Bed Mobility:     · Scooting: stand by assistance  · Sit to Supine: stand by assistance  · Transfers:     · Sit to Stand:  minimum assistance from bedside commode  and  moderate assistance from bedside chair x 2 trials  with rolling walker  · Bedside to bed: contact guard assistance with  rolling walker  using  Stand Pivot  · Gait:   Patient ambulated ~ 40 ft and 20 ft on level tile with Rolling Walker with Contact Guard Assistance ( bedside chair followed , 3L O2 NC) . Pt required 1 seated rest break during gait training 2* fatigue.  Pt with demonstarting a  3-point gait with decreased dyllan, increased time in double stance, decreased velocity of limb motion and decreased step length.Impairments contributing to gait deviations include impaired balance, decreased ROM and decreased strength         AM-PAC 6 CLICK MOBILITY  Turning over in bed (including adjusting bedclothes, sheets and blankets)?: 4  Sitting down on and standing up from a chair with arms (e.g., wheelchair, bedside commode, etc.): 2  Moving from lying on back to sitting on the side of the bed?: 4  Moving to and from a bed to a chair (including a wheelchair)?: 3  Need to walk in hospital room?: 3  Climbing 3-5 steps with a railing?: 3  Basic Mobility Total Score: 19       Therapeutic Activities and Exercises:   pt performed transfer , gait training and bed mobility as above.    Patient left with bed in chair position with all lines intact, call button in reach and nurse notified..    GOALS:    Physical Therapy Goals        Problem: Physical Therapy Goal    Goal Priority Disciplines Outcome Goal Variances Interventions   Physical Therapy Goal     PT/OT, PT Ongoing (interventions implemented as appropriate)     Description:  Goals to be met by: 2018     Patient will increase functional independence with mobility by performin. Supine to sit with Modified [place]  2. Sit to supine with Modified [place]  3. Sit to stand transfer with Modified [place]  4. Gait  x 250 feet with Modified [place] using Rolling Walker.  5. B LE therex x30 reps with modified independence.                           Time Tracking:     PT Received On: 07/25/18  PT Start Time: 1422     PT Stop Time: 1445  PT Total Time (min): 23 min     Billable Minutes: Gait Training 12 and Total Time 23 ( partial co-treat with DAVIS)     Treatment Type: Treatment  PT/PTA: PTA     PTA Visit Number: 1     Tram T Elaine, PTA  07/25/2018

## 2018-07-25 NOTE — ASSESSMENT & PLAN NOTE
"- Source likely urine as she is incontinent and UA was abnormal with very cloudy appearing urine.   - UCx with yeast which was thought to be more so a colonizer, since patient did improve with Zosyn which was continued  - CTA of chest/abdomen showed a right pneumocele with "debri" which was unlikely to cause degree of clinical instability and invasive diagnosis/treatment would be of very high risk for this patient.   - No other potential source identified. Continued with pip-tazo until 7/19 then stopped as 7 day course completed.  "

## 2018-07-25 NOTE — PLAN OF CARE
TN met with patient to inquire of whom will sign paperwork at [facility]. Patient stated that her  [place] will. TN contacted Felisa at [facility] to inform her. Felisa will call patient to inform her of all financial information that is needed to complete the acceptance process.        07/25/18 1304   Discharge Reassessment   Assessment Type Discharge Planning Reassessment   Provided patient/caregiver education on the expected discharge date and the discharge plan Yes   Do you have any problems affording any of your prescribed medications? No   Discharge Plan A Skilled Nursing Facility   Discharge Plan B Skilled Nursing Facility   Patient choice form signed by patient/caregiver No   Can the patient answer the patient profile reliably? Yes, cognitively intact   How does the patient rate their overall health at the present time? Fair   How often would a person be available to care for the patient? Often   Number of comorbid conditions (as recorded on the chart) Five or more   During the past month, has the patient often been bothered by feeling down, depressed or hopeless? No   During the past month, has the patient often been bothered by little interest or pleasure in doing things? No

## 2018-07-25 NOTE — PLAN OF CARE
Problem: Patient Care Overview  Goal: Plan of Care Review  Outcome: Ongoing (interventions implemented as appropriate)   07/25/18 0641   Coping/Psychosocial   Plan Of Care Reviewed With patient   Plan of care reviewed with patient. Voiced acceptance of possible transfer to SNF today. Fall precautions maintained. Encouraged patient to get up to BSC, resistant to getting up. Diaper changed, moisture barrier applied. Skin intact    Problem: Diabetes, Type 2 (Adult)  Goal: Signs and Symptoms of Listed Potential Problems Will be Absent, Minimized or Managed (Diabetes, Type 2)  Signs and symptoms of listed potential problems will be absent, minimized or managed by discharge/transition of care (reference Diabetes, Type 2 (Adult) CPG).   Outcome: Ongoing (interventions implemented as appropriate)   07/25/18 0641   Diabetes, Type 2   Problems Assessed (Type 2 Diabetes) all   Problems Present (Type 2 Diabetes) hyperglycemia       Problem: Fall Risk (Adult)  Goal: Identify Related Risk Factors and Signs and Symptoms  Related risk factors and signs and symptoms are identified upon initiation of Human Response Clinical Practice Guideline (CPG)   Outcome: Ongoing (interventions implemented as appropriate)   07/25/18 0641   Fall Risk   Related Risk Factors (Fall Risk) fatigue/slow reaction;gait/mobility problems;environment unfamiliar   Signs and Symptoms (Fall Risk) presence of risk factors

## 2018-07-25 NOTE — PT/OT/SLP PROGRESS
Occupational Therapy   Treatment    Name: Yasmeen Palm  MRN: [id number]  Admitting Diagnosis:  Sepsis       Recommendations:     Discharge Recommendations: nursing facility, skilled  Discharge Equipment Recommendations:   (TBD)  Barriers to discharge:  Decreased caregiver support    Subjective     Communicated with: Nurse Margareth prior to session.  Patient found seated in bedside chair; agreeable to therapy.     Pain/Comfort:  · Pain Rating 1: 0/10    Patients cultural, spiritual, Syn[employer] conflicts given the current situation: na    Objective:     Patient found with: telemetry, PICC line, oxygen    General Precautions: Standard, fall, respiratory   Orthopedic Precautions:N/A   Braces: N/A     Occupational Performance:    Bed Mobility:    · Patient completed Scooting/Bridging with stand by assistance  · Patient completed Sit to Supine with stand by assistance     Functional Mobility/Transfers:  · Patient completed Sit <> Stand Transfer with minimum assistance x1 from BSC and moderate assistance x4 from bedside chair with  rolling walker   · Patient completed Toilet Transfer Step transfer technique with minimum assistance and contact guard assistance with  rolling walker and bedside commode  · Functional Mobility: Patient able to ambulate in room and in hallway with PT with CGA/RW.  Patient easily fatigued/SOB with mobility and requires seated rest breaks for recovery, 3L O2 NC 91% SPO2 with mobility.    Activities of Daily Living:  · Grooming:supervision seated in bedside chair for oral care  · Toileting: minimum assistance for clothing management and contact guard assistance for pericare on bedside commode    Patient left HOB elevated with all lines intact, call button in reach, bed alarm on and nurse notified    AMPAC 6 Click:  AMPA Total Score: 18    Treatment & Education:  Patient performed bed mobility, functional transfers, and ADL's as above.   Patient re-educated on BUE AROM therex in all available planes  via demo. Patient verbalizes understanding.   Education:    Assessment:     Yasmeen Palm is a 68 y.o. female with a medical diagnosis of Sepsis.  She presents with the following performance deficits affecting function: weakness, impaired endurance, impaired self care skills, impaired functional mobilty, gait instability, impaired balance, decreased upper extremity function, decreased lower extremity function, decreased safety awareness, impaired cardiopulmonary response to activity.  Patient with good motivation and participation in therapy today.  Patient able to transfer to [facility] with CGA-Min A/RW. Patient is progressing toward OT goals.     Rehab Prognosis:  Fair+; patient would benefit from acute skilled OT services to address these deficits and reach maximum level of function.       Plan:     Patient to be seen 5 x/week to address the above listed problems via self-care/home management, therapeutic activities, therapeutic exercises  · Plan of Care Expires: 08/03/18  · Plan of Care Reviewed with: patient, spouse    This Plan of care has been discussed with the patient who was involved in its development and understands and is in agreement with the identified goals and treatment plan    GOALS:    Occupational Therapy Goals        Problem: Occupational Therapy Goal    Goal Priority Disciplines Outcome Interventions   Occupational Therapy Goal     OT, PT/OT Ongoing (interventions implemented as appropriate)    Description:  Goals to be met by: 7/13/18     Patient will increase functional independence with ADLs by performing:    UE Dressing with Set-up Assistance. Met 7/24  LE Dressing with Minimal Assistance.  Grooming while seated with Supervision. Met 7/25  Toileting from bedside commode with Contact Guard Assistance for hygiene and clothing management.   Supine to sit with Set-up Assistance. Met 7/24  Toilet transfer to bedside commode with Contact Guard Assistance.  Upper extremity exercise program x10 reps per  handout, with assistance as needed.                        Time Tracking:     OT Date of Treatment: 07/25/18  OT Start Time: 1414  OT Stop Time: 1445  OT Total Time (min): 31 min    Billable Minutes:Self Care/Home Management 8   Therapeutic Activity 11 (partial co-tx with PT)    JOAQUIN Rockwell  7/25/2018

## 2018-07-25 NOTE — PLAN OF CARE
Problem: Diabetes, Type 2 (Adult)  Goal: Signs and Symptoms of Listed Potential Problems Will be Absent, Minimized or Managed (Diabetes, Type 2)  Signs and symptoms of listed potential problems will be absent, minimized or managed by discharge/transition of care (reference Diabetes, Type 2 (Adult) CPG).   Outcome: Ongoing (interventions implemented as appropriate)   07/24/18 2008   Diabetes, Type 2   Problems Assessed (Type 2 Diabetes) all   Problems Present (Type 2 Diabetes) hyperglycemia       Problem: Fall Risk (Adult)  Goal: Identify Related Risk Factors and Signs and Symptoms  Related risk factors and signs and symptoms are identified upon initiation of Human Response Clinical Practice Guideline (CPG)   Outcome: Ongoing (interventions implemented as appropriate)   07/24/18 2008   Fall Risk   Related Risk Factors (Fall Risk) bladder function altered;culprit medication(s);fatigue/slow reaction;gait/mobility problems;history of falls;polypharmacy;environment unfamiliar  (prolonged hospitalization)   Signs and Symptoms (Fall Risk) presence of risk factors

## 2018-07-25 NOTE — PROGRESS NOTES
[facility] Hospital Medicine  Progress Note    Patient Name: Yasmeen Palm  MRN: [id number]  Patient Class: IP- Inpatient   Admission Date: 6/24/2018  Length of Stay: 31 days  Attending Physician: Viri Paredes MD  Primary Care Provider: Angel Orourke Jr, MD        Subjective:     Principal Problem:Sepsis    HPI:  Yasmeen Palm is a 68 y.o. female that (in part)  has a past medical history of Anticoagulant long-term use; Arthritis; Asthma; CHF (congestive heart failure); COPD (chronic obstructive pulmonary disease); Coronary artery disease; Depression; Diabetes mellitus; GERD (gastroesophageal reflux disease); and Hypertension.  has a past surgical history that includes Abdominal surgery; Cardiac surgery; and Hernia repair. Presents to [facility] Emergency Department complaining of chest pain .  She reports compliance with her home medication regimen, including Xarelto.     Description of symptoms  Location:  Substernal  Onset:  Acute onset 2 days ago  Character:  The patient remained; moderate severity  Frequency:  Daily  Duration:  each episode lasts several minutes at a time  Associated Symptoms:  Diaphoresis, shortness of breath, weakness and fatigue  Radiation:  Recent the chest  Exacerbating factors:  Worse on exertion  Relieving factors:  Minimal relief with supplemental oxygen     In the emergency department routine laboratory studies, chest x-ray, EKG, cardiac enzymes were obtained.  EKG findings were concerning the case was discussed with cardiologist, Dr. Pulido.  It was determined that her EKG was not consistent with STEMI and she has been chest pain-free since arrival.  She had been given Lovenox, aspirin, and plan was to continue trending troponins and monitor closely on telemetry.    Hospital Course:  Ms. Palm was admitted to the hospital originally on 6/24/18 for chest pain. She was later sent to the ICU with acute hypercapnic respiratory failure  "the same day on BIPAP.  She quickly improved and was sent to the floor on 6/25.  Cards was consulted for NSTEMI with noted troponin increase and was planning on LHC on 6/26. However, the patient was sent back to the ICU on 6/26 with hypercapnic respiratory failure with a CO2 of > 100 and was intubated. Pulmonary was consulted. Pt clinically improved from respiratory standpoint and was extubated on 6/27 to NC, but she did complain of chest discomfort. Cardiology was notified and patient was taken for LHC on 6/27 which was only remarkable for non-obstructive CAD and did not require intervention. Pt was given IVF post procedure and the next day developed increased SOB and required BIPAP. Pt was treated with IV lasix with good response with much improved respiratory status after output of 1L. Pt also treated for COPD exacerbation with IV steroids, NEBS and doxycycline. ECHO performed on 6/25/2018 remarkable for EF=50-55% + grade 2 diastolic dysfunction. Pt diuresed and changed to maintenance PO lasix regimen. She as started on BIPAP due to increased pCO2 on ABG and lethargy. Pt will need BIPAP/home ventilator for use at home and was approved for the device prior to discharge.    On 7/13, she became febrile, lethargic, hypercapnic, with SOB and was in AFib with RVR. Transferred to ICU again, on BiPAP and amiodarone infusion. Clinically improved over the course of 3 days with IV diuresis 40 mg daily, solumedrol 40 mg TID, BiPAP and empiric Zosyn. Other than UA consistent with UTI, CT chest/abdomen/pelvis with IV showed no other potential source for infection. There was mention of a pneumocele with "debri" however unlikely this to be cause for sepsis and invasive treatment would be too high risk (discussed with pulmonologist). She was weaned to low flow NC to remain in place continuously. AFib with RVR was extremely difficult to control despite amiodarone infusion and treatment of sepsis. Metoprolol added and uptitrated, " and currently on 100 mg BID with good results. She has remained without further event on telemetry. Her H&H did drop slowly throughout admission with no apparent bleed source other than frequent lab monitoring. She was transfused 1 unit RBCs on 7/21. She continued to be stable. BP meds adjusted. Prednisone taper completed prior to discharge.     PT/OT consulted and recommended SNF. PPD placed and SW consulted.     Interval History: No acute events.     Review of Systems   Constitutional: Negative for chills and fever.   Respiratory: Negative for shortness of breath.    Cardiovascular: Negative for chest pain.     Objective:     Vital Signs (Most Recent):  Temp: 98.6 °F (37 °C) (07/25/18 1100)  Pulse: 74 (07/25/18 1100)  Resp: 16 (07/25/18 1100)  BP: (!) 168/72 (07/25/18 1100)  SpO2: 96 % (07/25/18 1100) Vital Signs (24h Range):  Temp:  [97 °F (36.1 °C)-99.5 °F (37.5 °C)] 98.6 °F (37 °C)  Pulse:  [66-84] 74  Resp:  [14-20] 16  SpO2:  [91 %-99 %] 96 %  BP: (136-185)/(60-75) 168/72     Weight: 83.2 kg (183 lb 6.8 oz) (taken by RN on 7/21)  Body mass index is 30.52 kg/m².  No intake or output data in the 24 hours ending 07/25/18 1241   Physical Exam   Constitutional: She is oriented to person, place, and time. She appears well-developed. No distress.   HENT:   Head: Normocephalic and atraumatic.   Eyes: EOM are normal. Pupils are equal, round, and reactive to light.   Neck: Normal range of motion. Neck supple.   Cardiovascular: Normal rate and regular rhythm.    Pulmonary/Chest: Effort normal and breath sounds normal. No respiratory distress. She has no wheezes.   Abdominal: Soft. Bowel sounds are normal.   Musculoskeletal: Normal range of motion. She exhibits no edema.   Neurological: She is alert and oriented to person, place, and time.   Skin: Skin is warm and dry. Capillary refill takes less than 2 seconds. She is not diaphoretic.   Psychiatric: She has a normal mood and affect. Her behavior is normal. Thought  "content normal.         Assessment/Plan:      * Sepsis    - Source likely urine as she is incontinent and UA was abnormal with very cloudy appearing urine.   - UCx with yeast which was thought to be more so a colonizer, since patient did improve with Zosyn which was continued  - CTA of chest/abdomen showed a right pneumocele with "debri" which was unlikely to cause degree of clinical instability and invasive diagnosis/treatment would be of very high risk for this patient.   - No other potential source identified. Continued with pip-tazo until 7/19 then stopped as 7 day course completed.        Acute bacterial conjunctivitis of right eye    Erythromycin ointment to right eye started 7/18/2018. Improved. Continue x 7 days then stopped. Cold pack to right eye thereafter and advised patient to stop rubbing.        Atrial fibrillation with RVR    - May have been secondary to development of sepsis and subsequent respiratory failure.   - Pt has been on amiodarone gtt and improving from sepsis standpoint and HR has greatly improved by increasing dose of metoprolol to 100 mg BID.   - Pt spontaneously converted to NSR 7/16 and case discussed with Cardiology  - Amiodarone converted to PO 7/17/2018. Slow taper given how difficult it was to control.  - Pt is stable on telemetry. She is already fully anticoagulated        Debility    - Resumed PT/OT  - Recommending SNF        Acute hypercapnic respiratory failure    - A recurrent issue during this admission        PAF (paroxysmal atrial fibrillation)    - As discussed above        Neuropathy    - No acute issues         Non-STEMI (non-ST elevated myocardial infarction)    - Followed by Cardiology  - s/p LHC on 6/27 only remarkable for non-obstructive CAD (40% RCA)  - Continue medical management as per Cardiology with ASA, statin, metoprolol and patient will not be placed on plavix as she is also on Xarelto        Acute exacerbation of chronic obstructive pulmonary disease (COPD) "    - Pt has been back and forth to ICU for hypercapnic respiratory failure.   - Underlying COPD (centrilobular emphysema seen on CT chest 6/2018) but also complicated with diastolic HF and Afib with RVR  - Pt was intubated at one point, but overall improved with trial of solumedrol 40 mg TID and another dose of IV lasix.   - Started with solumedrol 40 mg TID then de-escalate to prednisone PO and slowly tapered. Prednisone 10mg last day 7/25 and stopped.  - De-escalated furosemide to PO for maintenance diuresis.   - Continuous supplemental O2 via low flow NC for goal sats 88-93%, incentive spirometer and BiPAP QHS  - Pt has been approved for home BIPAP and has the machine at home for when she is discharged.        Acute diastolic CHF (congestive heart failure)    - Initially due to volume overload, but now likely due to sepsis + Afib RVR  - Last ECHO with EF=50-55% + grade 2 diastolic dysfunction on 6/26  - Stable and well compensated. Management as above        Obesity    - Weight reduction as outpatient after all acute issues resolved        Chronic anticoagulation    - Patient on Xarelto for PAF which has been resumed        History of deep vein thrombosis    - Resumed anticoagulation         History of pulmonary embolism    - Xarelto resumed   - CTA negative for PE        Anemia of chronic disease    - Pt with gradual trend down, but no overt source of bleeding  - Suspect iatrogenic given multiple blood draws over prolonged hospital stay daily  - Transfused 1 unit RBCs 7/21.        Malignant hypertension    - Pt on metoprolol for HR and BP control  - Home meds gradually restarted as BP climbed. Lisinopril 40, Imdur 120. Added clonidine patch 0.2 pm 7/25.  - Monitor and adjust as needed.         Tobacco abuse    - Smoking cessation counseling done        Gastroesophageal reflux disease without esophagitis    - Continue PPI        Type 2 diabetes mellitus, controlled    - Complications of peripheral neuropathy.   -  Increased basal and prandial insulin in setting of steroids  - Steroids stopped 7/25. SSI PRN with accuchecks.  - Goal < 180        Coronary artery disease involving native coronary artery of native heart with angina pectoris    - [facility] on 6/27. Non obstructive CAD (40% RCA)  - On adequate cardioprotective regimen          VTE Risk Mitigation         Ordered     rivaroxaban tablet 20 mg  With dinner      07/24/18 4794              Viri Paredes MD  Department of Hospital Medicine   [facility]

## 2018-07-25 NOTE — UM SECONDARY REVIEW
[facility] Medical Affairs    IP Extended Stay > 10   Pt accepted to [facility] beds at the [facility]. Awaiting family to sign paperwork     LOS: approved an agreement with D/C plan     Approved per  list

## 2018-07-25 NOTE — ASSESSMENT & PLAN NOTE
- May have been secondary to development of sepsis and subsequent respiratory failure.   - Pt has been on amiodarone gtt and improving from sepsis standpoint and HR has greatly improved by increasing dose of metoprolol to 100 mg BID.   - Pt spontaneously converted to NSR 7/16 and case discussed with Cardiology  - Amiodarone converted to PO 7/17/2018. Slow taper given how difficult it was to control.  - Pt is stable on telemetry. She is already fully anticoagulated

## 2018-07-26 LAB
ANION GAP SERPL CALC-SCNC: 10 MMOL/L
BASOPHILS # BLD AUTO: 0.01 K/UL
BASOPHILS NFR BLD: 0.1 %
BUN SERPL-MCNC: 13 MG/DL
CALCIUM SERPL-MCNC: 9.1 MG/DL
CHLORIDE SERPL-SCNC: 99 MMOL/L
CO2 SERPL-SCNC: 33 MMOL/L
CREAT SERPL-MCNC: 0.7 MG/DL
DIFFERENTIAL METHOD: ABNORMAL
EOSINOPHIL # BLD AUTO: 0.1 K/UL
EOSINOPHIL NFR BLD: 1.1 %
ERYTHROCYTE [DISTWIDTH] IN BLOOD BY AUTOMATED COUNT: 20 %
EST. GFR  (AFRICAN AMERICAN): >60 ML/MIN/1.73 M^2
EST. GFR  (NON AFRICAN AMERICAN): >60 ML/MIN/1.73 M^2
GLUCOSE SERPL-MCNC: 125 MG/DL
HCT VFR BLD AUTO: 25.8 %
HGB BLD-MCNC: 7.5 G/DL
LYMPHOCYTES # BLD AUTO: 1.6 K/UL
LYMPHOCYTES NFR BLD: 16 %
MCH RBC QN AUTO: 26 PG
MCHC RBC AUTO-ENTMCNC: 29.1 G/DL
MCV RBC AUTO: 89 FL
MONOCYTES # BLD AUTO: 0.9 K/UL
MONOCYTES NFR BLD: 9.2 %
NEUTROPHILS # BLD AUTO: 7.3 K/UL
NEUTROPHILS NFR BLD: 73.2 %
PLATELET # BLD AUTO: 377 K/UL
PMV BLD AUTO: 9.9 FL
POCT GLUCOSE: 146 MG/DL (ref 70–110)
POCT GLUCOSE: 218 MG/DL (ref 70–110)
POCT GLUCOSE: 276 MG/DL (ref 70–110)
POTASSIUM SERPL-SCNC: 3.8 MMOL/L
RBC # BLD AUTO: 2.89 M/UL
SODIUM SERPL-SCNC: 142 MMOL/L
WBC # BLD AUTO: 9.99 K/UL

## 2018-07-26 PROCEDURE — A4216 STERILE WATER/SALINE, 10 ML: HCPCS | Performed by: INTERNAL MEDICINE

## 2018-07-26 PROCEDURE — 63600175 PHARM REV CODE 636 W HCPCS: Performed by: HOSPITALIST

## 2018-07-26 PROCEDURE — 36415 COLL VENOUS BLD VENIPUNCTURE: CPT

## 2018-07-26 PROCEDURE — G8979 MOBILITY GOAL STATUS: HCPCS | Mod: CI

## 2018-07-26 PROCEDURE — G8980 MOBILITY D/C STATUS: HCPCS | Mod: CJ

## 2018-07-26 PROCEDURE — 21400001 HC TELEMETRY ROOM

## 2018-07-26 PROCEDURE — 99900035 HC TECH TIME PER 15 MIN (STAT)

## 2018-07-26 PROCEDURE — 27000221 HC OXYGEN, UP TO 24 HOURS

## 2018-07-26 PROCEDURE — 94660 CPAP INITIATION&MGMT: CPT

## 2018-07-26 PROCEDURE — 94799 UNLISTED PULMONARY SVC/PX: CPT

## 2018-07-26 PROCEDURE — 25000003 PHARM REV CODE 250: Performed by: INTERNAL MEDICINE

## 2018-07-26 PROCEDURE — 85025 COMPLETE CBC W/AUTO DIFF WBC: CPT

## 2018-07-26 PROCEDURE — 86580 TB INTRADERMAL TEST: CPT | Performed by: HOSPITALIST

## 2018-07-26 PROCEDURE — 97116 GAIT TRAINING THERAPY: CPT

## 2018-07-26 PROCEDURE — 94640 AIRWAY INHALATION TREATMENT: CPT

## 2018-07-26 PROCEDURE — G8978 MOBILITY CURRENT STATUS: HCPCS | Mod: CJ

## 2018-07-26 PROCEDURE — 80048 BASIC METABOLIC PNL TOTAL CA: CPT

## 2018-07-26 RX ORDER — CLONIDINE 0.2 MG/24H
1 PATCH, EXTENDED RELEASE TRANSDERMAL
Qty: 4 PATCH | Refills: 11 | Status: ON HOLD
Start: 2018-08-01 | End: 2018-12-11 | Stop reason: HOSPADM

## 2018-07-26 RX ADMIN — Medication 10 ML: at 06:07

## 2018-07-26 RX ADMIN — LABETALOL HYDROCHLORIDE 20 MG: 5 INJECTION, SOLUTION INTRAVENOUS at 12:07

## 2018-07-26 RX ADMIN — ACETAMINOPHEN 650 MG: 325 TABLET, FILM COATED ORAL at 06:07

## 2018-07-26 RX ADMIN — LIDOCAINE 1 PATCH: 50 PATCH TOPICAL at 09:07

## 2018-07-26 RX ADMIN — ACETAMINOPHEN 650 MG: 325 TABLET, FILM COATED ORAL at 02:07

## 2018-07-26 RX ADMIN — INSULIN ASPART 6 UNITS: 100 INJECTION, SOLUTION INTRAVENOUS; SUBCUTANEOUS at 06:07

## 2018-07-26 RX ADMIN — ATORVASTATIN CALCIUM 80 MG: 40 TABLET, FILM COATED ORAL at 08:07

## 2018-07-26 RX ADMIN — AMIODARONE HYDROCHLORIDE 400 MG: 200 TABLET ORAL at 08:07

## 2018-07-26 RX ADMIN — POTASSIUM PHOSPHATE, MONOBASIC 1000 MG: 500 TABLET, SOLUBLE ORAL at 09:07

## 2018-07-26 RX ADMIN — AMIODARONE HYDROCHLORIDE 400 MG: 200 TABLET ORAL at 09:07

## 2018-07-26 RX ADMIN — INSULIN ASPART 1 UNITS: 100 INJECTION, SOLUTION INTRAVENOUS; SUBCUTANEOUS at 08:07

## 2018-07-26 RX ADMIN — PANTOPRAZOLE SODIUM 40 MG: 40 TABLET, DELAYED RELEASE ORAL at 09:07

## 2018-07-26 RX ADMIN — RAMELTEON 8 MG: 8 TABLET, FILM COATED ORAL at 10:07

## 2018-07-26 RX ADMIN — TIOTROPIUM BROMIDE 18 MCG: 18 CAPSULE ORAL; RESPIRATORY (INHALATION) at 08:07

## 2018-07-26 RX ADMIN — RIVAROXABAN 20 MG: 20 TABLET, FILM COATED ORAL at 06:07

## 2018-07-26 RX ADMIN — METOPROLOL TARTRATE 100 MG: 50 TABLET ORAL at 08:07

## 2018-07-26 RX ADMIN — ISOSORBIDE MONONITRATE 120 MG: 30 TABLET, EXTENDED RELEASE ORAL at 09:07

## 2018-07-26 RX ADMIN — Medication 5 UNITS: at 06:07

## 2018-07-26 RX ADMIN — METOPROLOL TARTRATE 100 MG: 50 TABLET ORAL at 09:07

## 2018-07-26 RX ADMIN — FUROSEMIDE 40 MG: 40 TABLET ORAL at 09:07

## 2018-07-26 RX ADMIN — LISINOPRIL 40 MG: 20 TABLET ORAL at 09:07

## 2018-07-26 RX ADMIN — ASPIRIN 81 MG CHEWABLE TABLET 81 MG: 81 TABLET CHEWABLE at 09:07

## 2018-07-26 NOTE — PLAN OF CARE
Problem: Occupational Therapy Goal  Goal: Occupational Therapy Goal  Goals to be met by: 7/13/18     Patient will increase functional independence with ADLs by performing:    UE Dressing with Set-up Assistance. Met 7/24  LE Dressing with Minimal Assistance.  Grooming while seated with Supervision. Met 7/25  Toileting from bedside commode with Contact Guard Assistance for hygiene and clothing management.   Supine to sit with Set-up Assistance. Met 7/24  Toilet transfer to bedside commode with Contact Guard Assistance.  Upper extremity exercise program x10 reps per handout, with assistance as needed.       Outcome: Ongoing (interventions implemented as appropriate)  Patient continues to progress toward OT goals. Patient will benefit from continued OT to address functional deficits.

## 2018-07-26 NOTE — PLAN OF CARE
Problem: Fall Risk (Adult)  Intervention: Safety Promotion/Fall Prevention   07/25/18 1930   Safety Interventions   Safety Promotion/Fall Prevention assistive device/personal item within reach;bed alarm set;bedside commode chair;Fall Risk reviewed with patient/family;lighting adjusted;medications reviewed;nonskid shoes/socks when out of bed;side rails raised x 2;instructed to call staff for mobility;room near unit station

## 2018-07-26 NOTE — PLAN OF CARE
Problem: Patient Care Overview  Goal: Plan of Care Review  Outcome: Ongoing (interventions implemented as appropriate)   07/26/18 1101   Coping/Psychosocial   Plan Of Care Reviewed With patient       Problem: Infection, Risk/Actual (Adult)  Goal: Infection Prevention/Resolution  Patient will demonstrate the desired outcomes by discharge/transition of care.    Outcome: Ongoing (interventions implemented as appropriate)   07/26/18 1101   Infection, Risk/Actual (Adult)   Infection Prevention/Resolution making progress toward outcome       Problem: Diabetes, Type 2 (Adult)  Goal: Signs and Symptoms of Listed Potential Problems Will be Absent, Minimized or Managed (Diabetes, Type 2)  Signs and symptoms of listed potential problems will be absent, minimized or managed by discharge/transition of care (reference Diabetes, Type 2 (Adult) CPG).   Outcome: Ongoing (interventions implemented as appropriate)   07/26/18 1101   Diabetes, Type 2   Problems Assessed (Type 2 Diabetes) all   Problems Present (Type 2 Diabetes) none       Problem: Cardiac Output Decreased (Adult)  Goal: Adequate Cardiac Output/Effective Tissue Perfusion  Patient will demonstrate the desired outcomes by discharge/transition of care.   Outcome: Ongoing (interventions implemented as appropriate)   07/26/18 1101   Cardiac Output Decreased (Adult)   Adequate Cardiac Output/Effective Tissue Perfusion making progress toward outcome

## 2018-07-26 NOTE — PLAN OF CARE
Problem: Physical Therapy Goal  Goal: Physical Therapy Goal  Goals to be met by: 2018     Patient will increase functional independence with mobility by performin. Supine to sit with Modified [place]  2. Sit to supine with Modified [place]  3. Sit to stand transfer with Modified [place]  4. Gait  x 250 feet with Modified [place] using Rolling Walker.  5. B LE therex x30 reps with modified independence.         Outcome: Ongoing (interventions implemented as appropriate)  Pt increased distance with gait training today ( ambulated ~ 100 ft and 150 ft , RW , CGA/SBA ) . Pt required 1 long seated rest break during gait training 2* fatigue and c/o SOB despite on 3 L O2 NC ) . VC's for safety technique and walker management.

## 2018-07-26 NOTE — PT/OT/SLP PROGRESS
Occupational Therapy   Treatment    Name: Yasmeen Palm  MRN: [id number]  Admitting Diagnosis:  Sepsis       Recommendations:     Discharge Recommendations: nursing facility, skilled  Discharge Equipment Recommendations:   (TBD)  Barriers to discharge:  Decreased caregiver support    Subjective     Communicated with: Nurse Long prior to session.  Patient agreeable to therapy.   Pain/Comfort:  · Pain Rating 1: 0/10    Patients cultural, spiritual, Scientologist conflicts given the current situation: na    Objective:     Patient found with: telemetry, oxygen 3L O2 NC    General Precautions: Standard, fall, respiratory   Orthopedic Precautions:N/A   Braces: N/A     Occupational Performance:    Bed Mobility:    · Patient completed Scooting/Bridging with supervision  · Patient completed Supine to Sit with supervision     Functional Mobility/Transfers:  · Patient completed Sit <> Stand Transfer with contact guard assistance  with  rolling walker from EOB, minimum assistance from bedside chair  · Functional Mobility: Patient able to ambulate in hallway with PT SBA-CGA/RW. Patient easily fatigued and SOB and requires seated rest breaks and pursed lip breathing for recovery. Patient requires verbal cues for safe technique with transfers.    Patient left reclined up in chair with all lines intact, call button in reach and nurse notified    AMPAC 6 Click:  AMPAC Total Score: 18    Treatment & Education:  Patient performed bed mobility, functional transfers, and ADL's as above.   Patient tolerated sitting EOB for UE therex with supervision. Patient performed x10 reps BUE AROM therex in all available planes.   Education:    Assessment:     Yasmeen Palm is a 68 y.o. female with a medical diagnosis of Sepsis.  She presents with the following performance deficits affecting function: weakness, impaired endurance, impaired functional mobilty, impaired self care skills, gait instability, decreased upper extremity function, decreased  lower extremity function, decreased safety awareness, impaired cardiopulmonary response to activity, impaired balance.  Patient continues to progress toward OT goals.     Rehab Prognosis:  good; patient would benefit from acute skilled OT services to address these deficits and reach maximum level of function.       Plan:     Patient to be seen 5 x/week to address the above listed problems via self-care/home management, therapeutic activities, therapeutic exercises  · Plan of Care Expires: 08/03/18  · Plan of Care Reviewed with: patient, spouse    This Plan of care has been discussed with the patient who was involved in its development and understands and is in agreement with the identified goals and treatment plan    GOALS:    Occupational Therapy Goals        Problem: Occupational Therapy Goal    Goal Priority Disciplines Outcome Interventions   Occupational Therapy Goal     OT, PT/OT Ongoing (interventions implemented as appropriate)    Description:  Goals to be met by: 7/13/18     Patient will increase functional independence with ADLs by performing:    UE Dressing with Set-up Assistance. Met 7/24  LE Dressing with Minimal Assistance.  Grooming while seated with Supervision. Met 7/25  Toileting from bedside commode with Contact Guard Assistance for hygiene and clothing management.   Supine to sit with Set-up Assistance. Met 7/24  Toilet transfer to bedside commode with Contact Guard Assistance.  Upper extremity exercise program x10 reps per handout, with assistance as needed.                        Time Tracking:     OT Date of Treatment: 07/26/18  OT Start Time: 1040  OT Stop Time: 1105  OT Total Time (min): 25 min    Billable Minutes:Therapeutic Activity 12    JOAQUIN Rockwell  7/26/2018

## 2018-07-26 NOTE — PROGRESS NOTES
TN met with patient. Patient stated that her  Getachew is in the process of gathering all financial information. [facility] at 10:30am. Getachew will try to meet with [facility] today if he has all requested documents.     Spoke with Felisa at [facility]. Felisa will submit auth when financials are reviewed.

## 2018-07-26 NOTE — PLAN OF CARE
Problem: Diabetes, Type 2 (Adult)  Intervention: Optimize Glycemic Control   07/24/18 1911   Nutrition Interventions   Glycemic Management blood glucose monitoring

## 2018-07-26 NOTE — PT/OT/SLP PROGRESS
Physical Therapy Treatment    Patient Name:  Yasmeen Palm   MRN:  [id number]    Recommendations:     Discharge Recommendations:  nursing facility, skilled   Discharge Equipment Recommendations:  TBD   Barriers to discharge: decreased endurance     Assessment:     Yasmeen Palm is a 68 y.o. female admitted with a medical diagnosis of Sepsis.  She presents with the following impairments/functional limitations:  weakness, impaired endurance, impaired self care skills, gait instability, impaired balance, decreased safety awareness, decreased lower extremity function, decreased upper extremity function, impaired cardiopulmonary response to activity. Pt progressing well toward treatment goals.    Rehab Prognosis:  good; patient would benefit from acute skilled PT services to address these deficits and reach maximum level of function.      Recent Surgery: Procedure(s) (LRB):  Left heart cath R rad access, not before 9am (Left)      Plan:     During this hospitalization, patient to be seen daily to address the above listed problems via gait training, therapeutic activities, therapeutic exercises  · Plan of Care Expires:  08/02/18   Plan of Care Reviewed with: patient    Subjective     Communicated with nurse Long prior to session.  Patient found in bed upon PT entry to room, agreeable to treatment.      Chief Complaint: none  Patient comments/goals: ready to go home  Pain/Comfort:  · Pain Rating 1: 0/10  · Pain Rating Post-Intervention 1: 0/10    Patients cultural, spiritual, Caodaism conflicts given the current situation: none    Objective:     Patient found with: bed alarm, telemetry, oxygen     General Precautions: Standard, fall, respiratory   Orthopedic Precautions:N/A   Braces: N/A     Functional Mobility:  · Bed Mobility:     · Supine to Sit: supervision  · Transfers:     · Sit to Stand: 2 trials from bed and contact guard assistance and minimum assistance from chair with rolling walker.VC's for safety  technique and walker management.   · Gait:   Patient ambulated  ~ 100 ft and 150 ft ( 3 L O2 NC and bedside chair followed) on level tile with Rolling Walker with Stand-by Assistance and Contact Guard Assistance. Pt required 1 long seated rest break during gait training 2* fatigue and SOB despite on 3 LO2NC . VC's for proper breathing technique.   Pt with demonstarting a  reciprocal gait with decreased dyllan, decreased velocity of limb motion and decreased step length.Impairments contributing to gait deviations include decreased strength       AM-PAC 6 CLICK MOBILITY  Turning over in bed (including adjusting bedclothes, sheets and blankets)?: 4  Sitting down on and standing up from a chair with arms (e.g., wheelchair, bedside commode, etc.): 3  Moving from lying on back to sitting on the side of the bed?: 4  Moving to and from a bed to a chair (including a wheelchair)?: 3  Need to walk in hospital room?: 3  Climbing 3-5 steps with a railing?: 3  Basic Mobility Total Score: 20       Therapeutic Activities and Exercises:   pt performed bed mobility, transfer and gait training as above.    Patient left up in chair with all lines intact, call button in reach and nurse notified..    GOALS:    Physical Therapy Goals        Problem: Physical Therapy Goal    Goal Priority Disciplines Outcome Goal Variances Interventions   Physical Therapy Goal     PT/OT, PT Ongoing (interventions implemented as appropriate)     Description:  Goals to be met by: 2018     Patient will increase functional independence with mobility by performin. Supine to sit with Modified [place]  2. Sit to supine with Modified [place]  3. Sit to stand transfer with Modified [place]  4. Gait  x 250 feet with Modified [place] using Rolling Walker.  5. B LE therex x30 reps with modified independence.                          Time Tracking:     PT Received On: 18  PT Start Time: 1040     PT Stop Time: 1105  PT Total Time  (min): 25 min     Billable Minutes: Gait Training 13 and Total Time 25 ( co-treat with OT)     Treatment Type: Treatment  PT/PTA: PTA     PTA Visit Number: 2     Kayleigh Henry, PTA  07/26/2018

## 2018-07-27 ENCOUNTER — TELEPHONE (OUTPATIENT)
Dept: PULMONOLOGY | Facility: CLINIC | Age: 68
End: 2018-07-27

## 2018-07-27 VITALS
SYSTOLIC BLOOD PRESSURE: 149 MMHG | HEIGHT: 65 IN | RESPIRATION RATE: 16 BRPM | OXYGEN SATURATION: 95 % | TEMPERATURE: 99 F | HEART RATE: 64 BPM | WEIGHT: 173.5 LBS | DIASTOLIC BLOOD PRESSURE: 65 MMHG | BODY MASS INDEX: 28.91 KG/M2

## 2018-07-27 LAB
POCT GLUCOSE: 150 MG/DL (ref 70–110)
POCT GLUCOSE: 210 MG/DL (ref 70–110)

## 2018-07-27 PROCEDURE — 99900035 HC TECH TIME PER 15 MIN (STAT)

## 2018-07-27 PROCEDURE — 97110 THERAPEUTIC EXERCISES: CPT

## 2018-07-27 PROCEDURE — 25000003 PHARM REV CODE 250: Performed by: INTERNAL MEDICINE

## 2018-07-27 PROCEDURE — 94640 AIRWAY INHALATION TREATMENT: CPT

## 2018-07-27 PROCEDURE — G8987 SELF CARE CURRENT STATUS: HCPCS | Mod: CK

## 2018-07-27 PROCEDURE — 27000221 HC OXYGEN, UP TO 24 HOURS

## 2018-07-27 PROCEDURE — 94761 N-INVAS EAR/PLS OXIMETRY MLT: CPT

## 2018-07-27 PROCEDURE — A4216 STERILE WATER/SALINE, 10 ML: HCPCS | Performed by: INTERNAL MEDICINE

## 2018-07-27 PROCEDURE — G8989 SELF CARE D/C STATUS: HCPCS | Mod: CK

## 2018-07-27 RX ORDER — TRAMADOL HYDROCHLORIDE 50 MG/1
50 TABLET ORAL EVERY 8 HOURS PRN
Qty: 20 TABLET | Refills: 0 | Status: SHIPPED | OUTPATIENT
Start: 2018-07-27 | End: 2018-08-06

## 2018-07-27 RX ADMIN — PANTOPRAZOLE SODIUM 40 MG: 40 TABLET, DELAYED RELEASE ORAL at 08:07

## 2018-07-27 RX ADMIN — AMIODARONE HYDROCHLORIDE 400 MG: 200 TABLET ORAL at 08:07

## 2018-07-27 RX ADMIN — ACETAMINOPHEN 650 MG: 325 TABLET, FILM COATED ORAL at 08:07

## 2018-07-27 RX ADMIN — Medication 10 ML: at 06:07

## 2018-07-27 RX ADMIN — METOPROLOL TARTRATE 100 MG: 50 TABLET ORAL at 08:07

## 2018-07-27 RX ADMIN — TIOTROPIUM BROMIDE 18 MCG: 18 CAPSULE ORAL; RESPIRATORY (INHALATION) at 07:07

## 2018-07-27 RX ADMIN — ISOSORBIDE MONONITRATE 120 MG: 30 TABLET, EXTENDED RELEASE ORAL at 08:07

## 2018-07-27 RX ADMIN — Medication 10 ML: at 12:07

## 2018-07-27 RX ADMIN — LISINOPRIL 40 MG: 20 TABLET ORAL at 08:07

## 2018-07-27 RX ADMIN — LIDOCAINE 1 PATCH: 50 PATCH TOPICAL at 08:07

## 2018-07-27 RX ADMIN — INSULIN ASPART 4 UNITS: 100 INJECTION, SOLUTION INTRAVENOUS; SUBCUTANEOUS at 12:07

## 2018-07-27 RX ADMIN — POTASSIUM PHOSPHATE, MONOBASIC 1000 MG: 500 TABLET, SOLUBLE ORAL at 08:07

## 2018-07-27 RX ADMIN — ASPIRIN 81 MG CHEWABLE TABLET 81 MG: 81 TABLET CHEWABLE at 08:07

## 2018-07-27 RX ADMIN — FUROSEMIDE 40 MG: 40 TABLET ORAL at 08:07

## 2018-07-27 NOTE — PROGRESS NOTES
[facility] Hospital Medicine  Progress Note    Patient Name: Yasmeen Palm  MRN: [id number]  Patient Class: IP- Inpatient   Admission Date: 6/24/2018  Length of Stay: 32 days  Attending Physician: Meredith Olguin MD  Primary Care Provider: Angel Orourke Jr, MD        Subjective:     Principal Problem:Sepsis    HPI:  Yasmeen Palm is a 68 y.o. female that (in part)  has a past medical history of Anticoagulant long-term use; Arthritis; Asthma; CHF (congestive heart failure); COPD (chronic obstructive pulmonary disease); Coronary artery disease; Depression; Diabetes mellitus; GERD (gastroesophageal reflux disease); and Hypertension.  has a past surgical history that includes Abdominal surgery; Cardiac surgery; and Hernia repair. Presents to [facility] Emergency Department complaining of chest pain .  She reports compliance with her home medication regimen, including Xarelto.     Description of symptoms  Location:  Substernal  Onset:  Acute onset 2 days ago  Character:  The patient remained; moderate severity  Frequency:  Daily  Duration:  each episode lasts several minutes at a time  Associated Symptoms:  Diaphoresis, shortness of breath, weakness and fatigue  Radiation:  Recent the chest  Exacerbating factors:  Worse on exertion  Relieving factors:  Minimal relief with supplemental oxygen     In the emergency department routine laboratory studies, chest x-ray, EKG, cardiac enzymes were obtained.  EKG findings were concerning the case was discussed with cardiologist, Dr. Pulido.  It was determined that her EKG was not consistent with STEMI and she has been chest pain-free since arrival.  She had been given Lovenox, aspirin, and plan was to continue trending troponins and monitor closely on telemetry.    Hospital Course:  Ms. Palm was admitted to the hospital originally on 6/24/18 for chest pain. She was later sent to the ICU with acute hypercapnic respiratory failure the  "same day on BIPAP.  She quickly improved and was sent to the floor on 6/25.  Cards was consulted for NSTEMI with noted troponin increase and was planning on LHC on 6/26. However, the patient was sent back to the ICU on 6/26 with hypercapnic respiratory failure with a CO2 of > 100 and was intubated. Pulmonary was consulted. Pt clinically improved from respiratory standpoint and was extubated on 6/27 to NC, but she did complain of chest discomfort. Cardiology was notified and patient was taken for LHC on 6/27 which was only remarkable for non-obstructive CAD and did not require intervention. Pt was given IVF post procedure and the next day developed increased SOB and required BIPAP. Pt was treated with IV lasix with good response with much improved respiratory status after output of 1L. Pt also treated for COPD exacerbation with IV steroids, NEBS and doxycycline. ECHO performed on 6/25/2018 remarkable for EF=50-55% + grade 2 diastolic dysfunction. Pt diuresed and changed to maintenance PO lasix regimen. She as started on BIPAP due to increased pCO2 on ABG and lethargy. Pt will need BIPAP/home ventilator for use at home and was approved for the device prior to discharge.    On 7/13, she became febrile, lethargic, hypercapnic, with SOB and was in AFib with RVR. Transferred to ICU again, on BiPAP and amiodarone infusion. Clinically improved over the course of 3 days with IV diuresis 40 mg daily, solumedrol 40 mg TID, BiPAP and empiric Zosyn. Other than UA consistent with UTI, CT chest/abdomen/pelvis with IV showed no other potential source for infection. There was mention of a pneumocele with "debri" however unlikely this to be cause for sepsis and invasive treatment would be too high risk (discussed with pulmonologist). She was weaned to low flow NC to remain in place continuously. AFib with RVR was extremely difficult to control despite amiodarone infusion and treatment of sepsis. Metoprolol added and uptitrated, and " currently on 100 mg BID with good results. She has remained without further event on telemetry. Her H&H did drop slowly throughout admission with no apparent bleed source other than frequent lab monitoring. She was transfused 1 unit RBCs on 7/21. She continued to be stable. BP meds adjusted. Prednisone taper completed prior to discharge.     PT/OT consulted and recommended SNF. PPD placed and SW consulted.     Interval History: Pt has no new c/o.     Review of Systems   Constitutional: Negative for chills and fever.   Respiratory: Negative for shortness of breath.    Cardiovascular: Negative for chest pain.     Objective:     Vital Signs (Most Recent):  Temp: 98.6 °F (37 °C) (07/26/18 1934)  Pulse: 67 (07/26/18 1938)  Resp: 16 (07/26/18 1938)  BP: (!) 142/67 (07/26/18 1934)  SpO2: 99 % (07/26/18 1938) Vital Signs (24h Range):  Temp:  [98 °F (36.7 °C)-98.7 °F (37.1 °C)] 98.6 °F (37 °C)  Pulse:  [59-78] 67  Resp:  [16-21] 16  SpO2:  [94 %-100 %] 99 %  BP: (112-187)/(53-89) 142/67     Weight: 80 kg (176 lb 5.9 oz)  Body mass index is 29.35 kg/m².    Intake/Output Summary (Last 24 hours) at 07/26/18 2209  Last data filed at 07/26/18 1700   Gross per 24 hour   Intake              540 ml   Output                0 ml   Net              540 ml      Physical Exam   Constitutional: She is oriented to person, place, and time. She appears well-developed. No distress.   HENT:   Head: Normocephalic and atraumatic.   Eyes: EOM are normal. Pupils are equal, round, and reactive to light.   Neck: Normal range of motion. Neck supple.   Cardiovascular: Normal rate and regular rhythm.    Pulmonary/Chest:   Slight decrease at bases, no wheezing   Abdominal: Soft. Bowel sounds are normal.   Musculoskeletal: Normal range of motion. She exhibits edema.   Neurological: She is alert and oriented to person, place, and time.   Skin: Skin is warm and dry. Capillary refill takes less than 2 seconds. She is not diaphoretic.   Psychiatric: She has  "a normal mood and affect. Her behavior is normal. Thought content normal.       Significant Labs: All pertinent labs within the past 24 hours have been reviewed.    Significant Imaging: I have reviewed and interpreted all pertinent imaging results/findings within the past 24 hours.    Assessment/Plan:      * Sepsis    - Source likely urine as she is incontinent and UA was abnormal with very cloudy appearing urine.   - UCx with yeast which was thought to be more so a colonizer, since patient did improve with Zosyn which was continued  - CTA of chest/abdomen showed a right pneumocele with "debri" which was unlikely to cause degree of clinical instability and invasive diagnosis/treatment would be of very high risk for this patient.   - No other potential source identified. Continued with pip-tazo until 7/19 then stopped as 7 day course completed.        Atrial fibrillation with RVR    - May have been secondary to development of sepsis and subsequent respiratory failure.   - Pt has been on amiodarone gtt and improving from sepsis standpoint and HR has greatly improved by increasing dose of metoprolol to 100 mg BID.   - Pt spontaneously converted to NSR 7/16 and case discussed with Cardiology  - Amiodarone converted to PO 7/17/2018. Slow taper given how difficult it was to control.  - Pt is stable on telemetry. She is already fully anticoagulated        Non-STEMI (non-ST elevated myocardial infarction)    - Followed by Cardiology  - s/p [facility] on 6/27 only remarkable for non-obstructive CAD (40% RCA)  - Continue medical management as per Cardiology with ASA, statin, metoprolol and patient will not be placed on plavix as she is also on Xarelto        Acute hypercapnic respiratory failure    - A recurrent issue during this admission        Acute exacerbation of chronic obstructive pulmonary disease (COPD)    - Pt has been back and forth to ICU for hypercapnic respiratory failure.   - Underlying COPD (centrilobular emphysema " seen on CT chest 6/2018) but also complicated with diastolic HF and Afib with RVR  - Pt was intubated at one point, but overall improved with trial of solumedrol 40 mg TID and another dose of IV lasix.   - Started with solumedrol 40 mg TID then de-escalate to prednisone PO and slowly tapered. Prednisone 10mg last day 7/25 and stopped.  - De-escalated furosemide to PO for maintenance diuresis.   - Continuous supplemental O2 via low flow NC for goal sats 88-93%, incentive spirometer and BiPAP QHS  - Pt has been approved for home BIPAP and has the machine at home for when she is discharged.        Acute diastolic CHF (congestive heart failure)    - Initially due to volume overload, but now likely due to sepsis + Afib RVR  - Last ECHO with EF=50-55% + grade 2 diastolic dysfunction on 6/26  - Stable and well compensated. Management as above        Coronary artery disease involving native coronary artery of native heart with angina pectoris    - [facility] on 6/27. Non obstructive CAD (40% RCA)  - On adequate cardioprotective regimen        PAF (paroxysmal atrial fibrillation)    - As discussed above        Acute bacterial conjunctivitis of right eye    Erythromycin ointment to right eye started 7/18/2018. Improved. Continue x 7 days then stopped. Cold pack to right eye thereafter and advised patient to stop rubbing.        Debility    - Resumed PT/OT  - Recommending SNF        Neuropathy    - No acute issues         Obesity    - Weight reduction as outpatient after all acute issues resolved        Chronic anticoagulation    - Patient on Xarelto for PAF which has been resumed        History of deep vein thrombosis    - Resumed anticoagulation         History of pulmonary embolism    - Xarelto resumed   - CTA negative for PE        Anemia of chronic disease    - Pt with gradual trend down, but no overt source of bleeding  - Suspect iatrogenic given multiple blood draws over prolonged hospital stay daily  - Transfused 1 unit RBCs  7/21.        Malignant hypertension    - Pt on metoprolol for HR and BP control  - Home meds gradually restarted as BP climbed. Lisinopril 40, Imdur 120. Added clonidine patch 0.2 pm 7/25.  - Monitor and adjust as needed.         Tobacco abuse    - Smoking cessation counseling done        Gastroesophageal reflux disease without esophagitis    - Continue PPI        Type 2 diabetes mellitus, controlled    - Complications of peripheral neuropathy.   - Increased basal and prandial insulin in setting of steroids  - Steroids stopped 7/25. SSI PRN with accuchecks.  - Goal < 180          VTE Risk Mitigation         Ordered     rivaroxaban tablet 20 mg  With dinner      07/24/18 8859              Meredith Olguin MD  Department of Hospital Medicine   [facility]

## 2018-07-27 NOTE — PT/OT/SLP DISCHARGE
Physical Therapy Discharge Summary    Name: Yasmeen Palm  MRN: [id number]   Principal Problem: Sepsis     Patient Discharged from acute Physical Therapy on 18.  Please refer to prior PT noted date on 18 for functional status.     Assessment:     Goals partially met. Patient appropriate for care in another setting.    Objective:     GOALS:    Physical Therapy Goals     Not on file          Multidisciplinary Problems (Resolved)        Problem: Physical Therapy Goal    Goal Priority Disciplines Outcome Goal Variances Interventions   Physical Therapy Goal   (Resolved)     PT/OT, PT Outcome(s) achieved     Description:  Goals to be met by: 2018     Patient will increase functional independence with mobility by performin. Supine to sit with Modified [place]  2. Sit to supine with Modified [place]  3. Sit to stand transfer with Modified [place]  4. Gait  x 250 feet with Modified [place] using Rolling Walker.  5. B LE therex x30 reps with modified independence.                          Reasons for Discontinuation of Therapy Services  Transfer to alternate level of care.      Plan:     Patient Discharged to: Skilled Nursing Facility.    Viv Wilson, PT  2018

## 2018-07-27 NOTE — SUBJECTIVE & OBJECTIVE
Interval History: Pt has no new c/o.     Review of Systems   Constitutional: Negative for chills and fever.   Respiratory: Negative for shortness of breath.    Cardiovascular: Negative for chest pain.     Objective:     Vital Signs (Most Recent):  Temp: 98.6 °F (37 °C) (07/26/18 1934)  Pulse: 67 (07/26/18 1938)  Resp: 16 (07/26/18 1938)  BP: (!) 142/67 (07/26/18 1934)  SpO2: 99 % (07/26/18 1938) Vital Signs (24h Range):  Temp:  [98 °F (36.7 °C)-98.7 °F (37.1 °C)] 98.6 °F (37 °C)  Pulse:  [59-78] 67  Resp:  [16-21] 16  SpO2:  [94 %-100 %] 99 %  BP: (112-187)/(53-89) 142/67     Weight: 80 kg (176 lb 5.9 oz)  Body mass index is 29.35 kg/m².    Intake/Output Summary (Last 24 hours) at 07/26/18 2209  Last data filed at 07/26/18 1700   Gross per 24 hour   Intake              540 ml   Output                0 ml   Net              540 ml      Physical Exam   Constitutional: She is oriented to person, place, and time. She appears well-developed. No distress.   HENT:   Head: Normocephalic and atraumatic.   Eyes: EOM are normal. Pupils are equal, round, and reactive to light.   Neck: Normal range of motion. Neck supple.   Cardiovascular: Normal rate and regular rhythm.    Pulmonary/Chest:   Slight decrease at bases, no wheezing   Abdominal: Soft. Bowel sounds are normal.   Musculoskeletal: Normal range of motion. She exhibits edema.   Neurological: She is alert and oriented to person, place, and time.   Skin: Skin is warm and dry. Capillary refill takes less than 2 seconds. She is not diaphoretic.   Psychiatric: She has a normal mood and affect. Her behavior is normal. Thought content normal.       Significant Labs: All pertinent labs within the past 24 hours have been reviewed.    Significant Imaging: I have reviewed and interpreted all pertinent imaging results/findings within the past 24 hours.

## 2018-07-27 NOTE — PLAN OF CARE
Problem: Patient Care Overview  Goal: Plan of Care Review  Outcome: Ongoing (interventions implemented as appropriate)   07/27/18 0955   Coping/Psychosocial   Plan Of Care Reviewed With patient       Problem: Infection, Risk/Actual (Adult)  Goal: Infection Prevention/Resolution  Patient will demonstrate the desired outcomes by discharge/transition of care.    Outcome: Ongoing (interventions implemented as appropriate)   07/27/18 0955   Infection, Risk/Actual (Adult)   Infection Prevention/Resolution making progress toward outcome       Problem: Diabetes, Type 2 (Adult)  Goal: Signs and Symptoms of Listed Potential Problems Will be Absent, Minimized or Managed (Diabetes, Type 2)  Signs and symptoms of listed potential problems will be absent, minimized or managed by discharge/transition of care (reference Diabetes, Type 2 (Adult) CPG).   Outcome: Ongoing (interventions implemented as appropriate)   07/27/18 0955   Diabetes, Type 2   Problems Assessed (Type 2 Diabetes) all   Problems Present (Type 2 Diabetes) hyperglycemia       Problem: Cardiac Output Decreased (Adult)  Goal: Adequate Cardiac Output/Effective Tissue Perfusion  Patient will demonstrate the desired outcomes by discharge/transition of care.   Outcome: Ongoing (interventions implemented as appropriate)   07/27/18 0955   Cardiac Output Decreased (Adult)   Adequate Cardiac Output/Effective Tissue Perfusion making progress toward outcome

## 2018-07-27 NOTE — PLAN OF CARE
Problem: Occupational Therapy Goal  Goal: Occupational Therapy Goal  Goals to be met by: 7/13/18     Patient will increase functional independence with ADLs by performing:    UE Dressing with Set-up Assistance. Met 7/24  LE Dressing with Minimal Assistance.  Grooming while seated with Supervision. Met 7/25  Toileting from bedside commode with Contact Guard Assistance for hygiene and clothing management.   Supine to sit with Set-up Assistance. Met 7/24  Toilet transfer to bedside commode with Contact Guard Assistance.  Upper extremity exercise program x10 reps per handout, with assistance as needed.        Outcome: Ongoing (interventions implemented as appropriate)  Patient tolerated BUE therex with light resistance seated EOB today. Patient continues to progress toward OT goals.

## 2018-07-27 NOTE — PLAN OF CARE
Patient informed that she will discharge to [facility].     TN provided Nurse Mercedes with call report information.       07/27/18 1247   Final Note   Assessment Type Final Discharge Note   Discharge Disposition SNF   What phone number can be called within the next 1-3 days to see how you are doing after discharge? ([phone])   Hospital Follow Up  Appt(s) scheduled? No   Discharge plans and expectations educations in teach back method with documentation complete? No   Right Care Referral Info   Post Acute Recommendation SNF / Sub-Acute Rehab

## 2018-07-27 NOTE — PLAN OF CARE
[facility]     Department of Hospital Medicine     [address] [place] [address]     [phone] after hours  [phone] fax       NURSING HOME ORDERS    07/27/2018    Admit to Nursing Home:   Skilled Bed                                                Diagnoses:  Active Hospital Problems    Diagnosis  POA    *Sepsis [A41.9]  Yes     Priority: 1 - High    Atrial fibrillation with RVR [I48.91]  No     Priority: 2     Non-STEMI (non-ST elevated myocardial infarction) [I21.4]  Yes     Priority: 2     Acute hypercapnic respiratory failure [J96.02]  Yes     Priority: 3     Acute exacerbation of chronic obstructive pulmonary disease (COPD) [J44.1]  Yes     Priority: 3     Acute diastolic CHF (congestive heart failure) [I50.31]  Yes     Priority: 3     Coronary artery disease involving native coronary artery of native heart with angina pectoris [I25.119]  Yes     Priority: 3     PAF (paroxysmal atrial fibrillation) [I48.0]  Yes     Priority: 4      Chronic    Acute bacterial conjunctivitis of right eye [H10.31]  No    Debility [R53.81]  Yes    Neuropathy [G62.9]  Yes     Chronic    Obesity [E66.9]  Yes     Chronic    Chronic anticoagulation [Z79.01]  Not Applicable     Chronic    History of deep vein thrombosis [Z86.718]  Not Applicable     Chronic    History of pulmonary embolism [Z86.711]  Yes     Chronic    Anemia of chronic disease [D63.8]  Yes     Chronic    Malignant hypertension [I10]  Yes    Type 2 diabetes mellitus, controlled [E11.9]  Yes     Chronic    Gastroesophageal reflux disease without esophagitis [K21.9]  Yes     Chronic    Tobacco abuse [Z72.0]  Yes     Chronic      Resolved Hospital Problems    Diagnosis Date Resolved POA    Chest pain [R07.9] 07/19/2018 Yes    Hyperkalemia [E87.5] 07/19/2018 Yes    Fall [W19.XXXA] 07/19/2018 No    Elevated troponin I level [R74.8] 07/19/2018 Yes    Elevated brain natriuretic peptide (BNP)  level [R79.89] 07/24/2018 Yes    Chronic respiratory failure with hypoxia and hypercapnia [J96.11, J96.12] 06/26/2018 Yes     Chronic       Allergies:Review of patient's allergies indicates:  No Known Allergies    Vitals:  As per facility protocol    Diet: 1800 ADA and cardiac   Supplement:  1 can every three times a day with meals                         Type:  Glucerna     Acitivities:   As tolerated    LABS:  Per facility protocol    Nursing Precautions:     - Aspiration precautions per nursing home protocol      - Fall precautions per nursing home protocol   - Seizure precaution per group home protocol   - Decubitus precautions:        -  for positioning   - Pressure reducing foam mattress   - Turn patient every two hours. Use wedge pillows to anchor patient    CONSULTS:     Physical Therapy to evaluate and treat     Occupational Therapy to evaluate and treat    MISCELLANEOUS CARE:   Routine Skin for Bedridden Patients:  Apply moisture barrier cream to all    skin folds and wet areas in perineal area daily and after baths and                           all bowel movements.      BIPAP: FIO2 35%, setting of 10/5 to be used QHS and PRN for SOB      Oxygen: 3 Liters via nasal cannula continuously         DIABETES CARE:       Check blood sugar:    Fingerstick blood sugar AC and HS   Fingerstick blood sugar every 6 hours if unable to eat      Report CBG < 60 or > 400 to physician.                                          Insulin Sliding Scale          Glucose  Novolog Insulin Subcutaneous        0 - 60   Orange juice or glucose tablet, hold insulin      No insulin   201-250  2 units   251-300  4 units   301-350  6 units   351-400  8 units   >400   10 units then call physician      Medications: Discontinue all previous medication orders, if any. See new list below.     Yasmeen Palm   Home Medication Instructions XAVIER:[id number]    Printed on:07/27/18 0753   Medication Information                       acetaminophen (TYLENOL) 500 MG tablet  Take 1 tablet (500 mg total) by mouth every 8 (eight) hours as needed.             amiodarone (PACERONE) 200 MG Tab  Take 1 tablet (200 mg total) by mouth 2 (two) times daily.   * Start taking on 7/31/2018 for 14 days and last day on 8/13/2018           amiodarone (PACERONE) 200 MG Tab  Take 1 tablet (200 mg total) by mouth once daily.  * Start taking on 8/14/2018.           amiodarone (PACERONE) 400 MG tablet  Take 1 tablet (400 mg total) by mouth 2 (two) times daily.   * Last dose on 7/30/2018.           aspirin (ECOTRIN) 81 MG EC tablet  Take 81 mg by mouth once daily.             atorvastatin (LIPITOR) 80 MG tablet  Take 1 tablet (80 mg total) by mouth every evening.             cloNIDine 0.2 mg/24 hr td ptwk (CATAPRES) 0.2 mg/24 hr  Place 1 patch onto the skin every 7 days. Change every Wednesday             docusate sodium (COLACE) 100 MG capsule  Take 1 capsule (100 mg total) by mouth once daily.             furosemide (LASIX) 40 MG tablet  Take 40 mg by mouth once daily.              isosorbide mononitrate (IMDUR) 120 MG 24 hr tablet  Take 1 tablet (120 mg total) by mouth once daily.             levalbuterol (XOPENEX HFA) 45 mcg/actuation inhaler  Inhale 2 puffs into the lungs every 4 (four) hours as needed for Wheezing or Shortness of Breath. Rescue             lidocaine (LIDODERM) 5 %  Place 1 patch onto the skin once daily. Remove & Discard patch within 12 hours or as directed by MD             lisinopril (PRINIVIL,ZESTRIL) 40 MG tablet  Take 1 tablet (40 mg total) by mouth once daily. Do not take this medication if blood pressure is below 130/80 mmHg and/or feeling dizzy after taking all other blood pressure meds             metformin (GLUCOPHAGE) 500 MG tablet  Take 500 mg by mouth 2 (two) times daily with meals.             metoprolol tartrate (LOPRESSOR) 100 MG tablet  Take 1 tablet (100 mg total) by mouth 2 (two) times daily.             potassium phosphate,  monobasic, (K-PHOS) 500 mg TbSO  Take 2 tablets (1,000 mg total) by mouth once daily.             rivaroxaban (XARELTO) 20 mg Tab  Take 20 mg by mouth daily with dinner or evening meal.             tiotropium (SPIRIVA) 18 mcg inhalation capsule  Inhale 1 capsule (18 mcg total) into the lungs once daily. Controller             traMADol (ULTRAM) 50 mg tablet  Take 1 tablet (50 mg total) by mouth every 8 (eight) hours as needed.             UMECLIDINIUM BRM/VILANTEROL TR (ANORO ELLIPTA INHL)  Inhale into the lungs daily as needed.                   _________________________________  Meredith Olguin MD  07/27/2018

## 2018-07-27 NOTE — NURSING
Sent packet and pt's belongings with transport personnel.    Patient is being wheeled off unit via wheel chair personnel in route to Nursing Home. NAD noted.

## 2018-07-27 NOTE — PT/OT/SLP PROGRESS
Occupational Therapy   Treatment    Name: Yasmeen Palm  MRN: [id number]  Admitting Diagnosis:  Sepsis       Recommendations:     Discharge Recommendations: nursing facility, skilled  Discharge Equipment Recommendations:   (TBD)  Barriers to discharge:  Decreased caregiver support    Subjective     Communicated with: Nurse prior to session.  Patient agreeable to therapy.  Pain/Comfort:  · Pain Rating 1: 0/10    Patients cultural, spiritual, Muslim conflicts given the current situation: na    Objective:     Patient found with: bed alarm, telemetry, oxygen    General Precautions: Standard, fall, respiratory   Orthopedic Precautions:N/A   Braces: N/A     Occupational Performance:    Bed Mobility:    · Patient completed Scooting/Bridging with supervision  · Patient completed Supine to Sit with supervision     Patient left seated EOB with lunch tray with all lines intact, call button in reach and nurse notified    [facility] 6 Click:  [facility] Total Score: 18    Treatment & Education:  Patient performed bed mobility as above. Patient tolerated sitting EOB for BUE therex with supervision.  Patient educated on BUE therex HEP with light resistance yellow theraband via demo and written instruction. Patient verbalizes and demonstrates understanding.  Patient performed x10 reps B shoulder abd, B shoulder horizontal abd, B shoulder flex, B elbow flex, and B elbow ext utilizing pursed lip breathing technique. Patient tolerated well.  Education:    Assessment:     Yasmeen Palm is a 68 y.o. female with a medical diagnosis of Sepsis.  She presents with the performance deficits affecting function: weakness, impaired endurance, impaired self care skills, impaired functional mobilty, gait instability, decreased upper extremity function, decreased lower extremity function, decreased safety awareness, impaired cardiopulmonary response to activity.  Patient tolerated BUE therex with light resistance seated EOB today. Patient continues to  progress toward OT goals.     Rehab Prognosis:  good; patient would benefit from acute skilled OT services to address these deficits and reach maximum level of function.       Plan:     Patient to be seen 5 x/week to address the above listed problems via self-care/home management, therapeutic activities, therapeutic exercises  · Plan of Care Expires: 08/03/18  · Plan of Care Reviewed with: patient, spouse    This Plan of care has been discussed with the patient who was involved in its development and understands and is in agreement with the identified goals and treatment plan    GOALS:    Occupational Therapy Goals        Problem: Occupational Therapy Goal    Goal Priority Disciplines Outcome Interventions   Occupational Therapy Goal     OT, PT/OT Ongoing (interventions implemented as appropriate)    Description:  Goals to be met by: 7/13/18     Patient will increase functional independence with ADLs by performing:    UE Dressing with Set-up Assistance. Met 7/24  LE Dressing with Minimal Assistance.  Grooming while seated with Supervision. Met 7/25  Toileting from bedside commode with Contact Guard Assistance for hygiene and clothing management.   Supine to sit with Set-up Assistance. Met 7/24  Toilet transfer to bedside commode with Contact Guard Assistance.  Upper extremity exercise program x10 reps per handout, with assistance as needed.                         Time Tracking:     OT Date of Treatment: 07/27/18  OT Start Time: 1126  OT Stop Time: 1139  OT Total Time (min): 13 min    Billable Minutes:Therapeutic Exercise 13    JOAQUIN Rockwell  7/27/2018

## 2018-07-27 NOTE — PLAN OF CARE
Problem: Diabetes, Type 2 (Adult)  Goal: Signs and Symptoms of Listed Potential Problems Will be Absent, Minimized or Managed (Diabetes, Type 2)  Signs and symptoms of listed potential problems will be absent, minimized or managed by discharge/transition of care (reference Diabetes, Type 2 (Adult) CPG).    07/26/18 6155   Diabetes, Type 2   Problems Assessed (Type 2 Diabetes) all   Problems Present (Type 2 Diabetes) hyperglycemia

## 2018-07-27 NOTE — PROGRESS NOTES
TN spoke with Maria Eugenia. Call report information received. Patient will go to room 5A.    TN called [employer] @ [phone]. Transportation will arrive within in the hour.

## 2018-07-27 NOTE — NURSING
In preparation for discharge, d/c'ed pt's PICC per policy and procedure. Catheter tip intact. D/C'ed patient's tele,SR, prior to removal. Patient is sitting in room waiting for transportation. NAD noted.

## 2018-07-27 NOTE — PLAN OF CARE
Problem: Fall Risk (Adult)  Goal: Identify Related Risk Factors and Signs and Symptoms  Related risk factors and signs and symptoms are identified upon initiation of Human [employer] Clinical Practice Guideline (CPG)    07/26/18 4467   Fall Risk   Related Risk Factors (Fall Risk) age-related changes;culprit medication(s);fatigue/slow reaction   Signs and Symptoms (Fall Risk) presence of risk factors

## 2018-07-27 NOTE — UM SECONDARY REVIEW
[facility]    Level of Care Issue     Accepted to SNF . Awaiting family to get financials in place    LOS: approved an agreement with D/C plan     Approved per  list    Avoidable days placed

## 2018-07-28 LAB — POCT GLUCOSE: 176 MG/DL (ref 70–110)

## 2018-07-28 NOTE — DISCHARGE SUMMARY
[facility] Hospital Medicine  Discharge Summary      Patient Name: Yasmeen Palm  MRN: [id number]  Admission Date: 6/24/2018  Hospital Length of Stay: 33 days  Discharge Date and Time: 7/27/2018  3:33 PM  Attending Physician:Meredith Olguin MD   Discharging Provider: Meredith Olguin MD  Primary Care Provider: Angel Orourke Jr, MD      HPI:   Yasmeen Palm is a 68 y.o. female that (in part)  has a past medical history of Anticoagulant long-term use; Arthritis; Asthma; CHF (congestive heart failure); COPD (chronic obstructive pulmonary disease); Coronary artery disease; Depression; Diabetes mellitus; GERD (gastroesophageal reflux disease); and Hypertension.  has a past surgical history that includes Abdominal surgery; Cardiac surgery; and Hernia repair. Presents to [facility] Emergency Department complaining of chest pain .  She reports compliance with her home medication regimen, including Xarelto.     Description of symptoms  Location:  Substernal  Onset:  Acute onset 2 days ago  Character:  The patient remained; moderate severity  Frequency:  Daily  Duration:  each episode lasts several minutes at a time  Associated Symptoms:  Diaphoresis, shortness of breath, weakness and fatigue  Radiation:  Recent the chest  Exacerbating factors:  Worse on exertion  Relieving factors:  Minimal relief with supplemental oxygen     In the emergency department routine laboratory studies, chest x-ray, EKG, cardiac enzymes were obtained.  EKG findings were concerning the case was discussed with cardiologist, Dr. Pulido.  It was determined that her EKG was not consistent with STEMI and she has been chest pain-free since arrival.  She had been given Lovenox, aspirin, and plan was to continue trending troponins and monitor closely on telemetry.    Procedure(s) (LRB):  Left heart cath R rad access, not before 9am (Left)      Hospital Course:   Ms. Palm was admitted to the hospital originally on  "6/24/18 for chest pain. She was later sent to the ICU with acute hypercapnic respiratory failure the same day on BIPAP.  She quickly improved and was sent to the floor on 6/25.  Cards was consulted for NSTEMI with noted troponin increase and was planning on LHC on 6/26. However, the patient was sent back to the ICU on 6/26 with hypercapnic respiratory failure with a CO2 of > 100 and was intubated. Pulmonary was consulted. Pt clinically improved from respiratory standpoint and was extubated on 6/27 to NC, but she did complain of chest discomfort. Cardiology was notified and patient was taken for LHC on 6/27 which was only remarkable for non-obstructive CAD and did not require intervention. Pt was given IVF post procedure and the next day developed increased SOB and required BIPAP. Pt was treated with IV lasix with good response with much improved respiratory status after output of 1L. Pt also treated for COPD exacerbation with IV steroids, NEBS and doxycycline. ECHO performed on 6/25/2018 remarkable for EF=50-55% + grade 2 diastolic dysfunction. Pt diuresed and changed to maintenance PO lasix regimen. She as started on BIPAP due to increased pCO2 on ABG and lethargy. Pt will need BIPAP/home ventilator for use at home and was approved for the device prior to discharge.    On 7/13, she became febrile, lethargic, hypercapnic, with SOB and was in AFib with RVR. Transferred to ICU again, on BiPAP and amiodarone infusion. Clinically improved over the course of 3 days with IV diuresis 40 mg daily, solumedrol 40 mg TID, BiPAP and empiric Zosyn. Other than UA consistent with UTI, CT chest/abdomen/pelvis with IV showed no other potential source for infection. There was mention of a pneumocele with "debri" however unlikely this to be cause for sepsis and invasive treatment would be too high risk (discussed with pulmonologist). She was weaned to low flow NC to remain in place continuously. AFib with RVR was extremely difficult " to control despite amiodarone infusion and treatment of sepsis. Metoprolol added and uptitrated, and currently on 100 mg BID with good results. She has remained without further event on telemetry. Her H&H did drop slowly throughout admission with no apparent bleed source other than frequent lab monitoring. She was transfused 1 unit RBCs on 7/21. She continued to be stable. BP meds adjusted. Prednisone taper completed prior to discharge. Due to her long hospitalization, PT/OT consulted and recommended SNF. Pt was finally discharged to SNF with orders for patien to use BIPAP QHS and PRN.     Consults:   Consults         Status Ordering Provider     Inpatient consult to Cardiology  Once     Provider:  Laureano Pulido MD    Completed YULY WHITLEY     Inpatient consult to Cardiology  Once     Provider:  Laureano Pulido MD    Completed DEREK WHITE     Inpatient consult to Pulmonology  Once     Provider:  Vivian Ely MD    Completed LAUREANO PRESLEY     Inpatient consult to Pulmonology  Once     Provider:  Vivian Ely MD    Completed LAUREANO PRESLEY     Inpatient consult to Social Work  Once     Provider:  (Not yet assigned)    Completed DEREK WHITE     Inpatient consult to Social Work  Once     Provider:  (Not yet assigned)    Completed DEREK WHITE     Inpatient consult to Social Work  Once     Provider:  (Not yet assigned)    Completed YAZMIN RENTERIA     Inpatient consult to Social Work/Case Management  Once     Provider:  (Not yet assigned)    Completed ALLIE KIM          Service: Hospital Medicine    Final Active Diagnoses:    Diagnosis Date Noted POA    PRINCIPAL PROBLEM:  Sepsis [A41.9] 07/12/2018 Yes    Atrial fibrillation with RVR [I48.91] 07/14/2018 No    Non-STEMI (non-ST elevated myocardial infarction) [I21.4] 06/24/2018 Yes    Acute hypercapnic respiratory failure [J96.02] 06/26/2018 Yes    Acute exacerbation of chronic  obstructive pulmonary disease (COPD) [J44.1] 12/08/2017 Yes    Acute diastolic CHF (congestive heart failure) [I50.31] 08/12/2017 Yes    Coronary artery disease involving native coronary artery of native heart with angina pectoris [I25.119] 12/14/2015 Yes    PAF (paroxysmal atrial fibrillation) [I48.0] 06/25/2018 Yes     Chronic    Acute bacterial conjunctivitis of right eye [H10.31] 07/18/2018 No    Debility [R53.81] 06/29/2018 Yes    Neuropathy [G62.9] 06/25/2018 Yes     Chronic    Obesity [E66.9] 04/14/2017 Yes     Chronic    Chronic anticoagulation [Z79.01] 04/10/2017 Not Applicable     Chronic    History of deep vein thrombosis [Z86.718] 04/10/2017 Not Applicable     Chronic    History of pulmonary embolism [Z86.711] 04/10/2017 Yes     Chronic    Anemia of chronic disease [D63.8] 09/27/2016 Yes     Chronic    Malignant hypertension [I10] 09/27/2016 Yes    Type 2 diabetes mellitus, controlled [E11.9] 12/14/2015 Yes     Chronic    Gastroesophageal reflux disease without esophagitis [K21.9] 12/14/2015 Yes     Chronic    Tobacco abuse [Z72.0] 12/14/2015 Yes     Chronic      Problems Resolved During this Admission:    Diagnosis Date Noted Date Resolved POA    Chest pain [R07.9] 07/13/2018 07/19/2018 Yes    Hyperkalemia [E87.5] 07/09/2018 07/19/2018 Yes    Fall [W19.XXXA] 07/07/2018 07/19/2018 No    Elevated troponin I level [R74.8] 08/12/2017 07/19/2018 Yes    Elevated brain natriuretic peptide (BNP) level [R79.89] 08/12/2017 07/24/2018 Yes    Chronic respiratory failure with hypoxia and hypercapnia [J96.11, J96.12] 04/10/2017 06/26/2018 Yes     Chronic       Discharged Condition: good    Disposition: Skilled Nursing Facility    Follow Up:  Follow-up Information     [facility]    Specialties:  DME Provider, Wound Care  Why:  DME: BIPAP  Contact information:  [facility] [id number] [phone]             Angel Orourke Jr, MD.    Specialty:  Family  "Medicine  Why:  Call to schedule appointment after discharged from SNF   Contact information:  [address]  [address]  [facility] [address]  [phone]             Laureano Pulido MD.    Specialties:  Cardiology, INTERVENTIONAL CARDIOLOGY  Why:  call to schedule appointment in 2 weeks or after SNF discharge   Contact information:  [address] [facility] [address]  [address]  [place] [address]  [phone]                 Patient Instructions:     OXYGEN FOR HOME USE   Order Specific Question Answer Comments   Liter Flow 4    Duration Continuous    Qualifying SpO2: 72%    Testing done at: [facility]    Device home concentrator with portable unit    Length of need (in months): 99 mos    Patient condition with qualifying saturation COPD    Height: 5' 5" (1.651 m)    Weight: 76.7 kg (169 lb 1.5 oz)    Does patient have medical equipment at home? bedside commode back brace / back brace / back brace / back brace / back brace / back brace / back brace   Does patient have medical equipment at home? glucometer    Does patient have medical equipment at home? nebulizer    Does patient have medical equipment at home? oxygen    Does patient have medical equipment at home? shower chair    Does patient have medical equipment at home? wheelchair    Does patient have medical equipment at home? other (see comments)    Alternative treatment measures have been tried or considered and deemed clinically ineffective. Yes      BIPAP FOR HOME USE   Order Specific Question Answer Comments   Type: Auto BiPap    Auto BiPap setting (cmH20) Inspiratory Range: 10    Auto BiPap setting (cmH20) Expiratory Range: 5    Length of need (1-99 months): 99    Humidification: Heated    Type of mask: FFM    Accessories needed: Headgear    Accessories needed: Tubing    Accessories needed: Chin strap    Accessories needed: Filters    BiPap supply refills: 12      VENTILATOR FOR HOME USE   Order Specific Question Answer Comments   Height: " "5' 5" (1.651 m)    Weight: 77.5 kg (170 lb 13.7 oz)    Does patient have medical equipment at home? cane, straight    Does patient have medical equipment at home? wheelchair    Does patient have medical equipment at home? rollator    Does patient have medical equipment at home? bedside commode    Does patient have medical equipment at home? shower chair    Does patient have medical equipment at home? oxygen    Length of need (1-99 months): 99    Interface needed: Full face mask    Mode: AVAPS    Tidal Volume 400    PEEP - EPAP 5.0 CM/H2O    Pressure support - IPAP 10 10-25   FiO2% Other 2 liters   Humidifier needed? Yes      Diet Cardiac     Diet diabetic     Activity as tolerated       Significant Diagnostic Studies:    Pending Diagnostic Studies:     None         Medications:  Reconciled Home Medications:      Medication List      START taking these medications    * amiodarone 400 MG tablet  Commonly known as:  PACERONE  Take 1 tablet (400 mg total) by mouth 2 (two) times daily. for 7 days     * amiodarone 200 MG Tab  Commonly known as:  PACERONE  Take 1 tablet (200 mg total) by mouth 2 (two) times daily. for 13 days  Start taking on:  7/31/2018     * amiodarone 200 MG Tab  Commonly known as:  PACERONE  Take 1 tablet (200 mg total) by mouth once daily.  Start taking on:  8/14/2018     atorvastatin 80 MG tablet  Commonly known as:  LIPITOR  Take 1 tablet (80 mg total) by mouth every evening.     cloNIDine 0.2 mg/24 hr td ptwk 0.2 mg/24 hr  Commonly known as:  CATAPRES  Place 1 patch onto the skin every 7 days.  Start taking on:  8/1/2018     docusate sodium 100 MG capsule  Commonly known as:  COLACE  Take 1 capsule (100 mg total) by mouth once daily.     isosorbide mononitrate 120 MG 24 hr tablet  Commonly known as:  IMDUR  Take 1 tablet (120 mg total) by mouth once daily.     lidocaine 5 %  Commonly known as:  LIDODERM  Place 1 patch onto the skin once daily. Remove & Discard patch within 12 hours or as directed " by MD     metoprolol tartrate 100 MG tablet  Commonly known as:  LOPRESSOR  Take 1 tablet (100 mg total) by mouth 2 (two) times daily.     potassium phosphate (monobasic) 500 mg Tbso  Commonly known as:  K-PHOS  Take 2 tablets (1,000 mg total) by mouth once daily.     tiotropium 18 mcg inhalation capsule  Commonly known as:  SPIRIVA  Inhale 1 capsule (18 mcg total) into the lungs once daily. Controller        * This list has 3 medication(s) that are the same as other medications prescribed for you. Read the directions carefully, and ask your doctor or other care provider to review them with you.            CHANGE how you take these medications    traMADol 50 mg tablet  Commonly known as:  ULTRAM  Take 1 tablet (50 mg total) by mouth every 8 (eight) hours as needed.  What changed:  · when to take this  · reasons to take this        CONTINUE taking these medications    acetaminophen 500 MG tablet  Commonly known as:  TYLENOL  Take 1 tablet (500 mg total) by mouth every 8 (eight) hours as needed.     ANORO ELLIPTA INHL  Inhale into the lungs daily as needed.     aspirin 81 MG EC tablet  Commonly known as:  ECOTRIN  Take 81 mg by mouth once daily.     furosemide 40 MG tablet  Commonly known as:  LASIX  Take 40 mg by mouth once daily.     levalbuterol 45 mcg/actuation inhaler  Commonly known as:  XOPENEX HFA  Inhale 2 puffs into the lungs every 4 (four) hours as needed for Wheezing or Shortness of Breath. Rescue     lisinopril 40 MG tablet  Commonly known as:  PRINIVIL,ZESTRIL  Take 1 tablet (40 mg total) by mouth once daily. Do not take this medication if blood pressure is below 130/80 mmHg and/or feeling dizzy after taking all other blood pressure meds     metFORMIN 500 MG tablet  Commonly known as:  GLUCOPHAGE  Take 500 mg by mouth 2 (two) times daily with meals.     XARELTO 20 mg Tab  Generic drug:  rivaroxaban  Take 20 mg by mouth daily with dinner or evening meal.        STOP taking these medications    cloNIDine  0.1 MG tablet  Commonly known as:  CATAPRES     diltiaZEM 120 MG tablet  Commonly known as:  CARDIZEM     gabapentin 300 MG capsule  Commonly known as:  NEURONTIN     hydrALAZINE 50 MG tablet  Commonly known as:  APRESOLINE     naproxen 375 MG tablet  Commonly known as:  NAPROSYN     sotalol 80 MG tablet  Commonly known as:  BETAPACE            Indwelling Lines/Drains at time of discharge:   Lines/Drains/Airways          No matching active lines, drains, or airways          Time spent on the discharge of patient: 45 minutes  Patient was seen and examined on the date of discharge and determined to be suitable for discharge.         Meredith Olguin MD  Department of Hospital Medicine  [facility]

## 2018-07-28 NOTE — PT/OT/SLP DISCHARGE
Occupational Therapy Discharge Summary    Yasmeen Palm  MRN: [id number]   Principal Problem: Sepsis      Patient Discharged from acute Occupational Therapy on 7/28/18  Please refer to prior OT notes for functional status.    Assessment:      Patient appropriate for care in another setting.    Objective:     GOALS:    Occupational Therapy Goals     Not on file          Multidisciplinary Problems (Resolved)        Problem: Occupational Therapy Goal    Goal Priority Disciplines Outcome Interventions   Occupational Therapy Goal   (Resolved)     OT, PT/OT Outcome(s) achieved    Description:  Goals to be met by: 7/13/18     Patient will increase functional independence with ADLs by performing:    UE Dressing with Set-up Assistance. Met 7/24  LE Dressing with Minimal Assistance.  Grooming while seated with Supervision. Met 7/25  Toileting from bedside commode with Contact Guard Assistance for hygiene and clothing management.   Supine to sit with Set-up Assistance. Met 7/24  Toilet transfer to bedside commode with Contact Guard Assistance.  Upper extremity exercise program x10 reps per handout, with assistance as needed.                         Reasons for Discontinuation of Therapy Services  Transfer to alternate level of care.      Plan:     Patient Discharged to: Skilled Nursing Facility    Kendra Barrios OT  7/28/2018

## 2018-07-30 ENCOUNTER — PATIENT OUTREACH (OUTPATIENT)
Dept: ADMINISTRATIVE | Facility: CLINIC | Age: 68
End: 2018-07-30

## 2018-07-30 NOTE — PHYSICIAN QUERY
"PT Name: Yasmeen Palm  MR #: [id number]    Physician Query Form - Cause and Effect Relationship Clarification      CDS/: Brenda Haynes RN CDI      Contact information: [email or web address]    This form is a permanent document in the medical record.     Query Date: July 30, 2018    By submitting this query, we are merely seeking further clarification of documentation. Please utilize your independent clinical judgment when addressing the question(s) below.    The Medical record contains the following:  Supporting Clinical Findings   Location in record                                                                    PRINCIPAL PROBLEM:  Sepsis     AFib with RVR was extremely difficult to control despite amiodarone infusion and treatment of sepsis.                                                                                                           Discharge   Summary   7/27    Respiratory Culture   6/26Normal respiratory cosmo   Gram Stain (Respiratory) <10 epithelial cells per low power field.   Gram Stain (Respiratory) Few WBC's   Gram Stain (Respiratory) Few Gram positive cocci     Blood culture - no growth after 5 days collected 7/12    Urine Culture, Routine  7/12  MIKEY GLABRATA   > 100,000 cfu/ml   Treatment of asymptomatic candiduria is not recommended (except for specific populations). Candida isolated in the urine typically represents colonization. If an indwelling urinary catheter is present it should be removed or replaced.                                                                                                                                                                                           Lab results         Provider, please clarify if there is any correlation between "Sepsis" and "Urine culture with Candida".           Are the conditions:     [  ] Due to or associated with each other     [ X ] Unrelated to each other     [  ] Other (Please Specify): " _________________________     [  ] Clinically Undetermined

## 2018-11-25 ENCOUNTER — HOSPITAL ENCOUNTER (EMERGENCY)
Facility: HOSPITAL | Age: 68
Discharge: HOME | End: 2018-11-26
Attending: EMERGENCY MEDICINE
Payer: MEDICARE

## 2018-11-25 DIAGNOSIS — R06.02 SHORTNESS OF BREATH: Primary | ICD-10-CM

## 2018-11-25 DIAGNOSIS — J44.1 ACUTE EXACERBATION OF CHRONIC OBSTRUCTIVE PULMONARY DISEASE (COPD): ICD-10-CM

## 2018-11-25 LAB
ALBUMIN SERPL BCP-MCNC: 3.3 G/DL
ALP SERPL-CCNC: 52 U/L
ALT SERPL W/O P-5'-P-CCNC: 6 U/L
ANION GAP SERPL CALC-SCNC: 6 MMOL/L
APTT BLDCRRT: 21.4 SEC
AST SERPL-CCNC: 14 U/L
BASOPHILS # BLD AUTO: 0.02 K/UL
BASOPHILS NFR BLD: 0.2 %
BILIRUB SERPL-MCNC: 0.2 MG/DL
BUN SERPL-MCNC: 13 MG/DL
CALCIUM SERPL-MCNC: 8.8 MG/DL
CHLORIDE SERPL-SCNC: 102 MMOL/L
CO2 SERPL-SCNC: 34 MMOL/L
CREAT SERPL-MCNC: 0.7 MG/DL
DIFFERENTIAL METHOD: ABNORMAL
EOSINOPHIL # BLD AUTO: 0.3 K/UL
EOSINOPHIL NFR BLD: 2.6 %
ERYTHROCYTE [DISTWIDTH] IN BLOOD BY AUTOMATED COUNT: 18.3 %
EST. GFR  (AFRICAN AMERICAN): >60 ML/MIN/1.73 M^2
EST. GFR  (NON AFRICAN AMERICAN): >60 ML/MIN/1.73 M^2
GLUCOSE SERPL-MCNC: 99 MG/DL
HCT VFR BLD AUTO: 30 %
HGB BLD-MCNC: 8.4 G/DL
INR PPP: 1
LYMPHOCYTES # BLD AUTO: 1 K/UL
LYMPHOCYTES NFR BLD: 8.8 %
MCH RBC QN AUTO: 24.7 PG
MCHC RBC AUTO-ENTMCNC: 28 G/DL
MCV RBC AUTO: 88 FL
MONOCYTES # BLD AUTO: 0.6 K/UL
MONOCYTES NFR BLD: 5 %
NEUTROPHILS # BLD AUTO: 9.9 K/UL
NEUTROPHILS NFR BLD: 83.7 %
PLATELET # BLD AUTO: 267 K/UL
PMV BLD AUTO: 9.3 FL
POTASSIUM SERPL-SCNC: 4.4 MMOL/L
PROT SERPL-MCNC: 6.5 G/DL
PROTHROMBIN TIME: 10.4 SEC
RBC # BLD AUTO: 3.4 M/UL
SODIUM SERPL-SCNC: 142 MMOL/L
WBC # BLD AUTO: 11.82 K/UL

## 2018-11-25 PROCEDURE — 85025 COMPLETE CBC W/AUTO DIFF WBC: CPT

## 2018-11-25 PROCEDURE — 93005 ELECTROCARDIOGRAM TRACING: CPT

## 2018-11-25 PROCEDURE — 96374 THER/PROPH/DIAG INJ IV PUSH: CPT

## 2018-11-25 PROCEDURE — 27000221 HC OXYGEN, UP TO 24 HOURS

## 2018-11-25 PROCEDURE — 99285 EMERGENCY DEPT VISIT HI MDM: CPT | Mod: 25

## 2018-11-25 PROCEDURE — 85610 PROTHROMBIN TIME: CPT

## 2018-11-25 PROCEDURE — 84484 ASSAY OF TROPONIN QUANT: CPT

## 2018-11-25 PROCEDURE — 85730 THROMBOPLASTIN TIME PARTIAL: CPT

## 2018-11-25 PROCEDURE — 25000242 PHARM REV CODE 250 ALT 637 W/ HCPCS: Performed by: EMERGENCY MEDICINE

## 2018-11-25 PROCEDURE — 63600175 PHARM REV CODE 636 W HCPCS: Performed by: EMERGENCY MEDICINE

## 2018-11-25 PROCEDURE — 83880 ASSAY OF NATRIURETIC PEPTIDE: CPT

## 2018-11-25 PROCEDURE — 93010 ELECTROCARDIOGRAM REPORT: CPT | Mod: ,,, | Performed by: INTERNAL MEDICINE

## 2018-11-25 PROCEDURE — 80053 COMPREHEN METABOLIC PANEL: CPT

## 2018-11-25 PROCEDURE — 94644 CONT INHLJ TX 1ST HOUR: CPT

## 2018-11-25 RX ORDER — CLONIDINE HYDROCHLORIDE 0.2 MG/1
0.2 TABLET ORAL 2 TIMES DAILY
Status: ON HOLD | COMMUNITY
End: 2019-02-10 | Stop reason: HOSPADM

## 2018-11-25 RX ORDER — HYDRALAZINE HYDROCHLORIDE 50 MG/1
50 TABLET, FILM COATED ORAL EVERY 8 HOURS
COMMUNITY

## 2018-11-25 RX ORDER — IPRATROPIUM BROMIDE 0.5 MG/2.5ML
1 SOLUTION RESPIRATORY (INHALATION) CONTINUOUS
Status: DISCONTINUED | OUTPATIENT
Start: 2018-11-25 | End: 2018-11-26 | Stop reason: HOSPADM

## 2018-11-25 RX ORDER — ALBUTEROL SULFATE 2.5 MG/.5ML
10 SOLUTION RESPIRATORY (INHALATION)
Status: COMPLETED | OUTPATIENT
Start: 2018-11-25 | End: 2018-11-25

## 2018-11-25 RX ORDER — METHYLPREDNISOLONE SOD SUCC 125 MG
125 VIAL (EA) INJECTION
Status: COMPLETED | OUTPATIENT
Start: 2018-11-25 | End: 2018-11-25

## 2018-11-25 RX ADMIN — IPRATROPIUM BROMIDE 1 MG: 0.5 SOLUTION RESPIRATORY (INHALATION) at 10:11

## 2018-11-25 RX ADMIN — ALBUTEROL SULFATE 10 MG: 2.5 SOLUTION RESPIRATORY (INHALATION) at 10:11

## 2018-11-25 RX ADMIN — METHYLPREDNISOLONE SODIUM SUCCINATE 125 MG: 125 INJECTION, POWDER, FOR SOLUTION INTRAMUSCULAR; INTRAVENOUS at 10:11

## 2018-11-26 VITALS
HEART RATE: 75 BPM | DIASTOLIC BLOOD PRESSURE: 79 MMHG | HEIGHT: 64 IN | TEMPERATURE: 98 F | OXYGEN SATURATION: 96 % | WEIGHT: 200 LBS | SYSTOLIC BLOOD PRESSURE: 181 MMHG | RESPIRATION RATE: 20 BRPM | BODY MASS INDEX: 34.15 KG/M2

## 2018-11-26 LAB
BNP SERPL-MCNC: 274 PG/ML
TROPONIN I SERPL DL<=0.01 NG/ML-MCNC: 0.01 NG/ML

## 2018-11-26 RX ORDER — PREDNISONE 20 MG/1
40 TABLET ORAL DAILY
Qty: 10 TABLET | Refills: 0 | Status: SHIPPED | OUTPATIENT
Start: 2018-11-26 | End: 2018-12-01

## 2018-11-26 NOTE — ED PROVIDER NOTES
"Encounter Date: 11/25/2018    SCRIBE #1 NOTE: I, Baudilio Strong, am scribing for, and in the presence of,  Katie Seaman MD. I have scribed the following portions of the note - Other sections scribed: HPI, ROS, PE.       History     Chief Complaint   Patient presents with    Shortness of Breath     started two days ago, states she had some family come in town that had a cold, reports she has had nausea and vomting that started yesterday, home oxygen sat was 82% on 4L, when ems arrived pt placed on their oxygen at 4L and her oxygen sat came up to 100% pt states "I dont feel well."     CC: Shortness of Breath    HPI: This 68 y.o. Female with COPD presents to the ED via EMS for an emergent evaluation of SOB. Patient was admitted to [facility] 11/10/18-11/12/18 (d/c'ed about 2 weeks ago) for COPD exacerbation. She was d/c'ed on prednisone and levaquin which she has finished. Patient started feeling nauseated and vomited several times yesterday, but was not winded; her nausea continued today. She became short of breath this evening. EMS found patient with O2 sat 82% on room air, though patient is typically on home O2 (3.5-4L by NC). EMS put patient on O2, 4L by NC, and her sats came up to 100%. Patient suspects that her nephew's friend, who was at her house for Thanksgiving, might have gotten her sick.     Still smokes sometimes.    PMH: anticoagulant long-term use, arthritis, asthma, CHF, COPD, CAD, depression, diabetes mellitus type II, GERD, HTN  PSH: abdominal surgery, cardiac surgery, hernia repair, hernia mesh      The history is provided by the patient. No  was used.     Review of patient's allergies indicates:  No Known Allergies  Past Medical History:   Diagnosis Date    Anticoagulant long-term use     Arthritis     Asthma     CHF (congestive heart failure)     COPD (chronic obstructive pulmonary disease)     Coronary artery disease     Depression     Diabetes mellitus     GERD " (gastroesophageal reflux disease)     Hypertension      Past Surgical History:   Procedure Laterality Date    ABDOMINAL SURGERY      CARDIAC SURGERY      HERNIA REPAIR       Family History   Problem Relation Age of Onset    Heart disease Mother     Hypertension Mother     Diabetes Father      Social History     Tobacco Use    Smoking status: Current Some Day Smoker     Packs/day: 0.25     Years: 55.00     Pack years: 13.75     Types: Cigarettes    Smokeless tobacco: Never Used   Substance Use Topics    Alcohol use: Yes     Alcohol/week: 0.6 oz     Types: 1 Glasses of wine per week    Drug use: No     Review of Systems   Constitutional: Positive for fatigue. Negative for appetite change and fever.   HENT: Negative for rhinorrhea and sore throat.    Eyes: Negative for visual disturbance.   Respiratory: Positive for shortness of breath.    Cardiovascular: Negative for chest pain.   Gastrointestinal: Positive for nausea and vomiting (yesterday). Negative for abdominal pain.   Genitourinary: Negative for dysuria.   Musculoskeletal: Negative for neck pain.   Skin: Negative for rash.   Neurological: Negative for syncope.       Physical Exam     Initial Vitals   BP Pulse Resp Temp SpO2   11/25/18 2211 11/25/18 2211 11/25/18 2214 11/25/18 2214 11/25/18 2214   (!) 188/75 78 20 98.8 °F (37.1 °C) 98 %      MAP       --                Physical Exam    Nursing note and vitals reviewed.  Constitutional: She appears well-developed and well-nourished. She is not diaphoretic.   Awake and alert.    HENT:   Head: Normocephalic and atraumatic.   Mouth/Throat: Oropharynx is clear and moist.   Eyes: Conjunctivae and EOM are normal. Pupils are equal, round, and reactive to light.   Neck: Normal range of motion. Neck supple.   Cardiovascular: Normal rate, regular rhythm, normal heart sounds and intact distal pulses.   No murmur heard.  Pulmonary/Chest: She is in respiratory distress (mild). She has wheezes (rare expiratory). She  has no rhonchi. She has no rales.   Abdominal: Soft. Bowel sounds are normal. She exhibits no distension. There is no tenderness. There is no rebound and no guarding.   Musculoskeletal: Normal range of motion. She exhibits no tenderness.   Neurological: She is alert and oriented to person, place, and time. She has normal strength.   Moving all extremities.   Skin: Skin is warm and dry. No erythema. No pallor.   Psychiatric: She has a normal mood and affect.         ED Course   Procedures  Labs Reviewed   COMPREHENSIVE METABOLIC PANEL - Abnormal; Notable for the following components:       Result Value    CO2 34 (*)     Albumin 3.3 (*)     Alkaline Phosphatase 52 (*)     ALT 6 (*)     Anion Gap 6 (*)     All other components within normal limits   CBC W/ AUTO DIFFERENTIAL - Abnormal; Notable for the following components:    RBC 3.40 (*)     Hemoglobin 8.4 (*)     Hematocrit 30.0 (*)     MCH 24.7 (*)     MCHC 28.0 (*)     RDW 18.3 (*)     Gran # (ANC) 9.9 (*)     Gran% 83.7 (*)     Lymph% 8.8 (*)     All other components within normal limits   B-TYPE NATRIURETIC PEPTIDE - Abnormal; Notable for the following components:     (*)     All other components within normal limits   TROPONIN I   PROTIME-INR   APTT     EKG Readings: (Independently Interpreted)   EMS, 21:32: NSR, HR 77. No ectopy. No STEMI.  ED, 22:13: NSR, HR 79. No ectopy. No STEMI.   Morphology c/w 6/27/18.       Imaging Results          X-Ray Chest AP Portable (Final result)  Result time 11/25/18 23:05:48    Final result by Davon Sandoval MD (11/25/18 23:05:48)                 Impression:      No acute cardiopulmonary process identified.      Electronically signed by: Davon Sandoval MD  Date:    11/25/2018  Time:    23:05             Narrative:    EXAMINATION:  XR CHEST AP PORTABLE    CLINICAL HISTORY:  shortness of breath;    TECHNIQUE:  Single frontal view of the chest was performed.    COMPARISON:  07/14/2018.    FINDINGS:  Cardiac silhouette is  stable in size.  Lungs are symmetrically expanded.  Mild chronic coarse interstitial lung changes are seen.  No evidence of focal consolidative process, pneumothorax, or significant effusion.  No acute osseous abnormality identified.  Previously visualized right-sided PICC line has been discontinued.                              X-Rays:   Independently Interpreted Readings:   Other Readings:  CXR NAD    Medical Decision Making:   History:   Old Medical Records: I decided to obtain old medical records.  Old Records Summarized: records from another hospital, records from previous admission(s) and records from clinic visits.  Initial Assessment:   68 y.o. Female with COPD, CHF, CAD, presents with nausea/vomiting yesterday, nausea today, shortness of breaht this evening.  Differential Diagnosis:   Ddx includes COPD exacerbation, CHF, ACS, PE, PNA, infectious bronchitis, dehydration, MIRIAM, viral gastroenteritis, AE of recent levaquin, other.  Independently Interpreted Test(s):   I have ordered and independently interpreted X-rays - see prior notes.  I have ordered and independently interpreted EKG Reading(s) - see prior notes  Clinical Tests:   Lab Tests: Ordered and Reviewed  Radiological Study: Ordered and Reviewed  Medical Tests: Ordered and Reviewed  ED Management:  EKG no STEMI.    CXR NAD. No PNA, no pulmonary edema to suggest CHF.    Labs with stable anemia, Hgb 8.4. , c/w prior. Troponin negative. CMP reassuring; bicarb 34 is chronic, likely due to COPD / chronic retention.     Patient had solumedrol and albuterol/ipratropium continuous in ED with improvement in breathing. No nausea/vomiting in ED.     As patient recently completed levaquin and has no evidence of acute infection today, she does not require additional abx. I have rx'ed anotehr prednisone burst course and have urged PMD f/u. Patient is presently comfortable, maintaining sats on usual O2 (3.5L by NC), and states she feels well enough to go  home.            Scribe Attestation:   Scribe #1: I performed the above scribed service and the documentation accurately describes the services I performed. I attest to the accuracy of the note.    Attending Attestation:           Physician Attestation for Scribe:  Physician Attestation Statement for Scribe #1: I, Katie Seaman MD, reviewed documentation, as scribed by Baudilio Strong in my presence, and it is both accurate and complete.                    Clinical Impression:   The primary encounter diagnosis was Shortness of breath. A diagnosis of Acute exacerbation of chronic obstructive pulmonary disease (COPD) was also pertinent to this visit.                             Katie Seaman MD  11/26/18 0654

## 2018-12-08 ENCOUNTER — HOSPITAL ENCOUNTER (INPATIENT)
Facility: HOSPITAL | Age: 68
LOS: 3 days | Discharge: HOME HEALTH | DRG: 092 | End: 2018-12-11
Attending: EMERGENCY MEDICINE | Admitting: INTERNAL MEDICINE
Payer: MEDICARE

## 2018-12-08 DIAGNOSIS — I63.9 CEREBROVASCULAR ACCIDENT (CVA), UNSPECIFIED MECHANISM: ICD-10-CM

## 2018-12-08 DIAGNOSIS — R47.01 APHASIA: ICD-10-CM

## 2018-12-08 DIAGNOSIS — R41.82 ALTERED MENTAL STATUS, UNSPECIFIED ALTERED MENTAL STATUS TYPE: Primary | ICD-10-CM

## 2018-12-08 DIAGNOSIS — R00.0 TACHYCARDIA: ICD-10-CM

## 2018-12-08 PROBLEM — E66.9 OBESITY: Chronic | Status: RESOLVED | Noted: 2017-04-14 | Resolved: 2018-12-08

## 2018-12-08 PROBLEM — G81.94 HEMIPARESIS, LEFT: Status: ACTIVE | Noted: 2018-12-08

## 2018-12-08 LAB
ALBUMIN SERPL BCP-MCNC: 3.7 G/DL
ALP SERPL-CCNC: 80 U/L
ALT SERPL W/O P-5'-P-CCNC: 7 U/L
ANION GAP SERPL CALC-SCNC: 12 MMOL/L
AST SERPL-CCNC: 13 U/L
BASOPHILS # BLD AUTO: 0.02 K/UL
BASOPHILS NFR BLD: 0.2 %
BILIRUB SERPL-MCNC: 0.3 MG/DL
BUN SERPL-MCNC: 12 MG/DL
CALCIUM SERPL-MCNC: 9.7 MG/DL
CHLORIDE SERPL-SCNC: 97 MMOL/L
CHOLEST SERPL-MCNC: 246 MG/DL
CHOLEST/HDLC SERPL: 4.5 {RATIO}
CO2 SERPL-SCNC: 30 MMOL/L
CREAT SERPL-MCNC: 0.7 MG/DL
DIFFERENTIAL METHOD: ABNORMAL
EOSINOPHIL # BLD AUTO: 0.1 K/UL
EOSINOPHIL NFR BLD: 0.7 %
ERYTHROCYTE [DISTWIDTH] IN BLOOD BY AUTOMATED COUNT: 17.8 %
EST. GFR  (AFRICAN AMERICAN): >60 ML/MIN/1.73 M^2
EST. GFR  (NON AFRICAN AMERICAN): >60 ML/MIN/1.73 M^2
GLUCOSE SERPL-MCNC: 75 MG/DL
HCT VFR BLD AUTO: 34.2 %
HDLC SERPL-MCNC: 55 MG/DL
HDLC SERPL: 22.4 %
HGB BLD-MCNC: 9.9 G/DL
INR PPP: 1.1
LDLC SERPL CALC-MCNC: 172 MG/DL
LYMPHOCYTES # BLD AUTO: 1.3 K/UL
LYMPHOCYTES NFR BLD: 13.5 %
MCH RBC QN AUTO: 24.8 PG
MCHC RBC AUTO-ENTMCNC: 28.9 G/DL
MCV RBC AUTO: 86 FL
MONOCYTES # BLD AUTO: 0.6 K/UL
MONOCYTES NFR BLD: 6.1 %
NEUTROPHILS # BLD AUTO: 7.5 K/UL
NEUTROPHILS NFR BLD: 79.7 %
NONHDLC SERPL-MCNC: 191 MG/DL
PLATELET # BLD AUTO: 288 K/UL
PMV BLD AUTO: 9.6 FL
POCT GLUCOSE: 159 MG/DL (ref 70–110)
POCT GLUCOSE: 69 MG/DL (ref 70–110)
POCT GLUCOSE: 74 MG/DL (ref 70–110)
POTASSIUM SERPL-SCNC: 4.1 MMOL/L
PROT SERPL-MCNC: 7.7 G/DL
PROTHROMBIN TIME: 11.2 SEC
RBC # BLD AUTO: 4 M/UL
SODIUM SERPL-SCNC: 139 MMOL/L
TRIGL SERPL-MCNC: 95 MG/DL
TSH SERPL DL<=0.005 MIU/L-ACNC: 0.44 UIU/ML
WBC # BLD AUTO: 9.49 K/UL

## 2018-12-08 PROCEDURE — 82962 GLUCOSE BLOOD TEST: CPT

## 2018-12-08 PROCEDURE — 25000003 PHARM REV CODE 250

## 2018-12-08 PROCEDURE — 93005 ELECTROCARDIOGRAM TRACING: CPT

## 2018-12-08 PROCEDURE — 84443 ASSAY THYROID STIM HORMONE: CPT

## 2018-12-08 PROCEDURE — 85610 PROTHROMBIN TIME: CPT

## 2018-12-08 PROCEDURE — 27000221 HC OXYGEN, UP TO 24 HOURS

## 2018-12-08 PROCEDURE — 25000242 PHARM REV CODE 250 ALT 637 W/ HCPCS: Performed by: EMERGENCY MEDICINE

## 2018-12-08 PROCEDURE — 25500020 PHARM REV CODE 255: Performed by: EMERGENCY MEDICINE

## 2018-12-08 PROCEDURE — 99291 CRITICAL CARE FIRST HOUR: CPT | Mod: 25

## 2018-12-08 PROCEDURE — 93010 ELECTROCARDIOGRAM REPORT: CPT | Mod: ,,, | Performed by: INTERNAL MEDICINE

## 2018-12-08 PROCEDURE — 94640 AIRWAY INHALATION TREATMENT: CPT

## 2018-12-08 PROCEDURE — 96374 THER/PROPH/DIAG INJ IV PUSH: CPT

## 2018-12-08 PROCEDURE — 25000003 PHARM REV CODE 250: Performed by: EMERGENCY MEDICINE

## 2018-12-08 PROCEDURE — 21400001 HC TELEMETRY ROOM

## 2018-12-08 PROCEDURE — 80061 LIPID PANEL: CPT

## 2018-12-08 PROCEDURE — 80053 COMPREHEN METABOLIC PANEL: CPT

## 2018-12-08 PROCEDURE — 63600175 PHARM REV CODE 636 W HCPCS: Performed by: EMERGENCY MEDICINE

## 2018-12-08 PROCEDURE — A4216 STERILE WATER/SALINE, 10 ML: HCPCS | Performed by: EMERGENCY MEDICINE

## 2018-12-08 PROCEDURE — 85025 COMPLETE CBC W/AUTO DIFF WBC: CPT

## 2018-12-08 PROCEDURE — G0508 CRIT CARE TELEHEA CONSULT 60: HCPCS | Mod: GT,,, | Performed by: PSYCHIATRY & NEUROLOGY

## 2018-12-08 RX ORDER — MULTIVITAMIN
1 TABLET ORAL DAILY
Status: ON HOLD | COMMUNITY
End: 2019-07-04 | Stop reason: HOSPADM

## 2018-12-08 RX ORDER — PANTOPRAZOLE SODIUM 40 MG/1
40 TABLET, DELAYED RELEASE ORAL DAILY
COMMUNITY

## 2018-12-08 RX ORDER — LABETALOL HYDROCHLORIDE 5 MG/ML
10 INJECTION, SOLUTION INTRAVENOUS EVERY 6 HOURS PRN
Status: DISCONTINUED | OUTPATIENT
Start: 2018-12-08 | End: 2018-12-10

## 2018-12-08 RX ORDER — DILTIAZEM HYDROCHLORIDE 90 MG/1
90 TABLET, FILM COATED ORAL 3 TIMES DAILY
Status: ON HOLD | COMMUNITY
End: 2018-12-11 | Stop reason: HOSPADM

## 2018-12-08 RX ORDER — DEXTROSE 50 % IN WATER (D50W) INTRAVENOUS SYRINGE
Status: COMPLETED
Start: 2018-12-08 | End: 2018-12-08

## 2018-12-08 RX ORDER — GABAPENTIN 300 MG/1
300 CAPSULE ORAL 3 TIMES DAILY
Status: ON HOLD | COMMUNITY
End: 2019-07-01 | Stop reason: HOSPADM

## 2018-12-08 RX ORDER — IPRATROPIUM BROMIDE AND ALBUTEROL SULFATE 2.5; .5 MG/3ML; MG/3ML
3 SOLUTION RESPIRATORY (INHALATION)
Status: COMPLETED | OUTPATIENT
Start: 2018-12-08 | End: 2018-12-08

## 2018-12-08 RX ORDER — ENOXAPARIN SODIUM 100 MG/ML
30 INJECTION SUBCUTANEOUS EVERY 24 HOURS
Status: DISCONTINUED | OUTPATIENT
Start: 2018-12-08 | End: 2018-12-08 | Stop reason: SDUPTHER

## 2018-12-08 RX ORDER — DEXTROSE 50 % IN WATER (D50W) INTRAVENOUS SYRINGE
12.5
Status: COMPLETED | OUTPATIENT
Start: 2018-12-08 | End: 2018-12-08

## 2018-12-08 RX ORDER — METOPROLOL TARTRATE 50 MG/1
100 TABLET ORAL
Status: COMPLETED | OUTPATIENT
Start: 2018-12-08 | End: 2018-12-08

## 2018-12-08 RX ORDER — SOTALOL HYDROCHLORIDE 80 MG/1
40 TABLET ORAL 2 TIMES DAILY
Status: ON HOLD | COMMUNITY
End: 2018-12-11 | Stop reason: HOSPADM

## 2018-12-08 RX ORDER — SODIUM CHLORIDE 0.9 % (FLUSH) 0.9 %
3 SYRINGE (ML) INJECTION EVERY 8 HOURS
Status: DISCONTINUED | OUTPATIENT
Start: 2018-12-08 | End: 2018-12-11 | Stop reason: HOSPADM

## 2018-12-08 RX ORDER — ENOXAPARIN SODIUM 100 MG/ML
40 INJECTION SUBCUTANEOUS EVERY 24 HOURS
Status: DISCONTINUED | OUTPATIENT
Start: 2018-12-08 | End: 2018-12-10

## 2018-12-08 RX ORDER — ASPIRIN 325 MG
325 TABLET, DELAYED RELEASE (ENTERIC COATED) ORAL DAILY
Status: DISCONTINUED | OUTPATIENT
Start: 2018-12-09 | End: 2018-12-10

## 2018-12-08 RX ORDER — ATORVASTATIN CALCIUM 40 MG/1
40 TABLET, FILM COATED ORAL DAILY
Status: DISCONTINUED | OUTPATIENT
Start: 2018-12-09 | End: 2018-12-11 | Stop reason: HOSPADM

## 2018-12-08 RX ORDER — PRAVASTATIN SODIUM 40 MG/1
40 TABLET ORAL DAILY
COMMUNITY
End: 2019-06-07

## 2018-12-08 RX ADMIN — DEXTROSE 50 % IN WATER (D50W) INTRAVENOUS SYRINGE 12.5 G: at 05:12

## 2018-12-08 RX ADMIN — IPRATROPIUM BROMIDE AND ALBUTEROL SULFATE 3 ML: .5; 3 SOLUTION RESPIRATORY (INHALATION) at 10:12

## 2018-12-08 RX ADMIN — DEXTROSE MONOHYDRATE 12.5 G: 25 INJECTION, SOLUTION INTRAVENOUS at 05:12

## 2018-12-08 RX ADMIN — IOHEXOL 100 ML: 350 INJECTION, SOLUTION INTRAVENOUS at 07:12

## 2018-12-08 RX ADMIN — METOPROLOL TARTRATE 100 MG: 50 TABLET ORAL at 08:12

## 2018-12-08 RX ADMIN — ENOXAPARIN SODIUM 40 MG: 100 INJECTION SUBCUTANEOUS at 11:12

## 2018-12-08 RX ADMIN — Medication 3 ML: at 11:12

## 2018-12-08 NOTE — SUBJECTIVE & OBJECTIVE
Woke up with symptoms?: no  Last known normal:        Recent bleeding noted: no  Does the patient take any Blood Thinners? yes  Medications: Anticoagulants:  rivaroxaban/Xarelto    Past Medical History:   Diagnosis Date    Anticoagulant long-term use     Arthritis     Asthma     CHF (congestive heart failure)     COPD (chronic obstructive pulmonary disease)     Coronary artery disease     Depression     Diabetes mellitus     GERD (gastroesophageal reflux disease)     Hypertension      Past Surgical History:   Procedure Laterality Date    ABDOMINAL SURGERY      CARDIAC SURGERY      HERNIA REPAIR       Social History     Tobacco Use    Smoking status: Current Some Day Smoker     Packs/day: 0.25     Years: 55.00     Pack years: 13.75     Types: Cigarettes    Smokeless tobacco: Never Used   Substance Use Topics    Alcohol use: Yes     Alcohol/week: 0.6 oz     Types: 1 Glasses of wine per week    Drug use: No     Family History   Problem Relation Age of Onset    Heart disease Mother     Hypertension Mother     Diabetes Father      No current facility-administered medications on file prior to encounter.      Current Outpatient Medications on File Prior to Encounter   Medication Sig Dispense Refill    acetaminophen (TYLENOL) 500 MG tablet Take 1 tablet (500 mg total) by mouth every 8 (eight) hours as needed.  0    amiodarone (PACERONE) 200 MG Tab Take 1 tablet (200 mg total) by mouth once daily. 30 tablet 11    aspirin (ECOTRIN) 81 MG EC tablet Take 81 mg by mouth once daily.      atorvastatin (LIPITOR) 80 MG tablet Take 1 tablet (80 mg total) by mouth every evening. 30 tablet 1    cloNIDine (CATAPRES) 0.2 MG tablet Take 0.2 mg by mouth 2 (two) times daily.      cloNIDine 0.2 mg/24 hr td ptwk (CATAPRES) 0.2 mg/24 hr Place 1 patch onto the skin every 7 days. (Patient not taking: Reported on 11/25/2018) 4 patch 11    docusate sodium (COLACE) 100 MG capsule Take 1 capsule (100 mg total) by mouth  "once daily.  0    furosemide (LASIX) 40 MG tablet Take 40 mg by mouth once daily.       hydrALAZINE (APRESOLINE) 50 MG tablet Take 50 mg by mouth 2 (two) times daily.      isosorbide mononitrate (IMDUR) 120 MG 24 hr tablet Take 1 tablet (120 mg total) by mouth once daily. (Patient taking differently: Take 80 mg by mouth once daily. ) 30 tablet 11    levalbuterol (XOPENEX HFA) 45 mcg/actuation inhaler Inhale 2 puffs into the lungs every 4 (four) hours as needed for Wheezing or Shortness of Breath. Rescue 1 Inhaler 3    lidocaine (LIDODERM) 5 % Place 1 patch onto the skin once daily. Remove & Discard patch within 12 hours or as directed by MD 15 patch 0    lisinopril (PRINIVIL,ZESTRIL) 40 MG tablet Take 1 tablet (40 mg total) by mouth once daily. Do not take this medication if blood pressure is below 130/80 mmHg and/or feeling dizzy after taking all other blood pressure meds      metformin (GLUCOPHAGE) 500 MG tablet Take 500 mg by mouth 2 (two) times daily with meals.      metoprolol tartrate (LOPRESSOR) 100 MG tablet Take 1 tablet (100 mg total) by mouth 2 (two) times daily. 60 tablet 11    potassium phosphate, monobasic, (K-PHOS) 500 mg TbSO Take 2 tablets (1,000 mg total) by mouth once daily. 60 tablet 11    rivaroxaban (XARELTO) 20 mg Tab Take 20 mg by mouth daily with dinner or evening meal.      tiotropium (SPIRIVA) 18 mcg inhalation capsule Inhale 1 capsule (18 mcg total) into the lungs once daily. Controller 30 capsule 11    UMECLIDINIUM BRM/VILANTEROL TR (ANORO ELLIPTA INHL) Inhale into the lungs daily as needed.         Allergies: No Known Allergies    Review of Systems   All other systems reviewed and are negative.    Objective:   Vitals: Blood pressure (!) 236/110, pulse 101, temperature 99.8 °F (37.7 °C), temperature source Oral, resp. rate 20, height 5' 8" (1.727 m), weight 75.8 kg (167 lb), SpO2 100 %.   Repeat 184/88    CT READ: Yes  No hemmorhage. No mass effect. No early infarct signs. "     Physical Exam   Vitals reviewed.

## 2018-12-08 NOTE — HPI
69 y/o AAF with sudden unresponsiveness followed by L sided weakness at 2p.  Per her , there was UE jerking (he thinks right).  She has become more alert and interactive.  No history of similar symptoms.  She takes Xarelto (apparently due to prior DVT).

## 2018-12-08 NOTE — CONSULTS
[facility]  Vascular Neurology  Comprehensive Stroke Center  Tele-Consultation Note      Consults    Consulting Provider: Spoke Physician:: Braden Matos  Current Providers  No providers found    Patient Location: [facility]  Spoke hospital nurse at bedside with patient assisting consultant.     Patient information was obtained from spouse/SO.       Assessment/Plan:     STROKE DOCUMENTATION     Acute Stroke Times:   Acute Stroke Times   Stroke Team Arrival Time: 1520  CT Interpretation Time: 1522    NIH Scale:  Interval: baseline (upon arrival/admit)  1a. Level Of Consciousness: 0-->Alert: keenly responsive  1b. LOC Questions: 2-->Answers neither question correctly  1c. LOC Commands: 0-->Performs both tasks correctly  2. Best Gaze: 0-->Normal  3. Visual: 0-->No visual loss  4. Facial Palsy: 1-->Minor paralysis (flattened nasolabial fold, asymmetry on smiling)(left)  5a. Motor Arm, Left: 1-->Drift: limb holds 90 (or 45) degrees, but drifts down before full 10 seconds: does not hit bed or other support  5b. Motor Arm, Right: 0-->No drift: limb holds 90 (or 45) degrees for full 10 secs  6a. Motor Leg, Left: 2-->Some effort against gravity: leg falls to bed by 5 secs, but has some effort against gravity  6b. Motor Leg, Right: 2-->Some effort against gravity: leg falls to bed by 5 secs, but has some effort against gravity  7. Limb Ataxia: 0-->Absent  8. Sensory: 0-->Normal: no sensory loss  9. Best Language: 2-->Severe aphasia: all communication is through fragmentary expression: great need for inference, questioning, and guessing by the listener. Range of information that can be exchanged is limited: listener carries burden of. . . (see row details)  10. Dysarthria: 2-->Severe dysarthria: patients speech is so slurred as to be unintelligible in the absence of or out of proportion to any dysphasia, or is mute/anarthric  11. Extinction and Inattention  "(formerly Neglect): 0-->No abnormality  Total (NIH Stroke Scale): 12     Modified [place]    Angela Coma Scale:    ABCD2 Score:    [id number] Score:   HAS -BLED Score:   ICH Score:   Hunt & Tabares Classification:       Diagnoses:   Hemiparesis, left    History most suggestive of seizure although DDx includes ischemic stroke with anarthria vs expressive aphasia (I did not have her try to write).  If a stroke, she is not a tPA candidate as she takes Xarelto.  Will obtain CTA head/neck.  If + for LVO, will transfer to [facility]; otherwise will keep at [facility] for MRI and, if negative for stroke, EEG.         Blood pressure (!) 236/110, pulse 101, temperature 99.8 °F (37.7 °C), temperature source Oral, resp. rate 20, height 5' 8" (1.727 m), weight 75.8 kg (167 lb), SpO2 100 %.  Alteplase Eligible?: No  Alteplase Recommendation: Alteplase not recommended due to Full dose anticoagulation   Possible Interventional Revascularization Candidate? Yes    Disposition Recommendation: pending further studies      Subjective:     History of Present Illness:  67 y/o AAF with sudden unresponsiveness followed by L sided weakness at 2p.  Per her , there was UE jerking (he thinks right).  She has become more alert and interactive.  No history of similar symptoms.  She takes Xarelto (apparently due to prior DVT).      Woke up with symptoms?: no  Last known normal:        Recent bleeding noted: no  Does the patient take any Blood Thinners? yes  Medications: Anticoagulants:  rivaroxaban/Xarelto    Past Medical History:   Diagnosis Date    Anticoagulant long-term use     Arthritis     Asthma     CHF (congestive heart failure)     COPD (chronic obstructive pulmonary disease)     Coronary artery disease     Depression     Diabetes mellitus     GERD (gastroesophageal reflux disease)     Hypertension      Past Surgical History:   Procedure Laterality Date    ABDOMINAL SURGERY      CARDIAC SURGERY      HERNIA REPAIR   "     Social History     Tobacco Use    Smoking status: Current Some Day Smoker     Packs/day: 0.25     Years: 55.00     Pack years: 13.75     Types: Cigarettes    Smokeless tobacco: Never Used   Substance Use Topics    Alcohol use: Yes     Alcohol/week: 0.6 oz     Types: 1 Glasses of wine per week    Drug use: No     Family History   Problem Relation Age of Onset    Heart disease Mother     Hypertension Mother     Diabetes Father      No current facility-administered medications on file prior to encounter.      Current Outpatient Medications on File Prior to Encounter   Medication Sig Dispense Refill    acetaminophen (TYLENOL) 500 MG tablet Take 1 tablet (500 mg total) by mouth every 8 (eight) hours as needed.  0    amiodarone (PACERONE) 200 MG Tab Take 1 tablet (200 mg total) by mouth once daily. 30 tablet 11    aspirin (ECOTRIN) 81 MG EC tablet Take 81 mg by mouth once daily.      atorvastatin (LIPITOR) 80 MG tablet Take 1 tablet (80 mg total) by mouth every evening. 30 tablet 1    cloNIDine (CATAPRES) 0.2 MG tablet Take 0.2 mg by mouth 2 (two) times daily.      cloNIDine 0.2 mg/24 hr td ptwk (CATAPRES) 0.2 mg/24 hr Place 1 patch onto the skin every 7 days. (Patient not taking: Reported on 11/25/2018) 4 patch 11    docusate sodium (COLACE) 100 MG capsule Take 1 capsule (100 mg total) by mouth once daily.  0    furosemide (LASIX) 40 MG tablet Take 40 mg by mouth once daily.       hydrALAZINE (APRESOLINE) 50 MG tablet Take 50 mg by mouth 2 (two) times daily.      isosorbide mononitrate (IMDUR) 120 MG 24 hr tablet Take 1 tablet (120 mg total) by mouth once daily. (Patient taking differently: Take 80 mg by mouth once daily. ) 30 tablet 11    levalbuterol (XOPENEX HFA) 45 mcg/actuation inhaler Inhale 2 puffs into the lungs every 4 (four) hours as needed for Wheezing or Shortness of Breath. Rescue 1 Inhaler 3    lidocaine (LIDODERM) 5 % Place 1 patch onto the skin once daily. Remove & Discard patch  "within 12 hours or as directed by MD 15 patch 0    lisinopril (PRINIVIL,ZESTRIL) 40 MG tablet Take 1 tablet (40 mg total) by mouth once daily. Do not take this medication if blood pressure is below 130/80 mmHg and/or feeling dizzy after taking all other blood pressure meds      metformin (GLUCOPHAGE) 500 MG tablet Take 500 mg by mouth 2 (two) times daily with meals.      metoprolol tartrate (LOPRESSOR) 100 MG tablet Take 1 tablet (100 mg total) by mouth 2 (two) times daily. 60 tablet 11    potassium phosphate, monobasic, (K-PHOS) 500 mg TbSO Take 2 tablets (1,000 mg total) by mouth once daily. 60 tablet 11    rivaroxaban (XARELTO) 20 mg Tab Take 20 mg by mouth daily with dinner or evening meal.      tiotropium (SPIRIVA) 18 mcg inhalation capsule Inhale 1 capsule (18 mcg total) into the lungs once daily. Controller 30 capsule 11    UMECLIDINIUM BRM/VILANTEROL TR (ANORO ELLIPTA INHL) Inhale into the lungs daily as needed.         Allergies: No Known Allergies    Review of Systems   All other systems reviewed and are negative.    Objective:   Vitals: Blood pressure (!) 236/110, pulse 101, temperature 99.8 °F (37.7 °C), temperature source Oral, resp. rate 20, height 5' 8" (1.727 m), weight 75.8 kg (167 lb), SpO2 100 %.   Repeat 184/88    CT READ: Yes  No hemmorhage. No mass effect. No early infarct signs.     Physical Exam   Vitals reviewed.            Recommended the emergency room physician to have a brief discussion with the patient and/or family if available regarding the risks and benefits of treatment, and to briefly document the occurrence of that discussion in his clinical encounter note.     The attending portion of this evaluation, treatment, and documentation was performed per Rocky Harmon MD via audiovisual.    Billing code:  (time dependent stroke, complex case, unstable patient, hemorrhages, any intervention, some mimics)    · This patient has a very critical neurological " condition/illness, with very high morbidity and mortality.  · There is a very high probability for acute neurological change leading to clinical and possibly life-threatening deterioration requiring highest level of physician preparedness for urgent intervention.  · There is possibility that this condition will require treatment with high risk medications as quickly as possible.  · There is also a possibility that the patient may benefit from further, more advance complex therapies (e.g. endovascular therapy) that will require prompt diagnosis and care.  · Care was coordinated with other physicians involved in the patient's care.  · Radiologic studies and laboratory data were reviewed and interpreted, and plan of care was re-assessed based on the results.  · Diagnosis, treatment options and prognosis may have been discussed with the patient and/or family members or caregiver.  · Further advanced medical management and further evaluation is warranted for his care.      In your opinion, this was a: Tier 1    Consult End Time: 3:46 PM     Rocky Harmon MD  Comprehensive Stroke Center  Vascular Neurology   [facility]

## 2018-12-08 NOTE — ED TRIAGE NOTES
Pt arrives to er via ems on stretcher from home,  present. Pt  reports while at home watching tv pt became confused and right hand was shaking. Pt states pt told him to call 911.

## 2018-12-08 NOTE — ED PROVIDER NOTES
Encounter Date: 12/8/2018       History     Chief Complaint   Patient presents with    Aphasia     about an hour ago. Per spouse, we were watching the TV and then I came home and she was altered. Pt unable to speak and is confused.      HPI   This 68-year-old female presents to the emergency room with a 1 hr history of confusion.  The patient would not talk on arrival.  She now speaking.  When asked what month it is she says [place].  She knows that she is at the hospital.  Her face is symmetrical. She will speak words.  There may be some mild aphasia.  The patient has normally formed words.  She seems confused.  This is been going on for about an hour.  Review of patient's allergies indicates:  No Known Allergies  Past Medical History:   Diagnosis Date    Anticoagulant long-term use     Arthritis     Asthma     CHF (congestive heart failure)     COPD (chronic obstructive pulmonary disease)     Coronary artery disease     Depression     Diabetes mellitus     GERD (gastroesophageal reflux disease)     Hypertension      Past Surgical History:   Procedure Laterality Date    ABDOMINAL SURGERY      CARDIAC SURGERY      HERNIA REPAIR       Family History   Problem Relation Age of Onset    Heart disease Mother     Hypertension Mother     Diabetes Father      Social History     Tobacco Use    Smoking status: Current Some Day Smoker     Packs/day: 0.25     Years: 55.00     Pack years: 13.75     Types: Cigarettes    Smokeless tobacco: Never Used   Substance Use Topics    Alcohol use: No     Alcohol/week: 0.6 oz     Types: 1 Glasses of wine per week     Frequency: Never    Drug use: No     Review of Systems  The patient was questioned specifically with regard to the following.  General: Fever, chills, sweats. Neuro: Headache. Eyes: eye problems. ENT: Ear pain, sore throat. Cardiovascular: Chest pain. Respiratory: Cough, shortness of breath. Gastrointestinal: Abdominal pain, vomiting, diarrhea.  Genitourinary: Painful urination.  Musculoskeletal: Arm and leg problems. Skin: Rash.  The review of systems was negative except for the following:  Confusion.  Physical Exam     Initial Vitals [12/08/18 1447]   BP Pulse Resp Temp SpO2   (!) 236/110 101 20 99.8 °F (37.7 °C) 100 %      MAP       --         Physical Exam  The patient was examined specifically for the following:   General:No significant distress, Good color, Warm and dry. Head and neck:Scalp atraumatic, Neck supple. Neurological:Appropriate conversation, Gross motor deficits. Eyes:Conjugate gaze, Clear corneas. ENT: No epistaxis. Cardiac: Regular rate and rhythm, Grossly normal heart tones. Pulmonary: Wheezing, Rales. Gastrointestinal: Abdominal tenderness, Abdominal distention. Musculoskeletal: Extremity deformity, Apparent pain with range of motion of the joints. Skin: Rash.   The findings on examination were normal except for the following:  Patient seems confused.  She is slow to respond.  I find no definite motor movements are facial asymmetry.  The patient can form words.  The lungs are free of wheezing rales or rhonchi.  Heart tones are normal.  The patient has regular rate and rhythm. The abdomen is soft.  There is no extremity tenderness.  ED Course   Critical Care  Date/Time: 12/8/2018 9:17 PM  Performed by: Rogelio Bolton MD  Authorized by: Rogelio Bolton MD   Direct patient critical care time: 20 minutes  Additional history critical care time: 10 minutes  Ordering / reviewing critical care time: 10 minutes  Documentation critical care time: 10 minutes  Consulting other physicians critical care time: 10 minutes  Consult with family critical care time: 10 minutes  Total critical care time (exclusive of procedural time) : 70 minutes  Critical care time was exclusive of teaching time and separately billable procedures and treating other patients.  Critical care was time spent personally by me on the following activities: development  of treatment plan with patient or surrogate, discussions with consultants, discussions with primary provider, interpretation of cardiac output measurements, evaluation of patient's response to treatment, examination of patient, obtaining history from patient or surrogate, ordering and performing treatments and interventions, ordering and review of laboratory studies, ordering and review of radiographic studies, pulse oximetry, re-evaluation of patient's condition and review of old charts.        Labs Reviewed   CBC W/ AUTO DIFFERENTIAL - Abnormal; Notable for the following components:       Result Value    Hemoglobin 9.9 (*)     Hematocrit 34.2 (*)     MCH 24.8 (*)     MCHC 28.9 (*)     RDW 17.8 (*)     Gran% 79.7 (*)     Lymph% 13.5 (*)     All other components within normal limits   COMPREHENSIVE METABOLIC PANEL - Abnormal; Notable for the following components:    CO2 30 (*)     ALT 7 (*)     All other components within normal limits    Narrative:     Recoll. [id number] by BEW at 12/08/2018 16:09, reason: Specimen   hemolyzed spoke to shamir silke   LIPID PANEL - Abnormal; Notable for the following components:    Cholesterol 246 (*)     LDL Cholesterol 172.0 (*)     All other components within normal limits    Narrative:     Recoll. [id number] by BEW at 12/08/2018 16:09, reason: Specimen   hemolyzed spoke to [facility]   POCT GLUCOSE - Abnormal; Notable for the following components:    POCT Glucose 69 (*)     All other components within normal limits   POCT GLUCOSE - Abnormal; Notable for the following components:    POCT Glucose 159 (*)     All other components within normal limits   PROTIME-INR    Narrative:     Recoll. [id number] by BEW at 12/08/2018 16:09, reason: Specimen   [facility]   TSH    Narrative:     Recoll. [id number] by BEW at 12/08/2018 16:09, reason: Specimen   hemolyzed spoke to [facility]   POCT GLUCOSE, HAND-HELD DEVICE   POCT GLUCOSE MONITORING CONTINUOUS      EKG Readings: (Independently Interpreted)   This patient is in a sinus tachycardia with a heart rate of 104.  There is left axis deviation.  There is poor R-wave progression across the precordium.  There are nonspecific ST segment and T-wave changes.       Imaging Results          CTA Head and Neck (xpd) (Final result)  Result time 12/08/18 19:56:53    Final result by Davon Sandoval MD (12/08/18 19:56:53)                 Impression:      1. Approximately 50% stenosis at the right ICA origin.  2. Extensive atherosclerotic plaquing resulting in greater than 70% stenosis of the distal right vertebral artery V4 segment.  3. No acute intracranial abnormality identified.  4. Additional findings and CTA vascular details as detailed above.      Electronically signed by: Davon Sandoval MD  Date:    12/08/2018  Time:    19:56             Narrative:    EXAMINATION:  CTA HEAD AND NECK (XPD)    CLINICAL HISTORY:  Confusion/delirium, altered LOC, unexplained;    TECHNIQUE:  Non contrast low dose axial images were obtained through the head.  CT angiogram was performed from the level of the paco to the top of the head following the IV administration of 100mL of Omnipaque 350.   Sagittal and coronal reconstructions and maximum intensity projection reconstructions were performed. Arterial stenosis percentages are based on NASCET measurement criteria.    COMPARISON:  CT head from the same date at 14:52.    FINDINGS:  No evidence to suggest acute/recent major vascular distribution infarction.  Ventricular system is normal in size and configuration.  No evidence of mass or mass effect..    CTA Neck: Atherosclerotic plaquing of the arch and origin of the great vessels without significant focal stenosis.  The origins of the right brachiocephalic, left common carotid and left subclavian arteries from the arch are within normal limits.  The origins of the vertebral arteries are within normal limits.  Right vertebral artery is  dominant.  Severe atherosclerotic plaquing is seen involving the right vertebral artery V4 segment resulting in greater than 70% stenosis.  Bilateral common carotid arteries are patent.  Prominent plaquing is seen at the bilateral carotid bifurcations and ICA origins.  This results in approximately 50% stenosis involving the right internal carotid artery origin.  Internal carotid arteries are otherwise patent.    Anterior circulation: The bilateral distal cervical, petrous, cavernous, and supraclinoid ICAs are patent without significant stenosis or aneurysm.  Atherosclerotic plaquing of the cavernous segments of the ICAs.  The anterior and middle cerebral arteries are patent without significant stenosis, occlusion, or aneurysm.    Posterior circulation: Dominant right vertebral artery.  Prominent plaquing and stenosis seen involving the right distal vertebral artery V4 segment as above.  Distal left vertebral artery is small in caliber.  Basilar artery and bilateral posterior communicating arteries are patent without significant focal stenosis, occlusion, or aneurysm.  Persistent fetal circulation is visualized on the right with prominent right-sided posterior communicating artery supplying majority of flow to the right PCA.    Airways: Unremarkable.    Glands/Nodes: The parotid and submandibular glands are unremarkable.  Few small subcentimeter hypodense thyroid nodules are seen    Spine: No acute osseous abnormality identified.  Mild degenerative changes are visualized.    Lungs: Emphysematous changes are seen within the visualized upper lobes.                               X-Ray Chest AP Portable (Final result)  Result time 12/08/18 15:21:57    Final result by Richard Worley MD (12/08/18 15:21:57)                 Impression:      No acute disease or interval change      Electronically signed by: Richard Worley MD  Date:    12/08/2018  Time:    15:21             Narrative:    EXAMINATION:  XR CHEST AP  PORTABLE    CLINICAL HISTORY:  Stroke;    TECHNIQUE:  Single frontal view of the chest was performed.    COMPARISON:  Chest x-ray from 11/25/2018.    FINDINGS:  EKG leads overlie chest obscuring detail.  Lungs appear clear without focal pulmonary consolidation.  Aorta is tortuous.  Cardiomediastinal silhouette remains slightly enlarged.  Degenerative changes of the shoulder girdles are noted                               CT Head Without Contrast (Final result)  Result time 12/08/18 15:08:01    Final result by Yaron Castro MD (12/08/18 15:08:01)                 Impression:      No acute large vascular territory infarct or intracranial hemorrhage definitively seen.  Further evaluation/follow-up as warranted.    Grossly stable chronic findings as above.      Electronically signed by: Yaron Castro MD  Date:    12/08/2018  Time:    15:08             Narrative:    EXAMINATION:  CT HEAD WITHOUT CONTRAST    CLINICAL HISTORY:  Confusion/delirium, altered LOC, unexplained;Acute confusion;    TECHNIQUE:  Low dose axial CT images obtained throughout the head without intravenous contrast. Sagittal and coronal reconstructions were performed.    COMPARISON:  Head CT 07/06/2018    FINDINGS:  Study is somewhat limited by beam hardening with streak artifact.    Intracranial compartment:    Ventricles and sulci are normal in size for age without evidence of hydrocephalus. No extra-axial blood or fluid collections.    Grossly stable distribution of supratentorial white matter mild chronic small vessel ischemic change.  Left thalamic tiny remote lacunar type infarct, unchanged.  Left cerebellar prominent sulcus versus remote infarct, unchanged.  No definite new focal areas of abnormal parenchymal attenuation.  No parenchymal mass, hemorrhage, edema or major vascular distribution infarct.    Skull/extracranial contents (limited evaluation): No fracture. Small retention cyst versus polyp within the left sphenoid sinus.  Visualized  portions of the mastoid air cells and remaining paranasal sinuses are otherwise clear.                              Medical decision making:  Given the above this patient presents to the emergency room with an hour of mental status change, confusion.  There is some report of weakness of the left upper extremity.  The patient is supported the weight of her arms from you without difficulty for 10 sec.  She holds her left elbow slightly bent.  I saw no facial asymmetry. The patient did seem confused.  When asked the month, she responded [place].  She knows that she is at the hospital.  A stroke code was initiated.  The patient was evaluated by Neurology.  They obtained additional history that suggested the patient may have had some jerking movements, suggesting seizures.  They recommended a CTA, and if that study is negative the patient will remain at the [facility] for further evaluation by in-house neurology.  Dr. Zavala was informed of the consult.  CTA of the head and neck are pending.  The patient will be signed out to .        2100 Patient appears to be showing some signs of improvement. Able to hold a conversation. CTA shows some atherosclerotic disease.  Discussed with tele neurology through the transfer center who recommends admission to this hospital.                       Clinical Impression:   The encounter diagnosis was Altered mental status, unspecified altered mental status type.                             Rogelio Bolton MD  12/08/18 5843

## 2018-12-09 PROBLEM — R47.01 APHASIA: Status: ACTIVE | Noted: 2018-12-08

## 2018-12-09 LAB
ALBUMIN SERPL BCP-MCNC: 3.7 G/DL
ALP SERPL-CCNC: 79 U/L
ALT SERPL W/O P-5'-P-CCNC: 8 U/L
AMMONIA PLAS-SCNC: 44 UMOL/L
ANION GAP SERPL CALC-SCNC: 10 MMOL/L
APTT BLDCRRT: 27.7 SEC
AST SERPL-CCNC: 15 U/L
BASOPHILS # BLD AUTO: 0.03 K/UL
BASOPHILS NFR BLD: 0.3 %
BILIRUB SERPL-MCNC: 0.3 MG/DL
BUN SERPL-MCNC: 10 MG/DL
CALCIUM SERPL-MCNC: 10.2 MG/DL
CHLORIDE SERPL-SCNC: 97 MMOL/L
CK MB SERPL-MCNC: 1.9 NG/ML
CK MB SERPL-RTO: 6.1 %
CK SERPL-CCNC: 31 U/L
CO2 SERPL-SCNC: 32 MMOL/L
CREAT SERPL-MCNC: 0.7 MG/DL
DIFFERENTIAL METHOD: ABNORMAL
EOSINOPHIL # BLD AUTO: 0.1 K/UL
EOSINOPHIL NFR BLD: 1.1 %
ERYTHROCYTE [DISTWIDTH] IN BLOOD BY AUTOMATED COUNT: 17.7 %
EST. GFR  (AFRICAN AMERICAN): >60 ML/MIN/1.73 M^2
EST. GFR  (NON AFRICAN AMERICAN): >60 ML/MIN/1.73 M^2
ESTIMATED AVG GLUCOSE: 117 MG/DL
GLUCOSE SERPL-MCNC: 74 MG/DL
HBA1C MFR BLD HPLC: 5.7 %
HCT VFR BLD AUTO: 35.3 %
HGB BLD-MCNC: 10.2 G/DL
INR PPP: 1.1
LYMPHOCYTES # BLD AUTO: 2.1 K/UL
LYMPHOCYTES NFR BLD: 20.1 %
MAGNESIUM SERPL-MCNC: 1.8 MG/DL
MCH RBC QN AUTO: 24.5 PG
MCHC RBC AUTO-ENTMCNC: 28.9 G/DL
MCV RBC AUTO: 85 FL
MONOCYTES # BLD AUTO: 1.1 K/UL
MONOCYTES NFR BLD: 10.2 %
NEUTROPHILS # BLD AUTO: 7.3 K/UL
NEUTROPHILS NFR BLD: 68.6 %
PHOSPHATE SERPL-MCNC: 2.8 MG/DL
PLATELET # BLD AUTO: 370 K/UL
PMV BLD AUTO: 9.5 FL
POCT GLUCOSE: 102 MG/DL (ref 70–110)
POCT GLUCOSE: 84 MG/DL (ref 70–110)
POCT GLUCOSE: 86 MG/DL (ref 70–110)
POTASSIUM SERPL-SCNC: 4.5 MMOL/L
PROT SERPL-MCNC: 7.7 G/DL
PROTHROMBIN TIME: 11.4 SEC
RBC # BLD AUTO: 4.16 M/UL
SODIUM SERPL-SCNC: 139 MMOL/L
TROPONIN I SERPL DL<=0.01 NG/ML-MCNC: 0.04 NG/ML
WBC # BLD AUTO: 10.63 K/UL

## 2018-12-09 PROCEDURE — 94660 CPAP INITIATION&MGMT: CPT

## 2018-12-09 PROCEDURE — 80053 COMPREHEN METABOLIC PANEL: CPT

## 2018-12-09 PROCEDURE — 36415 COLL VENOUS BLD VENIPUNCTURE: CPT

## 2018-12-09 PROCEDURE — 83735 ASSAY OF MAGNESIUM: CPT

## 2018-12-09 PROCEDURE — 27000190 HC CPAP FULL FACE MASK W/VALVE

## 2018-12-09 PROCEDURE — A4216 STERILE WATER/SALINE, 10 ML: HCPCS | Performed by: EMERGENCY MEDICINE

## 2018-12-09 PROCEDURE — 82550 ASSAY OF CK (CPK): CPT

## 2018-12-09 PROCEDURE — 85730 THROMBOPLASTIN TIME PARTIAL: CPT

## 2018-12-09 PROCEDURE — 27000221 HC OXYGEN, UP TO 24 HOURS

## 2018-12-09 PROCEDURE — 97162 PT EVAL MOD COMPLEX 30 MIN: CPT | Performed by: PHYSICAL THERAPIST

## 2018-12-09 PROCEDURE — 63600175 PHARM REV CODE 636 W HCPCS: Performed by: EMERGENCY MEDICINE

## 2018-12-09 PROCEDURE — 84484 ASSAY OF TROPONIN QUANT: CPT

## 2018-12-09 PROCEDURE — 99222 1ST HOSP IP/OBS MODERATE 55: CPT | Mod: ,,, | Performed by: PSYCHIATRY & NEUROLOGY

## 2018-12-09 PROCEDURE — 21400001 HC TELEMETRY ROOM

## 2018-12-09 PROCEDURE — 99900035 HC TECH TIME PER 15 MIN (STAT)

## 2018-12-09 PROCEDURE — 85025 COMPLETE CBC W/AUTO DIFF WBC: CPT

## 2018-12-09 PROCEDURE — 94761 N-INVAS EAR/PLS OXIMETRY MLT: CPT

## 2018-12-09 PROCEDURE — 25000242 PHARM REV CODE 250 ALT 637 W/ HCPCS: Performed by: INTERNAL MEDICINE

## 2018-12-09 PROCEDURE — 84100 ASSAY OF PHOSPHORUS: CPT

## 2018-12-09 PROCEDURE — 25000003 PHARM REV CODE 250: Performed by: HOSPITALIST

## 2018-12-09 PROCEDURE — 25000003 PHARM REV CODE 250: Performed by: EMERGENCY MEDICINE

## 2018-12-09 PROCEDURE — 82140 ASSAY OF AMMONIA: CPT

## 2018-12-09 PROCEDURE — 94640 AIRWAY INHALATION TREATMENT: CPT

## 2018-12-09 PROCEDURE — 82553 CREATINE MB FRACTION: CPT

## 2018-12-09 PROCEDURE — 83036 HEMOGLOBIN GLYCOSYLATED A1C: CPT

## 2018-12-09 PROCEDURE — 85610 PROTHROMBIN TIME: CPT

## 2018-12-09 PROCEDURE — G8979 MOBILITY GOAL STATUS: HCPCS | Mod: CI | Performed by: PHYSICAL THERAPIST

## 2018-12-09 PROCEDURE — G8978 MOBILITY CURRENT STATUS: HCPCS | Mod: CK | Performed by: PHYSICAL THERAPIST

## 2018-12-09 RX ORDER — IPRATROPIUM BROMIDE AND ALBUTEROL SULFATE 2.5; .5 MG/3ML; MG/3ML
3 SOLUTION RESPIRATORY (INHALATION) EVERY 4 HOURS PRN
Status: DISCONTINUED | OUTPATIENT
Start: 2018-12-09 | End: 2018-12-11 | Stop reason: HOSPADM

## 2018-12-09 RX ORDER — TIOTROPIUM BROMIDE 18 UG/1
1 CAPSULE ORAL; RESPIRATORY (INHALATION) DAILY
Status: DISCONTINUED | OUTPATIENT
Start: 2018-12-09 | End: 2018-12-11 | Stop reason: HOSPADM

## 2018-12-09 RX ORDER — OXYCODONE AND ACETAMINOPHEN 5; 325 MG/1; MG/1
1 TABLET ORAL EVERY 6 HOURS PRN
Status: DISCONTINUED | OUTPATIENT
Start: 2018-12-09 | End: 2018-12-11 | Stop reason: HOSPADM

## 2018-12-09 RX ORDER — GLUCAGON 1 MG
1 KIT INJECTION
Status: DISCONTINUED | OUTPATIENT
Start: 2018-12-09 | End: 2018-12-11 | Stop reason: HOSPADM

## 2018-12-09 RX ORDER — IPRATROPIUM BROMIDE AND ALBUTEROL SULFATE 2.5; .5 MG/3ML; MG/3ML
3 SOLUTION RESPIRATORY (INHALATION) ONCE
Status: COMPLETED | OUTPATIENT
Start: 2018-12-09 | End: 2018-12-09

## 2018-12-09 RX ORDER — ACETAMINOPHEN 325 MG/1
650 TABLET ORAL EVERY 6 HOURS PRN
Status: DISCONTINUED | OUTPATIENT
Start: 2018-12-09 | End: 2018-12-11 | Stop reason: HOSPADM

## 2018-12-09 RX ADMIN — IPRATROPIUM BROMIDE AND ALBUTEROL SULFATE 3 ML: .5; 3 SOLUTION RESPIRATORY (INHALATION) at 11:12

## 2018-12-09 RX ADMIN — OXYCODONE AND ACETAMINOPHEN 1 TABLET: 5; 325 TABLET ORAL at 12:12

## 2018-12-09 RX ADMIN — ATORVASTATIN CALCIUM 40 MG: 40 TABLET, FILM COATED ORAL at 09:12

## 2018-12-09 RX ADMIN — ASPIRIN 325 MG: 325 TABLET, DELAYED RELEASE ORAL at 09:12

## 2018-12-09 RX ADMIN — ENOXAPARIN SODIUM 40 MG: 100 INJECTION SUBCUTANEOUS at 07:12

## 2018-12-09 RX ADMIN — OXYCODONE AND ACETAMINOPHEN 1 TABLET: 5; 325 TABLET ORAL at 09:12

## 2018-12-09 RX ADMIN — Medication 3 ML: at 06:12

## 2018-12-09 RX ADMIN — TIOTROPIUM BROMIDE 18 MCG: 18 CAPSULE ORAL; RESPIRATORY (INHALATION) at 02:12

## 2018-12-09 RX ADMIN — ACETAMINOPHEN 650 MG: 325 TABLET ORAL at 04:12

## 2018-12-09 RX ADMIN — Medication 3 ML: at 10:12

## 2018-12-09 RX ADMIN — ACETAMINOPHEN 650 MG: 325 TABLET ORAL at 10:12

## 2018-12-09 RX ADMIN — IPRATROPIUM BROMIDE AND ALBUTEROL SULFATE 3 ML: .5; 3 SOLUTION RESPIRATORY (INHALATION) at 08:12

## 2018-12-09 NOTE — CONSULTS
[facility]  Neurology  Consult Note    Patient Name: Yasmeen Palm  MRN: [id number]  Admission Date: 12/8/2018  Hospital Length of Stay: 1 days  Code Status: Full Code   Attending Provider: Viri Paredes MD   Consulting Provider: Romeo Zavala MD  Primary Care Physician: Angel Orourke Jr, MD  Principal Problem:Hemiparesis, left    Consults  Subjective:     Chief Complaint: Slurred spech     HPI: 67 y/o female with medical as listed in PMHx section was brought to ED upon noticing the she became non-verbal and appear confused.  states that while watching TV she became unresponsive and had jerking movements of UE's. Pt is improving. No previous episodes. No Hx of seizures.    Past Medical History:   Diagnosis Date    Anticoagulant long-term use     Arthritis     Asthma     CHF (congestive heart failure)     COPD (chronic obstructive pulmonary disease)     Coronary artery disease     Depression     Diabetes mellitus     GERD (gastroesophageal reflux disease)     Hypertension        Past Surgical History:   Procedure Laterality Date    ABDOMINAL SURGERY      CARDIAC SURGERY      HERNIA REPAIR         Review of patient's allergies indicates:  No Known Allergies    Current Neurological Medications:     No current facility-administered medications on file prior to encounter.      Current Outpatient Medications on File Prior to Encounter   Medication Sig    aspirin (ECOTRIN) 81 MG EC tablet Take 81 mg by mouth once daily.    diltiaZEM (CARDIZEM) 90 MG tablet Take 90 mg by mouth 3 (three) times daily.    furosemide (LASIX) 40 MG tablet Take 40 mg by mouth once daily.     gabapentin (NEURONTIN) 300 MG capsule Take 300 mg by mouth 3 (three) times daily.    isosorbide mononitrate (IMDUR) 120 MG 24 hr tablet Take 1 tablet (120 mg total) by mouth once daily. (Patient taking differently: Take 80 mg by mouth once daily. )    lisinopril (PRINIVIL,ZESTRIL) 40 MG tablet Take 1 tablet  (40 mg total) by mouth once daily. Do not take this medication if blood pressure is below 130/80 mmHg and/or feeling dizzy after taking all other blood pressure meds    metformin (GLUCOPHAGE) 500 MG tablet Take 500 mg by mouth 2 (two) times daily with meals.    multivitamin (THERAGRAN) per tablet Take 1 tablet by mouth once daily.    pantoprazole (PROTONIX) 40 MG tablet Take 40 mg by mouth once daily.    pravastatin (PRAVACHOL) 40 MG tablet Take 40 mg by mouth once daily.    rivaroxaban (XARELTO) 20 mg Tab Take 20 mg by mouth daily with dinner or evening meal.    sotalol (BETAPACE) 80 MG tablet Take 40 mg by mouth 2 (two) times daily.    acetaminophen (TYLENOL) 500 MG tablet Take 1 tablet (500 mg total) by mouth every 8 (eight) hours as needed.    amiodarone (PACERONE) 200 MG Tab Take 1 tablet (200 mg total) by mouth once daily.    atorvastatin (LIPITOR) 80 MG tablet Take 1 tablet (80 mg total) by mouth every evening.    cloNIDine (CATAPRES) 0.2 MG tablet Take 0.2 mg by mouth 2 (two) times daily.    cloNIDine 0.2 mg/24 hr td ptwk (CATAPRES) 0.2 mg/24 hr Place 1 patch onto the skin every 7 days. (Patient not taking: Reported on 11/25/2018)    docusate sodium (COLACE) 100 MG capsule Take 1 capsule (100 mg total) by mouth once daily.    hydrALAZINE (APRESOLINE) 50 MG tablet Take 50 mg by mouth 2 (two) times daily.    levalbuterol (XOPENEX HFA) 45 mcg/actuation inhaler Inhale 2 puffs into the lungs every 4 (four) hours as needed for Wheezing or Shortness of Breath. Rescue    lidocaine (LIDODERM) 5 % Place 1 patch onto the skin once daily. Remove & Discard patch within 12 hours or as directed by MD    metoprolol tartrate (LOPRESSOR) 100 MG tablet Take 1 tablet (100 mg total) by mouth 2 (two) times daily.    potassium phosphate, monobasic, (K-PHOS) 500 mg TbSO Take 2 tablets (1,000 mg total) by mouth once daily.    tiotropium (SPIRIVA) 18 mcg inhalation capsule Inhale 1 capsule (18 mcg total) into the  lungs once daily. Controller    UMECLIDINIUM BRM/VILANTEROL TR (ANORO ELLIPTA INHL) Inhale into the lungs daily as needed.      Family History     Problem Relation (Age of Onset)    Diabetes Father    Heart disease Mother    Hypertension Mother        Tobacco Use    Smoking status: Current Some Day Smoker     Packs/day: 0.25     Years: 55.00     Pack years: 13.75     Types: Cigarettes    Smokeless tobacco: Never Used   Substance and Sexual Activity    Alcohol use: No     Alcohol/week: 0.6 oz     Types: 1 Glasses of wine per week     Frequency: Never    Drug use: No    Sexual activity: No     Review of Systems   Constitutional: Negative for fever.   HENT: Negative for trouble swallowing.    Eyes: Negative for photophobia.   Respiratory: Positive for shortness of breath.    Cardiovascular: Negative for chest pain.   Gastrointestinal: Negative for abdominal pain.   Genitourinary: Negative for dysuria.   Musculoskeletal: Negative for back pain.   Neurological: Negative for headaches.     Objective:     Vital Signs (Most Recent):  Temp: 99 °F (37.2 °C) (12/09/18 1122)  Pulse: (!) 111 (12/09/18 1106)  Resp: 18 (12/09/18 1122)  BP: (!) 161/98 (12/09/18 0745)  SpO2: 97 % (12/09/18 1106) Vital Signs (24h Range):  Temp:  [98 °F (36.7 °C)-99.8 °F (37.7 °C)] 99 °F (37.2 °C)  Pulse:  [] 111  Resp:  [12-23] 18  SpO2:  [91 %-100 %] 97 %  BP: (161-236)/() 161/98     Weight: 73.5 kg (162 lb 0.6 oz)  Body mass index is 24.64 kg/m².    Physical Exam   Constitutional: She is oriented to person, place, and time.   HENT:   Head: Normocephalic.   Eyes: Right eye exhibits no discharge. Left eye exhibits no discharge.   Neck: Normal range of motion.   Cardiovascular: Normal rate.   Pulmonary/Chest: Effort normal.   Abdominal: Bowel sounds are normal.   Musculoskeletal: She exhibits no edema.   Neurological: She is oriented to person, place, and time.   Skin: She is not diaphoretic.       NEUROLOGICAL EXAMINATION:      MENTAL STATUS   Oriented to person, place, and time.        Somnolent     CRANIAL NERVES     CN III, IV, VI   Right pupil: Size: 2 mm. Shape: regular. Reactivity: brisk.   Left pupil: Size: 2 mm. Shape: regular. Reactivity: brisk.   Nystagmus: none   Ophthalmoparesis: none  Conjugate gaze: present    CN VII   Right facial weakness: none  Left facial weakness: none    CN XII   Tongue deviation: none    MOTOR EXAM        Elevates UE's against gravity. No drift     GAIT AND COORDINATION     Tremor   Resting tremor: absent      Significant Labs:   CBC:   Recent Labs   Lab 12/08/18  1530 12/09/18  0514   WBC 9.49 10.63   HGB 9.9* 10.2*   HCT 34.2* 35.3*    370*     CMP:   Recent Labs   Lab 12/08/18  1711 12/09/18  0514   GLU 75 74    139   K 4.1 4.5   CL 97 97   CO2 30* 32*   BUN 12 10   CREATININE 0.7 0.7   CALCIUM 9.7 10.2   MG  --  1.8   PROT 7.7 7.7   ALBUMIN 3.7 3.7   BILITOT 0.3 0.3   ALKPHOS 80 79   AST 13 15   ALT 7* 8*   ANIONGAP 12 10   EGFRNONAA >60 >60       Significant Imaging:   MRI brain  Impression       No acute intracranial abnormality or interval change.  Chronic findings as above.      Electronically signed by: Richard Worley MD  Date: 12/09/2018  Time: 12:51         Assessment and Plan:     69 y/o female consulted for stroke    1. Stroke: No stroke on MRI. Not certain if a transient neurovascular insult or even pt had a seizure. No focal findings on exam but she is still with slow responses. No metabolic of infectious etiology for her encephalopathy.   -Will continue to monitor. May need EEG if no further improvement.    Active Diagnoses:    Diagnosis Date Noted POA    PRINCIPAL PROBLEM:  Hemiparesis, left [G81.94] 12/08/2018 Yes    Neuropathy [G62.9] 06/25/2018 Yes     Chronic    PAF (paroxysmal atrial fibrillation) [I48.0] 06/25/2018 Yes     Chronic    History of pulmonary embolism [Z86.711] 04/10/2017 Yes     Chronic    History of deep vein thrombosis [Z86.718] 04/10/2017 Not  Applicable     Chronic    Chronic anticoagulation [Z79.01] 04/10/2017 Not Applicable     Chronic    Anemia of chronic disease [D63.8] 09/27/2016 Yes     Chronic    Malignant hypertension [I10] 09/27/2016 Yes    Type 2 diabetes mellitus, controlled [E11.9] 12/14/2015 Yes     Chronic    Gastroesophageal reflux disease without esophagitis [K21.9] 12/14/2015 Yes     Chronic    Tobacco abuse [Z72.0] 12/14/2015 Yes     Chronic    Coronary artery disease involving native coronary artery of native heart with angina pectoris [I25.119] 12/14/2015 Yes      Problems Resolved During this Admission:    Diagnosis Date Noted Date Resolved POA    Obesity [E66.9] 04/14/2017 12/08/2018 Yes     Chronic       VTE Risk Mitigation (From admission, onward)        Ordered     enoxaparin injection 40 mg  Daily      12/08/18 2256     IP VTE HIGH RISK PATIENT  Once      12/08/18 2256     Place sequential compression device  Until discontinued      12/08/18 2256          Thank you for your consult. I will follow-up with patient. Please contact us if you have any additional questions.    Romeo Zavala MD  Neurology  [facility]

## 2018-12-09 NOTE — PROGRESS NOTES
"[facility] Medicine  Progress Note    Patient Name: Yasmeen Palm  MRN: [id number]  Patient Class: IP- Inpatient   Admission Date: 12/8/2018  Length of Stay: 1 days  Attending Physician: Viri Paredes MD  Primary Care Provider: Angel Orourke Jr, MD        Subjective:     Principal Problem:Hemiparesis, left    HPI:  68 y.o. female with DM2, tobacco abuse, anemia, chronic OAC on rivaroxaban, PAF, CAD, HTN, LOYDA, and COPD (previously on home O2) presents from home with her  with a complaint of confusion.  She and her  are very poor historians and it is difficult to ascertain the exact events that lead to her presentation.  He states that she suddenly went unresponsive while they were seated watching TV with some upper ext jerking.  Afterwards she was confused and disoriented with word finding difficulty and "saying her words backwards".  Her mental status has improved since that time, though she is still confused.  Denies fever, chills, cough, SOB, chest pain, palpitations, headaches, vision changes, facial asymmetry, difficulty swallowing, weakness, numbness, N/V/D, abdominal pain, dysuria, frequency, or urgency.  Tele stroke eval in ED suspects seizure most likely, recommends CTA head/neck to rule out large territory infarct which was negative.  Admitted to Hospital Medicine for further evaluation and treatment.    Hospital Course:  Ms. Palm presented with aphasia concerning for stroke. CTA head and neck showed 50% stenosis at the right ICA origin and extensive atherosclerotic plaquing resulting in greater than 70% stenosis of the distal right vertebral artery V4 segment but no acute intracranial abnormality. MRI was negative for acute stroke. Neurology was consulted. Her aphasia resolved but she was still slow to respond. She had no metabolic, infectious, or endocrine abnormalities to explain her encephalopathy. She may have had a seizure. She will continue to be " monitored for improvement. Consider getting an EEG.    Past Medical History:   Diagnosis Date    Anticoagulant long-term use     Arthritis     Asthma     CHF (congestive heart failure)     COPD (chronic obstructive pulmonary disease)     Coronary artery disease     Depression     Diabetes mellitus     GERD (gastroesophageal reflux disease)     Hypertension        Past Surgical History:   Procedure Laterality Date    ABDOMINAL SURGERY      CARDIAC SURGERY      HERNIA REPAIR         Review of patient's allergies indicates:  No Known Allergies    No current facility-administered medications on file prior to encounter.      Current Outpatient Medications on File Prior to Encounter   Medication Sig    aspirin (ECOTRIN) 81 MG EC tablet Take 81 mg by mouth once daily.    diltiaZEM (CARDIZEM) 90 MG tablet Take 90 mg by mouth 3 (three) times daily.    furosemide (LASIX) 40 MG tablet Take 40 mg by mouth once daily.     gabapentin (NEURONTIN) 300 MG capsule Take 300 mg by mouth 3 (three) times daily.    isosorbide mononitrate (IMDUR) 120 MG 24 hr tablet Take 1 tablet (120 mg total) by mouth once daily. (Patient taking differently: Take 80 mg by mouth once daily. )    lisinopril (PRINIVIL,ZESTRIL) 40 MG tablet Take 1 tablet (40 mg total) by mouth once daily. Do not take this medication if blood pressure is below 130/80 mmHg and/or feeling dizzy after taking all other blood pressure meds    metformin (GLUCOPHAGE) 500 MG tablet Take 500 mg by mouth 2 (two) times daily with meals.    multivitamin (THERAGRAN) per tablet Take 1 tablet by mouth once daily.    pantoprazole (PROTONIX) 40 MG tablet Take 40 mg by mouth once daily.    pravastatin (PRAVACHOL) 40 MG tablet Take 40 mg by mouth once daily.    rivaroxaban (XARELTO) 20 mg Tab Take 20 mg by mouth daily with dinner or evening meal.    sotalol (BETAPACE) 80 MG tablet Take 40 mg by mouth 2 (two) times daily.    acetaminophen (TYLENOL) 500 MG tablet Take 1  tablet (500 mg total) by mouth every 8 (eight) hours as needed.    amiodarone (PACERONE) 200 MG Tab Take 1 tablet (200 mg total) by mouth once daily.    atorvastatin (LIPITOR) 80 MG tablet Take 1 tablet (80 mg total) by mouth every evening.    cloNIDine (CATAPRES) 0.2 MG tablet Take 0.2 mg by mouth 2 (two) times daily.    cloNIDine 0.2 mg/24 hr td ptwk (CATAPRES) 0.2 mg/24 hr Place 1 patch onto the skin every 7 days. (Patient not taking: Reported on 11/25/2018)    docusate sodium (COLACE) 100 MG capsule Take 1 capsule (100 mg total) by mouth once daily.    hydrALAZINE (APRESOLINE) 50 MG tablet Take 50 mg by mouth 2 (two) times daily.    levalbuterol (XOPENEX HFA) 45 mcg/actuation inhaler Inhale 2 puffs into the lungs every 4 (four) hours as needed for Wheezing or Shortness of Breath. Rescue    lidocaine (LIDODERM) 5 % Place 1 patch onto the skin once daily. Remove & Discard patch within 12 hours or as directed by MD    metoprolol tartrate (LOPRESSOR) 100 MG tablet Take 1 tablet (100 mg total) by mouth 2 (two) times daily.    potassium phosphate, monobasic, (K-PHOS) 500 mg TbSO Take 2 tablets (1,000 mg total) by mouth once daily.    tiotropium (SPIRIVA) 18 mcg inhalation capsule Inhale 1 capsule (18 mcg total) into the lungs once daily. Controller    UMECLIDINIUM BRM/VILANTEROL TR (ANORO ELLIPTA INHL) Inhale into the lungs daily as needed.     Family History     Problem Relation (Age of Onset)    Diabetes Father    Heart disease Mother    Hypertension Mother        Tobacco Use    Smoking status: Current Some Day Smoker     Packs/day: 0.25     Years: 55.00     Pack years: 13.75     Types: Cigarettes    Smokeless tobacco: Never Used   Substance and Sexual Activity    Alcohol use: No     Alcohol/week: 0.6 oz     Types: 1 Glasses of wine per week     Frequency: Never    Drug use: No    Sexual activity: No     Review of Systems   Constitutional: Negative for chills and fever.   Respiratory: Negative for  cough and shortness of breath.    Cardiovascular: Negative for chest pain and leg swelling.   Gastrointestinal: Negative for abdominal pain, nausea and vomiting.   Genitourinary: Negative for dysuria and frequency.   Neurological: Positive for seizures, syncope and speech difficulty. Negative for dizziness, facial asymmetry, weakness, light-headedness, numbness and headaches.   Psychiatric/Behavioral: Positive for confusion and decreased concentration.     Objective:     Vital Signs (Most Recent):  Temp: 99 °F (37.2 °C) (12/09/18 1122)  Pulse: 104 (12/09/18 1408)  Resp: 18 (12/09/18 1408)  BP: (!) 161/98 (12/09/18 0745)  SpO2: 98 % (12/09/18 1408) Vital Signs (24h Range):  Temp:  [98 °F (36.7 °C)-99.2 °F (37.3 °C)] 99 °F (37.2 °C)  Pulse:  [] 104  Resp:  [15-23] 18  SpO2:  [91 %-100 %] 98 %  BP: (161-225)/(77-99) 161/98     Weight: 73.5 kg (162 lb 0.6 oz)  Body mass index is 24.64 kg/m².    Physical Exam   Constitutional: She appears well-developed and well-nourished. No distress.   HENT:   Right Ear: External ear normal.   Left Ear: External ear normal.   Nose: Nose normal.   Mouth/Throat: Oropharynx is clear and moist.   Eyes: Conjunctivae and EOM are normal. Pupils are equal, round, and reactive to light.   Neck: Normal range of motion. Neck supple.   Cardiovascular: Normal rate and normal heart sounds.   No murmur heard.  Pulmonary/Chest: Effort normal and breath sounds normal. No respiratory distress. She has no wheezes.   Abdominal: Soft. Bowel sounds are normal. She exhibits no distension. There is no tenderness.   Musculoskeletal: Normal range of motion. She exhibits no edema or tenderness.   Neurological: She is alert. She is not disoriented.   Skin: Skin is warm and dry.   Nursing note and vitals reviewed.        CRANIAL NERVES     CN III, IV, VI   Pupils are equal, round, and reactive to light.  Extraocular motions are normal.        Significant Labs: All pertinent labs within the past 24 hours have  been reviewed.    Significant Imaging: I have reviewed all pertinent imaging results/findings within the past 24 hours.    Assessment/Plan:      * Hemiparesis, left    Her symptoms have improved, but she has not returned to baseline.  TSH and routine labs unremarkable, CTA head/neck negative for LVO, there was mild hypoglycemia 66-78 in the ED but this has been treated without significant affect on symptoms, MRI neg for acute stroke.  Neurology following.  UA and tox screen pending.  PT/OT.  Permissive HTN.     PAF (paroxysmal atrial fibrillation)    Continue amio, BBL and DOAC, monitor on tele     Neuropathy    Continue gabapentin     Chronic anticoagulation    Continue rivaroxaban     History of deep vein thrombosis    Continue rivaroxaban     History of pulmonary embolism    Continue rivaroxaban     Anemia of chronic disease    Patient H/H stable and consistent with baseline. No evidence acute bleeding or indication for transfusion at this time.      Tobacco abuse    Counseled the patient on smoking cessation and she is not currently ready to stop smoking. She will be offereded a nicotine transdermal patch while hospitalized and monitored for withdrawal.       Gastroesophageal reflux disease without esophagitis    Continue PPI     Type 2 diabetes mellitus, controlled    Last HgbA1c   Lab Results   Component Value Date    HGBA1C 5.7 (H) 12/09/2018     Hold oral antihyperglycemics while inpatient  PRN sliding scale insulin  ACHS glucose monitoring   ADA diet     Coronary artery disease involving native coronary artery of native heart with angina pectoris    Continue ASA/statin/imdur/BBl, monitor on tele       VTE Risk Mitigation (From admission, onward)        Ordered     enoxaparin injection 40 mg  Daily      12/08/18 2256     IP VTE HIGH RISK PATIENT  Once      12/08/18 2256     Place sequential compression device  Until discontinued      12/08/18 2256              Viri Paredes MD  Department of Hospital  Medicine   [facility]

## 2018-12-09 NOTE — ASSESSMENT & PLAN NOTE
Last HgbA1c   Lab Results   Component Value Date    HGBA1C 5.7 (H) 12/09/2018     Hold oral antihyperglycemics while inpatient  PRN sliding scale insulin  ACHS glucose monitoring   ADA diet

## 2018-12-09 NOTE — ASSESSMENT & PLAN NOTE
5 minutes spent counseling the patient on smoking cessation and she is not currently ready to stop smoking. She will be offereded a nicotine transdermal patch while hospitalized and monitored for withdrawal.  Will provide additional smoking cessation counseling prior to discharge.

## 2018-12-09 NOTE — PLAN OF CARE
"TN completed discharge needs assessment. TN provided and reviewed with patient "[facility] My [facility] Packet" , "Help At Home"  Is her niece Sparkle. TN discussed with patient the things the patient is responsible for to manage patient's  healthcare at home. Patient verbalized understanding & teachback implemented. Patient prefers morning doctor appointments.     12/09/18 1410   Discharge Assessment   Assessment Type Discharge Planning Assessment   Confirmed/corrected address and phone number on facesheet? Yes   Assessment information obtained from? Patient   Communicated expected length of stay with patient/caregiver no   Prior to hospitilization cognitive status: Alert/Oriented;No Deficits   Prior to hospitalization functional status: Independent   Current cognitive status: No Deficits   Current Functional Status: Independent;Assistive Equipment   Lives With spouse;other (see comments)   Able to Return to Prior Arrangements yes   Is patient able to care for self after discharge? Yes   Who are your caregiver(s) and their phone number(s)? (spouse, Getachew Palm [phone])   Patient's perception of discharge disposition admitted as an inpatient   Readmission Within the Last 30 Days no previous admission in last 30 days   Patient currently being followed by outpatient case management? No   Patient currently receives any other outside agency services? No   Equipment Currently Used at Home cane, quad;walker, standard   Do you have any problems affording any of your prescribed medications? No   Is the patient taking medications as prescribed? yes   Does the patient have transportation home? Yes   Transportation Anticipated family or friend will provide   Does the patient receive services at the [facility]? No   Discharge Plan A Home with family   Discharge Plan B Home with family   Patient/Family in Agreement with Plan yes   Readmission Questionnaire   Have you felt down, depressed, or hopeless? 1   Have you felt little " interest or pleasure in doing things? 1     [facility] [address] [place] - [address]  [address] [address]  Phone: [phone] Fax: [phone]

## 2018-12-09 NOTE — HOSPITAL COURSE
Ms. Palm presented with aphasia concerning for stroke. CTA head and neck showed 50% stenosis at the right ICA origin and extensive atherosclerotic plaquing resulting in greater than 70% stenosis of the distal right vertebral artery V4 segment but no acute intracranial abnormality. MRI was negative for acute stroke. Neurology was consulted. Her aphasia resolved but she was still slow to respond. She had no metabolic, infectious, or endocrine abnormalities to explain her encephalopathy. There was concern for seizure given the reported shaking activity; EEG 12/10 normal. PT/OT/ST consulted -  recommended. She has many chronic medical issues which became poorly controlled when home medications were stopped for permissive hypertension. Her medications for blood pressure and heart rate control were resumed 12/10.  She is now afebrile and hemodynamically stable.  Does not seem far off baseline.  She will be discharged home with .

## 2018-12-09 NOTE — ASSESSMENT & PLAN NOTE
Her symptoms have improved but did not returned to baseline immediately.  TSH and routine labs unremarkable, CTA head/neck negative for LVO, there was mild hypoglycemia 66-78 in the ED but this was treated without significant affect on symptoms, MRI neg for acute stroke.  Neurology following.  UA and tox screen pending.  PT/OT.  Permissive HTN initially, but meds resumed 12/10 as no stroke and BP and HR uncontrolled. EEG on 12/10/18 unremarkable. By 12/10 her  felt she was pretty much back to baseline. PT/OT/ST evaluated. Recommending home with HH.

## 2018-12-09 NOTE — ASSESSMENT & PLAN NOTE
Last HgbA1c   Lab Results   Component Value Date    HGBA1C 6.3 (H) 06/25/2018     Hold oral antihyperglycemics while inpatient  PRN sliding scale insulin  ACHS glucose monitoring   ADA diet

## 2018-12-09 NOTE — PLAN OF CARE
Problem: Physical Therapy Goal  Goal: Physical Therapy Goal  Goals to be met by: 2018     Patient will increase functional independence with mobility by performin. Supine to sit with Modified [place]  2. Sit to supine with Modified [place]  3. Sit to stand transfer with Modified [place]  4. Gait  x 350 feet with Modified [place] using Rolling Walker.    Recommend: HHPT at time of discharge.        Devin Pollock, PT  2018

## 2018-12-09 NOTE — SUBJECTIVE & OBJECTIVE
Past Medical History:   Diagnosis Date    Anticoagulant long-term use     Arthritis     Asthma     CHF (congestive heart failure)     COPD (chronic obstructive pulmonary disease)     Coronary artery disease     Depression     Diabetes mellitus     GERD (gastroesophageal reflux disease)     Hypertension        Past Surgical History:   Procedure Laterality Date    ABDOMINAL SURGERY      CARDIAC SURGERY      HERNIA REPAIR         Review of patient's allergies indicates:  No Known Allergies    No current facility-administered medications on file prior to encounter.      Current Outpatient Medications on File Prior to Encounter   Medication Sig    aspirin (ECOTRIN) 81 MG EC tablet Take 81 mg by mouth once daily.    diltiaZEM (CARDIZEM) 90 MG tablet Take 90 mg by mouth 3 (three) times daily.    furosemide (LASIX) 40 MG tablet Take 40 mg by mouth once daily.     gabapentin (NEURONTIN) 300 MG capsule Take 300 mg by mouth 3 (three) times daily.    isosorbide mononitrate (IMDUR) 120 MG 24 hr tablet Take 1 tablet (120 mg total) by mouth once daily. (Patient taking differently: Take 80 mg by mouth once daily. )    lisinopril (PRINIVIL,ZESTRIL) 40 MG tablet Take 1 tablet (40 mg total) by mouth once daily. Do not take this medication if blood pressure is below 130/80 mmHg and/or feeling dizzy after taking all other blood pressure meds    metformin (GLUCOPHAGE) 500 MG tablet Take 500 mg by mouth 2 (two) times daily with meals.    multivitamin (THERAGRAN) per tablet Take 1 tablet by mouth once daily.    pantoprazole (PROTONIX) 40 MG tablet Take 40 mg by mouth once daily.    pravastatin (PRAVACHOL) 40 MG tablet Take 40 mg by mouth once daily.    rivaroxaban (XARELTO) 20 mg Tab Take 20 mg by mouth daily with dinner or evening meal.    sotalol (BETAPACE) 80 MG tablet Take 40 mg by mouth 2 (two) times daily.    acetaminophen (TYLENOL) 500 MG tablet Take 1 tablet (500 mg total) by mouth every 8 (eight) hours as  needed.    amiodarone (PACERONE) 200 MG Tab Take 1 tablet (200 mg total) by mouth once daily.    atorvastatin (LIPITOR) 80 MG tablet Take 1 tablet (80 mg total) by mouth every evening.    cloNIDine (CATAPRES) 0.2 MG tablet Take 0.2 mg by mouth 2 (two) times daily.    cloNIDine 0.2 mg/24 hr td ptwk (CATAPRES) 0.2 mg/24 hr Place 1 patch onto the skin every 7 days. (Patient not taking: Reported on 11/25/2018)    docusate sodium (COLACE) 100 MG capsule Take 1 capsule (100 mg total) by mouth once daily.    hydrALAZINE (APRESOLINE) 50 MG tablet Take 50 mg by mouth 2 (two) times daily.    levalbuterol (XOPENEX HFA) 45 mcg/actuation inhaler Inhale 2 puffs into the lungs every 4 (four) hours as needed for Wheezing or Shortness of Breath. Rescue    lidocaine (LIDODERM) 5 % Place 1 patch onto the skin once daily. Remove & Discard patch within 12 hours or as directed by MD    metoprolol tartrate (LOPRESSOR) 100 MG tablet Take 1 tablet (100 mg total) by mouth 2 (two) times daily.    potassium phosphate, monobasic, (K-PHOS) 500 mg TbSO Take 2 tablets (1,000 mg total) by mouth once daily.    tiotropium (SPIRIVA) 18 mcg inhalation capsule Inhale 1 capsule (18 mcg total) into the lungs once daily. Controller    UMECLIDINIUM BRM/VILANTEROL TR (ANORO ELLIPTA INHL) Inhale into the lungs daily as needed.     Family History     Problem Relation (Age of Onset)    Diabetes Father    Heart disease Mother    Hypertension Mother        Tobacco Use    Smoking status: Current Some Day Smoker     Packs/day: 0.25     Years: 55.00     Pack years: 13.75     Types: Cigarettes    Smokeless tobacco: Never Used   Substance and Sexual Activity    Alcohol use: No     Alcohol/week: 0.6 oz     Types: 1 Glasses of wine per week     Frequency: Never    Drug use: No    Sexual activity: No     Review of Systems   Constitutional: Negative for chills and fever.   Respiratory: Negative for cough and shortness of breath.    Cardiovascular:  Negative for chest pain and leg swelling.   Gastrointestinal: Negative for abdominal pain, nausea and vomiting.   Genitourinary: Negative for dysuria and frequency.   Neurological: Positive for seizures, syncope and speech difficulty. Negative for dizziness, facial asymmetry, weakness, light-headedness, numbness and headaches.   Psychiatric/Behavioral: Positive for confusion and decreased concentration.     Objective:     Vital Signs (Most Recent):  Temp: 99 °F (37.2 °C) (12/09/18 1122)  Pulse: 104 (12/09/18 1408)  Resp: 18 (12/09/18 1408)  BP: (!) 161/98 (12/09/18 0745)  SpO2: 98 % (12/09/18 1408) Vital Signs (24h Range):  Temp:  [98 °F (36.7 °C)-99.2 °F (37.3 °C)] 99 °F (37.2 °C)  Pulse:  [] 104  Resp:  [15-23] 18  SpO2:  [91 %-100 %] 98 %  BP: (161-225)/(77-99) 161/98     Weight: 73.5 kg (162 lb 0.6 oz)  Body mass index is 24.64 kg/m².    Physical Exam   Constitutional: She appears well-developed and well-nourished. No distress.   HENT:   Right Ear: External ear normal.   Left Ear: External ear normal.   Nose: Nose normal.   Mouth/Throat: Oropharynx is clear and moist.   Eyes: Conjunctivae and EOM are normal. Pupils are equal, round, and reactive to light.   Neck: Normal range of motion. Neck supple.   Cardiovascular: Normal rate and normal heart sounds.   No murmur heard.  Pulmonary/Chest: Effort normal and breath sounds normal. No respiratory distress. She has no wheezes.   Abdominal: Soft. Bowel sounds are normal. She exhibits no distension. There is no tenderness.   Musculoskeletal: Normal range of motion. She exhibits no edema or tenderness.   Neurological: She is alert. She is not disoriented.   Skin: Skin is warm and dry.   Nursing note and vitals reviewed.        CRANIAL NERVES     CN III, IV, VI   Pupils are equal, round, and reactive to light.  Extraocular motions are normal.        Significant Labs: All pertinent labs within the past 24 hours have been reviewed.    Significant Imaging: I have  reviewed all pertinent imaging results/findings within the past 24 hours.

## 2018-12-09 NOTE — ASSESSMENT & PLAN NOTE
Her symptoms have improved, but she has not returned to baseline.  TSH and routine labs unremarkable, CTA head/neck negative for LVO, there was mild hypoglycemia 66-78 in the ED but this has been treated without significant affect on symptoms, MRI neg for acute stroke.  Neurology following.  UA and tox screen pending.  PT/OT.  Permissive HTN.

## 2018-12-09 NOTE — ASSESSMENT & PLAN NOTE
Her symptoms have somewhat improved, but she has not returned to baseline.  TSH and routine labs unremarkable, CTA head/neck negative for LVO, there was mild hypoglycemia 66-78 in the ED but this has been treated without significant affect on symptoms.  Neurology consult, MRI in am.  Add UA and tox screen.  PT/OT.  Permissive HTN.

## 2018-12-09 NOTE — SUBJECTIVE & OBJECTIVE
Past Medical History:   Diagnosis Date    Anticoagulant long-term use     Arthritis     Asthma     CHF (congestive heart failure)     COPD (chronic obstructive pulmonary disease)     Coronary artery disease     Depression     Diabetes mellitus     GERD (gastroesophageal reflux disease)     Hypertension        Past Surgical History:   Procedure Laterality Date    ABDOMINAL SURGERY      CARDIAC SURGERY      HERNIA REPAIR         Review of patient's allergies indicates:  No Known Allergies    No current facility-administered medications on file prior to encounter.      Current Outpatient Medications on File Prior to Encounter   Medication Sig    aspirin (ECOTRIN) 81 MG EC tablet Take 81 mg by mouth once daily.    diltiaZEM (CARDIZEM) 90 MG tablet Take 90 mg by mouth 3 (three) times daily.    furosemide (LASIX) 40 MG tablet Take 40 mg by mouth once daily.     gabapentin (NEURONTIN) 300 MG capsule Take 300 mg by mouth 3 (three) times daily.    isosorbide mononitrate (IMDUR) 120 MG 24 hr tablet Take 1 tablet (120 mg total) by mouth once daily. (Patient taking differently: Take 80 mg by mouth once daily. )    lisinopril (PRINIVIL,ZESTRIL) 40 MG tablet Take 1 tablet (40 mg total) by mouth once daily. Do not take this medication if blood pressure is below 130/80 mmHg and/or feeling dizzy after taking all other blood pressure meds    metformin (GLUCOPHAGE) 500 MG tablet Take 500 mg by mouth 2 (two) times daily with meals.    multivitamin (THERAGRAN) per tablet Take 1 tablet by mouth once daily.    pantoprazole (PROTONIX) 40 MG tablet Take 40 mg by mouth once daily.    pravastatin (PRAVACHOL) 40 MG tablet Take 40 mg by mouth once daily.    rivaroxaban (XARELTO) 20 mg Tab Take 20 mg by mouth daily with dinner or evening meal.    sotalol (BETAPACE) 80 MG tablet Take 40 mg by mouth 2 (two) times daily.    acetaminophen (TYLENOL) 500 MG tablet Take 1 tablet (500 mg total) by mouth every 8 (eight) hours as  needed.    amiodarone (PACERONE) 200 MG Tab Take 1 tablet (200 mg total) by mouth once daily.    atorvastatin (LIPITOR) 80 MG tablet Take 1 tablet (80 mg total) by mouth every evening.    cloNIDine (CATAPRES) 0.2 MG tablet Take 0.2 mg by mouth 2 (two) times daily.    cloNIDine 0.2 mg/24 hr td ptwk (CATAPRES) 0.2 mg/24 hr Place 1 patch onto the skin every 7 days. (Patient not taking: Reported on 11/25/2018)    docusate sodium (COLACE) 100 MG capsule Take 1 capsule (100 mg total) by mouth once daily.    hydrALAZINE (APRESOLINE) 50 MG tablet Take 50 mg by mouth 2 (two) times daily.    levalbuterol (XOPENEX HFA) 45 mcg/actuation inhaler Inhale 2 puffs into the lungs every 4 (four) hours as needed for Wheezing or Shortness of Breath. Rescue    lidocaine (LIDODERM) 5 % Place 1 patch onto the skin once daily. Remove & Discard patch within 12 hours or as directed by MD    metoprolol tartrate (LOPRESSOR) 100 MG tablet Take 1 tablet (100 mg total) by mouth 2 (two) times daily.    potassium phosphate, monobasic, (K-PHOS) 500 mg TbSO Take 2 tablets (1,000 mg total) by mouth once daily.    tiotropium (SPIRIVA) 18 mcg inhalation capsule Inhale 1 capsule (18 mcg total) into the lungs once daily. Controller    UMECLIDINIUM BRM/VILANTEROL TR (ANORO ELLIPTA INHL) Inhale into the lungs daily as needed.     Family History     Problem Relation (Age of Onset)    Diabetes Father    Heart disease Mother    Hypertension Mother        Tobacco Use    Smoking status: Current Some Day Smoker     Packs/day: 0.25     Years: 55.00     Pack years: 13.75     Types: Cigarettes    Smokeless tobacco: Never Used   Substance and Sexual Activity    Alcohol use: No     Alcohol/week: 0.6 oz     Types: 1 Glasses of wine per week     Frequency: Never    Drug use: No    Sexual activity: No     Review of Systems   Constitutional: Negative for chills, fatigue and fever.   Eyes: Negative for photophobia and visual disturbance.   Respiratory:  Negative for cough and shortness of breath.    Cardiovascular: Negative for chest pain, palpitations and leg swelling.   Gastrointestinal: Negative for abdominal pain, diarrhea, nausea and vomiting.   Genitourinary: Negative for dysuria, frequency and urgency.   Skin: Negative for pallor, rash and wound.   Neurological: Positive for seizures, syncope and speech difficulty. Negative for dizziness, facial asymmetry, weakness, light-headedness, numbness and headaches.   Psychiatric/Behavioral: Positive for confusion and decreased concentration.     Objective:     Vital Signs (Most Recent):  Temp: 99.2 °F (37.3 °C) (12/08/18 1941)  Pulse: 101 (12/08/18 1943)  Resp: 18 (12/08/18 1943)  BP: (!) 180/87 (12/08/18 2101)  SpO2: 100 % (12/08/18 1943) Vital Signs (24h Range):  Temp:  [98.4 °F (36.9 °C)-99.8 °F (37.7 °C)] 99.2 °F (37.3 °C)  Pulse:  [] 101  Resp:  [12-20] 18  SpO2:  [95 %-100 %] 100 %  BP: (171-236)/() 180/87     Weight: 75.8 kg (167 lb)  Body mass index is 25.39 kg/m².    Physical Exam   Constitutional: She appears well-developed and well-nourished. She appears ill. No distress.   HENT:   Head: Normocephalic and atraumatic.   Right Ear: External ear normal.   Left Ear: External ear normal.   Nose: Nose normal.   Mouth/Throat: Oropharynx is clear and moist.   Eyes: Conjunctivae and EOM are normal. Pupils are equal, round, and reactive to light.   Neck: Normal range of motion. Neck supple.   Cardiovascular: Normal rate, regular rhythm and intact distal pulses.   Pulmonary/Chest: Effort normal and breath sounds normal. No respiratory distress. She has no wheezes.   Abdominal: Soft. Bowel sounds are normal. She exhibits no distension. There is no tenderness.   No palpable hepatomegaly or splenomegaly    Musculoskeletal: Normal range of motion. She exhibits no edema or tenderness.   Neurological: She is alert. She is disoriented. GCS eye subscore is 4. GCS verbal subscore is 4. GCS motor subscore is 6.    Skin: Skin is warm and dry.   Psychiatric: She has a normal mood and affect. Thought content normal.   Nursing note and vitals reviewed.        CRANIAL NERVES     CN III, IV, VI   Pupils are equal, round, and reactive to light.  Extraocular motions are normal.        Significant Labs: All pertinent labs within the past 24 hours have been reviewed.    Significant Imaging: I have reviewed all pertinent imaging results/findings within the past 24 hours.

## 2018-12-09 NOTE — ASSESSMENT & PLAN NOTE
Counseled the patient on smoking cessation and she is not currently ready to stop smoking. She will be offereded a nicotine transdermal patch while hospitalized and monitored for withdrawal.

## 2018-12-09 NOTE — PROGRESS NOTES
Received pt from ED via stretcher per transport. AAOx4. Spouse at bedside. Transferred to bed w/o difficulty. Pain to lower back noted. No acute respiratory distress noted. O2 in use at 4L via nasal cannula. Telemetry monitor in use with alarm set. IV to bilateral upper arms, saline locked with site clear. VS taken and blood pressure remains elevated. Safety measures maintained. Call light within reach. Will continue to monitor.

## 2018-12-09 NOTE — PT/OT/SLP EVAL
Physical Therapy Evaluation    Patient Name:  Yasmeen Palm   MRN:  [id number]    Recommendations:     Discharge Recommendations:  other (see comments)(- HHPT at time of discharge. )   Discharge Equipment Recommendations: none   Barriers to discharge: None    Assessment:     Yasmeen Palm is a 68 y.o. female admitted with a medical diagnosis of Hemiparesis, left.  She presents with the following impairments/functional limitations:  weakness, impaired endurance, impaired functional mobilty, gait instability, decreased coordination, decreased safety awareness, decreased ROM, decreased lower extremity function, impaired cardiopulmonary response to activity.    Rehab Prognosis: Good; patient would benefit from acute skilled PT services to address these deficits and reach maximum level of function.    Recent Surgery: * No surgery found *      Plan:     During this hospitalization, patient to be seen 6 x/week to address the identified rehab impairments via gait training, therapeutic activities, therapeutic exercises and progress toward the following goals:    GOALS:   Multidisciplinary Problems     Physical Therapy Goals        Problem: Physical Therapy Goal    Goal Priority Disciplines Outcome Goal Variances Interventions   Physical Therapy Goal     PT, PT/OT      Description:  Goals to be met by: 2018     Patient will increase functional independence with mobility by performin. Supine to sit with Modified [place]  2. Sit to supine with Modified [place]  3. Sit to stand transfer with Modified [place]  4. Gait  x 350 feet with Modified [place] using Rolling Walker.    Recommend: HHPT at time of discharge.                          · Plan of Care Expires:  18    Subjective     Chief Complaint: It's hard to breath  Patient/Family Comments/goals: to return to PLOF    Pain/Comfort:  · Pain Rating 1: 0/10    Patients cultural, spiritual, Alevism conflicts given the current situation:       Living Environment:  Pt lives with spouse in a [facility] with 3 ANGELIA and bilateral // rails.   Prior to admission, patients level of function was Mod I.  Equipment used at home: cane, straight, wheelchair, walker, rolling, rollator, bedside commode, shower chair, oxygen(- 2.0 Lpm of O2 via N.C. ).  DME owned (not currently used): none.  Upon discharge, patient will have assistance from self and spouse.    Objective:     Communicated with Nurse prior to session.  Patient found supine in bed with spouse and  all lines intact and call button in reach oxygen, peripheral IV, telemetry(- 3.0 Lpm of O2 via N.C. )  upon PT entry to room.    General Precautions: Standard, fall, respiratory   Orthopedic Precautions:Full weight bearing   Braces: N/A     Exams:  · Cognitive Exam:  Patient is oriented to Person, Place and Situation  · Gross Motor Coordination:  WFL  · Postural Exam:  Patient presented with the following abnormalities:    · -       Rounded shoulders  · -       Forward head  · -       Abnormal trunk flexion  · Skin Integrity/Edema:      · -       Skin integrity: Visible skin intact  · -       Edema: None noted .  · RLE ROM: WFL  · RLE Strength: WFL  · LLE ROM: WFL  · LLE Strength: WFL    Functional Mobility:  · Bed Mobility:     · Supine to Sit: contact guard assistance  · Sit to Supine: contact guard assistance  · Transfers:     · Sit to Stand:  minimum assistance with rolling walker  · Gait: 50' with RW and MIn A for directing RW around obstacles and 3.0 Lpm of O2 via N.C.   Pt requesting return to sitting EOB.    AM-PAC 6 CLICK MOBILITY  Total Score:19     Patient left supine with all lines intact and call button in reach.    GOALS:   Multidisciplinary Problems     Physical Therapy Goals        Problem: Physical Therapy Goal    Goal Priority Disciplines Outcome Goal Variances Interventions   Physical Therapy Goal     PT, PT/OT      Description:  Goals to be met by: 12/22/2018     Patient will increase functional  independence with mobility by performin. Supine to sit with Modified [place]  2. Sit to supine with Modified [place]  3. Sit to stand transfer with Modified [place]  4. Gait  x 350 feet with Modified [place] using Rolling Walker.    Recommend: HHPT at time of discharge.                          History:     Past Medical History:   Diagnosis Date    Anticoagulant long-term use     Arthritis     Asthma     CHF (congestive heart failure)     COPD (chronic obstructive pulmonary disease)     Coronary artery disease     Depression     Diabetes mellitus     GERD (gastroesophageal reflux disease)     Hypertension        Past Surgical History:   Procedure Laterality Date    ABDOMINAL SURGERY      CARDIAC SURGERY      HERNIA REPAIR       Time Tracking:     PT Received On: 18  PT Start Time: 1000     PT Stop Time: 1030  PT Total Time (min): 30 min     Billable Minutes: Evaluation 30 min      Devin Pollock, PT  2018

## 2018-12-09 NOTE — HPI
"68 y.o. female with DM2, tobacco abuse, anemia, chronic OAC on rivaroxaban, PAF, CAD, HTN, LOYDA, and COPD (previously on home O2) presents from home with her  with a complaint of confusion.  She and her  are very poor historians and it is difficult to ascertain the exact events that lead to her presentation.  He states that she suddenly went unresponsive while they were seated watching TV with some upper ext jerking.  Afterwards she was confused and disoriented with word finding difficulty and "saying her words backwards".  Her mental status has improved since that time, though she is still confused.  Denies fever, chills, cough, SOB, chest pain, palpitations, headaches, vision changes, facial asymmetry, difficulty swallowing, weakness, numbness, N/V/D, abdominal pain, dysuria, frequency, or urgency.  Tele stroke eval in ED suspects seizure most likely, recommends CTA head/neck to rule out large territory infarct which was negative.  Admitted to Hospital Medicine for further evaluation and treatment.  "

## 2018-12-09 NOTE — H&P
"[facility] Hospital Medicine  History & Physical    Patient Name: Yasmeen Palm  MRN: [id number]  Admission Date: 12/8/2018  Attending Physician: Viir Paredes MD  Primary Care Provider: Angel Orourke Jr, MD         Patient information was obtained from patient, spouse/SO, past medical records and ER records.     Subjective:     Principal Problem:Hemiparesis, left    Chief Complaint:   Chief Complaint   Patient presents with    Aphasia     about an hour ago. Per spouse, we were watching the TV and then I came home and she was altered. Pt unable to speak and is confused.         HPI: 68 y.o. female with DM2, tobacco abuse, anemia, chronic OAC on rivaroxaban, PAF, CAD, HTN, and COPD presents from home with her  with a complaint of confusion.  She and her  are very poor historians and it is difficult to ascertain the exact events that lead to her presentation.  He states that she suddenly went unresponsive while they were seated watching TV with some upper ext jerking.  Afterwards she was confused and disoriented with word finding difficulty and "saying her words backwards".  Her mental status has improved since that time, though she is still confused.  Denies fever, chills, cough, SOB, chest pain, palpitations, headaches, vision changes, facial asymmetry, difficulty swallowing, weakness, numbness, N/V/D, abdominal pain, dysuria, frequency, or urgency.  Tele stroke eval in ED suspects seizure most likely, recommends CTA head/neck to rule out large territory infarct which was negative.  Admitted to Hospital Medicine for further evaluation and treatment.    Past Medical History:   Diagnosis Date    Anticoagulant long-term use     Arthritis     Asthma     CHF (congestive heart failure)     COPD (chronic obstructive pulmonary disease)     Coronary artery disease     Depression     Diabetes mellitus     GERD (gastroesophageal reflux disease)     Hypertension        Past " Surgical History:   Procedure Laterality Date    ABDOMINAL SURGERY      CARDIAC SURGERY      HERNIA REPAIR         Review of patient's allergies indicates:  No Known Allergies    No current facility-administered medications on file prior to encounter.      Current Outpatient Medications on File Prior to Encounter   Medication Sig    aspirin (ECOTRIN) 81 MG EC tablet Take 81 mg by mouth once daily.    diltiaZEM (CARDIZEM) 90 MG tablet Take 90 mg by mouth 3 (three) times daily.    furosemide (LASIX) 40 MG tablet Take 40 mg by mouth once daily.     gabapentin (NEURONTIN) 300 MG capsule Take 300 mg by mouth 3 (three) times daily.    isosorbide mononitrate (IMDUR) 120 MG 24 hr tablet Take 1 tablet (120 mg total) by mouth once daily. (Patient taking differently: Take 80 mg by mouth once daily. )    lisinopril (PRINIVIL,ZESTRIL) 40 MG tablet Take 1 tablet (40 mg total) by mouth once daily. Do not take this medication if blood pressure is below 130/80 mmHg and/or feeling dizzy after taking all other blood pressure meds    metformin (GLUCOPHAGE) 500 MG tablet Take 500 mg by mouth 2 (two) times daily with meals.    multivitamin (THERAGRAN) per tablet Take 1 tablet by mouth once daily.    pantoprazole (PROTONIX) 40 MG tablet Take 40 mg by mouth once daily.    pravastatin (PRAVACHOL) 40 MG tablet Take 40 mg by mouth once daily.    rivaroxaban (XARELTO) 20 mg Tab Take 20 mg by mouth daily with dinner or evening meal.    sotalol (BETAPACE) 80 MG tablet Take 40 mg by mouth 2 (two) times daily.    acetaminophen (TYLENOL) 500 MG tablet Take 1 tablet (500 mg total) by mouth every 8 (eight) hours as needed.    amiodarone (PACERONE) 200 MG Tab Take 1 tablet (200 mg total) by mouth once daily.    atorvastatin (LIPITOR) 80 MG tablet Take 1 tablet (80 mg total) by mouth every evening.    cloNIDine (CATAPRES) 0.2 MG tablet Take 0.2 mg by mouth 2 (two) times daily.    cloNIDine 0.2 mg/24 hr td ptwk (CATAPRES) 0.2 mg/24  hr Place 1 patch onto the skin every 7 days. (Patient not taking: Reported on 11/25/2018)    docusate sodium (COLACE) 100 MG capsule Take 1 capsule (100 mg total) by mouth once daily.    hydrALAZINE (APRESOLINE) 50 MG tablet Take 50 mg by mouth 2 (two) times daily.    levalbuterol (XOPENEX HFA) 45 mcg/actuation inhaler Inhale 2 puffs into the lungs every 4 (four) hours as needed for Wheezing or Shortness of Breath. Rescue    lidocaine (LIDODERM) 5 % Place 1 patch onto the skin once daily. Remove & Discard patch within 12 hours or as directed by MD    metoprolol tartrate (LOPRESSOR) 100 MG tablet Take 1 tablet (100 mg total) by mouth 2 (two) times daily.    potassium phosphate, monobasic, (K-PHOS) 500 mg TbSO Take 2 tablets (1,000 mg total) by mouth once daily.    tiotropium (SPIRIVA) 18 mcg inhalation capsule Inhale 1 capsule (18 mcg total) into the lungs once daily. Controller    UMECLIDINIUM BRM/VILANTEROL TR (ANORO ELLIPTA INHL) Inhale into the lungs daily as needed.     Family History     Problem Relation (Age of Onset)    Diabetes Father    Heart disease Mother    Hypertension Mother        Tobacco Use    Smoking status: Current Some Day Smoker     Packs/day: 0.25     Years: 55.00     Pack years: 13.75     Types: Cigarettes    Smokeless tobacco: Never Used   Substance and Sexual Activity    Alcohol use: No     Alcohol/week: 0.6 oz     Types: 1 Glasses of wine per week     Frequency: Never    Drug use: No    Sexual activity: No     Review of Systems   Constitutional: Negative for chills, fatigue and fever.   Eyes: Negative for photophobia and visual disturbance.   Respiratory: Negative for cough and shortness of breath.    Cardiovascular: Negative for chest pain, palpitations and leg swelling.   Gastrointestinal: Negative for abdominal pain, diarrhea, nausea and vomiting.   Genitourinary: Negative for dysuria, frequency and urgency.   Skin: Negative for pallor, rash and wound.   Neurological:  Positive for seizures, syncope and speech difficulty. Negative for dizziness, facial asymmetry, weakness, light-headedness, numbness and headaches.   Psychiatric/Behavioral: Positive for confusion and decreased concentration.     Objective:     Vital Signs (Most Recent):  Temp: 99.2 °F (37.3 °C) (12/08/18 1941)  Pulse: 101 (12/08/18 1943)  Resp: 18 (12/08/18 1943)  BP: (!) 180/87 (12/08/18 2101)  SpO2: 100 % (12/08/18 1943) Vital Signs (24h Range):  Temp:  [98.4 °F (36.9 °C)-99.8 °F (37.7 °C)] 99.2 °F (37.3 °C)  Pulse:  [] 101  Resp:  [12-20] 18  SpO2:  [95 %-100 %] 100 %  BP: (171-236)/() 180/87     Weight: 75.8 kg (167 lb)  Body mass index is 25.39 kg/m².    Physical Exam   Constitutional: She appears well-developed and well-nourished. She appears ill. No distress.   HENT:   Head: Normocephalic and atraumatic.   Right Ear: External ear normal.   Left Ear: External ear normal.   Nose: Nose normal.   Mouth/Throat: Oropharynx is clear and moist.   Eyes: Conjunctivae and EOM are normal. Pupils are equal, round, and reactive to light.   Neck: Normal range of motion. Neck supple.   Cardiovascular: Normal rate, regular rhythm and intact distal pulses.   Pulmonary/Chest: Effort normal and breath sounds normal. No respiratory distress. She has no wheezes.   Abdominal: Soft. Bowel sounds are normal. She exhibits no distension. There is no tenderness.   No palpable hepatomegaly or splenomegaly    Musculoskeletal: Normal range of motion. She exhibits no edema or tenderness.   Neurological: She is alert. She is disoriented. GCS eye subscore is 4. GCS verbal subscore is 4. GCS motor subscore is 6.   Skin: Skin is warm and dry.   Psychiatric: She has a normal mood and affect. Thought content normal.   Nursing note and vitals reviewed.        CRANIAL NERVES     CN III, IV, VI   Pupils are equal, round, and reactive to light.  Extraocular motions are normal.        Significant Labs: All pertinent labs within the past  24 hours have been reviewed.    Significant Imaging: I have reviewed all pertinent imaging results/findings within the past 24 hours.    Assessment/Plan:     * Hemiparesis, left    Her symptoms have somewhat improved, but she has not returned to baseline.  TSH and routine labs unremarkable, CTA head/neck negative for LVO, there was mild hypoglycemia 66-78 in the ED but this has been treated without significant affect on symptoms.  Neurology consult, MRI in am.  Add UA and tox screen.  PT/OT.  Permissive HTN.     PAF (paroxysmal atrial fibrillation)    Continue amio, BBL and DOAC, monitor on tele     Neuropathy    Continue gabapentin     Chronic anticoagulation    Continue rivaroxaban     History of deep vein thrombosis    Continue rivaroxaban     History of pulmonary embolism    Continue rivaroxaban     Anemia of chronic disease    Patient H/H stable and consistent with baseline. No evidence acute bleeding or indication for transfusion at this time.      Malignant hypertension    Poorly controlled, permissive HTN pending MRI, PRN labetalol available.     Tobacco abuse    5 minutes spent counseling the patient on smoking cessation and she is not currently ready to stop smoking. She will be offereded a nicotine transdermal patch while hospitalized and monitored for withdrawal.  Will provide additional smoking cessation counseling prior to discharge.     Gastroesophageal reflux disease without esophagitis    Continue PPI     Type 2 diabetes mellitus, controlled    Last HgbA1c   Lab Results   Component Value Date    HGBA1C 6.3 (H) 06/25/2018     Hold oral antihyperglycemics while inpatient  PRN sliding scale insulin  ACHS glucose monitoring   ADA diet     Coronary artery disease involving native coronary artery of native heart with angina pectoris    Continue ASA/statin/imdur/BBl, monitor on tele       VTE Risk Mitigation (From admission, onward)    None        Mau Shultz Jr., APRN, AGACNP-BC  Hospitalist -  Department of Hospital Medicine  [facility] [address] [place] [address]  Office #: [phone]; Pager #: [phone]

## 2018-12-09 NOTE — PT/OT/SLP PROGRESS
Occupational Therapy      Patient Name:  Yasmeen Palm   MRN:  [id number]    Patient not seen today secondary to CT/MRI. Will follow-up as able.    Trish Ca OT  12/9/2018

## 2018-12-10 LAB
POCT GLUCOSE: 121 MG/DL (ref 70–110)
POCT GLUCOSE: 191 MG/DL (ref 70–110)
POCT GLUCOSE: 81 MG/DL (ref 70–110)

## 2018-12-10 PROCEDURE — 94761 N-INVAS EAR/PLS OXIMETRY MLT: CPT

## 2018-12-10 PROCEDURE — 25000003 PHARM REV CODE 250: Performed by: INTERNAL MEDICINE

## 2018-12-10 PROCEDURE — 95819 EEG AWAKE AND ASLEEP: CPT

## 2018-12-10 PROCEDURE — 97165 OT EVAL LOW COMPLEX 30 MIN: CPT

## 2018-12-10 PROCEDURE — G8988 SELF CARE GOAL STATUS: HCPCS | Mod: CJ

## 2018-12-10 PROCEDURE — 21400001 HC TELEMETRY ROOM

## 2018-12-10 PROCEDURE — 99900035 HC TECH TIME PER 15 MIN (STAT)

## 2018-12-10 PROCEDURE — 93010 ELECTROCARDIOGRAM REPORT: CPT | Mod: ,,, | Performed by: INTERNAL MEDICINE

## 2018-12-10 PROCEDURE — 25000003 PHARM REV CODE 250: Performed by: HOSPITALIST

## 2018-12-10 PROCEDURE — 94660 CPAP INITIATION&MGMT: CPT

## 2018-12-10 PROCEDURE — G8989 SELF CARE D/C STATUS: HCPCS | Mod: CJ

## 2018-12-10 PROCEDURE — 25000242 PHARM REV CODE 250 ALT 637 W/ HCPCS: Performed by: INTERNAL MEDICINE

## 2018-12-10 PROCEDURE — 94640 AIRWAY INHALATION TREATMENT: CPT

## 2018-12-10 PROCEDURE — 92610 EVALUATE SWALLOWING FUNCTION: CPT

## 2018-12-10 PROCEDURE — G8987 SELF CARE CURRENT STATUS: HCPCS | Mod: CJ

## 2018-12-10 PROCEDURE — 93005 ELECTROCARDIOGRAM TRACING: CPT

## 2018-12-10 PROCEDURE — 99232 SBSQ HOSP IP/OBS MODERATE 35: CPT | Mod: ,,, | Performed by: PSYCHIATRY & NEUROLOGY

## 2018-12-10 PROCEDURE — A4216 STERILE WATER/SALINE, 10 ML: HCPCS | Performed by: EMERGENCY MEDICINE

## 2018-12-10 PROCEDURE — 97530 THERAPEUTIC ACTIVITIES: CPT

## 2018-12-10 PROCEDURE — 25000003 PHARM REV CODE 250: Performed by: EMERGENCY MEDICINE

## 2018-12-10 PROCEDURE — 95819 EEG AWAKE AND ASLEEP: CPT | Mod: 26,,, | Performed by: PSYCHIATRY & NEUROLOGY

## 2018-12-10 RX ORDER — DILTIAZEM HYDROCHLORIDE 30 MG/1
90 TABLET, FILM COATED ORAL 3 TIMES DAILY
Status: DISCONTINUED | OUTPATIENT
Start: 2018-12-10 | End: 2018-12-10

## 2018-12-10 RX ORDER — LISINOPRIL 20 MG/1
40 TABLET ORAL DAILY
Status: DISCONTINUED | OUTPATIENT
Start: 2018-12-11 | End: 2018-12-11 | Stop reason: HOSPADM

## 2018-12-10 RX ORDER — METOPROLOL TARTRATE 1 MG/ML
5 INJECTION, SOLUTION INTRAVENOUS EVERY 6 HOURS PRN
Status: DISCONTINUED | OUTPATIENT
Start: 2018-12-10 | End: 2018-12-11 | Stop reason: HOSPADM

## 2018-12-10 RX ORDER — FUROSEMIDE 40 MG/1
40 TABLET ORAL DAILY
Status: DISCONTINUED | OUTPATIENT
Start: 2018-12-10 | End: 2018-12-11 | Stop reason: HOSPADM

## 2018-12-10 RX ORDER — CLONIDINE HYDROCHLORIDE 0.1 MG/1
0.2 TABLET ORAL 2 TIMES DAILY
Status: DISCONTINUED | OUTPATIENT
Start: 2018-12-10 | End: 2018-12-10

## 2018-12-10 RX ORDER — HYDRALAZINE HYDROCHLORIDE 25 MG/1
50 TABLET, FILM COATED ORAL 2 TIMES DAILY
Status: DISCONTINUED | OUTPATIENT
Start: 2018-12-10 | End: 2018-12-10

## 2018-12-10 RX ORDER — SOTALOL HYDROCHLORIDE 80 MG/1
40 TABLET ORAL 2 TIMES DAILY
Status: DISCONTINUED | OUTPATIENT
Start: 2018-12-10 | End: 2018-12-10

## 2018-12-10 RX ORDER — PRAVASTATIN SODIUM 40 MG/1
40 TABLET ORAL DAILY
Status: DISCONTINUED | OUTPATIENT
Start: 2018-12-11 | End: 2018-12-10

## 2018-12-10 RX ORDER — FLUTICASONE FUROATE AND VILANTEROL 200; 25 UG/1; UG/1
1 POWDER RESPIRATORY (INHALATION) DAILY
Status: DISCONTINUED | OUTPATIENT
Start: 2018-12-11 | End: 2018-12-11 | Stop reason: HOSPADM

## 2018-12-10 RX ORDER — ASPIRIN 81 MG/1
81 TABLET ORAL DAILY
Status: DISCONTINUED | OUTPATIENT
Start: 2018-12-11 | End: 2018-12-11 | Stop reason: HOSPADM

## 2018-12-10 RX ORDER — AMIODARONE HYDROCHLORIDE 200 MG/1
200 TABLET ORAL DAILY
Status: DISCONTINUED | OUTPATIENT
Start: 2018-12-10 | End: 2018-12-11 | Stop reason: HOSPADM

## 2018-12-10 RX ORDER — FUROSEMIDE 40 MG/1
40 TABLET ORAL DAILY
Status: DISCONTINUED | OUTPATIENT
Start: 2018-12-11 | End: 2018-12-10

## 2018-12-10 RX ORDER — METOPROLOL TARTRATE 50 MG/1
100 TABLET ORAL 2 TIMES DAILY
Status: DISCONTINUED | OUTPATIENT
Start: 2018-12-10 | End: 2018-12-11 | Stop reason: HOSPADM

## 2018-12-10 RX ORDER — AMIODARONE HYDROCHLORIDE 200 MG/1
200 TABLET ORAL DAILY
Status: DISCONTINUED | OUTPATIENT
Start: 2018-12-11 | End: 2018-12-10

## 2018-12-10 RX ORDER — ISOSORBIDE MONONITRATE 30 MG/1
90 TABLET, EXTENDED RELEASE ORAL DAILY
Status: DISCONTINUED | OUTPATIENT
Start: 2018-12-11 | End: 2018-12-11 | Stop reason: HOSPADM

## 2018-12-10 RX ORDER — ISOSORBIDE MONONITRATE 120 MG/1
80 TABLET, EXTENDED RELEASE ORAL DAILY
Status: DISCONTINUED | OUTPATIENT
Start: 2018-12-11 | End: 2018-12-10

## 2018-12-10 RX ORDER — TIOTROPIUM BROMIDE 18 UG/1
1 CAPSULE ORAL; RESPIRATORY (INHALATION) DAILY
Status: DISCONTINUED | OUTPATIENT
Start: 2018-12-11 | End: 2018-12-10

## 2018-12-10 RX ORDER — GABAPENTIN 300 MG/1
300 CAPSULE ORAL 3 TIMES DAILY
Status: DISCONTINUED | OUTPATIENT
Start: 2018-12-10 | End: 2018-12-11 | Stop reason: HOSPADM

## 2018-12-10 RX ORDER — LABETALOL HYDROCHLORIDE 5 MG/ML
10 INJECTION, SOLUTION INTRAVENOUS EVERY 6 HOURS PRN
Status: DISCONTINUED | OUTPATIENT
Start: 2018-12-10 | End: 2018-12-11 | Stop reason: HOSPADM

## 2018-12-10 RX ADMIN — LABETALOL HYDROCHLORIDE 10 MG: 5 INJECTION, SOLUTION INTRAVENOUS at 12:12

## 2018-12-10 RX ADMIN — METOPROLOL TARTRATE 5 MG: 1 INJECTION, SOLUTION INTRAVENOUS at 09:12

## 2018-12-10 RX ADMIN — Medication 3 ML: at 05:12

## 2018-12-10 RX ADMIN — GABAPENTIN 300 MG: 300 CAPSULE ORAL at 08:12

## 2018-12-10 RX ADMIN — FUROSEMIDE 40 MG: 40 TABLET ORAL at 06:12

## 2018-12-10 RX ADMIN — TIOTROPIUM BROMIDE 18 MCG: 18 CAPSULE ORAL; RESPIRATORY (INHALATION) at 08:12

## 2018-12-10 RX ADMIN — IPRATROPIUM BROMIDE AND ALBUTEROL SULFATE 3 ML: .5; 3 SOLUTION RESPIRATORY (INHALATION) at 12:12

## 2018-12-10 RX ADMIN — Medication 3 ML: at 08:12

## 2018-12-10 RX ADMIN — ASPIRIN 325 MG: 325 TABLET, DELAYED RELEASE ORAL at 09:12

## 2018-12-10 RX ADMIN — ACETAMINOPHEN 650 MG: 325 TABLET ORAL at 10:12

## 2018-12-10 RX ADMIN — LABETALOL HYDROCHLORIDE 10 MG: 5 INJECTION INTRAVENOUS at 04:12

## 2018-12-10 RX ADMIN — ATORVASTATIN CALCIUM 40 MG: 40 TABLET, FILM COATED ORAL at 09:12

## 2018-12-10 RX ADMIN — AMIODARONE HYDROCHLORIDE 200 MG: 200 TABLET ORAL at 06:12

## 2018-12-10 RX ADMIN — RIVAROXABAN 20 MG: 20 TABLET, FILM COATED ORAL at 06:12

## 2018-12-10 RX ADMIN — METOPROLOL TARTRATE 100 MG: 50 TABLET ORAL at 08:12

## 2018-12-10 NOTE — PLAN OF CARE
Problem: Occupational Therapy Goal  Goal: Occupational Therapy Goal  Outcome: Outcome(s) achieved Date Met: 12/10/18  Patient tolerated evaluation well, patient with no need for skilled OT services at this time. HEDY Wiley, MS

## 2018-12-10 NOTE — PROGRESS NOTES
"[facility] Medicine  Progress Note    Patient Name: Yasmeen Palm  MRN: [id number]  Patient Class: IP- Inpatient   Admission Date: 12/8/2018  Length of Stay: 2 days  Attending Physician: Viri Paredes MD  Primary Care Provider: Angel Orourke Jr, MD        Subjective:     Principal Problem:Aphasia    HPI:  68 y.o. female with DM2, tobacco abuse, anemia, chronic OAC on rivaroxaban, PAF, CAD, HTN, LOYDA, and COPD (previously on home O2) presents from home with her  with a complaint of confusion.  She and her  are very poor historians and it is difficult to ascertain the exact events that lead to her presentation.  He states that she suddenly went unresponsive while they were seated watching TV with some upper ext jerking.  Afterwards she was confused and disoriented with word finding difficulty and "saying her words backwards".  Her mental status has improved since that time, though she is still confused.  Denies fever, chills, cough, SOB, chest pain, palpitations, headaches, vision changes, facial asymmetry, difficulty swallowing, weakness, numbness, N/V/D, abdominal pain, dysuria, frequency, or urgency.  Tele stroke eval in ED suspects seizure most likely, recommends CTA head/neck to rule out large territory infarct which was negative.  Admitted to Hospital Medicine for further evaluation and treatment.    Hospital Course:  Ms. Palm presented with aphasia concerning for stroke. CTA head and neck showed 50% stenosis at the right ICA origin and extensive atherosclerotic plaquing resulting in greater than 70% stenosis of the distal right vertebral artery V4 segment but no acute intracranial abnormality. MRI was negative for acute stroke. Neurology was consulted. Her aphasia resolved but she was still slow to respond. She had no metabolic, infectious, or endocrine abnormalities to explain her encephalopathy. There was concern for seizure given the reported shaking " activity; EEG 12/10 normal. PT/OT/ST consulted - HH recommended. She has many chronic medical issues which became poorly controlled when home medications were stopped for permissive hypertension. Her medications for blood pressure and heart rate control were resumed 12/10.     Interval History:  Improving. Speech less delayed today.  says he feels she close to baseline. She developed palpitations with uncontrolled HR. Also hypertensive. Her anti-hypertensives and rate control meds were held for permissive hypertension. These have been restarted.    Review of Systems   Constitutional: Negative for chills and fever.   Respiratory: Positive for shortness of breath. Negative for cough.    Cardiovascular: Positive for palpitations. Negative for chest pain and leg swelling.   Gastrointestinal: Negative for abdominal pain, nausea and vomiting.   Neurological: Negative for dizziness, seizures, syncope, facial asymmetry, speech difficulty, weakness, light-headedness, numbness and headaches.   Psychiatric/Behavioral: Negative for confusion and decreased concentration.     Objective:     Vital Signs (Most Recent):  Temp: 98.8 °F (37.1 °C) (12/10/18 1620)  Pulse: 97 (12/10/18 1620)  Resp: 18 (12/10/18 1620)  BP: (!) 205/101 (12/10/18 1620)  SpO2: 98 % (12/10/18 1620) Vital Signs (24h Range):  Temp:  [98.2 °F (36.8 °C)-99.1 °F (37.3 °C)] 98.8 °F (37.1 °C)  Pulse:  [] 97  Resp:  [17-25] 18  SpO2:  [94 %-98 %] 98 %  BP: (123-209)/() 205/101     Weight: 73.5 kg (162 lb 0.6 oz)  Body mass index is 24.64 kg/m².    Physical Exam   Constitutional: She is oriented to person, place, and time. She appears well-developed and well-nourished. No distress.   HENT:   Right Ear: External ear normal.   Left Ear: External ear normal.   Nose: Nose normal.   Mouth/Throat: Oropharynx is clear and moist.   Eyes: Conjunctivae and EOM are normal. Pupils are equal, round, and reactive to light.   Neck: Normal range of motion. Neck  supple.   Cardiovascular: Normal heart sounds.   No murmur heard.  Tachycardic   Pulmonary/Chest: Effort normal and breath sounds normal. No respiratory distress. She has no wheezes.   Abdominal: Soft. Bowel sounds are normal. She exhibits no distension. There is no tenderness.   Musculoskeletal: Normal range of motion. She exhibits no edema or tenderness.   Neurological: She is alert and oriented to person, place, and time. She is not disoriented. No cranial nerve deficit.   Oriented to person, place, and situation. A little sleepy and slow, but speech is generally normal.   Skin: Skin is warm and dry.   Nursing note and vitals reviewed.        CRANIAL NERVES     CN III, IV, VI   Pupils are equal, round, and reactive to light.  Extraocular motions are normal.        Significant Labs: All pertinent labs within the past 24 hours have been reviewed.    Significant Imaging: I have reviewed all pertinent imaging results/findings within the past 24 hours.    Assessment/Plan:      * Aphasia    Her symptoms have improved but did not returned to baseline immediately.  TSH and routine labs unremarkable, CTA head/neck negative for LVO, there was mild hypoglycemia 66-78 in the ED but this was treated without significant affect on symptoms, MRI neg for acute stroke.  Neurology following.  UA and tox screen pending.  PT/OT.  Permissive HTN initially, but meds resumed 12/10 as no stroke and BP and HR uncontrolled. EEG on 12/10/18 unremarkable. By 12/10 her  felt she was pretty much back to baseline. PT/OT/ST evaluated. Recommending home with HH.     PAF (paroxysmal atrial fibrillation)    Continue amio, BBL and DOAC, monitor on tele. Tachycardic 12/10 as home meds had been held for permissive HTN. She has diltiazem and sotalol still on her home med list, but per her last DC summary she is supposed to have stopped these medications and be on metoprolol 100.     Neuropathy    Continue gabapentin     Chronic anticoagulation     Continue rivaroxaban     History of deep vein thrombosis    Continue rivaroxaban     History of pulmonary embolism    Continue rivaroxaban     Moderate malnutrition    Appreciate nutrition recs.     Anemia of chronic disease    Patient H/H stable and consistent with baseline. No evidence acute bleeding or indication for transfusion at this time.      Tobacco abuse    Counseled the patient on smoking cessation and she is not currently ready to stop smoking. She will be offereded a nicotine transdermal patch while hospitalized and monitored for withdrawal.       Gastroesophageal reflux disease without esophagitis    Continue PPI     Type 2 diabetes mellitus, controlled    Last HgbA1c   Lab Results   Component Value Date    HGBA1C 5.7 (H) 12/09/2018     Hold oral antihyperglycemics while inpatient  PRN sliding scale insulin  ACHS glucose monitoring   ADA diet     Coronary artery disease involving native coronary artery of native heart with angina pectoris    Continue ASA/statin/imdur/BBl, monitor on tele       VTE Risk Mitigation (From admission, onward)        Ordered     rivaroxaban tablet 20 mg  With dinner      12/10/18 1630     enoxaparin injection 40 mg  Daily      12/08/18 2256     IP VTE HIGH RISK PATIENT  Once      12/08/18 2256     Place sequential compression device  Until discontinued      12/08/18 2256              Viri Paredes MD  Department of Hospital Medicine   [facility]

## 2018-12-10 NOTE — PLAN OF CARE
Problem: Physical Therapy Goal  Goal: Physical Therapy Goal  Goals to be met by: 2018     Patient will increase functional independence with mobility by performin. Supine to sit with Modified [place]  2. Sit to supine with Modified [place]  3. Sit to stand transfer with Modified [place]  4. Gait  x 350 feet with Modified [place] using Rolling Walker.    Recommend: HHPT at time of discharge.         Outcome: Ongoing (interventions implemented as appropriate)  Pt is self-limiting , required max encouragements to participate in therapy. Pt agreeable to ambulate to chair for lunch . Pt will benefit from further therapy in order to get back to PLOF.

## 2018-12-10 NOTE — PT/OT/SLP EVAL
"Speech Language Pathology Evaluation  Bedside Swallow    Patient Name:  Yasmeen Palm   MRN:  [id number]  Admitting Diagnosis: Aphasia    Recommendations:                 General Recommendations:  f/u x 1 for diet tolerance; complete speech/language/cognition evaluation per stroke protocol  Diet recommendations:  Regular, Thin   Aspiration Precautions: Standard aspiration precautions   General Precautions: Standard,        History:     Past Medical History:   Diagnosis Date    Anticoagulant long-term use     Arthritis     Asthma     CHF (congestive heart failure)     COPD (chronic obstructive pulmonary disease)     Coronary artery disease     Depression     Diabetes mellitus     GERD (gastroesophageal reflux disease)     Hypertension        Past Surgical History:   Procedure Laterality Date    ABDOMINAL SURGERY      CARDIAC SURGERY      HERNIA REPAIR         Social History: Patient lives with at home with her .    Modified Barium Swallow: none on file for review    Chest X-Rays: 12/8/18: No acute disease or interval change    MRI Brain: 12/8/18: No acute intracranial abnormality or interval change.  Chronic findings as above.    Prior diet: regular/thin    Pt previously seen by ST department at [facility] 7/18/18 and 7/19/18(seen and discharged) with following results/recs: "Yasmeen Palm is a 68 y.o. female admitted with Sepsis.  She presents with oral and pharyngeal phases of the swallow judged to be WFL.   SLP recs: regular/thin liquids po diet with universal swallow precautions. ST services no longer required as pt has met all ST goals. SLP notified RN Meron of results/recs. Please re-consult should pt experience any change in status"        Subjective   Pt resting with eyes closed but agreeable to bedside swallow evaluation. Pt denied any recent difficulty with swallowing.   Patient goals: to get home    Pain/Comfort:  · Pain Rating 1: 0/10    Objective:     Oral Musculature Evaluation  · Oral " Musculature: WFL(mildly reduced strength noted across tasks)  · Dentition: scattered dentition  · Secretion Management: adequate  · Mucosal Quality: adequate  · Mandibular Strength and Mobility: WFL  · Oral Labial Strength and Mobility: WFL(mildly reduced strength)  · Lingual Strength and Mobility: WFL(mildly reduced strength)  · Velar Elevation: WFL  · Buccal Strength and Mobility: WFL  · Volitional Cough: adeqaute  · Volitional Swallow: timely with adeqaute laryngeal elevation suspected upon palpation  · Voice Prior to PO Intake: clear/dry/strong    Bedside Swallow Eval:   Consistencies Assessed:  · Thin liquids water- self-fed cup edge and straw sips, 4 oz total  · Puree tsp bites, self-fed, 2 oz total  · Solids 1/2 package osiris crackers, self-fed     Oral Phase:   · WFL    Pharyngeal Phase:   · no overt clinical signs/symptoms of aspiration          Assessment:     Yasmeen Palm is a 68 y.o. female with stroke protocol orders for clinical bedside swallow and speech/language/cognition evaluations.  Pt tolerated all PO without overt s/s aspiration and adequate oral clearance at b/s. Oral and pharyngeal phases of deglutition appear WFL at b/s.      Goals:   Multidisciplinary Problems     SLP Goals        Problem: SLP Goal    Goal Priority Disciplines Outcome   SLP Goal     SLP Ongoing (interventions implemented as appropriate)   Description:  Short Term Goals:  1.Pt will participate in speech/language/cognition evaluation per stroke protocol.  2.Pt will tolerate regular diet with thin liquids without overt s/s aspiration and adequate/efficient oral clearance at b/s.                     Plan:     · Patient to be seen:  (x1 for swallow safety/diet tolerance; TBD after SLC eval)   · Speech/language/cognition evaluation at next service date.  · Plan of Care expires:  12/17/18  · Plan of Care reviewed with:  patient   · SLP Follow-Up:  Yes       Discharge recommendations:      Barriers to Discharge:  None    Time  Tracking:     SLP Treatment Date:   12/10/18  Speech Start Time:  1240  Speech Stop Time:  1300     Speech Total Time (min):  20 min    Billable Minutes: Eval Swallow and Oral Function  20 min and Total Time 20 min    CARRIE Alvarez, CCC-SLP  12/10/2018

## 2018-12-10 NOTE — PT/OT/SLP PROGRESS
"Physical Therapy Treatment    Patient Name:  Yasmeen Palm   MRN:  [id number]    Recommendations:     Discharge Recommendations:  Home health PT  Discharge Equipment Recommendations:  none  Barriers to discharge: None    Assessment:     Yasmeen Palm is a 68 y.o. female admitted with a medical diagnosis of Aphasia.  She presents with the following impairments/functional limitations:  weakness, gait instability, impaired functional mobilty, impaired self care skills, impaired endurance, impaired balance, decreased lower extremity function, decreased upper extremity function, decreased safety awareness, impaired cardiopulmonary response to activity, decreased ROM . Pt required encouragement to participate in therapy. Pt agreeable to transfer to chair only.     Rehab Prognosis:  Fair +; patient would benefit from acute skilled PT services to address these deficits and reach maximum level of function.      Recent Surgery: * No surgery found *      Plan:     During this hospitalization, patient to be seen 6 x/week to address the above listed problems via gait training, therapeutic activities, therapeutic exercises  · Plan of Care Expires:  12/22/18   Plan of Care Reviewed with: patient, spouse    Subjective     Communicated with nurse  prior to session.  Patient found in bed with spouse upon PT entry to room, agreeable to treatment.      Chief Complaint: tired  Patient comments/goals: " I am not walking "   Pain/Comfort:  · Pain Rating 1: 0/10  · Pain Rating Post-Intervention 1: 0/10    Patients cultural, spiritual, Mu-ism conflicts given the current situation:      Objective:     Patient found with: bed alarm, telemetry, oxygen     General Precautions: Standard, fall, respiratory   Orthopedic Precautions:N/A   Braces: N/A     Functional Mobility:  · Bed Mobility:     · Scooting: stand by assistance  · Supine to Sit: stand by assistance  · Transfers:     · Sit to Stand:  contact guard assistance with rolling " walker  · Bed to Chair: contact guard assistance with  rolling walker  using  Step Transfer  · Gait:  Pt ambulated ~ 6 ft from bed to chair, RW, CGA. Noted with decreased dyllan, decreased step length. VC's for safety technique and walker management.       AM-PAC 6 CLICK MOBILITY  Turning over in bed (including adjusting bedclothes, sheets and blankets)?: 4  Sitting down on and standing up from a chair with arms (e.g., wheelchair, bedside commode, etc.): 3  Moving from lying on back to sitting on the side of the bed?: 4  Moving to and from a bed to a chair (including a wheelchair)?: 3  Need to walk in hospital room?: 3  Climbing 3-5 steps with a railing?: 3  Basic Mobility Total Score: 20       Therapeutic Activities and Exercises:   pt performed bed mobility, transfer and gait training as above.    Patient left up in chair lunch tray table set up with all lines intact, call button in reach, nurse notified and family present..    GOALS:   Multidisciplinary Problems     Physical Therapy Goals        Problem: Physical Therapy Goal    Goal Priority Disciplines Outcome Goal Variances Interventions   Physical Therapy Goal     PT, PT/OT Ongoing (interventions implemented as appropriate)     Description:  Goals to be met by: 2018     Patient will increase functional independence with mobility by performin. Supine to sit with Modified [place]  2. Sit to supine with Modified [place]  3. Sit to stand transfer with Modified [place]  4. Gait  x 350 feet with Modified [place] using Rolling Walker.    Recommend: HHPT at time of discharge.                          Time Tracking:     PT Received On: 12/10/18  PT Start Time: 1150     PT Stop Time: 1204  PT Total Time (min): 14 min     Billable Minutes: Therapeutic Activity 14 eval for OT    Treatment Type: Treatment  PT/PTA: PTA     PTA Visit Number: 1     Kayleigh Henry PTA  12/10/2018

## 2018-12-10 NOTE — ASSESSMENT & PLAN NOTE
Continue amio, BBL and DOAC, monitor on tele. Tachycardic 12/10 as home meds had been held for permissive HTN. She has diltiazem and sotalol still on her home med list, but per her last DC summary she is supposed to have stopped these medications and be on metoprolol 100.

## 2018-12-10 NOTE — CONSULTS
"  [facility]  Adult Nutrition  Consult Note    SUMMARY     Recommendations     1. Boost glucose tid (straw or vanilla)   2. Consider appetite stimulant to aid with home diet   3. RD to monitor    Goals: Meet >85% EEN  Nutrition Goal Status: new  Communication of RD Recs: reviewed with RN(plan of care)    Reason for Assessment    Reason For Assessment: consult  Diagnosis: (Stroke w/u)  Relevant Medical History: COPD, CHF, HTN, DM, GERD, CAD, hernia repair  Interdisciplinary Rounds: did not attend    General Information Comments: Stroke w/u negative at this time. Pt reports decreased intake and appetite with weight loss over past month; prev adm records indicate weight loss since 2018. Denies issues with n/v/chewing or swallowing. NFPE performed with adequate fat, moderate loss of lean body mass noted. Pt has been drinking boost at home bid. Pt would like to continue boost while inpatient. Provided handouts on cardiac diet and reviewed, but encouraged pt to focus on adequate kcal/pro intake at this time due to significant loss of lean body mass noted on physical exam.     Nutrition Discharge Planning: Cardiac/ADA diet with supplements as needed.     Nutrition Risk Screen         Nutrition/Diet History    Food Preferences: Denies  Spiritual, Cultural Beliefs, Temple Practices, Values that Affect Care: no  Supplemental Drinks or Food Habits: Boost Plus  Food Allergies: NKFA  Factors Affecting Nutritional Intake: decreased appetite    Anthropometrics    Temp: 98.6 °F (37 °C)  Height Method: Stated  Height: 5' 8" (172.7 cm)  Height (inches): 68 in  Weight Method: Bed Scale  Weight: 73.5 kg (162 lb 0.6 oz)  Weight (lb): 162.04 lb  Ideal Body Weight (IBW), Female: 140 lb  % Ideal Body Weight, Female (lb): 115.74 lb  BMI (Calculated): 24.7  Weight Loss: unintentional  Usual Body Weight (UBW), k.7 kg(2018; pt reports normal weight in 200's)  % Usual Body Weight: 81.21  % Weight Change From Usual " Weight: -18.96 %       Lab/Procedures/Meds    Pertinent Labs Comments: HgbA1c 5.7%; Chol 246;   Pertinent Medications Comments: statin    Physical Findings/Assessment    Overall: loss of lean body mass  Skin: blister on buttocks       Estimated/Assessed Needs    Weight Used For Calorie Calculations: 73.5 kg (162 lb 0.6 oz)  Energy Calorie Requirements (kcal): 1650 (x1.25)  Energy Need Method: Clinch-St Jeor  Protein Requirements: 73-88g (1-1.2g/kg)  Weight Used For Protein Calculations: 73.5 kg (162 lb 0.6 oz)     Estimated Fluid Requirement Method: RDA Method  RDA Method (mL): 1650  Estimated Fiber Requirements: (CHO: 200g)      Nutrition Prescription Ordered    Current Diet Order: 2000 ADA    Evaluation of Received Nutrient/Fluid Intake    I/O: reviewed  Energy Calories Required: not meeting needs  Protein Required: not meeting needs  Fluid Required: (per MD)  Comments: LBM: 12/8  Tolerance: tolerating(small amts)  % Intake of Estimated Energy Needs: 0 - 25 %  % Meal Intake: 0 - 25 %    Nutrition Risk    Level of Risk/Frequency of Follow-up: (2 x week)     Assessment and Plan    Malnutrition in the context of Chronic Illness/Injury    Related to (etiology):  Decreased appetite    Signs and Symptoms (as evidenced by):  Energy Intake: <75% of estimated energy requirement for >1 month  Muscle Mass Depletion: moderate depletion of temples, clavicle region and lower extremities   Weight Loss: 18% x 10 months    Interventions:  Commercial beverage  Collaboration with providers    Nutrition Diagnosis Status:  New     Monitor and Evaluation    Food and Nutrient Intake: energy intake, food and beverage intake  Food and Nutrient Adminstration: diet order  Knowledge/Beliefs/Attitudes: food and nutrition knowledge/skill  Physical Activity and Function: nutrition-related ADLs and IADLs  Anthropometric Measurements: weight, weight change  Biochemical Data, Medical Tests and Procedures: electrolyte and renal panel,  glucose/endocrine profile  Nutrition-Focused Physical Findings: overall appearance     Malnutrition Assessment             Weight Loss (Malnutrition): greater than 10% in 6 months  Energy Intake (Malnutrition): less than 75% for greater than or equal to 1 month  Muscle Mass (Malnutrition): moderate depletion  Hand  Strength, Left (Malnutrition): weak  Hand  Strength, Right (Malnutrition): weak   Orbital Region (Subcutaneous Fat Loss): well nourished  Upper Arm Region (Subcutaneous Fat Loss): well nourished  Thoracic and Lumbar Region: well nourished   [place] Region (Muscle Loss): moderate depletion  Clavicle Bone Region (Muscle Loss): moderate depletion  Clavicle and Acromion Bone Region (Muscle Loss): (Upper arm area: Moderate)  Patellar Region (Muscle Loss): moderate depletion  Anterior Thigh Region (Muscle Loss): moderate depletion  Posterior Calf Region (Muscle Loss): moderate depletion       Subcutaneous Fat Loss (Final Summary): well nourished  Muscle Loss Evaluation (Final Summary): moderate protein-calorie malnutrition    Severe Weight Loss (Malnutrition): greater than 10% in 6 months    Nutrition Follow-Up    RD Follow-up?: Yes

## 2018-12-10 NOTE — SUBJECTIVE & OBJECTIVE
Interval History:  Improving. Speech less delayed today.  says he feels she close to baseline. She developed palpitations with uncontrolled HR. Also hypertensive. Her anti-hypertensives and rate control meds were held for permissive hypertension. These have been restarted.    Review of Systems   Constitutional: Negative for chills and fever.   Respiratory: Positive for shortness of breath. Negative for cough.    Cardiovascular: Positive for palpitations. Negative for chest pain and leg swelling.   Gastrointestinal: Negative for abdominal pain, nausea and vomiting.   Neurological: Negative for dizziness, seizures, syncope, facial asymmetry, speech difficulty, weakness, light-headedness, numbness and headaches.   Psychiatric/Behavioral: Negative for confusion and decreased concentration.     Objective:     Vital Signs (Most Recent):  Temp: 98.8 °F (37.1 °C) (12/10/18 1620)  Pulse: 97 (12/10/18 1620)  Resp: 18 (12/10/18 1620)  BP: (!) 205/101 (12/10/18 1620)  SpO2: 98 % (12/10/18 1620) Vital Signs (24h Range):  Temp:  [98.2 °F (36.8 °C)-99.1 °F (37.3 °C)] 98.8 °F (37.1 °C)  Pulse:  [] 97  Resp:  [17-25] 18  SpO2:  [94 %-98 %] 98 %  BP: (123-209)/() 205/101     Weight: 73.5 kg (162 lb 0.6 oz)  Body mass index is 24.64 kg/m².    Physical Exam   Constitutional: She is oriented to person, place, and time. She appears well-developed and well-nourished. No distress.   HENT:   Right Ear: External ear normal.   Left Ear: External ear normal.   Nose: Nose normal.   Mouth/Throat: Oropharynx is clear and moist.   Eyes: Conjunctivae and EOM are normal. Pupils are equal, round, and reactive to light.   Neck: Normal range of motion. Neck supple.   Cardiovascular: Normal heart sounds.   No murmur heard.  Tachycardic   Pulmonary/Chest: Effort normal and breath sounds normal. No respiratory distress. She has no wheezes.   Abdominal: Soft. Bowel sounds are normal. She exhibits no distension. There is no tenderness.    Musculoskeletal: Normal range of motion. She exhibits no edema or tenderness.   Neurological: She is alert and oriented to person, place, and time. She is not disoriented. No cranial nerve deficit.   Oriented to person, place, and situation. A little sleepy and slow, but speech is generally normal.   Skin: Skin is warm and dry.   Nursing note and vitals reviewed.        CRANIAL NERVES     CN III, IV, VI   Pupils are equal, round, and reactive to light.  Extraocular motions are normal.        Significant Labs: All pertinent labs within the past 24 hours have been reviewed.    Significant Imaging: I have reviewed all pertinent imaging results/findings within the past 24 hours.

## 2018-12-10 NOTE — NURSING
-0020 Patient complaining of shortness of breath, encouraged patient to wear cpap that she was refusing. Patient placed on cpap and respiratory therapist called for PRN breath treatment per patient's request. Patient blood pressure 209/113. Hr 110s-120s. Labetalol 10 mg given.     -0045 patient complaining of being hot. Shortness of breath resolved.     -0102 /60 HR 85-95 Patient states she is feeling better.

## 2018-12-10 NOTE — PT/OT/SLP EVAL
"Occupational Therapy   Evaluation and Discharge Note    Name: Yasmeen Palm  MRN: [id number]  Admitting Diagnosis:  Aphasia      Recommendations:     Discharge Recommendations: (home with HH)  Discharge Equipment Recommendations:  none  Barriers to discharge:  None    History:     Occupational Profile:  Living Environment: lives with her   Previous level of function: modified independent  Equipment Used at home:  cane, quad, walker, standard  Assistance upon Discharge: from     Past Medical History:   Diagnosis Date    Anticoagulant long-term use     Arthritis     Asthma     CHF (congestive heart failure)     COPD (chronic obstructive pulmonary disease)     Coronary artery disease     Depression     Diabetes mellitus     GERD (gastroesophageal reflux disease)     Hypertension        Past Surgical History:   Procedure Laterality Date    ABDOMINAL SURGERY      CARDIAC SURGERY      HERNIA REPAIR         Subjective     Chief Complaint: "I'm sure that my food is cold since you people made me get up."  Patient/Family Comments/goals: to return home    Pain/Comfort:  · Pain Rating 1: 0/10    Patients cultural, spiritual, Gnosticism conflicts given the current situation: no    Objective:     Communicated with: nursing prior to session.  Patient found all lines intact and call button in reach and bed alarm, telemetry, peripheral IV, oxygen upon OT entry to room.    General Precautions: Standard, fall, respiratory   Orthopedic Precautions:N/A   Braces: N/A     Occupational Performance:    Bed Mobility:    · Patient completed Rolling/Turning to Right with contact guard assistance  · Patient completed Scooting/Bridging with contact guard assistance  · Patient completed Supine to Sit with contact guard assistance    Functional Mobility/Transfers:  · Patient completed Sit <> Stand Transfer with contact guard assistance  with  rolling walker   · Patient completed Bed <> Chair Transfer using Step Transfer " "technique with contact guard assistance with rolling walker  · Functional Mobility: ambulated 10 feet within room with a RW to the bedside chair    Activities of Daily Living:  · Feeding:  independence seated in bedside chair  · Upper Body Dressing: contact guard assistance seated EOB  · Lower Body Dressing: minimum assistance seated EOB    Cognitive/Visual Perceptual:  Cognitive/Psychosocial Skills:     -       Oriented to: Person, Place and Situation   -       Follows Commands/attention:Follows one-step commands  -       Communication: clear/fluent  -       Memory: No Deficits noted  -       Safety awareness/insight to disability: intact   -       Mood/Affect/Coping skills/emotional control: Appropriate to situation    Physical Exam:  Balance:    -       sit balance fair plus; standing balance fair plus with AD  Postural examination/scapula alignment:    -       Rounded shoulders  Skin integrity: Visible skin intact  Upper Extremity Range of Motion:     -       Right Upper Extremity: WFL  -       Left Upper Extremity: WFL  Upper Extremity Strength:    -       Right Upper Extremity: WFL  -       Left Upper Extremity: WFL    AMPAC 6 Click ADL:  AMPAC Total Score: 21    Treatment & Education:  Evaluation    Education:    Patient left up in chair with all lines intact, call button in reach, nurse notified and lunch tray on bedside table    Assessment:     Yasmeen Palm is a 68 y.o. female with a medical diagnosis of Aphasia. At this time, patient is functioning at their prior level of function and does not require further acute OT services.     Clinical Decision Makin.  OT Low:  "Pt evaluation falls under low complexity for evaluation coding due to performance deficits noted in 1-3 areas as stated above and 0 co-morbities affecting current functional status. Data obtained from problem focused assessments. No modifications or assistance was required for completion of evaluation. Only brief occupational profile " "and history review completed."     Plan:     During this hospitalization, patient does not require further acute OT services.  Please re-consult if situation changes.    · Plan of Care Reviewed with: patient, spouse    This Plan of care has been discussed with the patient who was involved in its development and understands and is in agreement with the identified goals and treatment plan    GOALS:   Multidisciplinary Problems     Occupational Therapy Goals     Not on file          Multidisciplinary Problems (Resolved)        Problem: Occupational Therapy Goal    Goal Priority Disciplines Outcome Interventions   Occupational Therapy Goal   (Resolved)     OT, PT/OT Outcome(s) achieved                    Time Tracking:     OT Date of Treatment: 12/10/18  OT Start Time: 1153  OT Stop Time: 1204  OT Total Time (min): 11 min    Billable Minutes:Evaluation 11 minutes    HEDY Wiley, MS  12/10/2018    "

## 2018-12-10 NOTE — ASSESSMENT & PLAN NOTE
Malnutrition in the context of Chronic Illness/Injury     Related to (etiology):  Decreased appetite     Signs and Symptoms (as evidenced by):  Energy Intake: <75% of estimated energy requirement for >1 month  Muscle Mass Depletion: moderate depletion of temples, clavicle region and lower extremities   Weight Loss: 18% x 10 months     Interventions:  Commercial beverage  Collaboration with providers     Nutrition Diagnosis Status:  New

## 2018-12-10 NOTE — PLAN OF CARE
Problem: SLP Goal  Goal: SLP Goal  Short Term Goals:  1.Pt will participate in speech/language/cognition evaluation per stroke protocol.  2.Pt will tolerate regular diet with thin liquids without overt s/s aspiration and adequate/efficient oral clearance at b/s.   Outcome: Ongoing (interventions implemented as appropriate)  Clinical bedside swallow evaluation completed with SLP.  Pt tolerated all PO without overt s/s aspiration and adequate oral clearance.  Oral and pharyngeal phases of deglutition appear WFL at b/s. ST to f/u x 1 for diet tolerance/safety and also for completion of speech/language/cognitive evaluation per stroke protocol orders at next service date.  Recommend continuing with regular diet with thin liquids; meds one at a time with thin liquids; universal swallow safety precautions.

## 2018-12-10 NOTE — PLAN OF CARE
12/10/18 1554   Post-Acute Status   Post-Acute Authorization (discharge planning in procgress.... TBD)

## 2018-12-10 NOTE — PLAN OF CARE
Problem: Patient Care Overview  Goal: Plan of Care Review  Outcome: Ongoing (interventions implemented as appropriate)  Plan of care reviewed with patient at bedside. All questions answered. Fall precautions are in place. Bedside commode at bedside. Call light within reach. Bed in lowest position. Patient had elevated blood pressure requiring PRN labetalol. PIV intact. NSR to sinus tach. Will continue to monitor.

## 2018-12-10 NOTE — PROCEDURES
EXTENDED  ELECTROENCEPHALOGRAM  REPORT    Yasmeen Palm  [id number]  1950    DATE OF SERVICE: 12/10/2018    DATE OF ADMISSION: 12/8/2018  2:44 PM    ADMITTING/REQUESTING PROVIDER: Rogelio Bolton MD    REASON FOR CONSULT: L hemiparesis    METHODOLOGY   Electroencephalographic (EEG) recording is with electrodes placed according to the International 10-20 placement system.  Thirty two (32) channels of digital signal (sampling rate of 512/sec) including T1 and T2 was simultaneously recorded from the scalp and may include  EKG, EMG, and/or eye monitors.  Recording band pass was 0.1 to 512 hz.  Digital video recording of the patient is simultaneously recorded with the EEG.  The patient is instructed report clinical symptoms which may occur during the recording session.  EEG and video recording is stored and archived in digital format.  Activation procedures which include photic stimulation, hyperventilation and instructing patients to perform simple task are done in selected patients.   The EEG is displayed on a monitor screen and can be reviewed using different montages.  Computer assisted analysis is employed to detect spike and electrographic seizure activity.   The entire record is submitted for computer analysis.  The entire recording is visually reviewed and the times identified by computer analysis as being spikes or seizures are reviewed again.  Compresses spectral analysis (CSA) is also performed on the activity recorded from each individual channel.  This is displayed as a power display of frequencies from 0 to 30 Hz over time.   The CSA is reviewed looking for asymmetries in power between homologous areas of the scalp and then compared with the original EEG recording.     Gir[employer] Friend software was also utilized in the review of this study.  This software suite analyzes the EEG recording in multiple domains.  Coherence and rhythmicity is computed to identify EEG sections which may contain organized seizures.   Each channel undergoes analysis to detect presence of spike and sharp waves which have special and morphological characteristic of epileptic activity.  The routine EEG recording is converted from spacial into frequency domain.  This is then displayed comparing homologous areas to identify areas of significant asymmetry.  Algorithm to identify non-cortically generated artifact is used to separate eye movement, EMG and other artifact from the EEG.      RECORDING TIMES  A total of 30 min of EEG recording was obtained.    EEG FINDINGS  Background activity:   The background rhythm was characterized by 4-14 Hz activity with an 11 Hz posterior dominant alpha rhythm at 30-70 microvolts. Background was symmetric, continuous, reactive and variable. Brief and intermittent 2-3hz generalized delta slowing seen while awake and bilateral independent temporal slowing while asleep. No clear asymmetry noted.     Sleep:    Approximately 27 minutes of normal sleep and sleep structures seen including sleep spindles and vertex waves.    Activation procedures: not performed    Abnormal activity:   No epileptiform discharges, periodic discharges, lateralized rhythmic delta activity or electrographic seizures were seen.    IMPRESSION:   This is an abnormal study with excessive drowsiness/sleep. Bilateral independent temporal slowing was noted.     The EKG channel revealed a sinus rhythm with PVCs      CLINICAL CORRELATION:  The patient is a 68-year-old female who is being evaluated for episodes of confusion.  The patient is currently not maintained on gabapentin.  This is an abnormal EEG during wakefulness, drowsiness and sleep. Bilateral independent temporal slowing seen suggestive of focal neuronal dysfunction in these reigons.  No seizures were recorded during this study.       Argelia Celeste MD  Neurology-Epilepsy Fellow.  [facility].    I personally reviewed the EEG study. This report was reviewed and edited by  myself.     Brock Ely MD   Epilepsy Division

## 2018-12-10 NOTE — PLAN OF CARE
Recommendations      1. Boost glucose tid (straw or vanilla)   2. Consider appetite stimulant to aid with home diet   3. RD to monitor     Goals: Meet >85% EEN  Nutrition Goal Status: new  Communication of RD Recs: reviewed with RN(plan of care)

## 2018-12-11 VITALS
HEIGHT: 68 IN | DIASTOLIC BLOOD PRESSURE: 60 MMHG | TEMPERATURE: 98 F | RESPIRATION RATE: 18 BRPM | BODY MASS INDEX: 25.39 KG/M2 | SYSTOLIC BLOOD PRESSURE: 104 MMHG | OXYGEN SATURATION: 100 % | WEIGHT: 167.56 LBS | HEART RATE: 82 BPM

## 2018-12-11 PROBLEM — R47.01 APHASIA: Status: RESOLVED | Noted: 2018-12-08 | Resolved: 2018-12-11

## 2018-12-11 LAB
ALBUMIN SERPL BCP-MCNC: 3.4 G/DL
ALP SERPL-CCNC: 73 U/L
ALT SERPL W/O P-5'-P-CCNC: 6 U/L
AMPHET+METHAMPHET UR QL: NEGATIVE
ANION GAP SERPL CALC-SCNC: 6 MMOL/L
ANISOCYTOSIS BLD QL SMEAR: SLIGHT
AST SERPL-CCNC: 14 U/L
BACTERIA #/AREA URNS HPF: ABNORMAL /HPF
BARBITURATES UR QL SCN>200 NG/ML: NEGATIVE
BASOPHILS # BLD AUTO: 0.03 K/UL
BASOPHILS NFR BLD: 0.2 %
BENZODIAZ UR QL SCN>200 NG/ML: NEGATIVE
BILIRUB SERPL-MCNC: 0.2 MG/DL
BILIRUB UR QL STRIP: NEGATIVE
BUN SERPL-MCNC: 15 MG/DL
BZE UR QL SCN: NEGATIVE
CALCIUM SERPL-MCNC: 9.6 MG/DL
CANNABINOIDS UR QL SCN: NEGATIVE
CHLORIDE SERPL-SCNC: 101 MMOL/L
CLARITY UR: ABNORMAL
CO2 SERPL-SCNC: 36 MMOL/L
COLOR UR: ABNORMAL
CREAT SERPL-MCNC: 0.8 MG/DL
CREAT UR-MCNC: 174.6 MG/DL
DIFFERENTIAL METHOD: ABNORMAL
EOSINOPHIL # BLD AUTO: 0.3 K/UL
EOSINOPHIL NFR BLD: 2.2 %
ERYTHROCYTE [DISTWIDTH] IN BLOOD BY AUTOMATED COUNT: 18 %
EST. GFR  (AFRICAN AMERICAN): >60 ML/MIN/1.73 M^2
EST. GFR  (NON AFRICAN AMERICAN): >60 ML/MIN/1.73 M^2
GLUCOSE SERPL-MCNC: 102 MG/DL
GLUCOSE UR QL STRIP: NEGATIVE
HCT VFR BLD AUTO: 34.4 %
HGB BLD-MCNC: 9.9 G/DL
HGB UR QL STRIP: ABNORMAL
HYALINE CASTS #/AREA URNS LPF: 0 /LPF
HYPOCHROMIA BLD QL SMEAR: ABNORMAL
KETONES UR QL STRIP: NEGATIVE
LEUKOCYTE ESTERASE UR QL STRIP: ABNORMAL
LYMPHOCYTES # BLD AUTO: 1.6 K/UL
LYMPHOCYTES NFR BLD: 13 %
MCH RBC QN AUTO: 24.6 PG
MCHC RBC AUTO-ENTMCNC: 28.8 G/DL
MCV RBC AUTO: 85 FL
METHADONE UR QL SCN>300 NG/ML: NEGATIVE
MICROSCOPIC COMMENT: ABNORMAL
MONOCYTES # BLD AUTO: 1.5 K/UL
MONOCYTES NFR BLD: 12.2 %
NEUTROPHILS # BLD AUTO: 8.7 K/UL
NEUTROPHILS NFR BLD: 72.6 %
NITRITE UR QL STRIP: POSITIVE
OPIATES UR QL SCN: NEGATIVE
PCP UR QL SCN>25 NG/ML: NEGATIVE
PH UR STRIP: 5 [PH] (ref 5–8)
PLATELET # BLD AUTO: 348 K/UL
PLATELET BLD QL SMEAR: ABNORMAL
PMV BLD AUTO: 9.3 FL
POCT GLUCOSE: 106 MG/DL (ref 70–110)
POCT GLUCOSE: 134 MG/DL (ref 70–110)
POCT GLUCOSE: 184 MG/DL (ref 70–110)
POLYCHROMASIA BLD QL SMEAR: ABNORMAL
POTASSIUM SERPL-SCNC: 4.1 MMOL/L
PROT SERPL-MCNC: 6.9 G/DL
PROT UR QL STRIP: ABNORMAL
RBC # BLD AUTO: 4.03 M/UL
RBC #/AREA URNS HPF: 10 /HPF (ref 0–4)
SODIUM SERPL-SCNC: 143 MMOL/L
SP GR UR STRIP: 1.02 (ref 1–1.03)
SQUAMOUS #/AREA URNS HPF: 10 /HPF
TARGETS BLD QL SMEAR: ABNORMAL
TOXICOLOGY INFORMATION: NORMAL
URN SPEC COLLECT METH UR: ABNORMAL
UROBILINOGEN UR STRIP-ACNC: NEGATIVE EU/DL
WBC # BLD AUTO: 12.07 K/UL
WBC #/AREA URNS HPF: >100 /HPF (ref 0–5)

## 2018-12-11 PROCEDURE — 80307 DRUG TEST PRSMV CHEM ANLYZR: CPT

## 2018-12-11 PROCEDURE — A4216 STERILE WATER/SALINE, 10 ML: HCPCS | Performed by: EMERGENCY MEDICINE

## 2018-12-11 PROCEDURE — 99900035 HC TECH TIME PER 15 MIN (STAT)

## 2018-12-11 PROCEDURE — 25000003 PHARM REV CODE 250: Performed by: EMERGENCY MEDICINE

## 2018-12-11 PROCEDURE — 80053 COMPREHEN METABOLIC PANEL: CPT

## 2018-12-11 PROCEDURE — 36415 COLL VENOUS BLD VENIPUNCTURE: CPT

## 2018-12-11 PROCEDURE — 81000 URINALYSIS NONAUTO W/SCOPE: CPT | Mod: 59

## 2018-12-11 PROCEDURE — 25000003 PHARM REV CODE 250: Performed by: INTERNAL MEDICINE

## 2018-12-11 PROCEDURE — 85025 COMPLETE CBC W/AUTO DIFF WBC: CPT

## 2018-12-11 RX ORDER — AMOXICILLIN AND CLAVULANATE POTASSIUM 875; 125 MG/1; MG/1
1 TABLET, FILM COATED ORAL 2 TIMES DAILY
Qty: 10 TABLET | Refills: 0 | Status: SHIPPED | OUTPATIENT
Start: 2018-12-11 | End: 2018-12-16

## 2018-12-11 RX ORDER — METOPROLOL TARTRATE 100 MG/1
100 TABLET ORAL 2 TIMES DAILY
Qty: 60 TABLET | Refills: 11 | Status: SHIPPED | OUTPATIENT
Start: 2018-12-11 | End: 2019-07-19

## 2018-12-11 RX ORDER — ISOSORBIDE MONONITRATE 30 MG/1
90 TABLET, EXTENDED RELEASE ORAL DAILY
Qty: 90 TABLET | Refills: 11 | Status: ON HOLD | OUTPATIENT
Start: 2018-12-11 | End: 2019-08-22 | Stop reason: HOSPADM

## 2018-12-11 RX ADMIN — ASPIRIN 81 MG: 81 TABLET, COATED ORAL at 10:12

## 2018-12-11 RX ADMIN — GABAPENTIN 300 MG: 300 CAPSULE ORAL at 10:12

## 2018-12-11 RX ADMIN — Medication 3 ML: at 05:12

## 2018-12-11 RX ADMIN — ISOSORBIDE MONONITRATE 90 MG: 30 TABLET, EXTENDED RELEASE ORAL at 10:12

## 2018-12-11 RX ADMIN — ATORVASTATIN CALCIUM 40 MG: 40 TABLET, FILM COATED ORAL at 10:12

## 2018-12-11 RX ADMIN — AMIODARONE HYDROCHLORIDE 200 MG: 200 TABLET ORAL at 10:12

## 2018-12-11 RX ADMIN — FUROSEMIDE 40 MG: 40 TABLET ORAL at 10:12

## 2018-12-11 RX ADMIN — LISINOPRIL 40 MG: 20 TABLET ORAL at 10:12

## 2018-12-11 RX ADMIN — METOPROLOL TARTRATE 100 MG: 50 TABLET ORAL at 10:12

## 2018-12-11 RX ADMIN — TIOTROPIUM BROMIDE 18 MCG: 18 CAPSULE ORAL; RESPIRATORY (INHALATION) at 08:12

## 2018-12-11 NOTE — PLAN OF CARE
[facility]    HOME HEALTH ORDERS  FACE TO FACE ENCOUNTER    Patient Name: Yasmeen Palm  YOB: 1950    PCP: Angel Orourke Jr, MD   PCP Address: [address] [facility]  PCP Phone Number: [phone]  PCP Fax: [phone]       Encounter Date: 12/11/2018    Admit to Home Health    Diagnoses:  Active Hospital Problems    Diagnosis  POA    Neuropathy [G62.9]  Yes     Chronic    PAF (paroxysmal atrial fibrillation) [I48.0]  Yes     Chronic    History of pulmonary embolism [Z86.711]  Yes     Chronic    History of deep vein thrombosis [Z86.718]  Not Applicable     Chronic    Chronic anticoagulation [Z79.01]  Not Applicable     Chronic    Anemia of chronic disease [D63.8]  Yes     Chronic    Moderate malnutrition [E44.0]  Yes     Chronic    Type 2 diabetes mellitus, controlled [E11.9]  Yes     Chronic    Gastroesophageal reflux disease without esophagitis [K21.9]  Yes     Chronic    Tobacco abuse [Z72.0]  Yes     Chronic    Coronary artery disease involving native coronary artery of native heart with angina pectoris [I25.119]  Yes     Chronic      Resolved Hospital Problems    Diagnosis Date Resolved POA    *Aphasia [R47.01] 12/11/2018 Yes     History most suggestive of seizure although DDx includes ischemic stroke with anarthria vs expressive aphasia (I did not have her try to write).  If a stroke, she is not a tPA candidate as she takes Xarelto.  Will obtain CTA head/neck.  If + for LVO, will transfer to [facility]; otherwise will keep at [facility] for MRI and, if negative for stroke, EEG.     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  Follow-up Information     Angel Orourke Jr, MD In 1 week.    Specialty:  Family Medicine  Contact information:  [address]  [address]  [place] [facility] [address]  [phone]                     I have seen and examined this patient face to face today. My clinical findings that support the need for the home health skilled services and home bound status are the following:  Weakness/numbness causing balance and gait disturbance due to Weakness/Debility making it taxing to leave home.    Allergies:Review of patient's allergies indicates:  No Known Allergies    Diet: cardiac diet and diabetic diet: 2000 calorie    Activities: activity as tolerated    Nursing:   SN to complete comprehensive assessment including routine vital signs. Instruct on disease process and s/s of complications to report to MD. Review/verify medication list sent home with the patient at time of discharge  and instruct patient/caregiver as needed. Frequency may be adjusted depending on start of care date.    Notify MD if SBP > 160 or < 90; DBP > 90 or < 50; HR > 120 or < 50; Temp > 101      CONSULTS:    Physical Therapy to evaluate and treat. Evaluate for home safety and equipment needs; Establish/upgrade home exercise program. Perform / instruct on therapeutic exercises, gait training, transfer training, and Range of Motion.  Occupational Therapy to evaluate and treat. Evaluate home environment for safety and equipment needs. Perform/Instruct on transfers, ADL training, ROM, and therapeutic exercises.      Medications: Review discharge medications with patient and family and provide education.      Current Discharge Medication List      CONTINUE these medications which have CHANGED    Details   isosorbide mononitrate (IMDUR) 30 MG 24 hr tablet Take 3 tablets (90 mg total) by mouth once daily.  Qty: 90 tablet, Refills: 11      metoprolol tartrate (LOPRESSOR) 100 MG tablet Take 1 tablet (100 mg total) by mouth 2 (two) times daily.  Qty: 60 tablet, Refills: 11         CONTINUE these medications which have NOT CHANGED    Details   aspirin (ECOTRIN) 81 MG EC  tablet Take 81 mg by mouth once daily.      furosemide (LASIX) 40 MG tablet Take 40 mg by mouth once daily.       gabapentin (NEURONTIN) 300 MG capsule Take 300 mg by mouth 3 (three) times daily.      lisinopril (PRINIVIL,ZESTRIL) 40 MG tablet Take 1 tablet (40 mg total) by mouth once daily. Do not take this medication if blood pressure is below 130/80 mmHg and/or feeling dizzy after taking all other blood pressure meds      metformin (GLUCOPHAGE) 500 MG tablet Take 500 mg by mouth 2 (two) times daily with meals.      multivitamin (THERAGRAN) per tablet Take 1 tablet by mouth once daily.      pantoprazole (PROTONIX) 40 MG tablet Take 40 mg by mouth once daily.      pravastatin (PRAVACHOL) 40 MG tablet Take 40 mg by mouth once daily.      rivaroxaban (XARELTO) 20 mg Tab Take 20 mg by mouth daily with dinner or evening meal.      acetaminophen (TYLENOL) 500 MG tablet Take 1 tablet (500 mg total) by mouth every 8 (eight) hours as needed.  Refills: 0      amiodarone (PACERONE) 200 MG Tab Take 1 tablet (200 mg total) by mouth once daily.  Qty: 30 tablet, Refills: 11      cloNIDine (CATAPRES) 0.2 MG tablet Take 0.2 mg by mouth 2 (two) times daily.      hydrALAZINE (APRESOLINE) 50 MG tablet Take 50 mg by mouth 2 (two) times daily.      levalbuterol (XOPENEX HFA) 45 mcg/actuation inhaler Inhale 2 puffs into the lungs every 4 (four) hours as needed for Wheezing or Shortness of Breath. Rescue  Qty: 1 Inhaler, Refills: 3      lidocaine (LIDODERM) 5 % Place 1 patch onto the skin once daily. Remove & Discard patch within 12 hours or as directed by MD  Qty: 15 patch, Refills: 0      potassium phosphate, monobasic, (K-PHOS) 500 mg TbSO Take 2 tablets (1,000 mg total) by mouth once daily.  Qty: 60 tablet, Refills: 11      tiotropium (SPIRIVA) 18 mcg inhalation capsule Inhale 1 capsule (18 mcg total) into the lungs once daily. Controller  Qty: 30 capsule, Refills: 11      UMECLIDINIUM BRM/VILANTEROL TR (ANORO ELLIPTA INHL) Inhale  into the lungs daily as needed.         STOP taking these medications       diltiaZEM (CARDIZEM) 90 MG tablet Comments:   Reason for Stopping:         sotalol (BETAPACE) 80 MG tablet Comments:   Reason for Stopping:         atorvastatin (LIPITOR) 80 MG tablet Comments:   Reason for Stopping:         cloNIDine 0.2 mg/24 hr td ptwk (CATAPRES) 0.2 mg/24 hr Comments:   Reason for Stopping:         docusate sodium (COLACE) 100 MG capsule Comments:   Reason for Stopping:               I certify that this patient is confined to her home and needs intermittent skilled nursing care, physical therapy and occupational therapy.

## 2018-12-11 NOTE — PROGRESS NOTES
PCP office contacted and appointment scheduled.  Appointment information entered into follow up tab to print on AVS at time of discharge.    1120- HH referral sent via [facility] to: 1. [facility]  2. [facility] 3. [facility] 4. [facility].  TN to follow in RC for response.    1143- [facility] declined acceptance.    1149- referral sent via [facility] to [facility] for review.  TN to follow in RC for response.

## 2018-12-11 NOTE — PROGRESS NOTES
WRITTEN HEALTHCARE DISCHARGE INFORMATION      Things that YOU are responsible for to Manage Your Care At Home:  1. Getting your prescriptions filled.  2. Taking you medications as directed. DO NOT MISS ANY DOSES!  3. Going to your follow-up doctor appointments. This is important because it allows the doctor to monitor your progress and to determine if any changes need to be made to your treatment plan.     If you are unable to make your follow up appointments, please call the number listed and reschedule this appointment.      After discharge, if you need assistance, you can call [facility] for 24/7 assistance at [phone]     If you are experience any signs or symptoms, Call your doctor or Call 911 and come to your nearest Emergency Room.     Thank you for choosing [facility] and allowing us to care for you.        You should receive a call from [facility] within 48-72 hours to help manage your care after discharge. Please try to make sure that you answer your phone for this important phone call.     Follow-up Information     Angel Orourke Jr, MD On 12/18/2018.    Specialty:  Family Medicine  Why:  Appointment scheduled for Tuesday December 18th at 11:15am  Contact information:  [address] [facility] [address]  [phone]             [facility].    Specialty:  Home Health Services  Why:  Home Health-please contact number provided for questions or concerns regarding home health  Contact information:  [address] [address]  [phone]

## 2018-12-11 NOTE — PROGRESS NOTES
[facility]  Neurology  Progress Note    Patient Name: Yasmeen Palm  MRN: [id number]  Admission Date: 12/8/2018  Hospital Length of Stay: 2 days  Code Status: Full Code   Attending Provider: Viri Paredes MD  Primary Care Physician: Angel Orourke Jr, MD   Principal Problem:Aphasia    Subjective:     Interval History: 67 y/o female with medical as listed in PMHx section was brought to ED upon noticing the she became non-verbal and appear confused.  states that while watching TV she became unresponsive and had jerking movements of UE's. Pt is improving. No previous episodes. No Hx of seizures.    -12/10/18: According to  pt is at her baseline.    Current Neurological Medications:     Current Facility-Administered Medications   Medication Dose Route Frequency Provider Last Rate Last Dose    acetaminophen tablet 650 mg  650 mg Oral Q6H PRN Laureano Gentile MD   650 mg at 12/09/18 1041    albuterol-ipratropium 2.5 mg-0.5 mg/3 mL nebulizer solution 3 mL  3 mL Nebulization Q4H PRN Viri Paredes MD   3 mL at 12/10/18 0032    amiodarone tablet 200 mg  200 mg Oral Daily Viri Paredes MD   200 mg at 12/10/18 1803    [START ON 12/11/2018] aspirin EC tablet 81 mg  81 mg Oral Daily Viri Paredes MD        atorvastatin tablet 40 mg  40 mg Oral Daily Rogelio Bolton MD   40 mg at 12/10/18 0908    dextrose 50% injection 12.5 g  12.5 g Intravenous PRN Laureano Gentile MD        [START ON 12/11/2018] fluticasone-vilanterol 200-25 mcg/dose diskus inhaler 1 puff  1 puff Inhalation Daily Viri Paredes MD        furosemide tablet 40 mg  40 mg Oral Daily Viri Paredes MD   40 mg at 12/10/18 1803    gabapentin capsule 300 mg  300 mg Oral TID Viri Paredes MD        glucagon (human recombinant) injection 1 mg  1 mg Intramuscular PRN Laureano Gentile MD        influenza (FLUZONE HIGH-DOSE) vaccine 0.5 mL  0.5 mL Intramuscular vaccine x 1 dose  Laureano Gentile MD        [START ON 12/11/2018] isosorbide mononitrate 24 hr tablet 90 mg  90 mg Oral Daily Viri Paredes MD        labetalol injection 10 mg  10 mg Intravenous Q6H PRN Viri Paredes MD   10 mg at 12/10/18 1653    [START ON 12/11/2018] lisinopril tablet 40 mg  40 mg Oral Daily Viri Paredes MD        metoprolol injection 5 mg  5 mg Intravenous Q6H PRN Viri Paredes MD   5 mg at 12/10/18 0907    metoprolol tartrate (LOPRESSOR) tablet 100 mg  100 mg Oral BID Viri Paredes MD        oxyCODONE-acetaminophen 5-325 mg per tablet 1 tablet  1 tablet Oral Q6H PRN Laureano Gentile MD   1 tablet at 12/09/18 0945    rivaroxaban tablet 20 mg  20 mg Oral Daily with dinner Viri Paredes MD   20 mg at 12/10/18 1805    sodium chloride 0.9% flush 3 mL  3 mL Intravenous Q8H Rogelio Bolton MD   3 mL at 12/10/18 0530    tiotropium inhalation capsule 18 mcg  1 capsule Inhalation Daily Viri Paredes MD   18 mcg at 12/10/18 0845       Review of Systems   Constitutional: Negative for fever.   HENT: Negative for trouble swallowing.    Eyes: Negative for photophobia.   Respiratory: Positive for shortness of breath.    Cardiovascular: Negative for chest pain.   Gastrointestinal: Negative for abdominal pain.   Genitourinary: Negative for dysuria.   Musculoskeletal: Negative for back pain.   Neurological: Negative for headaches.       Objective:     Vital Signs (Most Recent):  Temp: 98.8 °F (37.1 °C) (12/10/18 1620)  Pulse: 97 (12/10/18 1620)  Resp: 18 (12/10/18 1620)  BP: (!) 205/101 (12/10/18 1620)  SpO2: 98 % (12/10/18 1620) Vital Signs (24h Range):  Temp:  [98.2 °F (36.8 °C)-99.1 °F (37.3 °C)] 98.8 °F (37.1 °C)  Pulse:  [] 97  Resp:  [17-25] 18  SpO2:  [94 %-98 %] 98 %  BP: (123-209)/() 205/101     Weight: 73.5 kg (162 lb 0.6 oz)  Body mass index is 24.64 kg/m².    Physical Exam  Constitutional: She is oriented to person, place, and time.   HENT:    Head: Normocephalic.   Eyes: Right eye exhibits no discharge. Left eye exhibits no discharge.   Neck: Normal range of motion.   Cardiovascular: Normal rate.   Pulmonary/Chest: Effort normal.   Abdominal: Bowel sounds are normal.   Musculoskeletal: She exhibits no edema.   Neurological: She is oriented to person, place, and time.   Skin: She is not diaphoretic.         NEUROLOGICAL EXAMINATION:      MENTAL STATUS   Oriented to person, place, and time.        Somnolent      CRANIAL NERVES      CN III, IV, VI   Right pupil: Size: 2 mm. Shape: regular. Reactivity: brisk.   Left pupil: Size: 2 mm. Shape: regular. Reactivity: brisk.   Nystagmus: none   Ophthalmoparesis: none  Conjugate gaze: present     CN VII   Right facial weakness: none  Left facial weakness: none     CN XII   Tongue deviation: none     MOTOR EXAM        Elevates UE's against gravity. No drift      GAIT AND COORDINATION      Tremor   Resting tremor: absent           Significant Labs:   CBC:   Recent Labs   Lab 12/09/18  0514   WBC 10.63   HGB 10.2*   HCT 35.3*   *     CMP:   Recent Labs   Lab 12/09/18  0514   GLU 74      K 4.5   CL 97   CO2 32*   BUN 10   CREATININE 0.7   CALCIUM 10.2   MG 1.8   PROT 7.7   ALBUMIN 3.7   BILITOT 0.3   ALKPHOS 79   AST 15   ALT 8*   ANIONGAP 10   EGFRNONAA >60       EEG:  CLINICAL CORRELATION:  The patient is a 68-year-old female who is   being evaluated for episodes of confusion.  The patient is   currently not maintained on gabapentin.  This is an abnormal EEG   during wakefulness, drowsiness and sleep. Bilateral independent   temporal slowing seen suggestive of focal neuronal dysfunction in   these reigons.  No seizures were recorded during this study.       Argelia Celeste MD  Neurology-Epilepsy Fellow.  [facility].    I personally reviewed the EEG study. This report was reviewed and   edited by myself.     Brock Ely MD   Epilepsy Division    Assessment and Plan:     67 y/o  female consulted for stroke     1. Stroke: No stroke on MRI. Not certain if a transient neurovascular insult or even pt had a seizure. No focal findings on exam but she is still with slow responses. No metabolic of infectious etiology for her encephalopathy. Improving. EEG with no seizures.           -Will continue to monitor.         Active Diagnoses:    Diagnosis Date Noted POA    PRINCIPAL PROBLEM:  Aphasia [R47.01] 12/08/2018 Yes    Neuropathy [G62.9] 06/25/2018 Yes     Chronic    PAF (paroxysmal atrial fibrillation) [I48.0] 06/25/2018 Yes     Chronic    History of pulmonary embolism [Z86.711] 04/10/2017 Yes     Chronic    History of deep vein thrombosis [Z86.718] 04/10/2017 Not Applicable     Chronic    Chronic anticoagulation [Z79.01] 04/10/2017 Not Applicable     Chronic    Anemia of chronic disease [D63.8] 09/27/2016 Yes     Chronic    Moderate malnutrition [E44.0] 09/27/2016 Yes     Chronic    Type 2 diabetes mellitus, controlled [E11.9] 12/14/2015 Yes     Chronic    Gastroesophageal reflux disease without esophagitis [K21.9] 12/14/2015 Yes     Chronic    Tobacco abuse [Z72.0] 12/14/2015 Yes     Chronic    Coronary artery disease involving native coronary artery of native heart with angina pectoris [I25.119] 12/14/2015 Yes     Chronic      Problems Resolved During this Admission:    Diagnosis Date Noted Date Resolved POA    Altered mental status [R41.82]  12/10/2018 Yes    Obesity [E66.9] 04/14/2017 12/08/2018 Yes     Chronic    Malignant hypertension [I10] 09/27/2016 12/09/2018 Yes       VTE Risk Mitigation (From admission, onward)        Ordered     rivaroxaban tablet 20 mg  With dinner      12/10/18 1630     IP VTE HIGH RISK PATIENT  Once      12/08/18 2256     Place sequential compression device  Until discontinued      12/08/18 2256          Romeo Zavala MD  Neurology  [facility]

## 2018-12-11 NOTE — PLAN OF CARE
"   12/11/18 1159   Final Note   Assessment Type Final Discharge Note   Anticipated Discharge Disposition [facility]   Hospital Follow Up  Appt(s) scheduled? Yes   Discharge plans and expectations educations in teach back method with documentation complete? Yes   Right Care Referral Info   Post Acute Recommendation Home-care   Referral Type home health    Facility Name [facility]   pts nurse Nell advised that all CM needs have been addressed and that she may proceed with nursing d/c instructions and teaching to complete d/c process    EDUCATION: provided with educational information on afib.  Information reviewed and placed in :My Healthcare Packet" to be brought home to use as resource after discharge.  Information included:  signs and symptoms to look for and call the doctor if experiencing, and symptoms that may indicate a medical emergency: CALL 911.      All questions answered.  Teach back method used.    Patient stated, " she will take medications as prescribed and go to appointment as scheduled ".        "

## 2018-12-11 NOTE — PLAN OF CARE
Problem: Patient Care Overview  Goal: Plan of Care Review  Outcome: Ongoing (interventions implemented as appropriate)   12/11/18 0663   Plan of Care Review   Plan of Care Reviewed With patient

## 2018-12-11 NOTE — DISCHARGE SUMMARY
"[facility] Medicine  Discharge Summary      Patient Name: Yasmeen Palm  MRN: [id number]  Admission Date: 12/8/2018  Hospital Length of Stay: 3 days  Discharge Date and Time:  12/11/2018 10:12 AM  Attending Physician: Zenon Alfredo MD   Discharging Provider: Zenon Alfredo MD  Primary Care Provider: Angel Orourke Jr, MD      HPI:   68 y.o. female with DM2, tobacco abuse, anemia, chronic OAC on rivaroxaban, PAF, CAD, HTN, LOYDA, and COPD (previously on home O2) presents from home with her  with a complaint of confusion.  She and her  are very poor historians and it is difficult to ascertain the exact events that lead to her presentation.  He states that she suddenly went unresponsive while they were seated watching TV with some upper ext jerking.  Afterwards she was confused and disoriented with word finding difficulty and "saying her words backwards".  Her mental status has improved since that time, though she is still confused.  Denies fever, chills, cough, SOB, chest pain, palpitations, headaches, vision changes, facial asymmetry, difficulty swallowing, weakness, numbness, N/V/D, abdominal pain, dysuria, frequency, or urgency.  Tele stroke eval in ED suspects seizure most likely, recommends CTA head/neck to rule out large territory infarct which was negative.  Admitted to Hospital Medicine for further evaluation and treatment.    * No surgery found *      Hospital Course:   Ms. Palm presented with aphasia concerning for stroke. CTA head and neck showed 50% stenosis at the right ICA origin and extensive atherosclerotic plaquing resulting in greater than 70% stenosis of the distal right vertebral artery V4 segment but no acute intracranial abnormality. MRI was negative for acute stroke. Neurology was consulted. Her aphasia resolved but she was still slow to respond. She had no metabolic, infectious, or endocrine abnormalities to explain her encephalopathy. There was " concern for seizure given the reported shaking activity; EEG 12/10 normal. PT/OT/ST consulted - HH recommended. She has many chronic medical issues which became poorly controlled when home medications were stopped for permissive hypertension. Her medications for blood pressure and heart rate control were resumed 12/10.  She is now afebrile and hemodynamically stable.  Does not seem far off baseline.  She will be discharged home with .    Addendum: Patient had complained to nurse about dysuria.  UA sent to lab and consistent with UTI.  Will discharge home on Augmentin.     Consults:   Consults (From admission, onward)        Status Ordering Provider     Inpatient consult to neurology  Once     Provider:  Romeo Zavala MD    Completed HAMLET CACERES     Inpatient consult to Registered Dietitian/Nutritionist  Once     Provider:  (Not yet assigned)    Completed HAMLET CACERES     Inpatient consult to Telemedicine-Stroke  Once     Provider:  Rocky Harmon MD    Acknowledged BALA ISAACS     IP consult to case management/social work  Once     Provider:  (Not yet assigned)    Acknowledged HAMLET CACERES          No new Assessment & Plan notes have been filed under this hospital service since the last note was generated.  Service: Hospital Medicine    Final Active Diagnoses:    Diagnosis Date Noted POA    Neuropathy [G62.9] 06/25/2018 Yes     Chronic    PAF (paroxysmal atrial fibrillation) [I48.0] 06/25/2018 Yes     Chronic    History of pulmonary embolism [Z86.711] 04/10/2017 Yes     Chronic    History of deep vein thrombosis [Z86.718] 04/10/2017 Not Applicable     Chronic    Chronic anticoagulation [Z79.01] 04/10/2017 Not Applicable     Chronic    Anemia of chronic disease [D63.8] 09/27/2016 Yes     Chronic    Moderate malnutrition [E44.0] 09/27/2016 Yes     Chronic    Type 2 diabetes mellitus, controlled [E11.9] 12/14/2015 Yes     Chronic    Gastroesophageal reflux disease without  esophagitis [K21.9] 12/14/2015 Yes     Chronic    Tobacco abuse [Z72.0] 12/14/2015 Yes     Chronic    Coronary artery disease involving native coronary artery of native heart with angina pectoris [I25.119] 12/14/2015 Yes     Chronic      Problems Resolved During this Admission:    Diagnosis Date Noted Date Resolved POA    PRINCIPAL PROBLEM:  Aphasia [R47.01] 12/08/2018 12/11/2018 Yes    Malignant hypertension [I10] 09/27/2016 12/09/2018 Yes    Altered mental status [R41.82]  12/10/2018 Yes    Obesity [E66.9] 04/14/2017 12/08/2018 Yes     Chronic       Discharged Condition: stable    [place] [facility]    Follow Up:  Follow-up Information     Angel Orourke Jr, MD In 1 week.    Specialty:  Family Medicine  Contact information:  [address] [facility]  [address] [facility] [address]  [phone]                 Patient Instructions:      Diet diabetic     Diet Cardiac     Notify your health care provider if you experience any of the following:  temperature >100.4     Notify your health care provider if you experience any of the following:  persistent nausea and vomiting or diarrhea     Notify your health care provider if you experience any of the following:  severe uncontrolled pain     Notify your health care provider if you experience any of the following:  difficulty breathing or increased cough     Notify your health care provider if you experience any of the following:  persistent dizziness, light-headedness, or visual disturbances     Notify your health care provider if you experience any of the following:  increased confusion or weakness     Activity as tolerated         Pending Diagnostic Studies:     None         Medications:  Reconciled Home Medications:      Medication List      CHANGE how you take these medications    isosorbide mononitrate 30 MG 24 hr tablet  Commonly known as:  IMDUR  Take 3 tablets (90 mg total) by mouth once daily.  What changed:     · medication strength  · how much to take        CONTINUE taking these medications    acetaminophen 500 MG tablet  Commonly known as:  TYLENOL  Take 1 tablet (500 mg total) by mouth every 8 (eight) hours as needed.     amiodarone 200 MG Tab  Commonly known as:  PACERONE  Take 1 tablet (200 mg total) by mouth once daily.     ANORO ELLIPTA INHL  Inhale into the lungs daily as needed.     aspirin 81 MG EC tablet  Commonly known as:  ECOTRIN  Take 81 mg by mouth once daily.     cloNIDine 0.2 MG tablet  Commonly known as:  CATAPRES  Take 0.2 mg by mouth 2 (two) times daily.     furosemide 40 MG tablet  Commonly known as:  LASIX  Take 40 mg by mouth once daily.     gabapentin 300 MG capsule  Commonly known as:  NEURONTIN  Take 300 mg by mouth 3 (three) times daily.     hydrALAZINE 50 MG tablet  Commonly known as:  APRESOLINE  Take 50 mg by mouth 2 (two) times daily.     levalbuterol 45 mcg/actuation inhaler  Commonly known as:  XOPENEX HFA  Inhale 2 puffs into the lungs every 4 (four) hours as needed for Wheezing or Shortness of Breath. Rescue     lidocaine 5 %  Commonly known as:  LIDODERM  Place 1 patch onto the skin once daily. Remove & Discard patch within 12 hours or as directed by MD     lisinopril 40 MG tablet  Commonly known as:  PRINIVIL,ZESTRIL  Take 1 tablet (40 mg total) by mouth once daily. Do not take this medication if blood pressure is below 130/80 mmHg and/or feeling dizzy after taking all other blood pressure meds     metFORMIN 500 MG tablet  Commonly known as:  GLUCOPHAGE  Take 500 mg by mouth 2 (two) times daily with meals.     metoprolol tartrate 100 MG tablet  Commonly known as:  LOPRESSOR  Take 1 tablet (100 mg total) by mouth 2 (two) times daily.     multivitamin per tablet  Commonly known as:  THERAGRAN  Take 1 tablet by mouth once daily.     pantoprazole 40 MG tablet  Commonly known as:  PROTONIX  Take 40 mg by mouth once daily.     potassium phosphate (monobasic) 500 mg Tbso  Commonly known  as:  K-PHOS  Take 2 tablets (1,000 mg total) by mouth once daily.     pravastatin 40 MG tablet  Commonly known as:  PRAVACHOL  Take 40 mg by mouth once daily.     tiotropium 18 mcg inhalation capsule  Commonly known as:  SPIRIVA  Inhale 1 capsule (18 mcg total) into the lungs once daily. Controller     XARELTO 20 mg Tab  Generic drug:  rivaroxaban  Take 20 mg by mouth daily with dinner or evening meal.        STOP taking these medications    atorvastatin 80 MG tablet  Commonly known as:  LIPITOR     BETAPACE 80 MG tablet  Generic drug:  sotalol     cloNIDine 0.2 mg/24 hr td ptwk 0.2 mg/24 hr  Commonly known as:  CATAPRES     diltiaZEM 90 MG tablet  Commonly known as:  CARDIZEM     docusate sodium 100 MG capsule  Commonly known as:  COLACE            Indwelling Lines/Drains at time of discharge:   Lines/Drains/Airways          None          Time spent on the discharge of patient: >30 minutes  Patient was seen and examined on the date of discharge and determined to be suitable for discharge.         Zenon Alfredo MD  Department of Hospital Medicine  [facility]

## 2018-12-12 NOTE — PHYSICIAN QUERY
PT Name: Yasmeen Palm  MR #: [id number]     CDS/: Yareli Rodrigez RN             Contact information:[email or web address]  This form is a permanent document in the medical record.     Query Date: December 12, 2018    Physician Query - Neurological Condition Clarification    By submitting this query, we are merely seeking further clarification of documentation to reflect the severity of illness of your patient. Please utilize your independent clinical judgment when addressing the question(s) below.    The Medical record reflects the following:     Indicators   Supporting Clinical Findings Location in Medical Record   x AMS, Confusion,  LOC, etc.   states pt suddenly went unresponsive while they were seated watching IB with some upper ext jerking. Afterwards she was confused and disoriented with word finding difficulty and saying her words backwards Discharge summary   x Acute / Chronic Illness PAF, CAD, HTN, LOYDA, COPD, chronic OAC on rivaroxaban, chronic moderate malnutrition, DM2 Discharge summary   x Radiology Findings CTA head and neck showed 50% stenosis at the right ICA origin and extensive atherosclerotic plaquing resulting in greater than 70% stenosis of the distal right vertebral artery V4 segment but no acute intracranial abnormality. MRI was negative for acute stroke     Electrolyte Imbalance     x Medication Amiodarone  Clonidine  Furosemide  gabepentin  Hydralazine  Lisinopril  Metoprolol tartrate  xarelto  Stop taking atorvastatin, betapace, diltiazem Discharge summary medications    Treatment             x Other Tele stroke eval in ED suspects seizure most likely. She had no metabolic, infectious or endocrine abnormalities to explain her encephalopathy. EEG 12/10 normal. She has many chronic medical issues which became poorly controlled when home medications were stopped for permissive hypertension.    BP= 236/110    HR= 101  BP= 225/97      HR= 101  BP= 199/89      HR= 91  BP= 161/98    BP= 180/118    HR= 118  BP= 209/113    HR= 119  BP= 205/101    HR= 97  BP= 120/56      HR= 83 Discharge summary              Vital signs 12/8@1448  Vital signs 12/8@1941  Vital signs 12/9@0515  Vital signs 12/9@0745  Vital signs 12/9@1615  Vital signs 12/10@0017  Vital signs 12/10@1620  Vital signs 12/11@0821       Provider, please specify the diagnosis or diagnoses associated with above clinical findings.  [   ] Hypertensive Encephalopathy    [   ] Toxic Encephalopathy    [   ] Other Encephalopathy    [   ] Unspecified Encephalopathy      [   ] Other Neurological Condition (please specify condition): _________   [   ]  Clinically Undetermined x     Please document in your progress notes daily for the duration of treatment until resolved, and include in your discharge summary.

## 2018-12-21 RX ORDER — PREGABALIN 75 MG/1
CAPSULE ORAL
Qty: 90 CAPSULE | Refills: 5 | OUTPATIENT
Start: 2018-12-21

## 2019-02-08 ENCOUNTER — HOSPITAL ENCOUNTER (INPATIENT)
Facility: HOSPITAL | Age: 69
LOS: 2 days | Discharge: HOME HEALTH | DRG: 189 | End: 2019-02-10
Attending: EMERGENCY MEDICINE | Admitting: EMERGENCY MEDICINE
Payer: MEDICARE

## 2019-02-08 DIAGNOSIS — R06.02 SHORTNESS OF BREATH: ICD-10-CM

## 2019-02-08 DIAGNOSIS — R06.02 SOB (SHORTNESS OF BREATH): ICD-10-CM

## 2019-02-08 DIAGNOSIS — J44.1 COPD EXACERBATION: Primary | ICD-10-CM

## 2019-02-08 PROBLEM — I50.32 CHRONIC DIASTOLIC CONGESTIVE HEART FAILURE: Status: ACTIVE | Noted: 2017-08-12

## 2019-02-08 PROBLEM — A41.9 SEPSIS: Status: RESOLVED | Noted: 2018-07-12 | Resolved: 2019-02-08

## 2019-02-08 PROBLEM — R00.0 SINUS TACHYCARDIA: Chronic | Status: RESOLVED | Noted: 2017-12-08 | Resolved: 2019-02-08

## 2019-02-08 PROBLEM — D72.829 LEUKOCYTOSIS: Status: ACTIVE | Noted: 2019-02-08

## 2019-02-08 PROBLEM — J96.22 ACUTE ON CHRONIC RESPIRATORY FAILURE WITH HYPOXIA AND HYPERCAPNIA: Status: ACTIVE | Noted: 2018-06-26

## 2019-02-08 PROBLEM — H10.31 ACUTE BACTERIAL CONJUNCTIVITIS OF RIGHT EYE: Status: RESOLVED | Noted: 2018-07-18 | Resolved: 2019-02-08

## 2019-02-08 PROBLEM — I48.91 ATRIAL FIBRILLATION WITH RVR: Status: RESOLVED | Noted: 2018-07-14 | Resolved: 2019-02-08

## 2019-02-08 PROBLEM — J96.21 ACUTE ON CHRONIC RESPIRATORY FAILURE WITH HYPOXIA AND HYPERCAPNIA: Status: ACTIVE | Noted: 2018-06-26

## 2019-02-08 LAB
ALBUMIN SERPL BCP-MCNC: 3.5 G/DL
ALP SERPL-CCNC: 67 U/L
ALT SERPL W/O P-5'-P-CCNC: 6 U/L
ANION GAP SERPL CALC-SCNC: 8 MMOL/L
AST SERPL-CCNC: 11 U/L
BASOPHILS # BLD AUTO: 0.06 K/UL
BASOPHILS NFR BLD: 0.3 %
BILIRUB SERPL-MCNC: 0.2 MG/DL
BNP SERPL-MCNC: 202 PG/ML
BUN SERPL-MCNC: 11 MG/DL
CALCIUM SERPL-MCNC: 9.9 MG/DL
CHLORIDE SERPL-SCNC: 100 MMOL/L
CO2 SERPL-SCNC: 30 MMOL/L
CREAT SERPL-MCNC: 0.8 MG/DL
DIFFERENTIAL METHOD: ABNORMAL
EOSINOPHIL # BLD AUTO: 0.8 K/UL
EOSINOPHIL NFR BLD: 4 %
ERYTHROCYTE [DISTWIDTH] IN BLOOD BY AUTOMATED COUNT: 18.9 %
EST. GFR  (AFRICAN AMERICAN): >60 ML/MIN/1.73 M^2
EST. GFR  (NON AFRICAN AMERICAN): >60 ML/MIN/1.73 M^2
GLUCOSE SERPL-MCNC: 81 MG/DL
HCT VFR BLD AUTO: 36.5 %
HGB BLD-MCNC: 10.2 G/DL
HYPOCHROMIA BLD QL SMEAR: ABNORMAL
LYMPHOCYTES # BLD AUTO: 2.5 K/UL
LYMPHOCYTES NFR BLD: 12.4 %
MCH RBC QN AUTO: 24.8 PG
MCHC RBC AUTO-ENTMCNC: 27.9 G/DL
MCV RBC AUTO: 89 FL
MONOCYTES # BLD AUTO: 1.3 K/UL
MONOCYTES NFR BLD: 6.6 %
NEUTROPHILS # BLD AUTO: 15.2 K/UL
NEUTROPHILS NFR BLD: 77 %
PLATELET # BLD AUTO: 325 K/UL
PLATELET BLD QL SMEAR: ABNORMAL
PMV BLD AUTO: 9.6 FL
POCT GLUCOSE: 89 MG/DL (ref 70–110)
POIKILOCYTOSIS BLD QL SMEAR: ABNORMAL
POLYCHROMASIA BLD QL SMEAR: ABNORMAL
POTASSIUM SERPL-SCNC: 4.9 MMOL/L
PROT SERPL-MCNC: 7.3 G/DL
RBC # BLD AUTO: 4.11 M/UL
SODIUM SERPL-SCNC: 138 MMOL/L
TROPONIN I SERPL DL<=0.01 NG/ML-MCNC: 0.01 NG/ML
TROPONIN I SERPL DL<=0.01 NG/ML-MCNC: <0.006 NG/ML
WBC # BLD AUTO: 19.88 K/UL

## 2019-02-08 PROCEDURE — 63600175 PHARM REV CODE 636 W HCPCS: Performed by: EMERGENCY MEDICINE

## 2019-02-08 PROCEDURE — 84484 ASSAY OF TROPONIN QUANT: CPT | Mod: 91

## 2019-02-08 PROCEDURE — 84484 ASSAY OF TROPONIN QUANT: CPT

## 2019-02-08 PROCEDURE — 25000003 PHARM REV CODE 250: Performed by: EMERGENCY MEDICINE

## 2019-02-08 PROCEDURE — 96375 TX/PRO/DX INJ NEW DRUG ADDON: CPT

## 2019-02-08 PROCEDURE — 94761 N-INVAS EAR/PLS OXIMETRY MLT: CPT

## 2019-02-08 PROCEDURE — 94660 CPAP INITIATION&MGMT: CPT

## 2019-02-08 PROCEDURE — 25000242 PHARM REV CODE 250 ALT 637 W/ HCPCS: Performed by: HOSPITALIST

## 2019-02-08 PROCEDURE — 27000190 HC CPAP FULL FACE MASK W/VALVE

## 2019-02-08 PROCEDURE — 94640 AIRWAY INHALATION TREATMENT: CPT

## 2019-02-08 PROCEDURE — 85025 COMPLETE CBC W/AUTO DIFF WBC: CPT

## 2019-02-08 PROCEDURE — 82962 GLUCOSE BLOOD TEST: CPT

## 2019-02-08 PROCEDURE — 93010 EKG 12-LEAD: ICD-10-PCS | Mod: ,,, | Performed by: INTERNAL MEDICINE

## 2019-02-08 PROCEDURE — 11000001 HC ACUTE MED/SURG PRIVATE ROOM

## 2019-02-08 PROCEDURE — 96365 THER/PROPH/DIAG IV INF INIT: CPT

## 2019-02-08 PROCEDURE — 94644 CONT INHLJ TX 1ST HOUR: CPT

## 2019-02-08 PROCEDURE — 93010 ELECTROCARDIOGRAM REPORT: CPT | Mod: ,,, | Performed by: INTERNAL MEDICINE

## 2019-02-08 PROCEDURE — 93005 ELECTROCARDIOGRAM TRACING: CPT

## 2019-02-08 PROCEDURE — 99285 EMERGENCY DEPT VISIT HI MDM: CPT | Mod: 25

## 2019-02-08 PROCEDURE — 96366 THER/PROPH/DIAG IV INF ADDON: CPT

## 2019-02-08 PROCEDURE — 83880 ASSAY OF NATRIURETIC PEPTIDE: CPT

## 2019-02-08 PROCEDURE — 25000242 PHARM REV CODE 250 ALT 637 W/ HCPCS: Performed by: EMERGENCY MEDICINE

## 2019-02-08 PROCEDURE — 80053 COMPREHEN METABOLIC PANEL: CPT

## 2019-02-08 PROCEDURE — 99900035 HC TECH TIME PER 15 MIN (STAT)

## 2019-02-08 RX ORDER — HYDRALAZINE HYDROCHLORIDE 25 MG/1
50 TABLET, FILM COATED ORAL 2 TIMES DAILY
Status: DISCONTINUED | OUTPATIENT
Start: 2019-02-08 | End: 2019-02-10 | Stop reason: HOSPADM

## 2019-02-08 RX ORDER — ACETAMINOPHEN 325 MG/1
650 TABLET ORAL EVERY 6 HOURS PRN
Status: DISCONTINUED | OUTPATIENT
Start: 2019-02-08 | End: 2019-02-10 | Stop reason: HOSPADM

## 2019-02-08 RX ORDER — ALBUTEROL SULFATE 2.5 MG/.5ML
10 SOLUTION RESPIRATORY (INHALATION) CONTINUOUS
Status: DISCONTINUED | OUTPATIENT
Start: 2019-02-08 | End: 2019-02-08

## 2019-02-08 RX ORDER — ONDANSETRON 2 MG/ML
4 INJECTION INTRAMUSCULAR; INTRAVENOUS
Status: COMPLETED | OUTPATIENT
Start: 2019-02-08 | End: 2019-02-08

## 2019-02-08 RX ORDER — AMIODARONE HYDROCHLORIDE 200 MG/1
200 TABLET ORAL DAILY
Status: DISCONTINUED | OUTPATIENT
Start: 2019-02-09 | End: 2019-02-10 | Stop reason: HOSPADM

## 2019-02-08 RX ORDER — AMOXICILLIN 250 MG
1 CAPSULE ORAL 2 TIMES DAILY
Status: DISCONTINUED | OUTPATIENT
Start: 2019-02-08 | End: 2019-02-10 | Stop reason: HOSPADM

## 2019-02-08 RX ORDER — IPRATROPIUM BROMIDE 0.5 MG/2.5ML
1.5 SOLUTION RESPIRATORY (INHALATION) CONTINUOUS
Status: DISCONTINUED | OUTPATIENT
Start: 2019-02-08 | End: 2019-02-10 | Stop reason: HOSPADM

## 2019-02-08 RX ORDER — METHYLPREDNISOLONE SOD SUCC 125 MG
80 VIAL (EA) INJECTION EVERY 8 HOURS
Status: DISCONTINUED | OUTPATIENT
Start: 2019-02-08 | End: 2019-02-09

## 2019-02-08 RX ORDER — METOPROLOL TARTRATE 50 MG/1
100 TABLET ORAL 2 TIMES DAILY
Status: DISCONTINUED | OUTPATIENT
Start: 2019-02-08 | End: 2019-02-10 | Stop reason: HOSPADM

## 2019-02-08 RX ORDER — IPRATROPIUM BROMIDE AND ALBUTEROL SULFATE 2.5; .5 MG/3ML; MG/3ML
3 SOLUTION RESPIRATORY (INHALATION) EVERY 4 HOURS PRN
Status: DISCONTINUED | OUTPATIENT
Start: 2019-02-08 | End: 2019-02-10 | Stop reason: HOSPADM

## 2019-02-08 RX ORDER — ASPIRIN 81 MG/1
81 TABLET ORAL DAILY
Status: DISCONTINUED | OUTPATIENT
Start: 2019-02-09 | End: 2019-02-10 | Stop reason: HOSPADM

## 2019-02-08 RX ORDER — GABAPENTIN 300 MG/1
300 CAPSULE ORAL 3 TIMES DAILY
Status: DISCONTINUED | OUTPATIENT
Start: 2019-02-08 | End: 2019-02-10 | Stop reason: HOSPADM

## 2019-02-08 RX ORDER — CLONIDINE HYDROCHLORIDE 0.1 MG/1
0.2 TABLET ORAL 2 TIMES DAILY
Status: DISCONTINUED | OUTPATIENT
Start: 2019-02-08 | End: 2019-02-10 | Stop reason: HOSPADM

## 2019-02-08 RX ORDER — INSULIN ASPART 100 [IU]/ML
0-5 INJECTION, SOLUTION INTRAVENOUS; SUBCUTANEOUS
Status: DISCONTINUED | OUTPATIENT
Start: 2019-02-08 | End: 2019-02-10 | Stop reason: HOSPADM

## 2019-02-08 RX ORDER — ALBUTEROL SULFATE 2.5 MG/.5ML
2.5 SOLUTION RESPIRATORY (INHALATION)
Status: COMPLETED | OUTPATIENT
Start: 2019-02-08 | End: 2019-02-08

## 2019-02-08 RX ORDER — FUROSEMIDE 40 MG/1
40 TABLET ORAL DAILY
Status: DISCONTINUED | OUTPATIENT
Start: 2019-02-09 | End: 2019-02-10 | Stop reason: HOSPADM

## 2019-02-08 RX ORDER — GLUCAGON 1 MG
1 KIT INJECTION
Status: DISCONTINUED | OUTPATIENT
Start: 2019-02-08 | End: 2019-02-10 | Stop reason: HOSPADM

## 2019-02-08 RX ORDER — SODIUM CHLORIDE 0.9 % (FLUSH) 0.9 %
5 SYRINGE (ML) INJECTION
Status: DISCONTINUED | OUTPATIENT
Start: 2019-02-08 | End: 2019-02-10 | Stop reason: HOSPADM

## 2019-02-08 RX ORDER — IBUPROFEN 200 MG
16 TABLET ORAL
Status: DISCONTINUED | OUTPATIENT
Start: 2019-02-08 | End: 2019-02-10 | Stop reason: HOSPADM

## 2019-02-08 RX ORDER — ISOSORBIDE MONONITRATE 30 MG/1
90 TABLET, EXTENDED RELEASE ORAL DAILY
Status: DISCONTINUED | OUTPATIENT
Start: 2019-02-09 | End: 2019-02-10 | Stop reason: HOSPADM

## 2019-02-08 RX ORDER — PANTOPRAZOLE SODIUM 40 MG/1
40 TABLET, DELAYED RELEASE ORAL DAILY
Status: DISCONTINUED | OUTPATIENT
Start: 2019-02-09 | End: 2019-02-10 | Stop reason: HOSPADM

## 2019-02-08 RX ORDER — PRAVASTATIN SODIUM 40 MG/1
40 TABLET ORAL DAILY
Status: DISCONTINUED | OUTPATIENT
Start: 2019-02-09 | End: 2019-02-10 | Stop reason: HOSPADM

## 2019-02-08 RX ORDER — IPRATROPIUM BROMIDE AND ALBUTEROL SULFATE 2.5; .5 MG/3ML; MG/3ML
3 SOLUTION RESPIRATORY (INHALATION) EVERY 4 HOURS
Status: DISCONTINUED | OUTPATIENT
Start: 2019-02-08 | End: 2019-02-10 | Stop reason: HOSPADM

## 2019-02-08 RX ORDER — IBUPROFEN 200 MG
24 TABLET ORAL
Status: DISCONTINUED | OUTPATIENT
Start: 2019-02-08 | End: 2019-02-10 | Stop reason: HOSPADM

## 2019-02-08 RX ORDER — LISINOPRIL 20 MG/1
40 TABLET ORAL DAILY
Status: DISCONTINUED | OUTPATIENT
Start: 2019-02-09 | End: 2019-02-10 | Stop reason: HOSPADM

## 2019-02-08 RX ORDER — MORPHINE SULFATE 10 MG/ML
4 INJECTION INTRAMUSCULAR; INTRAVENOUS; SUBCUTANEOUS
Status: COMPLETED | OUTPATIENT
Start: 2019-02-08 | End: 2019-02-08

## 2019-02-08 RX ADMIN — CLONIDINE HYDROCHLORIDE 0.2 MG: 0.1 TABLET ORAL at 10:02

## 2019-02-08 RX ADMIN — GABAPENTIN 300 MG: 300 CAPSULE ORAL at 10:02

## 2019-02-08 RX ADMIN — ALBUTEROL SULFATE 10 MG: 2.5 SOLUTION RESPIRATORY (INHALATION) at 02:02

## 2019-02-08 RX ADMIN — AZITHROMYCIN 500 MG: 500 INJECTION, POWDER, LYOPHILIZED, FOR SOLUTION INTRAVENOUS at 05:02

## 2019-02-08 RX ADMIN — IPRATROPIUM BROMIDE 1.5 MG: 0.5 SOLUTION RESPIRATORY (INHALATION) at 02:02

## 2019-02-08 RX ADMIN — METHYLPREDNISOLONE SODIUM SUCCINATE 80 MG: 125 INJECTION, POWDER, FOR SOLUTION INTRAMUSCULAR; INTRAVENOUS at 09:02

## 2019-02-08 RX ADMIN — RIVAROXABAN 20 MG: 20 TABLET, FILM COATED ORAL at 05:02

## 2019-02-08 RX ADMIN — ALBUTEROL SULFATE 2.5 MG: 2.5 SOLUTION RESPIRATORY (INHALATION) at 06:02

## 2019-02-08 RX ADMIN — METOPROLOL TARTRATE 100 MG: 50 TABLET ORAL at 10:02

## 2019-02-08 RX ADMIN — ONDANSETRON 4 MG: 2 INJECTION INTRAMUSCULAR; INTRAVENOUS at 04:02

## 2019-02-08 RX ADMIN — IPRATROPIUM BROMIDE AND ALBUTEROL SULFATE 3 ML: .5; 3 SOLUTION RESPIRATORY (INHALATION) at 11:02

## 2019-02-08 RX ADMIN — IPRATROPIUM BROMIDE AND ALBUTEROL SULFATE 3 ML: .5; 3 SOLUTION RESPIRATORY (INHALATION) at 06:02

## 2019-02-08 RX ADMIN — MORPHINE SULFATE 4 MG: 10 INJECTION INTRAVENOUS at 04:02

## 2019-02-08 NOTE — ASSESSMENT & PLAN NOTE
Her white count is 19 on admission.  I do not currently have a source of infection, though she does report urinary symptoms.  Will check a UA.  She did also recently completed a course of prednisone, but her last dose was 6 days ago, and I would expect that her white count should have normalized by now.  Will continue to monitor.

## 2019-02-08 NOTE — ASSESSMENT & PLAN NOTE
She has a history of chronic grade 2 diastolic dysfunction last demonstrated on her last echo in June 2018.  She is euvolemic and has no signs of CHF exacerbation.  Her BNP is 202 which is less than prior.  Plan to continue her home medications for heart failure including her home Lasix

## 2019-02-08 NOTE — HPI
Ms. Yasmeen Palm is a 69-year-old woman with COPD on home oxygen, CAD, chronic diastolic heart failure, diabetes, paroxysmal atrial fibrillation, and hypertension who presents with shortness of breath.  She 1st started to experience shortness of breath yesterday.  She says that this shortness of breath was happening at rest.  This progressed throughout the day.  Last night the shortness of breath became unbearable.  Her niece who is at the bedside describes that she was gasping for air.  The patient also describes chest pain, a pressure on her chest, that was associated with the shortness of breath.  The patient says that this shortness of breath was accompanied by a cough productive of white sputum.  She denies hemoptysis.  She denies any worsening swelling in her abdomen or in her legs.  She chronically sleeps sitting upright.  She has not had to change the amount of her home oxygen.  She is adherent to her home medications including her home anticoagulation and home Lasix.  She says that she was hospitalized at [facility] 3 weeks ago for COPD exacerbation and discharged with amoxicillin and prednisone. She has completed her course of amoxicillin, and took her last dose of prednisone about 6 days ago.    Called EMS her oxygen saturation was 89% on 3 L nasal cannula. On arrival to the ER she was using accessory muscles. She was put on BiPAP for work of breathing, and given continuous duo nebs treatment.  She now says that her shortness of breath is slightly better while on the BiPAP.    Of note she also says that she has been urinating frequently and feels like she may be getting a UTI.

## 2019-02-08 NOTE — ASSESSMENT & PLAN NOTE
She has chronic hypoxic respiratory failure wearing 3 L nasal cannula at home.  She presents with acute shortness of breath and acute hypoxia requiring continuous BiPAP.  I suspect that this is due to COPD exacerbation.  She does not have evidence of acute heart failure exacerbation, and she is on chronic anticoagulation which makes PE less likely.  She is currently still requiring BiPAP.  Will attempt to wean down to home nasal cannula. See COPD exacerbation

## 2019-02-08 NOTE — SUBJECTIVE & OBJECTIVE
Past Medical History:   Diagnosis Date    Anticoagulant long-term use     Xarelto    Arthritis     Asthma     CHF (congestive heart failure)     COPD (chronic obstructive pulmonary disease)     Coronary artery disease     Deep vein thrombosis (DVT) of left lower extremity     Depression     Diabetes mellitus     GERD (gastroesophageal reflux disease)     Hypertension     Obstructive sleep apnea on CPAP     On home oxygen therapy     Unsteady gait     uses either walker or 4 prong cane       Past Surgical History:   Procedure Laterality Date    CORONARY ANGIOPLASTY WITH STENT PLACEMENT      HERNIA REPAIR      encapsulated umbilical hernia       Review of patient's allergies indicates:  No Known Allergies    No current facility-administered medications on file prior to encounter.      Current Outpatient Medications on File Prior to Encounter   Medication Sig    acetaminophen (TYLENOL) 500 MG tablet Take 1 tablet (500 mg total) by mouth every 8 (eight) hours as needed.    amiodarone (PACERONE) 200 MG Tab Take 1 tablet (200 mg total) by mouth once daily.    aspirin (ECOTRIN) 81 MG EC tablet Take 81 mg by mouth once daily.    cloNIDine (CATAPRES) 0.2 MG tablet Take 0.2 mg by mouth 2 (two) times daily.    furosemide (LASIX) 40 MG tablet Take 40 mg by mouth once daily.     gabapentin (NEURONTIN) 300 MG capsule Take 300 mg by mouth 3 (three) times daily.    hydrALAZINE (APRESOLINE) 50 MG tablet Take 50 mg by mouth 2 (two) times daily.    isosorbide mononitrate (IMDUR) 30 MG 24 hr tablet Take 3 tablets (90 mg total) by mouth once daily.    levalbuterol (XOPENEX HFA) 45 mcg/actuation inhaler Inhale 2 puffs into the lungs every 4 (four) hours as needed for Wheezing or Shortness of Breath. Rescue    lidocaine (LIDODERM) 5 % Place 1 patch onto the skin once daily. Remove & Discard patch within 12 hours or as directed by MD    lisinopril (PRINIVIL,ZESTRIL) 40 MG tablet Take 1 tablet (40 mg total) by  mouth once daily. Do not take this medication if blood pressure is below 130/80 mmHg and/or feeling dizzy after taking all other blood pressure meds    metformin (GLUCOPHAGE) 500 MG tablet Take 500 mg by mouth 2 (two) times daily with meals.    metoprolol tartrate (LOPRESSOR) 100 MG tablet Take 1 tablet (100 mg total) by mouth 2 (two) times daily.    multivitamin (THERAGRAN) per tablet Take 1 tablet by mouth once daily.    pantoprazole (PROTONIX) 40 MG tablet Take 40 mg by mouth once daily.    potassium phosphate, monobasic, (K-PHOS) 500 mg TbSO Take 2 tablets (1,000 mg total) by mouth once daily.    pravastatin (PRAVACHOL) 40 MG tablet Take 40 mg by mouth once daily.    rivaroxaban (XARELTO) 20 mg Tab Take 20 mg by mouth daily with dinner or evening meal.    tiotropium (SPIRIVA) 18 mcg inhalation capsule Inhale 1 capsule (18 mcg total) into the lungs once daily. Controller    UMECLIDINIUM BRM/VILANTEROL TR (ANORO ELLIPTA INHL) Inhale into the lungs daily as needed.     Family History     Problem Relation (Age of Onset)    Diabetes Father    Heart disease Mother    Hypertension Mother        Tobacco Use    Smoking status: Former Smoker     Packs/day: 1.00     Years: 55.00     Pack years: 55.00     Types: Cigarettes     Start date: 1963     Last attempt to quit: 2018     Years since quittin.1    Smokeless tobacco: Never Used   Substance and Sexual Activity    Alcohol use: Yes     Alcohol/week: 0.6 oz     Types: 1 Glasses of wine per week     Frequency: Never     Comment: socially    Drug use: No    Sexual activity: No     Review of Systems   Constitutional: Negative for activity change, appetite change, chills, fatigue and fever.   HENT: Negative for congestion, postnasal drip, rhinorrhea, sinus pain, sore throat and trouble swallowing.    Eyes: Negative for visual disturbance.   Respiratory: Positive for cough, chest tightness, shortness of breath and wheezing.    Cardiovascular: Positive  for chest pain and leg swelling (chronic left leg). Negative for palpitations.   Gastrointestinal: Positive for nausea and vomiting. Negative for abdominal distention, abdominal pain, constipation and diarrhea.   Genitourinary: Positive for frequency. Negative for decreased urine volume, difficulty urinating, dysuria and urgency.   Musculoskeletal: Negative for arthralgias and myalgias.   Skin: Negative for rash and wound.   Neurological: Negative for dizziness, weakness, light-headedness, numbness and headaches.   Hematological: Negative for adenopathy.   Psychiatric/Behavioral: Negative for agitation and confusion.     Objective:     Vital Signs (Most Recent):  Temp: 98.2 °F (36.8 °C) (02/08/19 1404)  Pulse: 67 (02/08/19 1551)  Resp: (!) 21 (02/08/19 1551)  BP: (!) 108/53 (02/08/19 1551)  SpO2: 100 % (02/08/19 1551) Vital Signs (24h Range):  Temp:  [98.2 °F (36.8 °C)] 98.2 °F (36.8 °C)  Pulse:  [67-74] 67  Resp:  [20-27] 21  SpO2:  [100 %] 100 %  BP: (108-160)/(53-70) 108/53     Weight: 72.6 kg (160 lb)  Body mass index is 25.06 kg/m².    Physical Exam   Constitutional: She is oriented to person, place, and time. She appears well-developed and well-nourished. No distress.   HENT:   Head: Normocephalic and atraumatic.   Unable to examine oropharynx as patient is on BiPAP   Eyes: Conjunctivae and EOM are normal. Pupils are equal, round, and reactive to light. No scleral icterus.   Neck: Neck supple. No JVD present. No thyromegaly present.   Cardiovascular: Normal rate, regular rhythm, normal heart sounds and intact distal pulses. Exam reveals no gallop and no friction rub.   No murmur heard.  Pulmonary/Chest: Effort normal. No respiratory distress. She has wheezes. She has rales.   Very poor air movement bilaterally.    Abdominal: Soft. Bowel sounds are normal. She exhibits no distension and no mass. There is no tenderness. There is no guarding.   No CVA tenderness   Musculoskeletal: She exhibits edema (1+ to left  foot. trace to right foot). She exhibits no tenderness or deformity.   Lymphadenopathy:     She has no cervical adenopathy.   Neurological: She is alert and oriented to person, place, and time. No cranial nerve deficit or sensory deficit.   Skin: Skin is warm and dry. No rash noted. She is not diaphoretic. No erythema.   Psychiatric: She has a normal mood and affect. Her behavior is normal. Judgment and thought content normal.   Nursing note and vitals reviewed.        CRANIAL NERVES     CN III, IV, VI   Pupils are equal, round, and reactive to light.  Extraocular motions are normal.        Significant Labs: All pertinent labs within the past 24 hours have been reviewed.    Significant Imaging: I have reviewed and interpreted all pertinent imaging results/findings within the past 24 hours.

## 2019-02-08 NOTE — H&P
[facility] Hospital Medicine  History & Physical    Patient Name: Yasmeen Palm  MRN: [id number]  Admission Date: 2/8/2019  Attending Physician: Sadia Salgado MD   Primary Care Provider: Angel Orourke Jr, MD         Patient information was obtained from patient, relative(s), past medical records and ER records.     Subjective:     Principal Problem:COPD exacerbation    Chief Complaint:   Chief Complaint   Patient presents with    Shortness of Breath     x couple of days. EMS reports 89% RA sats. hx. COPD and CHF        HPI: Ms. Yasmeen Palm is a 69-year-old woman with COPD on home oxygen, CAD, chronic diastolic heart failure, diabetes, paroxysmal atrial fibrillation, and hypertension who presents with shortness of breath.  She 1st started to experience shortness of breath yesterday.  She says that this shortness of breath was happening at rest.  This progressed throughout the day.  Last night the shortness of breath became unbearable.  Her niece who is at the bedside describes that she was gasping for air.  The patient also describes chest pain, a pressure on her chest, that was associated with the shortness of breath.  The patient says that this shortness of breath was accompanied by a cough productive of white sputum.  She denies hemoptysis.  She denies any worsening swelling in her abdomen or in her legs.  She chronically sleeps sitting upright.  She has not had to change the amount of her home oxygen.  She is adherent to her home medications including her home anticoagulation and home Lasix.  She says that she was hospitalized at [facility] 3 weeks ago for COPD exacerbation and discharged with amoxicillin and prednisone. She has completed her course of amoxicillin, and took her last dose of prednisone about 6 days ago.    Called EMS her oxygen saturation was 89% on 3 L nasal cannula. On arrival to the ER she was using accessory muscles. She was put on BiPAP for work of breathing, and given  continuous duo nebs treatment.  She now says that her shortness of breath is slightly better while on the BiPAP.    Of note she also says that she has been urinating frequently and feels like she may be getting a UTI.    Past Medical History:   Diagnosis Date    Anticoagulant long-term use     Xarelto    Arthritis     Asthma     CHF (congestive heart failure)     COPD (chronic obstructive pulmonary disease)     Coronary artery disease     Deep vein thrombosis (DVT) of left lower extremity     Depression     Diabetes mellitus     GERD (gastroesophageal reflux disease)     Hypertension     Obstructive sleep apnea on CPAP     On home oxygen therapy     Unsteady gait     uses either walker or 4 prong cane       Past Surgical History:   Procedure Laterality Date    CORONARY ANGIOPLASTY WITH STENT PLACEMENT      HERNIA REPAIR      encapsulated umbilical hernia       Review of patient's allergies indicates:  No Known Allergies    No current facility-administered medications on file prior to encounter.      Current Outpatient Medications on File Prior to Encounter   Medication Sig    acetaminophen (TYLENOL) 500 MG tablet Take 1 tablet (500 mg total) by mouth every 8 (eight) hours as needed.    amiodarone (PACERONE) 200 MG Tab Take 1 tablet (200 mg total) by mouth once daily.    aspirin (ECOTRIN) 81 MG EC tablet Take 81 mg by mouth once daily.    cloNIDine (CATAPRES) 0.2 MG tablet Take 0.2 mg by mouth 2 (two) times daily.    furosemide (LASIX) 40 MG tablet Take 40 mg by mouth once daily.     gabapentin (NEURONTIN) 300 MG capsule Take 300 mg by mouth 3 (three) times daily.    hydrALAZINE (APRESOLINE) 50 MG tablet Take 50 mg by mouth 2 (two) times daily.    isosorbide mononitrate (IMDUR) 30 MG 24 hr tablet Take 3 tablets (90 mg total) by mouth once daily.    levalbuterol (XOPENEX HFA) 45 mcg/actuation inhaler Inhale 2 puffs into the lungs every 4 (four) hours as needed for Wheezing or Shortness of  Breath. Rescue    lidocaine (LIDODERM) 5 % Place 1 patch onto the skin once daily. Remove & Discard patch within 12 hours or as directed by MD    lisinopril (PRINIVIL,ZESTRIL) 40 MG tablet Take 1 tablet (40 mg total) by mouth once daily. Do not take this medication if blood pressure is below 130/80 mmHg and/or feeling dizzy after taking all other blood pressure meds    metformin (GLUCOPHAGE) 500 MG tablet Take 500 mg by mouth 2 (two) times daily with meals.    metoprolol tartrate (LOPRESSOR) 100 MG tablet Take 1 tablet (100 mg total) by mouth 2 (two) times daily.    multivitamin (THERAGRAN) per tablet Take 1 tablet by mouth once daily.    pantoprazole (PROTONIX) 40 MG tablet Take 40 mg by mouth once daily.    potassium phosphate, monobasic, (K-PHOS) 500 mg TbSO Take 2 tablets (1,000 mg total) by mouth once daily.    pravastatin (PRAVACHOL) 40 MG tablet Take 40 mg by mouth once daily.    rivaroxaban (XARELTO) 20 mg Tab Take 20 mg by mouth daily with dinner or evening meal.    tiotropium (SPIRIVA) 18 mcg inhalation capsule Inhale 1 capsule (18 mcg total) into the lungs once daily. Controller    UMECLIDINIUM BRM/VILANTEROL TR (ANORO ELLIPTA INHL) Inhale into the lungs daily as needed.     Family History     Problem Relation (Age of Onset)    Diabetes Father    Heart disease Mother    Hypertension Mother        Tobacco Use    Smoking status: Former Smoker     Packs/day: 1.00     Years: 55.00     Pack years: 55.00     Types: Cigarettes     Start date: 1963     Last attempt to quit: 2018     Years since quittin.1    Smokeless tobacco: Never Used   Substance and Sexual Activity    Alcohol use: Yes     Alcohol/week: 0.6 oz     Types: 1 Glasses of wine per week     Frequency: Never     Comment: socially    Drug use: No    Sexual activity: No     Review of Systems   Constitutional: Negative for activity change, appetite change, chills, fatigue and fever.   HENT: Negative for congestion, postnasal  drip, rhinorrhea, sinus pain, sore throat and trouble swallowing.    Eyes: Negative for visual disturbance.   Respiratory: Positive for cough, chest tightness, shortness of breath and wheezing.    Cardiovascular: Positive for chest pain and leg swelling (chronic left leg). Negative for palpitations.   Gastrointestinal: Positive for nausea and vomiting. Negative for abdominal distention, abdominal pain, constipation and diarrhea.   Genitourinary: Positive for frequency. Negative for decreased urine volume, difficulty urinating, dysuria and urgency.   Musculoskeletal: Negative for arthralgias and myalgias.   Skin: Negative for rash and wound.   Neurological: Negative for dizziness, weakness, light-headedness, numbness and headaches.   Hematological: Negative for adenopathy.   Psychiatric/Behavioral: Negative for agitation and confusion.     Objective:     Vital Signs (Most Recent):  Temp: 98.2 °F (36.8 °C) (02/08/19 1404)  Pulse: 67 (02/08/19 1551)  Resp: (!) 21 (02/08/19 1551)  BP: (!) 108/53 (02/08/19 1551)  SpO2: 100 % (02/08/19 1551) Vital Signs (24h Range):  Temp:  [98.2 °F (36.8 °C)] 98.2 °F (36.8 °C)  Pulse:  [67-74] 67  Resp:  [20-27] 21  SpO2:  [100 %] 100 %  BP: (108-160)/(53-70) 108/53     Weight: 72.6 kg (160 lb)  Body mass index is 25.06 kg/m².    Physical Exam   Constitutional: She is oriented to person, place, and time. She appears well-developed and well-nourished. No distress.   HENT:   Head: Normocephalic and atraumatic.   Unable to examine oropharynx as patient is on BiPAP   Eyes: Conjunctivae and EOM are normal. Pupils are equal, round, and reactive to light. No scleral icterus.   Neck: Neck supple. No JVD present. No thyromegaly present.   Cardiovascular: Normal rate, regular rhythm, normal heart sounds and intact distal pulses. Exam reveals no gallop and no friction rub.   No murmur heard.  Pulmonary/Chest: Effort normal. No respiratory distress. She has wheezes. She has rales.   Very poor air  movement bilaterally.    Abdominal: Soft. Bowel sounds are normal. She exhibits no distension and no mass. There is no tenderness. There is no guarding.   No CVA tenderness   Musculoskeletal: She exhibits edema (1+ to left foot. trace to right foot). She exhibits no tenderness or deformity.   Lymphadenopathy:     She has no cervical adenopathy.   Neurological: She is alert and oriented to person, place, and time. No cranial nerve deficit or sensory deficit.   Skin: Skin is warm and dry. No rash noted. She is not diaphoretic. No erythema.   Psychiatric: She has a normal mood and affect. Her behavior is normal. Judgment and thought content normal.   Nursing note and vitals reviewed.        CRANIAL NERVES     CN III, IV, VI   Pupils are equal, round, and reactive to light.  Extraocular motions are normal.        Significant Labs: All pertinent labs within the past 24 hours have been reviewed.    Significant Imaging: I have reviewed and interpreted all pertinent imaging results/findings within the past 24 hours.    Assessment/Plan:     * COPD exacerbation    Presents with shortness of breath new cough and new sputum production associated with hypoxia requiring continuous BiPAP.  Her pulmonary exam is significant for very decreased almost absent breath sounds bilaterally.  Her white count is elevated at 19, but her chest x-ray does not show any evidence of pneumonia.  She is on continuous BiPAP  She has been given IV steroids- continue for now while on continuous BiPAP  Started azithromycin- continue  Scheduled duo nebs  She has quit smoking       Leukocytosis    Her white count is 19 on admission.  I do not currently have a source of infection, though she does report urinary symptoms.  Will check a UA.  She did also recently completed a course of prednisone, but her last dose was 6 days ago, and I would expect that her white count should have normalized by now.  Will continue to monitor.       Acute on chronic  respiratory failure with hypoxia and hypercapnia    She has chronic hypoxic respiratory failure wearing 3 L nasal cannula at home.  She presents with acute shortness of breath and acute hypoxia requiring continuous BiPAP.  I suspect that this is due to COPD exacerbation.  She does not have evidence of acute heart failure exacerbation, and she is on chronic anticoagulation which makes PE less likely.  She is currently still requiring BiPAP.  Will attempt to wean down to home nasal cannula. See COPD exacerbation       PAF (paroxysmal atrial fibrillation)    Currently in sinus  Rate control: metoprolol  Anticoagulation: xarelto  Rhythm control: amiodarone        Neuropathy    Continue home gabapentin       Chronic diastolic congestive heart failure    She has a history of chronic grade 2 diastolic dysfunction last demonstrated on her last echo in June 2018.  She is euvolemic and has no signs of CHF exacerbation.  Her BNP is 202 which is less than prior.  Plan to continue her home medications for heart failure including her home Lasix       Chronic anticoagulation    For Afib, prior PE, and prior DVT       Moderate malnutrition    Body mass index is 25.06 kg/m².  Encourage healthy diet       Anemia of chronic disease    Hgb at baseline on admit. No recent workup in system.        Tobacco abuse    Patient says she quit smoking 3 months ago  Congratulated her and encouraged continued cessation.        Gastroesophageal reflux disease without esophagitis    Continue PPI  No active issues       Type 2 diabetes mellitus, controlled    A1c: 5.7% (12/2018)  Meds: hold home oral meds. SSI PRN. Start detemir if consistently elevated  ADA diet, accuchecks ACHS, hypoglycemic protocol         Coronary artery disease involving native coronary artery of native heart with angina pectoris    She describes chest pain that sounds consistent with typical angina which occurred during her episode of shortness of breath.  Troponin was normal  on admission.  EKG shows normal sinus rhythm with an old septal infarct.  Her last catheterization was June 2018 which showed diffuse luminal irregularities and a 40% lesion in the mid RCA  Will continue to trend troponins.  Chest pain is now resolved  I suspect this chest pain was due to her acute shortness of breath.  If her troponins are positive, will consult Cardiology.         VTE Risk Mitigation (From admission, onward)        Ordered     rivaroxaban tablet 20 mg  With dinner      02/08/19 1649     IP VTE HIGH RISK PATIENT  Once      02/08/19 1649     Place TOM hose  Until discontinued      02/08/19 1649         Discussed code status- she confirms full code.     Sadia Salgado MD  Department of Hospital Medicine   [facility] [place]

## 2019-02-08 NOTE — ED TRIAGE NOTES
Pt arrived to ED due to SOB since last night. Reports Hx of COPD with use of 3 liters o2 via NC at home, Staes use of home breathing treatment with no relief. Reports Chest tightness. EMS reports duo neb in the field.

## 2019-02-08 NOTE — ASSESSMENT & PLAN NOTE
Patient says she quit smoking 3 months ago  Congratulated her and encouraged continued cessation.

## 2019-02-08 NOTE — ED PROVIDER NOTES
Encounter Date: 2/8/2019    SCRIBE #1 NOTE: I, Meron Palm, am scribing for, and in the presence of,  Emre Gomez Jr., MD. I have scribed the following portions of the note - Other sections scribed: HPI and ROS.       History     Chief Complaint   Patient presents with    Shortness of Breath     x couple of days. EMS reports 89% RA sats. hx. COPD and CHF     CC: Shortness of Breath    HPI: This 69 y.o female presents to the ED for an evaluation for acute onset, constant shortness of breath with associated chest tightness that began yesterday and worsened today.  Patient also reports of nausea and productive cough with thick sputum.  Patient reports being evaluated 2 weeks ago for similar symptoms and reports she was admitted to [facility].  Patient reports of increased nebulizing treatments since onset.  She reports she is on 3L home oxygen.  Patient denies fever, chills, emesis, diarrhea, abdominal pain, or any other associated symptoms.  No alleviating factors.      The history is provided by the patient. No  was used.     Review of patient's allergies indicates:  No Known Allergies  Past Medical History:   Diagnosis Date    Anticoagulant long-term use     Xarelto    Arthritis     Asthma     CHF (congestive heart failure)     COPD (chronic obstructive pulmonary disease)     Coronary artery disease     Deep vein thrombosis (DVT) of left lower extremity     Depression     Diabetes mellitus     GERD (gastroesophageal reflux disease)     Hypertension     Obstructive sleep apnea on CPAP     On home oxygen therapy     Unsteady gait     uses either walker or 4 prong cane     Past Surgical History:   Procedure Laterality Date    CORONARY ANGIOPLASTY WITH STENT PLACEMENT      HERNIA REPAIR      encapsulated umbilical hernia     Family History   Problem Relation Age of Onset    Heart disease Mother     Hypertension Mother     Diabetes Father      Social History     Tobacco Use     Smoking status: Former Smoker     Packs/day: 1.00     Years: 55.00     Pack years: 55.00     Types: Cigarettes     Start date: 1963     Last attempt to quit: 2018     Years since quittin.1    Smokeless tobacco: Never Used   Substance Use Topics    Alcohol use: Yes     Alcohol/week: 0.6 oz     Types: 1 Glasses of wine per week     Frequency: Never     Comment: socially    Drug use: No     Review of Systems   Constitutional: Negative for chills and fever.   HENT: Negative for ear pain and sore throat.    Eyes: Negative for pain.   Respiratory: Positive for chest tightness and shortness of breath. Negative for cough.    Cardiovascular: Negative for chest pain.   Gastrointestinal: Positive for nausea. Negative for abdominal pain, diarrhea and vomiting.   Genitourinary: Negative for dysuria.   Musculoskeletal: Negative for back pain.        (-) arm or leg problems   Skin: Negative for rash.   Neurological: Negative for headaches.       Physical Exam     Initial Vitals [19 1404]   BP Pulse Resp Temp SpO2   (!) 141/62 69 (!) 24 98.2 °F (36.8 °C) 100 %      MAP       --         Physical Exam    Nursing note and vitals reviewed.  Constitutional: She appears well-developed and well-nourished. She is not diaphoretic. No distress.   HENT:   Head: Normocephalic and atraumatic.   Right Ear: External ear normal.   Left Ear: External ear normal.   Nose: Nose normal.   Mouth/Throat: Oropharynx is clear and moist.   Eyes: Conjunctivae and EOM are normal. Pupils are equal, round, and reactive to light. Right eye exhibits no discharge. Left eye exhibits no discharge. No scleral icterus.   Neck: Normal range of motion. Neck supple.   Cardiovascular: Normal rate, regular rhythm, normal heart sounds and intact distal pulses.   No murmur heard.  Pulmonary/Chest: She is in respiratory distress. She has wheezes. She has no rhonchi. She has no rales.   Moderate to severe respiratory distress.  Significant decrease in  breath sounds bilaterally.  Expiratory wheezing.   Abdominal: Soft. Bowel sounds are normal. She exhibits no distension and no mass. There is no tenderness. There is no rebound and no guarding.   Musculoskeletal: Normal range of motion. She exhibits no edema or tenderness.   Neurological: She is alert and oriented to person, place, and time. She has normal strength. No cranial nerve deficit or sensory deficit.   Skin: Skin is warm and dry. No rash noted. No erythema. No pallor.   Psychiatric: She has a normal mood and affect. Her behavior is normal. Judgment and thought content normal.         ED Course   Critical Care  Date/Time: 2/8/2019 6:37 PM  Performed by: Emre Gomez Jr., MD  Authorized by: Sadia Salgado MD   Direct patient critical care time: 5 minutes  Additional history critical care time: 10 minutes  Ordering / reviewing critical care time: 5 minutes  Documentation critical care time: 5 minutes  Consulting other physicians critical care time: 5 minutes  Consult with family critical care time: 5 minutes  Total critical care time (exclusive of procedural time) : 35 minutes  Critical care was necessary to treat or prevent imminent or life-threatening deterioration of the following conditions: respiratory failure.  Critical care was time spent personally by me on the following activities: discussions with consultants, development of treatment plan with patient or surrogate, ordering and performing treatments and interventions, evaluation of patient's response to treatment, examination of patient, interpretation of cardiac output measurements, ordering and review of radiographic studies, pulse oximetry, obtaining history from patient or surrogate, review of old charts, re-evaluation of patient's condition and ordering and review of laboratory studies.  Comments: bipap        Labs Reviewed   CBC W/ AUTO DIFFERENTIAL - Abnormal; Notable for the following components:       Result Value    WBC 19.88 (*)      Hemoglobin 10.2 (*)     Hematocrit 36.5 (*)     MCH 24.8 (*)     MCHC 27.9 (*)     RDW 18.9 (*)     Gran # (ANC) 15.2 (*)     Mono # 1.3 (*)     Eos # 0.8 (*)     Gran% 77.0 (*)     Lymph% 12.4 (*)     All other components within normal limits   COMPREHENSIVE METABOLIC PANEL - Abnormal; Notable for the following components:    CO2 30 (*)     ALT 6 (*)     All other components within normal limits   B-TYPE NATRIURETIC PEPTIDE - Abnormal; Notable for the following components:     (*)     All other components within normal limits   TROPONIN I   TROPONIN I   URINALYSIS, REFLEX TO URINE CULTURE   POCT GLUCOSE   POCT GLUCOSE MONITORING CONTINUOUS     EKG Readings: (Independently Interpreted)   Initial Reading: No STEMI.    EKG reviewed and interpreted by me shows  Sinus rhythm at a rate of 74.  P.r., QRS, QT intervals grossly within normal limits.  There is left axis deviation.   There is poor R-wave progression across the precordium.  There are septal T wave inversions.       Imaging Results          X-Ray Chest AP Portable (Final result)  Result time 02/08/19 15:05:30    Final result by Mary Meléndez MD (02/08/19 15:05:30)                 Impression:      As above      Electronically signed by: Mary Meléndez MD  Date:    02/08/2019  Time:    15:05             Narrative:    EXAMINATION:  XR CHEST AP PORTABLE    CLINICAL HISTORY:  Shortness of breath    TECHNIQUE:  Single frontal view of the chest was performed.    COMPARISON:  December 8, 2018    FINDINGS:  The cardiac size is not enlarged.  There is calcification along the wall of the aorta.  Lung fields are clear.  The osseous structures show degenerative change.                              X-Rays:   Independently Interpreted Readings:   Other Readings:    No evidence of infiltrate, consolidation, pneumothorax, or pulmonary edema.    Medical Decision Making:   ED Management:    This is the emergent evaluation of a   69-year-old female presents  emergency department with shortness of breath.   Differential diagnosis at the time of initial evaluation included, but was not limited to:  COPD exacerbation, pneumonia, pneumothorax, viral illness.  I considered but doubt pulmonary embolus.  I doubt CHF as the patient has no rales on exam and no evidence of hypovolemia.  On exam, she has decreased breath sounds symmetrically.  She is using accessory muscles to breathe.  She appears to be in moderate to severe respiratory distress upon arrival.  She uses home oxygen.  Despite this, she has been having worsening symptoms.  Patient placed on BiPAP to assist with work of breathing.  Continuous DuoNeb for given.  She was also given Solu-Medrol.  Her laboratory evaluation reveals leukocytosis which may be related to her recent steroid taper.  She is on the tail end of this steroid taper.  Given the above, patient is admission to the hospital.  She continues to be on BiPAP.  She needs q.4 hours nebs.  She will receive Solu-Medrol as well as antibiotics for chronic colonization.  Case was discussed with Dr. Salgado from [facility] medicine.            Scribe Attestation:   Scribe #1: I performed the above scribed service and the documentation accurately describes the services I performed. I attest to the accuracy of the note.    Attending Attestation:           Physician Attestation for Scribe:  Physician Attestation Statement for Scribe #1: I, Emre Gomez Jr., MD, reviewed documentation, as scribed by Meron Palm in my presence, and it is both accurate and complete.                    Clinical Impression:   The primary encounter diagnosis was COPD exacerbation. Diagnoses of Shortness of breath and SOB (shortness of breath) were also pertinent to this visit.                             Emre Gomez Jr., MD  02/08/19 6161

## 2019-02-08 NOTE — ASSESSMENT & PLAN NOTE
Currently in sinus  Rate control: metoprolol  Anticoagulation: xarelto  Rhythm control: amiodarone

## 2019-02-08 NOTE — ASSESSMENT & PLAN NOTE
She describes chest pain that sounds consistent with typical angina which occurred during her episode of shortness of breath.  Troponin was normal on admission.  EKG shows normal sinus rhythm with an old septal infarct.  Her last catheterization was June 2018 which showed diffuse luminal irregularities and a 40% lesion in the mid RCA  Will continue to trend troponins.  Chest pain is now resolved  I suspect this chest pain was due to her acute shortness of breath.  If her troponins are positive, will consult Cardiology.

## 2019-02-08 NOTE — ASSESSMENT & PLAN NOTE
Presents with shortness of breath new cough and new sputum production associated with hypoxia requiring continuous BiPAP.  Her pulmonary exam is significant for very decreased almost absent breath sounds bilaterally.  Her white count is elevated at 19, but her chest x-ray does not show any evidence of pneumonia.  She is on continuous BiPAP  She has been given IV steroids- continue for now while on continuous BiPAP  Started azithromycin- continue  Scheduled duo nebs  She has quit smoking

## 2019-02-09 LAB
ANION GAP SERPL CALC-SCNC: 10 MMOL/L
BASOPHILS # BLD AUTO: 0.01 K/UL
BASOPHILS NFR BLD: 0.1 %
BUN SERPL-MCNC: 19 MG/DL
CALCIUM SERPL-MCNC: 9.5 MG/DL
CHLORIDE SERPL-SCNC: 96 MMOL/L
CO2 SERPL-SCNC: 31 MMOL/L
CREAT SERPL-MCNC: 0.8 MG/DL
DIFFERENTIAL METHOD: ABNORMAL
EOSINOPHIL # BLD AUTO: 0 K/UL
EOSINOPHIL NFR BLD: 0.1 %
ERYTHROCYTE [DISTWIDTH] IN BLOOD BY AUTOMATED COUNT: 18.8 %
EST. GFR  (AFRICAN AMERICAN): >60 ML/MIN/1.73 M^2
EST. GFR  (NON AFRICAN AMERICAN): >60 ML/MIN/1.73 M^2
GLUCOSE SERPL-MCNC: 160 MG/DL
HCT VFR BLD AUTO: 36.1 %
HGB BLD-MCNC: 9.7 G/DL
HYPOCHROMIA BLD QL SMEAR: ABNORMAL
LYMPHOCYTES # BLD AUTO: 0.9 K/UL
LYMPHOCYTES NFR BLD: 6.5 %
MCH RBC QN AUTO: 24.2 PG
MCHC RBC AUTO-ENTMCNC: 26.9 G/DL
MCV RBC AUTO: 90 FL
MONOCYTES # BLD AUTO: 0 K/UL
MONOCYTES NFR BLD: 0.3 %
NEUTROPHILS # BLD AUTO: 12.5 K/UL
NEUTROPHILS NFR BLD: 93.4 %
PLATELET # BLD AUTO: 317 K/UL
PLATELET BLD QL SMEAR: ABNORMAL
PMV BLD AUTO: 9.3 FL
POCT GLUCOSE: 183 MG/DL (ref 70–110)
POCT GLUCOSE: 294 MG/DL (ref 70–110)
POCT GLUCOSE: 317 MG/DL (ref 70–110)
POCT GLUCOSE: 381 MG/DL (ref 70–110)
POCT GLUCOSE: 401 MG/DL (ref 70–110)
POLYCHROMASIA BLD QL SMEAR: ABNORMAL
POTASSIUM SERPL-SCNC: 5.6 MMOL/L
RBC # BLD AUTO: 4.01 M/UL
SODIUM SERPL-SCNC: 137 MMOL/L
STOMATOCYTES BLD QL SMEAR: PRESENT
TARGETS BLD QL SMEAR: ABNORMAL
TROPONIN I SERPL DL<=0.01 NG/ML-MCNC: <0.006 NG/ML
WBC # BLD AUTO: 13.43 K/UL

## 2019-02-09 PROCEDURE — 25000242 PHARM REV CODE 250 ALT 637 W/ HCPCS: Performed by: HOSPITALIST

## 2019-02-09 PROCEDURE — 80048 BASIC METABOLIC PNL TOTAL CA: CPT

## 2019-02-09 PROCEDURE — 85025 COMPLETE CBC W/AUTO DIFF WBC: CPT

## 2019-02-09 PROCEDURE — 94761 N-INVAS EAR/PLS OXIMETRY MLT: CPT

## 2019-02-09 PROCEDURE — 94660 CPAP INITIATION&MGMT: CPT

## 2019-02-09 PROCEDURE — 11000001 HC ACUTE MED/SURG PRIVATE ROOM

## 2019-02-09 PROCEDURE — 27000221 HC OXYGEN, UP TO 24 HOURS

## 2019-02-09 PROCEDURE — 84484 ASSAY OF TROPONIN QUANT: CPT

## 2019-02-09 PROCEDURE — S5571 INSULIN DISPOS PEN 3 ML: HCPCS | Performed by: HOSPITALIST

## 2019-02-09 PROCEDURE — 63600175 PHARM REV CODE 636 W HCPCS: Performed by: HOSPITALIST

## 2019-02-09 PROCEDURE — 25000003 PHARM REV CODE 250: Performed by: EMERGENCY MEDICINE

## 2019-02-09 PROCEDURE — 36415 COLL VENOUS BLD VENIPUNCTURE: CPT

## 2019-02-09 PROCEDURE — 99900035 HC TECH TIME PER 15 MIN (STAT)

## 2019-02-09 PROCEDURE — 63600175 PHARM REV CODE 636 W HCPCS: Performed by: EMERGENCY MEDICINE

## 2019-02-09 PROCEDURE — 94640 AIRWAY INHALATION TREATMENT: CPT

## 2019-02-09 RX ORDER — INSULIN ASPART 100 [IU]/ML
3 INJECTION, SOLUTION INTRAVENOUS; SUBCUTANEOUS
Status: DISCONTINUED | OUTPATIENT
Start: 2019-02-10 | End: 2019-02-10 | Stop reason: HOSPADM

## 2019-02-09 RX ORDER — PREDNISONE 20 MG/1
40 TABLET ORAL DAILY
Status: DISCONTINUED | OUTPATIENT
Start: 2019-02-10 | End: 2019-02-10 | Stop reason: HOSPADM

## 2019-02-09 RX ADMIN — HYDRALAZINE HYDROCHLORIDE 50 MG: 25 TABLET ORAL at 09:02

## 2019-02-09 RX ADMIN — RIVAROXABAN 20 MG: 20 TABLET, FILM COATED ORAL at 05:02

## 2019-02-09 RX ADMIN — SENNOSIDES AND DOCUSATE SODIUM 1 TABLET: 8.6; 5 TABLET ORAL at 09:02

## 2019-02-09 RX ADMIN — METHYLPREDNISOLONE SODIUM SUCCINATE 80 MG: 125 INJECTION, POWDER, FOR SOLUTION INTRAMUSCULAR; INTRAVENOUS at 05:02

## 2019-02-09 RX ADMIN — IPRATROPIUM BROMIDE AND ALBUTEROL SULFATE 3 ML: .5; 3 SOLUTION RESPIRATORY (INHALATION) at 03:02

## 2019-02-09 RX ADMIN — CLONIDINE HYDROCHLORIDE 0.2 MG: 0.1 TABLET ORAL at 09:02

## 2019-02-09 RX ADMIN — GABAPENTIN 300 MG: 300 CAPSULE ORAL at 05:02

## 2019-02-09 RX ADMIN — AMIODARONE HYDROCHLORIDE 200 MG: 200 TABLET ORAL at 09:02

## 2019-02-09 RX ADMIN — ISOSORBIDE MONONITRATE 90 MG: 30 TABLET, EXTENDED RELEASE ORAL at 09:02

## 2019-02-09 RX ADMIN — PANTOPRAZOLE SODIUM 40 MG: 40 TABLET, DELAYED RELEASE ORAL at 09:02

## 2019-02-09 RX ADMIN — INSULIN ASPART 4 UNITS: 100 INJECTION, SOLUTION INTRAVENOUS; SUBCUTANEOUS at 05:02

## 2019-02-09 RX ADMIN — ACETAMINOPHEN 650 MG: 325 TABLET, FILM COATED ORAL at 06:02

## 2019-02-09 RX ADMIN — IPRATROPIUM BROMIDE AND ALBUTEROL SULFATE 3 ML: .5; 3 SOLUTION RESPIRATORY (INHALATION) at 07:02

## 2019-02-09 RX ADMIN — METOPROLOL TARTRATE 100 MG: 50 TABLET ORAL at 09:02

## 2019-02-09 RX ADMIN — SENNOSIDES AND DOCUSATE SODIUM 1 TABLET: 8.6; 5 TABLET ORAL at 08:02

## 2019-02-09 RX ADMIN — PRAVASTATIN SODIUM 40 MG: 40 TABLET ORAL at 09:02

## 2019-02-09 RX ADMIN — METOPROLOL TARTRATE 100 MG: 50 TABLET ORAL at 08:02

## 2019-02-09 RX ADMIN — IPRATROPIUM BROMIDE AND ALBUTEROL SULFATE 3 ML: .5; 3 SOLUTION RESPIRATORY (INHALATION) at 11:02

## 2019-02-09 RX ADMIN — AZITHROMYCIN MONOHYDRATE 500 MG: 500 INJECTION, POWDER, LYOPHILIZED, FOR SOLUTION INTRAVENOUS at 05:02

## 2019-02-09 RX ADMIN — INSULIN ASPART 3 UNITS: 100 INJECTION, SOLUTION INTRAVENOUS; SUBCUTANEOUS at 12:02

## 2019-02-09 RX ADMIN — LISINOPRIL 40 MG: 20 TABLET ORAL at 09:02

## 2019-02-09 RX ADMIN — GABAPENTIN 300 MG: 300 CAPSULE ORAL at 09:02

## 2019-02-09 RX ADMIN — ASPIRIN 81 MG: 81 TABLET, COATED ORAL at 09:02

## 2019-02-09 RX ADMIN — FUROSEMIDE 40 MG: 40 TABLET ORAL at 09:02

## 2019-02-09 RX ADMIN — IPRATROPIUM BROMIDE AND ALBUTEROL SULFATE 3 ML: .5; 3 SOLUTION RESPIRATORY (INHALATION) at 09:02

## 2019-02-09 RX ADMIN — INSULIN ASPART 2 UNITS: 100 INJECTION, SOLUTION INTRAVENOUS; SUBCUTANEOUS at 09:02

## 2019-02-09 RX ADMIN — INSULIN DETEMIR 5 UNITS: 100 INJECTION, SOLUTION SUBCUTANEOUS at 09:02

## 2019-02-09 RX ADMIN — ACETAMINOPHEN 650 MG: 325 TABLET, FILM COATED ORAL at 05:02

## 2019-02-09 RX ADMIN — IPRATROPIUM BROMIDE AND ALBUTEROL SULFATE 3 ML: .5; 3 SOLUTION RESPIRATORY (INHALATION) at 04:02

## 2019-02-09 NOTE — PLAN OF CARE
02/09/19 1137   Discharge Assessment   Assessment Type Discharge Planning Assessment   Confirmed/corrected address and phone number on facesheet? Yes   Assessment information obtained from? Patient   Expected Length of Stay (days) 3   Communicated expected length of stay with patient/caregiver yes   Prior to hospitilization cognitive status: Alert/Oriented   Prior to hospitalization functional status: Assistive Equipment   Current cognitive status: Alert/Oriented   Current Functional Status: Assistive Equipment   Facility Arrived From: Home   Lives With spouse   Able to Return to Prior Arrangements yes   Is patient able to care for self after discharge? No   Who are your caregiver(s) and their phone number(s)? Getachew Palm   [phone]   Patient's perception of discharge disposition admitted as an inpatient   Readmission Within the Last 30 Days previous discharge plan unsuccessful   If yes, most recent facility name: [facility]   Patient currently being followed by outpatient case management? No   Patient currently receives any other outside agency services? No   Equipment Currently Used at Home cane, straight   Do you have any problems affording any of your prescribed medications? No   Is the patient taking medications as prescribed? yes   Does the patient have transportation home? Yes   Transportation Anticipated family or friend will provide   Discharge Plan A Home with family   Patient/Family in Agreement with Plan yes   Readmission Questionnaire   At the time of your discharge, did someone talk to you about what your health problems were? Yes   At the time of discharge, did someone talk to you about what to watch out for regarding worsening of your health problem? Yes   At the time of discharge, did someone talk to you about what to do if you experienced worsening of your health problem? Yes   At the time of discharge, did someone talk to you about which medication to take when you left the hospital and  which ones to stop taking? Yes   At the time of discharge, did someone talk to you about when and where to follow up with a doctor after you left the hospital? Yes   What do you believe caused you to be sick enough to be re-admitted? got upset   How often do you need to have someone help you when you read instructions, pamphlets, or other written material from your doctor or pharmacy? Never   Do you have problems taking your medications as prescribed? Yes   Do you have any problems affording any of  your prescribed medications? No   Do you have problems obtaining/receiving your medications? Yes   Does the patient have transportation to healthcare appointments? Yes   Living Arrangements house   Does the patient have family/friends to help with healtcare needs after discharge? yes   Does your caregiver provide all the help you need? Yes   Are you currently feeling confused? No   Are you currently having problems thinking? No   Are you currently having memory problems? No   Have you felt down, depressed, or hopeless? 0

## 2019-02-09 NOTE — CARE UPDATE
Patient transferred to  Med surg via flow safe device with oxygen. Placed back on bipap with charted settings.

## 2019-02-09 NOTE — ASSESSMENT & PLAN NOTE
A1c: 5.7% (12/2018)  Meds: hold home oral meds. SSI PRN. Start detemir 5u QHS  ADA diet, accuchecks ACHS, hypoglycemic protocol

## 2019-02-09 NOTE — ASSESSMENT & PLAN NOTE
Presents with shortness of breath new cough and new sputum production associated with hypoxia requiring continuous BiPAP.  Her pulmonary exam is significant for very decreased almost absent breath sounds bilaterally.  Her white count is elevated at 19, but her chest x-ray does not show any evidence of pneumonia.  She now stable on 3 L nasal cannula.  BiPAP p.r.n..  She has been given IV steroids- changed to oral prednisone on 02/09.  Started azithromycin- continue  Scheduled duo nebs  She has quit smoking

## 2019-02-09 NOTE — ASSESSMENT & PLAN NOTE
She has chronic hypoxic respiratory failure wearing 3 L nasal cannula at home.  She presents with acute shortness of breath and acute hypoxia requiring continuous BiPAP.  I suspect that this is due to COPD exacerbation.  She does not have evidence of acute heart failure exacerbation, and she is on chronic anticoagulation which makes PE less likely.  She was requiring BiPAP, and now is on home nasal cannula 3 L. See COPD exacerbation

## 2019-02-09 NOTE — CARE UPDATE
Report received from RT RANJAN Goodrich. . Medication was given to initiate respiratory treatment. Assessment and treatment given. Will continue to monitor.

## 2019-02-09 NOTE — ASSESSMENT & PLAN NOTE
She describes chest pain that sounds consistent with typical angina which occurred during her episode of shortness of breath.  Troponin was normal on admission.  EKG shows normal sinus rhythm with an old septal infarct.  Her last catheterization was June 2018 which showed diffuse luminal irregularities and a 40% lesion in the mid RCA  Troponins trended and were negative.  Chest pain is resolved  I suspect this chest pain was due to her acute shortness of breath.

## 2019-02-09 NOTE — CARE UPDATE
Patient received on BIPAP 10/5, RATE 12, 40% with all alarms on, set and functioning. Will continue to monitor.

## 2019-02-09 NOTE — ASSESSMENT & PLAN NOTE
Her white count is 19 on admission.  I do not currently have a source of infection, though she does report urinary symptoms.  Will check a UA.  She did also recently completed a course of prednisone, but her last dose was 6 days ago, and I would expect that her white count should have normalized by now.  WBC is trending down with antibiotics.  UA has not been collected.  Continue to monitor.

## 2019-02-09 NOTE — ED NOTES
"MD notified pt placed back on bipap and is complaining of " pulling trying to breathe,"  and anxious in appearance. MD verbalized understanding  "

## 2019-02-09 NOTE — ED NOTES
Per report from FABY Collins, report has been called to the floor for the pt; floor requests pt not sent until 1930. Will arrange for transport at that time.

## 2019-02-09 NOTE — SUBJECTIVE & OBJECTIVE
Interval History:  Now tolerating nasal cannula.  She has cough productive of white sputum.  No new complaints.    Review of Systems   Constitutional: Negative for chills and fever.   Respiratory: Positive for cough and shortness of breath. Negative for chest tightness.    Cardiovascular: Negative for chest pain, palpitations and leg swelling.   Gastrointestinal: Negative for abdominal pain, constipation, diarrhea, nausea and vomiting.   Genitourinary: Negative for difficulty urinating.     Objective:     Vital Signs (Most Recent):  Temp: 99 °F (37.2 °C) (02/09/19 1126)  Pulse: 88 (02/09/19 1126)  Resp: 17 (02/09/19 1126)  BP: (!) 103/50 (02/09/19 1126)  SpO2: 98 % (02/09/19 1126) Vital Signs (24h Range):  Temp:  [96.5 °F (35.8 °C)-99 °F (37.2 °C)] 99 °F (37.2 °C)  Pulse:  [67-91] 88  Resp:  [15-27] 17  SpO2:  [91 %-100 %] 98 %  BP: (103-207)/(50-98) 103/50     Weight: (!) 172.4 kg (380 lb 1.2 oz)  Body mass index is 59.53 kg/m².    Intake/Output Summary (Last 24 hours) at 2/9/2019 1241  Last data filed at 2/9/2019 0900  Gross per 24 hour   Intake 370 ml   Output --   Net 370 ml      Physical Exam   Constitutional: She appears well-developed and well-nourished. No distress.   obese   Cardiovascular: Normal rate, regular rhythm and normal heart sounds. Exam reveals no gallop and no friction rub.   No murmur heard.  Pulmonary/Chest: Effort normal. No stridor. No respiratory distress. She has no wheezes. She has no rales.   Poor air movement throughout.  3 L nasal cannula.   Abdominal: Soft. Bowel sounds are normal. She exhibits no distension and no mass. There is no tenderness. There is no guarding.   Musculoskeletal: She exhibits no edema.   Neurological: She is alert.   Skin: Skin is warm and dry. She is not diaphoretic.   Nursing note and vitals reviewed.      Significant Labs: All pertinent labs within the past 24 hours have been reviewed.    Significant Imaging: I have reviewed and interpreted all pertinent  imaging results/findings within the past 24 hours.

## 2019-02-09 NOTE — PROGRESS NOTES
[facility] Medicine  Progress Note    Patient Name: Yasmeen Palm  MRN: [id number]  Patient Class: IP- Inpatient   Admission Date: 2/8/2019  Length of Stay: 1 days  Attending Physician: Sadia Salgado MD  Primary Care Provider: Angel Orourke Jr, MD        Subjective:     Principal Problem:COPD exacerbation    HPI:  Ms. Yasmeen Palm is a 69-year-old woman with COPD on home oxygen, CAD, chronic diastolic heart failure, diabetes, paroxysmal atrial fibrillation, and hypertension who presents with shortness of breath.  She 1st started to experience shortness of breath yesterday.  She says that this shortness of breath was happening at rest.  This progressed throughout the day.  Last night the shortness of breath became unbearable.  Her niece who is at the bedside describes that she was gasping for air.  The patient also describes chest pain, a pressure on her chest, that was associated with the shortness of breath.  The patient says that this shortness of breath was accompanied by a cough productive of white sputum.  She denies hemoptysis.  She denies any worsening swelling in her abdomen or in her legs.  She chronically sleeps sitting upright.  She has not had to change the amount of her home oxygen.  She is adherent to her home medications including her home anticoagulation and home Lasix.  She says that she was hospitalized at [facility] 3 weeks ago for COPD exacerbation and discharged with amoxicillin and prednisone. She has completed her course of amoxicillin, and took her last dose of prednisone about 6 days ago.    Called EMS her oxygen saturation was 89% on 3 L nasal cannula. On arrival to the ER she was using accessory muscles. She was put on BiPAP for work of breathing, and given continuous duo nebs treatment.  She now says that her shortness of breath is slightly better while on the BiPAP.    Of note she also says that she has been urinating frequently and feels like she may be  getting a UTI.    Hospital Course:  Admitted for hypoxic and hypercapnic respiratory failure due to COPD exacerbation.  She was started on continuous BiPAP which she continued to require until the morning of February 9th when she was able to be weaned to nasal cannula 3 L. She was also started on steroids, nebulizers, and azithromycin.  She is improving with treatment.    Interval History:  Now tolerating nasal cannula.  She has cough productive of white sputum.  No new complaints.    Review of Systems   Constitutional: Negative for chills and fever.   Respiratory: Positive for cough and shortness of breath. Negative for chest tightness.    Cardiovascular: Negative for chest pain, palpitations and leg swelling.   Gastrointestinal: Negative for abdominal pain, constipation, diarrhea, nausea and vomiting.   Genitourinary: Negative for difficulty urinating.     Objective:     Vital Signs (Most Recent):  Temp: 99 °F (37.2 °C) (02/09/19 1126)  Pulse: 88 (02/09/19 1126)  Resp: 17 (02/09/19 1126)  BP: (!) 103/50 (02/09/19 1126)  SpO2: 98 % (02/09/19 1126) Vital Signs (24h Range):  Temp:  [96.5 °F (35.8 °C)-99 °F (37.2 °C)] 99 °F (37.2 °C)  Pulse:  [67-91] 88  Resp:  [15-27] 17  SpO2:  [91 %-100 %] 98 %  BP: (103-207)/(50-98) 103/50     Weight: (!) 172.4 kg (380 lb 1.2 oz)  Body mass index is 59.53 kg/m².    Intake/Output Summary (Last 24 hours) at 2/9/2019 1241  Last data filed at 2/9/2019 0900  Gross per 24 hour   Intake 370 ml   Output --   Net 370 ml      Physical Exam   Constitutional: She appears well-developed and well-nourished. No distress.   obese   Cardiovascular: Normal rate, regular rhythm and normal heart sounds. Exam reveals no gallop and no friction rub.   No murmur heard.  Pulmonary/Chest: Effort normal. No stridor. No respiratory distress. She has no wheezes. She has no rales.   Poor air movement throughout.  3 L nasal cannula.   Abdominal: Soft. Bowel sounds are normal. She exhibits no distension and no  mass. There is no tenderness. There is no guarding.   Musculoskeletal: She exhibits no edema.   Neurological: She is alert.   Skin: Skin is warm and dry. She is not diaphoretic.   Nursing note and vitals reviewed.      Significant Labs: All pertinent labs within the past 24 hours have been reviewed.    Significant Imaging: I have reviewed and interpreted all pertinent imaging results/findings within the past 24 hours.    Assessment/Plan:      * COPD exacerbation    Presents with shortness of breath new cough and new sputum production associated with hypoxia requiring continuous BiPAP.  Her pulmonary exam is significant for very decreased almost absent breath sounds bilaterally.  Her white count is elevated at 19, but her chest x-ray does not show any evidence of pneumonia.  She now stable on 3 L nasal cannula.  BiPAP p.r.n..  She has been given IV steroids- changed to oral prednisone on 02/09.  Started azithromycin- continue  Scheduled duo nebs  She has quit smoking       Leukocytosis    Her white count is 19 on admission.  I do not currently have a source of infection, though she does report urinary symptoms.  Will check a UA.  She did also recently completed a course of prednisone, but her last dose was 6 days ago, and I would expect that her white count should have normalized by now.  WBC is trending down with antibiotics.  UA has not been collected.  Continue to monitor.       Acute on chronic respiratory failure with hypoxia and hypercapnia    She has chronic hypoxic respiratory failure wearing 3 L nasal cannula at home.  She presents with acute shortness of breath and acute hypoxia requiring continuous BiPAP.  I suspect that this is due to COPD exacerbation.  She does not have evidence of acute heart failure exacerbation, and she is on chronic anticoagulation which makes PE less likely.  She was requiring BiPAP, and now is on home nasal cannula 3 L. See COPD exacerbation       PAF (paroxysmal atrial  fibrillation)    Currently in sinus  Rate control: metoprolol  Anticoagulation: xarelto  Rhythm control: amiodarone        Neuropathy    Continue home gabapentin       Chronic diastolic congestive heart failure    She has a history of chronic grade 2 diastolic dysfunction last demonstrated on her last echo in June 2018.  She is euvolemic and has no signs of CHF exacerbation.  Her BNP is 202 which is less than prior.  Plan to continue her home medications for heart failure including her home Lasix       Chronic anticoagulation    For Afib, prior PE, and prior DVT       Moderate malnutrition    Body mass index is 59.53 kg/m².  Encourage healthy diet       Anemia of chronic disease    Hgb at baseline on admit. No recent workup in system.        Tobacco abuse    Patient says she quit smoking 3 months ago  Congratulated her and encouraged continued cessation.        Gastroesophageal reflux disease without esophagitis    Continue PPI  No active issues       Type 2 diabetes mellitus, controlled    A1c: 5.7% (12/2018)  Meds: hold home oral meds. SSI PRN. Start detemir 5u QHS  ADA diet, accuchecks ACHS, hypoglycemic protocol         Coronary artery disease involving native coronary artery of native heart with angina pectoris    She describes chest pain that sounds consistent with typical angina which occurred during her episode of shortness of breath.  Troponin was normal on admission.  EKG shows normal sinus rhythm with an old septal infarct.  Her last catheterization was June 2018 which showed diffuse luminal irregularities and a 40% lesion in the mid RCA  Troponins trended and were negative.  Chest pain is resolved  I suspect this chest pain was due to her acute shortness of breath.          VTE Risk Mitigation (From admission, onward)        Ordered     rivaroxaban tablet 20 mg  With dinner      02/08/19 1641     IP VTE HIGH RISK PATIENT  Once      02/08/19 1649     Place TOM hose  Until discontinued      02/08/19 5711               Sadia Salgado MD  Department of Hospital Medicine   [facility]

## 2019-02-09 NOTE — HOSPITAL COURSE
Admitted for hypoxic and hypercapnic respiratory failure due to COPD exacerbation.  She was started on continuous BiPAP which she continued to require until the morning of February 9th when she was able to be weaned to nasal cannula 3 L. She was also started on steroids, nebulizers, and azithromycin.  She is improving with treatment. Her WBC increased on 2/10, and she has hyperglycemic, both as expected with steroids. Her air movement has improved. She is now stable on 2L NC which is less than her home 3L NC. She is stable for discharge to home to complete oral steroids and azithromycin for COPD exacerbation. She noted that her home BiPAP which she wears at night is missing the plug but that it will not be fixed until she pays her bills; I encouraged her to pay that bill to have her machine fixed. She has home oxygen already. Will resume her home health physical therapy and add nurse and OT. She can follow up with her PCP and/or her Pulmonologist.

## 2019-02-09 NOTE — NURSING
Patient arrived on the unit via stretcher accompanied by staff. Patient AAO X 4. Patient received lying in bed with bipap in use. Resp at bedside.

## 2019-02-09 NOTE — PLAN OF CARE
Problem: Adult Inpatient Plan of Care  Goal: Plan of Care Review  Outcome: Ongoing (interventions implemented as appropriate)   02/09/19 9337   Plan of Care Review   Plan of Care Reviewed With patient   AOx4. C/o pain in neck from a fall in 2015. BiPaP in place throughout night. HOB remains 60-90 degrees. O2 Saturation monitored throughout night, maintaining at 99%. Educated to call for OOB assistance. Bed alarm set. Remains free from falls. Tele box continued #[id number].

## 2019-02-10 VITALS
BODY MASS INDEX: 45.99 KG/M2 | RESPIRATION RATE: 18 BRPM | HEART RATE: 94 BPM | OXYGEN SATURATION: 98 % | TEMPERATURE: 99 F | HEIGHT: 67 IN | SYSTOLIC BLOOD PRESSURE: 102 MMHG | DIASTOLIC BLOOD PRESSURE: 58 MMHG | WEIGHT: 293 LBS

## 2019-02-10 LAB
ANION GAP SERPL CALC-SCNC: 7 MMOL/L
BASOPHILS # BLD AUTO: 0.01 K/UL
BASOPHILS NFR BLD: 0 %
BUN SERPL-MCNC: 38 MG/DL
CALCIUM SERPL-MCNC: 9.3 MG/DL
CHLORIDE SERPL-SCNC: 98 MMOL/L
CO2 SERPL-SCNC: 31 MMOL/L
CREAT SERPL-MCNC: 1.2 MG/DL
DIFFERENTIAL METHOD: ABNORMAL
EOSINOPHIL # BLD AUTO: 0 K/UL
EOSINOPHIL NFR BLD: 0 %
ERYTHROCYTE [DISTWIDTH] IN BLOOD BY AUTOMATED COUNT: 18.8 %
EST. GFR  (AFRICAN AMERICAN): 54 ML/MIN/1.73 M^2
EST. GFR  (NON AFRICAN AMERICAN): 47 ML/MIN/1.73 M^2
GLUCOSE SERPL-MCNC: 148 MG/DL
HCT VFR BLD AUTO: 30.1 %
HGB BLD-MCNC: 8.8 G/DL
LYMPHOCYTES # BLD AUTO: 1.6 K/UL
LYMPHOCYTES NFR BLD: 7.2 %
MCH RBC QN AUTO: 25.4 PG
MCHC RBC AUTO-ENTMCNC: 29.2 G/DL
MCV RBC AUTO: 87 FL
MONOCYTES # BLD AUTO: 1.5 K/UL
MONOCYTES NFR BLD: 6.9 %
NEUTROPHILS # BLD AUTO: 19.1 K/UL
NEUTROPHILS NFR BLD: 85.9 %
PLATELET # BLD AUTO: 304 K/UL
PMV BLD AUTO: 9.3 FL
POCT GLUCOSE: 165 MG/DL (ref 70–110)
POCT GLUCOSE: 211 MG/DL (ref 70–110)
POCT GLUCOSE: 267 MG/DL (ref 70–110)
POTASSIUM SERPL-SCNC: 5 MMOL/L
RBC # BLD AUTO: 3.47 M/UL
SODIUM SERPL-SCNC: 136 MMOL/L
WBC # BLD AUTO: 22.25 K/UL

## 2019-02-10 PROCEDURE — 63600175 PHARM REV CODE 636 W HCPCS: Performed by: HOSPITALIST

## 2019-02-10 PROCEDURE — 85025 COMPLETE CBC W/AUTO DIFF WBC: CPT

## 2019-02-10 PROCEDURE — 25000003 PHARM REV CODE 250: Performed by: EMERGENCY MEDICINE

## 2019-02-10 PROCEDURE — 27000221 HC OXYGEN, UP TO 24 HOURS

## 2019-02-10 PROCEDURE — 25000242 PHARM REV CODE 250 ALT 637 W/ HCPCS: Performed by: HOSPITALIST

## 2019-02-10 PROCEDURE — 80048 BASIC METABOLIC PNL TOTAL CA: CPT

## 2019-02-10 PROCEDURE — 94640 AIRWAY INHALATION TREATMENT: CPT

## 2019-02-10 PROCEDURE — 36415 COLL VENOUS BLD VENIPUNCTURE: CPT

## 2019-02-10 PROCEDURE — 94761 N-INVAS EAR/PLS OXIMETRY MLT: CPT

## 2019-02-10 RX ORDER — AZITHROMYCIN 250 MG/1
250 TABLET, FILM COATED ORAL DAILY
Qty: 3 TABLET | Refills: 0 | Status: SHIPPED | OUTPATIENT
Start: 2019-02-10 | End: 2019-02-13

## 2019-02-10 RX ORDER — PREDNISONE 20 MG/1
40 TABLET ORAL DAILY
Qty: 4 TABLET | Refills: 0 | Status: SHIPPED | OUTPATIENT
Start: 2019-02-10 | End: 2019-02-12

## 2019-02-10 RX ADMIN — SENNOSIDES AND DOCUSATE SODIUM 1 TABLET: 8.6; 5 TABLET ORAL at 08:02

## 2019-02-10 RX ADMIN — ACETAMINOPHEN 650 MG: 325 TABLET, FILM COATED ORAL at 09:02

## 2019-02-10 RX ADMIN — PREDNISONE 40 MG: 20 TABLET ORAL at 08:02

## 2019-02-10 RX ADMIN — ACETAMINOPHEN 650 MG: 325 TABLET, FILM COATED ORAL at 02:02

## 2019-02-10 RX ADMIN — PANTOPRAZOLE SODIUM 40 MG: 40 TABLET, DELAYED RELEASE ORAL at 08:02

## 2019-02-10 RX ADMIN — IPRATROPIUM BROMIDE AND ALBUTEROL SULFATE 3 ML: .5; 3 SOLUTION RESPIRATORY (INHALATION) at 05:02

## 2019-02-10 RX ADMIN — IPRATROPIUM BROMIDE AND ALBUTEROL SULFATE 3 ML: .5; 3 SOLUTION RESPIRATORY (INHALATION) at 07:02

## 2019-02-10 RX ADMIN — ISOSORBIDE MONONITRATE 90 MG: 30 TABLET, EXTENDED RELEASE ORAL at 08:02

## 2019-02-10 RX ADMIN — ASPIRIN 81 MG: 81 TABLET, COATED ORAL at 08:02

## 2019-02-10 RX ADMIN — INSULIN ASPART 2 UNITS: 100 INJECTION, SOLUTION INTRAVENOUS; SUBCUTANEOUS at 08:02

## 2019-02-10 RX ADMIN — AMIODARONE HYDROCHLORIDE 200 MG: 200 TABLET ORAL at 08:02

## 2019-02-10 RX ADMIN — GABAPENTIN 300 MG: 300 CAPSULE ORAL at 08:02

## 2019-02-10 RX ADMIN — PRAVASTATIN SODIUM 40 MG: 40 TABLET ORAL at 08:02

## 2019-02-10 RX ADMIN — INSULIN ASPART 3 UNITS: 100 INJECTION, SOLUTION INTRAVENOUS; SUBCUTANEOUS at 08:02

## 2019-02-10 RX ADMIN — IPRATROPIUM BROMIDE AND ALBUTEROL SULFATE 3 ML: .5; 3 SOLUTION RESPIRATORY (INHALATION) at 11:02

## 2019-02-10 RX ADMIN — IPRATROPIUM BROMIDE AND ALBUTEROL SULFATE 3 ML: .5; 3 SOLUTION RESPIRATORY (INHALATION) at 12:02

## 2019-02-10 RX ADMIN — FUROSEMIDE 40 MG: 40 TABLET ORAL at 08:02

## 2019-02-10 RX ADMIN — IPRATROPIUM BROMIDE AND ALBUTEROL SULFATE 3 ML: .5; 3 SOLUTION RESPIRATORY (INHALATION) at 03:02

## 2019-02-10 NOTE — PLAN OF CARE
[facility]    HOME HEALTH ORDERS  FACE TO FACE ENCOUNTER    Patient Name: Yasmeen Palm  YOB: 1950    PCP: Angel Orourke Jr, MD   PCP Address: [address] [facility]*  PCP Phone Number: [phone]  PCP Fax: [phone]    Encounter Date: 02/10/2019    Admit to Home Health    Diagnoses:  Active Hospital Problems    Diagnosis  POA    *COPD exacerbation [J44.1]  Yes    Leukocytosis [D72.829]  Yes    Acute on chronic respiratory failure with hypoxia and hypercapnia [J96.21, J96.22]  Yes    PAF (paroxysmal atrial fibrillation) [I48.0]  Yes     Chronic    Neuropathy [G62.9]  Yes     Chronic    Chronic diastolic congestive heart failure [I50.32]  Yes    Chronic anticoagulation [Z79.01]  Not Applicable     Chronic    Anemia of chronic disease [D63.8]  Yes     Chronic    Moderate malnutrition [E44.0]  Yes     Chronic    Type 2 diabetes mellitus, controlled [E11.9]  Yes     Chronic    Tobacco abuse [Z72.0]  Yes     Chronic    Coronary artery disease involving native coronary artery of native heart with angina pectoris [I25.119]  Yes    Gastroesophageal reflux disease without esophagitis [K21.9]  Yes     Chronic      Resolved Hospital Problems   No resolved problems to display.       No future appointments.        I have seen and examined this patient face to face today. My clinical findings that support the need for the home health skilled services and home bound status are the following:  Weakness/numbness causing balance and gait disturbance due to COPD Exacerbation and Weakness/Debility making it taxing to leave home.  Requiring assistive device to leave home due to unsteady gait caused by  COPD Exacerbation and Weakness/Debility.    Allergies:Review of patient's allergies indicates:  No Known Allergies    Diet: diabetic diet: 1800 calorie    Activities: activity as tolerated    Nursing:   SN to complete comprehensive assessment  including routine vital signs. Instruct on disease process and s/s of complications to report to MD. Review/verify medication list sent home with the patient at time of discharge  and instruct patient/caregiver as needed. Frequency may be adjusted depending on start of care date.    Notify MD if SBP > 160 or < 90; DBP > 90 or < 50; HR > 120 or < 50; Temp > 101; Other:   SpO2<88% on home 3L NC      CONSULTS:    Physical Therapy to evaluate and treat. Evaluate for home safety and equipment needs; Establish/upgrade home exercise program. Perform / instruct on therapeutic exercises, gait training, transfer training, and Range of Motion.  Occupational Therapy to evaluate and treat. Evaluate home environment for safety and equipment needs. Perform/Instruct on transfers, ADL training, ROM, and therapeutic exercises.    MISCELLANEOUS CARE:  COPD: Teach patient MDI/HFA usage and guidelines  Please ensure that patient has a pulse oximeter and is educated on his normal oxygen saturations: 88-92%  Please ensure patient has a functioning nebulizer and provide education on its usage.  If patient has increased cough or symptoms, please initiate COPD protocol including  schedule appointment with PCP or pulmonologist within 24 hours    WOUND CARE ORDERS  n/a      Medications: Review discharge medications with patient and family and provide education.      Current Discharge Medication List      START taking these medications    Details   azithromycin (Z-MELISSA) 250 MG tablet Take 1 tablet (250 mg total) by mouth once daily. for 3 days  Qty: 3 tablet, Refills: 0      predniSONE (DELTASONE) 20 MG tablet Take 2 tablets (40 mg total) by mouth once daily. for 2 days  Qty: 4 tablet, Refills: 0         CONTINUE these medications which have NOT CHANGED    Details   acetaminophen (TYLENOL) 500 MG tablet Take 1 tablet (500 mg total) by mouth every 8 (eight) hours as needed.  Refills: 0      amiodarone (PACERONE) 200 MG Tab Take 1 tablet (200 mg  total) by mouth once daily.  Qty: 30 tablet, Refills: 11      aspirin (ECOTRIN) 81 MG EC tablet Take 81 mg by mouth once daily.      cloNIDine (CATAPRES) 0.2 MG tablet Take 0.2 mg by mouth 2 (two) times daily.      furosemide (LASIX) 40 MG tablet Take 40 mg by mouth once daily.       gabapentin (NEURONTIN) 300 MG capsule Take 300 mg by mouth 3 (three) times daily.      hydrALAZINE (APRESOLINE) 50 MG tablet Take 50 mg by mouth 2 (two) times daily.      isosorbide mononitrate (IMDUR) 30 MG 24 hr tablet Take 3 tablets (90 mg total) by mouth once daily.  Qty: 90 tablet, Refills: 11      levalbuterol (XOPENEX HFA) 45 mcg/actuation inhaler Inhale 2 puffs into the lungs every 4 (four) hours as needed for Wheezing or Shortness of Breath. Rescue  Qty: 1 Inhaler, Refills: 3      lidocaine (LIDODERM) 5 % Place 1 patch onto the skin once daily. Remove & Discard patch within 12 hours or as directed by MD  Qty: 15 patch, Refills: 0      lisinopril (PRINIVIL,ZESTRIL) 40 MG tablet Take 1 tablet (40 mg total) by mouth once daily. Do not take this medication if blood pressure is below 130/80 mmHg and/or feeling dizzy after taking all other blood pressure meds      metformin (GLUCOPHAGE) 500 MG tablet Take 500 mg by mouth 2 (two) times daily with meals.      metoprolol tartrate (LOPRESSOR) 100 MG tablet Take 1 tablet (100 mg total) by mouth 2 (two) times daily.  Qty: 60 tablet, Refills: 11      multivitamin (THERAGRAN) per tablet Take 1 tablet by mouth once daily.      pantoprazole (PROTONIX) 40 MG tablet Take 40 mg by mouth once daily.      pravastatin (PRAVACHOL) 40 MG tablet Take 40 mg by mouth once daily.      rivaroxaban (XARELTO) 20 mg Tab Take 20 mg by mouth daily with dinner or evening meal.      tiotropium (SPIRIVA) 18 mcg inhalation capsule Inhale 1 capsule (18 mcg total) into the lungs once daily. Controller  Qty: 30 capsule, Refills: 11         STOP taking these medications       potassium phosphate, monobasic, (K-PHOS)  500 mg TbSO Comments:   Reason for Stopping:         UMECLIDINIUM BRM/VILANTEROL TR (ANORO ELLIPTA INHL) Comments:   Reason for Stopping:               I certify that this patient is confined to her home and needs intermittent skilled nursing care, physical therapy and occupational therapy.      Sadia Salgado MD  02/10/2019 8:05 AM

## 2019-02-10 NOTE — PROGRESS NOTES
Follow-up Information     Schedule an appointment as soon as possible for a visit to follow up.           Schedule an appointment as soon as possible for a visit with Angel Orourke Jr, MD.    Specialty:  Family Medicine  Why:  Make follow up appointment with Dr. Orourke on Monday 11.  Contact information:  [address]  [address]  [facility] [address]  [phone]                 TN taught Symptoms and Problems for COPD home care with pt teach back:  1.Medications 2. Diet TN placed education sheet in [employer] Packet..     Help at home will be from Mr. Chilel, assisting in pt's recovery.     TN taught patient about these items patient is responsible for when discharged.    Particularly on how to Manage COPD Care At Home:  1. Getting his prescriptions filled.  2. Taking his medicatios as directed. DO NOT MISS ANY DOSES!  3. Going to his follow-up doctor appointments.   .  Marquita HOOKS, RN

## 2019-02-10 NOTE — PLAN OF CARE
02/10/19 1054   Final Note   Assessment Type Discharge Planning Assessment   Anticipated Discharge Disposition Home-Health   What phone number can be called within the next 1-3 days to see how you are doing after discharge? [phone]   Hospital Follow Up  Appt(s) scheduled? No  (Patient to make appointment)   Discharge plans and expectations educations in teach back method with documentation complete? Yes   Right Care Referral Info   Post Acute Recommendation Home-care   Referral Type Home Care   Facility Name [facility] [address] [place] [id number]

## 2019-02-10 NOTE — PROGRESS NOTES
COPD teaching and home care done with patient. Patient verbalized understanding by doing teach back.  Patient instructed to make follow up appointment on Monday 2/11 with Dr. Orourke      10:58    Nurse Fern told patient is cleared for discharge by Case Management's viewpoint.  .

## 2019-02-10 NOTE — DISCHARGE SUMMARY
[facility] Medicine  Discharge Summary      Patient Name: Yasmeen Palm  MRN: [id number]  Admission Date: 2/8/2019  Hospital Length of Stay: 2 days  Discharge Date and Time:  02/10/2019 8:08 AM  Attending Physician: Sadia Salgado MD   Discharging Provider: Sadia Salgado MD  Primary Care Provider: Angel Orourke Jr, MD      HPI:   Ms. Yasmeen Palm is a 69-year-old woman with COPD on home oxygen, CAD, chronic diastolic heart failure, diabetes, paroxysmal atrial fibrillation, and hypertension who presents with shortness of breath.  She 1st started to experience shortness of breath yesterday.  She says that this shortness of breath was happening at rest.  This progressed throughout the day.  Last night the shortness of breath became unbearable.  Her niece who is at the bedside describes that she was gasping for air.  The patient also describes chest pain, a pressure on her chest, that was associated with the shortness of breath.  The patient says that this shortness of breath was accompanied by a cough productive of white sputum.  She denies hemoptysis.  She denies any worsening swelling in her abdomen or in her legs.  She chronically sleeps sitting upright.  She has not had to change the amount of her home oxygen.  She is adherent to her home medications including her home anticoagulation and home Lasix.  She says that she was hospitalized at [facility] 3 weeks ago for COPD exacerbation and discharged with amoxicillin and prednisone. She has completed her course of amoxicillin, and took her last dose of prednisone about 6 days ago.    Called EMS her oxygen saturation was 89% on 3 L nasal cannula. On arrival to the ER she was using accessory muscles. She was put on BiPAP for work of breathing, and given continuous duo nebs treatment.  She now says that her shortness of breath is slightly better while on the BiPAP.    Of note she also says that she has been urinating frequently and feels  like she may be getting a UTI.    * No surgery found *      Hospital Course:   Admitted for hypoxic and hypercapnic respiratory failure due to COPD exacerbation.  She was started on continuous BiPAP which she continued to require until the morning of February 9th when she was able to be weaned to nasal cannula 3 L. She was also started on steroids, nebulizers, and azithromycin.  She is improving with treatment. Her WBC increased on 2/10, and she has hyperglycemic, both as expected with steroids. Her air movement has improved. She is now stable on 2L NC which is less than her home 3L NC. She is stable for discharge to home to complete oral steroids and azithromycin for COPD exacerbation. She noted that her home BiPAP which she wears at night is missing the plug but that it will not be fixed until she pays her bills; I encouraged her to pay that bill to have her machine fixed. She has home oxygen already. Will resume her home health physical therapy and add nurse and OT. She can follow up with her PCP and/or her Pulmonologist.      Consults:   Consults (From admission, onward)        Status Ordering Provider     IP consult to case management  Once     Provider:  (Not yet assigned)    Acknowledged TAMARA PRINCE          No new Assessment & Plan notes have been filed under this hospital service since the last note was generated.  Service: Hospital Medicine    Final Active Diagnoses:    Diagnosis Date Noted POA    PRINCIPAL PROBLEM:  COPD exacerbation [J44.1] 02/08/2019 Yes    Leukocytosis [D72.829] 02/08/2019 Yes    Acute on chronic respiratory failure with hypoxia and hypercapnia [J96.21, J96.22] 06/26/2018 Yes    PAF (paroxysmal atrial fibrillation) [I48.0] 06/25/2018 Yes     Chronic    Neuropathy [G62.9] 06/25/2018 Yes     Chronic    Chronic diastolic congestive heart failure [I50.32] 08/12/2017 Yes    Chronic anticoagulation [Z79.01] 04/10/2017 Not Applicable     Chronic    Anemia of chronic disease  [D63.8] 09/27/2016 Yes     Chronic    Moderate malnutrition [E44.0] 09/27/2016 Yes     Chronic    Type 2 diabetes mellitus, controlled [E11.9] 12/14/2015 Yes     Chronic    Tobacco abuse [Z72.0] 12/14/2015 Yes     Chronic    Coronary artery disease involving native coronary artery of native heart with angina pectoris [I25.119] 12/14/2015 Yes    Gastroesophageal reflux disease without esophagitis [K21.9] 12/14/2015 Yes     Chronic      Problems Resolved During this Admission:       Discharged Condition: good    Disposition: Home-Health Care [facility]    Follow Up:    Patient Instructions:      Diet diabetic     Notify your health care provider if you experience any of the following:  temperature >100.4     Notify your health care provider if you experience any of the following:  persistent nausea and vomiting or diarrhea     Notify your health care provider if you experience any of the following:  severe uncontrolled pain     Notify your health care provider if you experience any of the following:  redness, tenderness, or signs of infection (pain, swelling, redness, odor or green/yellow discharge around incision site)     Notify your health care provider if you experience any of the following:  difficulty breathing or increased cough     Notify your health care provider if you experience any of the following:  severe persistent headache     Notify your health care provider if you experience any of the following:  worsening rash     Notify your health care provider if you experience any of the following:  persistent dizziness, light-headedness, or visual disturbances     Notify your health care provider if you experience any of the following:  increased confusion or weakness     Activity as tolerated       Significant Diagnostic Studies: Labs: All labs within the past 24 hours have been reviewed    Pending Diagnostic Studies:     None         Medications:  Reconciled Home Medications:      Medication List      START  taking these medications    azithromycin 250 MG tablet  Commonly known as:  Z-MELISSA  Take 1 tablet (250 mg total) by mouth once daily. for 3 days     predniSONE 20 MG tablet  Commonly known as:  DELTASONE  Take 2 tablets (40 mg total) by mouth once daily. for 2 days        CONTINUE taking these medications    acetaminophen 500 MG tablet  Commonly known as:  TYLENOL  Take 1 tablet (500 mg total) by mouth every 8 (eight) hours as needed.     amiodarone 200 MG Tab  Commonly known as:  PACERONE  Take 1 tablet (200 mg total) by mouth once daily.     aspirin 81 MG EC tablet  Commonly known as:  ECOTRIN  Take 81 mg by mouth once daily.     cloNIDine 0.2 MG tablet  Commonly known as:  CATAPRES  Take 0.2 mg by mouth 2 (two) times daily.     furosemide 40 MG tablet  Commonly known as:  LASIX  Take 40 mg by mouth once daily.     gabapentin 300 MG capsule  Commonly known as:  NEURONTIN  Take 300 mg by mouth 3 (three) times daily.     hydrALAZINE 50 MG tablet  Commonly known as:  APRESOLINE  Take 50 mg by mouth 2 (two) times daily.     isosorbide mononitrate 30 MG 24 hr tablet  Commonly known as:  IMDUR  Take 3 tablets (90 mg total) by mouth once daily.     levalbuterol 45 mcg/actuation inhaler  Commonly known as:  XOPENEX HFA  Inhale 2 puffs into the lungs every 4 (four) hours as needed for Wheezing or Shortness of Breath. Rescue     lidocaine 5 %  Commonly known as:  LIDODERM  Place 1 patch onto the skin once daily. Remove & Discard patch within 12 hours or as directed by MD     lisinopril 40 MG tablet  Commonly known as:  PRINIVIL,ZESTRIL  Take 1 tablet (40 mg total) by mouth once daily. Do not take this medication if blood pressure is below 130/80 mmHg and/or feeling dizzy after taking all other blood pressure meds     metFORMIN 500 MG tablet  Commonly known as:  GLUCOPHAGE  Take 500 mg by mouth 2 (two) times daily with meals.     metoprolol tartrate 100 MG tablet  Commonly known as:  LOPRESSOR  Take 1 tablet (100 mg total) by  mouth 2 (two) times daily.     multivitamin per tablet  Commonly known as:  THERAGRAN  Take 1 tablet by mouth once daily.     pantoprazole 40 MG tablet  Commonly known as:  PROTONIX  Take 40 mg by mouth once daily.     pravastatin 40 MG tablet  Commonly known as:  PRAVACHOL  Take 40 mg by mouth once daily.     tiotropium 18 mcg inhalation capsule  Commonly known as:  SPIRIVA  Inhale 1 capsule (18 mcg total) into the lungs once daily. Controller     XARELTO 20 mg Tab  Generic drug:  rivaroxaban  Take 20 mg by mouth daily with dinner or evening meal.        STOP taking these medications    ANORO ELLIPTA INHL     potassium phosphate (monobasic) 500 mg Tbso  Commonly known as:  K-PHOS            Indwelling Lines/Drains at time of discharge:   Lines/Drains/Airways          None          Time spent on the discharge of patient: 35 minutes  Patient was seen and examined on the date of discharge and determined to be suitable for discharge.         Sadia Salgado MD  Department of Hospital Medicine  [facility]

## 2019-02-10 NOTE — NURSING
Patient discharged per MD order. IV removed; catheter tip intact. No distress noted. Discharge instructions explained; patient verbalized understanding. VSS. Afebrile. No complaint of pain, n/v, diarrhea, or SOB. RX provided. Patient left with belongings to Main Entrance via wheelchair per transport.

## 2019-02-10 NOTE — PLAN OF CARE
Problem: Adult Inpatient Plan of Care  Goal: Plan of Care Review  Outcome: Ongoing (interventions implemented as appropriate)   02/10/19 0603   Plan of Care Review   Plan of Care Reviewed With patient   AOx4. C/o pain in neck. Moderate relief with Tylenol. BiPaP on throughout night. Blood pressure monitored. Insulin given per orders. HOB remained elevated throughout night about 90 degrees. Incontinence care provided. Weight shift encouraged. TEDs on. Remains free from falls.  No distress noted. Call light within reach.

## 2019-02-10 NOTE — PLAN OF CARE
Problem: Adult Inpatient Plan of Care  Goal: Patient-Specific Goal (Individualization)   02/08/19 1657   OTHER   Anxieties, Fears or Concerns none   Individualized Care Needs none   Patient-Specific Goal (Individualization)   Patient-Specific Goals (Include Timeframe) none     Goal: Absence of Hospital-Acquired Illness or Injury    Intervention: Identify and Manage Fall Risk   02/09/19 1800   Optimize Balance and Safe Activity   Safety Promotion/Fall Prevention assistive device/personal item within reach;side rails raised x 2         Comments: Patient remains free from falls and injury. No distress noted. VSS. No complaint of pain, n/v, diarrhea, or SOB. Will continue to monitor, will continue with plan of care.

## 2019-02-18 NOTE — PHYSICIAN QUERY
PT Name: Yasmeen Palm  MR #: [id number]     Physician Query Form - Documentation Clarification      CDS/: Kerri Romero               Contact information:[email or web address]    This form is a permanent document in the medical record.     Query Date: February 18, 2019    By submitting this query, we are merely seeking further clarification of documentation. Please utilize your independent clinical judgment when addressing the question(s) below.    The Medical record reflects the following:    Supporting Clinical Findings Location in Medical Record     Weight: (!) 172.4 kg (380 lb 1.2 oz)   Body mass index is 59.53 kg/m².     obese        HM PN 2/9     Weight: 72.6 kg (160 lb)   Body mass index is 25.06 kg/m².     Moderate malnutrition Body mass index is 25.06 kg/m².   Encourage healthy diet          H&P 2/8                                                                            Doctor, Due to conflicting documentation, Please specify the correct BMI for the patient.    Provider Use Only      BMI = ___59.53_______    Other explanations with details_________________________________                                                                                                               [  ] Clinically Undetermined

## 2019-03-24 ENCOUNTER — HOSPITAL ENCOUNTER (OUTPATIENT)
Facility: HOSPITAL | Age: 69
Discharge: HOME | DRG: 189 | End: 2019-03-26
Attending: EMERGENCY MEDICINE | Admitting: HOSPITALIST
Payer: MEDICARE

## 2019-03-24 DIAGNOSIS — R07.9 CHEST PAIN: ICD-10-CM

## 2019-03-24 DIAGNOSIS — R06.02 SHORTNESS OF BREATH: ICD-10-CM

## 2019-03-24 DIAGNOSIS — J44.1 COPD EXACERBATION: Primary | ICD-10-CM

## 2019-03-24 PROBLEM — J96.21 ACUTE ON CHRONIC RESPIRATORY FAILURE WITH HYPOXIA AND HYPERCAPNIA: Chronic | Status: ACTIVE | Noted: 2018-06-26

## 2019-03-24 PROBLEM — J44.9 COPD (CHRONIC OBSTRUCTIVE PULMONARY DISEASE): Chronic | Status: ACTIVE | Noted: 2019-03-24

## 2019-03-24 PROBLEM — E78.5 HYPERLIPIDEMIA: Chronic | Status: ACTIVE | Noted: 2019-03-24

## 2019-03-24 PROBLEM — J96.22 ACUTE ON CHRONIC RESPIRATORY FAILURE WITH HYPOXIA AND HYPERCAPNIA: Chronic | Status: ACTIVE | Noted: 2018-06-26

## 2019-03-24 PROBLEM — I10 ESSENTIAL HYPERTENSION: Chronic | Status: ACTIVE | Noted: 2019-03-24

## 2019-03-24 LAB
ALBUMIN SERPL BCP-MCNC: 2.8 G/DL (ref 3.5–5.2)
ALLENS TEST: ABNORMAL
ALLENS TEST: ABNORMAL
ALP SERPL-CCNC: 60 U/L (ref 55–135)
ALT SERPL W/O P-5'-P-CCNC: 7 U/L (ref 10–44)
ANION GAP SERPL CALC-SCNC: 6 MMOL/L (ref 8–16)
AST SERPL-CCNC: 11 U/L (ref 10–40)
BASOPHILS # BLD AUTO: 0.01 K/UL (ref 0–0.2)
BASOPHILS NFR BLD: 0.1 % (ref 0–1.9)
BILIRUB SERPL-MCNC: 0.2 MG/DL (ref 0.1–1)
BNP SERPL-MCNC: 205 PG/ML (ref 0–99)
BUN SERPL-MCNC: 13 MG/DL (ref 8–23)
CALCIUM SERPL-MCNC: 8.9 MG/DL (ref 8.7–10.5)
CHLORIDE SERPL-SCNC: 106 MMOL/L (ref 95–110)
CO2 SERPL-SCNC: 32 MMOL/L (ref 23–29)
CREAT SERPL-MCNC: 0.7 MG/DL (ref 0.5–1.4)
DELSYS: ABNORMAL
DELSYS: ABNORMAL
DIFFERENTIAL METHOD: ABNORMAL
EOSINOPHIL # BLD AUTO: 0.2 K/UL (ref 0–0.5)
EOSINOPHIL NFR BLD: 1.3 % (ref 0–8)
EP: 5
ERYTHROCYTE [DISTWIDTH] IN BLOOD BY AUTOMATED COUNT: 19.3 % (ref 11.5–14.5)
ERYTHROCYTE [SEDIMENTATION RATE] IN BLOOD BY WESTERGREN METHOD: 14 MM/H
EST. GFR  (AFRICAN AMERICAN): >60 ML/MIN/1.73 M^2
EST. GFR  (NON AFRICAN AMERICAN): >60 ML/MIN/1.73 M^2
FIO2: 0.4
FLOW: 2
GLUCOSE SERPL-MCNC: 91 MG/DL (ref 70–110)
HCO3 UR-SCNC: 30.5 MMOL/L (ref 24–28)
HCO3 UR-SCNC: 32.8 MMOL/L (ref 24–28)
HCT VFR BLD AUTO: 31.8 % (ref 37–48.5)
HGB BLD-MCNC: 8.7 G/DL (ref 12–16)
INR PPP: 1.1 (ref 0.8–1.2)
IP: 10
LYMPHOCYTES # BLD AUTO: 2.1 K/UL (ref 1–4.8)
LYMPHOCYTES NFR BLD: 15 % (ref 18–48)
MCH RBC QN AUTO: 25.6 PG (ref 27–31)
MCHC RBC AUTO-ENTMCNC: 27.4 G/DL (ref 32–36)
MCV RBC AUTO: 94 FL (ref 82–98)
MIN VOL: 7.4
MODE: ABNORMAL
MODE: ABNORMAL
MONOCYTES # BLD AUTO: 1.2 K/UL (ref 0.3–1)
MONOCYTES NFR BLD: 8.8 % (ref 4–15)
NEUTROPHILS # BLD AUTO: 10.4 K/UL (ref 1.8–7.7)
NEUTROPHILS NFR BLD: 74.8 % (ref 38–73)
PCO2 BLDA: 60.2 MMHG (ref 35–45)
PCO2 BLDA: 60.3 MMHG (ref 35–45)
PH SMN: 7.31 [PH] (ref 7.35–7.45)
PH SMN: 7.34 [PH] (ref 7.35–7.45)
PLATELET # BLD AUTO: 270 K/UL (ref 150–350)
PMV BLD AUTO: 8.8 FL (ref 9.2–12.9)
PO2 BLDA: 38 MMHG (ref 40–60)
PO2 BLDA: 88 MMHG (ref 80–100)
POC BE: 3 MMOL/L
POC BE: 6 MMOL/L
POC SATURATED O2: 68 % (ref 95–100)
POC SATURATED O2: 95 % (ref 95–100)
POC TCO2: 32 MMOL/L (ref 23–27)
POC TCO2: 35 MMOL/L (ref 24–29)
POTASSIUM SERPL-SCNC: 4.4 MMOL/L (ref 3.5–5.1)
PROT SERPL-MCNC: 6.3 G/DL (ref 6–8.4)
PROTHROMBIN TIME: 11.4 SEC (ref 9–12.5)
RBC # BLD AUTO: 3.4 M/UL (ref 4–5.4)
SAMPLE: ABNORMAL
SAMPLE: ABNORMAL
SITE: ABNORMAL
SITE: ABNORMAL
SODIUM SERPL-SCNC: 144 MMOL/L (ref 136–145)
SP02: 99
TROPONIN I SERPL DL<=0.01 NG/ML-MCNC: <0.006 NG/ML (ref 0–0.03)
WBC # BLD AUTO: 13.88 K/UL (ref 3.9–12.7)

## 2019-03-24 PROCEDURE — 93010 ELECTROCARDIOGRAM REPORT: CPT | Mod: ,,, | Performed by: INTERNAL MEDICINE

## 2019-03-24 PROCEDURE — 27000221 HC OXYGEN, UP TO 24 HOURS

## 2019-03-24 PROCEDURE — 27000190 HC CPAP FULL FACE MASK W/VALVE

## 2019-03-24 PROCEDURE — 99900035 HC TECH TIME PER 15 MIN (STAT)

## 2019-03-24 PROCEDURE — 82803 BLOOD GASES ANY COMBINATION: CPT

## 2019-03-24 PROCEDURE — 36600 WITHDRAWAL OF ARTERIAL BLOOD: CPT

## 2019-03-24 PROCEDURE — G0378 HOSPITAL OBSERVATION PER HR: HCPCS

## 2019-03-24 PROCEDURE — 85610 PROTHROMBIN TIME: CPT

## 2019-03-24 PROCEDURE — 94660 CPAP INITIATION&MGMT: CPT

## 2019-03-24 PROCEDURE — 85025 COMPLETE CBC W/AUTO DIFF WBC: CPT

## 2019-03-24 PROCEDURE — 93005 ELECTROCARDIOGRAM TRACING: CPT

## 2019-03-24 PROCEDURE — 93010 EKG 12-LEAD: ICD-10-PCS | Mod: ,,, | Performed by: INTERNAL MEDICINE

## 2019-03-24 PROCEDURE — 94761 N-INVAS EAR/PLS OXIMETRY MLT: CPT

## 2019-03-24 PROCEDURE — 25000003 PHARM REV CODE 250: Performed by: EMERGENCY MEDICINE

## 2019-03-24 PROCEDURE — 99284 EMERGENCY DEPT VISIT MOD MDM: CPT | Mod: 25

## 2019-03-24 PROCEDURE — 80053 COMPREHEN METABOLIC PANEL: CPT

## 2019-03-24 PROCEDURE — 84484 ASSAY OF TROPONIN QUANT: CPT

## 2019-03-24 PROCEDURE — 99285 EMERGENCY DEPT VISIT HI MDM: CPT | Mod: 25

## 2019-03-24 PROCEDURE — 21400001 HC TELEMETRY ROOM

## 2019-03-24 PROCEDURE — 83880 ASSAY OF NATRIURETIC PEPTIDE: CPT

## 2019-03-24 RX ORDER — IPRATROPIUM BROMIDE AND ALBUTEROL SULFATE 2.5; .5 MG/3ML; MG/3ML
3 SOLUTION RESPIRATORY (INHALATION)
Status: DISCONTINUED | OUTPATIENT
Start: 2019-03-25 | End: 2019-03-26 | Stop reason: HOSPADM

## 2019-03-24 RX ORDER — ASPIRIN 81 MG/1
81 TABLET ORAL DAILY
Status: DISCONTINUED | OUTPATIENT
Start: 2019-03-25 | End: 2019-03-26 | Stop reason: HOSPADM

## 2019-03-24 RX ORDER — GLUCAGON 1 MG
1 KIT INJECTION
Status: DISCONTINUED | OUTPATIENT
Start: 2019-03-25 | End: 2019-03-26 | Stop reason: HOSPADM

## 2019-03-24 RX ORDER — PANTOPRAZOLE SODIUM 40 MG/10ML
40 INJECTION, POWDER, LYOPHILIZED, FOR SOLUTION INTRAVENOUS DAILY
Status: DISCONTINUED | OUTPATIENT
Start: 2019-03-25 | End: 2019-03-25

## 2019-03-24 RX ORDER — IPRATROPIUM BROMIDE AND ALBUTEROL SULFATE 2.5; .5 MG/3ML; MG/3ML
3 SOLUTION RESPIRATORY (INHALATION) EVERY 4 HOURS PRN
Status: DISCONTINUED | OUTPATIENT
Start: 2019-03-25 | End: 2019-03-26 | Stop reason: HOSPADM

## 2019-03-24 RX ORDER — CLONIDINE HYDROCHLORIDE 0.1 MG/1
0.1 TABLET ORAL 3 TIMES DAILY PRN
Status: DISCONTINUED | OUTPATIENT
Start: 2019-03-25 | End: 2019-03-26 | Stop reason: HOSPADM

## 2019-03-24 RX ORDER — IBUPROFEN 200 MG
16 TABLET ORAL
Status: DISCONTINUED | OUTPATIENT
Start: 2019-03-25 | End: 2019-03-26 | Stop reason: HOSPADM

## 2019-03-24 RX ORDER — ISOSORBIDE MONONITRATE 30 MG/1
90 TABLET, EXTENDED RELEASE ORAL DAILY
Status: DISCONTINUED | OUTPATIENT
Start: 2019-03-25 | End: 2019-03-26 | Stop reason: HOSPADM

## 2019-03-24 RX ORDER — ACETAMINOPHEN 325 MG/1
650 TABLET ORAL EVERY 8 HOURS PRN
Status: DISCONTINUED | OUTPATIENT
Start: 2019-03-24 | End: 2019-03-24

## 2019-03-24 RX ORDER — AMOXICILLIN 250 MG
1 CAPSULE ORAL 2 TIMES DAILY PRN
Status: DISCONTINUED | OUTPATIENT
Start: 2019-03-25 | End: 2019-03-26 | Stop reason: HOSPADM

## 2019-03-24 RX ORDER — RAMELTEON 8 MG/1
8 TABLET ORAL NIGHTLY PRN
Status: DISCONTINUED | OUTPATIENT
Start: 2019-03-25 | End: 2019-03-26 | Stop reason: HOSPADM

## 2019-03-24 RX ORDER — PRAVASTATIN SODIUM 40 MG/1
40 TABLET ORAL DAILY
Status: DISCONTINUED | OUTPATIENT
Start: 2019-03-25 | End: 2019-03-26 | Stop reason: HOSPADM

## 2019-03-24 RX ORDER — METHYLPREDNISOLONE SOD SUCC 125 MG
125 VIAL (EA) INJECTION EVERY 8 HOURS
Status: DISCONTINUED | OUTPATIENT
Start: 2019-03-25 | End: 2019-03-24

## 2019-03-24 RX ORDER — LISINOPRIL 20 MG/1
40 TABLET ORAL DAILY
Status: DISCONTINUED | OUTPATIENT
Start: 2019-03-25 | End: 2019-03-26 | Stop reason: HOSPADM

## 2019-03-24 RX ORDER — METHYLPREDNISOLONE SOD SUCC 125 MG
125 VIAL (EA) INJECTION EVERY 8 HOURS
Status: DISCONTINUED | OUTPATIENT
Start: 2019-03-25 | End: 2019-03-25

## 2019-03-24 RX ORDER — IBUPROFEN 200 MG
24 TABLET ORAL
Status: DISCONTINUED | OUTPATIENT
Start: 2019-03-25 | End: 2019-03-26 | Stop reason: HOSPADM

## 2019-03-24 RX ORDER — SODIUM CHLORIDE 0.9 % (FLUSH) 0.9 %
5 SYRINGE (ML) INJECTION
Status: DISCONTINUED | OUTPATIENT
Start: 2019-03-24 | End: 2019-03-24

## 2019-03-24 RX ORDER — HYDRALAZINE HYDROCHLORIDE 25 MG/1
50 TABLET, FILM COATED ORAL 2 TIMES DAILY
Status: DISCONTINUED | OUTPATIENT
Start: 2019-03-25 | End: 2019-03-26 | Stop reason: HOSPADM

## 2019-03-24 RX ORDER — METOPROLOL TARTRATE 50 MG/1
100 TABLET ORAL 2 TIMES DAILY
Status: DISCONTINUED | OUTPATIENT
Start: 2019-03-25 | End: 2019-03-26 | Stop reason: HOSPADM

## 2019-03-24 RX ORDER — AMIODARONE HYDROCHLORIDE 200 MG/1
200 TABLET ORAL DAILY
Status: DISCONTINUED | OUTPATIENT
Start: 2019-03-25 | End: 2019-03-26 | Stop reason: HOSPADM

## 2019-03-24 RX ORDER — ACETAMINOPHEN 500 MG
500 TABLET ORAL EVERY 6 HOURS PRN
Status: DISCONTINUED | OUTPATIENT
Start: 2019-03-25 | End: 2019-03-26 | Stop reason: HOSPADM

## 2019-03-24 RX ORDER — ONDANSETRON 8 MG/1
8 TABLET, ORALLY DISINTEGRATING ORAL EVERY 8 HOURS PRN
Status: DISCONTINUED | OUTPATIENT
Start: 2019-03-24 | End: 2019-03-24

## 2019-03-24 RX ORDER — PREGABALIN 75 MG/1
75 CAPSULE ORAL 2 TIMES DAILY
Status: ON HOLD | COMMUNITY
End: 2019-08-22 | Stop reason: HOSPADM

## 2019-03-24 RX ORDER — IBUPROFEN 200 MG
1 TABLET ORAL DAILY
Status: DISCONTINUED | OUTPATIENT
Start: 2019-03-25 | End: 2019-03-26 | Stop reason: HOSPADM

## 2019-03-24 RX ORDER — INSULIN ASPART 100 [IU]/ML
0-5 INJECTION, SOLUTION INTRAVENOUS; SUBCUTANEOUS
Status: DISCONTINUED | OUTPATIENT
Start: 2019-03-25 | End: 2019-03-26 | Stop reason: HOSPADM

## 2019-03-24 RX ORDER — FUROSEMIDE 40 MG/1
40 TABLET ORAL DAILY
Status: DISCONTINUED | OUTPATIENT
Start: 2019-03-25 | End: 2019-03-26 | Stop reason: HOSPADM

## 2019-03-24 RX ORDER — DOXYCYCLINE HYCLATE 100 MG
100 TABLET ORAL
Status: COMPLETED | OUTPATIENT
Start: 2019-03-24 | End: 2019-03-24

## 2019-03-24 RX ORDER — DOXYCYCLINE HYCLATE 100 MG
100 TABLET ORAL EVERY 12 HOURS
Status: DISCONTINUED | OUTPATIENT
Start: 2019-03-25 | End: 2019-03-26 | Stop reason: HOSPADM

## 2019-03-24 RX ADMIN — DOXYCYCLINE HYCLATE 100 MG: 100 TABLET, COATED ORAL at 08:03

## 2019-03-24 NOTE — ED TRIAGE NOTES
Pt arrives to er via ems on stretcher from home aaox3. Pt states hx of copd, chf, . States started getting short winded today. Denies chest pain, sob, numbness and tingling.

## 2019-03-24 NOTE — ED PROVIDER NOTES
Encounter Date: 3/24/2019       History     Chief Complaint   Patient presents with    Shortness of Breath     arrived via EMS with c/o SOB x 2 hours, per EMS pt OZ 84% on 4L home O2, now 98% after breathing treatment      69-year-old female with a long history of COPD CHF coronary artery disease atrial fibrillation prior DVTs on anticoagulants multiple admissions in the past between [facility] and [facility] home O2 dependent presents with shortness of breath for the past couple days dyspnea on exertion paroxysmal nocturnal dyspnea allegedly patient is compliant with medications which include diuretics and anticoagulants pleuritic chest pain no fevers sweats or chills nausea vomiting diarrhea constipation or any abdominal discomfort patient brought via EMS was given 125 of Solu-Medrol and albuterol Atrovent treatments patient improved per patient report        Review of patient's allergies indicates:  No Known Allergies  Past Medical History:   Diagnosis Date    Anticoagulant long-term use     Xarelto    Arthritis     Asthma     CHF (congestive heart failure)     COPD (chronic obstructive pulmonary disease)     Coronary artery disease     Deep vein thrombosis (DVT) of left lower extremity     Depression     Diabetes mellitus     GERD (gastroesophageal reflux disease)     Hypertension     Obstructive sleep apnea on CPAP     On home oxygen therapy     Unsteady gait     uses either walker or 4 prong cane     Past Surgical History:   Procedure Laterality Date    CORONARY ANGIOPLASTY WITH STENT PLACEMENT      HERNIA REPAIR      encapsulated umbilical hernia     Family History   Problem Relation Age of Onset    Heart disease Mother     Hypertension Mother     Diabetes Father      Social History     Tobacco Use    Smoking status: Former Smoker     Packs/day: 1.00     Years: 55.00     Pack years: 55.00     Types: Cigarettes     Start date: 1/1/1963     Last attempt to quit: 12/31/2018     Years  since quittin.2    Smokeless tobacco: Never Used   Substance Use Topics    Alcohol use: Yes     Alcohol/week: 0.6 oz     Types: 1 Glasses of wine per week     Frequency: Never     Comment: socially    Drug use: No     Review of Systems   Constitutional: Positive for fatigue.   HENT: Negative.    Eyes: Negative.    Respiratory: Positive for shortness of breath and wheezing.    Cardiovascular: Positive for chest pain.   Gastrointestinal: Negative.    Endocrine: Negative.    Genitourinary: Negative.    Musculoskeletal: Negative.    Neurological: Negative.    Hematological: Negative.    Psychiatric/Behavioral: Negative.    All other systems reviewed and are negative.      Physical Exam     Initial Vitals [19 1754]   BP Pulse Resp Temp SpO2   (!) 160/73 77 (!) 22 -- 97 %      MAP       --         Physical Exam    Nursing note and vitals reviewed.  Constitutional: She appears distressed.   HENT:   Head: Normocephalic and atraumatic.   Eyes: Pupils are equal, round, and reactive to light.   Neck: Normal range of motion. Neck supple.   Cardiovascular: Intact distal pulses.   Murmur heard.  Irregularly irregular, pedal edema and JVD   Pulmonary/Chest: She is in respiratory distress. She has rales.   Abdominal: Soft. Bowel sounds are normal.   Musculoskeletal: Normal range of motion.   Neurological: She is alert and oriented to person, place, and time.         ED Course   Procedures  Labs Reviewed   CBC W/ AUTO DIFFERENTIAL   COMPREHENSIVE METABOLIC PANEL   TROPONIN I   B-TYPE NATRIURETIC PEPTIDE   PROTIME-INR     EKG Readings: (Independently Interpreted)   Initial Reading: No STEMI. Rhythm: Normal Sinus Rhythm. Ectopy: No Ectopy.   Normal sinus rhythm rate of 75 normal QT normal QRS next axial deviation no significant changes from previous EKG no ST segment elevation MI no reciprocal changes possibly a first-degree AV block       Imaging Results    None                               Clinical Impression:        ICD-10-CM ICD-9-CM   1. COPD exacerbation J44.1 491.21   2. Shortness of breath R06.02 786.05         Disposition:   Disposition: Admitted  Condition: Stable                        Jeremias Lester MD  03/24/19 2020

## 2019-03-25 PROBLEM — N30.00 ACUTE CYSTITIS: Status: ACTIVE | Noted: 2019-03-25

## 2019-03-25 LAB
ALBUMIN SERPL BCP-MCNC: 3.1 G/DL (ref 3.5–5.2)
ALP SERPL-CCNC: 70 U/L (ref 55–135)
ALT SERPL W/O P-5'-P-CCNC: 9 U/L (ref 10–44)
ANION GAP SERPL CALC-SCNC: 8 MMOL/L (ref 8–16)
AST SERPL-CCNC: 10 U/L (ref 10–40)
BACTERIA #/AREA URNS HPF: ABNORMAL /HPF
BASOPHILS # BLD AUTO: 0 K/UL (ref 0–0.2)
BASOPHILS NFR BLD: 0 % (ref 0–1.9)
BILIRUB SERPL-MCNC: 0.2 MG/DL (ref 0.1–1)
BILIRUB UR QL STRIP: NEGATIVE
BUN SERPL-MCNC: 20 MG/DL (ref 8–23)
CALCIUM SERPL-MCNC: 9.4 MG/DL (ref 8.7–10.5)
CHLORIDE SERPL-SCNC: 104 MMOL/L (ref 95–110)
CLARITY UR: ABNORMAL
CO2 SERPL-SCNC: 30 MMOL/L (ref 23–29)
COLOR UR: ABNORMAL
CREAT SERPL-MCNC: 0.8 MG/DL (ref 0.5–1.4)
DIFFERENTIAL METHOD: ABNORMAL
EOSINOPHIL # BLD AUTO: 0 K/UL (ref 0–0.5)
EOSINOPHIL NFR BLD: 0 % (ref 0–8)
ERYTHROCYTE [DISTWIDTH] IN BLOOD BY AUTOMATED COUNT: 19.2 % (ref 11.5–14.5)
EST. GFR  (AFRICAN AMERICAN): >60 ML/MIN/1.73 M^2
EST. GFR  (NON AFRICAN AMERICAN): >60 ML/MIN/1.73 M^2
ESTIMATED AVG GLUCOSE: 163 MG/DL (ref 68–131)
GLUCOSE SERPL-MCNC: 183 MG/DL (ref 70–110)
GLUCOSE UR QL STRIP: ABNORMAL
HBA1C MFR BLD HPLC: 7.3 % (ref 4–5.6)
HCT VFR BLD AUTO: 35 % (ref 37–48.5)
HGB BLD-MCNC: 9.5 G/DL (ref 12–16)
HGB UR QL STRIP: ABNORMAL
KETONES UR QL STRIP: NEGATIVE
LEUKOCYTE ESTERASE UR QL STRIP: ABNORMAL
LYMPHOCYTES # BLD AUTO: 0.9 K/UL (ref 1–4.8)
LYMPHOCYTES NFR BLD: 6.9 % (ref 18–48)
MAGNESIUM SERPL-MCNC: 1.8 MG/DL (ref 1.6–2.6)
MCH RBC QN AUTO: 25.2 PG (ref 27–31)
MCHC RBC AUTO-ENTMCNC: 27.1 G/DL (ref 32–36)
MCV RBC AUTO: 93 FL (ref 82–98)
MICROSCOPIC COMMENT: ABNORMAL
MONOCYTES # BLD AUTO: 0.1 K/UL (ref 0.3–1)
MONOCYTES NFR BLD: 0.4 % (ref 4–15)
NEUTROPHILS # BLD AUTO: 11.6 K/UL (ref 1.8–7.7)
NEUTROPHILS NFR BLD: 92.9 % (ref 38–73)
NITRITE UR QL STRIP: NEGATIVE
PH UR STRIP: 5 [PH] (ref 5–8)
PHOSPHATE SERPL-MCNC: 2.7 MG/DL (ref 2.7–4.5)
PLATELET # BLD AUTO: 289 K/UL (ref 150–350)
PMV BLD AUTO: 9 FL (ref 9.2–12.9)
POCT GLUCOSE: 197 MG/DL (ref 70–110)
POCT GLUCOSE: 324 MG/DL (ref 70–110)
POCT GLUCOSE: 344 MG/DL (ref 70–110)
POTASSIUM SERPL-SCNC: 4.5 MMOL/L (ref 3.5–5.1)
PROT SERPL-MCNC: 7.1 G/DL (ref 6–8.4)
PROT UR QL STRIP: NEGATIVE
RBC # BLD AUTO: 3.77 M/UL (ref 4–5.4)
RBC #/AREA URNS HPF: 4 /HPF (ref 0–4)
SODIUM SERPL-SCNC: 142 MMOL/L (ref 136–145)
SP GR UR STRIP: 1.01 (ref 1–1.03)
SQUAMOUS #/AREA URNS HPF: 6 /HPF
TROPONIN I SERPL DL<=0.01 NG/ML-MCNC: 0.01 NG/ML (ref 0–0.03)
URN SPEC COLLECT METH UR: ABNORMAL
UROBILINOGEN UR STRIP-ACNC: NEGATIVE EU/DL
WBC # BLD AUTO: 12.49 K/UL (ref 3.9–12.7)
WBC #/AREA URNS HPF: 20 /HPF (ref 0–5)
YEAST URNS QL MICRO: ABNORMAL

## 2019-03-25 PROCEDURE — 83735 ASSAY OF MAGNESIUM: CPT

## 2019-03-25 PROCEDURE — 94640 AIRWAY INHALATION TREATMENT: CPT

## 2019-03-25 PROCEDURE — 84484 ASSAY OF TROPONIN QUANT: CPT

## 2019-03-25 PROCEDURE — 84100 ASSAY OF PHOSPHORUS: CPT

## 2019-03-25 PROCEDURE — 97161 PT EVAL LOW COMPLEX 20 MIN: CPT

## 2019-03-25 PROCEDURE — 25000003 PHARM REV CODE 250: Performed by: INTERNAL MEDICINE

## 2019-03-25 PROCEDURE — 36415 COLL VENOUS BLD VENIPUNCTURE: CPT

## 2019-03-25 PROCEDURE — 25000242 PHARM REV CODE 250 ALT 637 W/ HCPCS: Performed by: INTERNAL MEDICINE

## 2019-03-25 PROCEDURE — 87106 FUNGI IDENTIFICATION YEAST: CPT

## 2019-03-25 PROCEDURE — 93005 ELECTROCARDIOGRAM TRACING: CPT

## 2019-03-25 PROCEDURE — G0009 ADMIN PNEUMOCOCCAL VACCINE: HCPCS | Performed by: INTERNAL MEDICINE

## 2019-03-25 PROCEDURE — 87088 URINE BACTERIA CULTURE: CPT

## 2019-03-25 PROCEDURE — 87086 URINE CULTURE/COLONY COUNT: CPT

## 2019-03-25 PROCEDURE — G0378 HOSPITAL OBSERVATION PER HR: HCPCS

## 2019-03-25 PROCEDURE — 80053 COMPREHEN METABOLIC PANEL: CPT

## 2019-03-25 PROCEDURE — 63600175 PHARM REV CODE 636 W HCPCS: Performed by: HOSPITALIST

## 2019-03-25 PROCEDURE — 93010 ELECTROCARDIOGRAM REPORT: CPT | Mod: ,,, | Performed by: INTERNAL MEDICINE

## 2019-03-25 PROCEDURE — 94660 CPAP INITIATION&MGMT: CPT

## 2019-03-25 PROCEDURE — 85025 COMPLETE CBC W/AUTO DIFF WBC: CPT

## 2019-03-25 PROCEDURE — 90670 PCV13 VACCINE IM: CPT | Performed by: INTERNAL MEDICINE

## 2019-03-25 PROCEDURE — 63600175 PHARM REV CODE 636 W HCPCS: Performed by: INTERNAL MEDICINE

## 2019-03-25 PROCEDURE — 99900035 HC TECH TIME PER 15 MIN (STAT)

## 2019-03-25 PROCEDURE — 94761 N-INVAS EAR/PLS OXIMETRY MLT: CPT

## 2019-03-25 PROCEDURE — 27000221 HC OXYGEN, UP TO 24 HOURS

## 2019-03-25 PROCEDURE — S4991 NICOTINE PATCH NONLEGEND: HCPCS | Performed by: INTERNAL MEDICINE

## 2019-03-25 PROCEDURE — 93010 EKG 12-LEAD: ICD-10-PCS | Mod: ,,, | Performed by: INTERNAL MEDICINE

## 2019-03-25 PROCEDURE — 81000 URINALYSIS NONAUTO W/SCOPE: CPT

## 2019-03-25 PROCEDURE — 90471 IMMUNIZATION ADMIN: CPT | Performed by: INTERNAL MEDICINE

## 2019-03-25 PROCEDURE — S5571 INSULIN DISPOS PEN 3 ML: HCPCS | Performed by: HOSPITALIST

## 2019-03-25 PROCEDURE — 83036 HEMOGLOBIN GLYCOSYLATED A1C: CPT

## 2019-03-25 PROCEDURE — 21400001 HC TELEMETRY ROOM

## 2019-03-25 RX ORDER — PREDNISONE 20 MG/1
40 TABLET ORAL DAILY
Status: DISCONTINUED | OUTPATIENT
Start: 2019-03-25 | End: 2019-03-26 | Stop reason: HOSPADM

## 2019-03-25 RX ORDER — INSULIN ASPART 100 [IU]/ML
5 INJECTION, SOLUTION INTRAVENOUS; SUBCUTANEOUS
Status: DISCONTINUED | OUTPATIENT
Start: 2019-03-25 | End: 2019-03-26 | Stop reason: HOSPADM

## 2019-03-25 RX ADMIN — RIVAROXABAN 20 MG: 20 TABLET, FILM COATED ORAL at 07:03

## 2019-03-25 RX ADMIN — PRAVASTATIN SODIUM 40 MG: 40 TABLET ORAL at 09:03

## 2019-03-25 RX ADMIN — PREDNISONE 40 MG: 20 TABLET ORAL at 04:03

## 2019-03-25 RX ADMIN — HYDRALAZINE HYDROCHLORIDE 50 MG: 25 TABLET ORAL at 09:03

## 2019-03-25 RX ADMIN — PREGABALIN 75 MG: 50 CAPSULE ORAL at 09:03

## 2019-03-25 RX ADMIN — CLONIDINE HYDROCHLORIDE 0.1 MG: 0.1 TABLET ORAL at 06:03

## 2019-03-25 RX ADMIN — IPRATROPIUM BROMIDE AND ALBUTEROL SULFATE 3 ML: .5; 3 SOLUTION RESPIRATORY (INHALATION) at 08:03

## 2019-03-25 RX ADMIN — NICOTINE 1 PATCH: 21 PATCH, EXTENDED RELEASE TRANSDERMAL at 09:03

## 2019-03-25 RX ADMIN — LISINOPRIL 40 MG: 20 TABLET ORAL at 09:03

## 2019-03-25 RX ADMIN — IPRATROPIUM BROMIDE AND ALBUTEROL SULFATE 3 ML: .5; 3 SOLUTION RESPIRATORY (INHALATION) at 03:03

## 2019-03-25 RX ADMIN — IPRATROPIUM BROMIDE AND ALBUTEROL SULFATE 3 ML: .5; 3 SOLUTION RESPIRATORY (INHALATION) at 05:03

## 2019-03-25 RX ADMIN — RAMELTEON 8 MG: 8 TABLET, FILM COATED ORAL at 09:03

## 2019-03-25 RX ADMIN — IPRATROPIUM BROMIDE AND ALBUTEROL SULFATE 3 ML: .5; 3 SOLUTION RESPIRATORY (INHALATION) at 07:03

## 2019-03-25 RX ADMIN — INSULIN ASPART 4 UNITS: 100 INJECTION, SOLUTION INTRAVENOUS; SUBCUTANEOUS at 05:03

## 2019-03-25 RX ADMIN — INSULIN ASPART 5 UNITS: 100 INJECTION, SOLUTION INTRAVENOUS; SUBCUTANEOUS at 12:03

## 2019-03-25 RX ADMIN — FUROSEMIDE 40 MG: 40 TABLET ORAL at 09:03

## 2019-03-25 RX ADMIN — ISOSORBIDE MONONITRATE 90 MG: 30 TABLET, EXTENDED RELEASE ORAL at 09:03

## 2019-03-25 RX ADMIN — METOPROLOL TARTRATE 100 MG: 50 TABLET ORAL at 09:03

## 2019-03-25 RX ADMIN — ACETAMINOPHEN 500 MG: 500 TABLET, FILM COATED ORAL at 04:03

## 2019-03-25 RX ADMIN — INSULIN DETEMIR 15 UNITS: 100 INJECTION, SOLUTION SUBCUTANEOUS at 09:03

## 2019-03-25 RX ADMIN — ASPIRIN 81 MG: 81 TABLET, COATED ORAL at 09:03

## 2019-03-25 RX ADMIN — DOXYCYCLINE HYCLATE 100 MG: 100 TABLET, COATED ORAL at 09:03

## 2019-03-25 RX ADMIN — PNEUMOCOCCAL 13-VALENT CONJUGATE VACCINE 0.5 ML: 2.2; 2.2; 2.2; 2.2; 2.2; 4.4; 2.2; 2.2; 2.2; 2.2; 2.2; 2.2; 2.2 INJECTION, SUSPENSION INTRAMUSCULAR at 04:03

## 2019-03-25 RX ADMIN — IPRATROPIUM BROMIDE AND ALBUTEROL SULFATE 3 ML: .5; 3 SOLUTION RESPIRATORY (INHALATION) at 11:03

## 2019-03-25 RX ADMIN — CEFTRIAXONE 1 G: 1 INJECTION, SOLUTION INTRAVENOUS at 04:03

## 2019-03-25 RX ADMIN — AMIODARONE HYDROCHLORIDE 200 MG: 200 TABLET ORAL at 09:03

## 2019-03-25 RX ADMIN — INSULIN ASPART 4 UNITS: 100 INJECTION, SOLUTION INTRAVENOUS; SUBCUTANEOUS at 04:03

## 2019-03-25 RX ADMIN — INSULIN ASPART 2 UNITS: 100 INJECTION, SOLUTION INTRAVENOUS; SUBCUTANEOUS at 09:03

## 2019-03-25 RX ADMIN — INSULIN ASPART 5 UNITS: 100 INJECTION, SOLUTION INTRAVENOUS; SUBCUTANEOUS at 05:03

## 2019-03-25 RX ADMIN — METHYLPREDNISOLONE SODIUM SUCCINATE 125 MG: 125 INJECTION, POWDER, FOR SOLUTION INTRAMUSCULAR; INTRAVENOUS at 05:03

## 2019-03-25 NOTE — NURSING
Pt c/o chest pain. resp called and breathing tx given and schedule solumedrol given as ordered by MD. MD paged

## 2019-03-25 NOTE — ASSESSMENT & PLAN NOTE
Patient was in respiratory distress and was hypoxic at home for which she was placed on a non-rebreather mask.  I have reviewed the chest X-ray and it reveals no focal infiltrates or pleural effusions.  An ABG was obtained which was significant for 7.313  60.2  88  30.5 on BPAP 10  5  40%.  As she is now somnolent at this time, will repeat the ABG.  Will continue frequent nebulized JORDAN/LAMA; intravenous corticosteroids; and empiric antibiotics.

## 2019-03-25 NOTE — PT/OT/SLP EVAL
Physical Therapy Evaluation    Patient Name:  Yasmeen Palm   MRN:  [id number]    Recommendations:     Discharge Recommendations:  home health PT   Discharge Equipment Recommendations: none   Barriers to discharge: None    Assessment:     Yasmeen Palm is a 69 y.o. female admitted with a medical diagnosis of COPD with acute exacerbation.  She presents with the following impairments/functional limitations:  weakness, impaired functional mobilty, decreased safety awareness, impaired coordination, impaired endurance, gait instability, decreased coordination, pain, impaired balance, impaired self care skills, impaired cardiopulmonary response to activity .    Rehab Prognosis: Fair; patient would benefit from acute skilled PT services to address these deficits and reach maximum level of function.    Recent Surgery: * No surgery found *      Plan:     During this hospitalization, patient to be seen 3 x/week to address the identified rehab impairments via gait training, therapeutic activities, therapeutic exercises and progress toward the following goals:    · Plan of Care Expires:  04/08/19    Subjective     Chief Complaint: back pain  Patient/Family Comments/goals: PLOF  Pain/Comfort:  Pain Rating 1: 6/10  Location 1: back  Pain Addressed 1: Reposition, Distraction, Cessation of Activity, Nurse notified  Pain Rating Post-Intervention 1: 6/10    Patients cultural, spiritual, Zoroastrian conflicts given the current situation: no    Living Environment:  Pt lives with her spouse in a [facility] with 3 ANGELIA, B HR's  Prior to admission, patients level of function was Mod Indep wiwth ambulation and ADL's.  Equipment used at home: cane, straight, oxygen, walker, rolling, rollator, bedside commode, shower chair, wheelchair.  DME owned (not currently used): wheelchair.  Upon discharge, patient will have assistance from sposue.    Objective:     Communicated with nsg prior to session.  Patient found HOB elevated with bed alarm, oxygen  upon  PT entry to room.    General Precautions: Standard, fall, respiratory   Orthopedic Precautions:N/A   Braces: N/A     Exams:  · Cognitive Exam:  Patient is oriented to Person, Place, Time and Situation  · Gross Motor Coordination:  mildly impaired 2/2 deconditioning and weakness  · Postural Exam:  Patient presented with the following abnormalities:    · -       Rounded shoulders  · -       Forward head  · Sensation:    · -       Intact  light/touch B Le's  · Skin Integrity/Edema:      · -       Skin integrity: Visible skin intact  · RLE ROM: WFL  · RLE Strength: WFL  · LLE ROM: WFL  · LLE Strength: WFL    Functional Mobility:  · Bed Mobility:     · Scooting: modified independence  · Supine to Sit: modified independence  · Sit to Supine: minimum assistance  · Transfers:     · Sit to Stand:  stand by assistance with no AD  · Bed to Chair: stand by assistance with  no AD  using  Step Transfer  · Gait: 5 sidesteps at EOB with CGA  · Balance: Fair+ sit, Fair stand      Therapeutic Activities and Exercises:   Reviewed pursed lip breathing with patient who was able to return demonstration with VC    AM-PAC 6 CLICK MOBILITY  Total Score:20     Patient left HOB elevated with all lines intact, call button in reach, bed alarm on and nsg notified.    GOALS:   Multidisciplinary Problems     Physical Therapy Goals        Problem: Physical Therapy Goal    Goal Priority Disciplines Outcome Goal Variances Interventions   Physical Therapy Goal     PT, PT/OT                      History:     Past Medical History:   Diagnosis Date    Anticoagulant long-term use     Xarelto    Arthritis     Asthma     CHF (congestive heart failure)     COPD (chronic obstructive pulmonary disease)     Coronary artery disease     Deep vein thrombosis (DVT) of left lower extremity     Depression     Diabetes mellitus     GERD (gastroesophageal reflux disease)     Hypertension     Obstructive sleep apnea on CPAP     On home oxygen therapy      Unsteady gait     uses either walker or 4 prong cane       Past Surgical History:   Procedure Laterality Date    CORONARY ANGIOPLASTY WITH STENT PLACEMENT      HERNIA REPAIR      encapsulated umbilical hernia       Time Tracking:     PT Received On: 03/25/19  PT Start Time: 1510     PT Stop Time: 1525  PT Total Time (min): 15 min     Billable Minutes: Evaluation 15      Pat Renteria, PT  03/25/2019

## 2019-03-25 NOTE — PLAN OF CARE
Problem: Fall Injury Risk  Goal: Absence of Fall and Fall-Related Injury  Outcome: Ongoing (interventions implemented as appropriate)  Intervention: Identify and Manage Contributors to Fall Injury Risk     03/25/19 1703   Identify and Manage Contributors to Fall Injury Risk   Self-Care Promotion BADL personal objects within reach;meal setup provided   Manage Acute Allergic Reaction   Medication Review/Management medications reviewed;high risk medications identified         Problem: Diabetes Comorbidity  Goal: Blood Glucose Level Within Desired Range  Outcome: Ongoing (interventions implemented as appropriate)  Intervention: Maintain Glycemic Control     03/25/19 1703   Monitor and Manage Ketoacidosis   Glycemic Management blood glucose monitoring;insulin dose matched to carbohydrate intake         Problem: Skin Injury Risk Increased  Goal: Skin Health and Integrity  Outcome: Ongoing (interventions implemented as appropriate)  Intervention: Optimize Skin Protection     03/25/19 1703   Prevent Additional Skin Injury   Head of Bed (HOB) HOB at 30-45 degrees   Pressure Reduction Devices pressure-redistributing mattress utilized   Pressure Reduction Techniques frequent weight shift encouraged;sit time limited to 1 hour   Monitor and Manage Hypervolemia   Skin Protection incontinence pads utilized

## 2019-03-25 NOTE — NURSING
Pt in bed. Nonrebreather on. resp call for pt to be placed on bipap. Pt denies any pain. No s/s of distress noted. Will continue to monitor

## 2019-03-25 NOTE — PROGRESS NOTES
Pt currently resting without distress or skin breakdown while on ordered continuous Bipap with following settings.  12/5 14 RR with 3 lpm cannula bleed in.  RT will continue to monitor

## 2019-03-25 NOTE — NURSING
Pt c/o chest pain 9 out 0f 10. B/P elevated 187/88. Clonidine given per MD order. Also pt had 6  Beats of v tach.MD  Paged and telephone order given for stat ekg and troponin.

## 2019-03-25 NOTE — SUBJECTIVE & OBJECTIVE
Past Medical History:   Diagnosis Date    Anticoagulant long-term use     Xarelto    Arthritis     Asthma     CHF (congestive heart failure)     COPD (chronic obstructive pulmonary disease)     Coronary artery disease     Deep vein thrombosis (DVT) of left lower extremity     Depression     Diabetes mellitus     GERD (gastroesophageal reflux disease)     Hypertension     Obstructive sleep apnea on CPAP     On home oxygen therapy     Unsteady gait     uses either walker or 4 prong cane       Past Surgical History:   Procedure Laterality Date    CORONARY ANGIOPLASTY WITH STENT PLACEMENT      HERNIA REPAIR      encapsulated umbilical hernia       Review of patient's allergies indicates:  No Known Allergies    No current facility-administered medications on file prior to encounter.      Current Outpatient Medications on File Prior to Encounter   Medication Sig    amiodarone (PACERONE) 200 MG Tab Take 1 tablet (200 mg total) by mouth once daily.    aspirin (ECOTRIN) 81 MG EC tablet Take 81 mg by mouth once daily.    furosemide (LASIX) 40 MG tablet Take 40 mg by mouth once daily.     gabapentin (NEURONTIN) 300 MG capsule Take 300 mg by mouth 3 (three) times daily.    lisinopril (PRINIVIL,ZESTRIL) 40 MG tablet Take 1 tablet (40 mg total) by mouth once daily. Do not take this medication if blood pressure is below 130/80 mmHg and/or feeling dizzy after taking all other blood pressure meds    metformin (GLUCOPHAGE) 500 MG tablet Take 1,000 mg by mouth 2 (two) times daily with meals.     multivitamin (THERAGRAN) per tablet Take 1 tablet by mouth once daily.    pantoprazole (PROTONIX) 40 MG tablet Take 40 mg by mouth once daily.    pregabalin (LYRICA) 75 MG capsule Take 75 mg by mouth 2 (two) times daily.    rivaroxaban (XARELTO) 20 mg Tab Take 20 mg by mouth daily with dinner or evening meal.    acetaminophen (TYLENOL) 500 MG tablet Take 1 tablet (500 mg total) by mouth every 8 (eight) hours as  needed.    hydrALAZINE (APRESOLINE) 50 MG tablet Take 50 mg by mouth 2 (two) times daily.    isosorbide mononitrate (IMDUR) 30 MG 24 hr tablet Take 3 tablets (90 mg total) by mouth once daily.    levalbuterol (XOPENEX HFA) 45 mcg/actuation inhaler Inhale 2 puffs into the lungs every 4 (four) hours as needed for Wheezing or Shortness of Breath. Rescue    lidocaine (LIDODERM) 5 % Place 1 patch onto the skin once daily. Remove & Discard patch within 12 hours or as directed by MD    metoprolol tartrate (LOPRESSOR) 100 MG tablet Take 1 tablet (100 mg total) by mouth 2 (two) times daily.    pravastatin (PRAVACHOL) 40 MG tablet Take 40 mg by mouth once daily.    tiotropium (SPIRIVA) 18 mcg inhalation capsule Inhale 1 capsule (18 mcg total) into the lungs once daily. Controller     Family History     Problem Relation (Age of Onset)    Diabetes Father    Heart disease Mother    Hypertension Mother        Tobacco Use    Smoking status: Current Every Day Smoker     Packs/day: 0.50     Years: 55.00     Pack years: 27.50     Types: Cigarettes     Start date: 1963     Last attempt to quit: 2018     Years since quittin.2    Smokeless tobacco: Never Used   Substance and Sexual Activity    Alcohol use: Not Currently     Alcohol/week: 0.6 oz     Types: 1 Glasses of wine per week     Frequency: Never     Comment: socially    Drug use: No    Sexual activity: Never     Review of Systems   Unable to perform ROS: Mental status change   Respiratory: Positive for cough, shortness of breath and wheezing.      Objective:     Vital Signs (Most Recent):  Temp: 98.2 °F (36.8 °C) (19)  Pulse: 79 (19)  Resp: 18 (19)  BP: (!) 143/66 (191)  SpO2: 99 % (19) Vital Signs (24h Range):  Temp:  [98.2 °F (36.8 °C)-98.8 °F (37.1 °C)] 98.2 °F (36.8 °C)  Pulse:  [72-79] 79  Resp:  [16-23] 18  SpO2:  [95 %-100 %] 99 %  BP: (131-160)/(55-73) 143/66     Weight: 86.6 kg (191 lb)  Body  mass index is 29.91 kg/m².    Physical Exam   Constitutional: She is oriented to person, place, and time. She appears well-developed and well-nourished. No distress.   hypersomnolent   HENT:   Head: Normocephalic and atraumatic.   Right Ear: External ear normal.   Left Ear: External ear normal.   Nose: Nose normal.   BPAP mask in place   Eyes: Right eye exhibits no discharge. Left eye exhibits no discharge.   Neck: Normal range of motion.   Cardiovascular: Normal rate, regular rhythm, normal heart sounds and intact distal pulses. Exam reveals no gallop and no friction rub.   No murmur heard.  Pulmonary/Chest:   On BPAP with poor air movement, shallow breathing, but otherwise clear to auscultation bilaterally   Abdominal: Soft. Bowel sounds are normal. She exhibits no distension. There is no tenderness. There is no rebound and no guarding.   Musculoskeletal: Normal range of motion. She exhibits no edema.   Neurological: She is alert and oriented to person, place, and time.   Skin: Skin is warm and dry. She is not diaphoretic. No erythema.   Psychiatric: She has a normal mood and affect. Her behavior is normal. Judgment and thought content normal.   Vitals reviewed.          Significant Labs: All pertinent labs within the past 24 hours have been reviewed.    Significant Imaging: I have reviewed and interpreted all pertinent imaging results/findings within the past 24 hours.

## 2019-03-25 NOTE — ASSESSMENT & PLAN NOTE
Patient's blood pressure is well-controlled; will continue home regimen of furosemide, lisinopril, hydralazine, isosorbide, and metoprolol, and provide as-needed clonidine.

## 2019-03-25 NOTE — PLAN OF CARE
Problem: Functional Ability Impaired COPD (Chronic Obstructive Pulmonary Disease)  Goal: Optimal Level of Functional [place]    Intervention: Optimize Functional Ability     03/25/19 0600 03/25/19 0717   Monitor/Manage Chemotherapy Gastrointestinal Effects   Environmental Support  --  calm environment promoted;personal routine supported;rest periods encouraged   Identify and Manage Contributors to Fall Injury Risk   Self-Care Promotion  --  BADL personal objects within reach   Promote Airway Secretion Clearance   Activity Management activity adjusted per tolerance  --          Problem: Infection COPD (Chronic Obstructive Pulmonary Disease)  Goal: Absence of Infection Signs/Symptoms    Intervention: Prevent or Manage Infection     03/25/19 0717   Prevent or Manage Infection   Fever Reduction/Comfort Measures lightweight bedding         Problem: Respiratory Compromise COPD (Chronic Obstructive Pulmonary Disease)  Goal: Effective Oxygenation and Ventilation    Intervention: Optimize Oxygenation and Ventilation     03/25/19 0717   Support Asthma Symptom Control   Airway/Ventilation Management calming measures promoted;pulmonary hygiene promoted;airway patency maintained   Prevent Additional Skin Injury   Head of Bed (HOB) HOB at 45 degrees

## 2019-03-25 NOTE — PLAN OF CARE
03/25/19 1354   Discharge Assessment   Assessment Type Discharge Planning Assessment   Confirmed/corrected address and phone number on facesheet? Yes   Assessment information obtained from? Patient   Prior to hospitilization cognitive status: Alert/Oriented   Prior to hospitalization functional status: Independent;Assistive Equipment   Current cognitive status: Alert/Oriented   Current Functional Status: Independent;Assistive Equipment   Lives With spouse   Able to Return to Prior Arrangements yes   Is patient able to care for self after discharge? Yes   Who are your caregiver(s) and their phone number(s)? [place] [phone]   Patient's perception of discharge disposition home or selfcare;home health   Readmission Within the Last 30 Days no previous admission in last 30 days   Patient currently being followed by outpatient case management? No   Patient currently receives any other outside agency services? No   Equipment Currently Used at Home cane, straight;walker, standard   Do you have any problems affording any of your prescribed medications? No   Is the patient taking medications as prescribed? yes   Does the patient have transportation home? Yes   Transportation Anticipated family or friend will provide   Does the patient receive services at the [facility]? No   Discharge Plan A Home with family;Home Health   Discharge Plan B Home with family;Home Health   DME Needed Upon Discharge  none   Patient/Family in Agreement with Plan yes     [id number] - [place] [address] [address]  Phone: [phone] Fax: [phone]

## 2019-03-25 NOTE — ASSESSMENT & PLAN NOTE
Patient is currently in sinus rhythm; will continue her home regimen of amiodarone, metoprolol, and rivaroxaban.

## 2019-03-25 NOTE — PROGRESS NOTES
[facility] Hospital Medicine  Progress Note    Patient Name: Yasmeen Palm  MRN: [id number]  Patient Class: IP- Inpatient   Admission Date: 3/24/2019  Length of Stay: 1 days  Attending Physician: Sadia Ro MD  Primary Care Provider: Angel Orourke Jr, MD        Subjective:     Principal Problem:COPD with acute exacerbation    HPI:  Mrs. Yasmeen Palm is a 69 y.o. female known to me with essential hypertension, type 2 diabetes mellitus (HbA1c 5.7% Dec 2018), hyperlipidemia (.0 Dec 2018), paroxysmal atrial fibrillation ([id number] score 4) on chronic anticoagulation, COPD, chronic respiratory failure with hypoxia and hypercapnia, GERD, anemia of chronic disease, tobacco abuse, and history of PE/DVT on chronic anticoagulation who presents to [facility] ED with complaints of dyspnea for the past day.  She complains of wheezing and a productive cough but is unable to provide any further history due to hypersomnolence.  She told the ED physician that she had no chest pain, fevers, chills, nausea, not any vomiting.  She activated EMS and was given albuterol, ipratropium, and corticosteroids in the field.  Further history is limited at this time.    Hospital Course:  Presented with acute on chronic respiratory failure due to COPD exacerbation. Initially requiring continuous BiPAP; weaned to NC on the following day. Started on steroids, nebs, doxycycline.     Interval History: Seen just after BiPAP taken off. She is able to answer questions but she is delayed. She has no specific complaints.     Review of Systems   Unable to perform ROS: Mental status change   Constitutional: Negative for chills and fever.   Respiratory: Negative for cough, chest tightness and shortness of breath.    Cardiovascular: Negative for chest pain.   Gastrointestinal: Negative for abdominal pain.     Objective:     Vital Signs (Most Recent):  Temp: 98.3 °F (36.8 °C) (03/25/19 1146)  Pulse: 94 (03/25/19 1146)  Resp:  19 (03/25/19 1146)  BP: 139/66 (03/25/19 1146)  SpO2: 97 % (03/25/19 1146) Vital Signs (24h Range):  Temp:  [97.4 °F (36.3 °C)-98.8 °F (37.1 °C)] 98.3 °F (36.8 °C)  Pulse:  [70-94] 94  Resp:  [16-23] 19  SpO2:  [91 %-100 %] 97 %  BP: (131-196)/(55-94) 139/66     Weight: 87.1 kg (192 lb 0.3 oz)  Body mass index is 30.07 kg/m².    Intake/Output Summary (Last 24 hours) at 3/25/2019 1503  Last data filed at 3/25/2019 1200  Gross per 24 hour   Intake 300 ml   Output --   Net 300 ml      Physical Exam   Constitutional: She is oriented to person, place, and time. She appears well-developed and well-nourished.   Delayed speech   HENT:   Head: Normocephalic and atraumatic.   Nose: Nose normal.   Mouth/Throat: Oropharynx is clear and moist.   Cardiovascular: Normal rate, regular rhythm and normal heart sounds. Exam reveals no gallop and no friction rub.   No murmur heard.  Pulmonary/Chest: Effort normal. No stridor. No respiratory distress. She has wheezes. She has rales.   Abdominal: Soft. Bowel sounds are normal. She exhibits no distension and no mass. There is no tenderness. There is no guarding.   Musculoskeletal: She exhibits edema.   Neurological: She is oriented to person, place, and time.   Skin: Skin is warm and dry.   Nursing note and vitals reviewed.      Significant Labs: All pertinent labs within the past 24 hours have been reviewed.    Significant Imaging: I have reviewed and interpreted all pertinent imaging results/findings within the past 24 hours.    Assessment/Plan:      * COPD with acute exacerbation  Patient was in respiratory distress and was hypoxic at home for which she was placed on a non-rebreather mask.  I have reviewed the chest X-ray and it reveals no focal infiltrates or pleural effusions.  An ABG was obtained which was significant for 7.313  60.2  88  30.5 on BPAP 10  5  40%.  As she is now somnolent at this time.  Started on BiPAP  Weaned to NC on 3/25  Continue duonebs, change steroids to  prednisone, continue doxycycline  PT, OT consults  She has chronic respiratory failure on home O2-- goal SpO2 88-92%    Acute cystitis  Possible UTI contributing to mental status change  Start ceftriaxone 1g daily  UCx pending         Hyperlipidemia  Poorly-controlled; will continue patient's home regimen of pravastatin.    COPD (chronic obstructive pulmonary disease)  As addressed above.    Essential hypertension  Patient's blood pressure is well-controlled; will continue home regimen of furosemide, lisinopril, hydralazine, isosorbide, and metoprolol, and provide as-needed clonidine.    Acute on chronic respiratory failure with hypoxia and hypercapnia  As addressed above.    Paroxysmal atrial fibrillation  Patient is currently in sinus rhythm; will continue her home regimen of amiodarone, metoprolol, and rivaroxaban.    Chronic anticoagulation  As addressed above.    History of deep vein thrombosis  As addressed above.    History of pulmonary embolism  Stable; will continue rivaroxaban.    Anemia of chronic disease  The patient's H/H is stable and consistent with previous laboratory measurements, and the patient exhibits no signs or symptoms of acute bleeding; there is no indication for transfusion.  Will continue to monitor.    Tobacco abuse  Patient was counseled on smoking cessation and she will be provided a nicotine transdermal patch applied while inpatient.  Will provide additional smoking cessation counseling prior to discharge.    Gastroesophageal reflux disease without esophagitis  Will continue her home regimen of pantoprazole.    Controlled type 2 diabetes mellitus, without long-term current use of insulin  Well-controlled on a home regimen of metformin; will provide insulin sliding scale.  A1c 7.3%  ACHS accuchecks, ADA diet, hypoglycemic protocol         VTE Risk Mitigation (From admission, onward)        Ordered     rivaroxaban tablet 20 mg  With dinner      03/24/19 7580     Reason for No  Pharmacological VTE Prophylaxis  Once      03/24/19 2318     IP VTE HIGH RISK PATIENT  Once      03/24/19 2318              Sadia Ro MD  Department of Hospital Medicine   [facility]

## 2019-03-25 NOTE — ED NOTES
Pt resting in bed awake; pt has bipap in placed; pt aware of admission status; will continue to monitor.

## 2019-03-25 NOTE — ASSESSMENT & PLAN NOTE
Patient was in respiratory distress and was hypoxic at home for which she was placed on a non-rebreather mask.  I have reviewed the chest X-ray and it reveals no focal infiltrates or pleural effusions.  An ABG was obtained which was significant for 7.313  60.2  88  30.5 on BPAP 10  5  40%.  As she is now somnolent at this time.  Started on BiPAP  Weaned to NC on 3/25  Continue duonebs, change steroids to prednisone, continue doxycycline  PT, OT consults  She has chronic respiratory failure on home O2-- goal SpO2 88-92%

## 2019-03-25 NOTE — HPI
Mrs. Yasmeen Palm is a 69 y.o. female known to me with essential hypertension, type 2 diabetes mellitus (HbA1c 5.7% Dec 2018), hyperlipidemia (.0 Dec 2018), paroxysmal atrial fibrillation ([id number] score 4) on chronic anticoagulation, COPD, chronic respiratory failure with hypoxia and hypercapnia, GERD, anemia of chronic disease, tobacco abuse, and history of PE/DVT on chronic anticoagulation who presents to [facility] ED with complaints of dyspnea for the past day.  She complains of wheezing and a productive cough but is unable to provide any further history due to hypersomnolence.  She told the ED physician that she had no chest pain, fevers, chills, nausea, not any vomiting.  She activated EMS and was given albuterol, ipratropium, and corticosteroids in the field.  Further history is limited at this time.

## 2019-03-25 NOTE — ASSESSMENT & PLAN NOTE
Well-controlled on a home regimen of metformin; will provide insulin sliding scale.  A1c 7.3%  ACHS accuchecks, ADA diet, hypoglycemic protocol

## 2019-03-25 NOTE — HOSPITAL COURSE
Mrs. Yasmeen Palm is a 69 y.o. female with essential hypertension, type 2 diabetes mellitus (HbA1c 5.7% Dec 2018), hyperlipidemia (.0 Dec 2018), paroxysmal atrial fibrillation ([id number] score 4) on chronic anticoagulation, COPD, chronic respiratory failure with hypoxia and hypercapnia, GERD, anemia of chronic disease, tobacco abuse, and history of PE/DVT on chronic anticoagulation who presents to [facility] ED with complaints of dyspnea for the past day.  She complains of wheezing and a productive cough but was unable to provide any further history due to hypersomnolence.  She told the ED physician that she had no chest pain, fevers, chills, nausea, not any vomiting.  She activated EMS and was given albuterol, ipratropium, and corticosteroids in the field.patient was admitted  with acute on chronic respiratory failure due to COPD exacerbation. Initially requiring continuous BiPAP; weaned to NC on the following day. Started on steroids, nebs, doxycycline. With much improvement in her condition,her respiratiody status was back to baseline,mental status back to baseline,she was stable on NC O 2,she has already home oxygen,nebuzler and CPAP at home,she has been discharged home with PCP follow up in next few days.

## 2019-03-25 NOTE — MEDICAL/APP STUDENT
[facility]  History & Physical    Subjective:      Chief Complaint/Reason for Admission:     Yasmeen Palm is a 69 y.o. female with hypertension, type 2 DM, hyperlipidemia, atrial fibrillation, COPD, GERD and history of PE/DVT. She presented ot the ED with complaints of dyspnea for 1 day on 3/24/19.  She told ED physician she has no chest pain, fever, chills, nausea or vomitting.    No further history was taken due to hypersomnolence.     Past Medical History:   Diagnosis Date    Anticoagulant long-term use     Xarelto    Arthritis     Asthma     CHF (congestive heart failure)     COPD (chronic obstructive pulmonary disease)     Coronary artery disease     Deep vein thrombosis (DVT) of left lower extremity     Depression     Diabetes mellitus     GERD (gastroesophageal reflux disease)     Hypertension     Obstructive sleep apnea on CPAP     On home oxygen therapy     Unsteady gait     uses either walker or 4 prong cane     Past Surgical History:   Procedure Laterality Date    CORONARY ANGIOPLASTY WITH STENT PLACEMENT      HERNIA REPAIR      encapsulated umbilical hernia     Family History   Problem Relation Age of Onset    Heart disease Mother     Hypertension Mother     Diabetes Father      Social History     Tobacco Use    Smoking status: Current Every Day Smoker     Packs/day: 0.50     Years: 55.00     Pack years: 27.50     Types: Cigarettes     Start date: 1963     Last attempt to quit: 2018     Years since quittin.2    Smokeless tobacco: Never Used   Substance Use Topics    Alcohol use: Not Currently     Alcohol/week: 0.6 oz     Types: 1 Glasses of wine per week     Frequency: Never     Comment: socially    Drug use: No       PTA Medications   Medication Sig    amiodarone (PACERONE) 200 MG Tab Take 1 tablet (200 mg total) by mouth once daily.    aspirin (ECOTRIN) 81 MG EC tablet Take 81 mg by mouth once daily.    furosemide (LASIX) 40 MG tablet Take 40  mg by mouth once daily.     gabapentin (NEURONTIN) 300 MG capsule Take 300 mg by mouth 3 (three) times daily.    lisinopril (PRINIVIL,ZESTRIL) 40 MG tablet Take 1 tablet (40 mg total) by mouth once daily. Do not take this medication if blood pressure is below 130/80 mmHg and/or feeling dizzy after taking all other blood pressure meds    metformin (GLUCOPHAGE) 500 MG tablet Take 1,000 mg by mouth 2 (two) times daily with meals.     multivitamin (THERAGRAN) per tablet Take 1 tablet by mouth once daily.    pantoprazole (PROTONIX) 40 MG tablet Take 40 mg by mouth once daily.    pregabalin (LYRICA) 75 MG capsule Take 75 mg by mouth 2 (two) times daily.    rivaroxaban (XARELTO) 20 mg Tab Take 20 mg by mouth daily with dinner or evening meal.    acetaminophen (TYLENOL) 500 MG tablet Take 1 tablet (500 mg total) by mouth every 8 (eight) hours as needed.    hydrALAZINE (APRESOLINE) 50 MG tablet Take 50 mg by mouth 2 (two) times daily.    isosorbide mononitrate (IMDUR) 30 MG 24 hr tablet Take 3 tablets (90 mg total) by mouth once daily.    levalbuterol (XOPENEX HFA) 45 mcg/actuation inhaler Inhale 2 puffs into the lungs every 4 (four) hours as needed for Wheezing or Shortness of Breath. Rescue    lidocaine (LIDODERM) 5 % Place 1 patch onto the skin once daily. Remove & Discard patch within 12 hours or as directed by MD    metoprolol tartrate (LOPRESSOR) 100 MG tablet Take 1 tablet (100 mg total) by mouth 2 (two) times daily.    pravastatin (PRAVACHOL) 40 MG tablet Take 40 mg by mouth once daily.    tiotropium (SPIRIVA) 18 mcg inhalation capsule Inhale 1 capsule (18 mcg total) into the lungs once daily. Controller     Review of patient's allergies indicates:  No Known Allergies     Review of Systems   Unable to perform ROS: Patient unresponsive       Objective:      Vital Signs (Most Recent)  Temp: 97.6 °F (36.4 °C) (03/25/19 0738)  Pulse: 70 (03/25/19 0816)  Resp: (!) 23 (03/25/19 0816)  BP: (!) 159/69  (03/25/19 0738)  SpO2: (!) 91 % (03/25/19 0816)    Vital Signs Range (Last 24H):  Temp:  [97.4 °F (36.3 °C)-98.8 °F (37.1 °C)]   Pulse:  [70-88]   Resp:  [16-23]   BP: (131-196)/(55-94)   SpO2:  [91 %-100 %]     Physical Exam   Constitutional:   Hypersomnolent   Eyes: Pupils are equal, round, and reactive to light. Conjunctivae are normal.   Cardiovascular: Normal rate, regular rhythm, normal heart sounds and intact distal pulses.   Pulmonary/Chest:   Bipap 3L 91%  Lungs clear bilaterally   Abdominal: Soft. Normal appearance.   Musculoskeletal: She exhibits edema (Lower limb non pitting).   Neurological: She is unresponsive.   Skin: Skin is warm and dry. Capillary refill takes less than 2 seconds.   Nursing note and vitals reviewed.      Data Review:    CBC:   Lab Results   Component Value Date    WBC 12.49 03/25/2019    RBC 3.77 (L) 03/25/2019    HGB 9.5 (L) 03/25/2019    HCT 35.0 (L) 03/25/2019    HCT 38 11/03/2017     03/25/2019     BMP:   Lab Results   Component Value Date     (H) 03/25/2019     03/25/2019    K 4.5 03/25/2019     03/25/2019    CO2 30 (H) 03/25/2019    BUN 20 03/25/2019    CREATININE 0.8 03/25/2019    CALCIUM 9.4 03/25/2019     Coagulation:   Lab Results   Component Value Date    INR 1.1 03/24/2019    APTT 27.7 12/09/2018     Cardiac markers: No results found for: CKMB, TROPONINT, MYOGLOBIN  ABGs:   Lab Results   Component Value Date    PH 7.313 (L) 03/24/2019    PO2 88 03/24/2019    PCO2 60.2 (HH) 03/24/2019     Radiology review: Prominent cardiomediastinal silhouette. Aortic calcification. No acute processes, stable chronic findings.   ECG: Sinus rhythym with premature ventricular complexes, Septal infarct.     Assessment:      Active Hospital Problems    Diagnosis  POA    *COPD with acute exacerbation [J44.1]  Yes    Essential hypertension [I10]  Yes     Chronic    COPD (chronic obstructive pulmonary disease) [J44.9]  Yes     Chronic    Hyperlipidemia [E78.5]  Yes      Chronic    Acute on chronic respiratory failure with hypoxia and hypercapnia [J96.21, J96.22]  Yes     Chronic    Paroxysmal atrial fibrillation [I48.0]  Yes     Chronic    Chronic anticoagulation [Z79.01]  Not Applicable     Chronic    History of pulmonary embolism [Z86.711]  Yes     Chronic    History of deep vein thrombosis [Z86.718]  Not Applicable     Chronic    Anemia of chronic disease [D63.8]  Yes     Chronic    Type 2 diabetes mellitus, controlled [E11.9]  Yes     Chronic    Tobacco abuse [Z72.0]  Yes     Chronic    Gastroesophageal reflux disease without esophagitis [K21.9]  Yes     Chronic      Resolved Hospital Problems   No resolved problems to display.       Plan:    COPD with acute exacerbation  -Pt is hypoxic on 3L bipap  -ABG shows    PH 7.313 (L)   PO2 88   PCO2 60.2 (HH)   Metabolic acidosis  -continue JORDAN/LAMA and IV corticosteroids    Essential Hypertension  -Well controlled  -Continue home regimen of furosemide, lisinopril, hydralazine, isosorbide, and metoprolol    DM type 2  -Controlled, continue home metformin    Hyperlipidemia  -Poorly controlled, continue pravastatin    Paroxsymal afib  -Continue home amiodarone, metoprollo, rivaroxaban    Anticoagulation  -As above    Anemia  -stable, monitor    History of PE.DVT  -Continue rivaroxaban    Disposition  Continue to monitor

## 2019-03-25 NOTE — H&P
[facility] Hospital Medicine  History & Physical    Patient Name: Yasmeen Palm  MRN: [id number]  Admission Date: 3/24/2019  Attending Physician: Sadia Ro MD   Primary Care Provider: Angel Orourke Jr, MD         Patient information was obtained from patient.     Subjective:     Principal Problem:COPD with acute exacerbation    Chief Complaint: Shortness of breath for two days.    HPI: Mrs. Yasmeen Palm is a 69 y.o. female known to me with essential hypertension, type 2 diabetes mellitus (HbA1c 5.7% Dec 2018), hyperlipidemia (.0 Dec 2018), paroxysmal atrial fibrillation ([id number] score 4) on chronic anticoagulation, COPD, chronic respiratory failure with hypoxia and hypercapnia, GERD, anemia of chronic disease, tobacco abuse, and history of PE/DVT on chronic anticoagulation who presents to [facility] ED with complaints of dyspnea for the past day.  She complains of wheezing and a productive cough but is unable to provide any further history due to hypersomnolence.  She told the ED physician that she had no chest pain, fevers, chills, nausea, not any vomiting.  She activated EMS and was given albuterol, ipratropium, and corticosteroids in the field.  Further history is limited at this time.    Chart Review:  Previous Hospitalizations  Date Hospital Diagnosis   Feb 2019 [facility] COPD exacerbation    Dec 2018 [facility] Aphasia - negative work-up for CVA   Jun 2018 [facility] COPD exacerbation, respiratory failure, intubation, AFib with RVR   Dec 2017 [facility] COPD exacerbation    Nov 2017 [facility] COPD exacerbation    Aug 2017 [facility] Acute respiratory failure   Apr 2017 [facility] COPD exacerbation, respiratory failure, intubation    Sept 2016 [facility] PE/DVT   Dec 2015 [facility] Chest pain - normal nuclear stress test   Nov 2010 [facility] COPD exacerbation    Aug 2010 [facility] Chest pain - negative work-up     Past Medical History:   Diagnosis Date    Anticoagulant long-term use     Xarelto    Arthritis      Asthma     CHF (congestive heart failure)     COPD (chronic obstructive pulmonary disease)     Coronary artery disease     Deep vein thrombosis (DVT) of left lower extremity     Depression     Diabetes mellitus     GERD (gastroesophageal reflux disease)     Hypertension     Obstructive sleep apnea on CPAP     On home oxygen therapy     Unsteady gait     uses either walker or 4 prong cane       Past Surgical History:   Procedure Laterality Date    CORONARY ANGIOPLASTY WITH STENT PLACEMENT      HERNIA REPAIR      encapsulated umbilical hernia       Review of patient's allergies indicates:  No Known Allergies    No current facility-administered medications on file prior to encounter.      Current Outpatient Medications on File Prior to Encounter   Medication Sig    amiodarone (PACERONE) 200 MG Tab Take 1 tablet (200 mg total) by mouth once daily.    aspirin (ECOTRIN) 81 MG EC tablet Take 81 mg by mouth once daily.    furosemide (LASIX) 40 MG tablet Take 40 mg by mouth once daily.     gabapentin (NEURONTIN) 300 MG capsule Take 300 mg by mouth 3 (three) times daily.    lisinopril (PRINIVIL,ZESTRIL) 40 MG tablet Take 1 tablet (40 mg total) by mouth once daily. Do not take this medication if blood pressure is below 130/80 mmHg and/or feeling dizzy after taking all other blood pressure meds    metformin (GLUCOPHAGE) 500 MG tablet Take 1,000 mg by mouth 2 (two) times daily with meals.     multivitamin (THERAGRAN) per tablet Take 1 tablet by mouth once daily.    pantoprazole (PROTONIX) 40 MG tablet Take 40 mg by mouth once daily.    pregabalin (LYRICA) 75 MG capsule Take 75 mg by mouth 2 (two) times daily.    rivaroxaban (XARELTO) 20 mg Tab Take 20 mg by mouth daily with dinner or evening meal.    acetaminophen (TYLENOL) 500 MG tablet Take 1 tablet (500 mg total) by mouth every 8 (eight) hours as needed.    hydrALAZINE (APRESOLINE) 50 MG tablet Take 50 mg by mouth 2 (two) times daily.     isosorbide mononitrate (IMDUR) 30 MG 24 hr tablet Take 3 tablets (90 mg total) by mouth once daily.    levalbuterol (XOPENEX HFA) 45 mcg/actuation inhaler Inhale 2 puffs into the lungs every 4 (four) hours as needed for Wheezing or Shortness of Breath. Rescue    lidocaine (LIDODERM) 5 % Place 1 patch onto the skin once daily. Remove & Discard patch within 12 hours or as directed by MD    metoprolol tartrate (LOPRESSOR) 100 MG tablet Take 1 tablet (100 mg total) by mouth 2 (two) times daily.    pravastatin (PRAVACHOL) 40 MG tablet Take 40 mg by mouth once daily.    tiotropium (SPIRIVA) 18 mcg inhalation capsule Inhale 1 capsule (18 mcg total) into the lungs once daily. Controller     Family History     Problem Relation (Age of Onset)    Diabetes Father    Heart disease Mother    Hypertension Mother        Tobacco Use    Smoking status: Current Every Day Smoker     Packs/day: 0.50     Years: 55.00     Pack years: 27.50     Types: Cigarettes     Start date: 1963     Last attempt to quit: 2018     Years since quittin.2    Smokeless tobacco: Never Used   Substance and Sexual Activity    Alcohol use: Not Currently     Alcohol/week: 0.6 oz     Types: 1 Glasses of wine per week     Frequency: Never     Comment: socially    Drug use: No    Sexual activity: Never     Review of Systems   Unable to perform ROS: Mental status change   Respiratory: Positive for cough, shortness of breath and wheezing.      Objective:     Vital Signs (Most Recent):  Temp: 98.2 °F (36.8 °C) (19)  Pulse: 79 (19)  Resp: 18 (19)  BP: (!) 143/66 (19 2141)  SpO2: 99 % (19) Vital Signs (24h Range):  Temp:  [98.2 °F (36.8 °C)-98.8 °F (37.1 °C)] 98.2 °F (36.8 °C)  Pulse:  [72-79] 79  Resp:  [16-23] 18  SpO2:  [95 %-100 %] 99 %  BP: (131-160)/(55-73) 143/66     Weight: 86.6 kg (191 lb)  Body mass index is 29.91 kg/m².    Physical Exam   Constitutional: She is oriented to person,  place, and time. She appears well-developed and well-nourished. No distress.   hypersomnolent   HENT:   Head: Normocephalic and atraumatic.   Right Ear: External ear normal.   Left Ear: External ear normal.   Nose: Nose normal.   BPAP mask in place   Eyes: Right eye exhibits no discharge. Left eye exhibits no discharge.   Neck: Normal range of motion.   Cardiovascular: Normal rate, regular rhythm, normal heart sounds and intact distal pulses. Exam reveals no gallop and no friction rub.   No murmur heard.  Pulmonary/Chest:   On BPAP with poor air movement, shallow breathing, but otherwise clear to auscultation bilaterally   Abdominal: Soft. Bowel sounds are normal. She exhibits no distension. There is no tenderness. There is no rebound and no guarding.   Musculoskeletal: Normal range of motion. She exhibits no edema.   Neurological: She is alert and oriented to person, place, and time.   Skin: Skin is warm and dry. She is not diaphoretic. No erythema.   Psychiatric: She has a normal mood and affect. Her behavior is normal. Judgment and thought content normal.   Vitals reviewed.          Significant Labs: All pertinent labs within the past 24 hours have been reviewed.    Significant Imaging: I have reviewed and interpreted all pertinent imaging results/findings within the past 24 hours.    Assessment/Plan:     * COPD with acute exacerbation  Patient was in respiratory distress and was hypoxic at home for which she was placed on a non-rebreather mask.  I have reviewed the chest X-ray and it reveals no focal infiltrates or pleural effusions.  An ABG was obtained which was significant for 7.313  60.2  88  30.5 on BPAP 10  5  40%.  As she is now somnolent at this time, will repeat the ABG.  Will continue frequent nebulized JORDAN/LAMA; intravenous corticosteroids; and empiric antibiotics.     Acute on chronic respiratory failure with hypoxia and hypercapnia  As addressed above.    Essential hypertension  Patient's  blood pressure is well-controlled; will continue home regimen of furosemide, lisinopril, hydralazine, isosorbide, and metoprolol, and provide as-needed clonidine.    Type 2 diabetes mellitus, controlled  Well-controlled on a home regimen of metformin; will provide insulin sliding scale.    Hyperlipidemia  Poorly-controlled; will continue patient's home regimen of pravastatin.    Paroxysmal atrial fibrillation  Patient is currently in sinus rhythm; will continue her home regimen of amiodarone, metoprolol, and rivaroxaban.    Chronic anticoagulation  As addressed above.    COPD (chronic obstructive pulmonary disease)  As addressed above.    Anemia of chronic disease  The patient's H/H is stable and consistent with previous laboratory measurements, and the patient exhibits no signs or symptoms of acute bleeding; there is no indication for transfusion.  Will continue to monitor.    Gastroesophageal reflux disease without esophagitis  Will continue her home regimen of pantoprazole.    Tobacco abuse  Patient was counseled on smoking cessation and she will be provided a nicotine transdermal patch applied while inpatient.  Will provide additional smoking cessation counseling prior to discharge.    History of pulmonary embolism  Stable; will continue rivaroxaban.    History of deep vein thrombosis  As addressed above.    VTE Risk Mitigation (From admission, onward)        Ordered     rivaroxaban tablet 20 mg  With dinner      03/24/19 2318     Reason for No Pharmacological VTE Prophylaxis  Once      03/24/19 2318     IP VTE HIGH RISK PATIENT  Once      03/24/19 2318           Gaby Childers M.D.  Staff Nocturnist  Department of Hospital Medicine  [facility]  Pager: [phone]

## 2019-03-25 NOTE — SUBJECTIVE & OBJECTIVE
Interval History: Seen just after BiPAP taken off. She is able to answer questions but she is delayed. She has no specific complaints.     Review of Systems   Unable to perform ROS: Mental status change   Constitutional: Negative for chills and fever.   Respiratory: Negative for cough, chest tightness and shortness of breath.    Cardiovascular: Negative for chest pain.   Gastrointestinal: Negative for abdominal pain.     Objective:     Vital Signs (Most Recent):  Temp: 98.3 °F (36.8 °C) (03/25/19 1146)  Pulse: 94 (03/25/19 1146)  Resp: 19 (03/25/19 1146)  BP: 139/66 (03/25/19 1146)  SpO2: 97 % (03/25/19 1146) Vital Signs (24h Range):  Temp:  [97.4 °F (36.3 °C)-98.8 °F (37.1 °C)] 98.3 °F (36.8 °C)  Pulse:  [70-94] 94  Resp:  [16-23] 19  SpO2:  [91 %-100 %] 97 %  BP: (131-196)/(55-94) 139/66     Weight: 87.1 kg (192 lb 0.3 oz)  Body mass index is 30.07 kg/m².    Intake/Output Summary (Last 24 hours) at 3/25/2019 1503  Last data filed at 3/25/2019 1200  Gross per 24 hour   Intake 300 ml   Output --   Net 300 ml      Physical Exam   Constitutional: She is oriented to person, place, and time. She appears well-developed and well-nourished.   Delayed speech   HENT:   Head: Normocephalic and atraumatic.   Nose: Nose normal.   Mouth/Throat: Oropharynx is clear and moist.   Cardiovascular: Normal rate, regular rhythm and normal heart sounds. Exam reveals no gallop and no friction rub.   No murmur heard.  Pulmonary/Chest: Effort normal. No stridor. No respiratory distress. She has wheezes. She has rales.   Abdominal: Soft. Bowel sounds are normal. She exhibits no distension and no mass. There is no tenderness. There is no guarding.   Musculoskeletal: She exhibits edema.   Neurological: She is oriented to person, place, and time.   Skin: Skin is warm and dry.   Nursing note and vitals reviewed.      Significant Labs: All pertinent labs within the past 24 hours have been reviewed.    Significant Imaging: I have reviewed and  interpreted all pertinent imaging results/findings within the past 24 hours.

## 2019-03-26 VITALS
WEIGHT: 186.31 LBS | RESPIRATION RATE: 18 BRPM | TEMPERATURE: 99 F | HEART RATE: 105 BPM | OXYGEN SATURATION: 98 % | SYSTOLIC BLOOD PRESSURE: 110 MMHG | HEIGHT: 67 IN | DIASTOLIC BLOOD PRESSURE: 63 MMHG | BODY MASS INDEX: 29.24 KG/M2

## 2019-03-26 LAB
POCT GLUCOSE: 205 MG/DL (ref 70–110)
POCT GLUCOSE: 225 MG/DL (ref 70–110)
POCT GLUCOSE: 250 MG/DL (ref 70–110)
POCT GLUCOSE: 341 MG/DL (ref 70–110)

## 2019-03-26 PROCEDURE — 25000242 PHARM REV CODE 250 ALT 637 W/ HCPCS: Performed by: INTERNAL MEDICINE

## 2019-03-26 PROCEDURE — 25000003 PHARM REV CODE 250: Performed by: INTERNAL MEDICINE

## 2019-03-26 PROCEDURE — 99900035 HC TECH TIME PER 15 MIN (STAT)

## 2019-03-26 PROCEDURE — S4991 NICOTINE PATCH NONLEGEND: HCPCS | Performed by: INTERNAL MEDICINE

## 2019-03-26 PROCEDURE — G0378 HOSPITAL OBSERVATION PER HR: HCPCS

## 2019-03-26 PROCEDURE — 27000221 HC OXYGEN, UP TO 24 HOURS

## 2019-03-26 PROCEDURE — 94640 AIRWAY INHALATION TREATMENT: CPT

## 2019-03-26 PROCEDURE — 63600175 PHARM REV CODE 636 W HCPCS: Performed by: HOSPITALIST

## 2019-03-26 PROCEDURE — 94660 CPAP INITIATION&MGMT: CPT

## 2019-03-26 PROCEDURE — 94760 N-INVAS EAR/PLS OXIMETRY 1: CPT

## 2019-03-26 RX ADMIN — IPRATROPIUM BROMIDE AND ALBUTEROL SULFATE 3 ML: .5; 3 SOLUTION RESPIRATORY (INHALATION) at 03:03

## 2019-03-26 RX ADMIN — INSULIN ASPART 5 UNITS: 100 INJECTION, SOLUTION INTRAVENOUS; SUBCUTANEOUS at 05:03

## 2019-03-26 RX ADMIN — NICOTINE 1 PATCH: 21 PATCH, EXTENDED RELEASE TRANSDERMAL at 09:03

## 2019-03-26 RX ADMIN — INSULIN ASPART 5 UNITS: 100 INJECTION, SOLUTION INTRAVENOUS; SUBCUTANEOUS at 12:03

## 2019-03-26 RX ADMIN — INSULIN ASPART 2 UNITS: 100 INJECTION, SOLUTION INTRAVENOUS; SUBCUTANEOUS at 05:03

## 2019-03-26 RX ADMIN — PREGABALIN 75 MG: 50 CAPSULE ORAL at 09:03

## 2019-03-26 RX ADMIN — INSULIN ASPART 5 UNITS: 100 INJECTION, SOLUTION INTRAVENOUS; SUBCUTANEOUS at 09:03

## 2019-03-26 RX ADMIN — INSULIN ASPART 2 UNITS: 100 INJECTION, SOLUTION INTRAVENOUS; SUBCUTANEOUS at 01:03

## 2019-03-26 RX ADMIN — AMIODARONE HYDROCHLORIDE 200 MG: 200 TABLET ORAL at 09:03

## 2019-03-26 RX ADMIN — DOXYCYCLINE HYCLATE 100 MG: 100 TABLET, COATED ORAL at 09:03

## 2019-03-26 RX ADMIN — IPRATROPIUM BROMIDE AND ALBUTEROL SULFATE 3 ML: .5; 3 SOLUTION RESPIRATORY (INHALATION) at 07:03

## 2019-03-26 RX ADMIN — PREDNISONE 40 MG: 20 TABLET ORAL at 09:03

## 2019-03-26 RX ADMIN — ASPIRIN 81 MG: 81 TABLET, COATED ORAL at 09:03

## 2019-03-26 RX ADMIN — CEFTRIAXONE 1 G: 1 INJECTION, SOLUTION INTRAVENOUS at 01:03

## 2019-03-26 RX ADMIN — ISOSORBIDE MONONITRATE 90 MG: 30 TABLET, EXTENDED RELEASE ORAL at 09:03

## 2019-03-26 RX ADMIN — LISINOPRIL 40 MG: 20 TABLET ORAL at 09:03

## 2019-03-26 RX ADMIN — PRAVASTATIN SODIUM 40 MG: 40 TABLET ORAL at 09:03

## 2019-03-26 RX ADMIN — FUROSEMIDE 40 MG: 40 TABLET ORAL at 09:03

## 2019-03-26 RX ADMIN — RIVAROXABAN 20 MG: 20 TABLET, FILM COATED ORAL at 05:03

## 2019-03-26 RX ADMIN — IPRATROPIUM BROMIDE AND ALBUTEROL SULFATE 3 ML: .5; 3 SOLUTION RESPIRATORY (INHALATION) at 11:03

## 2019-03-26 RX ADMIN — ACETAMINOPHEN 500 MG: 500 TABLET, FILM COATED ORAL at 09:03

## 2019-03-26 NOTE — NURSING
Discharge instructions given to patient  at bedside. Patient  verbalized an understanding and states a willingness to comply. Saline lock removed. Tele monitoring removed.

## 2019-03-26 NOTE — PROGRESS NOTES
WRITTEN HEALTHCARE DISCHARGE INFORMATION      Things that YOU are responsible for to Manage Your Care At Home:  1. Getting your prescriptions filled.  2. Taking you medications as directed. DO NOT MISS ANY DOSES!  3. Going to your follow-up doctor appointments. This is important because it allows the doctor to monitor your progress and to determine if any changes need to be made to your treatment plan.     If you are unable to make your follow up appointments, please call the number listed and reschedule this appointment.      After discharge, if you need assistance, you can call [facility] for 24/7 assistance at [phone]     If you are experience any signs or symptoms, Call your doctor or Call 911 and come to your nearest Emergency Room.     Thank you for choosing [facility] and allowing us to care for you.        You should receive a call from [facility] within 48-72 hours to help manage your care after discharge. Please try to make sure that you answer your phone for this important phone call.     Follow-up Information     Angel Orourke Jr, MD In 1 week.    Specialty:  Family Medicine  Contact information:  [address] [facility] [address]  [phone]

## 2019-03-26 NOTE — PLAN OF CARE
03/26/19 1229   Final Note   Assessment Type Final Discharge Note   Anticipated Discharge Disposition Home   Hospital Follow Up  Appt(s) scheduled? Yes   Discharge plans and expectations educations in teach back method with documentation complete? Yes   Right Care Referral Info   Post Acute Recommendation No Care   TN advised [facility] FABY Acevedo that TN attempted to contact pts nurse Renan and was not able to get in contact with her.  TN requested that FABY Acevedo advised FABY Urrutia that all CM needs have been addressed and can d/c from CM standpoint.

## 2019-03-26 NOTE — PLAN OF CARE
Problem: Diabetes Comorbidity  Goal: Blood Glucose Level Within Desired Range  Outcome: Outcome(s) achieved Date Met: 03/26/19  Intervention: Maintain Glycemic Control     03/26/19 0887   Monitor and Manage Ketoacidosis   Glycemic Management blood glucose monitoring;insulin dose matched to carbohydrate intake

## 2019-03-26 NOTE — NURSING
Pt accidentally pulled iv out. Attempted to restart stuck the patient twice unsuccessful. Will inform charge nurse to try.

## 2019-03-26 NOTE — DISCHARGE SUMMARY
[facility] Hospital Medicine  Discharge Summary      Patient Name: Yasmeen Palm  MRN: [id number]  Admission Date: 3/24/2019  Hospital Length of Stay: 2 days  Discharge Date and Time:  03/26/2019 12:06 PM  Attending Physician: Michelle Dumont MD   Discharging Provider: Michelle Dumont MD  Primary Care Provider: Angel Orourke Jr, MD      HPI:   Mrs. Yasmeen Palm is a 69 y.o. female known to me with essential hypertension, type 2 diabetes mellitus (HbA1c 5.7% Dec 2018), hyperlipidemia (.0 Dec 2018), paroxysmal atrial fibrillation ([id number] score 4) on chronic anticoagulation, COPD, chronic respiratory failure with hypoxia and hypercapnia, GERD, anemia of chronic disease, tobacco abuse, and history of PE/DVT on chronic anticoagulation who presents to [facility] ED with complaints of dyspnea for the past day.  She complains of wheezing and a productive cough but is unable to provide any further history due to hypersomnolence.  She told the ED physician that she had no chest pain, fevers, chills, nausea, not any vomiting.  She activated EMS and was given albuterol, ipratropium, and corticosteroids in the field.  Further history is limited at this time.    * No surgery found *      Hospital Course:   Mrs. Yasmene Palm is a 69 y.o. female with essential hypertension, type 2 diabetes mellitus (HbA1c 5.7% Dec 2018), hyperlipidemia (.0 Dec 2018), paroxysmal atrial fibrillation ([id number] score 4) on chronic anticoagulation, COPD, chronic respiratory failure with hypoxia and hypercapnia, GERD, anemia of chronic disease, tobacco abuse, and history of PE/DVT on chronic anticoagulation who presents to [facility] ED with complaints of dyspnea for the past day.  She complains of wheezing and a productive cough but was unable to provide any further history due to hypersomnolence.  She told the ED physician that she had no chest pain, fevers, chills, nausea, not any vomiting.  She  activated EMS and was given albuterol, ipratropium, and corticosteroids in the field.patient was admitted  with acute on chronic respiratory failure due to COPD exacerbation. Initially requiring continuous BiPAP; weaned to NC on the following day. Started on steroids, nebs, doxycycline. With much improvement in her condition,her respiratiody status was back to baseline,mental status back to baseline,she was stable on NC O 2,she has already home oxygen,nebuzler and CPAP at home,she has been discharged home with PCP follow up in next few days.     Consults:   Consults (From admission, onward)        Status Ordering Provider     IP consult to case management  Once     Provider:  (Not yet assigned)    Acknowledged TAMARA REYES            Final Active Diagnoses:    Diagnosis Date Noted POA    PRINCIPAL PROBLEM:  COPD with acute exacerbation [J44.1] 02/08/2019 Yes    Acute cystitis [N30.00] 03/25/2019 Yes    Essential hypertension [I10] 03/24/2019 Yes     Chronic    COPD (chronic obstructive pulmonary disease) [J44.9] 03/24/2019 Yes     Chronic    Hyperlipidemia [E78.5] 03/24/2019 Yes     Chronic    Acute on chronic respiratory failure with hypoxia and hypercapnia [J96.21, J96.22] 06/26/2018 Yes     Chronic    Paroxysmal atrial fibrillation [I48.0] 06/25/2018 Yes     Chronic    Chronic anticoagulation [Z79.01] 04/10/2017 Not Applicable     Chronic    History of pulmonary embolism [Z86.711] 04/10/2017 Yes     Chronic    History of deep vein thrombosis [Z86.718] 04/10/2017 Not Applicable     Chronic    Anemia of chronic disease [D63.8] 09/27/2016 Yes     Chronic    Controlled type 2 diabetes mellitus, without long-term current use of insulin [E11.9] 12/14/2015 Yes    Tobacco abuse [Z72.0] 12/14/2015 Yes     Chronic    Gastroesophageal reflux disease without esophagitis [K21.9] 12/14/2015 Yes     Chronic      Problems Resolved During this Admission:       Discharged Condition: stable    Disposition: Home  or Self Care    Follow Up:  Follow-up Information     Angel Orourke Jr, MD In 1 week.    Specialty:  Family Medicine  Contact information:  [address]  [address]  [facility] [address]  [phone]                 Patient Instructions:      Activity as tolerated       Significant Diagnostic Studies: Labs:   BMP:   Recent Labs   Lab 03/24/19  1822 03/25/19  0612   GLU 91 183*    142   K 4.4 4.5    104   CO2 32* 30*   BUN 13 20   CREATININE 0.7 0.8   CALCIUM 8.9 9.4   MG  --  1.8    and CBC   Recent Labs   Lab 03/24/19  1822 03/25/19  0612   WBC 13.88* 12.49   HGB 8.7* 9.5*   HCT 31.8* 35.0*    289     Radiology: X-Ray: CXR: X-Ray Chest 1 View (CXR): No results found for this visit on 03/24/19. and X-Ray Chest PA and Lateral (CXR): No results found for this visit on 03/24/19.    Pending Diagnostic Studies:     Procedure Component Value Units Date/Time    EKG 12-lead [[id number]] Collected:  03/25/19 0628    Order Status:  Sent Lab Status:  In process Updated:  03/25/19 0736    Narrative:       Test Reason : R07.9,    Vent. Rate : 109 BPM     Atrial Rate : 084 BPM     P-R Int : 204 ms          QRS Dur : 078 ms      QT Int : 380 ms       P-R-T Axes : 076 -20 076 degrees     QTc Int : 511 ms    Sinus rhythm with frequent Premature ventricular complexes  Septal infarct ,age undetermined  Abnormal ECG  When compared with ECG of 24-MAR-2019 18:12,  Significant changes have occurred    Referred By: [email or web address]   SELF           Confirmed By:          Medications:  Reconciled Home Medications:      Medication List      CONTINUE taking these medications    acetaminophen 500 MG tablet  Commonly known as:  TYLENOL  Take 1 tablet (500 mg total) by mouth every 8 (eight) hours as needed.     amiodarone 200 MG Tab  Commonly known as:  PACERONE  Take 1 tablet (200 mg total) by mouth once daily.     aspirin 81 MG EC tablet  Commonly known as:  ECOTRIN  Take 81 mg by mouth once  daily.     furosemide 40 MG tablet  Commonly known as:  LASIX  Take 40 mg by mouth once daily.     gabapentin 300 MG capsule  Commonly known as:  NEURONTIN  Take 300 mg by mouth 3 (three) times daily.     hydrALAZINE 50 MG tablet  Commonly known as:  APRESOLINE  Take 50 mg by mouth 2 (two) times daily.     isosorbide mononitrate 30 MG 24 hr tablet  Commonly known as:  IMDUR  Take 3 tablets (90 mg total) by mouth once daily.     levalbuterol 45 mcg/actuation inhaler  Commonly known as:  XOPENEX HFA  Inhale 2 puffs into the lungs every 4 (four) hours as needed for Wheezing or Shortness of Breath. Rescue     lidocaine 5 %  Commonly known as:  LIDODERM  Place 1 patch onto the skin once daily. Remove & Discard patch within 12 hours or as directed by MD     lisinopril 40 MG tablet  Commonly known as:  PRINIVIL,ZESTRIL  Take 1 tablet (40 mg total) by mouth once daily. Do not take this medication if blood pressure is below 130/80 mmHg and/or feeling dizzy after taking all other blood pressure meds     metFORMIN 500 MG tablet  Commonly known as:  GLUCOPHAGE  Take 1,000 mg by mouth 2 (two) times daily with meals.     metoprolol tartrate 100 MG tablet  Commonly known as:  LOPRESSOR  Take 1 tablet (100 mg total) by mouth 2 (two) times daily.     multivitamin per tablet  Commonly known as:  THERAGRAN  Take 1 tablet by mouth once daily.     pantoprazole 40 MG tablet  Commonly known as:  PROTONIX  Take 40 mg by mouth once daily.     pravastatin 40 MG tablet  Commonly known as:  PRAVACHOL  Take 40 mg by mouth once daily.     pregabalin 75 MG capsule  Commonly known as:  LYRICA  Take 75 mg by mouth 2 (two) times daily.     tiotropium 18 mcg inhalation capsule  Commonly known as:  SPIRIVA  Inhale 1 capsule (18 mcg total) into the lungs once daily. Controller     XARELTO 20 mg Tab  Generic drug:  rivaroxaban  Take 20 mg by mouth daily with dinner or evening meal.            Indwelling Lines/Drains at time of discharge:    Lines/Drains/Airways          None          Time spent on the discharge of patient: over 30  minutes  Patient was seen and examined on the date of discharge and determined to be suitable for discharge.         Michelle Dumont MD  Department of Hospital Medicine  [facility]

## 2019-03-26 NOTE — PROGRESS NOTES
TN taught Symptoms and Problems for COPD home care with pt and with teach back:  1. Increase use of fast acting inhaler, 2. Get updated immunization, 3. Coughing up bloody sputum. TN placed education sheet in LUMI Mask Packet..     Help at home will be from spouse, Getachew, , assisting in pt's recovery.     TN taught patient about things she is responsible for when discharged TO HELP WITH her RECOVERY:  Particularly on how to Manage her Care At Home:  1. Getting his prescriptions filled.  2. Taking his medications as directed. DO NOT MISS ANY DOSES!  3. Going to his follow-up doctor appointments.   .  Drea Rg RN, BSN, STN CCM

## 2019-03-27 NOTE — PT/OT/SLP DISCHARGE
Physical Therapy Discharge Summary    Name: Yasmeen Palm  MRN: [id number]   Principal Problem: COPD with acute exacerbation     Patient Discharged from acute Physical Therapy on 03/26/2019.  Please refer to prior PT noted date on 03/26/2019 for functional status.     Assessment:     Patient appropriate for care in another setting.    Objective:     GOALS:   Multidisciplinary Problems     Physical Therapy Goals        Problem: Physical Therapy Goal    Goal Priority Disciplines Outcome Goal Variances Interventions   Physical Therapy Goal     PT, PT/OT                      Reasons for Discontinuation of Therapy Services  Transfer to alternate level of care.      Plan:     Patient Discharged to: Home no PT services needed.    Pat Renteria, PT  3/27/2019

## 2019-03-28 LAB — BACTERIA UR CULT: NORMAL

## 2019-05-12 ENCOUNTER — HOSPITAL ENCOUNTER (EMERGENCY)
Facility: HOSPITAL | Age: 69
Discharge: HOME | End: 2019-05-12
Attending: EMERGENCY MEDICINE
Payer: MEDICARE

## 2019-05-12 VITALS
OXYGEN SATURATION: 99 % | TEMPERATURE: 99 F | SYSTOLIC BLOOD PRESSURE: 144 MMHG | HEART RATE: 78 BPM | RESPIRATION RATE: 22 BRPM | DIASTOLIC BLOOD PRESSURE: 65 MMHG

## 2019-05-12 DIAGNOSIS — D64.9 ANEMIA, UNSPECIFIED TYPE: ICD-10-CM

## 2019-05-12 DIAGNOSIS — J44.1 ACUTE EXACERBATION OF CHRONIC OBSTRUCTIVE PULMONARY DISEASE (COPD): Primary | ICD-10-CM

## 2019-05-12 DIAGNOSIS — N30.00 ACUTE CYSTITIS WITHOUT HEMATURIA: ICD-10-CM

## 2019-05-12 LAB
ALBUMIN SERPL BCP-MCNC: 3 G/DL (ref 3.5–5.2)
ALP SERPL-CCNC: 78 U/L (ref 55–135)
ALT SERPL W/O P-5'-P-CCNC: 8 U/L (ref 10–44)
ANION GAP SERPL CALC-SCNC: 3 MMOL/L (ref 8–16)
AST SERPL-CCNC: 11 U/L (ref 10–40)
BACTERIA #/AREA URNS HPF: ABNORMAL /HPF
BASOPHILS # BLD AUTO: 0.01 K/UL (ref 0–0.2)
BASOPHILS NFR BLD: 0.1 % (ref 0–1.9)
BILIRUB SERPL-MCNC: 0.1 MG/DL (ref 0.1–1)
BILIRUB UR QL STRIP: NEGATIVE
BNP SERPL-MCNC: 184 PG/ML (ref 0–99)
BUN SERPL-MCNC: 9 MG/DL (ref 8–23)
CALCIUM SERPL-MCNC: 9.3 MG/DL (ref 8.7–10.5)
CHLORIDE SERPL-SCNC: 105 MMOL/L (ref 95–110)
CLARITY UR: CLEAR
CO2 SERPL-SCNC: 37 MMOL/L (ref 23–29)
COLOR UR: YELLOW
CREAT SERPL-MCNC: 0.7 MG/DL (ref 0.5–1.4)
DIFFERENTIAL METHOD: ABNORMAL
EOSINOPHIL # BLD AUTO: 0.1 K/UL (ref 0–0.5)
EOSINOPHIL NFR BLD: 1 % (ref 0–8)
ERYTHROCYTE [DISTWIDTH] IN BLOOD BY AUTOMATED COUNT: 17.8 % (ref 11.5–14.5)
EST. GFR  (AFRICAN AMERICAN): >60 ML/MIN/1.73 M^2
EST. GFR  (NON AFRICAN AMERICAN): >60 ML/MIN/1.73 M^2
GLUCOSE SERPL-MCNC: 100 MG/DL (ref 70–110)
GLUCOSE UR QL STRIP: NEGATIVE
HCT VFR BLD AUTO: 30.7 % (ref 37–48.5)
HGB BLD-MCNC: 8.1 G/DL (ref 12–16)
HGB UR QL STRIP: NEGATIVE
KETONES UR QL STRIP: NEGATIVE
LEUKOCYTE ESTERASE UR QL STRIP: ABNORMAL
LYMPHOCYTES # BLD AUTO: 2 K/UL (ref 1–4.8)
LYMPHOCYTES NFR BLD: 15.8 % (ref 18–48)
MAGNESIUM SERPL-MCNC: 1.7 MG/DL (ref 1.6–2.6)
MCH RBC QN AUTO: 24.9 PG (ref 27–31)
MCHC RBC AUTO-ENTMCNC: 26.4 G/DL (ref 32–36)
MCV RBC AUTO: 95 FL (ref 82–98)
MICROSCOPIC COMMENT: ABNORMAL
MONOCYTES # BLD AUTO: 1.1 K/UL (ref 0.3–1)
MONOCYTES NFR BLD: 8.9 % (ref 4–15)
NEUTROPHILS # BLD AUTO: 9.4 K/UL (ref 1.8–7.7)
NEUTROPHILS NFR BLD: 74.6 % (ref 38–73)
NITRITE UR QL STRIP: POSITIVE
PH UR STRIP: 7 [PH] (ref 5–8)
PLATELET # BLD AUTO: 414 K/UL (ref 150–350)
PMV BLD AUTO: 9.1 FL (ref 9.2–12.9)
POTASSIUM SERPL-SCNC: 5.6 MMOL/L (ref 3.5–5.1)
PROT SERPL-MCNC: 6.3 G/DL (ref 6–8.4)
PROT UR QL STRIP: NEGATIVE
RBC # BLD AUTO: 3.25 M/UL (ref 4–5.4)
SODIUM SERPL-SCNC: 145 MMOL/L (ref 136–145)
SP GR UR STRIP: 1.01 (ref 1–1.03)
TROPONIN I SERPL DL<=0.01 NG/ML-MCNC: 0.02 NG/ML (ref 0–0.03)
URN SPEC COLLECT METH UR: ABNORMAL
UROBILINOGEN UR STRIP-ACNC: NEGATIVE EU/DL
WBC # BLD AUTO: 12.63 K/UL (ref 3.9–12.7)
WBC #/AREA URNS HPF: 40 /HPF (ref 0–5)
WBC CLUMPS URNS QL MICRO: ABNORMAL

## 2019-05-12 PROCEDURE — 87086 URINE CULTURE/COLONY COUNT: CPT

## 2019-05-12 PROCEDURE — 87186 SC STD MICRODIL/AGAR DIL: CPT

## 2019-05-12 PROCEDURE — 81000 URINALYSIS NONAUTO W/SCOPE: CPT

## 2019-05-12 PROCEDURE — 63600175 PHARM REV CODE 636 W HCPCS: Performed by: EMERGENCY MEDICINE

## 2019-05-12 PROCEDURE — 96374 THER/PROPH/DIAG INJ IV PUSH: CPT

## 2019-05-12 PROCEDURE — 27000221 HC OXYGEN, UP TO 24 HOURS

## 2019-05-12 PROCEDURE — 87088 URINE BACTERIA CULTURE: CPT

## 2019-05-12 PROCEDURE — 99900035 HC TECH TIME PER 15 MIN (STAT)

## 2019-05-12 PROCEDURE — 99284 EMERGENCY DEPT VISIT MOD MDM: CPT | Mod: 25

## 2019-05-12 PROCEDURE — 83880 ASSAY OF NATRIURETIC PEPTIDE: CPT

## 2019-05-12 PROCEDURE — 87077 CULTURE AEROBIC IDENTIFY: CPT

## 2019-05-12 PROCEDURE — 84484 ASSAY OF TROPONIN QUANT: CPT

## 2019-05-12 PROCEDURE — 25000242 PHARM REV CODE 250 ALT 637 W/ HCPCS: Performed by: EMERGENCY MEDICINE

## 2019-05-12 PROCEDURE — 83735 ASSAY OF MAGNESIUM: CPT

## 2019-05-12 PROCEDURE — 96375 TX/PRO/DX INJ NEW DRUG ADDON: CPT

## 2019-05-12 PROCEDURE — 94660 CPAP INITIATION&MGMT: CPT

## 2019-05-12 PROCEDURE — 94640 AIRWAY INHALATION TREATMENT: CPT

## 2019-05-12 PROCEDURE — 85025 COMPLETE CBC W/AUTO DIFF WBC: CPT

## 2019-05-12 PROCEDURE — 27000190 HC CPAP FULL FACE MASK W/VALVE

## 2019-05-12 PROCEDURE — 80053 COMPREHEN METABOLIC PANEL: CPT

## 2019-05-12 RX ORDER — CEPHALEXIN 500 MG/1
1000 CAPSULE ORAL EVERY 12 HOURS
Qty: 28 CAPSULE | Refills: 0 | Status: SHIPPED | OUTPATIENT
Start: 2019-05-12 | End: 2019-05-19

## 2019-05-12 RX ORDER — METHYLPREDNISOLONE SOD SUCC 125 MG
125 VIAL (EA) INJECTION
Status: COMPLETED | OUTPATIENT
Start: 2019-05-12 | End: 2019-05-12

## 2019-05-12 RX ORDER — IPRATROPIUM BROMIDE AND ALBUTEROL SULFATE 2.5; .5 MG/3ML; MG/3ML
3 SOLUTION RESPIRATORY (INHALATION)
Status: COMPLETED | OUTPATIENT
Start: 2019-05-12 | End: 2019-05-12

## 2019-05-12 RX ORDER — ALBUTEROL SULFATE 2.5 MG/.5ML
5 SOLUTION RESPIRATORY (INHALATION)
Status: COMPLETED | OUTPATIENT
Start: 2019-05-12 | End: 2019-05-12

## 2019-05-12 RX ORDER — FLUTICASONE PROPIONATE 220 UG/1
1 AEROSOL, METERED RESPIRATORY (INHALATION) 2 TIMES DAILY
Qty: 12 G | Refills: 0 | Status: SHIPPED | OUTPATIENT
Start: 2019-05-12 | End: 2020-05-11

## 2019-05-12 RX ORDER — FERROUS GLUCONATE 324(38)MG
324 TABLET ORAL
Qty: 60 TABLET | Refills: 3 | Status: ON HOLD | OUTPATIENT
Start: 2019-05-12 | End: 2019-07-04 | Stop reason: SDUPTHER

## 2019-05-12 RX ADMIN — ALBUTEROL SULFATE 5 MG: 2.5 SOLUTION RESPIRATORY (INHALATION) at 12:05

## 2019-05-12 RX ADMIN — METHYLPREDNISOLONE SODIUM SUCCINATE 125 MG: 125 INJECTION, POWDER, FOR SOLUTION INTRAMUSCULAR; INTRAVENOUS at 12:05

## 2019-05-12 RX ADMIN — CEFTRIAXONE 1 G: 1 INJECTION, SOLUTION INTRAVENOUS at 02:05

## 2019-05-12 RX ADMIN — IPRATROPIUM BROMIDE AND ALBUTEROL SULFATE 3 ML: .5; 3 SOLUTION RESPIRATORY (INHALATION) at 12:05

## 2019-05-12 NOTE — ED PROVIDER NOTES
Encounter Date: 2019       History     Chief Complaint   Patient presents with    Respiratory Distress     HPI   Medical decision making:  Given the above this patient presents to the emergency room complaining of shortness of breath began last night.  The patient was just admitted at [facility] last week for the same thing.  The patient has a known history of COPD.  She has a history congestive heart failure.  She has fairly good EF, 50-55%.  She had some chest pain yesterday but not today.  The patient has had a angiogram demonstrating nonobstructive coronary artery disease in  of last year.  In she has some cough.  She had 3 episodes of vomiting over the course last 24 hr she has some mild pain with urination and some swelling of her legs.  She is maintained on anticoagulation with Xarelto.  Review of patient's allergies indicates:  No Known Allergies  Past Medical History:   Diagnosis Date    Anticoagulant long-term use     Xarelto    Arthritis     Asthma     CHF (congestive heart failure)     COPD (chronic obstructive pulmonary disease)     Coronary artery disease     Deep vein thrombosis (DVT) of left lower extremity     Depression     Diabetes mellitus     GERD (gastroesophageal reflux disease)     Hypertension     Obstructive sleep apnea on CPAP     On home oxygen therapy     Unsteady gait     uses either walker or 4 prong cane     Past Surgical History:   Procedure Laterality Date    CORONARY ANGIOPLASTY WITH STENT PLACEMENT      HERNIA REPAIR      encapsulated umbilical hernia     Family History   Problem Relation Age of Onset    Heart disease Mother     Hypertension Mother     Diabetes Father      Social History     Tobacco Use    Smoking status: Current Every Day Smoker     Packs/day: 0.50     Years: 55.00     Pack years: 27.50     Types: Cigarettes     Start date: 1963     Last attempt to quit: 2018     Years since quittin.3    Smokeless tobacco: Never  Used   Substance Use Topics    Alcohol use: Not Currently     Alcohol/week: 0.6 oz     Types: 1 Glasses of wine per week     Frequency: Never     Comment: socially    Drug use: No     Review of Systems  The patient was questioned specifically with regard to the following.  General: Fever, chills, sweats. Neuro: Headache. Eyes: eye problems. ENT: Ear pain, sore throat. Cardiovascular: Chest pain. Respiratory: Cough, shortness of breath. Gastrointestinal: Abdominal pain, vomiting, diarrhea. Genitourinary: Painful urination.  Musculoskeletal: Arm and leg problems. Skin: Rash.  The review of systems was negative except for the following:  Cough shortness of breath painful urination leg swelling vomiting  Physical Exam     Initial Vitals   BP Pulse Resp Temp SpO2   05/12/19 1146 05/12/19 1142 05/12/19 1142 05/12/19 1131 05/12/19 1142   (!) 140/66 67 (!) 23 98 °F (36.7 °C) 99 %      MAP       --                Physical Exam  The patient was examined specifically for the following:   General:No significant distress, Good color, Warm and dry. Head and neck:Scalp atraumatic, Neck supple. Neurological:Appropriate conversation, Gross motor deficits. Eyes:Conjugate gaze, Clear corneas. ENT: No epistaxis. Cardiac: Regular rate and rhythm, Grossly normal heart tones. Pulmonary: Wheezing, Rales. Gastrointestinal: Abdominal tenderness, Abdominal distention. Musculoskeletal: Extremity deformity, Apparent pain with range of motion of the joints. Skin: Rash.   The findings on examination were normal except for the following:  The patient is morbidly obese.  The lungs are basically clear and free of wheezing rales rubs or rhonchi.  Heart tones are normal.  The patient has regular rate and rhythm.  The abdomen is nontender.  There is no guarding rebound mass or distention.  The patient does have mild bilateral pedal edema. She is wearing a BiPAP mask.  She does not appear to be distressed this time.  Oxygen saturations are 99%.  ED  Course   Procedures  Labs Reviewed   CBC W/ AUTO DIFFERENTIAL - Abnormal; Notable for the following components:       Result Value    RBC 3.25 (*)     Hemoglobin 8.1 (*)     Hematocrit 30.7 (*)     Mean Corpuscular Hemoglobin 24.9 (*)     Mean Corpuscular Hemoglobin Conc 26.4 (*)     RDW 17.8 (*)     Platelets 414 (*)     MPV 9.1 (*)     Gran # (ANC) 9.4 (*)     Mono # 1.1 (*)     Gran% 74.6 (*)     Lymph% 15.8 (*)     All other components within normal limits   B-TYPE NATRIURETIC PEPTIDE - Abnormal; Notable for the following components:     (*)     All other components within normal limits   COMPREHENSIVE METABOLIC PANEL - Abnormal; Notable for the following components:    Potassium 5.6 (*)     CO2 37 (*)     Albumin 3.0 (*)     ALT 8 (*)     Anion Gap 3 (*)     All other components within normal limits   URINALYSIS, REFLEX TO URINE CULTURE - Abnormal; Notable for the following components:    Nitrite, UA Positive (*)     Leukocytes, UA 1+ (*)     All other components within normal limits    Narrative:     Preferred Collection Type->Urine, Clean Catch   URINALYSIS MICROSCOPIC - Abnormal; Notable for the following components:    WBC, UA 40 (*)     WBC Clumps, UA Occasional (*)     Bacteria Many (*)     All other components within normal limits    Narrative:     Preferred Collection Type->Urine, Clean Catch   CULTURE, URINE   TROPONIN I   MAGNESIUM     EKG Readings: (Independently Interpreted)   This patient is in a normal sinus rhythm with a heart rate of 68.  The p.r. interval is normal.  The QRS intervals normal the QT intervals normal the patient does have low voltage QRS complexes.  Patient has left axis deviation.  There is no definite evidence of acute myocardial infarction or malignant arrhythmia.       Imaging Results          X-Ray Chest AP Portable (Final result)  Result time 05/12/19 12:51:19    Final result by Charles Marsh MD (05/12/19 12:51:19)                 Impression:      No acute  intrathoracic process.      Electronically signed by: Charles Marsh MD  Date:    05/12/2019  Time:    12:51             Narrative:    EXAMINATION:  XR CHEST AP PORTABLE    CLINICAL HISTORY:  chest pain;    TECHNIQUE:  Single frontal view of the chest was performed.    COMPARISON:  Chest radiograph from 03/24/2019    FINDINGS:  No cardiomegaly.  Atherosclerosis of the aortic arch.  Normal pulmonary vasculature.  Lungs are clear.  No pneumothorax.  Degenerative changes of the spine.                              Medical decision making:  Given the above this patient was treated with bronchodilators in the emergency room and BiPAP for shortness of breath. The patient has a history of COPD.  She reports congestive heart failure.  She has a fairly good EF however chest x-ray is negative for pulmonary edema the BNP and troponin are negative. I doubt pulmonary edema. I believe she had an exacerbation of her COPD.  On 3 L nasal cannula, her home oxygen therapy, her O2 sats are 100% at the time of discharge. The patient is no longer short of breath. She had a low hemoglobin and hematocrit.  She has a history of anemia of chronic disease.  Rectal examination failed to reveal any GI bleeding.  Will treat the patient with a multivitamin and iron.  I will have her follow up with primary care.  I will encourage her to use her home nebulizer treatments.  I will have her return if she gets worse or if new problems develop.  I will add a steroid inhaler if she is not already on one.                           Clinical Impression:       ICD-10-CM ICD-9-CM   1. Acute exacerbation of chronic obstructive pulmonary disease (COPD) J44.1 491.21   2. Acute cystitis without hematuria N30.00 595.0   3. Anemia, unspecified type D64.9 285.9                                Braden Matos MD  05/12/19 3893

## 2019-05-12 NOTE — DISCHARGE INSTRUCTIONS
Please take multivitamins and iron as directed.  Click Keflex for your bladder infection.  Continue your home nebulizer treatments and oxygen.  Return immediately if you get worse or if new problems develop.

## 2019-05-12 NOTE — ED TRIAGE NOTES
Pt arrived via EMS on bipap. EMS reports pt was low 90s on RA with increased WOB prior to placing on bipap.  Pt reports she had sob overnight that worsened this morning, reports she wears 3L O2 at home.  Reports she was seen at  for respiratory distress x1 week ago.  Denies f/c/n/v/d.  Reports slight midsternal chest pain this morning that has since resolved.  Placed on cardiac monitor, bp cuff, and pulse ox.  VSS.  Respiratory at bedside to place on bipap.

## 2019-05-12 NOTE — ED NOTES
Care assumed. Pt asleep/arousable with bi-pap in place to assist with breathing. VSS. Will continue to monitor. Awaiting further orders.

## 2019-05-12 NOTE — ED NOTES
PIV d/c'd with cath tip intact. Bandage placed at site. Site secured with tap. Discharge instructions discussed and medications discussed. Pt verbalized understanding and need of meds and follow up care by primary physician.

## 2019-05-15 LAB — BACTERIA UR CULT: NORMAL

## 2019-06-07 ENCOUNTER — HOSPITAL ENCOUNTER (INPATIENT)
Facility: HOSPITAL | Age: 69
LOS: 5 days | Discharge: HOME HEALTH | DRG: 291 | End: 2019-06-12
Attending: EMERGENCY MEDICINE | Admitting: EMERGENCY MEDICINE
Payer: MEDICARE

## 2019-06-07 DIAGNOSIS — R07.9 CHEST PAIN, UNSPECIFIED TYPE: ICD-10-CM

## 2019-06-07 DIAGNOSIS — J44.1 ACUTE EXACERBATION OF CHRONIC OBSTRUCTIVE PULMONARY DISEASE (COPD): ICD-10-CM

## 2019-06-07 DIAGNOSIS — R06.02 SOB (SHORTNESS OF BREATH): ICD-10-CM

## 2019-06-07 DIAGNOSIS — I50.31 ACUTE DIASTOLIC HEART FAILURE: Primary | ICD-10-CM

## 2019-06-07 DIAGNOSIS — J96.01 ACUTE RESPIRATORY FAILURE WITH HYPOXIA AND HYPERCAPNIA: ICD-10-CM

## 2019-06-07 DIAGNOSIS — J96.02 ACUTE RESPIRATORY FAILURE WITH HYPOXIA AND HYPERCAPNIA: ICD-10-CM

## 2019-06-07 DIAGNOSIS — I50.9 ACUTE EXACERBATION OF CHF (CONGESTIVE HEART FAILURE): ICD-10-CM

## 2019-06-07 DIAGNOSIS — J44.1 COPD WITH ACUTE EXACERBATION: ICD-10-CM

## 2019-06-07 DIAGNOSIS — Z86.718 HISTORY OF DEEP VEIN THROMBOSIS: Chronic | ICD-10-CM

## 2019-06-07 DIAGNOSIS — R06.02 SHORTNESS OF BREATH: ICD-10-CM

## 2019-06-07 LAB
ALBUMIN SERPL BCP-MCNC: 3.4 G/DL (ref 3.5–5.2)
ALLENS TEST: ABNORMAL
ALP SERPL-CCNC: 62 U/L (ref 55–135)
ALT SERPL W/O P-5'-P-CCNC: 7 U/L (ref 10–44)
ANION GAP SERPL CALC-SCNC: 10 MMOL/L (ref 8–16)
AST SERPL-CCNC: 13 U/L (ref 10–40)
BASOPHILS # BLD AUTO: 0.04 K/UL (ref 0–0.2)
BASOPHILS NFR BLD: 0.2 % (ref 0–1.9)
BILIRUB SERPL-MCNC: 0.2 MG/DL (ref 0.1–1)
BNP SERPL-MCNC: 343 PG/ML (ref 0–99)
BUN SERPL-MCNC: 19 MG/DL (ref 8–23)
CALCIUM SERPL-MCNC: 9 MG/DL (ref 8.7–10.5)
CHLORIDE SERPL-SCNC: 95 MMOL/L (ref 95–110)
CO2 SERPL-SCNC: 30 MMOL/L (ref 23–29)
CREAT SERPL-MCNC: 0.8 MG/DL (ref 0.5–1.4)
DELSYS: ABNORMAL
DIFFERENTIAL METHOD: ABNORMAL
EOSINOPHIL # BLD AUTO: 0.2 K/UL (ref 0–0.5)
EOSINOPHIL NFR BLD: 1.3 % (ref 0–8)
ERYTHROCYTE [DISTWIDTH] IN BLOOD BY AUTOMATED COUNT: 18.8 % (ref 11.5–14.5)
EST. GFR  (AFRICAN AMERICAN): >60 ML/MIN/1.73 M^2
EST. GFR  (NON AFRICAN AMERICAN): >60 ML/MIN/1.73 M^2
FIO2: 100
FLOW: 15
GLUCOSE SERPL-MCNC: 203 MG/DL (ref 70–110)
HCO3 UR-SCNC: 37.7 MMOL/L (ref 24–28)
HCT VFR BLD AUTO: 32.3 % (ref 37–48.5)
HGB BLD-MCNC: 8.7 G/DL (ref 12–16)
LYMPHOCYTES # BLD AUTO: 3.2 K/UL (ref 1–4.8)
LYMPHOCYTES NFR BLD: 17.1 % (ref 18–48)
MAGNESIUM SERPL-MCNC: 1.6 MG/DL (ref 1.6–2.6)
MCH RBC QN AUTO: 25.1 PG (ref 27–31)
MCHC RBC AUTO-ENTMCNC: 26.9 G/DL (ref 32–36)
MCV RBC AUTO: 93 FL (ref 82–98)
MODE: ABNORMAL
MONOCYTES # BLD AUTO: 1.4 K/UL (ref 0.3–1)
MONOCYTES NFR BLD: 7.5 % (ref 4–15)
NEUTROPHILS # BLD AUTO: 13.6 K/UL (ref 1.8–7.7)
NEUTROPHILS NFR BLD: 74.4 % (ref 38–73)
PCO2 BLDA: 75.7 MMHG (ref 35–45)
PH SMN: 7.3 [PH] (ref 7.35–7.45)
PLATELET # BLD AUTO: 445 K/UL (ref 150–350)
PMV BLD AUTO: 9.4 FL (ref 9.2–12.9)
PO2 BLDA: 354 MMHG (ref 80–100)
POC BE: 9 MMOL/L
POC SATURATED O2: 100 % (ref 95–100)
POC TCO2: 40 MMOL/L (ref 23–27)
POTASSIUM SERPL-SCNC: 4.5 MMOL/L (ref 3.5–5.1)
PROT SERPL-MCNC: 6.6 G/DL (ref 6–8.4)
RBC # BLD AUTO: 3.47 M/UL (ref 4–5.4)
SAMPLE: ABNORMAL
SITE: ABNORMAL
SODIUM SERPL-SCNC: 135 MMOL/L (ref 136–145)
SP02: 100
TROPONIN I SERPL DL<=0.01 NG/ML-MCNC: 0.02 NG/ML (ref 0–0.03)
WBC # BLD AUTO: 18.42 K/UL (ref 3.9–12.7)

## 2019-06-07 PROCEDURE — 36600 WITHDRAWAL OF ARTERIAL BLOOD: CPT

## 2019-06-07 PROCEDURE — 25000003 PHARM REV CODE 250: Performed by: EMERGENCY MEDICINE

## 2019-06-07 PROCEDURE — 85025 COMPLETE CBC W/AUTO DIFF WBC: CPT

## 2019-06-07 PROCEDURE — 87106 FUNGI IDENTIFICATION YEAST: CPT

## 2019-06-07 PROCEDURE — 51702 INSERT TEMP BLADDER CATH: CPT

## 2019-06-07 PROCEDURE — 27000221 HC OXYGEN, UP TO 24 HOURS

## 2019-06-07 PROCEDURE — 25000242 PHARM REV CODE 250 ALT 637 W/ HCPCS: Performed by: EMERGENCY MEDICINE

## 2019-06-07 PROCEDURE — 12000002 HC ACUTE/MED SURGE SEMI-PRIVATE ROOM

## 2019-06-07 PROCEDURE — 87086 URINE CULTURE/COLONY COUNT: CPT

## 2019-06-07 PROCEDURE — 81000 URINALYSIS NONAUTO W/SCOPE: CPT

## 2019-06-07 PROCEDURE — 82803 BLOOD GASES ANY COMBINATION: CPT

## 2019-06-07 PROCEDURE — 87088 URINE BACTERIA CULTURE: CPT

## 2019-06-07 PROCEDURE — 96375 TX/PRO/DX INJ NEW DRUG ADDON: CPT

## 2019-06-07 PROCEDURE — 83880 ASSAY OF NATRIURETIC PEPTIDE: CPT

## 2019-06-07 PROCEDURE — 84439 ASSAY OF FREE THYROXINE: CPT

## 2019-06-07 PROCEDURE — 84484 ASSAY OF TROPONIN QUANT: CPT

## 2019-06-07 PROCEDURE — 94640 AIRWAY INHALATION TREATMENT: CPT

## 2019-06-07 PROCEDURE — 94660 CPAP INITIATION&MGMT: CPT

## 2019-06-07 PROCEDURE — 84443 ASSAY THYROID STIM HORMONE: CPT

## 2019-06-07 PROCEDURE — 84481 FREE ASSAY (FT-3): CPT

## 2019-06-07 PROCEDURE — 27000190 HC CPAP FULL FACE MASK W/VALVE

## 2019-06-07 PROCEDURE — 96374 THER/PROPH/DIAG INJ IV PUSH: CPT

## 2019-06-07 PROCEDURE — 93005 ELECTROCARDIOGRAM TRACING: CPT

## 2019-06-07 PROCEDURE — 99291 CRITICAL CARE FIRST HOUR: CPT | Mod: 25

## 2019-06-07 PROCEDURE — 83735 ASSAY OF MAGNESIUM: CPT

## 2019-06-07 PROCEDURE — 80053 COMPREHEN METABOLIC PANEL: CPT

## 2019-06-07 PROCEDURE — 93010 EKG 12-LEAD: ICD-10-PCS | Mod: 76,,, | Performed by: INTERNAL MEDICINE

## 2019-06-07 PROCEDURE — 93010 ELECTROCARDIOGRAM REPORT: CPT | Mod: ,,, | Performed by: INTERNAL MEDICINE

## 2019-06-07 PROCEDURE — 99900035 HC TECH TIME PER 15 MIN (STAT)

## 2019-06-07 PROCEDURE — 63600175 PHARM REV CODE 636 W HCPCS: Performed by: EMERGENCY MEDICINE

## 2019-06-07 RX ORDER — METHYLPREDNISOLONE SOD SUCC 125 MG
125 VIAL (EA) INJECTION
Status: COMPLETED | OUTPATIENT
Start: 2019-06-07 | End: 2019-06-07

## 2019-06-07 RX ORDER — FUROSEMIDE 10 MG/ML
40 INJECTION INTRAMUSCULAR; INTRAVENOUS
Status: COMPLETED | OUTPATIENT
Start: 2019-06-07 | End: 2019-06-07

## 2019-06-07 RX ORDER — DOXYCYCLINE HYCLATE 100 MG
100 TABLET ORAL
Status: COMPLETED | OUTPATIENT
Start: 2019-06-07 | End: 2019-06-07

## 2019-06-07 RX ORDER — ASPIRIN 325 MG
325 TABLET, DELAYED RELEASE (ENTERIC COATED) ORAL
Status: COMPLETED | OUTPATIENT
Start: 2019-06-07 | End: 2019-06-07

## 2019-06-07 RX ORDER — IPRATROPIUM BROMIDE AND ALBUTEROL SULFATE 2.5; .5 MG/3ML; MG/3ML
3 SOLUTION RESPIRATORY (INHALATION) EVERY 4 HOURS
Status: DISCONTINUED | OUTPATIENT
Start: 2019-06-08 | End: 2019-06-12 | Stop reason: HOSPADM

## 2019-06-07 RX ORDER — IPRATROPIUM BROMIDE AND ALBUTEROL SULFATE 2.5; .5 MG/3ML; MG/3ML
3 SOLUTION RESPIRATORY (INHALATION)
Status: COMPLETED | OUTPATIENT
Start: 2019-06-07 | End: 2019-06-07

## 2019-06-07 RX ADMIN — IPRATROPIUM BROMIDE AND ALBUTEROL SULFATE 3 ML: .5; 3 SOLUTION RESPIRATORY (INHALATION) at 11:06

## 2019-06-07 RX ADMIN — ASPIRIN 325 MG: 325 TABLET, DELAYED RELEASE ORAL at 11:06

## 2019-06-07 RX ADMIN — FUROSEMIDE 40 MG: 10 INJECTION, SOLUTION INTRAVENOUS at 08:06

## 2019-06-07 RX ADMIN — DOXYCYCLINE HYCLATE 100 MG: 100 TABLET, COATED ORAL at 10:06

## 2019-06-07 RX ADMIN — IPRATROPIUM BROMIDE AND ALBUTEROL SULFATE 3 ML: .5; 3 SOLUTION RESPIRATORY (INHALATION) at 08:06

## 2019-06-07 RX ADMIN — METHYLPREDNISOLONE SODIUM SUCCINATE 125 MG: 125 INJECTION, POWDER, FOR SOLUTION INTRAMUSCULAR; INTRAVENOUS at 10:06

## 2019-06-08 LAB
ANION GAP SERPL CALC-SCNC: 15 MMOL/L (ref 8–16)
BASOPHILS # BLD AUTO: 0 K/UL (ref 0–0.2)
BASOPHILS NFR BLD: 0 % (ref 0–1.9)
BILIRUB UR QL STRIP: NEGATIVE
BUN SERPL-MCNC: 19 MG/DL (ref 8–23)
CALCIUM SERPL-MCNC: 9.6 MG/DL (ref 8.7–10.5)
CHLORIDE SERPL-SCNC: 96 MMOL/L (ref 95–110)
CLARITY UR: CLEAR
CO2 SERPL-SCNC: 28 MMOL/L (ref 23–29)
COLOR UR: YELLOW
CREAT SERPL-MCNC: 0.8 MG/DL (ref 0.5–1.4)
DIFFERENTIAL METHOD: ABNORMAL
EOSINOPHIL # BLD AUTO: 0 K/UL (ref 0–0.5)
EOSINOPHIL NFR BLD: 0.1 % (ref 0–8)
ERYTHROCYTE [DISTWIDTH] IN BLOOD BY AUTOMATED COUNT: 19 % (ref 11.5–14.5)
EST. GFR  (AFRICAN AMERICAN): >60 ML/MIN/1.73 M^2
EST. GFR  (NON AFRICAN AMERICAN): >60 ML/MIN/1.73 M^2
ESTIMATED AVG GLUCOSE: 163 MG/DL (ref 68–131)
ESTIMATED AVG GLUCOSE: 163 MG/DL (ref 68–131)
GLUCOSE SERPL-MCNC: 277 MG/DL (ref 70–110)
GLUCOSE UR QL STRIP: NEGATIVE
HBA1C MFR BLD HPLC: 7.3 % (ref 4–5.6)
HBA1C MFR BLD HPLC: 7.3 % (ref 4–5.6)
HCT VFR BLD AUTO: 33.9 % (ref 37–48.5)
HGB BLD-MCNC: 8.8 G/DL (ref 12–16)
HGB UR QL STRIP: ABNORMAL
KETONES UR QL STRIP: NEGATIVE
LACTATE SERPL-SCNC: 1 MMOL/L (ref 0.5–2.2)
LEUKOCYTE ESTERASE UR QL STRIP: ABNORMAL
LYMPHOCYTES # BLD AUTO: 0.7 K/UL (ref 1–4.8)
LYMPHOCYTES NFR BLD: 3.9 % (ref 18–48)
MCH RBC QN AUTO: 24.6 PG (ref 27–31)
MCHC RBC AUTO-ENTMCNC: 26 G/DL (ref 32–36)
MCV RBC AUTO: 95 FL (ref 82–98)
MICROSCOPIC COMMENT: ABNORMAL
MONOCYTES # BLD AUTO: 0.1 K/UL (ref 0.3–1)
MONOCYTES NFR BLD: 0.5 % (ref 4–15)
NEUTROPHILS # BLD AUTO: 16.8 K/UL (ref 1.8–7.7)
NEUTROPHILS NFR BLD: 95.8 % (ref 38–73)
NITRITE UR QL STRIP: NEGATIVE
PH UR STRIP: 7 [PH] (ref 5–8)
PLATELET # BLD AUTO: 397 K/UL (ref 150–350)
PMV BLD AUTO: 10.1 FL (ref 9.2–12.9)
POCT GLUCOSE: 254 MG/DL (ref 70–110)
POCT GLUCOSE: 321 MG/DL (ref 70–110)
POCT GLUCOSE: 337 MG/DL (ref 70–110)
POTASSIUM SERPL-SCNC: 5 MMOL/L (ref 3.5–5.1)
PROT UR QL STRIP: NEGATIVE
RBC # BLD AUTO: 3.58 M/UL (ref 4–5.4)
RBC #/AREA URNS HPF: 1 /HPF (ref 0–4)
SODIUM SERPL-SCNC: 139 MMOL/L (ref 136–145)
SP GR UR STRIP: 1 (ref 1–1.03)
SQUAMOUS #/AREA URNS HPF: ABNORMAL /HPF
T3FREE SERPL-MCNC: 1.8 PG/ML (ref 2.3–4.2)
T3FREE SERPL-MCNC: 1.9 PG/ML (ref 2.3–4.2)
T4 FREE SERPL-MCNC: 1.01 NG/DL (ref 0.71–1.51)
T4 FREE SERPL-MCNC: 1.03 NG/DL (ref 0.71–1.51)
TSH SERPL DL<=0.005 MIU/L-ACNC: 0.7 UIU/ML (ref 0.4–4)
URN SPEC COLLECT METH UR: ABNORMAL
UROBILINOGEN UR STRIP-ACNC: NEGATIVE EU/DL
WBC # BLD AUTO: 17.6 K/UL (ref 3.9–12.7)
WBC #/AREA URNS HPF: >100 /HPF (ref 0–5)

## 2019-06-08 PROCEDURE — 94660 CPAP INITIATION&MGMT: CPT

## 2019-06-08 PROCEDURE — 25000003 PHARM REV CODE 250: Performed by: HOSPITALIST

## 2019-06-08 PROCEDURE — 63600175 PHARM REV CODE 636 W HCPCS: Performed by: HOSPITALIST

## 2019-06-08 PROCEDURE — 83036 HEMOGLOBIN GLYCOSYLATED A1C: CPT

## 2019-06-08 PROCEDURE — 25000003 PHARM REV CODE 250: Performed by: INTERNAL MEDICINE

## 2019-06-08 PROCEDURE — 99900035 HC TECH TIME PER 15 MIN (STAT)

## 2019-06-08 PROCEDURE — 25000242 PHARM REV CODE 250 ALT 637 W/ HCPCS: Performed by: INTERNAL MEDICINE

## 2019-06-08 PROCEDURE — 84481 FREE ASSAY (FT-3): CPT

## 2019-06-08 PROCEDURE — 25000242 PHARM REV CODE 250 ALT 637 W/ HCPCS: Performed by: HOSPITALIST

## 2019-06-08 PROCEDURE — 94640 AIRWAY INHALATION TREATMENT: CPT

## 2019-06-08 PROCEDURE — 94799 UNLISTED PULMONARY SVC/PX: CPT

## 2019-06-08 PROCEDURE — 85025 COMPLETE CBC W/AUTO DIFF WBC: CPT

## 2019-06-08 PROCEDURE — 83605 ASSAY OF LACTIC ACID: CPT

## 2019-06-08 PROCEDURE — 84439 ASSAY OF FREE THYROXINE: CPT

## 2019-06-08 PROCEDURE — 36415 COLL VENOUS BLD VENIPUNCTURE: CPT

## 2019-06-08 PROCEDURE — 80048 BASIC METABOLIC PNL TOTAL CA: CPT

## 2019-06-08 PROCEDURE — 11000001 HC ACUTE MED/SURG PRIVATE ROOM

## 2019-06-08 PROCEDURE — 63600175 PHARM REV CODE 636 W HCPCS: Performed by: INTERNAL MEDICINE

## 2019-06-08 PROCEDURE — 87040 BLOOD CULTURE FOR BACTERIA: CPT

## 2019-06-08 PROCEDURE — 94761 N-INVAS EAR/PLS OXIMETRY MLT: CPT

## 2019-06-08 PROCEDURE — 25000242 PHARM REV CODE 250 ALT 637 W/ HCPCS: Performed by: EMERGENCY MEDICINE

## 2019-06-08 PROCEDURE — 27100171 HC OXYGEN HIGH FLOW UP TO 24 HOURS

## 2019-06-08 PROCEDURE — S5571 INSULIN DISPOS PEN 3 ML: HCPCS | Performed by: INTERNAL MEDICINE

## 2019-06-08 PROCEDURE — 27000221 HC OXYGEN, UP TO 24 HOURS

## 2019-06-08 RX ORDER — ASPIRIN 81 MG/1
81 TABLET ORAL DAILY
Status: DISCONTINUED | OUTPATIENT
Start: 2019-06-08 | End: 2019-06-12 | Stop reason: HOSPADM

## 2019-06-08 RX ORDER — ISOSORBIDE MONONITRATE 30 MG/1
90 TABLET, EXTENDED RELEASE ORAL DAILY
Status: DISCONTINUED | OUTPATIENT
Start: 2019-06-08 | End: 2019-06-12 | Stop reason: HOSPADM

## 2019-06-08 RX ORDER — IBUPROFEN 200 MG
16 TABLET ORAL
Status: DISCONTINUED | OUTPATIENT
Start: 2019-06-08 | End: 2019-06-12 | Stop reason: HOSPADM

## 2019-06-08 RX ORDER — IBUPROFEN 200 MG
24 TABLET ORAL
Status: DISCONTINUED | OUTPATIENT
Start: 2019-06-08 | End: 2019-06-08 | Stop reason: SDUPTHER

## 2019-06-08 RX ORDER — FAMOTIDINE 20 MG/1
20 TABLET, FILM COATED ORAL 2 TIMES DAILY
Status: DISCONTINUED | OUTPATIENT
Start: 2019-06-08 | End: 2019-06-12 | Stop reason: HOSPADM

## 2019-06-08 RX ORDER — DOXYCYCLINE HYCLATE 100 MG
100 TABLET ORAL EVERY 12 HOURS
Status: DISCONTINUED | OUTPATIENT
Start: 2019-06-08 | End: 2019-06-12 | Stop reason: HOSPADM

## 2019-06-08 RX ORDER — ONDANSETRON 2 MG/ML
4 INJECTION INTRAMUSCULAR; INTRAVENOUS EVERY 8 HOURS PRN
Status: DISCONTINUED | OUTPATIENT
Start: 2019-06-08 | End: 2019-06-08

## 2019-06-08 RX ORDER — FUROSEMIDE 10 MG/ML
40 INJECTION INTRAMUSCULAR; INTRAVENOUS 2 TIMES DAILY
Status: DISCONTINUED | OUTPATIENT
Start: 2019-06-08 | End: 2019-06-09

## 2019-06-08 RX ORDER — ONDANSETRON 2 MG/ML
8 INJECTION INTRAMUSCULAR; INTRAVENOUS EVERY 8 HOURS PRN
Status: DISCONTINUED | OUTPATIENT
Start: 2019-06-08 | End: 2019-06-12 | Stop reason: HOSPADM

## 2019-06-08 RX ORDER — INSULIN ASPART 100 [IU]/ML
0-5 INJECTION, SOLUTION INTRAVENOUS; SUBCUTANEOUS
Status: DISCONTINUED | OUTPATIENT
Start: 2019-06-08 | End: 2019-06-08 | Stop reason: SDUPTHER

## 2019-06-08 RX ORDER — GUAIFENESIN/DEXTROMETHORPHAN 100-10MG/5
10 SYRUP ORAL EVERY 6 HOURS
Status: DISCONTINUED | OUTPATIENT
Start: 2019-06-08 | End: 2019-06-12 | Stop reason: HOSPADM

## 2019-06-08 RX ORDER — GABAPENTIN 300 MG/1
300 CAPSULE ORAL 3 TIMES DAILY
Status: DISCONTINUED | OUTPATIENT
Start: 2019-06-08 | End: 2019-06-12 | Stop reason: HOSPADM

## 2019-06-08 RX ORDER — ACETAMINOPHEN 325 MG/1
650 TABLET ORAL EVERY 8 HOURS PRN
Status: DISCONTINUED | OUTPATIENT
Start: 2019-06-08 | End: 2019-06-12 | Stop reason: HOSPADM

## 2019-06-08 RX ORDER — INSULIN ASPART 100 [IU]/ML
10 INJECTION, SOLUTION INTRAVENOUS; SUBCUTANEOUS ONCE
Status: COMPLETED | OUTPATIENT
Start: 2019-06-08 | End: 2019-06-08

## 2019-06-08 RX ORDER — IBUPROFEN 200 MG
16 TABLET ORAL
Status: DISCONTINUED | OUTPATIENT
Start: 2019-06-08 | End: 2019-06-08 | Stop reason: SDUPTHER

## 2019-06-08 RX ORDER — INSULIN ASPART 100 [IU]/ML
1-10 INJECTION, SOLUTION INTRAVENOUS; SUBCUTANEOUS
Status: DISCONTINUED | OUTPATIENT
Start: 2019-06-08 | End: 2019-06-12 | Stop reason: HOSPADM

## 2019-06-08 RX ORDER — AMIODARONE HYDROCHLORIDE 200 MG/1
200 TABLET ORAL DAILY
Status: DISCONTINUED | OUTPATIENT
Start: 2019-06-08 | End: 2019-06-12 | Stop reason: HOSPADM

## 2019-06-08 RX ORDER — TRAMADOL HYDROCHLORIDE 50 MG/1
50 TABLET ORAL EVERY 6 HOURS PRN
Status: DISCONTINUED | OUTPATIENT
Start: 2019-06-08 | End: 2019-06-12 | Stop reason: HOSPADM

## 2019-06-08 RX ORDER — GLUCAGON 1 MG
1 KIT INJECTION
Status: DISCONTINUED | OUTPATIENT
Start: 2019-06-08 | End: 2019-06-08 | Stop reason: SDUPTHER

## 2019-06-08 RX ORDER — BENZONATATE 100 MG/1
100 CAPSULE ORAL 3 TIMES DAILY PRN
Status: DISCONTINUED | OUTPATIENT
Start: 2019-06-08 | End: 2019-06-12 | Stop reason: HOSPADM

## 2019-06-08 RX ORDER — METOPROLOL TARTRATE 1 MG/ML
5 INJECTION, SOLUTION INTRAVENOUS EVERY 5 MIN PRN
Status: DISCONTINUED | OUTPATIENT
Start: 2019-06-08 | End: 2019-06-12 | Stop reason: HOSPADM

## 2019-06-08 RX ORDER — HYDRALAZINE HYDROCHLORIDE 25 MG/1
50 TABLET, FILM COATED ORAL 2 TIMES DAILY
Status: DISCONTINUED | OUTPATIENT
Start: 2019-06-08 | End: 2019-06-12 | Stop reason: HOSPADM

## 2019-06-08 RX ORDER — SODIUM CHLORIDE 0.9 % (FLUSH) 0.9 %
10 SYRINGE (ML) INJECTION
Status: DISCONTINUED | OUTPATIENT
Start: 2019-06-08 | End: 2019-06-12 | Stop reason: HOSPADM

## 2019-06-08 RX ORDER — HYDRALAZINE HYDROCHLORIDE 20 MG/ML
10 INJECTION INTRAMUSCULAR; INTRAVENOUS EVERY 6 HOURS PRN
Status: DISCONTINUED | OUTPATIENT
Start: 2019-06-08 | End: 2019-06-12 | Stop reason: HOSPADM

## 2019-06-08 RX ORDER — LISINOPRIL 20 MG/1
40 TABLET ORAL DAILY
Status: DISCONTINUED | OUTPATIENT
Start: 2019-06-08 | End: 2019-06-12 | Stop reason: HOSPADM

## 2019-06-08 RX ORDER — GLUCAGON 1 MG
1 KIT INJECTION
Status: DISCONTINUED | OUTPATIENT
Start: 2019-06-08 | End: 2019-06-12 | Stop reason: HOSPADM

## 2019-06-08 RX ORDER — METOPROLOL TARTRATE 50 MG/1
100 TABLET ORAL 2 TIMES DAILY
Status: DISCONTINUED | OUTPATIENT
Start: 2019-06-08 | End: 2019-06-12 | Stop reason: HOSPADM

## 2019-06-08 RX ORDER — FLUTICASONE PROPIONATE 220 UG/1
1 AEROSOL, METERED RESPIRATORY (INHALATION) 2 TIMES DAILY
Status: DISCONTINUED | OUTPATIENT
Start: 2019-06-08 | End: 2019-06-12 | Stop reason: HOSPADM

## 2019-06-08 RX ORDER — IBUPROFEN 200 MG
24 TABLET ORAL
Status: DISCONTINUED | OUTPATIENT
Start: 2019-06-08 | End: 2019-06-12 | Stop reason: HOSPADM

## 2019-06-08 RX ADMIN — IPRATROPIUM BROMIDE AND ALBUTEROL SULFATE 3 ML: .5; 3 SOLUTION RESPIRATORY (INHALATION) at 03:06

## 2019-06-08 RX ADMIN — FLUTICASONE PROPIONATE 1 PUFF: 220 AEROSOL, METERED RESPIRATORY (INHALATION) at 07:06

## 2019-06-08 RX ADMIN — GUAIFENESIN AND DEXTROMETHORPHAN 10 ML: 100; 10 SYRUP ORAL at 11:06

## 2019-06-08 RX ADMIN — GABAPENTIN 300 MG: 300 CAPSULE ORAL at 09:06

## 2019-06-08 RX ADMIN — METOPROLOL TARTRATE 100 MG: 50 TABLET ORAL at 09:06

## 2019-06-08 RX ADMIN — GUAIFENESIN AND DEXTROMETHORPHAN 10 ML: 100; 10 SYRUP ORAL at 05:06

## 2019-06-08 RX ADMIN — RIVAROXABAN 20 MG: 20 TABLET, FILM COATED ORAL at 05:06

## 2019-06-08 RX ADMIN — FUROSEMIDE 40 MG: 10 INJECTION, SOLUTION INTRAVENOUS at 05:06

## 2019-06-08 RX ADMIN — METHYLPREDNISOLONE SODIUM SUCCINATE 80 MG: 40 INJECTION, POWDER, FOR SOLUTION INTRAMUSCULAR; INTRAVENOUS at 02:06

## 2019-06-08 RX ADMIN — IPRATROPIUM BROMIDE AND ALBUTEROL SULFATE 3 ML: .5; 3 SOLUTION RESPIRATORY (INHALATION) at 07:06

## 2019-06-08 RX ADMIN — TRAMADOL HYDROCHLORIDE 50 MG: 50 TABLET, FILM COATED ORAL at 09:06

## 2019-06-08 RX ADMIN — CEFTRIAXONE SODIUM 2 G: 2 INJECTION, SOLUTION INTRAVENOUS at 07:06

## 2019-06-08 RX ADMIN — INSULIN ASPART 5 UNITS: 100 INJECTION, SOLUTION INTRAVENOUS; SUBCUTANEOUS at 05:06

## 2019-06-08 RX ADMIN — GABAPENTIN 300 MG: 300 CAPSULE ORAL at 02:06

## 2019-06-08 RX ADMIN — METHYLPREDNISOLONE SODIUM SUCCINATE 80 MG: 40 INJECTION, POWDER, FOR SOLUTION INTRAMUSCULAR; INTRAVENOUS at 06:06

## 2019-06-08 RX ADMIN — LISINOPRIL 40 MG: 20 TABLET ORAL at 09:06

## 2019-06-08 RX ADMIN — AMIODARONE HYDROCHLORIDE 200 MG: 200 TABLET ORAL at 09:06

## 2019-06-08 RX ADMIN — INSULIN ASPART 1 UNITS: 100 INJECTION, SOLUTION INTRAVENOUS; SUBCUTANEOUS at 02:06

## 2019-06-08 RX ADMIN — GUAIFENESIN AND DEXTROMETHORPHAN 10 ML: 100; 10 SYRUP ORAL at 03:06

## 2019-06-08 RX ADMIN — IPRATROPIUM BROMIDE AND ALBUTEROL SULFATE 3 ML: .5; 3 SOLUTION RESPIRATORY (INHALATION) at 11:06

## 2019-06-08 RX ADMIN — FLUTICASONE PROPIONATE 1 PUFF: 220 AEROSOL, METERED RESPIRATORY (INHALATION) at 11:06

## 2019-06-08 RX ADMIN — PREGABALIN 75 MG: 50 CAPSULE ORAL at 09:06

## 2019-06-08 RX ADMIN — DOXYCYCLINE HYCLATE 100 MG: 100 TABLET, COATED ORAL at 09:06

## 2019-06-08 RX ADMIN — HYDRALAZINE HYDROCHLORIDE 50 MG: 25 TABLET ORAL at 09:06

## 2019-06-08 RX ADMIN — FUROSEMIDE 40 MG: 10 INJECTION, SOLUTION INTRAVENOUS at 06:06

## 2019-06-08 RX ADMIN — INSULIN ASPART 10 UNITS: 100 INJECTION, SOLUTION INTRAVENOUS; SUBCUTANEOUS at 06:06

## 2019-06-08 RX ADMIN — INSULIN DETEMIR 10 UNITS: 100 INJECTION, SOLUTION SUBCUTANEOUS at 09:06

## 2019-06-08 RX ADMIN — ISOSORBIDE MONONITRATE 90 MG: 30 TABLET, EXTENDED RELEASE ORAL at 09:06

## 2019-06-08 RX ADMIN — FAMOTIDINE 20 MG: 20 TABLET ORAL at 09:06

## 2019-06-08 RX ADMIN — ASPIRIN 81 MG: 81 TABLET, COATED ORAL at 09:06

## 2019-06-08 RX ADMIN — INSULIN ASPART 4 UNITS: 100 INJECTION, SOLUTION INTRAVENOUS; SUBCUTANEOUS at 06:06

## 2019-06-08 RX ADMIN — INSULIN ASPART 5 UNITS: 100 INJECTION, SOLUTION INTRAVENOUS; SUBCUTANEOUS at 09:06

## 2019-06-08 RX ADMIN — TRAMADOL HYDROCHLORIDE 50 MG: 50 TABLET, FILM COATED ORAL at 02:06

## 2019-06-08 RX ADMIN — METHYLPREDNISOLONE SODIUM SUCCINATE 80 MG: 40 INJECTION, POWDER, FOR SOLUTION INTRAMUSCULAR; INTRAVENOUS at 09:06

## 2019-06-08 RX ADMIN — INSULIN ASPART 4 UNITS: 100 INJECTION, SOLUTION INTRAVENOUS; SUBCUTANEOUS at 11:06

## 2019-06-08 NOTE — PLAN OF CARE
Problem: Adult Inpatient Plan of Care  Goal: Plan of Care Review  Pt was sent to the ICU for monitoring. Remains on BiPAP for a SAT 92-88% per MD.  NUNO and bilateral lower extremity US scheduled for today. Clark in place with good urine output; lasix BID. Afebrile this shift. No falls, injuries or skin breakdown this shift, precautions maintained for all.

## 2019-06-08 NOTE — EICU
Ms. Palm is admitted to the ICU with hypercapnic respiratory failure, hyperinflated lungs on CXR and elevated BNP.  The most likely dx is COPD exacerbation, treated with bronchodilators, steroids, abx and NIV. Also received empiric diuresis. Comfortable on low level bipap, FIO2 weaned to 28%.    Charles Arshad MD

## 2019-06-08 NOTE — H&P
[facility] Medicine  History & Physical    Patient Name: Yasmeen Palm  MRN: [id number]  Admission Date: 06/08/2019  Attending Physician: Laureano Gentile MD, MPH      PCP:     Angel Orourke Jr, MD    CC:     Chief Complaint   Patient presents with    Shortness of Breath     Pt. stated being SOB and chest pain x 1day. Productive cough x 3 days.       HISTORY OF PRESENT ILLNESS:     Yasmeen Palm is a 69 y.o. female that (in part)  has a past medical history of Anticoagulant long-term use, Arthritis, Asthma, CHF (congestive heart failure), COPD (chronic obstructive pulmonary disease), Coronary artery disease, Deep vein thrombosis (DVT) of left lower extremity, Depression, Diabetes mellitus, GERD (gastroesophageal reflux disease), Hypertension, Obstructive sleep apnea on CPAP, On home oxygen therapy, and Unsteady gait.  has a past surgical history that includes Coronary angioplasty with stent and Hernia repair. Presents to [facility] Emergency Department complaining of shortness of breath.  Subacute acute onset 2 days ago with present progressive worsening.  She has had a cough for 3 days.  Denies fever chills.  Endorses shortness of breath at rest and dyspnea with exertion.  Positive for unintentional weight gain.  Previous history of CHF exacerbation and COPD.  Also reports 2 episodes of chest pain yesterday that were relieved with nitroglycerin.  She is on chronic anticoagulation for atrial fibrillation and history of pulmonary embolism and DVT.  Also complaining of peripheral edema in her lower extremities, right greater than left.  Denies syncope, cyanosis, or palpitations. No hemoptysis, stridor, or night sweats. She reports compliance with her home medication regimen and cardiac/diabetic diet.  No recent travel or sick contacts.    In the emergency department routine laboratory studies, chest x-ray, cardiac enzymes, an EKG was obtained.  History and  findings are consistent with either COPD versus CHF, but most likely a combination of both.  She was given Lasix for the CHF and started on BiPAP both CHF and COPD along with nebulizer treatments and steroids.  She is also started on empiric doxycycline for acute bronchitis.    Hospital medicine has been asked to admit for further evaluation and treatment.       REVIEW OF SYSTEMS:     -- Constitutional: No fever or chills.  -- Eyes: No visual changes, diplopia, pain, tearing, blind spots, or discharge.   -- Ears, nose, mouth, throat, and face: No congestion, sore throat, epistaxis, d/c, bleeding gums, neck stiffness masses, or dental issues.  -- Respiratory:  Shortness of breath at rest.  Cough and wheezing.  No hemoptysis, stridor, , or night sweats.  -- Cardiovascular:  As above in the HPI.   -- Gastrointestinal: No vomiting, abdominal pain, hematemesis, melena, dyspepsia, or change in bowel habits.  -- Genitourinary: No hematuria, dysuria, frequency, urgency, nocturia, polyuria, stones, or incontinence.  -- Integument/breast: No rash, pruritis, pigmentation changes, dryness, or changes in hair  -- Hematologic/lymphatic:  Easy bruising she attributes to anticoagulation therapy.  Denies lymphadenopathy.   -- Musculoskeletal: No acute arthralgias, acute myalgias, joint swelling, acute limitations of ROM, or acute muscular weakness.  -- Neurological: No seizures, headaches, incoordination, paraesthesias, ataxia, vertigo, or tremors.  -- Behavioral/Psych: No auditory or visual hallucinations, depression, or suicidal/homicidal ideations.  -- Endocrine:  Unintentional weight gain.  No heat or cold intolerance, polydipsia  -- Allergy/Immunologic: No recurrent infections or adverse reaction to food, insects, or difficulty breathing.        PAST MEDICAL / SURGICAL HISTORY:     Past Medical History:   Diagnosis Date    Anticoagulant long-term use     Xarelto    Arthritis     Asthma     CHF (congestive heart failure)      COPD (chronic obstructive pulmonary disease)     Coronary artery disease     Deep vein thrombosis (DVT) of left lower extremity     Depression     Diabetes mellitus     GERD (gastroesophageal reflux disease)     Hypertension     Obstructive sleep apnea on CPAP     On home oxygen therapy     Unsteady gait     uses either walker or 4 prong cane     Past Surgical History:   Procedure Laterality Date    CORONARY ANGIOPLASTY WITH STENT PLACEMENT      HERNIA REPAIR      encapsulated umbilical hernia         FAMILY HISTORY:     Family History   Problem Relation Age of Onset    Heart disease Mother     Hypertension Mother     Diabetes Father          SOCIAL HISTORY:     Social History     Socioeconomic History    Marital status: Legally      Spouse name: Not on file    Number of children: Not on file    Years of education: Not on file    Highest education level: Not on file   Occupational History    Not on file   Social Needs    Financial resource strain: Not on file    Food insecurity:     Worry: Not on file     Inability: Not on file    Transportation needs:     Medical: Not on file     Non-medical: Not on file   Tobacco Use    Smoking status: Current Every Day Smoker     Packs/day: 0.50     Years: 55.00     Pack years: 27.50     Types: Cigarettes     Start date: 1963     Last attempt to quit: 2018     Years since quittin.4    Smokeless tobacco: Never Used   Substance and Sexual Activity    Alcohol use: Not Currently     Alcohol/week: 0.6 oz     Types: 1 Glasses of wine per week     Frequency: Never     Comment: socially    Drug use: No    Sexual activity: Never   Lifestyle    Physical activity:     Days per week: Not on file     Minutes per session: Not on file    Stress: Not on file   Relationships    Social connections:     Talks on phone: Not on file     Gets together: Not on file     Attends Orthodoxy service: Not on file     Active member of club or  organization: Not on file     Attends meetings of clubs or organizations: Not on file     Relationship status: Not on file   Other Topics Concern    Not on file   Social History Narrative    Not on file         ALLERGIES:       Review of patient's allergies indicates:  No Known Allergies      HEALTH SCREENING:     Prevnar 13 pneumonia vaccine =  evidence of previous vaccination found in the medical record      HOME MEDICATIONS:     Prior to Admission medications    Medication Sig Start Date End Date Taking? Authorizing Provider   acetaminophen (TYLENOL) 500 MG tablet Take 1 tablet (500 mg total) by mouth every 8 (eight) hours as needed. 10/7/16  Yes Zenon Alfredo MD   amiodarone (PACERONE) 200 MG Tab Take 1 tablet (200 mg total) by mouth once daily. 8/14/18 8/14/19 Yes Viri Paredes MD   aspirin (ECOTRIN) 81 MG EC tablet Take 81 mg by mouth once daily.   Yes Historical Provider, MD   ferrous gluconate (FERGON) 324 MG tablet Take 1 tablet (324 mg total) by mouth daily with breakfast. 5/12/19  Yes Braden Matos MD   fluticasone propionate (FLOVENT HFA) 220 mcg/actuation inhaler Inhale 1 puff into the lungs 2 (two) times daily. Controller 5/12/19 5/11/20 Yes Braden Matos MD   furosemide (LASIX) 40 MG tablet Take 40 mg by mouth once daily.    Yes Historical Provider, MD   gabapentin (NEURONTIN) 300 MG capsule Take 300 mg by mouth 3 (three) times daily.   Yes Historical Provider, MD   hydrALAZINE (APRESOLINE) 50 MG tablet Take 50 mg by mouth 2 (two) times daily.   Yes Historical Provider, MD   isosorbide mononitrate (IMDUR) 30 MG 24 hr tablet Take 3 tablets (90 mg total) by mouth once daily. 12/11/18 12/11/19 Yes Zenon Alfredo MD   lisinopril (PRINIVIL,ZESTRIL) 40 MG tablet Take 1 tablet (40 mg total) by mouth once daily. Do not take this medication if blood pressure is below 130/80 mmHg and/or feeling dizzy after taking all other blood pressure meds 8/14/17  Yes Layla Villegas MD   metformin  (GLUCOPHAGE) 500 MG tablet Take 1,000 mg by mouth 2 (two) times daily with meals.    Yes Historical Provider, MD   metoprolol tartrate (LOPRESSOR) 100 MG tablet Take 1 tablet (100 mg total) by mouth 2 (two) times daily. 12/11/18 12/11/19 Yes Zenon Alfredo MD   multivit-min-FA-lycopen-lutein (CENTRUM SILVER) 0.4-300-250 mg-mcg-mcg Tab Take 1 tablet by mouth once daily. 5/12/19  Yes Braden Matos MD   multivitamin (THERAGRAN) per tablet Take 1 tablet by mouth once daily.   Yes Historical Provider, MD   pantoprazole (PROTONIX) 40 MG tablet Take 40 mg by mouth once daily.   Yes Historical Provider, MD   pregabalin (LYRICA) 75 MG capsule Take 75 mg by mouth 2 (two) times daily.   Yes Historical Provider, MD   rivaroxaban (XARELTO) 20 mg Tab Take 20 mg by mouth daily with dinner or evening meal.   Yes Historical Provider, MD   tiotropium (SPIRIVA) 18 mcg inhalation capsule Inhale 1 capsule (18 mcg total) into the lungs once daily. Controller 7/23/18 7/23/19 Yes Viri Paredes MD   levalbuterol (XOPENEX HFA) 45 mcg/actuation inhaler Inhale 2 puffs into the lungs every 4 (four) hours as needed for Wheezing or Shortness of Breath. Rescue 2/12/18 2/12/19  Shade Dejesus MD          [facility] [place] MEDICATIONS:     Scheduled Meds:    albuterol-ipratropium  3 mL Nebulization Q4H    amiodarone  200 mg Oral Daily    aspirin  81 mg Oral Daily    dextromethorphan-guaifenesin  mg/5 ml  10 mL Oral Q6H    doxycycline  100 mg Oral Q12H    famotidine  20 mg Oral BID    fluticasone propionate  1 puff Inhalation BID    furosemide  40 mg Intravenous BID    gabapentin  300 mg Oral TID    hydrALAZINE  50 mg Oral BID    isosorbide mononitrate  90 mg Oral Daily    lisinopril  40 mg Oral Daily    methylPREDNISolone sodium succinate  80 mg Intravenous Q8H    metoprolol tartrate  100 mg Oral BID    pregabalin  75 mg Oral BID    rivaroxaban  20 mg Oral Daily with dinner     Continuous Infusions:   PRN Meds:  acetaminophen, benzonatate, dextrose 50%, dextrose 50%, glucagon (human recombinant), glucose, glucose, hydrALAZINE, insulin aspart U-100, metoprolol, ondansetron, sodium chloride 0.9%      PHYSICAL EXAM:     Wt Readings from Last 1 Encounters:   06/08/19 0105 80.9 kg (178 lb 5.6 oz)   06/07/19 2028 90.7 kg (200 lb)     Body mass index is 28.79 kg/m².  Vitals:    06/08/19 0215 06/08/19 0230 06/08/19 0245 06/08/19 0300   BP: (!) 122/57 129/64 121/62 139/66   BP Location:       Patient Position:       Pulse: 82 84 85 90   Resp: 18 17 20 (!) 23   Temp:       TempSrc:       SpO2: 99% 99% 99% 99%   Weight:       Height:              -- General appearance:  Chronically ill-appearing female lying in bed.  BiPAP in place.  No apparent distress.  well developed. appears stated age   -- Head: normocephalic, atraumatic   -- Eyes: conjunctivae clear. Extraocular muscles intact  -- Nose: Nares normal. Septum midline.   -- Mouth/Throat: lips, mucosa, and tongue normal. no throat erythema.   -- Neck: supple, symmetrical, trachea midline, no JVD and thyroid not grossly enlarged, appears symmetric  -- Lungs:  Decreased breath sounds to auscultation bilaterally with prolonged expiratory phase and poor excursion consistent with long-term smoker.  Crackles at bases. normal respiratory effort. No use of accessory muscles.   -- Chest wall: no tenderness. equal bilateral chest rise   -- Heart: regular rate and regular rhythm. S1, S2 normal.  no click, rub or gallop   -- Abdomen:  Obese, soft, non-tender, non-distended, non-tympanic; bowel sounds normal; megaly exam limited by body habitus  -- Extremities:  Bilateral lower extremity edema.  Right greater than left.  No cords or tenderness appreciated  no cyanosis, clubbing.    -- Pulses: 2+ and symmetric   -- Skin: color normal, texture normal, turgor normal. No rashes or lesions.   -- Neurologic: Normal strength and tone. No focal numbness or weakness. CNII-XII intact. Angela coma scale:  eyes open spontaneously-4, oriented & converses-5, obeys commands-6.      LABORATORY STUDIES:     Recent Results (from the past 36 hour(s))   CBC auto differential    Collection Time: 06/07/19  8:33 PM   Result Value Ref Range    WBC 18.42 (H) 3.90 - 12.70 K/uL    RBC 3.47 (L) 4.00 - 5.40 M/uL    Hemoglobin 8.7 (L) 12.0 - 16.0 g/dL    Hematocrit 32.3 (L) 37.0 - 48.5 %    Mean Corpuscular Volume 93 82 - 98 fL    Mean Corpuscular Hemoglobin 25.1 (L) 27.0 - 31.0 pg    Mean Corpuscular Hemoglobin Conc 26.9 (L) 32.0 - 36.0 g/dL    RDW 18.8 (H) 11.5 - 14.5 %    Platelets 445 (H) 150 - 350 K/uL    MPV 9.4 9.2 - 12.9 fL    Gran # (ANC) 13.6 (H) 1.8 - 7.7 K/uL    Lymph # 3.2 1.0 - 4.8 K/uL    Mono # 1.4 (H) 0.3 - 1.0 K/uL    Eos # 0.2 0.0 - 0.5 K/uL    Baso # 0.04 0.00 - 0.20 K/uL    Gran% 74.4 (H) 38.0 - 73.0 %    Lymph% 17.1 (L) 18.0 - 48.0 %    Mono% 7.5 4.0 - 15.0 %    Eosinophil% 1.3 0.0 - 8.0 %    Basophil% 0.2 0.0 - 1.9 %    Differential Method Automated    Comprehensive metabolic panel    Collection Time: 06/07/19  8:33 PM   Result Value Ref Range    Sodium 135 (L) 136 - 145 mmol/L    Potassium 4.5 3.5 - 5.1 mmol/L    Chloride 95 95 - 110 mmol/L    CO2 30 (H) 23 - 29 mmol/L    Glucose 203 (H) 70 - 110 mg/dL    BUN, Bld 19 8 - 23 mg/dL    Creatinine 0.8 0.5 - 1.4 mg/dL    Calcium 9.0 8.7 - 10.5 mg/dL    Total Protein 6.6 6.0 - 8.4 g/dL    Albumin 3.4 (L) 3.5 - 5.2 g/dL    Total Bilirubin 0.2 0.1 - 1.0 mg/dL    Alkaline Phosphatase 62 55 - 135 U/L    AST 13 10 - 40 U/L    ALT 7 (L) 10 - 44 U/L    Anion Gap 10 8 - 16 mmol/L    eGFR if African American >60 >60 mL/min/1.73 m^2    eGFR if non African American >60 >60 mL/min/1.73 m^2   Troponin I    Collection Time: 06/07/19  8:33 PM   Result Value Ref Range    Troponin I 0.024 0.000 - 0.026 ng/mL   Brain natriuretic peptide    Collection Time: 06/07/19  8:33 PM   Result Value Ref Range     (H) 0 - 99 pg/mL   Magnesium    Collection Time: 06/07/19  8:33 PM   Result  Value Ref Range    Magnesium 1.6 1.6 - 2.6 mg/dL   ISTAT PROCEDURE    Collection Time: 06/07/19  8:39 PM   Result Value Ref Range    POC PH 7.305 (L) 7.35 - 7.45    POC PCO2 75.7 (HH) 35 - 45 mmHg    POC PO2 354 (H) 80 - 100 mmHg    POC HCO3 37.7 (H) 24 - 28 mmol/L    POC BE 9 -2 to 2 mmol/L    POC SATURATED O2 100 95 - 100 %    POC TCO2 40 (H) 23 - 27 mmol/L    Sample ARTERIAL     Site RR     Allens Test Pass     DelSys NRB     Mode SPONT     Flow 15     FiO2 100     Sp02 100    Urinalysis, Reflex to Urine Culture Urine, Clean Catch    Collection Time: 06/07/19 10:42 PM   Result Value Ref Range    Specimen UA Urine, Catheterized     Color, UA Yellow Yellow, Straw, Kerri    Appearance, UA Clear Clear    pH, UA 7.0 5.0 - 8.0    Specific Gravity, UA 1.005 1.005 - 1.030    Protein, UA Negative Negative    Glucose, UA Negative Negative    Ketones, UA Negative Negative    Bilirubin (UA) Negative Negative    Occult Blood UA 1+ (A) Negative    Nitrite, UA Negative Negative    Urobilinogen, UA Negative <2.0 EU/dL    Leukocytes, UA 3+ (A) Negative   Urinalysis Microscopic    Collection Time: 06/07/19 10:42 PM   Result Value Ref Range    RBC, UA 1 0 - 4 /hpf    WBC, UA >100 (H) 0 - 5 /hpf    Squam Epithel, UA OCC /hpf    Microscopic Comment SEE COMMENT    Lactic acid, plasma    Collection Time: 06/08/19 12:19 AM   Result Value Ref Range    Lactate (Lactic Acid) 1.0 0.5 - 2.2 mmol/L   POCT glucose    Collection Time: 06/08/19  2:11 AM   Result Value Ref Range    POCT Glucose 254 (H) 70 - 110 mg/dL       Lab Results   Component Value Date    INR 1.1 03/24/2019    INR 1.1 12/09/2018    INR 1.1 12/08/2018     Lab Results   Component Value Date    HGBA1C 7.3 (H) 03/25/2019     Recent Labs     06/08/19  0211   POCTGLUCOSE 254*           MICROBIOLOGY DATA:     Urine Culture, Routine   Date Value Ref Range Status   05/12/2019 ESCHERICHIA COLI  >100,000 cfu/ml    Final   03/25/2019   Final    MIKEY GLABRATA  > 100,000  cfu/ml  Treatment of asymptomatic candiduria is not recommended (except for   specific populations). Candida isolated in the urine typically   represents colonization. If an indwelling urinary catheter is present  it should be removed or replaced.     07/14/2018   Final    MIKEY GLABRATA  > 100,000 cfu/ml  Treatment of asymptomatic candiduria is not recommended (except for   specific populations). Candida isolated in the urine typically   represents colonization. If an indwelling urinary catheter is present  it should be removed or replaced.     10/05/2016 ESCHERICHIA COLI  50,000 - 99,999 cfu/ml    Final       Microbiology x 7d:   Microbiology Results (last 7 days)     Procedure Component Value Units Date/Time    Blood Culture #1 **CANNOT BE ORDERED STAT** [[id number]] Collected:  06/08/19 0020    Order Status:  Sent Specimen:  Blood from Peripheral, Hand, Left Updated:  06/08/19 0029    Blood Culture #1 **CANNOT BE ORDERED STAT** [[id number]] Collected:  06/08/19 0019    Order Status:  Sent Specimen:  Blood from Peripheral, Hand, Right Updated:  06/08/19 0028    Urine culture [[id number]] Collected:  06/07/19 2242    Order Status:  No result Specimen:  Urine Updated:  06/08/19 0017            IMAGING:     Imaging Results          X-Ray Chest AP Portable (Final result)  Result time 06/07/19 21:22:01    Final result by Yaron Castro MD (06/07/19 21:22:01)                 Impression:      No radiographic acute intrathoracic process seen on this single view.      Electronically signed by: Yaron Castro MD  Date:    06/07/2019  Time:    21:22             Narrative:    EXAMINATION:  XR CHEST AP PORTABLE    CLINICAL HISTORY:  CHF;    TECHNIQUE:  Single frontal view of the chest was performed.    COMPARISON:  Chest radiograph 05/12/2019    FINDINGS:  Monitoring leads overlie the chest.  Large body habitus.  Patient is slightly rotated.    No detrimental change.  Cardiomediastinal silhouette is midline and stable without  convincing evidence of failure.  Bibasilar linear opacities suggesting subsegmental scarring versus atelectasis.  The lungs are otherwise well expanded without large consolidation, pleural effusion or pneumothorax.  No acute osseous process seen.  PA and lateral views can be obtained.                                  ASSESSMENT & PLAN:     Primary Diagnosis:  Acute diastolic heart failure    Active Hospital Problems    Diagnosis  POA    *Acute diastolic heart failure [I50.31]  Yes     Priority: 1 - High    Acute exacerbation of chronic obstructive pulmonary disease (COPD) [J44.1]  Yes     Priority: 2     Hyperlipidemia [E78.5]  Yes     Chronic    Essential hypertension [I10]  Yes     Chronic    Paroxysmal atrial fibrillation [I48.0]  Yes     Chronic    Chronic anticoagulation [Z79.01]  Not Applicable     Chronic    History of deep vein thrombosis [Z86.718]  Not Applicable     Chronic    History of pulmonary embolism [Z86.711]  Yes     Chronic    Anemia of chronic disease [D63.8]  Yes     Chronic    Tobacco abuse [Z72.0]  Yes     Chronic    Gastroesophageal reflux disease without esophagitis [K21.9]  Yes     Chronic    Coronary artery disease involving native coronary artery of native heart with angina pectoris [I25.119]  Yes    Controlled type 2 diabetes mellitus, without long-term current use of insulin [E11.9]  Yes      Resolved Hospital Problems   No resolved problems to display.       Acute CHF exacerbation  · Evidenced by history, elevated BNP, pulmonary edema, peripheral edema  · Provide diuresis w/ IV medication  · Maintain w/ beta-blocker  · ACE inhibitor or ARB if GFR allows and remains stable  · If 1) Patient cannot tolerate ACEi or 2) NYHA class III or above, add spironolactone (or eplerenone) and hydralazine-isosorbide dinitrate  · Daily Weights  · Strict I/O  · Fluid restriction to 1,500cc daily  · Low-sodium cardiac diet  · Obtain 2D echo if <6 months   No results found for: EF  · Chest  X-ray  · Check TSH, albumin, UA, and renal function  · EKG and cardiac enzymes PRN  · DVT prophylaxis w/ pharmacological and/or mechanical measures  · Oxygen supplementation support PRN    Instructions given to patient/family:  Monitor daily weight.  Regular activity within patient's limitations.  Low salt, low fat and low choleterol diet and restrict fluid < 2L per day.  Call MD if SOB, chest pain, weight gain > 2-3 lbs per day and/or 5-6 lbs per week.   No smoking. Annual influenza vaccine required.    Acute COPD exacerbation  · Scheduled nebulizer treatments (albuterol/atrovent)  · Initiate Solumedrol 80mg IV q8, then taper as clinical course improves; convert to PO taper as outpatient (lower dose due to history of diabetes)  · PPI or H2 blocker concomitantly with steroids  · Titrate O2 sats between 88 to 93%.  No supplemental 02 for 02 saturation greater than 93% due to V/Q mismatch  · Breo, or similar, while inpatient  · Add budesonide/formoterol or salmeterol/fluticasone propionate - at or before discharge - (Advair 500/50mg, Spiriva 18mcg, or Daliresp 500mcg)   · Mucolytics  · Consult pulmonology for further optimization    Diabetes mellitus type 2  · BG slightly above acceptable range at this time.  Will provide sliding-scale insulin due to exogenous steroid administration  · Maintain w/ subcutaneous insulin management order set  · Hold oral diabetic meds  · ADA 1800 kcal diet  · BG goal while in patient is <180mg/dL  · HgA1c = Pending    Chronic paroxysmal atrial fibrillation  · Currently rate controlled  · Continue amiodarone  · Maintain with beta-blocker with hold parameters  · Monitor on telemetry  · Maintain magnesium around 2.0  · Maintain potassium around 4.0    Chronic anticoagulation  · For treatment of paroxysmal atrial fibrillation and history of DVT and PE  · No evidence of acute blood loss   · Continue Xarelto.  · Will obtain ultrasound of bilateral lower extremities to evaluate for possible DVT  given the anemia in edema in lower extremities    Anemia of chronic disease  · The patient's H/H is stable and consistent with previous laboratory measurements.  · The patient exhibits no signs or symptoms of acute bleeding.  · There is no indication for transfusion.  · Will continue to monitor.    Essential Hypertension  · Goal while inpatient is a systolic blood pressure less than 160mmHg  · BP in acceptable range at this time  · Continue current home regimen with hold parameters  · PRN antihypertensives available      Hyperlipidemia   · Lipid panel - as an outpatient  · Cardiac diet  · Continue statin      Morbid obesity  Body mass index is 28.79 kg/m².  · Order a cardiac diet  · Counseling given  · Nutrition consult as an outpatient              VTE Risk Mitigation (From admission, onward)        Ordered     rivaroxaban tablet 20 mg  With dinner      06/08/19 0314     IP VTE HIGH RISK PATIENT  Once      06/08/19 0103     Place TOM hose  Until discontinued      06/08/19 0103            Adult PRN medications available   DVT prophylaxis given       DISPOSITION:     Will admit to the Hospital Medicine service for further evaluation and treatment.    Chart reviewed and updated where applicable.    High Risk Conditions:  Patient has a condition that poses threat to life and bodily function: Severe Respiratory Distress due to CHF exacerbation and COPD exacerbation      ===============================================================    Laureano Gentile MD, MPH  Department of Hospital Medicine   [facility]  [phone] pg  (7pm - 6am)    Critical care was given for Yasmeen LIZ Néstor who has a condition that poses threat to life and bodily function, including:  Severe respiratory distress due to CHF and COPD exacerbation     Critical care was time spent personally by me on the following activities: development of treatment plan with patient or surrogate and bedside caregivers, discussions with  consultants, evaluation of patient's response to treatment, examination of patient, ordering and performing treatments and interventions, ordering and review of laboratory studies, ordering and review of radiographic studies, pulse oximetry, re-evaluation of patient's condition. This critical care time did not overlap with that of any other provider or involve time for any procedures.    Reviewed: previous chart and vitals  Reviewed previous: labs, x-ray,  EKG  Interpretation: labs, x-ray, ABG, EKG    Total Critical Care time: 75 minutes. This excludes time spent performing separately reportable procedures and services.  Counseling/Conference Time: 15 minutes        This note is dictated using [employer] voice recognition software.  There are word recognition mistakes that are occasionally missed on review.

## 2019-06-08 NOTE — HPI
Yasmeen Palm is a 69 y.o. female that (in part)  has a past medical history of Anticoagulant long-term use, Arthritis, Asthma, CHF (congestive heart failure), COPD (chronic obstructive pulmonary disease), Coronary artery disease, Deep vein thrombosis (DVT) of left lower extremity, Depression, Diabetes mellitus, GERD (gastroesophageal reflux disease), Hypertension, Obstructive sleep apnea on CPAP, On home oxygen therapy, and Unsteady gait.  has a past surgical history that includes Coronary angioplasty with stent and Hernia repair. Presents to [facility] Emergency Department complaining of shortness of breath.  Subacute acute onset 2 days ago with present progressive worsening.  She has had a cough for 3 days.  Denies fever chills.  Endorses shortness of breath at rest and dyspnea with exertion.  Positive for unintentional weight gain.  Previous history of CHF exacerbation and COPD.  Also reports 2 episodes of chest pain yesterday that were relieved with nitroglycerin.  She is on chronic anticoagulation for atrial fibrillation and history of pulmonary embolism and DVT.  Also complaining of peripheral edema in her lower extremities, right greater than left.  Denies syncope, cyanosis, or palpitations.  She reports compliance with her home medication regimen and cardiac/diabetic diet.  No recent travel or sick contacts.    In the emergency department routine laboratory studies, chest x-ray, cardiac enzymes, an EKG was obtained.  History and findings are consistent with either COPD versus CHF, but most likely a combination of both.  She was given Lasix for the CHF and started on BiPAP both CHF and COPD along with nebulizer treatments and steroids.  She is also started on empiric doxycycline for acute bronchitis.    Hospital medicine has been asked to admit for further evaluation and treatment.

## 2019-06-08 NOTE — PLAN OF CARE
06/08/19 1820   Discharge Assessment   Assessment Type Discharge Planning Assessment   Confirmed/corrected address and phone number on facesheet? Yes   Assessment information obtained from? Patient   Communicated expected length of stay with patient/caregiver no   Prior to hospitilization cognitive status: Alert/Oriented   Prior to hospitalization functional status: Independent;Assistive Equipment;Needs Assistance  (2 walkers; cane; O2; BSC; shower chair; back brace; nebulizer; pulsox; BP cuff; current with Anthony RANDHAWA)   Current cognitive status: Alert/Oriented   Current Functional Status: Assistive Equipment;Needs Assistance;Independent  (2 walkers; cane; O2; BSC; shower chair; back brace; nebulizer; pulsox; BP cuff; current with Anthony RANDHAWA)   Facility Arrived From: Home   Lives With spouse   Able to Return to Prior Arrangements yes   Is patient able to care for self after discharge? Yes  (With spouse assist; HH; DME use)   Who are your caregiver(s) and their phone number(s)? Getachew-spouse: [phone]   Patient's perception of discharge disposition home or selfcare;home health  (Continue with Anthony RANDHAWA)   Readmission Within the Last 30 Days planned readmission   If yes, most recent facility name: [facility]   Patient currently being followed by outpatient case management? No   Patient currently receives any other outside agency services? No  (-Anthony)   Equipment Currently Used at Home bedside commode;cane, straight;shower chair;walker, rolling;nebulizer   Do you have any problems affording any of your prescribed medications? No   Is the patient taking medications as prescribed? yes   Does the patient have transportation home? Yes   Transportation Anticipated car, drives self;family or friend will provide   Does the patient receive services at the [facility]? No   Discharge Plan A Home with family;Home Health  (Continue with Anthony RANDHAWA)   Discharge Plan B Other  (TBD)   DME Needed Upon Discharge  other (see comments)  (TBD)    Patient/Family in Agreement with Plan yes   Readmission Questionnaire   At the time of your discharge, did someone talk to you about what your health problems were? Yes   At the time of discharge, did someone talk to you about what to watch out for regarding worsening of your health problem? Yes   At the time of discharge, did someone talk to you about what to do if you experienced worsening of your health problem? Yes   At the time of discharge, did someone talk to you about which medication to take when you left the hospital and which ones to stop taking? Yes   At the time of discharge, did someone talk to you about when and where to follow up with a doctor after you left the hospital? Yes   What do you believe caused you to be sick enough to be re-admitted? SOB   How often do you need to have someone help you when you read instructions, pamphlets, or other written material from your doctor or pharmacy? Sometimes   Do you have problems taking your medications as prescribed? No   Do you have any problems affording any of  your prescribed medications? No   Do you have problems obtaining/receiving your medications? No   Does the patient have transportation to healthcare appointments? Yes   Living Arrangements house   Does the patient have family/friends to help with healtcare needs after discharge? yes   Does your caregiver provide all the help you need? Yes   If no, what kind of help do you need at home? some assist when weak or unwell   SW Role explained to patient; two patient identifiers recognized; SW contact information placed on Communication board. Discussed patient managing health care at home; determined who would be helping patient at home with recovery: Spouse and HH to help with recovery    PCP: Angel Orourke Jr, MD Prefers mid-day appointments    Extended Emergency Contact Information  Primary Emergency Contact: Getachew Palm   [employer] [facility] [place]  Mobile Phone: [phone]  Relation:  Spouse  Secondary Emergency Contact: Amado Montoya   [facility] [place]  Home Phone: [phone]  Mobile Phone: [phone]  Relation: Relative     [id number] [place] [address]  [address] [address]  Phone: [phone] Fax: [phone]    Payor: [facility] MCARE / Plan: [facility] DUAL COMPLETE HMO SNP / Product Type: Medicare Advantage /

## 2019-06-08 NOTE — CARE UPDATE
[facility]  ICU Multidisciplinary Bedside Rounds   SUMMARY     Date: 6/8/2019    Prehospitalization: Home  Admit Date / LOS : 6/7/2019/ 1 days    Diagnosis: Acute diastolic heart failure    Consults:        Active: N/A       Needed: N/A     Code Status: Full Code  Advanced Directive: <no information>    LDA: Clark and PIV       Central Lines/Site/Justification:Patient Does Not Have Central Line       Urinary Cath/Order/Justification:Critically Ill in ICU    Infusions: None    CAM ICU: Negative  Pain Management: PO       Pain Controlled: yes     Rhythm: NSR    Respiratory Device: Bipap      VTE Prophylaxis: Pharm  Mobility: Bedrest  Stress Ulcer Prophylaxis: Yes    Dietary: PO  Tolerance: yes  /  Advancement: N/A    Isolation: No active isolations    Restraints: No    Significant Dates:  Post Op Date: N/A  Rescue Date: N/A  Imaging/ Diagnostics: N/A    Noteworthy Labs:  WBC 18.42, PCO2 75.7    Needs from Care Team: none     ICU LOS 56m  Level of Care: Critical Care

## 2019-06-08 NOTE — CARE UPDATE
[facility]  ICU Multidisciplinary Bedside Rounds     UPDATE     Date: 6/8/2019      Plan of care reviewed with the following, Nurse, Charge Nurse and Physician.       Needs/ Goals for the day: wean from BIPAP, TTE, and BLE ultrasound      Level of Care: OK to Transfer

## 2019-06-08 NOTE — ED TRIAGE NOTES
Pt. stated being SOB and chest pain x 1day. Patient reports taking nitroglycerin which relieved the chest pain.  Productive cough x 3 days. Patient reports swelling to left leg and states she is compliant with her lasix.

## 2019-06-08 NOTE — NURSING
Pt arrived to floor on oxygen 4lpm NC. Aaox4, pt c/o pain to lower back, tramadol given. Voided once using bed pan, she also had one moderate formed dark brown bowel movement. She denies any n/v or chest pain. Will continue with plan of care.

## 2019-06-08 NOTE — NURSING TRANSFER
Nursing Transfer Note      6/8/2019     Transfer To: [address]    Transfer via wheelchair, bed    Transfer with O2, cardiac monitoring    Transported by transport service and RN    Medicines sent: insulin pen    Chart send with patient: Yes    Notified: spouse at bedside    Patient reassessed at: 6/8/19, 1130    Upon arrival to floor: cardiac monitor applied, patient oriented to room, call bell in reach and bed in lowest position

## 2019-06-08 NOTE — PROGRESS NOTES
Pt received on Bipap with settings 10/5/.40. Pt tolerating therapy at this time. Fio2 weaned to .35 to KS 88-92 per order. NARN  O2 sats 100%  322-weaned fio2 to .28

## 2019-06-08 NOTE — PROGRESS NOTES
Pt received on Bipap 10/5 and 28% with NARN, will monitor pt status and wean as tolerated.Pt face and nose checked for redness or breakdown. None noted at this time.

## 2019-06-08 NOTE — ED PROVIDER NOTES
Encounter Date: 6/7/2019    SCRIBE #1 NOTE: I, Montrell Hillman, am scribing for, and in the presence of, Dr. Coley.       History     Chief Complaint   Patient presents with    Shortness of Breath     Pt. stated being SOB and chest pain x 1day. Productive cough x 3 days.     This is a 69 y.o. Female with history of COPD, diastolic CHF with preserved ejection fraction, CAD, DVT who presents to the ED complaining of intermittent SOB for 2 days  Yesterday, the pt began having intermittent left sided CP x2 with associated SOB and left arm pain. She took a NTG which alleviated her symptoms. She is currently not having chest pain. Shortness of breath became worse since noon today.  Patient also complains of worsening edema of her left lower extremity.  Symptoms are typical previous exacerbations of congestive heart failure  Pt reports coughing up clear sputum, left leg swelling, nausea, weakness, and chronic lower back pain. She denies fever, vomiting, diarrhea, abdominal pain, and HA. She is compliant with her blood thinner medicine regiment.     The history is provided by the patient. No  was used.     Review of patient's allergies indicates:  No Known Allergies  Past Medical History:   Diagnosis Date    Anticoagulant long-term use     Xarelto    Arthritis     Asthma     CHF (congestive heart failure)     COPD (chronic obstructive pulmonary disease)     Coronary artery disease     Deep vein thrombosis (DVT) of left lower extremity     Depression     Diabetes mellitus     GERD (gastroesophageal reflux disease)     Hypertension     Obstructive sleep apnea on CPAP     On home oxygen therapy     Unsteady gait     uses either walker or 4 prong cane     Past Surgical History:   Procedure Laterality Date    CORONARY ANGIOPLASTY WITH STENT PLACEMENT      HERNIA REPAIR      encapsulated umbilical hernia     Family History   Problem Relation Age of Onset    Heart disease Mother      Hypertension Mother     Diabetes Father      Social History     Tobacco Use    Smoking status: Current Every Day Smoker     Packs/day: 0.50     Years: 55.00     Pack years: 27.50     Types: Cigarettes     Start date: 1963     Last attempt to quit: 2018     Years since quittin.4    Smokeless tobacco: Never Used   Substance Use Topics    Alcohol use: Not Currently     Alcohol/week: 0.6 oz     Types: 1 Glasses of wine per week     Frequency: Never     Comment: socially    Drug use: No     Review of Systems   Constitutional: Negative for chills, diaphoresis, fatigue and fever.   HENT: Negative for congestion, sinus pain and sore throat.    Respiratory: Positive for cough, shortness of breath and wheezing. Negative for stridor.    Cardiovascular: Positive for leg swelling. Negative for chest pain and palpitations.   Gastrointestinal: Positive for nausea. Negative for abdominal pain, diarrhea and vomiting.   Genitourinary: Negative for dysuria, flank pain and frequency.   Musculoskeletal: Positive for back pain. Negative for joint swelling.   Neurological: Negative for dizziness, seizures, syncope, facial asymmetry, speech difficulty, weakness, numbness and headaches.   Psychiatric/Behavioral: Negative for confusion. The patient is not nervous/anxious.    All other systems reviewed and are negative.      Physical Exam     Initial Vitals [19]   BP Pulse Resp Temp SpO2   (!) 152/80 82 (!) 22 98.4 °F (36.9 °C) (!) 91 %      MAP       --         Physical Exam    Nursing note and vitals reviewed.  Constitutional: She appears well-developed and well-nourished. She is not diaphoretic. No distress.   HENT:   Head: Normocephalic and atraumatic.   Right Ear: External ear normal.   Left Ear: External ear normal.   Nose: Nose normal.   Mouth/Throat: Oropharynx is clear and moist.   Eyes: Conjunctivae and EOM are normal. Pupils are equal, round, and reactive to light. Right eye exhibits no discharge. Left  eye exhibits no discharge. No scleral icterus.   Neck: Neck supple. No tracheal deviation present. JVD present.   Cardiovascular: Normal rate, regular rhythm and normal heart sounds.   No murmur heard.  Pulmonary/Chest: No stridor. She is in respiratory distress. She has no wheezes. She has no rhonchi. She has rales. She exhibits no tenderness.   Crackles heard throughout all lung bases bilaterally.    Abdominal: Soft. Bowel sounds are normal. She exhibits no distension. There is no tenderness. There is no rebound and no guarding.   Musculoskeletal: Normal range of motion. She exhibits edema. She exhibits no tenderness.        Right lower leg: Normal.        Left lower leg: She exhibits swelling and edema (2+ pitting edema that goes FDC up to the knee). She exhibits no tenderness.   Neurological: She is alert and oriented to person, place, and time. No cranial nerve deficit.   Skin: Skin is warm and dry. No rash noted. No erythema. There is pallor.   Psychiatric: She has a normal mood and affect. Her behavior is normal. Judgment and thought content normal.         ED Course   Procedures  Labs Reviewed   CBC W/ AUTO DIFFERENTIAL - Abnormal; Notable for the following components:       Result Value    WBC 18.42 (*)     RBC 3.47 (*)     Hemoglobin 8.7 (*)     Hematocrit 32.3 (*)     Mean Corpuscular Hemoglobin 25.1 (*)     Mean Corpuscular Hemoglobin Conc 26.9 (*)     RDW 18.8 (*)     Platelets 445 (*)     Gran # (ANC) 13.6 (*)     Mono # 1.4 (*)     Gran% 74.4 (*)     Lymph% 17.1 (*)     All other components within normal limits   COMPREHENSIVE METABOLIC PANEL - Abnormal; Notable for the following components:    Sodium 135 (*)     CO2 30 (*)     Glucose 203 (*)     Albumin 3.4 (*)     ALT 7 (*)     All other components within normal limits   B-TYPE NATRIURETIC PEPTIDE - Abnormal; Notable for the following components:     (*)     All other components within normal limits   ISTAT PROCEDURE - Abnormal; Notable  for the following components:    POC PH 7.305 (*)     POC PCO2 75.7 (*)     POC PO2 354 (*)     POC HCO3 37.7 (*)     POC TCO2 40 (*)     All other components within normal limits   CULTURE, BLOOD   CULTURE, BLOOD   TROPONIN I   MAGNESIUM   URINALYSIS, REFLEX TO URINE CULTURE   LACTIC ACID, PLASMA   URINALYSIS MICROSCOPIC     EKG Readings: (Independently Interpreted)   Initial Reading: No STEMI. Rhythm: Normal Sinus Rhythm. Heart Rate: 89. Ectopy: No Ectopy. Conduction: Normal. ST Segments: Normal ST Segments. T Waves: Normal. Q Waves: V1 and V2.   Unchanged previous EKG on 03/25/2019.  No acute ischemic changes.   Other EKG Interpretations: Another EKG was run today @2226. Results are the same from prior EKG ran earlier today.        Imaging Results          X-Ray Chest AP Portable (Final result)  Result time 06/07/19 21:22:01    Final result by Yaron Castro MD (06/07/19 21:22:01)                 Impression:      No radiographic acute intrathoracic process seen on this single view.      Electronically signed by: Yaron Castro MD  Date:    06/07/2019  Time:    21:22             Narrative:    EXAMINATION:  XR CHEST AP PORTABLE    CLINICAL HISTORY:  CHF;    TECHNIQUE:  Single frontal view of the chest was performed.    COMPARISON:  Chest radiograph 05/12/2019    FINDINGS:  Monitoring leads overlie the chest.  Large body habitus.  Patient is slightly rotated.    No detrimental change.  Cardiomediastinal silhouette is midline and stable without convincing evidence of failure.  Bibasilar linear opacities suggesting subsegmental scarring versus atelectasis.  The lungs are otherwise well expanded without large consolidation, pleural effusion or pneumothorax.  No acute osseous process seen.  PA and lateral views can be obtained.                                 Medical Decision Making:   History:   Old Medical Records: I decided to obtain old medical records.  Differential Diagnosis:   Differential diagnosis includes but  is not limited to:   CHF exacerbation, acute coronary syndrome, and kidney failure.   Clinical Tests:   Lab Tests: Ordered  Radiological Study: Ordered  Medical Tests: Ordered and Reviewed  ED Management:  2104: I was shown the pt's ABG and based on decreased oxygen saturation, will continue administration of BiPAP.  2220: Pt reports her teeth hurting.  EKG repeated without any changes.  Denies chest pain. Shortness of breath proving.  2325:  White blood cell count elevated compared to previous.  No infiltrate on chest x-ray.  Mild cephalization noted. She will treat for diastolic heart failure and COPD.  Awaiting urinalysis determine further need for IV antibiotics.  Patient has history UTI's the past she does not have any urinary symptoms. Discussed results patient family.            Scribe Attestation:   Scribe #1: I performed the above scribed service and the documentation accurately describes the services I performed. I attest to the accuracy of the note.    Attending Attestation:         Attending Critical Care:   Critical Care Times:   Direct Patient Care (initial evaluation, reassessments, and time considering the case)................................................................10 minutes.   Additional History from reviewing old medical records or taking additional history from the family, EMS, PCP, etc.......................10 minutes.   Ordering, Reviewing, and Interpreting Diagnostic Studies...............................................................................................................10 minutes.   Documentation..................................................................................................................................................................................10 minutes.   Consultation with other Physicians. .................................................................................................................................................10  minutes.   ==============================================================  · Total Critical Care Time - exclusive of procedural time: 50 minutes.  ==============================================================  Critical care was necessary to treat or prevent imminent or life-threatening deterioration of the following conditions: congestive heart failure.   Critical care was time spent personally by me on the following activities: obtaining history from patient or relative, examination of patient, review of x-rays / CT sent with the patient, review of old charts, ordering lab, x-rays, and/or EKG, evaluation of patient's response to treatment, interpretation of cardiac measurements and re-evaluation of patient's conition.   Critical Care Condition: life-threatening     Physician Attestation for Scribe:  Physician Attestation Statement for Scribe #1: I, Dr. Coley, reviewed documentation, as scribed by Montrell Hillman in my presence, and it is both accurate and complete.                   Clinical Impression:       ICD-10-CM ICD-9-CM   1. Acute diastolic heart failure I50.31 428.31   2. Shortness of breath R06.02 786.05   3. SOB (shortness of breath) R06.02 786.05   4. COPD with acute exacerbation J44.1 491.21   5. Acute respiratory failure with hypoxia and hypercapnia J96.01 518.81    J96.02    6. Chest pain, unspecified type R07.9 786.50         Disposition:   Disposition: Admitted  Condition: Wilfredo Coley MD  06/07/19 8973

## 2019-06-08 NOTE — PROGRESS NOTES
Reported results to Dr. Coley.Fio2 decreased to 40% per MD. Results for ANSLEY RICHMOND (MRN [id number]) as of 6/7/2019 21:28   Ref. Range 6/7/2019 20:39   POC PH Latest Ref Range: 7.35 - 7.45  7.305 (L)   POC PCO2 Latest Ref Range: 35 - 45 mmHg 75.7 (HH)   POC PO2 Latest Ref Range: 80 - 100 mmHg 354 (H)   POC BE Latest Ref Range: -2 to 2 mmol/L 9   POC HCO3 Latest Ref Range: 24 - 28 mmol/L 37.7 (H)   POC SATURATED O2 Latest Ref Range: 95 - 100 % 100   POC TCO2 Latest Ref Range: 23 - 27 mmol/L 40 (H)   FiO2 Unknown 100   Flow Unknown 15   Sample Unknown ARTERIAL   DelSys Unknown NRB   Allens Test Unknown Pass   Site Unknown RR   Mode Unknown SPONT

## 2019-06-08 NOTE — PLAN OF CARE
Problem: Adult Inpatient Plan of Care  Goal: Plan of Care Review  Outcome: Ongoing (interventions implemented as appropriate)  Pt on Bipap continuos throughout night. Order to KS 88-92 and wean Q4 as tolerated. Per Jeancarlos DURAN.

## 2019-06-09 LAB
ANION GAP SERPL CALC-SCNC: 9 MMOL/L (ref 8–16)
AORTIC ROOT ANNULUS: 3.44 CM
AORTIC VALVE CUSP SEPERATION: 1.94 CM
ASCENDING AORTA: 2.76 CM
AV INDEX (PROSTH): 0.97
AV MEAN GRADIENT: 7.01 MMHG
AV PEAK GRADIENT: 12.11 MMHG
AV VALVE AREA: 3.38 CM2
AV VELOCITY RATIO: 0.91
BASOPHILS # BLD AUTO: 0 K/UL (ref 0–0.2)
BASOPHILS NFR BLD: 0 % (ref 0–1.9)
BSA FOR ECHO PROCEDURE: 1.94 M2
BUN SERPL-MCNC: 40 MG/DL (ref 8–23)
CALCIUM SERPL-MCNC: 9.6 MG/DL (ref 8.7–10.5)
CHLORIDE SERPL-SCNC: 93 MMOL/L (ref 95–110)
CO2 SERPL-SCNC: 36 MMOL/L (ref 23–29)
CREAT SERPL-MCNC: 1.3 MG/DL (ref 0.5–1.4)
CV ECHO LV RWT: 0.82 CM
DIFFERENTIAL METHOD: ABNORMAL
DOP CALC AO PEAK VEL: 1.74 M/S
DOP CALC AO VTI: 39.93 CM
DOP CALC LVOT AREA: 3.49 CM2
DOP CALC LVOT DIAMETER: 2.11 CM
DOP CALC LVOT PEAK VEL: 1.58 M/S
DOP CALC LVOT STROKE VOLUME: 134.83 CM3
DOP CALCLVOT PEAK VEL VTI: 38.58 CM
E WAVE DECELERATION TIME: 349.44 MSEC
E/A RATIO: 0.73
E/E' RATIO: 14.86
ECHO LV POSTERIOR WALL: 1.51 CM (ref 0.6–1.1)
EOSINOPHIL # BLD AUTO: 0 K/UL (ref 0–0.5)
EOSINOPHIL NFR BLD: 0 % (ref 0–8)
ERYTHROCYTE [DISTWIDTH] IN BLOOD BY AUTOMATED COUNT: 19.2 % (ref 11.5–14.5)
EST. GFR  (AFRICAN AMERICAN): 49 ML/MIN/1.73 M^2
EST. GFR  (NON AFRICAN AMERICAN): 43 ML/MIN/1.73 M^2
FRACTIONAL SHORTENING: 48 % (ref 28–44)
GLUCOSE SERPL-MCNC: 459 MG/DL (ref 70–110)
HCT VFR BLD AUTO: 28 % (ref 37–48.5)
HGB BLD-MCNC: 7.5 G/DL (ref 12–16)
INTERVENTRICULAR SEPTUM: 1.65 CM (ref 0.6–1.1)
IVRT: 0.12 MSEC
LA MAJOR: 6.02 CM
LA MINOR: 6.06 CM
LA WIDTH: 4.3 CM
LEFT ATRIUM SIZE: 3.19 CM
LEFT ATRIUM VOLUME INDEX: 37 ML/M2
LEFT ATRIUM VOLUME: 70.42 CM3
LEFT INTERNAL DIMENSION IN SYSTOLE: 1.91 CM (ref 2.1–4)
LEFT VENTRICLE DIASTOLIC VOLUME INDEX: 30.38 ML/M2
LEFT VENTRICLE DIASTOLIC VOLUME: 57.83 ML
LEFT VENTRICLE MASS INDEX: 118.8 G/M2
LEFT VENTRICLE SYSTOLIC VOLUME INDEX: 6 ML/M2
LEFT VENTRICLE SYSTOLIC VOLUME: 11.36 ML
LEFT VENTRICULAR INTERNAL DIMENSION IN DIASTOLE: 3.69 CM (ref 3.5–6)
LEFT VENTRICULAR MASS: 226.22 G
LV LATERAL E/E' RATIO: 13
LV SEPTAL E/E' RATIO: 17.33
LYMPHOCYTES # BLD AUTO: 0.8 K/UL (ref 1–4.8)
LYMPHOCYTES NFR BLD: 4.2 % (ref 18–48)
MAGNESIUM SERPL-MCNC: 1.7 MG/DL (ref 1.6–2.6)
MCH RBC QN AUTO: 24.5 PG (ref 27–31)
MCHC RBC AUTO-ENTMCNC: 26.8 G/DL (ref 32–36)
MCV RBC AUTO: 92 FL (ref 82–98)
MONOCYTES # BLD AUTO: 0.5 K/UL (ref 0.3–1)
MONOCYTES NFR BLD: 2.6 % (ref 4–15)
MV PEAK A VEL: 1.42 M/S
MV PEAK E VEL: 1.04 M/S
NEUTROPHILS # BLD AUTO: 17.1 K/UL (ref 1.8–7.7)
NEUTROPHILS NFR BLD: 93.2 % (ref 38–73)
PHOSPHATE SERPL-MCNC: 3.1 MG/DL (ref 2.7–4.5)
PISA TR MAX VEL: 4.24 M/S
PLATELET # BLD AUTO: 335 K/UL (ref 150–350)
PMV BLD AUTO: 9.6 FL (ref 9.2–12.9)
POCT GLUCOSE: 339 MG/DL (ref 70–110)
POCT GLUCOSE: 350 MG/DL (ref 70–110)
POCT GLUCOSE: 483 MG/DL (ref 70–110)
POCT GLUCOSE: 497 MG/DL (ref 70–110)
POTASSIUM SERPL-SCNC: 5.1 MMOL/L (ref 3.5–5.1)
PULM VEIN S/D RATIO: 1.7
PV PEAK D VEL: 0.37 M/S
PV PEAK S VEL: 0.63 M/S
PV PEAK VELOCITY: 1.17 CM/S
RA MAJOR: 5.65 CM
RA PRESSURE: 8 MMHG
RA WIDTH: 3.96 CM
RBC # BLD AUTO: 3.06 M/UL (ref 4–5.4)
RIGHT VENTRICULAR END-DIASTOLIC DIMENSION: 4.46 CM
RV TISSUE DOPPLER FREE WALL SYSTOLIC VELOCITY 1 (APICAL 4 CHAMBER VIEW): 12.87 M/S
SINUS: 3.11 CM
SODIUM SERPL-SCNC: 138 MMOL/L (ref 136–145)
STJ: 2.52 CM
TDI LATERAL: 0.08
TDI SEPTAL: 0.06
TDI: 0.07
TR MAX PG: 71.91 MMHG
TRICUSPID ANNULAR PLANE SYSTOLIC EXCURSION: 1.87 CM
TV REST PULMONARY ARTERY PRESSURE: 80 MMHG
WBC # BLD AUTO: 18.28 K/UL (ref 3.9–12.7)

## 2019-06-09 PROCEDURE — 84100 ASSAY OF PHOSPHORUS: CPT

## 2019-06-09 PROCEDURE — 94761 N-INVAS EAR/PLS OXIMETRY MLT: CPT

## 2019-06-09 PROCEDURE — 94660 CPAP INITIATION&MGMT: CPT

## 2019-06-09 PROCEDURE — 94799 UNLISTED PULMONARY SVC/PX: CPT

## 2019-06-09 PROCEDURE — 63600175 PHARM REV CODE 636 W HCPCS: Performed by: INTERNAL MEDICINE

## 2019-06-09 PROCEDURE — 25000003 PHARM REV CODE 250: Performed by: INTERNAL MEDICINE

## 2019-06-09 PROCEDURE — 36415 COLL VENOUS BLD VENIPUNCTURE: CPT

## 2019-06-09 PROCEDURE — 25000242 PHARM REV CODE 250 ALT 637 W/ HCPCS: Performed by: INTERNAL MEDICINE

## 2019-06-09 PROCEDURE — 11000001 HC ACUTE MED/SURG PRIVATE ROOM

## 2019-06-09 PROCEDURE — 80048 BASIC METABOLIC PNL TOTAL CA: CPT

## 2019-06-09 PROCEDURE — 83735 ASSAY OF MAGNESIUM: CPT

## 2019-06-09 PROCEDURE — 94640 AIRWAY INHALATION TREATMENT: CPT

## 2019-06-09 PROCEDURE — 27000221 HC OXYGEN, UP TO 24 HOURS

## 2019-06-09 PROCEDURE — 85025 COMPLETE CBC W/AUTO DIFF WBC: CPT

## 2019-06-09 PROCEDURE — 99900035 HC TECH TIME PER 15 MIN (STAT)

## 2019-06-09 RX ORDER — INSULIN ASPART 100 [IU]/ML
8 INJECTION, SOLUTION INTRAVENOUS; SUBCUTANEOUS
Status: DISCONTINUED | OUTPATIENT
Start: 2019-06-09 | End: 2019-06-10

## 2019-06-09 RX ORDER — PREDNISONE 20 MG/1
40 TABLET ORAL DAILY
Status: DISCONTINUED | OUTPATIENT
Start: 2019-06-10 | End: 2019-06-12 | Stop reason: HOSPADM

## 2019-06-09 RX ORDER — INSULIN ASPART 100 [IU]/ML
5 INJECTION, SOLUTION INTRAVENOUS; SUBCUTANEOUS
Status: DISCONTINUED | OUTPATIENT
Start: 2019-06-09 | End: 2019-06-09

## 2019-06-09 RX ADMIN — FAMOTIDINE 20 MG: 20 TABLET ORAL at 09:06

## 2019-06-09 RX ADMIN — ASPIRIN 81 MG: 81 TABLET, COATED ORAL at 08:06

## 2019-06-09 RX ADMIN — DOXYCYCLINE HYCLATE 100 MG: 100 TABLET, COATED ORAL at 09:06

## 2019-06-09 RX ADMIN — ACETAMINOPHEN 650 MG: 325 TABLET, FILM COATED ORAL at 02:06

## 2019-06-09 RX ADMIN — METOPROLOL TARTRATE 100 MG: 50 TABLET ORAL at 09:06

## 2019-06-09 RX ADMIN — GUAIFENESIN AND DEXTROMETHORPHAN 10 ML: 100; 10 SYRUP ORAL at 05:06

## 2019-06-09 RX ADMIN — METOPROLOL TARTRATE 100 MG: 50 TABLET ORAL at 08:06

## 2019-06-09 RX ADMIN — IPRATROPIUM BROMIDE AND ALBUTEROL SULFATE 3 ML: .5; 3 SOLUTION RESPIRATORY (INHALATION) at 08:06

## 2019-06-09 RX ADMIN — GUAIFENESIN AND DEXTROMETHORPHAN 10 ML: 100; 10 SYRUP ORAL at 11:06

## 2019-06-09 RX ADMIN — FAMOTIDINE 20 MG: 20 TABLET ORAL at 08:06

## 2019-06-09 RX ADMIN — AMIODARONE HYDROCHLORIDE 200 MG: 200 TABLET ORAL at 08:06

## 2019-06-09 RX ADMIN — FUROSEMIDE 40 MG: 10 INJECTION, SOLUTION INTRAVENOUS at 08:06

## 2019-06-09 RX ADMIN — INSULIN ASPART 10 UNITS: 100 INJECTION, SOLUTION INTRAVENOUS; SUBCUTANEOUS at 04:06

## 2019-06-09 RX ADMIN — INSULIN ASPART 5 UNITS: 100 INJECTION, SOLUTION INTRAVENOUS; SUBCUTANEOUS at 11:06

## 2019-06-09 RX ADMIN — GABAPENTIN 300 MG: 300 CAPSULE ORAL at 09:06

## 2019-06-09 RX ADMIN — IPRATROPIUM BROMIDE AND ALBUTEROL SULFATE 3 ML: .5; 3 SOLUTION RESPIRATORY (INHALATION) at 04:06

## 2019-06-09 RX ADMIN — GUAIFENESIN AND DEXTROMETHORPHAN 10 ML: 100; 10 SYRUP ORAL at 06:06

## 2019-06-09 RX ADMIN — GABAPENTIN 300 MG: 300 CAPSULE ORAL at 08:06

## 2019-06-09 RX ADMIN — METHYLPREDNISOLONE SODIUM SUCCINATE 80 MG: 40 INJECTION, POWDER, FOR SOLUTION INTRAMUSCULAR; INTRAVENOUS at 01:06

## 2019-06-09 RX ADMIN — TRAMADOL HYDROCHLORIDE 50 MG: 50 TABLET, FILM COATED ORAL at 01:06

## 2019-06-09 RX ADMIN — IPRATROPIUM BROMIDE AND ALBUTEROL SULFATE 3 ML: .5; 3 SOLUTION RESPIRATORY (INHALATION) at 07:06

## 2019-06-09 RX ADMIN — INSULIN ASPART 8 UNITS: 100 INJECTION, SOLUTION INTRAVENOUS; SUBCUTANEOUS at 04:06

## 2019-06-09 RX ADMIN — ISOSORBIDE MONONITRATE 90 MG: 30 TABLET, EXTENDED RELEASE ORAL at 08:06

## 2019-06-09 RX ADMIN — RIVAROXABAN 20 MG: 20 TABLET, FILM COATED ORAL at 04:06

## 2019-06-09 RX ADMIN — LISINOPRIL 40 MG: 20 TABLET ORAL at 08:06

## 2019-06-09 RX ADMIN — SODIUM CHLORIDE 500 ML: 0.9 INJECTION, SOLUTION INTRAVENOUS at 11:06

## 2019-06-09 RX ADMIN — CEFTRIAXONE SODIUM 2 G: 2 INJECTION, SOLUTION INTRAVENOUS at 06:06

## 2019-06-09 RX ADMIN — HYDRALAZINE HYDROCHLORIDE 50 MG: 25 TABLET ORAL at 08:06

## 2019-06-09 RX ADMIN — GABAPENTIN 300 MG: 300 CAPSULE ORAL at 04:06

## 2019-06-09 RX ADMIN — INSULIN ASPART 4 UNITS: 100 INJECTION, SOLUTION INTRAVENOUS; SUBCUTANEOUS at 08:06

## 2019-06-09 RX ADMIN — PREGABALIN 75 MG: 50 CAPSULE ORAL at 09:06

## 2019-06-09 RX ADMIN — INSULIN ASPART 10 UNITS: 100 INJECTION, SOLUTION INTRAVENOUS; SUBCUTANEOUS at 08:06

## 2019-06-09 RX ADMIN — FLUTICASONE PROPIONATE 1 PUFF: 220 AEROSOL, METERED RESPIRATORY (INHALATION) at 08:06

## 2019-06-09 RX ADMIN — IPRATROPIUM BROMIDE AND ALBUTEROL SULFATE 3 ML: .5; 3 SOLUTION RESPIRATORY (INHALATION) at 01:06

## 2019-06-09 RX ADMIN — PREGABALIN 75 MG: 50 CAPSULE ORAL at 08:06

## 2019-06-09 RX ADMIN — DOXYCYCLINE HYCLATE 100 MG: 100 TABLET, COATED ORAL at 08:06

## 2019-06-09 RX ADMIN — IPRATROPIUM BROMIDE AND ALBUTEROL SULFATE 3 ML: .5; 3 SOLUTION RESPIRATORY (INHALATION) at 12:06

## 2019-06-09 RX ADMIN — INSULIN ASPART 10 UNITS: 100 INJECTION, SOLUTION INTRAVENOUS; SUBCUTANEOUS at 12:06

## 2019-06-09 RX ADMIN — METHYLPREDNISOLONE SODIUM SUCCINATE 80 MG: 40 INJECTION, POWDER, FOR SOLUTION INTRAMUSCULAR; INTRAVENOUS at 05:06

## 2019-06-09 NOTE — NURSING
"Patient abed this shift, awake on and off during NOC. Patient wore BIPAP during NOC even while awake. PRN pain meds given this shift for c/o "leg aches/cramps from lasix and crick in neck from reading emails on smart phone".   "

## 2019-06-09 NOTE — NURSING
Pt aaox4 free of falls or injuries. MD aware of pts low grade temp and high BS. New orders received. Pt able to let needs known. She is currently on 3L NC. No signs of distress noted. Call light w/i reach. Bed in lowest position

## 2019-06-09 NOTE — PLAN OF CARE
Discussed case with Dr. Paredes and reviewed chart/imaging.  Per Dr. Paredes, pt does not have phlegmasia or neurovascular compromise of her lower extremities.  LLE venous US shows chronic L femoral and popliteal DVT with new finding of L peroneal DVT.  Rec continuing anticoagulation.  Recommend compression with Rx stockings, elevation, dietary changes associated with water and sodium intake.  If there is proximal propagation of DVT on anticoagulation or contraindications to anticoagulation, she would benefit from an IVC filter at that time.  No vascular surgical intervention indicated at this time.  Primary team states ok for evaluation tomorrow with full consult note to follow as I am not currently on call for inpatient consults at this facility.  If her exam changes or worsens, or concerns develop and urgent evaluation warranted, please contact me for transfer to [facility].

## 2019-06-09 NOTE — PLAN OF CARE
Problem: Fall Injury Risk  Goal: Absence of Fall and Fall-Related Injury  Outcome: Ongoing (interventions implemented as appropriate)  Intervention: Identify and Manage Contributors to Fall Injury Risk     06/09/19 0640   Manage Acute Allergic Reaction   Medication Review/Management medications reviewed   Identify and Manage Contributors to Fall Injury Risk   Self-Care Promotion independence encouraged;BADL personal objects within reach;BADL personal routines maintained     Intervention: Promote Injury-Free Environment     06/09/19 0640   Optimize [place] and Functional Mobility   Environmental Safety Modification assistive device/personal items within reach;clutter free environment maintained;lighting adjusted;room near unit station;room organization consistent   Optimize Balance and Safe Activity   Safety Promotion/Fall Prevention assistive device/personal item within reach;commode/urinal/bedpan at bedside;Fall Risk reviewed with patient/family;lighting adjusted;medications reviewed;room near unit station;side rails raised x 2;instructed to call staff for mobility

## 2019-06-10 LAB
ANION GAP SERPL CALC-SCNC: 9 MMOL/L (ref 8–16)
BACTERIA UR CULT: NORMAL
BASOPHILS # BLD AUTO: 0 K/UL (ref 0–0.2)
BASOPHILS NFR BLD: 0 % (ref 0–1.9)
BUN SERPL-MCNC: 37 MG/DL (ref 8–23)
CALCIUM SERPL-MCNC: 9.3 MG/DL (ref 8.7–10.5)
CHLORIDE SERPL-SCNC: 99 MMOL/L (ref 95–110)
CO2 SERPL-SCNC: 34 MMOL/L (ref 23–29)
CREAT SERPL-MCNC: 0.9 MG/DL (ref 0.5–1.4)
DIFFERENTIAL METHOD: ABNORMAL
EOSINOPHIL # BLD AUTO: 0 K/UL (ref 0–0.5)
EOSINOPHIL NFR BLD: 0 % (ref 0–8)
ERYTHROCYTE [DISTWIDTH] IN BLOOD BY AUTOMATED COUNT: 19.3 % (ref 11.5–14.5)
EST. GFR  (AFRICAN AMERICAN): >60 ML/MIN/1.73 M^2
EST. GFR  (NON AFRICAN AMERICAN): >60 ML/MIN/1.73 M^2
GLUCOSE SERPL-MCNC: 332 MG/DL (ref 70–110)
HCT VFR BLD AUTO: 28.6 % (ref 37–48.5)
HGB BLD-MCNC: 7.8 G/DL (ref 12–16)
HYPOCHROMIA BLD QL SMEAR: ABNORMAL
LYMPHOCYTES # BLD AUTO: 1.2 K/UL (ref 1–4.8)
LYMPHOCYTES NFR BLD: 4.8 % (ref 18–48)
MCH RBC QN AUTO: 25.1 PG (ref 27–31)
MCHC RBC AUTO-ENTMCNC: 27.3 G/DL (ref 32–36)
MCV RBC AUTO: 92 FL (ref 82–98)
MONOCYTES # BLD AUTO: 1.2 K/UL (ref 0.3–1)
MONOCYTES NFR BLD: 5 % (ref 4–15)
NEUTROPHILS # BLD AUTO: 21.8 K/UL (ref 1.8–7.7)
NEUTROPHILS NFR BLD: 90.2 % (ref 38–73)
PLATELET # BLD AUTO: 322 K/UL (ref 150–350)
PLATELET BLD QL SMEAR: ABNORMAL
PMV BLD AUTO: 9.7 FL (ref 9.2–12.9)
POCT GLUCOSE: 298 MG/DL (ref 70–110)
POCT GLUCOSE: 306 MG/DL (ref 70–110)
POCT GLUCOSE: 376 MG/DL (ref 70–110)
POCT GLUCOSE: 386 MG/DL (ref 70–110)
POCT GLUCOSE: 483 MG/DL (ref 70–110)
POCT GLUCOSE: 497 MG/DL (ref 70–110)
POCT GLUCOSE: >500 MG/DL (ref 70–110)
POTASSIUM SERPL-SCNC: 4.5 MMOL/L (ref 3.5–5.1)
RBC # BLD AUTO: 3.11 M/UL (ref 4–5.4)
SODIUM SERPL-SCNC: 142 MMOL/L (ref 136–145)
WBC # BLD AUTO: 24.22 K/UL (ref 3.9–12.7)

## 2019-06-10 PROCEDURE — 63600175 PHARM REV CODE 636 W HCPCS: Performed by: HOSPITALIST

## 2019-06-10 PROCEDURE — 80048 BASIC METABOLIC PNL TOTAL CA: CPT

## 2019-06-10 PROCEDURE — 27000221 HC OXYGEN, UP TO 24 HOURS

## 2019-06-10 PROCEDURE — 25000003 PHARM REV CODE 250: Performed by: INTERNAL MEDICINE

## 2019-06-10 PROCEDURE — 99900035 HC TECH TIME PER 15 MIN (STAT)

## 2019-06-10 PROCEDURE — 94640 AIRWAY INHALATION TREATMENT: CPT

## 2019-06-10 PROCEDURE — 94660 CPAP INITIATION&MGMT: CPT

## 2019-06-10 PROCEDURE — 36415 COLL VENOUS BLD VENIPUNCTURE: CPT

## 2019-06-10 PROCEDURE — 11000001 HC ACUTE MED/SURG PRIVATE ROOM

## 2019-06-10 PROCEDURE — 25000242 PHARM REV CODE 250 ALT 637 W/ HCPCS: Performed by: INTERNAL MEDICINE

## 2019-06-10 PROCEDURE — 63600175 PHARM REV CODE 636 W HCPCS: Performed by: INTERNAL MEDICINE

## 2019-06-10 PROCEDURE — 85025 COMPLETE CBC W/AUTO DIFF WBC: CPT

## 2019-06-10 RX ORDER — INSULIN ASPART 100 [IU]/ML
10 INJECTION, SOLUTION INTRAVENOUS; SUBCUTANEOUS
Status: DISCONTINUED | OUTPATIENT
Start: 2019-06-10 | End: 2019-06-12 | Stop reason: HOSPADM

## 2019-06-10 RX ADMIN — GUAIFENESIN AND DEXTROMETHORPHAN 10 ML: 100; 10 SYRUP ORAL at 12:06

## 2019-06-10 RX ADMIN — IPRATROPIUM BROMIDE AND ALBUTEROL SULFATE 3 ML: .5; 3 SOLUTION RESPIRATORY (INHALATION) at 04:06

## 2019-06-10 RX ADMIN — IPRATROPIUM BROMIDE AND ALBUTEROL SULFATE 3 ML: .5; 3 SOLUTION RESPIRATORY (INHALATION) at 08:06

## 2019-06-10 RX ADMIN — HYDRALAZINE HYDROCHLORIDE 50 MG: 25 TABLET ORAL at 12:06

## 2019-06-10 RX ADMIN — INSULIN ASPART 5 UNITS: 100 INJECTION, SOLUTION INTRAVENOUS; SUBCUTANEOUS at 09:06

## 2019-06-10 RX ADMIN — HYDRALAZINE HYDROCHLORIDE 50 MG: 25 TABLET ORAL at 09:06

## 2019-06-10 RX ADMIN — INSULIN ASPART 8 UNITS: 100 INJECTION, SOLUTION INTRAVENOUS; SUBCUTANEOUS at 07:06

## 2019-06-10 RX ADMIN — GUAIFENESIN AND DEXTROMETHORPHAN 10 ML: 100; 10 SYRUP ORAL at 06:06

## 2019-06-10 RX ADMIN — TRAMADOL HYDROCHLORIDE 50 MG: 50 TABLET, FILM COATED ORAL at 08:06

## 2019-06-10 RX ADMIN — FAMOTIDINE 20 MG: 20 TABLET ORAL at 09:06

## 2019-06-10 RX ADMIN — INSULIN ASPART 8 UNITS: 100 INJECTION, SOLUTION INTRAVENOUS; SUBCUTANEOUS at 09:06

## 2019-06-10 RX ADMIN — METOPROLOL TARTRATE 100 MG: 50 TABLET ORAL at 09:06

## 2019-06-10 RX ADMIN — PREGABALIN 75 MG: 50 CAPSULE ORAL at 09:06

## 2019-06-10 RX ADMIN — FLUTICASONE PROPIONATE 1 PUFF: 220 AEROSOL, METERED RESPIRATORY (INHALATION) at 08:06

## 2019-06-10 RX ADMIN — TRAMADOL HYDROCHLORIDE 50 MG: 50 TABLET, FILM COATED ORAL at 09:06

## 2019-06-10 RX ADMIN — RIVAROXABAN 20 MG: 20 TABLET, FILM COATED ORAL at 07:06

## 2019-06-10 RX ADMIN — IPRATROPIUM BROMIDE AND ALBUTEROL SULFATE 3 ML: .5; 3 SOLUTION RESPIRATORY (INHALATION) at 11:06

## 2019-06-10 RX ADMIN — IPRATROPIUM BROMIDE AND ALBUTEROL SULFATE 3 ML: .5; 3 SOLUTION RESPIRATORY (INHALATION) at 12:06

## 2019-06-10 RX ADMIN — ASPIRIN 81 MG: 81 TABLET, COATED ORAL at 09:06

## 2019-06-10 RX ADMIN — ISOSORBIDE MONONITRATE 90 MG: 30 TABLET, EXTENDED RELEASE ORAL at 09:06

## 2019-06-10 RX ADMIN — GABAPENTIN 300 MG: 300 CAPSULE ORAL at 09:06

## 2019-06-10 RX ADMIN — LISINOPRIL 40 MG: 20 TABLET ORAL at 09:06

## 2019-06-10 RX ADMIN — INSULIN ASPART 8 UNITS: 100 INJECTION, SOLUTION INTRAVENOUS; SUBCUTANEOUS at 12:06

## 2019-06-10 RX ADMIN — AMIODARONE HYDROCHLORIDE 200 MG: 200 TABLET ORAL at 09:06

## 2019-06-10 RX ADMIN — IPRATROPIUM BROMIDE AND ALBUTEROL SULFATE 3 ML: .5; 3 SOLUTION RESPIRATORY (INHALATION) at 03:06

## 2019-06-10 RX ADMIN — GABAPENTIN 300 MG: 300 CAPSULE ORAL at 02:06

## 2019-06-10 RX ADMIN — CEFTRIAXONE SODIUM 2 G: 2 INJECTION, SOLUTION INTRAVENOUS at 07:06

## 2019-06-10 RX ADMIN — INSULIN ASPART 10 UNITS: 100 INJECTION, SOLUTION INTRAVENOUS; SUBCUTANEOUS at 07:06

## 2019-06-10 RX ADMIN — PREDNISONE 40 MG: 20 TABLET ORAL at 09:06

## 2019-06-10 RX ADMIN — DOXYCYCLINE HYCLATE 100 MG: 100 TABLET, COATED ORAL at 09:06

## 2019-06-10 RX ADMIN — INSULIN ASPART 6 UNITS: 100 INJECTION, SOLUTION INTRAVENOUS; SUBCUTANEOUS at 12:06

## 2019-06-10 NOTE — PROGRESS NOTES
"[facility]  Adult Nutrition  Progress Note    SUMMARY       Recommendations     1. RD to f/u with education needs if warranted   2. Current intake/diet adequate to meet estimated needs.    Goals: Meet > 85% EEN daily  Nutrition Goal Status: new  Communication of RD Recs: (POC)    Reason for Assessment    Reason For Assessment: identified at risk by screening criteria  Diagnosis: (HF (acute))  Relevant Medical History: PVD, COPD, CHF, HTN, DM, GERD, CAD, DVT  Interdisciplinary Rounds: did not attend    General Information Comments: Pt asleep at time of visit. Noted lunch tray with 75% intake.  NFPE 6/10: Limited, but pt with excess fat mass, no obvious sx/o LBM loss; pt appeared nourished. Noted A1c 7.3%; RD to f/u with education needs as needed (CHF).    Nutrition Discharge Planning: ADA/Cardiac Diet    Nutrition Risk Screen    Nutrition Risk Screen: no indicators present    Nutrition/Diet History    Spiritual, Cultural Beliefs, [employer] Practices, Values that Affect Care: no  Food Allergies: NKFA  Factors Affecting Nutritional Intake: None identified at this time    Anthropometrics    Temp: 97.8 °F (36.6 °C)  Height Method: Stated  Height: 5' 6" (167.6 cm)  Height (inches): 66 in  Weight Method: Bed Scale  Weight: 80.7 kg (178 lb)  Weight (lb): 178 lb  Ideal Body Weight (IBW), Female: 130 lb  % Ideal Body Weight, Female (lb): 136.92 lb  BMI (Calculated): 28.8  BMI Grade: 25 - 29.9 - overweight       Lab/Procedures/Meds    Pertinent Labs Reviewed: reviewed  Pertinent Labs Comments: A1c 7.3%  Pertinent Medications Reviewed: reviewed  Pertinent Medications Comments: abx, hydralazine, prednisone    Estimated/Assessed Needs    Weight Used For Calorie Calculations: 80.7 kg (177 lb 14.6 oz)  Energy Calorie Requirements (kcal): 1650 kcal (x 1.2)  Energy Need Method: [place]  Protein Requirements: 80g/kg (1g/kg)  Weight Used For Protein Calculations: 80.7 kg (177 lb 14.6 oz)     Estimated Fluid " Requirement Method: RDA Method  RDA Method (mL): 1650  CHO Requirement: 185g      Nutrition Prescription Ordered    Current Diet Order: Cardiac/ADA    Evaluation of Received Nutrient/Fluid Intake    I/O: reviewed  Energy Calories Required: meeting needs  Protein Required: meeting needs  Fluid Required: (per MD)  Comments: LBM: 6/8  Tolerance: tolerating  % Intake of Estimated Energy Needs: 75 - 100 %  % Meal Intake: 75 - 100 %    Nutrition Risk    Level of Risk/Frequency of Follow-up: (1 x week)     Assessment and Plan    Nutrition Problem  Decreased need for sodium    Related to (etiology):   Chronic Disease    Signs and Symptoms (as evidenced by):   CHF dx    Interventions  Sodium reduced diet    Nutrition Diagnosis Status:   New       Monitor and Evaluation    Food and Nutrient Intake: energy intake, food and beverage intake  Food and Nutrient Adminstration: diet order  Knowledge/Beliefs/Attitudes: food and nutrition knowledge/skill  Physical Activity and Function: nutrition-related ADLs and IADLs  Anthropometric Measurements: weight, weight change  Biochemical Data, Medical Tests and Procedures: electrolyte and renal panel, glucose/endocrine profile  Nutrition-Focused Physical Findings: overall appearance     Malnutrition Assessment       Subcutaneous Fat Loss (Final Summary): well nourished  Muscle Loss Evaluation (Final Summary): well nourished         Nutrition Follow-Up    RD Follow-up?: Yes

## 2019-06-10 NOTE — PLAN OF CARE
Recommendations      1. RD to f/u with education needs if warranted   2. Current intake/diet adequate to meet estimated needs.     Goals: Meet > 85% EEN daily  Nutrition Goal Status: new  Communication of RD Recs: (POC)

## 2019-06-10 NOTE — SUBJECTIVE & OBJECTIVE
Interval History: pt reports breathing much improved, pt has home bipap, nebulizer and O2, LE swelling improved and less pain     Review of Systems   Constitutional: Positive for activity change.   HENT: Positive for congestion.    Eyes: Negative.    Respiratory: Positive for cough, shortness of breath and wheezing.    Cardiovascular: Positive for leg swelling.   Gastrointestinal: Negative.    Endocrine: Negative.    Musculoskeletal: Positive for arthralgias and gait problem.   Neurological: Positive for weakness.   Psychiatric/Behavioral: Negative.      Objective:     Vital Signs (Most Recent):  Temp: 98.5 °F (36.9 °C) (06/10/19 0453)  Pulse: 74 (06/10/19 0453)  Resp: 18 (06/10/19 0453)  BP: 137/65 (06/10/19 0453)  SpO2: 95 % (06/10/19 0453) Vital Signs (24h Range):  Temp:  [98.5 °F (36.9 °C)-99.5 °F (37.5 °C)] 98.5 °F (36.9 °C)  Pulse:  [] 74  Resp:  [17-20] 18  SpO2:  [92 %-98 %] 95 %  BP: (117-162)/(57-76) 137/65     Weight: 80.7 kg (178 lb)  Body mass index is 28.73 kg/m².    Intake/Output Summary (Last 24 hours) at 6/10/2019 0709  Last data filed at 6/10/2019 0600  Gross per 24 hour   Intake 770 ml   Output --   Net 770 ml      Physical Exam   Constitutional: She is oriented to person, place, and time. She appears well-developed and well-nourished. No distress.   HENT:   Head: Normocephalic.   Eyes: Pupils are equal, round, and reactive to light.   Neck: Neck supple. No JVD present.   Cardiovascular: Normal rate, regular rhythm and normal heart sounds.   Pulmonary/Chest: Effort normal. No respiratory distress. She has no wheezes. She has rales.   Abdominal: Soft. Bowel sounds are normal. There is no tenderness.   Musculoskeletal: She exhibits edema and tenderness.   Neurological: She is alert and oriented to person, place, and time.   Skin: Capillary refill takes 2 to 3 seconds.   Psychiatric: She has a normal mood and affect. Her behavior is normal.       Significant Labs: All pertinent labs within the  past 24 hours have been reviewed.    Significant Imaging: I have reviewed and interpreted all pertinent imaging results/findings within the past 24 hours.

## 2019-06-10 NOTE — PROGRESS NOTES
[id number]  TN contacted [facility] at [phone]; spoke with Sylwia; scheduled cardiology f/u appt on 06/19/19 at 3:00 PM.

## 2019-06-10 NOTE — PLAN OF CARE
Problem: Adult Inpatient Plan of Care  Goal: Plan of Care Review  Outcome: Ongoing (interventions implemented as appropriate)     06/10/19 2968   Plan of Care Review   Plan of Care Reviewed With patient   No falls, trauma or injury this shift. Blood glucose monitored before each meal and at hour of sleep, coverage provided per sliding scale. infection managed with IV [place] daily. Continue with plan of care and continue to monitor patient.

## 2019-06-10 NOTE — HOSPITAL COURSE
Mrs. Yasmeen Palm is a 68 yo woman with COPD, CHF (EF 50%, G2DD), LOYDA, chronic respiratory failure with hypoxia and hypercapnia (on 3.5L NC at home), paroxysmal atrial fibrillation ([id number] score 4) on chronic Xarelto, essential hypertension, type 2 diabetes mellitus (HbA1c 7.3%), hyperlipidemia, GERD, anemia of chronic disease, tobacco abuse, and history of PE/DVT who present with shortness of breath due to COPD exacerbation. CXR was relatively clear with mild bibasilar opacities. She was started on steroids, nebs, and empiric antibiotics for COPD exacerbation. She was placed on a BiPAP and given some diuretics. She was admitted to the ICU for close monitoring.  Bipap weaned and patient stable for transfer to floor.  Also complained of LE swelling.  US showing extension of known DVT.  Patient has been on xarelto.  This was discussed with Vascular Surgery and no need for acute intervention.  Continue Xarelto.  She has continued to improve and currently afebrile and hemodynamically stable.  Symptoms greatly improved from admission.  Leukocytosis secondary to steroids.  Patient seems to be at baseline and will be discharged home with .

## 2019-06-10 NOTE — ASSESSMENT & PLAN NOTE
ECHO:  Conclusion     · Normal left ventricular systolic function. The estimated ejection fraction is 70%  · Moderate concentric left ventricular hypertrophy.  · Grade I (mild) left ventricular diastolic dysfunction consistent with impaired relaxation.  · Normal left atrial pressure.  · Moderate left atrial enlargement.  · Mild right atrial enlargement.  · Mild mitral regurgitation.  · Mild tricuspid regurgitation.  · The estimated PA systolic pressure is 80 mm Hg  · Intermediate central venous pressure (8 mm Hg).  · Pulmonary hypertension present.     Significant elevated PAP - should follow up pulmonary HTN clinic   Diurese as tolerated

## 2019-06-10 NOTE — NURSING
BP at 1937 was 117/57, hydralazine held. BP at 2332 was 269427 scheduled hydralazine given, BP at 0100 was 124/62.

## 2019-06-10 NOTE — NURSING
Pt aaox4 repositions herself in bed. Tramadol given once for low back pain. Insulin given as ordered. Advised pt that bringing outside food will increase her BS. Pt was not very happy but verbalized understanding. Wang hose on, bilateral legs elveated on pillows. Call light w/i reach, bed in lowest position.

## 2019-06-10 NOTE — ASSESSMENT & PLAN NOTE
Uncontrolled secondary to steroids - wean  Increase basal and pre-prandial dosing   Diabetic diet - though family bringing her [employer]

## 2019-06-10 NOTE — PROGRESS NOTES
[facility] Hospital Medicine  Progress Note    Patient Name: Yasmeen Palm  MRN: [id number]  Patient Class: IP- Inpatient   Admission Date: 6/7/2019  Length of Stay: 3 days  Attending Physician: Blanca Carvajal MD  Primary Care Provider: Angel Orourke Jr, MD        Subjective:     Principal Problem:Acute diastolic heart failure    HPI:    Yasmeen Palm is a 69 y.o. female that (in part)  has a past medical history of Anticoagulant long-term use, Arthritis, Asthma, CHF (congestive heart failure), COPD (chronic obstructive pulmonary disease), Coronary artery disease, Deep vein thrombosis (DVT) of left lower extremity, Depression, Diabetes mellitus, GERD (gastroesophageal reflux disease), Hypertension, Obstructive sleep apnea on CPAP, On home oxygen therapy, and Unsteady gait.  has a past surgical history that includes Coronary angioplasty with stent and Hernia repair. Presents to [facility] complaining of shortness of breath.  Subacute acute onset 2 days ago with present progressive worsening.  She has had a cough for 3 days.  Denies fever chills.  Endorses shortness of breath at rest and dyspnea with exertion.  Positive for unintentional weight gain.  Previous history of CHF exacerbation and COPD.  Also reports 2 episodes of chest pain yesterday that were relieved with nitroglycerin.  She is on chronic anticoagulation for atrial fibrillation and history of pulmonary embolism and DVT.  Also complaining of peripheral edema in her lower extremities, right greater than left.  Denies syncope, cyanosis, or palpitations.  She reports compliance with her home medication regimen and cardiac/diabetic diet.  No recent travel or sick contacts.    In the emergency department routine laboratory studies, chest x-ray, cardiac enzymes, an EKG was obtained.  History and findings are consistent with either COPD versus CHF, but most likely a combination of both.  She  was given Lasix for the CHF and started on BiPAP both CHF and COPD along with nebulizer treatments and steroids.  She is also started on empiric doxycycline for acute bronchitis.    Hospital medicine has been asked to admit for further evaluation and treatment.     Hospital Course:  Transferred from ICU. Weaned from continuous bipap to QHS and NC O2. Swelling in LE much improved. Continued on antibiotics for increase WBC and low grade temp with COPD flare. Urine culture shows yeast. Increased blood sugars secondary to steroids and insulin regimen increased. Vascular surgery to see patient tomorrow. Slight extension of known DVT. On xarelto.     Interval History: pt reports breathing much improved, pt has home bipap, nebulizer and O2, LE swelling improved and less pain     Review of Systems   Constitutional: Positive for activity change.   HENT: Positive for congestion.    Eyes: Negative.    Respiratory: Positive for cough, shortness of breath and wheezing.    Cardiovascular: Positive for leg swelling.   Gastrointestinal: Negative.    Endocrine: Negative.    Musculoskeletal: Positive for arthralgias and gait problem.   Neurological: Positive for weakness.   Psychiatric/Behavioral: Negative.      Objective:     Vital Signs (Most Recent):  Temp: 98.5 °F (36.9 °C) (06/10/19 0453)  Pulse: 74 (06/10/19 0453)  Resp: 18 (06/10/19 0453)  BP: 137/65 (06/10/19 0453)  SpO2: 95 % (06/10/19 0453) Vital Signs (24h Range):  Temp:  [98.5 °F (36.9 °C)-99.5 °F (37.5 °C)] 98.5 °F (36.9 °C)  Pulse:  [] 74  Resp:  [17-20] 18  SpO2:  [92 %-98 %] 95 %  BP: (117-162)/(57-76) 137/65     Weight: 80.7 kg (178 lb)  Body mass index is 28.73 kg/m².    Intake/Output Summary (Last 24 hours) at 6/10/2019 0709  Last data filed at 6/10/2019 0600  Gross per 24 hour   Intake 770 ml   Output --   Net 770 ml      Physical Exam   Constitutional: She is oriented to person, place, and time. She appears well-developed and well-nourished. No distress.    HENT:   Head: Normocephalic.   Eyes: Pupils are equal, round, and reactive to light.   Neck: Neck supple. No JVD present.   Cardiovascular: Normal rate, regular rhythm and normal heart sounds.   Pulmonary/Chest: Effort normal. No respiratory distress. She has no wheezes. She has rales.   Abdominal: Soft. Bowel sounds are normal. There is no tenderness.   Musculoskeletal: She exhibits edema and tenderness.   Neurological: She is alert and oriented to person, place, and time.   Skin: Capillary refill takes 2 to 3 seconds.   Psychiatric: She has a normal mood and affect. Her behavior is normal.       Significant Labs: All pertinent labs within the past 24 hours have been reviewed.    Significant Imaging: I have reviewed and interpreted all pertinent imaging results/findings within the past 24 hours.    Assessment/Plan:      * Acute diastolic heart failure  ECHO:  Conclusion     · Normal left ventricular systolic function. The estimated ejection fraction is 70%  · Moderate concentric left ventricular hypertrophy.  · Grade I (mild) left ventricular diastolic dysfunction consistent with impaired relaxation.  · Normal left atrial pressure.  · Moderate left atrial enlargement.  · Mild right atrial enlargement.  · Mild mitral regurgitation.  · Mild tricuspid regurgitation.  · The estimated PA systolic pressure is 80 mm Hg  · Intermediate central venous pressure (8 mm Hg).  · Pulmonary hypertension present.     Significant elevated PAP - should follow up pulmonary HTN clinic   Diurese as tolerated       Hyperlipidemia  Cardiac diet  Statin if tolerated       Essential hypertension    Controlled  Resume home meds     Paroxysmal atrial fibrillation  No acute issues, rate controlled on BB and amiodarone   xarelto       Acute exacerbation of chronic obstructive pulmonary disease (COPD)  Improved  Change to oral steroid  Wean O2 as tolerated  Supportive care  abx        Chronic anticoagulation  Resume xarelto      History of  deep vein thrombosis  LE u/s:  Impression:       Bilateral lower extremity DVT as above, left greater than right, with findings in the left lower extremity progressed since the comparison exam from January 24th 2017.     Resume xarelto  Vascular surgery consulted         History of pulmonary embolism  xarelto resumed         Anemia of chronic disease  Slight drop n h/h but stable and improving  Monitor on xarelto         Tobacco abuse  Counseled on smoking cessation >5 minutes      Gastroesophageal reflux disease without esophagitis  pepcid  No acute issues       Controlled type 2 diabetes mellitus, without long-term current use of insulin  Uncontrolled secondary to steroids - wean  Increase basal and pre-prandial dosing   Diabetic diet - though family bringing her [employer]       Coronary artery disease involving native coronary artery of native heart with angina pectoris  No acute issues  ECHO reviewed          VTE Risk Mitigation (From admission, onward)        Ordered     rivaroxaban tablet 20 mg  With dinner      06/08/19 0314     IP VTE HIGH RISK PATIENT  Once      06/08/19 0103     Place TOM hose  Until discontinued      06/08/19 0103              Blanca Carvajal MD  Department of Hospital Medicine   [facility]

## 2019-06-10 NOTE — PROGRESS NOTES
MA and MDI TX given.  Pt. Tolerated tx well, MARGARET.  Pt. States that she would like to be placed on BIPAP tonight between  9:30pm and 10:00pm.

## 2019-06-10 NOTE — ASSESSMENT & PLAN NOTE
LE u/s:  Impression:       Bilateral lower extremity DVT as above, left greater than right, with findings in the left lower extremity progressed since the comparison exam from January 24th 2017.     Resume xarelto  Vascular surgery consulted

## 2019-06-11 PROBLEM — I50.33 ACUTE ON CHRONIC DIASTOLIC HEART FAILURE: Status: ACTIVE | Noted: 2019-06-07

## 2019-06-11 PROBLEM — I50.33 ACUTE ON CHRONIC DIASTOLIC HEART FAILURE: Chronic | Status: ACTIVE | Noted: 2019-06-07

## 2019-06-11 LAB
ANION GAP SERPL CALC-SCNC: 5 MMOL/L (ref 8–16)
BASOPHILS # BLD AUTO: 0.01 K/UL (ref 0–0.2)
BASOPHILS NFR BLD: 0.1 % (ref 0–1.9)
BUN SERPL-MCNC: 32 MG/DL (ref 8–23)
CALCIUM SERPL-MCNC: 9.2 MG/DL (ref 8.7–10.5)
CHLORIDE SERPL-SCNC: 102 MMOL/L (ref 95–110)
CO2 SERPL-SCNC: 36 MMOL/L (ref 23–29)
CREAT SERPL-MCNC: 0.7 MG/DL (ref 0.5–1.4)
DIFFERENTIAL METHOD: ABNORMAL
EOSINOPHIL # BLD AUTO: 0 K/UL (ref 0–0.5)
EOSINOPHIL NFR BLD: 0.2 % (ref 0–8)
ERYTHROCYTE [DISTWIDTH] IN BLOOD BY AUTOMATED COUNT: 19.6 % (ref 11.5–14.5)
EST. GFR  (AFRICAN AMERICAN): >60 ML/MIN/1.73 M^2
EST. GFR  (NON AFRICAN AMERICAN): >60 ML/MIN/1.73 M^2
GLUCOSE SERPL-MCNC: 142 MG/DL (ref 70–110)
HCT VFR BLD AUTO: 28.9 % (ref 37–48.5)
HGB BLD-MCNC: 7.7 G/DL (ref 12–16)
LYMPHOCYTES # BLD AUTO: 2.4 K/UL (ref 1–4.8)
LYMPHOCYTES NFR BLD: 12 % (ref 18–48)
MCH RBC QN AUTO: 25 PG (ref 27–31)
MCHC RBC AUTO-ENTMCNC: 26.6 G/DL (ref 32–36)
MCV RBC AUTO: 94 FL (ref 82–98)
MONOCYTES # BLD AUTO: 1.4 K/UL (ref 0.3–1)
MONOCYTES NFR BLD: 7.1 % (ref 4–15)
NEUTROPHILS # BLD AUTO: 16 K/UL (ref 1.8–7.7)
NEUTROPHILS NFR BLD: 80.6 % (ref 38–73)
PLATELET # BLD AUTO: 290 K/UL (ref 150–350)
PMV BLD AUTO: 10 FL (ref 9.2–12.9)
POCT GLUCOSE: 145 MG/DL (ref 70–110)
POCT GLUCOSE: 182 MG/DL (ref 70–110)
POCT GLUCOSE: 204 MG/DL (ref 70–110)
POCT GLUCOSE: 268 MG/DL (ref 70–110)
POCT GLUCOSE: 332 MG/DL (ref 70–110)
POTASSIUM SERPL-SCNC: 4.5 MMOL/L (ref 3.5–5.1)
RBC # BLD AUTO: 3.08 M/UL (ref 4–5.4)
SODIUM SERPL-SCNC: 143 MMOL/L (ref 136–145)
WBC # BLD AUTO: 19.84 K/UL (ref 3.9–12.7)

## 2019-06-11 PROCEDURE — 99222 PR INITIAL HOSPITAL CARE,LEVL II: ICD-10-PCS | Mod: ,,, | Performed by: SURGERY

## 2019-06-11 PROCEDURE — 99900035 HC TECH TIME PER 15 MIN (STAT)

## 2019-06-11 PROCEDURE — 80048 BASIC METABOLIC PNL TOTAL CA: CPT

## 2019-06-11 PROCEDURE — 25000003 PHARM REV CODE 250: Performed by: HOSPITALIST

## 2019-06-11 PROCEDURE — 99222 1ST HOSP IP/OBS MODERATE 55: CPT | Mod: ,,, | Performed by: SURGERY

## 2019-06-11 PROCEDURE — 63600175 PHARM REV CODE 636 W HCPCS: Performed by: INTERNAL MEDICINE

## 2019-06-11 PROCEDURE — 85025 COMPLETE CBC W/AUTO DIFF WBC: CPT

## 2019-06-11 PROCEDURE — 94640 AIRWAY INHALATION TREATMENT: CPT

## 2019-06-11 PROCEDURE — 36415 COLL VENOUS BLD VENIPUNCTURE: CPT

## 2019-06-11 PROCEDURE — 25000242 PHARM REV CODE 250 ALT 637 W/ HCPCS: Performed by: INTERNAL MEDICINE

## 2019-06-11 PROCEDURE — 25000003 PHARM REV CODE 250: Performed by: INTERNAL MEDICINE

## 2019-06-11 PROCEDURE — 63600175 PHARM REV CODE 636 W HCPCS: Performed by: HOSPITALIST

## 2019-06-11 PROCEDURE — 27000221 HC OXYGEN, UP TO 24 HOURS

## 2019-06-11 PROCEDURE — 94761 N-INVAS EAR/PLS OXIMETRY MLT: CPT

## 2019-06-11 PROCEDURE — 11000001 HC ACUTE MED/SURG PRIVATE ROOM

## 2019-06-11 RX ORDER — FUROSEMIDE 20 MG/1
20 TABLET ORAL DAILY
Status: DISCONTINUED | OUTPATIENT
Start: 2019-06-11 | End: 2019-06-12 | Stop reason: HOSPADM

## 2019-06-11 RX ADMIN — IPRATROPIUM BROMIDE AND ALBUTEROL SULFATE 3 ML: .5; 3 SOLUTION RESPIRATORY (INHALATION) at 12:06

## 2019-06-11 RX ADMIN — TRAMADOL HYDROCHLORIDE 50 MG: 50 TABLET, FILM COATED ORAL at 04:06

## 2019-06-11 RX ADMIN — DOXYCYCLINE HYCLATE 100 MG: 100 TABLET, COATED ORAL at 08:06

## 2019-06-11 RX ADMIN — INSULIN ASPART 10 UNITS: 100 INJECTION, SOLUTION INTRAVENOUS; SUBCUTANEOUS at 08:06

## 2019-06-11 RX ADMIN — AMIODARONE HYDROCHLORIDE 200 MG: 200 TABLET ORAL at 08:06

## 2019-06-11 RX ADMIN — PREGABALIN 75 MG: 50 CAPSULE ORAL at 08:06

## 2019-06-11 RX ADMIN — INSULIN ASPART 10 UNITS: 100 INJECTION, SOLUTION INTRAVENOUS; SUBCUTANEOUS at 06:06

## 2019-06-11 RX ADMIN — IPRATROPIUM BROMIDE AND ALBUTEROL SULFATE 3 ML: .5; 3 SOLUTION RESPIRATORY (INHALATION) at 04:06

## 2019-06-11 RX ADMIN — INSULIN ASPART 6 UNITS: 100 INJECTION, SOLUTION INTRAVENOUS; SUBCUTANEOUS at 06:06

## 2019-06-11 RX ADMIN — FLUTICASONE PROPIONATE 1 PUFF: 220 AEROSOL, METERED RESPIRATORY (INHALATION) at 07:06

## 2019-06-11 RX ADMIN — HYDRALAZINE HYDROCHLORIDE 50 MG: 25 TABLET ORAL at 08:06

## 2019-06-11 RX ADMIN — RIVAROXABAN 20 MG: 20 TABLET, FILM COATED ORAL at 06:06

## 2019-06-11 RX ADMIN — ASPIRIN 81 MG: 81 TABLET, COATED ORAL at 08:06

## 2019-06-11 RX ADMIN — FAMOTIDINE 20 MG: 20 TABLET ORAL at 08:06

## 2019-06-11 RX ADMIN — LISINOPRIL 40 MG: 20 TABLET ORAL at 08:06

## 2019-06-11 RX ADMIN — IPRATROPIUM BROMIDE AND ALBUTEROL SULFATE 3 ML: .5; 3 SOLUTION RESPIRATORY (INHALATION) at 11:06

## 2019-06-11 RX ADMIN — PREDNISONE 40 MG: 20 TABLET ORAL at 08:06

## 2019-06-11 RX ADMIN — IPRATROPIUM BROMIDE AND ALBUTEROL SULFATE 3 ML: .5; 3 SOLUTION RESPIRATORY (INHALATION) at 09:06

## 2019-06-11 RX ADMIN — GABAPENTIN 300 MG: 300 CAPSULE ORAL at 06:06

## 2019-06-11 RX ADMIN — TRAMADOL HYDROCHLORIDE 50 MG: 50 TABLET, FILM COATED ORAL at 12:06

## 2019-06-11 RX ADMIN — ISOSORBIDE MONONITRATE 90 MG: 30 TABLET, EXTENDED RELEASE ORAL at 08:06

## 2019-06-11 RX ADMIN — INSULIN ASPART 4 UNITS: 100 INJECTION, SOLUTION INTRAVENOUS; SUBCUTANEOUS at 08:06

## 2019-06-11 RX ADMIN — GABAPENTIN 300 MG: 300 CAPSULE ORAL at 08:06

## 2019-06-11 RX ADMIN — METOPROLOL TARTRATE 100 MG: 50 TABLET ORAL at 08:06

## 2019-06-11 RX ADMIN — IPRATROPIUM BROMIDE AND ALBUTEROL SULFATE 3 ML: .5; 3 SOLUTION RESPIRATORY (INHALATION) at 07:06

## 2019-06-11 RX ADMIN — FLUTICASONE PROPIONATE 1 PUFF: 220 AEROSOL, METERED RESPIRATORY (INHALATION) at 09:06

## 2019-06-11 RX ADMIN — TRAMADOL HYDROCHLORIDE 50 MG: 50 TABLET, FILM COATED ORAL at 06:06

## 2019-06-11 RX ADMIN — INSULIN ASPART 4 UNITS: 100 INJECTION, SOLUTION INTRAVENOUS; SUBCUTANEOUS at 12:06

## 2019-06-11 RX ADMIN — INSULIN ASPART 10 UNITS: 100 INJECTION, SOLUTION INTRAVENOUS; SUBCUTANEOUS at 12:06

## 2019-06-11 RX ADMIN — FUROSEMIDE 20 MG: 20 TABLET ORAL at 12:06

## 2019-06-11 NOTE — SUBJECTIVE & OBJECTIVE
Interval History: pt has no new complaints     Review of Systems   Constitutional: Positive for activity change.   HENT: Positive for congestion.    Eyes: Negative.    Respiratory: Positive for cough, shortness of breath and wheezing.    Cardiovascular: Positive for leg swelling.   Gastrointestinal: Negative.    Endocrine: Negative.    Musculoskeletal: Positive for arthralgias and gait problem.   Neurological: Positive for weakness.   Psychiatric/Behavioral: Negative.      Objective:     Vital Signs (Most Recent):  Temp: 98.6 °F (37 °C) (06/10/19 2009)  Pulse: 82 (06/10/19 2049)  Resp: 18 (06/10/19 2049)  BP: (!) 165/73 (06/10/19 2009)  SpO2: 97 % (06/10/19 2049) Vital Signs (24h Range):  Temp:  [97.8 °F (36.6 °C)-99.2 °F (37.3 °C)] 98.6 °F (37 °C)  Pulse:  [73-92] 82  Resp:  [18-19] 18  SpO2:  [94 %-99 %] 97 %  BP: (105-165)/(52-76) 165/73     Weight: 80.7 kg (178 lb)  Body mass index is 28.73 kg/m².    Intake/Output Summary (Last 24 hours) at 6/10/2019 2204  Last data filed at 6/10/2019 1752  Gross per 24 hour   Intake 530 ml   Output --   Net 530 ml      Physical Exam   Constitutional: She is oriented to person, place, and time. She appears well-developed and well-nourished. No distress.   HENT:   Head: Normocephalic.   Eyes: Pupils are equal, round, and reactive to light.   Neck: Neck supple. No JVD present.   Cardiovascular: Normal rate, regular rhythm and normal heart sounds.   Pulmonary/Chest: Effort normal. No respiratory distress. She has no wheezes. She has rales.   Abdominal: Soft. Bowel sounds are normal. There is no tenderness.   Musculoskeletal: She exhibits edema and tenderness.   Neurological: She is alert and oriented to person, place, and time.   Skin: Capillary refill takes 2 to 3 seconds.   Psychiatric: She has a normal mood and affect. Her behavior is normal.       Significant Labs:   POCT Glucose:   Recent Labs   Lab 06/10/19  1129 06/10/19  1705 06/10/19  2010   POCTGLUCOSE 298* 306* [phone]*        Significant Imaging: I have reviewed and interpreted all pertinent imaging results/findings within the past 24 hours.

## 2019-06-11 NOTE — NURSING
Patient care assumed. Telemetry monitor and box is same number [id number]. Good waveform noted. Checked with off going nurse

## 2019-06-11 NOTE — CONSULTS
[facility]  Vascular Surgery  Consult Note    Inpatient consult to Vascular Surgery  Consult performed by: Blaise Adams MD  Consult ordered by: Viri Paredes MD  Reason for consult: LLE DVT        Subjective:     Chief Complaint/Reason for Admission: LLE DVT    History of Present Illness:             Blasie Adams MD R[facility]I [facility]                         06/11/2019    HPI:  Yasmeen Palm is a 69 y.o. female with   Patient Active Problem List   Diagnosis    Coronary artery disease involving native coronary artery of native heart with angina pectoris    Controlled type 2 diabetes mellitus, without long-term current use of insulin    Gastroesophageal reflux disease without esophagitis    Tobacco abuse    Anemia of chronic disease    History of pulmonary embolism    History of deep vein thrombosis    Chronic anticoagulation    Weakness    Chronic diastolic congestive heart failure    Shortness of breath    Metabolic alkalosis with respiratory acidosis    Thoracic aorta atherosclerosis    Abnormal CT scan, chest    Acute exacerbation of chronic obstructive pulmonary disease (COPD)    Non-STEMI (non-ST elevated myocardial infarction)    Neuropathy    Paroxysmal atrial fibrillation    Acute on chronic respiratory failure with hypoxia and hypercapnia    Debility    COPD with acute exacerbation    Leukocytosis    Essential hypertension    COPD (chronic obstructive pulmonary disease)    Hyperlipidemia    Acute cystitis    Acute on chronic diastolic heart failure    being managed by PCP and specialists who was admitted for evaluation of SOB.  Found to have a chronic L femoral and popliteal DVT with new findings of peroneal DVT.  Prev dx with L fem/pop DVT, peroneal DVT is a new finding when compared to 2017.  Pt states she does have minimal L calf pain at times although for the most part her left leg is without issues.   Has remained compliant with Xarelto.  Patient states location is left lower extremity occurring for several years.  Associated signs and symptoms include min pain.  Quality is dull and severity is 1/10.  Symptoms began years ago.  Alleviating factors include rest.  Worsening factors include pressure.  Vascular Surgery consulted for further mgmt.    no MI  no Stroke  Tobacco use: daily    Past Medical History:   Diagnosis Date    Anticoagulant long-term use     Xarelto    Arthritis     Asthma     CHF (congestive heart failure)     COPD (chronic obstructive pulmonary disease)     Coronary artery disease     Deep vein thrombosis (DVT) of left lower extremity     Depression     Diabetes mellitus     GERD (gastroesophageal reflux disease)     Hypertension     Obstructive sleep apnea on CPAP     On home oxygen therapy     Unsteady gait     uses either walker or 4 prong cane     Past Surgical History:   Procedure Laterality Date    CORONARY ANGIOPLASTY WITH STENT PLACEMENT      HERNIA REPAIR      encapsulated umbilical hernia     Family History   Problem Relation Age of Onset    Heart disease Mother     Hypertension Mother     Diabetes Father      Social History     Socioeconomic History    Marital status: Legally      Spouse name: Not on file    Number of children: Not on file    Years of education: Not on file    Highest education level: Not on file   Occupational History    Not on file   Social Needs    Financial resource strain: Not on file    Food insecurity:     Worry: Not on file     Inability: Not on file    Transportation needs:     Medical: Not on file     Non-medical: Not on file   Tobacco Use    Smoking status: Current Every Day Smoker     Packs/day: 0.50     Years: 55.00     Pack years: 27.50     Types: Cigarettes     Start date: 1963     Last attempt to quit: 2018     Years since quittin.4    Smokeless tobacco: Never Used   Substance and Sexual Activity     Alcohol use: Not Currently     Alcohol/week: 0.6 oz     Types: 1 Glasses of wine per week     Frequency: Never     Comment: socially    Drug use: No    Sexual activity: Never   Lifestyle    Physical activity:     Days per week: Not on file     Minutes per session: Not on file    Stress: Not on file   Relationships    Social connections:     Talks on phone: Not on file     Gets together: Not on file     Attends Sikhism service: Not on file     Active member of club or organization: Not on file     Attends meetings of clubs or organizations: Not on file     Relationship status: Not on file   Other Topics Concern    Not on file   Social History Narrative    Not on file       Current Facility-Administered Medications:     acetaminophen tablet 650 mg, 650 mg, Oral, Q8H PRN, Viri Paredes MD, 650 mg at 06/09/19 0253    albuterol-ipratropium 2.5 mg-0.5 mg/3 mL nebulizer solution 3 mL, 3 mL, Nebulization, Q4H, Viri Paredes MD, 3 mL at 06/11/19 1236    amiodarone tablet 200 mg, 200 mg, Oral, Daily, Viri Paredes MD, 200 mg at 06/11/19 0847    aspirin EC tablet 81 mg, 81 mg, Oral, Daily, Viri Paredes MD, 81 mg at 06/11/19 0846    benzonatate capsule 100 mg, 100 mg, Oral, TID PRN, Viri Paredes MD    dextromethorphan-guaifenesin  mg/5 ml liquid 10 mL, 10 mL, Oral, Q6H, Viri Paredes MD, 10 mL at 06/10/19 0623    dextrose 50% injection 12.5 g, 12.5 g, Intravenous, PRN, Viri Paredes MD    dextrose 50% injection 25 g, 25 g, Intravenous, PRN, Viri Paredes MD    doxycycline tablet 100 mg, 100 mg, Oral, Q12H, Viri Paredes MD, 100 mg at 06/11/19 0847    famotidine tablet 20 mg, 20 mg, Oral, BID, Viri Paredes MD, 20 mg at 06/11/19 0846    fluticasone propionate 220 mcg/actuation inhaler 1 puff, 1 puff, Inhalation, BID, Viri Paredes MD, 1 puff at 06/11/19 0755    furosemide tablet 20 mg, 20 mg, Oral, Daily, Zenon Alfredo MD, 20 mg at  06/11/19 1217    gabapentin capsule 300 mg, 300 mg, Oral, TID, Viri Paredes MD, 300 mg at 06/11/19 0847    glucagon (human recombinant) injection 1 mg, 1 mg, Intramuscular, PRN, Viri Paredes MD    glucose chewable tablet 16 g, 16 g, Oral, PRN, Viri Paredes MD    glucose chewable tablet 24 g, 24 g, Oral, PRN, Viri Paredes MD    hydrALAZINE injection 10 mg, 10 mg, Intravenous, Q6H PRN, Viri Paredes MD    hydrALAZINE tablet 50 mg, 50 mg, Oral, BID, Viri Paredes MD, 50 mg at 06/11/19 0848    insulin aspart U-100 pen 1-10 Units, 1-10 Units, Subcutaneous, QID (AC + HS) PRN, Viri Paredes MD, 4 Units at 06/11/19 1240    insulin aspart U-100 pen 10 Units, 10 Units, Subcutaneous, TIDWM, Blanca Carvajal MD, 10 Units at 06/11/19 1239    insulin detemir U-100 pen 20 Units, 20 Units, Subcutaneous, QHS, Zenon Alfredo MD    isosorbide mononitrate 24 hr tablet 90 mg, 90 mg, Oral, Daily, Viri Paredes MD, 90 mg at 06/11/19 0847    lisinopril tablet 40 mg, 40 mg, Oral, Daily, Viri Paredes MD, 40 mg at 06/11/19 0847    metoprolol injection 5 mg, 5 mg, Intravenous, Q5 Min PRN, Viri Paredes MD    metoprolol tartrate (LOPRESSOR) tablet 100 mg, 100 mg, Oral, BID, Viri Paredes MD, 100 mg at 06/11/19 0846    ondansetron injection 8 mg, 8 mg, Intravenous, Q8H PRN, Viri Paredes MD    predniSONE tablet 40 mg, 40 mg, Oral, Daily, Blanca Carvajal MD, 40 mg at 06/11/19 0846    pregabalin capsule 75 mg, 75 mg, Oral, BID, Viri Paredes MD, 75 mg at 06/11/19 0846    rivaroxaban tablet 20 mg, 20 mg, Oral, Daily with dinner, Viri Paredes MD, 20 mg at 06/10/19 1933    sodium chloride 0.9% flush 10 mL, 10 mL, Intravenous, PRN, Viri Paredes MD    traMADol tablet 50 mg, 50 mg, Oral, Q6H PRN, Viri Paredes MD, 50 mg at 06/11/19 1217      Medications Prior to Admission   Medication Sig Dispense Refill Last Dose     acetaminophen (TYLENOL) 500 MG tablet Take 1 tablet (500 mg total) by mouth every 8 (eight) hours as needed.  0 6/7/2019    amiodarone (PACERONE) 200 MG Tab Take 1 tablet (200 mg total) by mouth once daily. 30 tablet 11 6/7/2019    aspirin (ECOTRIN) 81 MG EC tablet Take 81 mg by mouth once daily.   6/7/2019    ferrous gluconate (FERGON) 324 MG tablet Take 1 tablet (324 mg total) by mouth daily with breakfast. 60 tablet 3 Past Week    fluticasone propionate (FLOVENT HFA) 220 mcg/actuation inhaler Inhale 1 puff into the lungs 2 (two) times daily. Controller 12 g 0 6/7/2019    furosemide (LASIX) 40 MG tablet Take 40 mg by mouth once daily.    6/7/2019    gabapentin (NEURONTIN) 300 MG capsule Take 300 mg by mouth 3 (three) times daily.   6/7/2019    hydrALAZINE (APRESOLINE) 50 MG tablet Take 50 mg by mouth 2 (two) times daily.   6/7/2019    isosorbide mononitrate (IMDUR) 30 MG 24 hr tablet Take 3 tablets (90 mg total) by mouth once daily. 90 tablet 11 6/7/2019    lisinopril (PRINIVIL,ZESTRIL) 40 MG tablet Take 1 tablet (40 mg total) by mouth once daily. Do not take this medication if blood pressure is below 130/80 mmHg and/or feeling dizzy after taking all other blood pressure meds   6/7/2019    metformin (GLUCOPHAGE) 500 MG tablet Take 1,000 mg by mouth 2 (two) times daily with meals.    6/7/2019    metoprolol tartrate (LOPRESSOR) 100 MG tablet Take 1 tablet (100 mg total) by mouth 2 (two) times daily. 60 tablet 11 6/7/2019    multivit-min-FA-lycopen-lutein (CENTRUM SILVER) 0.4-300-250 mg-mcg-mcg Tab Take 1 tablet by mouth once daily. 60 tablet 1 6/6/2019    multivitamin (THERAGRAN) per tablet Take 1 tablet by mouth once daily.   6/7/2019    pantoprazole (PROTONIX) 40 MG tablet Take 40 mg by mouth once daily.   6/7/2019    pregabalin (LYRICA) 75 MG capsule Take 75 mg by mouth 2 (two) times daily.   6/7/2019    rivaroxaban (XARELTO) 20 mg Tab Take 20 mg by mouth daily with dinner or evening meal.    2019    tiotropium (SPIRIVA) 18 mcg inhalation capsule Inhale 1 capsule (18 mcg total) into the lungs once daily. Controller 30 capsule 11 2019    levalbuterol (XOPENEX HFA) 45 mcg/actuation inhaler Inhale 2 puffs into the lungs every 4 (four) hours as needed for Wheezing or Shortness of Breath. Rescue 1 Inhaler 3 2019       Review of patient's allergies indicates:  No Known Allergies    Past Medical History:   Diagnosis Date    Anticoagulant long-term use     Xarelto    Arthritis     Asthma     CHF (congestive heart failure)     COPD (chronic obstructive pulmonary disease)     Coronary artery disease     Deep vein thrombosis (DVT) of left lower extremity     Depression     Diabetes mellitus     GERD (gastroesophageal reflux disease)     Hypertension     Obstructive sleep apnea on CPAP     On home oxygen therapy     Unsteady gait     uses either walker or 4 prong cane     Past Surgical History:   Procedure Laterality Date    CORONARY ANGIOPLASTY WITH STENT PLACEMENT      HERNIA REPAIR      encapsulated umbilical hernia     Family History     Problem Relation (Age of Onset)    Diabetes Father    Heart disease Mother    Hypertension Mother        Tobacco Use    Smoking status: Current Every Day Smoker     Packs/day: 0.50     Years: 55.00     Pack years: 27.50     Types: Cigarettes     Start date: 1963     Last attempt to quit: 2018     Years since quittin.4    Smokeless tobacco: Never Used   Substance and Sexual Activity    Alcohol use: Not Currently     Alcohol/week: 0.6 oz     Types: 1 Glasses of wine per week     Frequency: Never     Comment: socially    Drug use: No    Sexual activity: Never     Review of Systems   Constitutional: Negative for chills.   HENT: Negative for congestion.    Eyes: Negative for visual disturbance.   Respiratory: Negative for shortness of breath.    Cardiovascular: Negative for chest pain.   Gastrointestinal: Negative for abdominal  distention.   Endocrine: Negative for cold intolerance.   Genitourinary: Negative for flank pain.   Musculoskeletal: Negative for back pain.   Skin: Negative for color change, pallor, rash and wound.   Allergic/Immunologic: Negative for immunocompromised state.   Neurological: Negative for dizziness and headaches.   Hematological: Does not bruise/bleed easily.   Psychiatric/Behavioral: Negative for agitation.     Objective:     Vital Signs (Most Recent):  Temp: 98.6 °F (37 °C) (06/11/19 1044)  Pulse: 71 (06/11/19 1236)  Resp: 17 (06/11/19 1236)  BP: (!) 107/54 (06/11/19 1044)  SpO2: 96 % (06/11/19 1236) Vital Signs (24h Range):  Temp:  [97.3 °F (36.3 °C)-99 °F (37.2 °C)] 98.6 °F (37 °C)  Pulse:  [65-82] 71  Resp:  [16-19] 17  SpO2:  [87 %-99 %] 96 %  BP: (107-179)/(54-81) 107/54     Weight: 80.7 kg (178 lb)  Body mass index is 28.73 kg/m².    Physical Exam   Constitutional: She appears well-developed and well-nourished. No distress.   HENT:   Head: Normocephalic and atraumatic.   Eyes: Conjunctivae are normal.   Neck: Neck supple.   Cardiovascular: Normal rate.   Pulmonary/Chest: Effort normal. No respiratory distress.   Abdominal: Soft. She exhibits no distension and no mass. There is no tenderness. There is no rebound and no guarding. No hernia.   Musculoskeletal: She exhibits no edema or tenderness.   Neurological: She is alert. No sensory deficit.   Skin: Skin is warm and dry. Capillary refill takes 2 to 3 seconds. No rash noted. She is not diaphoretic. No erythema. No pallor.   Psychiatric: She has a normal mood and affect.   Vitals reviewed.      Significant Labs:  All pertinent labs from the last 24 hours have been reviewed.    Significant Diagnostics:  I have reviewed all pertinent imaging results/findings within the past 24 hours.    Assessment/Plan:     History of deep vein thrombosis  -Chronic L femoral and popliteal with new finding of L peroneal DVT, no phlegmasia or neurovascular compromise of her  lower extremities - Rec continuing anticoagulation.    -Recommend compression with Rx stockings, elevation, dietary changes associated with water and sodium intake.  If there is proximal propagation of DVT on anticoagulation or contraindications to anticoagulation, she would benefit from an IVC filter at that time.  No vascular surgical intervention indicated at this time.      Tobacco abuse  I discussed smoking cessation at length with this patient, including treatment options of nicotine replacement therapy and management of the addiction with assistance by [facility].  The patient states understanding that continued smoking will increase the chances of stenosis progression and other cardiovascular morbidity.          Thank you for your consult. I will follow-up with patient. Please contact us if you have any additional questions.    Blaise Adams MD  Vascular Surgery  [facility]

## 2019-06-11 NOTE — PROGRESS NOTES
[facility] Hospital Medicine  Progress Note    Patient Name: aYsmeen Palm  MRN: [id number]  Patient Class: IP- Inpatient   Admission Date: 6/7/2019  Length of Stay: 3 days  Attending Physician: Blanca Carvajal MD  Primary Care Provider: Angel Orourke Jr, MD        Subjective:     Principal Problem:Acute diastolic heart failure    HPI:    Yasmeen Palm is a 69 y.o. female that (in part)  has a past medical history of Anticoagulant long-term use, Arthritis, Asthma, CHF (congestive heart failure), COPD (chronic obstructive pulmonary disease), Coronary artery disease, Deep vein thrombosis (DVT) of left lower extremity, Depression, Diabetes mellitus, GERD (gastroesophageal reflux disease), Hypertension, Obstructive sleep apnea on CPAP, On home oxygen therapy, and Unsteady gait.  has a past surgical history that includes Coronary angioplasty with stent and Hernia repair. Presents to [facility] complaining of shortness of breath.  Subacute acute onset 2 days ago with present progressive worsening.  She has had a cough for 3 days.  Denies fever chills.  Endorses shortness of breath at rest and dyspnea with exertion.  Positive for unintentional weight gain.  Previous history of CHF exacerbation and COPD.  Also reports 2 episodes of chest pain yesterday that were relieved with nitroglycerin.  She is on chronic anticoagulation for atrial fibrillation and history of pulmonary embolism and DVT.  Also complaining of peripheral edema in her lower extremities, right greater than left.  Denies syncope, cyanosis, or palpitations.  She reports compliance with her home medication regimen and cardiac/diabetic diet.  No recent travel or sick contacts.    In the emergency department routine laboratory studies, chest x-ray, cardiac enzymes, an EKG was obtained.  History and findings are consistent with either COPD versus CHF, but most likely a combination of both.  She  was given Lasix for the CHF and started on BiPAP both CHF and COPD along with nebulizer treatments and steroids.  She is also started on empiric doxycycline for acute bronchitis.    Hospital medicine has been asked to admit for further evaluation and treatment.     Hospital Course:  Transferred from ICU. Weaned from continuous bipap to QHS and NC O2. Swelling in LE much improved. Continued on antibiotics for increase WBC and low grade temp with COPD flare. Urine culture shows yeast. Increased blood sugars secondary to steroids and insulin regimen increased. Vascular surgery to see patient tomorrow. Slight extension of known DVT. On xarelto.     Increase insulin regimen - stil uncontrolled    Interval History: pt has no new complaints     Review of Systems   Constitutional: Positive for activity change.   HENT: Positive for congestion.    Eyes: Negative.    Respiratory: Positive for cough, shortness of breath and wheezing.    Cardiovascular: Positive for leg swelling.   Gastrointestinal: Negative.    Endocrine: Negative.    Musculoskeletal: Positive for arthralgias and gait problem.   Neurological: Positive for weakness.   Psychiatric/Behavioral: Negative.      Objective:     Vital Signs (Most Recent):  Temp: 98.6 °F (37 °C) (06/10/19 2009)  Pulse: 82 (06/10/19 2049)  Resp: 18 (06/10/19 2049)  BP: (!) 165/73 (06/10/19 2009)  SpO2: 97 % (06/10/19 2049) Vital Signs (24h Range):  Temp:  [97.8 °F (36.6 °C)-99.2 °F (37.3 °C)] 98.6 °F (37 °C)  Pulse:  [73-92] 82  Resp:  [18-19] 18  SpO2:  [94 %-99 %] 97 %  BP: (105-165)/(52-76) 165/73     Weight: 80.7 kg (178 lb)  Body mass index is 28.73 kg/m².    Intake/Output Summary (Last 24 hours) at 6/10/2019 2204  Last data filed at 6/10/2019 1752  Gross per 24 hour   Intake 530 ml   Output --   Net 530 ml      Physical Exam   Constitutional: She is oriented to person, place, and time. She appears well-developed and well-nourished. No distress.   HENT:   Head: Normocephalic.   Eyes:  Pupils are equal, round, and reactive to light.   Neck: Neck supple. No JVD present.   Cardiovascular: Normal rate, regular rhythm and normal heart sounds.   Pulmonary/Chest: Effort normal. No respiratory distress. She has no wheezes. She has rales.   Abdominal: Soft. Bowel sounds are normal. There is no tenderness.   Musculoskeletal: She exhibits edema and tenderness.   Neurological: She is alert and oriented to person, place, and time.   Skin: Capillary refill takes 2 to 3 seconds.   Psychiatric: She has a normal mood and affect. Her behavior is normal.       Significant Labs:   POCT Glucose:   Recent Labs   Lab 06/10/19  1129 06/10/19  1705 06/10/19  2010   POCTGLUCOSE 298* 306* 386*       Significant Imaging: I have reviewed and interpreted all pertinent imaging results/findings within the past 24 hours.    Assessment/Plan:      * Acute diastolic heart failure  ECHO:  Conclusion     · Normal left ventricular systolic function. The estimated ejection fraction is 70%  · Moderate concentric left ventricular hypertrophy.  · Grade I (mild) left ventricular diastolic dysfunction consistent with impaired relaxation.  · Normal left atrial pressure.  · Moderate left atrial enlargement.  · Mild right atrial enlargement.  · Mild mitral regurgitation.  · Mild tricuspid regurgitation.  · The estimated PA systolic pressure is 80 mm Hg  · Intermediate central venous pressure (8 mm Hg).  · Pulmonary hypertension present.     Significant elevated PAP - should follow up pulmonary HTN clinic   Diurese as tolerated       Hyperlipidemia  Cardiac diet  Statin if tolerated       Essential hypertension    Controlled  Resume home meds     Paroxysmal atrial fibrillation  No acute issues, rate controlled on BB and amiodarone   xarelto       Acute exacerbation of chronic obstructive pulmonary disease (COPD)  Improved  Change to oral steroid  Wean O2 as tolerated  Supportive care  abx        Chronic anticoagulation  Resume  xarelto      History of deep vein thrombosis  LE u/s:  Impression:       Bilateral lower extremity DVT as above, left greater than right, with findings in the left lower extremity progressed since the comparison exam from January 24th 2017.     Resume xarelto  Vascular surgery consulted         History of pulmonary embolism  xarelto resumed         Anemia of chronic disease  Slight drop n h/h but stable and improving  Monitor on xarelto         Tobacco abuse  Counseled on smoking cessation >5 minutes      Gastroesophageal reflux disease without esophagitis  pepcid  No acute issues       Controlled type 2 diabetes mellitus, without long-term current use of insulin  Uncontrolled secondary to steroids - wean  Increase basal and pre-prandial dosing   Diabetic diet - though family bringing her [employer]       Coronary artery disease involving native coronary artery of native heart with angina pectoris  No acute issues  ECHO reviewed          VTE Risk Mitigation (From admission, onward)        Ordered     rivaroxaban tablet 20 mg  With dinner      06/08/19 0314     IP VTE HIGH RISK PATIENT  Once      06/08/19 0103     Place TOM hose  Until discontinued      06/08/19 0103              Blanca Carvajal MD  Department of Hospital Medicine   [facility]

## 2019-06-11 NOTE — PLAN OF CARE
Problem: Noninvasive Ventilation Acute  Goal: Effective Unassisted Ventilation and Oxygenation  Outcome: Ongoing (interventions implemented as appropriate)  Pt wears Bipap HS

## 2019-06-11 NOTE — PLAN OF CARE
Problem: Adult Inpatient Plan of Care  Goal: Plan of Care Review  Outcome: Ongoing (interventions implemented as appropriate)  Patient remains with scheduled  Q4 aerosol tx, MDI and IS. Pt  Wear BP at night.. Patient is 3lpm which she wears at home.

## 2019-06-11 NOTE — SUBJECTIVE & OBJECTIVE
Interval History: Feeling better.    Review of Systems   HENT: Negative for ear discharge and ear pain.    Eyes: Negative for pain and redness.   Endocrine: Negative for polyphagia and polyuria.   Neurological: Negative for seizures and syncope.     Objective:     Vital Signs (Most Recent):  Temp: 98.2 °F (36.8 °C) (06/11/19 0729)  Pulse: 79 (06/11/19 0755)  Resp: 17 (06/11/19 0755)  BP: (!) 143/69 (06/11/19 0729)  SpO2: (!) 87 %(Pt had oxygen off) (06/11/19 0755) Vital Signs (24h Range):  Temp:  [97.3 °F (36.3 °C)-99 °F (37.2 °C)] 98.2 °F (36.8 °C)  Pulse:  [65-83] 79  Resp:  [16-19] 17  SpO2:  [87 %-99 %] 87 %  BP: (105-179)/(52-81) 143/69     Weight: 80.7 kg (178 lb)  Body mass index is 28.73 kg/m².    Intake/Output Summary (Last 24 hours) at 6/11/2019 1014  Last data filed at 6/11/2019 0849  Gross per 24 hour   Intake 1250 ml   Output --   Net 1250 ml      Physical Exam   Constitutional: She is oriented to person, place, and time. She appears well-developed and well-nourished. No distress.   HENT:   Head: Normocephalic.   Eyes: Pupils are equal, round, and reactive to light.   Neck: Neck supple. No JVD present.   Cardiovascular: Normal rate, regular rhythm and normal heart sounds.   Pulmonary/Chest: Effort normal. No respiratory distress. She has no wheezes. She has rales.   Abdominal: Soft. Bowel sounds are normal. There is no tenderness.   Musculoskeletal: She exhibits edema and tenderness.   Neurological: She is alert and oriented to person, place, and time.   Skin: Capillary refill takes 2 to 3 seconds.   Psychiatric: She has a normal mood and affect. Her behavior is normal.       Significant Labs:   BMP:   Recent Labs   Lab 06/11/19  0350   *      K 4.5      CO2 36*   BUN 32*   CREATININE 0.7   CALCIUM 9.2     CBC:   Recent Labs   Lab 06/10/19  0507 06/11/19  0350   WBC 24.22* 19.84*   HGB 7.8* 7.7*   HCT 28.6* 28.9*    290

## 2019-06-11 NOTE — PLAN OF CARE
Problem: Adult Inpatient Plan of Care  Goal: Plan of Care Review  Outcome: Ongoing (interventions implemented as appropriate)     06/11/19 3907   Plan of Care Review   Plan of Care Reviewed With patient   No falls, injury tor trauma this shift. Infection managed width IV medications.blood glucose managed with each restaurant.

## 2019-06-11 NOTE — PHYSICIAN QUERY
PT Name: Yasmeen Palm  MR #: [id number]    Physician Query Form - Emergency Medicine Diagnosis Clarification     CDS/: Bonita Ribera               Contact information: [email or web address]   This form is a permanent document in the medical record.     Query Date: June 11, 2019    By submitting this query, we are merely seeking further clarification of documentation.  Please utilize your independent clinical judgment when addressing the question(s) below.      The Medical record contains the following:     Diagnosis Supporting Clinical Information Location in Medical Record   Acute respiratory failure with hypoxia and hypercapnia           SOB for 2 days      Positive for cough, shortness of breath and wheezing    She is in respiratory distress    I was shown the pt's ABG and based on decreased oxygen saturation, will continue administration of BiPAP.   ED Provider Note 6/7      ED Provider Note 6/7    ED Provider Note 6/7    ED Provider Note 6/7    ED Provider Note 6/7     Do you agree with the Emergency Medicine diagnosis of _Acute respiratory failure with hypoxia and hypercapnia__?    [   ] Yes   [ x  ] Yes, but it resolved prior to my assessment of the patient   [   ] No   [   ] Clinically Insignificant   [   ] Other/Clarification of Findings:   [  ] Clinically Undetermined         Please document in your progress notes daily for the duration of treatment until resolved and include in your discharge summary.

## 2019-06-11 NOTE — SUBJECTIVE & OBJECTIVE
Medications Prior to Admission   Medication Sig Dispense Refill Last Dose    acetaminophen (TYLENOL) 500 MG tablet Take 1 tablet (500 mg total) by mouth every 8 (eight) hours as needed.  0 6/7/2019    amiodarone (PACERONE) 200 MG Tab Take 1 tablet (200 mg total) by mouth once daily. 30 tablet 11 6/7/2019    aspirin (ECOTRIN) 81 MG EC tablet Take 81 mg by mouth once daily.   6/7/2019    ferrous gluconate (FERGON) 324 MG tablet Take 1 tablet (324 mg total) by mouth daily with breakfast. 60 tablet 3 Past Week    fluticasone propionate (FLOVENT HFA) 220 mcg/actuation inhaler Inhale 1 puff into the lungs 2 (two) times daily. Controller 12 g 0 6/7/2019    furosemide (LASIX) 40 MG tablet Take 40 mg by mouth once daily.    6/7/2019    gabapentin (NEURONTIN) 300 MG capsule Take 300 mg by mouth 3 (three) times daily.   6/7/2019    hydrALAZINE (APRESOLINE) 50 MG tablet Take 50 mg by mouth 2 (two) times daily.   6/7/2019    isosorbide mononitrate (IMDUR) 30 MG 24 hr tablet Take 3 tablets (90 mg total) by mouth once daily. 90 tablet 11 6/7/2019    lisinopril (PRINIVIL,ZESTRIL) 40 MG tablet Take 1 tablet (40 mg total) by mouth once daily. Do not take this medication if blood pressure is below 130/80 mmHg and/or feeling dizzy after taking all other blood pressure meds   6/7/2019    metformin (GLUCOPHAGE) 500 MG tablet Take 1,000 mg by mouth 2 (two) times daily with meals.    6/7/2019    metoprolol tartrate (LOPRESSOR) 100 MG tablet Take 1 tablet (100 mg total) by mouth 2 (two) times daily. 60 tablet 11 6/7/2019    multivit-min-FA-lycopen-lutein (CENTRUM SILVER) 0.4-300-250 mg-mcg-mcg Tab Take 1 tablet by mouth once daily. 60 tablet 1 6/6/2019    multivitamin (THERAGRAN) per tablet Take 1 tablet by mouth once daily.   6/7/2019    pantoprazole (PROTONIX) 40 MG tablet Take 40 mg by mouth once daily.   6/7/2019    pregabalin (LYRICA) 75 MG capsule Take 75 mg by mouth 2 (two) times daily.   6/7/2019    rivaroxaban  (XARELTO) 20 mg Tab Take 20 mg by mouth daily with dinner or evening meal.   2019    tiotropium (SPIRIVA) 18 mcg inhalation capsule Inhale 1 capsule (18 mcg total) into the lungs once daily. Controller 30 capsule 11 2019    levalbuterol (XOPENEX HFA) 45 mcg/actuation inhaler Inhale 2 puffs into the lungs every 4 (four) hours as needed for Wheezing or Shortness of Breath. Rescue 1 Inhaler 3 2019       Review of patient's allergies indicates:  No Known Allergies    Past Medical History:   Diagnosis Date    Anticoagulant long-term use     Xarelto    Arthritis     Asthma     CHF (congestive heart failure)     COPD (chronic obstructive pulmonary disease)     Coronary artery disease     Deep vein thrombosis (DVT) of left lower extremity     Depression     Diabetes mellitus     GERD (gastroesophageal reflux disease)     Hypertension     Obstructive sleep apnea on CPAP     On home oxygen therapy     Unsteady gait     uses either walker or 4 prong cane     Past Surgical History:   Procedure Laterality Date    CORONARY ANGIOPLASTY WITH STENT PLACEMENT      HERNIA REPAIR      encapsulated umbilical hernia     Family History     Problem Relation (Age of Onset)    Diabetes Father    Heart disease Mother    Hypertension Mother        Tobacco Use    Smoking status: Current Every Day Smoker     Packs/day: 0.50     Years: 55.00     Pack years: 27.50     Types: Cigarettes     Start date: 1963     Last attempt to quit: 2018     Years since quittin.4    Smokeless tobacco: Never Used   Substance and Sexual Activity    Alcohol use: Not Currently     Alcohol/week: 0.6 oz     Types: 1 Glasses of wine per week     Frequency: Never     Comment: socially    Drug use: No    Sexual activity: Never     Review of Systems   Constitutional: Negative for chills.   HENT: Negative for congestion.    Eyes: Negative for visual disturbance.   Respiratory: Negative for shortness of breath.     Cardiovascular: Negative for chest pain.   Gastrointestinal: Negative for abdominal distention.   Endocrine: Negative for cold intolerance.   Genitourinary: Negative for flank pain.   Musculoskeletal: Negative for back pain.   Skin: Negative for color change, pallor, rash and wound.   Allergic/Immunologic: Negative for immunocompromised state.   Neurological: Negative for dizziness and headaches.   Hematological: Does not bruise/bleed easily.   Psychiatric/Behavioral: Negative for agitation.     Objective:     Vital Signs (Most Recent):  Temp: 98.6 °F (37 °C) (06/11/19 1044)  Pulse: 71 (06/11/19 1236)  Resp: 17 (06/11/19 1236)  BP: (!) 107/54 (06/11/19 1044)  SpO2: 96 % (06/11/19 1236) Vital Signs (24h Range):  Temp:  [97.3 °F (36.3 °C)-99 °F (37.2 °C)] 98.6 °F (37 °C)  Pulse:  [65-82] 71  Resp:  [16-19] 17  SpO2:  [87 %-99 %] 96 %  BP: (107-179)/(54-81) 107/54     Weight: 80.7 kg (178 lb)  Body mass index is 28.73 kg/m².    Physical Exam   Constitutional: She appears well-developed and well-nourished. No distress.   HENT:   Head: Normocephalic and atraumatic.   Eyes: Conjunctivae are normal.   Neck: Neck supple.   Cardiovascular: Normal rate.   Pulmonary/Chest: Effort normal. No respiratory distress.   Abdominal: Soft. She exhibits no distension and no mass. There is no tenderness. There is no rebound and no guarding. No hernia.   Musculoskeletal: She exhibits no edema or tenderness.   Neurological: She is alert. No sensory deficit.   Skin: Skin is warm and dry. Capillary refill takes 2 to 3 seconds. No rash noted. She is not diaphoretic. No erythema. No pallor.   Psychiatric: She has a normal mood and affect.   Vitals reviewed.      Significant Labs:  All pertinent labs from the last 24 hours have been reviewed.    Significant Diagnostics:  I have reviewed all pertinent imaging results/findings within the past 24 hours.

## 2019-06-11 NOTE — PLAN OF CARE
Problem: Adult Inpatient Plan of Care  Goal: Plan of Care Review  Outcome: Ongoing (interventions implemented as appropriate)     06/11/19 4588   Plan of Care Review   Plan of Care Reviewed With patient   Resting quietly in bed. Remains on bed rest.Skin intact. No wounds or abrasion

## 2019-06-11 NOTE — ASSESSMENT & PLAN NOTE
I discussed smoking cessation at length with this patient, including treatment options of nicotine replacement therapy and management of the addiction with assistance by [facility].  The patient states understanding that continued smoking will increase the chances of stenosis progression and other cardiovascular morbidity.

## 2019-06-11 NOTE — ASSESSMENT & PLAN NOTE
-Chronic L femoral and popliteal with new finding of L peroneal DVT, no phlegmasia or neurovascular compromise of her lower extremities - Rec continuing anticoagulation.    -Recommend compression with Rx stockings, elevation, dietary changes associated with water and sodium intake.  If there is proximal propagation of DVT on anticoagulation or contraindications to anticoagulation, she would benefit from an IVC filter at that time.  No vascular surgical intervention indicated at this time.

## 2019-06-11 NOTE — ASSESSMENT & PLAN NOTE
LE u/s:  Impression:       Bilateral lower extremity DVT as above, left greater than right, with findings in the left lower extremity progressed since the comparison exam from January 24th 2017.     Resume xarelto  Appreciate Vascular Surgery input.  No need for acute intervention.

## 2019-06-11 NOTE — PROGRESS NOTES
Pt placed on bp on settings as documented 10/5/28%. Alarms are set and functioning. Pt tolerating therapy well.

## 2019-06-11 NOTE — HPI
Blaise Adams MD RPVI                       [facility]                         06/11/2019    HPI:  Yasmeen Palm is a 69 y.o. female with   Patient Active Problem List   Diagnosis    Coronary artery disease involving native coronary artery of native heart with angina pectoris    Controlled type 2 diabetes mellitus, without long-term current use of insulin    Gastroesophageal reflux disease without esophagitis    Tobacco abuse    Anemia of chronic disease    History of pulmonary embolism    History of deep vein thrombosis    Chronic anticoagulation    Weakness    Chronic diastolic congestive heart failure    Shortness of breath    Metabolic alkalosis with respiratory acidosis    Thoracic aorta atherosclerosis    Abnormal CT scan, chest    Acute exacerbation of chronic obstructive pulmonary disease (COPD)    Non-STEMI (non-ST elevated myocardial infarction)    Neuropathy    Paroxysmal atrial fibrillation    Acute on chronic respiratory failure with hypoxia and hypercapnia    Debility    COPD with acute exacerbation    Leukocytosis    Essential hypertension    COPD (chronic obstructive pulmonary disease)    Hyperlipidemia    Acute cystitis    Acute on chronic diastolic heart failure    being managed by PCP and specialists who was admitted for evaluation of SOB.  Found to have a chronic L femoral and popliteal DVT with new findings of peroneal DVT.  Prev dx with L fem/pop DVT, peroneal DVT is a new finding when compared to 2017.  Pt states she does have minimal L calf pain at times although for the most part her left leg is without issues.  Has remained compliant with Xarelto.  Patient states location is left lower extremity occurring for several years.  Associated signs and symptoms include min pain.  Quality is dull and severity is 1/10.  Symptoms began years ago.  Alleviating factors include rest.  Worsening factors include pressure.  Vascular Surgery  HPI Comments: Patient is a 23 yo female with right arm pain. States started tonight, states located in her right elbow and forearm. States improves which she holds it up against her chest and worsens with straightening and movement. No chest pain, no SOB, no nausea or vomiting, no fevers or chills, no further complaints. Patient is a 24 y.o. female presenting with arm pain. The history is provided by the patient. No  was used. Arm Pain    Pertinent negatives include no back pain and no neck pain. Past Medical History:   Diagnosis Date    Morbid obesity with BMI of 45.0-49.9, adult [facility])        History reviewed. No pertinent surgical history. Family History:   Problem Relation Age of Onset    Diabetes Mother     Arthritis-osteo Mother     Other Other      Dercums       Social History     Social History    Marital status: SINGLE     Spouse name: N/A    Number of children: N/A    Years of education: N/A     Occupational History    Not on file. Social History Main Topics    Smoking status: Current Some Day Smoker    Smokeless tobacco: Not on file    Alcohol use Yes      Comment: occ    Drug use: Yes     Special: Marijuana    Sexual activity: Not on file     Other Topics Concern    Not on file     Social History Narrative         ALLERGIES: Review of patient's allergies indicates no known allergies. Review of Systems   Constitutional: Negative for chills and fever. HENT: Negative for rhinorrhea and sore throat. Eyes: Negative for visual disturbance. Respiratory: Negative for cough and shortness of breath. Cardiovascular: Negative for chest pain and leg swelling. Gastrointestinal: Negative for abdominal pain, diarrhea, nausea and vomiting. Genitourinary: Negative for dysuria. Musculoskeletal: Positive for myalgias (per HPI, right arm). Negative for back pain and neck pain. Skin: Negative for rash.    Neurological: Negative for weakness and consulted for further mgmt.    no MI  no Stroke  Tobacco use: daily    Past Medical History:   Diagnosis Date    Anticoagulant long-term use     Xarelto    Arthritis     Asthma     CHF (congestive heart failure)     COPD (chronic obstructive pulmonary disease)     Coronary artery disease     Deep vein thrombosis (DVT) of left lower extremity     Depression     Diabetes mellitus     GERD (gastroesophageal reflux disease)     Hypertension     Obstructive sleep apnea on CPAP     On home oxygen therapy     Unsteady gait     uses either walker or 4 prong cane     Past Surgical History:   Procedure Laterality Date    CORONARY ANGIOPLASTY WITH STENT PLACEMENT      HERNIA REPAIR      encapsulated umbilical hernia     Family History   Problem Relation Age of Onset    Heart disease Mother     Hypertension Mother     Diabetes Father      Social History     Socioeconomic History    Marital status: Legally      Spouse name: Not on file    Number of children: Not on file    Years of education: Not on file    Highest education level: Not on file   Occupational History    Not on file   Social Needs    Financial resource strain: Not on file    Food insecurity:     Worry: Not on file     Inability: Not on file    Transportation needs:     Medical: Not on file     Non-medical: Not on file   Tobacco Use    Smoking status: Current Every Day Smoker     Packs/day: 0.50     Years: 55.00     Pack years: 27.50     Types: Cigarettes     Start date: 1963     Last attempt to quit: 2018     Years since quittin.4    Smokeless tobacco: Never Used   Substance and Sexual Activity    Alcohol use: Not Currently     Alcohol/week: 0.6 oz     Types: 1 Glasses of wine per week     Frequency: Never     Comment: socially    Drug use: No    Sexual activity: Never   Lifestyle    Physical activity:     Days per week: Not on file     Minutes per session: Not on file    Stress: Not on file   Relationships  headaches. Psychiatric/Behavioral: The patient is not nervous/anxious. Vitals:    03/03/18 0123   BP: (!) 143/99   Pulse: (!) 105   Resp: 20   Temp: 97.8 °F (36.6 °C)   SpO2: 99%   Weight: 136.1 kg (300 lb)   Height: 5' 9\" (1.753 m)            Physical Exam   Constitutional: She is oriented to person, place, and time. She appears well-developed and well-nourished. HENT:   Head: Normocephalic. Right Ear: External ear normal.   Left Ear: External ear normal.   Eyes: Conjunctivae and EOM are normal. Pupils are equal, round, and reactive to light. Neck: Normal range of motion. Neck supple. No tracheal deviation present. Cardiovascular: Normal rate, regular rhythm, normal heart sounds and intact distal pulses. No murmur heard. Pulmonary/Chest: Effort normal and breath sounds normal. No respiratory distress. Abdominal: Soft. There is no tenderness. Musculoskeletal: Normal range of motion. Radial pulse 2+ bilaterally, non-painful to palpation, pain with ROM with bending/twisting. No swelling or redness noted. Neurological: She is alert and oriented to person, place, and time. No cranial nerve deficit. Skin: No rash noted. Nursing note and vitals reviewed.        MDM  Number of Diagnoses or Management Options  Right arm pain: new and requires workup     Amount and/or Complexity of Data Reviewed  Clinical lab tests: ordered and reviewed  Tests in the radiology section of CPT®: reviewed and ordered  Review and summarize past medical records: yes    Risk of Complications, Morbidity, and/or Mortality  Presenting problems: moderate  Diagnostic procedures: moderate  Management options: moderate    Patient Progress  Patient progress: stable        ED Course       Procedures     Recent Results (from the past 12 hour(s))   CBC WITH AUTOMATED DIFF    Collection Time: 03/03/18  2:08 AM   Result Value Ref Range    WBC 9.3 4.3 - 11.1 K/uL    RBC 4.53 4.05 - 5.25 M/uL    HGB 10.3 (L) 11.7 - 15.4 g/dL    Social connections:     Talks on phone: Not on file     Gets together: Not on file     Attends Evangelical service: Not on file     Active member of club or organization: Not on file     Attends meetings of clubs or organizations: Not on file     Relationship status: Not on file   Other Topics Concern    Not on file   Social History Narrative    Not on file       Current Facility-Administered Medications:     acetaminophen tablet 650 mg, 650 mg, Oral, Q8H PRN, Viri Paredes MD, 650 mg at 06/09/19 0253    albuterol-ipratropium 2.5 mg-0.5 mg/3 mL nebulizer solution 3 mL, 3 mL, Nebulization, Q4H, Viri Paredes MD, 3 mL at 06/11/19 1236    amiodarone tablet 200 mg, 200 mg, Oral, Daily, Viri Paredes MD, 200 mg at 06/11/19 0847    aspirin EC tablet 81 mg, 81 mg, Oral, Daily, Viri Paredes MD, 81 mg at 06/11/19 0846    benzonatate capsule 100 mg, 100 mg, Oral, TID PRN, Viri Paredes MD    dextromethorphan-guaifenesin  mg/5 ml liquid 10 mL, 10 mL, Oral, Q6H, Viri Paredes MD, 10 mL at 06/10/19 0623    dextrose 50% injection 12.5 g, 12.5 g, Intravenous, PRN, Viri Paredes MD    dextrose 50% injection 25 g, 25 g, Intravenous, PRN, Viri Paredes MD    doxycycline tablet 100 mg, 100 mg, Oral, Q12H, Viri Paredes MD, 100 mg at 06/11/19 0847    famotidine tablet 20 mg, 20 mg, Oral, BID, Viri Paredes MD, 20 mg at 06/11/19 0846    fluticasone propionate 220 mcg/actuation inhaler 1 puff, 1 puff, Inhalation, BID, Viri Paredes MD, 1 puff at 06/11/19 0755    furosemide tablet 20 mg, 20 mg, Oral, Daily, Zenon Aflredo MD, 20 mg at 06/11/19 1217    gabapentin capsule 300 mg, 300 mg, Oral, TID, Viri Paredes MD, 300 mg at 06/11/19 0847    glucagon (human recombinant) injection 1 mg, 1 mg, Intramuscular, PRN, Viri Paredes MD    glucose chewable tablet 16 g, 16 g, Oral, PRN, Viri Paredes MD    glucose chewable tablet 24 g,  HCT 31.9 (L) 35.8 - 46.3 %    MCV 70.4 (L) 79.6 - 97.8 FL    MCH 22.7 (L) 26.1 - 32.9 PG    MCHC 32.3 31.4 - 35.0 g/dL    RDW 15.0 (H) 11.9 - 14.6 %    PLATELET 037 704 - 555 K/uL    MPV 8.3 (L) 10.8 - 14.1 FL    DF AUTOMATED      NEUTROPHILS 50 43 - 78 %    LYMPHOCYTES 40 13 - 44 %    MONOCYTES 6 4.0 - 12.0 %    EOSINOPHILS 4 0.5 - 7.8 %    BASOPHILS 0 0.0 - 2.0 %    IMMATURE GRANULOCYTES 0 0.0 - 5.0 %    ABS. NEUTROPHILS 4.6 1.7 - 8.2 K/UL    ABS. LYMPHOCYTES 3.8 0.5 - 4.6 K/UL    ABS. MONOCYTES 0.5 0.1 - 1.3 K/UL    ABS. EOSINOPHILS 0.4 0.0 - 0.8 K/UL    ABS. BASOPHILS 0.0 0.0 - 0.2 K/UL    ABS. IMM. GRANS. 0.0 0.0 - 0.5 K/UL   METABOLIC PANEL, BASIC    Collection Time: 03/03/18  2:08 AM   Result Value Ref Range    Sodium 143 136 - 145 mmol/L    Potassium 4.4 3.5 - 5.1 mmol/L    Chloride 109 (H) 98 - 107 mmol/L    CO2 25 21 - 32 mmol/L    Anion gap 9 7 - 16 mmol/L    Glucose 105 (H) 65 - 100 mg/dL    BUN 12 6 - 23 MG/DL    Creatinine 0.70 0.6 - 1.0 MG/DL    GFR est AA >60 >60 ml/min/1.73m2    GFR est non-AA >60 >60 ml/min/1.73m2    Calcium 8.1 (L) 8.3 - 10.4 MG/DL   D DIMER    Collection Time: 03/03/18  2:08 AM   Result Value Ref Range    D DIMER 0.45 <0.56 ug/ml(FEU)     Xr Elbow Rt Min 3 V    Result Date: 3/3/2018  LEFT ELBOW 3 views. HISTORY:  Elbow pain . TECHNIQUE: AP and lateral and oblique views. COMPARISON: None. FINDINGS: The radial head appears intact. No abnormal fat pad signs. No fracture or dislocation. No periostitis or erosions. IMPRESSION: Negative. 23 yo female with right arm pain:       Pain resolved in ED, states \"It feels back to normal now\". D-dimer negative, labs reassuring, and I performed bedside US which showed fully compressible Deep brachial vein from elbow to axillary region pulses 2+ bilaterally.   Patient to follow up with PCP in 1-2 days or return immediately with any further concerns including any fevers or chills, nausea or vomiting, redness or swelling, any chest pain 24 g, Oral, PRN, Viri Paredes MD    hydrALAZINE injection 10 mg, 10 mg, Intravenous, Q6H PRN, Viri Paredes MD    hydrALAZINE tablet 50 mg, 50 mg, Oral, BID, Viri Paredes MD, 50 mg at 06/11/19 0848    insulin aspart U-100 pen 1-10 Units, 1-10 Units, Subcutaneous, QID (AC + HS) PRN, Viri Paredes MD, 4 Units at 06/11/19 1240    insulin aspart U-100 pen 10 Units, 10 Units, Subcutaneous, TIDWM, Blanca Carvajal MD, 10 Units at 06/11/19 1239    insulin detemir U-100 pen 20 Units, 20 Units, Subcutaneous, QHS, Zenon Alfredo MD    isosorbide mononitrate 24 hr tablet 90 mg, 90 mg, Oral, Daily, Viri Paredes MD, 90 mg at 06/11/19 0847    lisinopril tablet 40 mg, 40 mg, Oral, Daily, Viri Paredes MD, 40 mg at 06/11/19 0847    metoprolol injection 5 mg, 5 mg, Intravenous, Q5 Min PRN, Viri Paredes MD    metoprolol tartrate (LOPRESSOR) tablet 100 mg, 100 mg, Oral, BID, Viri Paredes MD, 100 mg at 06/11/19 0846    ondansetron injection 8 mg, 8 mg, Intravenous, Q8H PRN, Viri Paredes MD    predniSONE tablet 40 mg, 40 mg, Oral, Daily, Blanca Carvajal MD, 40 mg at 06/11/19 0846    pregabalin capsule 75 mg, 75 mg, Oral, BID, Viri Paredes MD, 75 mg at 06/11/19 0846    rivaroxaban tablet 20 mg, 20 mg, Oral, Daily with dinner, Viri Paredes MD, 20 mg at 06/10/19 1933    sodium chloride 0.9% flush 10 mL, 10 mL, Intravenous, PRN, Viri Paredes MD    traMADol tablet 50 mg, 50 mg, Oral, Q6H PRN, Viri Paredes MD, 50 mg at 06/11/19 5707     or SOB or any further concerns. Patient in full agreement with plan.

## 2019-06-11 NOTE — ASSESSMENT & PLAN NOTE
ECHO:  Conclusion     · Normal left ventricular systolic function. The estimated ejection fraction is 70%  · Moderate concentric left ventricular hypertrophy.  · Grade I (mild) left ventricular diastolic dysfunction consistent with impaired relaxation.  · Normal left atrial pressure.  · Moderate left atrial enlargement.  · Mild right atrial enlargement.  · Mild mitral regurgitation.  · Mild tricuspid regurgitation.  · The estimated PA systolic pressure is 80 mm Hg  · Intermediate central venous pressure (8 mm Hg).  · Pulmonary hypertension present.     Significant elevated PAP - should follow up pulmonary HTN clinic   Initially on IV lasix.  Will place on daily oral Lasix.

## 2019-06-11 NOTE — PHYSICIAN QUERY
PT Name: Yasmeen Palm  MR #: [id number]     Physician Query Form - Diabetic Condition Clarification     CDS/: Bonita Ribera               Contact information: [email or web address]   This form is a permanent document in the medical record.     Query Date: June 11, 2019    By submitting this query, we are merely seeking further clarification of documentation to reflect the severity of illness of your patient. Please utilize your independent clinical judgment when addressing the question(s) below.    The Medical record reflects the following:     Indicators   Supporting Clinical Findings Location in Medical Record   X Diabetes uncontrolled documented Controlled type 2 diabetes mellitus, without long-term current use of insulin  Uncontrolled secondary to [facility] PN 6/10   X Lab Value(s), POCT glucose value(s) POCT Glucose 254- >500- 204 Labs 6/7-6/11    Treatment                        Medication      Other       Provider, please specify the meaning of the term uncontrolled:    [  x ] Diabetes mellitus Type 2 with hyperglycemia   [   ] Other diabetes complication (please specify): ____________   [   ]  Clinically Undetermined       Please document in your progress notes daily for the duration of treatment until resolved, and include in your discharge summary.

## 2019-06-11 NOTE — PROGRESS NOTES
[facility] Hospital Medicine  Progress Note    Patient Name: Yasmeen Palm  MRN: [id number]  Patient Class: IP- Inpatient   Admission Date: 6/7/2019  Length of Stay: 4 days  Attending Physician: Zenon Alfredo MD  Primary Care Provider: Angel Orourke Jr, MD        Subjective:     Principal Problem:Acute exacerbation of chronic obstructive pulmonary disease (COPD)    HPI:    Yasmeen Palm is a 69 y.o. female that (in part)  has a past medical history of Anticoagulant long-term use, Arthritis, Asthma, CHF (congestive heart failure), COPD (chronic obstructive pulmonary disease), Coronary artery disease, Deep vein thrombosis (DVT) of left lower extremity, Depression, Diabetes mellitus, GERD (gastroesophageal reflux disease), Hypertension, Obstructive sleep apnea on CPAP, On home oxygen therapy, and Unsteady gait.  has a past surgical history that includes Coronary angioplasty with stent and Hernia repair. Presents to [facility] Emergency Department complaining of shortness of breath.  Subacute acute onset 2 days ago with present progressive worsening.  She has had a cough for 3 days.  Denies fever chills.  Endorses shortness of breath at rest and dyspnea with exertion.  Positive for unintentional weight gain.  Previous history of CHF exacerbation and COPD.  Also reports 2 episodes of chest pain yesterday that were relieved with nitroglycerin.  She is on chronic anticoagulation for atrial fibrillation and history of pulmonary embolism and DVT.  Also complaining of peripheral edema in her lower extremities, right greater than left.  Denies syncope, cyanosis, or palpitations.  She reports compliance with her home medication regimen and cardiac/diabetic diet.  No recent travel or sick contacts.    In the emergency department routine laboratory studies, chest x-ray, cardiac enzymes, an EKG was obtained.  History and findings are consistent with either COPD versus CHF, but most  likely a combination of both.  She was given Lasix for the CHF and started on BiPAP both CHF and COPD along with nebulizer treatments and steroids.  She is also started on empiric doxycycline for acute bronchitis.    Hospital medicine has been asked to admit for further evaluation and treatment.     Hospital Course:  Transferred from ICU. Weaned from continuous bipap to QHS and NC O2. Swelling in LE much improved. Continued on antibiotics for increase WBC and low grade temp with COPD flare. Increased blood sugars secondary to steroids and insulin regimen increased. Slight extension of known DVT. On xarelto.  Vascular Surgery consulted.  No need for acute intervention.    Interval History: Feeling better.    Review of Systems   HENT: Negative for ear discharge and ear pain.    Eyes: Negative for pain and redness.   Endocrine: Negative for polyphagia and polyuria.   Neurological: Negative for seizures and syncope.     Objective:     Vital Signs (Most Recent):  Temp: 98.2 °F (36.8 °C) (06/11/19 0729)  Pulse: 79 (06/11/19 0755)  Resp: 17 (06/11/19 0755)  BP: (!) 143/69 (06/11/19 0729)  SpO2: (!) 87 %(Pt had oxygen off) (06/11/19 0755) Vital Signs (24h Range):  Temp:  [97.3 °F (36.3 °C)-99 °F (37.2 °C)] 98.2 °F (36.8 °C)  Pulse:  [65-83] 79  Resp:  [16-19] 17  SpO2:  [87 %-99 %] 87 %  BP: (105-179)/(52-81) 143/69     Weight: 80.7 kg (178 lb)  Body mass index is 28.73 kg/m².    Intake/Output Summary (Last 24 hours) at 6/11/2019 1014  Last data filed at 6/11/2019 0849  Gross per 24 hour   Intake 1250 ml   Output --   Net 1250 ml      Physical Exam   Constitutional: She is oriented to person, place, and time. She appears well-developed and well-nourished. No distress.   HENT:   Head: Normocephalic.   Eyes: Pupils are equal, round, and reactive to light.   Neck: Neck supple. No JVD present.   Cardiovascular: Normal rate, regular rhythm and normal heart sounds.   Pulmonary/Chest: Effort normal. No respiratory distress. She  has no wheezes. She has rales.   Abdominal: Soft. Bowel sounds are normal. There is no tenderness.   Musculoskeletal: She exhibits edema and tenderness.   Neurological: She is alert and oriented to person, place, and time.   Skin: Capillary refill takes 2 to 3 seconds.   Psychiatric: She has a normal mood and affect. Her behavior is normal.       Significant Labs:   BMP:   Recent Labs   Lab 06/11/19  0350   *      K 4.5      CO2 36*   BUN 32*   CREATININE 0.7   CALCIUM 9.2     CBC:   Recent Labs   Lab 06/10/19  0507 06/11/19  0350   WBC 24.22* 19.84*   HGB 7.8* 7.7*   HCT 28.6* 28.9*    290         Assessment/Plan:      * Acute exacerbation of chronic obstructive pulmonary disease (COPD)  Improved  Changed to oral steroid  Wean O2 as tolerated  Supportive care  Daily improvement.  One more day of treatment and home tomorrow.        Acute on chronic diastolic heart failure  ECHO:  Conclusion     · Normal left ventricular systolic function. The estimated ejection fraction is 70%  · Moderate concentric left ventricular hypertrophy.  · Grade I (mild) left ventricular diastolic dysfunction consistent with impaired relaxation.  · Normal left atrial pressure.  · Moderate left atrial enlargement.  · Mild right atrial enlargement.  · Mild mitral regurgitation.  · Mild tricuspid regurgitation.  · The estimated PA systolic pressure is 80 mm Hg  · Intermediate central venous pressure (8 mm Hg).  · Pulmonary hypertension present.     Significant elevated PAP - should follow up pulmonary HTN clinic   Initially on IV lasix.  Will place on daily oral Lasix.      Hyperlipidemia        Essential hypertension  Continue current management.    Paroxysmal atrial fibrillation  Rate controlled on BB and amiodarone   xarelto       Chronic anticoagulation        History of deep vein thrombosis  LE u/s:  Impression:       Bilateral lower extremity DVT as above, left greater than right, with findings in the left lower  extremity progressed since the comparison exam from January 24th 2017.     Resume xarelto  Appreciate Vascular Surgery input.  No need for acute intervention.         History of pulmonary embolism  Continue Xarelto.        Anemia of chronic disease  H/H low, but stable.  No evidence of bleeding.  No need for transfusion.        Tobacco abuse  Counseled on smoking cessation >5 minutes      Gastroesophageal reflux disease without esophagitis        Controlled type 2 diabetes mellitus, without long-term current use of insulin  Uncontrolled secondary to steroids  Steroids being weaned.  Continue insulin management.      Coronary artery disease involving native coronary artery of native heart with angina pectoris  No acute issues            VTE Risk Mitigation (From admission, onward)        Ordered     rivaroxaban tablet 20 mg  With dinner      06/08/19 0314     IP VTE HIGH RISK PATIENT  Once      06/08/19 0103     Place TOM hose  Until discontinued      06/08/19 0103              Zenon Alfredo MD  Department of Hospital Medicine   [facility]

## 2019-06-11 NOTE — ASSESSMENT & PLAN NOTE
Improved  Changed to oral steroid  Wean O2 as tolerated  Supportive care  Daily improvement.  One more day of treatment and home tomorrow.

## 2019-06-12 VITALS
OXYGEN SATURATION: 98 % | HEART RATE: 68 BPM | WEIGHT: 178 LBS | DIASTOLIC BLOOD PRESSURE: 58 MMHG | SYSTOLIC BLOOD PRESSURE: 134 MMHG | TEMPERATURE: 99 F | HEIGHT: 66 IN | RESPIRATION RATE: 18 BRPM | BODY MASS INDEX: 28.61 KG/M2

## 2019-06-12 PROBLEM — J44.1 ACUTE EXACERBATION OF CHRONIC OBSTRUCTIVE PULMONARY DISEASE (COPD): Status: RESOLVED | Noted: 2017-12-08 | Resolved: 2019-06-12

## 2019-06-12 PROBLEM — I21.4 NON-STEMI (NON-ST ELEVATED MYOCARDIAL INFARCTION): Status: RESOLVED | Noted: 2018-06-24 | Resolved: 2019-06-12

## 2019-06-12 PROBLEM — R06.02 SHORTNESS OF BREATH: Status: RESOLVED | Noted: 2017-08-12 | Resolved: 2019-06-12

## 2019-06-12 PROBLEM — N30.00 ACUTE CYSTITIS: Status: RESOLVED | Noted: 2019-03-25 | Resolved: 2019-06-12

## 2019-06-12 PROBLEM — J44.1 COPD WITH ACUTE EXACERBATION: Status: RESOLVED | Noted: 2019-02-08 | Resolved: 2019-06-12

## 2019-06-12 PROBLEM — I50.33 ACUTE ON CHRONIC DIASTOLIC HEART FAILURE: Chronic | Status: RESOLVED | Noted: 2019-06-07 | Resolved: 2019-06-12

## 2019-06-12 LAB
POCT GLUCOSE: 141 MG/DL (ref 70–110)
POCT GLUCOSE: 197 MG/DL (ref 70–110)
POCT GLUCOSE: 234 MG/DL (ref 70–110)
POCT GLUCOSE: 336 MG/DL (ref 70–110)

## 2019-06-12 PROCEDURE — 25000003 PHARM REV CODE 250: Performed by: INTERNAL MEDICINE

## 2019-06-12 PROCEDURE — 99900035 HC TECH TIME PER 15 MIN (STAT)

## 2019-06-12 PROCEDURE — 94640 AIRWAY INHALATION TREATMENT: CPT

## 2019-06-12 PROCEDURE — 25000003 PHARM REV CODE 250: Performed by: HOSPITALIST

## 2019-06-12 PROCEDURE — 25000242 PHARM REV CODE 250 ALT 637 W/ HCPCS: Performed by: INTERNAL MEDICINE

## 2019-06-12 PROCEDURE — 94660 CPAP INITIATION&MGMT: CPT

## 2019-06-12 PROCEDURE — 94761 N-INVAS EAR/PLS OXIMETRY MLT: CPT

## 2019-06-12 PROCEDURE — 63600175 PHARM REV CODE 636 W HCPCS: Performed by: HOSPITALIST

## 2019-06-12 PROCEDURE — 27000221 HC OXYGEN, UP TO 24 HOURS

## 2019-06-12 RX ORDER — DOXYCYCLINE HYCLATE 100 MG
100 TABLET ORAL EVERY 12 HOURS
Qty: 6 TABLET | Refills: 0 | Status: SHIPPED | OUTPATIENT
Start: 2019-06-12 | End: 2019-06-15

## 2019-06-12 RX ORDER — PREDNISONE 10 MG/1
TABLET ORAL
Qty: 13 TABLET | Refills: 0 | Status: SHIPPED | OUTPATIENT
Start: 2019-06-12 | End: 2019-06-24

## 2019-06-12 RX ORDER — BENZONATATE 100 MG/1
100 CAPSULE ORAL 3 TIMES DAILY PRN
Qty: 20 CAPSULE | Refills: 0 | Status: SHIPPED | OUTPATIENT
Start: 2019-06-12 | End: 2019-06-22

## 2019-06-12 RX ADMIN — IPRATROPIUM BROMIDE AND ALBUTEROL SULFATE 3 ML: .5; 3 SOLUTION RESPIRATORY (INHALATION) at 12:06

## 2019-06-12 RX ADMIN — IPRATROPIUM BROMIDE AND ALBUTEROL SULFATE 3 ML: .5; 3 SOLUTION RESPIRATORY (INHALATION) at 03:06

## 2019-06-12 RX ADMIN — PREGABALIN 75 MG: 50 CAPSULE ORAL at 09:06

## 2019-06-12 RX ADMIN — DOXYCYCLINE HYCLATE 100 MG: 100 TABLET, COATED ORAL at 09:06

## 2019-06-12 RX ADMIN — TRAMADOL HYDROCHLORIDE 50 MG: 50 TABLET, FILM COATED ORAL at 12:06

## 2019-06-12 RX ADMIN — INSULIN ASPART 2 UNITS: 100 INJECTION, SOLUTION INTRAVENOUS; SUBCUTANEOUS at 09:06

## 2019-06-12 RX ADMIN — FUROSEMIDE 20 MG: 20 TABLET ORAL at 09:06

## 2019-06-12 RX ADMIN — FAMOTIDINE 20 MG: 20 TABLET ORAL at 09:06

## 2019-06-12 RX ADMIN — GABAPENTIN 300 MG: 300 CAPSULE ORAL at 09:06

## 2019-06-12 RX ADMIN — PREDNISONE 40 MG: 20 TABLET ORAL at 09:06

## 2019-06-12 RX ADMIN — TRAMADOL HYDROCHLORIDE 50 MG: 50 TABLET, FILM COATED ORAL at 01:06

## 2019-06-12 RX ADMIN — INSULIN ASPART 10 UNITS: 100 INJECTION, SOLUTION INTRAVENOUS; SUBCUTANEOUS at 04:06

## 2019-06-12 RX ADMIN — RIVAROXABAN 20 MG: 20 TABLET, FILM COATED ORAL at 04:06

## 2019-06-12 RX ADMIN — ISOSORBIDE MONONITRATE 90 MG: 30 TABLET, EXTENDED RELEASE ORAL at 09:06

## 2019-06-12 RX ADMIN — IPRATROPIUM BROMIDE AND ALBUTEROL SULFATE 3 ML: .5; 3 SOLUTION RESPIRATORY (INHALATION) at 08:06

## 2019-06-12 RX ADMIN — FLUTICASONE PROPIONATE 1 PUFF: 220 AEROSOL, METERED RESPIRATORY (INHALATION) at 08:06

## 2019-06-12 RX ADMIN — GUAIFENESIN AND DEXTROMETHORPHAN 10 ML: 100; 10 SYRUP ORAL at 05:06

## 2019-06-12 RX ADMIN — HYDRALAZINE HYDROCHLORIDE 50 MG: 25 TABLET ORAL at 09:06

## 2019-06-12 RX ADMIN — GABAPENTIN 300 MG: 300 CAPSULE ORAL at 04:06

## 2019-06-12 RX ADMIN — IPRATROPIUM BROMIDE AND ALBUTEROL SULFATE 3 ML: .5; 3 SOLUTION RESPIRATORY (INHALATION) at 04:06

## 2019-06-12 RX ADMIN — INSULIN ASPART 10 UNITS: 100 INJECTION, SOLUTION INTRAVENOUS; SUBCUTANEOUS at 12:06

## 2019-06-12 RX ADMIN — METOPROLOL TARTRATE 100 MG: 50 TABLET ORAL at 09:06

## 2019-06-12 RX ADMIN — ASPIRIN 81 MG: 81 TABLET, COATED ORAL at 09:06

## 2019-06-12 RX ADMIN — INSULIN ASPART 8 UNITS: 100 INJECTION, SOLUTION INTRAVENOUS; SUBCUTANEOUS at 04:06

## 2019-06-12 RX ADMIN — LISINOPRIL 40 MG: 20 TABLET ORAL at 09:06

## 2019-06-12 RX ADMIN — INSULIN ASPART 10 UNITS: 100 INJECTION, SOLUTION INTRAVENOUS; SUBCUTANEOUS at 09:06

## 2019-06-12 RX ADMIN — AMIODARONE HYDROCHLORIDE 200 MG: 200 TABLET ORAL at 09:06

## 2019-06-12 NOTE — PLAN OF CARE
Problem: Adult Inpatient Plan of Care  Goal: Plan of Care Review  Outcome: Ongoing (interventions implemented as appropriate)  Pt did very well overnight. C/o lower back pain, treated with PRN tramadol. Expiratory wheezes noted on assessment, treated with scheduled resp tx. Minimal coughing reported. Remains on 3L NC, which is home O2 amount. BiPAP in place overnight. Slept well.  and 234, SSI administered as ordered. Levemir administered @HS. UO WDL. VSSAF. Possible d/c to home today per MD note. No acute events overnight.

## 2019-06-12 NOTE — PROGRESS NOTES
1119 TN sent clinicals to [facility] via [employer] to resume services; awaiting acceptance.

## 2019-06-12 NOTE — PLAN OF CARE
[facility]    HOME HEALTH ORDERS  FACE TO FACE ENCOUNTER    Patient Name: Yasmeen Palm  YOB: 1950    PCP: Angel Orourke Jr, MD   PCP Address: [address]*  PCP Phone Number: [phone]  PCP Fax: [phone]       Encounter Date: 06/12/2019    Admit to Home Health    Diagnoses:  Active Hospital Problems    Diagnosis  POA    Hyperlipidemia [E78.5]  Yes     Chronic    Essential hypertension [I10]  Yes     Chronic    Paroxysmal atrial fibrillation [I48.0]  Yes     Chronic    Chronic anticoagulation [Z79.01]  Not Applicable     Chronic    History of deep vein thrombosis [Z86.718]  Not Applicable     Chronic    History of pulmonary embolism [Z86.711]  Yes     Chronic    Anemia of chronic disease [D63.8]  Yes     Chronic    Tobacco abuse [Z72.0]  Yes     Chronic    Gastroesophageal reflux disease without esophagitis [K21.9]  Yes     Chronic    Coronary artery disease involving native coronary artery of native heart with angina pectoris [I25.119]  Yes    Controlled type 2 diabetes mellitus, without long-term current use of insulin [E11.9]  Yes      Resolved Hospital Problems    Diagnosis Date Resolved POA    *Acute exacerbation of chronic obstructive pulmonary disease (COPD) [J44.1] 06/12/2019 Yes     Priority: 1 - High    Acute on chronic diastolic heart failure [I50.33] 06/12/2019 Yes     Chronic       No future appointments.  Follow-up Information     Edyta Archer NP On 6/19/2019.    Specialty:  Cardiology  Why:  Outpatient Services Cardiology Follow-Up Wednesday at 3:00 PM.  Contact information:  [address]  [place] [id number] [phone]             [facility].    Specialties:  DME Provider, Home Health Services  Why:  Home Health Agency will contact patient to resume services.  Contact information:  [address] [facility] [address]  [place] [id number] [phone]                     I have  seen and examined this patient face to face today. My clinical findings that support the need for the home health skilled services and home bound status are the following:  Weakness/numbness causing balance and gait disturbance due to Weakness/Debility making it taxing to leave home.    Allergies:Review of patient's allergies indicates:  No Known Allergies    Diet: cardiac diet and diabetic diet: 2000 calorie    Activities: activity as tolerated    Nursing:   SN to complete comprehensive assessment including routine vital signs. Instruct on disease process and s/s of complications to report to MD. Review/verify medication list sent home with the patient at time of discharge  and instruct patient/caregiver as needed. Frequency may be adjusted depending on start of care date.    Notify MD if SBP > 160 or < 90; DBP > 90 or < 50; HR > 120 or < 50; Temp > 101      CONSULTS:    Physical Therapy to evaluate and treat. Evaluate for home safety and equipment needs; Establish/upgrade home exercise program. Perform / instruct on therapeutic exercises, gait training, transfer training, and Range of Motion.  Occupational Therapy to evaluate and treat. Evaluate home environment for safety and equipment needs. Perform/Instruct on transfers, ADL training, ROM, and therapeutic exercises.      Medications: Review discharge medications with patient and family and provide education.      Current Discharge Medication List      START taking these medications    Details   benzonatate (TESSALON) 100 MG capsule Take 1 capsule (100 mg total) by mouth 3 (three) times daily as needed for Cough.  Qty: 20 capsule, Refills: 0      doxycycline (VIBRA-TABS) 100 MG tablet Take 1 tablet (100 mg total) by mouth every 12 (twelve) hours. for 3 days  Qty: 6 tablet, Refills: 0      predniSONE (DELTASONE) 10 MG tablet Take 3 tabs for 2 days, then 2 tabs for 2 days, then 1 tab for 3 days, then stop.  Qty: 13 tablet, Refills: 0         CONTINUE these  medications which have NOT CHANGED    Details   acetaminophen (TYLENOL) 500 MG tablet Take 1 tablet (500 mg total) by mouth every 8 (eight) hours as needed.  Refills: 0      amiodarone (PACERONE) 200 MG Tab Take 1 tablet (200 mg total) by mouth once daily.  Qty: 30 tablet, Refills: 11      aspirin (ECOTRIN) 81 MG EC tablet Take 81 mg by mouth once daily.      ferrous gluconate (FERGON) 324 MG tablet Take 1 tablet (324 mg total) by mouth daily with breakfast.  Qty: 60 tablet, Refills: 3      fluticasone propionate (FLOVENT HFA) 220 mcg/actuation inhaler Inhale 1 puff into the lungs 2 (two) times daily. Controller  Qty: 12 g, Refills: 0      furosemide (LASIX) 40 MG tablet Take 40 mg by mouth once daily.       gabapentin (NEURONTIN) 300 MG capsule Take 300 mg by mouth 3 (three) times daily.      hydrALAZINE (APRESOLINE) 50 MG tablet Take 50 mg by mouth 2 (two) times daily.      isosorbide mononitrate (IMDUR) 30 MG 24 hr tablet Take 3 tablets (90 mg total) by mouth once daily.  Qty: 90 tablet, Refills: 11      lisinopril (PRINIVIL,ZESTRIL) 40 MG tablet Take 1 tablet (40 mg total) by mouth once daily. Do not take this medication if blood pressure is below 130/80 mmHg and/or feeling dizzy after taking all other blood pressure meds      metformin (GLUCOPHAGE) 500 MG tablet Take 1,000 mg by mouth 2 (two) times daily with meals.       metoprolol tartrate (LOPRESSOR) 100 MG tablet Take 1 tablet (100 mg total) by mouth 2 (two) times daily.  Qty: 60 tablet, Refills: 11      multivit-min-FA-lycopen-lutein (CENTRUM SILVER) 0.4-300-250 mg-mcg-mcg Tab Take 1 tablet by mouth once daily.  Qty: 60 tablet, Refills: 1      multivitamin (THERAGRAN) per tablet Take 1 tablet by mouth once daily.      pantoprazole (PROTONIX) 40 MG tablet Take 40 mg by mouth once daily.      pregabalin (LYRICA) 75 MG capsule Take 75 mg by mouth 2 (two) times daily.      rivaroxaban (XARELTO) 20 mg Tab Take 20 mg by mouth daily with dinner or evening meal.       tiotropium (SPIRIVA) 18 mcg inhalation capsule Inhale 1 capsule (18 mcg total) into the lungs once daily. Controller  Qty: 30 capsule, Refills: 11      levalbuterol (XOPENEX HFA) 45 mcg/actuation inhaler Inhale 2 puffs into the lungs every 4 (four) hours as needed for Wheezing or Shortness of Breath. Rescue  Qty: 1 Inhaler, Refills: 3             I certify that this patient is confined to her home and needs intermittent skilled nursing care, physical therapy and occupational therapy.

## 2019-06-12 NOTE — PROGRESS NOTES
Follow-up Information     Edyta Archer NP On 6/19/2019.    Specialty:  Cardiology  Why:  Outpatient Services Cardiology Follow-Up Wednesday at 3:00 PM.  Contact information:  [address]  [place] [id number] [phone]             [facility].    Specialties:  DME Provider, Home Health Services  Why:  Home Health Agency will contact patient to resume services.  Contact information:  [address] [place] [id number] [phone]             Please follow up.    Why:  Outpatient Services Pulmonology Follow-Up Patient will schedule an appointment in 2 weeks             PLEASE BRING TO ALL FOLLOW UP APPOINTMENTS:   1) A COPY YOUR DISCHARGE INSTRUCTIONS   2) ALL MEDICINES YOU ARE CURRENTLY TAKING IN THEIR ORIGINAL BOTTLES   3) IDENTIFICATION CARD   4) INSURANCE CARD    **PLEASE ARRIVE 15 MINUTES AHEAD OF SCHEDULED APPOINTMENT TIME   ++PLEASE CALL 24 HOURS IN ADVANCE IF YOU MUST RESCHEDULE YOUR APPOINTMENT DAY AND/OR TIME     [facility] CARE MANAGEMENT WRITTEN DISCHARGE INFORMATION    APPOINTMENTS AND RESOURCES TO HELP YOU MANAGE YOUR CARE AT HOME BASED ON YOUR PREFERENCES:  (If an appointment is not scheduled for you when you leave the hospital, call your doctor to schedule a follow up visit within a week)        Healthy Living Instructions to HELP MANAGE YOUR CARE AT HOME:  Things You are responsible for:  1.    Getting your prescriptions filled   2.    Taking your medications as directed, DO NOT MISS ANY DOSES!  3.    Following the diet and exercise recommended by your doctor  4.    Going to your follow-up doctor appointment. This is important because it allows the doctor to monitor your progress and determine if any changes need to made to your treatment plan.  5. If you have any questions about MANAGING YOUR CARE AT HOME Call the Nurse Care Line for 24/7 Assistance [phone]       Please answer any calls you may receive from [facility]. We want to continue to support you  as you manage your healthcare needs. [facility] is happy to have the opportunity to serve you.      Thank you for choosing [facility] for your healthcare needs!  Your [facility] Case Management Team,    FABY Sánchez  Registered Nurse Transition Navigator  [phone]

## 2019-06-12 NOTE — PLAN OF CARE
06/12/19 1410   Post-Acute Status   Post-Acute Authorization Home Health/Hospice   Post-Acute Placement Status Set-up Complete   Home Health/Hospice Status Set-up Complete

## 2019-06-12 NOTE — DISCHARGE SUMMARY
[facility] Hospital Medicine  Discharge Summary      Patient Name: Yasmeen Palm  MRN: [id number]  Admission Date: 6/7/2019  Hospital Length of Stay: 5 days  Discharge Date and Time:  06/12/2019 9:59 AM  Attending Physician: Zenon Alfredo MD   Discharging Provider: Zenon Alfredo MD  Primary Care Provider: Angel Orourke Jr, MD      HPI:     Yasmeen Palm is a 69 y.o. female that (in part)  has a past medical history of Anticoagulant long-term use, Arthritis, Asthma, CHF (congestive heart failure), COPD (chronic obstructive pulmonary disease), Coronary artery disease, Deep vein thrombosis (DVT) of left lower extremity, Depression, Diabetes mellitus, GERD (gastroesophageal reflux disease), Hypertension, Obstructive sleep apnea on CPAP, On home oxygen therapy, and Unsteady gait.  has a past surgical history that includes Coronary angioplasty with stent and Hernia repair. Presents to [facility] Emergency Department complaining of shortness of breath.  Subacute acute onset 2 days ago with present progressive worsening.  She has had a cough for 3 days.  Denies fever chills.  Endorses shortness of breath at rest and dyspnea with exertion.  Positive for unintentional weight gain.  Previous history of CHF exacerbation and COPD.  Also reports 2 episodes of chest pain yesterday that were relieved with nitroglycerin.  She is on chronic anticoagulation for atrial fibrillation and history of pulmonary embolism and DVT.  Also complaining of peripheral edema in her lower extremities, right greater than left.  Denies syncope, cyanosis, or palpitations.  She reports compliance with her home medication regimen and cardiac/diabetic diet.  No recent travel or sick contacts.    In the emergency department routine laboratory studies, chest x-ray, cardiac enzymes, an EKG was obtained.  History and findings are consistent with either COPD versus CHF, but most likely a combination of both.   She was given Lasix for the CHF and started on BiPAP both CHF and COPD along with nebulizer treatments and steroids.  She is also started on empiric doxycycline for acute bronchitis.    Hospital medicine has been asked to admit for further evaluation and treatment.     * No surgery found *      Hospital Course:   Mrs. Yasmeen Palm is a 68 yo woman with COPD, CHF (EF 50%, G2DD), LOYDA, chronic respiratory failure with hypoxia and hypercapnia (on 3.5L NC at home), paroxysmal atrial fibrillation ([id number] score 4) on chronic Xarelto, essential hypertension, type 2 diabetes mellitus (HbA1c 7.3%), hyperlipidemia, GERD, anemia of chronic disease, tobacco abuse, and history of PE/DVT who present with shortness of breath due to COPD exacerbation. CXR was relatively clear with mild bibasilar opacities. She was started on steroids, nebs, and empiric antibiotics for COPD exacerbation. She was placed on a BiPAP and given some diuretics. She was admitted to the ICU for close monitoring.  Bipap weaned and patient stable for transfer to floor.  Also complained of LE swelling.  US showing extension of known DVT.  Patient has been on xarelto.  This was discussed with Vascular Surgery and no need for acute intervention.  Continue Xarelto.  She has continued to improve and currently afebrile and hemodynamically stable.  Symptoms greatly improved from admission.  Leukocytosis secondary to steroids.  Patient seems to be at baseline and will be discharged home with .     Consults:   Consults (From admission, onward)        Status Ordering Provider     Inpatient consult to Vascular Surgery  Once     Provider:  Blaise Adams MD    Completed YAZMIN RENTERIA          No new Assessment & Plan notes have been filed under this hospital service since the last note was generated.  Service: Hospital Medicine    Final Active Diagnoses:    Diagnosis Date Noted POA    Hyperlipidemia [E78.5] 03/24/2019 Yes     Chronic    Essential  hypertension [I10] 03/24/2019 Yes     Chronic    Paroxysmal atrial fibrillation [I48.0] 06/25/2018 Yes     Chronic    Chronic anticoagulation [Z79.01] 04/10/2017 Not Applicable     Chronic    History of deep vein thrombosis [Z86.718] 04/10/2017 Not Applicable     Chronic    History of pulmonary embolism [Z86.711] 04/10/2017 Yes     Chronic    Anemia of chronic disease [D63.8] 09/27/2016 Yes     Chronic    Tobacco abuse [Z72.0] 12/14/2015 Yes     Chronic    Gastroesophageal reflux disease without esophagitis [K21.9] 12/14/2015 Yes     Chronic    Coronary artery disease involving native coronary artery of native heart with angina pectoris [I25.119] 12/14/2015 Yes    Controlled type 2 diabetes mellitus, without long-term current use of insulin [E11.9] 12/14/2015 Yes      Problems Resolved During this Admission:    Diagnosis Date Noted Date Resolved POA    PRINCIPAL PROBLEM:  Acute exacerbation of chronic obstructive pulmonary disease (COPD) [J44.1] 12/08/2017 06/12/2019 Yes    Acute on chronic diastolic heart failure [I50.33] 06/07/2019 06/12/2019 Yes     Chronic       Discharged Condition: stable    Disposition: Home-Health Care Svc    Follow Up:  Follow-up Information     Edyta Archer NP On 6/19/2019.    Specialty:  Cardiology  Why:  Outpatient Services Cardiology Follow-Up Wednesday at 3:00 PM.  Contact information:  [address] [facility] [address]  [place] [id number] [phone]             [facility].    Specialties:  DME Provider, Home Health Services  Why:  Home Health Agency will contact patient to resume services.  Contact information:  [address]  [facility]  [place] [id number] [phone]                 Patient Instructions:      Diet diabetic     Diet Cardiac     Notify your health care provider if you experience any of the following:  temperature >100.4     Notify your health care provider if you experience any of the following:  persistent nausea and vomiting or diarrhea     Notify  your health care provider if you experience any of the following:  difficulty breathing or increased cough     Notify your health care provider if you experience any of the following:  persistent dizziness, light-headedness, or visual disturbances     Notify your health care provider if you experience any of the following:  increased confusion or weakness     Activity as tolerated         Pending Diagnostic Studies:     None         Medications:  Reconciled Home Medications:      Medication List      START taking these medications    benzonatate 100 MG capsule  Commonly known as:  TESSALON  Take 1 capsule (100 mg total) by mouth 3 (three) times daily as needed for Cough.     doxycycline 100 MG tablet  Commonly known as:  VIBRA-TABS  Take 1 tablet (100 mg total) by mouth every 12 (twelve) hours. for 3 days     predniSONE 10 MG tablet  Commonly known as:  DELTASONE  Take 3 tabs for 2 days, then 2 tabs for 2 days, then 1 tab for 3 days, then stop.        CONTINUE taking these medications    acetaminophen 500 MG tablet  Commonly known as:  TYLENOL  Take 1 tablet (500 mg total) by mouth every 8 (eight) hours as needed.     amiodarone 200 MG Tab  Commonly known as:  PACERONE  Take 1 tablet (200 mg total) by mouth once daily.     aspirin 81 MG EC tablet  Commonly known as:  ECOTRIN  Take 81 mg by mouth once daily.     ferrous gluconate 324 MG tablet  Commonly known as:  FERGON  Take 1 tablet (324 mg total) by mouth daily with breakfast.     fluticasone propionate 220 mcg/actuation inhaler  Commonly known as:  FLOVENT HFA  Inhale 1 puff into the lungs 2 (two) times daily. Controller     furosemide 40 MG tablet  Commonly known as:  LASIX  Take 40 mg by mouth once daily.     gabapentin 300 MG capsule  Commonly known as:  NEURONTIN  Take 300 mg by mouth 3 (three) times daily.     hydrALAZINE 50 MG tablet  Commonly known as:  APRESOLINE  Take 50 mg by mouth 2 (two) times daily.     isosorbide mononitrate 30 MG 24 hr  tablet  Commonly known as:  IMDUR  Take 3 tablets (90 mg total) by mouth once daily.     levalbuterol 45 mcg/actuation inhaler  Commonly known as:  XOPENEX HFA  Inhale 2 puffs into the lungs every 4 (four) hours as needed for Wheezing or Shortness of Breath. Rescue     lisinopril 40 MG tablet  Commonly known as:  PRINIVIL,ZESTRIL  Take 1 tablet (40 mg total) by mouth once daily. Do not take this medication if blood pressure is below 130/80 mmHg and/or feeling dizzy after taking all other blood pressure meds     metFORMIN 500 MG tablet  Commonly known as:  GLUCOPHAGE  Take 1,000 mg by mouth 2 (two) times daily with meals.     metoprolol tartrate 100 MG tablet  Commonly known as:  LOPRESSOR  Take 1 tablet (100 mg total) by mouth 2 (two) times daily.     multivit-min-FA-lycopen-lutein 0.4-300-250 mg-mcg-mcg Tab  Commonly known as:  CENTRUM SILVER  Take 1 tablet by mouth once daily.     multivitamin per tablet  Commonly known as:  THERAGRAN  Take 1 tablet by mouth once daily.     pantoprazole 40 MG tablet  Commonly known as:  PROTONIX  Take 40 mg by mouth once daily.     pregabalin 75 MG capsule  Commonly known as:  LYRICA  Take 75 mg by mouth 2 (two) times daily.     tiotropium 18 mcg inhalation capsule  Commonly known as:  SPIRIVA  Inhale 1 capsule (18 mcg total) into the lungs once daily. Controller     XARELTO 20 mg Tab  Generic drug:  rivaroxaban  Take 20 mg by mouth daily with dinner or evening meal.            Indwelling Lines/Drains at time of discharge:   Lines/Drains/Airways          None          Time spent on the discharge of patient: >30 minutes  Patient was seen and examined on the date of discharge and determined to be suitable for discharge.         Zenon Alfredo MD  Department of Hospital Medicine  [facility]

## 2019-06-12 NOTE — PLAN OF CARE
"   06/12/19 0953   Final Note   Assessment Type Final Discharge Note   Anticipated Discharge Disposition Home   What phone number can be called within the next 1-3 days to see how you are doing after discharge?   (Listed in chart)   Hospital Follow Up  Appt(s) scheduled? Yes   Discharge plans and expectations educations in teach back method with documentation complete? Yes   Right Care Referral Info   Post Acute Recommendation Home-care     EDUCATION:  TN provided with educational information on COPD.  Information reviewed and placed in :My Healthcare Packet" to be brought home for pt to use as resource after discharge.  Information included:  signs and symptoms to look for and call the doctor if experiencing, and symptoms that may indicate a medical emergency: CALL 911.   All questions answered.  Teach back method used.    Patient stated, "I know the signs already ".    Pt stated she does not need oxygen to go home, will schedule cardiology and pulmonology appts. TN informed Anna, floor nurse, that pt's niece will not pick her up until 4 pm.        "

## 2019-06-13 LAB
BACTERIA BLD CULT: NORMAL
BACTERIA BLD CULT: NORMAL

## 2019-06-14 ENCOUNTER — PATIENT OUTREACH (OUTPATIENT)
Dept: ADMINISTRATIVE | Facility: CLINIC | Age: 69
End: 2019-06-14

## 2019-06-14 NOTE — PATIENT INSTRUCTIONS
COPD Flare  Both emphysema and chronic bronchitis are forms of Chronic Obstructive Pulmonary Disease (COPD). It is most often caused by many years of smoking tobacco. Many things can make your lung disease suddenly get worse. These causes include the common cold, pneumonia, acute bronchitis, missing doses of your regular breathing medicines, or exposure to smoke, dust, or other air pollutants.  A COPD flare may last 7-14 days. Medicine may be prescribed to relax the airways and prevent wheezing. Antibiotics will be prescribed if your doctor thinks there is a bacterial infection. Prednisone is helpful to decrease inflammation in a severe attack.  Home Care:  Drink lots of water or other fluids (at least 10 glasses a day) during an attack. This will loosen lung secretions and make it easier to breathe. If you have heart or kidney disease, check with your doctor before you drink extra amounts of fluids.  Take prescribed medicine exactly at the times advised. If you have a hand-held inhaler or aerosol breathing medicine, do not use it more than once every four hours, unless told to do so. If prescribed an antibiotic or prednisone, take all of the medicine even if you are feeling better after a few days.  Do not smoke. Avoid being exposed to the smoke of others.  If you were given an inhaler, use it exactly as directed. If you need to use it more often than prescribed, your condition may be getting worse. Contact your doctor or this facility.  Follow Up  with your doctor, or as advised by our staff.  NOTE: If you are age 65 or older, or if you have chronic asthma or COPD, we  recommend a PNEUMOCOCCAL VACCINATION every five years and an INFLUENZA VACCINATION (FLU-SHOT) every autumn. Ask your doctor about this.  Get Prompt Medical Attention  if any of the following occur:  Increased wheezing or shortness of breath  Need to use your inhalers more often than usual without relief  Fever of 100.4°F (38ºC) or higher, or as  directed by your healthcare provider  Coughing up lots of dark-colored or bloody sputum (mucus)  Chest pain with each breath  You do not start to improve within 24 hours  © 4389-4112 [employer], [address] [place] [address], [place] [address]. All rights reserved. This information is not intended as a substitute for professional medical care. Always follow your healthcare professional's instructions.

## 2019-06-21 ENCOUNTER — EXTERNAL HOME HEALTH (OUTPATIENT)
Dept: HOME HEALTH SERVICES | Facility: HOSPITAL | Age: 69
End: 2019-06-21

## 2019-06-24 ENCOUNTER — HOSPITAL ENCOUNTER (EMERGENCY)
Facility: HOSPITAL | Age: 69
Discharge: HOME | End: 2019-06-24
Attending: EMERGENCY MEDICINE
Payer: MEDICARE

## 2019-06-24 VITALS
BODY MASS INDEX: 27.94 KG/M2 | DIASTOLIC BLOOD PRESSURE: 61 MMHG | RESPIRATION RATE: 20 BRPM | SYSTOLIC BLOOD PRESSURE: 130 MMHG | HEIGHT: 67 IN | OXYGEN SATURATION: 96 % | HEART RATE: 88 BPM | WEIGHT: 178 LBS | TEMPERATURE: 98 F

## 2019-06-24 DIAGNOSIS — J44.1 COPD WITH ACUTE EXACERBATION: Primary | ICD-10-CM

## 2019-06-24 DIAGNOSIS — Z86.718 HISTORY OF DEEP VEIN THROMBOSIS: Chronic | ICD-10-CM

## 2019-06-24 DIAGNOSIS — N18.4 CONTROLLED TYPE 2 DIABETES MELLITUS WITH STAGE 4 CHRONIC KIDNEY DISEASE, WITHOUT LONG-TERM CURRENT USE OF INSULIN: ICD-10-CM

## 2019-06-24 DIAGNOSIS — N39.0 URINARY TRACT INFECTION WITHOUT HEMATURIA, SITE UNSPECIFIED: ICD-10-CM

## 2019-06-24 DIAGNOSIS — E11.22 CONTROLLED TYPE 2 DIABETES MELLITUS WITH STAGE 4 CHRONIC KIDNEY DISEASE, WITHOUT LONG-TERM CURRENT USE OF INSULIN: ICD-10-CM

## 2019-06-24 DIAGNOSIS — I25.119 CORONARY ARTERY DISEASE INVOLVING NATIVE CORONARY ARTERY OF NATIVE HEART WITH ANGINA PECTORIS: ICD-10-CM

## 2019-06-24 DIAGNOSIS — R06.02 SHORTNESS OF BREATH: ICD-10-CM

## 2019-06-24 DIAGNOSIS — B37.49 CANDIDURIA: ICD-10-CM

## 2019-06-24 DIAGNOSIS — B37.89 CANDIDA RASH OF GROIN: ICD-10-CM

## 2019-06-24 LAB
ALBUMIN SERPL BCP-MCNC: 3 G/DL (ref 3.5–5.2)
ALP SERPL-CCNC: 66 U/L (ref 55–135)
ALT SERPL W/O P-5'-P-CCNC: 10 U/L (ref 10–44)
ANION GAP SERPL CALC-SCNC: 9 MMOL/L (ref 8–16)
AST SERPL-CCNC: 12 U/L (ref 10–40)
BACTERIA #/AREA URNS HPF: ABNORMAL /HPF
BASOPHILS # BLD AUTO: 0.02 K/UL (ref 0–0.2)
BASOPHILS NFR BLD: 0.2 % (ref 0–1.9)
BILIRUB SERPL-MCNC: 0.1 MG/DL (ref 0.1–1)
BILIRUB UR QL STRIP: NEGATIVE
BNP SERPL-MCNC: 118 PG/ML (ref 0–99)
BUN SERPL-MCNC: 12 MG/DL (ref 8–23)
CALCIUM SERPL-MCNC: 9 MG/DL (ref 8.7–10.5)
CHLORIDE SERPL-SCNC: 104 MMOL/L (ref 95–110)
CLARITY UR: ABNORMAL
CO2 SERPL-SCNC: 30 MMOL/L (ref 23–29)
COLOR UR: YELLOW
CREAT SERPL-MCNC: 0.8 MG/DL (ref 0.5–1.4)
DIFFERENTIAL METHOD: ABNORMAL
EOSINOPHIL # BLD AUTO: 0.1 K/UL (ref 0–0.5)
EOSINOPHIL NFR BLD: 0.7 % (ref 0–8)
ERYTHROCYTE [DISTWIDTH] IN BLOOD BY AUTOMATED COUNT: 19.6 % (ref 11.5–14.5)
EST. GFR  (AFRICAN AMERICAN): >60 ML/MIN/1.73 M^2
EST. GFR  (NON AFRICAN AMERICAN): >60 ML/MIN/1.73 M^2
GLUCOSE SERPL-MCNC: 246 MG/DL (ref 70–110)
GLUCOSE UR QL STRIP: ABNORMAL
HCT VFR BLD AUTO: 30.3 % (ref 37–48.5)
HGB BLD-MCNC: 8.1 G/DL (ref 12–16)
HGB UR QL STRIP: ABNORMAL
HYALINE CASTS #/AREA URNS LPF: 0 /LPF
INR PPP: 1 (ref 0.8–1.2)
KETONES UR QL STRIP: NEGATIVE
LEUKOCYTE ESTERASE UR QL STRIP: ABNORMAL
LYMPHOCYTES # BLD AUTO: 1.8 K/UL (ref 1–4.8)
LYMPHOCYTES NFR BLD: 14.6 % (ref 18–48)
MCH RBC QN AUTO: 25.2 PG (ref 27–31)
MCHC RBC AUTO-ENTMCNC: 26.7 G/DL (ref 32–36)
MCV RBC AUTO: 94 FL (ref 82–98)
MICROSCOPIC COMMENT: ABNORMAL
MONOCYTES # BLD AUTO: 0.9 K/UL (ref 0.3–1)
MONOCYTES NFR BLD: 7.3 % (ref 4–15)
NEUTROPHILS # BLD AUTO: 9.7 K/UL (ref 1.8–7.7)
NEUTROPHILS NFR BLD: 77.4 % (ref 38–73)
NITRITE UR QL STRIP: NEGATIVE
PH UR STRIP: 6 [PH] (ref 5–8)
PLATELET # BLD AUTO: 331 K/UL (ref 150–350)
PMV BLD AUTO: 9.7 FL (ref 9.2–12.9)
POCT GLUCOSE: 239 MG/DL (ref 70–110)
POTASSIUM SERPL-SCNC: 4.4 MMOL/L (ref 3.5–5.1)
PROT SERPL-MCNC: 6.3 G/DL (ref 6–8.4)
PROT UR QL STRIP: ABNORMAL
PROTHROMBIN TIME: 10.8 SEC (ref 9–12.5)
RBC # BLD AUTO: 3.22 M/UL (ref 4–5.4)
RBC #/AREA URNS HPF: 25 /HPF (ref 0–4)
SODIUM SERPL-SCNC: 143 MMOL/L (ref 136–145)
SP GR UR STRIP: 1.02 (ref 1–1.03)
TROPONIN I SERPL DL<=0.01 NG/ML-MCNC: 0.01 NG/ML (ref 0–0.03)
URN SPEC COLLECT METH UR: ABNORMAL
UROBILINOGEN UR STRIP-ACNC: NEGATIVE EU/DL
WBC # BLD AUTO: 12.55 K/UL (ref 3.9–12.7)
WBC #/AREA URNS HPF: >100 /HPF (ref 0–5)
YEAST URNS QL MICRO: ABNORMAL

## 2019-06-24 PROCEDURE — 94644 CONT INHLJ TX 1ST HOUR: CPT

## 2019-06-24 PROCEDURE — 94640 AIRWAY INHALATION TREATMENT: CPT

## 2019-06-24 PROCEDURE — 84484 ASSAY OF TROPONIN QUANT: CPT

## 2019-06-24 PROCEDURE — 80053 COMPREHEN METABOLIC PANEL: CPT

## 2019-06-24 PROCEDURE — 85610 PROTHROMBIN TIME: CPT

## 2019-06-24 PROCEDURE — P9612 CATHETERIZE FOR URINE SPEC: HCPCS

## 2019-06-24 PROCEDURE — 93005 ELECTROCARDIOGRAM TRACING: CPT

## 2019-06-24 PROCEDURE — 93010 ELECTROCARDIOGRAM REPORT: CPT | Mod: ,,, | Performed by: INTERNAL MEDICINE

## 2019-06-24 PROCEDURE — 83880 ASSAY OF NATRIURETIC PEPTIDE: CPT

## 2019-06-24 PROCEDURE — 25000003 PHARM REV CODE 250: Performed by: EMERGENCY MEDICINE

## 2019-06-24 PROCEDURE — 81000 URINALYSIS NONAUTO W/SCOPE: CPT

## 2019-06-24 PROCEDURE — 82962 GLUCOSE BLOOD TEST: CPT

## 2019-06-24 PROCEDURE — 87086 URINE CULTURE/COLONY COUNT: CPT

## 2019-06-24 PROCEDURE — 93010 EKG 12-LEAD: ICD-10-PCS | Mod: ,,, | Performed by: INTERNAL MEDICINE

## 2019-06-24 PROCEDURE — 99285 EMERGENCY DEPT VISIT HI MDM: CPT | Mod: 25

## 2019-06-24 PROCEDURE — 85025 COMPLETE CBC W/AUTO DIFF WBC: CPT

## 2019-06-24 PROCEDURE — 25000242 PHARM REV CODE 250 ALT 637 W/ HCPCS: Performed by: EMERGENCY MEDICINE

## 2019-06-24 RX ORDER — PREDNISONE 20 MG/1
40 TABLET ORAL DAILY
Qty: 8 TABLET | Refills: 0 | Status: SHIPPED | OUTPATIENT
Start: 2019-06-25 | End: 2019-06-24 | Stop reason: SDUPTHER

## 2019-06-24 RX ORDER — CEPHALEXIN 250 MG/1
500 CAPSULE ORAL
Status: COMPLETED | OUTPATIENT
Start: 2019-06-24 | End: 2019-06-24

## 2019-06-24 RX ORDER — NYSTATIN 100000 [USP'U]/G
POWDER TOPICAL 2 TIMES DAILY
Qty: 1 BOTTLE | Refills: 0 | Status: SHIPPED | OUTPATIENT
Start: 2019-06-24 | End: 2019-06-24 | Stop reason: SDUPTHER

## 2019-06-24 RX ORDER — CEPHALEXIN 500 MG/1
500 CAPSULE ORAL EVERY 12 HOURS
Qty: 14 CAPSULE | Refills: 0 | Status: ON HOLD | OUTPATIENT
Start: 2019-06-24 | End: 2019-07-01 | Stop reason: HOSPADM

## 2019-06-24 RX ORDER — PREDNISONE 20 MG/1
40 TABLET ORAL DAILY
Qty: 8 TABLET | Refills: 0 | Status: SHIPPED | OUTPATIENT
Start: 2019-06-25 | End: 2019-06-29

## 2019-06-24 RX ORDER — CEPHALEXIN 500 MG/1
500 CAPSULE ORAL EVERY 12 HOURS
Qty: 14 CAPSULE | Refills: 0 | Status: SHIPPED | OUTPATIENT
Start: 2019-06-24 | End: 2019-06-24 | Stop reason: SDUPTHER

## 2019-06-24 RX ORDER — NYSTATIN 100000 [USP'U]/G
POWDER TOPICAL 2 TIMES DAILY
Qty: 1 BOTTLE | Refills: 0 | Status: SHIPPED | OUTPATIENT
Start: 2019-06-24 | End: 2019-07-19

## 2019-06-24 RX ORDER — ALBUTEROL SULFATE 2.5 MG/.5ML
10 SOLUTION RESPIRATORY (INHALATION)
Status: COMPLETED | OUTPATIENT
Start: 2019-06-24 | End: 2019-06-24

## 2019-06-24 RX ORDER — IPRATROPIUM BROMIDE 0.5 MG/2.5ML
500 SOLUTION RESPIRATORY (INHALATION)
Status: COMPLETED | OUTPATIENT
Start: 2019-06-24 | End: 2019-06-24

## 2019-06-24 RX ADMIN — ALBUTEROL SULFATE 10 MG: 2.5 SOLUTION RESPIRATORY (INHALATION) at 01:06

## 2019-06-24 RX ADMIN — IPRATROPIUM BROMIDE 500 MCG: 0.5 SOLUTION RESPIRATORY (INHALATION) at 01:06

## 2019-06-24 RX ADMIN — CEPHALEXIN 500 MG: 250 CAPSULE ORAL at 03:06

## 2019-06-24 NOTE — ED PROVIDER NOTES
Encounter Date: 6/24/2019    SCRIBE #1 NOTE: I, Meron Palm, am scribing for, and in the presence of,  Safia Godinez MD. I have scribed the following portions of the note - Other sections scribed: HPI, ROS, PE.       History     Chief Complaint   Patient presents with    Shortness of Breath     SOB with productive cough and bilateral leg swelling x 2 days     CC: Shortness of Breath  Patient arrived via EMS    HPI: This 69 y.o female who has COPD, CHF, HTN, DM, GERD, Asthma, CAD, DVT presents to the ED for an evaluation of acute onset, constant increased shortness of breath with associated fatigue, bilateral lower extremity swelling, bilateral leg pain, and productive cough for the past 3 days.  Patient also reports of chest pain that began yesterday, which she reports has since subsided.  Patient denies fever, chills, nausea, emesis, diarrhea, abdominal pain, or any other associated symptoms.  Patient reports she has been compliant with her medications, including her lasix and reports she has maintained an no salt diet.  She reports she is currently on 3L home oxygen, but reports this morning she felt the need to increased it to 3.5L.  She reports she is typically able to take breaks from her oxygen use for up to one hour in duration.  Patient reports her last nebulizing treatment this morning.  She states she last was placed on steroids 2 weeks ago.  She reports she is concerned for a possible UTI as she wears adult diapers.  She reports she has been taking cranberry and AZO tablets.  Patient denies fever, chills, nausea, emesis, abdominal pain, or any other associated symptoms.  No alleviating factors.    The history is provided by the patient. No  was used.     Review of patient's allergies indicates:  No Known Allergies  Past Medical History:   Diagnosis Date    Anticoagulant long-term use     Xarelto    Arthritis     Asthma     CHF (congestive heart failure)     COPD (chronic  "obstructive pulmonary disease)     Coronary artery disease     Deep vein thrombosis (DVT) of left lower extremity     Depression     Diabetes mellitus     GERD (gastroesophageal reflux disease)     Hypertension     Obstructive sleep apnea on CPAP     On home oxygen therapy     Unsteady gait     uses either walker or 4 prong cane     Past Surgical History:   Procedure Laterality Date    CORONARY ANGIOPLASTY WITH STENT PLACEMENT      HERNIA REPAIR      encapsulated umbilical hernia     Family History   Problem Relation Age of Onset    Heart disease Mother     Hypertension Mother     Diabetes Father      Social History     Tobacco Use    Smoking status: Former Smoker     Packs/day: 0.50     Years: 55.00     Pack years: 27.50     Types: Cigarettes     Start date: 1963     Last attempt to quit: 2018     Years since quittin.4    Smokeless tobacco: Never Used    Tobacco comment: "States she quit smoking about 3 or 4 weeks ago"   Substance Use Topics    Alcohol use: Not Currently     Alcohol/week: 0.6 oz     Types: 1 Glasses of wine per week     Frequency: Never     Comment: socially    Drug use: No     Review of Systems   Constitutional: Positive for fatigue. Negative for chills and fever.   HENT: Negative for congestion, ear pain, rhinorrhea and sore throat.    Eyes: Negative for pain and visual disturbance.   Respiratory: Positive for cough and shortness of breath.    Cardiovascular: Positive for chest pain (resolved) and leg swelling.   Gastrointestinal: Negative for abdominal pain, diarrhea, nausea and vomiting.   Genitourinary: Positive for dysuria.   Musculoskeletal: Positive for myalgias. Negative for back pain and neck pain.   Skin: Negative for rash.   Neurological: Negative for headaches.       Physical Exam     Initial Vitals   BP Pulse Resp Temp SpO2   19 1227 19 1227 19 1227 19 1305 19 1227   (!) 121/58 78 (!) 22 98.4 °F (36.9 °C) 98 %      MAP     "   --                Physical Exam    Nursing note and vitals reviewed.  Constitutional: She is not diaphoretic. No distress.   HENT:   Head: Normocephalic and atraumatic.   Mouth/Throat: Oropharynx is clear and moist.   Eyes: EOM are normal. Pupils are equal, round, and reactive to light. No scleral icterus.   Neck: Normal range of motion. Neck supple. No JVD present.   Cardiovascular: Normal rate, regular rhythm and intact distal pulses.   Pulmonary/Chest: No stridor. No respiratory distress. She has no rales.   Patient has decreased breath sounds at the bilateral bases.  Patient exhibits an increased work of breathing.  Patient is able to speak in full sentences.   Abdominal: Soft. Bowel sounds are normal. She exhibits no distension. There is no tenderness.   Genitourinary:   Genitourinary Comments: Patient wears adult diapers.  Skin breakdown and maceration to groin and pannus.  No crepitation   Musculoskeletal: Normal range of motion. She exhibits edema (bilateral lower extremities; left greater than the right). She exhibits no tenderness.   Neurological: She is alert and oriented to person, place, and time. She has normal strength. No cranial nerve deficit or sensory deficit. GCS score is 15. GCS eye subscore is 4. GCS verbal subscore is 5. GCS motor subscore is 6.   Skin: Skin is warm and dry.   Psychiatric: She has a normal mood and affect.         ED Course   Procedures  Labs Reviewed   CBC W/ AUTO DIFFERENTIAL - Abnormal; Notable for the following components:       Result Value    RBC 3.22 (*)     Hemoglobin 8.1 (*)     Hematocrit 30.3 (*)     Mean Corpuscular Hemoglobin 25.2 (*)     Mean Corpuscular Hemoglobin Conc 26.7 (*)     RDW 19.6 (*)     Gran # (ANC) 9.7 (*)     Gran% 77.4 (*)     Lymph% 14.6 (*)     All other components within normal limits   COMPREHENSIVE METABOLIC PANEL - Abnormal; Notable for the following components:    CO2 30 (*)     Glucose 246 (*)     Albumin 3.0 (*)     All other components  within normal limits   B-TYPE NATRIURETIC PEPTIDE - Abnormal; Notable for the following components:     (*)     All other components within normal limits   URINALYSIS, REFLEX TO URINE CULTURE - Abnormal; Notable for the following components:    Appearance, UA Cloudy (*)     Protein, UA 3+ (*)     Glucose, UA 3+ (*)     Occult Blood UA 2+ (*)     Leukocytes, UA 2+ (*)     All other components within normal limits    Narrative:     Preferred Collection Type->Urine, Clean Catch   URINALYSIS MICROSCOPIC - Abnormal; Notable for the following components:    RBC, UA 25 (*)     WBC, UA >100 (*)     Bacteria Few (*)     All other components within normal limits    Narrative:     Preferred Collection Type->Urine, Clean Catch   POCT GLUCOSE - Abnormal; Notable for the following components:    POCT Glucose 239 (*)     All other components within normal limits   CULTURE, URINE   TROPONIN I   PROTIME-INR     EKG Readings: (Independently Interpreted)   Initial Reading: No STEMI. Rhythm: Normal Sinus Rhythm. Heart Rate: 80. Ectopy: No Ectopy. Conduction: Normal. ST Segments: Normal ST Segments. T Waves Flipped: AVL.       Imaging Results          X-Ray Chest AP Portable (Final result)  Result time 06/24/19 13:52:23    Final result by Maddison Rawls MD (06/24/19 13:52:23)                 Impression:      As above.      Electronically signed by: Maddison Rawls MD  Date:    06/24/2019  Time:    13:52             Narrative:    EXAMINATION:  XR CHEST AP PORTABLE    CLINICAL HISTORY:  CHF;    TECHNIQUE:  Single frontal view of the chest was performed.    COMPARISON:  Chest one view 06/07/2019, 03/24/2019, 07/14/2018, 12/12/2018    FINDINGS:  There is a linear opacity in the right lung base that appears grossly stable when compared to the 03/24/2019 exam.  It was not, however, present on other prior exams and may warrant surveillance.    The bilateral lungs are otherwise unchanged.  No convincing new lung opacities.    No  pneumothorax.  There is grossly stable nonspecific prominence of the cardiac silhouette, mediastinum, and pulmonary vasculature.    The osseous structures are unremarkable.                                 Medical Decision Making:   History:   Old Medical Records: I decided to obtain old medical records.  Old Records Summarized: records from previous admission(s).       <> Summary of Records: Recent admission for CHF/COPD exacerbation  Differential Diagnosis:   CHF exacerbation  COPD exacerbation  Electrolyte abnormality  ACS  PE  DVT  Clinical Tests:   Lab Tests: Ordered and Reviewed  Radiological Study: Ordered and Reviewed  Medical Tests: Ordered and Reviewed  ED Management:  Patient is afebrile and in no acute distress at time history and physical.  She has decreased breath sounds and mild increased work of breathing.  She is not hypoxic or in respiratory distress on usual dose of home oxygen after EMS breathing treatment and Solu-Medrol.  Patient's started on continuous albuterol nebulizations in the emergency department.  X-ray shows new linear opacity at the right lung base that was present on 03/24.  Patient recently completed a course of doxycycline.  BNP is mildly elevated at 118 and does not suggest an acute CHF exacerbation.  Troponin is within normal limits.  I have low clinical suspicion of ACS in this patient .  Electrolytes and renal function are within acceptable limits. On reassessment patient reports significant improvement in her symptoms after breathing treatment.  Urinalysis with 2+ leukocyte, greater than 100 WBCs, concerning for UTI as this patient reports dysuria.  Past urine cultures have been positive for Candida.  Unclear if patient's urinary symptoms are related to candiduria, UTI or to skin breakdown in the groin.  Patient started on Keflex.  She reports having follow-up with a urologist in the past when not seeing the urologist recently due to transportation difficulties.  Patient is  not toxic appearing and stable to follow up with Urology for further evaluation of her urinary symptoms. Reassessment patient is not in respiratory distress and is saturating adequately on her home oxygen dose.  She is stable for discharge to follow up with her primary physician and complete course of prednisone.  Patient reports having adequate solution for nebulizer and having a fresh pump.  Patient has been evaluated by social Work and will further assessment of her health needs.  Patient does not appear to require BiPAP or admission for further management of her COPD exacerbation.  She has been  counseled on supportive care, appropriate medication usage, concerning symptoms for which to return to ER and the importance of follow up. Understanding and agreement with treatment plan was expressed.   This chart was completed using dictation software, as a result there may be some transcription errors.             Scribe Attestation:   Scribe #1: I performed the above scribed service and the documentation accurately describes the services I performed. I attest to the accuracy of the note.    Attending Attestation:           Physician Attestation for Scribe:  Physician Attestation Statement for Scribe #1: I, Safia Godinez MD, reviewed documentation, as scribed by Meron Palm in my presence, and it is both accurate and complete.                    Clinical Impression:       ICD-10-CM ICD-9-CM   1. COPD with acute exacerbation J44.1 491.21   2. Shortness of breath R06.02 786.05   3. Urinary tract infection without hematuria, site unspecified N39.0 599.0   4. Candida rash of groin B37.89 112.89   5. Candiduria B37.49 112.2   6. Coronary artery disease involving native coronary artery of native heart with angina pectoris I25.119 414.01     413.9   7. Controlled type 2 diabetes mellitus with stage 4 chronic kidney disease, without long-term current use of insulin E11.22 250.40    N18.4 585.4   8. History of deep vein  thrombosis Z86.718 V12.51         Disposition:   Disposition: Discharged  Condition: Stable                        Safia Godinez MD  06/24/19 1622

## 2019-06-24 NOTE — ED TRIAGE NOTES
Pt arrived via EMS from home with c/o SOB with productive cough and bilateral leg swelling x 2 days.

## 2019-06-24 NOTE — PROGRESS NOTES
OUT PATIENT CM REFERRAL FORM COMPLETED  In [facility] per request ED physician..Drea Rg RN, BSN, STN [facility]  6/24/2019    .

## 2019-06-24 NOTE — DISCHARGE INSTRUCTIONS
Your lab tests are consistent with your baseline and x-ray does not show evidence of pneumonia.  Your symptoms are likely related to your COPD.  Continue to use her nebulizations and breathing treatments as you have been doing.  Urinalysis suggest a urinary tract infection.  You have been given a prescription for course of Keflex to treat your urinary tract infection.  You have a lot of skin breakdown in your clinic.  Use the prescribed nystatin powder twice daily to treat this.  It is important that she make an appointment to see your primary physician as soon as possible to monitor and further manage your symptoms. You will also need referral to see a urologist to further evaluate past findings of fungus in your urine Return to the emergency department for fever, chest pain, worsening breathing difficulty, numbness, weakness or any new, worsening or concerning symptoms.

## 2019-06-26 LAB — BACTERIA UR CULT: NORMAL

## 2019-07-01 ENCOUNTER — HOSPITAL ENCOUNTER (INPATIENT)
Facility: HOSPITAL | Age: 69
LOS: 3 days | Discharge: HOME HEALTH | DRG: 872 | End: 2019-07-04
Attending: EMERGENCY MEDICINE | Admitting: INTERNAL MEDICINE
Payer: MEDICARE

## 2019-07-01 DIAGNOSIS — R07.9 CHEST PAIN: ICD-10-CM

## 2019-07-01 DIAGNOSIS — R09.02 HYPOXIA: ICD-10-CM

## 2019-07-01 DIAGNOSIS — N30.01 ACUTE CYSTITIS WITH HEMATURIA: ICD-10-CM

## 2019-07-01 DIAGNOSIS — I50.9 ACUTE ON CHRONIC CONGESTIVE HEART FAILURE, UNSPECIFIED HEART FAILURE TYPE: ICD-10-CM

## 2019-07-01 DIAGNOSIS — R06.02 SHORTNESS OF BREATH: Primary | ICD-10-CM

## 2019-07-01 DIAGNOSIS — J44.1 COPD WITH ACUTE EXACERBATION: ICD-10-CM

## 2019-07-01 DIAGNOSIS — J44.1 COPD EXACERBATION: ICD-10-CM

## 2019-07-01 PROBLEM — J96.22 ACUTE ON CHRONIC RESPIRATORY FAILURE WITH HYPOXIA AND HYPERCAPNIA: Chronic | Status: RESOLVED | Noted: 2018-06-26 | Resolved: 2019-07-01

## 2019-07-01 PROBLEM — N30.00 ACUTE CYSTITIS: Status: ACTIVE | Noted: 2019-07-01

## 2019-07-01 PROBLEM — J96.21 ACUTE ON CHRONIC RESPIRATORY FAILURE WITH HYPOXIA AND HYPERCAPNIA: Chronic | Status: RESOLVED | Noted: 2018-06-26 | Resolved: 2019-07-01

## 2019-07-01 LAB
ALBUMIN SERPL BCP-MCNC: 3 G/DL (ref 3.5–5.2)
ALP SERPL-CCNC: 58 U/L (ref 55–135)
ALT SERPL W/O P-5'-P-CCNC: 9 U/L (ref 10–44)
ANION GAP SERPL CALC-SCNC: 13 MMOL/L (ref 8–16)
APTT BLDCRRT: <21 SEC (ref 21–32)
AST SERPL-CCNC: 12 U/L (ref 10–40)
B-OH-BUTYR BLD STRIP-SCNC: 0.1 MMOL/L (ref 0–0.5)
BACTERIA #/AREA URNS HPF: ABNORMAL /HPF
BASOPHILS # BLD AUTO: 0.01 K/UL (ref 0–0.2)
BASOPHILS NFR BLD: 0.1 % (ref 0–1.9)
BILIRUB SERPL-MCNC: 0.1 MG/DL (ref 0.1–1)
BILIRUB UR QL STRIP: NEGATIVE
BNP SERPL-MCNC: 362 PG/ML (ref 0–99)
BUN SERPL-MCNC: 11 MG/DL (ref 8–23)
CALCIUM SERPL-MCNC: 8.6 MG/DL (ref 8.7–10.5)
CHLORIDE SERPL-SCNC: 105 MMOL/L (ref 95–110)
CLARITY UR: ABNORMAL
CO2 SERPL-SCNC: 23 MMOL/L (ref 23–29)
COLOR UR: ABNORMAL
CREAT SERPL-MCNC: 0.8 MG/DL (ref 0.5–1.4)
DIFFERENTIAL METHOD: ABNORMAL
EOSINOPHIL # BLD AUTO: 0 K/UL (ref 0–0.5)
EOSINOPHIL NFR BLD: 0.2 % (ref 0–8)
ERYTHROCYTE [DISTWIDTH] IN BLOOD BY AUTOMATED COUNT: 18.7 % (ref 11.5–14.5)
EST. GFR  (AFRICAN AMERICAN): >60 ML/MIN/1.73 M^2
EST. GFR  (NON AFRICAN AMERICAN): >60 ML/MIN/1.73 M^2
ESTIMATED AVG GLUCOSE: 186 MG/DL (ref 68–131)
FERRITIN SERPL-MCNC: 50 NG/ML (ref 20–300)
GLUCOSE SERPL-MCNC: 294 MG/DL (ref 70–110)
GLUCOSE SERPL-MCNC: 314 MG/DL (ref 70–110)
GLUCOSE UR QL STRIP: ABNORMAL
HBA1C MFR BLD HPLC: 8.1 % (ref 4–5.6)
HCT VFR BLD AUTO: 30.8 % (ref 37–48.5)
HGB BLD-MCNC: 8.5 G/DL (ref 12–16)
HGB UR QL STRIP: ABNORMAL
HYALINE CASTS #/AREA URNS LPF: 0 /LPF
INR PPP: 1 (ref 0.8–1.2)
IRON SERPL-MCNC: 15 UG/DL (ref 30–160)
KETONES UR QL STRIP: NEGATIVE
LACTATE SERPL-SCNC: 2.8 MMOL/L (ref 0.5–2.2)
LEUKOCYTE ESTERASE UR QL STRIP: ABNORMAL
LYMPHOCYTES # BLD AUTO: 1.6 K/UL (ref 1–4.8)
LYMPHOCYTES NFR BLD: 15.5 % (ref 18–48)
MCH RBC QN AUTO: 25.3 PG (ref 27–31)
MCHC RBC AUTO-ENTMCNC: 27.6 G/DL (ref 32–36)
MCV RBC AUTO: 92 FL (ref 82–98)
MICROSCOPIC COMMENT: ABNORMAL
MONOCYTES # BLD AUTO: 0.7 K/UL (ref 0.3–1)
MONOCYTES NFR BLD: 7 % (ref 4–15)
NEUTROPHILS # BLD AUTO: 8 K/UL (ref 1.8–7.7)
NEUTROPHILS NFR BLD: 77.5 % (ref 38–73)
NITRITE UR QL STRIP: POSITIVE
PH UR STRIP: 6 [PH] (ref 5–8)
PLATELET # BLD AUTO: 352 K/UL (ref 150–350)
PMV BLD AUTO: 9.4 FL (ref 9.2–12.9)
POCT GLUCOSE: 314 MG/DL (ref 70–110)
POCT GLUCOSE: 376 MG/DL (ref 70–110)
POCT GLUCOSE: 386 MG/DL (ref 70–110)
POCT GLUCOSE: 438 MG/DL (ref 70–110)
POCT GLUCOSE: 455 MG/DL (ref 70–110)
POCT GLUCOSE: 466 MG/DL (ref 70–110)
POTASSIUM SERPL-SCNC: 4.3 MMOL/L (ref 3.5–5.1)
PROT SERPL-MCNC: 6 G/DL (ref 6–8.4)
PROT UR QL STRIP: ABNORMAL
PROTHROMBIN TIME: 10.7 SEC (ref 9–12.5)
RBC # BLD AUTO: 3.36 M/UL (ref 4–5.4)
RBC #/AREA URNS HPF: 30 /HPF (ref 0–4)
SATURATED IRON: 4 % (ref 20–50)
SODIUM SERPL-SCNC: 141 MMOL/L (ref 136–145)
SP GR UR STRIP: 1.02 (ref 1–1.03)
TOTAL IRON BINDING CAPACITY: 386 UG/DL (ref 250–450)
TRANSFERRIN SERPL-MCNC: 261 MG/DL (ref 200–375)
TROPONIN I SERPL DL<=0.01 NG/ML-MCNC: 0.02 NG/ML (ref 0–0.03)
URN SPEC COLLECT METH UR: ABNORMAL
UROBILINOGEN UR STRIP-ACNC: ABNORMAL EU/DL
WBC # BLD AUTO: 10.41 K/UL (ref 3.9–12.7)
WBC #/AREA URNS HPF: 75 /HPF (ref 0–5)
YEAST URNS QL MICRO: ABNORMAL

## 2019-07-01 PROCEDURE — 84484 ASSAY OF TROPONIN QUANT: CPT | Mod: 91

## 2019-07-01 PROCEDURE — 83605 ASSAY OF LACTIC ACID: CPT

## 2019-07-01 PROCEDURE — S5571 INSULIN DISPOS PEN 3 ML: HCPCS | Performed by: NURSE PRACTITIONER

## 2019-07-01 PROCEDURE — 36415 COLL VENOUS BLD VENIPUNCTURE: CPT

## 2019-07-01 PROCEDURE — 97162 PT EVAL MOD COMPLEX 30 MIN: CPT

## 2019-07-01 PROCEDURE — 97165 OT EVAL LOW COMPLEX 30 MIN: CPT

## 2019-07-01 PROCEDURE — 83036 HEMOGLOBIN GLYCOSYLATED A1C: CPT

## 2019-07-01 PROCEDURE — 27000221 HC OXYGEN, UP TO 24 HOURS

## 2019-07-01 PROCEDURE — 96372 THER/PROPH/DIAG INJ SC/IM: CPT | Mod: 59

## 2019-07-01 PROCEDURE — 93010 ELECTROCARDIOGRAM REPORT: CPT | Mod: ,,, | Performed by: INTERNAL MEDICINE

## 2019-07-01 PROCEDURE — 99900035 HC TECH TIME PER 15 MIN (STAT)

## 2019-07-01 PROCEDURE — 63600175 PHARM REV CODE 636 W HCPCS: Performed by: EMERGENCY MEDICINE

## 2019-07-01 PROCEDURE — 99285 EMERGENCY DEPT VISIT HI MDM: CPT | Mod: 25

## 2019-07-01 PROCEDURE — 93005 ELECTROCARDIOGRAM TRACING: CPT

## 2019-07-01 PROCEDURE — 83540 ASSAY OF IRON: CPT

## 2019-07-01 PROCEDURE — 87086 URINE CULTURE/COLONY COUNT: CPT

## 2019-07-01 PROCEDURE — G0378 HOSPITAL OBSERVATION PER HR: HCPCS

## 2019-07-01 PROCEDURE — 87088 URINE BACTERIA CULTURE: CPT

## 2019-07-01 PROCEDURE — 96365 THER/PROPH/DIAG IV INF INIT: CPT

## 2019-07-01 PROCEDURE — 96376 TX/PRO/DX INJ SAME DRUG ADON: CPT

## 2019-07-01 PROCEDURE — 27000190 HC CPAP FULL FACE MASK W/VALVE

## 2019-07-01 PROCEDURE — 94644 CONT INHLJ TX 1ST HOUR: CPT

## 2019-07-01 PROCEDURE — 94761 N-INVAS EAR/PLS OXIMETRY MLT: CPT

## 2019-07-01 PROCEDURE — 82010 KETONE BODYS QUAN: CPT

## 2019-07-01 PROCEDURE — 94640 AIRWAY INHALATION TREATMENT: CPT

## 2019-07-01 PROCEDURE — 25000003 PHARM REV CODE 250: Performed by: EMERGENCY MEDICINE

## 2019-07-01 PROCEDURE — 11000001 HC ACUTE MED/SURG PRIVATE ROOM

## 2019-07-01 PROCEDURE — 85025 COMPLETE CBC W/AUTO DIFF WBC: CPT

## 2019-07-01 PROCEDURE — 25000242 PHARM REV CODE 250 ALT 637 W/ HCPCS: Performed by: EMERGENCY MEDICINE

## 2019-07-01 PROCEDURE — 93010 EKG 12-LEAD: ICD-10-PCS | Mod: ,,, | Performed by: INTERNAL MEDICINE

## 2019-07-01 PROCEDURE — S4991 NICOTINE PATCH NONLEGEND: HCPCS | Performed by: INTERNAL MEDICINE

## 2019-07-01 PROCEDURE — 82728 ASSAY OF FERRITIN: CPT

## 2019-07-01 PROCEDURE — 96361 HYDRATE IV INFUSION ADD-ON: CPT

## 2019-07-01 PROCEDURE — 85730 THROMBOPLASTIN TIME PARTIAL: CPT

## 2019-07-01 PROCEDURE — 63600175 PHARM REV CODE 636 W HCPCS: Performed by: NURSE PRACTITIONER

## 2019-07-01 PROCEDURE — 25000003 PHARM REV CODE 250: Performed by: NURSE PRACTITIONER

## 2019-07-01 PROCEDURE — 63600175 PHARM REV CODE 636 W HCPCS: Performed by: INTERNAL MEDICINE

## 2019-07-01 PROCEDURE — 81000 URINALYSIS NONAUTO W/SCOPE: CPT

## 2019-07-01 PROCEDURE — 85610 PROTHROMBIN TIME: CPT

## 2019-07-01 PROCEDURE — 82962 GLUCOSE BLOOD TEST: CPT

## 2019-07-01 PROCEDURE — 25000003 PHARM REV CODE 250: Performed by: INTERNAL MEDICINE

## 2019-07-01 PROCEDURE — 94660 CPAP INITIATION&MGMT: CPT

## 2019-07-01 PROCEDURE — 96375 TX/PRO/DX INJ NEW DRUG ADDON: CPT

## 2019-07-01 PROCEDURE — 80053 COMPREHEN METABOLIC PANEL: CPT

## 2019-07-01 PROCEDURE — 83880 ASSAY OF NATRIURETIC PEPTIDE: CPT

## 2019-07-01 RX ORDER — FUROSEMIDE 10 MG/ML
40 INJECTION INTRAMUSCULAR; INTRAVENOUS 2 TIMES DAILY
Status: DISCONTINUED | OUTPATIENT
Start: 2019-07-01 | End: 2019-07-01

## 2019-07-01 RX ORDER — POLYETHYLENE GLYCOL 3350 17 G/17G
17 POWDER, FOR SOLUTION ORAL DAILY
Status: CANCELLED | OUTPATIENT
Start: 2019-07-01

## 2019-07-01 RX ORDER — IBUPROFEN 200 MG
1 TABLET ORAL DAILY
Status: DISCONTINUED | OUTPATIENT
Start: 2019-07-01 | End: 2019-07-04 | Stop reason: HOSPADM

## 2019-07-01 RX ORDER — BENZONATATE 200 MG/1
200 CAPSULE ORAL 3 TIMES DAILY PRN
Qty: 20 CAPSULE | Refills: 0 | Status: SHIPPED | OUTPATIENT
Start: 2019-07-01 | End: 2019-07-11

## 2019-07-01 RX ORDER — PANTOPRAZOLE SODIUM 40 MG/1
40 TABLET, DELAYED RELEASE ORAL DAILY
Status: DISCONTINUED | OUTPATIENT
Start: 2019-07-01 | End: 2019-07-04 | Stop reason: HOSPADM

## 2019-07-01 RX ORDER — ONDANSETRON 2 MG/ML
4 INJECTION INTRAMUSCULAR; INTRAVENOUS EVERY 4 HOURS PRN
Status: CANCELLED | OUTPATIENT
Start: 2019-07-01

## 2019-07-01 RX ORDER — IPRATROPIUM BROMIDE AND ALBUTEROL SULFATE 2.5; .5 MG/3ML; MG/3ML
3 SOLUTION RESPIRATORY (INHALATION) EVERY 4 HOURS PRN
Status: DISCONTINUED | OUTPATIENT
Start: 2019-07-01 | End: 2019-07-04 | Stop reason: HOSPADM

## 2019-07-01 RX ORDER — DEXTROSE MONOHYDRATE 100 MG/ML
25 INJECTION, SOLUTION INTRAVENOUS
Status: DISCONTINUED | OUTPATIENT
Start: 2019-07-01 | End: 2019-07-04 | Stop reason: HOSPADM

## 2019-07-01 RX ORDER — INSULIN ASPART 100 [IU]/ML
6 INJECTION, SOLUTION INTRAVENOUS; SUBCUTANEOUS
Status: DISCONTINUED | OUTPATIENT
Start: 2019-07-01 | End: 2019-07-01

## 2019-07-01 RX ORDER — INSULIN ASPART 100 [IU]/ML
10 INJECTION, SOLUTION INTRAVENOUS; SUBCUTANEOUS ONCE
Status: COMPLETED | OUTPATIENT
Start: 2019-07-01 | End: 2019-07-01

## 2019-07-01 RX ORDER — CLONIDINE HYDROCHLORIDE 0.1 MG/1
0.1 TABLET ORAL 3 TIMES DAILY PRN
Status: DISCONTINUED | OUTPATIENT
Start: 2019-07-01 | End: 2019-07-04 | Stop reason: HOSPADM

## 2019-07-01 RX ORDER — INSULIN ASPART 100 [IU]/ML
8 INJECTION, SOLUTION INTRAVENOUS; SUBCUTANEOUS
Status: DISCONTINUED | OUTPATIENT
Start: 2019-07-01 | End: 2019-07-03

## 2019-07-01 RX ORDER — DEXTROSE MONOHYDRATE 100 MG/ML
12.5 INJECTION, SOLUTION INTRAVENOUS
Status: DISCONTINUED | OUTPATIENT
Start: 2019-07-01 | End: 2019-07-04 | Stop reason: HOSPADM

## 2019-07-01 RX ORDER — BENZONATATE 100 MG/1
200 CAPSULE ORAL 3 TIMES DAILY PRN
Status: DISCONTINUED | OUTPATIENT
Start: 2019-07-01 | End: 2019-07-04 | Stop reason: HOSPADM

## 2019-07-01 RX ORDER — FAMOTIDINE 20 MG/1
20 TABLET, FILM COATED ORAL DAILY
Status: CANCELLED | OUTPATIENT
Start: 2019-07-01

## 2019-07-01 RX ORDER — AMIODARONE HYDROCHLORIDE 200 MG/1
200 TABLET ORAL DAILY
Status: DISCONTINUED | OUTPATIENT
Start: 2019-07-01 | End: 2019-07-04 | Stop reason: HOSPADM

## 2019-07-01 RX ORDER — IPRATROPIUM BROMIDE AND ALBUTEROL SULFATE 2.5; .5 MG/3ML; MG/3ML
3 SOLUTION RESPIRATORY (INHALATION) EVERY 6 HOURS
Qty: 1 BOX | Refills: 0 | Status: SHIPPED | OUTPATIENT
Start: 2019-07-01 | End: 2020-06-30

## 2019-07-01 RX ORDER — TIOTROPIUM BROMIDE 18 UG/1
1 CAPSULE ORAL; RESPIRATORY (INHALATION) DAILY
Status: CANCELLED | OUTPATIENT
Start: 2019-07-01

## 2019-07-01 RX ORDER — ISOSORBIDE MONONITRATE 30 MG/1
90 TABLET, EXTENDED RELEASE ORAL DAILY
Status: DISCONTINUED | OUTPATIENT
Start: 2019-07-01 | End: 2019-07-04 | Stop reason: HOSPADM

## 2019-07-01 RX ORDER — ACETAMINOPHEN 325 MG/1
650 TABLET ORAL EVERY 8 HOURS PRN
Status: CANCELLED | OUTPATIENT
Start: 2019-07-01

## 2019-07-01 RX ORDER — INSULIN ASPART 100 [IU]/ML
3 INJECTION, SOLUTION INTRAVENOUS; SUBCUTANEOUS
Status: DISCONTINUED | OUTPATIENT
Start: 2019-07-01 | End: 2019-07-01

## 2019-07-01 RX ORDER — AMOXICILLIN AND CLAVULANATE POTASSIUM 875; 125 MG/1; MG/1
1 TABLET, FILM COATED ORAL EVERY 12 HOURS
Status: DISCONTINUED | OUTPATIENT
Start: 2019-07-01 | End: 2019-07-04 | Stop reason: HOSPADM

## 2019-07-01 RX ORDER — METHYLPREDNISOLONE SOD SUCC 125 MG
80 VIAL (EA) INJECTION EVERY 8 HOURS
Status: DISCONTINUED | OUTPATIENT
Start: 2019-07-01 | End: 2019-07-01

## 2019-07-01 RX ORDER — IPRATROPIUM BROMIDE AND ALBUTEROL SULFATE 2.5; .5 MG/3ML; MG/3ML
3 SOLUTION RESPIRATORY (INHALATION)
Status: COMPLETED | OUTPATIENT
Start: 2019-07-01 | End: 2019-07-01

## 2019-07-01 RX ORDER — ALBUTEROL SULFATE 2.5 MG/.5ML
10 SOLUTION RESPIRATORY (INHALATION)
Status: COMPLETED | OUTPATIENT
Start: 2019-07-01 | End: 2019-07-01

## 2019-07-01 RX ORDER — IPRATROPIUM BROMIDE AND ALBUTEROL SULFATE 2.5; .5 MG/3ML; MG/3ML
3 SOLUTION RESPIRATORY (INHALATION)
Status: DISCONTINUED | OUTPATIENT
Start: 2019-07-01 | End: 2019-07-04 | Stop reason: HOSPADM

## 2019-07-01 RX ORDER — IBUPROFEN 400 MG/1
400 TABLET ORAL ONCE
Status: COMPLETED | OUTPATIENT
Start: 2019-07-01 | End: 2019-07-01

## 2019-07-01 RX ORDER — METOPROLOL TARTRATE 1 MG/ML
5 INJECTION, SOLUTION INTRAVENOUS ONCE
Status: COMPLETED | OUTPATIENT
Start: 2019-07-01 | End: 2019-07-01

## 2019-07-01 RX ORDER — PREDNISONE 20 MG/1
40 TABLET ORAL DAILY
Qty: 10 TABLET | Refills: 0 | Status: SHIPPED | OUTPATIENT
Start: 2019-07-01 | End: 2019-07-04 | Stop reason: HOSPADM

## 2019-07-01 RX ORDER — ASPIRIN 81 MG/1
81 TABLET ORAL DAILY
Status: DISCONTINUED | OUTPATIENT
Start: 2019-07-01 | End: 2019-07-04 | Stop reason: HOSPADM

## 2019-07-01 RX ORDER — METHYLPREDNISOLONE SOD SUCC 125 MG
125 VIAL (EA) INJECTION
Status: COMPLETED | OUTPATIENT
Start: 2019-07-01 | End: 2019-07-01

## 2019-07-01 RX ORDER — PREDNISONE 20 MG/1
40 TABLET ORAL DAILY
Status: DISCONTINUED | OUTPATIENT
Start: 2019-07-02 | End: 2019-07-02

## 2019-07-01 RX ORDER — FLUTICASONE PROPIONATE 220 UG/1
1 AEROSOL, METERED RESPIRATORY (INHALATION) 2 TIMES DAILY
Status: CANCELLED | OUTPATIENT
Start: 2019-07-01

## 2019-07-01 RX ORDER — METHYLPREDNISOLONE SOD SUCC 125 MG
125 VIAL (EA) INJECTION EVERY 8 HOURS
Status: DISCONTINUED | OUTPATIENT
Start: 2019-07-01 | End: 2019-07-01

## 2019-07-01 RX ORDER — PHENAZOPYRIDINE HYDROCHLORIDE 100 MG/1
200 TABLET, FILM COATED ORAL
Status: DISCONTINUED | OUTPATIENT
Start: 2019-07-01 | End: 2019-07-04 | Stop reason: HOSPADM

## 2019-07-01 RX ORDER — AMOXICILLIN 250 MG
1 CAPSULE ORAL 2 TIMES DAILY PRN
Status: DISCONTINUED | OUTPATIENT
Start: 2019-07-01 | End: 2019-07-04 | Stop reason: HOSPADM

## 2019-07-01 RX ORDER — FERROUS GLUCONATE 324(38)MG
324 TABLET ORAL
Status: CANCELLED | OUTPATIENT
Start: 2019-07-01

## 2019-07-01 RX ORDER — GUAIFENESIN 600 MG/1
600 TABLET, EXTENDED RELEASE ORAL 2 TIMES DAILY
Status: DISCONTINUED | OUTPATIENT
Start: 2019-07-01 | End: 2019-07-04 | Stop reason: HOSPADM

## 2019-07-01 RX ORDER — HYDRALAZINE HYDROCHLORIDE 25 MG/1
50 TABLET, FILM COATED ORAL 2 TIMES DAILY
Status: DISCONTINUED | OUTPATIENT
Start: 2019-07-01 | End: 2019-07-04 | Stop reason: HOSPADM

## 2019-07-01 RX ORDER — INSULIN ASPART 100 [IU]/ML
0-5 INJECTION, SOLUTION INTRAVENOUS; SUBCUTANEOUS
Status: DISCONTINUED | OUTPATIENT
Start: 2019-07-01 | End: 2019-07-04 | Stop reason: HOSPADM

## 2019-07-01 RX ORDER — RAMELTEON 8 MG/1
8 TABLET ORAL NIGHTLY PRN
Status: DISCONTINUED | OUTPATIENT
Start: 2019-07-01 | End: 2019-07-04 | Stop reason: HOSPADM

## 2019-07-01 RX ORDER — AMOXICILLIN AND CLAVULANATE POTASSIUM 875; 125 MG/1; MG/1
1 TABLET, FILM COATED ORAL EVERY 12 HOURS
Qty: 14 TABLET | Refills: 0 | Status: SHIPPED | OUTPATIENT
Start: 2019-07-01 | End: 2019-07-04 | Stop reason: HOSPADM

## 2019-07-01 RX ORDER — GLUCAGON 1 MG
1 KIT INJECTION
Status: DISCONTINUED | OUTPATIENT
Start: 2019-07-01 | End: 2019-07-04 | Stop reason: HOSPADM

## 2019-07-01 RX ORDER — ACETAMINOPHEN 500 MG
500 TABLET ORAL EVERY 6 HOURS PRN
Status: DISCONTINUED | OUTPATIENT
Start: 2019-07-01 | End: 2019-07-04 | Stop reason: HOSPADM

## 2019-07-01 RX ORDER — FUROSEMIDE 10 MG/ML
40 INJECTION INTRAMUSCULAR; INTRAVENOUS
Status: COMPLETED | OUTPATIENT
Start: 2019-07-01 | End: 2019-07-01

## 2019-07-01 RX ORDER — IBUPROFEN 200 MG
24 TABLET ORAL
Status: DISCONTINUED | OUTPATIENT
Start: 2019-07-01 | End: 2019-07-04 | Stop reason: HOSPADM

## 2019-07-01 RX ORDER — SODIUM CHLORIDE 0.9 % (FLUSH) 0.9 %
10 SYRINGE (ML) INJECTION
Status: CANCELLED | OUTPATIENT
Start: 2019-07-01

## 2019-07-01 RX ORDER — GUAIFENESIN 600 MG/1
600 TABLET, EXTENDED RELEASE ORAL 2 TIMES DAILY
COMMUNITY
Start: 2019-07-01 | End: 2019-07-19

## 2019-07-01 RX ORDER — IBUPROFEN 200 MG
16 TABLET ORAL
Status: DISCONTINUED | OUTPATIENT
Start: 2019-07-01 | End: 2019-07-04 | Stop reason: HOSPADM

## 2019-07-01 RX ORDER — LISINOPRIL 20 MG/1
40 TABLET ORAL DAILY
Status: DISCONTINUED | OUTPATIENT
Start: 2019-07-01 | End: 2019-07-04 | Stop reason: HOSPADM

## 2019-07-01 RX ORDER — GABAPENTIN 300 MG/1
300 CAPSULE ORAL 3 TIMES DAILY
Status: DISCONTINUED | OUTPATIENT
Start: 2019-07-01 | End: 2019-07-04 | Stop reason: HOSPADM

## 2019-07-01 RX ORDER — IPRATROPIUM BROMIDE 0.5 MG/2.5ML
1.5 SOLUTION RESPIRATORY (INHALATION)
Status: COMPLETED | OUTPATIENT
Start: 2019-07-01 | End: 2019-07-01

## 2019-07-01 RX ORDER — DOXYCYCLINE HYCLATE 50 MG/1
100 CAPSULE ORAL EVERY 12 HOURS
Status: CANCELLED | OUTPATIENT
Start: 2019-07-01

## 2019-07-01 RX ORDER — METOPROLOL TARTRATE 50 MG/1
100 TABLET ORAL 2 TIMES DAILY
Status: DISCONTINUED | OUTPATIENT
Start: 2019-07-01 | End: 2019-07-04 | Stop reason: HOSPADM

## 2019-07-01 RX ORDER — PROCHLORPERAZINE EDISYLATE 5 MG/ML
5 INJECTION INTRAMUSCULAR; INTRAVENOUS EVERY 6 HOURS PRN
Status: DISCONTINUED | OUTPATIENT
Start: 2019-07-01 | End: 2019-07-04 | Stop reason: HOSPADM

## 2019-07-01 RX ADMIN — METOPROLOL TARTRATE 100 MG: 50 TABLET, FILM COATED ORAL at 07:07

## 2019-07-01 RX ADMIN — ACETAMINOPHEN 500 MG: 500 TABLET, FILM COATED ORAL at 11:07

## 2019-07-01 RX ADMIN — PANTOPRAZOLE SODIUM 40 MG: 40 TABLET, DELAYED RELEASE ORAL at 07:07

## 2019-07-01 RX ADMIN — GABAPENTIN 300 MG: 300 CAPSULE ORAL at 07:07

## 2019-07-01 RX ADMIN — INSULIN DETEMIR 15 UNITS: 100 INJECTION, SOLUTION SUBCUTANEOUS at 08:07

## 2019-07-01 RX ADMIN — ISOSORBIDE MONONITRATE 90 MG: 30 TABLET, EXTENDED RELEASE ORAL at 07:07

## 2019-07-01 RX ADMIN — INSULIN ASPART 6 UNITS: 100 INJECTION, SOLUTION INTRAVENOUS; SUBCUTANEOUS at 11:07

## 2019-07-01 RX ADMIN — NICOTINE 1 PATCH: 21 PATCH, EXTENDED RELEASE TRANSDERMAL at 07:07

## 2019-07-01 RX ADMIN — FUROSEMIDE 40 MG: 10 INJECTION, SOLUTION INTRAVENOUS at 07:07

## 2019-07-01 RX ADMIN — PREGABALIN 75 MG: 50 CAPSULE ORAL at 08:07

## 2019-07-01 RX ADMIN — INSULIN ASPART 6 UNITS: 100 INJECTION, SOLUTION INTRAVENOUS; SUBCUTANEOUS at 04:07

## 2019-07-01 RX ADMIN — AMIODARONE HYDROCHLORIDE 200 MG: 200 TABLET ORAL at 07:07

## 2019-07-01 RX ADMIN — AMOXICILLIN AND CLAVULANATE POTASSIUM 1 TABLET: 875; 125 TABLET, FILM COATED ORAL at 11:07

## 2019-07-01 RX ADMIN — SODIUM CHLORIDE 500 ML: 0.9 INJECTION, SOLUTION INTRAVENOUS at 03:07

## 2019-07-01 RX ADMIN — PHENAZOPYRIDINE HYDROCHLORIDE 200 MG: 100 TABLET ORAL at 04:07

## 2019-07-01 RX ADMIN — METHYLPREDNISOLONE SODIUM SUCCINATE 125 MG: 125 INJECTION, POWDER, FOR SOLUTION INTRAMUSCULAR; INTRAVENOUS at 06:07

## 2019-07-01 RX ADMIN — GUAIFENESIN 600 MG: 600 TABLET, EXTENDED RELEASE ORAL at 11:07

## 2019-07-01 RX ADMIN — FUROSEMIDE 40 MG: 10 INJECTION, SOLUTION INTRAVENOUS at 03:07

## 2019-07-01 RX ADMIN — IPRATROPIUM BROMIDE 1.5 MG: 0.5 SOLUTION RESPIRATORY (INHALATION) at 12:07

## 2019-07-01 RX ADMIN — INSULIN ASPART 10 UNITS: 100 INJECTION, SOLUTION INTRAVENOUS; SUBCUTANEOUS at 02:07

## 2019-07-01 RX ADMIN — CEFTRIAXONE 1 G: 1 INJECTION, SOLUTION INTRAVENOUS at 03:07

## 2019-07-01 RX ADMIN — GABAPENTIN 300 MG: 300 CAPSULE ORAL at 08:07

## 2019-07-01 RX ADMIN — PHENAZOPYRIDINE HYDROCHLORIDE 200 MG: 100 TABLET ORAL at 11:07

## 2019-07-01 RX ADMIN — INSULIN ASPART 3 UNITS: 100 INJECTION, SOLUTION INTRAVENOUS; SUBCUTANEOUS at 07:07

## 2019-07-01 RX ADMIN — GUAIFENESIN 600 MG: 600 TABLET, EXTENDED RELEASE ORAL at 09:07

## 2019-07-01 RX ADMIN — IPRATROPIUM BROMIDE AND ALBUTEROL SULFATE 3 ML: .5; 3 SOLUTION RESPIRATORY (INHALATION) at 05:07

## 2019-07-01 RX ADMIN — INSULIN ASPART 3 UNITS: 100 INJECTION, SOLUTION INTRAVENOUS; SUBCUTANEOUS at 08:07

## 2019-07-01 RX ADMIN — LISINOPRIL 40 MG: 20 TABLET ORAL at 07:07

## 2019-07-01 RX ADMIN — ALBUTEROL SULFATE 10 MG: 2.5 SOLUTION RESPIRATORY (INHALATION) at 12:07

## 2019-07-01 RX ADMIN — INSULIN ASPART 5 UNITS: 100 INJECTION, SOLUTION INTRAVENOUS; SUBCUTANEOUS at 11:07

## 2019-07-01 RX ADMIN — INSULIN ASPART 5 UNITS: 100 INJECTION, SOLUTION INTRAVENOUS; SUBCUTANEOUS at 04:07

## 2019-07-01 RX ADMIN — INSULIN ASPART 5 UNITS: 100 INJECTION, SOLUTION INTRAVENOUS; SUBCUTANEOUS at 07:07

## 2019-07-01 RX ADMIN — IBUPROFEN 400 MG: 400 TABLET, FILM COATED ORAL at 04:07

## 2019-07-01 RX ADMIN — IPRATROPIUM BROMIDE AND ALBUTEROL SULFATE 3 ML: .5; 3 SOLUTION RESPIRATORY (INHALATION) at 08:07

## 2019-07-01 RX ADMIN — PHENAZOPYRIDINE HYDROCHLORIDE 200 MG: 100 TABLET ORAL at 07:07

## 2019-07-01 RX ADMIN — PREGABALIN 75 MG: 50 CAPSULE ORAL at 07:07

## 2019-07-01 RX ADMIN — IPRATROPIUM BROMIDE AND ALBUTEROL SULFATE 3 ML: .5; 3 SOLUTION RESPIRATORY (INHALATION) at 04:07

## 2019-07-01 RX ADMIN — METHYLPREDNISOLONE SODIUM SUCCINATE 125 MG: 125 INJECTION, POWDER, FOR SOLUTION INTRAMUSCULAR; INTRAVENOUS at 03:07

## 2019-07-01 RX ADMIN — IPRATROPIUM BROMIDE AND ALBUTEROL SULFATE 3 ML: .5; 3 SOLUTION RESPIRATORY (INHALATION) at 03:07

## 2019-07-01 RX ADMIN — IPRATROPIUM BROMIDE AND ALBUTEROL SULFATE 3 ML: .5; 3 SOLUTION RESPIRATORY (INHALATION) at 10:07

## 2019-07-01 RX ADMIN — AMOXICILLIN AND CLAVULANATE POTASSIUM 1 TABLET: 875; 125 TABLET, FILM COATED ORAL at 08:07

## 2019-07-01 RX ADMIN — METOPROLOL TARTRATE 5 MG: 5 INJECTION, SOLUTION INTRAVENOUS at 07:07

## 2019-07-01 RX ADMIN — RIVAROXABAN 20 MG: 20 TABLET, FILM COATED ORAL at 04:07

## 2019-07-01 RX ADMIN — HYDRALAZINE HYDROCHLORIDE 50 MG: 25 TABLET ORAL at 07:07

## 2019-07-01 RX ADMIN — ASPIRIN 81 MG: 81 TABLET, COATED ORAL at 07:07

## 2019-07-01 NOTE — HOSPITAL COURSE
Mrs. Palm is a 68 yo female with history for COPD on home oxygen 3L, LOYDA on CPAP, pulmonary hypertension PAP 80 mmHg, DVT LLE 2 yrs ago, and PAF on Xarelto who was admitted to admission for UTI and COPD exacerbation. Rocephin IV started in ED. IV steroid and duoneb started. Lasix 40 mg IV also started. On 7/1/19, lung exam revealed fine inspiratory crackles mid to lower lobes without wheezing. Coughing about same but not able to get sputum up. BLE edema improved. Continue duoneb, steroid, rocephin.     Respirations were more even the day after admission however UTI with spike in WBC overnight 19,000. Likely from oral prednisone. Continued to treat for UTI, continue duo nebs, antibiotic coverage to cover respiratory and urinary tract.  She was at risk for severe acute decline given her debility and her /primary caretaker was also hospitalized at the time. She developed hypotension on 7/2/2019 that responded to IV fluids. Her glucoses were also wildly uncontrolled due to steroids. She was continued on antibiotic therapy and monitored.    7/3/2019:  Patient stepped up to inpatient 7/2/2019 and placed on my service. She was noted to have hyperglycemia, leukocytosis, and hypotension. Her hypotension resolved with IV fluids. Her leukocytosis has since steeply declined with tapering of steroids and adjustment in insulin. She remained on Augmentin to treat possible pneumonia/COPD exacerbation though no consolidation on CXR and she is was reported to not ever have wheezing. She does not appear to have bacteria growing in her urine, only yeast. She describes symptoms of a yeast infection and was treated with fluconazole 150mg and also given anti-fungal powder for intertrigo. Her glucoses were uncontrolled due to steroids. Her respiratory symptoms are much improved and steroids were stopped. No need for taper with this short course. She was feeling better. Antibiotics were stopped. She was monitored overnight 7/3. On  7/4 she was feeling better and was medically stable for discharge home with home health. She needs to follow up closely with her PCP.

## 2019-07-01 NOTE — PROGRESS NOTES
[facility] Hospital Medicine  Progress Note    Patient Name: Yasmeen Palm  MRN: [id number]  Patient Class: OP- Observation   Admission Date: 7/1/2019  Length of Stay: 0 days  Attending Physician: Jewel Novoa MD  Primary Care Provider: Angel Orourke Jr, MD        Subjective:     Principal Problem:COPD with acute exacerbation      HPI:  Mrs. Yasmeen Palm is a 69 y.o. female known to me with essential hypertension, type 2 diabetes mellitus (HbA1c 7.3% Jun 2019), hyperlipidemia (.0 Dec 2018), paroxysmal atrial fibrillation ([id number] score 4) on chronic anticoagulation, COPD, chronic respiratory failure with hypoxia and hypercapnia, GERD, anemia of chronic disease, tobacco abuse, and history of PE/DVT on chronic anticoagulation who presents to [facility] ED with complaints of dyspnea past two days.  The dyspnea is mainly on exertion and associated with wheezing and a cough that was occasionally productive of thick clear sputum.  She denies any fevers, chills, nausea, vomiting, chest pain, pleurisy, diaphoresis, palpitations, nor any hemoptysis.  She does report bilateral lower extremity edema that is slightly worse than her baseline.  She does complain of orthopnea but denies any PND.  She also reports that her niece and  have been having allergy symptoms; she has not had any recent travel.    She also reports dysuria for the past 1.5 days.  It is associated with increased urinary frequency and urgency.  She denies any hematuria.    Overview/Hospital Course:  Mrs. Palm is a 68 yo female with history for COPD on home oxygen .3, LOYDA on CPAP, pulmonary hypertension PAP 80 mmHg, DVT LLE 2 yrs ago on xarelto, and PAF   who was admitted to admission for UTI and COPD exacerbation. Rocephin IV started in ED. IV steroid and duoneb started. Lasix 40 mg IV also started. On 7/1/19, lung sound fine inspiratory crackles mid to lower lobes without wheezing. Coughing about same but not able  to get sputum up. BLE edema improved. Continue duoneb, steroid, rocephin.     Interval History: Feels somewhat better and breathing more comfortable but not at baseline.     Review of Systems   Constitutional: Negative for activity change, appetite change, chills, diaphoresis, fatigue, fever and unexpected weight change.   HENT: Negative.    Eyes: Negative.    Respiratory: Positive for cough, shortness of breath and wheezing. Negative for chest tightness.    Cardiovascular: Positive for leg swelling. Negative for chest pain and palpitations.   Gastrointestinal: Negative for abdominal distention, abdominal pain, blood in stool, constipation, diarrhea, nausea and vomiting.   Genitourinary: Positive for dysuria, frequency and urgency. Negative for hematuria.   Musculoskeletal: Negative.    Skin: Negative.    Neurological: Negative for dizziness, seizures, syncope, weakness and light-headedness.   Psychiatric/Behavioral: Negative.      Objective:     Vital Signs (Most Recent):  Temp: 99 °F (37.2 °C) (07/01/19 0744)  Pulse: 91 (07/01/19 1012)  Resp: 17 (07/01/19 1012)  BP: (!) 175/79 (07/01/19 0744)  SpO2: 96 % (07/01/19 1012) Vital Signs (24h Range):  Temp:  [98.1 °F (36.7 °C)-99.5 °F (37.5 °C)] 99 °F (37.2 °C)  Pulse:  [] 91  Resp:  [15-31] 17  SpO2:  [95 %-100 %] 96 %  BP: (118-192)/(56-90) 175/79     Weight: 79.5 kg (175 lb 4.3 oz)  Body mass index is 27.45 kg/m².  No intake or output data in the 24 hours ending 07/01/19 1016   Physical Exam   Constitutional: She is oriented to person, place, and time. She appears well-developed and well-nourished. No distress.   HENT:   Head: Normocephalic and atraumatic.   Right Ear: External ear normal.   Left Ear: External ear normal.   Nose: Nose normal.   Eyes: Right eye exhibits no discharge. Left eye exhibits no discharge.   Neck: Normal range of motion.   Cardiovascular: Normal rate, regular rhythm, normal heart sounds and intact distal pulses. Exam reveals no gallop  and no friction rub.   No murmur heard.  Pulmonary/Chest:   Normal respiratory effort with bibasilar crackles and very minimal end expiratory wheezing   Abdominal: Soft. Bowel sounds are normal. She exhibits no distension. There is no tenderness. There is no rebound and no guarding.   Musculoskeletal: Normal range of motion. She exhibits edema (Trace pitting edema to the bilateral lower extremities).   Neurological: She is alert and oriented to person, place, and time.   Skin: Skin is warm and dry. She is not diaphoretic. No erythema.   Psychiatric: She has a normal mood and affect.   Nursing note and vitals reviewed.      Significant Labs: All pertinent labs within the past 24 hours have been reviewed.    Significant Imaging: I have reviewed and interpreted all pertinent imaging results/findings within the past 24 hours.      Assessment/Plan:      * COPD with acute exacerbation  Patient had a similar episode about 3 weeks ago and was treated for COPD exacerbation.  I have reviewed her EKG which was significant for sinus tachycardia without evidence of acute ischemia.  She wear oxygen at home and is usually on 3.5 liters/minute.  I also reviewed her plain radiograph of the chest and it was without infiltrates, pleural effusions, nor any pneumothorax.  She was initially started on CPAP which was transition to BPAP.  She is now off of BPAP and doing well.  Will provide frequent nebulized JORDAN/LAMA; intravenous corticosteroids, and empiric antibiotics therapy.    Start Augmentin as possible will cover for UTI as well, mucinex, Will get O2 sat on 3.5 L home oxygen. If does well and O2 sat 88-92%, then okay home.     Addendum 1522.  SBP 7O's. Bolus 500 ml now.   . Give another 10 units insulin now.   Cancel discharge    Acute cystitis  Patient's urinalysis is concerning for acute cystitis given cloudy appearance, 3+ protein, 3+ glucose, 3+ occult blood, and positive for nitrites.  She has been started on empiric  antibiotic therapy pending urine culture results.    7/1/19  Urine culture pending. Previous culture e coli resistant to cipro, levo, and tetra, but is sensitive to augmentin, cephalosporins    Hyperlipidemia  Poorly-controlled; will continue to monitor.    COPD (chronic obstructive pulmonary disease)  As addressed above.    Essential hypertension  Patient's blood pressure is poorly-controlled; will continue home regimen of furosemide, hydralazine, isosorbide, lisinopril, and metoprolol, and provide as-needed clonidine.    Paroxysmal atrial fibrillation  Patient is currently in sinus rhythm that is rate-controlled with amiodarone and metoprolol.  Will also continued her home regimen of rivaroxaban.    Chronic respiratory failure with hypoxia  Will continue supplemental oxygen therapy.    Chronic anticoagulation  As addressed above.    History of deep vein thrombosis  Stable; there are no acute issues. Continue xarelto    History of pulmonary embolism  Stable; there are no acute issues. Continue xarelto.     Anemia of chronic disease  The patient's H/H is stable and consistent with previous laboratory measurements, and the patient exhibits no signs or symptoms of acute bleeding; there is no indication for transfusion.  Will continue to monitor. Obtain iron studies.    Tobacco abuse  Patient was counseled on smoking cessation and she will be provided a nicotine transdermal patch applied while inpatient.  Will provide additional smoking cessation counseling prior to discharge.    Gastroesophageal reflux disease without esophagitis  Will continue her home regimen of pantoprazole.    Controlled type 2 diabetes mellitus, without long-term current use of insulin  Fairly-controlled on a home regimen of metformin; will provide basal-prandial insulin along with insulin sliding scale.      VTE Risk Mitigation (From admission, onward)        Ordered     rivaroxaban tablet 20 mg  With dinner      07/01/19 0456     Reason for No  Pharmacological VTE Prophylaxis  Once      07/01/19 0456     IP VTE HIGH RISK PATIENT  Once      07/01/19 0456     Reason for No Pharmacological VTE Prophylaxis  Once      07/01/19 0456                Amy Castellanos NP  Department of Hospital Medicine   [facility]

## 2019-07-01 NOTE — SUBJECTIVE & OBJECTIVE
Interval History: Feels somewhat better and breathing more comfortable but not at baseline.     Review of Systems   Constitutional: Negative for activity change, appetite change, chills, diaphoresis, fatigue, fever and unexpected weight change.   HENT: Negative.    Eyes: Negative.    Respiratory: Positive for cough, shortness of breath and wheezing. Negative for chest tightness.    Cardiovascular: Positive for leg swelling. Negative for chest pain and palpitations.   Gastrointestinal: Negative for abdominal distention, abdominal pain, blood in stool, constipation, diarrhea, nausea and vomiting.   Genitourinary: Positive for dysuria, frequency and urgency. Negative for hematuria.   Musculoskeletal: Negative.    Skin: Negative.    Neurological: Negative for dizziness, seizures, syncope, weakness and light-headedness.   Psychiatric/Behavioral: Negative.      Objective:     Vital Signs (Most Recent):  Temp: 99 °F (37.2 °C) (07/01/19 0744)  Pulse: 91 (07/01/19 1012)  Resp: 17 (07/01/19 1012)  BP: (!) 175/79 (07/01/19 0744)  SpO2: 96 % (07/01/19 1012) Vital Signs (24h Range):  Temp:  [98.1 °F (36.7 °C)-99.5 °F (37.5 °C)] 99 °F (37.2 °C)  Pulse:  [] 91  Resp:  [15-31] 17  SpO2:  [95 %-100 %] 96 %  BP: (118-192)/(56-90) 175/79     Weight: 79.5 kg (175 lb 4.3 oz)  Body mass index is 27.45 kg/m².  No intake or output data in the 24 hours ending 07/01/19 1016   Physical Exam   Constitutional: She is oriented to person, place, and time. She appears well-developed and well-nourished. No distress.   HENT:   Head: Normocephalic and atraumatic.   Right Ear: External ear normal.   Left Ear: External ear normal.   Nose: Nose normal.   Eyes: Right eye exhibits no discharge. Left eye exhibits no discharge.   Neck: Normal range of motion.   Cardiovascular: Normal rate, regular rhythm, normal heart sounds and intact distal pulses. Exam reveals no gallop and no friction rub.   No murmur heard.  Pulmonary/Chest:   Normal respiratory  effort with bibasilar crackles and very minimal end expiratory wheezing   Abdominal: Soft. Bowel sounds are normal. She exhibits no distension. There is no tenderness. There is no rebound and no guarding.   Musculoskeletal: Normal range of motion. She exhibits edema (Trace pitting edema to the bilateral lower extremities).   Neurological: She is alert and oriented to person, place, and time.   Skin: Skin is warm and dry. She is not diaphoretic. No erythema.   Psychiatric: She has a normal mood and affect.   Nursing note and vitals reviewed.      Significant Labs: All pertinent labs within the past 24 hours have been reviewed.    Significant Imaging: I have reviewed and interpreted all pertinent imaging results/findings within the past 24 hours.

## 2019-07-01 NOTE — PLAN OF CARE
07/01/19 1000   Discharge Assessment   Assessment Type Discharge Planning Assessment   Assessment information obtained from? Patient;Medical Record   Prior to hospitilization cognitive status: Alert/Oriented   Prior to hospitalization functional status: Independent;Assistive Equipment   Current cognitive status: Alert/Oriented   Current Functional Status: Independent;Assistive Equipment   Facility Arrived From: home   Lives With spouse   Able to Return to Prior Arrangements yes   Is patient able to care for self after discharge? Yes  (with minimal assistance and equipment)   Who are your caregiver(s) and their phone number(s)? Getachew   Patient's perception of discharge disposition home or selfcare;home health   Readmission Within the Last 30 Days other (see comments)   Patient currently being followed by outpatient case management? No   Patient currently receives any other outside agency services? No   Equipment Currently Used at Home walker, rolling;cane, straight;oxygen;bedside commode;shower chair;nebulizer   Do you have any problems affording any of your prescribed medications? No   Is the patient taking medications as prescribed? yes   Does the patient have transportation home? Yes   Transportation Anticipated family or friend will provide   Discharge Plan A Home with family;Home Health   Discharge Plan B Home with family;Home Health  (with follow up appointment)   DME Needed Upon Discharge  none   Patient/Family in Agreement with Plan yes         [id number] - [place] [address]  [address] [place] [address]  Phone: [phone] Fax: [phone]

## 2019-07-01 NOTE — PT/OT/SLP EVAL
"Occupational Therapy   Evaluation and Discharge Note    Name: Yasmeen Palm  MRN: [id number]  Admitting Diagnosis:  COPD with acute exacerbation      Recommendations:     Discharge Recommendations: home with home health  Discharge Equipment Recommendations:  none  Barriers to discharge:  None    Assessment:     Yasmeen Palm is a 69 y.o. female with a medical diagnosis of COPD with acute exacerbation. At this time, patient is functioning at their prior level of function and does not require further acute OT services.     Plan:     During this hospitalization, patient does not require further acute OT services.  Please re-consult if situation changes.    · Plan of Care Reviewed with: patient    Subjective     Chief Complaint: "I was sleeping."  Patient/Family Comments/goals: to go home    Occupational Profile:  Living Environment: lives with her spouse in a house  Previous level of function: modified independent using her walker  Roles and Routines: self care and light cooking  Equipment Used at home:  walker, rolling, oxygen, cane, straight, bedside commode, shower chair  Assistance upon Discharge: from her family    Pain/Comfort:  · Pain Rating 1: 0/10    Patients cultural, spiritual, Evangelical conflicts given the current situation: no    Objective:     Communicated with: nurse Broderick prior to session.  Patient found supine with PureWick, peripheral IV, telemetry upon OT entry to room.    General Precautions: Standard, fall   Orthopedic Precautions:N/A   Braces: N/A     Occupational Performance:    Bed Mobility:    · Patient completed Rolling/Turning to Left with  stand by assistance  · Patient completed Rolling/Turning to Right with stand by assistance  · Patient completed Scooting/Bridging with stand by assistance  · Patient completed Supine to Sit with stand by assistance  · Patient completed Sit to Supine with stand by assistance    Functional Mobility/Transfers:  · Patient completed Sit <> Stand Transfer with " contact guard assistance  with  rolling walker   · Functional Mobility: ambulated to the fournier door and back including along the EOB to get higher up.    Activities of Daily Living:  · Feeding:  NPO secondary increased blood sugar  · Upper Body Dressing: contact guard assistance seated EOB  · Lower Body Dressing: minimum assistance to merly socks    Cognitive/Visual Perceptual:  Cognitive/Psychosocial Skills:     -       Oriented to: Person, Place and Situation   -       Follows Commands/attention:Follows one-step commands  -       Communication: clear/fluent  -       Memory: No Deficits noted  -       Safety awareness/insight to disability: impaired   -       Mood/Affect/Coping skills/emotional control: Appropriate to situation    Physical Exam:  Balance:    -       sit balance good; standing balance fair plus  Postural examination/scapula alignment:    -       Rounded shoulders  Skin integrity: Visible skin intact  Upper Extremity Range of Motion:     -       Right Upper Extremity: WFL  -       Left Upper Extremity: WFL  Upper Extremity Strength:    -       Right Upper Extremity: WFL  -       Left Upper Extremity: WFL    AMPAC 6 Click ADL:  AMPAC Total Score: 21    Treatment & Education:  Evaluation  Education:    Patient left supine with all lines intact, call button in reach and Meggan notified    GOALS:   Multidisciplinary Problems     Occupational Therapy Goals     Not on file          Multidisciplinary Problems (Resolved)        Problem: Occupational Therapy Goal    Goal Priority Disciplines Outcome Interventions   Occupational Therapy Goal   (Resolved)     OT, PT/OT Outcome(s) achieved                    History:     Past Medical History:   Diagnosis Date    Anticoagulant long-term use     Xarelto    Arthritis     Asthma     CHF (congestive heart failure)     COPD (chronic obstructive pulmonary disease)     Coronary artery disease     Deep vein thrombosis (DVT) of left lower extremity     Depression      Diabetes mellitus     GERD (gastroesophageal reflux disease)     Hypertension     Obstructive sleep apnea on CPAP     On home oxygen therapy     Unsteady gait     uses either walker or 4 prong cane       Past Surgical History:   Procedure Laterality Date    CORONARY ANGIOPLASTY WITH STENT PLACEMENT      HERNIA REPAIR      encapsulated umbilical hernia       Time Tracking:     OT Date of Treatment: 07/01/19  OT Start Time: 1334  OT Stop Time: 1352  OT Total Time (min): 18 min    Billable Minutes:Evaluation 18 minutes with PT    Trish Ca OT  7/1/2019

## 2019-07-01 NOTE — NURSING
PCT notified nurse patient's BP 70/40.  Manual BP 78/42.  POCT glucose 466.  Patient diaphoretic, dizzy, and feeling lightheaded.  Placed in trendelenburg.  Amy Castellanos NP notified; new order placed.  IV infiltrated to right hand.  New access placed to left FA.  Bolus infusing.

## 2019-07-01 NOTE — ASSESSMENT & PLAN NOTE
Patient's urinalysis is concerning for acute cystitis given cloudy appearance, 3+ protein, 3+ glucose, 3+ occult blood, and positive for nitrites.  She has been started on empiric antibiotic therapy pending urine culture results.    7/1/19  Urine culture pending. Previous culture e coli resistant to cipro, levo, and tetra, but is sensitive to augmentin, cephalosporins

## 2019-07-01 NOTE — PLAN OF CARE
Problem: Occupational Therapy Goal  Goal: Occupational Therapy Goal  Outcome: Outcome(s) achieved Date Met: 07/01/19  Patient tolerated evaluation well, patient with no need for skilled OT services at this time. HEDY Wiley, MS

## 2019-07-01 NOTE — PLAN OF CARE
Problem: Physical Therapy Goal  Goal: Physical Therapy Goal  Outcome: Outcome(s) achieved Date Met: 07/01/19  Pt ambulated ~20 ft within room with SBA using RW and 3L O2 NC.  Pt will benefit from home health PT services.

## 2019-07-01 NOTE — PROGRESS NOTES
WRITTEN HEALTHCARE DISCHARGE INFORMATION      Things that YOU are responsible for to Manage Your Care At Home:  1. Getting your prescriptions filled.  2. Taking you medications as directed. DO NOT MISS ANY DOSES!  3. Going to your follow-up doctor appointments. This is important because it allows the doctor to monitor your progress and to determine if any changes need to be made to your treatment plan.     If you are unable to make your follow up appointments, please call the number listed and reschedule this appointment.      After discharge, if you need assistance, you can call [facility] On Call Nurse Care Line for 24/7 assistance at [phone]     If you are experience any signs or symptoms, Call your doctor or Call 911 and come to your nearest Emergency Room.     Thank you for choosing [facility] and allowing us to care for you.        You should receive a call from [facility] Discharge Department within 48-72 hours to help manage your care after discharge. Please try to make sure that you answer your phone for this important phone call.     Follow-up Information     Angel Orourke Jr, MD.    Specialty:  Family Medicine  Contact information:  [address]  [facility] [facility] [address]  [phone]             [facility].    Specialties:  DME Provider, Home Health Services  Why:  Home Health- call to notify that you are home and that care is to be resumed  Contact information:  [address] [facility]  [address]  [place] [address]  [phone]             Shyam Glaser MD On 8/1/2019.    Specialty:  Pulmonary Disease  Why:  at 10:20am  Contact information:  [address] [place] [address]  [id number]  [facility] [address]  [phone]

## 2019-07-01 NOTE — ED TRIAGE NOTES
Pt presents to ER via EMS with c\o worsening SOB. Pt reports it started around 0100 last night and has progressively gotten worse despite using at home inhalation tx. EMS states she was on home O2 @3L and sats were 90%, but pt appeared distressed. Pt arrives to ED on BiPAP. Pt reports lso taking oral prednisone today. Pt was recently seen in this ED with similar s\s. Hx COPD and CHF

## 2019-07-01 NOTE — ASSESSMENT & PLAN NOTE
Patient is currently in sinus rhythm that is rate-controlled with amiodarone and metoprolol.  Will also continued her home regimen of rivaroxaban.

## 2019-07-01 NOTE — NURSING
"POCT glucose greater than 455.  Amy Castellanos NP notified; new order placed.  Novolog 10 units administered.    Oxygen SAT 97% on 3.5 L per NC.    Patient states she feels "awful."  Emotional support provided.    Amy Castellanos NP ordered patient to be discharged when POCT glucose less than 290.   "

## 2019-07-01 NOTE — PT/OT/SLP EVAL
Physical Therapy Evaluation and Discharge Note    Patient Name:  Yasmeen Palm   MRN:  [id number]    Recommendations:     Discharge Recommendations:  home health PT(with family assistance)   Discharge Equipment Recommendations: none   Barriers to discharge: Decreased caregiver support    Assessment:     Yasmeen Palm is a 69 y.o. female admitted with a medical diagnosis of COPD with acute exacerbation.     Recent Surgery: * No surgery found *      Plan:     Pt to be D/C'ed home today.     Subjective     Chief Complaint: SOB with activities  Patient/Family Comments/goals: Pt reported that her spouse is in the ICU at [facility] at this time.  Pain/Comfort:  · Pain Rating 1: 0/10    Living Environment:  Pt lives with spouse in a SS house with ~3 steps and B HR at entry.   Prior to admission, patients level of function was mod I with household ambulation using rollator and 3L O2 NC.  Equipment used at home: walker, rolling, rollator, oxygen, cane, straight, bedside commode, shower chair, wheelchair.  Upon discharge, patient will have assistance from no one.  Pt reported that children [place] of [place].     Objective:     Communicated with nurse Nieto prior to session.  Patient found HOB elevated with oxygen 3L, peripheral IV, telemetry, PureWick upon PT entry to room.    General Precautions: Standard, fall, respiratory, diabetic   Orthopedic Precautions:N/A   Braces: N/A     Exams:  · Cognitive Exam:  Patient was able to follow multiple commands.    · Gross Motor Coordination:  WFL  · Postural Exam:  Patient presented with the following abnormalities:    · -       Rounded shoulders  · -       obesity  · Sensation: Pt reported numbness to B foot.    · Skin Integrity/Edema:    · -       Skin integrity: Visible skin intact  · -       Edema: Mild BLE  · BLE ROM: WFL  · BLE Strength: WFL    Functional Mobility:  · Bed Mobility:     · Rolling Left:  stand by assistance  · Rolling Right: stand by assistance  · Scooting:  stand by assistance  · Bridging: stand by assistance  · Supine to Sit: stand by assistance  · Sit to Supine: stand by assistance  · Transfers:     · Sit to Stand:  stand by assistance and contact guard assistance with rolling walker x 2 trials   · Gait: Pt ambulated ~6 ft and ~20 ft with CGA-SBA using RW and 3L O2 NC.  Pt with decreased step length and dyllan.  Pt with SOB during ambulation.    · Balance: Pt with fair+ balance.     AM-PAC 6 CLICK MOBILITY  Total Score:22       Patient left HOB elevated with all lines intact, call button in reach and nurse Shaye and Amy Castellanos NP notified.    GOALS:   Multidisciplinary Problems     Physical Therapy Goals     Not on file          Multidisciplinary Problems (Resolved)        Problem: Physical Therapy Goal    Goal Priority Disciplines Outcome Goal Variances Interventions   Physical Therapy Goal   (Resolved)     PT, PT/OT Outcome(s) achieved                     History:     Past Medical History:   Diagnosis Date    Anticoagulant long-term use     Xarelto    Arthritis     Asthma     CHF (congestive heart failure)     COPD (chronic obstructive pulmonary disease)     Coronary artery disease     Deep vein thrombosis (DVT) of left lower extremity     Depression     Diabetes mellitus     GERD (gastroesophageal reflux disease)     Hypertension     Obstructive sleep apnea on CPAP     On home oxygen therapy     Unsteady gait     uses either walker or 4 prong cane       Past Surgical History:   Procedure Laterality Date    CORONARY ANGIOPLASTY WITH STENT PLACEMENT      HERNIA REPAIR      encapsulated umbilical hernia       Time Tracking:     PT Received On: 07/01/19  PT Start Time: 1337     PT Stop Time: 1351  PT Total Time (min): 14 min     Billable Minutes: Evaluation 14 min co-eval with OT      Estephania Resendiz, PT  07/01/2019

## 2019-07-01 NOTE — PLAN OF CARE
Problem: Adult Inpatient Plan of Care  Goal: Patient-Specific Goal (Individualization)  Outcome: Ongoing (interventions implemented as appropriate)  Deep breathing excersises

## 2019-07-01 NOTE — PLAN OF CARE
"   07/01/19 1105   Final Note   Assessment Type Final Discharge Note   Anticipated Discharge Disposition Home-[facility]   Hospital Follow Up  Appt(s) scheduled? Yes   Discharge plans and expectations educations in teach back method with documentation complete? Yes   Right Care Referral Info   Post Acute Recommendation Home-care   Referral Type   (Anthony RANDHAWA)   pts nurse Meggan advised that all CM needs have been addressed and can d/c from CM standpoint.    EDUCATION:  provided with educational information on COPD.  Information reviewed and placed in :My Healthcare Packet" to be brought home to use as resource after discharge.  Information included:  signs and symptoms to look for and call the doctor if experiencing, and symptoms that may indicate a medical emergency: CALL 911.      All questions answered.  Teach back method used.    Patient stated, " will go to scheduled appointment and take medications as prescribed ".        "

## 2019-07-01 NOTE — ED PROVIDER NOTES
Encounter Date: 7/1/2019       History     Chief Complaint   Patient presents with    Shortness of Breath     hx of CHF, COPD, sats 90% upon EMS arrival on 4L home O2, placed on CPAP with neb tx in route with EMS     Dysuria     sicne yesterday      This is an emergent evaluation of a 68 yo female who presents via [facility] with acute severe shortness of breath. Patient reports this episode started around noon today, about 12 hours prior to ED arrival. She had an episode of chest pain earlier but does not have any chest pain now. She has noticed swelling to her bilateral feet, left >> right, since yesterday. (Left foot generally swells more than right.) No nausea/vomiting. No diaphoresis. EMS found patient with O2 sat 90% despite her being on O2 by NC at 4L, which is greater than her baseline requirement (3.5L). EMS started patient on CPAP with some improvement. Patient reports she took prednisone earlier today.    Patient in addition reports dysuria since yesterday.     PMH: diastolic CHF, paroxysmal atrial fibrillation, CAD, COPD, HTN, DM2, DVT LLE, chronic anticoagulation (Xarelto), chronic O2 therapy (3.5L), asthma, arthritis, GERD, LOYDA on CPAP    Psych: depression    PSH: abdominal surgery, coronary angioplasty, cardiac surgery, hernia repair, hernia mesh    6/27/18 Cardiac Cath  Diagnostic:        Patient has a right dominant coronary artery.      - Left Main Coronary Artery:             The LM has luminal irregularities. There is ROSA 3 flow.     - Left Anterior Descending Artery:             The LAD has luminal irregularities. There is ROSA 3 flow.     - Left Circumflex Artery:             The LCX has luminal irregularities. There is ROSA 3 flow.     - Right Coronary Artery:             The mid RCA has a 40% stenosis. There is ROSA 3 flow.    TTE 6/9/19  · Normal left ventricular systolic function. The estimated ejection fraction is 70%  · Moderate concentric left ventricular hypertrophy.  · Grade I (mild)  "left ventricular diastolic dysfunction consistent with impaired relaxation.  · Normal left atrial pressure.  · Moderate left atrial enlargement.  · Mild right atrial enlargement.  · Mild mitral regurgitation.  · Mild tricuspid regurgitation.  · The estimated PA systolic pressure is 80 mm Hg  · Intermediate central venous pressure (8 mm Hg).  · Pulmonary hypertension present.        Review of patient's allergies indicates:  No Known Allergies  Past Medical History:   Diagnosis Date    Anticoagulant long-term use     Xarelto    Arthritis     Asthma     CHF (congestive heart failure)     COPD (chronic obstructive pulmonary disease)     Coronary artery disease     Deep vein thrombosis (DVT) of left lower extremity     Depression     Diabetes mellitus     GERD (gastroesophageal reflux disease)     Hypertension     Obstructive sleep apnea on CPAP     On home oxygen therapy     Unsteady gait     uses either walker or 4 prong cane     Past Surgical History:   Procedure Laterality Date    CORONARY ANGIOPLASTY WITH STENT PLACEMENT      HERNIA REPAIR      encapsulated umbilical hernia     Family History   Problem Relation Age of Onset    Heart disease Mother     Hypertension Mother     Diabetes Father      Social History     Tobacco Use    Smoking status: Former Smoker     Packs/day: 0.50     Years: 55.00     Pack years: 27.50     Types: Cigarettes     Start date: 1963     Last attempt to quit: 2018     Years since quittin.4    Smokeless tobacco: Never Used    Tobacco comment: "States she quit smoking about 3 or 4 weeks ago"   Substance Use Topics    Alcohol use: Not Currently     Alcohol/week: 0.6 oz     Types: 1 Glasses of wine per week     Frequency: Never     Comment: socially    Drug use: No     Review of Systems   Constitutional: Negative for fever.   HENT: Positive for facial swelling.    Eyes: Negative for visual disturbance.   Respiratory: Positive for shortness of breath.  "   Cardiovascular: Positive for chest pain and leg swelling.   Gastrointestinal: Negative for abdominal pain.   Genitourinary: Positive for dysuria.   Musculoskeletal: Negative for neck pain.   Skin: Negative for color change.   Neurological: Negative for numbness.   Hematological: Bruises/bleeds easily (Xarelto).       Physical Exam     Initial Vitals   BP Pulse Resp Temp SpO2   07/01/19 0044 07/01/19 0042 07/01/19 0042 07/01/19 0238 07/01/19 0042   (!) 144/90 94 (!) 25 98.1 °F (36.7 °C) 100 %      MAP       --                Physical Exam    Nursing note and vitals reviewed.  Constitutional: She appears well-developed and well-nourished. She is not diaphoretic. She appears distressed.   Awake and alert. CPAP in progress by EMS.   HENT:   Head: Normocephalic and atraumatic.   Mild facial swelling   Eyes: Conjunctivae and EOM are normal. Pupils are equal, round, and reactive to light.   Neck: Normal range of motion. Neck supple.   Cardiovascular: Normal rate, regular rhythm and intact distal pulses.   Murmur heard.  Pulmonary/Chest: She is in respiratory distress. She has wheezes. She has no rhonchi. She has no rales.   Very diminished breath sounds bilat, rare wheezes.   Abdominal: Soft. There is no tenderness. There is no rebound and no guarding.   Musculoskeletal: Normal range of motion. She exhibits edema. She exhibits no tenderness.   2+ LLE edema, 1+ RLE edema   Neurological: She is alert and oriented to person, place, and time. She has normal strength.   Moving all extremities.   Skin: Skin is warm and dry. No erythema. There is pallor.   Psychiatric: She has a normal mood and affect.         ED Course   Procedures  Labs Reviewed   B-TYPE NATRIURETIC PEPTIDE - Abnormal; Notable for the following components:       Result Value     (*)     All other components within normal limits   CBC W/ AUTO DIFFERENTIAL - Abnormal; Notable for the following components:    RBC 3.36 (*)     Hemoglobin 8.5 (*)      Hematocrit 30.8 (*)     Mean Corpuscular Hemoglobin 25.3 (*)     Mean Corpuscular Hemoglobin Conc 27.6 (*)     RDW 18.7 (*)     Platelets 352 (*)     Gran # (ANC) 8.0 (*)     Gran% 77.5 (*)     Lymph% 15.5 (*)     All other components within normal limits   COMPREHENSIVE METABOLIC PANEL - Abnormal; Notable for the following components:    Glucose 294 (*)     Calcium 8.6 (*)     Albumin 3.0 (*)     ALT 9 (*)     All other components within normal limits   LACTIC ACID, PLASMA - Abnormal; Notable for the following components:    Lactate (Lactic Acid) 2.8 (*)     All other components within normal limits   URINALYSIS, REFLEX TO URINE CULTURE - Abnormal; Notable for the following components:    Appearance, UA Cloudy (*)     Protein, UA 3+ (*)     Glucose, UA 3+ (*)     Occult Blood UA 3+ (*)     Nitrite, UA Positive (*)     Urobilinogen, UA 4.0-6.0 (*)     Leukocytes, UA 3+ (*)     All other components within normal limits    Narrative:     Preferred Collection Type->Urine, Clean Catch   URINALYSIS MICROSCOPIC - Abnormal; Notable for the following components:    RBC, UA 30 (*)     WBC, UA 75 (*)     Bacteria Few (*)     All other components within normal limits    Narrative:     Preferred Collection Type->Urine, Clean Catch   POCT GLUCOSE - Abnormal; Notable for the following components:    POCT Glucose 314 (*)     All other components within normal limits   CULTURE, URINE   APTT   PROTIME-INR   TROPONIN I   BETA - HYDROXYBUTYRATE, SERUM   POCT GLUCOSE MONITORING CONTINUOUS     EKG Readings: (Independently Interpreted)   00:44: NSR, HR 96. L axis. No ectopy. TWI in aVL. No STEMI. Morphology c/w 6/24/19.      ECG Results          EKG 12-lead (In process)  Result time 07/01/19 06:22:57    In process by Interface, Lab In [facility] (07/01/19 06:22:57)                 Narrative:    Test Reason : R06.02,    Vent. Rate : 096 BPM     Atrial Rate : 096 BPM     P-R Int : 164 ms          QRS Dur : 076 ms      QT Int : 342 ms        "P-R-T Axes : 067 -52 066 degrees     QTc Int : 432 ms    Normal sinus rhythm  Left anterior fascicular block  Anterior infarct (cited on or before 07-JUN-2019)  Abnormal ECG  When compared with ECG of 24-JUN-2019 13:00,  Significant changes have occurred    Referred By: NETTE   SELF           Confirmed By:                   In process by Interface, Lab In [facility] (07/01/19 06:17:22)                 Narrative:    Test Reason : R06.02,    Vent. Rate : 096 BPM     Atrial Rate : 096 BPM     P-R Int : 164 ms          QRS Dur : 076 ms      QT Int : 342 ms       P-R-T Axes : 067 -52 066 degrees     QTc Int : 432 ms    Normal sinus rhythm  Left anterior fascicular block  Anterior infarct (cited on or before 07-JUN-2019)  Abnormal ECG  When compared with ECG of 24-JUN-2019 13:00,  Significant changes have occurred    Referred By: NETTE   SELF           Confirmed By:                             Imaging Results          X-Ray Chest AP Portable (Final result)  Result time 07/01/19 01:40:24    Final result by Gopal Hawthorne MD (07/01/19 01:40:24)                 Impression:      No acute cardiopulmonary finding or detrimental change when compared with 06/24/2019.      Electronically signed by: Gopal Hawthorne MD  Date:    07/01/2019  Time:    01:40             Narrative:    EXAMINATION:  XR CHEST AP PORTABLE    CLINICAL HISTORY:  Provided history is "shortness of breath;  ".    TECHNIQUE:  One view of the chest.    COMPARISON:  06/24/2019.    FINDINGS:  Cardiac wires overlie the chest.  Cardiac silhouette is magnified by portable technique but not felt to be enlarged.  No focal area of consolidation.  No sizable pleural effusion.  No pneumothorax.  No detrimental change when compared with the prior study.                              X-Rays:   Independently Interpreted Readings:   Other Readings:  CXR NAD    Medical Decision Making:   History:   Old Medical Records: I decided to obtain old medical records.  Old " Records Summarized: records from previous admission(s).  Initial Assessment:   68 yo female with SOB.  Differential Diagnosis:   Ddx includes COPD, CHF, ACS, symptomatic anemia, PNA, other.  Independently Interpreted Test(s):   I have ordered and independently interpreted X-rays - see prior notes.  I have ordered and independently interpreted EKG Reading(s) - see prior notes  Clinical Tests:   Lab Tests: Ordered and Reviewed  Radiological Study: Ordered and Reviewed  Medical Tests: Ordered and Reviewed  ED Management:  EKG no STEMI.    CXR NAD.    Labs: Hgb 8.5, c/w prior. CMP hyperglycemia without anion gap. Troponin negative. , mildly over priors. Lactate 2.8.     I suspect CHF + COPD exacerbation. Patient had continuous neb via BIPAP (albuterol 10, ipratropium 1.5). She later had solumedrol 125mg IV. She was able to tolerate removal of BIPAP and was switched to NC at baseline requirement.    I discussed patient for OBS with nocturnist Dr. Childers and have written courtesy orders.   Other:   I have discussed this case with another health care provider.                      Clinical Impression:       ICD-10-CM ICD-9-CM   1. Shortness of breath R06.02 786.05   2. Acute on chronic congestive heart failure, unspecified heart failure type I50.9 428.0   3. COPD exacerbation J44.1 491.21   4. Hypoxia R09.02 799.02   5. Acute cystitis with hematuria N30.01 595.0                                Katie Seaman MD  07/01/19 0709

## 2019-07-01 NOTE — ASSESSMENT & PLAN NOTE
Fairly-controlled on a home regimen of metformin; will provide basal-prandial insulin along with insulin sliding scale.

## 2019-07-01 NOTE — ASSESSMENT & PLAN NOTE
Patient's urinalysis is concerning for acute cystitis given cloudy appearance, 3+ protein, 3+ glucose, 3+ occult blood, and positive for nitrites.  She has been started on empiric antibiotic therapy pending urine culture results.

## 2019-07-01 NOTE — NURSING
Patient stated she did not feel well enough to walk around unit.  BG greater than 500.  Amy Castellanos NP notified; acknowledged.

## 2019-07-01 NOTE — HPI
Mrs. Yasmeen Palm is a 69 y.o. female known to me with essential hypertension, type 2 diabetes mellitus (HbA1c 7.3% Jun 2019), hyperlipidemia (.0 Dec 2018), paroxysmal atrial fibrillation ([id number] score 4) on chronic anticoagulation, COPD, chronic respiratory failure with hypoxia and hypercapnia, GERD, anemia of chronic disease, tobacco abuse, and history of PE/DVT on chronic anticoagulation who presents to [facility] ED with complaints of dyspnea past two days.  The dyspnea is mainly on exertion and associated with wheezing and a cough that was occasionally productive of thick clear sputum.  She denies any fevers, chills, nausea, vomiting, chest pain, pleurisy, diaphoresis, palpitations, nor any hemoptysis.  She does report bilateral lower extremity edema that is slightly worse than her baseline.  She does complain of orthopnea but denies any PND.  She also reports that her niece and  have been having allergy symptoms; she has not had any recent travel.    She also reports dysuria for the past 1.5 days.  It is associated with increased urinary frequency and urgency.  She denies any hematuria.

## 2019-07-01 NOTE — ASSESSMENT & PLAN NOTE
Patient had a similar episode about 3 weeks ago and was treated for COPD exacerbation.  I have reviewed her EKG which was significant for sinus tachycardia without evidence of acute ischemia.  She wear oxygen at home and is usually on 3.5 liters/minute.  I also reviewed her plain radiograph of the chest and it was without infiltrates, pleural effusions, nor any pneumothorax.  She was initially started on CPAP which was transition to BPAP.  She is now off of BPAP and doing well.  Will provide frequent nebulized JORDAN/LAMA; intravenous corticosteroids, and empiric antibiotics therapy.

## 2019-07-01 NOTE — ASSESSMENT & PLAN NOTE
Patient's blood pressure is poorly-controlled; will continue home regimen of furosemide, hydralazine, isosorbide, lisinopril, and metoprolol, and provide as-needed clonidine.

## 2019-07-01 NOTE — H&P
[facility] Medicine  History & Physical    Patient Name: Yasmeen Palm  MRN: [id number]  Admission Date: 7/1/2019  Attending Physician: Jewel Novoa MD   Primary Care Provider: Angel Orourke Jr, MD         Patient information was obtained from patient.     Subjective:     Principal Problem:COPD with acute exacerbation    Chief Complaint: Shortness of breath for two days.    HPI: Mrs. Yasmeen Palm is a 69 y.o. female known to me with essential hypertension, type 2 diabetes mellitus (HbA1c 7.3% Jun 2019), hyperlipidemia (.0 Dec 2018), paroxysmal atrial fibrillation ([id number] score 4) on chronic anticoagulation, COPD, chronic respiratory failure with hypoxia and hypercapnia, GERD, anemia of chronic disease, tobacco abuse, and history of PE/DVT on chronic anticoagulation who presents to [facility] ED with complaints of dyspnea past two days.  The dyspnea is mainly on exertion and associated with wheezing and a cough that was occasionally productive of thick clear sputum.  She denies any fevers, chills, nausea, vomiting, chest pain, pleurisy, diaphoresis, palpitations, nor any hemoptysis.  She does report bilateral lower extremity edema that is slightly worse than her baseline.  She does complain of orthopnea but denies any PND.  She also reports that her niece and  have been having allergy symptoms; she has not had any recent travel.    She also reports dysuria for the past 1.5 days.  It is associated with increased urinary frequency and urgency.  She denies any hematuria.    Chart Review:  Previous Hospitalizations  Date Hospital Diagnosis   Jun 7, 2019 [facility] COPD exacerbation    Mar 2019 [facility] COPD exacerbation    Feb 2019 [facility] COPD exacerbation    Dec 2018 [facility] Aphasia - negative work-up for CVA   Jun 2018 [facility] COPD exacerbation, respiratory failure, intubation, AFib with RVR   Dec 2017 [facility] COPD exacerbation    Nov 2017 [facility] COPD exacerbation    Aug 2017  [facility] Acute respiratory failure   Apr 2017 [facility] COPD exacerbation, respiratory failure, intubation    Sept 2016 [facility] PE/DVT   Dec 2015 [facility] [place] Chest pain - normal nuclear stress test   Nov 2010 [facility] COPD exacerbation    Aug 2010 [facility] Chest pain - negative work-up     Past Medical History:   Diagnosis Date    Anticoagulant long-term use     Xarelto    Arthritis     Asthma     CHF (congestive heart failure)     COPD (chronic obstructive pulmonary disease)     Coronary artery disease     Deep vein thrombosis (DVT) of left lower extremity     Depression     Diabetes mellitus     GERD (gastroesophageal reflux disease)     Hypertension     Obstructive sleep apnea on CPAP     On home oxygen therapy     Unsteady gait     uses either walker or 4 prong cane       Past Surgical History:   Procedure Laterality Date    CORONARY ANGIOPLASTY WITH STENT PLACEMENT      HERNIA REPAIR      encapsulated umbilical hernia       Review of patient's allergies indicates:  No Known Allergies    No current facility-administered medications on file prior to encounter.      Current Outpatient Medications on File Prior to Encounter   Medication Sig    acetaminophen (TYLENOL) 500 MG tablet Take 1 tablet (500 mg total) by mouth every 8 (eight) hours as needed.    amiodarone (PACERONE) 200 MG Tab Take 1 tablet (200 mg total) by mouth once daily.    aspirin (ECOTRIN) 81 MG EC tablet Take 81 mg by mouth once daily.    cephALEXin (KEFLEX) 500 MG capsule Take 1 capsule (500 mg total) by mouth every 12 (twelve) hours. for 7 days    ferrous gluconate (FERGON) 324 MG tablet Take 1 tablet (324 mg total) by mouth daily with breakfast.    fluticasone propionate (FLOVENT HFA) 220 mcg/actuation inhaler Inhale 1 puff into the lungs 2 (two) times daily. Controller    furosemide (LASIX) 40 MG tablet Take 40 mg by mouth once daily.     gabapentin (NEURONTIN) 300 MG capsule Take 300 mg by mouth 3 (three) times daily.    hydrALAZINE  "(APRESOLINE) 50 MG tablet Take 50 mg by mouth 2 (two) times daily.    isosorbide mononitrate (IMDUR) 30 MG 24 hr tablet Take 3 tablets (90 mg total) by mouth once daily.    lisinopril (PRINIVIL,ZESTRIL) 40 MG tablet Take 1 tablet (40 mg total) by mouth once daily. Do not take this medication if blood pressure is below 130/80 mmHg and/or feeling dizzy after taking all other blood pressure meds    metformin (GLUCOPHAGE) 500 MG tablet Take 1,000 mg by mouth 2 (two) times daily with meals.     metoprolol tartrate (LOPRESSOR) 100 MG tablet Take 1 tablet (100 mg total) by mouth 2 (two) times daily.    multivit-min-FA-lycopen-lutein (CENTRUM SILVER) 0.4-300-250 mg-mcg-mcg Tab Take 1 tablet by mouth once daily.    multivitamin (THERAGRAN) per tablet Take 1 tablet by mouth once daily.    nystatin (MYCOSTATIN) powder Apply topically 2 (two) times daily.    pantoprazole (PROTONIX) 40 MG tablet Take 40 mg by mouth once daily.    pregabalin (LYRICA) 75 MG capsule Take 75 mg by mouth 2 (two) times daily.    rivaroxaban (XARELTO) 20 mg Tab Take 20 mg by mouth daily with dinner or evening meal.     tiotropium (SPIRIVA) 18 mcg inhalation capsule Inhale 1 capsule (18 mcg total) into the lungs once daily. Controller    umeclidinium brm/vilanterol tr (ANORO ELLIPTA INHL) Inhale into the lungs.    levalbuterol (XOPENEX HFA) 45 mcg/actuation inhaler Inhale 2 puffs into the lungs every 4 (four) hours as needed for Wheezing or Shortness of Breath. Rescue     Family History     Problem Relation (Age of Onset)    Diabetes Father    Heart disease Mother    Hypertension Mother        Tobacco Use    Smoking status: Former Smoker     Packs/day: 0.50     Years: 55.00     Pack years: 27.50     Types: Cigarettes     Start date: 1963     Last attempt to quit: 2018     Years since quittin.4    Smokeless tobacco: Never Used    Tobacco comment: "States she quit smoking about 3 or 4 weeks ago"   Substance and Sexual " Activity    Alcohol use: Not Currently     Alcohol/week: 0.6 oz     Types: 1 Glasses of wine per week     Frequency: Never     Comment: socially    Drug use: No    Sexual activity: Never     Review of Systems   Constitutional: Negative for activity change, appetite change, chills, diaphoresis, fatigue, fever and unexpected weight change.   HENT: Negative.    Eyes: Negative.    Respiratory: Positive for cough, shortness of breath and wheezing. Negative for chest tightness.    Cardiovascular: Positive for leg swelling. Negative for chest pain and palpitations.   Gastrointestinal: Negative for abdominal distention, abdominal pain, blood in stool, constipation, diarrhea, nausea and vomiting.   Genitourinary: Positive for dysuria, frequency and urgency. Negative for hematuria.   Musculoskeletal: Negative.    Skin: Negative.    Neurological: Negative for dizziness, seizures, syncope, weakness and light-headedness.   Psychiatric/Behavioral: Negative.      Objective:     Vital Signs (Most Recent):  Temp: 99.5 °F (37.5 °C) (07/01/19 0502)  Pulse: 99 (07/01/19 0502)  Resp: 18 (07/01/19 0502)  BP: (!) 168/79 (07/01/19 0502)  SpO2: 95 % (07/01/19 0502) Vital Signs (24h Range):  Temp:  [98.1 °F (36.7 °C)-99.5 °F (37.5 °C)] 99.5 °F (37.5 °C)  Pulse:  [86-99] 99  Resp:  [15-31] 18  SpO2:  [95 %-100 %] 95 %  BP: (118-192)/(56-90) 168/79     Weight: 79.5 kg (175 lb 4.3 oz)  Body mass index is 27.45 kg/m².    Physical Exam   Constitutional: She is oriented to person, place, and time. She appears well-developed and well-nourished. No distress.   HENT:   Head: Normocephalic and atraumatic.   Right Ear: External ear normal.   Left Ear: External ear normal.   Nose: Nose normal.   Eyes: Right eye exhibits no discharge. Left eye exhibits no discharge.   Neck: Normal range of motion.   Cardiovascular: Normal rate, regular rhythm, normal heart sounds and intact distal pulses. Exam reveals no gallop and no friction rub.   No murmur  heard.  Pulmonary/Chest:   Normal respiratory effort with bibasilar crackles and very minimal end expiratory wheezing   Abdominal: Soft. Bowel sounds are normal. She exhibits no distension. There is no tenderness. There is no rebound and no guarding.   Musculoskeletal: Normal range of motion. She exhibits edema (Trace pitting edema to the bilateral lower extremities).   Neurological: She is alert and oriented to person, place, and time.   Skin: Skin is warm and dry. She is not diaphoretic. No erythema.   Psychiatric: She has a normal mood and affect. Her behavior is normal. Judgment and thought content normal.   Nursing note and vitals reviewed.          Significant Labs: All pertinent labs within the past 24 hours have been reviewed.    Significant Imaging: I have reviewed and interpreted all pertinent imaging results/findings within the past 24 hours.    Assessment/Plan:     * COPD with acute exacerbation  Patient had a similar episode about 3 weeks ago and was treated for COPD exacerbation.  I have reviewed her EKG which was significant for sinus tachycardia without evidence of acute ischemia.  She wear oxygen at home and is usually on 3.5 liters/minute.  I also reviewed her plain radiograph of the chest and it was without infiltrates, pleural effusions, nor any pneumothorax.  She was initially started on CPAP which was transition to BPAP.  She is now off of BPAP and doing well.  Will provide frequent nebulized JORDAN/LAMA; intravenous corticosteroids, and empiric antibiotics therapy.      Acute cystitis  Patient's urinalysis is concerning for acute cystitis given cloudy appearance, 3+ protein, 3+ glucose, 3+ occult blood, and positive for nitrites.  She has been started on empiric antibiotic therapy pending urine culture results.    Essential hypertension  Patient's blood pressure is poorly-controlled; will continue home regimen of furosemide, hydralazine, isosorbide, lisinopril, and metoprolol, and provide  as-needed clonidine.    Controlled type 2 diabetes mellitus, without long-term current use of insulin  Fairly-controlled on a home regimen of metformin; will provide basal-prandial insulin along with insulin sliding scale.    Hyperlipidemia  Poorly-controlled; will continue to monitor.    Paroxysmal atrial fibrillation  Patient is currently in sinus rhythm that is rate-controlled with amiodarone and metoprolol.  Will also continued her home regimen of rivaroxaban.    Chronic anticoagulation  As addressed above.    COPD (chronic obstructive pulmonary disease)  As addressed above.    Chronic respiratory failure with hypoxia  Will continue supplemental oxygen therapy.    Gastroesophageal reflux disease without esophagitis  Will continue her home regimen of pantoprazole.    Anemia of chronic disease  The patient's H/H is stable and consistent with previous laboratory measurements, and the patient exhibits no signs or symptoms of acute bleeding; there is no indication for transfusion.  Will continue to monitor.    Tobacco abuse  Patient was counseled on smoking cessation and she will be provided a nicotine transdermal patch applied while inpatient.  Will provide additional smoking cessation counseling prior to discharge.    History of pulmonary embolism  Stable; there are no acute issues.    History of deep vein thrombosis  Stable; there are no acute issues.    VTE Risk Mitigation (From admission, onward)        Ordered     rivaroxaban tablet 20 mg  With dinner      07/01/19 0456     Reason for No Pharmacological VTE Prophylaxis  Once      07/01/19 0456     IP VTE HIGH RISK PATIENT  Once      07/01/19 0456     Reason for No Pharmacological VTE Prophylaxis  Once      07/01/19 0456             Gaby Childers M.D.  Staff Nocturnist  Department of Hospital Medicine  [facility]  Pager: [phone]          N.B.: Portions of this note was dictated using M*Modal Fluency Direct--there may be voice  recognition errors occasionally missed on review.

## 2019-07-01 NOTE — ASSESSMENT & PLAN NOTE
The patient's H/H is stable and consistent with previous laboratory measurements, and the patient exhibits no signs or symptoms of acute bleeding; there is no indication for transfusion.  Will continue to monitor. Obtain iron studies.

## 2019-07-01 NOTE — NURSING
Pt arrived on unit from the ED dept via stretcher accompanied by transport. Pt AAOx4 with no c/o pain. Telemetry monitor in place. Saline lock in place to site is clear. Pt Admission assessment in progress, pt informed of md orders, oriented to environment and safety maintained with bed low side rails up x2 with nurse call bell within reach and bed alarm set

## 2019-07-01 NOTE — SUBJECTIVE & OBJECTIVE
Past Medical History:   Diagnosis Date    Anticoagulant long-term use     Xarelto    Arthritis     Asthma     CHF (congestive heart failure)     COPD (chronic obstructive pulmonary disease)     Coronary artery disease     Deep vein thrombosis (DVT) of left lower extremity     Depression     Diabetes mellitus     GERD (gastroesophageal reflux disease)     Hypertension     Obstructive sleep apnea on CPAP     On home oxygen therapy     Unsteady gait     uses either walker or 4 prong cane       Past Surgical History:   Procedure Laterality Date    CORONARY ANGIOPLASTY WITH STENT PLACEMENT      HERNIA REPAIR      encapsulated umbilical hernia       Review of patient's allergies indicates:  No Known Allergies    No current facility-administered medications on file prior to encounter.      Current Outpatient Medications on File Prior to Encounter   Medication Sig    acetaminophen (TYLENOL) 500 MG tablet Take 1 tablet (500 mg total) by mouth every 8 (eight) hours as needed.    amiodarone (PACERONE) 200 MG Tab Take 1 tablet (200 mg total) by mouth once daily.    aspirin (ECOTRIN) 81 MG EC tablet Take 81 mg by mouth once daily.    cephALEXin (KEFLEX) 500 MG capsule Take 1 capsule (500 mg total) by mouth every 12 (twelve) hours. for 7 days    ferrous gluconate (FERGON) 324 MG tablet Take 1 tablet (324 mg total) by mouth daily with breakfast.    fluticasone propionate (FLOVENT HFA) 220 mcg/actuation inhaler Inhale 1 puff into the lungs 2 (two) times daily. Controller    furosemide (LASIX) 40 MG tablet Take 40 mg by mouth once daily.     gabapentin (NEURONTIN) 300 MG capsule Take 300 mg by mouth 3 (three) times daily.    hydrALAZINE (APRESOLINE) 50 MG tablet Take 50 mg by mouth 2 (two) times daily.    isosorbide mononitrate (IMDUR) 30 MG 24 hr tablet Take 3 tablets (90 mg total) by mouth once daily.    lisinopril (PRINIVIL,ZESTRIL) 40 MG tablet Take 1 tablet (40 mg total) by mouth once daily. Do not  "take this medication if blood pressure is below 130/80 mmHg and/or feeling dizzy after taking all other blood pressure meds    metformin (GLUCOPHAGE) 500 MG tablet Take 1,000 mg by mouth 2 (two) times daily with meals.     metoprolol tartrate (LOPRESSOR) 100 MG tablet Take 1 tablet (100 mg total) by mouth 2 (two) times daily.    multivit-min-FA-lycopen-lutein (CENTRUM SILVER) 0.4-300-250 mg-mcg-mcg Tab Take 1 tablet by mouth once daily.    multivitamin (THERAGRAN) per tablet Take 1 tablet by mouth once daily.    nystatin (MYCOSTATIN) powder Apply topically 2 (two) times daily.    pantoprazole (PROTONIX) 40 MG tablet Take 40 mg by mouth once daily.    pregabalin (LYRICA) 75 MG capsule Take 75 mg by mouth 2 (two) times daily.    rivaroxaban (XARELTO) 20 mg Tab Take 20 mg by mouth daily with dinner or evening meal.     tiotropium (SPIRIVA) 18 mcg inhalation capsule Inhale 1 capsule (18 mcg total) into the lungs once daily. Controller    umeclidinium brm/vilanterol tr (ANORO ELLIPTA INHL) Inhale into the lungs.    levalbuterol (XOPENEX HFA) 45 mcg/actuation inhaler Inhale 2 puffs into the lungs every 4 (four) hours as needed for Wheezing or Shortness of Breath. Rescue     Family History     Problem Relation (Age of Onset)    Diabetes Father    Heart disease Mother    Hypertension Mother        Tobacco Use    Smoking status: Former Smoker     Packs/day: 0.50     Years: 55.00     Pack years: 27.50     Types: Cigarettes     Start date: 1963     Last attempt to quit: 2018     Years since quittin.4    Smokeless tobacco: Never Used    Tobacco comment: "States she quit smoking about 3 or 4 weeks ago"   Substance and Sexual Activity    Alcohol use: Not Currently     Alcohol/week: 0.6 oz     Types: 1 Glasses of wine per week     Frequency: Never     Comment: socially    Drug use: No    Sexual activity: Never     Review of Systems   Constitutional: Negative for activity change, appetite change, " chills, diaphoresis, fatigue, fever and unexpected weight change.   HENT: Negative.    Eyes: Negative.    Respiratory: Positive for cough, shortness of breath and wheezing. Negative for chest tightness.    Cardiovascular: Positive for leg swelling. Negative for chest pain and palpitations.   Gastrointestinal: Negative for abdominal distention, abdominal pain, blood in stool, constipation, diarrhea, nausea and vomiting.   Genitourinary: Positive for dysuria, frequency and urgency. Negative for hematuria.   Musculoskeletal: Negative.    Skin: Negative.    Neurological: Negative for dizziness, seizures, syncope, weakness and light-headedness.   Psychiatric/Behavioral: Negative.      Objective:     Vital Signs (Most Recent):  Temp: 99.5 °F (37.5 °C) (07/01/19 0502)  Pulse: 99 (07/01/19 0502)  Resp: 18 (07/01/19 0502)  BP: (!) 168/79 (07/01/19 0502)  SpO2: 95 % (07/01/19 0502) Vital Signs (24h Range):  Temp:  [98.1 °F (36.7 °C)-99.5 °F (37.5 °C)] 99.5 °F (37.5 °C)  Pulse:  [86-99] 99  Resp:  [15-31] 18  SpO2:  [95 %-100 %] 95 %  BP: (118-192)/(56-90) 168/79     Weight: 79.5 kg (175 lb 4.3 oz)  Body mass index is 27.45 kg/m².    Physical Exam   Constitutional: She is oriented to person, place, and time. She appears well-developed and well-nourished. No distress.   HENT:   Head: Normocephalic and atraumatic.   Right Ear: External ear normal.   Left Ear: External ear normal.   Nose: Nose normal.   Eyes: Right eye exhibits no discharge. Left eye exhibits no discharge.   Neck: Normal range of motion.   Cardiovascular: Normal rate, regular rhythm, normal heart sounds and intact distal pulses. Exam reveals no gallop and no friction rub.   No murmur heard.  Pulmonary/Chest:   Normal respiratory effort with bibasilar crackles and very minimal end expiratory wheezing   Abdominal: Soft. Bowel sounds are normal. She exhibits no distension. There is no tenderness. There is no rebound and no guarding.   Musculoskeletal: Normal range  of motion. She exhibits edema (Trace pitting edema to the bilateral lower extremities).   Neurological: She is alert and oriented to person, place, and time.   Skin: Skin is warm and dry. She is not diaphoretic. No erythema.   Psychiatric: She has a normal mood and affect. Her behavior is normal. Judgment and thought content normal.   Nursing note and vitals reviewed.          Significant Labs: All pertinent labs within the past 24 hours have been reviewed.    Significant Imaging: I have reviewed and interpreted all pertinent imaging results/findings within the past 24 hours.

## 2019-07-01 NOTE — ASSESSMENT & PLAN NOTE
Patient had a similar episode about 3 weeks ago and was treated for COPD exacerbation.  I have reviewed her EKG which was significant for sinus tachycardia without evidence of acute ischemia.  She wear oxygen at home and is usually on 3.5 liters/minute.  I also reviewed her plain radiograph of the chest and it was without infiltrates, pleural effusions, nor any pneumothorax.  She was initially started on CPAP which was transition to BPAP.  She is now off of BPAP and doing well.  Will provide frequent nebulized JORDAN/LAMA; intravenous corticosteroids, and empiric antibiotics therapy.    Start Augmentin as possible will cover for UTI as well, mucinex, Will get O2 sat on 3.5 L home oxygen. If does well and O2 sat 88-92%, then okay home.

## 2019-07-02 PROBLEM — A41.51 SEPSIS DUE TO ESCHERICHIA COLI: Status: ACTIVE | Noted: 2019-07-02

## 2019-07-02 LAB
ALBUMIN SERPL BCP-MCNC: 3 G/DL (ref 3.5–5.2)
ALP SERPL-CCNC: 61 U/L (ref 55–135)
ALT SERPL W/O P-5'-P-CCNC: 9 U/L (ref 10–44)
ANION GAP SERPL CALC-SCNC: 7 MMOL/L (ref 8–16)
AST SERPL-CCNC: 7 U/L (ref 10–40)
BASOPHILS # BLD AUTO: 0 K/UL (ref 0–0.2)
BASOPHILS NFR BLD: 0 % (ref 0–1.9)
BILIRUB SERPL-MCNC: 0.2 MG/DL (ref 0.1–1)
BUN SERPL-MCNC: 32 MG/DL (ref 8–23)
CALCIUM SERPL-MCNC: 9.4 MG/DL (ref 8.7–10.5)
CHLORIDE SERPL-SCNC: 102 MMOL/L (ref 95–110)
CO2 SERPL-SCNC: 34 MMOL/L (ref 23–29)
CREAT SERPL-MCNC: 1.2 MG/DL (ref 0.5–1.4)
DIFFERENTIAL METHOD: ABNORMAL
EOSINOPHIL # BLD AUTO: 0 K/UL (ref 0–0.5)
EOSINOPHIL NFR BLD: 0 % (ref 0–8)
ERYTHROCYTE [DISTWIDTH] IN BLOOD BY AUTOMATED COUNT: 19.2 % (ref 11.5–14.5)
EST. GFR  (AFRICAN AMERICAN): 54 ML/MIN/1.73 M^2
EST. GFR  (NON AFRICAN AMERICAN): 47 ML/MIN/1.73 M^2
GLUCOSE SERPL-MCNC: 379 MG/DL (ref 70–110)
HCT VFR BLD AUTO: 29.5 % (ref 37–48.5)
HGB BLD-MCNC: 7.9 G/DL (ref 12–16)
LACTATE SERPL-SCNC: 2.5 MMOL/L (ref 0.5–2.2)
LYMPHOCYTES # BLD AUTO: 1 K/UL (ref 1–4.8)
LYMPHOCYTES NFR BLD: 5.2 % (ref 18–48)
MAGNESIUM SERPL-MCNC: 2.2 MG/DL (ref 1.6–2.6)
MCH RBC QN AUTO: 24.7 PG (ref 27–31)
MCHC RBC AUTO-ENTMCNC: 26.8 G/DL (ref 32–36)
MCV RBC AUTO: 92 FL (ref 82–98)
MONOCYTES # BLD AUTO: 1 K/UL (ref 0.3–1)
MONOCYTES NFR BLD: 4.9 % (ref 4–15)
NEUTROPHILS # BLD AUTO: 17.6 K/UL (ref 1.8–7.7)
NEUTROPHILS NFR BLD: 90.3 % (ref 38–73)
PHOSPHATE SERPL-MCNC: 3.5 MG/DL (ref 2.7–4.5)
PLATELET # BLD AUTO: 435 K/UL (ref 150–350)
PMV BLD AUTO: 9.7 FL (ref 9.2–12.9)
POCT GLUCOSE: 352 MG/DL (ref 70–110)
POCT GLUCOSE: 377 MG/DL (ref 70–110)
POCT GLUCOSE: 388 MG/DL (ref 70–110)
POCT GLUCOSE: 407 MG/DL (ref 70–110)
POCT GLUCOSE: 439 MG/DL (ref 70–110)
POCT GLUCOSE: 460 MG/DL (ref 70–110)
POCT GLUCOSE: >500 MG/DL (ref 70–110)
POCT GLUCOSE: >500 MG/DL (ref 70–110)
POTASSIUM SERPL-SCNC: 5 MMOL/L (ref 3.5–5.1)
PROT SERPL-MCNC: 6.2 G/DL (ref 6–8.4)
RBC # BLD AUTO: 3.2 M/UL (ref 4–5.4)
SODIUM SERPL-SCNC: 143 MMOL/L (ref 136–145)
WBC # BLD AUTO: 19.55 K/UL (ref 3.9–12.7)

## 2019-07-02 PROCEDURE — 63600175 PHARM REV CODE 636 W HCPCS: Performed by: HOSPITALIST

## 2019-07-02 PROCEDURE — 97116 GAIT TRAINING THERAPY: CPT

## 2019-07-02 PROCEDURE — 25000242 PHARM REV CODE 250 ALT 637 W/ HCPCS: Performed by: EMERGENCY MEDICINE

## 2019-07-02 PROCEDURE — 83605 ASSAY OF LACTIC ACID: CPT

## 2019-07-02 PROCEDURE — 25000003 PHARM REV CODE 250: Performed by: NURSE PRACTITIONER

## 2019-07-02 PROCEDURE — 84100 ASSAY OF PHOSPHORUS: CPT

## 2019-07-02 PROCEDURE — 27000221 HC OXYGEN, UP TO 24 HOURS

## 2019-07-02 PROCEDURE — 83735 ASSAY OF MAGNESIUM: CPT

## 2019-07-02 PROCEDURE — 36415 COLL VENOUS BLD VENIPUNCTURE: CPT

## 2019-07-02 PROCEDURE — 85025 COMPLETE CBC W/AUTO DIFF WBC: CPT

## 2019-07-02 PROCEDURE — 25000003 PHARM REV CODE 250: Performed by: INTERNAL MEDICINE

## 2019-07-02 PROCEDURE — 94640 AIRWAY INHALATION TREATMENT: CPT

## 2019-07-02 PROCEDURE — S4991 NICOTINE PATCH NONLEGEND: HCPCS | Performed by: INTERNAL MEDICINE

## 2019-07-02 PROCEDURE — 97530 THERAPEUTIC ACTIVITIES: CPT

## 2019-07-02 PROCEDURE — 94761 N-INVAS EAR/PLS OXIMETRY MLT: CPT

## 2019-07-02 PROCEDURE — 63600175 PHARM REV CODE 636 W HCPCS: Performed by: NURSE PRACTITIONER

## 2019-07-02 PROCEDURE — 80053 COMPREHEN METABOLIC PANEL: CPT

## 2019-07-02 PROCEDURE — 25000003 PHARM REV CODE 250: Performed by: EMERGENCY MEDICINE

## 2019-07-02 PROCEDURE — 96372 THER/PROPH/DIAG INJ SC/IM: CPT

## 2019-07-02 PROCEDURE — 11000001 HC ACUTE MED/SURG PRIVATE ROOM

## 2019-07-02 RX ORDER — PREDNISONE 5 MG/1
10 TABLET ORAL DAILY
Status: DISCONTINUED | OUTPATIENT
Start: 2019-07-05 | End: 2019-07-03

## 2019-07-02 RX ORDER — PREDNISONE 20 MG/1
20 TABLET ORAL DAILY
Status: DISCONTINUED | OUTPATIENT
Start: 2019-07-03 | End: 2019-07-03

## 2019-07-02 RX ORDER — INSULIN ASPART 100 [IU]/ML
7 INJECTION, SOLUTION INTRAVENOUS; SUBCUTANEOUS ONCE
Status: COMPLETED | OUTPATIENT
Start: 2019-07-02 | End: 2019-07-02

## 2019-07-02 RX ORDER — PREDNISONE 5 MG/1
5 TABLET ORAL DAILY
Status: DISCONTINUED | OUTPATIENT
Start: 2019-07-07 | End: 2019-07-03

## 2019-07-02 RX ADMIN — NICOTINE 1 PATCH: 21 PATCH, EXTENDED RELEASE TRANSDERMAL at 09:07

## 2019-07-02 RX ADMIN — IPRATROPIUM BROMIDE AND ALBUTEROL SULFATE 3 ML: .5; 3 SOLUTION RESPIRATORY (INHALATION) at 08:07

## 2019-07-02 RX ADMIN — INSULIN ASPART 5 UNITS: 100 INJECTION, SOLUTION INTRAVENOUS; SUBCUTANEOUS at 07:07

## 2019-07-02 RX ADMIN — INSULIN DETEMIR 15 UNITS: 100 INJECTION, SOLUTION SUBCUTANEOUS at 09:07

## 2019-07-02 RX ADMIN — ACETAMINOPHEN 500 MG: 500 TABLET, FILM COATED ORAL at 07:07

## 2019-07-02 RX ADMIN — PHENAZOPYRIDINE HYDROCHLORIDE 200 MG: 100 TABLET ORAL at 07:07

## 2019-07-02 RX ADMIN — AMOXICILLIN AND CLAVULANATE POTASSIUM 1 TABLET: 875; 125 TABLET, FILM COATED ORAL at 09:07

## 2019-07-02 RX ADMIN — IPRATROPIUM BROMIDE AND ALBUTEROL SULFATE 3 ML: .5; 3 SOLUTION RESPIRATORY (INHALATION) at 11:07

## 2019-07-02 RX ADMIN — GABAPENTIN 300 MG: 300 CAPSULE ORAL at 09:07

## 2019-07-02 RX ADMIN — AMIODARONE HYDROCHLORIDE 200 MG: 200 TABLET ORAL at 09:07

## 2019-07-02 RX ADMIN — INSULIN ASPART 7 UNITS: 100 INJECTION, SOLUTION INTRAVENOUS; SUBCUTANEOUS at 09:07

## 2019-07-02 RX ADMIN — METOPROLOL TARTRATE 100 MG: 50 TABLET, FILM COATED ORAL at 12:07

## 2019-07-02 RX ADMIN — PHENAZOPYRIDINE HYDROCHLORIDE 200 MG: 100 TABLET ORAL at 05:07

## 2019-07-02 RX ADMIN — GABAPENTIN 300 MG: 300 CAPSULE ORAL at 02:07

## 2019-07-02 RX ADMIN — INSULIN ASPART 8 UNITS: 100 INJECTION, SOLUTION INTRAVENOUS; SUBCUTANEOUS at 07:07

## 2019-07-02 RX ADMIN — IPRATROPIUM BROMIDE AND ALBUTEROL SULFATE 3 ML: .5; 3 SOLUTION RESPIRATORY (INHALATION) at 04:07

## 2019-07-02 RX ADMIN — ISOSORBIDE MONONITRATE 90 MG: 30 TABLET, EXTENDED RELEASE ORAL at 09:07

## 2019-07-02 RX ADMIN — PANTOPRAZOLE SODIUM 40 MG: 40 TABLET, DELAYED RELEASE ORAL at 09:07

## 2019-07-02 RX ADMIN — INSULIN ASPART 8 UNITS: 100 INJECTION, SOLUTION INTRAVENOUS; SUBCUTANEOUS at 05:07

## 2019-07-02 RX ADMIN — METOPROLOL TARTRATE 100 MG: 50 TABLET, FILM COATED ORAL at 09:07

## 2019-07-02 RX ADMIN — PREDNISONE 40 MG: 20 TABLET ORAL at 09:07

## 2019-07-02 RX ADMIN — PREGABALIN 75 MG: 50 CAPSULE ORAL at 09:07

## 2019-07-02 RX ADMIN — INSULIN ASPART 8 UNITS: 100 INJECTION, SOLUTION INTRAVENOUS; SUBCUTANEOUS at 01:07

## 2019-07-02 RX ADMIN — LISINOPRIL 40 MG: 20 TABLET ORAL at 09:07

## 2019-07-02 RX ADMIN — INSULIN ASPART 5 UNITS: 100 INJECTION, SOLUTION INTRAVENOUS; SUBCUTANEOUS at 01:07

## 2019-07-02 RX ADMIN — PHENAZOPYRIDINE HYDROCHLORIDE 200 MG: 100 TABLET ORAL at 01:07

## 2019-07-02 RX ADMIN — GUAIFENESIN 600 MG: 600 TABLET, EXTENDED RELEASE ORAL at 09:07

## 2019-07-02 RX ADMIN — ACETAMINOPHEN 500 MG: 500 TABLET, FILM COATED ORAL at 02:07

## 2019-07-02 RX ADMIN — ASPIRIN 81 MG: 81 TABLET, COATED ORAL at 09:07

## 2019-07-02 RX ADMIN — INSULIN ASPART 3 UNITS: 100 INJECTION, SOLUTION INTRAVENOUS; SUBCUTANEOUS at 09:07

## 2019-07-02 RX ADMIN — ACETAMINOPHEN 500 MG: 500 TABLET, FILM COATED ORAL at 09:07

## 2019-07-02 RX ADMIN — RIVAROXABAN 20 MG: 20 TABLET, FILM COATED ORAL at 05:07

## 2019-07-02 RX ADMIN — ACETAMINOPHEN 500 MG: 500 TABLET, FILM COATED ORAL at 01:07

## 2019-07-02 RX ADMIN — HYDRALAZINE HYDROCHLORIDE 50 MG: 25 TABLET ORAL at 09:07

## 2019-07-02 RX ADMIN — INSULIN ASPART 5 UNITS: 100 INJECTION, SOLUTION INTRAVENOUS; SUBCUTANEOUS at 05:07

## 2019-07-02 NOTE — PROGRESS NOTES
[facility] Medicine  Progress Note    Patient Name: Yasmeen Palm  MRN: [id number]  Patient Class: IP- Inpatient   Admission Date: 7/1/2019  Length of Stay: 0 days  Attending Physician: Blanca Carvajal MD  Primary Care Provider: Angel Orourke Jr, MD        Subjective:     Principal Problem:COPD with acute exacerbation      HPI:  Mrs. Yasmeen Palm is a 69 y.o. female known to me with essential hypertension, type 2 diabetes mellitus (HbA1c 7.3% Jun 2019), hyperlipidemia (.0 Dec 2018), paroxysmal atrial fibrillation ([id number] score 4) on chronic anticoagulation, COPD, chronic respiratory failure with hypoxia and hypercapnia, GERD, anemia of chronic disease, tobacco abuse, and history of PE/DVT on chronic anticoagulation who presents to [facility] ED with complaints of dyspnea past two days.  The dyspnea is mainly on exertion and associated with wheezing and a cough that was occasionally productive of thick clear sputum.  She denies any fevers, chills, nausea, vomiting, chest pain, pleurisy, diaphoresis, palpitations, nor any hemoptysis.  She does report bilateral lower extremity edema that is slightly worse than her baseline.  She does complain of orthopnea but denies any PND.  She also reports that her niece and  have been having allergy symptoms; she has not had any recent travel.    She also reports dysuria for the past 1.5 days.  It is associated with increased urinary frequency and urgency.  She denies any hematuria.    Overview/Hospital Course:  Mrs. Palm is a 70 yo female with history for COPD on home oxygen .3, LOYDA on CPAP, pulmonary hypertension PAP 80 mmHg, DVT LLE 2 yrs ago on xarelto, and PAF   who was admitted to admission for UTI and COPD exacerbation. Rocephin IV started in ED. IV steroid and duoneb started. Lasix 40 mg IV also started. On 7/1/19, lung sound fine inspiratory crackles mid to lower lobes without wheezing. Coughing about same but not able  to get sputum up. BLE edema improved. Continue duoneb, steroid, rocephin.     Respirations a more even today however UTI with spike in WBC overnight 19,000. Originally Suspected from oral prednisone however lactic acid still elevated 2.5 despite IV fluids overnight.  Continue to treat for UTI, continue duo nebs, antibiotic coverage to cover respiratory and urinary tract.  She is at risk for severe acute decline given her debility her  who is her sole caretaker is also hospitalized at this time.  Will continue antibiotic therapy monitor temperature, monitor vital signs---active vein acid in the morning    Addendum 1522.  SBP 7O's. Bolus 500 ml now.   . Give another 10 units insulin now.   Cancel discharge    Interval History: Feels somewhat better and breathing more comfortable but not at baseline.     Review of Systems   Constitutional: Negative for activity change, appetite change, chills, diaphoresis, fatigue, fever and unexpected weight change.   HENT: Negative.  Negative for congestion, hearing loss, sore throat, tinnitus and trouble swallowing.    Eyes: Negative.  Negative for photophobia, discharge, itching and visual disturbance.   Respiratory: Positive for shortness of breath. Negative for apnea, cough, chest tightness, wheezing and stridor.    Cardiovascular: Positive for chest pain. Negative for palpitations and leg swelling.   Gastrointestinal: Negative for abdominal distention, abdominal pain, blood in stool, constipation, diarrhea, nausea and vomiting.   Endocrine: Negative for polydipsia, polyphagia and polyuria.   Genitourinary: Positive for urgency. Negative for difficulty urinating, dysuria, flank pain, frequency and hematuria.   Musculoskeletal: Negative.  Negative for arthralgias, joint swelling and neck stiffness.   Skin: Negative.  Negative for color change, rash and wound.   Neurological: Negative for dizziness, tremors, seizures, syncope, weakness, light-headedness, numbness and  headaches.   Hematological: Negative for adenopathy.   Psychiatric/Behavioral: Negative.  Negative for hallucinations and self-injury.     Objective:     Vital Signs (Most Recent):  Temp: 98 °F (36.7 °C) (07/02/19 1557)  Pulse: 88 (07/02/19 1615)  Resp: 18 (07/02/19 1615)  BP: (!) 107/55 (07/02/19 1557)  SpO2: 97 % (07/02/19 1615) Vital Signs (24h Range):  Temp:  [98 °F (36.7 °C)-98.6 °F (37 °C)] 98 °F (36.7 °C)  Pulse:  [] 88  Resp:  [16-18] 18  SpO2:  [94 %-98 %] 97 %  BP: (100-150)/(50-76) 107/55     Weight: 79.5 kg (175 lb 4.3 oz)  Body mass index is 27.45 kg/m².    Intake/Output Summary (Last 24 hours) at 7/2/2019 1721  Last data filed at 7/2/2019 1300  Gross per 24 hour   Intake 1020 ml   Output 175 ml   Net 845 ml      Physical Exam   Constitutional: She is oriented to person, place, and time. She appears well-developed and well-nourished. No distress.   HENT:   Head: Normocephalic and atraumatic.   Right Ear: External ear normal.   Left Ear: External ear normal.   Nose: Nose normal.   Eyes: Right eye exhibits no discharge. Left eye exhibits no discharge.   Neck: Normal range of motion.   Cardiovascular: Normal rate, regular rhythm, normal heart sounds and intact distal pulses. Exam reveals no gallop and no friction rub.   No murmur heard.  Pulmonary/Chest:   Normal respiratory effort with bibasilar crackles and very minimal end expiratory wheezing   Abdominal: Soft. Bowel sounds are normal. She exhibits no distension. There is no tenderness. There is no rebound and no guarding.   Musculoskeletal: Normal range of motion. She exhibits edema (Trace pitting edema to the bilateral lower extremities).   Neurological: She is alert and oriented to person, place, and time.   Skin: Skin is warm and dry. She is not diaphoretic. No erythema.   Psychiatric: She has a normal mood and affect.   Nursing note and vitals reviewed.      Significant Labs: All pertinent labs within the past 24 hours have been  reviewed.    Significant Imaging: I have reviewed and interpreted all pertinent imaging results/findings within the past 24 hours.      Assessment/Plan:      * Acute cystitis  Patient's urinalysis is concerning for acute cystitis given cloudy appearance, 3+ protein, 3+ glucose, 3+ occult blood, and positive for nitrites.  She has been started on empiric antibiotic therapy pending urine culture results.    7/1/19  Urine culture pending. Previous culture e coli resistant to cipro, levo, and tetra, but is sensitive to augmentin, cephalosporins    7/2/19  Lactic acid remains elevated 2.5 and white count 19 K. White count thought to be reactive however will continue antibiotic therapy with this patient Augmentin to cover respiratory and  and monitor closely  -IV fluids  -CBC, CMP in a.m.  -repeat lactic in a.m.  -urine culture pending      COPD with acute exacerbation  Patient had a similar episode about 3 weeks ago and was treated for COPD exacerbation.  I have reviewed her EKG which was significant for sinus tachycardia without evidence of acute ischemia.  She wear oxygen at home and is usually on 3.5 liters/minute.  I also reviewed her plain radiograph of the chest and it was without infiltrates, pleural effusions, nor any pneumothorax.  She was initially started on CPAP which was transition to BPAP.  She is now off of BPAP and doing well.  Will provide frequent nebulized JORDAN/LAMA; intravenous corticosteroids, and empiric antibiotics therapy.    Start Augmentin as possible will cover for UTI as well, mucinex, Will get O2 sat on 3.5 L home oxygen. If does well and O2 sat 88-92%, then okay home.     Sepsis due to Escherichia coli  Patient with abnormal urinalysis - hypotension and elevated lactic acid level + leukocytosis  Continue IV fluids & ABX  Monitor closely  Repeat CBC and CMP in am  Lactic acid in am      Essential hypertension  Patient's blood pressure is poorly-controlled; will continue home regimen of  furosemide, hydralazine, isosorbide, lisinopril, and metoprolol, and provide as-needed clonidine.    Controlled type 2 diabetes mellitus, without long-term current use of insulin  Fairly-controlled on a home regimen of metformin; will provide basal-prandial insulin along with insulin sliding scale.    History of pulmonary embolism  Stable; there are no acute issues. Continue xarelto.     Tobacco abuse  Patient was counseled on smoking cessation and she will be provided a nicotine transdermal patch applied while inpatient.  Will provide additional smoking cessation counseling prior to discharge.    Hyperlipidemia  Poorly-controlled; will continue to monitor.    COPD (chronic obstructive pulmonary disease)  As addressed above.    Paroxysmal atrial fibrillation  Patient is currently in sinus rhythm that is rate-controlled with amiodarone and metoprolol.  Will also continued her home regimen of rivaroxaban.    Chronic respiratory failure with hypoxia  Will continue supplemental oxygen therapy.    Chronic anticoagulation  As addressed above.    History of deep vein thrombosis  Stable; there are no acute issues. Continue xarelto    Anemia of chronic disease  The patient's H/H is stable and consistent with previous laboratory measurements, and the patient exhibits no signs or symptoms of acute bleeding; there is no indication for transfusion.  Will continue to monitor. Obtain iron studies.    Gastroesophageal reflux disease without esophagitis  Will continue her home regimen of pantoprazole.    VTE Risk Mitigation (From admission, onward)        Ordered     rivaroxaban tablet 20 mg  With dinner      07/01/19 0456     IP VTE HIGH RISK PATIENT  Once      07/01/19 0456              YOSVANY Faria, FNP-C  Hospitalist - Department of Hospital Medicine  [address], [place] [address]  Office [phone]; Pager [phone]

## 2019-07-02 NOTE — SUBJECTIVE & OBJECTIVE
Interval History: Feels somewhat better and breathing more comfortable but not at baseline.     Review of Systems   Constitutional: Negative for activity change, appetite change, chills, diaphoresis, fatigue, fever and unexpected weight change.   HENT: Negative.  Negative for congestion, hearing loss, sore throat, tinnitus and trouble swallowing.    Eyes: Negative.  Negative for photophobia, discharge, itching and visual disturbance.   Respiratory: Positive for shortness of breath. Negative for apnea, cough, chest tightness, wheezing and stridor.    Cardiovascular: Positive for chest pain. Negative for palpitations and leg swelling.   Gastrointestinal: Negative for abdominal distention, abdominal pain, blood in stool, constipation, diarrhea, nausea and vomiting.   Endocrine: Negative for polydipsia, polyphagia and polyuria.   Genitourinary: Positive for urgency. Negative for difficulty urinating, dysuria, flank pain, frequency and hematuria.   Musculoskeletal: Negative.  Negative for arthralgias, joint swelling and neck stiffness.   Skin: Negative.  Negative for color change, rash and wound.   Neurological: Negative for dizziness, tremors, seizures, syncope, weakness, light-headedness, numbness and headaches.   Hematological: Negative for adenopathy.   Psychiatric/Behavioral: Negative.  Negative for hallucinations and self-injury.     Objective:     Vital Signs (Most Recent):  Temp: 98 °F (36.7 °C) (07/02/19 1557)  Pulse: 88 (07/02/19 1615)  Resp: 18 (07/02/19 1615)  BP: (!) 107/55 (07/02/19 1557)  SpO2: 97 % (07/02/19 1615) Vital Signs (24h Range):  Temp:  [98 °F (36.7 °C)-98.6 °F (37 °C)] 98 °F (36.7 °C)  Pulse:  [] 88  Resp:  [16-18] 18  SpO2:  [94 %-98 %] 97 %  BP: (100-150)/(50-76) 107/55     Weight: 79.5 kg (175 lb 4.3 oz)  Body mass index is 27.45 kg/m².    Intake/Output Summary (Last 24 hours) at 7/2/2019 1721  Last data filed at 7/2/2019 1300  Gross per 24 hour   Intake 1020 ml   Output 175 ml   Net  845 ml      Physical Exam   Constitutional: She is oriented to person, place, and time. She appears well-developed and well-nourished. No distress.   HENT:   Head: Normocephalic and atraumatic.   Right Ear: External ear normal.   Left Ear: External ear normal.   Nose: Nose normal.   Eyes: Right eye exhibits no discharge. Left eye exhibits no discharge.   Neck: Normal range of motion.   Cardiovascular: Normal rate, regular rhythm, normal heart sounds and intact distal pulses. Exam reveals no gallop and no friction rub.   No murmur heard.  Pulmonary/Chest:   Normal respiratory effort with bibasilar crackles and very minimal end expiratory wheezing   Abdominal: Soft. Bowel sounds are normal. She exhibits no distension. There is no tenderness. There is no rebound and no guarding.   Musculoskeletal: Normal range of motion. She exhibits edema (Trace pitting edema to the bilateral lower extremities).   Neurological: She is alert and oriented to person, place, and time.   Skin: Skin is warm and dry. She is not diaphoretic. No erythema.   Psychiatric: She has a normal mood and affect.   Nursing note and vitals reviewed.      Significant Labs: All pertinent labs within the past 24 hours have been reviewed.    Significant Imaging: I have reviewed and interpreted all pertinent imaging results/findings within the past 24 hours.

## 2019-07-02 NOTE — ASSESSMENT & PLAN NOTE
Patient's urinalysis is concerning for acute cystitis given cloudy appearance, 3+ protein, 3+ glucose, 3+ occult blood, and positive for nitrites.  She has been started on empiric antibiotic therapy pending urine culture results.    7/1/19  Urine culture pending. Previous culture e coli resistant to cipro, levo, and tetra, but is sensitive to augmentin, cephalosporins    7/2/19  Lactic acid remains elevated 2.5 and white count 19 K. White count thought to be reactive however will continue antibiotic therapy with this patient Augmentin to cover respiratory and  and monitor closely  -IV fluids  -CBC, CMP in a.m.  -repeat lactic in a.m.  -urine culture pending

## 2019-07-02 NOTE — PT/OT/SLP PROGRESS
Physical Therapy Treatment    Patient Name:  Yasmeen Palm   MRN:  [id number]    Recommendations:     Discharge Recommendations:  home health PT(with family assistance)   Discharge Equipment Recommendations: none   Barriers to discharge: Decreased caregiver support    Assessment:     Yasmeen Palm is a 69 y.o. female admitted with a medical diagnosis of COPD with acute exacerbation.  She presents with the following impairments/functional limitations:  weakness, impaired endurance, decreased upper extremity function, decreased lower extremity function, decreased safety awareness, gait instability, impaired cardiopulmonary response to activity, decreased ROM .    Rehab Prognosis: Good; patient would benefit from acute skilled PT services to address these deficits and reach maximum level of function.    Recent Surgery: * No surgery found *      Plan:     During this hospitalization, patient to be seen   to address the identified rehab impairments via   and progress toward the following goals:    · Plan of Care Expires:       Subjective     Chief Complaint: none   Patient/Family Comments/goals: pt agreeable to treatment with encouragement.    Pain/Comfort:  · Pain Rating 1: 0/10  · Pain Rating Post-Intervention 1: 0/10      Objective:     Communicated with nurse Valencia prior to session.  Patient found seated EOB with OT present with bed alarm, PureWick, telemetry, oxygen upon PT entry to room.     General Precautions: Standard, fall, respiratory, diabetic   Orthopedic Precautions:N/A   Braces: N/A     Functional Mobility:  · Transfers:     · Sit to Stand:  contact guard assistance with rolling walker  · Gait:  Pt ambulated ~ 120 ft with RW, CGA (3LO2NC). Noted with decreased dyllan, decreased step length. VC's for safety technique, walker management and pursed lip breathing technique .   · Balance:  Fair       AM-PAC 6 CLICK MOBILITY  Turning over in bed (including adjusting bedclothes, sheets and blankets)?:  4  Sitting down on and standing up from a chair with arms (e.g., wheelchair, bedside commode, etc.): 3  Moving from lying on back to sitting on the side of the bed?: 4  Moving to and from a bed to a chair (including a wheelchair)?: 4  Need to walk in hospital room?: 3  Climbing 3-5 steps with a railing?: 3  Basic Mobility Total Score: 21       Therapeutic Activities and Exercises:   pt performed transfer and gait training as above.    Patient left seated EOB with all lines intact, call button in reach, bed alarm on and nurse notified..    GOALS:   Multidisciplinary Problems     Physical Therapy Goals        Problem: Physical Therapy Goal    Goal Priority Disciplines Outcome Goal Variances Interventions   Physical Therapy Goal     PT, PT/OT Ongoing (interventions implemented as appropriate)     Description:  Goals to be met by: 19     Patient will increase functional independence with mobility by performin. Supine to sit with Modified [place]  2. Rolling to Left and Right with Modified [place]  3. Sit to stand transfer with Modified [place] using RW  4. Bed to chair transfer with Modified [place] using Rolling Walker  5. Gait  x50-100 feet with Modified [place] using Rolling Walker and O2  6. Upper/Lower extremity exercise program 3 sets x10 reps per handout, with independence                      Time Tracking:     PT Received On: 19  PT Start Time: 153     PT Stop Time: 1547  PT Total Time (min): 10 min     Billable Minutes: Gait Training 9    Treatment Type: Treatment  PT/PTA: PTA     PTA Visit Number: 1     Kayleigh Henry PTA  2019

## 2019-07-02 NOTE — PT/OT/SLP PROGRESS
PT/PTA met face to face to discuss patient's treatment plan and progress towards established goals.  Pt was not D/C'ed yesterday, POC updated with goals added.  Patient will be seen by physical therapist every sixth visit and minimally once per month.

## 2019-07-02 NOTE — PLAN OF CARE
Problem: Physical Therapy Goal  Goal: Physical Therapy Goal  Goals to be met by: 19     Patient will increase functional independence with mobility by performin. Supine to sit with Modified [place]  2. Rolling to Left and Right with Modified [place]  3. Sit to stand transfer with Modified [place] using RW  4. Bed to chair transfer with Modified [place] using Rolling Walker  5. Gait  x50-100 feet with Modified [place] using Rolling Walker and O2  6. Upper/Lower extremity exercise program 3 sets x10 reps per handout, with independence     Outcome: Ongoing (interventions implemented as appropriate)  Pt ambulated ~ 120 ft with RW, CGA (3L O2NC).

## 2019-07-02 NOTE — PT/OT/SLP PROGRESS
Physical Therapy      Patient Name:  Yasmeen Palm   MRN:  [id number]    Patient not seen today secondary to Patient unwilling to participate despite encouragement , pt stated she is worry about her  and want to sleep today  . Nurse notified.   Will follow-up as able later hour/day .    Kayleigh Henry, PTA

## 2019-07-02 NOTE — PT/OT/SLP PROGRESS
Occupational Therapy   Treatment/Discharge    Name: Yasmeen Palm  MRN: [id number]  Admitting Diagnosis:  COPD with acute exacerbation       Recommendations:     Discharge Recommendations: home with home health  Discharge Equipment Recommendations:  none  Barriers to discharge:  Inaccessible home environment    Assessment:     Yasmeen Palm is a 69 y.o. female with a medical diagnosis of COPD with acute exacerbation.  She presents with  independence for self-care and functional transfers. Performance deficits affecting function are weakness, impaired endurance, impaired functional mobilty, gait instability, impaired balance, impaired cardiopulmonary response to activity.     Rehab Prognosis:  Good; patient would benefit from acute skilled OT services to address these deficits and reach maximum level of function.       Plan:     Patient see for evaluation yesterday, still on the hospital, made sure that she is getting up  · Plan of Care Reviewed with: patient    Subjective     Pain/Comfort:  · Pain Rating 1: 0/10    Objective:     Communicated with: nurse Valencia prior to session.  Patient found supine with PureWick, peripheral IV, telemetry upon OT entry to room.    General Precautions: Standard, fall   Orthopedic Precautions:N/A   Braces: N/A     Occupational Performance:     Bed Mobility:    · Patient completed Rolling/Turning to Left with  independence  · Patient completed Scooting/Bridging with independence  · Patient completed Supine to Sit with independence     Functional Mobility/Transfers:  · Patient completed Sit <> Stand Transfer with contact guard assistance  with  rolling walker   · Functional Mobility: ambulated with PT    Activities of Daily Living:  · Feeding:  independence    · Upper Body Dressing: independence        [facility] 6 Click ADL: 21    Treatment & Education:  Therapeutic activity    Patient left sitting EOB with all lines intact, call button in reach and nurse Erik  notifiedEducation:      GOALS:   Multidisciplinary Problems     Occupational Therapy Goals     Not on file          Multidisciplinary Problems (Resolved)        Problem: Occupational Therapy Goal    Goal Priority Disciplines Outcome Interventions   Occupational Therapy Goal   (Resolved)     OT, PT/OT Outcome(s) achieved                    Time Tracking:     OT Date of Treatment: 07/02/19  OT Start Time: 1529  OT Stop Time: 1537  OT Total Time (min): 8 min    Billable Minutes:Therapeutic Activity 8 minutes    HEDY Wiley, MS  7/2/2019

## 2019-07-02 NOTE — NURSING
Received report from ABAD Escalante RN. Pt AAOx4 with no c/o pain.telemetry monitor in place. Saline lock in place to site is clear. Pt informed of plan of care and safety maintained with bed low side rails up x2 with nurse call bell within reach and bed alarm set

## 2019-07-03 ENCOUNTER — EXTERNAL HOME HEALTH (OUTPATIENT)
Dept: HOME HEALTH SERVICES | Facility: HOSPITAL | Age: 69
End: 2019-07-03

## 2019-07-03 PROBLEM — A41.51 SEPSIS DUE TO ESCHERICHIA COLI: Status: RESOLVED | Noted: 2019-07-02 | Resolved: 2019-07-03

## 2019-07-03 LAB
ANION GAP SERPL CALC-SCNC: 6 MMOL/L (ref 8–16)
BACTERIA UR CULT: ABNORMAL
BASOPHILS # BLD AUTO: 0 K/UL (ref 0–0.2)
BASOPHILS NFR BLD: 0 % (ref 0–1.9)
BUN SERPL-MCNC: 36 MG/DL (ref 8–23)
CALCIUM SERPL-MCNC: 8.9 MG/DL (ref 8.7–10.5)
CHLORIDE SERPL-SCNC: 103 MMOL/L (ref 95–110)
CO2 SERPL-SCNC: 32 MMOL/L (ref 23–29)
CREAT SERPL-MCNC: 0.9 MG/DL (ref 0.5–1.4)
DIFFERENTIAL METHOD: ABNORMAL
EOSINOPHIL # BLD AUTO: 0 K/UL (ref 0–0.5)
EOSINOPHIL NFR BLD: 0.2 % (ref 0–8)
ERYTHROCYTE [DISTWIDTH] IN BLOOD BY AUTOMATED COUNT: 19 % (ref 11.5–14.5)
EST. GFR  (AFRICAN AMERICAN): >60 ML/MIN/1.73 M^2
EST. GFR  (NON AFRICAN AMERICAN): >60 ML/MIN/1.73 M^2
GLUCOSE SERPL-MCNC: 292 MG/DL (ref 70–110)
HCT VFR BLD AUTO: 27.6 % (ref 37–48.5)
HGB BLD-MCNC: 7.2 G/DL (ref 12–16)
LACTATE SERPL-SCNC: 1.7 MMOL/L (ref 0.5–2.2)
LYMPHOCYTES # BLD AUTO: 1.7 K/UL (ref 1–4.8)
LYMPHOCYTES NFR BLD: 11.7 % (ref 18–48)
MCH RBC QN AUTO: 24.5 PG (ref 27–31)
MCHC RBC AUTO-ENTMCNC: 26.1 G/DL (ref 32–36)
MCV RBC AUTO: 94 FL (ref 82–98)
MONOCYTES # BLD AUTO: 1.2 K/UL (ref 0.3–1)
MONOCYTES NFR BLD: 8.3 % (ref 4–15)
NEUTROPHILS # BLD AUTO: 11.4 K/UL (ref 1.8–7.7)
NEUTROPHILS NFR BLD: 80.3 % (ref 38–73)
PLATELET # BLD AUTO: 353 K/UL (ref 150–350)
PMV BLD AUTO: 9 FL (ref 9.2–12.9)
POCT GLUCOSE: 258 MG/DL (ref 70–110)
POCT GLUCOSE: 341 MG/DL (ref 70–110)
POCT GLUCOSE: 363 MG/DL (ref 70–110)
POCT GLUCOSE: 376 MG/DL (ref 70–110)
POTASSIUM SERPL-SCNC: 4.3 MMOL/L (ref 3.5–5.1)
RBC # BLD AUTO: 2.94 M/UL (ref 4–5.4)
SODIUM SERPL-SCNC: 141 MMOL/L (ref 136–145)
TROPONIN I SERPL DL<=0.01 NG/ML-MCNC: 0.02 NG/ML (ref 0–0.03)
WBC # BLD AUTO: 14.3 K/UL (ref 3.9–12.7)

## 2019-07-03 PROCEDURE — 27000221 HC OXYGEN, UP TO 24 HOURS

## 2019-07-03 PROCEDURE — 25000242 PHARM REV CODE 250 ALT 637 W/ HCPCS: Performed by: INTERNAL MEDICINE

## 2019-07-03 PROCEDURE — 83605 ASSAY OF LACTIC ACID: CPT

## 2019-07-03 PROCEDURE — 63600175 PHARM REV CODE 636 W HCPCS: Performed by: INTERNAL MEDICINE

## 2019-07-03 PROCEDURE — 63600175 PHARM REV CODE 636 W HCPCS: Performed by: NURSE PRACTITIONER

## 2019-07-03 PROCEDURE — 97116 GAIT TRAINING THERAPY: CPT

## 2019-07-03 PROCEDURE — 80048 BASIC METABOLIC PNL TOTAL CA: CPT

## 2019-07-03 PROCEDURE — 25000003 PHARM REV CODE 250: Performed by: NURSE PRACTITIONER

## 2019-07-03 PROCEDURE — 94640 AIRWAY INHALATION TREATMENT: CPT

## 2019-07-03 PROCEDURE — 25000242 PHARM REV CODE 250 ALT 637 W/ HCPCS: Performed by: EMERGENCY MEDICINE

## 2019-07-03 PROCEDURE — 25000003 PHARM REV CODE 250: Performed by: INTERNAL MEDICINE

## 2019-07-03 PROCEDURE — 93010 ELECTROCARDIOGRAM REPORT: CPT | Mod: ,,, | Performed by: INTERNAL MEDICINE

## 2019-07-03 PROCEDURE — 94761 N-INVAS EAR/PLS OXIMETRY MLT: CPT

## 2019-07-03 PROCEDURE — 27000190 HC CPAP FULL FACE MASK W/VALVE

## 2019-07-03 PROCEDURE — 36415 COLL VENOUS BLD VENIPUNCTURE: CPT

## 2019-07-03 PROCEDURE — 93005 ELECTROCARDIOGRAM TRACING: CPT

## 2019-07-03 PROCEDURE — S4991 NICOTINE PATCH NONLEGEND: HCPCS | Performed by: INTERNAL MEDICINE

## 2019-07-03 PROCEDURE — 99900035 HC TECH TIME PER 15 MIN (STAT)

## 2019-07-03 PROCEDURE — 84484 ASSAY OF TROPONIN QUANT: CPT

## 2019-07-03 PROCEDURE — 25000003 PHARM REV CODE 250: Performed by: EMERGENCY MEDICINE

## 2019-07-03 PROCEDURE — 21400001 HC TELEMETRY ROOM

## 2019-07-03 PROCEDURE — 94660 CPAP INITIATION&MGMT: CPT

## 2019-07-03 PROCEDURE — 93010 EKG 12-LEAD: ICD-10-PCS | Mod: ,,, | Performed by: INTERNAL MEDICINE

## 2019-07-03 PROCEDURE — 85025 COMPLETE CBC W/AUTO DIFF WBC: CPT

## 2019-07-03 RX ORDER — DICYCLOMINE HYDROCHLORIDE 10 MG/1
10 CAPSULE ORAL EVERY 6 HOURS PRN
Status: DISCONTINUED | OUTPATIENT
Start: 2019-07-03 | End: 2019-07-04 | Stop reason: HOSPADM

## 2019-07-03 RX ORDER — MICONAZOLE NITRATE 2 %
POWDER (GRAM) TOPICAL 2 TIMES DAILY
Status: DISCONTINUED | OUTPATIENT
Start: 2019-07-03 | End: 2019-07-04 | Stop reason: HOSPADM

## 2019-07-03 RX ORDER — FLUCONAZOLE 150 MG/1
150 TABLET ORAL ONCE
Status: COMPLETED | OUTPATIENT
Start: 2019-07-03 | End: 2019-07-03

## 2019-07-03 RX ORDER — INSULIN ASPART 100 [IU]/ML
11 INJECTION, SOLUTION INTRAVENOUS; SUBCUTANEOUS
Status: DISCONTINUED | OUTPATIENT
Start: 2019-07-03 | End: 2019-07-04 | Stop reason: HOSPADM

## 2019-07-03 RX ADMIN — GABAPENTIN 300 MG: 300 CAPSULE ORAL at 08:07

## 2019-07-03 RX ADMIN — PHENAZOPYRIDINE HYDROCHLORIDE 200 MG: 100 TABLET ORAL at 08:07

## 2019-07-03 RX ADMIN — ASPIRIN 81 MG: 81 TABLET, COATED ORAL at 08:07

## 2019-07-03 RX ADMIN — RIVAROXABAN 20 MG: 20 TABLET, FILM COATED ORAL at 06:07

## 2019-07-03 RX ADMIN — PHENAZOPYRIDINE HYDROCHLORIDE 200 MG: 100 TABLET ORAL at 12:07

## 2019-07-03 RX ADMIN — AMOXICILLIN AND CLAVULANATE POTASSIUM 1 TABLET: 875; 125 TABLET, FILM COATED ORAL at 08:07

## 2019-07-03 RX ADMIN — IPRATROPIUM BROMIDE AND ALBUTEROL SULFATE 3 ML: .5; 3 SOLUTION RESPIRATORY (INHALATION) at 07:07

## 2019-07-03 RX ADMIN — FLUCONAZOLE 150 MG: 150 TABLET ORAL at 12:07

## 2019-07-03 RX ADMIN — GUAIFENESIN 600 MG: 600 TABLET, EXTENDED RELEASE ORAL at 08:07

## 2019-07-03 RX ADMIN — INSULIN ASPART 2 UNITS: 100 INJECTION, SOLUTION INTRAVENOUS; SUBCUTANEOUS at 08:07

## 2019-07-03 RX ADMIN — IPRATROPIUM BROMIDE AND ALBUTEROL SULFATE 3 ML: .5; 3 SOLUTION RESPIRATORY (INHALATION) at 03:07

## 2019-07-03 RX ADMIN — INSULIN ASPART 11 UNITS: 100 INJECTION, SOLUTION INTRAVENOUS; SUBCUTANEOUS at 05:07

## 2019-07-03 RX ADMIN — IPRATROPIUM BROMIDE AND ALBUTEROL SULFATE 3 ML: .5; 3 SOLUTION RESPIRATORY (INHALATION) at 11:07

## 2019-07-03 RX ADMIN — LISINOPRIL 40 MG: 20 TABLET ORAL at 08:07

## 2019-07-03 RX ADMIN — AMIODARONE HYDROCHLORIDE 200 MG: 200 TABLET ORAL at 08:07

## 2019-07-03 RX ADMIN — PHENAZOPYRIDINE HYDROCHLORIDE 200 MG: 100 TABLET ORAL at 05:07

## 2019-07-03 RX ADMIN — PANTOPRAZOLE SODIUM 40 MG: 40 TABLET, DELAYED RELEASE ORAL at 08:07

## 2019-07-03 RX ADMIN — HYDRALAZINE HYDROCHLORIDE 50 MG: 25 TABLET ORAL at 08:07

## 2019-07-03 RX ADMIN — METOPROLOL TARTRATE 100 MG: 50 TABLET, FILM COATED ORAL at 08:07

## 2019-07-03 RX ADMIN — NICOTINE 1 PATCH: 21 PATCH, EXTENDED RELEASE TRANSDERMAL at 08:07

## 2019-07-03 RX ADMIN — PREGABALIN 75 MG: 50 CAPSULE ORAL at 09:07

## 2019-07-03 RX ADMIN — GABAPENTIN 300 MG: 300 CAPSULE ORAL at 03:07

## 2019-07-03 RX ADMIN — PREDNISONE 20 MG: 20 TABLET ORAL at 08:07

## 2019-07-03 RX ADMIN — DICYCLOMINE HYDROCHLORIDE 10 MG: 10 CAPSULE ORAL at 05:07

## 2019-07-03 RX ADMIN — Medication: at 12:07

## 2019-07-03 RX ADMIN — PREGABALIN 75 MG: 50 CAPSULE ORAL at 08:07

## 2019-07-03 RX ADMIN — DICYCLOMINE HYDROCHLORIDE 10 MG: 10 CAPSULE ORAL at 12:07

## 2019-07-03 RX ADMIN — INSULIN ASPART 11 UNITS: 100 INJECTION, SOLUTION INTRAVENOUS; SUBCUTANEOUS at 12:07

## 2019-07-03 RX ADMIN — ISOSORBIDE MONONITRATE 90 MG: 30 TABLET, EXTENDED RELEASE ORAL at 08:07

## 2019-07-03 RX ADMIN — INSULIN ASPART 8 UNITS: 100 INJECTION, SOLUTION INTRAVENOUS; SUBCUTANEOUS at 08:07

## 2019-07-03 RX ADMIN — INSULIN ASPART 3 UNITS: 100 INJECTION, SOLUTION INTRAVENOUS; SUBCUTANEOUS at 08:07

## 2019-07-03 RX ADMIN — INSULIN ASPART 4 UNITS: 100 INJECTION, SOLUTION INTRAVENOUS; SUBCUTANEOUS at 12:07

## 2019-07-03 RX ADMIN — Medication: at 09:07

## 2019-07-03 NOTE — PLAN OF CARE
Problem: Fall Injury Risk  Goal: Absence of Fall and Fall-Related Injury    Intervention: Promote Injury-Free Environment     07/02/19 1905   Optimize Balance and Safe Activity   Safety Promotion/Fall Prevention bed alarm set;commode/urinal/bedpan at bedside;Fall Risk reviewed with patient/family;high risk medications identified;lighting adjusted;medications reviewed         Problem: Adult Inpatient Plan of Care  Goal: Plan of Care Review  Outcome: Ongoing (interventions implemented as appropriate)     07/02/19 2013   Plan of Care Review   Plan of Care Reviewed With patient     Goal: Absence of Hospital-Acquired Illness or Injury    Intervention: Prevent VTE (venous thromboembolism)     07/02/19 2013   Prevent or Manage Embolism   VTE Prevention/Management bleeding risk assessed;ROM (active) performed;bleeding precautions maintained         Problem: Diabetes Comorbidity  Goal: Blood Glucose Level Within Desired Range    Intervention: Maintain Glycemic Control     07/02/19 2013   Monitor and Manage Ketoacidosis   Glycemic Management blood glucose monitoring;supplemental insulin given         Problem: Skin Injury Risk Increased  Goal: Skin Health and Integrity    Intervention: Promote and Optimize Oral Intake     07/02/19 2013   Monitor and Manage Anemia   Oral Nutrition Promotion calorie dense foods provided;calorie dense liquids provided

## 2019-07-03 NOTE — PLAN OF CARE
Problem: Diabetes Comorbidity  Goal: Blood Glucose Level Within Desired Range  Outcome: Ongoing (interventions implemented as appropriate)  Intervention: Maintain Glycemic Control     07/03/19 0024   Monitor and Manage Ketoacidosis   Glycemic Management blood glucose monitoring;insulin dose matched to carbohydrate intake

## 2019-07-03 NOTE — SUBJECTIVE & OBJECTIVE
Interval History: Feels much better. Very concerned about her elevated glucoses. Respiratory symptoms improved.    Review of Systems   Constitutional: Negative for chills and fever.   HENT: Negative for trouble swallowing and voice change.    Respiratory: Negative for cough, shortness of breath and wheezing.    Cardiovascular: Negative for chest pain and leg swelling.   Gastrointestinal: Negative for abdominal pain, nausea and vomiting.   Genitourinary: Positive for dysuria and vaginal pain. Negative for difficulty urinating.     Objective:     Vital Signs (Most Recent):  Temp: 98.7 °F (37.1 °C) (07/03/19 1135)  Pulse: 83 (07/03/19 1135)  Resp: 18 (07/03/19 1135)  BP: (!) 105/54 (07/03/19 1135)  SpO2: (!) 94 % (07/03/19 1135) Vital Signs (24h Range):  Temp:  [97.4 °F (36.3 °C)-98.7 °F (37.1 °C)] 98.7 °F (37.1 °C)  Pulse:  [] 83  Resp:  [17-18] 18  SpO2:  [94 %-98 %] 94 %  BP: (105-148)/(54-67) 105/54     Weight: 79.5 kg (175 lb 4.3 oz)  Body mass index is 27.45 kg/m².    Intake/Output Summary (Last 24 hours) at 7/3/2019 1519  Last data filed at 7/3/2019 1230  Gross per 24 hour   Intake 360 ml   Output 400 ml   Net -40 ml      Physical Exam   Constitutional: She is oriented to person, place, and time. She appears well-developed and well-nourished. No distress.   HENT:   Right Ear: External ear normal.   Left Ear: External ear normal.   Nose: Nose normal.   Mouth/Throat: Oropharynx is clear and moist.   Eyes: Pupils are equal, round, and reactive to light. EOM are normal. No scleral icterus.   Neck: Normal range of motion. Neck supple. No thyromegaly present.   Cardiovascular: Normal rate and normal heart sounds. Exam reveals no friction rub.   No murmur heard.  Pulmonary/Chest: Effort normal and breath sounds normal. No respiratory distress. She has no wheezes. She has no rales.   Abdominal: Soft. Bowel sounds are normal. She exhibits no mass. There is no tenderness.   Obese   Musculoskeletal: Normal range of  motion. She exhibits no edema or deformity.   Neurological: She is alert and oriented to person, place, and time. No cranial nerve deficit.   Skin: Skin is warm and dry. No pallor.   Psychiatric: She has a normal mood and affect. Her behavior is normal. Thought content normal.       Significant Labs: All pertinent labs within the past 24 hours have been reviewed.    Significant Imaging: I have reviewed and interpreted all pertinent imaging results/findings within the past 24 hours.

## 2019-07-03 NOTE — ASSESSMENT & PLAN NOTE
Patient's urinalysis is concerning for acute cystitis given cloudy appearance, 3+ protein, 3+ glucose, 3+ occult blood, and positive for nitrites.  She has been started on empiric antibiotic therapy pending urine culture results.    7/1/19  Urine culture pending. Previous culture e coli resistant to cipro, levo, and tetra, but is sensitive to augmentin, cephalosporins    7/2/19  Lactic acid remains 2.5 and white count 19 K. White count thought to be reactive however will continue antibiotic therapy with this patient Augmentin to cover respiratory and  and monitor closely    7/3/2019   Urine grew yeast. Treated yeast infection. Cont augmenting given concern for COPD exacerbation on presentation.

## 2019-07-03 NOTE — ASSESSMENT & PLAN NOTE
Patient had a similar episode about 3 weeks ago and was treated for COPD exacerbation.  EKG was significant for sinus tachycardia without evidence of acute ischemia.  She wears oxygen at home and is usually on 3.5 liters/minute.  CXR without infiltrates, pleural effusions, nor any pneumothorax.  She was initially started on CPAP which was transition to BPAP.  She was now off of BPAP and doing well by the night of admission.  Will provide frequent nebulized JORDAN/LAMA; intravenous corticosteroids, and empiric antibiotics therapy.    Started Augmentin as possible will cover for UTI as well, mucinex. O2 sat stable on 3.5 L home oxygen. Steroids stopped as she was much improved and her glucoses were very uncontrolled.

## 2019-07-03 NOTE — ASSESSMENT & PLAN NOTE
Patient with abnormal urinalysis - hypotension and elevated lactic acid level + leukocytosis  Continue IV fluids & ABX  Monitor closely  Repeat CBC and CMP in am  Lactic acid in am

## 2019-07-03 NOTE — PROGRESS NOTES
[facility] Hospital Medicine  Progress Note    Patient Name: Yasmeen Palm  MRN: [id number]  Patient Class: IP- Inpatient   Admission Date: 7/1/2019  Length of Stay: 1 days  Attending Physician: Viri Paredes MD  Primary Care Provider: Angel Orourke Jr, MD        Subjective:     Principal Problem:Acute cystitis      HPI:  Mrs. Yasmeen Palm is a 69 y.o. female known to me with essential hypertension, type 2 diabetes mellitus (HbA1c 7.3% Jun 2019), hyperlipidemia (.0 Dec 2018), paroxysmal atrial fibrillation ([id number] score 4) on chronic anticoagulation, COPD, chronic respiratory failure with hypoxia and hypercapnia, GERD, anemia of chronic disease, tobacco abuse, and history of PE/DVT on chronic anticoagulation who presents to [facility] ED with complaints of dyspnea past two days.  The dyspnea is mainly on exertion and associated with wheezing and a cough that was occasionally productive of thick clear sputum.  She denies any fevers, chills, nausea, vomiting, chest pain, pleurisy, diaphoresis, palpitations, nor any hemoptysis.  She does report bilateral lower extremity edema that is slightly worse than her baseline.  She does complain of orthopnea but denies any PND.  She also reports that her niece and  have been having allergy symptoms; she has not had any recent travel.    She also reports dysuria for the past 1.5 days.  It is associated with increased urinary frequency and urgency.  She denies any hematuria.    Overview/Hospital Course:  Mrs. Palm is a 70 yo female with history for COPD on home oxygen .3, LOYDA on CPAP, pulmonary hypertension PAP 80 mmHg, DVT LLE 2 yrs ago on xarelto, and PAF   who was admitted to admission for UTI and COPD exacerbation. Rocephin IV started in ED. IV steroid and duoneb started. Lasix 40 mg IV also started. On 7/1/19, lung sound fine inspiratory crackles mid to lower lobes without wheezing. Coughing about same but not able to get  sputum up. BLE edema improved. Continue duoneb, steroid, rocephin.     Respirations a more even today however UTI with spike in WBC overnight 19,000. Originally Suspected from oral prednisone however lactic acid still elevated 2.5 despite IV fluids overnight.  Continue to treat for UTI, continue duo nebs, antibiotic coverage to cover respiratory and urinary tract.  She is at risk for severe acute decline given her debility her  who is her sole caretaker is also hospitalized at this time.  Will continue antibiotic therapy monitor temperature, monitor vital signs---active vein acid in the morning    Addendum 1522.  SBP 7O's. Bolus 500 ml now.   . Give another 10 units insulin now.   Cancel discharge    7/3/2019:  Patient stepped up to inpatient yesterday and placed on my service. She was noted to have hyperglycemia, leukocytosis, and hypotension. Her hypotension resolved with IV fluids. Her leukocytosis has since steeply declined. She remains on Augmentin to treat possible pneumonia, but she does not appear to have bacteria growing in her urine, only yeast. She describes symptoms of a yeast infection and was treated with fluconazole 150mg. Her glucoses are uncontrolled due to steroids. Her respiratory symptoms are much improved and steroids were stopped. No need for taper with this short course. She is feeling better and may be able to be discharged tomorrow pending care giver support.    Interval History: Feels much better. Very concerned about her elevated glucoses. Respiratory symptoms improved.    Review of Systems   Constitutional: Negative for chills and fever.   HENT: Negative for trouble swallowing and voice change.    Respiratory: Negative for cough, shortness of breath and wheezing.    Cardiovascular: Negative for chest pain and leg swelling.   Gastrointestinal: Negative for abdominal pain, nausea and vomiting.   Genitourinary: Positive for dysuria and vaginal pain. Negative for difficulty  urinating.     Objective:     Vital Signs (Most Recent):  Temp: 98.7 °F (37.1 °C) (07/03/19 1135)  Pulse: 83 (07/03/19 1135)  Resp: 18 (07/03/19 1135)  BP: (!) 105/54 (07/03/19 1135)  SpO2: (!) 94 % (07/03/19 1135) Vital Signs (24h Range):  Temp:  [97.4 °F (36.3 °C)-98.7 °F (37.1 °C)] 98.7 °F (37.1 °C)  Pulse:  [] 83  Resp:  [17-18] 18  SpO2:  [94 %-98 %] 94 %  BP: (105-148)/(54-67) 105/54     Weight: 79.5 kg (175 lb 4.3 oz)  Body mass index is 27.45 kg/m².    Intake/Output Summary (Last 24 hours) at 7/3/2019 1519  Last data filed at 7/3/2019 1230  Gross per 24 hour   Intake 360 ml   Output 400 ml   Net -40 ml      Physical Exam   Constitutional: She is oriented to person, place, and time. She appears well-developed and well-nourished. No distress.   HENT:   Right Ear: External ear normal.   Left Ear: External ear normal.   Nose: Nose normal.   Mouth/Throat: Oropharynx is clear and moist.   Eyes: Pupils are equal, round, and reactive to light. EOM are normal. No scleral icterus.   Neck: Normal range of motion. Neck supple. No thyromegaly present.   Cardiovascular: Normal rate and normal heart sounds. Exam reveals no friction rub.   No murmur heard.  Pulmonary/Chest: Effort normal and breath sounds normal. No respiratory distress. She has no wheezes. She has no rales.   Abdominal: Soft. Bowel sounds are normal. She exhibits no mass. There is no tenderness.   Obese   Musculoskeletal: Normal range of motion. She exhibits no edema or deformity.   Neurological: She is alert and oriented to person, place, and time. No cranial nerve deficit.   Skin: Skin is warm and dry. No pallor.   Psychiatric: She has a normal mood and affect. Her behavior is normal. Thought content normal.       Significant Labs: All pertinent labs within the past 24 hours have been reviewed.    Significant Imaging: I have reviewed and interpreted all pertinent imaging results/findings within the past 24 hours.      Assessment/Plan:      * Acute  cystitis  Patient's urinalysis is concerning for acute cystitis given cloudy appearance, 3+ protein, 3+ glucose, 3+ occult blood, and positive for nitrites.  She has been started on empiric antibiotic therapy pending urine culture results.    7/1/19  Urine culture pending. Previous culture e coli resistant to cipro, levo, and tetra, but is sensitive to augmentin, cephalosporins    7/2/19  Lactic acid remains 2.5 and white count 19 K. White count thought to be reactive however will continue antibiotic therapy with this patient Augmentin to cover respiratory and  and monitor closely    7/3/2019   Urine grew yeast. Treated yeast infection. Cont augmenting given concern for COPD exacerbation on presentation.    COPD with acute exacerbation  Patient had a similar episode about 3 weeks ago and was treated for COPD exacerbation.  EKG was significant for sinus tachycardia without evidence of acute ischemia.  She wears oxygen at home and is usually on 3.5 liters/minute.  CXR without infiltrates, pleural effusions, nor any pneumothorax.  She was initially started on CPAP which was transition to BPAP.  She was now off of BPAP and doing well by the night of admission.  Will provide frequent nebulized JORDAN/LAMA; intravenous corticosteroids, and empiric antibiotics therapy.    Started Augmentin as possible will cover for UTI as well, mucinex. O2 sat stable on 3.5 L home oxygen. Steroids stopped as she was much improved and her glucoses were very uncontrolled.    Hyperlipidemia  Poorly-controlled; will continue to monitor.    COPD (chronic obstructive pulmonary disease)  As addressed above.    Essential hypertension  Patient's blood pressure is poorly-controlled; will continue home regimen of furosemide, hydralazine, isosorbide, lisinopril, and metoprolol, and provide as-needed clonidine.    Paroxysmal atrial fibrillation  Patient is currently in sinus rhythm that is rate-controlled with amiodarone and metoprolol.  Will also  continued her home regimen of rivaroxaban.    Chronic respiratory failure with hypoxia  Will continue supplemental oxygen therapy.    Chronic anticoagulation  As addressed above.    History of deep vein thrombosis  Stable; there are no acute issues. Continue xarelto    History of pulmonary embolism  Stable; there are no acute issues. Continue xarelto.     Anemia of chronic disease  The patient's H/H is stable and consistent with previous laboratory measurements, and the patient exhibits no signs or symptoms of acute bleeding; there is no indication for transfusion.  Will continue to monitor. Obtain iron studies.    Tobacco abuse  Patient was counseled on smoking cessation and she will be provided a nicotine transdermal patch applied while inpatient.  Will provide additional smoking cessation counseling prior to discharge.    Gastroesophageal reflux disease without esophagitis  Will continue her home regimen of pantoprazole.    Controlled type 2 diabetes mellitus, without long-term current use of insulin  Fairly-controlled on a home regimen of metformin; will provide basal-prandial insulin along with insulin sliding scale.      VTE Risk Mitigation (From admission, onward)        Ordered     rivaroxaban tablet 20 mg  With dinner      07/01/19 0452     IP VTE HIGH RISK PATIENT  Once      07/01/19 0453                Viri Paredes MD  Department of Hospital Medicine   [facility]

## 2019-07-03 NOTE — PLAN OF CARE
Problem: Physical Therapy Goal  Goal: Physical Therapy Goal  Goals to be met by: 19     Patient will increase functional independence with mobility by performin. Supine to sit with Modified [place]  2. Rolling to Left and Right with Modified [place]  3. Sit to stand transfer with Modified [place] using RW  4. Bed to chair transfer with Modified [place] using Rolling Walker  5. Gait  x50-100 feet with Modified [place] using Rolling Walker and O2  6. Upper/Lower extremity exercise program 3 sets x10 reps per handout, with independence     Outcome: Ongoing (interventions implemented as appropriate)  Pt ambulated ~ 120 ft with RW, CGA/SBA (3LO2NC).

## 2019-07-03 NOTE — NURSING
"Pt had c/o "feeling funny a flutter in my chest every now and then."  Pt had c/o 4/10 chest pain w/ VS stable and was ST w/ ST elevation on monitor. MD notified and ordered stat EKG w/ repeat troponin.   "

## 2019-07-03 NOTE — NURSING
Patient transferred to telemetry via bed with 3L N/C in use. No complaints, no acute distress noted.

## 2019-07-03 NOTE — PT/OT/SLP PROGRESS
Physical Therapy Treatment    Patient Name:  Yasmeen Palm   MRN:  [id number]    Recommendations:     Discharge Recommendations:  home health PT(with family assistance)   Discharge Equipment Recommendations: none   Barriers to discharge: None    Assessment:     Yasmeen Palm is a 69 y.o. female admitted with a medical diagnosis of Acute cystitis.  She presents with the following impairments/functional limitations:  weakness, impaired endurance, impaired self care skills, decreased upper extremity function, decreased lower extremity function, decreased ROM, decreased safety awareness, pain, impaired cardiopulmonary response to activity, gait instability .    Rehab Prognosis: Good; patient would benefit from acute skilled PT services to address these deficits and reach maximum level of function.    Recent Surgery: * No surgery found *      Plan:     During this hospitalization, patient to be seen   to address the identified rehab impairments via   and progress toward the following goals:    · Plan of Care Expires:       Subjective     Chief Complaint: NONE   Patient/Family Comments/goals: to walk   Pain/Comfort:  · Pain Rating 1: 0/10  · Pain Rating Post-Intervention 1: 0/10      Objective:     Communicated with nurse Urrutia  prior to session.  Patient found HOB elevated with bed alarm, telemetry, oxygen upon PT entry to room.     General Precautions: Standard, fall, diabetic, respiratory   Orthopedic Precautions:N/A   Braces: N/A     Functional Mobility:  · Bed Mobility:     · Rolling Left:  supervision  · Scooting: supervision  · Supine to Sit: supervision  · Transfers:     · Sit to Stand:  stand by assistance and contact guard assistance with rolling walker  · Gait:  Pt ambulated ~ 120 ft with RW, CGA (3LO2NC). Noted with decreased dyllan, decreased step length. VC's for safety technique, walker management and pursed lip breathing technique .       AM-PAC 6 CLICK MOBILITY  Turning over in bed (including adjusting  bedclothes, sheets and blankets)?: 4  Sitting down on and standing up from a chair with arms (e.g., wheelchair, bedside commode, etc.): 3  Moving from lying on back to sitting on the side of the bed?: 4  Moving to and from a bed to a chair (including a wheelchair)?: 3  Need to walk in hospital room?: 3  Climbing 3-5 steps with a railing?: 3  Basic Mobility Total Score: 20       Therapeutic Activities and Exercises:   pt performed bed mobility, transfer and gait training as above.     Patient left seated EOB with all lines intact, call button in reach and nurse notified..    GOALS:   Multidisciplinary Problems     Physical Therapy Goals        Problem: Physical Therapy Goal    Goal Priority Disciplines Outcome Goal Variances Interventions   Physical Therapy Goal     PT, PT/OT Ongoing (interventions implemented as appropriate)     Description:  Goals to be met by: 19     Patient will increase functional independence with mobility by performin. Supine to sit with Modified [place]  2. Rolling to Left and Right with Modified [place]  3. Sit to stand transfer with Modified [place] using RW  4. Bed to chair transfer with Modified [place] using Rolling Walker  5. Gait  x50-100 feet with Modified [place] using Rolling Walker and O2  6. Upper/Lower extremity exercise program 3 sets x10 reps per handout, with independence                      Time Tracking:     PT Received On: 19  PT Start Time: 1708     PT Stop Time: 1721  PT Total Time (min): 13 min     Billable Minutes: Gait Training 13    Treatment Type: Treatment  PT/PTA: PTA     PTA Visit Number: 2     Kayleigh Henry, PTA  2019

## 2019-07-03 NOTE — NURSING
Pt AAOx4 with no c/o pain. Telemetry monitor in place. Saline lock in place to site is clear. Pt informed of plan of care and safety maintained with bed low side rails up x2 with nurse call bell within reach and bed alarm set

## 2019-07-03 NOTE — NURSING
Report received from FABY Reis. Patient resting comfortably in bed with BIPAP in use, requests to change to N/C. Switched to 3 L N/C. No acute distress noted. Will continue to monitor.

## 2019-07-03 NOTE — PLAN OF CARE
Problem: Fall Injury Risk  Goal: Absence of Fall and Fall-Related Injury    Intervention: Identify and Manage Contributors to Fall Injury Risk     07/03/19 0525   Manage Acute Allergic Reaction   Medication Review/Management medications reviewed;high risk medications identified   Identify and Manage Contributors to Fall Injury Risk   Self-Care Promotion independence encouraged     Intervention: Promote Injury-Free Environment     07/03/19 0525   Optimize [place] and Functional Mobility   Environmental Safety Modification clutter free environment maintained;lighting adjusted   Optimize Balance and Safe Activity   Safety Promotion/Fall Prevention bed alarm set;high risk medications identified;Fall Risk reviewed with patient/family;side rails raised x 2         Comments: Pt VS has been stable and did not received BP meds d/t low blood pressure. Pt had c/o chest pain that resolved spontaneously. MD order EKG and Troponin, which the EKG was ST. Pt was ST on the monitor and ST during the episode of chest pain. Pt BG have been elevated and last BG was 363 during chest pain episode. Pt free of falls this hsift and rested comfortably at bedside.

## 2019-07-04 VITALS
SYSTOLIC BLOOD PRESSURE: 101 MMHG | OXYGEN SATURATION: 97 % | HEIGHT: 67 IN | RESPIRATION RATE: 16 BRPM | BODY MASS INDEX: 28.61 KG/M2 | WEIGHT: 182.31 LBS | DIASTOLIC BLOOD PRESSURE: 55 MMHG | TEMPERATURE: 99 F | HEART RATE: 84 BPM

## 2019-07-04 PROBLEM — J44.1 COPD WITH ACUTE EXACERBATION: Status: RESOLVED | Noted: 2019-07-01 | Resolved: 2019-07-04

## 2019-07-04 PROBLEM — N30.00 ACUTE CYSTITIS: Status: RESOLVED | Noted: 2019-07-01 | Resolved: 2019-07-04

## 2019-07-04 LAB
ANION GAP SERPL CALC-SCNC: 2 MMOL/L (ref 8–16)
BASOPHILS # BLD AUTO: 0.01 K/UL (ref 0–0.2)
BASOPHILS NFR BLD: 0.1 % (ref 0–1.9)
BUN SERPL-MCNC: 31 MG/DL (ref 8–23)
CALCIUM SERPL-MCNC: 9.3 MG/DL (ref 8.7–10.5)
CHLORIDE SERPL-SCNC: 102 MMOL/L (ref 95–110)
CO2 SERPL-SCNC: 37 MMOL/L (ref 23–29)
CREAT SERPL-MCNC: 0.8 MG/DL (ref 0.5–1.4)
DIFFERENTIAL METHOD: ABNORMAL
EOSINOPHIL # BLD AUTO: 0.1 K/UL (ref 0–0.5)
EOSINOPHIL NFR BLD: 0.5 % (ref 0–8)
ERYTHROCYTE [DISTWIDTH] IN BLOOD BY AUTOMATED COUNT: 18.8 % (ref 11.5–14.5)
EST. GFR  (AFRICAN AMERICAN): >60 ML/MIN/1.73 M^2
EST. GFR  (NON AFRICAN AMERICAN): >60 ML/MIN/1.73 M^2
GLUCOSE SERPL-MCNC: 191 MG/DL (ref 70–110)
HCT VFR BLD AUTO: 26.9 % (ref 37–48.5)
HGB BLD-MCNC: 7.1 G/DL (ref 12–16)
LYMPHOCYTES # BLD AUTO: 2.1 K/UL (ref 1–4.8)
LYMPHOCYTES NFR BLD: 16.7 % (ref 18–48)
MCH RBC QN AUTO: 24.5 PG (ref 27–31)
MCHC RBC AUTO-ENTMCNC: 26.4 G/DL (ref 32–36)
MCV RBC AUTO: 93 FL (ref 82–98)
MONOCYTES # BLD AUTO: 1.3 K/UL (ref 0.3–1)
MONOCYTES NFR BLD: 10.2 % (ref 4–15)
NEUTROPHILS # BLD AUTO: 9 K/UL (ref 1.8–7.7)
NEUTROPHILS NFR BLD: 73.1 % (ref 38–73)
PLATELET # BLD AUTO: 385 K/UL (ref 150–350)
PMV BLD AUTO: 9.3 FL (ref 9.2–12.9)
POCT GLUCOSE: 149 MG/DL (ref 70–110)
POCT GLUCOSE: 195 MG/DL (ref 70–110)
POCT GLUCOSE: 309 MG/DL (ref 70–110)
POTASSIUM SERPL-SCNC: 5 MMOL/L (ref 3.5–5.1)
RBC # BLD AUTO: 2.9 M/UL (ref 4–5.4)
SODIUM SERPL-SCNC: 141 MMOL/L (ref 136–145)
WBC # BLD AUTO: 12.36 K/UL (ref 3.9–12.7)

## 2019-07-04 PROCEDURE — S4991 NICOTINE PATCH NONLEGEND: HCPCS | Performed by: INTERNAL MEDICINE

## 2019-07-04 PROCEDURE — 25000003 PHARM REV CODE 250: Performed by: INTERNAL MEDICINE

## 2019-07-04 PROCEDURE — 85025 COMPLETE CBC W/AUTO DIFF WBC: CPT

## 2019-07-04 PROCEDURE — 27000221 HC OXYGEN, UP TO 24 HOURS

## 2019-07-04 PROCEDURE — 80048 BASIC METABOLIC PNL TOTAL CA: CPT

## 2019-07-04 PROCEDURE — 25000242 PHARM REV CODE 250 ALT 637 W/ HCPCS: Performed by: INTERNAL MEDICINE

## 2019-07-04 PROCEDURE — 99900035 HC TECH TIME PER 15 MIN (STAT)

## 2019-07-04 PROCEDURE — 94660 CPAP INITIATION&MGMT: CPT

## 2019-07-04 PROCEDURE — 36415 COLL VENOUS BLD VENIPUNCTURE: CPT

## 2019-07-04 PROCEDURE — 94640 AIRWAY INHALATION TREATMENT: CPT

## 2019-07-04 PROCEDURE — 94761 N-INVAS EAR/PLS OXIMETRY MLT: CPT

## 2019-07-04 RX ORDER — MICONAZOLE NITRATE 2 %
POWDER (GRAM) TOPICAL 2 TIMES DAILY
Refills: 0 | COMMUNITY
Start: 2019-07-04 | End: 2019-07-04 | Stop reason: HOSPADM

## 2019-07-04 RX ORDER — FERROUS GLUCONATE 324(38)MG
324 TABLET ORAL
Qty: 60 TABLET | Refills: 3 | Status: SHIPPED | OUTPATIENT
Start: 2019-07-04

## 2019-07-04 RX ORDER — METFORMIN HYDROCHLORIDE 1000 MG/1
1000 TABLET ORAL 2 TIMES DAILY WITH MEALS
Qty: 180 TABLET | Refills: 3 | Status: SHIPPED | OUTPATIENT
Start: 2019-07-04 | End: 2020-07-03

## 2019-07-04 RX ORDER — POLYETHYLENE GLYCOL 3350 17 G/17G
17 POWDER, FOR SOLUTION ORAL DAILY
Qty: 510 G | Refills: 0 | Status: SHIPPED | OUTPATIENT
Start: 2019-07-04 | End: 2019-07-19

## 2019-07-04 RX ADMIN — INSULIN ASPART 11 UNITS: 100 INJECTION, SOLUTION INTRAVENOUS; SUBCUTANEOUS at 12:07

## 2019-07-04 RX ADMIN — METOPROLOL TARTRATE 100 MG: 50 TABLET, FILM COATED ORAL at 09:07

## 2019-07-04 RX ADMIN — LISINOPRIL 40 MG: 20 TABLET ORAL at 09:07

## 2019-07-04 RX ADMIN — IPRATROPIUM BROMIDE AND ALBUTEROL SULFATE 3 ML: .5; 3 SOLUTION RESPIRATORY (INHALATION) at 03:07

## 2019-07-04 RX ADMIN — ASPIRIN 81 MG: 81 TABLET, COATED ORAL at 09:07

## 2019-07-04 RX ADMIN — GABAPENTIN 300 MG: 300 CAPSULE ORAL at 09:07

## 2019-07-04 RX ADMIN — Medication: at 09:07

## 2019-07-04 RX ADMIN — NICOTINE 1 PATCH: 21 PATCH, EXTENDED RELEASE TRANSDERMAL at 09:07

## 2019-07-04 RX ADMIN — INSULIN ASPART 11 UNITS: 100 INJECTION, SOLUTION INTRAVENOUS; SUBCUTANEOUS at 07:07

## 2019-07-04 RX ADMIN — ISOSORBIDE MONONITRATE 90 MG: 30 TABLET, EXTENDED RELEASE ORAL at 09:07

## 2019-07-04 RX ADMIN — PHENAZOPYRIDINE HYDROCHLORIDE 200 MG: 100 TABLET ORAL at 08:07

## 2019-07-04 RX ADMIN — IPRATROPIUM BROMIDE AND ALBUTEROL SULFATE 3 ML: .5; 3 SOLUTION RESPIRATORY (INHALATION) at 08:07

## 2019-07-04 RX ADMIN — IPRATROPIUM BROMIDE AND ALBUTEROL SULFATE 3 ML: .5; 3 SOLUTION RESPIRATORY (INHALATION) at 11:07

## 2019-07-04 RX ADMIN — AMIODARONE HYDROCHLORIDE 200 MG: 200 TABLET ORAL at 09:07

## 2019-07-04 RX ADMIN — AMOXICILLIN AND CLAVULANATE POTASSIUM 1 TABLET: 875; 125 TABLET, FILM COATED ORAL at 09:07

## 2019-07-04 RX ADMIN — PREGABALIN 75 MG: 50 CAPSULE ORAL at 09:07

## 2019-07-04 RX ADMIN — ACETAMINOPHEN 500 MG: 500 TABLET, FILM COATED ORAL at 09:07

## 2019-07-04 RX ADMIN — DICYCLOMINE HYDROCHLORIDE 10 MG: 10 CAPSULE ORAL at 03:07

## 2019-07-04 RX ADMIN — HYDRALAZINE HYDROCHLORIDE 50 MG: 25 TABLET ORAL at 09:07

## 2019-07-04 RX ADMIN — GUAIFENESIN 600 MG: 600 TABLET, EXTENDED RELEASE ORAL at 09:07

## 2019-07-04 RX ADMIN — PANTOPRAZOLE SODIUM 40 MG: 40 TABLET, DELAYED RELEASE ORAL at 09:07

## 2019-07-04 NOTE — NURSING
Patient escorted to 4th floor to be discharged, she will be visiting with her  until it's time for her niece to pick her up. Patient escorted by transport. No apparent distress noted.

## 2019-07-04 NOTE — PROGRESS NOTES
"Order received for to resume HH.  Pt was receiving services from [facility] prior to admit.     Facesheet, Orders and  H&P sent via [facility] to [place].  Awaiting confirmation that services will resume.      EDUCATION:  Pt provided with educational information on UTI.  Information reviewed and placed in :My Healthcare Packet" to be brought home for  use as resource after discharge.  Information included:  signs and symptoms to look for at discharge. ESPERANZA instructed pt to call the doctor if experiencing symptoms that may indicate a medical emergency: CALL 911 if signs and symptoms worsen. ESPERANZA asked pt to provide SW with two signs and symptoms recently discussed.  Pt stated, "pain from vaginal area".  Reminded pt things she will be responsible for to manage her healthcare at home: getting Rx filled, attending follow up appointments, and taking medication as prescribed were discussed.   Teach back method used.  All questions answered.  Patient verbalized understanding of all information.      ESPERANZA provided pt with a copy of follow-up appointments. ESPERANZA explained/highlighted date, time, and location of each appointment. ESPERANZA provided pt with a blue folder and instructed pt to place all medical documents in blue folder. ESPERANZA explained to pt the nurse will provide an AVS with diagnosis and instructed pt to place in blue folder and bring to follow-up appointment. ESPERANZA notified pt's nurse, Renan, all CM needs have been met.             "

## 2019-07-04 NOTE — NURSING
Patient resting in bed quietly. NAD noted. No c/o pain.   Fall and safety precautions maintained. Bed alarm activated and audible. Bed locked in the lowest position, with side rails up x 2. Call bell and personal items within reach.Patient on BiPap which is what she wears at night.

## 2019-07-04 NOTE — PLAN OF CARE
07/04/19 1210   Final Note   Assessment Type Final Discharge Note   Anticipated Discharge Disposition Home-Health   What phone number can be called within the next 1-3 days to see how you are doing after discharge? [phone]   Hospital Follow Up  Appt(s) scheduled? Yes   Right Care Referral Info   Post Acute Recommendation Home-care   Referral Type Home Care    [place] [address]

## 2019-07-04 NOTE — DISCHARGE SUMMARY
[facility] Hospital Medicine  Discharge Summary      Patient Name: Yasmeen Palm  MRN: [id number]  Admission Date: 7/1/2019  Hospital Length of Stay: 2 days  Discharge Date and Time: 7/4/2019  3:37 PM  Attending Physician: No att. providers found   Discharging Provider: Viri Paredes MD  Primary Care Provider: Angel Orourke Jr, MD      HPI:   Mrs. Yasmeen Palm is a 69 y.o. female known to me with essential hypertension, type 2 diabetes mellitus (HbA1c 7.3% Jun 2019), hyperlipidemia (.0 Dec 2018), paroxysmal atrial fibrillation ([id number] score 4) on chronic anticoagulation, COPD, chronic respiratory failure with hypoxia and hypercapnia, GERD, anemia of chronic disease, tobacco abuse, and history of PE/DVT on chronic anticoagulation who presents to [facility] ED with complaints of dyspnea past two days.  The dyspnea is mainly on exertion and associated with wheezing and a cough that was occasionally productive of thick clear sputum.  She denies any fevers, chills, nausea, vomiting, chest pain, pleurisy, diaphoresis, palpitations, nor any hemoptysis.  She does report bilateral lower extremity edema that is slightly worse than her baseline.  She does complain of orthopnea but denies any PND.  She also reports that her niece and  have been having allergy symptoms; she has not had any recent travel.    She also reports dysuria for the past 1.5 days.  It is associated with increased urinary frequency and urgency.  She denies any hematuria.    Hospital Course:   Mrs. Palm is a 68 yo female with history for COPD on home oxygen 3L, LOYDA on CPAP, pulmonary hypertension PAP 80 mmHg, DVT LLE 2 yrs ago, and PAF on Xarelto who was admitted to admission for UTI and COPD exacerbation. Rocephin IV started in ED. IV steroid and duoneb started. Lasix 40 mg IV also started. On 7/1/19, lung exam revealed fine inspiratory crackles mid to lower lobes without wheezing. Coughing about same but not  able to get sputum up. BLE edema improved. Continue duoneb, steroid, rocephin.     Respirations were more even the day after admission however UTI with spike in WBC overnight 19,000. Likely from oral prednisone. Continued to treat for UTI, continue duo nebs, antibiotic coverage to cover respiratory and urinary tract.  She was at risk for severe acute decline given her debility and her /primary caretaker was also hospitalized at the time. She developed hypotension on 7/2/2019 that responded to IV fluids. Her glucoses were also wildly uncontrolled due to steroids. She was continued on antibiotic therapy and monitored.    7/3/2019:  Patient stepped up to inpatient 7/2/2019 and placed on my service. She was noted to have hyperglycemia, leukocytosis, and hypotension. Her hypotension resolved with IV fluids. Her leukocytosis has since steeply declined with tapering of steroids and adjustment in insulin. She remained on Augmentin to treat possible pneumonia/COPD exacerbation though no consolidation on CXR and she is was reported to not ever have wheezing. She does not appear to have bacteria growing in her urine, only yeast. She describes symptoms of a yeast infection and was treated with fluconazole 150mg and also given anti-fungal powder for intertrigo. Her glucoses were uncontrolled due to steroids. Her respiratory symptoms are much improved and steroids were stopped. No need for taper with this short course. She was feeling better. Antibiotics were stopped. She was monitored overnight 7/3. On 7/4 she was feeling better and was medically stable for discharge home with home health. She needs to follow up closely with her PCP.     Consults:   Consults (From admission, onward)        Status Ordering Provider     IP consult to case management  Once     Provider:  (Not yet assigned)    Completed YAZMIN RENTERIA        Final Active Diagnoses:    Diagnosis Date Noted POA    PRINCIPAL PROBLEM:  Chronic respiratory  failure with hypoxia [J96.11] 04/10/2017 Yes     Chronic    Essential hypertension [I10] 03/24/2019 Yes     Chronic    Hyperlipidemia [E78.5] 03/24/2019 Yes     Chronic    COPD (chronic obstructive pulmonary disease) [J44.9] 03/24/2019 Yes     Chronic    Paroxysmal atrial fibrillation [I48.0] 06/25/2018 Yes     Chronic    Chronic anticoagulation [Z79.01] 04/10/2017 Not Applicable     Chronic    History of pulmonary embolism [Z86.711] 04/10/2017 Yes     Chronic    History of deep vein thrombosis [Z86.718] 04/10/2017 Not Applicable     Chronic    Anemia of chronic disease [D63.8] 09/27/2016 Yes     Chronic    Controlled type 2 diabetes mellitus, without long-term current use of insulin [E11.9] 12/14/2015 Yes     Chronic    Gastroesophageal reflux disease without esophagitis [K21.9] 12/14/2015 Yes     Chronic    Tobacco abuse [Z72.0] 12/14/2015 Yes     Chronic      Problems Resolved During this Admission:    Diagnosis Date Noted Date Resolved POA    Sepsis due to Escherichia coli [A41.51] 07/02/2019 07/03/2019 No    COPD with acute exacerbation [J44.1] 07/01/2019 07/04/2019 Yes    Acute cystitis [N30.00] 07/01/2019 07/04/2019 Yes       Discharged Condition: stable    Disposition: Home or Self Care    Follow Up:  Follow-up Information     [facility].    Specialties:  DME Provider, Home Health Services  Why:  Home Health- call to notify that you are home and that care is to be resumed  Contact information:  [address] [facility]  [address]  [place] [address]  [phone]             Shyam Glaser MD On 8/1/2019.    Specialty:  Pulmonary Disease  Why:  at 10:20am  Contact information:  [address] [place] [address]  [address]  [place] [address]  [phone]                 Patient Instructions:      Diet diabetic     Diet Cardiac     Diet diabetic     Diet Cardiac     Notify your health care provider if you experience any of the following:  temperature >100.4     Notify your health care  provider if you experience any of the following:  difficulty breathing or increased cough     Notify your health care provider if you experience any of the following:  increased confusion or weakness     Notify your health care provider if you experience any of the following:  increased confusion or weakness     Notify your health care provider if you experience any of the following:  persistent dizziness, light-headedness, or visual disturbances     Notify your health care provider if you experience any of the following:  worsening rash     Notify your health care provider if you experience any of the following:  severe persistent headache     Notify your health care provider if you experience any of the following:  difficulty breathing or increased cough     Notify your health care provider if you experience any of the following:  redness, tenderness, or signs of infection (pain, swelling, redness, odor or green/yellow discharge around incision site)     Notify your health care provider if you experience any of the following:  severe uncontrolled pain     Notify your health care provider if you experience any of the following:  temperature >100.4     Notify your health care provider if you experience any of the following:  persistent nausea and vomiting or diarrhea     Activity as tolerated     Activity as tolerated       Medications:  Reconciled Home Medications:      Medication List      START taking these medications    benzonatate 200 MG capsule  Commonly known as:  TESSALON  Take 1 capsule (200 mg total) by mouth 3 (three) times daily as needed for Cough.     guaiFENesin 600 mg 12 hr tablet  Commonly known as:  MUCINEX  Take 1 tablet (600 mg total) by mouth 2 (two) times daily.     * ipratropium-albuterol  mcg/actuation inhaler  Commonly known as:  COMBIVENT  Inhale 1 puff into the lungs every 6 (six) hours as needed for Wheezing or Shortness of Breath. Rescue     * albuterol-ipratropium 2.5 mg-0.5 mg/3  mL nebulizer solution  Commonly known as:  DUO-NEB  Take 3 mLs by nebulization every 6 (six) hours. Rescue     polyethylene glycol 17 gram/dose powder  Commonly known as:  GLYCOLAX  Take 17 g by mouth once daily.         * This list has 2 medication(s) that are the same as other medications prescribed for you. Read the directions carefully, and ask your doctor or other care provider to review them with you.            CHANGE how you take these medications    ferrous gluconate 324 MG tablet  Commonly known as:  FERGON  Take 1 tablet (324 mg total) by mouth 3 (three) times daily with meals.  What changed:  when to take this     metFORMIN 1000 MG tablet  Commonly known as:  GLUCOPHAGE  Take 1 tablet (1,000 mg total) by mouth 2 (two) times daily with meals.  What changed:  medication strength        CONTINUE taking these medications    acetaminophen 500 MG tablet  Commonly known as:  TYLENOL  Take 1 tablet (500 mg total) by mouth every 8 (eight) hours as needed.     amiodarone 200 MG Tab  Commonly known as:  PACERONE  Take 1 tablet (200 mg total) by mouth once daily.     ANORO ELLIPTA INHL  Inhale into the lungs.     aspirin 81 MG EC tablet  Commonly known as:  ECOTRIN  Take 81 mg by mouth once daily.     fluticasone propionate 220 mcg/actuation inhaler  Commonly known as:  FLOVENT HFA  Inhale 1 puff into the lungs 2 (two) times daily. Controller     furosemide 40 MG tablet  Commonly known as:  LASIX  Take 40 mg by mouth once daily.     hydrALAZINE 50 MG tablet  Commonly known as:  APRESOLINE  Take 50 mg by mouth 2 (two) times daily.     isosorbide mononitrate 30 MG 24 hr tablet  Commonly known as:  IMDUR  Take 3 tablets (90 mg total) by mouth once daily.     lisinopril 40 MG tablet  Commonly known as:  PRINIVIL,ZESTRIL  Take 1 tablet (40 mg total) by mouth once daily. Do not take this medication if blood pressure is below 130/80 mmHg and/or feeling dizzy after taking all other blood pressure meds     metoprolol  tartrate 100 MG tablet  Commonly known as:  LOPRESSOR  Take 1 tablet (100 mg total) by mouth 2 (two) times daily.     multivit-min-FA-lycopen-lutein 0.4-300-250 mg-mcg-mcg Tab  Commonly known as:  CENTRUM SILVER  Take 1 tablet by mouth once daily.     nystatin powder  Commonly known as:  MYCOSTATIN  Apply topically 2 (two) times daily.     pantoprazole 40 MG tablet  Commonly known as:  PROTONIX  Take 40 mg by mouth once daily.     pregabalin 75 MG capsule  Commonly known as:  LYRICA  Take 75 mg by mouth 2 (two) times daily.     XARELTO 20 mg Tab  Generic drug:  rivaroxaban  Take 20 mg by mouth daily with dinner or evening meal.        STOP taking these medications    cephALEXin 500 MG capsule  Commonly known as:  KEFLEX     gabapentin 300 MG capsule  Commonly known as:  NEURONTIN     levalbuterol 45 mcg/actuation inhaler  Commonly known as:  XOPENEX HFA     multivitamin per tablet  Commonly known as:  THERAGRAN     tiotropium 18 mcg inhalation capsule  Commonly known as:  SPIRIVA            Indwelling Lines/Drains at time of discharge:   Lines/Drains/Airways          None          Time spent on the discharge of patient: 45 minutes  Patient was seen and examined on the date of discharge and determined to be suitable for discharge.         Viri Paredes MD  Department of Hospital Medicine  [facility]

## 2019-07-04 NOTE — PLAN OF CARE
Problem: Adult Inpatient Plan of Care  Goal: Plan of Care Review  Patient placed on cpap +8 with 2 lpm oxygen bled in. RADHA.

## 2019-07-04 NOTE — PROGRESS NOTES
WRITTEN HEALTHCARE DISCHARGE INFORMATION     Things that YOU are RESPONSIBLE for to Manage Your Care At Home:    1. Getting your prescriptions filled.  2. Taking you medications as directed. DO NOT MISS ANY DOSES!  3. Going to your follow-up doctor appointments. This is important because it allows the doctor to monitor your progress and to determine if any changes need to be made to your treatment plan.    If you are unable to make your follow up appointments, please call the number listed and reschedule this appointment.     ____________HELP AT HOME____________________    Experiencing any SIGNS or SYMPTOMS: YOU CAN    Schedule a same day appopintment with your Primary Care Doctor or  you can call [facility] On Call Nurse Care Line for 24/7 assistance at [phone]    If you are experience any signs or symptoms that have become severe, Call 911 and come to your nearest Emergency Room.    Thank you for choosing [facility] and allowing us to care for you.   From your care management team: Mary BLACKMAN [facility] [phone]    You should receive a call from [facility] Discharge Department within 48-72 hours to help manage your care after discharge. Please try to make sure that you answer your phone for this important phone call.  Follow-up Information     [facility].    Specialties:  DME Provider, Home Health Services  Why:  Home Health- call to notify that you are home and that care is to be resumed  Contact information:  [address] [facility]  [address]  [place] [address]  [phone]             Shyam Glaser MD On 8/1/2019.    Specialty:  Pulmonary Disease  Why:  at 10:20am  Contact information:  [address] [place] [address]  [address]  [place] [address]  [phone]

## 2019-07-04 NOTE — PLAN OF CARE
[facility]  HOME  HEALTH ORDERS     07/04/2019    Admit to Home Health    Diagnoses:  Active Hospital Problems    Diagnosis  POA    *Acute cystitis [N30.00]  Yes    COPD with acute exacerbation [J44.1]  Yes    Essential hypertension [I10]  Yes     Chronic    Hyperlipidemia [E78.5]  Yes     Chronic    COPD (chronic obstructive pulmonary disease) [J44.9]  Yes     Chronic    Paroxysmal atrial fibrillation [I48.0]  Yes     Chronic    Chronic anticoagulation [Z79.01]  Not Applicable     Chronic    Chronic respiratory failure with hypoxia [J96.11]  Yes     Chronic    History of pulmonary embolism [Z86.711]  Yes     Chronic    History of deep vein thrombosis [Z86.718]  Not Applicable     Chronic    Anemia of chronic disease [D63.8]  Yes     Chronic    Controlled type 2 diabetes mellitus, without long-term current use of insulin [E11.9]  Yes     Chronic    Gastroesophageal reflux disease without esophagitis [K21.9]  Yes     Chronic    Tobacco abuse [Z72.0]  Yes     Chronic      Resolved Hospital Problems    Diagnosis Date Resolved POA    Sepsis due to Escherichia coli [A41.51] 07/03/2019 No       Patient is homebound due to:  Acute cystitis    Allergies:Review of patient's allergies indicates:  No Known Allergies    Diet: 1800 amanda ADA, low sodium, heart healthy    Acitivities: As tolerated    Nursing:   SN to complete comprehensive assessment including routine vital signs. Instruct on disease process and s/s of complications to report to MD. Review/verify medication list sent home with the patient at time of discharge  and instruct patient/caregiver as needed. Frequency may be adjusted depending on start of care date.    Notify MD if SBP > 160 or < 90; DBP > 90 or < 50; HR > 120 or < 50; Temp > 101; O2 sat < 88%      CONSULTS:   PT to evaluate and treat. Evaluate for home safety and equipment needs; Establish/upgrade home exercise program. Perform / instruct on therapeutic exercises, gait  training, transfer training, and Range of Motion.    OT to evaluate and treat. Evaluate home environment for safety and equipment needs. Perform/Instruct on transfers, ADL training, ROM, and therapeutic exercises.     MSW to evaluate for community resources/long-range planning.     Aide to provide assistance with personal care, ADLs, and vital signs        Medications: Review discharge medications with patient and family and provide education.       ANSLEY Palm   Home Medication Instructions XAVIER:[id number]    Printed on:07/04/19 6211   Medication Information                      acetaminophen (TYLENOL) 500 MG tablet  Take 1 tablet (500 mg total) by mouth every 8 (eight) hours as needed.             albuterol-ipratropium (DUO-NEB) 2.5 mg-0.5 mg/3 mL nebulizer solution  Take 3 mLs by nebulization every 6 (six) hours. Rescue             amiodarone (PACERONE) 200 MG Tab  Take 1 tablet (200 mg total) by mouth once daily.             aspirin (ECOTRIN) 81 MG EC tablet  Take 81 mg by mouth once daily.             benzonatate (TESSALON) 200 MG capsule  Take 1 capsule (200 mg total) by mouth 3 (three) times daily as needed for Cough.             ferrous gluconate (FERGON) 324 MG tablet  Take 1 tablet (324 mg total) by mouth 3 (three) times daily with meals.             fluticasone propionate (FLOVENT HFA) 220 mcg/actuation inhaler  Inhale 1 puff into the lungs 2 (two) times daily. Controller             furosemide (LASIX) 40 MG tablet  Take 40 mg by mouth once daily.              guaiFENesin (MUCINEX) 600 mg 12 hr tablet  Take 1 tablet (600 mg total) by mouth 2 (two) times daily.             hydrALAZINE (APRESOLINE) 50 MG tablet  Take 50 mg by mouth 2 (two) times daily.             ipratropium-albuterol (COMBIVENT)  mcg/actuation inhaler  Inhale 1 puff into the lungs every 6 (six) hours as needed for Wheezing or Shortness of Breath. Rescue             isosorbide mononitrate (IMDUR) 30 MG 24 hr tablet  Take 3  tablets (90 mg total) by mouth once daily.             lisinopril (PRINIVIL,ZESTRIL) 40 MG tablet  Take 1 tablet (40 mg total) by mouth once daily. Do not take this medication if blood pressure is below 130/80 mmHg and/or feeling dizzy after taking all other blood pressure meds             metFORMIN (GLUCOPHAGE) 1000 MG tablet  Take 1 tablet (1,000 mg total) by mouth 2 (two) times daily with meals.             metoprolol tartrate (LOPRESSOR) 100 MG tablet  Take 1 tablet (100 mg total) by mouth 2 (two) times daily.             multivit-min-FA-lycopen-lutein (CENTRUM SILVER) 0.4-300-250 mg-mcg-mcg Tab  Take 1 tablet by mouth once daily.             nystatin (MYCOSTATIN) powder  Apply topically 2 (two) times daily.             pantoprazole (PROTONIX) 40 MG tablet  Take 40 mg by mouth once daily.             polyethylene glycol (GLYCOLAX) 17 gram/dose powder  Take 17 g by mouth once daily.             pregabalin (LYRICA) 75 MG capsule  Take 75 mg by mouth 2 (two) times daily.             rivaroxaban (XARELTO) 20 mg Tab  Take 20 mg by mouth daily with dinner or evening meal.              umeclidinium brm/vilanterol tr (ANORO ELLIPTA INHL)  Inhale into the lungs.                       _________________________________  Viri Paredes MD  07/04/2019

## 2019-07-05 NOTE — PT/OT/SLP DISCHARGE
Physical Therapy Discharge Summary    Name: Yasmeen Palm  MRN: [id number]   Principal Problem: Chronic respiratory failure with hypoxia     Patient Discharged from acute Physical Therapy on 19.  Please refer to prior PT notes for functional status.     Assessment:     Patient appropriate for care in another setting.    Objective:     GOALS:   Multidisciplinary Problems     Physical Therapy Goals        Problem: Physical Therapy Goal    Goal Priority Disciplines Outcome Goal Variances Interventions   Physical Therapy Goal     PT, PT/OT Ongoing (interventions implemented as appropriate)     Description:  Goals to be met by: 19     Patient will increase functional independence with mobility by performin. Supine to sit with Modified [place]  2. Rolling to Left and Right with Modified [place]  3. Sit to stand transfer with Modified [place] using RW  4. Bed to chair transfer with Modified [place] using Rolling Walker  5. Gait  x50-100 feet with Modified [place] using Rolling Walker and O2  6. Upper/Lower extremity exercise program 3 sets x10 reps per handout, with independence                      Reasons for Discontinuation of Therapy Services  Transfer to alternate level of care.      Plan:     Patient Discharged to: Home with Home Health Service.    Estephania Resendiz, PT  2019

## 2019-07-19 ENCOUNTER — HOSPITAL ENCOUNTER (EMERGENCY)
Facility: HOSPITAL | Age: 69
Discharge: HOME | End: 2019-07-20
Attending: EMERGENCY MEDICINE
Payer: MEDICARE

## 2019-07-19 DIAGNOSIS — R30.0 DYSURIA: ICD-10-CM

## 2019-07-19 DIAGNOSIS — N39.0 URINARY TRACT INFECTION WITH HEMATURIA, SITE UNSPECIFIED: ICD-10-CM

## 2019-07-19 DIAGNOSIS — R06.02 SOB (SHORTNESS OF BREATH): ICD-10-CM

## 2019-07-19 DIAGNOSIS — J44.1 COPD WITH ACUTE EXACERBATION: Primary | ICD-10-CM

## 2019-07-19 DIAGNOSIS — R31.9 URINARY TRACT INFECTION WITH HEMATURIA, SITE UNSPECIFIED: ICD-10-CM

## 2019-07-19 LAB
ALBUMIN SERPL BCP-MCNC: 2.9 G/DL (ref 3.5–5.2)
ALP SERPL-CCNC: 72 U/L (ref 55–135)
ALT SERPL W/O P-5'-P-CCNC: 11 U/L (ref 10–44)
ANION GAP SERPL CALC-SCNC: 7 MMOL/L (ref 8–16)
AST SERPL-CCNC: 13 U/L (ref 10–40)
BACTERIA #/AREA URNS HPF: ABNORMAL /HPF
BASOPHILS # BLD AUTO: 0.03 K/UL (ref 0–0.2)
BASOPHILS NFR BLD: 0.3 % (ref 0–1.9)
BILIRUB SERPL-MCNC: 0.1 MG/DL (ref 0.1–1)
BILIRUB UR QL STRIP: NEGATIVE
BNP SERPL-MCNC: 56 PG/ML (ref 0–99)
BUN SERPL-MCNC: 13 MG/DL (ref 8–23)
CALCIUM SERPL-MCNC: 8.9 MG/DL (ref 8.7–10.5)
CHLORIDE SERPL-SCNC: 110 MMOL/L (ref 95–110)
CLARITY UR: ABNORMAL
CO2 SERPL-SCNC: 25 MMOL/L (ref 23–29)
COLOR UR: ABNORMAL
CREAT SERPL-MCNC: 0.9 MG/DL (ref 0.5–1.4)
DIFFERENTIAL METHOD: ABNORMAL
EOSINOPHIL # BLD AUTO: 0.2 K/UL (ref 0–0.5)
EOSINOPHIL NFR BLD: 1.7 % (ref 0–8)
ERYTHROCYTE [DISTWIDTH] IN BLOOD BY AUTOMATED COUNT: 20.9 % (ref 11.5–14.5)
EST. GFR  (AFRICAN AMERICAN): >60 ML/MIN/1.73 M^2
EST. GFR  (NON AFRICAN AMERICAN): >60 ML/MIN/1.73 M^2
GLUCOSE SERPL-MCNC: 120 MG/DL (ref 70–110)
GLUCOSE UR QL STRIP: NEGATIVE
HCT VFR BLD AUTO: 31.1 % (ref 37–48.5)
HGB BLD-MCNC: 8.5 G/DL (ref 12–16)
HGB UR QL STRIP: ABNORMAL
HYALINE CASTS #/AREA URNS LPF: 0 /LPF
KETONES UR QL STRIP: NEGATIVE
LEUKOCYTE ESTERASE UR QL STRIP: ABNORMAL
LYMPHOCYTES # BLD AUTO: 3.3 K/UL (ref 1–4.8)
LYMPHOCYTES NFR BLD: 32.8 % (ref 18–48)
MAGNESIUM SERPL-MCNC: 1.6 MG/DL (ref 1.6–2.6)
MCH RBC QN AUTO: 25.3 PG (ref 27–31)
MCHC RBC AUTO-ENTMCNC: 27.3 G/DL (ref 32–36)
MCV RBC AUTO: 93 FL (ref 82–98)
MICROSCOPIC COMMENT: ABNORMAL
MONOCYTES # BLD AUTO: 0.8 K/UL (ref 0.3–1)
MONOCYTES NFR BLD: 7.8 % (ref 4–15)
NEUTROPHILS # BLD AUTO: 5.8 K/UL (ref 1.8–7.7)
NEUTROPHILS NFR BLD: 57.7 % (ref 38–73)
NITRITE UR QL STRIP: NEGATIVE
PH UR STRIP: 5 [PH] (ref 5–8)
PLATELET # BLD AUTO: 386 K/UL (ref 150–350)
PMV BLD AUTO: 9.7 FL (ref 9.2–12.9)
POTASSIUM SERPL-SCNC: 4.8 MMOL/L (ref 3.5–5.1)
PROT SERPL-MCNC: 6.1 G/DL (ref 6–8.4)
PROT UR QL STRIP: ABNORMAL
RBC # BLD AUTO: 3.36 M/UL (ref 4–5.4)
RBC #/AREA URNS HPF: >100 /HPF (ref 0–4)
SODIUM SERPL-SCNC: 142 MMOL/L (ref 136–145)
SP GR UR STRIP: 1.03 (ref 1–1.03)
TROPONIN I SERPL DL<=0.01 NG/ML-MCNC: <0.006 NG/ML (ref 0–0.03)
URN SPEC COLLECT METH UR: ABNORMAL
UROBILINOGEN UR STRIP-ACNC: NEGATIVE EU/DL
WBC # BLD AUTO: 10.01 K/UL (ref 3.9–12.7)
WBC #/AREA URNS HPF: >100 /HPF (ref 0–5)
YEAST URNS QL MICRO: ABNORMAL

## 2019-07-19 PROCEDURE — 25000003 PHARM REV CODE 250: Performed by: EMERGENCY MEDICINE

## 2019-07-19 PROCEDURE — 99285 EMERGENCY DEPT VISIT HI MDM: CPT | Mod: 25

## 2019-07-19 PROCEDURE — 25000242 PHARM REV CODE 250 ALT 637 W/ HCPCS: Performed by: EMERGENCY MEDICINE

## 2019-07-19 PROCEDURE — 85025 COMPLETE CBC W/AUTO DIFF WBC: CPT

## 2019-07-19 PROCEDURE — 27000221 HC OXYGEN, UP TO 24 HOURS

## 2019-07-19 PROCEDURE — 83880 ASSAY OF NATRIURETIC PEPTIDE: CPT

## 2019-07-19 PROCEDURE — P9612 CATHETERIZE FOR URINE SPEC: HCPCS

## 2019-07-19 PROCEDURE — 83735 ASSAY OF MAGNESIUM: CPT

## 2019-07-19 PROCEDURE — 94640 AIRWAY INHALATION TREATMENT: CPT

## 2019-07-19 PROCEDURE — 87086 URINE CULTURE/COLONY COUNT: CPT

## 2019-07-19 PROCEDURE — 81000 URINALYSIS NONAUTO W/SCOPE: CPT

## 2019-07-19 PROCEDURE — 80053 COMPREHEN METABOLIC PANEL: CPT

## 2019-07-19 PROCEDURE — 93010 EKG 12-LEAD: ICD-10-PCS | Mod: ,,, | Performed by: INTERNAL MEDICINE

## 2019-07-19 PROCEDURE — 63600175 PHARM REV CODE 636 W HCPCS: Performed by: EMERGENCY MEDICINE

## 2019-07-19 PROCEDURE — 84484 ASSAY OF TROPONIN QUANT: CPT

## 2019-07-19 PROCEDURE — 96374 THER/PROPH/DIAG INJ IV PUSH: CPT

## 2019-07-19 PROCEDURE — 93010 ELECTROCARDIOGRAM REPORT: CPT | Mod: ,,, | Performed by: INTERNAL MEDICINE

## 2019-07-19 PROCEDURE — 93005 ELECTROCARDIOGRAM TRACING: CPT

## 2019-07-19 RX ORDER — CEFUROXIME AXETIL 250 MG/1
500 TABLET ORAL
Status: COMPLETED | OUTPATIENT
Start: 2019-07-19 | End: 2019-07-20

## 2019-07-19 RX ORDER — METHYLPREDNISOLONE SOD SUCC 125 MG
125 VIAL (EA) INJECTION
Status: COMPLETED | OUTPATIENT
Start: 2019-07-19 | End: 2019-07-19

## 2019-07-19 RX ORDER — HYDROCODONE BITARTRATE AND ACETAMINOPHEN 5; 325 MG/1; MG/1
1 TABLET ORAL
Status: COMPLETED | OUTPATIENT
Start: 2019-07-19 | End: 2019-07-19

## 2019-07-19 RX ORDER — IPRATROPIUM BROMIDE AND ALBUTEROL SULFATE 2.5; .5 MG/3ML; MG/3ML
3 SOLUTION RESPIRATORY (INHALATION)
Status: COMPLETED | OUTPATIENT
Start: 2019-07-19 | End: 2019-07-19

## 2019-07-19 RX ADMIN — HYDROCODONE BITARTRATE AND ACETAMINOPHEN 1 TABLET: 5; 325 TABLET ORAL at 10:07

## 2019-07-19 RX ADMIN — METHYLPREDNISOLONE SODIUM SUCCINATE 125 MG: 125 INJECTION, POWDER, FOR SOLUTION INTRAMUSCULAR; INTRAVENOUS at 10:07

## 2019-07-19 RX ADMIN — IPRATROPIUM BROMIDE AND ALBUTEROL SULFATE 3 ML: .5; 3 SOLUTION RESPIRATORY (INHALATION) at 11:07

## 2019-07-20 VITALS
BODY MASS INDEX: 34.53 KG/M2 | HEIGHT: 67 IN | SYSTOLIC BLOOD PRESSURE: 164 MMHG | OXYGEN SATURATION: 99 % | HEART RATE: 105 BPM | TEMPERATURE: 98 F | WEIGHT: 220 LBS | RESPIRATION RATE: 20 BRPM | DIASTOLIC BLOOD PRESSURE: 99 MMHG

## 2019-07-20 PROCEDURE — 25000003 PHARM REV CODE 250: Performed by: EMERGENCY MEDICINE

## 2019-07-20 RX ORDER — METHYLPREDNISOLONE 4 MG/1
TABLET ORAL
Qty: 1 PACKAGE | Refills: 0 | Status: ON HOLD | OUTPATIENT
Start: 2019-07-20 | End: 2019-08-22 | Stop reason: HOSPADM

## 2019-07-20 RX ORDER — PHENAZOPYRIDINE HYDROCHLORIDE 200 MG/1
200 TABLET, FILM COATED ORAL 3 TIMES DAILY
Qty: 6 TABLET | Refills: 0 | Status: SHIPPED | OUTPATIENT
Start: 2019-07-20 | End: 2019-07-30

## 2019-07-20 RX ORDER — CEFUROXIME AXETIL 500 MG/1
500 TABLET ORAL EVERY 12 HOURS
Qty: 14 TABLET | Refills: 0 | Status: ON HOLD | OUTPATIENT
Start: 2019-07-20 | End: 2019-08-22 | Stop reason: HOSPADM

## 2019-07-20 RX ORDER — ACETAMINOPHEN 325 MG/1
650 TABLET ORAL
Status: COMPLETED | OUTPATIENT
Start: 2019-07-20 | End: 2019-07-20

## 2019-07-20 RX ADMIN — CEFUROXIME AXETIL 500 MG: 250 TABLET ORAL at 12:07

## 2019-07-20 RX ADMIN — ACETAMINOPHEN 650 MG: 325 TABLET, FILM COATED ORAL at 07:07

## 2019-07-20 NOTE — ED NOTES
Received report, assumed care of 69 year old female to ED for  SOB. Upon arrival, patient is discharged and waiting for family member to come pick her up. Patient is in no apparent distress. AAO x 4, GCS 15, BBS CTA. Call bell within reach. Will monitor.

## 2019-07-20 NOTE — ED NOTES
Patient states she has no ride home and is currently locked out of her home. Patient states the only person with a key to her home is her niece who works overnight and can not leave. Patient states her  is sick and is staying with his brother.

## 2019-07-20 NOTE — ED NOTES
Patient states niece is on the way. Patient requested to be placed outside ed doors, Patient on continuous oxygen at home.

## 2019-07-20 NOTE — ED TRIAGE NOTES
pt w/ complaint of increasing sob x 1-2 days acutely worse this pm. pt hx copd. Patient also reports difficulty urinating. Blood noted in urine.

## 2019-07-20 NOTE — ED PROVIDER NOTES
Encounter Date: 7/19/2019    SCRIBE #1 NOTE: I, Montrell Hillman, am scribing for, and in the presence of,  Dr. Coley. I have scribed the entire note.       History     Chief Complaint   Patient presents with    Shortness of Breath     pt w/ complaint of increasing sob x 1-2 days acutely worse this pm. pt hx  copd     This is a 69 y.o. female with history of COPD, DM, CHF who presents to the ED complaining of SOB with associated wheezing and coughing with white production that started today in the morning. She has used her albuterol to mild alleviation and normally uses it about 3-4 times a day. The patient reports mild nausea, mild bilateral leg swelling, and dysuria that started today around noon with hematuria. The dysuria was initially mild but worsened over the day and is now severe. She denies fevers, CP, vomiting, congestion, and rhinorrhea. She is on 3 liters of home oxygen all the time. The patient was last admitted for her COPD 3-4 weeks ago. She was given a breathing treatment on the way by EMS which provided some alleviation.       The history is provided by the patient. No  was used.     Review of patient's allergies indicates:  No Known Allergies  Past Medical History:   Diagnosis Date    Anticoagulant long-term use     Xarelto    Arthritis     Asthma     CHF (congestive heart failure)     COPD (chronic obstructive pulmonary disease)     Coronary artery disease     Deep vein thrombosis (DVT) of left lower extremity     Depression     Diabetes mellitus     GERD (gastroesophageal reflux disease)     Hypertension     Obstructive sleep apnea on CPAP     On home oxygen therapy     Unsteady gait     uses either walker or 4 prong cane     Past Surgical History:   Procedure Laterality Date    CORONARY ANGIOPLASTY WITH STENT PLACEMENT      HERNIA REPAIR      encapsulated umbilical hernia     Family History   Problem Relation Age of Onset    Heart disease Mother      "Hypertension Mother     Diabetes Father      Social History     Tobacco Use    Smoking status: Former Smoker     Packs/day: 0.50     Years: 55.00     Pack years: 27.50     Types: Cigarettes     Start date: 1963     Last attempt to quit: 2018     Years since quittin.5    Smokeless tobacco: Never Used    Tobacco comment: "States she quit smoking about 3 or 4 weeks ago"   Substance Use Topics    Alcohol use: Not Currently     Alcohol/week: 0.6 oz     Types: 1 Glasses of wine per week     Frequency: Never     Comment: socially    Drug use: No     Review of Systems   Constitutional: Negative for chills, diaphoresis, fatigue and fever.   HENT: Negative for congestion, rhinorrhea, sinus pain and sore throat.    Respiratory: Positive for cough, shortness of breath and wheezing. Negative for stridor.    Cardiovascular: Negative for chest pain, palpitations and leg swelling.   Gastrointestinal: Positive for nausea. Negative for abdominal pain, diarrhea and vomiting.   Genitourinary: Positive for dysuria and hematuria. Negative for flank pain and frequency.   Musculoskeletal: Negative for back pain and joint swelling.   Neurological: Negative for dizziness, seizures, syncope, facial asymmetry, speech difficulty, weakness, numbness and headaches.   Psychiatric/Behavioral: Negative for confusion.   All other systems reviewed and are negative.      Physical Exam     Initial Vitals [19]   BP Pulse Resp Temp SpO2   (!) 161/94 84 (!) 24 -- 100 %      MAP       --         Physical Exam    Nursing note and vitals reviewed.  Constitutional: She appears well-developed and well-nourished. No distress.   HENT:   Head: Normocephalic and atraumatic.   Nose: Nose normal.   Eyes: EOM are normal. Right eye exhibits no discharge. Left eye exhibits no discharge.   Neck: Neck supple. No tracheal deviation present.   Cardiovascular: Normal rate, regular rhythm and normal heart sounds.   No murmur " heard.  Pulmonary/Chest: No respiratory distress. She has wheezes (faint) in the right lower field and the left lower field. She has no rhonchi. She has rales (faint) in the right lower field and the left lower field.   Abdominal: Soft.   Musculoskeletal: Normal range of motion. She exhibits no tenderness.   Neurological: She is alert and oriented to person, place, and time. No cranial nerve deficit.   Skin: Skin is warm and dry.   Psychiatric: She has a normal mood and affect. Her behavior is normal. Judgment and thought content normal.         ED Course   Procedures  Labs Reviewed   CBC W/ AUTO DIFFERENTIAL - Abnormal; Notable for the following components:       Result Value    RBC 3.36 (*)     Hemoglobin 8.5 (*)     Hematocrit 31.1 (*)     Mean Corpuscular Hemoglobin 25.3 (*)     Mean Corpuscular Hemoglobin Conc 27.3 (*)     RDW 20.9 (*)     Platelets 386 (*)     All other components within normal limits   COMPREHENSIVE METABOLIC PANEL - Abnormal; Notable for the following components:    Glucose 120 (*)     Albumin 2.9 (*)     Anion Gap 7 (*)     All other components within normal limits   URINALYSIS, REFLEX TO URINE CULTURE - Abnormal; Notable for the following components:    Color, UA Red (*)     Appearance, UA Cloudy (*)     Protein, UA 3+ (*)     Occult Blood UA 3+ (*)     Leukocytes, UA 3+ (*)     All other components within normal limits    Narrative:     Preferred Collection Type->Urine, Clean Catch   URINALYSIS MICROSCOPIC - Abnormal; Notable for the following components:    RBC, UA >100 (*)     WBC, UA >100 (*)     Bacteria Many (*)     Yeast, UA Moderate (*)     All other components within normal limits    Narrative:     Preferred Collection Type->Urine, Clean Catch   CULTURE, URINE   MAGNESIUM   B-TYPE NATRIURETIC PEPTIDE   TROPONIN I     EKG Readings: (Independently Interpreted)   Initial Reading: No STEMI. Rhythm: Normal Sinus Rhythm. Heart Rate: 97. Ectopy: No Ectopy. Conduction: Normal. ST  Segments: Normal ST Segments. T Waves: Normal. Axis: Left Axis Deviation.   No acute ischemia.  No significant changes when compared to July 3, 2019       Imaging Results          X-Ray Chest AP Portable (Final result)  Result time 07/19/19 21:07:18    Final result by Pete Elizalde MD (07/19/19 21:07:18)                 Impression:      No failure or pneumonia.      Electronically signed by: Pete Elizalde  Date:    07/19/2019  Time:    21:07             Narrative:    EXAMINATION:  XR CHEST AP PORTABLE    CLINICAL HISTORY:  Chest Pain;    TECHNIQUE:  Single frontal view of the chest was performed.    COMPARISON:  04/10/2017    FINDINGS:  Single AP portable view at 20:45.    The heart is mildly enlarged unchanged.  No pneumothorax or pleural effusion or interstitial edema consolidation or nodule.  The right hilar shadows are somewhat prominent, unchanged probably normal.  No tracheal narrowing.  No abdominal free air or fracture.  There are overlying leads.                                 Medical Decision Making:   Clinical Tests:   Lab Tests: Ordered and Reviewed  The following lab test(s) were unremarkable: CBC, CMP, Troponin and BNP  Radiological Study: Reviewed and Ordered  Medical Tests: Ordered and Reviewed  ED Management:  0030:  No signs of pneumonia on chest x-ray.  No CHF.  No signs acute coronary syndrome.  It symptoms improved after DuoNeb and steroids.  Symptoms consistent with COPD exacerbation.  Patient also having dysuria from UTI.  Treat with antibiotics cover for UTI and COPD exacerbation.  Patient may be treated outpatient.  Patient struck to follow up her primary care physician.  Patient's O2 sats are 98% 2 L this time.  She is on 3 L at home.  Wheezing has improved.            Scribe Attestation:   Scribe #1: I performed the above scribed service and the documentation accurately describes the services I performed. I attest to the accuracy of the note.               Clinical Impression:        ICD-10-CM ICD-9-CM   1. COPD with acute exacerbation J44.1 491.21   2. SOB (shortness of breath) R06.02 786.05   3. Urinary tract infection with hematuria, site unspecified N39.0 599.0    R31.9 599.70   4. Dysuria R30.0 788.1         Disposition:   Disposition: Discharged  Condition: Stable                   Scribe attestation: I, Eugenio Coley MD, personally performed the services described in this documentation. All medical record entries made by the scribe were at my direction and in my presence. I have reviewed the chart and agree that the record reflects my personal performance and is accurate and complete         Eugenio Coley MD  07/20/19 0055

## 2019-07-21 LAB — BACTERIA UR CULT: NORMAL

## 2019-07-26 ENCOUNTER — TELEPHONE (OUTPATIENT)
Dept: HOME HEALTH SERVICES | Facility: HOSPITAL | Age: 69
End: 2019-07-26

## 2019-07-26 NOTE — TELEPHONE ENCOUNTER
[facility] Order 06/14/2019 to 08/12/2019 with [facility] Dr. Angel Orourke

## 2019-07-31 ENCOUNTER — TELEPHONE (OUTPATIENT)
Dept: HOME HEALTH SERVICES | Facility: HOSPITAL | Age: 69
End: 2019-07-31
Payer: MEDICARE

## 2019-08-16 ENCOUNTER — HOSPITAL ENCOUNTER (INPATIENT)
Facility: HOSPITAL | Age: 69
LOS: 6 days | Discharge: HOME | DRG: 309 | End: 2019-08-22
Attending: EMERGENCY MEDICINE | Admitting: INTERNAL MEDICINE
Payer: MEDICARE

## 2019-08-16 DIAGNOSIS — I82.5Z3 CHRONIC DEEP VEIN THROMBOSIS (DVT) OF DISTAL VEIN OF BOTH LOWER EXTREMITIES: ICD-10-CM

## 2019-08-16 DIAGNOSIS — R06.02 SOB (SHORTNESS OF BREATH): ICD-10-CM

## 2019-08-16 DIAGNOSIS — I48.91 ATRIAL FIBRILLATION WITH RVR: Primary | ICD-10-CM

## 2019-08-16 DIAGNOSIS — J44.9 CHRONIC OBSTRUCTIVE PULMONARY DISEASE, UNSPECIFIED COPD TYPE: ICD-10-CM

## 2019-08-16 DIAGNOSIS — I82.4Z9 DEEP VEIN THROMBOSIS (DVT) OF DISTAL VEIN OF LOWER EXTREMITY, UNSPECIFIED CHRONICITY, UNSPECIFIED LATERALITY: ICD-10-CM

## 2019-08-16 DIAGNOSIS — R07.9 CHEST PAIN, UNSPECIFIED TYPE: ICD-10-CM

## 2019-08-16 DIAGNOSIS — I82.4Z9: ICD-10-CM

## 2019-08-16 LAB
ALBUMIN SERPL BCP-MCNC: 2.8 G/DL (ref 3.5–5.2)
ALP SERPL-CCNC: 63 U/L (ref 55–135)
ALT SERPL W/O P-5'-P-CCNC: 7 U/L (ref 10–44)
ANION GAP SERPL CALC-SCNC: 8 MMOL/L (ref 8–16)
AST SERPL-CCNC: 10 U/L (ref 10–40)
BASOPHILS # BLD AUTO: 0.02 K/UL (ref 0–0.2)
BASOPHILS NFR BLD: 0.1 % (ref 0–1.9)
BILIRUB SERPL-MCNC: 0.2 MG/DL (ref 0.1–1)
BNP SERPL-MCNC: 140 PG/ML (ref 0–99)
BUN SERPL-MCNC: 16 MG/DL (ref 8–23)
CALCIUM SERPL-MCNC: 9.3 MG/DL (ref 8.7–10.5)
CHLORIDE SERPL-SCNC: 109 MMOL/L (ref 95–110)
CO2 SERPL-SCNC: 29 MMOL/L (ref 23–29)
CREAT SERPL-MCNC: 0.8 MG/DL (ref 0.5–1.4)
DIFFERENTIAL METHOD: ABNORMAL
EOSINOPHIL # BLD AUTO: 0 K/UL (ref 0–0.5)
EOSINOPHIL NFR BLD: 0.2 % (ref 0–8)
ERYTHROCYTE [DISTWIDTH] IN BLOOD BY AUTOMATED COUNT: 20.6 % (ref 11.5–14.5)
EST. GFR  (AFRICAN AMERICAN): >60 ML/MIN/1.73 M^2
EST. GFR  (NON AFRICAN AMERICAN): >60 ML/MIN/1.73 M^2
GLUCOSE SERPL-MCNC: 184 MG/DL (ref 70–110)
HCT VFR BLD AUTO: 33.6 % (ref 37–48.5)
HGB BLD-MCNC: 8.8 G/DL (ref 12–16)
LACTATE SERPL-SCNC: 0.8 MMOL/L (ref 0.5–2.2)
LYMPHOCYTES # BLD AUTO: 2.5 K/UL (ref 1–4.8)
LYMPHOCYTES NFR BLD: 17.3 % (ref 18–48)
MCH RBC QN AUTO: 24.6 PG (ref 27–31)
MCHC RBC AUTO-ENTMCNC: 26.2 G/DL (ref 32–36)
MCV RBC AUTO: 94 FL (ref 82–98)
MONOCYTES # BLD AUTO: 1.4 K/UL (ref 0.3–1)
MONOCYTES NFR BLD: 9.7 % (ref 4–15)
NEUTROPHILS # BLD AUTO: 10.3 K/UL (ref 1.8–7.7)
NEUTROPHILS NFR BLD: 72.7 % (ref 38–73)
PLATELET # BLD AUTO: 323 K/UL (ref 150–350)
PMV BLD AUTO: 9.7 FL (ref 9.2–12.9)
POCT GLUCOSE: 194 MG/DL (ref 70–110)
POTASSIUM SERPL-SCNC: 4.6 MMOL/L (ref 3.5–5.1)
PROT SERPL-MCNC: 6.3 G/DL (ref 6–8.4)
RBC # BLD AUTO: 3.57 M/UL (ref 4–5.4)
SODIUM SERPL-SCNC: 146 MMOL/L (ref 136–145)
TROPONIN I SERPL DL<=0.01 NG/ML-MCNC: 0.02 NG/ML (ref 0–0.03)
TROPONIN I SERPL DL<=0.01 NG/ML-MCNC: 0.03 NG/ML (ref 0–0.03)
WBC # BLD AUTO: 14.27 K/UL (ref 3.9–12.7)

## 2019-08-16 PROCEDURE — 82962 GLUCOSE BLOOD TEST: CPT

## 2019-08-16 PROCEDURE — 96375 TX/PRO/DX INJ NEW DRUG ADDON: CPT

## 2019-08-16 PROCEDURE — G0378 HOSPITAL OBSERVATION PER HR: HCPCS

## 2019-08-16 PROCEDURE — 96366 THER/PROPH/DIAG IV INF ADDON: CPT

## 2019-08-16 PROCEDURE — 80053 COMPREHEN METABOLIC PANEL: CPT

## 2019-08-16 PROCEDURE — 93005 ELECTROCARDIOGRAM TRACING: CPT

## 2019-08-16 PROCEDURE — 25000003 PHARM REV CODE 250: Performed by: EMERGENCY MEDICINE

## 2019-08-16 PROCEDURE — 11000001 HC ACUTE MED/SURG PRIVATE ROOM

## 2019-08-16 PROCEDURE — 83605 ASSAY OF LACTIC ACID: CPT

## 2019-08-16 PROCEDURE — 63600175 PHARM REV CODE 636 W HCPCS: Performed by: HOSPITALIST

## 2019-08-16 PROCEDURE — 93010 ELECTROCARDIOGRAM REPORT: CPT | Mod: ,,, | Performed by: INTERNAL MEDICINE

## 2019-08-16 PROCEDURE — 96365 THER/PROPH/DIAG IV INF INIT: CPT

## 2019-08-16 PROCEDURE — 25000242 PHARM REV CODE 250 ALT 637 W/ HCPCS: Performed by: EMERGENCY MEDICINE

## 2019-08-16 PROCEDURE — 25000003 PHARM REV CODE 250: Performed by: HOSPITALIST

## 2019-08-16 PROCEDURE — 25500020 PHARM REV CODE 255: Performed by: EMERGENCY MEDICINE

## 2019-08-16 PROCEDURE — 94640 AIRWAY INHALATION TREATMENT: CPT

## 2019-08-16 PROCEDURE — 63600175 PHARM REV CODE 636 W HCPCS: Performed by: EMERGENCY MEDICINE

## 2019-08-16 PROCEDURE — 84484 ASSAY OF TROPONIN QUANT: CPT

## 2019-08-16 PROCEDURE — 85025 COMPLETE CBC W/AUTO DIFF WBC: CPT

## 2019-08-16 PROCEDURE — 83880 ASSAY OF NATRIURETIC PEPTIDE: CPT

## 2019-08-16 PROCEDURE — 87040 BLOOD CULTURE FOR BACTERIA: CPT

## 2019-08-16 PROCEDURE — 99285 EMERGENCY DEPT VISIT HI MDM: CPT | Mod: 25

## 2019-08-16 PROCEDURE — 93010 EKG 12-LEAD: ICD-10-PCS | Mod: ,,, | Performed by: INTERNAL MEDICINE

## 2019-08-16 RX ORDER — RAMELTEON 8 MG/1
8 TABLET ORAL NIGHTLY PRN
Status: DISCONTINUED | OUTPATIENT
Start: 2019-08-16 | End: 2019-08-22 | Stop reason: HOSPADM

## 2019-08-16 RX ORDER — ONDANSETRON 2 MG/ML
4 INJECTION INTRAMUSCULAR; INTRAVENOUS EVERY 6 HOURS PRN
Status: DISCONTINUED | OUTPATIENT
Start: 2019-08-16 | End: 2019-08-22 | Stop reason: HOSPADM

## 2019-08-16 RX ORDER — DEXAMETHASONE SODIUM PHOSPHATE 4 MG/ML
12 INJECTION, SOLUTION INTRA-ARTICULAR; INTRALESIONAL; INTRAMUSCULAR; INTRAVENOUS; SOFT TISSUE EVERY 12 HOURS
Status: DISCONTINUED | OUTPATIENT
Start: 2019-08-16 | End: 2019-08-16

## 2019-08-16 RX ORDER — ASPIRIN 325 MG
325 TABLET, DELAYED RELEASE (ENTERIC COATED) ORAL DAILY
Status: DISCONTINUED | OUTPATIENT
Start: 2019-08-16 | End: 2019-08-16

## 2019-08-16 RX ORDER — DEXAMETHASONE SODIUM PHOSPHATE 4 MG/ML
12 INJECTION, SOLUTION INTRA-ARTICULAR; INTRALESIONAL; INTRAMUSCULAR; INTRAVENOUS; SOFT TISSUE EVERY 12 HOURS
Status: DISCONTINUED | OUTPATIENT
Start: 2019-08-17 | End: 2019-08-16 | Stop reason: SDUPTHER

## 2019-08-16 RX ORDER — PANTOPRAZOLE SODIUM 40 MG/1
40 TABLET, DELAYED RELEASE ORAL DAILY
Status: DISCONTINUED | OUTPATIENT
Start: 2019-08-17 | End: 2019-08-22 | Stop reason: HOSPADM

## 2019-08-16 RX ORDER — METHYLPREDNISOLONE SOD SUCC 125 MG
80 VIAL (EA) INJECTION EVERY 8 HOURS
Status: DISCONTINUED | OUTPATIENT
Start: 2019-08-16 | End: 2019-08-17

## 2019-08-16 RX ORDER — FUROSEMIDE 10 MG/ML
40 INJECTION INTRAMUSCULAR; INTRAVENOUS
Status: COMPLETED | OUTPATIENT
Start: 2019-08-16 | End: 2019-08-16

## 2019-08-16 RX ORDER — IPRATROPIUM BROMIDE AND ALBUTEROL SULFATE 2.5; .5 MG/3ML; MG/3ML
3 SOLUTION RESPIRATORY (INHALATION)
Status: COMPLETED | OUTPATIENT
Start: 2019-08-16 | End: 2019-08-16

## 2019-08-16 RX ORDER — IPRATROPIUM BROMIDE AND ALBUTEROL SULFATE 2.5; .5 MG/3ML; MG/3ML
3 SOLUTION RESPIRATORY (INHALATION)
Status: DISCONTINUED | OUTPATIENT
Start: 2019-08-17 | End: 2019-08-17

## 2019-08-16 RX ORDER — ASPIRIN 81 MG/1
81 TABLET ORAL DAILY
Status: DISCONTINUED | OUTPATIENT
Start: 2019-08-16 | End: 2019-08-18

## 2019-08-16 RX ORDER — SODIUM CHLORIDE 0.9 % (FLUSH) 0.9 %
10 SYRINGE (ML) INJECTION
Status: DISCONTINUED | OUTPATIENT
Start: 2019-08-16 | End: 2019-08-22 | Stop reason: HOSPADM

## 2019-08-16 RX ORDER — DILTIAZEM HYDROCHLORIDE 30 MG/1
90 TABLET, FILM COATED ORAL EVERY 8 HOURS
Status: DISCONTINUED | OUTPATIENT
Start: 2019-08-16 | End: 2019-08-18

## 2019-08-16 RX ORDER — LISINOPRIL 20 MG/1
40 TABLET ORAL DAILY
Status: DISCONTINUED | OUTPATIENT
Start: 2019-08-17 | End: 2019-08-18

## 2019-08-16 RX ORDER — LEVOFLOXACIN 5 MG/ML
750 INJECTION, SOLUTION INTRAVENOUS
Status: DISCONTINUED | OUTPATIENT
Start: 2019-08-16 | End: 2019-08-17

## 2019-08-16 RX ORDER — FUROSEMIDE 10 MG/ML
40 INJECTION INTRAMUSCULAR; INTRAVENOUS 2 TIMES DAILY
Status: DISCONTINUED | OUTPATIENT
Start: 2019-08-17 | End: 2019-08-17

## 2019-08-16 RX ORDER — ACETAMINOPHEN 325 MG/1
650 TABLET ORAL EVERY 6 HOURS PRN
Status: DISCONTINUED | OUTPATIENT
Start: 2019-08-16 | End: 2019-08-22 | Stop reason: HOSPADM

## 2019-08-16 RX ORDER — HYDRALAZINE HYDROCHLORIDE 25 MG/1
50 TABLET, FILM COATED ORAL 2 TIMES DAILY
Status: DISCONTINUED | OUTPATIENT
Start: 2019-08-16 | End: 2019-08-18

## 2019-08-16 RX ORDER — ISOSORBIDE MONONITRATE 30 MG/1
90 TABLET, EXTENDED RELEASE ORAL DAILY
Status: DISCONTINUED | OUTPATIENT
Start: 2019-08-17 | End: 2019-08-18

## 2019-08-16 RX ADMIN — FUROSEMIDE 40 MG: 10 INJECTION, SOLUTION INTRAVENOUS at 05:08

## 2019-08-16 RX ADMIN — IPRATROPIUM BROMIDE AND ALBUTEROL SULFATE 3 ML: .5; 3 SOLUTION RESPIRATORY (INHALATION) at 05:08

## 2019-08-16 RX ADMIN — IOHEXOL 90 ML: 350 INJECTION, SOLUTION INTRAVENOUS at 08:08

## 2019-08-16 RX ADMIN — DILTIAZEM HYDROCHLORIDE 90 MG: 30 TABLET, FILM COATED ORAL at 10:08

## 2019-08-16 RX ADMIN — DEXAMETHASONE SODIUM PHOSPHATE 12 MG: 4 INJECTION, SOLUTION INTRA-ARTICULAR; INTRALESIONAL; INTRAMUSCULAR; INTRAVENOUS; SOFT TISSUE at 05:08

## 2019-08-16 RX ADMIN — METHYLPREDNISOLONE SODIUM SUCCINATE 80 MG: 125 INJECTION, POWDER, FOR SOLUTION INTRAMUSCULAR; INTRAVENOUS at 10:08

## 2019-08-16 RX ADMIN — ASPIRIN 81 MG: 81 TABLET, COATED ORAL at 10:08

## 2019-08-16 RX ADMIN — LEVOFLOXACIN 750 MG: 750 INJECTION, SOLUTION INTRAVENOUS at 05:08

## 2019-08-16 RX ADMIN — HYDRALAZINE HYDROCHLORIDE 50 MG: 25 TABLET ORAL at 10:08

## 2019-08-16 NOTE — ED TRIAGE NOTES
"Pt reports "I could not breath, my CHF, CODP, and diabetes." Pt reports all day being SOB.  Pt wears 02 at home 3 1/2 liter and continue to be SOB.  Pt reports taking albuterol nebulizer with no relief.  "

## 2019-08-16 NOTE — ED PROVIDER NOTES
Encounter Date: 8/16/2019       History     Chief Complaint   Patient presents with    Shortness of Breath     pt c/o sob since this am, breathing treatment in route helped. pt states she had cp but it subsided.      ·69 y.o. female Past Medical History:  No date: Anticoagulant long-term use      Comment:  Xarelto  No date: Arthritis  No date: Asthma  No date: CHF (congestive heart failure)  No date: COPD (chronic obstructive pulmonary disease)  No date: Coronary artery disease  No date: Deep vein thrombosis (DVT) of left lower extremity  No date: Depression  No date: Diabetes mellitus  No date: GERD (gastroesophageal reflux disease)  No date: Hypertension  No date: Obstructive sleep apnea on CPAP  No date: On home oxygen therapy  No date: Unsteady gait      Comment:  uses either walker or 4 prong cane     Presents for evaluation of worsening sob, notes at baseline she uses 3L home o2 but felt so sob that she had to increase it to 3.5L. Notes cough prod white sputum.     6/2019  Normal left ventricular systolic function. The estimated ejection fraction is 70%  · Moderate concentric left ventricular hypertrophy.  · Grade I (mild) left ventricular diastolic dysfunction consistent with impaired relaxation.  · Normal left atrial pressure.  · Moderate left atrial enlargement.  · Mild right atrial enlargement.  · Mild mitral regurgitation.  · Mild tricuspid regurgitation.  · The estimated PA systolic pressure is 80 mm Hg  · Intermediate central venous pressure (8 mm Hg).  · Pulmonary hypertension present.           Review of patient's allergies indicates:  No Known Allergies  Past Medical History:   Diagnosis Date    Anticoagulant long-term use     Xarelto    Arthritis     Asthma     CHF (congestive heart failure)     COPD (chronic obstructive pulmonary disease)     Coronary artery disease     Deep vein thrombosis (DVT) of left lower extremity     Depression     Diabetes mellitus     GERD (gastroesophageal  "reflux disease)     Hypertension     Obstructive sleep apnea on CPAP     On home oxygen therapy     Unsteady gait     uses either walker or 4 prong cane     Past Surgical History:   Procedure Laterality Date    CORONARY ANGIOPLASTY WITH STENT PLACEMENT      HERNIA REPAIR      encapsulated umbilical hernia     Family History   Problem Relation Age of Onset    Heart disease Mother     Hypertension Mother     Diabetes Father      Social History     Tobacco Use    Smoking status: Former Smoker     Packs/day: 0.50     Years: 55.00     Pack years: 27.50     Types: Cigarettes     Start date: 1963     Last attempt to quit: 2018     Years since quittin.6    Smokeless tobacco: Never Used    Tobacco comment: "States she quit smoking about 3 or 4 weeks ago"   Substance Use Topics    Alcohol use: Not Currently     Alcohol/week: 0.6 oz     Types: 1 Glasses of wine per week     Frequency: Never     Comment: socially    Drug use: No     Review of Systems   Constitutional: Negative for fever.   HENT: Negative for sore throat.    Respiratory: Positive for cough and shortness of breath.    Cardiovascular: Negative for chest pain.   Gastrointestinal: Negative for nausea.   Genitourinary: Negative for dysuria.   Musculoskeletal: Negative for back pain.   Skin: Negative for rash.   Neurological: Negative for weakness.   Hematological: Does not bruise/bleed easily.   All other systems reviewed and are negative.      Physical Exam     Initial Vitals [19 1458]   BP Pulse Resp Temp SpO2   135/68 110 18 99.1 °F (37.3 °C) 99 %      MAP       --         Physical Exam    Nursing note and vitals reviewed.  Constitutional: She appears well-developed and well-nourished.   HENT:   Head: Normocephalic and atraumatic.   Eyes: Conjunctivae and EOM are normal. Pupils are equal, round, and reactive to light.   Neck: Normal range of motion.   Cardiovascular: Normal rate and regular rhythm.   Abdominal: Soft. She exhibits " no distension.   Musculoskeletal: Normal range of motion.   Neurological: She is alert. No cranial nerve deficit. GCS score is 15. GCS eye subscore is 4. GCS verbal subscore is 5. GCS motor subscore is 6.   Skin: Skin is warm and dry.   Psychiatric: She has a normal mood and affect. Thought content normal.     Diminished breath sounds throughout, poor air movement, no distress  LLE>RLE pitting edema, LUE edema    ED Course   Procedures  Labs Reviewed   CBC W/ AUTO DIFFERENTIAL   COMPREHENSIVE METABOLIC PANEL   TROPONIN I   TROPONIN I   B-TYPE NATRIURETIC PEPTIDE     EKG Readings: (Independently Interpreted)   Hr 108, sinus tach, nsc compared to ekg 7/19/19. No stemi     ECG Results          EKG 12-lead (In process)  Result time 08/16/19 17:10:24    In process by Interface, Lab In [facility] (08/16/19 17:10:24)                 Narrative:    Test Reason : R06.02,    Vent. Rate : 108 BPM     Atrial Rate : 108 BPM     P-R Int : 188 ms          QRS Dur : 080 ms      QT Int : 322 ms       P-R-T Axes : 072 -26 064 degrees     QTc Int : 431 ms    Sinus tachycardia  Septal infarct Possible Lateral infarct Inferior injury pattern  ** ** ACUTE MI / STEMI ** **  Abnormal ECG  When compared with ECG of 19-JUL-2019 21:23,  Significant changes have occurred    Referred By: KENY BORDEN           Confirmed By:                   In process by Interface, Lab In [facility] (08/16/19 17:07:48)                 Narrative:    Test Reason : R06.02,    Vent. Rate : 108 BPM     Atrial Rate : 108 BPM     P-R Int : 188 ms          QRS Dur : 080 ms      QT Int : 322 ms       P-R-T Axes : 072 -26 064 degrees     QTc Int : 431 ms    Sinus tachycardia  Septal infarct , new  Possible Lateral infarct , new  Inferior injury pattern  ** ** ACUTE MI / STEMI ** **  Abnormal ECG  When compared with ECG of 19-JUL-2019 21:23,  Significant changes have occurred    Referred By: KENY BORDEN           Confirmed By:                             Imaging Results           X-Ray Chest AP Portable (In process)                                  unclear etiology, copd, vs pe, vs pna, vs chf    pt refused abg. Reassessed, feels much better.      1. New areas of dvt despite xarelto  2. Copd exacerbation  3. Chest pain  Clinical Impression:       ICD-10-CM ICD-9-CM   1. Atrial fibrillation with RVR I48.91 427.31   2. SOB (shortness of breath) R06.02 786.05   3. Deep vein thrombosis (DVT) of distal vein of lower extremity, unspecified chronicity, unspecified laterality I82.4Z9 453.42   4. Chronic obstructive pulmonary disease, unspecified COPD type J44.9 496   5. Chest pain, unspecified type R07.9 786.50   6. Chronic deep vein thrombosis (DVT) of distal vein of both lower extremities I82.5Z3 453.52   7. Deep vein thrombosis (DVT) of distal vein of lower extremity I82.4Z9 453.42                                Jj Garcia MD  08/22/19 [id number]

## 2019-08-17 PROBLEM — R82.90 ABNORMAL URINALYSIS: Status: ACTIVE | Noted: 2019-08-17

## 2019-08-17 LAB
ALBUMIN SERPL BCP-MCNC: 2.9 G/DL (ref 3.5–5.2)
ALBUMIN SERPL BCP-MCNC: 2.9 G/DL (ref 3.5–5.2)
ALP SERPL-CCNC: 66 U/L (ref 55–135)
ALP SERPL-CCNC: 70 U/L (ref 55–135)
ALT SERPL W/O P-5'-P-CCNC: 8 U/L (ref 10–44)
ALT SERPL W/O P-5'-P-CCNC: 8 U/L (ref 10–44)
ANION GAP SERPL CALC-SCNC: 13 MMOL/L (ref 8–16)
ANION GAP SERPL CALC-SCNC: 14 MMOL/L (ref 8–16)
AST SERPL-CCNC: 10 U/L (ref 10–40)
AST SERPL-CCNC: 9 U/L (ref 10–40)
BACTERIA #/AREA URNS HPF: ABNORMAL /HPF
BASOPHILS # BLD AUTO: 0 K/UL (ref 0–0.2)
BASOPHILS NFR BLD: 0 % (ref 0–1.9)
BILIRUB SERPL-MCNC: 0.2 MG/DL (ref 0.1–1)
BILIRUB SERPL-MCNC: 0.2 MG/DL (ref 0.1–1)
BILIRUB UR QL STRIP: NEGATIVE
BILIRUB UR QL STRIP: NEGATIVE
BUN SERPL-MCNC: 19 MG/DL (ref 8–23)
BUN SERPL-MCNC: 29 MG/DL (ref 8–23)
CALCIUM SERPL-MCNC: 9.3 MG/DL (ref 8.7–10.5)
CALCIUM SERPL-MCNC: 9.6 MG/DL (ref 8.7–10.5)
CHLORIDE SERPL-SCNC: 100 MMOL/L (ref 95–110)
CHLORIDE SERPL-SCNC: 98 MMOL/L (ref 95–110)
CHOLEST SERPL-MCNC: 271 MG/DL (ref 120–199)
CHOLEST/HDLC SERPL: 5.1 {RATIO} (ref 2–5)
CLARITY UR: ABNORMAL
CLARITY UR: ABNORMAL
CO2 SERPL-SCNC: 28 MMOL/L (ref 23–29)
CO2 SERPL-SCNC: 28 MMOL/L (ref 23–29)
COLOR UR: YELLOW
COLOR UR: YELLOW
CREAT SERPL-MCNC: 1 MG/DL (ref 0.5–1.4)
CREAT SERPL-MCNC: 1.5 MG/DL (ref 0.5–1.4)
D DIMER PPP IA.FEU-MCNC: 0.43 MG/L FEU
DIFFERENTIAL METHOD: ABNORMAL
EOSINOPHIL # BLD AUTO: 0 K/UL (ref 0–0.5)
EOSINOPHIL NFR BLD: 0 % (ref 0–8)
ERYTHROCYTE [DISTWIDTH] IN BLOOD BY AUTOMATED COUNT: 20.4 % (ref 11.5–14.5)
EST. GFR  (AFRICAN AMERICAN): 41 ML/MIN/1.73 M^2
EST. GFR  (AFRICAN AMERICAN): >60 ML/MIN/1.73 M^2
EST. GFR  (NON AFRICAN AMERICAN): 36 ML/MIN/1.73 M^2
EST. GFR  (NON AFRICAN AMERICAN): 58 ML/MIN/1.73 M^2
GLUCOSE SERPL-MCNC: 337 MG/DL (ref 70–110)
GLUCOSE SERPL-MCNC: 553 MG/DL (ref 70–110)
GLUCOSE UR QL STRIP: ABNORMAL
GLUCOSE UR QL STRIP: ABNORMAL
HCT VFR BLD AUTO: 36.8 % (ref 37–48.5)
HDLC SERPL-MCNC: 53 MG/DL (ref 40–75)
HDLC SERPL: 19.6 % (ref 20–50)
HGB BLD-MCNC: 10.3 G/DL (ref 12–16)
HGB UR QL STRIP: ABNORMAL
HGB UR QL STRIP: ABNORMAL
HYALINE CASTS #/AREA URNS LPF: 0 /LPF
KETONES UR QL STRIP: ABNORMAL
KETONES UR QL STRIP: ABNORMAL
LDLC SERPL CALC-MCNC: 196.4 MG/DL (ref 63–159)
LEUKOCYTE ESTERASE UR QL STRIP: ABNORMAL
LEUKOCYTE ESTERASE UR QL STRIP: ABNORMAL
LYMPHOCYTES # BLD AUTO: 1.1 K/UL (ref 1–4.8)
LYMPHOCYTES NFR BLD: 7.9 % (ref 18–48)
MCH RBC QN AUTO: 24.8 PG (ref 27–31)
MCHC RBC AUTO-ENTMCNC: 28 G/DL (ref 32–36)
MCV RBC AUTO: 89 FL (ref 82–98)
MICROSCOPIC COMMENT: ABNORMAL
MONOCYTES # BLD AUTO: 0.1 K/UL (ref 0.3–1)
MONOCYTES NFR BLD: 0.6 % (ref 4–15)
NEUTROPHILS # BLD AUTO: 12.2 K/UL (ref 1.8–7.7)
NEUTROPHILS NFR BLD: 91.5 % (ref 38–73)
NITRITE UR QL STRIP: NEGATIVE
NITRITE UR QL STRIP: NEGATIVE
NONHDLC SERPL-MCNC: 218 MG/DL
PH UR STRIP: 6 [PH] (ref 5–8)
PH UR STRIP: 6 [PH] (ref 5–8)
PLATELET # BLD AUTO: 385 K/UL (ref 150–350)
PMV BLD AUTO: 9.7 FL (ref 9.2–12.9)
POCT GLUCOSE: 253 MG/DL (ref 70–110)
POCT GLUCOSE: 255 MG/DL (ref 70–110)
POCT GLUCOSE: 324 MG/DL (ref 70–110)
POCT GLUCOSE: 427 MG/DL (ref 70–110)
POCT GLUCOSE: 452 MG/DL (ref 70–110)
POCT GLUCOSE: 455 MG/DL (ref 70–110)
POTASSIUM SERPL-SCNC: 4.6 MMOL/L (ref 3.5–5.1)
POTASSIUM SERPL-SCNC: 4.7 MMOL/L (ref 3.5–5.1)
PROT SERPL-MCNC: 6.6 G/DL (ref 6–8.4)
PROT SERPL-MCNC: 6.9 G/DL (ref 6–8.4)
PROT UR QL STRIP: ABNORMAL
PROT UR QL STRIP: ABNORMAL
RBC # BLD AUTO: 4.16 M/UL (ref 4–5.4)
RBC #/AREA URNS HPF: 10 /HPF (ref 0–4)
SODIUM SERPL-SCNC: 139 MMOL/L (ref 136–145)
SODIUM SERPL-SCNC: 142 MMOL/L (ref 136–145)
SP GR UR STRIP: 1.01 (ref 1–1.03)
SP GR UR STRIP: 1.01 (ref 1–1.03)
TRIGL SERPL-MCNC: 108 MG/DL (ref 30–150)
TROPONIN I SERPL DL<=0.01 NG/ML-MCNC: 0.02 NG/ML (ref 0–0.03)
TROPONIN I SERPL DL<=0.01 NG/ML-MCNC: 0.03 NG/ML (ref 0–0.03)
TROPONIN I SERPL DL<=0.01 NG/ML-MCNC: 0.07 NG/ML (ref 0–0.03)
URN SPEC COLLECT METH UR: ABNORMAL
URN SPEC COLLECT METH UR: ABNORMAL
UROBILINOGEN UR STRIP-ACNC: NEGATIVE EU/DL
UROBILINOGEN UR STRIP-ACNC: NEGATIVE EU/DL
WBC # BLD AUTO: 13.41 K/UL (ref 3.9–12.7)
WBC #/AREA URNS HPF: >100 /HPF (ref 0–5)
WBC CLUMPS URNS QL MICRO: ABNORMAL
YEAST URNS QL MICRO: ABNORMAL

## 2019-08-17 PROCEDURE — G0378 HOSPITAL OBSERVATION PER HR: HCPCS

## 2019-08-17 PROCEDURE — 63600175 PHARM REV CODE 636 W HCPCS: Performed by: HOSPITALIST

## 2019-08-17 PROCEDURE — 96376 TX/PRO/DX INJ SAME DRUG ADON: CPT

## 2019-08-17 PROCEDURE — 94660 CPAP INITIATION&MGMT: CPT

## 2019-08-17 PROCEDURE — 81000 URINALYSIS NONAUTO W/SCOPE: CPT

## 2019-08-17 PROCEDURE — 25000242 PHARM REV CODE 250 ALT 637 W/ HCPCS: Performed by: HOSPITALIST

## 2019-08-17 PROCEDURE — 96361 HYDRATE IV INFUSION ADD-ON: CPT

## 2019-08-17 PROCEDURE — S5571 INSULIN DISPOS PEN 3 ML: HCPCS | Performed by: NURSE PRACTITIONER

## 2019-08-17 PROCEDURE — 11000001 HC ACUTE MED/SURG PRIVATE ROOM

## 2019-08-17 PROCEDURE — 25000242 PHARM REV CODE 250 ALT 637 W/ HCPCS: Performed by: NURSE PRACTITIONER

## 2019-08-17 PROCEDURE — 96372 THER/PROPH/DIAG INJ SC/IM: CPT

## 2019-08-17 PROCEDURE — 80053 COMPREHEN METABOLIC PANEL: CPT

## 2019-08-17 PROCEDURE — 94760 N-INVAS EAR/PLS OXIMETRY 1: CPT

## 2019-08-17 PROCEDURE — 84484 ASSAY OF TROPONIN QUANT: CPT

## 2019-08-17 PROCEDURE — 99900035 HC TECH TIME PER 15 MIN (STAT)

## 2019-08-17 PROCEDURE — 25000003 PHARM REV CODE 250: Performed by: INTERNAL MEDICINE

## 2019-08-17 PROCEDURE — 87077 CULTURE AEROBIC IDENTIFY: CPT

## 2019-08-17 PROCEDURE — 94640 AIRWAY INHALATION TREATMENT: CPT

## 2019-08-17 PROCEDURE — 80061 LIPID PANEL: CPT

## 2019-08-17 PROCEDURE — 96367 TX/PROPH/DG ADDL SEQ IV INF: CPT

## 2019-08-17 PROCEDURE — 87186 SC STD MICRODIL/AGAR DIL: CPT | Mod: 59

## 2019-08-17 PROCEDURE — 63600175 PHARM REV CODE 636 W HCPCS: Performed by: NURSE PRACTITIONER

## 2019-08-17 PROCEDURE — 85379 FIBRIN DEGRADATION QUANT: CPT

## 2019-08-17 PROCEDURE — 84484 ASSAY OF TROPONIN QUANT: CPT | Mod: 91

## 2019-08-17 PROCEDURE — 25000003 PHARM REV CODE 250: Performed by: HOSPITALIST

## 2019-08-17 PROCEDURE — 36415 COLL VENOUS BLD VENIPUNCTURE: CPT

## 2019-08-17 PROCEDURE — 87086 URINE CULTURE/COLONY COUNT: CPT

## 2019-08-17 PROCEDURE — 96375 TX/PRO/DX INJ NEW DRUG ADDON: CPT

## 2019-08-17 PROCEDURE — 27000221 HC OXYGEN, UP TO 24 HOURS

## 2019-08-17 PROCEDURE — 25000003 PHARM REV CODE 250: Performed by: NURSE PRACTITIONER

## 2019-08-17 PROCEDURE — 85025 COMPLETE CBC W/AUTO DIFF WBC: CPT

## 2019-08-17 PROCEDURE — 27000190 HC CPAP FULL FACE MASK W/VALVE

## 2019-08-17 PROCEDURE — 87088 URINE BACTERIA CULTURE: CPT

## 2019-08-17 PROCEDURE — 63600175 PHARM REV CODE 636 W HCPCS: Performed by: EMERGENCY MEDICINE

## 2019-08-17 PROCEDURE — 25000003 PHARM REV CODE 250: Performed by: EMERGENCY MEDICINE

## 2019-08-17 PROCEDURE — 94761 N-INVAS EAR/PLS OXIMETRY MLT: CPT

## 2019-08-17 RX ORDER — INSULIN ASPART 100 [IU]/ML
0-5 INJECTION, SOLUTION INTRAVENOUS; SUBCUTANEOUS
Status: DISCONTINUED | OUTPATIENT
Start: 2019-08-17 | End: 2019-08-22 | Stop reason: HOSPADM

## 2019-08-17 RX ORDER — INSULIN ASPART 100 [IU]/ML
5 INJECTION, SOLUTION INTRAVENOUS; SUBCUTANEOUS
Status: DISCONTINUED | OUTPATIENT
Start: 2019-08-17 | End: 2019-08-22 | Stop reason: HOSPADM

## 2019-08-17 RX ORDER — LORAZEPAM 2 MG/ML
0.25 INJECTION INTRAMUSCULAR ONCE
Status: COMPLETED | OUTPATIENT
Start: 2019-08-17 | End: 2019-08-17

## 2019-08-17 RX ORDER — INSULIN ASPART 100 [IU]/ML
20 INJECTION, SOLUTION INTRAVENOUS; SUBCUTANEOUS ONCE
Status: COMPLETED | OUTPATIENT
Start: 2019-08-17 | End: 2019-08-17

## 2019-08-17 RX ORDER — METOPROLOL TARTRATE 1 MG/ML
5 INJECTION, SOLUTION INTRAVENOUS ONCE
Status: COMPLETED | OUTPATIENT
Start: 2019-08-17 | End: 2019-08-17

## 2019-08-17 RX ORDER — IBUPROFEN 200 MG
24 TABLET ORAL
Status: DISCONTINUED | OUTPATIENT
Start: 2019-08-17 | End: 2019-08-22 | Stop reason: HOSPADM

## 2019-08-17 RX ORDER — TRAMADOL HYDROCHLORIDE 50 MG/1
50 TABLET ORAL EVERY 6 HOURS PRN
Status: DISCONTINUED | OUTPATIENT
Start: 2019-08-17 | End: 2019-08-22 | Stop reason: HOSPADM

## 2019-08-17 RX ORDER — CEPHALEXIN 500 MG/1
500 CAPSULE ORAL EVERY 12 HOURS
Status: DISCONTINUED | OUTPATIENT
Start: 2019-08-17 | End: 2019-08-18

## 2019-08-17 RX ORDER — SODIUM CHLORIDE 9 MG/ML
INJECTION, SOLUTION INTRAVENOUS CONTINUOUS
Status: ACTIVE | OUTPATIENT
Start: 2019-08-17 | End: 2019-08-17

## 2019-08-17 RX ORDER — IPRATROPIUM BROMIDE 0.5 MG/2.5ML
0.5 SOLUTION RESPIRATORY (INHALATION) EVERY 4 HOURS
Status: DISCONTINUED | OUTPATIENT
Start: 2019-08-17 | End: 2019-08-22 | Stop reason: HOSPADM

## 2019-08-17 RX ORDER — GLUCAGON 1 MG
1 KIT INJECTION
Status: DISCONTINUED | OUTPATIENT
Start: 2019-08-17 | End: 2019-08-22 | Stop reason: HOSPADM

## 2019-08-17 RX ORDER — METOPROLOL TARTRATE 1 MG/ML
10 INJECTION, SOLUTION INTRAVENOUS
Status: DISPENSED | OUTPATIENT
Start: 2019-08-17 | End: 2019-08-17

## 2019-08-17 RX ORDER — IBUPROFEN 200 MG
16 TABLET ORAL
Status: DISCONTINUED | OUTPATIENT
Start: 2019-08-17 | End: 2019-08-22 | Stop reason: HOSPADM

## 2019-08-17 RX ORDER — LEVALBUTEROL INHALATION SOLUTION 0.63 MG/3ML
0.63 SOLUTION RESPIRATORY (INHALATION) EVERY 8 HOURS
Status: DISCONTINUED | OUTPATIENT
Start: 2019-08-17 | End: 2019-08-22 | Stop reason: HOSPADM

## 2019-08-17 RX ADMIN — CEFTRIAXONE 1 G: 1 INJECTION, SOLUTION INTRAVENOUS at 05:08

## 2019-08-17 RX ADMIN — INSULIN ASPART 20 UNITS: 100 INJECTION, SOLUTION INTRAVENOUS; SUBCUTANEOUS at 12:08

## 2019-08-17 RX ADMIN — PANTOPRAZOLE SODIUM 40 MG: 40 TABLET, DELAYED RELEASE ORAL at 09:08

## 2019-08-17 RX ADMIN — METOPROLOL TARTRATE 5 MG: 5 INJECTION INTRAVENOUS at 03:08

## 2019-08-17 RX ADMIN — HYDRALAZINE HYDROCHLORIDE 50 MG: 25 TABLET ORAL at 09:08

## 2019-08-17 RX ADMIN — IPRATROPIUM BROMIDE AND ALBUTEROL SULFATE 3 ML: .5; 3 SOLUTION RESPIRATORY (INHALATION) at 12:08

## 2019-08-17 RX ADMIN — ISOSORBIDE MONONITRATE 90 MG: 30 TABLET, EXTENDED RELEASE ORAL at 09:08

## 2019-08-17 RX ADMIN — METOPROLOL TARTRATE 5 MG: 5 INJECTION INTRAVENOUS at 11:08

## 2019-08-17 RX ADMIN — INSULIN ASPART 5 UNITS: 100 INJECTION, SOLUTION INTRAVENOUS; SUBCUTANEOUS at 04:08

## 2019-08-17 RX ADMIN — APIXABAN 5 MG: 5 TABLET, FILM COATED ORAL at 08:08

## 2019-08-17 RX ADMIN — IPRATROPIUM BROMIDE 0.5 MG: 0.5 SOLUTION RESPIRATORY (INHALATION) at 08:08

## 2019-08-17 RX ADMIN — LEVALBUTEROL HYDROCHLORIDE 0.63 MG: 0.63 SOLUTION RESPIRATORY (INHALATION) at 04:08

## 2019-08-17 RX ADMIN — TRAMADOL HYDROCHLORIDE 50 MG: 50 TABLET, FILM COATED ORAL at 10:08

## 2019-08-17 RX ADMIN — INSULIN ASPART 5 UNITS: 100 INJECTION, SOLUTION INTRAVENOUS; SUBCUTANEOUS at 12:08

## 2019-08-17 RX ADMIN — LORAZEPAM 0.25 MG: 2 INJECTION INTRAMUSCULAR; INTRAVENOUS at 10:08

## 2019-08-17 RX ADMIN — DILTIAZEM HYDROCHLORIDE 90 MG: 30 TABLET, FILM COATED ORAL at 05:08

## 2019-08-17 RX ADMIN — METHYLPREDNISOLONE SODIUM SUCCINATE 80 MG: 125 INJECTION, POWDER, FOR SOLUTION INTRAMUSCULAR; INTRAVENOUS at 05:08

## 2019-08-17 RX ADMIN — SODIUM CHLORIDE: 0.9 INJECTION, SOLUTION INTRAVENOUS at 02:08

## 2019-08-17 RX ADMIN — INSULIN DETEMIR 20 UNITS: 100 INJECTION, SOLUTION SUBCUTANEOUS at 12:08

## 2019-08-17 RX ADMIN — CEPHALEXIN 500 MG: 500 CAPSULE ORAL at 08:08

## 2019-08-17 RX ADMIN — INSULIN ASPART 20 UNITS: 100 INJECTION, SOLUTION INTRAVENOUS; SUBCUTANEOUS at 04:08

## 2019-08-17 RX ADMIN — ASPIRIN 81 MG: 81 TABLET, COATED ORAL at 09:08

## 2019-08-17 RX ADMIN — ACETAMINOPHEN 650 MG: 325 TABLET, FILM COATED ORAL at 05:08

## 2019-08-17 RX ADMIN — TRAMADOL HYDROCHLORIDE 50 MG: 50 TABLET, FILM COATED ORAL at 08:08

## 2019-08-17 RX ADMIN — METOPROLOL TARTRATE 10 MG: 5 INJECTION INTRAVENOUS at 05:08

## 2019-08-17 RX ADMIN — IPRATROPIUM BROMIDE AND ALBUTEROL SULFATE 3 ML: .5; 3 SOLUTION RESPIRATORY (INHALATION) at 08:08

## 2019-08-17 RX ADMIN — LISINOPRIL 40 MG: 20 TABLET ORAL at 09:08

## 2019-08-17 RX ADMIN — FUROSEMIDE 40 MG: 10 INJECTION, SOLUTION INTRAVENOUS at 09:08

## 2019-08-17 RX ADMIN — DILTIAZEM HYDROCHLORIDE 90 MG: 30 TABLET, FILM COATED ORAL at 02:08

## 2019-08-17 RX ADMIN — IPRATROPIUM BROMIDE 0.5 MG: 0.5 SOLUTION RESPIRATORY (INHALATION) at 04:08

## 2019-08-17 NOTE — PLAN OF CARE
08/17/19 1226   Discharge Assessment   Assessment Type Discharge Planning Assessment   Confirmed/corrected address and phone number on facesheet? Yes   Assessment information obtained from? Patient   Expected Length of Stay (days) 2   Communicated expected length of stay with patient/caregiver yes   Prior to hospitilization cognitive status: Alert/Oriented   Prior to hospitalization functional status: Needs Assistance;Assistive Equipment   Current cognitive status: Alert/Oriented   Current Functional Status: Needs Assistance   Lives With spouse   Able to Return to Prior Arrangements yes   Is patient able to care for self after discharge? No   Who are your caregiver(s) and their phone number(s)?  helps at home   Patient's perception of discharge disposition home or selfcare   Readmission Within the Last 30 Days no previous admission in last 30 days   Patient currently being followed by outpatient case management? No   Patient currently receives any other outside agency services? No   Equipment Currently Used at Home cane, quad;rollator   Do you have any problems affording any of your prescribed medications? No   Is the patient taking medications as prescribed? yes   Does the patient have transportation home? Yes   Transportation Anticipated family or friend will provide   Does the patient receive services at the [facility]? No   Discharge Plan A Home with family   DME Needed Upon Discharge  none   Patient/Family in Agreement with Plan yes

## 2019-08-17 NOTE — HPI
Yasmeen Palm is a 69 y.o. sorely deconditioned patient with essential hypertension, DMII (HbA1c 8.1% July 2019), HLD (.0 Dec 2018), paroxysmal atrial fibrillation ([id number] score 4) on chronic anticoagulation, COPD, chronic respiratory failure with hypoxia and hypercapnia uses 2-3L home oxygen, GERD, anemia of chronic disease, tobacco abuse, and history of PE/DVT on chronic anticoagulation.    Patient presented with complaints of dyspnea that began in early morning prior to presentation.  The dyspnea is mainly exertional.  She denies any fevers, chills, nausea, vomiting, pleurisy, diaphoresis, palpitations, sick contracts, recent long trips, or hemoptysis.    Initial workup shows mildly elevated WBC (on prednisone) thought to be reactive, hypernatremia

## 2019-08-17 NOTE — SUBJECTIVE & OBJECTIVE
Past Medical History:   Diagnosis Date    Anticoagulant long-term use     Xarelto    Arthritis     Asthma     CHF (congestive heart failure)     COPD (chronic obstructive pulmonary disease)     Coronary artery disease     Deep vein thrombosis (DVT) of left lower extremity     Depression     Diabetes mellitus     GERD (gastroesophageal reflux disease)     Hypertension     Obstructive sleep apnea on CPAP     On home oxygen therapy     Unsteady gait     uses either walker or 4 prong cane       Past Surgical History:   Procedure Laterality Date    CORONARY ANGIOPLASTY WITH STENT PLACEMENT      HERNIA REPAIR      encapsulated umbilical hernia       Review of patient's allergies indicates:  No Known Allergies    No current facility-administered medications on file prior to encounter.      Current Outpatient Medications on File Prior to Encounter   Medication Sig    acetaminophen (TYLENOL) 500 MG tablet Take 1 tablet (500 mg total) by mouth every 8 (eight) hours as needed.    albuterol (ACCUNEB) 1.25 mg/3 mL Nebu Inhale 1.25 mg into the lungs.    albuterol-ipratropium (DUO-NEB) 2.5 mg-0.5 mg/3 mL nebulizer solution Take 3 mLs by nebulization every 6 (six) hours. Rescue    aspirin (ECOTRIN) 81 MG EC tablet Take 81 mg by mouth once daily.    diltiaZEM (CARDIZEM) 90 MG tablet Take 90 mg by mouth.    ferrous gluconate (FERGON) 324 MG tablet Take 1 tablet (324 mg total) by mouth 3 (three) times daily with meals.    furosemide (LASIX) 40 MG tablet Take 40 mg by mouth once daily.     gabapentin (NEURONTIN) 300 MG capsule Take 300 mg by mouth.    hydrALAZINE (APRESOLINE) 50 MG tablet Take 50 mg by mouth 2 (two) times daily.    isosorbide mononitrate (IMDUR) 30 MG 24 hr tablet Take 3 tablets (90 mg total) by mouth once daily.    lisinopril (PRINIVIL,ZESTRIL) 40 MG tablet Take 1 tablet (40 mg total) by mouth once daily. Do not take this medication if blood pressure is below 130/80 mmHg and/or feeling dizzy  after taking all other blood pressure meds    metFORMIN (GLUCOPHAGE) 1000 MG tablet Take 1 tablet (1,000 mg total) by mouth 2 (two) times daily with meals.    multivit-min-FA-lycopen-lutein (CENTRUM SILVER) 0.4-300-250 mg-mcg-mcg Tab Take 1 tablet by mouth once daily.    pantoprazole (PROTONIX) 40 MG tablet Take 40 mg by mouth once daily.    pregabalin (LYRICA) 75 MG capsule Take 75 mg by mouth 2 (two) times daily.    rivaroxaban (XARELTO) 20 mg Tab Take 20 mg by mouth daily with dinner or evening meal.     traMADol (ULTRAM) 50 mg tablet     acetaminophen (TYLENOL) 500 MG tablet Take 500 mg by mouth.    albuterol (PROVENTIL) 2.5 mg /3 mL (0.083 %) nebulizer solution Take 2.5 mg by nebulization every 6 (six) hours as needed.    amiodarone (PACERONE) 200 MG Tab Take 1 tablet (200 mg total) by mouth once daily.    ANORO ELLIPTA 62.5-25 mcg/actuation DsDv     aspirin (ECOTRIN) 81 MG EC tablet Take 81 mg by mouth.    cefUROXime (CEFTIN) 500 MG tablet Take 1 tablet (500 mg total) by mouth every 12 (twelve) hours.    fluticasone propionate (FLOVENT HFA) 220 mcg/actuation inhaler Inhale 1 puff into the lungs 2 (two) times daily. Controller    ipratropium-albuterol (COMBIVENT)  mcg/actuation inhaler Inhale 1 puff into the lungs every 6 (six) hours as needed for Wheezing or Shortness of Breath. Rescue    isosorbide mononitrate (IMDUR) 30 MG 24 hr tablet Take 30 mg by mouth.    methylPREDNISolone (MEDROL DOSEPACK) 4 mg tablet use as directed    nitroGLYCERIN (NITROSTAT) 0.4 MG SL tablet     umeclidinium brm/vilanterol tr (ANORO ELLIPTA INHL) Inhale into the lungs.     Family History     Problem Relation (Age of Onset)    Diabetes Father    Heart disease Mother    Hypertension Mother        Tobacco Use    Smoking status: Former Smoker     Packs/day: 0.50     Years: 55.00     Pack years: 27.50     Types: Cigarettes     Start date: 1/1/1963     Last attempt to quit: 12/31/2018     Years since quitting:  "0.6    Smokeless tobacco: Never Used    Tobacco comment: "States she quit smoking about 3 or 4 weeks ago"   Substance and Sexual Activity    Alcohol use: Not Currently     Alcohol/week: 0.6 oz     Types: 1 Glasses of wine per week     Frequency: Never     Comment: socially    Drug use: No    Sexual activity: Never     Review of Systems   Constitutional: Negative for appetite change, chills, diaphoresis and fever.   HENT: Negative for congestion, hearing loss, sore throat, tinnitus and trouble swallowing.    Eyes: Negative for photophobia, discharge, itching and visual disturbance.   Respiratory: Positive for shortness of breath. Negative for apnea, cough, wheezing and stridor.    Cardiovascular: Negative for chest pain, palpitations and leg swelling.   Gastrointestinal: Negative for abdominal distention, abdominal pain, blood in stool, constipation, diarrhea and nausea.   Endocrine: Negative for polydipsia, polyphagia and polyuria.   Genitourinary: Negative for difficulty urinating, dysuria, flank pain and frequency.   Musculoskeletal: Negative for arthralgias, joint swelling and neck stiffness.   Skin: Negative for color change, rash and wound.   Neurological: Negative for dizziness, tremors, seizures, light-headedness, numbness and headaches.   Hematological: Negative for adenopathy.   Psychiatric/Behavioral: Negative for hallucinations and self-injury.     Objective:     Vital Signs (Most Recent):  Temp: 98 °F (36.7 °C) (08/17/19 0727)  Pulse: 102 (08/17/19 0804)  Resp: 18 (08/17/19 0804)  BP: (!) 163/89 (08/17/19 0727)  SpO2: 98 % (08/17/19 0804) Vital Signs (24h Range):  Temp:  [98 °F (36.7 °C)-99.1 °F (37.3 °C)] 98 °F (36.7 °C)  Pulse:  [101-116] 102  Resp:  [15-28] 18  SpO2:  [98 %-100 %] 98 %  BP: (130-163)/(68-89) 163/89     Weight: 77.4 kg (170 lb 10.2 oz)  Body mass index is 26.73 kg/m².    Physical Exam   Constitutional: She is oriented to person, place, and time. She appears well-developed. She is " cooperative.   Deconditioned;needy   HENT:   Head: Normocephalic and atraumatic.   Eyes: Pupils are equal, round, and reactive to light. Conjunctivae, EOM and lids are normal.   Neck: Normal range of motion and full passive range of motion without pain. Neck supple. No JVD present. No edema present. No thyroid mass present.   Cardiovascular: Normal rate, S1 normal, S2 normal and intact distal pulses.   No murmur heard.  Pulmonary/Chest: Effort normal.   Abdominal: Soft. Bowel sounds are normal. She exhibits no distension and no abdominal bruit. There is no splenomegaly or hepatomegaly. There is no tenderness. There is no CVA tenderness.   Musculoskeletal: Normal range of motion.   Denies pain   Lymphadenopathy:     She has no cervical adenopathy.     She has no axillary adenopathy.   Neurological: She is alert and oriented to person, place, and time. She has normal reflexes. She displays no tremor. She displays no seizure activity.   Skin: Skin is warm, dry and intact.   Psychiatric: She has a normal mood and affect. Her speech is normal. Thought content normal. Cognition and memory are normal.         CRANIAL NERVES     CN III, IV, VI   Pupils are equal, round, and reactive to light.  Extraocular motions are normal.        Significant Labs:   CBC:   Recent Labs   Lab 08/16/19  1705 08/17/19  0808   WBC 14.27* 13.41*   HGB 8.8* 10.3*   HCT 33.6* 36.8*    385*     CMP:   Recent Labs   Lab 08/16/19  1705   *   K 4.6      CO2 29   *   BUN 16   CREATININE 0.8   CALCIUM 9.3   PROT 6.3   ALBUMIN 2.8*   BILITOT 0.2   ALKPHOS 63   AST 10   ALT 7*   ANIONGAP 8   EGFRNONAA >60     Lactic Acid:   Recent Labs   Lab 08/16/19  1821   LACTATE 0.8       Troponin:   Recent Labs   Lab 08/16/19  1705 08/16/19  2022 08/17/19  0204   TROPONINI 0.025 0.016 0.025     TSH:   Recent Labs   Lab 06/07/19  2033   TSH 0.699       Significant Imaging:     Imaging Results           US Lower Extremity Veins Bilateral  (Final result)  Result time 08/16/19 20:38:59    Final result by Davon Sandoval MD (08/16/19 20:38:59)                 Impression:      Bilateral lower extremity deep venous thrombosis as above.  Compared with previous ultrasound from 06/18/2019, there has been resolution of some previous areas of thrombosis, however some new areas of thrombosis are now seen.    This report was flagged in Epic as abnormal.      Electronically signed by: Davon Sandoval MD  Date:    08/16/2019  Time:    20:38             Narrative:    EXAMINATION:  US LOWER EXTREMITY VEINS BILATERAL    CLINICAL HISTORY:  swelling;    TECHNIQUE:  Duplex and color flow Doppler evaluation of the bilateral lower extremity veins was performed.    COMPARISON:  06/08/2019.    FINDINGS:  Right lower extremity:    Thrombosis is seen within 1 of the paired right posterior tibial veins (new).  No evidence of clot involving the right common femoral, femoral, greater saphenous, popliteal, anterior tibial, and peroneal veins.  Previously visualized thrombosis within the right femoral vein is no longer seen.    Left lower extremity: Thrombosis is seen within the distal femoral vein with partial thrombosis seen of the popliteal vein (partially resolve).  Thrombosis is also visualized within the posterior tibial vein (new).  No evidence of clot involving the left common femoral, greater saphenous, anterior tibial, and peroneal veins.                               CTA Chest Non-Coronary (PE Study) (Final result)  Result time 08/16/19 20:34:23    Final result by Davon Sandoval MD (08/16/19 20:34:23)                 Impression:      1. No evidence of PE.  No acute intrathoracic abnormalities identified.  2. Centrilobular emphysema.  3. Extensive calcified and noncalcified plaque throughout the aorta with multifocal small outpouchings suggestive for penetrating ulcers.  Appearance is overall similar compared to prior CTA from June 2018.      Electronically signed  by: Davon Sandoval MD  Date:    08/16/2019  Time:    20:34             Narrative:    EXAMINATION:  CTA CHEST NON CORONARY    CLINICAL HISTORY:  sob;    TECHNIQUE:  Low dose axial images, sagittal and coronal reformations were obtained from the thoracic inlet to the lung bases following the IV administration of 90 mL of Omnipaque 350.  Contrast timing was optimized to evaluate the pulmonary arteries.  MIP images were performed.    COMPARISON:  CTA chest and abdomen from June 2018.    FINDINGS:  Structures at the base of the neck are unremarkable.  Extensive calcified and noncalcified plaque are seen throughout the aorta with small multifocal outpouchings again seen likely reflecting penetrating ulcers.  Appearance appears overall unchanged compared to previous CT from 2018. Heart is normal in size without pericardial effusion.  No intraluminal filling defects within the pulmonary arteries to suggest pulmonary thromboembolism.   There is no evidence of mediastinal, axillary, or hilar lymph node enlargement.  Scattered air seen throughout the esophagus.    The trachea and bronchi are patent.  The lungs are symmetrically expanded.  Mild centrilobular emphysematous changes are seen with upper lobe predominance.  Mild bilateral scarring is seen.  There is interval enlargement of thin-walled lung cysts seen within the medial aspect of the right lower lobe.  No evidence of new focal consolidation, mass, pneumothorax, or pleural effusion.    The visualized abdominal structures are unremarkable.  Osseous structures demonstrate degenerative change.  Extrathoracic soft tissues are unremarkable.                               X-Ray Chest AP Portable (Final result)  Result time 08/16/19 17:41:37    Final result by Saud Musa MD (08/16/19 17:41:37)                 Impression:      No focal consolidation      Electronically signed by: Saud Musa MD  Date:    08/16/2019  Time:    17:41             Narrative:     EXAMINATION:  XR CHEST AP PORTABLE    CLINICAL HISTORY:  Chest Pain;    TECHNIQUE:  Single frontal view of the chest was performed.    COMPARISON:  Chest radiograph 07/19/2019    FINDINGS:  The lungs are clear.  The cardiac silhouette is stable.  The osseous and soft tissue structures demonstrate no acute abnormality.

## 2019-08-17 NOTE — ASSESSMENT & PLAN NOTE
Patient has history and has been on anticoagulation Xarelto - endorses compliance. Discussed broader anticoagulation options but at this point the patient is not amenable. She denies LE pain - edema is chronic  -consult to hematology for opinion  -continue xarelto for now  -cta negative for PE

## 2019-08-17 NOTE — HOSPITAL COURSE
Yasmeen Palm is a 69 y.o. sorely deconditioned patient with essential hypertension, DMII (HbA1c 8.1% July 2019), HLD (.0 Dec 2018), paroxysmal atrial fibrillation ([id number] score 4) on chronic anticoagulation, COPD, chronic respiratory failure with hypoxia and hypercapnia uses 2-3L home oxygen, GERD, anemia of chronic disease, tobacco abuse, and history of PE/DVT on chronic anticoagulation.    Patient presented with complaints of dyspnea that began in early morning prior to presentation.  The dyspnea is mainly exertional.  She denies any fevers, chills, nausea, vomiting, pleurisy, diaphoresis, palpitations, sick contracts, recent long trips, or hemoptysis.    Initial workup shows mildly elevated WBC (on prednisone) thought to be reactive, hypernatremia  She had Tachycardia sustained in 130's, Discontinued the albuterol and added xopenex Q8H/altrovent Q4H, metoprolol X 2 doses IV, cautious gentle fluids as patient has been getting lasix IV 40 mg BID as ordered in ED   -WBC 14K; her BUN/creatine/GFR 16/0.8/60 overnight now 29/1.5/41; abnormal urinalysis; awaiting culture; start rocephin  -DVT - failed therapy unclear compliance - was on xarelto; failed therapy, refused warfarin - consulted to Hematology - patient agreed to start apixaban    08/19/19  0615am----  -Paged by nursing staff patient converted to afib rvr with 90 systolic bp. Came in with acute and chronic LE DVT was on xarelto at home unclear compliance. Refused to be converted to warfarin -  Hematology consulted and recommended apixban instead which was initiated. CTA neg PE at presentation.     One day prior patient with ST and decreased renal functions the night before from normal to creatine 1.5. Noted to be in ST and administered metoprolol X 2 and urinalysis sent which was abnormal. Started on one dose IV rocephin and then oral to start today 8/18/19 to prevent/avoid FVO.      Known LVEF 70% with GI DD - pul htn PA pressure 80 - on home  oxygen at 2-3L.therefore - Gentle fluids X 5h administered as thought to be overdiuressed at that time as she had been placed on IV lasix 40 mg BID from the ED. Her HR improved about 5pm 8/17/19.    0844 am:   - Patient's HR remains 149-155 BP 88/56 after metoprolol IV - transfer to ICU with amio. gtt  - Urine culture positive e.coli & staph aeurus - start Vanc, and rocephin,has still discomfort,started on pyridium.  - New DVT - continue apixaban  - LOYDA/chronic respiratory impairment/COPD - bipap nightly,  Converted to sinus,cardiogy started on Cardizem,was stable go back to Tele.  Remains Sinus on Tele,  PT,OT is consulted.did well.  Had constipation,,was on multiple stool softner.  Patient has katherine discharged home with bactrim and increased Cardizem and eliquis and follow up with PCP in next few days.

## 2019-08-17 NOTE — ASSESSMENT & PLAN NOTE
08/17/19  Abnormal urinalysis - tachycardia; start rocephin IV X 1 gram  Then keflex BID (avoid FVO)    08/18/19  Cultures showing e coli and staph aureus  Van & Rocephin for now

## 2019-08-17 NOTE — ASSESSMENT & PLAN NOTE
Acute and chronic DVT's identified on DVT; CTA negative for PE; Patient has history and has been on anticoagulation Xarelto - endorses compliance. Discussed broader anticoagulation options but at this point the patient is not amenable to warfarin. She denies LE pain - edema is chronic  -consult to hematology for opinion  -continue xarelto for now (addendum seen by Hematology changed to apixaban)  -cta negative for PE

## 2019-08-17 NOTE — ASSESSMENT & PLAN NOTE
While hospitalized will use combined insulin therapy with basal and prandial insulin coverage, POCT glucose checks, hypoglycemic protocol and correction scale - HgA1c 8.1

## 2019-08-17 NOTE — H&P
[facility] Medicine  History & Physical    Patient Name: Yasmeen Palm  MRN: [id number]  Admission Date: 8/16/2019  Attending Physician: Tremayne Kong MD   Primary Care Provider: Angel Orourke Jr, MD         Patient information was obtained from patient and ER records.     Subjective:     Principal Problem:Deep vein thrombosis (DVT) of distal vein of lower extremity    Chief Complaint:   Chief Complaint   Patient presents with    Shortness of Breath     pt c/o sob since this am, breathing treatment in route helped. pt states she had cp but it subsided.         HPI: Yasmeen Palm is a 69 y.o. sorely deconditioned patient with essential hypertension, DMII (HbA1c 8.1% July 2019), HLD (.0 Dec 2018), paroxysmal atrial fibrillation ([id number] score 4) on chronic anticoagulation, COPD, chronic respiratory failure with hypoxia and hypercapnia uses 2-3L home oxygen, GERD, anemia of chronic disease, tobacco abuse, and history of PE/DVT on chronic anticoagulation.    Patient presented with complaints of dyspnea that began in early morning prior to presentation.  The dyspnea is mainly exertional.  She denies any fevers, chills, nausea, vomiting, pleurisy, diaphoresis, palpitations, sick contracts, recent long trips, or hemoptysis.    Initial workup shows mildly elevated WBC (on prednisone) thought to be reactive, hypernatremia       Past Medical History:   Diagnosis Date    Anticoagulant long-term use     Xarelto    Arthritis     Asthma     CHF (congestive heart failure)     COPD (chronic obstructive pulmonary disease)     Coronary artery disease     Deep vein thrombosis (DVT) of left lower extremity     Depression     Diabetes mellitus     GERD (gastroesophageal reflux disease)     Hypertension     Obstructive sleep apnea on CPAP     On home oxygen therapy     Unsteady gait     uses either walker or 4 prong cane       Past Surgical History:   Procedure  Laterality Date    CORONARY ANGIOPLASTY WITH STENT PLACEMENT      HERNIA REPAIR      encapsulated umbilical hernia       Review of patient's allergies indicates:  No Known Allergies    No current facility-administered medications on file prior to encounter.      Current Outpatient Medications on File Prior to Encounter   Medication Sig    acetaminophen (TYLENOL) 500 MG tablet Take 1 tablet (500 mg total) by mouth every 8 (eight) hours as needed.    albuterol (ACCUNEB) 1.25 mg/3 mL Nebu Inhale 1.25 mg into the lungs.    albuterol-ipratropium (DUO-NEB) 2.5 mg-0.5 mg/3 mL nebulizer solution Take 3 mLs by nebulization every 6 (six) hours. Rescue    aspirin (ECOTRIN) 81 MG EC tablet Take 81 mg by mouth once daily.    diltiaZEM (CARDIZEM) 90 MG tablet Take 90 mg by mouth.    ferrous gluconate (FERGON) 324 MG tablet Take 1 tablet (324 mg total) by mouth 3 (three) times daily with meals.    furosemide (LASIX) 40 MG tablet Take 40 mg by mouth once daily.     gabapentin (NEURONTIN) 300 MG capsule Take 300 mg by mouth.    hydrALAZINE (APRESOLINE) 50 MG tablet Take 50 mg by mouth 2 (two) times daily.    isosorbide mononitrate (IMDUR) 30 MG 24 hr tablet Take 3 tablets (90 mg total) by mouth once daily.    lisinopril (PRINIVIL,ZESTRIL) 40 MG tablet Take 1 tablet (40 mg total) by mouth once daily. Do not take this medication if blood pressure is below 130/80 mmHg and/or feeling dizzy after taking all other blood pressure meds    metFORMIN (GLUCOPHAGE) 1000 MG tablet Take 1 tablet (1,000 mg total) by mouth 2 (two) times daily with meals.    multivit-min-FA-lycopen-lutein (CENTRUM SILVER) 0.4-300-250 mg-mcg-mcg Tab Take 1 tablet by mouth once daily.    pantoprazole (PROTONIX) 40 MG tablet Take 40 mg by mouth once daily.    pregabalin (LYRICA) 75 MG capsule Take 75 mg by mouth 2 (two) times daily.    rivaroxaban (XARELTO) 20 mg Tab Take 20 mg by mouth daily with dinner or evening meal.     traMADol (ULTRAM) 50 mg  "tablet     acetaminophen (TYLENOL) 500 MG tablet Take 500 mg by mouth.    albuterol (PROVENTIL) 2.5 mg /3 mL (0.083 %) nebulizer solution Take 2.5 mg by nebulization every 6 (six) hours as needed.    amiodarone (PACERONE) 200 MG Tab Take 1 tablet (200 mg total) by mouth once daily.    ANORO ELLIPTA 62.5-25 mcg/actuation DsDv     aspirin (ECOTRIN) 81 MG EC tablet Take 81 mg by mouth.    cefUROXime (CEFTIN) 500 MG tablet Take 1 tablet (500 mg total) by mouth every 12 (twelve) hours.    fluticasone propionate (FLOVENT HFA) 220 mcg/actuation inhaler Inhale 1 puff into the lungs 2 (two) times daily. Controller    ipratropium-albuterol (COMBIVENT)  mcg/actuation inhaler Inhale 1 puff into the lungs every 6 (six) hours as needed for Wheezing or Shortness of Breath. Rescue    isosorbide mononitrate (IMDUR) 30 MG 24 hr tablet Take 30 mg by mouth.    methylPREDNISolone (MEDROL DOSEPACK) 4 mg tablet use as directed    nitroGLYCERIN (NITROSTAT) 0.4 MG SL tablet     umeclidinium brm/vilanterol tr (ANORO ELLIPTA INHL) Inhale into the lungs.     Family History     Problem Relation (Age of Onset)    Diabetes Father    Heart disease Mother    Hypertension Mother        Tobacco Use    Smoking status: Former Smoker     Packs/day: 0.50     Years: 55.00     Pack years: 27.50     Types: Cigarettes     Start date: 1963     Last attempt to quit: 2018     Years since quittin.6    Smokeless tobacco: Never Used    Tobacco comment: "States she quit smoking about 3 or 4 weeks ago"   Substance and Sexual Activity    Alcohol use: Not Currently     Alcohol/week: 0.6 oz     Types: 1 Glasses of wine per week     Frequency: Never     Comment: socially    Drug use: No    Sexual activity: Never     Review of Systems   Constitutional: Negative for appetite change, chills, diaphoresis and fever.   HENT: Negative for congestion, hearing loss, sore throat, tinnitus and trouble swallowing.    Eyes: Negative for " photophobia, discharge, itching and visual disturbance.   Respiratory: Positive for shortness of breath. Negative for apnea, cough, wheezing and stridor.    Cardiovascular: Negative for chest pain, palpitations and leg swelling.   Gastrointestinal: Negative for abdominal distention, abdominal pain, blood in stool, constipation, diarrhea and nausea.   Endocrine: Negative for polydipsia, polyphagia and polyuria.   Genitourinary: Negative for difficulty urinating, dysuria, flank pain and frequency.   Musculoskeletal: Negative for arthralgias, joint swelling and neck stiffness.   Skin: Negative for color change, rash and wound.   Neurological: Negative for dizziness, tremors, seizures, light-headedness, numbness and headaches.   Hematological: Negative for adenopathy.   Psychiatric/Behavioral: Negative for hallucinations and self-injury.     Objective:     Vital Signs (Most Recent):  Temp: 98 °F (36.7 °C) (08/17/19 0727)  Pulse: 102 (08/17/19 0804)  Resp: 18 (08/17/19 0804)  BP: (!) 163/89 (08/17/19 0727)  SpO2: 98 % (08/17/19 0804) Vital Signs (24h Range):  Temp:  [98 °F (36.7 °C)-99.1 °F (37.3 °C)] 98 °F (36.7 °C)  Pulse:  [101-116] 102  Resp:  [15-28] 18  SpO2:  [98 %-100 %] 98 %  BP: (130-163)/(68-89) 163/89     Weight: 77.4 kg (170 lb 10.2 oz)  Body mass index is 26.73 kg/m².    Physical Exam   Constitutional: She is oriented to person, place, and time. She appears well-developed. She is cooperative.   Deconditioned;needy   HENT:   Head: Normocephalic and atraumatic.   Eyes: Pupils are equal, round, and reactive to light. Conjunctivae, EOM and lids are normal.   Neck: Normal range of motion and full passive range of motion without pain. Neck supple. No JVD present. No edema present. No thyroid mass present.   Cardiovascular: Normal rate, S1 normal, S2 normal and intact distal pulses.   No murmur heard.  Pulmonary/Chest: Effort normal.   Abdominal: Soft. Bowel sounds are normal. She exhibits no distension and no  abdominal bruit. There is no splenomegaly or hepatomegaly. There is no tenderness. There is no CVA tenderness.   Musculoskeletal: Normal range of motion.   Denies pain   Lymphadenopathy:     She has no cervical adenopathy.     She has no axillary adenopathy.   Neurological: She is alert and oriented to person, place, and time. She has normal reflexes. She displays no tremor. She displays no seizure activity.   Skin: Skin is warm, dry and intact.   Psychiatric: She has a normal mood and affect. Her speech is normal. Thought content normal. Cognition and memory are normal.         CRANIAL NERVES     CN III, IV, VI   Pupils are equal, round, and reactive to light.  Extraocular motions are normal.        Significant Labs:   CBC:   Recent Labs   Lab 08/16/19  1705 08/17/19  0808   WBC 14.27* 13.41*   HGB 8.8* 10.3*   HCT 33.6* 36.8*    385*     CMP:   Recent Labs   Lab 08/16/19  1705   *   K 4.6      CO2 29   *   BUN 16   CREATININE 0.8   CALCIUM 9.3   PROT 6.3   ALBUMIN 2.8*   BILITOT 0.2   ALKPHOS 63   AST 10   ALT 7*   ANIONGAP 8   EGFRNONAA >60     Lactic Acid:   Recent Labs   Lab 08/16/19  1821   LACTATE 0.8       Troponin:   Recent Labs   Lab 08/16/19  1705 08/16/19 2022 08/17/19  0204   TROPONINI 0.025 0.016 0.025     TSH:   Recent Labs   Lab 06/07/19  2033   TSH 0.699       Significant Imaging:     Imaging Results           US Lower Extremity Veins Bilateral (Final result)  Result time 08/16/19 20:38:59    Final result by Davon Sandoval MD (08/16/19 20:38:59)                 Impression:      Bilateral lower extremity deep venous thrombosis as above.  Compared with previous ultrasound from 06/18/2019, there has been resolution of some previous areas of thrombosis, however some new areas of thrombosis are now seen.    This report was flagged in Epic as abnormal.      Electronically signed by: Davon Sandoval MD  Date:    08/16/2019  Time:    20:38             Narrative:     EXAMINATION:  US LOWER EXTREMITY VEINS BILATERAL    CLINICAL HISTORY:  swelling;    TECHNIQUE:  Duplex and color flow Doppler evaluation of the bilateral lower extremity veins was performed.    COMPARISON:  06/08/2019.    FINDINGS:  Right lower extremity:    Thrombosis is seen within 1 of the paired right posterior tibial veins (new).  No evidence of clot involving the right common femoral, femoral, greater saphenous, popliteal, anterior tibial, and peroneal veins.  Previously visualized thrombosis within the right femoral vein is no longer seen.    Left lower extremity: Thrombosis is seen within the distal femoral vein with partial thrombosis seen of the popliteal vein (partially resolve).  Thrombosis is also visualized within the posterior tibial vein (new).  No evidence of clot involving the left common femoral, greater saphenous, anterior tibial, and peroneal veins.                               CTA Chest Non-Coronary (PE Study) (Final result)  Result time 08/16/19 20:34:23    Final result by Davon Sandoval MD (08/16/19 20:34:23)                 Impression:      1. No evidence of PE.  No acute intrathoracic abnormalities identified.  2. Centrilobular emphysema.  3. Extensive calcified and noncalcified plaque throughout the aorta with multifocal small outpouchings suggestive for penetrating ulcers.  Appearance is overall similar compared to prior CTA from June 2018.      Electronically signed by: Davon Sandoval MD  Date:    08/16/2019  Time:    20:34             Narrative:    EXAMINATION:  CTA CHEST NON CORONARY    CLINICAL HISTORY:  sob;    TECHNIQUE:  Low dose axial images, sagittal and coronal reformations were obtained from the thoracic inlet to the lung bases following the IV administration of 90 mL of Omnipaque 350.  Contrast timing was optimized to evaluate the pulmonary arteries.  MIP images were performed.    COMPARISON:  CTA chest and abdomen from June 2018.    FINDINGS:  Structures at the base of  the neck are unremarkable.  Extensive calcified and noncalcified plaque are seen throughout the aorta with small multifocal outpouchings again seen likely reflecting penetrating ulcers.  Appearance appears overall unchanged compared to previous CT from 2018. Heart is normal in size without pericardial effusion.  No intraluminal filling defects within the pulmonary arteries to suggest pulmonary thromboembolism.   There is no evidence of mediastinal, axillary, or hilar lymph node enlargement.  Scattered air seen throughout the esophagus.    The trachea and bronchi are patent.  The lungs are symmetrically expanded.  Mild centrilobular emphysematous changes are seen with upper lobe predominance.  Mild bilateral scarring is seen.  There is interval enlargement of thin-walled lung cysts seen within the medial aspect of the right lower lobe.  No evidence of new focal consolidation, mass, pneumothorax, or pleural effusion.    The visualized abdominal structures are unremarkable.  Osseous structures demonstrate degenerative change.  Extrathoracic soft tissues are unremarkable.                               X-Ray Chest AP Portable (Final result)  Result time 08/16/19 17:41:37    Final result by Saud Musa MD (08/16/19 17:41:37)                 Impression:      No focal consolidation      Electronically signed by: Saud Musa MD  Date:    08/16/2019  Time:    17:41             Narrative:    EXAMINATION:  XR CHEST AP PORTABLE    CLINICAL HISTORY:  Chest Pain;    TECHNIQUE:  Single frontal view of the chest was performed.    COMPARISON:  Chest radiograph 07/19/2019    FINDINGS:  The lungs are clear.  The cardiac silhouette is stable.  The osseous and soft tissue structures demonstrate no acute abnormality.                                  Assessment/Plan:     * Deep vein thrombosis (DVT) of distal vein of lower extremity  Patient has history and has been on anticoagulation Xarelto - endorses compliance. Discussed  broader anticoagulation options but at this point the patient is not amenable. She denies LE pain - edema is chronic  -consult to hematology for opinion  -continue xarelto for now (addendum seen by Hematology changed to apixaban)  -cta negative for PE      Abnormal urinalysis  Abnormal urinalysis - tachycardia; start rocephin IV X 1 gram  Then keflex BID (avoid FVO)      Essential hypertension  Decent control continue usual home medications      Sinus tachycardia  Patient with HR sustained in 130's in early morning - received IV metoprolol X 2 doses with improvement  -restarted usual diltiazem & Imdur  -gentle IV fluids X 5 Hours (overdiuressing)  -PRN metoprolol 10 mg q5 minus X 2 HR>100  -Stop albuterol      Chronic respiratory failure with hypoxia  LOYDA, uses home oxygen at 2-3L  CPAP at night  Supplemental oxygen  Stop albuterol treatments and start xopenex/altrovent  Stop steroids       Controlled type 2 diabetes mellitus, without long-term current use of insulin  While hospitalized will use combined insulin therapy with basal and prandial insulin coverage, POCT glucose checks, hypoglycemic protocol and correction scale - HgA1c 8.1      VTE Risk Mitigation (From admission, onward)        Ordered     rivaroxaban tablet 20 mg  With dinner      08/16/19 2051             YOSVANY Faria, FNP-C  Hospitalist - Department of Hospital Medicine  [address] [place] [address]  Office [phone]; Pager [phone]

## 2019-08-17 NOTE — CONSULTS
[facility]  Hematology/Oncology  Consult Note    Patient Name: Yasmeen Palm  MRN: [id number]  Admission Date: 8/16/2019  Hospital Length of Stay: 0 days  Code Status: Full Code   Attending Provider: Tremayne Kong MD  Consulting Provider: Francois Lara MD  Primary Care Physician: Angel Orourke Jr, MD  Principal Problem:Deep vein thrombosis (DVT) of distal vein of lower extremity    Inpatient consult to Hematology  Consult performed by: Francois Lara MD  Consult ordered by: GABRIELA Faria        Subjective:     HPI:   Ms. Palm is a 70 YO lady with multiple medical conditions with chronic DVT of jorge LE and PE. Pt has been on chronic anticoagulation with Rivaroxaban- not able to obtain details of compliance. She admitted with SOB. CTA negative for acute PE. US of jorge LE showed bilateral lower extremity deep venous thrombosis. Compared with previous ultrasound from 06/18/2019, there has been resolution of some previous areas of thrombosis, however some new areas of thrombosis are now seen. I have been asked to see her regarding failed tx with Rivaroxaban. Not able to get details from pt.     Oncology Treatment Plan:   [No treatment plan]    Medications:  Continuous Infusions:  Scheduled Meds:   albuterol-ipratropium  3 mL Nebulization Q4H WAKE    aspirin  81 mg Oral Daily    diltiaZEM  90 mg Oral Q8H    furosemide  40 mg Intravenous BID    hydrALAZINE  50 mg Oral BID    isosorbide mononitrate  90 mg Oral Daily    lisinopril  40 mg Oral Daily    lorazepam  0.25 mg Intravenous Once    pantoprazole  40 mg Oral Daily    rivaroxaban  20 mg Oral Daily with dinner     PRN Meds:acetaminophen, ondansetron, ramelteon, sodium chloride 0.9%, traMADol     Review of patient's allergies indicates:  No Known Allergies     Past Medical History:   Diagnosis Date    Anticoagulant long-term use     Xarelto    Arthritis     Asthma     CHF (congestive heart failure)     COPD  "(chronic obstructive pulmonary disease)     Coronary artery disease     Deep vein thrombosis (DVT) of left lower extremity     Depression     Diabetes mellitus     GERD (gastroesophageal reflux disease)     Hypertension     Obstructive sleep apnea on CPAP     On home oxygen therapy     Unsteady gait     uses either walker or 4 prong cane     Past Surgical History:   Procedure Laterality Date    CORONARY ANGIOPLASTY WITH STENT PLACEMENT      HERNIA REPAIR      encapsulated umbilical hernia     Family History     Problem Relation (Age of Onset)    Diabetes Father    Heart disease Mother    Hypertension Mother        Tobacco Use    Smoking status: Former Smoker     Packs/day: 0.50     Years: 55.00     Pack years: 27.50     Types: Cigarettes     Start date: 1963     Last attempt to quit: 2018     Years since quittin.6    Smokeless tobacco: Never Used    Tobacco comment: "States she quit smoking about 3 or 4 weeks ago"   Substance and Sexual Activity    Alcohol use: Not Currently     Alcohol/week: 0.6 oz     Types: 1 Glasses of wine per week     Frequency: Never     Comment: socially    Drug use: No    Sexual activity: Never       Review of Systems   No fever  Baseline sob    Objective:     Vital Signs (Most Recent):  Temp: 98 °F (36.7 °C) (19)  Pulse: 102 (19)  Resp: 18 (19)  BP: (!) 163/89 (19)  SpO2: 98 % (19) Vital Signs (24h Range):  Temp:  [98 °F (36.7 °C)-99.1 °F (37.3 °C)] 98 °F (36.7 °C)  Pulse:  [101-116] 102  Resp:  [15-28] 18  SpO2:  [98 %-100 %] 98 %  BP: (130-163)/(68-89) 163/89     Weight: 77.4 kg (170 lb 10.2 oz)  Body mass index is 26.73 kg/m².  Body surface area is 1.91 meters squared.      Intake/Output Summary (Last 24 hours) at 2019 1042  Last data filed at 2019  Gross per 24 hour   Intake 150 ml   Output --   Net 150 ml       Physical Exam  Chronically ill appearing lady. Appear to be older than " stated age  NC/AT  Conj     Significant Labs:   CBC:   Recent Labs   Lab 08/16/19  1705 08/17/19  0808   WBC 14.27* 13.41*   HGB 8.8* 10.3*   HCT 33.6* 36.8*    385*   , CMP:   Recent Labs   Lab 08/16/19  1705 08/17/19  0808   * 142   K 4.6 4.7    100   CO2 29 28   * 337*   BUN 16 19   CREATININE 0.8 1.0   CALCIUM 9.3 9.6   PROT 6.3 6.9   ALBUMIN 2.8* 2.9*   BILITOT 0.2 0.2   ALKPHOS 63 70   AST 10 9*   ALT 7* 8*   ANIONGAP 8 14   EGFRNONAA >60 58*   , Coagulation: No results for input(s): PT, INR, APTT in the last 48 hours., Uric Acid No results for input(s): URICACID in the last 48 hours., Urine Studies:   Recent Labs   Lab 08/17/19  0948   COLORU Yellow  Yellow   APPEARANCEUA Cloudy*  Cloudy*   PHUR 6.0  6.0   SPECGRAV 1.010  1.010   PROTEINUA 2+*  2+*   GLUCUA 3+*  3+*   KETONESU Trace*  Trace*   BILIRUBINUA Negative  Negative   OCCULTUA 3+*  3+*   NITRITE Negative  Negative   UROBILINOGEN Negative  Negative   LEUKOCYTESUR 3+*  3+*   RBCUA 10*   WBCUA >100*   BACTERIA Few*   HYALINECASTS 0    and All pertinent labs from the last 24 hours have been reviewed.    Diagnostic Results:      Assessment/Plan:     Active Diagnoses:    Diagnosis Date Noted POA    PRINCIPAL PROBLEM:  Deep vein thrombosis (DVT) of distal vein of lower extremity [I82.4Z9] 08/16/2019 Yes    Essential hypertension [I10] 03/24/2019 Yes     Chronic    Controlled type 2 diabetes mellitus, without long-term current use of insulin [E11.9] 12/14/2015 Yes     Chronic      Problems Resolved During this Admission:     Ms. Palm, 70 YO lady with multiple medical conditions with recurrent DVT of LE.     1. Recurrent DVT: US reviewed   - check Ddimer level   - I am concerned about non compliance with medication rather than anticoagulant failure. If there is a question about Rivaroxaban failure change to Apixaban 5mg bid     2. SOB: chronic   - per primary team     Discussed with Letha Bartholomew NP in detail     Thank  you for your consult. I will follow-up with patient. Please contact us if you have any additional questions.    Francois Lara M.D  Internal Medicine & Geriatric Medicine  Hematology & Oncology  Palliative Medicine    [address] [facility], [address]  [place], [place] [address] [phone] (Office)  [phone] (Fax)

## 2019-08-17 NOTE — ASSESSMENT & PLAN NOTE
LOYDA, uses home oxygen at 2-3L  CPAP at night --- patient uses Bipap instead, change order  Supplemental oxygen  Stop albuterol treatments and start xopenex/altrovent  Stop steroids

## 2019-08-17 NOTE — PLAN OF CARE
Problem: Skin Injury Risk Increased  Goal: Skin Health and Integrity  Outcome: Ongoing (interventions implemented as appropriate)  Intervention: Optimize Skin Protection     08/17/19 1712   Prevent Additional Skin Injury   Head of Bed (HOB) HOB at 30-45 degrees   Pressure Reduction Techniques frequent weight shift encouraged   Monitor and Manage Hypervolemia   Skin Protection tubing/devices free from skin contact

## 2019-08-17 NOTE — NURSING
Patient complaining of SOB patient's O2 SATs 97% on 2 liters . Patient teary . Patient's HR up to 128 Letha NP aware. New orders noted.

## 2019-08-17 NOTE — PROGRESS NOTES
Received pt from ED via stretcher per transport. AAOx4. No pain or discomfort voiced. Shortness of breath noted. Telemetry monitor placed with alarm set. IV to left AC, saline locked with site clear. VS taken. Safety measures maintained. Call light within reach. Will continue to monitor.

## 2019-08-18 LAB
ALBUMIN SERPL BCP-MCNC: 2.6 G/DL (ref 3.5–5.2)
ALP SERPL-CCNC: 55 U/L (ref 55–135)
ALT SERPL W/O P-5'-P-CCNC: 6 U/L (ref 10–44)
ANION GAP SERPL CALC-SCNC: 12 MMOL/L (ref 8–16)
AST SERPL-CCNC: 10 U/L (ref 10–40)
BASOPHILS # BLD AUTO: 0.01 K/UL (ref 0–0.2)
BASOPHILS NFR BLD: 0 % (ref 0–1.9)
BILIRUB SERPL-MCNC: 0.2 MG/DL (ref 0.1–1)
BUN SERPL-MCNC: 38 MG/DL (ref 8–23)
CALCIUM SERPL-MCNC: 8.5 MG/DL (ref 8.7–10.5)
CHLORIDE SERPL-SCNC: 104 MMOL/L (ref 95–110)
CO2 SERPL-SCNC: 25 MMOL/L (ref 23–29)
CREAT SERPL-MCNC: 1.6 MG/DL (ref 0.5–1.4)
DIFFERENTIAL METHOD: ABNORMAL
EOSINOPHIL # BLD AUTO: 0 K/UL (ref 0–0.5)
EOSINOPHIL NFR BLD: 0 % (ref 0–8)
ERYTHROCYTE [DISTWIDTH] IN BLOOD BY AUTOMATED COUNT: 20.4 % (ref 11.5–14.5)
EST. GFR  (AFRICAN AMERICAN): 38 ML/MIN/1.73 M^2
EST. GFR  (NON AFRICAN AMERICAN): 33 ML/MIN/1.73 M^2
GLUCOSE SERPL-MCNC: 375 MG/DL (ref 70–110)
HCT VFR BLD AUTO: 32.3 % (ref 37–48.5)
HGB BLD-MCNC: 8.9 G/DL (ref 12–16)
LYMPHOCYTES # BLD AUTO: 1.6 K/UL (ref 1–4.8)
LYMPHOCYTES NFR BLD: 6.6 % (ref 18–48)
MCH RBC QN AUTO: 24.9 PG (ref 27–31)
MCHC RBC AUTO-ENTMCNC: 27.6 G/DL (ref 32–36)
MCV RBC AUTO: 91 FL (ref 82–98)
MONOCYTES # BLD AUTO: 1.3 K/UL (ref 0.3–1)
MONOCYTES NFR BLD: 5.4 % (ref 4–15)
NEUTROPHILS # BLD AUTO: 21.1 K/UL (ref 1.8–7.7)
NEUTROPHILS NFR BLD: 88 % (ref 38–73)
PLATELET # BLD AUTO: 354 K/UL (ref 150–350)
PMV BLD AUTO: 10.2 FL (ref 9.2–12.9)
POCT GLUCOSE: 245 MG/DL (ref 70–110)
POCT GLUCOSE: 276 MG/DL (ref 70–110)
POCT GLUCOSE: 303 MG/DL (ref 70–110)
POCT GLUCOSE: 338 MG/DL (ref 70–110)
POCT GLUCOSE: 372 MG/DL (ref 70–110)
POTASSIUM SERPL-SCNC: 4.7 MMOL/L (ref 3.5–5.1)
PROT SERPL-MCNC: 5.9 G/DL (ref 6–8.4)
RBC # BLD AUTO: 3.57 M/UL (ref 4–5.4)
SODIUM SERPL-SCNC: 141 MMOL/L (ref 136–145)
TROPONIN I SERPL DL<=0.01 NG/ML-MCNC: 0.06 NG/ML (ref 0–0.03)
WBC # BLD AUTO: 24.09 K/UL (ref 3.9–12.7)

## 2019-08-18 PROCEDURE — 63600175 PHARM REV CODE 636 W HCPCS: Performed by: NURSE PRACTITIONER

## 2019-08-18 PROCEDURE — 99291 PR CRITICAL CARE, E/M 30-74 MINUTES: ICD-10-PCS | Mod: ,,, | Performed by: INTERNAL MEDICINE

## 2019-08-18 PROCEDURE — 94660 CPAP INITIATION&MGMT: CPT

## 2019-08-18 PROCEDURE — 25000003 PHARM REV CODE 250: Performed by: HOSPITALIST

## 2019-08-18 PROCEDURE — 25000242 PHARM REV CODE 250 ALT 637 W/ HCPCS: Performed by: NURSE PRACTITIONER

## 2019-08-18 PROCEDURE — 27000221 HC OXYGEN, UP TO 24 HOURS

## 2019-08-18 PROCEDURE — 96376 TX/PRO/DX INJ SAME DRUG ADON: CPT

## 2019-08-18 PROCEDURE — 25000003 PHARM REV CODE 250: Performed by: INTERNAL MEDICINE

## 2019-08-18 PROCEDURE — 85025 COMPLETE CBC W/AUTO DIFF WBC: CPT

## 2019-08-18 PROCEDURE — 94640 AIRWAY INHALATION TREATMENT: CPT

## 2019-08-18 PROCEDURE — 63600175 PHARM REV CODE 636 W HCPCS: Performed by: INTERNAL MEDICINE

## 2019-08-18 PROCEDURE — 63600175 PHARM REV CODE 636 W HCPCS: Performed by: HOSPITALIST

## 2019-08-18 PROCEDURE — 20000000 HC ICU ROOM

## 2019-08-18 PROCEDURE — 99291 CRITICAL CARE FIRST HOUR: CPT | Mod: ,,, | Performed by: INTERNAL MEDICINE

## 2019-08-18 PROCEDURE — 96361 HYDRATE IV INFUSION ADD-ON: CPT

## 2019-08-18 PROCEDURE — 82962 GLUCOSE BLOOD TEST: CPT

## 2019-08-18 PROCEDURE — 99900035 HC TECH TIME PER 15 MIN (STAT)

## 2019-08-18 PROCEDURE — 94761 N-INVAS EAR/PLS OXIMETRY MLT: CPT

## 2019-08-18 PROCEDURE — S5571 INSULIN DISPOS PEN 3 ML: HCPCS | Performed by: HOSPITALIST

## 2019-08-18 PROCEDURE — 80053 COMPREHEN METABOLIC PANEL: CPT

## 2019-08-18 PROCEDURE — 96372 THER/PROPH/DIAG INJ SC/IM: CPT

## 2019-08-18 PROCEDURE — 36415 COLL VENOUS BLD VENIPUNCTURE: CPT

## 2019-08-18 RX ORDER — DIGOXIN 0.25 MG/ML
250 INJECTION INTRAMUSCULAR; INTRAVENOUS ONCE
Status: DISCONTINUED | OUTPATIENT
Start: 2019-08-18 | End: 2019-08-18

## 2019-08-18 RX ORDER — METOPROLOL TARTRATE 1 MG/ML
5 INJECTION, SOLUTION INTRAVENOUS EVERY 5 MIN PRN
Status: DISCONTINUED | OUTPATIENT
Start: 2019-08-18 | End: 2019-08-22 | Stop reason: HOSPADM

## 2019-08-18 RX ORDER — VANCOMYCIN HCL IN 5 % DEXTROSE 1G/250ML
1000 PLASTIC BAG, INJECTION (ML) INTRAVENOUS ONCE
Status: COMPLETED | OUTPATIENT
Start: 2019-08-18 | End: 2019-08-18

## 2019-08-18 RX ORDER — ATORVASTATIN CALCIUM 40 MG/1
80 TABLET, FILM COATED ORAL NIGHTLY
Status: DISCONTINUED | OUTPATIENT
Start: 2019-08-18 | End: 2019-08-22 | Stop reason: HOSPADM

## 2019-08-18 RX ORDER — VANCOMYCIN HCL IN 5 % DEXTROSE 1G/250ML
1000 PLASTIC BAG, INJECTION (ML) INTRAVENOUS
Status: DISCONTINUED | OUTPATIENT
Start: 2019-08-19 | End: 2019-08-21

## 2019-08-18 RX ORDER — INSULIN ASPART 100 [IU]/ML
20 INJECTION, SOLUTION INTRAVENOUS; SUBCUTANEOUS DAILY
Status: DISCONTINUED | OUTPATIENT
Start: 2019-08-18 | End: 2019-08-19

## 2019-08-18 RX ADMIN — INSULIN ASPART 5 UNITS: 100 INJECTION, SOLUTION INTRAVENOUS; SUBCUTANEOUS at 05:08

## 2019-08-18 RX ADMIN — LEVALBUTEROL HYDROCHLORIDE 0.63 MG: 0.63 SOLUTION RESPIRATORY (INHALATION) at 07:08

## 2019-08-18 RX ADMIN — PANTOPRAZOLE SODIUM 40 MG: 40 TABLET, DELAYED RELEASE ORAL at 09:08

## 2019-08-18 RX ADMIN — INSULIN ASPART 2 UNITS: 100 INJECTION, SOLUTION INTRAVENOUS; SUBCUTANEOUS at 05:08

## 2019-08-18 RX ADMIN — IPRATROPIUM BROMIDE 0.5 MG: 0.5 SOLUTION RESPIRATORY (INHALATION) at 03:08

## 2019-08-18 RX ADMIN — INSULIN ASPART 5 UNITS: 100 INJECTION, SOLUTION INTRAVENOUS; SUBCUTANEOUS at 09:08

## 2019-08-18 RX ADMIN — METOPROLOL TARTRATE 5 MG: 1 INJECTION, SOLUTION INTRAVENOUS at 06:08

## 2019-08-18 RX ADMIN — INSULIN ASPART 3 UNITS: 100 INJECTION, SOLUTION INTRAVENOUS; SUBCUTANEOUS at 09:08

## 2019-08-18 RX ADMIN — SODIUM CHLORIDE 500 ML: 0.9 INJECTION, SOLUTION INTRAVENOUS at 10:08

## 2019-08-18 RX ADMIN — ASPIRIN 81 MG: 81 TABLET, COATED ORAL at 09:08

## 2019-08-18 RX ADMIN — IPRATROPIUM BROMIDE 0.5 MG: 0.5 SOLUTION RESPIRATORY (INHALATION) at 11:08

## 2019-08-18 RX ADMIN — INSULIN ASPART 2 UNITS: 100 INJECTION, SOLUTION INTRAVENOUS; SUBCUTANEOUS at 12:08

## 2019-08-18 RX ADMIN — IPRATROPIUM BROMIDE 0.5 MG: 0.5 SOLUTION RESPIRATORY (INHALATION) at 07:08

## 2019-08-18 RX ADMIN — AMIODARONE HYDROCHLORIDE 150 MG: 1.5 INJECTION, SOLUTION INTRAVENOUS at 10:08

## 2019-08-18 RX ADMIN — LEVALBUTEROL HYDROCHLORIDE 0.63 MG: 0.63 SOLUTION RESPIRATORY (INHALATION) at 03:08

## 2019-08-18 RX ADMIN — ACETAMINOPHEN 650 MG: 325 TABLET, FILM COATED ORAL at 06:08

## 2019-08-18 RX ADMIN — ATORVASTATIN CALCIUM 80 MG: 40 TABLET, FILM COATED ORAL at 09:08

## 2019-08-18 RX ADMIN — APIXABAN 5 MG: 5 TABLET, FILM COATED ORAL at 09:08

## 2019-08-18 RX ADMIN — INSULIN ASPART 5 UNITS: 100 INJECTION, SOLUTION INTRAVENOUS; SUBCUTANEOUS at 12:08

## 2019-08-18 RX ADMIN — LEVALBUTEROL HYDROCHLORIDE 0.63 MG: 0.63 SOLUTION RESPIRATORY (INHALATION) at 12:08

## 2019-08-18 RX ADMIN — LEVALBUTEROL HYDROCHLORIDE 0.63 MG: 0.63 SOLUTION RESPIRATORY (INHALATION) at 11:08

## 2019-08-18 RX ADMIN — IPRATROPIUM BROMIDE 0.5 MG: 0.5 SOLUTION RESPIRATORY (INHALATION) at 12:08

## 2019-08-18 RX ADMIN — IPRATROPIUM BROMIDE 0.5 MG: 0.5 SOLUTION RESPIRATORY (INHALATION) at 04:08

## 2019-08-18 RX ADMIN — SODIUM CHLORIDE 250 ML: 0.9 INJECTION, SOLUTION INTRAVENOUS at 06:08

## 2019-08-18 RX ADMIN — METOPROLOL TARTRATE 5 MG: 1 INJECTION, SOLUTION INTRAVENOUS at 07:08

## 2019-08-18 RX ADMIN — VANCOMYCIN HYDROCHLORIDE 1000 MG: 1 INJECTION, POWDER, LYOPHILIZED, FOR SOLUTION INTRAVENOUS at 10:08

## 2019-08-18 RX ADMIN — INSULIN ASPART 20 UNITS: 100 INJECTION, SOLUTION INTRAVENOUS; SUBCUTANEOUS at 12:08

## 2019-08-18 RX ADMIN — INSULIN DETEMIR 8 UNITS: 100 INJECTION, SOLUTION SUBCUTANEOUS at 10:08

## 2019-08-18 RX ADMIN — CEFTRIAXONE 1 G: 1 INJECTION, SOLUTION INTRAVENOUS at 09:08

## 2019-08-18 RX ADMIN — AMIODARONE HYDROCHLORIDE 0.5 MG/MIN: 1.8 INJECTION, SOLUTION INTRAVENOUS at 03:08

## 2019-08-18 RX ADMIN — AMIODARONE HYDROCHLORIDE 1 MG/MIN: 1.8 INJECTION, SOLUTION INTRAVENOUS at 11:08

## 2019-08-18 RX ADMIN — SODIUM CHLORIDE 500 ML: 0.9 INJECTION, SOLUTION INTRAVENOUS at 03:08

## 2019-08-18 NOTE — PROGRESS NOTES
Pharmacokinetic Initial Assessment: IV Vancomycin    Assessment/Plan:    Initiate intravenous vancomycin with loading dose of 1000 mg once followed by a maintenance dose of vancomycin 1000 mg IV every 24 hours  Desired empiric serum trough concentration is 15 to 20 mcg/mL  Draw vancomycin trough level 30 min prior to fourth dose on 8/21 at approximately 0930   Pharmacy will continue to follow and monitor vancomycin.      Please contact pharmacy at extension [phone] with any questions regarding this assessment.     Thank you for the consult,   Eddy Dumont       Patient brief summary:  Yasmeen Palm is a 69 y.o. female initiated on antimicrobial therapy with IV Vancomycin for treatment of suspected urinary tract infection    Drug Allergies:   Review of patient's allergies indicates:  No Known Allergies    Actual Body Weight:   77.4 kg    Renal Function:   Estimated Creatinine Clearance: 39 mL/min (A) (based on SCr of 1.5 mg/dL (H)).,     Dialysis Method (if applicable):  no     CBC (last 72 hours):  Recent Labs   Lab Result Units 08/16/19  1705 08/17/19  0808   WBC K/uL 14.27* 13.41*   Hemoglobin g/dL 8.8* 10.3*   Hematocrit % 33.6* 36.8*   Platelets K/uL 323 385*   Gran% % 72.7 91.5*   Lymph% % 17.3* 7.9*   Mono% % 9.7 0.6*   Eosinophil% % 0.2 0.0   Basophil% % 0.1 0.0   Differential Method  Automated Automated       Metabolic Panel (last 72 hours):  Recent Labs   Lab Result Units 08/16/19  1705 08/17/19  0808 08/17/19  0948 08/17/19  1507   Sodium mmol/L 146* 142  --  139   Potassium mmol/L 4.6 4.7  --  4.6   Chloride mmol/L 109 100  --  98   CO2 mmol/L 29 28  --  28   Glucose mg/dL 184* 337*  --  553*   Glucose, UA   --   --  3+*  3+*  --    BUN, Bld mg/dL 16 19  --  29*   Creatinine mg/dL 0.8 1.0  --  1.5*   Albumin g/dL 2.8* 2.9*  --  2.9*   Total Bilirubin mg/dL 0.2 0.2  --  0.2   Alkaline Phosphatase U/L 63 70  --  66   AST U/L 10 9*  --  10   ALT U/L 7* 8*  --  8*       Drug levels (last 3 results):  No  results for input(s): VANCOMYCINRA, VANCOMYCINPE, VANCOMYCINTR in the last 72 hours.    Microbiologic Results:  Microbiology Results (last 7 days)       Procedure Component Value Units Date/Time    Urine culture [[id number]]  (Abnormal) Collected:  08/17/19 0948    Order Status:  Completed Specimen:  Urine Updated:  08/18/19 0722     Urine Culture, Routine PRESUMPTIVE E COLI  >100,000 cfu/ml  Identification and susceptibility pending        STAPHYLOCOCCUS AUREUS  > 100,000 cfu/ml  Susceptibility pending      Narrative:       Preferred Collection Type->Urine, Clean Catch    Blood Culture #2 **CANNOT BE ORDERED STAT** [[id number]] Collected:  08/16/19 1830    Order Status:  Completed Specimen:  Blood from Peripheral, Hand, Right Updated:  08/17/19 1903     Blood Culture, Routine No Growth to date      No Growth to date    Blood Culture #1 **CANNOT BE ORDERED STAT** [[id number]] Collected:  08/16/19 1821    Order Status:  Completed Specimen:  Blood from Peripheral, Antecubital, Right Updated:  08/17/19 1903     Blood Culture, Routine No Growth to date      No Growth to date

## 2019-08-18 NOTE — ASSESSMENT & PLAN NOTE
"?stable "penetrating ulcers" on CTA Chest  Start high dose atorva 80mg qhs  Check lipids/LFT 3 months (mid Nov 2019)  "

## 2019-08-18 NOTE — ASSESSMENT & PLAN NOTE
As noted in hospital course - HR now 140-->155; BP 88 systolic   Transfer to ICU for amiodarone infusion  Discontinue oral BP medication for now  Etta nous cardiac monitoring  Monitor closely

## 2019-08-18 NOTE — PLAN OF CARE
Problem: Adult Inpatient Plan of Care  Goal: Plan of Care Review  Outcome: Ongoing (interventions implemented as appropriate)     08/18/19 0257   Plan of Care Review   Plan of Care Reviewed With patient   Progress improving

## 2019-08-18 NOTE — SUBJECTIVE & OBJECTIVE
Past Medical History:   Diagnosis Date    Anticoagulant long-term use     Xarelto    Arthritis     Asthma     CHF (congestive heart failure)     COPD (chronic obstructive pulmonary disease)     Coronary artery disease     Deep vein thrombosis (DVT) of left lower extremity     Depression     Diabetes mellitus     GERD (gastroesophageal reflux disease)     Hypertension     Obstructive sleep apnea on CPAP     On home oxygen therapy     Unsteady gait     uses either walker or 4 prong cane       Past Surgical History:   Procedure Laterality Date    CORONARY ANGIOPLASTY WITH STENT PLACEMENT      HERNIA REPAIR      encapsulated umbilical hernia       Review of patient's allergies indicates:  No Known Allergies    No current facility-administered medications on file prior to encounter.      Current Outpatient Medications on File Prior to Encounter   Medication Sig    acetaminophen (TYLENOL) 500 MG tablet Take 1 tablet (500 mg total) by mouth every 8 (eight) hours as needed.    albuterol (ACCUNEB) 1.25 mg/3 mL Nebu Inhale 1.25 mg into the lungs.    albuterol-ipratropium (DUO-NEB) 2.5 mg-0.5 mg/3 mL nebulizer solution Take 3 mLs by nebulization every 6 (six) hours. Rescue    aspirin (ECOTRIN) 81 MG EC tablet Take 81 mg by mouth once daily.    diltiaZEM (CARDIZEM) 90 MG tablet Take 90 mg by mouth.    ferrous gluconate (FERGON) 324 MG tablet Take 1 tablet (324 mg total) by mouth 3 (three) times daily with meals.    furosemide (LASIX) 40 MG tablet Take 40 mg by mouth once daily.     gabapentin (NEURONTIN) 300 MG capsule Take 300 mg by mouth.    hydrALAZINE (APRESOLINE) 50 MG tablet Take 50 mg by mouth 2 (two) times daily.    isosorbide mononitrate (IMDUR) 30 MG 24 hr tablet Take 3 tablets (90 mg total) by mouth once daily.    lisinopril (PRINIVIL,ZESTRIL) 40 MG tablet Take 1 tablet (40 mg total) by mouth once daily. Do not take this medication if blood pressure is below 130/80 mmHg and/or feeling dizzy  after taking all other blood pressure meds    metFORMIN (GLUCOPHAGE) 1000 MG tablet Take 1 tablet (1,000 mg total) by mouth 2 (two) times daily with meals.    multivit-min-FA-lycopen-lutein (CENTRUM SILVER) 0.4-300-250 mg-mcg-mcg Tab Take 1 tablet by mouth once daily.    pantoprazole (PROTONIX) 40 MG tablet Take 40 mg by mouth once daily.    pregabalin (LYRICA) 75 MG capsule Take 75 mg by mouth 2 (two) times daily.    rivaroxaban (XARELTO) 20 mg Tab Take 20 mg by mouth daily with dinner or evening meal.     traMADol (ULTRAM) 50 mg tablet     acetaminophen (TYLENOL) 500 MG tablet Take 500 mg by mouth.    albuterol (PROVENTIL) 2.5 mg /3 mL (0.083 %) nebulizer solution Take 2.5 mg by nebulization every 6 (six) hours as needed.    amiodarone (PACERONE) 200 MG Tab Take 1 tablet (200 mg total) by mouth once daily.    ANORO ELLIPTA 62.5-25 mcg/actuation DsDv     aspirin (ECOTRIN) 81 MG EC tablet Take 81 mg by mouth.    cefUROXime (CEFTIN) 500 MG tablet Take 1 tablet (500 mg total) by mouth every 12 (twelve) hours.    fluticasone propionate (FLOVENT HFA) 220 mcg/actuation inhaler Inhale 1 puff into the lungs 2 (two) times daily. Controller    ipratropium-albuterol (COMBIVENT)  mcg/actuation inhaler Inhale 1 puff into the lungs every 6 (six) hours as needed for Wheezing or Shortness of Breath. Rescue    isosorbide mononitrate (IMDUR) 30 MG 24 hr tablet Take 30 mg by mouth.    methylPREDNISolone (MEDROL DOSEPACK) 4 mg tablet use as directed    nitroGLYCERIN (NITROSTAT) 0.4 MG SL tablet     umeclidinium brm/vilanterol tr (ANORO ELLIPTA INHL) Inhale into the lungs.     Family History     Problem Relation (Age of Onset)    Diabetes Father    Heart disease Mother    Hypertension Mother        Tobacco Use    Smoking status: Former Smoker     Packs/day: 0.50     Years: 55.00     Pack years: 27.50     Types: Cigarettes     Start date: 1/1/1963     Last attempt to quit: 12/31/2018     Years since quitting:  "0.6    Smokeless tobacco: Never Used    Tobacco comment: "States she quit smoking about 3 or 4 weeks ago"   Substance and Sexual Activity    Alcohol use: Not Currently     Alcohol/week: 0.6 oz     Types: 1 Glasses of wine per week     Frequency: Never     Comment: socially    Drug use: No    Sexual activity: Never     Review of Systems   Constitutional: Positive for activity change, appetite change and chills. Negative for diaphoresis and fever.   HENT: Negative for congestion, hearing loss, sore throat, tinnitus and trouble swallowing.    Eyes: Negative for photophobia, discharge, itching and visual disturbance.   Respiratory: Positive for shortness of breath. Negative for apnea, cough, wheezing and stridor.    Cardiovascular: Negative for chest pain, palpitations and leg swelling.   Gastrointestinal: Negative for abdominal distention, abdominal pain, blood in stool, constipation, diarrhea and nausea.   Endocrine: Negative for polydipsia, polyphagia and polyuria.   Genitourinary: Positive for dysuria. Negative for difficulty urinating, flank pain and frequency.   Musculoskeletal: Positive for myalgias. Negative for arthralgias, joint swelling and neck stiffness.   Skin: Negative for color change, rash and wound.   Neurological: Positive for weakness. Negative for dizziness, tremors, seizures, light-headedness, numbness and headaches.   Hematological: Negative for adenopathy.   Psychiatric/Behavioral: Negative for hallucinations and self-injury.     Objective:     Vital Signs (Most Recent):  Temp: 98.5 °F (36.9 °C) (08/18/19 0722)  Pulse: (!) 140 (08/18/19 0805)  Resp: 16 (08/18/19 0805)  BP: (!) 88/56 (08/18/19 0805)  SpO2: 98 % (08/18/19 0805) Vital Signs (24h Range):  Temp:  [97.5 °F (36.4 °C)-98.6 °F (37 °C)] 98.5 °F (36.9 °C)  Pulse:  [] 140  Resp:  [14-19] 16  SpO2:  [96 %-98 %] 98 %  BP: ()/(50-71) 88/56     Weight: 77.4 kg (170 lb 10.2 oz)  Body mass index is 26.73 kg/m².    Physical Exam "   Constitutional: She is oriented to person, place, and time. She appears well-developed. She is cooperative.   Deconditioned;needy; fatigued; generalized weakness   HENT:   Head: Normocephalic and atraumatic.   Eyes: Pupils are equal, round, and reactive to light. Conjunctivae, EOM and lids are normal.   Neck: Normal range of motion and full passive range of motion without pain. Neck supple. No JVD present. No edema present. No thyroid mass present.   Cardiovascular: S1 normal, S2 normal and intact distal pulses.   No murmur heard.  afib rvr 140   Pulmonary/Chest: Effort normal.   Abdominal: Soft. Bowel sounds are normal. She exhibits no distension and no abdominal bruit. There is no splenomegaly or hepatomegaly. There is no tenderness. There is no CVA tenderness.   Musculoskeletal:   Denies pain; generalized weakness   Lymphadenopathy:     She has no cervical adenopathy.     She has no axillary adenopathy.   Neurological: She is alert and oriented to person, place, and time. She has normal reflexes. She displays no tremor. She displays no seizure activity.   Skin: Skin is warm, dry and intact.   Psychiatric: She has a normal mood and affect. Her speech is normal. Thought content normal. Cognition and memory are normal.         CRANIAL NERVES     CN III, IV, VI   Pupils are equal, round, and reactive to light.  Extraocular motions are normal.        Significant Labs:   CBC:   Recent Labs   Lab 08/16/19  1705 08/17/19  0808   WBC 14.27* 13.41*   HGB 8.8* 10.3*   HCT 33.6* 36.8*    385*     CMP:   Recent Labs   Lab 08/16/19  1705 08/17/19  0808 08/17/19  1507   * 142 139   K 4.6 4.7 4.6    100 98   CO2 29 28 28   * 337* 553*   BUN 16 19 29*   CREATININE 0.8 1.0 1.5*   CALCIUM 9.3 9.6 9.3   PROT 6.3 6.9 6.6   ALBUMIN 2.8* 2.9* 2.9*   BILITOT 0.2 0.2 0.2   ALKPHOS 63 70 66   AST 10 9* 10   ALT 7* 8* 8*   ANIONGAP 8 14 13   EGFRNONAA >60 58* 36*     Lactic Acid:   Recent Labs   Lab  08/16/19  1821   LACTATE 0.8       Troponin:   Recent Labs   Lab 08/17/19  0808 08/17/19  1818 08/17/19  2328   TROPONINI 0.021 0.071* 0.061*     TSH:   Recent Labs   Lab 06/07/19  2033   TSH 0.699       Significant Imaging:     Imaging Results           US Lower Extremity Veins Bilateral (Final result)  Result time 08/16/19 20:38:59    Final result by Davon Sandoval MD (08/16/19 20:38:59)                 Impression:      Bilateral lower extremity deep venous thrombosis as above.  Compared with previous ultrasound from 06/18/2019, there has been resolution of some previous areas of thrombosis, however some new areas of thrombosis are now seen.    This report was flagged in [employer] as abnormal.      Electronically signed by: Davon Sandoval MD  Date:    08/16/2019  Time:    20:38             Narrative:    EXAMINATION:  US LOWER EXTREMITY VEINS BILATERAL    CLINICAL HISTORY:  swelling;    TECHNIQUE:  Duplex and color flow Doppler evaluation of the bilateral lower extremity veins was performed.    COMPARISON:  06/08/2019.    FINDINGS:  Right lower extremity:    Thrombosis is seen within 1 of the paired right posterior tibial veins (new).  No evidence of clot involving the right common femoral, femoral, greater saphenous, popliteal, anterior tibial, and peroneal veins.  Previously visualized thrombosis within the right femoral vein is no longer seen.    Left lower extremity: Thrombosis is seen within the distal femoral vein with partial thrombosis seen of the popliteal vein (partially resolve).  Thrombosis is also visualized within the posterior tibial vein (new).  No evidence of clot involving the left common femoral, greater saphenous, anterior tibial, and peroneal veins.                               CTA Chest Non-Coronary (PE Study) (Final result)  Result time 08/16/19 20:34:23    Final result by Davon Sandoval MD (08/16/19 20:34:23)                 Impression:      1. No evidence of PE.  No acute intrathoracic  abnormalities identified.  2. Centrilobular emphysema.  3. Extensive calcified and noncalcified plaque throughout the aorta with multifocal small outpouchings suggestive for penetrating ulcers.  Appearance is overall similar compared to prior CTA from June 2018.      Electronically signed by: Davon Sandoval MD  Date:    08/16/2019  Time:    20:34             Narrative:    EXAMINATION:  CTA CHEST NON CORONARY    CLINICAL HISTORY:  sob;    TECHNIQUE:  Low dose axial images, sagittal and coronal reformations were obtained from the thoracic inlet to the lung bases following the IV administration of 90 mL of Omnipaque 350.  Contrast timing was optimized to evaluate the pulmonary arteries.  MIP images were performed.    COMPARISON:  CTA chest and abdomen from June 2018.    FINDINGS:  Structures at the base of the neck are unremarkable.  Extensive calcified and noncalcified plaque are seen throughout the aorta with small multifocal outpouchings again seen likely reflecting penetrating ulcers.  Appearance appears overall unchanged compared to previous CT from 2018. Heart is normal in size without pericardial effusion.  No intraluminal filling defects within the pulmonary arteries to suggest pulmonary thromboembolism.   There is no evidence of mediastinal, axillary, or hilar lymph node enlargement.  Scattered air seen throughout the esophagus.    The trachea and bronchi are patent.  The lungs are symmetrically expanded.  Mild centrilobular emphysematous changes are seen with upper lobe predominance.  Mild bilateral scarring is seen.  There is interval enlargement of thin-walled lung cysts seen within the medial aspect of the right lower lobe.  No evidence of new focal consolidation, mass, pneumothorax, or pleural effusion.    The visualized abdominal structures are unremarkable.  Osseous structures demonstrate degenerative change.  Extrathoracic soft tissues are unremarkable.                               X-Ray Chest AP  Portable (Final result)  Result time 08/16/19 17:41:37    Final result by Saud Musa MD (08/16/19 17:41:37)                 Impression:      No focal consolidation      Electronically signed by: Saud Musa MD  Date:    08/16/2019  Time:    17:41             Narrative:    EXAMINATION:  XR CHEST AP PORTABLE    CLINICAL HISTORY:  Chest Pain;    TECHNIQUE:  Single frontal view of the chest was performed.    COMPARISON:  Chest radiograph 07/19/2019    FINDINGS:  The lungs are clear.  The cardiac silhouette is stable.  The osseous and soft tissue structures demonstrate no acute abnormality.

## 2019-08-18 NOTE — SUBJECTIVE & OBJECTIVE
Past Medical History:   Diagnosis Date    Anticoagulant long-term use     Xarelto    Arthritis     Asthma     CHF (congestive heart failure)     COPD (chronic obstructive pulmonary disease)     Coronary artery disease     Deep vein thrombosis (DVT) of left lower extremity     Depression     Diabetes mellitus     GERD (gastroesophageal reflux disease)     Hypertension     Obstructive sleep apnea on CPAP     On home oxygen therapy     Unsteady gait     uses either walker or 4 prong cane       Past Surgical History:   Procedure Laterality Date    CORONARY ANGIOPLASTY WITH STENT PLACEMENT      HERNIA REPAIR      encapsulated umbilical hernia       Review of patient's allergies indicates:  No Known Allergies    No current facility-administered medications on file prior to encounter.      Current Outpatient Medications on File Prior to Encounter   Medication Sig    acetaminophen (TYLENOL) 500 MG tablet Take 1 tablet (500 mg total) by mouth every 8 (eight) hours as needed.    albuterol (ACCUNEB) 1.25 mg/3 mL Nebu Inhale 1.25 mg into the lungs.    albuterol-ipratropium (DUO-NEB) 2.5 mg-0.5 mg/3 mL nebulizer solution Take 3 mLs by nebulization every 6 (six) hours. Rescue    aspirin (ECOTRIN) 81 MG EC tablet Take 81 mg by mouth once daily.    diltiaZEM (CARDIZEM) 90 MG tablet Take 90 mg by mouth.    ferrous gluconate (FERGON) 324 MG tablet Take 1 tablet (324 mg total) by mouth 3 (three) times daily with meals.    furosemide (LASIX) 40 MG tablet Take 40 mg by mouth once daily.     gabapentin (NEURONTIN) 300 MG capsule Take 300 mg by mouth.    hydrALAZINE (APRESOLINE) 50 MG tablet Take 50 mg by mouth 2 (two) times daily.    isosorbide mononitrate (IMDUR) 30 MG 24 hr tablet Take 3 tablets (90 mg total) by mouth once daily.    lisinopril (PRINIVIL,ZESTRIL) 40 MG tablet Take 1 tablet (40 mg total) by mouth once daily. Do not take this medication if blood pressure is below 130/80 mmHg and/or feeling dizzy  after taking all other blood pressure meds    metFORMIN (GLUCOPHAGE) 1000 MG tablet Take 1 tablet (1,000 mg total) by mouth 2 (two) times daily with meals.    multivit-min-FA-lycopen-lutein (CENTRUM SILVER) 0.4-300-250 mg-mcg-mcg Tab Take 1 tablet by mouth once daily.    pantoprazole (PROTONIX) 40 MG tablet Take 40 mg by mouth once daily.    pregabalin (LYRICA) 75 MG capsule Take 75 mg by mouth 2 (two) times daily.    rivaroxaban (XARELTO) 20 mg Tab Take 20 mg by mouth daily with dinner or evening meal.     traMADol (ULTRAM) 50 mg tablet     acetaminophen (TYLENOL) 500 MG tablet Take 500 mg by mouth.    albuterol (PROVENTIL) 2.5 mg /3 mL (0.083 %) nebulizer solution Take 2.5 mg by nebulization every 6 (six) hours as needed.    amiodarone (PACERONE) 200 MG Tab Take 1 tablet (200 mg total) by mouth once daily.    ANORO ELLIPTA 62.5-25 mcg/actuation DsDv     aspirin (ECOTRIN) 81 MG EC tablet Take 81 mg by mouth.    cefUROXime (CEFTIN) 500 MG tablet Take 1 tablet (500 mg total) by mouth every 12 (twelve) hours.    fluticasone propionate (FLOVENT HFA) 220 mcg/actuation inhaler Inhale 1 puff into the lungs 2 (two) times daily. Controller    ipratropium-albuterol (COMBIVENT)  mcg/actuation inhaler Inhale 1 puff into the lungs every 6 (six) hours as needed for Wheezing or Shortness of Breath. Rescue    isosorbide mononitrate (IMDUR) 30 MG 24 hr tablet Take 30 mg by mouth.    methylPREDNISolone (MEDROL DOSEPACK) 4 mg tablet use as directed    nitroGLYCERIN (NITROSTAT) 0.4 MG SL tablet     umeclidinium brm/vilanterol tr (ANORO ELLIPTA INHL) Inhale into the lungs.     Family History     Problem Relation (Age of Onset)    Diabetes Father    Heart disease Mother    Hypertension Mother        Tobacco Use    Smoking status: Former Smoker     Packs/day: 0.50     Years: 55.00     Pack years: 27.50     Types: Cigarettes     Start date: 1/1/1963     Last attempt to quit: 12/31/2018     Years since quitting:  "0.6    Smokeless tobacco: Never Used    Tobacco comment: "States she quit smoking about 3 or 4 weeks ago"   Substance and Sexual Activity    Alcohol use: Not Currently     Alcohol/week: 0.6 oz     Types: 1 Glasses of wine per week     Frequency: Never     Comment: socially    Drug use: No    Sexual activity: Never     Review of Systems   Constitution: Negative for chills, diaphoresis, fever and malaise/fatigue.   HENT: Negative for nosebleeds.    Eyes: Negative for blurred vision and double vision.   Cardiovascular: Positive for chest pain and palpitations. Negative for claudication, cyanosis, dyspnea on exertion, leg swelling, orthopnea, paroxysmal nocturnal dyspnea and syncope.   Respiratory: Positive for shortness of breath. Negative for cough and wheezing.    Skin: Negative for dry skin and poor wound healing.   Musculoskeletal: Negative for back pain, joint swelling and myalgias.   Gastrointestinal: Negative for abdominal pain, nausea and vomiting.   Genitourinary: Negative for hematuria.   Neurological: Negative for dizziness, headaches, numbness, seizures and weakness.   Psychiatric/Behavioral: Negative for altered mental status and depression.     Objective:     Vital Signs (Most Recent):  Temp: 98.5 °F (36.9 °C) (08/18/19 0722)  Pulse: (!) 140 (08/18/19 0805)  Resp: 16 (08/18/19 0805)  BP: (!) 88/56 (08/18/19 0805)  SpO2: 98 % (08/18/19 0805) Vital Signs (24h Range):  Temp:  [97.5 °F (36.4 °C)-98.6 °F (37 °C)] 98.5 °F (36.9 °C)  Pulse:  [] 140  Resp:  [14-19] 16  SpO2:  [96 %-98 %] 98 %  BP: ()/(50-71) 88/56     Weight: 77.4 kg (170 lb 10.2 oz)  Body mass index is 26.73 kg/m².    SpO2: 98 %  O2 Device (Oxygen Therapy): nasal cannula      Intake/Output Summary (Last 24 hours) at 8/18/2019 0982  Last data filed at 8/17/2019 2100  Gross per 24 hour   Intake 1000 ml   Output --   Net 1000 ml       Lines/Drains/Airways     Peripheral Intravenous Line                 Peripheral IV - Single Lumen " 08/16/19 1500 18 G Left Antecubital 1 day                Physical Exam   Constitutional: She is oriented to person, place, and time. She appears well-developed and well-nourished. No distress.   HENT:   Head: Normocephalic and atraumatic.   Mouth/Throat: No oropharyngeal exudate.   Eyes: Pupils are equal, round, and reactive to light. Conjunctivae and EOM are normal. No scleral icterus.   Neck: Normal range of motion. Neck supple. No JVD present. No tracheal deviation present. No thyromegaly present.   Cardiovascular: S1 normal and S2 normal. An irregularly irregular rhythm present. Tachycardia present. Exam reveals distant heart sounds. Exam reveals no gallop and no friction rub.   No murmur heard.  Pulmonary/Chest: Effort normal. No respiratory distress. She has no wheezes. She has no rales. She exhibits no tenderness.   Poor air entry bilat   Abdominal: Soft. She exhibits no distension.   Musculoskeletal: Normal range of motion. She exhibits no edema.   Neurological: She is alert and oriented to person, place, and time. No cranial nerve deficit.   Skin: Skin is warm and dry. She is not diaphoretic.   Psychiatric: She has a normal mood and affect. Her behavior is normal. Judgment normal.       Current Medications:   apixaban  5 mg Oral BID    aspirin  81 mg Oral Daily    cefTRIAXone (ROCEPHIN) IVPB  1 g Intravenous Daily    insulin aspart U-100  20 Units Subcutaneous Daily    insulin aspart U-100  5 Units Subcutaneous TIDWM    ipratropium  0.5 mg Nebulization Q4H    levalbuterol  0.63 mg Nebulization Q8H    pantoprazole  40 mg Oral Daily    vancomycin (VANCOCIN) IVPB  1,000 mg Intravenous Once    [START ON 8/19/2019] vancomycin (VANCOCIN) IVPB  1,000 mg Intravenous Q24H       acetaminophen, dextrose 50%, dextrose 50%, glucagon (human recombinant), glucose, glucose, insulin aspart U-100, metoprolol, ondansetron, ramelteon, sodium chloride 0.9%, traMADol    Laboratory (all labs reviewed):  CBC:  Recent  Labs   Lab 07/03/19  0933 07/04/19  0349 07/19/19 2121 08/16/19  1705 08/17/19  0808   WBC 14.30 H 12.36 10.01 14.27 H 13.41 H   Hemoglobin 7.2 L 7.1 L 8.5 L 8.8 L 10.3 L   Hematocrit 27.6 L 26.9 L 31.1 L 33.6 L 36.8 L   Platelets 353 H 385 H 386 H 323 385 H       CHEMISTRIES:  Recent Labs   Lab 05/12/19  1215 06/07/19 2033  06/09/19  0504  07/02/19  0549  07/04/19  0349 07/19/19 2121 08/16/19  1705 08/17/19  0808 08/17/19  1507   Glucose 100 203 H   < > 459 HH   < > 379 H   < > 191 H 120 H 184 H 337 H 553 HH   Sodium 145 135 L   < > 138   < > 143   < > 141 142 146 H 142 139   Potassium 5.6 H 4.5   < > 5.1   < > 5.0   < > 5.0 4.8 4.6 4.7 4.6   BUN, Bld 9 19   < > 40 H   < > 32 H   < > 31 H 13 16 19 29 H   Creatinine 0.7 0.8   < > 1.3   < > 1.2   < > 0.8 0.9 0.8 1.0 1.5 H   eGFR if  >60 >60   < > 49 A   < > 54 A   < > >60 >60 >60 >60 41 A   eGFR if non African American >60 >60   < > 43 A   < > 47 A   < > >60 >60 >60 58 A 36 A   Calcium 9.3 9.0   < > 9.6   < > 9.4   < > 9.3 8.9 9.3 9.6 9.3   Magnesium 1.7 1.6  --  1.7  --  2.2  --   --  1.6  --   --   --     < > = values in this interval not displayed.       CARDIAC BIOMARKERS:  Recent Labs   Lab 12/09/18  0514  08/16/19 2022 08/17/19  0204 08/17/19  0808 08/17/19  1818 08/17/19  2328   CPK 31  --   --   --   --   --   --    CPK MB 1.9  --   --   --   --   --   --    Troponin I 0.035 H   < > 0.016 0.025 0.021 0.071 H 0.061 H    < > = values in this interval not displayed.       COAGS:  Recent Labs   Lab 12/08/18  1747 12/09/18  0514 03/24/19  1822 06/24/19  1245 07/01/19  0040   INR 1.1 1.1 1.1 1.0 1.0       LIPIDS/LFTS:  Recent Labs   Lab 08/12/17  1037  12/08/18  1711  07/02/19  0549 07/19/19  2121 08/16/19  1705 08/17/19  0808 08/17/19  1507   Cholesterol 243 H  --  246 H  --   --   --   --  271 H  --    Triglycerides 93  --  95  --   --   --   --  108  --    HDL 43  --  55  --   --   --   --  53  --    LDL Cholesterol 181.4 H  --  172.0 H  --    --   --   --  196.4 H  --    Non-HDL Cholesterol 200  --  191  --   --   --   --  218  --    AST  --    < > 13   < > 7 L 13 10 9 L 10   ALT  --    < > 7 L   < > 9 L 11 7 L 8 L 8 L    < > = values in this interval not displayed.       BNP:  Recent Labs   Lab 06/07/19  2033 06/24/19  1245 07/01/19  0040 07/19/19  2121 08/16/19  1705    H 118 H 362 H 56 140 H       TSH:  Recent Labs   Lab 06/24/18  1413 12/08/18  1743 06/07/19  2033   TSH 0.754 0.438 0.699       Free T4:  Recent Labs   Lab 06/24/18  1413 06/07/19 2033 06/08/19  0351   Free T4 1.09 1.01 1.03       Diagnostic Results:  ECG (personally reviewed and interpreted tracing(s)):  8/16/19 1459 , PRWP  8/18/19 tele 0539 sinus tachy-> AF/RVR    Chest X-Ray (personally reviewed and interpreted image(s)): 8/16/19 ?LLL infil    CTA Chest 8/16/19  1. No evidence of PE.  No acute intrathoracic abnormalities identified.  2. Centrilobular emphysema.  3. Extensive calcified and noncalcified plaque throughout the aorta with multifocal small outpouchings suggestive for penetrating ulcers.  Appearance is overall similar compared to prior CTA from June 2018.    LE venous US 8/16/19  Bilateral lower extremity deep venous thrombosis as above.  Compared with previous ultrasound from 06/18/2019, there has been resolution of some previous areas of thrombosis, however some new areas of thrombosis are now seen.    Echo: 6/9/19  · Normal left ventricular systolic function. The estimated ejection fraction is 70%  · Moderate concentric left ventricular hypertrophy.  · Grade I (mild) left ventricular diastolic dysfunction consistent with impaired relaxation.  · Normal left atrial pressure.  · Moderate left atrial enlargement.  · Mild right atrial enlargement.  · Mild mitral regurgitation.  · Mild tricuspid regurgitation.  · The estimated PA systolic pressure is 80 mm Hg  · Intermediate central venous pressure (8 mm Hg).  · Pulmonary hypertension present.    Cath 6/27/18    LVEDP: 9mmHg  LVEF: 50% by echo  Wall Motion: LAD WMA by echo  Dominance: Right  LM: MLI  LAD: MLI  LCx: MLI  RCA: dom, mid 40%  Hemostasis:  R Radial band  Impression:  NSTEMI  Nonobst CAD  Normal LV fxn  R rad vasband for hemostasis  Plan:  Cont med rx  Cont ASA 81mg qd  Stop Plavix  Restart Xarelto 20mg qd this pm  Pt to follow up with Dr. Pulido in 2 weeks     LLE venous US 1/24/17  Interval decrease in overall thrombus burden of the left lower extremity, although there is persistent deep venous thrombus present within the left femoral and popliteal veins.     Stress Test: L MPI 12/15/15  Nuclear Quantitative Functional Analysis:   LVEF: 66 %  Impression: NORMAL MYOCARDIAL PERFUSION  1. The perfusion scan is free of evidence for myocardial ischemia or injury.   2. Resting wall motion is physiologic.   3. Resting LV function is normal.   4. The ventricular volumes are normal at rest and stress.   5. The extracardiac distribution of radioactivity is normal.   6. There was no previous study available to compare.

## 2019-08-18 NOTE — HPI
69 y.o. sorely deconditioned patient with essential hypertension, DMII (HbA1c 8.1% July 2019), HLD (.0 Dec 2018), paroxysmal atrial fibrillation ([id number] score 4) on chronic anticoagulation, COPD, chronic respiratory failure with hypoxia and hypercapnia uses 2-3L home oxygen, GERD, anemia of chronic disease, tobacco abuse, and history of PE/DVT on chronic anticoagulation.  Patient presented with complaints of dyspnea that began in early morning prior to presentation.  The dyspnea is mainly exertional.  She denies any fevers, chills, nausea, vomiting, pleurisy, diaphoresis, palpitations, sick contracts, recent long trips, or hemoptysis.  Initial workup shows mildly elevated WBC (on prednisone) thought to be reactive, hypernatremia  Overview/Hospital Course:  08/17/19  -Tachycardia sustained in 130's, Discontinued the albuterol and added xopenex Q8H/altrovent Q4H, metoprolol X 2 doses IV, cautious gentle fluids as patient has been getting lasix IV 40 mg BID as ordered in ED   -WBC 14K; her BUN/creatine/GFR 16/0.8/60 overnight now 29/1.5/41; abnormal urinalysis; awaiting culture; start rocephin  -DVT - failed therapy unclear compliance - was on xarelto; failed therapy, refused warfarin - consulted to Hematology - patient agreed to start apixaban  08/19/19  0615am----  -Paged by nursing staff patient converted to afib rvr with 90 systolic bp. Came in with acute and chronic LE DVT was on xarelto at home unclear compliance. Refused to be converted to warfarin -  Hematology consulted and recommended apixban instead which was initiated. CTA neg PE at presentation.  One day prior patient with ST and decreased renal functions the night before from normal to creatine 1.5. Noted to be in ST and administered metoprolol X 2 and urinalysis sent which was abnormal. Started on one dose IV rocephin and then oral to start today 8/18/19 to prevent/avoid FVO.   Known LVEF 70% with GI DD - pul htn PA pressure 80 - on home  oxygen at 2-3L.therefore - Gentle fluids X 5h administered as thought to be overdiuressed at that time as she had been placed on IV lasix 40 mg BID from the ED. Her HR improved about 5pm 8/17/19.  0844 am:   - Patient's HR remains 149-155 BP 88/56 after metoprolol IV - transfer to ICU with amio  - Urine culture positive e.coli & staph aeurus - start Vanc  - New DVT - continue apixaban  - LOYDA/chronic respiratory impairment/COPD - bipap nightly    Last seen by me as inpat 7/2018.  Had AF-> NSR after amio.    Cardiology is consulted for evaluation and management of atrial fibrillation with rapid ventricular response.  The patient seems to be admitted for mostly a COPD/respiratory complaint.  She also has bilateral lower extremity DVT without pulmonary embolism by CT scanning of the chest.  Note is made of aortic atherosclerosis and possible penetrating ulcers, although these appear to be stable versus prior CT scanning.  At present, the patient describes shortness of breath and some mild chest discomfort.  She does have a prior history of heart catheterization 6/2018 with nonobstructive CAD.

## 2019-08-18 NOTE — PROGRESS NOTES
[facility] Medicine  Progress Note    Patient Name: Yasmeen Palm  MRN: [id number]  Patient Class: OP- Observation   Admission Date: 8/16/2019  Length of Stay: 0 days  Attending Physician: Michelle Dumont MD  Primary Care Provider: Angel Orourke Jr, MD        Subjective:     Principal Problem:Atrial fibrillation with RVR        HPI:  Yasmeen Palm is a 69 y.o. sorely deconditioned patient with essential hypertension, DMII (HbA1c 8.1% July 2019), HLD (.0 Dec 2018), paroxysmal atrial fibrillation ([id number] score 4) on chronic anticoagulation, COPD, chronic respiratory failure with hypoxia and hypercapnia uses 2-3L home oxygen, GERD, anemia of chronic disease, tobacco abuse, and history of PE/DVT on chronic anticoagulation.    Patient presented with complaints of dyspnea that began in early morning prior to presentation.  The dyspnea is mainly exertional.  She denies any fevers, chills, nausea, vomiting, pleurisy, diaphoresis, palpitations, sick contracts, recent long trips, or hemoptysis.    Initial workup shows mildly elevated WBC (on prednisone) thought to be reactive, hypernatremia       Overview/Hospital Course:  08/17/19  -Tachycardia sustained in 130's, Discontinued the albuterol and added xopenex Q8H/altrovent Q4H, metoprolol X 2 doses IV, cautious gentle fluids as patient has been getting lasix IV 40 mg BID as ordered in ED   -WBC 14K; her BUN/creatine/GFR 16/0.8/60 overnight now 29/1.5/41; abnormal urinalysis; awaiting culture; start rocephin  -DVT - failed therapy unclear compliance - was on xarelto; failed therapy, refused warfarin - consulted to Hematology - patient agreed to start apixaban    08/19/19  0615am----  -Paged by nursing staff patient converted to afib rvr with 90 systolic bp. Came in with acute and chronic LE DVT was on xarelto at home unclear compliance. Refused to be converted to warfarin -  Hematology consulted and recommended apixban  instead which was initiated. CTA neg PE at presentation.     One day prior patient with ST and decreased renal functions the night before from normal to creatine 1.5. Noted to be in ST and administered metoprolol X 2 and urinalysis sent which was abnormal. Started on one dose IV rocephin and then oral to start today 8/18/19 to prevent/avoid FVO.      Known LVEF 70% with GI DD - pul htn PA pressure 80 - on home oxygen at 2-3L.therefore - Gentle fluids X 5h administered as thought to be overdiuressed at that time as she had been placed on IV lasix 40 mg BID from the ED. Her HR improved about 5pm 8/17/19.    0844 am:   - Patient's HR remains 149-155 BP 88/56 after metoprolol IV - transfer to ICU with amio  - Urine culture positive e.coli & staph aeurus - start Vanc  - New DVT - continue apixaban  - LOYDA/chronic respiratory impairment/COPD - bipap nightly    Past Medical History:   Diagnosis Date    Anticoagulant long-term use     Xarelto    Arthritis     Asthma     CHF (congestive heart failure)     COPD (chronic obstructive pulmonary disease)     Coronary artery disease     Deep vein thrombosis (DVT) of left lower extremity     Depression     Diabetes mellitus     GERD (gastroesophageal reflux disease)     Hypertension     Obstructive sleep apnea on CPAP     On home oxygen therapy     Unsteady gait     uses either walker or 4 prong cane       Past Surgical History:   Procedure Laterality Date    CORONARY ANGIOPLASTY WITH STENT PLACEMENT      HERNIA REPAIR      encapsulated umbilical hernia       Review of patient's allergies indicates:  No Known Allergies    No current facility-administered medications on file prior to encounter.      Current Outpatient Medications on File Prior to Encounter   Medication Sig    acetaminophen (TYLENOL) 500 MG tablet Take 1 tablet (500 mg total) by mouth every 8 (eight) hours as needed.    albuterol (ACCUNEB) 1.25 mg/3 mL Nebu Inhale 1.25 mg into the lungs.     albuterol-ipratropium (DUO-NEB) 2.5 mg-0.5 mg/3 mL nebulizer solution Take 3 mLs by nebulization every 6 (six) hours. Rescue    aspirin (ECOTRIN) 81 MG EC tablet Take 81 mg by mouth once daily.    diltiaZEM (CARDIZEM) 90 MG tablet Take 90 mg by mouth.    ferrous gluconate (FERGON) 324 MG tablet Take 1 tablet (324 mg total) by mouth 3 (three) times daily with meals.    furosemide (LASIX) 40 MG tablet Take 40 mg by mouth once daily.     gabapentin (NEURONTIN) 300 MG capsule Take 300 mg by mouth.    hydrALAZINE (APRESOLINE) 50 MG tablet Take 50 mg by mouth 2 (two) times daily.    isosorbide mononitrate (IMDUR) 30 MG 24 hr tablet Take 3 tablets (90 mg total) by mouth once daily.    lisinopril (PRINIVIL,ZESTRIL) 40 MG tablet Take 1 tablet (40 mg total) by mouth once daily. Do not take this medication if blood pressure is below 130/80 mmHg and/or feeling dizzy after taking all other blood pressure meds    metFORMIN (GLUCOPHAGE) 1000 MG tablet Take 1 tablet (1,000 mg total) by mouth 2 (two) times daily with meals.    multivit-min-FA-lycopen-lutein (CENTRUM SILVER) 0.4-300-250 mg-mcg-mcg Tab Take 1 tablet by mouth once daily.    pantoprazole (PROTONIX) 40 MG tablet Take 40 mg by mouth once daily.    pregabalin (LYRICA) 75 MG capsule Take 75 mg by mouth 2 (two) times daily.    rivaroxaban (XARELTO) 20 mg Tab Take 20 mg by mouth daily with dinner or evening meal.     traMADol (ULTRAM) 50 mg tablet     acetaminophen (TYLENOL) 500 MG tablet Take 500 mg by mouth.    albuterol (PROVENTIL) 2.5 mg /3 mL (0.083 %) nebulizer solution Take 2.5 mg by nebulization every 6 (six) hours as needed.    amiodarone (PACERONE) 200 MG Tab Take 1 tablet (200 mg total) by mouth once daily.    ANORO ELLIPTA 62.5-25 mcg/actuation DsDv     aspirin (ECOTRIN) 81 MG EC tablet Take 81 mg by mouth.    cefUROXime (CEFTIN) 500 MG tablet Take 1 tablet (500 mg total) by mouth every 12 (twelve) hours.    fluticasone propionate (FLOVENT  "HFA) 220 mcg/actuation inhaler Inhale 1 puff into the lungs 2 (two) times daily. Controller    ipratropium-albuterol (COMBIVENT)  mcg/actuation inhaler Inhale 1 puff into the lungs every 6 (six) hours as needed for Wheezing or Shortness of Breath. Rescue    isosorbide mononitrate (IMDUR) 30 MG 24 hr tablet Take 30 mg by mouth.    methylPREDNISolone (MEDROL DOSEPACK) 4 mg tablet use as directed    nitroGLYCERIN (NITROSTAT) 0.4 MG SL tablet     umeclidinium brm/vilanterol tr (ANORO ELLIPTA INHL) Inhale into the lungs.     Family History     Problem Relation (Age of Onset)    Diabetes Father    Heart disease Mother    Hypertension Mother        Tobacco Use    Smoking status: Former Smoker     Packs/day: 0.50     Years: 55.00     Pack years: 27.50     Types: Cigarettes     Start date: 1963     Last attempt to quit: 2018     Years since quittin.6    Smokeless tobacco: Never Used    Tobacco comment: "States she quit smoking about 3 or 4 weeks ago"   Substance and Sexual Activity    Alcohol use: Not Currently     Alcohol/week: 0.6 oz     Types: 1 Glasses of wine per week     Frequency: Never     Comment: socially    Drug use: No    Sexual activity: Never     Review of Systems   Constitutional: Positive for activity change, appetite change and chills. Negative for diaphoresis and fever.   HENT: Negative for congestion, hearing loss, sore throat, tinnitus and trouble swallowing.    Eyes: Negative for photophobia, discharge, itching and visual disturbance.   Respiratory: Positive for shortness of breath. Negative for apnea, cough, wheezing and stridor.    Cardiovascular: Negative for chest pain, palpitations and leg swelling.   Gastrointestinal: Negative for abdominal distention, abdominal pain, blood in stool, constipation, diarrhea and nausea.   Endocrine: Negative for polydipsia, polyphagia and polyuria.   Genitourinary: Positive for dysuria. Negative for difficulty urinating, flank pain and " frequency.   Musculoskeletal: Positive for myalgias. Negative for arthralgias, joint swelling and neck stiffness.   Skin: Negative for color change, rash and wound.   Neurological: Positive for weakness. Negative for dizziness, tremors, seizures, light-headedness, numbness and headaches.   Hematological: Negative for adenopathy.   Psychiatric/Behavioral: Negative for hallucinations and self-injury.     Objective:     Vital Signs (Most Recent):  Temp: 98.5 °F (36.9 °C) (08/18/19 0722)  Pulse: (!) 140 (08/18/19 0805)  Resp: 16 (08/18/19 0805)  BP: (!) 88/56 (08/18/19 0805)  SpO2: 98 % (08/18/19 0805) Vital Signs (24h Range):  Temp:  [97.5 °F (36.4 °C)-98.6 °F (37 °C)] 98.5 °F (36.9 °C)  Pulse:  [] 140  Resp:  [14-19] 16  SpO2:  [96 %-98 %] 98 %  BP: ()/(50-71) 88/56     Weight: 77.4 kg (170 lb 10.2 oz)  Body mass index is 26.73 kg/m².    Physical Exam   Constitutional: She is oriented to person, place, and time. She appears well-developed. She is cooperative.   Deconditioned;needy; fatigued; generalized weakness   HENT:   Head: Normocephalic and atraumatic.   Eyes: Pupils are equal, round, and reactive to light. Conjunctivae, EOM and lids are normal.   Neck: Normal range of motion and full passive range of motion without pain. Neck supple. No JVD present. No edema present. No thyroid mass present.   Cardiovascular: S1 normal, S2 normal and intact distal pulses.   No murmur heard.  afib rvr 140   Pulmonary/Chest: Effort normal.   Abdominal: Soft. Bowel sounds are normal. She exhibits no distension and no abdominal bruit. There is no splenomegaly or hepatomegaly. There is no tenderness. There is no CVA tenderness.   Musculoskeletal:   Denies pain; generalized weakness   Lymphadenopathy:     She has no cervical adenopathy.     She has no axillary adenopathy.   Neurological: She is alert and oriented to person, place, and time. She has normal reflexes. She displays no tremor. She displays no seizure activity.    Skin: Skin is warm, dry and intact.   Psychiatric: She has a normal mood and affect. Her speech is normal. Thought content normal. Cognition and memory are normal.         CRANIAL NERVES     CN III, IV, VI   Pupils are equal, round, and reactive to light.  Extraocular motions are normal.        Significant Labs:   CBC:   Recent Labs   Lab 08/16/19  1705 08/17/19  0808   WBC 14.27* 13.41*   HGB 8.8* 10.3*   HCT 33.6* 36.8*    385*     CMP:   Recent Labs   Lab 08/16/19  1705 08/17/19  0808 08/17/19  1507   * 142 139   K 4.6 4.7 4.6    100 98   CO2 29 28 28   * 337* 553*   BUN 16 19 29*   CREATININE 0.8 1.0 1.5*   CALCIUM 9.3 9.6 9.3   PROT 6.3 6.9 6.6   ALBUMIN 2.8* 2.9* 2.9*   BILITOT 0.2 0.2 0.2   ALKPHOS 63 70 66   AST 10 9* 10   ALT 7* 8* 8*   ANIONGAP 8 14 13   EGFRNONAA >60 58* 36*     Lactic Acid:   Recent Labs   Lab 08/16/19  1821   LACTATE 0.8       Troponin:   Recent Labs   Lab 08/17/19  0808 08/17/19  1818 08/17/19  2328   TROPONINI 0.021 0.071* 0.061*     TSH:   Recent Labs   Lab 06/07/19  2033   TSH 0.699       Significant Imaging:     Imaging Results           US Lower Extremity Veins Bilateral (Final result)  Result time 08/16/19 20:38:59    Final result by Davon Sandoval MD (08/16/19 20:38:59)                 Impression:      Bilateral lower extremity deep venous thrombosis as above.  Compared with previous ultrasound from 06/18/2019, there has been resolution of some previous areas of thrombosis, however some new areas of thrombosis are now seen.    This report was flagged in Epic as abnormal.      Electronically signed by: Davon Sandoval MD  Date:    08/16/2019  Time:    20:38             Narrative:    EXAMINATION:  US LOWER EXTREMITY VEINS BILATERAL    CLINICAL HISTORY:  swelling;    TECHNIQUE:  Duplex and color flow Doppler evaluation of the bilateral lower extremity veins was performed.    COMPARISON:  06/08/2019.    FINDINGS:  Right lower extremity:    Thrombosis is  seen within 1 of the paired right posterior tibial veins (new).  No evidence of clot involving the right common femoral, femoral, greater saphenous, popliteal, anterior tibial, and peroneal veins.  Previously visualized thrombosis within the right femoral vein is no longer seen.    Left lower extremity: Thrombosis is seen within the distal femoral vein with partial thrombosis seen of the popliteal vein (partially resolve).  Thrombosis is also visualized within the posterior tibial vein (new).  No evidence of clot involving the left common femoral, greater saphenous, anterior tibial, and peroneal veins.                               CTA Chest Non-Coronary (PE Study) (Final result)  Result time 08/16/19 20:34:23    Final result by Davon Sandoval MD (08/16/19 20:34:23)                 Impression:      1. No evidence of PE.  No acute intrathoracic abnormalities identified.  2. Centrilobular emphysema.  3. Extensive calcified and noncalcified plaque throughout the aorta with multifocal small outpouchings suggestive for penetrating ulcers.  Appearance is overall similar compared to prior CTA from June 2018.      Electronically signed by: Davon Sandoval MD  Date:    08/16/2019  Time:    20:34             Narrative:    EXAMINATION:  CTA CHEST NON CORONARY    CLINICAL HISTORY:  sob;    TECHNIQUE:  Low dose axial images, sagittal and coronal reformations were obtained from the thoracic inlet to the lung bases following the IV administration of 90 mL of Omnipaque 350.  Contrast timing was optimized to evaluate the pulmonary arteries.  MIP images were performed.    COMPARISON:  CTA chest and abdomen from June 2018.    FINDINGS:  Structures at the base of the neck are unremarkable.  Extensive calcified and noncalcified plaque are seen throughout the aorta with small multifocal outpouchings again seen likely reflecting penetrating ulcers.  Appearance appears overall unchanged compared to previous CT from 2018. Heart is normal  in size without pericardial effusion.  No intraluminal filling defects within the pulmonary arteries to suggest pulmonary thromboembolism.   There is no evidence of mediastinal, axillary, or hilar lymph node enlargement.  Scattered air seen throughout the esophagus.    The trachea and bronchi are patent.  The lungs are symmetrically expanded.  Mild centrilobular emphysematous changes are seen with upper lobe predominance.  Mild bilateral scarring is seen.  There is interval enlargement of thin-walled lung cysts seen within the medial aspect of the right lower lobe.  No evidence of new focal consolidation, mass, pneumothorax, or pleural effusion.    The visualized abdominal structures are unremarkable.  Osseous structures demonstrate degenerative change.  Extrathoracic soft tissues are unremarkable.                               X-Ray Chest AP Portable (Final result)  Result time 08/16/19 17:41:37    Final result by Saud Musa MD (08/16/19 17:41:37)                 Impression:      No focal consolidation      Electronically signed by: Saud Musa MD  Date:    08/16/2019  Time:    17:41             Narrative:    EXAMINATION:  XR CHEST AP PORTABLE    CLINICAL HISTORY:  Chest Pain;    TECHNIQUE:  Single frontal view of the chest was performed.    COMPARISON:  Chest radiograph 07/19/2019    FINDINGS:  The lungs are clear.  The cardiac silhouette is stable.  The osseous and soft tissue structures demonstrate no acute abnormality.                                    Assessment/Plan:      * Atrial fibrillation with RVR  As noted in hospital course - HR now 140-->155; BP 88 systolic   Transfer to ICU for amiodarone infusion  Discontinue oral BP medication for now  Etta nous cardiac monitoring  Monitor closely      Deep vein thrombosis (DVT) of distal vein of lower extremity  Acute and chronic DVT's identified on DVT; CTA negative for PE; Patient has history and has been on anticoagulation Xarelto - endorses  compliance. Discussed broader anticoagulation options but at this point the patient is not amenable to warfarin. She denies LE pain - edema is chronic  -consult to hematology for opinion  -continue xarelto for now (addendum seen by Hematology changed to apixaban)  -cta negative for PE      Abnormal urinalysis  08/17/19  Abnormal urinalysis - tachycardia; start rocephin IV X 1 gram  Then keflex BID (avoid FVO)    08/18/19  Cultures showing e coli and staph aureus  Van & Rocephin for now        Essential hypertension  08/18/19  Decent control continue usual home medications    08/19/19  Hold oral BP meds for now      Sinus tachycardia  Patient with HR sustained in 130's in early morning - received IV metoprolol X 2 doses with improvement  -restarted usual diltiazem & Imdur  -gentle IV fluids X 5 Hours (overdiuressing)  -PRN metoprolol 10 mg q5 minus X 2 HR>100  -Stop albuterol      Chronic respiratory failure with hypoxia  LOYDA, uses home oxygen at 2-3L  CPAP at night --- patient uses Bipap instead, change order  Supplemental oxygen  Stop albuterol treatments and start xopenex/altrovent  Stop steroids       Controlled type 2 diabetes mellitus, without long-term current use of insulin  While hospitalized will use combined insulin therapy with basal and prandial insulin coverage, POCT glucose checks, hypoglycemic protocol and correction scale - HgA1c 8.1        VTE Risk Mitigation (From admission, onward)        Ordered     apixaban tablet 5 mg  2 times daily      08/17/19 1414                YOSVANY Faria, FNP-C  Hospitalist - Department of Hospital Medicine  [address] [place] [address]  Office [phone]; Pager [phone]

## 2019-08-18 NOTE — ASSESSMENT & PLAN NOTE
Admission compliant appears to be COPD exam.  Pt in SR on adm and prev on Xarelto (since switched to eliquis for persistent DVT without PE).  Noted to develop AF/RVR on 8/18/19 at 0539 (tele personally reviewed, see media tab).  AF likely being driven by pulm issues as she was in sinus tachy prior to AF.  Transfer to ICU for amio gtt  Cont eliquis  Keep NPO after MN for possible DCCV in am (NUNO not needed) if she does not convert on amio.

## 2019-08-18 NOTE — PROGRESS NOTES
[facility]  Hematology/Oncology  Progress Note    Patient Name: Yasmeen Palm  Admission Date: 8/16/2019  Hospital Length of Stay: 0 days  Code Status: Full Code     Subjective:     Interval History:     08/18/19: No significant edema of jorge LE    Oncology Treatment Plan:   [No treatment plan]    Medications:  Continuous Infusions:   amiodarone in dextrose 5% 1 mg/min (08/18/19 1108)    amiodarone in dextrose 5%       Scheduled Meds:   apixaban  5 mg Oral BID    atorvastatin  80 mg Oral QHS    cefTRIAXone (ROCEPHIN) IVPB  1 g Intravenous Daily    insulin aspart U-100  20 Units Subcutaneous Daily    insulin aspart U-100  5 Units Subcutaneous TIDWM    ipratropium  0.5 mg Nebulization Q4H    levalbuterol  0.63 mg Nebulization Q8H    pantoprazole  40 mg Oral Daily    [START ON 8/19/2019] vancomycin (VANCOCIN) IVPB  1,000 mg Intravenous Q24H     PRN Meds:acetaminophen, dextrose 50%, dextrose 50%, glucagon (human recombinant), glucose, glucose, insulin aspart U-100, metoprolol, ondansetron, ramelteon, sodium chloride 0.9%, traMADol     Review of Systems     No fever  Baseline sob      Objective:     Vital Signs (Most Recent):  Temp: 98.3 °F (36.8 °C) (08/18/19 1005)  Pulse: (!) 122 (08/18/19 1138)  Resp: (!) 22 (08/18/19 1138)  BP: (!) 127/56 (08/18/19 1045)  SpO2: 100 % (08/18/19 1138) Vital Signs (24h Range):  Temp:  [97.5 °F (36.4 °C)-98.6 °F (37 °C)] 98.3 °F (36.8 °C)  Pulse:  [] 122  Resp:  [14-23] 22  SpO2:  [96 %-100 %] 100 %  BP: ()/(44-59) 127/56     Weight: 77.4 kg (170 lb 10.2 oz)  Body mass index is 26.73 kg/m².  Body surface area is 1.91 meters squared.      Intake/Output Summary (Last 24 hours) at 8/18/2019 1259  Last data filed at 8/17/2019 2100  Gross per 24 hour   Intake 1000 ml   Output --   Net 1000 ml       Physical Exam     NAD, resting in bed   NC/AT  Conj slightly pale   Decreased air entry laterally  S1s2, tachycardic, irregular   Soft, + BS   Some pedal  edema bi  A&O x 2  Calm     Significant Labs:   CBC:   Recent Labs   Lab 08/16/19  1705 08/17/19  0808 08/18/19  0943   WBC 14.27* 13.41* 24.09*   HGB 8.8* 10.3* 8.9*   HCT 33.6* 36.8* 32.3*    385* 354*   , CMP:   Recent Labs   Lab 08/17/19  0808 08/17/19  1507 08/18/19  0943    139 141   K 4.7 4.6 4.7    98 104   CO2 28 28 25   * 553* 375*   BUN 19 29* 38*   CREATININE 1.0 1.5* 1.6*   CALCIUM 9.6 9.3 8.5*   PROT 6.9 6.6 5.9*   ALBUMIN 2.9* 2.9* 2.6*   BILITOT 0.2 0.2 0.2   ALKPHOS 70 66 55   AST 9* 10 10   ALT 8* 8* 6*   ANIONGAP 14 13 12   EGFRNONAA 58* 36* 33*   , Coagulation: No results for input(s): PT, INR, APTT in the last 48 hours., LDH: No results for input(s): LDHCSF, BFSOURCE in the last 48 hours., Reticulocytes: No results for input(s): RETIC in the last 48 hours., Tumor Markers: No results for input(s): PSA, CEA, , AFPTM, [id number],  in the last 48 hours.    Invalid input(s): ALGTM and Uric Acid No results for input(s): URICACID in the last 48 hours.    Diagnostic Results:    Results for ANSLEY RICHMOND (MRN [id number]) as of 8/18/2019 12:59   Ref. Range 11/2/2017 22:11 8/17/2019 15:07   D-Dimer Latest Ref Range: <0.50 mg/L FEU 0.61 (H) 0.43       Assessment/Plan:     Active Diagnoses:    Diagnosis Date Noted POA    PRINCIPAL PROBLEM:  Atrial fibrillation with RVR [I48.91] 07/14/2018 Yes    Abnormal urinalysis [R82.90] 08/17/2019 Yes    Deep vein thrombosis (DVT) of distal vein of lower extremity [I82.4Z9] 08/16/2019 Yes    Essential hypertension [I10] 03/24/2019 Yes     Chronic    Hyperlipidemia [E78.5] 03/24/2019 Yes     Chronic    Sinus tachycardia [R00.0] 12/08/2017 No     Chronic    Thoracic aorta atherosclerosis [I70.0] 08/12/2017 Yes    Chronic respiratory failure with hypoxia [J96.11] 04/10/2017 Yes     Chronic    Controlled type 2 diabetes mellitus, without long-term current use of insulin [E11.9] 12/14/2015 Yes     Chronic      Problems Resolved During  this Admission:    Diagnosis Date Noted Date Resolved POA    Acute exacerbation of chronic obstructive pulmonary disease (COPD) [J44.1] 12/08/2017 06/12/2019 Yes     Ms. Palm, 68 YO lady with multiple medical conditions with recurrent DVT of LE.      1. Recurrent DVT: US reviewed              -  Ddimer level within the NL range and I do not think she's having new thrombus burden              - I am concerned about non compliance with medication rather than anticoagulant failure.    -If there is a question about Rivaroxaban failure change to Apixaban 5mg bid    - tolerating Apixaban 5mg bid      2. SOB: chronic              - per primary team     3. Afib: per cards and primary        Francois Lara M.D  Internal Medicine & Geriatric Medicine  Hematology & Oncology  Palliative Medicine    [address], [address]  [place] [address] [phone] (Office)  [phone] (Fax)

## 2019-08-18 NOTE — CONSULTS
[facility]  Cardiology  Consult Note    Patient Name: Yasmeen Palm  MRN: [id number]  Admission Date: 8/16/2019  Hospital Length of Stay: 0 days  Code Status: Full Code   Attending Provider: Michelle Dumont MD   Consulting Provider: Laureano Pulido MD  Primary Care Physician: Angel Orourke Jr, MD  Principal Problem:Atrial fibrillation with RVR    Patient information was obtained from patient and ER records.     Inpatient consult to Cardiology  Consult performed by: Laureano Pulido MD  Consult ordered by: GABRIELA Faria  Reason for consult: AF/RVR        Subjective:     Chief Complaint:  SOB     HPI:   69 y.o. sorely deconditioned patient with essential hypertension, DMII (HbA1c 8.1% July 2019), HLD (.0 Dec 2018), paroxysmal atrial fibrillation ([id number] score 4) on chronic anticoagulation, COPD, chronic respiratory failure with hypoxia and hypercapnia uses 2-3L home oxygen, GERD, anemia of chronic disease, tobacco abuse, and history of PE/DVT on chronic anticoagulation.  Patient presented with complaints of dyspnea that began in early morning prior to presentation.  The dyspnea is mainly exertional.  She denies any fevers, chills, nausea, vomiting, pleurisy, diaphoresis, palpitations, sick contracts, recent long trips, or hemoptysis.  Initial workup shows mildly elevated WBC (on prednisone) thought to be reactive, hypernatremia  Overview/Hospital Course:  08/17/19  -Tachycardia sustained in 130's, Discontinued the albuterol and added xopenex Q8H/altrovent Q4H, metoprolol X 2 doses IV, cautious gentle fluids as patient has been getting lasix IV 40 mg BID as ordered in ED   -WBC 14K; her BUN/creatine/GFR 16/0.8/60 overnight now 29/1.5/41; abnormal urinalysis; awaiting culture; start rocephin  -DVT - failed therapy unclear compliance - was on xarelto; failed therapy, refused warfarin - consulted to Hematology - patient agreed to start  apixaban  08/19/19  0615am----  -Paged by nursing staff patient converted to afib rvr with 90 systolic bp. Came in with acute and chronic LE DVT was on xarelto at home unclear compliance. Refused to be converted to warfarin -  Hematology consulted and recommended apixban instead which was initiated. CTA neg PE at presentation.  One day prior patient with ST and decreased renal functions the night before from normal to creatine 1.5. Noted to be in ST and administered metoprolol X 2 and urinalysis sent which was abnormal. Started on one dose IV rocephin and then oral to start today 8/18/19 to prevent/avoid FVO.   Known LVEF 70% with GI DD - pul htn PA pressure 80 - on home oxygen at 2-3L.therefore - Gentle fluids X 5h administered as thought to be overdiuressed at that time as she had been placed on IV lasix 40 mg BID from the ED. Her HR improved about 5pm 8/17/19.  0844 am:   - Patient's HR remains 149-155 BP 88/56 after metoprolol IV - transfer to ICU with amio  - Urine culture positive e.coli & staph aeurus - start Vanc  - New DVT - continue apixaban  - LOYDA/chronic respiratory impairment/COPD - bipap nightly    Last seen by me as inpat 7/2018.  Had AF-> NSR after amio.    Cardiology is consulted for evaluation and management of atrial fibrillation with rapid ventricular response.  The patient seems to be admitted for mostly a COPD/respiratory complaint.  She also has bilateral lower extremity DVT without pulmonary embolism by CT scanning of the chest.  Note is made of aortic atherosclerosis and possible penetrating ulcers, although these appear to be stable versus prior CT scanning.  At present, the patient describes shortness of breath and some mild chest discomfort.  She does have a prior history of heart catheterization 6/2018 with nonobstructive CAD.        Past Medical History:   Diagnosis Date    Anticoagulant long-term use     Xarelto    Arthritis     Asthma     CHF (congestive heart failure)     COPD  (chronic obstructive pulmonary disease)     Coronary artery disease     Deep vein thrombosis (DVT) of left lower extremity     Depression     Diabetes mellitus     GERD (gastroesophageal reflux disease)     Hypertension     Obstructive sleep apnea on CPAP     On home oxygen therapy     Unsteady gait     uses either walker or 4 prong cane       Past Surgical History:   Procedure Laterality Date    CORONARY ANGIOPLASTY WITH STENT PLACEMENT      HERNIA REPAIR      encapsulated umbilical hernia       Review of patient's allergies indicates:  No Known Allergies    No current facility-administered medications on file prior to encounter.      Current Outpatient Medications on File Prior to Encounter   Medication Sig    acetaminophen (TYLENOL) 500 MG tablet Take 1 tablet (500 mg total) by mouth every 8 (eight) hours as needed.    albuterol (ACCUNEB) 1.25 mg/3 mL Nebu Inhale 1.25 mg into the lungs.    albuterol-ipratropium (DUO-NEB) 2.5 mg-0.5 mg/3 mL nebulizer solution Take 3 mLs by nebulization every 6 (six) hours. Rescue    aspirin (ECOTRIN) 81 MG EC tablet Take 81 mg by mouth once daily.    diltiaZEM (CARDIZEM) 90 MG tablet Take 90 mg by mouth.    ferrous gluconate (FERGON) 324 MG tablet Take 1 tablet (324 mg total) by mouth 3 (three) times daily with meals.    furosemide (LASIX) 40 MG tablet Take 40 mg by mouth once daily.     gabapentin (NEURONTIN) 300 MG capsule Take 300 mg by mouth.    hydrALAZINE (APRESOLINE) 50 MG tablet Take 50 mg by mouth 2 (two) times daily.    isosorbide mononitrate (IMDUR) 30 MG 24 hr tablet Take 3 tablets (90 mg total) by mouth once daily.    lisinopril (PRINIVIL,ZESTRIL) 40 MG tablet Take 1 tablet (40 mg total) by mouth once daily. Do not take this medication if blood pressure is below 130/80 mmHg and/or feeling dizzy after taking all other blood pressure meds    metFORMIN (GLUCOPHAGE) 1000 MG tablet Take 1 tablet (1,000 mg total) by mouth 2 (two) times daily with  "meals.    multivit-min-FA-lycopen-lutein (CENTRUM SILVER) 0.4-300-250 mg-mcg-mcg Tab Take 1 tablet by mouth once daily.    pantoprazole (PROTONIX) 40 MG tablet Take 40 mg by mouth once daily.    pregabalin (LYRICA) 75 MG capsule Take 75 mg by mouth 2 (two) times daily.    rivaroxaban (XARELTO) 20 mg Tab Take 20 mg by mouth daily with dinner or evening meal.     traMADol (ULTRAM) 50 mg tablet     acetaminophen (TYLENOL) 500 MG tablet Take 500 mg by mouth.    albuterol (PROVENTIL) 2.5 mg /3 mL (0.083 %) nebulizer solution Take 2.5 mg by nebulization every 6 (six) hours as needed.    amiodarone (PACERONE) 200 MG Tab Take 1 tablet (200 mg total) by mouth once daily.    ANORO ELLIPTA 62.5-25 mcg/actuation DsDv     aspirin (ECOTRIN) 81 MG EC tablet Take 81 mg by mouth.    cefUROXime (CEFTIN) 500 MG tablet Take 1 tablet (500 mg total) by mouth every 12 (twelve) hours.    fluticasone propionate (FLOVENT HFA) 220 mcg/actuation inhaler Inhale 1 puff into the lungs 2 (two) times daily. Controller    ipratropium-albuterol (COMBIVENT)  mcg/actuation inhaler Inhale 1 puff into the lungs every 6 (six) hours as needed for Wheezing or Shortness of Breath. Rescue    isosorbide mononitrate (IMDUR) 30 MG 24 hr tablet Take 30 mg by mouth.    methylPREDNISolone (MEDROL DOSEPACK) 4 mg tablet use as directed    nitroGLYCERIN (NITROSTAT) 0.4 MG SL tablet     umeclidinium brm/vilanterol tr (ANORO ELLIPTA INHL) Inhale into the lungs.     Family History     Problem Relation (Age of Onset)    Diabetes Father    Heart disease Mother    Hypertension Mother        Tobacco Use    Smoking status: Former Smoker     Packs/day: 0.50     Years: 55.00     Pack years: 27.50     Types: Cigarettes     Start date: 1963     Last attempt to quit: 2018     Years since quittin.6    Smokeless tobacco: Never Used    Tobacco comment: "States she quit smoking about 3 or 4 weeks ago"   Substance and Sexual Activity    Alcohol " use: Not Currently     Alcohol/week: 0.6 oz     Types: 1 Glasses of wine per week     Frequency: Never     Comment: socially    Drug use: No    Sexual activity: Never     Review of Systems   Constitution: Negative for chills, diaphoresis, fever and malaise/fatigue.   HENT: Negative for nosebleeds.    Eyes: Negative for blurred vision and double vision.   Cardiovascular: Positive for chest pain and palpitations. Negative for claudication, cyanosis, dyspnea on exertion, leg swelling, orthopnea, paroxysmal nocturnal dyspnea and syncope.   Respiratory: Positive for shortness of breath. Negative for cough and wheezing.    Skin: Negative for dry skin and poor wound healing.   Musculoskeletal: Negative for back pain, joint swelling and myalgias.   Gastrointestinal: Negative for abdominal pain, nausea and vomiting.   Genitourinary: Negative for hematuria.   Neurological: Negative for dizziness, headaches, numbness, seizures and weakness.   Psychiatric/Behavioral: Negative for altered mental status and depression.     Objective:     Vital Signs (Most Recent):  Temp: 98.5 °F (36.9 °C) (08/18/19 0722)  Pulse: (!) 140 (08/18/19 0805)  Resp: 16 (08/18/19 0805)  BP: (!) 88/56 (08/18/19 0805)  SpO2: 98 % (08/18/19 0805) Vital Signs (24h Range):  Temp:  [97.5 °F (36.4 °C)-98.6 °F (37 °C)] 98.5 °F (36.9 °C)  Pulse:  [] 140  Resp:  [14-19] 16  SpO2:  [96 %-98 %] 98 %  BP: ()/(50-71) 88/56     Weight: 77.4 kg (170 lb 10.2 oz)  Body mass index is 26.73 kg/m².    SpO2: 98 %  O2 Device (Oxygen Therapy): nasal cannula      Intake/Output Summary (Last 24 hours) at 8/18/2019 0925  Last data filed at 8/17/2019 2100  Gross per 24 hour   Intake 1000 ml   Output --   Net 1000 ml       Lines/Drains/Airways     Peripheral Intravenous Line                 Peripheral IV - Single Lumen 08/16/19 1500 18 G Left Antecubital 1 day                Physical Exam   Constitutional: She is oriented to person, place, and time. She appears  well-developed and well-nourished. No distress.   HENT:   Head: Normocephalic and atraumatic.   Mouth/Throat: No oropharyngeal exudate.   Eyes: Pupils are equal, round, and reactive to light. Conjunctivae and EOM are normal. No scleral icterus.   Neck: Normal range of motion. Neck supple. No JVD present. No tracheal deviation present. No thyromegaly present.   Cardiovascular: S1 normal and S2 normal. An irregularly irregular rhythm present. Tachycardia present. Exam reveals distant heart sounds. Exam reveals no gallop and no friction rub.   No murmur heard.  Pulmonary/Chest: Effort normal. No respiratory distress. She has no wheezes. She has no rales. She exhibits no tenderness.   Poor air entry bilat   Abdominal: Soft. She exhibits no distension.   Musculoskeletal: Normal range of motion. She exhibits no edema.   Neurological: She is alert and oriented to person, place, and time. No cranial nerve deficit.   Skin: Skin is warm and dry. She is not diaphoretic.   Psychiatric: She has a normal mood and affect. Her behavior is normal. Judgment normal.       Current Medications:   apixaban  5 mg Oral BID    aspirin  81 mg Oral Daily    cefTRIAXone (ROCEPHIN) IVPB  1 g Intravenous Daily    insulin aspart U-100  20 Units Subcutaneous Daily    insulin aspart U-100  5 Units Subcutaneous TIDWM    ipratropium  0.5 mg Nebulization Q4H    levalbuterol  0.63 mg Nebulization Q8H    pantoprazole  40 mg Oral Daily    vancomycin (VANCOCIN) IVPB  1,000 mg Intravenous Once    [START ON 8/19/2019] vancomycin (VANCOCIN) IVPB  1,000 mg Intravenous Q24H       acetaminophen, dextrose 50%, dextrose 50%, glucagon (human recombinant), glucose, glucose, insulin aspart U-100, metoprolol, ondansetron, ramelteon, sodium chloride 0.9%, traMADol    Laboratory (all labs reviewed):  CBC:  Recent Labs   Lab 07/03/19  0933 07/04/19  0349 07/19/19  2121 08/16/19  1705 08/17/19  0808   WBC 14.30 H 12.36 10.01 14.27 H 13.41 H   Hemoglobin 7.2 L  7.1 L 8.5 L 8.8 L 10.3 L   Hematocrit 27.6 L 26.9 L 31.1 L 33.6 L 36.8 L   Platelets 353 H 385 H 386 H 323 385 H       CHEMISTRIES:  Recent Labs   Lab 05/12/19  1215 06/07/19 2033  06/09/19  0504  07/02/19  0549  07/04/19  0349 07/19/19  2121 08/16/19  1705 08/17/19  0808 08/17/19  1507   Glucose 100 203 H   < > 459 HH   < > 379 H   < > 191 H 120 H 184 H 337 H 553 HH   Sodium 145 135 L   < > 138   < > 143   < > 141 142 146 H 142 139   Potassium 5.6 H 4.5   < > 5.1   < > 5.0   < > 5.0 4.8 4.6 4.7 4.6   BUN, Bld 9 19   < > 40 H   < > 32 H   < > 31 H 13 16 19 29 H   Creatinine 0.7 0.8   < > 1.3   < > 1.2   < > 0.8 0.9 0.8 1.0 1.5 H   eGFR if  >60 >60   < > 49 A   < > 54 A   < > >60 >60 >60 >60 41 A   eGFR if non African American >60 >60   < > 43 A   < > 47 A   < > >60 >60 >60 58 A 36 A   Calcium 9.3 9.0   < > 9.6   < > 9.4   < > 9.3 8.9 9.3 9.6 9.3   Magnesium 1.7 1.6  --  1.7  --  2.2  --   --  1.6  --   --   --     < > = values in this interval not displayed.       CARDIAC BIOMARKERS:  Recent Labs   Lab 12/09/18  0514  08/16/19 2022 08/17/19  0204 08/17/19  0808 08/17/19  1818 08/17/19  2328   CPK 31  --   --   --   --   --   --    CPK MB 1.9  --   --   --   --   --   --    Troponin I 0.035 H   < > 0.016 0.025 0.021 0.071 H 0.061 H    < > = values in this interval not displayed.       COAGS:  Recent Labs   Lab 12/08/18  1747 12/09/18  0514 03/24/19  1822 06/24/19  1245 07/01/19  0040   INR 1.1 1.1 1.1 1.0 1.0       LIPIDS/LFTS:  Recent Labs   Lab 08/12/17  1037  12/08/18  1711  07/02/19  0549 07/19/19  2121 08/16/19  1705 08/17/19  0808 08/17/19  1507   Cholesterol 243 H  --  246 H  --   --   --   --  271 H  --    Triglycerides 93  --  95  --   --   --   --  108  --    HDL 43  --  55  --   --   --   --  53  --    LDL Cholesterol 181.4 H  --  172.0 H  --   --   --   --  196.4 H  --    Non-HDL Cholesterol 200  --  191  --   --   --   --  218  --    AST  --    < > 13   < > 7 L 13 10 9 L 10   ALT  --     < > 7 L   < > 9 L 11 7 L 8 L 8 L    < > = values in this interval not displayed.       BNP:  Recent Labs   Lab 06/07/19  2033 06/24/19  1245 07/01/19  0040 07/19/19  2121 08/16/19  1705    H 118 H 362 H 56 140 H       TSH:  Recent Labs   Lab 06/24/18  1413 12/08/18  1743 06/07/19  2033   TSH 0.754 0.438 0.699       Free T4:  Recent Labs   Lab 06/24/18  1413 06/07/19  2033 06/08/19  0351   Free T4 1.09 1.01 1.03       Diagnostic Results:  ECG (personally reviewed and interpreted tracing(s)):  8/16/19 1459 , PRWP  8/18/19 tele 0539 sinus tachy-> AF/RVR    Chest X-Ray (personally reviewed and interpreted image(s)): 8/16/19 ?LLL infil    CTA Chest 8/16/19  1. No evidence of PE.  No acute intrathoracic abnormalities identified.  2. Centrilobular emphysema.  3. Extensive calcified and noncalcified plaque throughout the aorta with multifocal small outpouchings suggestive for penetrating ulcers.  Appearance is overall similar compared to prior CTA from June 2018.    LE venous US 8/16/19  Bilateral lower extremity deep venous thrombosis as above.  Compared with previous ultrasound from 06/18/2019, there has been resolution of some previous areas of thrombosis, however some new areas of thrombosis are now seen.    Echo: 6/9/19  · Normal left ventricular systolic function. The estimated ejection fraction is 70%  · Moderate concentric left ventricular hypertrophy.  · Grade I (mild) left ventricular diastolic dysfunction consistent with impaired relaxation.  · Normal left atrial pressure.  · Moderate left atrial enlargement.  · Mild right atrial enlargement.  · Mild mitral regurgitation.  · Mild tricuspid regurgitation.  · The estimated PA systolic pressure is 80 mm Hg  · Intermediate central venous pressure (8 mm Hg).  · Pulmonary hypertension present.    Cath 6/27/18   LVEDP: 9mmHg  LVEF: 50% by echo  Wall Motion: LAD WMA by echo  Dominance: Right  LM: MLI  LAD: MLI  LCx: MLI  RCA: dom, mid 40%  Hemostasis:  R  "Radial band  Impression:  NSTEMI  Nonobst CAD  Normal LV fxn  R rad vasband for hemostasis  Plan:  Cont med rx  Cont ASA 81mg qd  Stop Plavix  Restart Xarelto 20mg qd this pm  Pt to follow up with Dr. Pulido in 2 weeks     LLE venous US 1/24/17  Interval decrease in overall thrombus burden of the left lower extremity, although there is persistent deep venous thrombus present within the left femoral and popliteal veins.     Stress Test: L MPI 12/15/15  Nuclear Quantitative Functional Analysis:   LVEF: 66 %  Impression: NORMAL MYOCARDIAL PERFUSION  1. The perfusion scan is free of evidence for myocardial ischemia or injury.   2. Resting wall motion is physiologic.   3. Resting LV function is normal.   4. The ventricular volumes are normal at rest and stress.   5. The extracardiac distribution of radioactivity is normal.   6. There was no previous study available to compare.    Assessment and Plan:     * Atrial fibrillation with RVR  Admission compliant appears to be COPD exam.  Pt in SR on adm and prev on Xarelto (since switched to eliquis for persistent DVT without PE).  Noted to develop AF/RVR on 8/18/19 at 0539 (tele personally reviewed, see media tab).  AF likely being driven by pulm issues as she was in sinus tachy prior to AF.  Transfer to ICU for amio gtt  Cont eliquis  Keep NPO after MN for possible DCCV in am (NUNO not needed) if she does not convert on amio.    Chronic respiratory failure with hypoxia  Per IM    Essential hypertension  Cont med rx  BP borderline at present    Deep vein thrombosis (DVT) of distal vein of lower extremity  Cont eliquis  No PE on CTA    Thoracic aorta atherosclerosis  ?stable "penetrating ulcers" on CTA Chest  Start high dose atorva 80mg qhs  Check lipids/LFT 3 months (mid Nov 2019)    Hyperlipidemia  Start atorva 80mg qhs  Check lipids/LFT 3 months (mid Nov 2019)    Controlled type 2 diabetes mellitus, without long-term current use of insulin  Per IM        VTE Risk Mitigation " (From admission, onward)        Ordered     apixaban tablet 5 mg  2 times daily      08/17/19 1414        Case d/w Dr. Greenwood and RN staff.  Amio orders written  Critical care time 35min    Thank you for your consult. I will follow-up with patient. Please contact us if you have any additional questions.    Laureano Pulido MD  Cardiology   [facility]

## 2019-08-18 NOTE — CARE UPDATE
Paged by nursing staff patient converted to afib rvr with 90 systolic bp. Came in with acute and chronic LE DVT was on xarelto at home unclear compliance. Refused to be converted to warfarin -  Hematology consulted and recommended apixban instead which was initiated. CTA neg PE at presentation.    One day prior patient with ST and decreased renal functions the night before from normal to creatine 1.5. Noted to be in ST and administered metoprolol X 2 and urinalysis sent which was abnormal. Started on one dose IV rocephin and then oral to start today 8/18/19 to prevent/avoid FVO.     Known LVEF 70% with GI DD - pul htn PA pressure 80 - on home oxygen at 2-3L.therefore - Gentle fluids X 5h administered as thought to be overdiuressed at that time as she had been placed on IV lasix 40 mg BID from the ED. Her HR improved about 5pm 8/17/19.        PLAN:  Consult to Cardiology Afib RVR  Contacted early MD Dr. Greenwood to see patient  Continue apixaban for now  Continue cardiac monitoring  Consider amio infusion          Present on Admission:   Deep vein thrombosis (DVT) of distal vein of lower extremity   Essential hypertension   Controlled type 2 diabetes mellitus, without long-term current use of insulin   Abnormal urinalysis   Chronic respiratory failure with hypoxia    YOSVANY Faria, FNP-C  Hospitalist - Department of Hospital Medicine  [address], [place] [address]  Office [phone]; Pager [phone]

## 2019-08-18 NOTE — PLAN OF CARE
Problem: Adult Inpatient Plan of Care  Goal: Plan of Care Review  Pt. Was transferred from room 330 to ICU with Afib with RVR. Was started on Amiodarone gtt per protocol. 's-130's and Afib per cardiac monitor. Is hypotensive. Orders received for NS Bolus x3. O2 Sat 99% on 2lpm O2 via n/c. Denies respiratory distress or chest pain. No injuries or falls this shift.

## 2019-08-19 PROBLEM — N39.0 URINARY TRACT INFECTION WITHOUT HEMATURIA: Status: ACTIVE | Noted: 2019-08-17

## 2019-08-19 LAB
ALBUMIN SERPL BCP-MCNC: 2.5 G/DL (ref 3.5–5.2)
ALP SERPL-CCNC: 57 U/L (ref 55–135)
ALT SERPL W/O P-5'-P-CCNC: 9 U/L (ref 10–44)
ANION GAP SERPL CALC-SCNC: 8 MMOL/L (ref 8–16)
AST SERPL-CCNC: 14 U/L (ref 10–40)
BACTERIA UR CULT: ABNORMAL
BASOPHILS # BLD AUTO: 0 K/UL (ref 0–0.2)
BASOPHILS NFR BLD: 0 % (ref 0–1.9)
BILIRUB SERPL-MCNC: 0.1 MG/DL (ref 0.1–1)
BUN SERPL-MCNC: 32 MG/DL (ref 8–23)
CALCIUM SERPL-MCNC: 8 MG/DL (ref 8.7–10.5)
CHLORIDE SERPL-SCNC: 109 MMOL/L (ref 95–110)
CO2 SERPL-SCNC: 25 MMOL/L (ref 23–29)
CREAT SERPL-MCNC: 0.9 MG/DL (ref 0.5–1.4)
DIFFERENTIAL METHOD: ABNORMAL
EOSINOPHIL # BLD AUTO: 0.1 K/UL (ref 0–0.5)
EOSINOPHIL NFR BLD: 0.6 % (ref 0–8)
ERYTHROCYTE [DISTWIDTH] IN BLOOD BY AUTOMATED COUNT: 20.4 % (ref 11.5–14.5)
EST. GFR  (AFRICAN AMERICAN): >60 ML/MIN/1.73 M^2
EST. GFR  (NON AFRICAN AMERICAN): >60 ML/MIN/1.73 M^2
GLUCOSE SERPL-MCNC: 197 MG/DL (ref 70–110)
HCT VFR BLD AUTO: 31.5 % (ref 37–48.5)
HGB BLD-MCNC: 8.7 G/DL (ref 12–16)
HYPOCHROMIA BLD QL SMEAR: ABNORMAL
LYMPHOCYTES # BLD AUTO: 2.2 K/UL (ref 1–4.8)
LYMPHOCYTES NFR BLD: 12.7 % (ref 18–48)
MCH RBC QN AUTO: 25.4 PG (ref 27–31)
MCHC RBC AUTO-ENTMCNC: 27.6 G/DL (ref 32–36)
MCV RBC AUTO: 92 FL (ref 82–98)
MONOCYTES # BLD AUTO: 1.5 K/UL (ref 0.3–1)
MONOCYTES NFR BLD: 8.7 % (ref 4–15)
NEUTROPHILS # BLD AUTO: 13.4 K/UL (ref 1.8–7.7)
NEUTROPHILS NFR BLD: 78 % (ref 38–73)
PLATELET # BLD AUTO: 258 K/UL (ref 150–350)
PLATELET BLD QL SMEAR: ABNORMAL
PMV BLD AUTO: 10.7 FL (ref 9.2–12.9)
POCT GLUCOSE: 190 MG/DL (ref 70–110)
POCT GLUCOSE: 284 MG/DL (ref 70–110)
POCT GLUCOSE: 285 MG/DL (ref 70–110)
POCT GLUCOSE: 340 MG/DL (ref 70–110)
POLYCHROMASIA BLD QL SMEAR: ABNORMAL
POTASSIUM SERPL-SCNC: 4.4 MMOL/L (ref 3.5–5.1)
PROT SERPL-MCNC: 5.7 G/DL (ref 6–8.4)
RBC # BLD AUTO: 3.42 M/UL (ref 4–5.4)
SODIUM SERPL-SCNC: 142 MMOL/L (ref 136–145)
WBC # BLD AUTO: 17.27 K/UL (ref 3.9–12.7)

## 2019-08-19 PROCEDURE — 63600175 PHARM REV CODE 636 W HCPCS: Performed by: INTERNAL MEDICINE

## 2019-08-19 PROCEDURE — 25000242 PHARM REV CODE 250 ALT 637 W/ HCPCS: Performed by: HOSPITALIST

## 2019-08-19 PROCEDURE — 25000003 PHARM REV CODE 250: Performed by: HOSPITALIST

## 2019-08-19 PROCEDURE — 25000242 PHARM REV CODE 250 ALT 637 W/ HCPCS: Performed by: NURSE PRACTITIONER

## 2019-08-19 PROCEDURE — 25000003 PHARM REV CODE 250: Performed by: INTERNAL MEDICINE

## 2019-08-19 PROCEDURE — 80053 COMPREHEN METABOLIC PANEL: CPT

## 2019-08-19 PROCEDURE — 94660 CPAP INITIATION&MGMT: CPT

## 2019-08-19 PROCEDURE — 99900035 HC TECH TIME PER 15 MIN (STAT)

## 2019-08-19 PROCEDURE — 94640 AIRWAY INHALATION TREATMENT: CPT

## 2019-08-19 PROCEDURE — 99233 PR SUBSEQUENT HOSPITAL CARE,LEVL III: ICD-10-PCS | Mod: ,,, | Performed by: INTERNAL MEDICINE

## 2019-08-19 PROCEDURE — 36415 COLL VENOUS BLD VENIPUNCTURE: CPT

## 2019-08-19 PROCEDURE — 63600175 PHARM REV CODE 636 W HCPCS: Performed by: NURSE PRACTITIONER

## 2019-08-19 PROCEDURE — 94761 N-INVAS EAR/PLS OXIMETRY MLT: CPT

## 2019-08-19 PROCEDURE — 63600175 PHARM REV CODE 636 W HCPCS: Performed by: HOSPITALIST

## 2019-08-19 PROCEDURE — 21400001 HC TELEMETRY ROOM

## 2019-08-19 PROCEDURE — 25000003 PHARM REV CODE 250: Performed by: NURSE PRACTITIONER

## 2019-08-19 PROCEDURE — 85025 COMPLETE CBC W/AUTO DIFF WBC: CPT

## 2019-08-19 PROCEDURE — 99233 SBSQ HOSP IP/OBS HIGH 50: CPT | Mod: ,,, | Performed by: INTERNAL MEDICINE

## 2019-08-19 PROCEDURE — 27000221 HC OXYGEN, UP TO 24 HOURS

## 2019-08-19 RX ORDER — CLONIDINE HYDROCHLORIDE 0.1 MG/1
0.1 TABLET ORAL EVERY 4 HOURS PRN
Status: DISCONTINUED | OUTPATIENT
Start: 2019-08-19 | End: 2019-08-22 | Stop reason: HOSPADM

## 2019-08-19 RX ORDER — DILTIAZEM HYDROCHLORIDE 180 MG/1
180 CAPSULE, COATED, EXTENDED RELEASE ORAL DAILY
Status: DISCONTINUED | OUTPATIENT
Start: 2019-08-19 | End: 2019-08-22 | Stop reason: HOSPADM

## 2019-08-19 RX ADMIN — APIXABAN 5 MG: 5 TABLET, FILM COATED ORAL at 08:08

## 2019-08-19 RX ADMIN — TRAMADOL HYDROCHLORIDE 50 MG: 50 TABLET, FILM COATED ORAL at 10:08

## 2019-08-19 RX ADMIN — DILTIAZEM HYDROCHLORIDE 180 MG: 180 CAPSULE, COATED, EXTENDED RELEASE ORAL at 10:08

## 2019-08-19 RX ADMIN — VANCOMYCIN HYDROCHLORIDE 1000 MG: 1 INJECTION, POWDER, LYOPHILIZED, FOR SOLUTION INTRAVENOUS at 09:08

## 2019-08-19 RX ADMIN — INSULIN ASPART 5 UNITS: 100 INJECTION, SOLUTION INTRAVENOUS; SUBCUTANEOUS at 11:08

## 2019-08-19 RX ADMIN — IPRATROPIUM BROMIDE 0.5 MG: 0.5 SOLUTION RESPIRATORY (INHALATION) at 09:08

## 2019-08-19 RX ADMIN — TRAMADOL HYDROCHLORIDE 50 MG: 50 TABLET, FILM COATED ORAL at 01:08

## 2019-08-19 RX ADMIN — INSULIN ASPART 1 UNITS: 100 INJECTION, SOLUTION INTRAVENOUS; SUBCUTANEOUS at 10:08

## 2019-08-19 RX ADMIN — INSULIN ASPART 3 UNITS: 100 INJECTION, SOLUTION INTRAVENOUS; SUBCUTANEOUS at 11:08

## 2019-08-19 RX ADMIN — INSULIN DETEMIR 8 UNITS: 100 INJECTION, SOLUTION SUBCUTANEOUS at 09:08

## 2019-08-19 RX ADMIN — ACETAMINOPHEN 650 MG: 325 TABLET, FILM COATED ORAL at 04:08

## 2019-08-19 RX ADMIN — AMIODARONE HYDROCHLORIDE 0.5 MG/MIN: 1.8 INJECTION, SOLUTION INTRAVENOUS at 03:08

## 2019-08-19 RX ADMIN — RAMELTEON 8 MG: 8 TABLET, FILM COATED ORAL at 10:08

## 2019-08-19 RX ADMIN — INSULIN ASPART 4 UNITS: 100 INJECTION, SOLUTION INTRAVENOUS; SUBCUTANEOUS at 04:08

## 2019-08-19 RX ADMIN — PANTOPRAZOLE SODIUM 40 MG: 40 TABLET, DELAYED RELEASE ORAL at 08:08

## 2019-08-19 RX ADMIN — CEFTRIAXONE 1 G: 1 INJECTION, SOLUTION INTRAVENOUS at 08:08

## 2019-08-19 RX ADMIN — IPRATROPIUM BROMIDE 0.5 MG: 0.5 SOLUTION RESPIRATORY (INHALATION) at 08:08

## 2019-08-19 RX ADMIN — LEVALBUTEROL HYDROCHLORIDE 0.63 MG: 0.63 SOLUTION RESPIRATORY (INHALATION) at 08:08

## 2019-08-19 RX ADMIN — INSULIN DETEMIR 8 UNITS: 100 INJECTION, SOLUTION SUBCUTANEOUS at 10:08

## 2019-08-19 RX ADMIN — ATORVASTATIN CALCIUM 80 MG: 40 TABLET, FILM COATED ORAL at 10:08

## 2019-08-19 RX ADMIN — IPRATROPIUM BROMIDE 0.5 MG: 0.5 SOLUTION RESPIRATORY (INHALATION) at 03:08

## 2019-08-19 RX ADMIN — INSULIN ASPART 5 UNITS: 100 INJECTION, SOLUTION INTRAVENOUS; SUBCUTANEOUS at 04:08

## 2019-08-19 RX ADMIN — IPRATROPIUM BROMIDE 0.5 MG: 0.5 SOLUTION RESPIRATORY (INHALATION) at 12:08

## 2019-08-19 RX ADMIN — APIXABAN 5 MG: 5 TABLET, FILM COATED ORAL at 10:08

## 2019-08-19 NOTE — HOSPITAL COURSE
8/16/19: adm with COPD exac  8/17/19: AF/RVR  8/18/19: converted to Stach on amio gtt    Interval Hx: pt seen in ICU, case d/w RN.  Complains of dysuria.  No cp/palps, sob improving.    Tele: sinus tachy 110s (personally reviewed and interpreted)

## 2019-08-19 NOTE — ASSESSMENT & PLAN NOTE
As noted in hospital course - HR now 140-->155; BP 88 systolic   Transfer to ICU for amiodarone infusion  Discontinue oral BP medication for now  Etta shimon cardiac monitoring  Monitor closely,    Converted to sinus,cardiogy started on Cardizem,stable go back to Tele.

## 2019-08-19 NOTE — NURSING
Patient noted to be in A-fib with RVR. HR at 140's to low 150's. Patient states she can feel heart beating fast but is resting in bed with no other ill effect. SBP variable in 80's -90's. TEE Rivas notified. Dr Greenwood here to place orders. Metoprolol IV q5mins for increased heart rate. OK to give with BP as is. On continuous cardiac monitoring. x2 doses metoprolol IV administered with heart rate between 115-130. Further orders to follow per Dr Greenwood, cardiology consulted. May transfer to ICU if no improvement.

## 2019-08-19 NOTE — ASSESSMENT & PLAN NOTE
Admission compliant appears to be COPD exacerbation.  Pt in SR on adm and prev on Xarelto (since switched to eliquis for persistent DVT without PE).  Noted to develop AF/RVR on 8/18/19 at 0539 (tele personally reviewed, see media tab).  Now back in SR after amio gtt overnight.  AF likely being driven by pulm issues as she was in sinus tachy prior to AF.  Cont eliquis  Stop amio gtt and start PO dilt (try to avoid amio given underlying lung disease).

## 2019-08-19 NOTE — PLAN OF CARE
Problem: Adult Inpatient Plan of Care  Goal: Plan of Care Review  Outcome: Ongoing (interventions implemented as appropriate)  Remains in ICU on amio gtt per orders.  Purewick draining adequate urine; pt c/o of pain to vagina and flank pain with urination.  BiPap at HS; 2 L NC.  VSS, afebrile.  Accu checks AC/HS; insulin provided per orders.  NPO since 12 AM per order.  POC reviewed with pt; expressed understanding.

## 2019-08-19 NOTE — NURSING TRANSFER
Nursing Transfer Note      8/19/2019     Transfer from 278 to 301    Transfer via wheelchair    Transfer with  to O2, cardiac monitoring    Transported by transport and RN    Medicines sent: insulin    Chart send with patient: yes    Notified: niece at bedside    Patient reassessed at: 1200    Upon arrival to floor:

## 2019-08-19 NOTE — SUBJECTIVE & OBJECTIVE
Past Medical History:   Diagnosis Date    Anticoagulant long-term use     Xarelto    Arthritis     Asthma     CHF (congestive heart failure)     COPD (chronic obstructive pulmonary disease)     Coronary artery disease     Deep vein thrombosis (DVT) of left lower extremity     Depression     Diabetes mellitus     GERD (gastroesophageal reflux disease)     Hypertension     Obstructive sleep apnea on CPAP     On home oxygen therapy     Unsteady gait     uses either walker or 4 prong cane       Past Surgical History:   Procedure Laterality Date    CORONARY ANGIOPLASTY WITH STENT PLACEMENT      HERNIA REPAIR      encapsulated umbilical hernia       Review of patient's allergies indicates:  No Known Allergies    No current facility-administered medications on file prior to encounter.      Current Outpatient Medications on File Prior to Encounter   Medication Sig    acetaminophen (TYLENOL) 500 MG tablet Take 1 tablet (500 mg total) by mouth every 8 (eight) hours as needed.    albuterol (ACCUNEB) 1.25 mg/3 mL Nebu Inhale 1.25 mg into the lungs.    albuterol-ipratropium (DUO-NEB) 2.5 mg-0.5 mg/3 mL nebulizer solution Take 3 mLs by nebulization every 6 (six) hours. Rescue    aspirin (ECOTRIN) 81 MG EC tablet Take 81 mg by mouth once daily.    diltiaZEM (CARDIZEM) 90 MG tablet Take 90 mg by mouth.    ferrous gluconate (FERGON) 324 MG tablet Take 1 tablet (324 mg total) by mouth 3 (three) times daily with meals.    furosemide (LASIX) 40 MG tablet Take 40 mg by mouth once daily.     gabapentin (NEURONTIN) 300 MG capsule Take 300 mg by mouth.    hydrALAZINE (APRESOLINE) 50 MG tablet Take 50 mg by mouth 2 (two) times daily.    isosorbide mononitrate (IMDUR) 30 MG 24 hr tablet Take 3 tablets (90 mg total) by mouth once daily.    lisinopril (PRINIVIL,ZESTRIL) 40 MG tablet Take 1 tablet (40 mg total) by mouth once daily. Do not take this medication if blood pressure is below 130/80 mmHg and/or feeling dizzy  after taking all other blood pressure meds    metFORMIN (GLUCOPHAGE) 1000 MG tablet Take 1 tablet (1,000 mg total) by mouth 2 (two) times daily with meals.    multivit-min-FA-lycopen-lutein (CENTRUM SILVER) 0.4-300-250 mg-mcg-mcg Tab Take 1 tablet by mouth once daily.    pantoprazole (PROTONIX) 40 MG tablet Take 40 mg by mouth once daily.    pregabalin (LYRICA) 75 MG capsule Take 75 mg by mouth 2 (two) times daily.    rivaroxaban (XARELTO) 20 mg Tab Take 20 mg by mouth daily with dinner or evening meal.     traMADol (ULTRAM) 50 mg tablet     acetaminophen (TYLENOL) 500 MG tablet Take 500 mg by mouth.    albuterol (PROVENTIL) 2.5 mg /3 mL (0.083 %) nebulizer solution Take 2.5 mg by nebulization every 6 (six) hours as needed.    amiodarone (PACERONE) 200 MG Tab Take 1 tablet (200 mg total) by mouth once daily.    ANORO ELLIPTA 62.5-25 mcg/actuation DsDv     aspirin (ECOTRIN) 81 MG EC tablet Take 81 mg by mouth.    cefUROXime (CEFTIN) 500 MG tablet Take 1 tablet (500 mg total) by mouth every 12 (twelve) hours.    fluticasone propionate (FLOVENT HFA) 220 mcg/actuation inhaler Inhale 1 puff into the lungs 2 (two) times daily. Controller    ipratropium-albuterol (COMBIVENT)  mcg/actuation inhaler Inhale 1 puff into the lungs every 6 (six) hours as needed for Wheezing or Shortness of Breath. Rescue    isosorbide mononitrate (IMDUR) 30 MG 24 hr tablet Take 30 mg by mouth.    methylPREDNISolone (MEDROL DOSEPACK) 4 mg tablet use as directed    nitroGLYCERIN (NITROSTAT) 0.4 MG SL tablet     umeclidinium brm/vilanterol tr (ANORO ELLIPTA INHL) Inhale into the lungs.     Family History     Problem Relation (Age of Onset)    Diabetes Father    Heart disease Mother    Hypertension Mother        Tobacco Use    Smoking status: Former Smoker     Packs/day: 0.50     Years: 55.00     Pack years: 27.50     Types: Cigarettes     Start date: 1/1/1963     Last attempt to quit: 12/31/2018     Years since quitting:  "0.6    Smokeless tobacco: Never Used    Tobacco comment: "States she quit smoking about 3 or 4 weeks ago"   Substance and Sexual Activity    Alcohol use: Not Currently     Alcohol/week: 0.6 oz     Types: 1 Glasses of wine per week     Frequency: Never     Comment: socially    Drug use: No    Sexual activity: Never     Review of Systems   Gastrointestinal: Negative for melena.   Genitourinary: Negative for hematuria.     Objective:     Vital Signs (Most Recent):  Temp: 98.3 °F (36.8 °C) (08/19/19 0700)  Pulse: 98 (08/19/19 0823)  Resp: 15 (08/19/19 0823)  BP: (!) 155/78 (08/19/19 0800)  SpO2: 100 % (08/19/19 0823) Vital Signs (24h Range):  Temp:  [97.9 °F (36.6 °C)-98.3 °F (36.8 °C)] 98.3 °F (36.8 °C)  Pulse:  [] 98  Resp:  [14-25] 15  SpO2:  [91 %-100 %] 100 %  BP: ()/(39-78) 155/78     Weight: 77.4 kg (170 lb 10.2 oz)  Body mass index is 26.73 kg/m².    SpO2: 100 %  O2 Device (Oxygen Therapy): room air      Intake/Output Summary (Last 24 hours) at 8/19/2019 1006  Last data filed at 8/19/2019 0600  Gross per 24 hour   Intake 2143.09 ml   Output 600 ml   Net 1543.09 ml       Lines/Drains/Airways     Peripheral Intravenous Line                 Peripheral IV - Single Lumen 08/18/19 1200 20 G Posterior;Right Hand less than 1 day         Peripheral IV - Single Lumen 08/19/19 0539 20 G Anterior;Right Upper Arm less than 1 day                Physical Exam   Constitutional: She is oriented to person, place, and time. She appears well-developed and well-nourished. No distress.   HENT:   Head: Normocephalic and atraumatic.   Mouth/Throat: No oropharyngeal exudate.   Eyes: Pupils are equal, round, and reactive to light. Conjunctivae and EOM are normal. No scleral icterus.   Neck: Normal range of motion. Neck supple. No JVD present. No tracheal deviation present. No thyromegaly present.   Cardiovascular: Regular rhythm, S1 normal and S2 normal. Tachycardia present. Exam reveals distant heart sounds. Exam " reveals no gallop and no friction rub.   No murmur heard.  Pulmonary/Chest: Effort normal. No respiratory distress. She has no wheezes. She has no rales. She exhibits no tenderness.   Poor air entry bilat   Abdominal: Soft. She exhibits no distension.   Musculoskeletal: Normal range of motion. She exhibits no edema.   Neurological: She is alert and oriented to person, place, and time. No cranial nerve deficit.   Skin: Skin is warm and dry. She is not diaphoretic.   Psychiatric: She has a normal mood and affect. Her behavior is normal. Judgment normal.       Current Medications:   apixaban  5 mg Oral BID    atorvastatin  80 mg Oral QHS    cefTRIAXone (ROCEPHIN) IVPB  1 g Intravenous Daily    insulin aspart U-100  5 Units Subcutaneous TIDWM    insulin detemir U-100  8 Units Subcutaneous BID    ipratropium  0.5 mg Nebulization Q4H    levalbuterol  0.63 mg Nebulization Q8H    pantoprazole  40 mg Oral Daily    vancomycin (VANCOCIN) IVPB  1,000 mg Intravenous Q24H      amiodarone in dextrose 5% 0.5 mg/min (08/19/19 0600)     acetaminophen, dextrose 50%, dextrose 50%, glucagon (human recombinant), glucose, glucose, insulin aspart U-100, metoprolol, ondansetron, ramelteon, sodium chloride 0.9%, traMADol    Laboratory (all labs reviewed):  CBC:  Recent Labs   Lab 07/19/19 2121 08/16/19  1705 08/17/19  0808 08/18/19  0943 08/19/19  0403   WBC 10.01 14.27 H 13.41 H 24.09 H 17.27 H   Hemoglobin 8.5 L 8.8 L 10.3 L 8.9 L 8.7 L   Hematocrit 31.1 L 33.6 L 36.8 L 32.3 L 31.5 L   Platelets 386 H 323 385 H 354 H 258       CHEMISTRIES:  Recent Labs   Lab 05/12/19  1215 06/07/19  2033  06/09/19  0504  07/02/19  0549  07/19/19  2121 08/16/19  1705 08/17/19  0808 08/17/19  1507 08/18/19  0943 08/19/19  0403   Glucose 100 203 H   < > 459 HH   < > 379 H   < > 120 H 184 H 337 H 553  H 197 H   Sodium 145 135 L   < > 138   < > 143   < > 142 146 H 142 139 141 142   Potassium 5.6 H 4.5   < > 5.1   < > 5.0   < > 4.8 4.6 4.7 4.6  4.7 4.4   BUN, Bld 9 19   < > 40 H   < > 32 H   < > 13 16 19 29 H 38 H 32 H   Creatinine 0.7 0.8   < > 1.3   < > 1.2   < > 0.9 0.8 1.0 1.5 H 1.6 H 0.9   eGFR if African American >60 >60   < > 49 A   < > 54 A   < > >60 >60 >60 41 A 38 A >60   eGFR if non African American >60 >60   < > 43 A   < > 47 A   < > >60 >60 58 A 36 A 33 A >60   Calcium 9.3 9.0   < > 9.6   < > 9.4   < > 8.9 9.3 9.6 9.3 8.5 L 8.0 L   Magnesium 1.7 1.6  --  1.7  --  2.2  --  1.6  --   --   --   --   --     < > = values in this interval not displayed.       CARDIAC BIOMARKERS:  Recent Labs   Lab 12/09/18  0514  08/16/19  2022 08/17/19  0204 08/17/19  0808 08/17/19  1818 08/17/19  2328   CPK 31  --   --   --   --   --   --    CPK MB 1.9  --   --   --   --   --   --    Troponin I 0.035 H   < > 0.016 0.025 0.021 0.071 H 0.061 H    < > = values in this interval not displayed.       COAGS:  Recent Labs   Lab 12/08/18  1747 12/09/18  0514 03/24/19  1822 06/24/19  1245 07/01/19  0040   INR 1.1 1.1 1.1 1.0 1.0       LIPIDS/LFTS:  Recent Labs   Lab 08/12/17  1037  12/08/18  1711  08/16/19  1705 08/17/19  0808 08/17/19  1507 08/18/19  0943 08/19/19  0403   Cholesterol 243 H  --  246 H  --   --  271 H  --   --   --    Triglycerides 93  --  95  --   --  108  --   --   --    HDL 43  --  55  --   --  53  --   --   --    LDL Cholesterol 181.4 H  --  172.0 H  --   --  196.4 H  --   --   --    Non-HDL Cholesterol 200  --  191  --   --  218  --   --   --    AST  --    < > 13   < > 10 9 L 10 10 14   ALT  --    < > 7 L   < > 7 L 8 L 8 L 6 L 9 L    < > = values in this interval not displayed.       BNP:  Recent Labs   Lab 06/07/19 2033 06/24/19  1245 07/01/19  0040 07/19/19 2121 08/16/19  1705    H 118 H 362 H 56 140 H       TSH:  Recent Labs   Lab 06/24/18  1413 12/08/18  1743 06/07/19  2033   TSH 0.754 0.438 0.699       Free T4:  Recent Labs   Lab 06/24/18  1413 06/07/19 2033 06/08/19  0351   Free T4 1.09 1.01 1.03       Diagnostic Results:  ECG  (personally reviewed and interpreted tracing(s)):  8/16/19 1459 , PRWP  8/18/19 tele 0539 sinus tachy-> AF/RVR    Chest X-Ray (personally reviewed and interpreted image(s)): 8/16/19 ?LLL infil    CTA Chest 8/16/19  1. No evidence of PE.  No acute intrathoracic abnormalities identified.  2. Centrilobular emphysema.  3. Extensive calcified and noncalcified plaque throughout the aorta with multifocal small outpouchings suggestive for penetrating ulcers.  Appearance is overall similar compared to prior CTA from June 2018.    LE venous US 8/16/19  Bilateral lower extremity deep venous thrombosis as above.  Compared with previous ultrasound from 06/18/2019, there has been resolution of some previous areas of thrombosis, however some new areas of thrombosis are now seen.    Echo: 6/9/19  · Normal left ventricular systolic function. The estimated ejection fraction is 70%  · Moderate concentric left ventricular hypertrophy.  · Grade I (mild) left ventricular diastolic dysfunction consistent with impaired relaxation.  · Normal left atrial pressure.  · Moderate left atrial enlargement.  · Mild right atrial enlargement.  · Mild mitral regurgitation.  · Mild tricuspid regurgitation.  · The estimated PA systolic pressure is 80 mm Hg  · Intermediate central venous pressure (8 mm Hg).  · Pulmonary hypertension present.    Cath 6/27/18   LVEDP: 9mmHg  LVEF: 50% by echo  Wall Motion: LAD WMA by echo  Dominance: Right  LM: MLI  LAD: MLI  LCx: MLI  RCA: dom, mid 40%  Hemostasis:  R Radial band  Impression:  NSTEMI  Nonobst CAD  Normal LV fxn  R rad vasband for hemostasis  Plan:  Cont med rx  Cont ASA 81mg qd  Stop Plavix  Restart Xarelto 20mg qd this pm  Pt to follow up with Dr. Pulido in 2 weeks     LLE venous US 1/24/17  Interval decrease in overall thrombus burden of the left lower extremity, although there is persistent deep venous thrombus present within the left femoral and popliteal veins.     Stress Test: L MPI  12/15/15  Nuclear Quantitative Functional Analysis:   LVEF: 66 %  Impression: NORMAL MYOCARDIAL PERFUSION  1. The perfusion scan is free of evidence for myocardial ischemia or injury.   2. Resting wall motion is physiologic.   3. Resting LV function is normal.   4. The ventricular volumes are normal at rest and stress.   5. The extracardiac distribution of radioactivity is normal.   6. There was no previous study available to compare.

## 2019-08-19 NOTE — NURSING
Pt arrived to room 301 from ICU with Asael and Jaki transport and ICU nurse respectively via wheelchair. Pt on O2 at 2 liters. Pt transferred from wheelchair to bed with assist. Pt SOB with exerction. Pt O2 increased to 3 liters then decreased to 2 liters once recovered. Pt urinated while in wheelchair. Pt was cleaned and new gown.

## 2019-08-19 NOTE — PROGRESS NOTES
[facility]  Hematology/Oncology  Progress Note    Patient Name: Yasmeen Palm  Admission Date: 8/16/2019  Hospital Length of Stay: 1 days  Code Status: Full Code     Subjective:     Interval History:     08/19/19: Transferred to ICU yesterday for AFib RVR. Tolerating current dose of Apixaban.   08/18/19: No significant edema of jorge LE    Oncology Treatment Plan:   [No treatment plan]    Medications:  Continuous Infusions:   amiodarone in dextrose 5% 0.5 mg/min (08/19/19 0500)     Scheduled Meds:   apixaban  5 mg Oral BID    atorvastatin  80 mg Oral QHS    cefTRIAXone (ROCEPHIN) IVPB  1 g Intravenous Daily    insulin aspart U-100  20 Units Subcutaneous Daily    insulin aspart U-100  5 Units Subcutaneous TIDWM    insulin detemir U-100  8 Units Subcutaneous BID    ipratropium  0.5 mg Nebulization Q4H    levalbuterol  0.63 mg Nebulization Q8H    pantoprazole  40 mg Oral Daily    vancomycin (VANCOCIN) IVPB  1,000 mg Intravenous Q24H     PRN Meds:acetaminophen, dextrose 50%, dextrose 50%, glucagon (human recombinant), glucose, glucose, insulin aspart U-100, metoprolol, ondansetron, ramelteon, sodium chloride 0.9%, traMADol     Review of Systems     No fever  Baseline sob  No cp  Intermittent abdominal pain    Objective:     Vital Signs (Most Recent):  Temp: 98.2 °F (36.8 °C) (08/19/19 0301)  Pulse: 98 (08/19/19 0500)  Resp: 19 (08/19/19 0500)  BP: (!) 143/71 (08/19/19 0500)  SpO2: 100 % (08/19/19 0500) Vital Signs (24h Range):  Temp:  [97.9 °F (36.6 °C)-98.5 °F (36.9 °C)] 98.2 °F (36.8 °C)  Pulse:  [] 98  Resp:  [14-25] 19  SpO2:  [91 %-100 %] 100 %  BP: ()/(39-76) 143/71     Weight: 77.4 kg (170 lb 10.2 oz)  Body mass index is 26.73 kg/m².  Body surface area is 1.91 meters squared.      Intake/Output Summary (Last 24 hours) at 8/19/2019 0611  Last data filed at 8/19/2019 0500  Gross per 24 hour   Intake 2126.39 ml   Output 500 ml   Net 1626.39 ml       Physical Exam     NAD,  resting in ICU bed   NC/AT  Conj slightly pale   Decreased air entry laterally  S1s2,RR   Soft, + BS   Some pedal edema jorge  A&O x 2  Calm     Significant Labs:   CBC:   Recent Labs   Lab 08/17/19  0808 08/18/19  0943   WBC 13.41* 24.09*   HGB 10.3* 8.9*   HCT 36.8* 32.3*   * 354*   , CMP:   Recent Labs   Lab 08/17/19  1507 08/18/19  0943 08/19/19  0403    141 142   K 4.6 4.7 4.4   CL 98 104 109   CO2 28 25 25   * 375* 197*   BUN 29* 38* 32*   CREATININE 1.5* 1.6* 0.9   CALCIUM 9.3 8.5* 8.0*   PROT 6.6 5.9* 5.7*   ALBUMIN 2.9* 2.6* 2.5*   BILITOT 0.2 0.2 0.1   ALKPHOS 66 55 57   AST 10 10 14   ALT 8* 6* 9*   ANIONGAP 13 12 8   EGFRNONAA 36* 33* >60   , Coagulation: No results for input(s): PT, INR, APTT in the last 48 hours., LDH: No results for input(s): LDHCSF, BFSOURCE in the last 48 hours., Reticulocytes: No results for input(s): RETIC in the last 48 hours., Tumor Markers: No results for input(s): PSA, CEA, , AFPTM, [id number],  in the last 48 hours.    Invalid input(s): ALGTM and Uric Acid No results for input(s): URICACID in the last 48 hours.    Diagnostic Results:     Ref. Range 11/2/2017 22:11 8/17/2019 15:07   D-Dimer Latest Ref Range: <0.50 mg/L FEU 0.61 (H) 0.43       Assessment/Plan:     Active Diagnoses:    Diagnosis Date Noted POA    PRINCIPAL PROBLEM:  Atrial fibrillation with RVR [I48.91] 07/14/2018 Yes    Abnormal urinalysis [R82.90] 08/17/2019 Yes    Deep vein thrombosis (DVT) of distal vein of lower extremity [I82.4Z9] 08/16/2019 Yes    Essential hypertension [I10] 03/24/2019 Yes     Chronic    Hyperlipidemia [E78.5] 03/24/2019 Yes     Chronic    Sinus tachycardia [R00.0] 12/08/2017 No     Chronic    Thoracic aorta atherosclerosis [I70.0] 08/12/2017 Yes    Chronic respiratory failure with hypoxia [J96.11] 04/10/2017 Yes     Chronic    Controlled type 2 diabetes mellitus, without long-term current use of insulin [E11.9] 12/14/2015 Yes     Chronic      Problems  Resolved During this Admission:    Diagnosis Date Noted Date Resolved POA    Acute exacerbation of chronic obstructive pulmonary disease (COPD) [J44.1] 12/08/2017 06/12/2019 Yes     Ms. Palm, 68 YO lady with multiple medical conditions with recurrent DVT of LE.      1. Recurrent DVT: US reviewed              -  Ddimer level within the NL range and I do not think she's having new thrombus burden              - I am concerned about non compliance with medication rather than anticoagulant failure.    -If there is a question about Rivaroxaban failure change to Apixaban 5mg bid    - tolerating Apixaban 5mg bid    - advised pt on current tx plan- she voiced understanding      2. SOB: chronic              - per primary team     3. Afib: rate controlled and anticoagulated   - pt in icu   - per cardiology         Francois Lara M.D  Internal Medicine & Geriatric Medicine  Hematology & Oncology  Palliative Medicine    [address], [address]  [place] [address]  [phone] (Office)  [phone] (Fax)

## 2019-08-19 NOTE — PROGRESS NOTES
[facility]  Cardiology  Progress Note    Patient Name: Yasmeen Palm  MRN: [id number]  Admission Date: 8/16/2019  Hospital Length of Stay: 1 days  Code Status: Full Code   Attending Physician: Michelle Dumont MD   Primary Care Physician: Angel Orourke Jr, MD  Expected Discharge Date:   Principal Problem:Atrial fibrillation with RVR    Subjective:     Hospital Course:   8/16/19: adm with COPD exac  8/17/19: AF/RVR  8/18/19: converted to Stach on amio gtt    Interval Hx: pt seen in ICU, case d/w RN.  Complains of dysuria.  No cp/palps, sob improving.    Tele: sinus tachy 110s (personally reviewed and interpreted)      Past Medical History:   Diagnosis Date    Anticoagulant long-term use     Xarelto    Arthritis     Asthma     CHF (congestive heart failure)     COPD (chronic obstructive pulmonary disease)     Coronary artery disease     Deep vein thrombosis (DVT) of left lower extremity     Depression     Diabetes mellitus     GERD (gastroesophageal reflux disease)     Hypertension     Obstructive sleep apnea on CPAP     On home oxygen therapy     Unsteady gait     uses either walker or 4 prong cane       Past Surgical History:   Procedure Laterality Date    CORONARY ANGIOPLASTY WITH STENT PLACEMENT      HERNIA REPAIR      encapsulated umbilical hernia       Review of patient's allergies indicates:  No Known Allergies    No current facility-administered medications on file prior to encounter.      Current Outpatient Medications on File Prior to Encounter   Medication Sig    acetaminophen (TYLENOL) 500 MG tablet Take 1 tablet (500 mg total) by mouth every 8 (eight) hours as needed.    albuterol (ACCUNEB) 1.25 mg/3 mL Nebu Inhale 1.25 mg into the lungs.    albuterol-ipratropium (DUO-NEB) 2.5 mg-0.5 mg/3 mL nebulizer solution Take 3 mLs by nebulization every 6 (six) hours. Rescue    aspirin (ECOTRIN) 81 MG EC tablet Take 81 mg by mouth once daily.    diltiaZEM (CARDIZEM) 90  MG tablet Take 90 mg by mouth.    ferrous gluconate (FERGON) 324 MG tablet Take 1 tablet (324 mg total) by mouth 3 (three) times daily with meals.    furosemide (LASIX) 40 MG tablet Take 40 mg by mouth once daily.     gabapentin (NEURONTIN) 300 MG capsule Take 300 mg by mouth.    hydrALAZINE (APRESOLINE) 50 MG tablet Take 50 mg by mouth 2 (two) times daily.    isosorbide mononitrate (IMDUR) 30 MG 24 hr tablet Take 3 tablets (90 mg total) by mouth once daily.    lisinopril (PRINIVIL,ZESTRIL) 40 MG tablet Take 1 tablet (40 mg total) by mouth once daily. Do not take this medication if blood pressure is below 130/80 mmHg and/or feeling dizzy after taking all other blood pressure meds    metFORMIN (GLUCOPHAGE) 1000 MG tablet Take 1 tablet (1,000 mg total) by mouth 2 (two) times daily with meals.    multivit-min-FA-lycopen-lutein (CENTRUM SILVER) 0.4-300-250 mg-mcg-mcg Tab Take 1 tablet by mouth once daily.    pantoprazole (PROTONIX) 40 MG tablet Take 40 mg by mouth once daily.    pregabalin (LYRICA) 75 MG capsule Take 75 mg by mouth 2 (two) times daily.    rivaroxaban (XARELTO) 20 mg Tab Take 20 mg by mouth daily with dinner or evening meal.     traMADol (ULTRAM) 50 mg tablet     acetaminophen (TYLENOL) 500 MG tablet Take 500 mg by mouth.    albuterol (PROVENTIL) 2.5 mg /3 mL (0.083 %) nebulizer solution Take 2.5 mg by nebulization every 6 (six) hours as needed.    amiodarone (PACERONE) 200 MG Tab Take 1 tablet (200 mg total) by mouth once daily.    ANORO ELLIPTA 62.5-25 mcg/actuation DsDv     aspirin (ECOTRIN) 81 MG EC tablet Take 81 mg by mouth.    cefUROXime (CEFTIN) 500 MG tablet Take 1 tablet (500 mg total) by mouth every 12 (twelve) hours.    fluticasone propionate (FLOVENT HFA) 220 mcg/actuation inhaler Inhale 1 puff into the lungs 2 (two) times daily. Controller    ipratropium-albuterol (COMBIVENT)  mcg/actuation inhaler Inhale 1 puff into the lungs every 6 (six) hours as needed for  "Wheezing or Shortness of Breath. Rescue    isosorbide mononitrate (IMDUR) 30 MG 24 hr tablet Take 30 mg by mouth.    methylPREDNISolone (MEDROL DOSEPACK) 4 mg tablet use as directed    nitroGLYCERIN (NITROSTAT) 0.4 MG SL tablet     umeclidinium brm/vilanterol tr (ANORO ELLIPTA INHL) Inhale into the lungs.     Family History     Problem Relation (Age of Onset)    Diabetes Father    Heart disease Mother    Hypertension Mother        Tobacco Use    Smoking status: Former Smoker     Packs/day: 0.50     Years: 55.00     Pack years: 27.50     Types: Cigarettes     Start date: 1963     Last attempt to quit: 2018     Years since quittin.6    Smokeless tobacco: Never Used    Tobacco comment: "States she quit smoking about 3 or 4 weeks ago"   Substance and Sexual Activity    Alcohol use: Not Currently     Alcohol/week: 0.6 oz     Types: 1 Glasses of wine per week     Frequency: Never     Comment: socially    Drug use: No    Sexual activity: Never     Review of Systems   Gastrointestinal: Negative for melena.   Genitourinary: Negative for hematuria.     Objective:     Vital Signs (Most Recent):  Temp: 98.3 °F (36.8 °C) (19 0700)  Pulse: 98 (19)  Resp: 15 (19)  BP: (!) 155/78 (19 08)  SpO2: 100 % (19) Vital Signs (24h Range):  Temp:  [97.9 °F (36.6 °C)-98.3 °F (36.8 °C)] 98.3 °F (36.8 °C)  Pulse:  [] 98  Resp:  [14-25] 15  SpO2:  [91 %-100 %] 100 %  BP: ()/(39-78) 155/78     Weight: 77.4 kg (170 lb 10.2 oz)  Body mass index is 26.73 kg/m².    SpO2: 100 %  O2 Device (Oxygen Therapy): room air      Intake/Output Summary (Last 24 hours) at 2019 1006  Last data filed at 2019 0600  Gross per 24 hour   Intake 2143.09 ml   Output 600 ml   Net 1543.09 ml       Lines/Drains/Airways     Peripheral Intravenous Line                 Peripheral IV - Single Lumen 19 1200 20 G Posterior;Right Hand less than 1 day         Peripheral IV - Single " Lumen 08/19/19 0539 20 G Anterior;Right Upper Arm less than 1 day                Physical Exam   Constitutional: She is oriented to person, place, and time. She appears well-developed and well-nourished. No distress.   HENT:   Head: Normocephalic and atraumatic.   Mouth/Throat: No oropharyngeal exudate.   Eyes: Pupils are equal, round, and reactive to light. Conjunctivae and EOM are normal. No scleral icterus.   Neck: Normal range of motion. Neck supple. No JVD present. No tracheal deviation present. No thyromegaly present.   Cardiovascular: Regular rhythm, S1 normal and S2 normal. Tachycardia present. Exam reveals distant heart sounds. Exam reveals no gallop and no friction rub.   No murmur heard.  Pulmonary/Chest: Effort normal. No respiratory distress. She has no wheezes. She has no rales. She exhibits no tenderness.   Poor air entry bilat   Abdominal: Soft. She exhibits no distension.   Musculoskeletal: Normal range of motion. She exhibits no edema.   Neurological: She is alert and oriented to person, place, and time. No cranial nerve deficit.   Skin: Skin is warm and dry. She is not diaphoretic.   Psychiatric: She has a normal mood and affect. Her behavior is normal. Judgment normal.       Current Medications:   apixaban  5 mg Oral BID    atorvastatin  80 mg Oral QHS    cefTRIAXone (ROCEPHIN) IVPB  1 g Intravenous Daily    insulin aspart U-100  5 Units Subcutaneous TIDWM    insulin detemir U-100  8 Units Subcutaneous BID    ipratropium  0.5 mg Nebulization Q4H    levalbuterol  0.63 mg Nebulization Q8H    pantoprazole  40 mg Oral Daily    vancomycin (VANCOCIN) IVPB  1,000 mg Intravenous Q24H      amiodarone in dextrose 5% 0.5 mg/min (08/19/19 0600)     acetaminophen, dextrose 50%, dextrose 50%, glucagon (human recombinant), glucose, glucose, insulin aspart U-100, metoprolol, ondansetron, ramelteon, sodium chloride 0.9%, traMADol    Laboratory (all labs reviewed):  CBC:  Recent Labs   Lab 07/19/19  7976  08/16/19  1705 08/17/19  0808 08/18/19  0943 08/19/19  0403   WBC 10.01 14.27 H 13.41 H 24.09 H 17.27 H   Hemoglobin 8.5 L 8.8 L 10.3 L 8.9 L 8.7 L   Hematocrit 31.1 L 33.6 L 36.8 L 32.3 L 31.5 L   Platelets 386 H 323 385 H 354 H 258       CHEMISTRIES:  Recent Labs   Lab 05/12/19  1215 06/07/19  2033  06/09/19  0504  07/02/19  0549  07/19/19  2121 08/16/19  1705 08/17/19  0808 08/17/19  1507 08/18/19  0943 08/19/19  0403   Glucose 100 203 H   < > 459 HH   < > 379 H   < > 120 H 184 H 337 H 553  H 197 H   Sodium 145 135 L   < > 138   < > 143   < > 142 146 H 142 139 141 142   Potassium 5.6 H 4.5   < > 5.1   < > 5.0   < > 4.8 4.6 4.7 4.6 4.7 4.4   BUN, Bld 9 19   < > 40 H   < > 32 H   < > 13 16 19 29 H 38 H 32 H   Creatinine 0.7 0.8   < > 1.3   < > 1.2   < > 0.9 0.8 1.0 1.5 H 1.6 H 0.9   eGFR if African American >60 >60   < > 49 A   < > 54 A   < > >60 >60 >60 41 A 38 A >60   eGFR if non African American >60 >60   < > 43 A   < > 47 A   < > >60 >60 58 A 36 A 33 A >60   Calcium 9.3 9.0   < > 9.6   < > 9.4   < > 8.9 9.3 9.6 9.3 8.5 L 8.0 L   Magnesium 1.7 1.6  --  1.7  --  2.2  --  1.6  --   --   --   --   --     < > = values in this interval not displayed.       CARDIAC BIOMARKERS:  Recent Labs   Lab 12/09/18  0514  08/16/19 2022 08/17/19  0204 08/17/19  0808 08/17/19  1818 08/17/19  2328   CPK 31  --   --   --   --   --   --    CPK MB 1.9  --   --   --   --   --   --    Troponin I 0.035 H   < > 0.016 0.025 0.021 0.071 H 0.061 H    < > = values in this interval not displayed.       COAGS:  Recent Labs   Lab 12/08/18  1747 12/09/18  0514 03/24/19  1822 06/24/19  1245 07/01/19  0040   INR 1.1 1.1 1.1 1.0 1.0       LIPIDS/LFTS:  Recent Labs   Lab 08/12/17  1037  12/08/18  1711  08/16/19  1705 08/17/19  0808 08/17/19  1507 08/18/19  0943 08/19/19  0403   Cholesterol 243 H  --  246 H  --   --  271 H  --   --   --    Triglycerides 93  --  95  --   --  108  --   --   --    HDL 43  --  55  --   --  53  --   --   --    LDL  Cholesterol 181.4 H  --  172.0 H  --   --  196.4 H  --   --   --    Non-HDL Cholesterol 200  --  191  --   --  218  --   --   --    AST  --    < > 13   < > 10 9 L 10 10 14   ALT  --    < > 7 L   < > 7 L 8 L 8 L 6 L 9 L    < > = values in this interval not displayed.       BNP:  Recent Labs   Lab 06/07/19  2033 06/24/19  1245 07/01/19  0040 07/19/19  2121 08/16/19  1705    H 118 H 362 H 56 140 H       TSH:  Recent Labs   Lab 06/24/18  1413 12/08/18  1743 06/07/19  2033   TSH 0.754 0.438 0.699       Free T4:  Recent Labs   Lab 06/24/18  1413 06/07/19 2033 06/08/19  0351   Free T4 1.09 1.01 1.03       Diagnostic Results:  ECG (personally reviewed and interpreted tracing(s)):  8/16/19 1459 , PRWP  8/18/19 tele 0539 sinus tachy-> AF/RVR    Chest X-Ray (personally reviewed and interpreted image(s)): 8/16/19 ?LLL infil    CTA Chest 8/16/19  1. No evidence of PE.  No acute intrathoracic abnormalities identified.  2. Centrilobular emphysema.  3. Extensive calcified and noncalcified plaque throughout the aorta with multifocal small outpouchings suggestive for penetrating ulcers.  Appearance is overall similar compared to prior CTA from June 2018.    LE venous US 8/16/19  Bilateral lower extremity deep venous thrombosis as above.  Compared with previous ultrasound from 06/18/2019, there has been resolution of some previous areas of thrombosis, however some new areas of thrombosis are now seen.    Echo: 6/9/19  · Normal left ventricular systolic function. The estimated ejection fraction is 70%  · Moderate concentric left ventricular hypertrophy.  · Grade I (mild) left ventricular diastolic dysfunction consistent with impaired relaxation.  · Normal left atrial pressure.  · Moderate left atrial enlargement.  · Mild right atrial enlargement.  · Mild mitral regurgitation.  · Mild tricuspid regurgitation.  · The estimated PA systolic pressure is 80 mm Hg  · Intermediate central venous pressure (8 mm Hg).  · Pulmonary  "hypertension present.    Cath 6/27/18   LVEDP: 9mmHg  LVEF: 50% by echo  Wall Motion: LAD WMA by echo  Dominance: Right  LM: MLI  LAD: MLI  LCx: MLI  RCA: dom, mid 40%  Hemostasis:  R Radial band  Impression:  NSTEMI  Nonobst CAD  Normal LV fxn  R rad vasband for hemostasis  Plan:  Cont med rx  Cont ASA 81mg qd  Stop Plavix  Restart Xarelto 20mg qd this pm  Pt to follow up with Dr. Pulido in 2 weeks     LLE venous US 1/24/17  Interval decrease in overall thrombus burden of the left lower extremity, although there is persistent deep venous thrombus present within the left femoral and popliteal veins.     Stress Test: L MPI 12/15/15  Nuclear Quantitative Functional Analysis:   LVEF: 66 %  Impression: NORMAL MYOCARDIAL PERFUSION  1. The perfusion scan is free of evidence for myocardial ischemia or injury.   2. Resting wall motion is physiologic.   3. Resting LV function is normal.   4. The ventricular volumes are normal at rest and stress.   5. The extracardiac distribution of radioactivity is normal.   6. There was no previous study available to compare.    Assessment and Plan:     * Atrial fibrillation with RVR  Admission compliant appears to be COPD exacerbation.  Pt in SR on adm and prev on Xarelto (since switched to eliquis for persistent DVT without PE).  Noted to develop AF/RVR on 8/18/19 at 0539 (tele personally reviewed, see media tab).  Now back in SR after amio gtt overnight.  AF likely being driven by pulm issues as she was in sinus tachy prior to AF.  Cont eliquis  Stop amio gtt and start PO dilt (try to avoid amio given underlying lung disease).    Chronic respiratory failure with hypoxia  Per IM    Essential hypertension  Cont med rx  Start dilt PO    Deep vein thrombosis (DVT) of distal vein of lower extremity  Cont eliquis  No PE on CTA    Thoracic aorta atherosclerosis  ?stable "penetrating ulcers" on CTA Chest  Start high dose atorva 80mg qhs  Check lipids/LFT 3 months (mid Nov " 2019)    Hyperlipidemia  Start atorva 80mg qhs  Check lipids/LFT 3 months (mid Nov 2019)    Controlled type 2 diabetes mellitus, without long-term current use of insulin  Per IM        VTE Risk Mitigation (From admission, onward)        Ordered     apixaban tablet 5 mg  2 times daily      08/17/19 1414        Pt seen in ICU.  OK for transfer for transfer to [facility].  Cardiology will sign off, pls call with questions.  Pt to follow up with me after discharge.    Laureano Pulido MD  Cardiology  [facility]

## 2019-08-19 NOTE — PROGRESS NOTES
[facility] Medicine  Progress Note    Patient Name: Yasmeen Palm  MRN: [id number]  Patient Class: IP- Inpatient   Admission Date: 8/16/2019  Length of Stay: 1 days  Attending Physician: Michelle Dumont MD  Primary Care Provider: Angel Orourke Jr, MD        Subjective:     Principal Problem:Atrial fibrillation with RVR        HPI:  Yasmeen Palm is a 69 y.o. sorely deconditioned patient with essential hypertension, DMII (HbA1c 8.1% July 2019), HLD (.0 Dec 2018), paroxysmal atrial fibrillation (EPH2DS9-FDQe score 4) on chronic anticoagulation, COPD, chronic respiratory failure with hypoxia and hypercapnia uses 2-3L home oxygen, GERD, anemia of chronic disease, tobacco abuse, and history of PE/DVT on chronic anticoagulation.    Patient presented with complaints of dyspnea that began in early morning prior to presentation.  The dyspnea is mainly exertional.  She denies any fevers, chills, nausea, vomiting, pleurisy, diaphoresis, palpitations, sick contracts, recent long trips, or hemoptysis.    Initial workup shows mildly elevated WBC (on prednisone) thought to be reactive, hypernatremia       Overview/Hospital Course:  08/17/19  -Tachycardia sustained in 130's, Discontinued the albuterol and added xopenex Q8H/altrovent Q4H, metoprolol X 2 doses IV, cautious gentle fluids as patient has been getting lasix IV 40 mg BID as ordered in ED   -WBC 14K; her BUN/creatine/GFR 16/0.8/60 overnight now 29/1.5/41; abnormal urinalysis; awaiting culture; start rocephin  -DVT - failed therapy unclear compliance - was on xarelto; failed therapy, refused warfarin - consulted to Hematology - patient agreed to start apixaban    08/19/19  0615am----  -Paged by nursing staff patient converted to afib rvr with 90 systolic bp. Came in with acute and chronic LE DVT was on xarelto at home unclear compliance. Refused to be converted to warfarin -  Hematology consulted and recommended apixban instead  which was initiated. CTA neg PE at presentation.     One day prior patient with ST and decreased renal functions the night before from normal to creatine 1.5. Noted to be in ST and administered metoprolol X 2 and urinalysis sent which was abnormal. Started on one dose IV rocephin and then oral to start today 8/18/19 to prevent/avoid FVO.      Known LVEF 70% with GI DD - pul htn PA pressure 80 - on home oxygen at 2-3L.therefore - Gentle fluids X 5h administered as thought to be overdiuressed at that time as she had been placed on IV lasix 40 mg BID from the ED. Her HR improved about 5pm 8/17/19.    0844 am:   - Patient's HR remains 149-155 BP 88/56 after metoprolol IV - transfer to ICU with amio  - Urine culture positive e.coli & staph aeurus - start Vanc, and rocephin,  - New DVT - continue apixaban  - LOYDA/chronic respiratory impairment/COPD - bipap nightly,  Converted to sinus,cardiogy started on Cardizem,stable go back to [facility].    Past Medical History:   Diagnosis Date    Anticoagulant long-term use     Xarelto    Arthritis     Asthma     CHF (congestive heart failure)     COPD (chronic obstructive pulmonary disease)     Coronary artery disease     Deep vein thrombosis (DVT) of left lower extremity     Depression     Diabetes mellitus     GERD (gastroesophageal reflux disease)     Hypertension     Obstructive sleep apnea on CPAP     On home oxygen therapy     Unsteady gait     uses either walker or 4 prong cane       Past Surgical History:   Procedure Laterality Date    CORONARY ANGIOPLASTY WITH STENT PLACEMENT      HERNIA REPAIR      encapsulated umbilical hernia       Review of patient's allergies indicates:  No Known Allergies    No current facility-administered medications on file prior to encounter.      Current Outpatient Medications on File Prior to Encounter   Medication Sig    acetaminophen (TYLENOL) 500 MG tablet Take 1 tablet (500 mg total) by mouth every 8 (eight) hours as needed.     albuterol (ACCUNEB) 1.25 mg/3 mL Nebu Inhale 1.25 mg into the lungs.    albuterol-ipratropium (DUO-NEB) 2.5 mg-0.5 mg/3 mL nebulizer solution Take 3 mLs by nebulization every 6 (six) hours. Rescue    aspirin (ECOTRIN) 81 MG EC tablet Take 81 mg by mouth once daily.    diltiaZEM (CARDIZEM) 90 MG tablet Take 90 mg by mouth.    ferrous gluconate (FERGON) 324 MG tablet Take 1 tablet (324 mg total) by mouth 3 (three) times daily with meals.    furosemide (LASIX) 40 MG tablet Take 40 mg by mouth once daily.     gabapentin (NEURONTIN) 300 MG capsule Take 300 mg by mouth.    hydrALAZINE (APRESOLINE) 50 MG tablet Take 50 mg by mouth 2 (two) times daily.    isosorbide mononitrate (IMDUR) 30 MG 24 hr tablet Take 3 tablets (90 mg total) by mouth once daily.    lisinopril (PRINIVIL,ZESTRIL) 40 MG tablet Take 1 tablet (40 mg total) by mouth once daily. Do not take this medication if blood pressure is below 130/80 mmHg and/or feeling dizzy after taking all other blood pressure meds    metFORMIN (GLUCOPHAGE) 1000 MG tablet Take 1 tablet (1,000 mg total) by mouth 2 (two) times daily with meals.    multivit-min-FA-lycopen-lutein (CENTRUM SILVER) 0.4-300-250 mg-mcg-mcg Tab Take 1 tablet by mouth once daily.    pantoprazole (PROTONIX) 40 MG tablet Take 40 mg by mouth once daily.    pregabalin (LYRICA) 75 MG capsule Take 75 mg by mouth 2 (two) times daily.    rivaroxaban (XARELTO) 20 mg Tab Take 20 mg by mouth daily with dinner or evening meal.     traMADol (ULTRAM) 50 mg tablet     acetaminophen (TYLENOL) 500 MG tablet Take 500 mg by mouth.    albuterol (PROVENTIL) 2.5 mg /3 mL (0.083 %) nebulizer solution Take 2.5 mg by nebulization every 6 (six) hours as needed.    amiodarone (PACERONE) 200 MG Tab Take 1 tablet (200 mg total) by mouth once daily.    ANORO ELLIPTA 62.5-25 mcg/actuation DsDv     aspirin (ECOTRIN) 81 MG EC tablet Take 81 mg by mouth.    cefUROXime (CEFTIN) 500 MG tablet Take 1 tablet (500 mg total)  "by mouth every 12 (twelve) hours.    fluticasone propionate (FLOVENT HFA) 220 mcg/actuation inhaler Inhale 1 puff into the lungs 2 (two) times daily. Controller    ipratropium-albuterol (COMBIVENT)  mcg/actuation inhaler Inhale 1 puff into the lungs every 6 (six) hours as needed for Wheezing or Shortness of Breath. Rescue    isosorbide mononitrate (IMDUR) 30 MG 24 hr tablet Take 30 mg by mouth.    methylPREDNISolone (MEDROL DOSEPACK) 4 mg tablet use as directed    nitroGLYCERIN (NITROSTAT) 0.4 MG SL tablet     umeclidinium brm/vilanterol tr (ANORO ELLIPTA INHL) Inhale into the lungs.     Family History     Problem Relation (Age of Onset)    Diabetes Father    Heart disease Mother    Hypertension Mother        Tobacco Use    Smoking status: Former Smoker     Packs/day: 0.50     Years: 55.00     Pack years: 27.50     Types: Cigarettes     Start date: 1963     Last attempt to quit: 2018     Years since quittin.6    Smokeless tobacco: Never Used    Tobacco comment: "States she quit smoking about 3 or 4 weeks ago"   Substance and Sexual Activity    Alcohol use: Not Currently     Alcohol/week: 0.6 oz     Types: 1 Glasses of wine per week     Frequency: Never     Comment: socially    Drug use: No    Sexual activity: Never     Review of Systems   Constitutional: Positive for activity change, appetite change and chills. Negative for diaphoresis and fever.   HENT: Negative for congestion, hearing loss, sore throat, tinnitus and trouble swallowing.    Eyes: Negative for photophobia, discharge, itching and visual disturbance.   Respiratory: Negative for apnea, cough, shortness of breath, wheezing and stridor.    Cardiovascular: Negative for chest pain, palpitations and leg swelling.   Gastrointestinal: Negative for abdominal distention, abdominal pain, blood in stool, constipation, diarrhea and nausea.   Endocrine: Negative for polydipsia, polyphagia and polyuria.   Genitourinary: Positive for " dysuria. Negative for difficulty urinating, flank pain and frequency.   Musculoskeletal: Negative for arthralgias, joint swelling and neck stiffness.   Skin: Negative for color change, rash and wound.   Neurological: Positive for weakness. Negative for dizziness, tremors, seizures, light-headedness, numbness and headaches.   Hematological: Negative for adenopathy.   Psychiatric/Behavioral: Negative for hallucinations and self-injury.     Objective:     Vital Signs (Most Recent):  Temp: 98.3 °F (36.8 °C) (08/19/19 0700)  Pulse: 98 (08/19/19 0823)  Resp: 15 (08/19/19 0823)  BP: (!) 155/78 (08/19/19 0800)  SpO2: 100 % (08/19/19 0823) Vital Signs (24h Range):  Temp:  [97.9 °F (36.6 °C)-98.3 °F (36.8 °C)] 98.3 °F (36.8 °C)  Pulse:  [] 98  Resp:  [14-25] 15  SpO2:  [91 %-100 %] 100 %  BP: ()/(39-78) 155/78     Weight: 77.4 kg (170 lb 10.2 oz)  Body mass index is 26.73 kg/m².    Physical Exam   Constitutional: She is oriented to person, place, and time. She appears well-developed. She is cooperative.   Deconditioned;needy; fatigued; generalized weakness   HENT:   Head: Normocephalic and atraumatic.   Eyes: Pupils are equal, round, and reactive to light. Conjunctivae, EOM and lids are normal.   Neck: Normal range of motion and full passive range of motion without pain. Neck supple. No JVD present. No edema present. No thyroid mass present.   Cardiovascular: Normal rate, regular rhythm, S1 normal, S2 normal and intact distal pulses.   No murmur heard.  Pulmonary/Chest: Effort normal.   Abdominal: Soft. Bowel sounds are normal. She exhibits no distension and no abdominal bruit. There is no splenomegaly or hepatomegaly. There is no tenderness. There is no CVA tenderness.   Musculoskeletal:   Denies pain; generalized weakness   Lymphadenopathy:     She has no cervical adenopathy.     She has no axillary adenopathy.   Neurological: She is alert and oriented to person, place, and time. She has normal reflexes. She  displays no tremor. She displays no seizure activity.   Skin: Skin is warm, dry and intact.   Psychiatric: She has a normal mood and affect. Her speech is normal. Thought content normal. Cognition and memory are normal.         CRANIAL NERVES     CN III, IV, VI   Pupils are equal, round, and reactive to light.  Extraocular motions are normal.        Significant Labs:   CBC:   Recent Labs   Lab 08/18/19  0943 08/19/19  0403   WBC 24.09* 17.27*   HGB 8.9* 8.7*   HCT 32.3* 31.5*   * 258     CMP:   Recent Labs   Lab 08/17/19  1507 08/18/19  0943 08/19/19  0403    141 142   K 4.6 4.7 4.4   CL 98 104 109   CO2 28 25 25   * 375* 197*   BUN 29* 38* 32*   CREATININE 1.5* 1.6* 0.9   CALCIUM 9.3 8.5* 8.0*   PROT 6.6 5.9* 5.7*   ALBUMIN 2.9* 2.6* 2.5*   BILITOT 0.2 0.2 0.1   ALKPHOS 66 55 57   AST 10 10 14   ALT 8* 6* 9*   ANIONGAP 13 12 8   EGFRNONAA 36* 33* >60     Lactic Acid:   No results for input(s): LACTATE in the last 48 hours.    Troponin:   Recent Labs   Lab 08/17/19  1818 08/17/19  2328   TROPONINI 0.071* 0.061*     TSH:   Recent Labs   Lab 06/07/19  2033   TSH 0.699       Significant Imaging:     Imaging Results           US Lower Extremity Veins Bilateral (Final result)  Result time 08/16/19 20:38:59    Final result by Davon Sandoval MD (08/16/19 20:38:59)                 Impression:      Bilateral lower extremity deep venous thrombosis as above.  Compared with previous ultrasound from 06/18/2019, there has been resolution of some previous areas of thrombosis, however some new areas of thrombosis are now seen.    This report was flagged in Epic as abnormal.      Electronically signed by: Davon Sandoval MD  Date:    08/16/2019  Time:    20:38             Narrative:    EXAMINATION:  US LOWER EXTREMITY VEINS BILATERAL    CLINICAL HISTORY:  swelling;    TECHNIQUE:  Duplex and color flow Doppler evaluation of the bilateral lower extremity veins was  performed.    COMPARISON:  06/08/2019.    FINDINGS:  Right lower extremity:    Thrombosis is seen within 1 of the paired right posterior tibial veins (new).  No evidence of clot involving the right common femoral, femoral, greater saphenous, popliteal, anterior tibial, and peroneal veins.  Previously visualized thrombosis within the right femoral vein is no longer seen.    Left lower extremity: Thrombosis is seen within the distal femoral vein with partial thrombosis seen of the popliteal vein (partially resolve).  Thrombosis is also visualized within the posterior tibial vein (new).  No evidence of clot involving the left common femoral, greater saphenous, anterior tibial, and peroneal veins.                               CTA Chest Non-Coronary (PE Study) (Final result)  Result time 08/16/19 20:34:23    Final result by Davon Sandoval MD (08/16/19 20:34:23)                 Impression:      1. No evidence of PE.  No acute intrathoracic abnormalities identified.  2. Centrilobular emphysema.  3. Extensive calcified and noncalcified plaque throughout the aorta with multifocal small outpouchings suggestive for penetrating ulcers.  Appearance is overall similar compared to prior CTA from June 2018.      Electronically signed by: Davon Sandoval MD  Date:    08/16/2019  Time:    20:34             Narrative:    EXAMINATION:  CTA CHEST NON CORONARY    CLINICAL HISTORY:  sob;    TECHNIQUE:  Low dose axial images, sagittal and coronal reformations were obtained from the thoracic inlet to the lung bases following the IV administration of 90 mL of Omnipaque 350.  Contrast timing was optimized to evaluate the pulmonary arteries.  MIP images were performed.    COMPARISON:  CTA chest and abdomen from June 2018.    FINDINGS:  Structures at the base of the neck are unremarkable.  Extensive calcified and noncalcified plaque are seen throughout the aorta with small multifocal outpouchings again seen likely reflecting penetrating  ulcers.  Appearance appears overall unchanged compared to previous CT from 2018. Heart is normal in size without pericardial effusion.  No intraluminal filling defects within the pulmonary arteries to suggest pulmonary thromboembolism.   There is no evidence of mediastinal, axillary, or hilar lymph node enlargement.  Scattered air seen throughout the esophagus.    The trachea and bronchi are patent.  The lungs are symmetrically expanded.  Mild centrilobular emphysematous changes are seen with upper lobe predominance.  Mild bilateral scarring is seen.  There is interval enlargement of thin-walled lung cysts seen within the medial aspect of the right lower lobe.  No evidence of new focal consolidation, mass, pneumothorax, or pleural effusion.    The visualized abdominal structures are unremarkable.  Osseous structures demonstrate degenerative change.  Extrathoracic soft tissues are unremarkable.                               X-Ray Chest AP Portable (Final result)  Result time 08/16/19 17:41:37    Final result by Saud Musa MD (08/16/19 17:41:37)                 Impression:      No focal consolidation      Electronically signed by: Saud Musa MD  Date:    08/16/2019  Time:    17:41             Narrative:    EXAMINATION:  XR CHEST AP PORTABLE    CLINICAL HISTORY:  Chest Pain;    TECHNIQUE:  Single frontal view of the chest was performed.    COMPARISON:  Chest radiograph 07/19/2019    FINDINGS:  The lungs are clear.  The cardiac silhouette is stable.  The osseous and soft tissue structures demonstrate no acute abnormality.                                    Assessment/Plan:      * Atrial fibrillation with RVR  As noted in hospital course - HR now 140-->155; BP 88 systolic   Transfer to ICU for amiodarone infusion  Discontinue oral BP medication for now  Et[facility] nous cardiac monitoring  Monitor closely,    Converted to sinus,cardiogy started on Cardizem,stable go back to [facility].    Urinary tract infection  without hematuria  08/17/19  Abnormal urinalysis - tachycardia; start rocephin IV X 1 gram  Then keflex BID (avoid FVO)    08/18/19  Cultures showing e coli and staph aureus  Van & Rocephin for now        Deep vein thrombosis (DVT) of distal vein of lower extremity  Acute and chronic DVT's identified on DVT; CTA negative for PE; Patient has history and has been on anticoagulation Xarelto - endorses compliance. Discussed broader anticoagulation options but at this point the patient is not amenable to warfarin. She denies LE pain - edema is chronic  -consult to hematology for opinion  -continue xarelto for now (addendum seen by Hematology changed to apixaban)  -cta negative for PE      Essential hypertension  08/18/19  Decent control continue usual home medications    08/19/19  Hold oral BP meds for now      Sinus tachycardia  Patient with HR sustained in 130's in early morning - received IV metoprolol X 2 doses with improvement  -restarted usual diltiazem & Imdur  -gentle IV fluids X 5 Hours (overdiuressing)  -PRN metoprolol 10 mg q5 minus X 2 HR>100  -Stop albuterol      Chronic respiratory failure with hypoxia  LOYDA, uses home oxygen at 2-3L  CPAP at night --- patient uses Bipap instead, change order  Supplemental oxygen  Stop albuterol treatments and start xopenex/altrovent  Stop steroids       Controlled type 2 diabetes mellitus, without long-term current use of insulin  While hospitalized will use combined insulin therapy with basal and prandial insulin coverage, POCT glucose checks, hypoglycemic protocol and correction scale - HgA1c 8.1        VTE Risk Mitigation (From admission, onward)        Ordered     apixaban tablet 5 mg  2 times daily      08/17/19 1414          Critical care time spent on the evaluation and treatment of severe organ dysfunction, review of pertinent labs and imaging studies, discussions with consulting providers and discussions with patient/family: over 45  minutes.      Michelle  MD Julia  Department of Hospital Medicine   [facility]

## 2019-08-19 NOTE — PLAN OF CARE
Problem: Adult Inpatient Plan of Care  Goal: Patient-Specific Goal (Individualization)  Outcome: Ongoing (interventions implemented as appropriate)  Maintain oxygen saturation goals

## 2019-08-19 NOTE — SUBJECTIVE & OBJECTIVE
Past Medical History:   Diagnosis Date    Anticoagulant long-term use     Xarelto    Arthritis     Asthma     CHF (congestive heart failure)     COPD (chronic obstructive pulmonary disease)     Coronary artery disease     Deep vein thrombosis (DVT) of left lower extremity     Depression     Diabetes mellitus     GERD (gastroesophageal reflux disease)     Hypertension     Obstructive sleep apnea on CPAP     On home oxygen therapy     Unsteady gait     uses either walker or 4 prong cane       Past Surgical History:   Procedure Laterality Date    CORONARY ANGIOPLASTY WITH STENT PLACEMENT      HERNIA REPAIR      encapsulated umbilical hernia       Review of patient's allergies indicates:  No Known Allergies    No current facility-administered medications on file prior to encounter.      Current Outpatient Medications on File Prior to Encounter   Medication Sig    acetaminophen (TYLENOL) 500 MG tablet Take 1 tablet (500 mg total) by mouth every 8 (eight) hours as needed.    albuterol (ACCUNEB) 1.25 mg/3 mL Nebu Inhale 1.25 mg into the lungs.    albuterol-ipratropium (DUO-NEB) 2.5 mg-0.5 mg/3 mL nebulizer solution Take 3 mLs by nebulization every 6 (six) hours. Rescue    aspirin (ECOTRIN) 81 MG EC tablet Take 81 mg by mouth once daily.    diltiaZEM (CARDIZEM) 90 MG tablet Take 90 mg by mouth.    ferrous gluconate (FERGON) 324 MG tablet Take 1 tablet (324 mg total) by mouth 3 (three) times daily with meals.    furosemide (LASIX) 40 MG tablet Take 40 mg by mouth once daily.     gabapentin (NEURONTIN) 300 MG capsule Take 300 mg by mouth.    hydrALAZINE (APRESOLINE) 50 MG tablet Take 50 mg by mouth 2 (two) times daily.    isosorbide mononitrate (IMDUR) 30 MG 24 hr tablet Take 3 tablets (90 mg total) by mouth once daily.    lisinopril (PRINIVIL,ZESTRIL) 40 MG tablet Take 1 tablet (40 mg total) by mouth once daily. Do not take this medication if blood pressure is below 130/80 mmHg and/or feeling dizzy  after taking all other blood pressure meds    metFORMIN (GLUCOPHAGE) 1000 MG tablet Take 1 tablet (1,000 mg total) by mouth 2 (two) times daily with meals.    multivit-min-FA-lycopen-lutein (CENTRUM SILVER) 0.4-300-250 mg-mcg-mcg Tab Take 1 tablet by mouth once daily.    pantoprazole (PROTONIX) 40 MG tablet Take 40 mg by mouth once daily.    pregabalin (LYRICA) 75 MG capsule Take 75 mg by mouth 2 (two) times daily.    rivaroxaban (XARELTO) 20 mg Tab Take 20 mg by mouth daily with dinner or evening meal.     traMADol (ULTRAM) 50 mg tablet     acetaminophen (TYLENOL) 500 MG tablet Take 500 mg by mouth.    albuterol (PROVENTIL) 2.5 mg /3 mL (0.083 %) nebulizer solution Take 2.5 mg by nebulization every 6 (six) hours as needed.    amiodarone (PACERONE) 200 MG Tab Take 1 tablet (200 mg total) by mouth once daily.    ANORO ELLIPTA 62.5-25 mcg/actuation DsDv     aspirin (ECOTRIN) 81 MG EC tablet Take 81 mg by mouth.    cefUROXime (CEFTIN) 500 MG tablet Take 1 tablet (500 mg total) by mouth every 12 (twelve) hours.    fluticasone propionate (FLOVENT HFA) 220 mcg/actuation inhaler Inhale 1 puff into the lungs 2 (two) times daily. Controller    ipratropium-albuterol (COMBIVENT)  mcg/actuation inhaler Inhale 1 puff into the lungs every 6 (six) hours as needed for Wheezing or Shortness of Breath. Rescue    isosorbide mononitrate (IMDUR) 30 MG 24 hr tablet Take 30 mg by mouth.    methylPREDNISolone (MEDROL DOSEPACK) 4 mg tablet use as directed    nitroGLYCERIN (NITROSTAT) 0.4 MG SL tablet     umeclidinium brm/vilanterol tr (ANORO ELLIPTA INHL) Inhale into the lungs.     Family History     Problem Relation (Age of Onset)    Diabetes Father    Heart disease Mother    Hypertension Mother        Tobacco Use    Smoking status: Former Smoker     Packs/day: 0.50     Years: 55.00     Pack years: 27.50     Types: Cigarettes     Start date: 1/1/1963     Last attempt to quit: 12/31/2018     Years since quitting:  "0.6    Smokeless tobacco: Never Used    Tobacco comment: "States she quit smoking about 3 or 4 weeks ago"   Substance and Sexual Activity    Alcohol use: Not Currently     Alcohol/week: 0.6 oz     Types: 1 Glasses of wine per week     Frequency: Never     Comment: socially    Drug use: No    Sexual activity: Never     Review of Systems   Constitutional: Positive for activity change, appetite change and chills. Negative for diaphoresis and fever.   HENT: Negative for congestion, hearing loss, sore throat, tinnitus and trouble swallowing.    Eyes: Negative for photophobia, discharge, itching and visual disturbance.   Respiratory: Negative for apnea, cough, shortness of breath, wheezing and stridor.    Cardiovascular: Negative for chest pain, palpitations and leg swelling.   Gastrointestinal: Negative for abdominal distention, abdominal pain, blood in stool, constipation, diarrhea and nausea.   Endocrine: Negative for polydipsia, polyphagia and polyuria.   Genitourinary: Positive for dysuria. Negative for difficulty urinating, flank pain and frequency.   Musculoskeletal: Negative for arthralgias, joint swelling and neck stiffness.   Skin: Negative for color change, rash and wound.   Neurological: Positive for weakness. Negative for dizziness, tremors, seizures, light-headedness, numbness and headaches.   Hematological: Negative for adenopathy.   Psychiatric/Behavioral: Negative for hallucinations and self-injury.     Objective:     Vital Signs (Most Recent):  Temp: 98.3 °F (36.8 °C) (08/19/19 0700)  Pulse: 98 (08/19/19 0823)  Resp: 15 (08/19/19 0823)  BP: (!) 155/78 (08/19/19 0800)  SpO2: 100 % (08/19/19 0823) Vital Signs (24h Range):  Temp:  [97.9 °F (36.6 °C)-98.3 °F (36.8 °C)] 98.3 °F (36.8 °C)  Pulse:  [] 98  Resp:  [14-25] 15  SpO2:  [91 %-100 %] 100 %  BP: ()/(39-78) 155/78     Weight: 77.4 kg (170 lb 10.2 oz)  Body mass index is 26.73 kg/m².    Physical Exam   Constitutional: She is oriented to " person, place, and time. She appears well-developed. She is cooperative.   Deconditioned;needy; fatigued; generalized weakness   HENT:   Head: Normocephalic and atraumatic.   Eyes: Pupils are equal, round, and reactive to light. Conjunctivae, EOM and lids are normal.   Neck: Normal range of motion and full passive range of motion without pain. Neck supple. No JVD present. No edema present. No thyroid mass present.   Cardiovascular: Normal rate, regular rhythm, S1 normal, S2 normal and intact distal pulses.   No murmur heard.  Pulmonary/Chest: Effort normal.   Abdominal: Soft. Bowel sounds are normal. She exhibits no distension and no abdominal bruit. There is no splenomegaly or hepatomegaly. There is no tenderness. There is no CVA tenderness.   Musculoskeletal:   Denies pain; generalized weakness   Lymphadenopathy:     She has no cervical adenopathy.     She has no axillary adenopathy.   Neurological: She is alert and oriented to person, place, and time. She has normal reflexes. She displays no tremor. She displays no seizure activity.   Skin: Skin is warm, dry and intact.   Psychiatric: She has a normal mood and affect. Her speech is normal. Thought content normal. Cognition and memory are normal.         CRANIAL NERVES     CN III, IV, VI   Pupils are equal, round, and reactive to light.  Extraocular motions are normal.        Significant Labs:   CBC:   Recent Labs   Lab 08/18/19  0943 08/19/19  0403   WBC 24.09* 17.27*   HGB 8.9* 8.7*   HCT 32.3* 31.5*   * 258     CMP:   Recent Labs   Lab 08/17/19  1507 08/18/19  0943 08/19/19  0403    141 142   K 4.6 4.7 4.4   CL 98 104 109   CO2 28 25 25   * 375* 197*   BUN 29* 38* 32*   CREATININE 1.5* 1.6* 0.9   CALCIUM 9.3 8.5* 8.0*   PROT 6.6 5.9* 5.7*   ALBUMIN 2.9* 2.6* 2.5*   BILITOT 0.2 0.2 0.1   ALKPHOS 66 55 57   AST 10 10 14   ALT 8* 6* 9*   ANIONGAP 13 12 8   EGFRNONAA 36* 33* >60     Lactic Acid:   No results for input(s): LACTATE in the last 48  hours.    Troponin:   Recent Labs   Lab 08/17/19  1818 08/17/19  2328   TROPONINI 0.071* 0.061*     TSH:   Recent Labs   Lab 06/07/19  2033   TSH 0.699       Significant Imaging:     Imaging Results           US Lower Extremity Veins Bilateral (Final result)  Result time 08/16/19 20:38:59    Final result by Davon Sandoval MD (08/16/19 20:38:59)                 Impression:      Bilateral lower extremity deep venous thrombosis as above.  Compared with previous ultrasound from 06/18/2019, there has been resolution of some previous areas of thrombosis, however some new areas of thrombosis are now seen.    This report was flagged in Epic as abnormal.      Electronically signed by: Davon Sandoval MD  Date:    08/16/2019  Time:    20:38             Narrative:    EXAMINATION:  US LOWER EXTREMITY VEINS BILATERAL    CLINICAL HISTORY:  swelling;    TECHNIQUE:  Duplex and color flow Doppler evaluation of the bilateral lower extremity veins was performed.    COMPARISON:  06/08/2019.    FINDINGS:  Right lower extremity:    Thrombosis is seen within 1 of the paired right posterior tibial veins (new).  No evidence of clot involving the right common femoral, femoral, greater saphenous, popliteal, anterior tibial, and peroneal veins.  Previously visualized thrombosis within the right femoral vein is no longer seen.    Left lower extremity: Thrombosis is seen within the distal femoral vein with partial thrombosis seen of the popliteal vein (partially resolve).  Thrombosis is also visualized within the posterior tibial vein (new).  No evidence of clot involving the left common femoral, greater saphenous, anterior tibial, and peroneal veins.                               CTA Chest Non-Coronary (PE Study) (Final result)  Result time 08/16/19 20:34:23    Final result by Davon Sandoval MD (08/16/19 20:34:23)                 Impression:      1. No evidence of PE.  No acute intrathoracic abnormalities identified.  2. Centrilobular  emphysema.  3. Extensive calcified and noncalcified plaque throughout the aorta with multifocal small outpouchings suggestive for penetrating ulcers.  Appearance is overall similar compared to prior CTA from June 2018.      Electronically signed by: Davon Sandoval MD  Date:    08/16/2019  Time:    20:34             Narrative:    EXAMINATION:  CTA CHEST NON CORONARY    CLINICAL HISTORY:  sob;    TECHNIQUE:  Low dose axial images, sagittal and coronal reformations were obtained from the thoracic inlet to the lung bases following the IV administration of 90 mL of Omnipaque 350.  Contrast timing was optimized to evaluate the pulmonary arteries.  MIP images were performed.    COMPARISON:  CTA chest and abdomen from June 2018.    FINDINGS:  Structures at the base of the neck are unremarkable.  Extensive calcified and noncalcified plaque are seen throughout the aorta with small multifocal outpouchings again seen likely reflecting penetrating ulcers.  Appearance appears overall unchanged compared to previous CT from 2018. Heart is normal in size without pericardial effusion.  No intraluminal filling defects within the pulmonary arteries to suggest pulmonary thromboembolism.   There is no evidence of mediastinal, axillary, or hilar lymph node enlargement.  Scattered air seen throughout the esophagus.    The trachea and bronchi are patent.  The lungs are symmetrically expanded.  Mild centrilobular emphysematous changes are seen with upper lobe predominance.  Mild bilateral scarring is seen.  There is interval enlargement of thin-walled lung cysts seen within the medial aspect of the right lower lobe.  No evidence of new focal consolidation, mass, pneumothorax, or pleural effusion.    The visualized abdominal structures are unremarkable.  Osseous structures demonstrate degenerative change.  Extrathoracic soft tissues are unremarkable.                               X-Ray Chest AP Portable (Final result)  Result time 08/16/19  17:41:37    Final result by Saud Musa MD (08/16/19 17:41:37)                 Impression:      No focal consolidation      Electronically signed by: Saud Musa MD  Date:    08/16/2019  Time:    17:41             Narrative:    EXAMINATION:  XR CHEST AP PORTABLE    CLINICAL HISTORY:  Chest Pain;    TECHNIQUE:  Single frontal view of the chest was performed.    COMPARISON:  Chest radiograph 07/19/2019    FINDINGS:  The lungs are clear.  The cardiac silhouette is stable.  The osseous and soft tissue structures demonstrate no acute abnormality.

## 2019-08-19 NOTE — PROGRESS NOTES
Pt set up on V60 BIPAP for the night. 10/5, R 10, FIO2 40%. Medium full face mask. Tolerating well.

## 2019-08-19 NOTE — CARE UPDATE
[facility]  ICU Multidisciplinary Bedside Rounds   SUMMARY     Date: 8/19/2019    Prehospitalization: Home  Admit Date / LOS : 8/16/2019/ 1 days    Diagnosis: Atrial fibrillation with RVR    Consults:        Active: Cardio and Heme Onc       Needed: N/A     Code Status: Full Code   Advanced Directive: <no information>    LDA: PIV and Purewick       Central Lines/Site/Justification:Patient Does Not Have Central Line       Urinary Cath/Order/Justification:Patient Does Not Have Urinary Catheter    Vasopressors/Infusions:    amiodarone in dextrose 5% 0.5 mg/min (08/19/19 0302)          GOALS: Volume/ Hemodynamic: N/A                     RASS: 0  alert and calm    CAM ICU: Negative  Pain Management: PO       Pain Controlled: yes     Rhythm: NSR    Respiratory Device: Nasal Cannula    Oxygen Concentration (%):  [40] 40             Most Recent SBT/ SAT: N/A       MOVE Screen: PASS    VTE Prophylaxis: Pharm  Mobility: Bedrest  Stress Ulcer Prophylaxis: Yes    Dietary: NPO  Tolerance: not applicable  /      Isolation: No active isolations    Restraints: No    Significant Dates:  Post Op Date: N/A  Rescue Date: 08/18/19  Imaging/ Diagnostics: N/A    Noteworthy Labs:     CBC/Anemia Labs: Coags:    Recent Labs   Lab 08/16/19  1705 08/17/19  0808 08/18/19  0943   WBC 14.27* 13.41* 24.09*   HGB 8.8* 10.3* 8.9*   HCT 33.6* 36.8* 32.3*    385* 354*   MCV 94 89 91   RDW 20.6* 20.4* 20.4*    No results for input(s): PT, INR, APTT in the last 168 hours.     Chemistries:   Recent Labs   Lab 08/17/19  0808 08/17/19  1507 08/18/19  0943    139 141   K 4.7 4.6 4.7    98 104   CO2 28 28 25   BUN 19 29* 38*   CREATININE 1.0 1.5* 1.6*   CALCIUM 9.6 9.3 8.5*   PROT 6.9 6.6 5.9*   BILITOT 0.2 0.2 0.2   ALKPHOS 70 66 55   ALT 8* 8* 6*   AST 9* 10 10        Cardiac Enzymes: Ejection Fractions:    Recent Labs     08/17/19  0808 08/17/19  1818 08/17/19  2328   TROPONINI 0.021 0.071* 0.061*    No results found  for: EF     POCT Glucose: HbA1c:    Recent Labs   Lab 08/17/19  1940 08/18/19  0007 08/18/19  0722 08/18/19  1151 08/18/19  1636 08/18/19  2124   POCTGLUCOSE 324* 303* 276* 338* 245* 372*    Hemoglobin A1C   Date Value Ref Range Status   07/01/2019 8.1 (H) 4.0 - 5.6 % Final     Comment:     ADA Screening Guidelines:  5.7-6.4%  Consistent with prediabetes  >or=6.5%  Consistent with diabetes  High levels of fetal hemoglobin interfere with the HbA1C  assay. Heterozygous hemoglobin variants (HbS, HgC, etc)do  not significantly interfere with this assay.   However, presence of multiple variants may affect accuracy.     06/08/2019 7.3 (H) 4.0 - 5.6 % Final     Comment:     ADA Screening Guidelines:  5.7-6.4%  Consistent with prediabetes  >or=6.5%  Consistent with diabetes  High levels of fetal hemoglobin interfere with the HbA1C  assay. Heterozygous hemoglobin variants (HbS, HgC, etc)do  not significantly interfere with this assay.   However, presence of multiple variants may affect accuracy.     06/08/2019 7.3 (H) 4.0 - 5.6 % Final     Comment:     ADA Screening Guidelines:  5.7-6.4%  Consistent with prediabetes  >or=6.5%  Consistent with diabetes  High levels of fetal hemoglobin interfere with the HbA1C  assay. Heterozygous hemoglobin variants (HbS, HgC, etc)do  not significantly interfere with this assay.   However, presence of multiple variants may affect accuracy.          Needs from Care Team: Nystatin     ICU LOS 17h  Level of Care: OK to Transfer once amio changed to PO

## 2019-08-20 LAB
ALBUMIN SERPL BCP-MCNC: 2.4 G/DL (ref 3.5–5.2)
ALP SERPL-CCNC: 60 U/L (ref 55–135)
ALT SERPL W/O P-5'-P-CCNC: 12 U/L (ref 10–44)
ANION GAP SERPL CALC-SCNC: 6 MMOL/L (ref 8–16)
AST SERPL-CCNC: 13 U/L (ref 10–40)
BASOPHILS # BLD AUTO: 0 K/UL (ref 0–0.2)
BASOPHILS NFR BLD: 0 % (ref 0–1.9)
BILIRUB SERPL-MCNC: 0.2 MG/DL (ref 0.1–1)
BUN SERPL-MCNC: 16 MG/DL (ref 8–23)
CALCIUM SERPL-MCNC: 8.4 MG/DL (ref 8.7–10.5)
CHLORIDE SERPL-SCNC: 108 MMOL/L (ref 95–110)
CO2 SERPL-SCNC: 29 MMOL/L (ref 23–29)
CREAT SERPL-MCNC: 0.6 MG/DL (ref 0.5–1.4)
DIFFERENTIAL METHOD: ABNORMAL
EOSINOPHIL # BLD AUTO: 0.1 K/UL (ref 0–0.5)
EOSINOPHIL NFR BLD: 0.8 % (ref 0–8)
ERYTHROCYTE [DISTWIDTH] IN BLOOD BY AUTOMATED COUNT: 19.5 % (ref 11.5–14.5)
EST. GFR  (AFRICAN AMERICAN): >60 ML/MIN/1.73 M^2
EST. GFR  (NON AFRICAN AMERICAN): >60 ML/MIN/1.73 M^2
GLUCOSE SERPL-MCNC: 129 MG/DL (ref 70–110)
HCT VFR BLD AUTO: 33.2 % (ref 37–48.5)
HGB BLD-MCNC: 9 G/DL (ref 12–16)
LYMPHOCYTES # BLD AUTO: 2.5 K/UL (ref 1–4.8)
LYMPHOCYTES NFR BLD: 21.4 % (ref 18–48)
MCH RBC QN AUTO: 25.1 PG (ref 27–31)
MCHC RBC AUTO-ENTMCNC: 27.1 G/DL (ref 32–36)
MCV RBC AUTO: 93 FL (ref 82–98)
MONOCYTES # BLD AUTO: 1.3 K/UL (ref 0.3–1)
MONOCYTES NFR BLD: 11.1 % (ref 4–15)
NEUTROPHILS # BLD AUTO: 7.7 K/UL (ref 1.8–7.7)
NEUTROPHILS NFR BLD: 67 % (ref 38–73)
PLATELET # BLD AUTO: 294 K/UL (ref 150–350)
PMV BLD AUTO: 9.4 FL (ref 9.2–12.9)
POCT GLUCOSE: 166 MG/DL (ref 70–110)
POCT GLUCOSE: 175 MG/DL (ref 70–110)
POCT GLUCOSE: 190 MG/DL (ref 70–110)
POCT GLUCOSE: 224 MG/DL (ref 70–110)
POTASSIUM SERPL-SCNC: 3.9 MMOL/L (ref 3.5–5.1)
PROT SERPL-MCNC: 5.5 G/DL (ref 6–8.4)
RBC # BLD AUTO: 3.58 M/UL (ref 4–5.4)
SODIUM SERPL-SCNC: 143 MMOL/L (ref 136–145)
WBC # BLD AUTO: 11.55 K/UL (ref 3.9–12.7)

## 2019-08-20 PROCEDURE — 25000242 PHARM REV CODE 250 ALT 637 W/ HCPCS: Performed by: HOSPITALIST

## 2019-08-20 PROCEDURE — 27000221 HC OXYGEN, UP TO 24 HOURS

## 2019-08-20 PROCEDURE — 99900035 HC TECH TIME PER 15 MIN (STAT)

## 2019-08-20 PROCEDURE — 25000003 PHARM REV CODE 250: Performed by: HOSPITALIST

## 2019-08-20 PROCEDURE — 80053 COMPREHEN METABOLIC PANEL: CPT

## 2019-08-20 PROCEDURE — 94660 CPAP INITIATION&MGMT: CPT

## 2019-08-20 PROCEDURE — 97165 OT EVAL LOW COMPLEX 30 MIN: CPT

## 2019-08-20 PROCEDURE — 97161 PT EVAL LOW COMPLEX 20 MIN: CPT

## 2019-08-20 PROCEDURE — 63600175 PHARM REV CODE 636 W HCPCS: Performed by: HOSPITALIST

## 2019-08-20 PROCEDURE — 21400001 HC TELEMETRY ROOM

## 2019-08-20 PROCEDURE — 94640 AIRWAY INHALATION TREATMENT: CPT

## 2019-08-20 PROCEDURE — 36415 COLL VENOUS BLD VENIPUNCTURE: CPT

## 2019-08-20 PROCEDURE — 94761 N-INVAS EAR/PLS OXIMETRY MLT: CPT

## 2019-08-20 PROCEDURE — 85025 COMPLETE CBC W/AUTO DIFF WBC: CPT

## 2019-08-20 RX ORDER — PHENAZOPYRIDINE HYDROCHLORIDE 100 MG/1
100 TABLET, FILM COATED ORAL
Status: DISCONTINUED | OUTPATIENT
Start: 2019-08-20 | End: 2019-08-22 | Stop reason: HOSPADM

## 2019-08-20 RX ORDER — LACTULOSE 10 G/15ML
20 SOLUTION ORAL 2 TIMES DAILY
Status: DISCONTINUED | OUTPATIENT
Start: 2019-08-20 | End: 2019-08-22 | Stop reason: HOSPADM

## 2019-08-20 RX ORDER — POLYETHYLENE GLYCOL 3350 17 G/17G
17 POWDER, FOR SOLUTION ORAL 2 TIMES DAILY
Status: DISCONTINUED | OUTPATIENT
Start: 2019-08-20 | End: 2019-08-22 | Stop reason: HOSPADM

## 2019-08-20 RX ORDER — DOCUSATE SODIUM 100 MG/1
100 CAPSULE, LIQUID FILLED ORAL 2 TIMES DAILY
Status: DISCONTINUED | OUTPATIENT
Start: 2019-08-20 | End: 2019-08-22 | Stop reason: HOSPADM

## 2019-08-20 RX ADMIN — APIXABAN 5 MG: 5 TABLET, FILM COATED ORAL at 08:08

## 2019-08-20 RX ADMIN — IPRATROPIUM BROMIDE 0.5 MG: 0.5 SOLUTION RESPIRATORY (INHALATION) at 07:08

## 2019-08-20 RX ADMIN — INSULIN ASPART 5 UNITS: 100 INJECTION, SOLUTION INTRAVENOUS; SUBCUTANEOUS at 08:08

## 2019-08-20 RX ADMIN — CEFTRIAXONE 1 G: 1 INJECTION, SOLUTION INTRAVENOUS at 08:08

## 2019-08-20 RX ADMIN — DOCUSATE SODIUM 100 MG: 100 CAPSULE, LIQUID FILLED ORAL at 07:08

## 2019-08-20 RX ADMIN — LEVALBUTEROL HYDROCHLORIDE 0.63 MG: 0.63 SOLUTION RESPIRATORY (INHALATION) at 12:08

## 2019-08-20 RX ADMIN — INSULIN ASPART 5 UNITS: 100 INJECTION, SOLUTION INTRAVENOUS; SUBCUTANEOUS at 12:08

## 2019-08-20 RX ADMIN — IPRATROPIUM BROMIDE 0.5 MG: 0.5 SOLUTION RESPIRATORY (INHALATION) at 03:08

## 2019-08-20 RX ADMIN — INSULIN DETEMIR 8 UNITS: 100 INJECTION, SOLUTION SUBCUTANEOUS at 08:08

## 2019-08-20 RX ADMIN — LEVALBUTEROL HYDROCHLORIDE 0.63 MG: 0.63 SOLUTION RESPIRATORY (INHALATION) at 07:08

## 2019-08-20 RX ADMIN — INSULIN ASPART 5 UNITS: 100 INJECTION, SOLUTION INTRAVENOUS; SUBCUTANEOUS at 04:08

## 2019-08-20 RX ADMIN — PANTOPRAZOLE SODIUM 40 MG: 40 TABLET, DELAYED RELEASE ORAL at 08:08

## 2019-08-20 RX ADMIN — IPRATROPIUM BROMIDE 0.5 MG: 0.5 SOLUTION RESPIRATORY (INHALATION) at 11:08

## 2019-08-20 RX ADMIN — VANCOMYCIN HYDROCHLORIDE 1000 MG: 1 INJECTION, POWDER, LYOPHILIZED, FOR SOLUTION INTRAVENOUS at 11:08

## 2019-08-20 RX ADMIN — PHENAZOPYRIDINE HYDROCHLORIDE 100 MG: 100 TABLET, FILM COATED ORAL at 04:08

## 2019-08-20 RX ADMIN — DILTIAZEM HYDROCHLORIDE 180 MG: 180 CAPSULE, COATED, EXTENDED RELEASE ORAL at 08:08

## 2019-08-20 RX ADMIN — POLYETHYLENE GLYCOL 3350 17 G: 17 POWDER, FOR SOLUTION ORAL at 07:08

## 2019-08-20 RX ADMIN — ATORVASTATIN CALCIUM 80 MG: 40 TABLET, FILM COATED ORAL at 08:08

## 2019-08-20 RX ADMIN — LEVALBUTEROL HYDROCHLORIDE 0.63 MG: 0.63 SOLUTION RESPIRATORY (INHALATION) at 03:08

## 2019-08-20 RX ADMIN — IPRATROPIUM BROMIDE 0.5 MG: 0.5 SOLUTION RESPIRATORY (INHALATION) at 04:08

## 2019-08-20 RX ADMIN — LACTULOSE 20 G: 20 SOLUTION ORAL at 06:08

## 2019-08-20 RX ADMIN — ACETAMINOPHEN 650 MG: 325 TABLET, FILM COATED ORAL at 12:08

## 2019-08-20 RX ADMIN — INSULIN ASPART 2 UNITS: 100 INJECTION, SOLUTION INTRAVENOUS; SUBCUTANEOUS at 04:08

## 2019-08-20 RX ADMIN — IPRATROPIUM BROMIDE 0.5 MG: 0.5 SOLUTION RESPIRATORY (INHALATION) at 12:08

## 2019-08-20 NOTE — ASSESSMENT & PLAN NOTE
As noted in hospital course - HR now 140-->155; BP 88 systolic   Transfer to ICU for amiodarone infusion  Discontinue oral BP medication for now  Etta nous cardiac monitoring  Monitor closely,    Converted to sinus,cardiogy started on Cardizem,stable go back to Tele.  Sinus on Tele,  PT,Ot is consulted.

## 2019-08-20 NOTE — SUBJECTIVE & OBJECTIVE
Past Medical History:   Diagnosis Date    Anticoagulant long-term use     Xarelto    Arthritis     Asthma     CHF (congestive heart failure)     COPD (chronic obstructive pulmonary disease)     Coronary artery disease     Deep vein thrombosis (DVT) of left lower extremity     Depression     Diabetes mellitus     GERD (gastroesophageal reflux disease)     Hypertension     Obstructive sleep apnea on CPAP     On home oxygen therapy     Unsteady gait     uses either walker or 4 prong cane       Past Surgical History:   Procedure Laterality Date    CORONARY ANGIOPLASTY WITH STENT PLACEMENT      HERNIA REPAIR      encapsulated umbilical hernia       Review of patient's allergies indicates:  No Known Allergies    No current facility-administered medications on file prior to encounter.      Current Outpatient Medications on File Prior to Encounter   Medication Sig    acetaminophen (TYLENOL) 500 MG tablet Take 1 tablet (500 mg total) by mouth every 8 (eight) hours as needed.    albuterol (ACCUNEB) 1.25 mg/3 mL Nebu Inhale 1.25 mg into the lungs.    albuterol-ipratropium (DUO-NEB) 2.5 mg-0.5 mg/3 mL nebulizer solution Take 3 mLs by nebulization every 6 (six) hours. Rescue    aspirin (ECOTRIN) 81 MG EC tablet Take 81 mg by mouth once daily.    diltiaZEM (CARDIZEM) 90 MG tablet Take 90 mg by mouth.    ferrous gluconate (FERGON) 324 MG tablet Take 1 tablet (324 mg total) by mouth 3 (three) times daily with meals.    furosemide (LASIX) 40 MG tablet Take 40 mg by mouth once daily.     gabapentin (NEURONTIN) 300 MG capsule Take 300 mg by mouth.    hydrALAZINE (APRESOLINE) 50 MG tablet Take 50 mg by mouth 2 (two) times daily.    isosorbide mononitrate (IMDUR) 30 MG 24 hr tablet Take 3 tablets (90 mg total) by mouth once daily.    lisinopril (PRINIVIL,ZESTRIL) 40 MG tablet Take 1 tablet (40 mg total) by mouth once daily. Do not take this medication if blood pressure is below 130/80 mmHg and/or feeling dizzy  after taking all other blood pressure meds    metFORMIN (GLUCOPHAGE) 1000 MG tablet Take 1 tablet (1,000 mg total) by mouth 2 (two) times daily with meals.    multivit-min-FA-lycopen-lutein (CENTRUM SILVER) 0.4-300-250 mg-mcg-mcg Tab Take 1 tablet by mouth once daily.    pantoprazole (PROTONIX) 40 MG tablet Take 40 mg by mouth once daily.    pregabalin (LYRICA) 75 MG capsule Take 75 mg by mouth 2 (two) times daily.    rivaroxaban (XARELTO) 20 mg Tab Take 20 mg by mouth daily with dinner or evening meal.     traMADol (ULTRAM) 50 mg tablet     acetaminophen (TYLENOL) 500 MG tablet Take 500 mg by mouth.    albuterol (PROVENTIL) 2.5 mg /3 mL (0.083 %) nebulizer solution Take 2.5 mg by nebulization every 6 (six) hours as needed.    amiodarone (PACERONE) 200 MG Tab Take 1 tablet (200 mg total) by mouth once daily.    ANORO ELLIPTA 62.5-25 mcg/actuation DsDv     aspirin (ECOTRIN) 81 MG EC tablet Take 81 mg by mouth.    cefUROXime (CEFTIN) 500 MG tablet Take 1 tablet (500 mg total) by mouth every 12 (twelve) hours.    fluticasone propionate (FLOVENT HFA) 220 mcg/actuation inhaler Inhale 1 puff into the lungs 2 (two) times daily. Controller    ipratropium-albuterol (COMBIVENT)  mcg/actuation inhaler Inhale 1 puff into the lungs every 6 (six) hours as needed for Wheezing or Shortness of Breath. Rescue    isosorbide mononitrate (IMDUR) 30 MG 24 hr tablet Take 30 mg by mouth.    methylPREDNISolone (MEDROL DOSEPACK) 4 mg tablet use as directed    nitroGLYCERIN (NITROSTAT) 0.4 MG SL tablet     umeclidinium brm/vilanterol tr (ANORO ELLIPTA INHL) Inhale into the lungs.     Family History     Problem Relation (Age of Onset)    Diabetes Father    Heart disease Mother    Hypertension Mother        Tobacco Use    Smoking status: Former Smoker     Packs/day: 0.50     Years: 55.00     Pack years: 27.50     Types: Cigarettes     Start date: 1[birth year]1/1963     Last attempt to quit: 12/31/2018     Years since quitting:  "0.6    Smokeless tobacco: Never Used    Tobacco comment: "States she quit smoking about 3 or 4 weeks ago"   Substance and Sexual Activity    Alcohol use: Not Currently     Alcohol/week: 0.6 oz     Types: 1 Glasses of wine per week     Frequency: Never     Comment: socially    Drug use: No    Sexual activity: Never     Review of Systems   Constitutional: Positive for activity change, appetite change and chills. Negative for diaphoresis and fever.   HENT: Negative for congestion, hearing loss, sore throat, tinnitus and trouble swallowing.    Eyes: Negative for photophobia, discharge, itching and visual disturbance.   Respiratory: Negative for apnea, cough, shortness of breath, wheezing and stridor.    Cardiovascular: Negative for chest pain, palpitations and leg swelling.   Gastrointestinal: Negative for abdominal distention, abdominal pain, blood in stool, constipation, diarrhea and nausea.   Endocrine: Negative for polydipsia, polyphagia and polyuria.   Genitourinary: Positive for dysuria. Negative for difficulty urinating, flank pain and frequency.   Musculoskeletal: Negative for arthralgias, joint swelling and neck stiffness.   Skin: Negative for color change, rash and wound.   Neurological: Positive for weakness. Negative for dizziness, tremors, seizures, light-headedness, numbness and headaches.   Hematological: Negative for adenopathy.   Psychiatric/Behavioral: Negative for hallucinations and self-injury.     Objective:     Vital Signs (Most Recent):  Temp: 97.9 °F (36.6 °C) (08/19/19 2307)  Pulse: 90 (08/20/19 0730)  Resp: 18 (08/20/19 0730)  BP: 129/67 (08/20/19 0720)  SpO2: 100 % (08/20/19 0730) Vital Signs (24h Range):  Temp:  [97.9 °F (36.6 °C)-99.1 °F (37.3 °C)] 97.9 °F (36.6 °C)  Pulse:  [] 90  Resp:  [18-24] 18  SpO2:  [94 %-100 %] 100 %  BP: (129-184)/(67-92) 129/67     Weight: 77.4 kg (170 lb 10.2 oz)  Body mass index is 26.73 kg/m².    Physical Exam   Constitutional: She is oriented to " person, place, and time. She appears well-developed. She is cooperative.   Deconditioned;needy; fatigued; generalized weakness   HENT:   Head: Normocephalic and atraumatic.   Eyes: Pupils are equal, round, and reactive to light. Conjunctivae, EOM and lids are normal.   Neck: Normal range of motion and full passive range of motion without pain. Neck supple. No JVD present. No edema present. No thyroid mass present.   Cardiovascular: Normal rate, regular rhythm, S1 normal, S2 normal and intact distal pulses.   No murmur heard.  Pulmonary/Chest: Effort normal.   Abdominal: Soft. Bowel sounds are normal. She exhibits no distension and no abdominal bruit. There is no splenomegaly or hepatomegaly. There is no tenderness. There is no CVA tenderness.   Musculoskeletal:   Denies pain; generalized weakness   Lymphadenopathy:     She has no cervical adenopathy.     She has no axillary adenopathy.   Neurological: She is alert and oriented to person, place, and time. She has normal reflexes. She displays no tremor. She displays no seizure activity.   Skin: Skin is warm, dry and intact.   Psychiatric: She has a normal mood and affect. Her speech is normal. Thought content normal. Cognition and memory are normal.         CRANIAL NERVES     CN III, IV, VI   Pupils are equal, round, and reactive to light.  Extraocular motions are normal.        Significant Labs:   CBC:   Recent Labs   Lab 08/19/19  0403 08/20/19  0623   WBC 17.27* 11.55   HGB 8.7* 9.0*   HCT 31.5* 33.2*    294     CMP:   Recent Labs   Lab 08/19/19  0403 08/20/19  0621    143   K 4.4 3.9    108   CO2 25 29   * 129*   BUN 32* 16   CREATININE 0.9 0.6   CALCIUM 8.0* 8.4*   PROT 5.7* 5.5*   ALBUMIN 2.5* 2.4*   BILITOT 0.1 0.2   ALKPHOS 57 60   AST 14 13   ALT 9* 12   ANIONGAP 8 6*   EGFRNONAA >60 >60     Lactic Acid:   No results for input(s): LACTATE in the last 48 hours.    Troponin:   No results for input(s): TROPONINI in the last 48  hours.  TSH:   Recent Labs   Lab 06/07/19 2033   TSH 0.699       Significant Imaging:     Imaging Results           US Lower Extremity Veins Bilateral (Final result)  Result time 08/16/19 20:38:59    Final result by Davon Sandoval MD (08/16/19 20:38:59)                 Impression:      Bilateral lower extremity deep venous thrombosis as above.  Compared with previous ultrasound from 06/18/2019, there has been resolution of some previous areas of thrombosis, however some new areas of thrombosis are now seen.    This report was flagged in Epic as abnormal.      Electronically signed by: Davon Sandoval MD  Date:    08/16/2019  Time:    20:38             Narrative:    EXAMINATION:  US LOWER EXTREMITY VEINS BILATERAL    CLINICAL HISTORY:  swelling;    TECHNIQUE:  Duplex and color flow Doppler evaluation of the bilateral lower extremity veins was performed.    COMPARISON:  06/08/2019.    FINDINGS:  Right lower extremity:    Thrombosis is seen within 1 of the paired right posterior tibial veins (new).  No evidence of clot involving the right common femoral, femoral, greater saphenous, popliteal, anterior tibial, and peroneal veins.  Previously visualized thrombosis within the right femoral vein is no longer seen.    Left lower extremity: Thrombosis is seen within the distal femoral vein with partial thrombosis seen of the popliteal vein (partially resolve).  Thrombosis is also visualized within the posterior tibial vein (new).  No evidence of clot involving the left common femoral, greater saphenous, anterior tibial, and peroneal veins.                               CTA Chest Non-Coronary (PE Study) (Final result)  Result time 08/16/19 20:34:23    Final result by Davon Sandoval MD (08/16/19 20:34:23)                 Impression:      1. No evidence of PE.  No acute intrathoracic abnormalities identified.  2. Centrilobular emphysema.  3. Extensive calcified and noncalcified plaque throughout the aorta with multifocal  small outpouchings suggestive for penetrating ulcers.  Appearance is overall similar compared to prior CTA from June 2018.      Electronically signed by: Davon Sandoval MD  Date:    08/16/2019  Time:    20:34             Narrative:    EXAMINATION:  CTA CHEST NON CORONARY    CLINICAL HISTORY:  sob;    TECHNIQUE:  Low dose axial images, sagittal and coronal reformations were obtained from the thoracic inlet to the lung bases following the IV administration of 90 mL of Omnipaque 350.  Contrast timing was optimized to evaluate the pulmonary arteries.  MIP images were performed.    COMPARISON:  CTA chest and abdomen from June 2018.    FINDINGS:  Structures at the base of the neck are unremarkable.  Extensive calcified and noncalcified plaque are seen throughout the aorta with small multifocal outpouchings again seen likely reflecting penetrating ulcers.  Appearance appears overall unchanged compared to previous CT from 2018. Heart is normal in size without pericardial effusion.  No intraluminal filling defects within the pulmonary arteries to suggest pulmonary thromboembolism.   There is no evidence of mediastinal, axillary, or hilar lymph node enlargement.  Scattered air seen throughout the esophagus.    The trachea and bronchi are patent.  The lungs are symmetrically expanded.  Mild centrilobular emphysematous changes are seen with upper lobe predominance.  Mild bilateral scarring is seen.  There is interval enlargement of thin-walled lung cysts seen within the medial aspect of the right lower lobe.  No evidence of new focal consolidation, mass, pneumothorax, or pleural effusion.    The visualized abdominal structures are unremarkable.  Osseous structures demonstrate degenerative change.  Extrathoracic soft tissues are unremarkable.                               X-Ray Chest AP Portable (Final result)  Result time 08/16/19 17:41:37    Final result by Saud Musa MD (08/16/19 17:41:37)                 Impression:       No focal consolidation      Electronically signed by: Saud Musa MD  Date:    08/16/2019  Time:    17:41             Narrative:    EXAMINATION:  XR CHEST AP PORTABLE    CLINICAL HISTORY:  Chest Pain;    TECHNIQUE:  Single frontal view of the chest was performed.    COMPARISON:  Chest radiograph 07/19/2019    FINDINGS:  The lungs are clear.  The cardiac silhouette is stable.  The osseous and soft tissue structures demonstrate no acute abnormality.

## 2019-08-20 NOTE — NURSING
Patient bedside report has been completed and given to FABY Dang. Chart check has been completed. NAD noted. Pt is comfortable at this time.

## 2019-08-20 NOTE — PLAN OF CARE
Problem: Physical Therapy Goal  Goal: Physical Therapy Goal  Outcome: Outcome(s) achieved Date Met: 08/20/19  Initial PT evaluation performed.  Pt ok for D/C home from PT standpoint.  DME in [facility].  OK for OOB to chair and ambulation in room with nursing staff.

## 2019-08-20 NOTE — PLAN OF CARE
08/20/19 1435   Discharge Reassessment   Assessment Type Discharge Planning Reassessment   Provided patient/caregiver education on the expected discharge date and the discharge plan No   Do you have any problems affording any of your prescribed medications? No   Discharge Plan A Home with family   DME Needed Upon Discharge  none   Patient choice form signed by patient/caregiver N/A   Anticipated Discharge Disposition Home   Can the patient answer the patient profile reliably? Yes, cognitively intact   How does the patient rate their overall health at the present time? Fair   Describe the patient's ability to walk at the present time. Walks with the help of equipment   How often would a person be available to care for the patient? Whenever needed   Number of comorbid conditions (as recorded on the chart) Five or more   During the past month, has the patient often been bothered by feeling down, depressed or hopeless? Yes   During the past month, has the patient often been bothered by little interest or pleasure in doing things? Yes   Post-Acute Status   Post-Acute Authorization Placement  (Home)   Post-Acute Placement Status Awaiting Internal Medical Clearance

## 2019-08-20 NOTE — NURSING
Patient rested abed quietly this shift. PRN pain med given for c/o pain in buttocks. Patient reminded to turn throughout shift to relieve pressure. Call light in reach.

## 2019-08-20 NOTE — PT/OT/SLP EVAL
Physical Therapy Evaluation and Discharge Note    Patient Name:  Yasmeen Palm   MRN:  [id number]    Recommendations:     Discharge Recommendations:  home   Discharge Equipment Recommendations: none   Barriers to discharge: None    Assessment:     Yasmeen Palm is a 69 y.o. female admitted with a medical diagnosis of Atrial fibrillation with RVR. .  At this time, patient is functioning at their prior level of function and does not require further acute PT services.     Recent Surgery: * No surgery found *      Plan:     During this hospitalization, patient does not require further acute PT services.  Please re-consult if situation changes.      Subjective     Chief Complaint: trying to get some rest  Patient/Family Comments/goals: to get some rest  Pain/Comfort:  · Pain Rating 1: 0/10    Patients cultural, spiritual, Jew conflicts given the current situation: no    Living Environment:  Pt lives with spouse in a [facility] with 3 ANGELIA, B HR's  Prior to admission, patients level of function was Mod I.  Equipment used at home: cane, quad, rollator, bedside commode, shower chair, walker, rolling.  DME owned (not currently used): rolling walker and wheelchair.  Upon discharge, patient will have assistance from spouse.    Objective:     Communicated with nsg prior to session.  Patient found HOB elevated with peripheral IV, PureWick, telemetry upon PT entry to room.    General Precautions: Standard, fall, contact   Orthopedic Precautions:N/A   Braces: N/A     Exams:  · Cognitive Exam:  Patient is oriented to Person, Place, Time and Situation  · Gross Motor Coordination:  WFL  · Postural Exam:  Patient presented with the following abnormalities: -       Rounded shoulders  · -       Forward head  · Sensation:    · -       Intact  light/touch B LE's  · Skin Integrity/Edema:      · -       Skin integrity: Visible skin intact  · RLE ROM: WFL  · RLE Strength: WFL  · LLE ROM: WFL  · LLE Strength: WFL    Functional Mobility:  · Bed  Mobility:     · Scooting: modified independence  · Supine to Sit: modified independence  · Sit to Supine: modified independence  · Transfers:     · Sit to Stand:  modified independence with rolling walker  · Gait: 4 sidesteps with Rw Mod I  · Balance: Fair+    AM-PAC 6 CLICK MOBILITY  Total Score:23       Therapeutic Activities and Exercises:   eval only    AM-PAC 6 CLICK MOBILITY  Total Score:23     Patient left HOB elevated with all lines intact, call button in reach, bed alarm on and nsg notified.    GOALS:   Multidisciplinary Problems     Physical Therapy Goals        Problem: Physical Therapy Goal    Goal Priority Disciplines Outcome Goal Variances Interventions   Physical Therapy Goal   (Resolved)     PT, PT/OT Outcome(s) achieved                     History:     Past Medical History:   Diagnosis Date    Anticoagulant long-term use     Xarelto    Arthritis     Asthma     CHF (congestive heart failure)     COPD (chronic obstructive pulmonary disease)     Coronary artery disease     Deep vein thrombosis (DVT) of left lower extremity     Depression     Diabetes mellitus     GERD (gastroesophageal reflux disease)     Hypertension     Obstructive sleep apnea on CPAP     On home oxygen therapy     Unsteady gait     uses either walker or 4 prong cane       Past Surgical History:   Procedure Laterality Date    CORONARY ANGIOPLASTY WITH STENT PLACEMENT      HERNIA REPAIR      encapsulated umbilical hernia       Time Tracking:     PT Received On: 08/20/19  PT Start Time: 1327     PT Stop Time: 1336  PT Total Time (min): 9 min     Billable Minutes: Evaluation 9 OT present      Pat Renteria, PT  08/20/2019

## 2019-08-20 NOTE — NURSING
Bedside shift report received from FABY Dang. Communication board has been updated. NAD noted. Will continue to monitor. Pt attempted to have BM last night but unsuccessful. Trying to find a BSC. Fabio in place.

## 2019-08-20 NOTE — PT/OT/SLP EVAL
"Occupational Therapy   Evaluation and Discharge Note    Name: Yasmeen Palm  MRN: [id number]  Admitting Diagnosis:  Atrial fibrillation with RVR      Recommendations:     Discharge Recommendations: home  Discharge Equipment Recommendations:  none  Barriers to discharge:  None    Assessment:     Yasmeen Palm is a 69 y.o. female with a medical diagnosis of Atrial fibrillation with RVR. At this time, patient is functioning at their prior level of function and does not require further acute OT services.     Plan:     During this hospitalization, patient does not require further acute OT services.  Please re-consult if situation changes.    · Plan of Care Reviewed with: patient    Subjective     Chief Complaint: "Why can't those people leave me alone."  Patient/Family Comments/goals: to get better    Occupational Profile:  Living Environment: lives with her spouse in a house  Previous level of function: modified independent with her rollator  Equipment Used at home:  cane, quad, rollator  Assistance upon Discharge: from her     Pain/Comfort:  · Pain Rating 1: 0/10    Patients cultural, spiritual, Jain conflicts given the current situation: no    Objective:     Communicated with: nurse Brown prior to session.  Patient found supine with peripheral IV, telemetry, PureWick(Diaper) upon OT entry to room.    General Precautions: Standard, contact, fall   Orthopedic Precautions:N/A   Braces: N/A     Occupational Performance:    Bed Mobility:    · Patient completed Rolling/Turning to Left with  modified independence  · Patient completed Rolling/Turning to Right with modified independence  · Patient completed Scooting/Bridging with modified independence  · Patient completed Supine to Sit with modified independence  · Patient completed Sit to Supine with modified independence    Functional Mobility/Transfers:  · Patient completed Sit <> Stand Transfer with stand by assistance  with  rolling walker   · Functional " Mobility: took 3-5 side steps to the HOB    Activities of Daily Living:  · Feeding:  independence    · Upper Body Dressing: modified independence    · Lower Body Dressing: modified independence      Cognitive/Visual Perceptual:  Cognitive/Psychosocial Skills:     -       Oriented to: Person, Place and Situation   -       Follows Commands/attention:Follows two-step commands  -       Communication: clear/fluent  -       Memory: No Deficits noted  -       Safety awareness/insight to disability: intact   -       Mood/Affect/Coping skills/emotional control: Appropriate to situation    Physical Exam:  Balance:    -       sit and stand balance good  Postural examination/scapula alignment:    -       Rounded shoulders  Skin integrity: Visible skin intact  Upper Extremity Range of Motion:     -       Right Upper Extremity: WFL  -       Left Upper Extremity: WFL  Upper Extremity Strength:    -       Right Upper Extremity: WFL  -       Left Upper Extremity: WFL    AMPAC 6 Click ADL:  AMPAC Total Score: 22    Treatment & Education:  Evaluation   Education:    Patient left supine with all lines intact, call button in reach and bed alarm on, nurse Stephanie notified    GOALS:   Multidisciplinary Problems     Occupational Therapy Goals     Not on file          Multidisciplinary Problems (Resolved)        Problem: Occupational Therapy Goal    Goal Priority Disciplines Outcome Interventions   Occupational Therapy Goal   (Resolved)     OT, PT/OT Outcome(s) achieved                    History:     Past Medical History:   Diagnosis Date    Anticoagulant long-term use     Xarelto    Arthritis     Asthma     CHF (congestive heart failure)     COPD (chronic obstructive pulmonary disease)     Coronary artery disease     Deep vein thrombosis (DVT) of left lower extremity     Depression     Diabetes mellitus     GERD (gastroesophageal reflux disease)     Hypertension     Obstructive sleep apnea on CPAP     On home oxygen therapy      Unsteady gait     uses either walker or 4 prong cane       Past Surgical History:   Procedure Laterality Date    CORONARY ANGIOPLASTY WITH STENT PLACEMENT      HERNIA REPAIR      encapsulated umbilical hernia       Time Tracking:     OT Date of Treatment: 08/20/19  OT Start Time: 1324  OT Stop Time: 1336  OT Total Time (min): 12 min    Billable Minutes:Evaluation 12 minutes    HEDY Wiley, MS  8/20/2019

## 2019-08-20 NOTE — PLAN OF CARE
Problem: Fall Injury Risk  Goal: Absence of Fall and Fall-Related Injury  Outcome: Ongoing (interventions implemented as appropriate)  Intervention: Promote Injury-Free Environment     08/20/19 0505   Optimize [place] and Functional Mobility   Environmental Safety Modification assistive device/personal items within reach;clutter free environment maintained;lighting adjusted;room near unit station;room organization consistent   Optimize Balance and Safe Activity   Safety Promotion/Fall Prevention assistive device/personal item within reach;bed alarm set;commode/urinal/bedpan at bedside;Fall Risk reviewed with patient/family;lighting adjusted;medications reviewed;nonskid shoes/socks when out of bed;room near unit station;side rails raised x 2;instructed to call staff for mobility         Problem: Adult Inpatient Plan of Care  Goal: Plan of Care Review  Outcome: Ongoing (interventions implemented as appropriate)     08/20/19 0505   Plan of Care Review   Plan of Care Reviewed With patient   Progress no change

## 2019-08-20 NOTE — PLAN OF CARE
Problem: Occupational Therapy Goal  Goal: Occupational Therapy Goal  Outcome: Outcome(s) achieved Date Met: 08/20/19  Patient tolerated evaluation well, patient with no need for skilled OT services at this time as patient at her prior level of function. HEDY Wiley, MS

## 2019-08-20 NOTE — PROGRESS NOTES
[facility]  Progress Note    Patient Name: Yasmeen Palm  MRN: [id number]  Patient Class: IP- Inpatient   Admission Date: 8/16/2019  Length of Stay: 2 days  Attending Physician: Michelle Dumont MD  Primary Care Provider: Angel Orourke Jr, MD        Subjective:     Principal Problem:Atrial fibrillation with RVR        HPI:  Yasmeen Palm is a 69 y.o. sorely deconditioned patient with essential hypertension, DMII (HbA1c 8.1% July 2019), HLD (.0 Dec 2018), paroxysmal atrial fibrillation ([id number] score 4) on chronic anticoagulation, COPD, chronic respiratory failure with hypoxia and hypercapnia uses 2-3L home oxygen, GERD, anemia of chronic disease, tobacco abuse, and history of PE/DVT on chronic anticoagulation.    Patient presented with complaints of dyspnea that began in early morning prior to presentation.  The dyspnea is mainly exertional.  She denies any fevers, chills, nausea, vomiting, pleurisy, diaphoresis, palpitations, sick contracts, recent long trips, or hemoptysis.    Initial workup shows mildly elevated WBC (on prednisone) thought to be reactive, hypernatremia       Overview/Hospital Course:  08/17/19  -Tachycardia sustained in 130's, Discontinued the albuterol and added xopenex Q8H/altrovent Q4H, metoprolol X 2 doses IV, cautious gentle fluids as patient has been getting lasix IV 40 mg BID as ordered in ED   -WBC 14K; her BUN/creatine/GFR 16/0.8/60 overnight now 29/1.5/41; abnormal urinalysis; awaiting culture; start rocephin  -DVT - failed therapy unclear compliance - was on xarelto; failed therapy, refused warfarin - consulted to Hematology - patient agreed to start apixaban    08/19/19  0615am----  -Paged by nursing staff patient converted to afib rvr with 90 systolic bp. Came in with acute and chronic LE DVT was on xarelto at home unclear compliance. Refused to be converted to warfarin -  Hematology consulted and recommended apixban instead  which was initiated. CTA neg PE at presentation.     One day prior patient with ST and decreased renal functions the night before from normal to creatine 1.5. Noted to be in ST and administered metoprolol X 2 and urinalysis sent which was abnormal. Started on one dose IV rocephin and then oral to start today 8/18/19 to prevent/avoid FVO.      Known LVEF 70% with GI DD - pul htn PA pressure 80 - on home oxygen at 2-3L.therefore - Gentle fluids X 5h administered as thought to be overdiuressed at that time as she had been placed on IV lasix 40 mg BID from the ED. Her HR improved about 5pm 8/17/19.    0844 am:   - Patient's HR remains 149-155 BP 88/56 after metoprolol IV - transfer to ICU with amio  - Urine culture positive e.coli & staph aeurus - start Vanc, and rocephin,  - New DVT - continue apixaban  - LOYDA/chronic respiratory impairment/COPD - bipap nightly,  Converted to sinus,cardiogy started on Cardizem,stable go back to Tele.  Sinus on Tele,  PT,Ot is consulted.    Past Medical History:   Diagnosis Date    Anticoagulant long-term use     Xarelto    Arthritis     Asthma     CHF (congestive heart failure)     COPD (chronic obstructive pulmonary disease)     Coronary artery disease     Deep vein thrombosis (DVT) of left lower extremity     Depression     Diabetes mellitus     GERD (gastroesophageal reflux disease)     Hypertension     Obstructive sleep apnea on CPAP     On home oxygen therapy     Unsteady gait     uses either walker or 4 prong cane       Past Surgical History:   Procedure Laterality Date    CORONARY ANGIOPLASTY WITH STENT PLACEMENT      HERNIA REPAIR      encapsulated umbilical hernia       Review of patient's allergies indicates:  No Known Allergies    No current facility-administered medications on file prior to encounter.      Current Outpatient Medications on File Prior to Encounter   Medication Sig    acetaminophen (TYLENOL) 500 MG tablet Take 1 tablet (500 mg total) by mouth  every 8 (eight) hours as needed.    albuterol (ACCUNEB) 1.25 mg/3 mL Nebu Inhale 1.25 mg into the lungs.    albuterol-ipratropium (DUO-NEB) 2.5 mg-0.5 mg/3 mL nebulizer solution Take 3 mLs by nebulization every 6 (six) hours. Rescue    aspirin (ECOTRIN) 81 MG EC tablet Take 81 mg by mouth once daily.    diltiaZEM (CARDIZEM) 90 MG tablet Take 90 mg by mouth.    ferrous gluconate (FERGON) 324 MG tablet Take 1 tablet (324 mg total) by mouth 3 (three) times daily with meals.    furosemide (LASIX) 40 MG tablet Take 40 mg by mouth once daily.     gabapentin (NEURONTIN) 300 MG capsule Take 300 mg by mouth.    hydrALAZINE (APRESOLINE) 50 MG tablet Take 50 mg by mouth 2 (two) times daily.    isosorbide mononitrate (IMDUR) 30 MG 24 hr tablet Take 3 tablets (90 mg total) by mouth once daily.    lisinopril (PRINIVIL,ZESTRIL) 40 MG tablet Take 1 tablet (40 mg total) by mouth once daily. Do not take this medication if blood pressure is below 130/80 mmHg and/or feeling dizzy after taking all other blood pressure meds    metFORMIN (GLUCOPHAGE) 1000 MG tablet Take 1 tablet (1,000 mg total) by mouth 2 (two) times daily with meals.    multivit-min-FA-lycopen-lutein (CENTRUM SILVER) 0.4-300-250 mg-mcg-mcg Tab Take 1 tablet by mouth once daily.    pantoprazole (PROTONIX) 40 MG tablet Take 40 mg by mouth once daily.    pregabalin (LYRICA) 75 MG capsule Take 75 mg by mouth 2 (two) times daily.    rivaroxaban (XARELTO) 20 mg Tab Take 20 mg by mouth daily with dinner or evening meal.     traMADol (ULTRAM) 50 mg tablet     acetaminophen (TYLENOL) 500 MG tablet Take 500 mg by mouth.    albuterol (PROVENTIL) 2.5 mg /3 mL (0.083 %) nebulizer solution Take 2.5 mg by nebulization every 6 (six) hours as needed.    amiodarone (PACERONE) 200 MG Tab Take 1 tablet (200 mg total) by mouth once daily.    ANORO ELLIPTA 62.5-25 mcg/actuation DsDv     aspirin (ECOTRIN) 81 MG EC tablet Take 81 mg by mouth.    cefUROXime (CEFTIN) 500 MG  "tablet Take 1 tablet (500 mg total) by mouth every 12 (twelve) hours.    fluticasone propionate (FLOVENT HFA) 220 mcg/actuation inhaler Inhale 1 puff into the lungs 2 (two) times daily. Controller    ipratropium-albuterol (COMBIVENT)  mcg/actuation inhaler Inhale 1 puff into the lungs every 6 (six) hours as needed for Wheezing or Shortness of Breath. Rescue    isosorbide mononitrate (IMDUR) 30 MG 24 hr tablet Take 30 mg by mouth.    methylPREDNISolone (MEDROL DOSEPACK) 4 mg tablet use as directed    nitroGLYCERIN (NITROSTAT) 0.4 MG SL tablet     umeclidinium brm/vilanterol tr (ANORO ELLIPTA INHL) Inhale into the lungs.     Family History     Problem Relation (Age of Onset)    Diabetes Father    Heart disease Mother    Hypertension Mother        Tobacco Use    Smoking status: Former Smoker     Packs/day: 0.50     Years: 55.00     Pack years: 27.50     Types: Cigarettes     Start date: 1963     Last attempt to quit: 2018     Years since quittin.6    Smokeless tobacco: Never Used    Tobacco comment: "States she quit smoking about 3 or 4 weeks ago"   Substance and Sexual Activity    Alcohol use: Not Currently     Alcohol/week: 0.6 oz     Types: 1 Glasses of wine per week     Frequency: Never     Comment: socially    Drug use: No    Sexual activity: Never     Review of Systems   Constitutional: Positive for activity change, appetite change and chills. Negative for diaphoresis and fever.   HENT: Negative for congestion, hearing loss, sore throat, tinnitus and trouble swallowing.    Eyes: Negative for photophobia, discharge, itching and visual disturbance.   Respiratory: Negative for apnea, cough, shortness of breath, wheezing and stridor.    Cardiovascular: Negative for chest pain, palpitations and leg swelling.   Gastrointestinal: Negative for abdominal distention, abdominal pain, blood in stool, constipation, diarrhea and nausea.   Endocrine: Negative for polydipsia, polyphagia and " polyuria.   Genitourinary: Positive for dysuria. Negative for difficulty urinating, flank pain and frequency.   Musculoskeletal: Negative for arthralgias, joint swelling and neck stiffness.   Skin: Negative for color change, rash and wound.   Neurological: Positive for weakness. Negative for dizziness, tremors, seizures, light-headedness, numbness and headaches.   Hematological: Negative for adenopathy.   Psychiatric/Behavioral: Negative for hallucinations and self-injury.     Objective:     Vital Signs (Most Recent):  Temp: 97.9 °F (36.6 °C) (08/19/19 2307)  Pulse: 90 (08/20/19 0730)  Resp: 18 (08/20/19 0730)  BP: 129/67 (08/20/19 0720)  SpO2: 100 % (08/20/19 0730) Vital Signs (24h Range):  Temp:  [97.9 °F (36.6 °C)-99.1 °F (37.3 °C)] 97.9 °F (36.6 °C)  Pulse:  [] 90  Resp:  [18-24] 18  SpO2:  [94 %-100 %] 100 %  BP: (129-184)/(67-92) 129/67     Weight: 77.4 kg (170 lb 10.2 oz)  Body mass index is 26.73 kg/m².    Physical Exam   Constitutional: She is oriented to person, place, and time. She appears well-developed. She is cooperative.   Deconditioned;needy; fatigued; generalized weakness   HENT:   Head: Normocephalic and atraumatic.   Eyes: Pupils are equal, round, and reactive to light. Conjunctivae, EOM and lids are normal.   Neck: Normal range of motion and full passive range of motion without pain. Neck supple. No JVD present. No edema present. No thyroid mass present.   Cardiovascular: Normal rate, regular rhythm, S1 normal, S2 normal and intact distal pulses.   No murmur heard.  Pulmonary/Chest: Effort normal.   Abdominal: Soft. Bowel sounds are normal. She exhibits no distension and no abdominal bruit. There is no splenomegaly or hepatomegaly. There is no tenderness. There is no CVA tenderness.   Musculoskeletal:   Denies pain; generalized weakness   Lymphadenopathy:     She has no cervical adenopathy.     She has no axillary adenopathy.   Neurological: She is alert and oriented to person, place, and  time. She has normal reflexes. She displays no tremor. She displays no seizure activity.   Skin: Skin is warm, dry and intact.   Psychiatric: She has a normal mood and affect. Her speech is normal. Thought content normal. Cognition and memory are normal.         CRANIAL NERVES     CN III, IV, VI   Pupils are equal, round, and reactive to light.  Extraocular motions are normal.        Significant Labs:   CBC:   Recent Labs   Lab 08/19/19  0403 08/20/19  0623   WBC 17.27* 11.55   HGB 8.7* 9.0*   HCT 31.5* 33.2*    294     CMP:   Recent Labs   Lab 08/19/19  0403 08/20/19  0621    143   K 4.4 3.9    108   CO2 25 29   * 129*   BUN 32* 16   CREATININE 0.9 0.6   CALCIUM 8.0* 8.4*   PROT 5.7* 5.5*   ALBUMIN 2.5* 2.4*   BILITOT 0.1 0.2   ALKPHOS 57 60   AST 14 13   ALT 9* 12   ANIONGAP 8 6*   EGFRNONAA >60 >60     Lactic Acid:   No results for input(s): LACTATE in the last 48 hours.    Troponin:   No results for input(s): TROPONINI in the last 48 hours.  TSH:   Recent Labs   Lab 06/07/19  2033   TSH 0.699       Significant Imaging:     Imaging Results           US Lower Extremity Veins Bilateral (Final result)  Result time 08/16/19 20:38:59    Final result by Davon Sandoval MD (08/16/19 20:38:59)                 Impression:      Bilateral lower extremity deep venous thrombosis as above.  Compared with previous ultrasound from 06/18/2019, there has been resolution of some previous areas of thrombosis, however some new areas of thrombosis are now seen.    This report was flagged in Epic as abnormal.      Electronically signed by: Davon Sandoval MD  Date:    08/16/2019  Time:    20:38             Narrative:    EXAMINATION:  US LOWER EXTREMITY VEINS BILATERAL    CLINICAL HISTORY:  swelling;    TECHNIQUE:  Duplex and color flow Doppler evaluation of the bilateral lower extremity veins was performed.    COMPARISON:  06/08/2019.    FINDINGS:  Right lower extremity:    Thrombosis is seen within 1 of the  paired right posterior tibial veins (new).  No evidence of clot involving the right common femoral, femoral, greater saphenous, popliteal, anterior tibial, and peroneal veins.  Previously visualized thrombosis within the right femoral vein is no longer seen.    Left lower extremity: Thrombosis is seen within the distal femoral vein with partial thrombosis seen of the popliteal vein (partially resolve).  Thrombosis is also visualized within the posterior tibial vein (new).  No evidence of clot involving the left common femoral, greater saphenous, anterior tibial, and peroneal veins.                               CTA Chest Non-Coronary (PE Study) (Final result)  Result time 08/16/19 20:34:23    Final result by Davon Sandoval MD (08/16/19 20:34:23)                 Impression:      1. No evidence of PE.  No acute intrathoracic abnormalities identified.  2. Centrilobular emphysema.  3. Extensive calcified and noncalcified plaque throughout the aorta with multifocal small outpouchings suggestive for penetrating ulcers.  Appearance is overall similar compared to prior CTA from June 2018.      Electronically signed by: Davon Sandoval MD  Date:    08/16/2019  Time:    20:34             Narrative:    EXAMINATION:  CTA CHEST NON CORONARY    CLINICAL HISTORY:  sob;    TECHNIQUE:  Low dose axial images, sagittal and coronal reformations were obtained from the thoracic inlet to the lung bases following the IV administration of 90 mL of Omnipaque 350.  Contrast timing was optimized to evaluate the pulmonary arteries.  MIP images were performed.    COMPARISON:  CTA chest and abdomen from June 2018.    FINDINGS:  Structures at the base of the neck are unremarkable.  Extensive calcified and noncalcified plaque are seen throughout the aorta with small multifocal outpouchings again seen likely reflecting penetrating ulcers.  Appearance appears overall unchanged compared to previous CT from 2018. Heart is normal in size without  pericardial effusion.  No intraluminal filling defects within the pulmonary arteries to suggest pulmonary thromboembolism.   There is no evidence of mediastinal, axillary, or hilar lymph node enlargement.  Scattered air seen throughout the esophagus.    The trachea and bronchi are patent.  The lungs are symmetrically expanded.  Mild centrilobular emphysematous changes are seen with upper lobe predominance.  Mild bilateral scarring is seen.  There is interval enlargement of thin-walled lung cysts seen within the medial aspect of the right lower lobe.  No evidence of new focal consolidation, mass, pneumothorax, or pleural effusion.    The visualized abdominal structures are unremarkable.  Osseous structures demonstrate degenerative change.  Extrathoracic soft tissues are unremarkable.                               X-Ray Chest AP Portable (Final result)  Result time 08/16/19 17:41:37    Final result by Saud Musa MD (08/16/19 17:41:37)                 Impression:      No focal consolidation      Electronically signed by: Saud Musa MD  Date:    08/16/2019  Time:    17:41             Narrative:    EXAMINATION:  XR CHEST AP PORTABLE    CLINICAL HISTORY:  Chest Pain;    TECHNIQUE:  Single frontal view of the chest was performed.    COMPARISON:  Chest radiograph 07/19/2019    FINDINGS:  The lungs are clear.  The cardiac silhouette is stable.  The osseous and soft tissue structures demonstrate no acute abnormality.                                    Assessment/Plan:      * Atrial fibrillation with RVR  As noted in hospital course - HR now 140-->155; BP 88 systolic   Transfer to ICU for amiodarone infusion  Discontinue oral BP medication for now  Etta nous cardiac monitoring  Monitor closely,    Converted to sinus,cardiogy started on Cardizem,stable go back to Tele.  Sinus on Tele,  PT,Ot is consulted.    Urinary tract infection without hematuria  08/17/19  Abnormal urinalysis - tachycardia; start rocephin IV  X 1 gram  Then keflex BID (avoid FVO)    08/18/19  Cultures showing e coli and staph aureus  Toy & Rocephin for now        Deep vein thrombosis (DVT) of distal vein of lower extremity  Acute and chronic DVT's identified on DVT; CTA negative for PE; Patient has history and has been on anticoagulation Xarelto - endorses compliance. Discussed broader anticoagulation options but at this point the patient is not amenable to warfarin. She denies LE pain - edema is chronic  -consult to hematology for opinion  -continue xarelto for now (addendum seen by Hematology changed to apixaban)  -cta negative for PE      Essential hypertension  08/18/19  Decent control continue usual home medications    08/19/19  Hold oral BP meds for now      Sinus tachycardia  Patient with HR sustained in 130's in early morning - received IV metoprolol X 2 doses with improvement  -restarted usual diltiazem & Imdur  -gentle IV fluids X 5 Hours (overdiuressing)  -PRN metoprolol 10 mg q5 minus X 2 HR>100  -Stop albuterol      Chronic respiratory failure with hypoxia  LOYDA, uses home oxygen at 2-3L  CPAP at night --- patient uses Bipap instead, change order  Supplemental oxygen  Stop albuterol treatments and start xopenex/altrovent  Stop steroids       Controlled type 2 diabetes mellitus, without long-term current use of insulin  While hospitalized will use combined insulin therapy with basal and prandial insulin coverage, POCT glucose checks, hypoglycemic protocol and correction scale - HgA1c 8.1        VTE Risk Mitigation (From admission, onward)        Ordered     apixaban tablet 5 mg  2 times daily      08/17/19 4244                Michelle Dumont MD  Department of Hospital Medicine   [facility] [place]

## 2019-08-20 NOTE — PROGRESS NOTES
[facility]  Hematology/Oncology  Progress Note    Patient Name: Yasmeen Palm  Admission Date: 8/16/2019  Hospital Length of Stay: 2 days  Code Status: Full Code     Subjective:     Interval History:     08/20/19: no report of bleeding. Tolerating current tx.   08/19/19: Transferred to ICU yesterday for AFib RVR. Tolerating current dose of Apixaban.   08/18/19: No significant edema of jorge LE    Oncology Treatment Plan:   [No treatment plan]    Medications:  Continuous Infusions:    Scheduled Meds:   apixaban  5 mg Oral BID    atorvastatin  80 mg Oral QHS    cefTRIAXone (ROCEPHIN) IVPB  1 g Intravenous Daily    diltiaZEM  180 mg Oral Daily    insulin aspart U-100  5 Units Subcutaneous TIDWM    insulin detemir U-100  8 Units Subcutaneous BID    ipratropium  0.5 mg Nebulization Q4H    levalbuterol  0.63 mg Nebulization Q8H    pantoprazole  40 mg Oral Daily    vancomycin (VANCOCIN) IVPB  1,000 mg Intravenous Q24H     PRN Meds:acetaminophen, cloNIDine, dextrose 50%, dextrose 50%, glucagon (human recombinant), glucose, glucose, insulin aspart U-100, metoprolol, ondansetron, ramelteon, sodium chloride 0.9%, traMADol     Review of Systems     Objective:     Vital Signs (Most Recent):  Temp: 97.9 °F (36.6 °C) (08/19/19 2307)  Pulse: 90 (08/20/19 0730)  Resp: 18 (08/20/19 0730)  BP: 129/67 (08/20/19 0720)  SpO2: 100 % (08/20/19 0730) Vital Signs (24h Range):  Temp:  [97.9 °F (36.6 °C)-99.1 °F (37.3 °C)] 97.9 °F (36.6 °C)  Pulse:  [] 90  Resp:  [15-30] 18  SpO2:  [94 %-100 %] 100 %  BP: (129-184)/(66-92) 129/67     Weight: 77.4 kg (170 lb 10.2 oz)  Body mass index is 26.73 kg/m².  Body surface area is 1.91 meters squared.      Intake/Output Summary (Last 24 hours) at 8/20/2019 0931  Last data filed at 8/20/2019 0600  Gross per 24 hour   Intake --   Output 600 ml   Net -600 ml       Physical Exam       Labs reviewed     Significant Labs:   CBC:   Recent Labs   Lab 08/18/19  0943  08/19/19  0403 08/20/19  0623   WBC 24.09* 17.27* 11.55   HGB 8.9* 8.7* 9.0*   HCT 32.3* 31.5* 33.2*   * 258 294   , CMP:   Recent Labs   Lab 08/18/19  0943 08/19/19  0403 08/20/19  0621    142 143   K 4.7 4.4 3.9    109 108   CO2 25 25 29   * 197* 129*   BUN 38* 32* 16   CREATININE 1.6* 0.9 0.6   CALCIUM 8.5* 8.0* 8.4*   PROT 5.9* 5.7* 5.5*   ALBUMIN 2.6* 2.5* 2.4*   BILITOT 0.2 0.1 0.2   ALKPHOS 55 57 60   AST 10 14 13   ALT 6* 9* 12   ANIONGAP 12 8 6*   EGFRNONAA 33* >60 >60   , Coagulation: No results for input(s): PT, INR, APTT in the last 48 hours., LDH: No results for input(s): LDHCSF, BFSOURCE in the last 48 hours., Reticulocytes: No results for input(s): RETIC in the last 48 hours., Tumor Markers: No results for input(s): PSA, CEA, , AFPTM, LG1043,  in the last 48 hours.    Invalid input(s): ALGTM and Uric Acid No results for input(s): URICACID in the last 48 hours.    Diagnostic Results:     Ref. Range 11/2/2017 22:11 8/17/2019 15:07   D-Dimer Latest Ref Range: <0.50 mg/L FEU 0.61 (H) 0.43       Assessment/Plan:     Active Diagnoses:    Diagnosis Date Noted POA    PRINCIPAL PROBLEM:  Atrial fibrillation with RVR [I48.91] 07/14/2018 Yes    Urinary tract infection without hematuria [N39.0] 08/17/2019 Yes    Deep vein thrombosis (DVT) of distal vein of lower extremity [I82.4Z9] 08/16/2019 Yes    Essential hypertension [I10] 03/24/2019 Yes     Chronic    Hyperlipidemia [E78.5] 03/24/2019 Yes     Chronic    Sinus tachycardia [R00.0] 12/08/2017 No     Chronic    Thoracic aorta atherosclerosis [I70.0] 08/12/2017 Yes    Chronic respiratory failure with hypoxia [J96.11] 04/10/2017 Yes     Chronic    Controlled type 2 diabetes mellitus, without long-term current use of insulin [E11.9] 12/14/2015 Yes     Chronic      Problems Resolved During this Admission:    Diagnosis Date Noted Date Resolved POA    Acute exacerbation of chronic obstructive pulmonary disease (COPD)  [J44.1] 12/08/2017 06/12/2019 Yes     Ms. Palm, 68 YO lady with multiple medical conditions with recurrent DVT of LE.      1. Recurrent DVT: US reviewed              -  Ddimer level within the NL range and I do not think she's having new thrombus burden              - I am concerned about non compliance with medication rather than anticoagulant failure.    -If there is a question about Rivaroxaban failure change to Apixaban 5mg bid    - tolerating Apixaban 5mg bid    - continue current dose   - Hg remain stable      2. SOB: chronic              - per primary team     3. Afib: rate controlled and anticoagulated   - out of icu    - per cardiology         Francois Lara M.D  Internal Medicine & Geriatric Medicine  Hematology & Oncology  Palliative Medicine    [address], [address]  [place] [address] [phone] (Office)  [phone] (Fax)

## 2019-08-21 PROBLEM — K59.09 OTHER CONSTIPATION: Status: ACTIVE | Noted: 2019-08-21

## 2019-08-21 LAB
ALBUMIN SERPL BCP-MCNC: 2.4 G/DL (ref 3.5–5.2)
ALP SERPL-CCNC: 60 U/L (ref 55–135)
ALT SERPL W/O P-5'-P-CCNC: 12 U/L (ref 10–44)
ANION GAP SERPL CALC-SCNC: 7 MMOL/L (ref 8–16)
AST SERPL-CCNC: 13 U/L (ref 10–40)
BACTERIA BLD CULT: NORMAL
BACTERIA BLD CULT: NORMAL
BASOPHILS # BLD AUTO: 0.01 K/UL (ref 0–0.2)
BASOPHILS NFR BLD: 0.1 % (ref 0–1.9)
BILIRUB SERPL-MCNC: 0.2 MG/DL (ref 0.1–1)
BUN SERPL-MCNC: 9 MG/DL (ref 8–23)
CALCIUM SERPL-MCNC: 8.7 MG/DL (ref 8.7–10.5)
CHLORIDE SERPL-SCNC: 104 MMOL/L (ref 95–110)
CO2 SERPL-SCNC: 30 MMOL/L (ref 23–29)
CREAT SERPL-MCNC: 0.7 MG/DL (ref 0.5–1.4)
DIFFERENTIAL METHOD: ABNORMAL
EOSINOPHIL # BLD AUTO: 0.1 K/UL (ref 0–0.5)
EOSINOPHIL NFR BLD: 1.1 % (ref 0–8)
ERYTHROCYTE [DISTWIDTH] IN BLOOD BY AUTOMATED COUNT: 19.1 % (ref 11.5–14.5)
EST. GFR  (AFRICAN AMERICAN): >60 ML/MIN/1.73 M^2
EST. GFR  (NON AFRICAN AMERICAN): >60 ML/MIN/1.73 M^2
GLUCOSE SERPL-MCNC: 132 MG/DL (ref 70–110)
HCT VFR BLD AUTO: 32.1 % (ref 37–48.5)
HGB BLD-MCNC: 9.1 G/DL (ref 12–16)
LYMPHOCYTES # BLD AUTO: 2.2 K/UL (ref 1–4.8)
LYMPHOCYTES NFR BLD: 19.8 % (ref 18–48)
MCH RBC QN AUTO: 25.7 PG (ref 27–31)
MCHC RBC AUTO-ENTMCNC: 28.3 G/DL (ref 32–36)
MCV RBC AUTO: 91 FL (ref 82–98)
MONOCYTES # BLD AUTO: 1.1 K/UL (ref 0.3–1)
MONOCYTES NFR BLD: 9.7 % (ref 4–15)
NEUTROPHILS # BLD AUTO: 7.8 K/UL (ref 1.8–7.7)
NEUTROPHILS NFR BLD: 69.7 % (ref 38–73)
PLATELET # BLD AUTO: 277 K/UL (ref 150–350)
PMV BLD AUTO: 9.5 FL (ref 9.2–12.9)
POCT GLUCOSE: 167 MG/DL (ref 70–110)
POCT GLUCOSE: 168 MG/DL (ref 70–110)
POCT GLUCOSE: 183 MG/DL (ref 70–110)
POCT GLUCOSE: 191 MG/DL (ref 70–110)
POTASSIUM SERPL-SCNC: 4.2 MMOL/L (ref 3.5–5.1)
PROT SERPL-MCNC: 5.5 G/DL (ref 6–8.4)
RBC # BLD AUTO: 3.54 M/UL (ref 4–5.4)
SODIUM SERPL-SCNC: 141 MMOL/L (ref 136–145)
VANCOMYCIN TROUGH SERPL-MCNC: 5.7 UG/ML (ref 10–22)
WBC # BLD AUTO: 11.29 K/UL (ref 3.9–12.7)

## 2019-08-21 PROCEDURE — 27000221 HC OXYGEN, UP TO 24 HOURS

## 2019-08-21 PROCEDURE — 94640 AIRWAY INHALATION TREATMENT: CPT

## 2019-08-21 PROCEDURE — 36415 COLL VENOUS BLD VENIPUNCTURE: CPT

## 2019-08-21 PROCEDURE — 25000242 PHARM REV CODE 250 ALT 637 W/ HCPCS: Performed by: HOSPITALIST

## 2019-08-21 PROCEDURE — 94761 N-INVAS EAR/PLS OXIMETRY MLT: CPT

## 2019-08-21 PROCEDURE — 80202 ASSAY OF VANCOMYCIN: CPT

## 2019-08-21 PROCEDURE — 25000003 PHARM REV CODE 250: Performed by: HOSPITALIST

## 2019-08-21 PROCEDURE — 94660 CPAP INITIATION&MGMT: CPT

## 2019-08-21 PROCEDURE — 80053 COMPREHEN METABOLIC PANEL: CPT

## 2019-08-21 PROCEDURE — 63600175 PHARM REV CODE 636 W HCPCS: Performed by: HOSPITALIST

## 2019-08-21 PROCEDURE — 99900035 HC TECH TIME PER 15 MIN (STAT)

## 2019-08-21 PROCEDURE — 21400001 HC TELEMETRY ROOM

## 2019-08-21 PROCEDURE — 85025 COMPLETE CBC W/AUTO DIFF WBC: CPT

## 2019-08-21 RX ORDER — VANCOMYCIN HCL IN 5 % DEXTROSE 1G/250ML
1000 PLASTIC BAG, INJECTION (ML) INTRAVENOUS
Status: DISCONTINUED | OUTPATIENT
Start: 2019-08-22 | End: 2019-08-22 | Stop reason: HOSPADM

## 2019-08-21 RX ADMIN — LEVALBUTEROL HYDROCHLORIDE 0.63 MG: 0.63 SOLUTION RESPIRATORY (INHALATION) at 12:08

## 2019-08-21 RX ADMIN — LEVALBUTEROL HYDROCHLORIDE 0.63 MG: 0.63 SOLUTION RESPIRATORY (INHALATION) at 03:08

## 2019-08-21 RX ADMIN — IPRATROPIUM BROMIDE 0.5 MG: 0.5 SOLUTION RESPIRATORY (INHALATION) at 11:08

## 2019-08-21 RX ADMIN — TRAMADOL HYDROCHLORIDE 50 MG: 50 TABLET, FILM COATED ORAL at 12:08

## 2019-08-21 RX ADMIN — INSULIN DETEMIR 8 UNITS: 100 INJECTION, SOLUTION SUBCUTANEOUS at 10:08

## 2019-08-21 RX ADMIN — DOCUSATE SODIUM 100 MG: 100 CAPSULE, LIQUID FILLED ORAL at 10:08

## 2019-08-21 RX ADMIN — INSULIN ASPART 5 UNITS: 100 INJECTION, SOLUTION INTRAVENOUS; SUBCUTANEOUS at 04:08

## 2019-08-21 RX ADMIN — LACTULOSE 20 G: 20 SOLUTION ORAL at 09:08

## 2019-08-21 RX ADMIN — TRAMADOL HYDROCHLORIDE 50 MG: 50 TABLET, FILM COATED ORAL at 05:08

## 2019-08-21 RX ADMIN — TRAMADOL HYDROCHLORIDE 50 MG: 50 TABLET, FILM COATED ORAL at 10:08

## 2019-08-21 RX ADMIN — LEVALBUTEROL HYDROCHLORIDE 0.63 MG: 0.63 SOLUTION RESPIRATORY (INHALATION) at 07:08

## 2019-08-21 RX ADMIN — POLYETHYLENE GLYCOL 3350 17 G: 17 POWDER, FOR SOLUTION ORAL at 09:08

## 2019-08-21 RX ADMIN — IPRATROPIUM BROMIDE 0.5 MG: 0.5 SOLUTION RESPIRATORY (INHALATION) at 07:08

## 2019-08-21 RX ADMIN — LEVALBUTEROL HYDROCHLORIDE 0.63 MG: 0.63 SOLUTION RESPIRATORY (INHALATION) at 11:08

## 2019-08-21 RX ADMIN — IPRATROPIUM BROMIDE 0.5 MG: 0.5 SOLUTION RESPIRATORY (INHALATION) at 03:08

## 2019-08-21 RX ADMIN — DOCUSATE SODIUM 100 MG: 100 CAPSULE, LIQUID FILLED ORAL at 09:08

## 2019-08-21 RX ADMIN — VANCOMYCIN HYDROCHLORIDE 1000 MG: 1 INJECTION, POWDER, LYOPHILIZED, FOR SOLUTION INTRAVENOUS at 12:08

## 2019-08-21 RX ADMIN — PANTOPRAZOLE SODIUM 40 MG: 40 TABLET, DELAYED RELEASE ORAL at 09:08

## 2019-08-21 RX ADMIN — DILTIAZEM HYDROCHLORIDE 180 MG: 180 CAPSULE, COATED, EXTENDED RELEASE ORAL at 09:08

## 2019-08-21 RX ADMIN — IPRATROPIUM BROMIDE 0.5 MG: 0.5 SOLUTION RESPIRATORY (INHALATION) at 12:08

## 2019-08-21 RX ADMIN — PHENAZOPYRIDINE HYDROCHLORIDE 100 MG: 100 TABLET, FILM COATED ORAL at 09:08

## 2019-08-21 RX ADMIN — APIXABAN 5 MG: 5 TABLET, FILM COATED ORAL at 10:08

## 2019-08-21 RX ADMIN — APIXABAN 5 MG: 5 TABLET, FILM COATED ORAL at 09:08

## 2019-08-21 RX ADMIN — RAMELTEON 8 MG: 8 TABLET, FILM COATED ORAL at 10:08

## 2019-08-21 RX ADMIN — ATORVASTATIN CALCIUM 80 MG: 40 TABLET, FILM COATED ORAL at 10:08

## 2019-08-21 RX ADMIN — PHENAZOPYRIDINE HYDROCHLORIDE 100 MG: 100 TABLET, FILM COATED ORAL at 12:08

## 2019-08-21 RX ADMIN — INSULIN ASPART 5 UNITS: 100 INJECTION, SOLUTION INTRAVENOUS; SUBCUTANEOUS at 09:08

## 2019-08-21 RX ADMIN — CEFTRIAXONE 1 G: 1 INJECTION, SOLUTION INTRAVENOUS at 09:08

## 2019-08-21 RX ADMIN — INSULIN DETEMIR 8 UNITS: 100 INJECTION, SOLUTION SUBCUTANEOUS at 09:08

## 2019-08-21 RX ADMIN — PHENAZOPYRIDINE HYDROCHLORIDE 100 MG: 100 TABLET, FILM COATED ORAL at 04:08

## 2019-08-21 RX ADMIN — INSULIN ASPART 5 UNITS: 100 INJECTION, SOLUTION INTRAVENOUS; SUBCUTANEOUS at 12:08

## 2019-08-21 NOTE — SUBJECTIVE & OBJECTIVE
Past Medical History:   Diagnosis Date    Anticoagulant long-term use     Xarelto    Arthritis     Asthma     CHF (congestive heart failure)     COPD (chronic obstructive pulmonary disease)     Coronary artery disease     Deep vein thrombosis (DVT) of left lower extremity     Depression     Diabetes mellitus     GERD (gastroesophageal reflux disease)     Hypertension     Obstructive sleep apnea on CPAP     On home oxygen therapy     Unsteady gait     uses either walker or 4 prong cane       Past Surgical History:   Procedure Laterality Date    CORONARY ANGIOPLASTY WITH STENT PLACEMENT      HERNIA REPAIR      encapsulated umbilical hernia       Review of patient's allergies indicates:  No Known Allergies    No current facility-administered medications on file prior to encounter.      Current Outpatient Medications on File Prior to Encounter   Medication Sig    acetaminophen (TYLENOL) 500 MG tablet Take 1 tablet (500 mg total) by mouth every 8 (eight) hours as needed.    albuterol (ACCUNEB) 1.25 mg/3 mL Nebu Inhale 1.25 mg into the lungs.    albuterol-ipratropium (DUO-NEB) 2.5 mg-0.5 mg/3 mL nebulizer solution Take 3 mLs by nebulization every 6 (six) hours. Rescue    aspirin (ECOTRIN) 81 MG EC tablet Take 81 mg by mouth once daily.    diltiaZEM (CARDIZEM) 90 MG tablet Take 90 mg by mouth.    ferrous gluconate (FERGON) 324 MG tablet Take 1 tablet (324 mg total) by mouth 3 (three) times daily with meals.    furosemide (LASIX) 40 MG tablet Take 40 mg by mouth once daily.     gabapentin (NEURONTIN) 300 MG capsule Take 300 mg by mouth.    hydrALAZINE (APRESOLINE) 50 MG tablet Take 50 mg by mouth 2 (two) times daily.    isosorbide mononitrate (IMDUR) 30 MG 24 hr tablet Take 3 tablets (90 mg total) by mouth once daily.    lisinopril (PRINIVIL,ZESTRIL) 40 MG tablet Take 1 tablet (40 mg total) by mouth once daily. Do not take this medication if blood pressure is below 130/80 mmHg and/or feeling dizzy  after taking all other blood pressure meds    metFORMIN (GLUCOPHAGE) 1000 MG tablet Take 1 tablet (1,000 mg total) by mouth 2 (two) times daily with meals.    multivit-min-FA-lycopen-lutein (CENTRUM SILVER) 0.4-300-250 mg-mcg-mcg Tab Take 1 tablet by mouth once daily.    pantoprazole (PROTONIX) 40 MG tablet Take 40 mg by mouth once daily.    pregabalin (LYRICA) 75 MG capsule Take 75 mg by mouth 2 (two) times daily.    rivaroxaban (XARELTO) 20 mg Tab Take 20 mg by mouth daily with dinner or evening meal.     traMADol (ULTRAM) 50 mg tablet     acetaminophen (TYLENOL) 500 MG tablet Take 500 mg by mouth.    albuterol (PROVENTIL) 2.5 mg /3 mL (0.083 %) nebulizer solution Take 2.5 mg by nebulization every 6 (six) hours as needed.    amiodarone (PACERONE) 200 MG Tab Take 1 tablet (200 mg total) by mouth once daily.    ANORO ELLIPTA 62.5-25 mcg/actuation DsDv     aspirin (ECOTRIN) 81 MG EC tablet Take 81 mg by mouth.    cefUROXime (CEFTIN) 500 MG tablet Take 1 tablet (500 mg total) by mouth every 12 (twelve) hours.    fluticasone propionate (FLOVENT HFA) 220 mcg/actuation inhaler Inhale 1 puff into the lungs 2 (two) times daily. Controller    ipratropium-albuterol (COMBIVENT)  mcg/actuation inhaler Inhale 1 puff into the lungs every 6 (six) hours as needed for Wheezing or Shortness of Breath. Rescue    isosorbide mononitrate (IMDUR) 30 MG 24 hr tablet Take 30 mg by mouth.    methylPREDNISolone (MEDROL DOSEPACK) 4 mg tablet use as directed    nitroGLYCERIN (NITROSTAT) 0.4 MG SL tablet     umeclidinium brm/vilanterol tr (ANORO ELLIPTA INHL) Inhale into the lungs.     Family History     Problem Relation (Age of Onset)    Diabetes Father    Heart disease Mother    Hypertension Mother        Tobacco Use    Smoking status: Former Smoker     Packs/day: 0.50     Years: 55.00     Pack years: 27.50     Types: Cigarettes     Start date: 1/1/1963     Last attempt to quit: 12/31/2018     Years since quitting:  "0.6    Smokeless tobacco: Never Used    Tobacco comment: "States she quit smoking about 3 or 4 weeks ago"   Substance and Sexual Activity    Alcohol use: Not Currently     Alcohol/week: 0.6 oz     Types: 1 Glasses of wine per week     Frequency: Never     Comment: socially    Drug use: No    Sexual activity: Never     Review of Systems   Constitutional: Positive for activity change, appetite change and chills. Negative for diaphoresis and fever.   HENT: Negative for congestion, hearing loss, sore throat, tinnitus and trouble swallowing.    Eyes: Negative for photophobia, discharge, itching and visual disturbance.   Respiratory: Negative for apnea, cough, shortness of breath, wheezing and stridor.    Cardiovascular: Negative for chest pain, palpitations and leg swelling.   Gastrointestinal: Positive for constipation. Negative for abdominal distention, abdominal pain, blood in stool, diarrhea and nausea.   Endocrine: Negative for polydipsia, polyphagia and polyuria.   Genitourinary: Positive for dysuria. Negative for difficulty urinating, flank pain and frequency.   Musculoskeletal: Negative for arthralgias, joint swelling and neck stiffness.   Skin: Negative for color change, rash and wound.   Neurological: Positive for weakness. Negative for dizziness, tremors, seizures, light-headedness, numbness and headaches.   Hematological: Negative for adenopathy.   Psychiatric/Behavioral: Negative for hallucinations and self-injury.     Objective:     Vital Signs (Most Recent):  Temp: 98.1 °F (36.7 °C) (08/21/19 0744)  Pulse: 93 (08/21/19 0744)  Resp: 16 (08/21/19 0744)  BP: 133/62 (08/21/19 0744)  SpO2: 97 % (08/21/19 0744) Vital Signs (24h Range):  Temp:  [98.1 °F (36.7 °C)-99.1 °F (37.3 °C)] 98.1 °F (36.7 °C)  Pulse:  [] 93  Resp:  [15-20] 16  SpO2:  [93 %-100 %] 97 %  BP: (130-144)/(62-73) 133/62     Weight: 77.4 kg (170 lb 10.2 oz)  Body mass index is 26.73 kg/m².    Physical Exam   Constitutional: She is " oriented to person, place, and time. She appears well-developed. She is cooperative.   Deconditioned;needy; fatigued; generalized weakness   HENT:   Head: Normocephalic and atraumatic.   Eyes: Pupils are equal, round, and reactive to light. Conjunctivae, EOM and lids are normal.   Neck: Normal range of motion and full passive range of motion without pain. Neck supple. No JVD present. No edema present. No thyroid mass present.   Cardiovascular: Normal rate, regular rhythm, S1 normal, S2 normal and intact distal pulses.   No murmur heard.  Pulmonary/Chest: Effort normal.   Abdominal: Soft. Bowel sounds are normal. She exhibits no distension and no abdominal bruit. There is no splenomegaly or hepatomegaly. There is no tenderness. There is no CVA tenderness.   Musculoskeletal:   Denies pain; generalized weakness   Lymphadenopathy:     She has no cervical adenopathy.     She has no axillary adenopathy.   Neurological: She is alert and oriented to person, place, and time. She has normal reflexes. She displays no tremor. She displays no seizure activity.   Skin: Skin is warm, dry and intact.   Psychiatric: She has a normal mood and affect. Her speech is normal. Thought content normal. Cognition and memory are normal.         CRANIAL NERVES     CN III, IV, VI   Pupils are equal, round, and reactive to light.  Extraocular motions are normal.        Significant Labs:   CBC:   Recent Labs   Lab 08/20/19  0623 08/21/19  0515   WBC 11.55 11.29   HGB 9.0* 9.1*   HCT 33.2* 32.1*    277     CMP:   Recent Labs   Lab 08/20/19  0621 08/21/19  0515    141   K 3.9 4.2    104   CO2 29 30*   * 132*   BUN 16 9   CREATININE 0.6 0.7   CALCIUM 8.4* 8.7   PROT 5.5* 5.5*   ALBUMIN 2.4* 2.4*   BILITOT 0.2 0.2   ALKPHOS 60 60   AST 13 13   ALT 12 12   ANIONGAP 6* 7*   EGFRNONAA >60 >60     Lactic Acid:   No results for input(s): LACTATE in the last 48 hours.    Troponin:   No results for input(s): TROPONINI in the last  48 hours.  TSH:   Recent Labs   Lab 06/07/19 2033   TSH 0.699       Significant Imaging:     Imaging Results           US Lower Extremity Veins Bilateral (Final result)  Result time 08/16/19 20:38:59    Final result by Davon Sandoval MD (08/16/19 20:38:59)                 Impression:      Bilateral lower extremity deep venous thrombosis as above.  Compared with previous ultrasound from 06/18/2019, there has been resolution of some previous areas of thrombosis, however some new areas of thrombosis are now seen.    This report was flagged in Epic as abnormal.      Electronically signed by: Davon Sandoval MD  Date:    08/16/2019  Time:    20:38             Narrative:    EXAMINATION:  US LOWER EXTREMITY VEINS BILATERAL    CLINICAL HISTORY:  swelling;    TECHNIQUE:  Duplex and color flow Doppler evaluation of the bilateral lower extremity veins was performed.    COMPARISON:  06/08/2019.    FINDINGS:  Right lower extremity:    Thrombosis is seen within 1 of the paired right posterior tibial veins (new).  No evidence of clot involving the right common femoral, femoral, greater saphenous, popliteal, anterior tibial, and peroneal veins.  Previously visualized thrombosis within the right femoral vein is no longer seen.    Left lower extremity: Thrombosis is seen within the distal femoral vein with partial thrombosis seen of the popliteal vein (partially resolve).  Thrombosis is also visualized within the posterior tibial vein (new).  No evidence of clot involving the left common femoral, greater saphenous, anterior tibial, and peroneal veins.                               CTA Chest Non-Coronary (PE Study) (Final result)  Result time 08/16/19 20:34:23    Final result by Davon Sandoval MD (08/16/19 20:34:23)                 Impression:      1. No evidence of PE.  No acute intrathoracic abnormalities identified.  2. Centrilobular emphysema.  3. Extensive calcified and noncalcified plaque throughout the aorta with  multifocal small outpouchings suggestive for penetrating ulcers.  Appearance is overall similar compared to prior CTA from June 2018.      Electronically signed by: Davon Sandoval MD  Date:    08/16/2019  Time:    20:34             Narrative:    EXAMINATION:  CTA CHEST NON CORONARY    CLINICAL HISTORY:  sob;    TECHNIQUE:  Low dose axial images, sagittal and coronal reformations were obtained from the thoracic inlet to the lung bases following the IV administration of 90 mL of Omnipaque 350.  Contrast timing was optimized to evaluate the pulmonary arteries.  MIP images were performed.    COMPARISON:  CTA chest and abdomen from June 2018.    FINDINGS:  Structures at the base of the neck are unremarkable.  Extensive calcified and noncalcified plaque are seen throughout the aorta with small multifocal outpouchings again seen likely reflecting penetrating ulcers.  Appearance appears overall unchanged compared to previous CT from 2018. Heart is normal in size without pericardial effusion.  No intraluminal filling defects within the pulmonary arteries to suggest pulmonary thromboembolism.   There is no evidence of mediastinal, axillary, or hilar lymph node enlargement.  Scattered air seen throughout the esophagus.    The trachea and bronchi are patent.  The lungs are symmetrically expanded.  Mild centrilobular emphysematous changes are seen with upper lobe predominance.  Mild bilateral scarring is seen.  There is interval enlargement of thin-walled lung cysts seen within the medial aspect of the right lower lobe.  No evidence of new focal consolidation, mass, pneumothorax, or pleural effusion.    The visualized abdominal structures are unremarkable.  Osseous structures demonstrate degenerative change.  Extrathoracic soft tissues are unremarkable.                               X-Ray Chest AP Portable (Final result)  Result time 08/16/19 17:41:37    Final result by Saud Musa MD (08/16/19 17:41:37)                  Impression:      No focal consolidation      Electronically signed by: Saud Musa MD  Date:    08/16/2019  Time:    17:41             Narrative:    EXAMINATION:  XR CHEST AP PORTABLE    CLINICAL HISTORY:  Chest Pain;    TECHNIQUE:  Single frontal view of the chest was performed.    COMPARISON:  Chest radiograph 07/19/2019    FINDINGS:  The lungs are clear.  The cardiac silhouette is stable.  The osseous and soft tissue structures demonstrate no acute abnormality.

## 2019-08-21 NOTE — NURSING
Patient bedside report has been completed and given to FABY Rhoades. Chart check has been completed. NAD noted.     1845 - miralax, docusate, and lactulose given to help pt have a BM.     1329 - Dr. Greenwood notified that pt is having painful urination and if anything else could be ordered. He placed order for phenazopyridine.    1759 - Dr. Greenwood notified that pt has had multiple unsuccessful BM on bedpan and that pt was given a BSC and only had a small hard BM and if anything could be ordered to help her have BM. He placed order for lactulose, miralax, and docusate.

## 2019-08-21 NOTE — PLAN OF CARE
Problem: Diabetes Comorbidity  Goal: Blood Glucose Level Within Desired Range  Outcome: Ongoing (interventions implemented as appropriate)  Intervention: Maintain Glycemic Control     08/19/19 1101   Monitor and Manage Ketoacidosis   Glycemic Management supplemental insulin given;blood glucose monitoring

## 2019-08-21 NOTE — PROGRESS NOTES
Bedside report given to oncoming nurse LATASHA Bundy noted. Pt lying in bed, Respirations even et unlabored. Safety maintained, Call light within reach.     24 hr chart check completed.

## 2019-08-21 NOTE — PROGRESS NOTES
Report received from off going nurse, FABY Rhoades. Patient AAO. No signs of distress noted. Call light in reach. Bed low and locked. Will continue to monitor.

## 2019-08-21 NOTE — PLAN OF CARE
Problem: Adult Inpatient Plan of Care  Goal: Plan of Care Review  Outcome: Ongoing (interventions implemented as appropriate)     08/20/19 2105   Plan of Care Review   Plan of Care Reviewed With patient   Progress improving     Goal: Optimal Comfort and Wellbeing    Intervention: Provide Person-Centered Care     08/20/19 2105   Support Dyspnea Relief   Trust Relationship/Rapport care explained;choices provided;emotional support provided;empathic listening provided;questions answered

## 2019-08-21 NOTE — PROGRESS NOTES
[facility]  Hematology/Oncology  Progress Note    Patient Name: Yasmeen Palm  Admission Date: 8/16/2019  Hospital Length of Stay: 3 days  Code Status: Full Code     Subjective:     Interval History:     08/21/19: Feeling ok today. Tolerating current dose of Apixaban   08/20/19: no report of bleeding. Tolerating current tx.   08/19/19: Transferred to ICU yesterday for AFib RVR. Tolerating current dose of Apixaban.   08/18/19: No significant edema of jorge LE    Oncology Treatment Plan:   [No treatment plan]    Medications:  Continuous Infusions:    Scheduled Meds:   apixaban  5 mg Oral BID    atorvastatin  80 mg Oral QHS    cefTRIAXone (ROCEPHIN) IVPB  1 g Intravenous Daily    diltiaZEM  180 mg Oral Daily    docusate sodium  100 mg Oral BID    insulin aspart U-100  5 Units Subcutaneous TIDWM    insulin detemir U-100  8 Units Subcutaneous BID    ipratropium  0.5 mg Nebulization Q4H    lactulose  20 g Oral BID    levalbuterol  0.63 mg Nebulization Q8H    pantoprazole  40 mg Oral Daily    phenazopyridine  100 mg Oral TID WM    polyethylene glycol  17 g Oral BID    vancomycin (VANCOCIN) IVPB  1,000 mg Intravenous Q24H     PRN Meds:acetaminophen, cloNIDine, dextrose 50%, dextrose 50%, glucagon (human recombinant), glucose, glucose, insulin aspart U-100, metoprolol, ondansetron, ramelteon, sodium chloride 0.9%, traMADol     Review of Systems   Denies HA, blurred vision  No fever or chills  Gen abd pain     Objective:     Vital Signs (Most Recent):  Temp: 98.1 °F (36.7 °C) (08/21/19 0744)  Pulse: 93 (08/21/19 0744)  Resp: 16 (08/21/19 0744)  BP: 133/62 (08/21/19 0744)  SpO2: 97 % (08/21/19 0744) Vital Signs (24h Range):  Temp:  [98.1 °F (36.7 °C)-99.1 °F (37.3 °C)] 98.1 °F (36.7 °C)  Pulse:  [] 93  Resp:  [15-20] 16  SpO2:  [93 %-100 %] 97 %  BP: (130-144)/(62-73) 133/62     Weight: 77.4 kg (170 lb 10.2 oz)  Body mass index is 26.73 kg/m².  Body surface area is 1.91 meters  squared.      Intake/Output Summary (Last 24 hours) at 8/21/2019 0926  Last data filed at 8/20/2019 1948  Gross per 24 hour   Intake 780 ml   Output 350 ml   Net 430 ml       Physical Exam     NAD, resting in bed. RN at the bedside   NC/AT  Conj slightly pale   Decreased air entry laterally  S1s2, RR  Soft, + BS   Some pedal edema bi  A&O x 2  Calm     Labs reviewed     Significant Labs:   CBC:   Recent Labs   Lab 08/20/19  0623 08/21/19  0515   WBC 11.55 11.29   HGB 9.0* 9.1*   HCT 33.2* 32.1*    277   , CMP:   Recent Labs   Lab 08/20/19  0621 08/21/19  0515    141   K 3.9 4.2    104   CO2 29 30*   * 132*   BUN 16 9   CREATININE 0.6 0.7   CALCIUM 8.4* 8.7   PROT 5.5* 5.5*   ALBUMIN 2.4* 2.4*   BILITOT 0.2 0.2   ALKPHOS 60 60   AST 13 13   ALT 12 12   ANIONGAP 6* 7*   EGFRNONAA >60 >60   , Coagulation: No results for input(s): PT, INR, APTT in the last 48 hours., LDH: No results for input(s): LDHCSF, BFSOURCE in the last 48 hours., Reticulocytes: No results for input(s): RETIC in the last 48 hours., Tumor Markers: No results for input(s): PSA, CEA, , AFPTM, [id number],  in the last 48 hours.    Invalid input(s): ALGTM and Uric Acid No results for input(s): URICACID in the last 48 hours.    Diagnostic Results:     Ref. Range 11/2/2017 22:11 8/17/2019 15:07   D-Dimer Latest Ref Range: <0.50 mg/L FEU 0.61 (H) 0.43       Assessment/Plan:     Active Diagnoses:    Diagnosis Date Noted POA    PRINCIPAL PROBLEM:  Atrial fibrillation with RVR [I48.91] 07/14/2018 Yes    Urinary tract infection without hematuria [N39.0] 08/17/2019 Yes    Deep vein thrombosis (DVT) of distal vein of lower extremity [I82.4Z9] 08/16/2019 Yes    Essential hypertension [I10] 03/24/2019 Yes     Chronic    Hyperlipidemia [E78.5] 03/24/2019 Yes     Chronic    Sinus tachycardia [R00.0] 12/08/2017 No     Chronic    Thoracic aorta atherosclerosis [I70.0] 08/12/2017 Yes    Chronic respiratory failure with hypoxia  [J96.11] 04/10/2017 Yes     Chronic    Controlled type 2 diabetes mellitus, without long-term current use of insulin [E11.9] 12/14/2015 Yes     Chronic      Problems Resolved During this Admission:    Diagnosis Date Noted Date Resolved POA    Acute exacerbation of chronic obstructive pulmonary disease (COPD) [J44.1] 12/08/2017 06/12/2019 Yes     Ms. Palm, 70 YO lady with multiple medical conditions with recurrent DVT of LE.      1. Recurrent DVT: US reviewed              -  Ddimer level within the NL range and I do not think she's having new thrombus burden              - I am concerned about non compliance with medication rather than anticoagulant failure.    -If there is a question about Rivaroxaban failure change to Apixaban 5mg bid    - tolerating Apixaban 5mg bid    - no signs of bleeding      2. SOB: chronic              - per primary team     3. Afib: rate controlled and anticoagulated   - rate controlled and anticoagulated      Francois Lara M.D  Internal Medicine & Geriatric Medicine  Hematology & Oncology  Palliative Medicine    [address] [place] [address], [address]  [place] [address]  [phone] (Office)  [phone] (Fax)

## 2019-08-21 NOTE — PROGRESS NOTES
[facility]  [place] Note    Patient Name: Yasmeen Palm  MRN: [id number]  Patient Class: IP- Inpatient   Admission Date: 8/16/2019  Length of Stay: 3 days  Attending Physician: Michelle Dumont MD  Primary Care Provider: Angel Orourke Jr, MD        Subjective:     Principal Problem:Atrial fibrillation with RVR        HPI:  Yasmeen Palm is a 69 y.o. sorely deconditioned patient with essential hypertension, DMII (HbA1c 8.1% July 2019), HLD (.0 Dec 2018), paroxysmal atrial fibrillation ([id number] score 4) on chronic anticoagulation, COPD, chronic respiratory failure with hypoxia and hypercapnia uses 2-3L home oxygen, GERD, anemia of chronic disease, tobacco abuse, and history of PE/DVT on chronic anticoagulation.    Patient presented with complaints of dyspnea that began in early morning prior to presentation.  The dyspnea is mainly exertional.  She denies any fevers, chills, nausea, vomiting, pleurisy, diaphoresis, palpitations, sick contracts, recent long trips, or hemoptysis.    Initial workup shows mildly elevated WBC (on prednisone) thought to be reactive, hypernatremia       Overview/Hospital Course:  08/17/19  -Tachycardia sustained in 130's, Discontinued the albuterol and added xopenex Q8H/altrovent Q4H, metoprolol X 2 doses IV, cautious gentle fluids as patient has been getting lasix IV 40 mg BID as ordered in ED   -WBC 14K; her BUN/creatine/GFR 16/0.8/60 overnight now 29/1.5/41; abnormal urinalysis; awaiting culture; start rocephin  -DVT - failed therapy unclear compliance - was on xarelto; failed therapy, refused warfarin - consulted to Hematology - patient agreed to start apixaban    08/19/19  0615am----  -Paged by nursing staff patient converted to afib rvr with 90 systolic bp. Came in with acute and chronic LE DVT was on xarelto at home unclear compliance. Refused to be converted to warfarin -  Hematology consulted and recommended apixban instead  which was initiated. CTA neg PE at presentation.     One day prior patient with ST and decreased renal functions the night before from normal to creatine 1.5. Noted to be in ST and administered metoprolol X 2 and urinalysis sent which was abnormal. Started on one dose IV rocephin and then oral to start today 8/18/19 to prevent/avoid FVO.      Known LVEF 70% with GI DD - pul htn PA pressure 80 - on home oxygen at 2-3L.therefore - Gentle fluids X 5h administered as thought to be overdiuressed at that time as she had been placed on IV lasix 40 mg BID from the ED. Her HR improved about 5pm 8/17/19.    0844 am:   - Patient's HR remains 149-155 BP 88/56 after metoprolol IV - transfer to ICU with amio  - Urine culture positive e.coli & staph aeurus - start Vanc, and rocephin,has still discomfort,started on pyridium.  - New DVT - continue apixaban  - LOYDA/chronic respiratory impairment/COPD - bipap nightly,  Converted to sinus,cardiogy started on Cardizem,stable go back to Tele.  Sinus on Tele,  PT,OT is consulted.  still is constipated,on multiple stool softner.    Past Medical History:   Diagnosis Date    Anticoagulant long-term use     Xarelto    Arthritis     Asthma     CHF (congestive heart failure)     COPD (chronic obstructive pulmonary disease)     Coronary artery disease     Deep vein thrombosis (DVT) of left lower extremity     Depression     Diabetes mellitus     GERD (gastroesophageal reflux disease)     Hypertension     Obstructive sleep apnea on CPAP     On home oxygen therapy     Unsteady gait     uses either walker or 4 prong cane       Past Surgical History:   Procedure Laterality Date    CORONARY ANGIOPLASTY WITH STENT PLACEMENT      HERNIA REPAIR      encapsulated umbilical hernia       Review of patient's allergies indicates:  No Known Allergies    No current facility-administered medications on file prior to encounter.      Current Outpatient Medications on File Prior to Encounter    Medication Sig    acetaminophen (TYLENOL) 500 MG tablet Take 1 tablet (500 mg total) by mouth every 8 (eight) hours as needed.    albuterol (ACCUNEB) 1.25 mg/3 mL Nebu Inhale 1.25 mg into the lungs.    albuterol-ipratropium (DUO-NEB) 2.5 mg-0.5 mg/3 mL nebulizer solution Take 3 mLs by nebulization every 6 (six) hours. Rescue    aspirin (ECOTRIN) 81 MG EC tablet Take 81 mg by mouth once daily.    diltiaZEM (CARDIZEM) 90 MG tablet Take 90 mg by mouth.    ferrous gluconate (FERGON) 324 MG tablet Take 1 tablet (324 mg total) by mouth 3 (three) times daily with meals.    furosemide (LASIX) 40 MG tablet Take 40 mg by mouth once daily.     gabapentin (NEURONTIN) 300 MG capsule Take 300 mg by mouth.    hydrALAZINE (APRESOLINE) 50 MG tablet Take 50 mg by mouth 2 (two) times daily.    isosorbide mononitrate (IMDUR) 30 MG 24 hr tablet Take 3 tablets (90 mg total) by mouth once daily.    lisinopril (PRINIVIL,ZESTRIL) 40 MG tablet Take 1 tablet (40 mg total) by mouth once daily. Do not take this medication if blood pressure is below 130/80 mmHg and/or feeling dizzy after taking all other blood pressure meds    metFORMIN (GLUCOPHAGE) 1000 MG tablet Take 1 tablet (1,000 mg total) by mouth 2 (two) times daily with meals.    multivit-min-FA-lycopen-lutein (CENTRUM SILVER) 0.4-300-250 mg-mcg-mcg Tab Take 1 tablet by mouth once daily.    pantoprazole (PROTONIX) 40 MG tablet Take 40 mg by mouth once daily.    pregabalin (LYRICA) 75 MG capsule Take 75 mg by mouth 2 (two) times daily.    rivaroxaban (XARELTO) 20 mg Tab Take 20 mg by mouth daily with dinner or evening meal.     traMADol (ULTRAM) 50 mg tablet     acetaminophen (TYLENOL) 500 MG tablet Take 500 mg by mouth.    albuterol (PROVENTIL) 2.5 mg /3 mL (0.083 %) nebulizer solution Take 2.5 mg by nebulization every 6 (six) hours as needed.    amiodarone (PACERONE) 200 MG Tab Take 1 tablet (200 mg total) by mouth once daily.    ANORO ELLIPTA 62.5-25  "mcg/actuation DsDv     aspirin (ECOTRIN) 81 MG EC tablet Take 81 mg by mouth.    cefUROXime (CEFTIN) 500 MG tablet Take 1 tablet (500 mg total) by mouth every 12 (twelve) hours.    fluticasone propionate (FLOVENT HFA) 220 mcg/actuation inhaler Inhale 1 puff into the lungs 2 (two) times daily. Controller    ipratropium-albuterol (COMBIVENT)  mcg/actuation inhaler Inhale 1 puff into the lungs every 6 (six) hours as needed for Wheezing or Shortness of Breath. Rescue    isosorbide mononitrate (IMDUR) 30 MG 24 hr tablet Take 30 mg by mouth.    methylPREDNISolone (MEDROL DOSEPACK) 4 mg tablet use as directed    nitroGLYCERIN (NITROSTAT) 0.4 MG SL tablet     umeclidinium brm/vilanterol tr (ANORO ELLIPTA INHL) Inhale into the lungs.     Family History     Problem Relation (Age of Onset)    Diabetes Father    Heart disease Mother    Hypertension Mother        Tobacco Use    Smoking status: Former Smoker     Packs/day: 0.50     Years: 55.00     Pack years: 27.50     Types: Cigarettes     Start date: 1963     Last attempt to quit: 2018     Years since quittin.6    Smokeless tobacco: Never Used    Tobacco comment: "States she quit smoking about 3 or 4 weeks ago"   Substance and Sexual Activity    Alcohol use: Not Currently     Alcohol/week: 0.6 oz     Types: 1 Glasses of wine per week     Frequency: Never     Comment: socially    Drug use: No    Sexual activity: Never     Review of Systems   Constitutional: Positive for activity change, appetite change and chills. Negative for diaphoresis and fever.   HENT: Negative for congestion, hearing loss, sore throat, tinnitus and trouble swallowing.    Eyes: Negative for photophobia, discharge, itching and visual disturbance.   Respiratory: Negative for apnea, cough, shortness of breath, wheezing and stridor.    Cardiovascular: Negative for chest pain, palpitations and leg swelling.   Gastrointestinal: Positive for constipation. Negative for abdominal " distention, abdominal pain, blood in stool, diarrhea and nausea.   Endocrine: Negative for polydipsia, polyphagia and polyuria.   Genitourinary: Positive for dysuria. Negative for difficulty urinating, flank pain and frequency.   Musculoskeletal: Negative for arthralgias, joint swelling and neck stiffness.   Skin: Negative for color change, rash and wound.   Neurological: Positive for weakness. Negative for dizziness, tremors, seizures, light-headedness, numbness and headaches.   Hematological: Negative for adenopathy.   Psychiatric/Behavioral: Negative for hallucinations and self-injury.     Objective:     Vital Signs (Most Recent):  Temp: 98.1 °F (36.7 °C) (08/21/19 0744)  Pulse: 93 (08/21/19 0744)  Resp: 16 (08/21/19 0744)  BP: 133/62 (08/21/19 0744)  SpO2: 97 % (08/21/19 0744) Vital Signs (24h Range):  Temp:  [98.1 °F (36.7 °C)-99.1 °F (37.3 °C)] 98.1 °F (36.7 °C)  Pulse:  [] 93  Resp:  [15-20] 16  SpO2:  [93 %-100 %] 97 %  BP: (130-144)/(62-73) 133/62     Weight: 77.4 kg (170 lb 10.2 oz)  Body mass index is 26.73 kg/m².    Physical Exam   Constitutional: She is oriented to person, place, and time. She appears well-developed. She is cooperative.   Deconditioned;needy; fatigued; generalized weakness   HENT:   Head: Normocephalic and atraumatic.   Eyes: Pupils are equal, round, and reactive to light. Conjunctivae, EOM and lids are normal.   Neck: Normal range of motion and full passive range of motion without pain. Neck supple. No JVD present. No edema present. No thyroid mass present.   Cardiovascular: Normal rate, regular rhythm, S1 normal, S2 normal and intact distal pulses.   No murmur heard.  Pulmonary/Chest: Effort normal.   Abdominal: Soft. Bowel sounds are normal. She exhibits no distension and no abdominal bruit. There is no splenomegaly or hepatomegaly. There is no tenderness. There is no CVA tenderness.   Musculoskeletal:   Denies pain; generalized weakness   Lymphadenopathy:     She has no  cervical adenopathy.     She has no axillary adenopathy.   Neurological: She is alert and oriented to person, place, and time. She has normal reflexes. She displays no tremor. She displays no seizure activity.   Skin: Skin is warm, dry and intact.   Psychiatric: She has a normal mood and affect. Her speech is normal. Thought content normal. Cognition and memory are normal.         CRANIAL NERVES     CN III, IV, VI   Pupils are equal, round, and reactive to light.  Extraocular motions are normal.        Significant Labs:   CBC:   Recent Labs   Lab 08/20/19 0623 08/21/19  0515   WBC 11.55 11.29   HGB 9.0* 9.1*   HCT 33.2* 32.1*    277     CMP:   Recent Labs   Lab 08/20/19 0621 08/21/19 0515    141   K 3.9 4.2    104   CO2 29 30*   * 132*   BUN 16 9   CREATININE 0.6 0.7   CALCIUM 8.4* 8.7   PROT 5.5* 5.5*   ALBUMIN 2.4* 2.4*   BILITOT 0.2 0.2   ALKPHOS 60 60   AST 13 13   ALT 12 12   ANIONGAP 6* 7*   EGFRNONAA >60 >60     Lactic Acid:   No results for input(s): LACTATE in the last 48 hours.    Troponin:   No results for input(s): TROPONINI in the last 48 hours.  TSH:   Recent Labs   Lab 06/07/19  2033   TSH 0.699       Significant Imaging:     Imaging Results           US Lower Extremity Veins Bilateral (Final result)  Result time 08/16/19 20:38:59    Final result by Davon Sandoval MD (08/16/19 20:38:59)                 Impression:      Bilateral lower extremity deep venous thrombosis as above.  Compared with previous ultrasound from 06/18/2019, there has been resolution of some previous areas of thrombosis, however some new areas of thrombosis are now seen.    This report was flagged in Epic as abnormal.      Electronically signed by: Davon Sandoval MD  Date:    08/16/2019  Time:    20:38             Narrative:    EXAMINATION:  US LOWER EXTREMITY VEINS BILATERAL    CLINICAL HISTORY:  swelling;    TECHNIQUE:  Duplex and color flow Doppler evaluation of the bilateral lower extremity veins  was performed.    COMPARISON:  06/08/2019.    FINDINGS:  Right lower extremity:    Thrombosis is seen within 1 of the paired right posterior tibial veins (new).  No evidence of clot involving the right common femoral, femoral, greater saphenous, popliteal, anterior tibial, and peroneal veins.  Previously visualized thrombosis within the right femoral vein is no longer seen.    Left lower extremity: Thrombosis is seen within the distal femoral vein with partial thrombosis seen of the popliteal vein (partially resolve).  Thrombosis is also visualized within the posterior tibial vein (new).  No evidence of clot involving the left common femoral, greater saphenous, anterior tibial, and peroneal veins.                               CTA Chest Non-Coronary (PE Study) (Final result)  Result time 08/16/19 20:34:23    Final result by Davon Sandoval MD (08/16/19 20:34:23)                 Impression:      1. No evidence of PE.  No acute intrathoracic abnormalities identified.  2. Centrilobular emphysema.  3. Extensive calcified and noncalcified plaque throughout the aorta with multifocal small outpouchings suggestive for penetrating ulcers.  Appearance is overall similar compared to prior CTA from June 2018.      Electronically signed by: Davon Sandoval MD  Date:    08/16/2019  Time:    20:34             Narrative:    EXAMINATION:  CTA CHEST NON CORONARY    CLINICAL HISTORY:  sob;    TECHNIQUE:  Low dose axial images, sagittal and coronal reformations were obtained from the thoracic inlet to the lung bases following the IV administration of 90 mL of Omnipaque 350.  Contrast timing was optimized to evaluate the pulmonary arteries.  MIP images were performed.    COMPARISON:  CTA chest and abdomen from June 2018.    FINDINGS:  Structures at the base of the neck are unremarkable.  Extensive calcified and noncalcified plaque are seen throughout the aorta with small multifocal outpouchings again seen likely reflecting penetrating  ulcers.  Appearance appears overall unchanged compared to previous CT from 2018. Heart is normal in size without pericardial effusion.  No intraluminal filling defects within the pulmonary arteries to suggest pulmonary thromboembolism.   There is no evidence of mediastinal, axillary, or hilar lymph node enlargement.  Scattered air seen throughout the esophagus.    The trachea and bronchi are patent.  The lungs are symmetrically expanded.  Mild centrilobular emphysematous changes are seen with upper lobe predominance.  Mild bilateral scarring is seen.  There is interval enlargement of thin-walled lung cysts seen within the medial aspect of the right lower lobe.  No evidence of new focal consolidation, mass, pneumothorax, or pleural effusion.    The visualized abdominal structures are unremarkable.  Osseous structures demonstrate degenerative change.  Extrathoracic soft tissues are unremarkable.                               X-Ray Chest AP Portable (Final result)  Result time 08/16/19 17:41:37    Final result by Saud Musa MD (08/16/19 17:41:37)                 Impression:      No focal consolidation      Electronically signed by: Saud Musa MD  Date:    08/16/2019  Time:    17:41             Narrative:    EXAMINATION:  XR CHEST AP PORTABLE    CLINICAL HISTORY:  Chest Pain;    TECHNIQUE:  Single frontal view of the chest was performed.    COMPARISON:  Chest radiograph 07/19/2019    FINDINGS:  The lungs are clear.  The cardiac silhouette is stable.  The osseous and soft tissue structures demonstrate no acute abnormality.                                    Assessment/Plan:      * Atrial fibrillation with RVR  As noted in hospital course - HR now 140-->155; BP 88 systolic   Transfer to ICU for amiodarone infusion  Discontinue oral BP medication for now  Etta nous cardiac monitoring  Monitor closely,    Converted to sinus,cardiogy started on Cardizem,stable go back to Tele.  Sinus on Tele,  PT,Ot is  consulted.    Other constipation  still is constipated,on multiple stool softner.      Urinary tract infection without hematuria  08/17/19  Abnormal urinalysis - tachycardia; start rocephin IV X 1 gram  Then keflex BID (avoid FVO)    08/18/19  Cultures showing e coli and staph aureus  Van & Rocephin for now,has discomfort,stared on pyridium.        Deep vein thrombosis (DVT) of distal vein of lower extremity  Acute and chronic DVT's identified on DVT; CTA negative for PE; Patient has history and has been on anticoagulation Xarelto - endorses compliance. Discussed broader anticoagulation options but at this point the patient is not amenable to warfarin. She denies LE pain - edema is chronic  -consult to hematology for opinion  -continue xarelto for now (addendum seen by Hematology changed to apixaban)  -cta negative for PE      Essential hypertension  08/18/19  Decent control continue usual home medications    08/19/19  Hold oral BP meds for now      Sinus tachycardia  Patient with HR sustained in 130's in early morning - received IV metoprolol X 2 doses with improvement  -restarted usual diltiazem & Imdur  -gentle IV fluids X 5 Hours (overdiuressing)  -PRN metoprolol 10 mg q5 minus X 2 HR>100  -Stop albuterol      Chronic respiratory failure with hypoxia  LOYDA, uses home oxygen at 2-3L  CPAP at night --- patient uses Bipap instead, change order  Supplemental oxygen  Stop albuterol treatments and start xopenex/altrovent  Stop steroids       Controlled type 2 diabetes mellitus, without long-term current use of insulin  While hospitalized will use combined insulin therapy with basal and prandial insulin coverage, POCT glucose checks, hypoglycemic protocol and correction scale - HgA1c 8.1        VTE Risk Mitigation (From admission, onward)        Ordered     apixaban tablet 5 mg  2 times daily      08/17/19 5329                Michelle Dumont MD  Department of Hospital Medicine   [facility] [place]

## 2019-08-21 NOTE — ASSESSMENT & PLAN NOTE
08/17/19  Abnormal urinalysis - tachycardia; start rocephin IV X 1 gram  Then keflex BID (avoid FVO)    08/18/19  Cultures showing e coli and staph aureus  Van & Rocephin for now,has discomfort,stared on pyridium.

## 2019-08-21 NOTE — PROGRESS NOTES
Pharmacokinetic Assessment Follow Up: IV Vancomycin    Vancomycin serum concentration assessment(s):    The trough level was drawn correctly and can be used to guide therapy at this time. The measurement is below the desired definitive target range of 10 to 20 mcg/mL.    Vancomycin Regimen Plan:    Change regimen to Vancomycin 1000 mg IV every 12 hours with next serum trough concentration measured at 23:30 prior to 4th dose on 8/22/19     Drug levels (last 3 results):  Recent Labs   Lab Result Units 08/21/19  0946   Vancomycin-Trough ug/mL 5.7*       Pharmacy will continue to follow and monitor vancomycin.    Please contact pharmacy at extension [phone] for questions regarding this assessment.    Thank you for the consult,   Elaine Castellanos       Patient brief summary:  Yasmeen Palm is a 69 y.o. female initiated on antimicrobial therapy with IV Vancomycin for treatment of urinary tract infection      Drug Allergies:   Review of patient's allergies indicates:  No Known Allergies    Actual Body Weight:   77.4 kg    Renal Function:   Estimated Creatinine Clearance: 83.6 mL/min (based on SCr of 0.7 mg/dL).,     Dialysis Method (if applicable):        CBC (last 72 hours):  Recent Labs   Lab Result Units 08/19/19  0403 08/20/19  0623 08/21/19  0515   WBC K/uL 17.27* 11.55 11.29   Hemoglobin g/dL 8.7* 9.0* 9.1*   Hematocrit % 31.5* 33.2* 32.1*   Platelets K/uL 258 294 277   Gran% % 78.0* 67.0 69.7   Lymph% % 12.7* 21.4 19.8   Mono% % 8.7 11.1 9.7   Eosinophil% % 0.6 0.8 1.1   Basophil% % 0.0 0.0 0.1   Differential Method  Automated Automated Automated       Metabolic Panel (last 72 hours):  Recent Labs   Lab Result Units 08/19/19  0403 08/20/19  0621 08/21/19  0515   Sodium mmol/L 142 143 141   Potassium mmol/L 4.4 3.9 4.2   Chloride mmol/L 109 108 104   CO2 mmol/L 25 29 30*   Glucose mg/dL 197* 129* 132*   BUN, Bld mg/dL 32* 16 9   Creatinine mg/dL 0.9 0.6 0.7   Albumin g/dL 2.5* 2.4* 2.4*   Total Bilirubin mg/dL 0.1  0.2 0.2   Alkaline Phosphatase U/L 57 60 60   AST U/L 14 13 13   ALT U/L 9* 12 12       Vancomycin Administrations:  vancomycin given in the last 96 hours                     vancomycin in dextrose 5 % 1 gram/250 mL IVPB 1,000 mg (mg) 1,000 mg New Bag 08/21/19 1206     1,000 mg New Bag 08/20/19 1133     1,000 mg New Bag 08/19/19 0957                      Microbiologic Results:  Microbiology Results (last 7 days)       Procedure Component Value Units Date/Time    Blood Culture #2 **CANNOT BE ORDERED STAT** [[id number]] Collected:  08/16/19 1830    Order Status:  Completed Specimen:  Blood from Peripheral, Hand, Right Updated:  08/20/19 1903     Blood Culture, Routine No Growth to date      No Growth to date      No Growth to date      No Growth to date      No Growth to date    Blood Culture #1 **CANNOT BE ORDERED STAT** [[id number]] Collected:  08/16/19 1821    Order Status:  Completed Specimen:  Blood from Peripheral, Antecubital, Right Updated:  08/20/19 1903     Blood Culture, Routine No Growth to date      No Growth to date      No Growth to date      No Growth to date      No Growth to date    Urine culture [[id number]]  (Abnormal)  (Susceptibility) Collected:  08/17/19 0948    Order Status:  Completed Specimen:  Urine Updated:  08/19/19 0901     Urine Culture, Routine MRSA Results called to and read back by: ICU [facility] 08/19/2019       08:59      ESCHERICHIA COLI  >100,000 cfu/ml        METHICILLIN RESISTANT STAPHYLOCOCCUS AUREUS  > 100,000 cfu/ml      Narrative:       Preferred Collection Type->Urine, Clean Catch

## 2019-08-21 NOTE — PROGRESS NOTES
HOW TO MANAGE YOUR CARE  AT HOME:  TN taught Symptoms and Problems for  AFIBRILLATION home care with pt and with teach back:  1. RAPID HEART BEAT, 2. WEAKNESS, 3. SOB. TN placed education sheet in [facility] [facility] [employer]..       TN taught patient about things she is responsible for when discharged TO HELP WITH HER RECOVERY:  How to Manage her Care At Home:  1. Getting her prescriptions filled.  2. Taking her medications as directed. DO NOT MISS ANY DOSES!  3. Going to her follow-up doctor appointments.   .  Drea Rg RN, BSN, STN CCM

## 2019-08-21 NOTE — PLAN OF CARE
Problem: Diabetes Comorbidity  Goal: Blood Glucose Level Within Desired Range  Outcome: Ongoing (interventions implemented as appropriate)  Intervention: Maintain Glycemic Control     08/21/19 1354   Monitor and Manage Ketoacidosis   Glycemic Management blood glucose monitoring

## 2019-08-22 VITALS
TEMPERATURE: 98 F | WEIGHT: 192.88 LBS | HEART RATE: 98 BPM | HEIGHT: 67 IN | RESPIRATION RATE: 16 BRPM | OXYGEN SATURATION: 98 % | BODY MASS INDEX: 30.27 KG/M2 | SYSTOLIC BLOOD PRESSURE: 136 MMHG | DIASTOLIC BLOOD PRESSURE: 67 MMHG

## 2019-08-22 LAB
ALBUMIN SERPL BCP-MCNC: 2.3 G/DL (ref 3.5–5.2)
ALP SERPL-CCNC: 65 U/L (ref 55–135)
ALT SERPL W/O P-5'-P-CCNC: 13 U/L (ref 10–44)
ANION GAP SERPL CALC-SCNC: 6 MMOL/L (ref 8–16)
AST SERPL-CCNC: 10 U/L (ref 10–40)
BASOPHILS # BLD AUTO: 0.01 K/UL (ref 0–0.2)
BASOPHILS NFR BLD: 0.1 % (ref 0–1.9)
BILIRUB SERPL-MCNC: 0.2 MG/DL (ref 0.1–1)
BUN SERPL-MCNC: 8 MG/DL (ref 8–23)
CALCIUM SERPL-MCNC: 8.6 MG/DL (ref 8.7–10.5)
CHLORIDE SERPL-SCNC: 98 MMOL/L (ref 95–110)
CO2 SERPL-SCNC: 34 MMOL/L (ref 23–29)
CREAT SERPL-MCNC: 0.7 MG/DL (ref 0.5–1.4)
DIFFERENTIAL METHOD: ABNORMAL
EOSINOPHIL # BLD AUTO: 0.1 K/UL (ref 0–0.5)
EOSINOPHIL NFR BLD: 0.8 % (ref 0–8)
ERYTHROCYTE [DISTWIDTH] IN BLOOD BY AUTOMATED COUNT: 19.1 % (ref 11.5–14.5)
EST. GFR  (AFRICAN AMERICAN): >60 ML/MIN/1.73 M^2
EST. GFR  (NON AFRICAN AMERICAN): >60 ML/MIN/1.73 M^2
GLUCOSE SERPL-MCNC: 208 MG/DL (ref 70–110)
HCT VFR BLD AUTO: 30.4 % (ref 37–48.5)
HGB BLD-MCNC: 8.3 G/DL (ref 12–16)
LYMPHOCYTES # BLD AUTO: 2 K/UL (ref 1–4.8)
LYMPHOCYTES NFR BLD: 18.7 % (ref 18–48)
MCH RBC QN AUTO: 25.1 PG (ref 27–31)
MCHC RBC AUTO-ENTMCNC: 27.3 G/DL (ref 32–36)
MCV RBC AUTO: 92 FL (ref 82–98)
MONOCYTES # BLD AUTO: 1.2 K/UL (ref 0.3–1)
MONOCYTES NFR BLD: 11.2 % (ref 4–15)
NEUTROPHILS # BLD AUTO: 7.4 K/UL (ref 1.8–7.7)
NEUTROPHILS NFR BLD: 69.9 % (ref 38–73)
PLATELET # BLD AUTO: 305 K/UL (ref 150–350)
PMV BLD AUTO: 10 FL (ref 9.2–12.9)
POCT GLUCOSE: 135 MG/DL (ref 70–110)
POCT GLUCOSE: 158 MG/DL (ref 70–110)
POCT GLUCOSE: 189 MG/DL (ref 70–110)
POTASSIUM SERPL-SCNC: 4.5 MMOL/L (ref 3.5–5.1)
PROT SERPL-MCNC: 5.3 G/DL (ref 6–8.4)
RBC # BLD AUTO: 3.31 M/UL (ref 4–5.4)
SODIUM SERPL-SCNC: 138 MMOL/L (ref 136–145)
VANCOMYCIN TROUGH SERPL-MCNC: 8.7 UG/ML (ref 10–22)
WBC # BLD AUTO: 10.72 K/UL (ref 3.9–12.7)

## 2019-08-22 PROCEDURE — 94761 N-INVAS EAR/PLS OXIMETRY MLT: CPT

## 2019-08-22 PROCEDURE — 36415 COLL VENOUS BLD VENIPUNCTURE: CPT

## 2019-08-22 PROCEDURE — 25000003 PHARM REV CODE 250: Performed by: HOSPITALIST

## 2019-08-22 PROCEDURE — 80202 ASSAY OF VANCOMYCIN: CPT

## 2019-08-22 PROCEDURE — 99900035 HC TECH TIME PER 15 MIN (STAT)

## 2019-08-22 PROCEDURE — 94660 CPAP INITIATION&MGMT: CPT

## 2019-08-22 PROCEDURE — 63600175 PHARM REV CODE 636 W HCPCS: Performed by: HOSPITALIST

## 2019-08-22 PROCEDURE — 85025 COMPLETE CBC W/AUTO DIFF WBC: CPT

## 2019-08-22 PROCEDURE — 80053 COMPREHEN METABOLIC PANEL: CPT

## 2019-08-22 PROCEDURE — 94640 AIRWAY INHALATION TREATMENT: CPT

## 2019-08-22 PROCEDURE — 94760 N-INVAS EAR/PLS OXIMETRY 1: CPT

## 2019-08-22 PROCEDURE — 27000221 HC OXYGEN, UP TO 24 HOURS

## 2019-08-22 PROCEDURE — 25000242 PHARM REV CODE 250 ALT 637 W/ HCPCS: Performed by: HOSPITALIST

## 2019-08-22 RX ORDER — TRAMADOL HYDROCHLORIDE 50 MG/1
TABLET ORAL
Qty: 30 TABLET | Refills: 0 | OUTPATIENT
Start: 2019-08-22

## 2019-08-22 RX ORDER — DOCUSATE SODIUM 100 MG/1
100 CAPSULE, LIQUID FILLED ORAL 2 TIMES DAILY
Refills: 0
Start: 2019-08-22

## 2019-08-22 RX ORDER — POLYETHYLENE GLYCOL 3350 17 G/17G
17 POWDER, FOR SOLUTION ORAL 2 TIMES DAILY
Refills: 0
Start: 2019-08-22

## 2019-08-22 RX ORDER — SULFAMETHOXAZOLE AND TRIMETHOPRIM 800; 160 MG/1; MG/1
1 TABLET ORAL 2 TIMES DAILY
Qty: 10 TABLET | Refills: 0 | Status: ON HOLD | OUTPATIENT
Start: 2019-08-22 | End: 2019-08-29 | Stop reason: HOSPADM

## 2019-08-22 RX ORDER — DILTIAZEM HYDROCHLORIDE 180 MG/1
180 CAPSULE, COATED, EXTENDED RELEASE ORAL DAILY
Qty: 30 CAPSULE | Refills: 0 | Status: SHIPPED | OUTPATIENT
Start: 2019-08-22 | End: 2019-11-29

## 2019-08-22 RX ADMIN — PANTOPRAZOLE SODIUM 40 MG: 40 TABLET, DELAYED RELEASE ORAL at 08:08

## 2019-08-22 RX ADMIN — IPRATROPIUM BROMIDE 0.5 MG: 0.5 SOLUTION RESPIRATORY (INHALATION) at 07:08

## 2019-08-22 RX ADMIN — VANCOMYCIN HYDROCHLORIDE 1000 MG: 1 INJECTION, POWDER, LYOPHILIZED, FOR SOLUTION INTRAVENOUS at 12:08

## 2019-08-22 RX ADMIN — INSULIN ASPART 5 UNITS: 100 INJECTION, SOLUTION INTRAVENOUS; SUBCUTANEOUS at 12:08

## 2019-08-22 RX ADMIN — DOCUSATE SODIUM 100 MG: 100 CAPSULE, LIQUID FILLED ORAL at 08:08

## 2019-08-22 RX ADMIN — TRAMADOL HYDROCHLORIDE 50 MG: 50 TABLET, FILM COATED ORAL at 12:08

## 2019-08-22 RX ADMIN — INSULIN DETEMIR 8 UNITS: 100 INJECTION, SOLUTION SUBCUTANEOUS at 08:08

## 2019-08-22 RX ADMIN — PHENAZOPYRIDINE HYDROCHLORIDE 100 MG: 100 TABLET, FILM COATED ORAL at 08:08

## 2019-08-22 RX ADMIN — POLYETHYLENE GLYCOL 3350 17 G: 17 POWDER, FOR SOLUTION ORAL at 08:08

## 2019-08-22 RX ADMIN — LACTULOSE 20 G: 20 SOLUTION ORAL at 08:08

## 2019-08-22 RX ADMIN — LEVALBUTEROL HYDROCHLORIDE 0.63 MG: 0.63 SOLUTION RESPIRATORY (INHALATION) at 07:08

## 2019-08-22 RX ADMIN — IPRATROPIUM BROMIDE 0.5 MG: 0.5 SOLUTION RESPIRATORY (INHALATION) at 11:08

## 2019-08-22 RX ADMIN — CEFTRIAXONE 1 G: 1 INJECTION, SOLUTION INTRAVENOUS at 08:08

## 2019-08-22 RX ADMIN — INSULIN ASPART 5 UNITS: 100 INJECTION, SOLUTION INTRAVENOUS; SUBCUTANEOUS at 08:08

## 2019-08-22 RX ADMIN — IPRATROPIUM BROMIDE 0.5 MG: 0.5 SOLUTION RESPIRATORY (INHALATION) at 03:08

## 2019-08-22 RX ADMIN — APIXABAN 5 MG: 5 TABLET, FILM COATED ORAL at 08:08

## 2019-08-22 RX ADMIN — IPRATROPIUM BROMIDE 0.5 MG: 0.5 SOLUTION RESPIRATORY (INHALATION) at 04:08

## 2019-08-22 RX ADMIN — LEVALBUTEROL HYDROCHLORIDE 0.63 MG: 0.63 SOLUTION RESPIRATORY (INHALATION) at 03:08

## 2019-08-22 RX ADMIN — DILTIAZEM HYDROCHLORIDE 180 MG: 180 CAPSULE, COATED, EXTENDED RELEASE ORAL at 08:08

## 2019-08-22 NOTE — PROGRESS NOTES
Follow-up Information     Angel Orourke Jr, MD On 9/6/2019.    Specialty:  Family Medicine  Why:  Outpatient Services PCP Follow-Up Friday at 11:15 AM  Contact information:  [address]  [address]  [place] [facility] [id number]  [phone]             Francois Lara MD On 8/29/2019.    Specialties:  Internal Medicine, Oncology, Hematology and Oncology  Why:  Outpatient Services Hematology Follow-Up Thursday at 11:30 AM  Contact information:  [address]  [address]  [place] [address]  [phone]               PLEASE BRING TO ALL FOLLOW UP APPOINTMENTS:   1) A COPY YOUR DISCHARGE INSTRUCTIONS   2) ALL MEDICINES YOU ARE CURRENTLY TAKING IN THEIR ORIGINAL BOTTLES   3) IDENTIFICATION CARD   4) INSURANCE CARD    **PLEASE ARRIVE 15 MINUTES AHEAD OF SCHEDULED APPOINTMENT TIME   ++PLEASE CALL 24 HOURS IN ADVANCE IF YOU MUST RESCHEDULE YOUR APPOINTMENT DAY AND/OR TIME     [facility] CARE MANAGEMENT WRITTEN DISCHARGE INFORMATION    APPOINTMENTS AND RESOURCES TO HELP YOU MANAGE YOUR CARE AT HOME BASED ON YOUR PREFERENCES:  (If an appointment is not scheduled for you when you leave the hospital, call your doctor to schedule a follow up visit within a week)        Healthy Living Instructions to HELP MANAGE YOUR CARE AT HOME:  Things You are responsible for:  1.    Getting your prescriptions filled   2.    Taking your medications as directed, DO NOT MISS ANY DOSES!  3.    Following the diet and exercise recommended by your doctor  4.    Going to your follow-up doctor appointment. This is important because it allows the doctor to monitor your progress and determine if any changes need to made to your treatment plan.  5. If you have any questions about MANAGING YOUR CARE AT HOME Call the Nurse Care Line for 24/7 Assistance [phone]       Please answer any calls you may receive from [facility]. We want to continue to support you as you manage your healthcare needs. [facility] is  happy to have the opportunity to serve you.      Thank you for choosing [facility] for your healthcare needs!  Your [facility] Case Management Team,    Mag Claudio RN [place]  Registered Nurse Transition Navigator  [phone]

## 2019-08-22 NOTE — PROGRESS NOTES
Report received from off going nurse, FABY Meredith. Patient AAO. No signs of distress noted. Call light in reach. Bed low and locked. Will continue to monitor.

## 2019-08-22 NOTE — PLAN OF CARE
08/22/19 1211   Post-Acute Status   Post-Acute Authorization Placement  (Home)   Post-Acute Placement Status Set-up Complete

## 2019-08-22 NOTE — PLAN OF CARE
Problem: Gas Exchange Impaired  Goal: Optimal Gas Exchange    Intervention: Optimize Oxygenation and Ventilation     08/22/19 0726   Prevent Additional Skin Injury   Head of Bed (HOB) HOB elevated      08/22/19 0726   Prevent Additional Skin Injury   Head of Bed (HOB) HOB elevated

## 2019-08-22 NOTE — PROGRESS NOTES
[facility]  Hematology/Oncology  Progress Note    Patient Name: Yasmeen Palm  Admission Date: 8/16/2019  Hospital Length of Stay: 4 days  Code Status: Full Code     Subjective:     Interval History:     08/22/19: Feeling better. No fever. NO bleeding.   08/21/19: Feeling ok today. Tolerating current dose of Apixaban   08/20/19: no report of bleeding. Tolerating current tx.   08/19/19: Transferred to ICU yesterday for AFib RVR. Tolerating current dose of Apixaban.   08/18/19: No significant edema of jorge LE    Oncology Treatment Plan:   [No treatment plan]    Medications:  Continuous Infusions:    Scheduled Meds:   apixaban  5 mg Oral BID    atorvastatin  80 mg Oral QHS    cefTRIAXone (ROCEPHIN) IVPB  1 g Intravenous Daily    diltiaZEM  180 mg Oral Daily    docusate sodium  100 mg Oral BID    insulin aspart U-100  5 Units Subcutaneous TIDWM    insulin detemir U-100  8 Units Subcutaneous BID    ipratropium  0.5 mg Nebulization Q4H    lactulose  20 g Oral BID    levalbuterol  0.63 mg Nebulization Q8H    pantoprazole  40 mg Oral Daily    phenazopyridine  100 mg Oral TID WM    polyethylene glycol  17 g Oral BID    vancomycin (VANCOCIN) IVPB  1,000 mg Intravenous Q12H     PRN Meds:acetaminophen, cloNIDine, dextrose 50%, dextrose 50%, glucagon (human recombinant), glucose, glucose, insulin aspart U-100, metoprolol, ondansetron, ramelteon, sodium chloride 0.9%, traMADol     Review of Systems   Denies HA, blurred vision  No fever or chills  Gen abd pain - improving  Going home today     Objective:     Vital Signs (Most Recent):  Temp: 98.1 °F (36.7 °C) (08/22/19 1146)  Pulse: 105 (08/22/19 1147)  Resp: 18 (08/22/19 1147)  BP: (!) 155/76 (08/22/19 1146)  SpO2: 96 % (08/22/19 1147) Vital Signs (24h Range):  Temp:  [98.1 °F (36.7 °C)-99.8 °F (37.7 °C)] 98.1 °F (36.7 °C)  Pulse:  [] 105  Resp:  [12-19] 18  SpO2:  [94 %-100 %] 96 %  BP: (126-155)/(64-76) 155/76     Weight: 87.5 kg (192 lb  14.4 oz)  Body mass index is 30.21 kg/m².  Body surface area is 2.03 meters squared.      Intake/Output Summary (Last 24 hours) at 8/22/2019 1538  Last data filed at 8/22/2019 0243  Gross per 24 hour   Intake 420 ml   Output 1100 ml   Net -680 ml       Physical Exam     NAD, resting in bed.   NC/AT  Conj slightly pale   Decreased air entry laterally  S1s2, RR  Soft, + BS   Some pedal edema bi  A&O x 2  Calm     Labs reviewed     Significant Labs:   CBC:   Recent Labs   Lab 08/21/19  0515 08/22/19  0354   WBC 11.29 10.72   HGB 9.1* 8.3*   HCT 32.1* 30.4*    305   , CMP:   Recent Labs   Lab 08/21/19  0515 08/22/19  0354    138   K 4.2 4.5    98   CO2 30* 34*   * 208*   BUN 9 8   CREATININE 0.7 0.7   CALCIUM 8.7 8.6*   PROT 5.5* 5.3*   ALBUMIN 2.4* 2.3*   BILITOT 0.2 0.2   ALKPHOS 60 65   AST 13 10   ALT 12 13   ANIONGAP 7* 6*   EGFRNONAA >60 >60   , Coagulation: No results for input(s): PT, INR, APTT in the last 48 hours., LDH: No results for input(s): LDHCSF, BFSOURCE in the last 48 hours., Reticulocytes: No results for input(s): RETIC in the last 48 hours., Tumor Markers: No results for input(s): PSA, CEA, , AFPTM, QT8736,  in the last 48 hours.    Invalid input(s): ALGTM and Uric Acid No results for input(s): URICACID in the last 48 hours.    Diagnostic Results:     Ref. Range 11/2/2017 22:11 8/17/2019 15:07   D-Dimer Latest Ref Range: <0.50 mg/L FEU 0.61 (H) 0.43       Assessment/Plan:     Active Diagnoses:    Diagnosis Date Noted POA    PRINCIPAL PROBLEM:  Atrial fibrillation with RVR [I48.91] 07/14/2018 Yes    Other constipation [K59.09] 08/21/2019 Yes    Urinary tract infection without hematuria [N39.0] 08/17/2019 Yes    Deep vein thrombosis (DVT) of distal vein of lower extremity [I82.4Z9] 08/16/2019 Yes    Essential hypertension [I10] 03/24/2019 Yes     Chronic    Hyperlipidemia [E78.5] 03/24/2019 Yes     Chronic    Sinus tachycardia [R00.0] 12/08/2017 No     Chronic     Thoracic aorta atherosclerosis [I70.0] 08/12/2017 Yes    Chronic respiratory failure with hypoxia [J96.11] 04/10/2017 Yes     Chronic    Controlled type 2 diabetes mellitus, without long-term current use of insulin [E11.9] 12/14/2015 Yes     Chronic      Problems Resolved During this Admission:    Diagnosis Date Noted Date Resolved POA    Acute exacerbation of chronic obstructive pulmonary disease (COPD) [J44.1] 12/08/2017 06/12/2019 Yes     Ms. Palm, 68 YO lady with multiple medical conditions with recurrent DVT of LE.      1. Recurrent DVT: US reviewed              -  Ddimer level within the NL range and I do not think she's having new thrombus burden              - I am concerned about non compliance with medication rather than anticoagulant failure.    -If there is a question about Rivaroxaban failure change to Apixaban 5mg bid    - tolerating Apixaban 5mg bid    - no signs of bleeding    - continue current dose and I will f/u with pt out pt      2. SOB: chronic              - per primary team     3. Afib: rate controlled and anticoagulated   - rate controlled and anticoagulated      Francois Lara M.D  Internal Medicine & Geriatric Medicine  Hematology & Oncology  Palliative Medicine    [address], [address]  [place] [address]  [phone] (Office)  [phone] (Fax)

## 2019-08-22 NOTE — PHYSICIAN QUERY
"PT Name: Yasmeen Palm  MR #: [id number]    Physician Query Form - Heart  Condition Clarification     CDS/: Samia Nicole               Contact information:[email or web address]  This form is a permanent document in the medical record.     Query Date: August 22, 2019    By submitting this query, we are merely seeking further clarification of documentation. Please utilize your independent clinical judgment when addressing the question(s) below.    The medical record contains the following   Indicators     Supporting Clinical Findings Location in Medical Record   x BNP XCC=261 Lab 8-16   x EF EF=70% Cardiology cn 8-18    Radiology findings     x Echo Results Normal left ventricular systolic function. The estimated ejection fraction is 70%   Moderate concentric left ventricular hypertrophy.   Grade I (mild) left ventricular diastolic dysfunction consistent with impaired relaxation.   Normal left atrial pressure.   Moderate left atrial enlargement.   Mild right atrial enlargement.   Mild mitral regurgitation.   Mild tricuspid regurgitation.   The estimated PA systolic pressure is 80 mm Hg   Intermediate central venous pressure (8 mm Hg).   Pulmonary hypertension present   Cardiology cn 8-18    "Ascites" documented      "SOB" or "MCFARLAND" documented      "Hypoxia" documented     x Heart Failure documented Past medical hx of CHF H&P    "Edema" documented     x Diuretics/Meds IV Furosemide Mar 8-16 to 8-17    Treatment:      Other:      Heart failure (HF) can be acute, chronic or both. It is generally further specificed as systolic, diastolic, or combined. Lastly, it is important to identify an underlying etiology if known or suspected.     Common clues to acute exacerbation:  Rapidly progressive symptoms (w/in 2 weeks of presentation), using IV diuretics to treat, using supplemental O2, pulmonary edema on Xray, MI w/in 4 weeks, and/or BNP >500    Systolic Heart Failure: is defined as chart documentation " of a left ventricular ejection fraction (LVEF) less than 40%     Diastolic Heart Failure: is defined as a left ventricular ejection fraction (LVEF) greater than 40%   +      Evidence of diastolic dysfunction on echocardiography OR    Right heart catheterization wedge pressure above 12 mm Hg OR    Left heart catheterization left ventricular end diastolic pressure 18 mm Hg or above.    References: *American Heart Association    The clinical guidelines noted below are only system guidelines, and do not replace the providers clinical judgment.     Provider, please specify the diagnosis associated with above clinical findings  [ x  ] Chronic Diastolic Heart Failure - Pre-existing diastolic HF diagnosis.  EF > 40%  without  acute HF symptoms documented   [   ] Other Type of Heart Failure (please specify type):   [   ] Other (please specify):   [   ] Clinically Undetermined                           Please document in your progress notes daily for the duration of treatment until resolved and include in your discharge summary.

## 2019-08-22 NOTE — PROGRESS NOTES
Received report from CHRISTOPHER Bundy. Patient AAOx4, resting quietly in bed, 2L O2 NC & telemetry monitoring in place, no distress noted. Safety maintained, call light in reach.

## 2019-08-22 NOTE — PROGRESS NOTES
Patient awake, alert, oriented resting comfortably. No signs of distress observed. bed low and locked. Call light in reach. Report given to oncoming nurse, FABY Meredith. 12hour chart check complete.

## 2019-08-22 NOTE — PROGRESS NOTES
HOW TO MANAGE YOUR CARE  AT HOME:  TN taught Symptoms and Problems for AFIB home care with pt and with teach back:  1. SOB, 2.FAST HEART BEAT. TN placed education sheet in [employer] Packet..     HELP AT HOME IS HER SPOUSE, LINO.      TN taught patient about things she is responsible for when discharged TO HELP WITH HER RECOVERY:   How to Manage Her Care At Home:  1. Getting her prescriptions filled.  2. Taking her medications as directed. DO NOT MISS ANY DOSES!  3. Going to her follow-up doctor appointments.   .  Drea Rg RN, BSN, STN CCM

## 2019-08-22 NOTE — PROGRESS NOTES
1050 TN contacted PCP, Dr. Orourke's office at [phone] to schedule an appt; spoke with Maryanne, appt already scheduled on 09/06/19 at 11:15 AM. TN contacted Dr. Francois Lara's office at [phone] to schedule a hematology f/u appt; spoke with Stephanie; scheduled on 08/29/19 at 11:30 AM.    1100 TN contacted [facility] at [phone]; spoke with Rhea to get cost of Eliquis 5 mg BID through insurance. Cost is $0; will convey to pt.

## 2019-08-22 NOTE — PLAN OF CARE
"EDUCATION:  TN provided with educational information on Atrial fibrillation.  Information reviewed and placed in :My Healthcare Packet" to be brought home for pt to use as resource after discharge.  Information included:  signs and symptoms to look for and call the doctor if experiencing, and symptoms that may indicate a medical emergency: CALL 911. All questions answered.  Teach back method used.    Patient stated, "I have been to the hospital before."    TN informed floor nurse, Niharika, care management is complete and can proceed with discharge teaching. TN informed pt stated she cannot get in her home. Sherice has the santana who will not be able to pick her up until 6 pm.       08/22/19 1212   Final Note   Assessment Type Final Discharge Note   Anticipated Discharge Disposition Home   What phone number can be called within the next 1-3 days to see how you are doing after discharge?   (Listed in chart)   Hospital Follow Up  Appt(s) scheduled? Yes   Discharge plans and expectations educations in teach back method with documentation complete? Yes   Right Care Referral Info   Post Acute Recommendation No Care         "

## 2019-08-22 NOTE — DISCHARGE SUMMARY
[facility] Hospital Medicine  Discharge Summary      Patient Name: Yasmeen Palm  MRN: [id number]  Admission Date: 8/16/2019  Hospital Length of Stay: 4 days  Discharge Date and Time:  08/22/2019 10:53 AM  Attending Physician: Michelle Dumont MD   Discharging Provider: Michelle Dumont MD  Primary Care Provider: Angel Orourke Jr, MD      HPI:   Yasmeen Palm is a 69 y.o. sorely deconditioned patient with essential hypertension, DMII (HbA1c 8.1% July 2019), HLD (.0 Dec 2018), paroxysmal atrial fibrillation ([id number] score 4) on chronic anticoagulation, COPD, chronic respiratory failure with hypoxia and hypercapnia uses 2-3L home oxygen, GERD, anemia of chronic disease, tobacco abuse, and history of PE/DVT on chronic anticoagulation.    Patient presented with complaints of dyspnea that began in early morning prior to presentation.  The dyspnea is mainly exertional.  She denies any fevers, chills, nausea, vomiting, pleurisy, diaphoresis, palpitations, sick contracts, recent long trips, or hemoptysis.    Initial workup shows mildly elevated WBC (on prednisone) thought to be reactive, hypernatremia       * No surgery found *      Hospital Course:   Yasmeen Palm is a 69 y.o. sorely deconditioned patient with essential hypertension, DMII (HbA1c 8.1% July 2019), HLD (.0 Dec 2018), paroxysmal atrial fibrillation ([id number] score 4) on chronic anticoagulation, COPD, chronic respiratory failure with hypoxia and hypercapnia uses 2-3L home oxygen, GERD, anemia of chronic disease, tobacco abuse, and history of PE/DVT on chronic anticoagulation.    Patient presented with complaints of dyspnea that began in early morning prior to presentation.  The dyspnea is mainly exertional.  She denies any fevers, chills, nausea, vomiting, pleurisy, diaphoresis, palpitations, sick contracts, recent long trips, or hemoptysis.    Initial workup shows mildly elevated WBC (on prednisone)  thought to be reactive, hypernatremia  She had Tachycardia sustained in 130's, Discontinued the albuterol and added xopenex Q8H/altrovent Q4H, metoprolol X 2 doses IV, cautious gentle fluids as patient has been getting lasix IV 40 mg BID as ordered in ED   -WBC 14K; her BUN/creatine/GFR 16/0.8/60 overnight now 29/1.5/41; abnormal urinalysis; awaiting culture; start rocephin  -DVT - failed therapy unclear compliance - was on xarelto; failed therapy, refused warfarin - consulted to Hematology - patient agreed to start apixaban    08/19/19  0615am----  -Paged by nursing staff patient converted to afib rvr with 90 systolic bp. Came in with acute and chronic LE DVT was on xarelto at home unclear compliance. Refused to be converted to warfarin -  Hematology consulted and recommended apixban instead which was initiated. CTA neg PE at presentation.     One day prior patient with ST and decreased renal functions the night before from normal to creatine 1.5. Noted to be in ST and administered metoprolol X 2 and urinalysis sent which was abnormal. Started on one dose IV rocephin and then oral to start today 8/18/19 to prevent/avoid FVO.      Known LVEF 70% with GI DD - pul htn PA pressure 80 - on home oxygen at 2-3L.therefore - Gentle fluids X 5h administered as thought to be overdiuressed at that time as she had been placed on IV lasix 40 mg BID from the ED. Her HR improved about 5pm 8/17/19.    0844 am:   - Patient's HR remains 149-155 BP 88/56 after metoprolol IV - transfer to ICU with amio. gtt  - Urine culture positive e.coli & staph aeurus - start Vanc, and rocephin,has still discomfort,started on pyridium.  - New DVT - continue apixaban  - LOYDA/chronic respiratory impairment/COPD - bipap nightly,  Converted to sinus,cardiogy started on Cardizem,was stable go back to Tele.  Remains Sinus on Tele,  PT,OT is consulted.did well.  Had constipation,,was on multiple stool softner.  Patient has katherine discharged home with bactrim  and increased Cardizem and eliquis and follow up with PCP in next few days.     Consults:   Consults (From admission, onward)        Status Ordering Provider     Inpatient consult to Cardiology  Once     Provider:  Laureano Pulido MD    Completed JEFFREY NAVARRETE     Inpatient consult to Hematology  Once     Provider:  Francois Lara MD    Completed JEFFREY NAVARRETE     Inpatient consult to Social Work  Once     Provider:  (Not yet assigned)    Completed AKHONDZADEH, ABDOLAZIM     IP consult to case management  Once     Provider:  (Not yet assigned)    Acknowledged AKHONDZADEH, ABDOLAZIM     Pharmacy to dose Vancomycin consult  Once     Provider:  (Not yet assigned)    Acknowledged AKHONDZADEH, ABDOLAZIM          No new Assessment & Plan notes have been filed under this hospital service since the last note was generated.  Service: Hospital Medicine    Final Active Diagnoses:    Diagnosis Date Noted POA    PRINCIPAL PROBLEM:  Atrial fibrillation with RVR [I48.91] 07/14/2018 Yes    Other constipation [K59.09] 08/21/2019 Yes    Urinary tract infection without hematuria [N39.0] 08/17/2019 Yes    Deep vein thrombosis (DVT) of distal vein of lower extremity [I82.4Z9] 08/16/2019 Yes    Essential hypertension [I10] 03/24/2019 Yes     Chronic    Hyperlipidemia [E78.5] 03/24/2019 Yes     Chronic    Sinus tachycardia [R00.0] 12/08/2017 No     Chronic    Thoracic aorta atherosclerosis [I70.0] 08/12/2017 Yes    Chronic respiratory failure with hypoxia [J96.11] 04/10/2017 Yes     Chronic    Controlled type 2 diabetes mellitus, without long-term current use of insulin [E11.9] 12/14/2015 Yes     Chronic      Problems Resolved During this Admission:    Diagnosis Date Noted Date Resolved POA    Acute exacerbation of chronic obstructive pulmonary disease (COPD) [J44.1] 12/08/2017 06/12/2019 Yes       Discharged Condition: stable    Disposition: Home or Self Care    Follow Up:  Follow-up Information     Angel  Lisset Claire MD On 9/6/2019.    Specialty:  Family Medicine  Why:  Outpatient Services PCP Follow-Up Friday at 11:15 AM  Contact information:  [address]  [address]  [place] [facility] [address]  [phone]             Francois Lara MD On 8/29/2019.    Specialties:  Internal Medicine, Oncology, Hematology and Oncology  Why:  Outpatient Services Hematology Follow-Up Thursday at 11:30 AM  Contact information:  [address]  [address]  [place] [address]  [phone]                 Patient Instructions:      Ambulatory referral to Outpatient Case Management   Referral Priority: Routine Referral Type: Consultation   Referral Reason: Specialty Services Required   Number of Visits Requested: 1     Activity as tolerated       Significant Diagnostic Studies: Labs:   BMP:   Recent Labs   Lab 08/21/19  0515 08/22/19  0354   * 208*    138   K 4.2 4.5    98   CO2 30* 34*   BUN 9 8   CREATININE 0.7 0.7   CALCIUM 8.7 8.6*    and CBC   Recent Labs   Lab 08/21/19  0515 08/22/19  0354   WBC 11.29 10.72   HGB 9.1* 8.3*   HCT 32.1* 30.4*    305     Microbiology:   Blood Culture   Lab Results   Component Value Date    LABBLOO No growth after 5 days. 08/16/2019    and Urine Culture    Lab Results   Component Value Date    LABURIN  08/17/2019     MRSA Results called to and read back by: ICU JAMES PEÑA [facility] 08/19/2019     LABURIN 08:59 08/17/2019    LABURIN ESCHERICHIA COLI  >100,000 cfu/ml   (A) 08/17/2019    LABURIN (A) 08/17/2019     METHICILLIN RESISTANT STAPHYLOCOCCUS AUREUS  > 100,000 cfu/ml       Radiology: X-Ray: CXR: X-Ray Chest 1 View (CXR): No results found for this visit on 08/16/19. and X-Ray Chest PA and Lateral (CXR): No results found for this visit on 08/16/19.  Cardiac Graphics: Echocardiogram:   2D echo with color flow doppler:   Results for orders placed or performed during the hospital encounter of 06/24/18   2D echo with color flow doppler   Result  Value Ref Range    QEF 50 55 - 65    Diastolic Dysfunction Yes (A)     Est. PA Systolic Pressure 51.82 (A)     Pericardial Effusion NONE     Mitral Valve Mobility NORMAL     Tricuspid Valve Regurgitation TRIVIAL TO MILD     Narrative    Date of Procedure: 06/25/2018        TEST DESCRIPTION   Technical Quality: This is a technically adequate study.     Aorta: The aortic root is normal in size, measuring 2.5 cm at sinotubular junction and 3.1 cm at Sinuses of Valsalva. The proximal ascending aorta is normal in size, measuring 2.6 cm across.     Left Atrium: The left atrial volume index is normal, measuring 32.90 cc/m2.     Left Ventricle: The left ventricle is normal in size, with an end-diastolic diameter of 4.7 cm, and an end-systolic diameter of 3.7 cm. LV wall thickness is normal, with the septum and the posterior wall each measuring 1.3 cm across. Relative wall   thickness was increased at 0.55, and the LV mass index was increased at 151.3 g/m2 consistent with concentric left ventricular hypertrophy. The apex is hypokinetic.   The following segments were hypokinetic: mid anteroseptum, apical septum, apical anterior wall.  Left ventricular systolic function appears low normal to mildly depressed. Visually estimated ejection fraction is 50-55%. The LV Doppler derived stroke volume equals 65.0 ccs.     Diastolic indices: E wave velocity 0.8 m/s, E/A ratio 0.5,  msec., E/e' ratio(avg) 19. There is pseudonormalization of mitral inflow pattern consistent with significant diastolic dysfunction.     Right Atrium: The right atrium is normal in size, measuring 4.5 cm in length and 4.5 cm in width in the apical view.     Right Ventricle: The right ventricle is normal in size measuring 4.7 cm at the base in the apical right ventricle-focused view. Global right ventricular systolic function appears normal. Tricuspid annular plane systolic excursion (TAPSE) is 1.6 cm.   Tissue Doppler-derived tricuspid annular peak  systolic velocity (S prime) is .1 cm/s. The estimated PA systolic pressure is 52 mmHg.     Aortic Valve:  The aortic valve is mildly sclerotic with normal leaflet mobility. The aortic valve is tri-leaflet in structure.     Mitral Valve:  The mitral valve is normal in structure with normal leaflet mobility. There is mitral annular calcification.     Tricuspid Valve:  The tricuspid valve is normal in structure with normal leaflet mobility. There is trivial to mild tricuspid regurgitation.     Pulmonary Valve:  The pulmonic valve is not well seen. There is trivial pulmonic regurgitation.     Pericardium: There is no evidence of pericardial effusion.      IVC: IVC is enlarged but collapses > 50% with a sniff, suggesting intermediate right atrial pressure of 8 mmHg.     Intracavitary: There is no evidence of intracavity mass, thrombi, or vegetation.         CONCLUSIONS     1 - Low normal to mildly depressed left ventricular systolic function (EF 50-55%).  Distal LAD territory WMA.     2 - Impaired LV relaxation, elevated LAP (grade 2 diastolic dysfunction).     3 - Concentric hypertrophy.     4 - Trivial to mild tricuspid regurgitation.     5 - Pulmonary hypertension. The estimated PA systolic pressure is 52 mmHg.             This document has been electronically    SIGNED BY: Laureano Pulido MD On: 06/25/2018 12:09    and Transthoracic echo (TTE) complete (Cupid Only):   Results for orders placed or performed during the hospital encounter of 06/07/19   Transthoracic echo (TTE) 2D with Color Flow   Result Value Ref Range    Ascending aorta 2.76 cm    STJ 2.52 cm    AV mean gradient 7.01 mmHg    Ao peak tammi 1.74 m/s    Ao VTI 39.93 cm    IVRT 0.12 msec    IVS 1.65 (A) 0.6 - 1.1 cm    LA size 3.19 cm    Left Atrium Major Axis 6.02 cm    Left Atrium Minor Axis 6.06 cm    LVIDD 3.69 3.5 - 6.0 cm    LVIDS 1.91 (A) 2.1 - 4.0 cm    LVOT diameter 2.11 cm    LVOT peak VTI 38.58 cm    PW 1.51 (A) 0.6 - 1.1 cm    MV Peak A Tammi  1.42 m/s    E wave decelartion time 349.44 msec    MV Peak E Vargas 1.04 m/s    PV Peak D Vargas 0.37 m/s    PV Peak S Vargas 0.63 m/s    RA Major Axis 5.65 cm    RA Width 3.96 cm    RVDD 4.46 cm    Sinus 3.11 cm    TAPSE 1.87 cm    TR Max Vargas 4.24 m/s    TDI LATERAL 0.08     TDI SEPTAL 0.06     LA WIDTH 4.30 cm    Ao root annulus 3.44 cm    AORTIC VALVE CUSP SEPERATION 1.94 cm    PV PEAK VELOCITY 1.17 cm/s    LV Diastolic Volume 57.83 mL    LV Systolic Volume 11.36 mL    RV S' 12.87 m/s    LVOT peak vargas 2.5023066623 m/s    LV LATERAL E/E' RATIO 13.00     LV SEPTAL E/E' RATIO 17.33     FS 48 %    LA volume 70.42 cm3    LV mass 226.22 g    Left Ventricle Relative Wall Thickness 0.82 cm    AV valve area 3.38 cm2    AV Velocity Ratio 0.91     AV index (prosthetic) 0.97     E/A ratio 0.73     Mean e' 0.07     Pulm vein S/D ratio 1.70     LVOT area 3.49 cm2    LVOT stroke volume 134.83 cm3    AV peak gradient 12.11 mmHg    E/E' ratio 14.86     LV Systolic Volume Index 6.0 mL/m2    LV Diastolic Volume Index 30.38 mL/m2    LA Volume Index 37.0 mL/m2    LV Mass Index 118.8 g/m2    Triscuspid Valve Regurgitation Peak Gradient 71.91 mmHg    BSA 1.94 m2    Right Atrial Pressure (from IVC) 8 mmHg    TV rest pulmonary artery pressure 80 mmHg    Narrative    · Normal left ventricular systolic function. The estimated ejection   fraction is 70%  · Moderate concentric left ventricular hypertrophy.  · Grade I (mild) left ventricular diastolic dysfunction consistent with   impaired relaxation.  · Normal left atrial pressure.  · Moderate left atrial enlargement.  · Mild right atrial enlargement.  · Mild mitral regurgitation.  · Mild tricuspid regurgitation.  · The estimated PA systolic pressure is 80 mm Hg  · Intermediate central venous pressure (8 mm Hg).  · Pulmonary hypertension present.          Pending Diagnostic Studies:     None         Medications:  Reconciled Home Medications:      Medication List      START taking these medications     apixaban 5 mg Tab  Commonly known as:  ELIQUIS  Take 1 tablet (5 mg total) by mouth 2 (two) times daily.     diltiaZEM 180 MG 24 hr capsule  Commonly known as:  CARDIZEM CD  Take 1 capsule (180 mg total) by mouth once daily.  Replaces:  diltiaZEM 90 MG tablet     docusate sodium 100 MG capsule  Commonly known as:  COLACE  Take 1 capsule (100 mg total) by mouth 2 (two) times daily.     polyethylene glycol 17 gram Pwpk  Commonly known as:  GLYCOLAX  Take 17 g by mouth 2 (two) times daily.     sulfamethoxazole-trimethoprim 800-160mg 800-160 mg Tab  Commonly known as:  BACTRIM DS  Take 1 tablet by mouth 2 (two) times daily. for 5 days        CHANGE how you take these medications    albuterol 1.25 mg/3 mL Nebu  Commonly known as:  ACCUNEB  Inhale 1.25 mg into the lungs.  What changed:  Another medication with the same name was removed. Continue taking this medication, and follow the directions you see here.     aspirin 81 MG EC tablet  Commonly known as:  ECOTRIN  Take 81 mg by mouth once daily.  What changed:  Another medication with the same name was removed. Continue taking this medication, and follow the directions you see here.     isosorbide mononitrate 30 MG 24 hr tablet  Commonly known as:  IMDUR  Take 30 mg by mouth.  What changed:  Another medication with the same name was removed. Continue taking this medication, and follow the directions you see here.        CONTINUE taking these medications    * acetaminophen 500 MG tablet  Commonly known as:  TYLENOL  Take 1 tablet (500 mg total) by mouth every 8 (eight) hours as needed.     * acetaminophen 500 MG tablet  Commonly known as:  TYLENOL  Take 500 mg by mouth.     amiodarone 200 MG Tab  Commonly known as:  PACERONE  Take 1 tablet (200 mg total) by mouth once daily.     * ANORO ELLIPTA INHL  Inhale into the lungs.     * ANORO ELLIPTA 62.5-25 mcg/actuation Dsdv  Generic drug:  umeclidinium-vilanterol     ferrous gluconate 324 MG tablet  Commonly known as:   FERGON  Take 1 tablet (324 mg total) by mouth 3 (three) times daily with meals.     fluticasone propionate 220 mcg/actuation inhaler  Commonly known as:  FLOVENT HFA  Inhale 1 puff into the lungs 2 (two) times daily. Controller     furosemide 40 MG tablet  Commonly known as:  LASIX  Take 40 mg by mouth once daily.     gabapentin 300 MG capsule  Commonly known as:  NEURONTIN  Take 300 mg by mouth.     hydrALAZINE 50 MG tablet  Commonly known as:  APRESOLINE  Take 50 mg by mouth 2 (two) times daily.     * ipratropium-albuterol  mcg/actuation inhaler  Commonly known as:  COMBIVENT  Inhale 1 puff into the lungs every 6 (six) hours as needed for Wheezing or Shortness of Breath. Rescue     * albuterol-ipratropium 2.5 mg-0.5 mg/3 mL nebulizer solution  Commonly known as:  DUO-NEB  Take 3 mLs by nebulization every 6 (six) hours. Rescue     lisinopril 40 MG tablet  Commonly known as:  PRINIVIL,ZESTRIL  Take 1 tablet (40 mg total) by mouth once daily. Do not take this medication if blood pressure is below 130/80 mmHg and/or feeling dizzy after taking all other blood pressure meds     metFORMIN 1000 MG tablet  Commonly known as:  GLUCOPHAGE  Take 1 tablet (1,000 mg total) by mouth 2 (two) times daily with meals.     multivit-min-FA-lycopen-lutein 0.4-300-250 mg-mcg-mcg Tab  Commonly known as:  CENTRUM SILVER  Take 1 tablet by mouth once daily.     nitroGLYCERIN 0.4 MG SL tablet  Commonly known as:  NITROSTAT     pantoprazole 40 MG tablet  Commonly known as:  PROTONIX  Take 40 mg by mouth once daily.     traMADol 50 mg tablet  Commonly known as:  ULTRAM         * This list has 6 medication(s) that are the same as other medications prescribed for you. Read the directions carefully, and ask your doctor or other care provider to review them with you.            STOP taking these medications    cefUROXime 500 MG tablet  Commonly known as:  CEFTIN     diltiaZEM 90 MG tablet  Commonly known as:  CARDIZEM  Replaced by:   diltiaZEM 180 MG 24 hr capsule     methylPREDNISolone 4 mg tablet  Commonly known as:  MEDROL DOSEPACK     pregabalin 75 MG capsule  Commonly known as:  LYRICA     XARELTO 20 mg Tab  Generic drug:  rivaroxaban            Indwelling Lines/Drains at time of discharge:   Lines/Drains/Airways     Drain            Female External Urinary Catheter 08/19/19 1520 2 days                Time spent on the discharge of patient: over 30  minutes  Patient was seen and examined on the date of discharge and determined to be suitable for discharge.         Michelle Dumont MD  Department of Hospital Medicine  [facility]

## 2019-08-22 NOTE — PROGRESS NOTES
Discharge teaching given to patient. No questions asked. Still awaiting ride, brother in law coming to pick her up. Will continue to monitor.

## 2019-08-28 ENCOUNTER — HOSPITAL ENCOUNTER (OUTPATIENT)
Facility: HOSPITAL | Age: 69
Discharge: HOME | End: 2019-08-29
Attending: EMERGENCY MEDICINE | Admitting: EMERGENCY MEDICINE
Payer: MEDICARE

## 2019-08-28 DIAGNOSIS — R94.31 PROLONGED Q-T INTERVAL ON ECG: ICD-10-CM

## 2019-08-28 DIAGNOSIS — E87.5 HYPERKALEMIA: ICD-10-CM

## 2019-08-28 DIAGNOSIS — J44.1 COPD EXACERBATION: Primary | ICD-10-CM

## 2019-08-28 DIAGNOSIS — R06.02 SHORTNESS OF BREATH: ICD-10-CM

## 2019-08-28 LAB
ALBUMIN SERPL BCP-MCNC: 2.9 G/DL (ref 3.5–5.2)
ALP SERPL-CCNC: 84 U/L (ref 55–135)
ALT SERPL W/O P-5'-P-CCNC: 10 U/L (ref 10–44)
ANION GAP SERPL CALC-SCNC: 11 MMOL/L (ref 8–16)
ANION GAP SERPL CALC-SCNC: 7 MMOL/L (ref 8–16)
AST SERPL-CCNC: 16 U/L (ref 10–40)
BASOPHILS # BLD AUTO: 0.01 K/UL (ref 0–0.2)
BASOPHILS NFR BLD: 0.1 % (ref 0–1.9)
BILIRUB SERPL-MCNC: 0.2 MG/DL (ref 0.1–1)
BNP SERPL-MCNC: 384 PG/ML (ref 0–99)
BUN SERPL-MCNC: 16 MG/DL (ref 8–23)
BUN SERPL-MCNC: 18 MG/DL (ref 6–30)
CALCIUM SERPL-MCNC: 9.9 MG/DL (ref 8.7–10.5)
CHLORIDE SERPL-SCNC: 102 MMOL/L (ref 95–110)
CHLORIDE SERPL-SCNC: 99 MMOL/L (ref 95–110)
CO2 SERPL-SCNC: 30 MMOL/L (ref 23–29)
CREAT SERPL-MCNC: 1 MG/DL (ref 0.5–1.4)
CREAT SERPL-MCNC: 1.1 MG/DL (ref 0.5–1.4)
DIFFERENTIAL METHOD: ABNORMAL
EOSINOPHIL # BLD AUTO: 0.1 K/UL (ref 0–0.5)
EOSINOPHIL NFR BLD: 0.5 % (ref 0–8)
ERYTHROCYTE [DISTWIDTH] IN BLOOD BY AUTOMATED COUNT: 19.1 % (ref 11.5–14.5)
EST. GFR  (AFRICAN AMERICAN): >60 ML/MIN/1.73 M^2
EST. GFR  (NON AFRICAN AMERICAN): 52 ML/MIN/1.73 M^2
GLUCOSE SERPL-MCNC: 169 MG/DL (ref 70–110)
GLUCOSE SERPL-MCNC: 86 MG/DL (ref 70–110)
GLUCOSE SERPL-MCNC: 89 MG/DL (ref 70–110)
HCT VFR BLD AUTO: 31.1 % (ref 37–48.5)
HCT VFR BLD CALC: 30 %PCV (ref 36–54)
HGB BLD-MCNC: 8.7 G/DL (ref 12–16)
HYPOCHROMIA BLD QL SMEAR: ABNORMAL
INR PPP: 1 (ref 0.8–1.2)
LYMPHOCYTES # BLD AUTO: 3 K/UL (ref 1–4.8)
LYMPHOCYTES NFR BLD: 20.5 % (ref 18–48)
MAGNESIUM SERPL-MCNC: 1.6 MG/DL (ref 1.6–2.6)
MCH RBC QN AUTO: 25.2 PG (ref 27–31)
MCHC RBC AUTO-ENTMCNC: 28 G/DL (ref 32–36)
MCV RBC AUTO: 90 FL (ref 82–98)
MONOCYTES # BLD AUTO: 0.8 K/UL (ref 0.3–1)
MONOCYTES NFR BLD: 5.5 % (ref 4–15)
NEUTROPHILS # BLD AUTO: 10.7 K/UL (ref 1.8–7.7)
NEUTROPHILS NFR BLD: 74.1 % (ref 38–73)
PHOSPHATE SERPL-MCNC: 3.7 MG/DL (ref 2.7–4.5)
PLATELET # BLD AUTO: 513 K/UL (ref 150–350)
PLATELET BLD QL SMEAR: ABNORMAL
PMV BLD AUTO: 9.1 FL (ref 9.2–12.9)
POC IONIZED CALCIUM: 1.25 MMOL/L (ref 1.06–1.42)
POC TCO2 (MEASURED): 31 MMOL/L (ref 23–29)
POCT GLUCOSE: 169 MG/DL (ref 70–110)
POCT GLUCOSE: 271 MG/DL (ref 70–110)
POTASSIUM BLD-SCNC: 5.5 MMOL/L (ref 3.5–5.1)
POTASSIUM SERPL-SCNC: 5.6 MMOL/L (ref 3.5–5.1)
PROT SERPL-MCNC: 6.7 G/DL (ref 6–8.4)
PROTHROMBIN TIME: 10.9 SEC (ref 9–12.5)
RBC # BLD AUTO: 3.45 M/UL (ref 4–5.4)
SAMPLE: ABNORMAL
SODIUM BLD-SCNC: 134 MMOL/L (ref 136–145)
SODIUM SERPL-SCNC: 139 MMOL/L (ref 136–145)
TROPONIN I SERPL DL<=0.01 NG/ML-MCNC: 0.01 NG/ML (ref 0–0.03)
TROPONIN I SERPL DL<=0.01 NG/ML-MCNC: 0.01 NG/ML (ref 0–0.03)
WBC # BLD AUTO: 14.55 K/UL (ref 3.9–12.7)

## 2019-08-28 PROCEDURE — 84484 ASSAY OF TROPONIN QUANT: CPT | Mod: 91

## 2019-08-28 PROCEDURE — 25000003 PHARM REV CODE 250: Performed by: EMERGENCY MEDICINE

## 2019-08-28 PROCEDURE — 99900035 HC TECH TIME PER 15 MIN (STAT)

## 2019-08-28 PROCEDURE — 80053 COMPREHEN METABOLIC PANEL: CPT

## 2019-08-28 PROCEDURE — 63600175 PHARM REV CODE 636 W HCPCS: Performed by: EMERGENCY MEDICINE

## 2019-08-28 PROCEDURE — 85610 PROTHROMBIN TIME: CPT

## 2019-08-28 PROCEDURE — 82330 ASSAY OF CALCIUM: CPT

## 2019-08-28 PROCEDURE — 96374 THER/PROPH/DIAG INJ IV PUSH: CPT

## 2019-08-28 PROCEDURE — 85014 HEMATOCRIT: CPT

## 2019-08-28 PROCEDURE — 82565 ASSAY OF CREATININE: CPT

## 2019-08-28 PROCEDURE — 96372 THER/PROPH/DIAG INJ SC/IM: CPT | Mod: 59

## 2019-08-28 PROCEDURE — 94640 AIRWAY INHALATION TREATMENT: CPT

## 2019-08-28 PROCEDURE — 99291 CRITICAL CARE FIRST HOUR: CPT | Mod: 25

## 2019-08-28 PROCEDURE — 85025 COMPLETE CBC W/AUTO DIFF WBC: CPT

## 2019-08-28 PROCEDURE — 25000242 PHARM REV CODE 250 ALT 637 W/ HCPCS: Performed by: HOSPITALIST

## 2019-08-28 PROCEDURE — G0378 HOSPITAL OBSERVATION PER HR: HCPCS

## 2019-08-28 PROCEDURE — 93010 ELECTROCARDIOGRAM REPORT: CPT | Mod: ,,, | Performed by: INTERNAL MEDICINE

## 2019-08-28 PROCEDURE — 93010 EKG 12-LEAD: ICD-10-PCS | Mod: ,,, | Performed by: INTERNAL MEDICINE

## 2019-08-28 PROCEDURE — 94761 N-INVAS EAR/PLS OXIMETRY MLT: CPT

## 2019-08-28 PROCEDURE — 82962 GLUCOSE BLOOD TEST: CPT

## 2019-08-28 PROCEDURE — 96361 HYDRATE IV INFUSION ADD-ON: CPT | Mod: 59

## 2019-08-28 PROCEDURE — 84132 ASSAY OF SERUM POTASSIUM: CPT | Mod: 91

## 2019-08-28 PROCEDURE — 25500020 PHARM REV CODE 255: Performed by: EMERGENCY MEDICINE

## 2019-08-28 PROCEDURE — 25000242 PHARM REV CODE 250 ALT 637 W/ HCPCS: Performed by: EMERGENCY MEDICINE

## 2019-08-28 PROCEDURE — 96375 TX/PRO/DX INJ NEW DRUG ADDON: CPT

## 2019-08-28 PROCEDURE — 25000003 PHARM REV CODE 250: Performed by: HOSPITALIST

## 2019-08-28 PROCEDURE — 36415 COLL VENOUS BLD VENIPUNCTURE: CPT

## 2019-08-28 PROCEDURE — 83735 ASSAY OF MAGNESIUM: CPT

## 2019-08-28 PROCEDURE — 84295 ASSAY OF SERUM SODIUM: CPT | Mod: 91

## 2019-08-28 PROCEDURE — 96374 THER/PROPH/DIAG INJ IV PUSH: CPT | Mod: 59

## 2019-08-28 PROCEDURE — 93005 ELECTROCARDIOGRAM TRACING: CPT

## 2019-08-28 PROCEDURE — 63600175 PHARM REV CODE 636 W HCPCS: Performed by: HOSPITALIST

## 2019-08-28 PROCEDURE — 83880 ASSAY OF NATRIURETIC PEPTIDE: CPT

## 2019-08-28 PROCEDURE — 94644 CONT INHLJ TX 1ST HOUR: CPT

## 2019-08-28 PROCEDURE — 84100 ASSAY OF PHOSPHORUS: CPT

## 2019-08-28 RX ORDER — ACETAMINOPHEN 500 MG
500 TABLET ORAL EVERY 8 HOURS PRN
Status: DISCONTINUED | OUTPATIENT
Start: 2019-08-28 | End: 2019-08-28

## 2019-08-28 RX ORDER — IBUPROFEN 200 MG
16 TABLET ORAL
Status: DISCONTINUED | OUTPATIENT
Start: 2019-08-28 | End: 2019-08-29 | Stop reason: HOSPADM

## 2019-08-28 RX ORDER — IPRATROPIUM BROMIDE AND ALBUTEROL SULFATE 2.5; .5 MG/3ML; MG/3ML
3 SOLUTION RESPIRATORY (INHALATION) EVERY 6 HOURS
Status: DISCONTINUED | OUTPATIENT
Start: 2019-08-28 | End: 2019-08-29

## 2019-08-28 RX ORDER — IPRATROPIUM BROMIDE AND ALBUTEROL SULFATE 2.5; .5 MG/3ML; MG/3ML
3 SOLUTION RESPIRATORY (INHALATION) EVERY 6 HOURS PRN
Status: DISCONTINUED | OUTPATIENT
Start: 2019-08-28 | End: 2019-08-29 | Stop reason: HOSPADM

## 2019-08-28 RX ORDER — PROCHLORPERAZINE EDISYLATE 5 MG/ML
10 INJECTION INTRAMUSCULAR; INTRAVENOUS EVERY 6 HOURS PRN
Status: DISCONTINUED | OUTPATIENT
Start: 2019-08-28 | End: 2019-08-29 | Stop reason: HOSPADM

## 2019-08-28 RX ORDER — FUROSEMIDE 40 MG/1
40 TABLET ORAL DAILY
Status: DISCONTINUED | OUTPATIENT
Start: 2019-08-29 | End: 2019-08-29 | Stop reason: HOSPADM

## 2019-08-28 RX ORDER — IBUPROFEN 200 MG
24 TABLET ORAL
Status: DISCONTINUED | OUTPATIENT
Start: 2019-08-28 | End: 2019-08-29 | Stop reason: HOSPADM

## 2019-08-28 RX ORDER — ALBUTEROL SULFATE 2.5 MG/.5ML
10 SOLUTION RESPIRATORY (INHALATION)
Status: COMPLETED | OUTPATIENT
Start: 2019-08-28 | End: 2019-08-28

## 2019-08-28 RX ORDER — FUROSEMIDE 10 MG/ML
20 INJECTION INTRAMUSCULAR; INTRAVENOUS ONCE
Status: COMPLETED | OUTPATIENT
Start: 2019-08-28 | End: 2019-08-28

## 2019-08-28 RX ORDER — GLUCAGON 1 MG
1 KIT INJECTION
Status: DISCONTINUED | OUTPATIENT
Start: 2019-08-28 | End: 2019-08-29 | Stop reason: HOSPADM

## 2019-08-28 RX ORDER — LISINOPRIL 20 MG/1
40 TABLET ORAL DAILY
Status: DISCONTINUED | OUTPATIENT
Start: 2019-08-29 | End: 2019-08-29 | Stop reason: HOSPADM

## 2019-08-28 RX ORDER — IPRATROPIUM BROMIDE AND ALBUTEROL SULFATE 2.5; .5 MG/3ML; MG/3ML
3 SOLUTION RESPIRATORY (INHALATION) EVERY 6 HOURS
Status: DISCONTINUED | OUTPATIENT
Start: 2019-08-28 | End: 2019-08-28

## 2019-08-28 RX ORDER — IPRATROPIUM BROMIDE AND ALBUTEROL SULFATE 2.5; .5 MG/3ML; MG/3ML
3 SOLUTION RESPIRATORY (INHALATION) EVERY 4 HOURS PRN
Status: DISCONTINUED | OUTPATIENT
Start: 2019-08-28 | End: 2019-08-28

## 2019-08-28 RX ORDER — IPRATROPIUM BROMIDE AND ALBUTEROL SULFATE 2.5; .5 MG/3ML; MG/3ML
3 SOLUTION RESPIRATORY (INHALATION)
Status: COMPLETED | OUTPATIENT
Start: 2019-08-28 | End: 2019-08-28

## 2019-08-28 RX ORDER — INSULIN ASPART 100 [IU]/ML
1-10 INJECTION, SOLUTION INTRAVENOUS; SUBCUTANEOUS
Status: DISCONTINUED | OUTPATIENT
Start: 2019-08-28 | End: 2019-08-29 | Stop reason: HOSPADM

## 2019-08-28 RX ORDER — RAMELTEON 8 MG/1
8 TABLET ORAL NIGHTLY PRN
Status: DISCONTINUED | OUTPATIENT
Start: 2019-08-28 | End: 2019-08-29 | Stop reason: HOSPADM

## 2019-08-28 RX ORDER — AMIODARONE HYDROCHLORIDE 200 MG/1
200 TABLET ORAL DAILY
Status: DISCONTINUED | OUTPATIENT
Start: 2019-08-29 | End: 2019-08-29 | Stop reason: HOSPADM

## 2019-08-28 RX ORDER — SODIUM CHLORIDE 0.9 % (FLUSH) 0.9 %
10 SYRINGE (ML) INJECTION
Status: DISCONTINUED | OUTPATIENT
Start: 2019-08-28 | End: 2019-08-29 | Stop reason: HOSPADM

## 2019-08-28 RX ORDER — HYDRALAZINE HYDROCHLORIDE 25 MG/1
50 TABLET, FILM COATED ORAL 2 TIMES DAILY
Status: DISCONTINUED | OUTPATIENT
Start: 2019-08-28 | End: 2019-08-29 | Stop reason: HOSPADM

## 2019-08-28 RX ORDER — IPRATROPIUM BROMIDE 0.5 MG/2.5ML
1 SOLUTION RESPIRATORY (INHALATION)
Status: COMPLETED | OUTPATIENT
Start: 2019-08-28 | End: 2019-08-28

## 2019-08-28 RX ORDER — DILTIAZEM HYDROCHLORIDE 180 MG/1
180 CAPSULE, COATED, EXTENDED RELEASE ORAL DAILY
Status: DISCONTINUED | OUTPATIENT
Start: 2019-08-29 | End: 2019-08-29 | Stop reason: HOSPADM

## 2019-08-28 RX ORDER — ISOSORBIDE MONONITRATE 30 MG/1
30 TABLET, EXTENDED RELEASE ORAL DAILY
Status: DISCONTINUED | OUTPATIENT
Start: 2019-08-29 | End: 2019-08-29 | Stop reason: HOSPADM

## 2019-08-28 RX ORDER — ACETAMINOPHEN 325 MG/1
650 TABLET ORAL EVERY 6 HOURS PRN
Status: DISCONTINUED | OUTPATIENT
Start: 2019-08-28 | End: 2019-08-29 | Stop reason: HOSPADM

## 2019-08-28 RX ADMIN — IPRATROPIUM BROMIDE 1 MG: 0.5 SOLUTION RESPIRATORY (INHALATION) at 12:08

## 2019-08-28 RX ADMIN — HYDRALAZINE HYDROCHLORIDE 50 MG: 25 TABLET ORAL at 08:08

## 2019-08-28 RX ADMIN — APIXABAN 5 MG: 5 TABLET, FILM COATED ORAL at 08:08

## 2019-08-28 RX ADMIN — IPRATROPIUM BROMIDE AND ALBUTEROL SULFATE 3 ML: .5; 3 SOLUTION RESPIRATORY (INHALATION) at 01:08

## 2019-08-28 RX ADMIN — IPRATROPIUM BROMIDE AND ALBUTEROL SULFATE 3 ML: .5; 3 SOLUTION RESPIRATORY (INHALATION) at 07:08

## 2019-08-28 RX ADMIN — FUROSEMIDE 20 MG: 10 INJECTION, SOLUTION INTRAMUSCULAR; INTRAVENOUS at 08:08

## 2019-08-28 RX ADMIN — SODIUM CHLORIDE 1000 ML: 0.9 INJECTION, SOLUTION INTRAVENOUS at 02:08

## 2019-08-28 RX ADMIN — INSULIN HUMAN 4 UNITS: 100 INJECTION, SOLUTION PARENTERAL at 02:08

## 2019-08-28 RX ADMIN — ACETAMINOPHEN 650 MG: 325 TABLET, FILM COATED ORAL at 09:08

## 2019-08-28 RX ADMIN — IOHEXOL 80 ML: 350 INJECTION, SOLUTION INTRAVENOUS at 01:08

## 2019-08-28 RX ADMIN — Medication 25 G: at 02:08

## 2019-08-28 RX ADMIN — INSULIN ASPART 3 UNITS: 100 INJECTION, SOLUTION INTRAVENOUS; SUBCUTANEOUS at 09:08

## 2019-08-28 RX ADMIN — ALBUTEROL SULFATE 10 MG: 2.5 SOLUTION RESPIRATORY (INHALATION) at 12:08

## 2019-08-28 RX ADMIN — RAMELTEON 8 MG: 8 TABLET, FILM COATED ORAL at 08:08

## 2019-08-28 NOTE — ASSESSMENT & PLAN NOTE
With prolonged QT interval on EKG  Current Kayexalate shortage  Albuterol, insulin/glucose, and furosemide  Repeat BMP in am

## 2019-08-28 NOTE — ED TRIAGE NOTES
EMS reports was d/c 3-4 days ago from hospital where she stayed in ICU for same issues. Reports COPD has been acting up and has been feeling bad since last night. No relief from home nebulizer. On home 02 @ 3-4L at all times

## 2019-08-28 NOTE — ED PROVIDER NOTES
"Encounter Date: 8/28/2019    SCRIBE #1 NOTE: I, Dontae Stuart, am scribing for, and in the presence of,  Peter Guzmán MD. I have scribed the following portions of the note - Other sections scribed: HPI, HOANG, MD KWABENA.       History     Chief Complaint   Patient presents with    Shortness of Breath     SOB begining last night. Pt reports beining recently discharged after MI. EMS reports that on scene pt O2 sat was 90% on RA. she denies any CP at this time     CC: Shortness of Breath     HPI: This 69 y.o F former smoker presents to the ED via EMS c/o SOB since last night. Per EMS, the pt presented with an O2 sat of 90% on RA upon arrival. EMS administered x2 duoneb tx's which improved her O2 sat to 100%. He current symptoms are consistent with her previous episodes of COPD exacerbation. The pt was admitted into this hospital from 8/16-8/22 for A-fib with RVR. Additionally, she c/o chronic bilateral leg swelling and urinary frequency. She has not been using her compression stockings over the past x3 days. She does use 3L of supplemental O2 at home. She denies fever, constipation and diarrhea. She denies EtOH consumption and illicit drug use. The pt used her albuterol inhaler x4 and nebulizer tx at home.    PMHx: Anticoagulant long-term use, Arthritis, Asthma, CHF, COPD, CAD, DVT of left lower extremity, Depression, DM, GERD, HTN, LOYDA on CPAP, On home oxygen therapy, and Unsteady gait.     During previous admission:  "08/17/19  Abnormal urinalysis - tachycardia; start rocephin IV X 1 gram  Then keflex BID (avoid FVO)     08/18/19  Cultures showing e coli and staph aureus  Van & Rocephin for now,has discomfort,stared on pyridium"      The history is provided by the patient. No  was used.     Review of patient's allergies indicates:  No Known Allergies  Past Medical History:   Diagnosis Date    Anticoagulant long-term use     Xarelto    Arthritis     Asthma     CHF (congestive heart failure)     " "COPD (chronic obstructive pulmonary disease)     Coronary artery disease     Deep vein thrombosis (DVT) of left lower extremity     Depression     Diabetes mellitus     GERD (gastroesophageal reflux disease)     Hypertension     Obstructive sleep apnea on CPAP     On home oxygen therapy     Unsteady gait     uses either walker or 4 prong cane     Past Surgical History:   Procedure Laterality Date    CORONARY ANGIOPLASTY WITH STENT PLACEMENT      HERNIA REPAIR      encapsulated umbilical hernia     Family History   Problem Relation Age of Onset    Heart disease Mother     Hypertension Mother     Diabetes Father      Social History     Tobacco Use    Smoking status: Former Smoker     Packs/day: 0.50     Years: 55.00     Pack years: 27.50     Types: Cigarettes     Start date: 1963     Last attempt to quit: 2018     Years since quittin.6    Smokeless tobacco: Never Used    Tobacco comment: "States she quit smoking about 3 or 4 weeks ago"   Substance Use Topics    Alcohol use: Not Currently     Alcohol/week: 0.6 oz     Types: 1 Glasses of wine per week     Frequency: Never     Comment: socially    Drug use: No     Review of Systems   Respiratory: Positive for shortness of breath.    Cardiovascular: Positive for leg swelling (bilateral, chronic).   Genitourinary: Positive for frequency.   All other systems reviewed and are negative.      Physical Exam     Initial Vitals   BP Pulse Resp Temp SpO2   19 1119 19 1119 19 1119 19 1217 19 1119   126/77 83 (!) 30 98.3 °F (36.8 °C) 100 %      MAP       --                Physical Exam    Nursing note and vitals reviewed.  Constitutional: She appears well-developed and well-nourished.  Non-toxic appearance. No distress.   HENT:   Head: Normocephalic and atraumatic.   Mouth/Throat: Oropharynx is clear and moist.   Eyes: Conjunctivae and EOM are normal. Pupils are equal, round, and reactive to light. Right conjunctiva is " not injected. Left conjunctiva is not injected. No scleral icterus.   Neck: Normal range of motion and full passive range of motion without pain. Neck supple.   Cardiovascular: Normal rate, regular rhythm, S1 normal, S2 normal, normal heart sounds and normal pulses. Exam reveals no gallop and no friction rub.    No murmur heard.  Pulses:       Radial pulses are 2+ on the right side, and 2+ on the left side.   Pulmonary/Chest: Effort normal. No respiratory distress. She has decreased breath sounds (bilateral). She has wheezes (bilateral).   Increased RR in 30s   Abdominal: Soft. She exhibits no distension. There is no tenderness.   Musculoskeletal: Normal range of motion. She exhibits edema.   Good active ROM of all extremities. No lower extremity edema or cyanosis.    Neurological: No cranial nerve deficit. Gait normal.   A&Ox4, normal speech.   Skin: Skin is warm. No ecchymosis and no rash noted.   Psychiatric: She has a normal mood and affect. Thought content normal.         ED Course   Procedures  Labs Reviewed   CBC W/ AUTO DIFFERENTIAL - Abnormal; Notable for the following components:       Result Value    WBC 14.55 (*)     RBC 3.45 (*)     Hemoglobin 8.7 (*)     Hematocrit 31.1 (*)     Mean Corpuscular Hemoglobin 25.2 (*)     Mean Corpuscular Hemoglobin Conc 28.0 (*)     RDW 19.1 (*)     Platelets 513 (*)     MPV 9.1 (*)     Gran # (ANC) 10.7 (*)     Gran% 74.1 (*)     Platelet Estimate Increased (*)     All other components within normal limits   COMPREHENSIVE METABOLIC PANEL - Abnormal; Notable for the following components:    Potassium 5.6 (*)     CO2 30 (*)     Albumin 2.9 (*)     Anion Gap 7 (*)     eGFR if non  52 (*)     All other components within normal limits   B-TYPE NATRIURETIC PEPTIDE - Abnormal; Notable for the following components:     (*)     All other components within normal limits   ISTAT PROCEDURE - Abnormal; Notable for the following components:    POC Sodium 134 (*)      POC Potassium 5.5 (*)     POC TCO2 (MEASURED) 31 (*)     POC Hematocrit 30 (*)     All other components within normal limits   TROPONIN I   PROTIME-INR   MAGNESIUM   PHOSPHORUS   ISTAT CHEM8     EKG Readings: (Independently Interpreted)   ekg done at 11:43 showing sinus tachycardia with rate 118. Peaked t waves. Prolonged qtc at 555. No st elvation. Low voltage qrs. Abnormal ekg and changed from previous     ECG Results          EKG 12-lead (In process)  Result time 08/28/19 12:47:47    In process by Interface, Lab In [facility] (08/28/19 12:47:47)                 Narrative:    Test Reason : R06.02,    Vent. Rate : 118 BPM     Atrial Rate : 090 BPM     P-R Int : 194 ms          QRS Dur : 080 ms      QT Int : 396 ms       P-R-T Axes : 063 -08 070 degrees     QTc Int : 555 ms    Undetermined rhythm  Low voltage QRS  Prolonged QT  Abnormal ECG  When compared with ECG of 16-AUG-2019 14:59,  Significant changes have occurred    Referred By: [email or web address]   SELF           Confirmed By:                   In process by Interface, Lab In [facility] (08/28/19 12:47:07)                 Narrative:    Test Reason : R06.02,    Vent. Rate : 118 BPM     Atrial Rate : 090 BPM     P-R Int : 194 ms          QRS Dur : 080 ms      QT Int : 396 ms       P-R-T Axes : 063 -08 070 degrees     QTc Int : 555 ms    Undetermined rhythm  Low voltage QRS  Prolonged QT  Abnormal ECG  No previous ECGs available    Referred By: [email or web address]   SELF           Confirmed By:                             Imaging Results          CTA Chest Non-Coronary - PE Study (Final result)  Result time 08/28/19 13:42:13    Final result by Charles Marsh MD (08/28/19 13:42:13)                 Impression:      No evidence of pulmonary embolism.    Extensive calcified and noncalcified plaque throughout the aorta with multiple small outpouching suggestive of penetrating ulcers, not significantly changed when compared to prior exams.    Centrilobular  emphysema.      Electronically signed by: Charles Marsh MD  Date:    08/28/2019  Time:    13:42             Narrative:    EXAMINATION:  CTA CHEST NON CORONARY    CLINICAL HISTORY:  Chest pain, acute, PE suspected, high pretest prob;    TECHNIQUE:  Low dose axial images, sagittal and coronal reformations were obtained from the thoracic inlet to the lung bases following the IV administration of 80 mL of Omnipaque 350.  Contrast timing was optimized to evaluate the pulmonary arteries.  MIP images were performed.    COMPARISON:  CTA chest from 08/16/2019    FINDINGS:  Pulmonary Arteries: This study is adequate for the evaluation of pulmonary thromboembolism.  No evidence of pulmonary embolism to the level of the subsegmental arteries bilaterally.    Soft tissues of the neck:  Visualized portion of the thyroid is normal in appearance.    Thoracic aorta:  Descending thoracic aorta demonstrates extensive calcified and noncalcified plaque with multiple small focal outpouchings again likely representing penetrating ulcers, not significantly changed when compared to prior exam.    Heart: No cardiomegaly.  No pericardial effusion.    Lymph nodes:  No evidence of mediastinal, hilar, or axillary lymphadenopathy.    Trachea and bronchi: Patent.    Lungs:  Lungs are clear.  No focal consolidation.  No pleural effusion.  No pneumothorax.  Lucency centrally consistent with centrilobular emphysema.    Limited evaluation of the upper abdomen:  Demonstrate a few hypodensities of the right kidney, unchanged when compared to prior exam likely representing cysts.  Diverticulosis coli of the sigmoid colon.    Osseous structures:  Mild compression fracture of T11 vertebral body, unchanged.                               X-Ray Chest AP Portable (Final result)  Result time 08/28/19 12:16:05    Final result by Yaron Castro MD (08/28/19 12:16:05)                 Impression:      No detrimental change or radiographic acute intrathoracic  process seen.      Electronically signed by: Yaron Castro MD  Date:    08/28/2019  Time:    12:16             Narrative:    EXAMINATION:  XR CHEST AP PORTABLE    CLINICAL HISTORY:  CHF;    TECHNIQUE:  Single frontal view of the chest was performed.    COMPARISON:  Chest radiograph 08/16/2019    FINDINGS:  Monitoring leads and tubing overlie the chest.  No detrimental change.    Cardiomediastinal silhouette is midline and stable without evidence of failure.  Pulmonary vasculature and hilar regions are within normal limits for age.  The lungs are symmetrically well expanded without large consolidation or new focal opacity.  No large pleural effusion or pneumothorax.  No acute osseous process seen.  PA and lateral views can be obtained.                                 Medical Decision Making:   History:   Old Medical Records: I decided to obtain old medical records.  Initial Assessment:   Pt presenting 2/2 wheezing and SOB c/w COPD exacerbation. Patient started on 10mg albuterol, 1 mg ipratropium (s/p 2 duonebs and steroids with ems). Will monitor for worsening respiratory distress to considered bipap vs intubation in patient. Will reassess after treatments    Also considered but less likely:  Acs: ekg doesn't show stemi. Troponin ordered  Pna: symptoms bilaterally and no fevers.   Bronchitis: considered but hpi most c/w copd  CHF: considered. Will compare bnp to previous and cxr for fluid overload. Possible  Pneumothorax: bilateral breath sounds    Labs notable for elevated potassium. Giving more duonebs and will recheck. No neville. Giving fluids.     Repeat is still elevated. Has ekg changes. Will admit. Insulin/glucose for patient.     Spoke with emilia and admitted patient.     Please put in 35 minutes of critical care due to patient having a high risk of respiratory/cardiac failure.   Separate from teaching and exclusive of procedure and ekg time  Includes:  Time at bedside  Time reviewing test results  Time discussing  case with staff  Time documenting the medical record  Time spent with family members  Time spent with consults  Management    Clinical Tests:   Lab Tests: Ordered and Reviewed  Radiological Study: Reviewed and Ordered  Medical Tests: Ordered and Reviewed            Scribe Attestation:   Scribe #1: I performed the above scribed service and the documentation accurately describes the services I performed. I attest to the accuracy of the note.    I, Peter Guzmán, personally performed the services described in this documentation. All medical record entries made by the scribe were at my direction and in my presence. I have reviewed the chart and agree that the record reflects my personal performance and is accurate and complete.            Clinical Impression:       ICD-10-CM ICD-9-CM   1. COPD exacerbation J44.1 491.21   2. Shortness of breath R06.02 786.05   3. Hyperkalemia E87.5 276.7   4. Prolonged Q-T interval on ECG R94.31 794.31                                Peter Guzmán MD  08/28/19 [id number]

## 2019-08-28 NOTE — SUBJECTIVE & OBJECTIVE
Past Medical History:   Diagnosis Date    Anticoagulant long-term use     Xarelto    Arthritis     Asthma     CHF (congestive heart failure)     COPD (chronic obstructive pulmonary disease)     Coronary artery disease     Deep vein thrombosis (DVT) of left lower extremity     Depression     Diabetes mellitus     GERD (gastroesophageal reflux disease)     Hypertension     MI (myocardial infarction) 08/2019    Obstructive sleep apnea on CPAP     On home oxygen therapy     Unsteady gait     uses either walker or 4 prong cane       Past Surgical History:   Procedure Laterality Date    CARDIAC CATHETERIZATION  06/2018    CORONARY ANGIOPLASTY WITH STENT PLACEMENT      HERNIA REPAIR      encapsulated umbilical hernia       Review of patient's allergies indicates:  No Known Allergies    No current facility-administered medications on file prior to encounter.      Current Outpatient Medications on File Prior to Encounter   Medication Sig    albuterol (ACCUNEB) 1.25 mg/3 mL Nebu Inhale 1.25 mg into the lungs.    albuterol-ipratropium (DUO-NEB) 2.5 mg-0.5 mg/3 mL nebulizer solution Take 3 mLs by nebulization every 6 (six) hours. Rescue    amiodarone (PACERONE) 200 MG Tab Take 1 tablet (200 mg total) by mouth once daily.    ANORO ELLIPTA 62.5-25 mcg/actuation DsDv     apixaban (ELIQUIS) 5 mg Tab Take 1 tablet (5 mg total) by mouth 2 (two) times daily.    aspirin (ECOTRIN) 81 MG EC tablet Take 81 mg by mouth once daily.    diltiaZEM (CARDIZEM CD) 180 MG 24 hr capsule Take 1 capsule (180 mg total) by mouth once daily.    docusate sodium (COLACE) 100 MG capsule Take 1 capsule (100 mg total) by mouth 2 (two) times daily.    ferrous gluconate (FERGON) 324 MG tablet Take 1 tablet (324 mg total) by mouth 3 (three) times daily with meals.    fluticasone propionate (FLOVENT HFA) 220 mcg/actuation inhaler Inhale 1 puff into the lungs 2 (two) times daily. Controller    furosemide (LASIX) 40 MG tablet Take 40  "mg by mouth once daily.     gabapentin (NEURONTIN) 300 MG capsule Take 300 mg by mouth.    hydrALAZINE (APRESOLINE) 50 MG tablet Take 50 mg by mouth 2 (two) times daily.    ipratropium-albuterol (COMBIVENT)  mcg/actuation inhaler Inhale 1 puff into the lungs every 6 (six) hours as needed for Wheezing or Shortness of Breath. Rescue    isosorbide mononitrate (IMDUR) 30 MG 24 hr tablet Take 30 mg by mouth.    lisinopril (PRINIVIL,ZESTRIL) 40 MG tablet Take 1 tablet (40 mg total) by mouth once daily. Do not take this medication if blood pressure is below 130/80 mmHg and/or feeling dizzy after taking all other blood pressure meds    metFORMIN (GLUCOPHAGE) 1000 MG tablet Take 1 tablet (1,000 mg total) by mouth 2 (two) times daily with meals.    multivit-min-FA-lycopen-lutein (CENTRUM SILVER) 0.4-300-250 mg-mcg-mcg Tab Take 1 tablet by mouth once daily.    pantoprazole (PROTONIX) 40 MG tablet Take 40 mg by mouth once daily.    polyethylene glycol (GLYCOLAX) 17 gram PwPk Take 17 g by mouth 2 (two) times daily.    sulfamethoxazole-trimethoprim 800-160mg (BACTRIM DS) 800-160 mg Tab Take 1 tablet by mouth 2 (two) times daily. for 5 days    traMADol (ULTRAM) 50 mg tablet     umeclidinium brm/vilanterol tr (ANORO ELLIPTA INHL) Inhale into the lungs.    acetaminophen (TYLENOL) 500 MG tablet Take 1 tablet (500 mg total) by mouth every 8 (eight) hours as needed.    acetaminophen (TYLENOL) 500 MG tablet Take 500 mg by mouth.    nitroGLYCERIN (NITROSTAT) 0.4 MG SL tablet      Family History     Problem Relation (Age of Onset)    Diabetes Father    Heart disease Mother    Hypertension Mother        Tobacco Use    Smoking status: Former Smoker     Packs/day: 0.50     Years: 55.00     Pack years: 27.50     Types: Cigarettes     Start date: 1963     Last attempt to quit: 2018     Years since quittin.6    Smokeless tobacco: Never Used    Tobacco comment: "States she quit smoking about 3 or 4 weeks " "ago"   Substance and Sexual Activity    Alcohol use: Not Currently     Alcohol/week: 0.6 oz     Types: 1 Glasses of wine per week     Frequency: Never     Comment: socially    Drug use: No    Sexual activity: Never     Review of Systems   Constitutional: Negative for chills, fatigue and fever.   Eyes: Negative for photophobia and visual disturbance.   Respiratory: Positive for shortness of breath. Negative for cough.    Cardiovascular: Positive for leg swelling. Negative for chest pain and palpitations.   Gastrointestinal: Negative for abdominal pain, diarrhea, nausea and vomiting.   Genitourinary: Positive for frequency. Negative for dysuria and urgency.   Skin: Negative for pallor, rash and wound.   Neurological: Negative for light-headedness and headaches.   Psychiatric/Behavioral: Negative for confusion and decreased concentration.     Objective:     Vital Signs (Most Recent):  Temp: 98.2 °F (36.8 °C) (08/28/19 1806)  Pulse: 86 (08/28/19 1806)  Resp: 19 (08/28/19 1806)  BP: (!) 142/62 (08/28/19 1806)  SpO2: (!) 89 % (08/28/19 1806) Vital Signs (24h Range):  Temp:  [98.1 °F (36.7 °C)-98.9 °F (37.2 °C)] 98.2 °F (36.8 °C)  Pulse:  [76-86] 86  Resp:  [18-30] 19  SpO2:  [89 %-100 %] 89 %  BP: (120-142)/(56-77) 142/62     Weight: 93.9 kg (207 lb)  Body mass index is 39.11 kg/m².    Physical Exam   Constitutional: She is oriented to person, place, and time. She appears well-developed and well-nourished. No distress.   HENT:   Head: Normocephalic and atraumatic.   Right Ear: External ear normal.   Left Ear: External ear normal.   Nose: Nose normal.   Mouth/Throat: Oropharynx is clear and moist.   Eyes: Pupils are equal, round, and reactive to light. Conjunctivae and EOM are normal.   Neck: Normal range of motion. Neck supple.   Cardiovascular: Normal rate, regular rhythm and intact distal pulses.   Pulmonary/Chest: Effort normal. No respiratory distress. She has decreased breath sounds. She has wheezes.   Abdominal: " Soft. Bowel sounds are normal. She exhibits no distension. There is no tenderness.   No palpable hepatomegaly or splenomegaly    Musculoskeletal: Normal range of motion. She exhibits no edema or tenderness.   Neurological: She is alert and oriented to person, place, and time.   Skin: Skin is warm and dry.   Psychiatric: She has a normal mood and affect. Thought content normal.   Nursing note and vitals reviewed.        CRANIAL NERVES     CN III, IV, VI   Pupils are equal, round, and reactive to light.  Extraocular motions are normal.        Significant Labs: All pertinent labs within the past 24 hours have been reviewed.    Significant Imaging: I have reviewed all pertinent imaging results/findings within the past 24 hours.

## 2019-08-28 NOTE — ASSESSMENT & PLAN NOTE
COPD exacerbation: severity = severe  is on oxygen at 3 L/min per nasal cannula.  -continue supplemental oxygen  -IV corticosteroids  -scheduled nebs

## 2019-08-28 NOTE — HPI
69 y.o. female with COPD, chronic diastolic heart failure, CAD, dm 2, GERD, tobacco use, anemia, chronic respiratory failure with hypoxia, paroxysmal AFib, hypertension, hyperlipidemia presents with complaint of shortness of breath acutely worse than her chronic baseline beginning yesterday evening.  She denies fever, chills, chest pain, palpitations, dizziness, syncope, nausea, vomiting, diarrhea, abdominal pain, bloody black stools, dysuria.  She does report bilateral lower extremity edema and urinary frequency.  Recently discharged on 5 day course of bactrim for MRSA UTI.  In the ED, she was found to be wheezing with tachypnea and labored respirations, labs significant for hyperkalemia, potassium 5.6 with prolonged QT interval on EKG.  She was given nebulizer treatments, IV corticosteroids, as well as insulin and glucose.  Placed in observation for further evaluation treatment.

## 2019-08-29 VITALS
OXYGEN SATURATION: 98 % | DIASTOLIC BLOOD PRESSURE: 60 MMHG | HEART RATE: 100 BPM | WEIGHT: 178.81 LBS | TEMPERATURE: 99 F | SYSTOLIC BLOOD PRESSURE: 110 MMHG | BODY MASS INDEX: 33.76 KG/M2 | RESPIRATION RATE: 20 BRPM | HEIGHT: 61 IN

## 2019-08-29 PROBLEM — E87.5 HYPERKALEMIA: Status: RESOLVED | Noted: 2018-07-09 | Resolved: 2019-08-29

## 2019-08-29 LAB
ALBUMIN SERPL BCP-MCNC: 2.7 G/DL (ref 3.5–5.2)
ALP SERPL-CCNC: 79 U/L (ref 55–135)
ALT SERPL W/O P-5'-P-CCNC: 10 U/L (ref 10–44)
ANION GAP SERPL CALC-SCNC: 9 MMOL/L (ref 8–16)
AST SERPL-CCNC: 12 U/L (ref 10–40)
BACTERIA #/AREA URNS HPF: ABNORMAL /HPF
BASOPHILS # BLD AUTO: 0 K/UL (ref 0–0.2)
BASOPHILS NFR BLD: 0 % (ref 0–1.9)
BILIRUB SERPL-MCNC: 0.1 MG/DL (ref 0.1–1)
BILIRUB UR QL STRIP: NEGATIVE
BUN SERPL-MCNC: 20 MG/DL (ref 8–23)
CALCIUM SERPL-MCNC: 9.6 MG/DL (ref 8.7–10.5)
CHLORIDE SERPL-SCNC: 102 MMOL/L (ref 95–110)
CLARITY UR: CLEAR
CO2 SERPL-SCNC: 27 MMOL/L (ref 23–29)
COLOR UR: ABNORMAL
CREAT SERPL-MCNC: 1.3 MG/DL (ref 0.5–1.4)
DIFFERENTIAL METHOD: ABNORMAL
EOSINOPHIL # BLD AUTO: 0 K/UL (ref 0–0.5)
EOSINOPHIL NFR BLD: 0 % (ref 0–8)
ERYTHROCYTE [DISTWIDTH] IN BLOOD BY AUTOMATED COUNT: 19.3 % (ref 11.5–14.5)
EST. GFR  (AFRICAN AMERICAN): 49 ML/MIN/1.73 M^2
EST. GFR  (NON AFRICAN AMERICAN): 43 ML/MIN/1.73 M^2
GLUCOSE SERPL-MCNC: 231 MG/DL (ref 70–110)
GLUCOSE UR QL STRIP: NEGATIVE
HCT VFR BLD AUTO: 30.9 % (ref 37–48.5)
HGB BLD-MCNC: 8.6 G/DL (ref 12–16)
HGB UR QL STRIP: NEGATIVE
KETONES UR QL STRIP: NEGATIVE
LEUKOCYTE ESTERASE UR QL STRIP: ABNORMAL
LYMPHOCYTES # BLD AUTO: 1.3 K/UL (ref 1–4.8)
LYMPHOCYTES NFR BLD: 8 % (ref 18–48)
MCH RBC QN AUTO: 24.9 PG (ref 27–31)
MCHC RBC AUTO-ENTMCNC: 27.8 G/DL (ref 32–36)
MCV RBC AUTO: 90 FL (ref 82–98)
MICROSCOPIC COMMENT: ABNORMAL
MONOCYTES # BLD AUTO: 0.2 K/UL (ref 0.3–1)
MONOCYTES NFR BLD: 1.3 % (ref 4–15)
NEUTROPHILS # BLD AUTO: 15 K/UL (ref 1.8–7.7)
NEUTROPHILS NFR BLD: 91.3 % (ref 38–73)
NITRITE UR QL STRIP: NEGATIVE
PH UR STRIP: 6 [PH] (ref 5–8)
PLATELET # BLD AUTO: 507 K/UL (ref 150–350)
PMV BLD AUTO: 9.1 FL (ref 9.2–12.9)
POCT GLUCOSE: 195 MG/DL (ref 70–110)
POCT GLUCOSE: 246 MG/DL (ref 70–110)
POTASSIUM SERPL-SCNC: 5.3 MMOL/L (ref 3.5–5.1)
PROT SERPL-MCNC: 6.3 G/DL (ref 6–8.4)
PROT UR QL STRIP: NEGATIVE
RBC # BLD AUTO: 3.45 M/UL (ref 4–5.4)
RBC #/AREA URNS HPF: 6 /HPF (ref 0–4)
SODIUM SERPL-SCNC: 138 MMOL/L (ref 136–145)
SP GR UR STRIP: 1.01 (ref 1–1.03)
SQUAMOUS #/AREA URNS HPF: 4 /HPF
TROPONIN I SERPL DL<=0.01 NG/ML-MCNC: 0.02 NG/ML (ref 0–0.03)
URN SPEC COLLECT METH UR: ABNORMAL
UROBILINOGEN UR STRIP-ACNC: NEGATIVE EU/DL
WBC # BLD AUTO: 16.52 K/UL (ref 3.9–12.7)
WBC #/AREA URNS HPF: >100 /HPF (ref 0–5)
YEAST URNS QL MICRO: ABNORMAL

## 2019-08-29 PROCEDURE — 36415 COLL VENOUS BLD VENIPUNCTURE: CPT

## 2019-08-29 PROCEDURE — 25000003 PHARM REV CODE 250: Performed by: EMERGENCY MEDICINE

## 2019-08-29 PROCEDURE — 25000242 PHARM REV CODE 250 ALT 637 W/ HCPCS: Performed by: HOSPITALIST

## 2019-08-29 PROCEDURE — 84484 ASSAY OF TROPONIN QUANT: CPT

## 2019-08-29 PROCEDURE — G0378 HOSPITAL OBSERVATION PER HR: HCPCS

## 2019-08-29 PROCEDURE — 80053 COMPREHEN METABOLIC PANEL: CPT

## 2019-08-29 PROCEDURE — 27000190 HC CPAP FULL FACE MASK W/VALVE

## 2019-08-29 PROCEDURE — 99900035 HC TECH TIME PER 15 MIN (STAT)

## 2019-08-29 PROCEDURE — 94660 CPAP INITIATION&MGMT: CPT

## 2019-08-29 PROCEDURE — 87086 URINE CULTURE/COLONY COUNT: CPT

## 2019-08-29 PROCEDURE — 25000003 PHARM REV CODE 250: Performed by: HOSPITALIST

## 2019-08-29 PROCEDURE — 94640 AIRWAY INHALATION TREATMENT: CPT

## 2019-08-29 PROCEDURE — 85025 COMPLETE CBC W/AUTO DIFF WBC: CPT

## 2019-08-29 PROCEDURE — 63600175 PHARM REV CODE 636 W HCPCS: Performed by: HOSPITALIST

## 2019-08-29 PROCEDURE — 81000 URINALYSIS NONAUTO W/SCOPE: CPT

## 2019-08-29 PROCEDURE — 96372 THER/PROPH/DIAG INJ SC/IM: CPT

## 2019-08-29 PROCEDURE — 94761 N-INVAS EAR/PLS OXIMETRY MLT: CPT

## 2019-08-29 RX ORDER — DOXYCYCLINE HYCLATE 100 MG
100 TABLET ORAL EVERY 12 HOURS
Status: DISCONTINUED | OUTPATIENT
Start: 2019-08-29 | End: 2019-08-29 | Stop reason: HOSPADM

## 2019-08-29 RX ORDER — PREDNISONE 20 MG/1
40 TABLET ORAL DAILY
Status: DISCONTINUED | OUTPATIENT
Start: 2019-08-29 | End: 2019-08-29 | Stop reason: HOSPADM

## 2019-08-29 RX ORDER — PREDNISONE 20 MG/1
40 TABLET ORAL DAILY
Qty: 10 TABLET | Refills: 0 | Status: SHIPPED | OUTPATIENT
Start: 2019-08-29 | End: 2019-09-03

## 2019-08-29 RX ORDER — DOXYCYCLINE HYCLATE 100 MG
100 TABLET ORAL EVERY 12 HOURS
Qty: 10 TABLET | Refills: 0 | Status: SHIPPED | OUTPATIENT
Start: 2019-08-29 | End: 2019-09-03

## 2019-08-29 RX ADMIN — ISOSORBIDE MONONITRATE 30 MG: 30 TABLET, EXTENDED RELEASE ORAL at 09:08

## 2019-08-29 RX ADMIN — DILTIAZEM HYDROCHLORIDE 180 MG: 180 CAPSULE, COATED, EXTENDED RELEASE ORAL at 09:08

## 2019-08-29 RX ADMIN — APIXABAN 5 MG: 5 TABLET, FILM COATED ORAL at 09:08

## 2019-08-29 RX ADMIN — IPRATROPIUM BROMIDE AND ALBUTEROL SULFATE 3 ML: .5; 3 SOLUTION RESPIRATORY (INHALATION) at 12:08

## 2019-08-29 RX ADMIN — DOXYCYCLINE HYCLATE 100 MG: 100 TABLET, COATED ORAL at 11:08

## 2019-08-29 RX ADMIN — INSULIN ASPART 4 UNITS: 100 INJECTION, SOLUTION INTRAVENOUS; SUBCUTANEOUS at 11:08

## 2019-08-29 RX ADMIN — IPRATROPIUM BROMIDE AND ALBUTEROL SULFATE 3 ML: .5; 3 SOLUTION RESPIRATORY (INHALATION) at 01:08

## 2019-08-29 RX ADMIN — AMIODARONE HYDROCHLORIDE 200 MG: 200 TABLET ORAL at 09:08

## 2019-08-29 RX ADMIN — PREDNISONE 40 MG: 20 TABLET ORAL at 11:08

## 2019-08-29 RX ADMIN — SODIUM CHLORIDE 500 ML: 0.9 INJECTION, SOLUTION INTRAVENOUS at 11:08

## 2019-08-29 RX ADMIN — ACETAMINOPHEN 650 MG: 325 TABLET, FILM COATED ORAL at 09:08

## 2019-08-29 RX ADMIN — IPRATROPIUM BROMIDE AND ALBUTEROL SULFATE 3 ML: .5; 3 SOLUTION RESPIRATORY (INHALATION) at 08:08

## 2019-08-29 RX ADMIN — HYDRALAZINE HYDROCHLORIDE 50 MG: 25 TABLET ORAL at 09:08

## 2019-08-29 RX ADMIN — INSULIN ASPART 2 UNITS: 100 INJECTION, SOLUTION INTRAVENOUS; SUBCUTANEOUS at 10:08

## 2019-08-29 RX ADMIN — LISINOPRIL 40 MG: 20 TABLET ORAL at 09:08

## 2019-08-29 RX ADMIN — FUROSEMIDE 40 MG: 40 TABLET ORAL at 09:08

## 2019-08-29 NOTE — PROGRESS NOTES
Discharge Instructions: COPD  You have been diagnosed with chronic obstructive pulmonary disease (COPD). This is a name given to a group of diseases that limit the flow of air in and out of your lungs. This makes it harder to breathe. With COPD, you are also more likely to get lung infections. COPD includes chronic bronchitis and emphysema. COPD is most often caused by heavy, long-term cigarette smoking.  Home care  Quit smoking  · If you smoke, quit. It is the best thing you can do for your COPD and your overall health.  · Join a stop-smoking program. There are even telephone, text message, and Internet programs to help you quit.  · Ask your healthcare provider about medicines or other methods to help you quit.  · Ask family members to quit smoking as well.  · Don't allow people to smoke in your home, in your car, or when they are around you.  Protect yourself from infection  · Wash your hands often. Do your best to keep your hands away from your face. Most germs are spread from your hands to your mouth.  · Get a flu shot every year. Also ask your provider about pneumonia vaccines.  · Avoid crowds. It's especially important to do this in the winter when more people have colds and flu.  · To stay healthy, get enough sleep, exercise regularly, and eat a balanced diet. You should:  ? Get about 8 hours of sleep every night.  ? Try to exercise for at least 30 minutes on most days.  ? Have healthy foods including fruits and vegetables, 100% whole grains, lean meats and fish, and low-fat dairy products. Try to stay away from foods high in fats and sugar.  Take your medicines  Take your medicines exactly as directed. Don't skip doses.  Manage your stress  Stress can make COPD worse. Use this stress management technique:  · Find a quiet place and sit or lie in a comfortable position.  · Close your eyes and perform breathing exercises for several minutes. Ask your provider about the best way to breathe.  Pulmonary  rehabilitation  · Pulmonary rehab can help you feel better. These programs include exercise, breathing techniques, information about COPD, counseling, and help for smokers.  · Ask your provider or your local hospital about programs in your area.  When to call your healthcare provider  Call your provider immediately if you have any of the following:  · Shortness of breath, wheezing, or coughing  · Increased mucus  · Yellow, green, bloody, or smelly mucus  · Fever or chills  · Tightness in your chest that does not go away with rest or medicine  · An irregular heartbeat or a feeling that your heart is beating very fast  · Swollen ankles

## 2019-08-29 NOTE — PLAN OF CARE
08/29/19 1000   Discharge Assessment   Assessment Type Discharge Planning Assessment   Assessment information obtained from? Medical Record   Prior to hospitilization cognitive status: Alert/Oriented   Prior to hospitalization functional status: Assistive Equipment;Needs Assistance   Current cognitive status: Alert/Oriented   Current Functional Status: Assistive Equipment;Needs Assistance   Facility Arrived From: home   Lives With spouse   Able to Return to Prior Arrangements yes   Is patient able to care for self after discharge? Yes   Who are your caregiver(s) and their phone number(s)? [place]   Patient's perception of discharge disposition home or selfcare   Readmission Within the Last 30 Days other (see comments)  (admitted for same diagnosis)   Patient currently being followed by outpatient case management? No   Patient currently receives any other outside agency services? No   Equipment Currently Used at Home cane, quad;CPAP;glucometer;oxygen;rollator;shower chair   Do you have any problems affording any of your prescribed medications? No   Is the patient taking medications as prescribed? yes   Does the patient have transportation home? Yes   Transportation Anticipated family or friend will provide   Does the patient receive services at the [facility]? No   Discharge Plan A Home with family   Discharge Plan B Home with family  (with follow up)   DME Needed Upon Discharge  none   Patient/Family in Agreement with Plan yes     [id number] - [place] [address]  [address] [address]  Phone: [phone] Fax: [phone]

## 2019-08-29 NOTE — PLAN OF CARE
08/29/19 1513   Final Note   Assessment Type Final Discharge Note   Anticipated Discharge Disposition Home   Hospital Follow Up  Appt(s) scheduled? Yes   Discharge plans and expectations educations in teach back method with documentation complete? Yes   Right Care Referral Info   Post Acute Recommendation No Care   pts nurse Cassia notified that pt can d/c from CM standpoint.

## 2019-08-29 NOTE — PROGRESS NOTES
WRITTEN HEALTHCARE DISCHARGE INFORMATION      Things that YOU are RESPONSIBLE for to Manage Your Care At Home:     1. Getting your prescriptions filled.  2. Taking you medications as directed. DO NOT MISS ANY DOSES!  3. Going to your follow-up doctor appointments. This is important because it allows the doctor to monitor your progress and to determine if any changes need to be made to your treatment plan.     If you are unable to make your follow up appointments, please call the number listed and reschedule this appointment.      ____________HELP AT HOME____________________     Experiencing any SIGNS or SYMPTOMS: YOU CAN     Schedule a same day appopintment with your Primary Care Doctor or  you can call [facility] On Call Nurse Care Line for 24/7 assistance at [phone]     If you are experience any signs or symptoms that have become severe, Call 911 and come to your nearest Emergency Room.     Thank you for choosing [facility] and allowing us to care for you.   From your care management team:      You should receive a call from [facility] Discharge Department within 48-72 hours to help manage your care after discharge. Please try to make sure that you answer your phone for this important phone call.    Follow-up Information     Angel Orourke Jr, MD On 9/6/2019.    Specialty:  Family Medicine  Why:  Appointment scheduled for Friday September 6th at 11:15am  Contact information:  [address]  [address]  [facility] [address]  [phone]             Haritha Leong NP On 9/5/2019.    Specialty:  Urology  Why:  Appointment scheduled for Thursday September 5th at 2:30pm  Contact information:  [address]  [address]  [facility] [address]  [phone]

## 2019-08-29 NOTE — HOSPITAL COURSE
Mrs. Palm was placed in observation for COPD exacerbation and mild hyperkalemia.  She received nebulizer treatments and corticosteroids with improvement of her breathing to her chronic baseline.  She also received albuterol, insulin/glucose, and Lasix with improvement of her potassium from 5.6 to 5.3.  Additionally, she complained of dysuria.  Recently treated for UTI with IV antibiotics while hospitalized followed by a p.o. course of Bactrim which she reports she completed fully.  She states that the dysuria has been occurring for years.  Takes Pyridium at home.  Saw Urology over 1 year ago and placed on Myrbetriq which she could not afford, but has not been back.  She remained afebrile without evidence of complicated infectious process.  Leukocytosis likely secondary to corticosteroid use.  Prescriptions for prednisone and doxycycline E scribed to her pharmacy.  Strongly encouraged to follow up with her PCP for repeat lab work to ensure continued resolution of her hyperkalemia and stable renal function.  As well as Urology for further evaluation and treatment of her chronic dysuria.  All findings and plan discussed with patient and her , all questions answered, they verbalize understanding and agreement.  Discharged home in stable condition to follow up with her PCP and Urology.

## 2019-08-29 NOTE — DISCHARGE SUMMARY
[facility] Hospital Medicine  Discharge Summary      Patient Name: Yasmeen Palm  MRN: [id number]  Admission Date: 8/28/2019  Hospital Length of Stay: 0 days  Discharge Date and Time:  08/29/2019 2:30 PM  Attending Physician: Michelle Dumont MD   Discharging Provider: Mau Shultz Jr, NP  Primary Care Provider: Angel Orourke Jr, MD      HPI:   69 y.o. female with COPD, chronic diastolic heart failure, CAD, dm 2, GERD, tobacco use, anemia, chronic respiratory failure with hypoxia, paroxysmal AFib, hypertension, hyperlipidemia presents with complaint of shortness of breath acutely worse than her chronic baseline beginning yesterday evening.  She denies fever, chills, chest pain, palpitations, dizziness, syncope, nausea, vomiting, diarrhea, abdominal pain, bloody black stools, dysuria.  She does report bilateral lower extremity edema and urinary frequency.  Recently discharged on 5 day course of bactrim for MRSA UTI.  In the ED, she was found to be wheezing with tachypnea and labored respirations, labs significant for hyperkalemia, potassium 5.6 with prolonged QT interval on EKG.  She was given nebulizer treatments, IV corticosteroids, as well as insulin and glucose.  Placed in observation for further evaluation treatment.    * No surgery found *      Hospital Course:   Mrs. Palm was placed in observation for COPD exacerbation and mild hyperkalemia.  She received nebulizer treatments and corticosteroids with improvement of her breathing to her chronic baseline.  She also received albuterol, insulin/glucose, and Lasix with improvement of her potassium from 5.6 to 5.3.  Additionally, she complained of dysuria.  Recently treated for UTI with IV antibiotics while hospitalized followed by a p.o. course of Bactrim which she reports she completed fully.  She states that the dysuria has been occurring for years.  Takes Pyridium at home.  Saw Urology over 1 year ago and placed on Myrbetriq which  she could not afford, but has not been back.  She remained afebrile without evidence of complicated infectious process.  Leukocytosis likely secondary to corticosteroid use.  Prescriptions for prednisone and doxycycline E scribed to her pharmacy.  Strongly encouraged to follow up with her PCP for repeat lab work to ensure continued resolution of her hyperkalemia and stable renal function.  As well as Urology for further evaluation and treatment of her chronic dysuria.  All findings and plan discussed with patient and her , all questions answered, they verbalize understanding and agreement.  Discharged home in stable condition to follow up with her PCP and Urology.     Consults:   Consults (From admission, onward)        Status Ordering Provider     IP consult to case management  Once     Provider:  (Not yet assigned)    Acknowledged DEREK WHITE          No new Assessment & Plan notes have been filed under this hospital service since the last note was generated.  Service: Hospital Medicine    Final Active Diagnoses:    Diagnosis Date Noted POA    PRINCIPAL PROBLEM:  COPD exacerbation [J44.1] 08/28/2019 Yes      Problems Resolved During this Admission:    Diagnosis Date Noted Date Resolved POA    Hyperkalemia [E87.5] 07/09/2018 08/29/2019 Yes       Discharged Condition: stable    Disposition: Home or Self Care    Follow Up:  Follow-up Information     Angel Orourke Jr, MD On 9/6/2019.    Specialty:  Family Medicine  Why:  Appointment scheduled for Friday September 6th at 11:15am  Contact information:  [address]  [address]  [facility] [address]  [phone]             Haritha Leong NP On 9/5/2019.    Specialty:  Urology  Why:  Appointment scheduled for Thursday September 5th at 2:30pm  Contact information:  [address]  [address]  [facility] [address]  [phone]                 Patient Instructions:      Diet diabetic     Diet Cardiac     Notify  your health care provider if you experience any of the following:  temperature >100.4     Notify your health care provider if you experience any of the following:  persistent nausea and vomiting or diarrhea     Notify your health care provider if you experience any of the following:  difficulty breathing or increased cough     Notify your health care provider if you experience any of the following:  persistent dizziness, light-headedness, or visual disturbances     Notify your health care provider if you experience any of the following:  increased confusion or weakness     Activity as tolerated       Significant Diagnostic Studies: Labs:   CMP   Recent Labs   Lab 08/28/19  1150 08/29/19  0425    138   K 5.6* 5.3*    102   CO2 30* 27   GLU 86 231*   BUN 16 20   CREATININE 1.1 1.3   CALCIUM 9.9 9.6   PROT 6.7 6.3   ALBUMIN 2.9* 2.7*   BILITOT 0.2 0.1   ALKPHOS 84 79   AST 16 12   ALT 10 10   ANIONGAP 7* 9   ESTGFRAFRICA >60 49*   EGFRNONAA 52* 43*       Pending Diagnostic Studies:     None         Medications:  Reconciled Home Medications:      Medication List      START taking these medications    doxycycline 100 MG tablet  Commonly known as:  VIBRA-TABS  Take 1 tablet (100 mg total) by mouth every 12 (twelve) hours. for 5 days     predniSONE 20 MG tablet  Commonly known as:  DELTASONE  Take 2 tablets (40 mg total) by mouth once daily. for 5 days        CONTINUE taking these medications    * acetaminophen 500 MG tablet  Commonly known as:  TYLENOL  Take 1 tablet (500 mg total) by mouth every 8 (eight) hours as needed.     * acetaminophen 500 MG tablet  Commonly known as:  TYLENOL  Take 500 mg by mouth.     albuterol 1.25 mg/3 mL Nebu  Commonly known as:  ACCUNEB  Inhale 1.25 mg into the lungs.     amiodarone 200 MG Tab  Commonly known as:  PACERONE  Take 1 tablet (200 mg total) by mouth once daily.     * ANORO ELLIPTA INHL  Inhale into the lungs.     * ANORO ELLIPTA 62.5-25 mcg/actuation Dsdv  Generic  drug:  umeclidinium-vilanterol     apixaban 5 mg Tab  Commonly known as:  ELIQUIS  Take 1 tablet (5 mg total) by mouth 2 (two) times daily.     aspirin 81 MG EC tablet  Commonly known as:  ECOTRIN  Take 81 mg by mouth once daily.     diltiaZEM 180 MG 24 hr capsule  Commonly known as:  CARDIZEM CD  Take 1 capsule (180 mg total) by mouth once daily.     docusate sodium 100 MG capsule  Commonly known as:  COLACE  Take 1 capsule (100 mg total) by mouth 2 (two) times daily.     ferrous gluconate 324 MG tablet  Commonly known as:  FERGON  Take 1 tablet (324 mg total) by mouth 3 (three) times daily with meals.     fluticasone propionate 220 mcg/actuation inhaler  Commonly known as:  FLOVENT HFA  Inhale 1 puff into the lungs 2 (two) times daily. Controller     furosemide 40 MG tablet  Commonly known as:  LASIX  Take 40 mg by mouth once daily.     gabapentin 300 MG capsule  Commonly known as:  NEURONTIN  Take 300 mg by mouth.     hydrALAZINE 50 MG tablet  Commonly known as:  APRESOLINE  Take 50 mg by mouth 2 (two) times daily.     * ipratropium-albuterol  mcg/actuation inhaler  Commonly known as:  COMBIVENT  Inhale 1 puff into the lungs every 6 (six) hours as needed for Wheezing or Shortness of Breath. Rescue     * albuterol-ipratropium 2.5 mg-0.5 mg/3 mL nebulizer solution  Commonly known as:  DUO-NEB  Take 3 mLs by nebulization every 6 (six) hours. Rescue     isosorbide mononitrate 30 MG 24 hr tablet  Commonly known as:  IMDUR  Take 30 mg by mouth.     lisinopril 40 MG tablet  Commonly known as:  PRINIVIL,ZESTRIL  Take 1 tablet (40 mg total) by mouth once daily. Do not take this medication if blood pressure is below 130/80 mmHg and/or feeling dizzy after taking all other blood pressure meds     metFORMIN 1000 MG tablet  Commonly known as:  GLUCOPHAGE  Take 1 tablet (1,000 mg total) by mouth 2 (two) times daily with meals.     multivit-min-FA-lycopen-lutein 0.4-300-250 mg-mcg-mcg Tab  Commonly known as:  CENTRUM  SILVER  Take 1 tablet by mouth once daily.     nitroGLYCERIN 0.4 MG SL tablet  Commonly known as:  NITROSTAT     pantoprazole 40 MG tablet  Commonly known as:  PROTONIX  Take 40 mg by mouth once daily.     polyethylene glycol 17 gram Pwpk  Commonly known as:  GLYCOLAX  Take 17 g by mouth 2 (two) times daily.     traMADol 50 mg tablet  Commonly known as:  ULTRAM         * This list has 6 medication(s) that are the same as other medications prescribed for you. Read the directions carefully, and ask your doctor or other care provider to review them with you.            STOP taking these medications    sulfamethoxazole-trimethoprim 800-160mg 800-160 mg Tab  Commonly known as:  BACTRIM DS            Indwelling Lines/Drains at time of discharge:   Lines/Drains/Airways          None          Time spent on the discharge of patient: 40 minutes  Patient was seen and examined on the date of discharge and determined to be suitable for discharge.         Mau Shultz Jr NP  Department of Hospital Medicine  [facility]

## 2019-08-29 NOTE — PLAN OF CARE
Problem: Adult Inpatient Plan of Care  Goal: Plan of Care Review  Outcome: Ongoing (interventions implemented as appropriate)  Pt remains on 2 L/M NC with bipap on standby at the [facility] with no complaints of SOB/wheezing. Continue PRN duoneb tx and bipap as ordered. Will continue to monitor. CAYLA Dash, RRT

## 2019-08-29 NOTE — NURSING
In preparation for discharge, removal of patient's saline lock and heart monitor per policy. Discharge instructions giving to patient. Patient verbalized understanding. No distress noted.

## 2019-08-29 NOTE — NURSING
Patient bedside report has been completed and given to FABY Hudson. Chart check has been completed. NAD noted. Pt on 2 liters O2.

## 2019-08-29 NOTE — H&P
[facility] Hospital Medicine  History & Physical    Patient Name: Yasmeen Palm  MRN: [id number]  Admission Date: 8/28/2019  Attending Physician: Michelle Dumont MD   Primary Care Provider: Angel Orourke Jr, MD         Patient information was obtained from patient, past medical records and ER records.     Subjective:     Principal Problem:COPD exacerbation    Chief Complaint:   Chief Complaint   Patient presents with    Shortness of Breath     SOB begining last night. Pt reports beining recently discharged after MI. EMS reports that on scene pt O2 sat was 90% on RA. she denies any CP at this time        HPI: 69 y.o. female with COPD, chronic diastolic heart failure, CAD, dm 2, GERD, tobacco use, anemia, chronic respiratory failure with hypoxia, paroxysmal AFib, hypertension, hyperlipidemia presents with complaint of shortness of breath acutely worse than her chronic baseline beginning yesterday evening.  She denies fever, chills, chest pain, palpitations, dizziness, syncope, nausea, vomiting, diarrhea, abdominal pain, bloody black stools, dysuria.  She does report bilateral lower extremity edema and urinary frequency.  Recently discharged on 5 day course of bactrim for MRSA UTI.  In the ED, she was found to be wheezing with tachypnea and labored respirations, labs significant for hyperkalemia, potassium 5.6 with prolonged QT interval on EKG.  She was given nebulizer treatments, IV corticosteroids, as well as insulin and glucose.  Placed in observation for further evaluation treatment.    Past Medical History:   Diagnosis Date    Anticoagulant long-term use     Xarelto    Arthritis     Asthma     CHF (congestive heart failure)     COPD (chronic obstructive pulmonary disease)     Coronary artery disease     Deep vein thrombosis (DVT) of left lower extremity     Depression     Diabetes mellitus     GERD (gastroesophageal reflux disease)     Hypertension     MI (myocardial  infarction) 08/2019    Obstructive sleep apnea on CPAP     On home oxygen therapy     Unsteady gait     uses either walker or 4 prong cane       Past Surgical History:   Procedure Laterality Date    CARDIAC CATHETERIZATION  06/2018    CORONARY ANGIOPLASTY WITH STENT PLACEMENT      HERNIA REPAIR      encapsulated umbilical hernia       Review of patient's allergies indicates:  No Known Allergies    No current facility-administered medications on file prior to encounter.      Current Outpatient Medications on File Prior to Encounter   Medication Sig    albuterol (ACCUNEB) 1.25 mg/3 mL Nebu Inhale 1.25 mg into the lungs.    albuterol-ipratropium (DUO-NEB) 2.5 mg-0.5 mg/3 mL nebulizer solution Take 3 mLs by nebulization every 6 (six) hours. Rescue    amiodarone (PACERONE) 200 MG Tab Take 1 tablet (200 mg total) by mouth once daily.    ANORO ELLIPTA 62.5-25 mcg/actuation DsDv     apixaban (ELIQUIS) 5 mg Tab Take 1 tablet (5 mg total) by mouth 2 (two) times daily.    aspirin (ECOTRIN) 81 MG EC tablet Take 81 mg by mouth once daily.    diltiaZEM (CARDIZEM CD) 180 MG 24 hr capsule Take 1 capsule (180 mg total) by mouth once daily.    docusate sodium (COLACE) 100 MG capsule Take 1 capsule (100 mg total) by mouth 2 (two) times daily.    ferrous gluconate (FERGON) 324 MG tablet Take 1 tablet (324 mg total) by mouth 3 (three) times daily with meals.    fluticasone propionate (FLOVENT HFA) 220 mcg/actuation inhaler Inhale 1 puff into the lungs 2 (two) times daily. Controller    furosemide (LASIX) 40 MG tablet Take 40 mg by mouth once daily.     gabapentin (NEURONTIN) 300 MG capsule Take 300 mg by mouth.    hydrALAZINE (APRESOLINE) 50 MG tablet Take 50 mg by mouth 2 (two) times daily.    ipratropium-albuterol (COMBIVENT)  mcg/actuation inhaler Inhale 1 puff into the lungs every 6 (six) hours as needed for Wheezing or Shortness of Breath. Rescue    isosorbide mononitrate (IMDUR) 30 MG 24 hr tablet Take  "30 mg by mouth.    lisinopril (PRINIVIL,ZESTRIL) 40 MG tablet Take 1 tablet (40 mg total) by mouth once daily. Do not take this medication if blood pressure is below 130/80 mmHg and/or feeling dizzy after taking all other blood pressure meds    metFORMIN (GLUCOPHAGE) 1000 MG tablet Take 1 tablet (1,000 mg total) by mouth 2 (two) times daily with meals.    multivit-min-FA-lycopen-lutein (CENTRUM SILVER) 0.4-300-250 mg-mcg-mcg Tab Take 1 tablet by mouth once daily.    pantoprazole (PROTONIX) 40 MG tablet Take 40 mg by mouth once daily.    polyethylene glycol (GLYCOLAX) 17 gram PwPk Take 17 g by mouth 2 (two) times daily.    sulfamethoxazole-trimethoprim 800-160mg (BACTRIM DS) 800-160 mg Tab Take 1 tablet by mouth 2 (two) times daily. for 5 days    traMADol (ULTRAM) 50 mg tablet     umeclidinium brm/vilanterol tr (ANORO ELLIPTA INHL) Inhale into the lungs.    acetaminophen (TYLENOL) 500 MG tablet Take 1 tablet (500 mg total) by mouth every 8 (eight) hours as needed.    acetaminophen (TYLENOL) 500 MG tablet Take 500 mg by mouth.    nitroGLYCERIN (NITROSTAT) 0.4 MG SL tablet      Family History     Problem Relation (Age of Onset)    Diabetes Father    Heart disease Mother    Hypertension Mother        Tobacco Use    Smoking status: Former Smoker     Packs/day: 0.50     Years: 55.00     Pack years: 27.50     Types: Cigarettes     Start date: 1963     Last attempt to quit: 2018     Years since quittin.6    Smokeless tobacco: Never Used    Tobacco comment: "States she quit smoking about 3 or 4 weeks ago"   Substance and Sexual Activity    Alcohol use: Not Currently     Alcohol/week: 0.6 oz     Types: 1 Glasses of wine per week     Frequency: Never     Comment: socially    Drug use: No    Sexual activity: Never     Review of Systems   Constitutional: Negative for chills, fatigue and fever.   Eyes: Negative for photophobia and visual disturbance.   Respiratory: Positive for shortness of breath. " Negative for cough.    Cardiovascular: Positive for leg swelling. Negative for chest pain and palpitations.   Gastrointestinal: Negative for abdominal pain, diarrhea, nausea and vomiting.   Genitourinary: Positive for frequency. Negative for dysuria and urgency.   Skin: Negative for pallor, rash and wound.   Neurological: Negative for light-headedness and headaches.   Psychiatric/Behavioral: Negative for confusion and decreased concentration.     Objective:     Vital Signs (Most Recent):  Temp: 98.2 °F (36.8 °C) (08/28/19 1806)  Pulse: 86 (08/28/19 1806)  Resp: 19 (08/28/19 1806)  BP: (!) 142/62 (08/28/19 1806)  SpO2: (!) 89 % (08/28/19 1806) Vital Signs (24h Range):  Temp:  [98.1 °F (36.7 °C)-98.9 °F (37.2 °C)] 98.2 °F (36.8 °C)  Pulse:  [76-86] 86  Resp:  [18-30] 19  SpO2:  [89 %-100 %] 89 %  BP: (120-142)/(56-77) 142/62     Weight: 93.9 kg (207 lb)  Body mass index is 39.11 kg/m².    Physical Exam   Constitutional: She is oriented to person, place, and time. She appears well-developed and well-nourished. No distress.   HENT:   Head: Normocephalic and atraumatic.   Right Ear: External ear normal.   Left Ear: External ear normal.   Nose: Nose normal.   Mouth/Throat: Oropharynx is clear and moist.   Eyes: Pupils are equal, round, and reactive to light. Conjunctivae and EOM are normal.   Neck: Normal range of motion. Neck supple.   Cardiovascular: Normal rate, regular rhythm and intact distal pulses.   Pulmonary/Chest: Effort normal. No respiratory distress. She has decreased breath sounds. She has wheezes.   Abdominal: Soft. Bowel sounds are normal. She exhibits no distension. There is no tenderness.   No palpable hepatomegaly or splenomegaly    Musculoskeletal: Normal range of motion. She exhibits no edema or tenderness.   Neurological: She is alert and oriented to person, place, and time.   Skin: Skin is warm and dry.   Psychiatric: She has a normal mood and affect. Thought content normal.   Nursing note and  vitals reviewed.        CRANIAL NERVES     CN III, IV, VI   Pupils are equal, round, and reactive to light.  Extraocular motions are normal.        Significant Labs: All pertinent labs within the past 24 hours have been reviewed.    Significant Imaging: I have reviewed all pertinent imaging results/findings within the past 24 hours.    Assessment/Plan:     * COPD exacerbation  COPD exacerbation: severity = severe  is on oxygen at 3 L/min per nasal cannula.  -continue supplemental oxygen  -IV corticosteroids  -scheduled nebs    Hyperkalemia  With prolonged QT interval on EKG  Current Kayexalate shortage  Albuterol, insulin/glucose, and furosemide  Repeat BMP in am        VTE Risk Mitigation (From admission, onward)        Ordered     apixaban tablet 5 mg  2 times daily      08/28/19 1802     IP VTE HIGH RISK PATIENT  Once      08/28/19 1802     Place sequential compression device  Until discontinued      08/28/19 1802     Place TOM hose  Until discontinued      08/28/19 1802        Mau Shultz Jr., APRN, AGACNP-BC  Hospitalist - Department of Hospital Medicine  [facility] [address] [place] [address]  Office #: [phone]; Pager #: [phone]

## 2019-08-31 LAB — BACTERIA UR CULT: NORMAL

## 2019-10-25 NOTE — ED NOTES
Pt moved to room 17 at this time. Report taken from Nathanael RN, care assumed. Pt is lethargic, md at beside speaking with pt's  about plan of care, preparing pt for intubation at this time.   Hydroquinone Counseling:  Patient advised that medication may result in skin irritation, lightening (hypopigmentation), dryness, and burning.  In the event of skin irritation, the patient was advised to reduce the amount of the drug applied or use it less frequently.  Rarely, spots that are treated with hydroquinone can become darker (pseudoochronosis).  Should this occur, patient instructed to stop medication and call the office. The patient verbalized understanding of the proper use and possible adverse effects of hydroquinone.  All of the patient's questions and concerns were addressed.

## 2019-10-29 ENCOUNTER — TELEPHONE (OUTPATIENT)
Dept: HOME HEALTH SERVICES | Facility: HOSPITAL | Age: 69
End: 2019-10-29

## 2019-10-29 NOTE — TELEPHONE ENCOUNTER
Extl [facility] Int Order [facility] with Dr. Angel Orourke. Order # [id number]

## 2019-11-03 ENCOUNTER — HOSPITAL ENCOUNTER (OUTPATIENT)
Facility: HOSPITAL | Age: 69
Discharge: HOME HEALTH | End: 2019-11-05
Attending: EMERGENCY MEDICINE | Admitting: EMERGENCY MEDICINE
Payer: MEDICARE

## 2019-11-03 DIAGNOSIS — R53.1 WEAKNESS: ICD-10-CM

## 2019-11-03 DIAGNOSIS — K21.9 GASTROESOPHAGEAL REFLUX DISEASE WITHOUT ESOPHAGITIS: Chronic | ICD-10-CM

## 2019-11-03 DIAGNOSIS — J44.1 COPD EXACERBATION: ICD-10-CM

## 2019-11-03 DIAGNOSIS — R06.02 SOB (SHORTNESS OF BREATH): ICD-10-CM

## 2019-11-03 DIAGNOSIS — E11.22 CONTROLLED TYPE 2 DIABETES MELLITUS WITH STAGE 4 CHRONIC KIDNEY DISEASE, WITHOUT LONG-TERM CURRENT USE OF INSULIN: Chronic | ICD-10-CM

## 2019-11-03 DIAGNOSIS — I50.9 CHF (CONGESTIVE HEART FAILURE): ICD-10-CM

## 2019-11-03 DIAGNOSIS — I50.9 CHF EXACERBATION: Primary | ICD-10-CM

## 2019-11-03 DIAGNOSIS — I10 ESSENTIAL HYPERTENSION: Chronic | ICD-10-CM

## 2019-11-03 DIAGNOSIS — J44.9 CHRONIC OBSTRUCTIVE PULMONARY DISEASE, UNSPECIFIED COPD TYPE: Chronic | ICD-10-CM

## 2019-11-03 DIAGNOSIS — I50.33 ACUTE ON CHRONIC DIASTOLIC HEART FAILURE: Chronic | ICD-10-CM

## 2019-11-03 DIAGNOSIS — J96.11 CHRONIC RESPIRATORY FAILURE WITH HYPOXIA: Chronic | ICD-10-CM

## 2019-11-03 DIAGNOSIS — G47.30 SLEEP APNEA, UNSPECIFIED TYPE: ICD-10-CM

## 2019-11-03 DIAGNOSIS — N18.4 CONTROLLED TYPE 2 DIABETES MELLITUS WITH STAGE 4 CHRONIC KIDNEY DISEASE, WITHOUT LONG-TERM CURRENT USE OF INSULIN: Chronic | ICD-10-CM

## 2019-11-03 DIAGNOSIS — Z86.711 HISTORY OF PULMONARY EMBOLISM: Chronic | ICD-10-CM

## 2019-11-03 PROCEDURE — 85610 PROTHROMBIN TIME: CPT

## 2019-11-03 PROCEDURE — 83605 ASSAY OF LACTIC ACID: CPT

## 2019-11-03 PROCEDURE — 85730 THROMBOPLASTIN TIME PARTIAL: CPT

## 2019-11-03 PROCEDURE — 99291 CRITICAL CARE FIRST HOUR: CPT | Mod: 25

## 2019-11-03 PROCEDURE — 27000190 HC CPAP FULL FACE MASK W/VALVE

## 2019-11-03 PROCEDURE — 84484 ASSAY OF TROPONIN QUANT: CPT

## 2019-11-03 PROCEDURE — 83880 ASSAY OF NATRIURETIC PEPTIDE: CPT

## 2019-11-03 PROCEDURE — 85025 COMPLETE CBC W/AUTO DIFF WBC: CPT

## 2019-11-03 PROCEDURE — 83735 ASSAY OF MAGNESIUM: CPT

## 2019-11-03 PROCEDURE — 99900035 HC TECH TIME PER 15 MIN (STAT)

## 2019-11-03 PROCEDURE — 94660 CPAP INITIATION&MGMT: CPT

## 2019-11-03 PROCEDURE — 80053 COMPREHEN METABOLIC PANEL: CPT

## 2019-11-03 RX ORDER — IPRATROPIUM BROMIDE 0.5 MG/2.5ML
1 SOLUTION RESPIRATORY (INHALATION)
Status: COMPLETED | OUTPATIENT
Start: 2019-11-04 | End: 2019-11-04

## 2019-11-03 RX ORDER — ALBUTEROL SULFATE 2.5 MG/.5ML
10 SOLUTION RESPIRATORY (INHALATION)
Status: COMPLETED | OUTPATIENT
Start: 2019-11-04 | End: 2019-11-04

## 2019-11-04 PROBLEM — G47.30 SLEEP APNEA: Status: ACTIVE | Noted: 2019-11-04

## 2019-11-04 PROBLEM — R47.01 APHASIA: Status: ACTIVE | Noted: 2019-11-04

## 2019-11-04 PROBLEM — I50.9 CHF EXACERBATION: Status: ACTIVE | Noted: 2019-11-04

## 2019-11-04 LAB
ALBUMIN SERPL BCP-MCNC: 3.1 G/DL (ref 3.5–5.2)
ALBUMIN SERPL BCP-MCNC: 3.1 G/DL (ref 3.5–5.2)
ALBUMIN SERPL BCP-MCNC: 3.2 G/DL (ref 3.5–5.2)
ALP SERPL-CCNC: 80 U/L (ref 55–135)
ALP SERPL-CCNC: 81 U/L (ref 55–135)
ALP SERPL-CCNC: 82 U/L (ref 55–135)
ALT SERPL W/O P-5'-P-CCNC: 10 U/L (ref 10–44)
ALT SERPL W/O P-5'-P-CCNC: 14 U/L (ref 10–44)
ALT SERPL W/O P-5'-P-CCNC: 9 U/L (ref 10–44)
ANION GAP SERPL CALC-SCNC: 10 MMOL/L (ref 8–16)
ANION GAP SERPL CALC-SCNC: 6 MMOL/L (ref 8–16)
ANION GAP SERPL CALC-SCNC: 7 MMOL/L (ref 8–16)
AORTIC ROOT ANNULUS: 3.55 CM
AORTIC VALVE CUSP SEPERATION: 1.91 CM
APTT BLDCRRT: 31.5 SEC (ref 21–32)
ASCENDING AORTA: 3.19 CM
AST SERPL-CCNC: 12 U/L (ref 10–40)
AST SERPL-CCNC: 13 U/L (ref 10–40)
AST SERPL-CCNC: 13 U/L (ref 10–40)
AV INDEX (PROSTH): 0.99
AV MEAN GRADIENT: 5 MMHG
AV PEAK GRADIENT: 9 MMHG
AV VALVE AREA: 2.53 CM2
AV VELOCITY RATIO: 0.89
BACTERIA #/AREA URNS HPF: ABNORMAL /HPF
BASOPHILS # BLD AUTO: 0.02 K/UL (ref 0–0.2)
BASOPHILS # BLD AUTO: 0.06 K/UL (ref 0–0.2)
BASOPHILS # BLD AUTO: 0.06 K/UL (ref 0–0.2)
BASOPHILS NFR BLD: 0.1 % (ref 0–1.9)
BASOPHILS NFR BLD: 0.5 % (ref 0–1.9)
BASOPHILS NFR BLD: 0.5 % (ref 0–1.9)
BILIRUB SERPL-MCNC: 0.3 MG/DL (ref 0.1–1)
BILIRUB SERPL-MCNC: 0.3 MG/DL (ref 0.1–1)
BILIRUB SERPL-MCNC: 0.4 MG/DL (ref 0.1–1)
BILIRUB UR QL STRIP: NEGATIVE
BNP SERPL-MCNC: 959 PG/ML (ref 0–99)
BSA FOR ECHO PROCEDURE: 1.86 M2
BUN SERPL-MCNC: 10 MG/DL (ref 8–23)
BUN SERPL-MCNC: 11 MG/DL (ref 8–23)
BUN SERPL-MCNC: 15 MG/DL (ref 8–23)
CALCIUM SERPL-MCNC: 9.3 MG/DL (ref 8.7–10.5)
CALCIUM SERPL-MCNC: 9.3 MG/DL (ref 8.7–10.5)
CALCIUM SERPL-MCNC: 9.7 MG/DL (ref 8.7–10.5)
CHLORIDE SERPL-SCNC: 96 MMOL/L (ref 95–110)
CHLORIDE SERPL-SCNC: 96 MMOL/L (ref 95–110)
CHLORIDE SERPL-SCNC: 99 MMOL/L (ref 95–110)
CK SERPL-CCNC: 15 U/L (ref 20–180)
CLARITY UR: ABNORMAL
CO2 SERPL-SCNC: 31 MMOL/L (ref 23–29)
CO2 SERPL-SCNC: 33 MMOL/L (ref 23–29)
CO2 SERPL-SCNC: 36 MMOL/L (ref 23–29)
COLOR UR: ABNORMAL
CREAT SERPL-MCNC: 0.7 MG/DL (ref 0.5–1.4)
CREAT SERPL-MCNC: 0.7 MG/DL (ref 0.5–1.4)
CREAT SERPL-MCNC: 0.8 MG/DL (ref 0.5–1.4)
CTP QC/QA: YES
CV ECHO LV RWT: 1.37 CM
DIFFERENTIAL METHOD: ABNORMAL
DOP CALC AO PEAK VEL: 1.52 M/S
DOP CALC AO VTI: 25.17 CM
DOP CALC LVOT AREA: 2.5 CM2
DOP CALC LVOT DIAMETER: 1.8 CM
DOP CALC LVOT PEAK VEL: 1.35 M/S
DOP CALC LVOT STROKE VOLUME: 63.66 CM3
DOP CALCLVOT PEAK VEL VTI: 25.03 CM
E WAVE DECELERATION TIME: 173.6 MSEC
E/A RATIO: 0.59
ECHO LV POSTERIOR WALL: 1.95 CM (ref 0.6–1.1)
EOSINOPHIL # BLD AUTO: 0 K/UL (ref 0–0.5)
EOSINOPHIL # BLD AUTO: 0.1 K/UL (ref 0–0.5)
EOSINOPHIL # BLD AUTO: 0.2 K/UL (ref 0–0.5)
EOSINOPHIL NFR BLD: 0 % (ref 0–8)
EOSINOPHIL NFR BLD: 0.8 % (ref 0–8)
EOSINOPHIL NFR BLD: 1.3 % (ref 0–8)
ERYTHROCYTE [DISTWIDTH] IN BLOOD BY AUTOMATED COUNT: 16.8 % (ref 11.5–14.5)
ERYTHROCYTE [DISTWIDTH] IN BLOOD BY AUTOMATED COUNT: 17 % (ref 11.5–14.5)
ERYTHROCYTE [DISTWIDTH] IN BLOOD BY AUTOMATED COUNT: 17 % (ref 11.5–14.5)
EST. GFR  (AFRICAN AMERICAN): >60 ML/MIN/1.73 M^2
EST. GFR  (NON AFRICAN AMERICAN): >60 ML/MIN/1.73 M^2
ESTIMATED AVG GLUCOSE: 166 MG/DL (ref 68–131)
ESTIMATED AVG GLUCOSE: 166 MG/DL (ref 68–131)
FERRITIN SERPL-MCNC: 78 NG/ML (ref 20–300)
FRACTIONAL SHORTENING: 42 % (ref 28–44)
GLUCOSE SERPL-MCNC: 105 MG/DL (ref 70–110)
GLUCOSE SERPL-MCNC: 126 MG/DL (ref 70–110)
GLUCOSE SERPL-MCNC: 291 MG/DL (ref 70–110)
GLUCOSE UR QL STRIP: NEGATIVE
HBA1C MFR BLD HPLC: 7.4 % (ref 4–5.6)
HBA1C MFR BLD HPLC: 7.4 % (ref 4–5.6)
HCT VFR BLD AUTO: 35.6 % (ref 37–48.5)
HCT VFR BLD AUTO: 35.8 % (ref 37–48.5)
HCT VFR BLD AUTO: 36.1 % (ref 37–48.5)
HGB BLD-MCNC: 10 G/DL (ref 12–16)
HGB BLD-MCNC: 10.2 G/DL (ref 12–16)
HGB BLD-MCNC: 10.2 G/DL (ref 12–16)
HGB UR QL STRIP: NEGATIVE
IMM GRANULOCYTES # BLD AUTO: 0.07 K/UL (ref 0–0.04)
IMM GRANULOCYTES # BLD AUTO: 0.09 K/UL (ref 0–0.04)
IMM GRANULOCYTES # BLD AUTO: 0.11 K/UL (ref 0–0.04)
IMM GRANULOCYTES NFR BLD AUTO: 0.6 % (ref 0–0.5)
IMM GRANULOCYTES NFR BLD AUTO: 0.7 % (ref 0–0.5)
IMM GRANULOCYTES NFR BLD AUTO: 0.7 % (ref 0–0.5)
INR PPP: 1 (ref 0.8–1.2)
INR PPP: 1.1 (ref 0.8–1.2)
INTERVENTRICULAR SEPTUM: 1.85 CM (ref 0.6–1.1)
IRON SERPL-MCNC: 24 UG/DL (ref 30–160)
IVRT: 0.02 MSEC
KETONES UR QL STRIP: NEGATIVE
LA MAJOR: 5.18 CM
LA MINOR: 6.67 CM
LA WIDTH: 3.25 CM
LACTATE SERPL-SCNC: 0.8 MMOL/L (ref 0.5–2.2)
LEFT ATRIUM SIZE: 3.24 CM
LEFT ATRIUM VOLUME INDEX: 29.7 ML/M2
LEFT ATRIUM VOLUME: 52.19 CM3
LEFT INTERNAL DIMENSION IN SYSTOLE: 1.65 CM (ref 2.1–4)
LEFT VENTRICLE DIASTOLIC VOLUME INDEX: 17.45 ML/M2
LEFT VENTRICLE DIASTOLIC VOLUME: 30.7 ML
LEFT VENTRICLE MASS INDEX: 128 G/M2
LEFT VENTRICLE SYSTOLIC VOLUME INDEX: 4.4 ML/M2
LEFT VENTRICLE SYSTOLIC VOLUME: 7.72 ML
LEFT VENTRICULAR INTERNAL DIMENSION IN DIASTOLE: 2.84 CM (ref 3.5–6)
LEFT VENTRICULAR MASS: 225.11 G
LEUKOCYTE ESTERASE UR QL STRIP: ABNORMAL
LYMPHOCYTES # BLD AUTO: 1 K/UL (ref 1–4.8)
LYMPHOCYTES # BLD AUTO: 2.1 K/UL (ref 1–4.8)
LYMPHOCYTES # BLD AUTO: 2.2 K/UL (ref 1–4.8)
LYMPHOCYTES NFR BLD: 17.1 % (ref 18–48)
LYMPHOCYTES NFR BLD: 17.7 % (ref 18–48)
LYMPHOCYTES NFR BLD: 6.9 % (ref 18–48)
MAGNESIUM SERPL-MCNC: 1.5 MG/DL (ref 1.6–2.6)
MCH RBC QN AUTO: 27.8 PG (ref 27–31)
MCH RBC QN AUTO: 27.9 PG (ref 27–31)
MCH RBC QN AUTO: 28 PG (ref 27–31)
MCHC RBC AUTO-ENTMCNC: 28.1 G/DL (ref 32–36)
MCHC RBC AUTO-ENTMCNC: 28.3 G/DL (ref 32–36)
MCHC RBC AUTO-ENTMCNC: 28.5 G/DL (ref 32–36)
MCV RBC AUTO: 100 FL (ref 82–98)
MCV RBC AUTO: 98 FL (ref 82–98)
MCV RBC AUTO: 99 FL (ref 82–98)
MICROSCOPIC COMMENT: ABNORMAL
MONOCYTES # BLD AUTO: 0.1 K/UL (ref 0.3–1)
MONOCYTES # BLD AUTO: 0.5 K/UL (ref 0.3–1)
MONOCYTES # BLD AUTO: 0.9 K/UL (ref 0.3–1)
MONOCYTES NFR BLD: 0.5 % (ref 4–15)
MONOCYTES NFR BLD: 3.9 % (ref 4–15)
MONOCYTES NFR BLD: 7 % (ref 4–15)
MV PEAK A VEL: 1.6 M/S
MV PEAK E VEL: 0.95 M/S
NEUTROPHILS # BLD AUTO: 13.9 K/UL (ref 1.8–7.7)
NEUTROPHILS # BLD AUTO: 9.2 K/UL (ref 1.8–7.7)
NEUTROPHILS # BLD AUTO: 9.3 K/UL (ref 1.8–7.7)
NEUTROPHILS NFR BLD: 72.8 % (ref 38–73)
NEUTROPHILS NFR BLD: 77.1 % (ref 38–73)
NEUTROPHILS NFR BLD: 91.8 % (ref 38–73)
NITRITE UR QL STRIP: NEGATIVE
NRBC BLD-RTO: 0 /100 WBC
PH UR STRIP: 5 [PH] (ref 5–8)
PISA TR MAX VEL: 2.23 M/S
PLATELET # BLD AUTO: 347 K/UL (ref 150–350)
PLATELET # BLD AUTO: 374 K/UL (ref 150–350)
PLATELET # BLD AUTO: ABNORMAL K/UL (ref 150–350)
PMV BLD AUTO: 9.8 FL (ref 9.2–12.9)
PMV BLD AUTO: 9.9 FL (ref 9.2–12.9)
PMV BLD AUTO: ABNORMAL FL (ref 9.2–12.9)
POC MOLECULAR INFLUENZA A AGN: NEGATIVE
POC MOLECULAR INFLUENZA B AGN: NEGATIVE
POCT GLUCOSE: 308 MG/DL (ref 70–110)
POCT GLUCOSE: 364 MG/DL (ref 70–110)
POTASSIUM SERPL-SCNC: 4.3 MMOL/L (ref 3.5–5.1)
POTASSIUM SERPL-SCNC: 4.3 MMOL/L (ref 3.5–5.1)
POTASSIUM SERPL-SCNC: 4.4 MMOL/L (ref 3.5–5.1)
PROCALCITONIN SERPL IA-MCNC: 0.13 NG/ML
PROT SERPL-MCNC: 6.3 G/DL (ref 6–8.4)
PROT SERPL-MCNC: 6.4 G/DL (ref 6–8.4)
PROT SERPL-MCNC: 6.5 G/DL (ref 6–8.4)
PROT UR QL STRIP: NEGATIVE
PROTHROMBIN TIME: 10.9 SEC (ref 9–12.5)
PROTHROMBIN TIME: 11 SEC (ref 9–12.5)
PV PEAK VELOCITY: 0.86 CM/S
RA MAJOR: 4.75 CM
RA PRESSURE: 3 MMHG
RA WIDTH: 2.88 CM
RBC # BLD AUTO: 3.57 M/UL (ref 4–5.4)
RBC # BLD AUTO: 3.66 M/UL (ref 4–5.4)
RBC # BLD AUTO: 3.67 M/UL (ref 4–5.4)
RIGHT VENTRICULAR END-DIASTOLIC DIMENSION: 3.67 CM
RV TISSUE DOPPLER FREE WALL SYSTOLIC VELOCITY 1 (APICAL 4 CHAMBER VIEW): 13.47 CM/S
SATURATED IRON: 8 % (ref 20–50)
SINUS: 3.19 CM
SODIUM SERPL-SCNC: 137 MMOL/L (ref 136–145)
SODIUM SERPL-SCNC: 138 MMOL/L (ref 136–145)
SODIUM SERPL-SCNC: 139 MMOL/L (ref 136–145)
SP GR UR STRIP: 1 (ref 1–1.03)
SQUAMOUS #/AREA URNS HPF: 5 /HPF
STJ: 2.52 CM
T3FREE SERPL-MCNC: 1.7 PG/ML (ref 2.3–4.2)
T4 FREE SERPL-MCNC: 1.04 NG/DL (ref 0.71–1.51)
TOTAL IRON BINDING CAPACITY: 293 UG/DL (ref 250–450)
TR MAX PG: 20 MMHG
TRANSFERRIN SERPL-MCNC: 198 MG/DL (ref 200–375)
TRICUSPID ANNULAR PLANE SYSTOLIC EXCURSION: 1.77 CM
TROPONIN I SERPL DL<=0.01 NG/ML-MCNC: 0.01 NG/ML (ref 0–0.03)
TROPONIN I SERPL DL<=0.01 NG/ML-MCNC: 0.03 NG/ML (ref 0–0.03)
TV REST PULMONARY ARTERY PRESSURE: 23 MMHG
URN SPEC COLLECT METH UR: ABNORMAL
UROBILINOGEN UR STRIP-ACNC: NEGATIVE EU/DL
WBC # BLD AUTO: 12.02 K/UL (ref 3.9–12.7)
WBC # BLD AUTO: 12.63 K/UL (ref 3.9–12.7)
WBC # BLD AUTO: 15.11 K/UL (ref 3.9–12.7)
WBC #/AREA URNS HPF: 5 /HPF (ref 0–5)

## 2019-11-04 PROCEDURE — 93010 ELECTROCARDIOGRAM REPORT: CPT | Mod: ,,, | Performed by: INTERNAL MEDICINE

## 2019-11-04 PROCEDURE — 94761 N-INVAS EAR/PLS OXIMETRY MLT: CPT

## 2019-11-04 PROCEDURE — 80053 COMPREHEN METABOLIC PANEL: CPT

## 2019-11-04 PROCEDURE — 96375 TX/PRO/DX INJ NEW DRUG ADDON: CPT | Mod: 59

## 2019-11-04 PROCEDURE — 80053 COMPREHEN METABOLIC PANEL: CPT | Mod: 91

## 2019-11-04 PROCEDURE — 25000242 PHARM REV CODE 250 ALT 637 W/ HCPCS: Performed by: NURSE PRACTITIONER

## 2019-11-04 PROCEDURE — 25000242 PHARM REV CODE 250 ALT 637 W/ HCPCS: Performed by: EMERGENCY MEDICINE

## 2019-11-04 PROCEDURE — 97165 OT EVAL LOW COMPLEX 30 MIN: CPT

## 2019-11-04 PROCEDURE — 25000242 PHARM REV CODE 250 ALT 637 W/ HCPCS: Performed by: HOSPITALIST

## 2019-11-04 PROCEDURE — 94644 CONT INHLJ TX 1ST HOUR: CPT

## 2019-11-04 PROCEDURE — 94645 CONT INHLJ TX EACH ADDL HOUR: CPT

## 2019-11-04 PROCEDURE — 92610 EVALUATE SWALLOWING FUNCTION: CPT

## 2019-11-04 PROCEDURE — 93005 ELECTROCARDIOGRAM TRACING: CPT

## 2019-11-04 PROCEDURE — 83036 HEMOGLOBIN GLYCOSYLATED A1C: CPT

## 2019-11-04 PROCEDURE — 36415 COLL VENOUS BLD VENIPUNCTURE: CPT

## 2019-11-04 PROCEDURE — 25000003 PHARM REV CODE 250: Performed by: EMERGENCY MEDICINE

## 2019-11-04 PROCEDURE — G0378 HOSPITAL OBSERVATION PER HR: HCPCS

## 2019-11-04 PROCEDURE — 87040 BLOOD CULTURE FOR BACTERIA: CPT | Mod: 59

## 2019-11-04 PROCEDURE — 96366 THER/PROPH/DIAG IV INF ADDON: CPT

## 2019-11-04 PROCEDURE — 83540 ASSAY OF IRON: CPT

## 2019-11-04 PROCEDURE — 84439 ASSAY OF FREE THYROXINE: CPT

## 2019-11-04 PROCEDURE — G0425 PR INPT TELEHEALTH CONSULT 30M: ICD-10-PCS | Mod: GT,,, | Performed by: PSYCHIATRY & NEUROLOGY

## 2019-11-04 PROCEDURE — 96372 THER/PROPH/DIAG INJ SC/IM: CPT | Mod: 59

## 2019-11-04 PROCEDURE — 84484 ASSAY OF TROPONIN QUANT: CPT | Mod: 91

## 2019-11-04 PROCEDURE — 63600175 PHARM REV CODE 636 W HCPCS: Performed by: HOSPITALIST

## 2019-11-04 PROCEDURE — 84145 PROCALCITONIN (PCT): CPT

## 2019-11-04 PROCEDURE — 63600175 PHARM REV CODE 636 W HCPCS: Performed by: EMERGENCY MEDICINE

## 2019-11-04 PROCEDURE — 96365 THER/PROPH/DIAG IV INF INIT: CPT | Mod: 59

## 2019-11-04 PROCEDURE — 82728 ASSAY OF FERRITIN: CPT

## 2019-11-04 PROCEDURE — 25000003 PHARM REV CODE 250: Performed by: PHYSICIAN ASSISTANT

## 2019-11-04 PROCEDURE — 95816 EEG AWAKE AND DROWSY: CPT | Mod: 26,,, | Performed by: PSYCHIATRY & NEUROLOGY

## 2019-11-04 PROCEDURE — 94640 AIRWAY INHALATION TREATMENT: CPT

## 2019-11-04 PROCEDURE — 85025 COMPLETE CBC W/AUTO DIFF WBC: CPT | Mod: 91

## 2019-11-04 PROCEDURE — 93010 EKG 12-LEAD: ICD-10-PCS | Mod: ,,, | Performed by: INTERNAL MEDICINE

## 2019-11-04 PROCEDURE — 87502 INFLUENZA DNA AMP PROBE: CPT

## 2019-11-04 PROCEDURE — 84484 ASSAY OF TROPONIN QUANT: CPT

## 2019-11-04 PROCEDURE — 85610 PROTHROMBIN TIME: CPT

## 2019-11-04 PROCEDURE — 97162 PT EVAL MOD COMPLEX 30 MIN: CPT

## 2019-11-04 PROCEDURE — G0425 INPT/ED TELECONSULT30: HCPCS | Mod: GT,,, | Performed by: PSYCHIATRY & NEUROLOGY

## 2019-11-04 PROCEDURE — 95819 EEG AWAKE AND ASLEEP: CPT

## 2019-11-04 PROCEDURE — 25000003 PHARM REV CODE 250: Performed by: HOSPITALIST

## 2019-11-04 PROCEDURE — 84481 FREE ASSAY (FT-3): CPT

## 2019-11-04 PROCEDURE — 82550 ASSAY OF CK (CPK): CPT

## 2019-11-04 PROCEDURE — 63600175 PHARM REV CODE 636 W HCPCS: Performed by: NURSE PRACTITIONER

## 2019-11-04 PROCEDURE — 25000003 PHARM REV CODE 250: Performed by: NURSE PRACTITIONER

## 2019-11-04 PROCEDURE — 95816 PR EEG,W/AWAKE & DROWSY RECORD: ICD-10-PCS | Mod: 26,,, | Performed by: PSYCHIATRY & NEUROLOGY

## 2019-11-04 PROCEDURE — 96376 TX/PRO/DX INJ SAME DRUG ADON: CPT

## 2019-11-04 PROCEDURE — 27000221 HC OXYGEN, UP TO 24 HOURS

## 2019-11-04 PROCEDURE — 81000 URINALYSIS NONAUTO W/SCOPE: CPT

## 2019-11-04 RX ORDER — FUROSEMIDE 10 MG/ML
40 INJECTION INTRAMUSCULAR; INTRAVENOUS 2 TIMES DAILY
Status: DISCONTINUED | OUTPATIENT
Start: 2019-11-04 | End: 2019-11-05 | Stop reason: HOSPADM

## 2019-11-04 RX ORDER — ALBUTEROL SULFATE 2.5 MG/.5ML
10 SOLUTION RESPIRATORY (INHALATION)
Status: COMPLETED | OUTPATIENT
Start: 2019-11-04 | End: 2019-11-04

## 2019-11-04 RX ORDER — BENZONATATE 100 MG/1
200 CAPSULE ORAL 3 TIMES DAILY
Status: DISCONTINUED | OUTPATIENT
Start: 2019-11-04 | End: 2019-11-05 | Stop reason: HOSPADM

## 2019-11-04 RX ORDER — ASPIRIN 325 MG
325 TABLET, DELAYED RELEASE (ENTERIC COATED) ORAL
Status: DISCONTINUED | OUTPATIENT
Start: 2019-11-04 | End: 2019-11-04

## 2019-11-04 RX ORDER — GUAIFENESIN/DEXTROMETHORPHAN 100-10MG/5
10 SYRUP ORAL EVERY 6 HOURS
Status: DISCONTINUED | OUTPATIENT
Start: 2019-11-04 | End: 2019-11-05 | Stop reason: HOSPADM

## 2019-11-04 RX ORDER — NITROGLYCERIN 0.4 MG/1
0.4 TABLET SUBLINGUAL EVERY 5 MIN PRN
Status: DISCONTINUED | OUTPATIENT
Start: 2019-11-04 | End: 2019-11-05 | Stop reason: HOSPADM

## 2019-11-04 RX ORDER — ASPIRIN 325 MG
325 TABLET, DELAYED RELEASE (ENTERIC COATED) ORAL
Status: COMPLETED | OUTPATIENT
Start: 2019-11-04 | End: 2019-11-04

## 2019-11-04 RX ORDER — BENZONATATE 200 MG/1
200 CAPSULE ORAL 3 TIMES DAILY
Qty: 30 CAPSULE | Refills: 0 | Status: SHIPPED | OUTPATIENT
Start: 2019-11-04 | End: 2019-11-14

## 2019-11-04 RX ORDER — PREDNISONE 20 MG/1
40 TABLET ORAL DAILY
Qty: 10 TABLET | Refills: 0 | Status: SHIPPED | OUTPATIENT
Start: 2019-11-04 | End: 2019-11-09

## 2019-11-04 RX ORDER — AZITHROMYCIN 250 MG/1
500 TABLET, FILM COATED ORAL
Status: COMPLETED | OUTPATIENT
Start: 2019-11-04 | End: 2019-11-04

## 2019-11-04 RX ORDER — METHYLPREDNISOLONE SOD SUCC 125 MG
125 VIAL (EA) INJECTION
Status: COMPLETED | OUTPATIENT
Start: 2019-11-04 | End: 2019-11-04

## 2019-11-04 RX ORDER — LISINOPRIL 20 MG/1
40 TABLET ORAL DAILY
Status: DISCONTINUED | OUTPATIENT
Start: 2019-11-04 | End: 2019-11-05 | Stop reason: HOSPADM

## 2019-11-04 RX ORDER — AMIODARONE HYDROCHLORIDE 200 MG/1
200 TABLET ORAL DAILY
Status: DISCONTINUED | OUTPATIENT
Start: 2019-11-04 | End: 2019-11-05 | Stop reason: HOSPADM

## 2019-11-04 RX ORDER — INSULIN ASPART 100 [IU]/ML
1-10 INJECTION, SOLUTION INTRAVENOUS; SUBCUTANEOUS EVERY 6 HOURS PRN
Status: DISCONTINUED | OUTPATIENT
Start: 2019-11-04 | End: 2019-11-05 | Stop reason: HOSPADM

## 2019-11-04 RX ORDER — ACETAMINOPHEN 500 MG
500 TABLET ORAL EVERY 6 HOURS PRN
Status: DISCONTINUED | OUTPATIENT
Start: 2019-11-04 | End: 2019-11-05 | Stop reason: HOSPADM

## 2019-11-04 RX ORDER — PREGABALIN 75 MG/1
75 CAPSULE ORAL 3 TIMES DAILY
COMMUNITY

## 2019-11-04 RX ORDER — SOTALOL HYDROCHLORIDE 80 MG/1
80 TABLET ORAL 2 TIMES DAILY
Status: DISCONTINUED | OUTPATIENT
Start: 2019-11-04 | End: 2019-11-05 | Stop reason: HOSPADM

## 2019-11-04 RX ORDER — PREDNISONE 5 MG/1
5 TABLET ORAL DAILY
Status: ON HOLD | COMMUNITY
End: 2019-11-04 | Stop reason: HOSPADM

## 2019-11-04 RX ORDER — IPRATROPIUM BROMIDE 0.5 MG/2.5ML
1 SOLUTION RESPIRATORY (INHALATION)
Status: COMPLETED | OUTPATIENT
Start: 2019-11-04 | End: 2019-11-04

## 2019-11-04 RX ORDER — AZITHROMYCIN 250 MG/1
500 TABLET, FILM COATED ORAL DAILY
Status: DISCONTINUED | OUTPATIENT
Start: 2019-11-04 | End: 2019-11-05 | Stop reason: HOSPADM

## 2019-11-04 RX ORDER — ISOSORBIDE MONONITRATE 30 MG/1
30 TABLET, EXTENDED RELEASE ORAL DAILY
Status: DISCONTINUED | OUTPATIENT
Start: 2019-11-04 | End: 2019-11-05 | Stop reason: HOSPADM

## 2019-11-04 RX ORDER — METHYLPREDNISOLONE SOD SUCC 125 MG
80 VIAL (EA) INJECTION EVERY 8 HOURS
Status: DISCONTINUED | OUTPATIENT
Start: 2019-11-04 | End: 2019-11-05 | Stop reason: HOSPADM

## 2019-11-04 RX ORDER — HYDRALAZINE HYDROCHLORIDE 25 MG/1
50 TABLET, FILM COATED ORAL EVERY 8 HOURS
Status: DISCONTINUED | OUTPATIENT
Start: 2019-11-04 | End: 2019-11-05 | Stop reason: HOSPADM

## 2019-11-04 RX ORDER — SODIUM CHLORIDE 0.9 % (FLUSH) 0.9 %
10 SYRINGE (ML) INJECTION
Status: DISCONTINUED | OUTPATIENT
Start: 2019-11-04 | End: 2019-11-05 | Stop reason: HOSPADM

## 2019-11-04 RX ORDER — GLUCAGON 1 MG
1 KIT INJECTION
Status: DISCONTINUED | OUTPATIENT
Start: 2019-11-04 | End: 2019-11-05 | Stop reason: HOSPADM

## 2019-11-04 RX ORDER — IPRATROPIUM BROMIDE AND ALBUTEROL SULFATE 2.5; .5 MG/3ML; MG/3ML
3 SOLUTION RESPIRATORY (INHALATION) EVERY 4 HOURS
Status: DISCONTINUED | OUTPATIENT
Start: 2019-11-04 | End: 2019-11-04

## 2019-11-04 RX ORDER — PREDNISONE 10 MG/1
40 TABLET ORAL DAILY
Qty: 20 TABLET | Refills: 0 | Status: SHIPPED | OUTPATIENT
Start: 2019-11-04 | End: 2019-11-04 | Stop reason: HOSPADM

## 2019-11-04 RX ORDER — AZITHROMYCIN 500 MG/1
250 TABLET, FILM COATED ORAL DAILY
Qty: 2 TABLET | Refills: 0 | Status: SHIPPED | OUTPATIENT
Start: 2019-11-05 | End: 2019-11-09

## 2019-11-04 RX ORDER — PANTOPRAZOLE SODIUM 40 MG/1
40 TABLET, DELAYED RELEASE ORAL DAILY
Status: DISCONTINUED | OUTPATIENT
Start: 2019-11-04 | End: 2019-11-05 | Stop reason: HOSPADM

## 2019-11-04 RX ORDER — IPRATROPIUM BROMIDE AND ALBUTEROL SULFATE 2.5; .5 MG/3ML; MG/3ML
3 SOLUTION RESPIRATORY (INHALATION) EVERY 6 HOURS
Status: DISCONTINUED | OUTPATIENT
Start: 2019-11-04 | End: 2019-11-05 | Stop reason: HOSPADM

## 2019-11-04 RX ORDER — FUROSEMIDE 10 MG/ML
40 INJECTION INTRAMUSCULAR; INTRAVENOUS
Status: COMPLETED | OUTPATIENT
Start: 2019-11-04 | End: 2019-11-04

## 2019-11-04 RX ORDER — AMIODARONE HYDROCHLORIDE 200 MG/1
200 TABLET ORAL DAILY
Qty: 30 TABLET | Refills: 11 | Status: SHIPPED | OUTPATIENT
Start: 2019-11-04 | End: 2020-11-03

## 2019-11-04 RX ORDER — DILTIAZEM HYDROCHLORIDE 180 MG/1
180 CAPSULE, COATED, EXTENDED RELEASE ORAL DAILY
Status: DISCONTINUED | OUTPATIENT
Start: 2019-11-04 | End: 2019-11-05 | Stop reason: HOSPADM

## 2019-11-04 RX ORDER — ONDANSETRON 2 MG/ML
8 INJECTION INTRAMUSCULAR; INTRAVENOUS EVERY 8 HOURS PRN
Status: DISCONTINUED | OUTPATIENT
Start: 2019-11-04 | End: 2019-11-05 | Stop reason: HOSPADM

## 2019-11-04 RX ORDER — IPRATROPIUM BROMIDE AND ALBUTEROL SULFATE 2.5; .5 MG/3ML; MG/3ML
3 SOLUTION RESPIRATORY (INHALATION) EVERY 6 HOURS
Status: DISCONTINUED | OUTPATIENT
Start: 2019-11-04 | End: 2019-11-04

## 2019-11-04 RX ORDER — SOTALOL HYDROCHLORIDE 120 MG/1
80 TABLET ORAL 2 TIMES DAILY
Status: ON HOLD | COMMUNITY
End: 2020-03-13 | Stop reason: HOSPADM

## 2019-11-04 RX ORDER — ASPIRIN 81 MG/1
81 TABLET ORAL DAILY
Status: DISCONTINUED | OUTPATIENT
Start: 2019-11-04 | End: 2019-11-05 | Stop reason: HOSPADM

## 2019-11-04 RX ADMIN — ALBUTEROL SULFATE 10 MG: 2.5 SOLUTION RESPIRATORY (INHALATION) at 02:11

## 2019-11-04 RX ADMIN — ISOSORBIDE MONONITRATE 30 MG: 30 TABLET, EXTENDED RELEASE ORAL at 09:11

## 2019-11-04 RX ADMIN — HYDRALAZINE HYDROCHLORIDE 50 MG: 25 TABLET, FILM COATED ORAL at 05:11

## 2019-11-04 RX ADMIN — METHYLPREDNISOLONE SODIUM SUCCINATE 80 MG: 125 INJECTION, POWDER, FOR SOLUTION INTRAMUSCULAR; INTRAVENOUS at 05:11

## 2019-11-04 RX ADMIN — BENZONATATE 200 MG: 100 CAPSULE ORAL at 09:11

## 2019-11-04 RX ADMIN — IPRATROPIUM BROMIDE AND ALBUTEROL SULFATE 3 ML: .5; 3 SOLUTION RESPIRATORY (INHALATION) at 08:11

## 2019-11-04 RX ADMIN — AZITHROMYCIN MONOHYDRATE 500 MG: 250 TABLET ORAL at 09:11

## 2019-11-04 RX ADMIN — DILTIAZEM HYDROCHLORIDE 180 MG: 180 CAPSULE, COATED, EXTENDED RELEASE ORAL at 09:11

## 2019-11-04 RX ADMIN — ASPIRIN 81 MG: 81 TABLET, COATED ORAL at 09:11

## 2019-11-04 RX ADMIN — METHYLPREDNISOLONE SODIUM SUCCINATE 80 MG: 125 INJECTION, POWDER, FOR SOLUTION INTRAMUSCULAR; INTRAVENOUS at 04:11

## 2019-11-04 RX ADMIN — BENZONATATE 200 MG: 100 CAPSULE ORAL at 05:11

## 2019-11-04 RX ADMIN — PANTOPRAZOLE SODIUM 40 MG: 40 TABLET, DELAYED RELEASE ORAL at 09:11

## 2019-11-04 RX ADMIN — INSULIN ASPART 5 UNITS: 100 INJECTION, SOLUTION INTRAVENOUS; SUBCUTANEOUS at 11:11

## 2019-11-04 RX ADMIN — METHYLPREDNISOLONE SODIUM SUCCINATE 125 MG: 125 INJECTION, POWDER, FOR SOLUTION INTRAMUSCULAR; INTRAVENOUS at 04:11

## 2019-11-04 RX ADMIN — ALBUTEROL SULFATE 10 MG: 2.5 SOLUTION RESPIRATORY (INHALATION) at 12:11

## 2019-11-04 RX ADMIN — APIXABAN 5 MG: 5 TABLET, FILM COATED ORAL at 08:11

## 2019-11-04 RX ADMIN — LISINOPRIL 40 MG: 20 TABLET ORAL at 09:11

## 2019-11-04 RX ADMIN — IPRATROPIUM BROMIDE AND ALBUTEROL SULFATE 3 ML: .5; 3 SOLUTION RESPIRATORY (INHALATION) at 01:11

## 2019-11-04 RX ADMIN — AMIODARONE HYDROCHLORIDE 200 MG: 200 TABLET ORAL at 09:11

## 2019-11-04 RX ADMIN — FUROSEMIDE 40 MG: 10 INJECTION, SOLUTION INTRAVENOUS at 01:11

## 2019-11-04 RX ADMIN — FUROSEMIDE 40 MG: 10 INJECTION, SOLUTION INTRAVENOUS at 09:11

## 2019-11-04 RX ADMIN — IPRATROPIUM BROMIDE 1 MG: 0.5 SOLUTION RESPIRATORY (INHALATION) at 02:11

## 2019-11-04 RX ADMIN — IPRATROPIUM BROMIDE AND ALBUTEROL SULFATE 3 ML: .5; 3 SOLUTION RESPIRATORY (INHALATION) at 07:11

## 2019-11-04 RX ADMIN — IPRATROPIUM BROMIDE 1 MG: 0.5 SOLUTION RESPIRATORY (INHALATION) at 12:11

## 2019-11-04 RX ADMIN — AZITHROMYCIN MONOHYDRATE 500 MG: 250 TABLET ORAL at 04:11

## 2019-11-04 RX ADMIN — ASPIRIN 325 MG: 325 TABLET, DELAYED RELEASE ORAL at 01:11

## 2019-11-04 RX ADMIN — ACETAMINOPHEN 500 MG: 500 TABLET ORAL at 08:11

## 2019-11-04 RX ADMIN — MAGNESIUM SULFATE HEPTAHYDRATE 3 G: 500 INJECTION, SOLUTION INTRAMUSCULAR; INTRAVENOUS at 06:11

## 2019-11-04 RX ADMIN — BENZONATATE 200 MG: 100 CAPSULE ORAL at 06:11

## 2019-11-04 RX ADMIN — FUROSEMIDE 40 MG: 10 INJECTION, SOLUTION INTRAVENOUS at 06:11

## 2019-11-04 NOTE — ASSESSMENT & PLAN NOTE
Lungs are diminished on exam. No further wheezing after steroid. Continue steroid and duoneb and supplemental oxygen to keep O2 88-92%.

## 2019-11-04 NOTE — PLAN OF CARE
11/04/19 1000   Discharge Assessment   Assessment Type Discharge Planning Assessment   Assessment information obtained from? Medical Record   Prior to hospitilization cognitive status: Alert/Oriented   Prior to hospitalization functional status: Independent;Assistive Equipment   Current cognitive status: Alert/Oriented   Current Functional Status: Independent;Assistive Equipment   Facility Arrived From: home   Lives With spouse;other (see comments)   Able to Return to Prior Arrangements yes   Is patient able to care for self after discharge? Yes   Who are your caregiver(s) and their phone number(s)? [place] [phone]   Patient's perception of discharge disposition home or selfcare;home health   Readmission Within the Last 30 Days other (see comments)   Patient currently being followed by outpatient case management? No   Patient currently receives any other outside agency services? No   Equipment Currently Used at Home bedside commode;shower chair;rollator;walker, rolling;wheelchair;oxygen;CPAP   Do you have any problems affording any of your prescribed medications? No   Is the patient taking medications as prescribed? yes   Does the patient receive services at the [facility]? No   Discharge Plan A Home with family;Home Health  (with follow up )   DME Needed Upon Discharge  none   Patient/Family in Agreement with Plan yes     [id number] - [place] - [address]  [address] [address]  Phone: [phone] Fax: [phone]

## 2019-11-04 NOTE — NURSING
Patient transported to 2D echo via wheelchair with 2L N/C in use. Will monitor upon return to unit.

## 2019-11-04 NOTE — ASSESSMENT & PLAN NOTE
IV lasix started on admit. COPD exacerbation contributing to symptoms.   Elevated troponin flat pattern and EKG NSR   Lung exam diminished and no edema  2 D echo pending. Continue IV lasix for now.

## 2019-11-04 NOTE — ED TRIAGE NOTES
Pt presents to ED via EMS with c/o SOB since 1600 today. She reports no relief with at home neb tx. Pt reports hx of CHF and COPD, pt at 92% on home O2, 3L, placed on CPAP in route to ED with EMS. Pt was given duoneb while in route to ED but was turned off before treatment finished. EMS report initial wheezing on scene that resolved. Pt tachynpeic at this time. Mild distress is noted. Will continue to be monitored.

## 2019-11-04 NOTE — SUBJECTIVE & OBJECTIVE
Interval History: See hospital course. Currently patient confuse and aphasic which is new. Patient was seen early this am AAO x 4.     Review of Systems   Unable to perform ROS: Mental status change     Objective:     Vital Signs (Most Recent):  Temp: 97 °F (36.1 °C) (11/04/19 1141)  Pulse: 103 (11/04/19 1141)  Resp: (!) 22 (11/04/19 1141)  BP: (!) 192/94 (11/04/19 1141)  SpO2: 95 % (11/04/19 1141) Vital Signs (24h Range):  Temp:  [97 °F (36.1 °C)-98.9 °F (37.2 °C)] 97 °F (36.1 °C)  Pulse:  [] 103  Resp:  [15-26] 22  SpO2:  [94 %-100 %] 95 %  BP: (117-192)/(58-94) 192/94     Weight: 76.7 kg (169 lb 1.5 oz)  Body mass index is 31.95 kg/m².    Intake/Output Summary (Last 24 hours) at 11/4/2019 1156  Last data filed at 11/4/2019 0800  Gross per 24 hour   Intake 360 ml   Output 150 ml   Net 210 ml      Physical Exam   Constitutional: She appears well-developed and well-nourished.   Aphasic and confuse to person, place time, situation   HENT:   Head: Atraumatic.   Eyes:   Starring and not following commands   Cardiovascular: Normal rate and regular rhythm.   Pulmonary/Chest: Effort normal and breath sounds normal.   95% on home 2 L NC.    Abdominal: Soft.   Musculoskeletal: She exhibits no edema.   Neurological: She is alert.   Skin: Skin is warm.   Psychiatric: She has a normal mood and affect.       Significant Labs: All pertinent labs within the past 24 hours have been reviewed.    Significant Imaging: I have reviewed and interpreted all pertinent imaging results/findings within the past 24 hours.

## 2019-11-04 NOTE — ASSESSMENT & PLAN NOTE
This new onset. . . Stroke code in progress. For CT head now and then ICU for stroke tele consult  Will follow up CT and stroke tele recommendations. ASA/on Eliquis/start statin  2 D echo pending  SP/PT/OT consult

## 2019-11-04 NOTE — PT/OT/SLP EVAL
"Occupational Therapy   Evaluation and Discharge Note    Name: aYsmeen Palm  MRN: [id number]  Admitting Diagnosis:  Acute on chronic diastolic heart failure      Recommendations:     Discharge Recommendations: home health OT  Discharge Equipment Recommendations:  None  Barriers to discharge:  Decreased caregiver support    Assessment:     Yasmeen Palm is a 69 y.o. female with a medical diagnosis of Acute on chronic diastolic heart failure. At this time, patient is functioning at their prior level of function and does not require further acute OT services.     Plan:     During this hospitalization, patient does not require further acute OT services.  Please re-consult if situation changes.    · Plan of Care Reviewed with: patient    Subjective     Chief Complaint: "I can't breathe very well, and I'm dizzy" patient stated when seated EOB  Patient/Family Comments/goals: to feel better    Occupational Profile:  Living Environment: lives in a house with her  in a spouse, no concerns  Previous level of function: modified independent with her rollator and using the walls for balance  Roles and Routines: performs her own self care,  recently had surgery  Equipment Used at home:  bedside commode, shower chair, rollator, walker, rolling, wheelchair, oxygen  Assistance upon Discharge: unable to verbalize however stated that when she falls at home her nephew picks her up off the floor, her nephew and sister provide transportation to appointments as needed    Pain/Comfort:  · Pain Rating 1: other (see comments)(Patient noted pain with palpation of L LE secondary to edema)    Patients cultural, spiritual, Sabianist conflicts given the current situation: no(Judaism)    Objective:     Communicated with: nurse Parker prior to session.  Patient found supine with peripheral IV, telemetry, oxygen upon OT entry to room.    General Precautions: Standard, fall, respiratory   Orthopedic Precautions:N/A   Braces: N/A "     Occupational Performance:    Bed Mobility:    · Patient completed Rolling/Turning to Left with  stand by assistance  · Patient completed Rolling/Turning to Right with stand by assistance  · Patient completed Scooting/Bridging with stand by assistance  · Patient completed Supine to Sit with stand by assistance  · Patient completed Sit to Supine with contact guard assistance    Functional Mobility/Transfers:  · Patient completed Sit <> Stand Transfer with minimum assistance  with  rolling walker   · Functional Mobility: ambulated with PT using a RW and O2 2L    Activities of Daily Living:  · Feeding:  independence bed level with HOB elevated  · Grooming: modified independence wiped her face while in supported sit   · Upper Body Dressing: minimum assistance seated EOB to merly back gown    Cognitive/Visual Perceptual:  Cognitive/Psychosocial Skills:     -       Oriented to: Person, Place and Situation   -       Follows Commands/attention:Easily distracted and Follows one-step commands  -       Communication: clear/fluent  -       Memory: No Deficits noted  -       Safety awareness/insight to disability: impaired   -       Mood/Affect/Coping skills/emotional control: Appropriate to situation    Physical Exam:  Balance:    -       sit balance good; standing balance fair plus  Postural examination/scapula alignment:    -       Rounded shoulders  -       Forward head  Skin integrity: Visible skin intact  Upper Extremity Range of Motion:     -       Right Upper Extremity: WFL  -       Left Upper Extremity: WFL  Upper Extremity Strength:    -       Right Upper Extremity: WFL  -       Left Upper Extremity: WFL    AMPAC 6 Click ADL:  AMPAC Total Score: 20    Treatment & Education:  Evaluation; Patient requested that BSC be placed at the foot of the bed so that she could walk to it while holding on to the bed,  Educated patient on the need to call for assistance when she needed to use the BSC, educated that the bed alarm was  on and would ring if she got up alone.  Reinforced the need to call for assistance for any OOB activity.  Education:    Patient left supine with HOB elevated with all lines intact, call button in reach and nurse Elena notified    GOALS:   Multidisciplinary Problems     Occupational Therapy Goals     Not on file          Multidisciplinary Problems (Resolved)        Problem: Occupational Therapy Goal    Goal Priority Disciplines Outcome Interventions   Occupational Therapy Goal   (Resolved)     OT, PT/OT Met                    History:     Past Medical History:   Diagnosis Date    Anticoagulant long-term use     Xarelto    Arthritis     Asthma     CHF (congestive heart failure)     COPD (chronic obstructive pulmonary disease)     Coronary artery disease     Deep vein thrombosis (DVT) of left lower extremity     Depression     Diabetes mellitus     GERD (gastroesophageal reflux disease)     Hypertension     MI (myocardial infarction) 08/2019    Obstructive sleep apnea on CPAP     On home oxygen therapy     Unsteady gait     uses either walker or 4 prong cane       Past Surgical History:   Procedure Laterality Date    CARDIAC CATHETERIZATION  06/2018    CORONARY ANGIOPLASTY WITH STENT PLACEMENT      HERNIA REPAIR      encapsulated umbilical hernia       Time Tracking:     OT Date of Treatment: 11/04/19  OT Start Time: 0952  OT Stop Time: 1008  OT Total Time (min): 16 min    Billable Minutes:Evaluation 16 minutes with PT    HEDY Wiley MS  11/4/2019      Patient with change in status, potential seizure vs stroke.Will reassess patient self care abilities in am and adjust treatment plan accordingly. HEDY Wiley, MS

## 2019-11-04 NOTE — PT/OT/SLP EVAL
Speech Language Pathology Evaluation  Bedside Swallow    Patient Name:  Yasmeen Palm   MRN:  [id number]  Admitting Diagnosis: Acute on chronic diastolic heart failure    Recommendations:                 General Recommendations:  Cognitive-linguistic evaluation pending MRI  Diet recommendations:  Regular, Thin   Aspiration Precautions: 1 bite/sip at a time   General Precautions: Standard,    Communication strategies:  none    History:   MD HPI: Yasmeen Palm is a 69 y.o. female that (in part)  has a past medical history of Anticoagulant long-term use, Arthritis, Asthma, CHF (congestive heart failure), COPD (chronic obstructive pulmonary disease), Coronary artery disease, Deep vein thrombosis (DVT) of left lower extremity, Depression, Diabetes mellitus, GERD (gastroesophageal reflux disease), Hypertension, MI (myocardial infarction), Obstructive sleep apnea on CPAP, On home oxygen therapy, and Unsteady gait.  has a past surgical history that includes Coronary angioplasty with stent; Hernia repair; and Cardiac catheterization (06/2018). Presents to [facility] complaining of shortness of breath.  This is been a chronic problem for her however she has had subacute progressive worsening for the last 3 days.  Associated with minimally productive cough.Denies fever or chills.  Complains she has shortness of breath at rest as well as dyspnea with exertion.  Mild intermittent chest pain the last episode approximately 6:00 p.m. this evening.  No chest pain at this  time.  Positive for unintentional weight gain.  Previous history of simultaneous CHF exacerbation and COPD.  Reports compliance with home medication regimen.  Some mild relief with supplemental oxygen given in the ED.  She is on chronic anticoagulation for atrial fibrillation and history of pulmonary embolism and DVT.  Also complaining of peripheral edema in her lower extremities.  Denies syncope, cyanosis, or  palpitations. No hemoptysis, stridor, or night sweats. No recent travel or sick contacts.     In the emergency department routine laboratory studies, chest x-ray, cardiac enzymes, an EKG was obtained.  History and findings are consistent with CHF exacerbation and exam consistent with COPD, so she likely has an acute exacerbation of both chronic conditions.  She was given Lasix for the CHF and started on BiPAP for both CHF and COPD along with nebulizer treatments and steroids.  BiPAP was weaned off.      Hospital medicine has been asked to admit for further evaluation and treatment.   Past Medical History:   Diagnosis Date    Anticoagulant long-term use     Xarelto    Arthritis     Asthma     CHF (congestive heart failure)     COPD (chronic obstructive pulmonary disease)     Coronary artery disease     Deep vein thrombosis (DVT) of left lower extremity     Depression     Diabetes mellitus     GERD (gastroesophageal reflux disease)     Hypertension     MI (myocardial infarction) 08/2019    Obstructive sleep apnea on CPAP     On home oxygen therapy     Unsteady gait     uses either walker or 4 prong cane       Past Surgical History:   Procedure Laterality Date    CARDIAC CATHETERIZATION  06/2018    CORONARY ANGIOPLASTY WITH STENT PLACEMENT      HERNIA REPAIR      encapsulated umbilical hernia     Modified Barium Swallow: n/a    Chest X-Rays: 11/4 no acute process    Prior diet: unrestricted     Subjective   Pt seen after code stroke was called upon return from CT which was negative, MRI pending. She was able to give ST detailed instructions to locate a comb she wanted out of her bag. She participated in conversation without significant difficulty and recalled general information.   Patient goals: reinitiate po    Pain/Comfort:  · Pain Rating 1: 0/10    Objective:     Oral Musculature Evaluation  · Dentition: present and adequate  · Secretion Management: adequate  · Mucosal Quality: good  · Mandibular  Strength and Mobility: WFL  · Oral Labial Strength and Mobility: WFL  · Lingual Strength and Mobility: WFL  · Velar Elevation: WFL  · Buccal Strength and Mobility: WFL  · Voice Prior to PO Intake: wfl  · Oral Musculature Comments: wfl    Bedside Swallow Eval:   Consistencies Assessed:  · Thin liquids 5+ via straw   · Solids X1 cracker     Oral Phase:   · swallow delay- thin liquids  · WFL    Pharyngeal Phase:   · no overt clinical signs/symptoms of aspiration    Compensatory Strategies  · None    Treatment: no overt s/s of aspiration, please note silent aspiration cannot be r/o at bedside.     Assessment:     Yasmeen Palm is a 69 y.o. female with dx of possible CVA she presents with functional swallow at reported baseline level of function.     Goals:   Multidisciplinary Problems     SLP Goals        Problem: SLP Goal    Goal Priority Disciplines Outcome   SLP Goal    Low SLP Ongoing, Progressing   Description:  STG  1. Pt will tolerate regular with thin liquids without overt s/s of aspiration.   2. Cognitive linguistic eval pending MRI                    Plan:     · Patient to be seen:  2 x/week   · Plan of Care expires:     · Plan of Care reviewed with:  patient   · SLP Follow-Up:  Yes(pending MRI)       Discharge recommendations:  other (see comments)(TBD)   Barriers to Discharge:  None    Time Tracking:     SLP Treatment Date:   11/04/19  Speech Start Time:  1355  Speech Stop Time:  1410     Speech Total Time (min):  15 min    Billable Minutes: Eval Swallow and Oral Function 15    Cindy Woodward CCC-SLP  11/04/2019

## 2019-11-04 NOTE — PLAN OF CARE
"   11/04/19 1130   Final Note   Assessment Type Final Discharge Note   Anticipated Discharge Disposition [facility]   Hospital Follow Up  Appt(s) scheduled? Yes   Discharge plans and expectations educations in teach back method with documentation complete? Yes   Right Care Referral Info   Post Acute Recommendation Home-care   Referral Type home [facility]    Facility Name [facility]    pts nurse Elena notified that pt can d/c from CM standpoint.    EDUCATION:   provided with educational information on COPD exacerbation.  Information reviewed and placed in :My Healthcare Packet" to be brought home  to use as resource after discharge.  Information included:  signs and symptoms to look for and call the doctor if experiencing, and symptoms that may indicate a medical emergency: CALL 911.      All questions answered.  Teach back method used.    Patient stated, " go to scheduled appointments and take medications as prescribed  ".        "

## 2019-11-04 NOTE — PLAN OF CARE
[facility] HOME HEALTH ORDERS  FACE TO FACE ENCOUNTER    Patient Name: Yasmeen Palm  YOB: 1950    PCP: Angel Orourke Jr, MD   PCP Address: [address] [facility]*  PCP Phone Number: [phone]  PCP Fax: [phone]    Encounter Date: 11/04/2019    Admit to Home Health    Diagnoses:  Active Hospital Problems    Diagnosis  POA    *Acute on chronic diastolic heart failure [I50.33]  Yes     Chronic    Essential hypertension [I10]  Yes     Chronic    COPD (chronic obstructive pulmonary disease) [J44.9]  Yes     Chronic    Hyperlipidemia [E78.5]  Yes     Chronic    Paroxysmal atrial fibrillation [I48.0]  Yes     Chronic    History of pulmonary embolism [Z86.711]  Yes     Chronic    History of deep vein thrombosis [Z86.718]  Not Applicable     Chronic    Chronic anticoagulation [Z79.01]  Not Applicable     Chronic    Chronic respiratory failure with hypoxia [J96.11]  Yes     Chronic    Anemia of chronic disease [D63.8]  Yes     Chronic    Controlled type 2 diabetes mellitus, without long-term current use of insulin [E11.9]  Yes     Chronic    Tobacco abuse [Z72.0]  Yes     Chronic    Gastroesophageal reflux disease without esophagitis [K21.9]  Yes     Chronic      Resolved Hospital Problems   No resolved problems to display.       No future appointments.        I have seen and examined this patient face to face today. My clinical findings that support the need for the home health skilled services and home bound status are the following:  Weakness/numbness causing balance and gait disturbance due to COPD Exacerbation making it taxing to leave home.  Requiring assistive device to leave home due to unsteady gait caused by  COPD Exacerbation.    Allergies:Review of patient's allergies indicates:  No Known Allergies    Diet: cardiac diet and diabetic diet: 2000 calorie    Activities: activity as tolerated    Nursing:   SN to  complete comprehensive assessment including routine vital signs. Instruct on disease process and s/s of complications to report to MD. Review/verify medication list sent home with the patient at time of discharge  and instruct patient/caregiver as needed. Frequency may be adjusted depending on start of care date.    Notify MD if SBP > 160 or < 90; DBP > 90 or < 50; HR > 120 or < 50; Temp > 101; Other:         CONSULTS:    Physical Therapy to evaluate and treat. Evaluate for home safety and equipment needs; Establish/upgrade home exercise program. Perform / instruct on therapeutic exercises, gait training, transfer training, and Range of Motion.  Occupational Therapy to evaluate and treat. Evaluate home environment for safety and equipment needs. Perform/Instruct on transfers, ADL training, ROM, and therapeutic exercises.  Aide to provide assistance with personal care, ADLs, and vital signs.    MISCELLANEOUS CARE:  COPD: Teach patient MDI/HFA usage and guidelines  Please provide smoking education, cessation, and nicotine replacement options if patient is a current smoker or if anyone in the household is a smoker  Please ensure that patient has a pulse oximeter and is educated on his normal oxygen saturations: 88-92%  Please ensure patient has a functioning nebulizer and provide education on its usage.  If patient has increased cough or symptoms, please initiate COPD protocol including  please call PCP for further directions   WOUND CARE ORDERS  n/a      Medications: Review discharge medications with patient and family and provide education.      Current Discharge Medication List      START taking these medications    Details   azithromycin (ZITHROMAX) 500 MG tablet Take 0.5 tablets (250 mg total) by mouth once daily. for 4 days  Qty: 2 tablet, Refills: 0      benzonatate (TESSALON) 200 MG capsule Take 1 capsule (200 mg total) by mouth 3 (three) times daily. for 10 days  Qty: 30 capsule, Refills: 0         CONTINUE  these medications which have CHANGED    Details   amiodarone (PACERONE) 200 MG Tab Take 1 tablet (200 mg total) by mouth once daily.  Qty: 30 tablet, Refills: 11      predniSONE (DELTASONE) 20 MG tablet Take 2 tablets (40 mg total) by mouth once daily. for 5 days  Qty: 10 tablet, Refills: 0         CONTINUE these medications which have NOT CHANGED    Details   apixaban (ELIQUIS) 5 mg Tab Take 5 mg by mouth 2 (two) times daily.      aspirin (ECOTRIN) 81 MG EC tablet Take 81 mg by mouth once daily.      diltiaZEM (CARDIZEM CD) 180 MG 24 hr capsule Take 1 capsule (180 mg total) by mouth once daily.  Qty: 30 capsule, Refills: 0      docusate sodium (COLACE) 100 MG capsule Take 1 capsule (100 mg total) by mouth 2 (two) times daily.  Refills: 0      furosemide (LASIX) 40 MG tablet Take 40 mg by mouth once daily.       hydrALAZINE (APRESOLINE) 50 MG tablet Take 50 mg by mouth every 8 (eight) hours.       lisinopril (PRINIVIL,ZESTRIL) 40 MG tablet Take 1 tablet (40 mg total) by mouth once daily. Do not take this medication if blood pressure is below 130/80 mmHg and/or feeling dizzy after taking all other blood pressure meds      metFORMIN (GLUCOPHAGE) 1000 MG tablet Take 1 tablet (1,000 mg total) by mouth 2 (two) times daily with meals.  Qty: 180 tablet, Refills: 3      multivit-min-FA-lycopen-lutein (CENTRUM SILVER) 0.4-300-250 mg-mcg-mcg Tab Take 1 tablet by mouth once daily.  Qty: 60 tablet, Refills: 1      pantoprazole (PROTONIX) 40 MG tablet Take 40 mg by mouth once daily.      pregabalin (LYRICA) 75 MG capsule Take 75 mg by mouth 3 (three) times daily.      sotalol (BETAPACE) 120 MG Tab Take 80 mg by mouth 2 (two) times daily.      !! acetaminophen (TYLENOL) 500 MG tablet Take 1 tablet (500 mg total) by mouth every 8 (eight) hours as needed.  Refills: 0      !! acetaminophen (TYLENOL) 500 MG tablet Take 500 mg by mouth.      albuterol (ACCUNEB) 1.25 mg/3 mL Nebu Inhale 1.25 mg into the lungs.       albuterol-ipratropium (DUO-NEB) 2.5 mg-0.5 mg/3 mL nebulizer solution Take 3 mLs by nebulization every 6 (six) hours. Rescue  Qty: 1 Box, Refills: 0      ferrous gluconate (FERGON) 324 MG tablet Take 1 tablet (324 mg total) by mouth 3 (three) times daily with meals.  Qty: 60 tablet, Refills: 3      fluticasone propionate (FLOVENT HFA) 220 mcg/actuation inhaler Inhale 1 puff into the lungs 2 (two) times daily. Controller  Qty: 12 g, Refills: 0      gabapentin (NEURONTIN) 300 MG capsule Take 300 mg by mouth.      ipratropium-albuterol (COMBIVENT)  mcg/actuation inhaler Inhale 1 puff into the lungs every 6 (six) hours as needed for Wheezing or Shortness of Breath. Rescue  Qty: 1 Package, Refills: 0      isosorbide mononitrate (IMDUR) 30 MG 24 hr tablet Take 30 mg by mouth.      nitroGLYCERIN (NITROSTAT) 0.4 MG SL tablet       polyethylene glycol (GLYCOLAX) 17 gram PwPk Take 17 g by mouth 2 (two) times daily.  Refills: 0      traMADol (ULTRAM) 50 mg tablet Refills: 0      umeclidinium brm/vilanterol tr (ANORO ELLIPTA INHL) Inhale into the lungs.       !! - Potential duplicate medications found. Please discuss with provider.          I certify that this patient is confined to her home and needs intermittent skilled nursing care, physical therapy and occupational therapy.

## 2019-11-04 NOTE — NURSING
Patient transported to MRI via bed with oxygen at 2L N/C in use. Charge nurse, Emilia, transporting with patient.     3:30- Patient arrived back to unit via bed with 3 L N/C in use. EEG at bedside

## 2019-11-04 NOTE — ASSESSMENT & PLAN NOTE
Lab Results   Component Value Date    LDLCALC 196.4 (H) 08/17/2019   not on statin??Start statin

## 2019-11-04 NOTE — NURSING
"Response Team - Patient Flow Sheet     Date: 2019  Time: 1022  Location: 325  Name: Yasmeen Palm  : 1950  MRN: [id number]  PCP: Angel Orourke Jr, MD  Allergies: Review of patient's allergies indicates:  No Known Allergies  Past Medical History:    Past Medical History:   Diagnosis Date    Anticoagulant long-term use     Xarelto    Arthritis     Asthma     CHF (congestive heart failure)     COPD (chronic obstructive pulmonary disease)     Coronary artery disease     Deep vein thrombosis (DVT) of left lower extremity     Depression     Diabetes mellitus     GERD (gastroesophageal reflux disease)     Hypertension     MI (myocardial infarction) 2019    Obstructive sleep apnea on CPAP     On home oxygen therapy     Unsteady gait     uses either walker or 4 prong cane       Reason for response team call: Stroke symptoms code stroke    Injury:  No    Vitals:  Time BP HR Resp SPO2 Glucose   1200 142/70 92 22 97%                                                Time Notes   [id number] Called to 325 for "Code Stroke"      Patient awake, although slightly delayed with responses.  QUIGLEY x 4 equally.  No arm drift noted.  No facial droop noted.  Confused to time only.   per report of floor RN.  Taken for stat CT head and brought to ICU for Telestroke Evaluation.  Dr Cavazos evaluated CT and patient via camera and determined low risk for acute event.  Patient was transferred back to room per Dr Patino's instructions.                 Response Team Members: JUDIT Pena RN & RUDDY Olsen RN  Provider Response Called:  Yes   Provider Name/Time: Dr Cavazos - telestroke service      Report Called To:Elena HOBBS 3W at bedside throughout  Family/Friend/Caregiver Notified: No  "

## 2019-11-04 NOTE — SUBJECTIVE & OBJECTIVE
"  Woke up with symptoms?: no    Recent bleeding noted: no  Does the patient take any Blood Thinners? yes  Medications: Anticoagulants:  apixaban/Eliquis      Past Medical History: hypertension, diabetes, hyperlipidemia, MI/CAD, CHF, Heart Problems and DVT, LOYDA    Past Surgical History: no major surgeries within the last 2 weeks    Family History: no relevant history    Social History: unable to obtain    Allergies: No Known Allergies No known drug allergies    Review of Systems   Constitutional: Negative for chills, diaphoresis and fever.   HENT: Negative for hearing loss, tinnitus and trouble swallowing.    Eyes: Negative for visual disturbance.   Respiratory: Negative for shortness of breath.    Cardiovascular: Negative for chest pain and palpitations.   Gastrointestinal: Negative for vomiting.   Endocrine: Negative for cold intolerance.   Genitourinary: Negative for hematuria.   Musculoskeletal: Negative for neck stiffness.   Skin: Negative for rash.   Allergic/Immunologic: Negative for immunocompromised state.   Neurological: Positive for speech difficulty. Negative for dizziness, facial asymmetry, weakness, numbness and headaches.   Hematological: Does not bruise/bleed easily.   Psychiatric/Behavioral: Positive for confusion. Negative for agitation and behavioral problems.     Objective:   Vitals: Blood pressure (!) 192/94, pulse 103, temperature 97 °F (36.1 °C), temperature source Oral, resp. rate (!) 22, height 5' 1" (1.549 m), weight 76.7 kg (169 lb 1.5 oz), SpO2 95 %, not currently breastfeeding. BP: 142/63, Respiratory Rate: 17 and Heart Rate: 82    CT READ: Yes  No hemmorhage. No mass effect. No early infarct signs.     Physical Exam   Constitutional: She appears well-developed and well-nourished.   HENT:   Head: Normocephalic and atraumatic.   Eyes: Pupils are equal, round, and reactive to light. EOM are normal.   Neck: Normal range of motion. Neck supple.   Cardiovascular: Normal rate and regular " rhythm.   Pulmonary/Chest: No respiratory distress.   Abdominal: She exhibits no mass.   Genitourinary:   Genitourinary Comments: No performed   Musculoskeletal: Normal range of motion. She exhibits no edema or deformity.   Neurological: She is alert. No cranial nerve deficit or sensory deficit. Coordination normal.   Disoriented to year, month.   Skin: No rash noted. She is not diaphoretic. No erythema.   Psychiatric: She has a normal mood and affect. Her behavior is normal.   Nursing note and vitals reviewed.

## 2019-11-04 NOTE — PLAN OF CARE
11/4/19 ST evaluated after code stroke was called ST RECS: regular with thin liquids, meds one at a time. Cognitive linguistic eval pending MRI. Cindy Woodward, AIDAN-SLP

## 2019-11-04 NOTE — ASSESSMENT & PLAN NOTE
Lab Results   Component Value Date    HGBA1C 7.4 (H) 11/04/2019    HGBA1C 7.4 (H) 11/04/2019   SSI and add prandial basal accordingly

## 2019-11-04 NOTE — ED PROVIDER NOTES
Encounter Date: 11/3/2019    SCRIBE #1 NOTE: I, Inna Moncada, am scribing for, and in the presence of,  Peter Guzmán MD. I have scribed the following portions of the note - Other sections scribed: HPI, ROS, PE, MDM.       History     Chief Complaint   Patient presents with    Shortness of Breath     since 1600 today, no relief with at home neb tx, hx of CHF and COPD, pt at 92% on home O2, 3L, placed on CPAP in route, given duoneb while in route to ED but was turned off before treatment finished,     CC: SOB    HPI: The patient is a 69 y.o female, with PMHx of COPD, CHF, CAD, HTN, MI, and asthma, who presents to the ED, per EMS, complaining of SOB since 4 pm this afternoon. Patient states that she is normally on 3-3.5 L of home O2, but it was increased to 4 L today, due to worsening SOB. She also complains of associated CP around 6 pm this evening. She denies any current pain or other discomfort. No fever, dysuria, urinary frequency, or changes in bowel movements. No recent travel. NKDA. PSHx of cardiac catheterization and coronary angioplasty. SHx as former smoker, but no alcohol consumption or drug use.      Per EMS, patient's SpO2 was 92% on home O2 upon EMS arrival. Patient was placed on CPAP and given duoneb. Patient's vital signs were normal.     The history is provided by the patient and the EMS personnel. No  was used.     Review of patient's allergies indicates:  No Known Allergies  Past Medical History:   Diagnosis Date    Anticoagulant long-term use     Xarelto    Arthritis     Asthma     CHF (congestive heart failure)     COPD (chronic obstructive pulmonary disease)     Coronary artery disease     Deep vein thrombosis (DVT) of left lower extremity     Depression     Diabetes mellitus     GERD (gastroesophageal reflux disease)     Hypertension     MI (myocardial infarction) 08/2019    Obstructive sleep apnea on CPAP     On home oxygen therapy     Unsteady gait     uses  "either walker or 4 prong cane     Past Surgical History:   Procedure Laterality Date    CARDIAC CATHETERIZATION  2018    CORONARY ANGIOPLASTY WITH STENT PLACEMENT      HERNIA REPAIR      encapsulated umbilical hernia     Family History   Problem Relation Age of Onset    Heart disease Mother     Hypertension Mother     Diabetes Father      Social History     Tobacco Use    Smoking status: Former Smoker     Packs/day: 0.50     Years: 55.00     Pack years: 27.50     Types: Cigarettes     Start date: 1963     Last attempt to quit: 2018     Years since quittin.8    Smokeless tobacco: Never Used    Tobacco comment: "States she quit smoking about 3 or 4 weeks ago"   Substance Use Topics    Alcohol use: Not Currently     Alcohol/week: 1.0 standard drinks     Types: 1 Glasses of wine per week     Frequency: Never     Comment: socially    Drug use: No     Review of Systems   Constitutional: Negative for fever.   HENT: Negative for congestion.    Respiratory: Positive for shortness of breath. Negative for cough.    Cardiovascular: Positive for chest pain (Resolved).   Gastrointestinal: Negative for abdominal pain, blood in stool, constipation, diarrhea, nausea and vomiting.   Genitourinary: Negative for difficulty urinating, dysuria and frequency.   Musculoskeletal: Negative for back pain.   Skin: Negative for rash and wound.   Neurological: Negative for dizziness, weakness and headaches.   Psychiatric/Behavioral: Negative for confusion.       Physical Exam     Initial Vitals   BP Pulse Resp Temp SpO2   19 2357 19 2357 19 2359 19 0014 19 2357   (!) 186/86 77 (!) 25 98.9 °F (37.2 °C) 100 %      MAP       --                Physical Exam    Nursing note and vitals reviewed.  Constitutional: She appears well-developed and well-nourished. She is not diaphoretic. No distress.   Patient is over-weight.    HENT:   Head: Normocephalic and atraumatic.   Eyes: Conjunctivae and EOM " are normal. Pupils are equal, round, and reactive to light. Right eye exhibits no discharge. Left eye exhibits no discharge. No scleral icterus.   Neck: Normal range of motion.   Cardiovascular: Normal rate, regular rhythm and normal heart sounds.   Pulmonary/Chest: No respiratory distress. She has decreased breath sounds.   Patient is on CPAP, with diminshed breath sounds bilaterally. Patient is talking upon exam.    Abdominal: Soft. There is no tenderness.   No abdominal tenderness.    Musculoskeletal: Normal range of motion. She exhibits no edema or tenderness.   No bilateral lower extremity swelling. No edema. No CVA tenderness.     Neurological: She is alert and oriented to person, place, and time. She has normal strength. No cranial nerve deficit.   Skin: Skin is warm and dry. No rash noted. No erythema.   Psychiatric: She has a normal mood and affect. Her behavior is normal. Judgment and thought content normal.         ED Course   Procedures  Labs Reviewed   B-TYPE NATRIURETIC PEPTIDE - Abnormal; Notable for the following components:       Result Value     (*)     All other components within normal limits   CBC W/ AUTO DIFFERENTIAL - Abnormal; Notable for the following components:    RBC 3.57 (*)     Hemoglobin 10.0 (*)     Hematocrit 35.6 (*)     Mean Corpuscular Volume 100 (*)     Mean Corpuscular Hemoglobin Conc 28.1 (*)     RDW 17.0 (*)     Platelets 374 (*)     Immature Granulocytes 0.7 (*)     Gran # (ANC) 9.2 (*)     Immature Grans (Abs) 0.09 (*)     Lymph% 17.7 (*)     All other components within normal limits   COMPREHENSIVE METABOLIC PANEL - Abnormal; Notable for the following components:    CO2 31 (*)     Glucose 126 (*)     Albumin 3.1 (*)     Anion Gap 7 (*)     All other components within normal limits   MAGNESIUM - Abnormal; Notable for the following components:    Magnesium 1.5 (*)     All other components within normal limits   TROPONIN I - Abnormal; Notable for the following  components:    Troponin I 0.029 (*)     All other components within normal limits   CULTURE, BLOOD   CULTURE, BLOOD   APTT   LACTIC ACID, PLASMA   PROTIME-INR   TROPONIN I   URINALYSIS, REFLEX TO URINE CULTURE   POCT INFLUENZA A/B MOLECULAR     EKG Readings: (Independently Interpreted)   EKG done at 12:03 a.m. on November 4 showing normal sinus rhythm rate of 75.  Left anterior fascicular block.  Septal infarct.  If no ST elevation.  Normal axis QRS.  Compared to previous EKG and a couple nonspecific changes.       Imaging Results          X-Ray Chest AP Portable (Final result)  Result time 11/04/19 01:16:42    Final result by Alexandria Carbajal MD (11/04/19 01:16:42)                 Impression:      No acute intrathoracic abnormality detected.      Electronically signed by: Alexandria Carbajal  Date:    11/04/2019  Time:    01:16             Narrative:    EXAMINATION:  AP PORTABLE CHEST    CLINICAL HISTORY:  sob;    TECHNIQUE:  AP portable chest radiograph was submitted.    COMPARISON:  08/28/2019    FINDINGS:  AP portable chest radiograph demonstrates a cardiac silhouette within normal limits.  There is tortuosity of the descending thoracic aorta.  Vascular calcification is seen at the aortic knob.  There is no focal consolidation, pneumothorax, or pleural effusion. Spondylitic changes are noted.                                 Medical Decision Making:   History:   Old Medical Records: I decided to obtain old medical records.  Initial Assessment:   Pt presenting 2/2 wheezing and SOB c/w COPD exacerbation versus CHF. Patient started on 10mg albuterol, 1 mg ipratropium.  Patient on CPAP on arrival.  Transitioned over to BiPAP and then will attempt to transition onto her normal settings.    Also considered but less likely:  Acs: ekg doesn't show stemi. Troponin ordered  Pna: symptoms bilaterally and no fevers.   Bronchitis: considered but hpi most c/w copd  Pneumothorax: bilateral breath sounds    Patient able to be  transferred shin doubt BiPAP.  Patient's troponin is slightly elevated.  BNP is markedly elevated from her baseline at almost 1000.  Normally she is between 50 and 300.  IV Lasix ordered for the patient.  Will repeat breathing treatments (10 mg albuterol and 1 mg ipratropium) due to diminished breath sounds continuing.  Patient given IV steroids.  Status post aspirin.  Azithromycin for COPD exacerbation.  Patient was admitted for observation further workup management.    Please put in 35 minutes of critical care due to patient having a high risk of respiratory failure.   Separate from teaching and exclusive of procedure and ekg time  Includes:  Time at bedside  Time reviewing test results  Time discussing case with staff  Time documenting the medical record  Time spent with family members  Time spent with consults  Management  Clinical Tests:   Lab Tests: Ordered  Radiological Study: Ordered  Medical Tests: Ordered  ED Management:  2354 Will order cardiac function tests, BNP, Lactic, UA, and basic labs. Will order CXR. Will reassess patient.             Scribe Attestation:   Scribe #1: I performed the above scribed service and the documentation accurately describes the services I performed. I attest to the accuracy of the note.           I, Peter Guzmán, personally performed the services described in this documentation. All medical record entries made by the scribe were at my direction and in my presence.  I have reviewed the chart and agree that the record reflects my personal performance and is accurate and complete.       Clinical Impression:       ICD-10-CM ICD-9-CM   1. CHF exacerbation I50.9 428.0   2. SOB (shortness of breath) R06.02 786.05   3. COPD exacerbation J44.1 491.21                                Peter Guzmán MD  11/04/19 7300

## 2019-11-04 NOTE — NURSING
Patient arrived back to unit via wheelchair. Patient with glass eye appearance, staring off, not answering questions or following verbal commands, patient had an incontinence episode of urine. Code stroke called over head. Patients blood glucose was 308 and BP elevated at 192/94.TEE Reyes at bedside.  Patient transported to [facility] CT

## 2019-11-04 NOTE — NURSING TRANSFER
Nursing Transfer Note      11/4/2019     Transfer From: ED To: 325    Transfer via stretcher    Transfer with cardiac monitoring and 2L of oxygen via NC    Transported by transport personnel    Medicines sent: home medications with patient    Chart send with patient: Yes    Notified: spouse    Patient reassessed at: 520am on November 4, 2019    Upon arrival to floor cardiac monitoring applied. Vital signs obtained and found in flow sheets with complete patient assessment. Skin dry and intact with a 20g RAC PIV noted saline locked. No complaints of pain or signs of respiratory distress noted. Plan to provide scheduled steroids, duo-nebs, IV lasix for diuresis, and monitor weights. Patient updated on plan of care and verbalized understanding. Call light in reach and patient instructed to inform the nurse if anything is needed. Patient stable and will continue to be monitored.

## 2019-11-04 NOTE — HOSPITAL COURSE
" is a 68 yo female who was admitted to hospital for acute on chronic diastolic heart failure and COPD exacerbation. IV lasix , steroid, azithromycin started. PT/OT recommended HH. O2 saturation 93% on 2 L NC. The patient was set for discharge and stable clinically when at about 1135 STROKE CODE was caused due to what was thought to be a "change in neuro status" and the transport personal reported that the patient acutely became aphasic. She was assessed at that time and her  and . Staff reports negative confusion and that the patient was calling out employees names. No change in bowel or bladder functions. CT head and MRI negative for bleed or stroke. She remained symptom free throughout the night. EEG obtained. There is high suspicion for malingering. Neurology was consulted and recommended monitoring overnight - with no new recommendations. She is already on apixaban. 2D echo showed LVEF 70% with normal diastolic function. Discharge to home with home health. Stable condition - fu with primary care in clinic.      "

## 2019-11-04 NOTE — H&P
[facility] Hospital Medicine  History & Physical    Patient Name: Yasmeen Palm  MRN: [id number]  Admission Date: 11/04/2019  Attending Physician: Laureano Gentile MD, MPH      PCP:     Angel Orourke Jr, MD    CC:     Chief Complaint   Patient presents with    Shortness of Breath     since 1600 today, no relief with at home neb tx, hx of CHF and COPD, pt at 92% on home O2, 3L, placed on CPAP in route, given duoneb while in route to ED but was turned off before treatment finished,       HISTORY OF PRESENT ILLNESS:     Yasmeen Palm is a 69 y.o. female that (in part)  has a past medical history of Anticoagulant long-term use, Arthritis, Asthma, CHF (congestive heart failure), COPD (chronic obstructive pulmonary disease), Coronary artery disease, Deep vein thrombosis (DVT) of left lower extremity, Depression, Diabetes mellitus, GERD (gastroesophageal reflux disease), Hypertension, MI (myocardial infarction), Obstructive sleep apnea on CPAP, On home oxygen therapy, and Unsteady gait.  has a past surgical history that includes Coronary angioplasty with stent; Hernia repair; and Cardiac catheterization (06/2018). Presents to [facility] Emergency Department complaining of shortness of breath.  This is been a chronic problem for her however she has had subacute progressive worsening for the last 3 days.  Associated with minimally productive cough.Denies fever or chills.  Complains she has shortness of breath at rest as well as dyspnea with exertion.  Mild intermittent chest pain the last episode approximately 6:00 p.m. this evening.  No chest pain at this  time.  Positive for unintentional weight gain.  Previous history of simultaneous CHF exacerbation and COPD.  Reports compliance with home medication regimen.  Some mild relief with supplemental oxygen given in the ED.  She is on chronic anticoagulation for atrial fibrillation and history of pulmonary embolism and DVT.   Also complaining of peripheral edema in her lower extremities.  Denies syncope, cyanosis, or palpitations. No hemoptysis, stridor, or night sweats. No recent travel or sick contacts.    In the emergency department routine laboratory studies, chest x-ray, cardiac enzymes, an EKG was obtained.  History and findings are consistent with CHF exacerbation and exam consistent with COPD, so she likely has an acute exacerbation of both chronic conditions.  She was given Lasix for the CHF and started on BiPAP for both CHF and COPD along with nebulizer treatments and steroids.  BiPAP was weaned off.     Hospital medicine has been asked to admit for further evaluation and treatment.       REVIEW OF SYSTEMS:     -- Constitutional: No fever or chills.  -- Eyes: No visual changes, diplopia, pain, tearing, blind spots, or discharge.   -- Ears, nose, mouth, throat, and face: No congestion, sore throat, epistaxis, d/c, bleeding gums, neck stiffness masses, or dental issues.  -- Respiratory:  Shortness of breath at rest.  Minimally productive Cough and expiratory wheezing.  No hemoptysis, stridor, , or night sweats.  -- Cardiovascular:  As above in the HPI.   -- Gastrointestinal: No vomiting, abdominal pain, hematemesis, melena, dyspepsia, or change in bowel habits.  -- Genitourinary: No hematuria, dysuria, frequency, urgency, nocturia, polyuria, stones, or incontinence.  -- Integument/breast: No rash, pruritis, pigmentation changes, dryness, or changes in hair  -- Hematologic/lymphatic:  Easy bruising she attributes to anticoagulation therapy.  Denies lymphadenopathy.   -- Musculoskeletal: No acute arthralgias, acute myalgias, joint swelling, acute limitations of ROM, or acute muscular weakness.  -- Neurological: No seizures, headaches, incoordination, paraesthesias, ataxia, vertigo, or tremors.  -- Behavioral/Psych: No auditory or visual hallucinations, depression, or suicidal/homicidal ideations.  -- Endocrine:  Unintentional  "weight gain.  No heat or cold intolerance, polydipsia  -- Allergy/Immunologic: No recurrent infections or adverse reaction to food, insects, or difficulty breathing.        PAST MEDICAL / SURGICAL HISTORY:     Past Medical History:   Diagnosis Date    Anticoagulant long-term use     Xarelto    Arthritis     Asthma     CHF (congestive heart failure)     COPD (chronic obstructive pulmonary disease)     Coronary artery disease     Deep vein thrombosis (DVT) of left lower extremity     Depression     Diabetes mellitus     GERD (gastroesophageal reflux disease)     Hypertension     MI (myocardial infarction) 2019    Obstructive sleep apnea on CPAP     On home oxygen therapy     Unsteady gait     uses either walker or 4 prong cane     Past Surgical History:   Procedure Laterality Date    CARDIAC CATHETERIZATION  2018    CORONARY ANGIOPLASTY WITH STENT PLACEMENT      HERNIA REPAIR      encapsulated umbilical hernia         FAMILY HISTORY:     Family History   Problem Relation Age of Onset    Heart disease Mother     Hypertension Mother     Diabetes Father          SOCIAL HISTORY:     Social History     Socioeconomic History    Marital status: Legally      Spouse name: Not on file    Number of children: Not on file    Years of education: Not on file    Highest education level: Not on file   Occupational History    Not on file   Social Needs    Financial resource strain: Not on file    Food insecurity:     Worry: Not on file     Inability: Not on file    Transportation needs:     Medical: Not on file     Non-medical: Not on file   Tobacco Use    Smoking status: Former Smoker     Packs/day: 0.50     Years: 55.00     Pack years: 27.50     Types: Cigarettes     Start date: 1963     Last attempt to quit: 2018     Years since quittin.8    Smokeless tobacco: Never Used    Tobacco comment: "States she quit smoking about 3 or 4 weeks ago"   Substance and Sexual Activity "    Alcohol use: Not Currently     Alcohol/week: 1.0 standard drinks     Types: 1 Glasses of wine per week     Frequency: Never     Comment: socially    Drug use: No    Sexual activity: Never   Lifestyle    Physical activity:     Days per week: Not on file     Minutes per session: Not on file    Stress: Not on file   Relationships    Social connections:     Talks on phone: Not on file     Gets together: Not on file     Attends Oriental orthodox service: Not on file     Active member of club or organization: Not on file     Attends meetings of clubs or organizations: Not on file     Relationship status: Not on file   Other Topics Concern    Not on file   Social History Narrative    Not on file         ALLERGIES:       Review of patient's allergies indicates:  No Known Allergies      HEALTH SCREENING:     Prevnar 13 pneumonia vaccine =  evidence of previous vaccination found in the medical record      HOME MEDICATIONS:     Prior to Admission medications    Medication Sig Start Date End Date Taking? Authorizing Provider   acetaminophen (TYLENOL) 500 MG tablet Take 1 tablet (500 mg total) by mouth every 8 (eight) hours as needed. 10/7/16  Yes Zenon Alfredo MD   amiodarone (PACERONE) 200 MG Tab Take 1 tablet (200 mg total) by mouth once daily. 8/14/18 8/14/19 Yes Viri Paredes MD   aspirin (ECOTRIN) 81 MG EC tablet Take 81 mg by mouth once daily.   Yes Historical Provider, MD   ferrous gluconate (FERGON) 324 MG tablet Take 1 tablet (324 mg total) by mouth daily with breakfast. 5/12/19  Yes Braden Matos MD   fluticasone propionate (FLOVENT HFA) 220 mcg/actuation inhaler Inhale 1 puff into the lungs 2 (two) times daily. Controller 5/12/19 5/11/20 Yes Braden Matos MD   furosemide (LASIX) 40 MG tablet Take 40 mg by mouth once daily.    Yes Historical Provider, MD   gabapentin (NEURONTIN) 300 MG capsule Take 300 mg by mouth 3 (three) times daily.   Yes Historical Provider, MD   hydrALAZINE (APRESOLINE) 50 MG  tablet Take 50 mg by mouth 2 (two) times daily.   Yes Historical Provider, MD   isosorbide mononitrate (IMDUR) 30 MG 24 hr tablet Take 3 tablets (90 mg total) by mouth once daily. 12/11/18 12/11/19 Yes Zenon Alfredo MD   lisinopril (PRINIVIL,ZESTRIL) 40 MG tablet Take 1 tablet (40 mg total) by mouth once daily. Do not take this medication if blood pressure is below 130/80 mmHg and/or feeling dizzy after taking all other blood pressure meds 8/14/17  Yes Layla Villegas MD   metformin (GLUCOPHAGE) 500 MG tablet Take 1,000 mg by mouth 2 (two) times daily with meals.    Yes Historical Provider, MD   metoprolol tartrate (LOPRESSOR) 100 MG tablet Take 1 tablet (100 mg total) by mouth 2 (two) times daily. 12/11/18 12/11/19 Yes Zenon Alfredo MD   multivit-min-FA-lycopen-lutein (CENTRUM SILVER) 0.4-300-250 mg-mcg-mcg Tab Take 1 tablet by mouth once daily. 5/12/19  Yes Braden Matos MD   multivitamin (THERAGRAN) per tablet Take 1 tablet by mouth once daily.   Yes Historical Provider, MD   pantoprazole (PROTONIX) 40 MG tablet Take 40 mg by mouth once daily.   Yes Historical Provider, MD   pregabalin (LYRICA) 75 MG capsule Take 75 mg by mouth 2 (two) times daily.   Yes Historical Provider, MD   rivaroxaban (XARELTO) 20 mg Tab Take 20 mg by mouth daily with dinner or evening meal.   Yes Historical Provider, MD   tiotropium (SPIRIVA) 18 mcg inhalation capsule Inhale 1 capsule (18 mcg total) into the lungs once daily. Controller 7/23/18 7/23/19 Yes Viri Paredes MD   levalbuterol (XOPENEX HFA) 45 mcg/actuation inhaler Inhale 2 puffs into the lungs every 4 (four) hours as needed for Wheezing or Shortness of Breath. Rescue 2/12/18 2/12/19  Shade Dejesus MD          [facility] MEDICATIONS:     Scheduled Meds:  Continuous Infusions:  PRN Meds:.      PHYSICAL EXAM:     Wt Readings from Last 1 Encounters:   11/04/19 0531 76.7 kg (169 lb 1.5 oz)   11/03/19 2359 80.7 kg (178 lb)     Body mass index is 31.95  kg/m².  Vitals:    11/04/19 0202 11/04/19 0230 11/04/19 0232 11/04/19 0531   BP: 139/63  (!) 123/58 135/60   BP Location:    Left arm   Patient Position:    Lying   Pulse: 78 73 75 78   Resp: 16 17 20 18   Temp:    98.7 °F (37.1 °C)   TempSrc:    Oral   SpO2: 97% 100% 100% (!) 94%   Weight:    76.7 kg (169 lb 1.5 oz)   Height:              -- General appearance:  Chronically ill-appearing female lying in bed.  No apparent distress.  well developed. appears stated age   -- Head: normocephalic, atraumatic   -- Eyes: conjunctivae clear. Extraocular muscles intact  -- Nose: Nares normal. Septum midline.   -- Mouth/Throat: lips, mucosa, and tongue normal. no throat erythema.   -- Neck: supple, symmetrical, trachea midline, no JVD and thyroid not grossly enlarged, appears symmetric  -- Lungs:  Decreased breath sounds to auscultation bilaterally with prolonged expiratory phase and poor excursion consistent with long-term smoker.  Crackles at bases. normal respiratory effort. No use of accessory muscles.   -- Chest wall: no tenderness. equal bilateral chest rise   -- Heart: regular rate and regular rhythm. S1, S2 normal.  no click, rub or gallop   -- Abdomen:  Obese, soft, non-tender, non-distended, non-tympanic; bowel sounds normal; megaly exam limited by body habitus  -- Extremities:  Bilateral lower extremity edema.  Right greater than left.  No cords or tenderness appreciated  no cyanosis, clubbing.    -- Pulses: 2+ and symmetric   -- Skin: color normal, texture normal, turgor normal. No rashes or lesions.   -- Neurologic: Normal strength and tone. No focal numbness or weakness. CNII-XII intact. Angela coma scale: eyes open spontaneously-4, oriented & converses-5, obeys commands-6.      LABORATORY STUDIES:     Recent Results (from the past 36 hour(s))   Brain natriuretic peptide    Collection Time: 11/03/19 11:57 PM   Result Value Ref Range     (H) 0 - 99 pg/mL   CBC auto differential    Collection Time:  11/03/19 11:57 PM   Result Value Ref Range    WBC 12.63 3.90 - 12.70 K/uL    RBC 3.57 (L) 4.00 - 5.40 M/uL    Hemoglobin 10.0 (L) 12.0 - 16.0 g/dL    Hematocrit 35.6 (L) 37.0 - 48.5 %    Mean Corpuscular Volume 100 (H) 82 - 98 fL    Mean Corpuscular Hemoglobin 28.0 27.0 - 31.0 pg    Mean Corpuscular Hemoglobin Conc 28.1 (L) 32.0 - 36.0 g/dL    RDW 17.0 (H) 11.5 - 14.5 %    Platelets 374 (H) 150 - 350 K/uL    MPV 9.9 9.2 - 12.9 fL    Immature Granulocytes 0.7 (H) 0.0 - 0.5 %    Gran # (ANC) 9.2 (H) 1.8 - 7.7 K/uL    Immature Grans (Abs) 0.09 (H) 0.00 - 0.04 K/uL    Lymph # 2.2 1.0 - 4.8 K/uL    Mono # 0.9 0.3 - 1.0 K/uL    Eos # 0.2 0.0 - 0.5 K/uL    Baso # 0.06 0.00 - 0.20 K/uL    nRBC 0 0 /100 WBC    Gran% 72.8 38.0 - 73.0 %    Lymph% 17.7 (L) 18.0 - 48.0 %    Mono% 7.0 4.0 - 15.0 %    Eosinophil% 1.3 0.0 - 8.0 %    Basophil% 0.5 0.0 - 1.9 %    Differential Method Automated    APTT    Collection Time: 11/03/19 11:57 PM   Result Value Ref Range    aPTT 31.5 21.0 - 32.0 sec   Comprehensive metabolic panel    Collection Time: 11/03/19 11:57 PM   Result Value Ref Range    Sodium 137 136 - 145 mmol/L    Potassium 4.3 3.5 - 5.1 mmol/L    Chloride 99 95 - 110 mmol/L    CO2 31 (H) 23 - 29 mmol/L    Glucose 126 (H) 70 - 110 mg/dL    BUN, Bld 11 8 - 23 mg/dL    Creatinine 0.7 0.5 - 1.4 mg/dL    Calcium 9.3 8.7 - 10.5 mg/dL    Total Protein 6.3 6.0 - 8.4 g/dL    Albumin 3.1 (L) 3.5 - 5.2 g/dL    Total Bilirubin 0.3 0.1 - 1.0 mg/dL    Alkaline Phosphatase 80 55 - 135 U/L    AST 13 10 - 40 U/L    ALT 14 10 - 44 U/L    Anion Gap 7 (L) 8 - 16 mmol/L    eGFR if African American >60 >60 mL/min/1.73 m^2    eGFR if non African American >60 >60 mL/min/1.73 m^2   Lactic acid, plasma    Collection Time: 11/03/19 11:57 PM   Result Value Ref Range    Lactate (Lactic Acid) 0.8 0.5 - 2.2 mmol/L   Magnesium    Collection Time: 11/03/19 11:57 PM   Result Value Ref Range    Magnesium 1.5 (L) 1.6 - 2.6 mg/dL   Troponin I    Collection Time:  11/03/19 11:57 PM   Result Value Ref Range    Troponin I 0.029 (H) 0.000 - 0.026 ng/mL   Protime-INR    Collection Time: 11/03/19 11:57 PM   Result Value Ref Range    Prothrombin Time 10.9 9.0 - 12.5 sec    INR 1.0 0.8 - 1.2   POCT Influenza A/B Molecular    Collection Time: 11/04/19 12:51 AM   Result Value Ref Range    POC Molecular Influenza A Ag Negative Negative, Not Reported    POC Molecular Influenza B Ag Negative Negative, Not Reported     Acceptable Yes    Troponin I    Collection Time: 11/04/19  2:35 AM   Result Value Ref Range    Troponin I 0.026 0.000 - 0.026 ng/mL       Lab Results   Component Value Date    INR 1.0 11/03/2019    INR 1.0 08/28/2019    INR 1.0 07/01/2019     Lab Results   Component Value Date    HGBA1C 8.1 (H) 07/01/2019     No results for input(s): POCTGLUCOSE in the last 72 hours.        MICROBIOLOGY DATA:     Urine Culture, Routine   Date Value Ref Range Status   08/29/2019 No significant growth  Final   08/17/2019   Final    MRSA Results called to and read back by: [facility] 08/19/2019 08/17/2019 08:59  Final   08/17/2019 ESCHERICHIA COLI  >100,000 cfu/ml   (A)  Final   08/17/2019 (A)  Final    METHICILLIN RESISTANT STAPHYLOCOCCUS AUREUS  > 100,000 cfu/ml         Microbiology x 7d:   Microbiology Results (last 7 days)     Procedure Component Value Units Date/Time    Blood culture #2 **CANNOT BE ORDERED STAT** [[id number]] Collected:  11/04/19 0110    Order Status:  Sent Specimen:  Blood from Peripheral, Hand, Left Updated:  11/04/19 0157    Blood culture #1 **CANNOT BE ORDERED STAT** [[id number]] Collected:  11/04/19 0000    Order Status:  Sent Specimen:  Blood from Peripheral, Antecubital, Left Updated:  11/04/19 0029            IMAGING:     Imaging Results          X-Ray Chest AP Portable (Final result)  Result time 11/04/19 01:16:42    Final result by Alexandria Carbajal MD (11/04/19 01:16:42)                 Impression:      No acute intrathoracic  abnormality detected.      Electronically signed by: Alexandria Carbajal  Date:    11/04/2019  Time:    01:16             Narrative:    EXAMINATION:  AP PORTABLE CHEST    CLINICAL HISTORY:  sob;    TECHNIQUE:  AP portable chest radiograph was submitted.    COMPARISON:  08/28/2019    FINDINGS:  AP portable chest radiograph demonstrates a cardiac silhouette within normal limits.  There is tortuosity of the descending thoracic aorta.  Vascular calcification is seen at the aortic knob.  There is no focal consolidation, pneumothorax, or pleural effusion. Spondylitic changes are noted.                                  ASSESSMENT & PLAN:     Primary Diagnosis:  Acute on chronic diastolic heart failure    Active Hospital Problems    Diagnosis  POA    *Acute on chronic diastolic heart failure [I50.33]  Yes     Priority: 1 - High     Chronic    Essential hypertension [I10]  Yes     Chronic    COPD (chronic obstructive pulmonary disease) [J44.9]  Yes     Chronic    Hyperlipidemia [E78.5]  Yes     Chronic    Paroxysmal atrial fibrillation [I48.0]  Yes     Chronic    History of pulmonary embolism [Z86.711]  Yes     Chronic    History of deep vein thrombosis [Z86.718]  Not Applicable     Chronic    Chronic anticoagulation [Z79.01]  Not Applicable     Chronic    Chronic respiratory failure with hypoxia [J96.11]  Yes     Chronic    Anemia of chronic disease [D63.8]  Yes     Chronic    Controlled type 2 diabetes mellitus, without long-term current use of insulin [E11.9]  Yes     Chronic    Tobacco abuse [Z72.0]  Yes     Chronic    Gastroesophageal reflux disease without esophagitis [K21.9]  Yes     Chronic      Resolved Hospital Problems   No resolved problems to display.       Acute CHF exacerbation  · Evidenced by history, elevated BNP, pulmonary edema, peripheral edema  · Provide diuresis w/ IV medication  · Maintain w/ beta-blocker  · ACE inhibitor or ARB if GFR allows and remains stable  · If 1) Patient cannot tolerate  ACEi or 2) NYHA class III or above, add spironolactone (or eplerenone) and hydralazine-isosorbide dinitrate  · Daily Weights  · Strict I/O  · Fluid restriction to 1,500cc daily  · Low-sodium cardiac diet  · Obtain 2D echo if <6 months   No results found for: EF  · Chest X-ray  · Check TSH, albumin, UA, and renal function  · EKG and cardiac enzymes PRN  · DVT prophylaxis w/ pharmacological and/or mechanical measures  · Oxygen supplementation support PRN    Instructions given to patient/family:  Monitor daily weight.  Regular activity within patient's limitations.  Low salt, low fat and low choleterol diet and restrict fluid < 2L per day.  Call MD if SOB, chest pain, weight gain > 2-3 lbs per day and/or 5-6 lbs per week.   No smoking. Annual influenza vaccine required.    Possible Acute COPD exacerbation  · Scheduled nebulizer treatments (albuterol/atrovent)  · Initiate Solumedrol 80mg IV q8, then taper as clinical course improves; convert to PO taper as outpatient (lower dose due to history of diabetes)  · PPI or H2 blocker concomitantly with steroids  · Titrate O2 sats between 88 to 93%.  No supplemental 02 for 02 saturation greater than 93% due to V/Q mismatch  · Breo, or similar, while inpatient  · Add budesonide/formoterol or salmeterol/fluticasone propionate - at or before discharge - (Advair 500/50mg, Spiriva 18mcg, or Daliresp 500mcg)   · Mucolytics  · Consult pulmonology for further optimization    Diabetes mellitus type 2  · BG in acceptable range at this time.  Will provide sliding-scale insulin due to exogenous steroid administration  · Maintain w/ subcutaneous insulin management order set  · Hold oral diabetic meds  · ADA 1800 kcal diet  · BG goal while in patient is <180mg/dL  · HgA1c = Pending    Chronic anticoagulation  · For treatment of paroxysmal atrial fibrillation and history of DVT and PE  · No evidence of acute blood loss   · Continue Xarelto.  · Will obtain ultrasound of bilateral lower  extremities to evaluate for possible DVT given the anemia in edema in lower extremities    Chronic paroxysmal atrial fibrillation  · Currently rate controlled  · Continue amiodarone  · Maintain with beta-blocker with hold parameters  · Monitor on telemetry  · Maintain magnesium around 2.0  · Maintain potassium around 4.0    Hyperlipidemia   · Lipid panel - as an outpatient  · Cardiac diet  · Continue statin    Anemia of chronic disease  · The patient's H/H is stable and consistent with previous laboratory measurements.  · The patient exhibits no signs or symptoms of acute bleeding.  · There is no indication for transfusion.  · Will continue to monitor.    Essential Hypertension  · Goal while inpatient is a systolic blood pressure less than 160mmHg  · BP in acceptable range at this time  · Continue current home regimen with hold parameters  · PRN antihypertensives available    Morbid obesity  Body mass index is 31.95 kg/m².  · Order a cardiac diet  · Counseling given  · Nutrition consult as an outpatient              VTE Risk Mitigation (From admission, onward)         Ordered     IP VTE HIGH RISK PATIENT  Once      11/04/19 0529     Place sequential compression device  Until discontinued      11/04/19 0529     Place TOM hose  Until discontinued      11/04/19 0529                  Adult PRN medications available   DVT prophylaxis given       DISPOSITION:     Will admit to the Hospital Medicine service for further evaluation and treatment.    Chart reviewed and updated where applicable.    High Risk Conditions:  Patient has a condition that poses threat to life and bodily function: Severe Respiratory Distress due to CHF exacerbation and COPD exacerbation      ===============================================================    Laureano Gentile MD, MPH  Department of Hospital Medicine   [facility]  [phone] pg  (7pm - 6am)

## 2019-11-04 NOTE — ASSESSMENT & PLAN NOTE
This new onset. . . Stroke code in progress. For CT head now and then ICU for stroke tele consult  Will follow up CT and stroke tele recommendations. ASA/on Eliquis/start statin  2 D echo pending  SP/PT/OT consult     Addendum 11/4/19 5963   Tele stroke concern for seizure.   MRI no acute stroke  2 D echo EF 70%, normal DD, and no WMA.  BP also improving . Seizure vs acute hypertensive encephalopathy???  Currently AAO x 4 while EEG in progress.   Follow up Neurology recommendations.

## 2019-11-04 NOTE — PLAN OF CARE
Problem: Physical Therapy Goal  Goal: Physical Therapy Goal  Description  Goals to be met by: 19     Patient will increase functional independence with mobility by performin. Supine to sit with Modified [place]  2. Rolling to Left and Right with Modified [place]  3. Sit to stand transfer with Modified [place] using RW  4. Bed to chair transfer with Modified [place] using Rolling Walker  5. Gait  x50-100 feet with Modified [place] using Rolling Walker and O2  6. Lower extremity exercise program 3 sets x10 reps per handout, with independence     Outcome: Ongoing, Progressing     Pt ambulated ~20 ft with CGA using RW and 2L O2 NC, spO2 on 2L ~93% and HR ~122 bpm.

## 2019-11-04 NOTE — PT/OT/SLP EVAL
Physical Therapy Evaluation    Patient Name:  Yasmeen Palm   MRN:  [id number]    Recommendations:     Discharge Recommendations:  home health PT(with family supervision/assistance vs NH placement)   Discharge Equipment Recommendations: none   Barriers to discharge home: Decreased caregiver support and Pt with decreased mobility and home O2 dependent, reported h/o falls at home.    Assessment:     Yasmeen Palm is a 69 y.o. female admitted with a medical diagnosis of Acute on chronic diastolic heart failure.  She presents with the following impairments/functional limitations:  weakness, impaired endurance, impaired functional mobilty, gait instability, impaired balance, visual deficits, decreased lower extremity function, decreased safety awareness, pain, edema, impaired cardiopulmonary response to activity, decreased upper extremity function.    Rehab Prognosis: Fair+; patient would benefit from acute skilled PT services to address these deficits and reach maximum level of function.    Recent Surgery: * No surgery found *      Plan:     During this hospitalization, patient to be seen 5 x/week to address the identified rehab impairments via gait training, therapeutic activities, therapeutic exercises and progress toward the following goals:    · Plan of Care Expires:  11/18/19    Subjective     Chief Complaint: lack of sleep, dizziness, SOB, and recent visual changes  Patient/Family Comments/goals: Pt agreeable to therapy after encouragement.   Pain/Comfort:  · Pain Rating 1: (Pt c/o pain to L foot during ROM 2* edema.)      Living Environment:  Pt lives with spouse in a SS house with ~3 steps and B HR at entry.  Pt with multiple hospital admissions.   Prior to admission, patients level of function was mod I with household ambulation using walker and home O2.  Pt reported also furniture walking at home with h/o falls.  Equipment used at home: walker, rolling, rollator, cane, straight, bedside commode, shower chair,  wheelchair, CPAP, oxygen.  Upon discharge, patient will have limited assistance from spouse.  Pt's brother-in-law and niece assist with transportation.  Pt's nephew assists her off the floor when she falls at home.  Pt's children [place] of [place].     Objective:     Communicated with nurse Elena prior to session.  Patient found HOB elevated with oxygen 2L, peripheral IV, telemetry, bed alarm on upon PT entry to room.    General Precautions: Standard, fall, respiratory, diabetic   Orthopedic Precautions:N/A   Braces: N/A     Exams:  · Cognitive Exam:  Patient was able to follow multiple commands.   · Gross Motor Coordination:  WFL  · Postural Exam:  Patient presented with the following abnormalities:    · -       No postural abnormalities identified  · Skin Integrity/Edema:      · -       Skin integrity: Visible skin intact  · -       Edema: Moderate B foot  · BLE ROM: WFL  · BLE Strength: WFL    Functional Mobility: Pt c/o dizziness, seated /64 and  bpm and standing -73 and  bpm.  Pt also c/o recent visual changes at home, reported that her vision comes and goes.  Pt required extra time and rest breaks during activities 2* SOB despite on 2L O2 NC.   · Bed Mobility:     · Scooting: stand by assistance  · Supine to Sit: stand by assistance with HOB elevated  · Sit to Supine: stand by assistance with HOB elevated   · Transfers:     · Sit to Stand:  contact guard assistance with rolling walker x 2 trials  · Gait: Pt ambulated ~20 ft with CGA using RW and 2L O2 NC.  Pt with decreased step length and dyllan.  Pt with decreased endurance, spO2 on 2L O2 NC ~93% and HR ~122 bpm.    · Balance: Pt with fair dynamic standing balance.       Therapeutic Activities and Exercises:  Pt educated to call for nursing assistance with OOB activities while in the hospital.  Pt verbalized understanding.      AM-PAC 6 CLICK MOBILITY  Total Score:17     Patient left HOB elevated with all lines intact, call button in  reach, bed alarm on and nurse Elena notified.    GOALS:   Multidisciplinary Problems     Physical Therapy Goals        Problem: Physical Therapy Goal    Goal Priority Disciplines Outcome Goal Variances Interventions   Physical Therapy Goal     PT, PT/OT Ongoing, Progressing     Description:  Goals to be met by: 19     Patient will increase functional independence with mobility by performin. Supine to sit with Modified [place]  2. Rolling to Left and Right with Modified [place]  3. Sit to stand transfer with Modified [place] using RW  4. Bed to chair transfer with Modified [place] using Rolling Walker  5. Gait  x50-100 feet with Modified [place] using Rolling Walker and O2  6. Lower extremity exercise program 3 sets x10 reps per handout, with independence                      History:     Past Medical History:   Diagnosis Date    Anticoagulant long-term use     Xarelto    Arthritis     Asthma     CHF (congestive heart failure)     COPD (chronic obstructive pulmonary disease)     Coronary artery disease     Deep vein thrombosis (DVT) of left lower extremity     Depression     Diabetes mellitus     GERD (gastroesophageal reflux disease)     Hypertension     MI (myocardial infarction) 2019    Obstructive sleep apnea on CPAP     On home oxygen therapy     Unsteady gait     uses either walker or 4 prong cane       Past Surgical History:   Procedure Laterality Date    CARDIAC CATHETERIZATION  2018    CORONARY ANGIOPLASTY WITH STENT PLACEMENT      HERNIA REPAIR      encapsulated umbilical hernia       Time Tracking:     PT Received On: 19  PT Start Time: 0949     PT Stop Time: 1007  PT Total Time (min): 18 min     Billable Minutes: Evaluation 18 min co-eval with OT      Estephania Resendiz, PT  2019

## 2019-11-04 NOTE — HPI
Yasmeen Palm is a 69 y.o. female that (in part)  has a past medical history of Anticoagulant long-term use, Arthritis, Asthma, CHF (congestive heart failure), COPD (chronic obstructive pulmonary disease), Coronary artery disease, Deep vein thrombosis (DVT) of left lower extremity, Depression, Diabetes mellitus, GERD (gastroesophageal reflux disease), Hypertension, MI (myocardial infarction), Obstructive sleep apnea on CPAP, On home oxygen therapy, and Unsteady gait.  has a past surgical history that includes Coronary angioplasty with stent; Hernia repair; and Cardiac catheterization (06/2018). Presents to [facility] Emergency Department complaining of shortness of breath.  This is been a chronic problem for her however she has had subacute progressive worsening for the last 3 days.  Associated with minimally productive cough.Denies fever or chills.  Complains she has shortness of breath at rest as well as dyspnea with exertion.  Mild intermittent chest pain the last episode approximately 6:00 p.m. this evening.  No chest pain at this  time.  Positive for unintentional weight gain.  Previous history of simultaneous CHF exacerbation and COPD.  Reports compliance with home medication regimen.  Some mild relief with supplemental oxygen given in the ED.  She is on chronic anticoagulation for atrial fibrillation and history of pulmonary embolism and DVT.  Also complaining of peripheral edema in her lower extremities.  Denies syncope, cyanosis, or palpitations. No hemoptysis, stridor, or night sweats. No recent travel or sick contacts.    In the emergency department routine laboratory studies, chest x-ray, cardiac enzymes, an EKG was obtained.  History and findings are consistent with CHF exacerbation and exam consistent with COPD, so she likely has an acute exacerbation of both chronic conditions.  She was given Lasix for the CHF and started on BiPAP for both CHF and COPD along with nebulizer  treatments and steroids.  BiPAP was weaned off.     Hospital medicine has been asked to admit for further evaluation and treatment.

## 2019-11-04 NOTE — PLAN OF CARE
Pt has Q6 douneb tx's and is tolerating them well. Currently wearing 2 LNC and CPAP QHS on stand by

## 2019-11-04 NOTE — CONSULTS
[facility]  Vascular Neurology  Comprehensive Stroke Center  Tele-Consultation Note      Consults    Consulting Provider: TAMARA REYES  Current Providers  No providers found    Patient Location:  [facility] OBSERVATION UNIT Emergency Department  Spoke hospital nurse at bedside with patient assisting consultant.     Patient information was obtained from nursing ICU.         Assessment/Plan:   70 y/o with HTN, HLD, DM, CAD, MI, CHF, LOYDA, DVT on apixaban, COPD, admitted with increasing shortness of breath, noted to have episode of altered awareness, staring, eyes rolling back and forth, follow by confusion afterwards.  CTH without acute abnormality.  Concern for seizure with impairment of awareness more than stroke.  Recommend MRI brain ,EEG, neuro consult.      STROKE DOCUMENTATION     Acute Stroke Times:   Acute Stroke Times   Last Known Normal Date: 11/04/19  Last Known Normal Time: 1023  Symptom Onset Date: 11/04/19  Symptom Onset Time: 1023  Stroke Team Called Date: 11/04/19  Stroke Team Called Time: 1209  Stroke Team Arrival Date: 11/04/19  Stroke Team Arrival Time: 1213  CT Interpretation Time: 1213  Decision to Treat Time for Alteplase: (No IV alteplase)  Decision to Treat Time for IR: (No IR candidate)    NIH Scale:  Interval: baseline  1a. Level of Consciousness: 0-->Alert, keenly responsive  1b. LOC Questions: 1-->Answers one question correctly  1c. LOC Commands: 0-->Performs both tasks correctly  2. Best Gaze: 0-->Normal  3. Visual: 0-->No visual loss  4. Facial Palsy: 0-->Normal symmetrical movements  5a. Motor Arm, Left: 0-->No drift, limb holds 90 (or 45) degrees for full 10 secs  5b. Motor Arm, Right: 0-->No drift, limb holds 90 (or 45) degrees for full 10 secs  6a. Motor Leg, Left: 0-->No drift, leg holds 30 degree position for full 5 secs  6b. Motor Leg, Right: 0-->No drift, leg holds 30 degree position for full 5 secs  7. Limb Ataxia: 0-->Absent  8. Sensory:  "0-->Normal, no sensory loss  9. Best Language: 0-->No aphasia, normal  10. Dysarthria: 0-->Normal  11. Extinction and Inattention (formerly Neglect): 0-->No abnormality  Total (NIH Stroke Scale): 1     Modified Humphreys  1  Angela Coma Scale:14   ABCD2 Score:    [id number] Score:   HAS -BLED Score:   ICH Score:   Hunt & Tabares Classification:       Diagnoses: altered mental status  No new Assessment & Plan notes have been filed under this hospital service since the last note was generated.  Service: Vascular Neurology      Blood pressure (!) 192/94, pulse 103, temperature 97 °F (36.1 °C), temperature source Oral, resp. rate (!) 22, height 5' 1" (1.549 m), weight 76.7 kg (169 lb 1.5 oz), SpO2 95 %, not currently breastfeeding.  Alteplase Eligible?: No  Alteplase Recommendation: Alteplase not recommended due to Suspected stroke mimic  and On apixaban  Possible Interventional Revascularization Candidate? No; No significant neurological deficit    Disposition Recommendation: admit to inpatient  do not transfer    Subjective:     History of Present Illness: 68 y/o with HTN, HLD, DM, CAD, MI, CHF, LOYDA, DVT on apixaban, COPD, admitted with increasing shortness of breath, went to get a TTE earlier today and after finishing the study was noted to have altered awareness, staring into the space, eyes rolling back and forth, and afterwards confused. Episode reportedly lasted couple of minutes, although she is still confused and disoriented.        No notes on file      Woke up with symptoms?: no    Recent bleeding noted: no  Does the patient take any Blood Thinners? yes  Medications: Anticoagulants:  apixaban/Eliquis      Past Medical History: hypertension, diabetes, hyperlipidemia, MI/CAD, CHF, Heart Problems and DVT, LOYDA    Past Surgical History: no major surgeries within the last 2 weeks    Family History: no relevant history    Social History: unable to obtain    Allergies: No Known Allergies No known drug " "allergies    Review of Systems   Constitutional: Negative for chills, diaphoresis and fever.   HENT: Negative for hearing loss, tinnitus and trouble swallowing.    Eyes: Negative for visual disturbance.   Respiratory: Negative for shortness of breath.    Cardiovascular: Negative for chest pain and palpitations.   Gastrointestinal: Negative for vomiting.   Endocrine: Negative for cold intolerance.   Genitourinary: Negative for hematuria.   Musculoskeletal: Negative for neck stiffness.   Skin: Negative for rash.   Allergic/Immunologic: Negative for immunocompromised state.   Neurological: Positive for speech difficulty. Negative for dizziness, facial asymmetry, weakness, numbness and headaches.   Hematological: Does not bruise/bleed easily.   Psychiatric/Behavioral: Positive for confusion. Negative for agitation and behavioral problems.     Objective:   Vitals: Blood pressure (!) 192/94, pulse 103, temperature 97 °F (36.1 °C), temperature source Oral, resp. rate (!) 22, height 5' 1" (1.549 m), weight 76.7 kg (169 lb 1.5 oz), SpO2 95 %, not currently breastfeeding. BP: 142/63, Respiratory Rate: 17 and Heart Rate: 82    CT READ: Yes  No hemmorhage. No mass effect. No early infarct signs.     Physical Exam   Constitutional: She appears well-developed and well-nourished.   HENT:   Head: Normocephalic and atraumatic.   Eyes: Pupils are equal, round, and reactive to light. EOM are normal.   Neck: Normal range of motion. Neck supple.   Cardiovascular: Normal rate and regular rhythm.   Pulmonary/Chest: No respiratory distress.   Abdominal: She exhibits no mass.   Genitourinary:   Genitourinary Comments: No performed   Musculoskeletal: Normal range of motion. She exhibits no edema or deformity.   Neurological: She is alert. No cranial nerve deficit or sensory deficit. Coordination normal.   Disoriented to year, month.   Skin: No rash noted. She is not diaphoretic. No erythema.   Psychiatric: She has a normal mood and affect. " Her behavior is normal.   Nursing note and vitals reviewed.            Recommended the emergency room physician to have a brief discussion with the patient and/or family if available regarding the risks and benefits of treatment, and to briefly document the occurrence of that discussion in his clinical encounter note.     The attending portion of this evaluation, treatment, and documentation was performed per Antonio Cavazos MD via audiovisual.    Billing code:  (non-intervention mild to moderate stroke, TIA, some mimics)    · This patient has a critical neurological condition/illness, with some potential for high morbidity and mortality.  · There is a moderate probability for acute neurological change leading to clinical and possibly life-threatening deterioration requiring highest level of physician preparedness for urgent intervention.  · Care was coordinated with other physicians involved in the patient's care.  · Radiologic studies and laboratory data were reviewed and interpreted, and plan of care was re-assessed based on the results.  · Diagnosis, treatment options and prognosis may have been discussed with the patient and/or family members or caregiver.      In your opinion, this was a: Tier 1 Van Negative    Consult End Time: 1240 m    Antonio Cavazos MD  Comprehensive Stroke Center  Vascular Neurology   [facility]

## 2019-11-04 NOTE — NURSING
Report received from FABY Nicole. Patient resting comfortably in bed with 3L N/C in use, no complaints, no acute distress noted. Plan of care reviewed with patient. Instructed patient to call for assistance before ambulating, side rails up x3, bed alarm set, call light in reach, non skid socks in use. Patient verbalized understanding of instructions.

## 2019-11-04 NOTE — PROGRESS NOTES
Referral for home health sent via [facility] to [facility]. TN to follow in [facility] for response.

## 2019-11-04 NOTE — PROGRESS NOTES
[facility] Hospital Medicine  Progress Note    Patient Name: Yasmeen Palm  MRN: [id number]  Patient Class: OP- Observation   Admission Date: 11/3/2019  Length of Stay: 0 days  Attending Physician: Sadia Ro MD  Primary Care Provider: Angel Orourke Jr, MD        Subjective:     Principal Problem:Acute on chronic diastolic heart failure        HPI:    Yasmeen Palm is a 69 y.o. female that (in part)  has a past medical history of Anticoagulant long-term use, Arthritis, Asthma, CHF (congestive heart failure), COPD (chronic obstructive pulmonary disease), Coronary artery disease, Deep vein thrombosis (DVT) of left lower extremity, Depression, Diabetes mellitus, GERD (gastroesophageal reflux disease), Hypertension, MI (myocardial infarction), Obstructive sleep apnea on CPAP, On home oxygen therapy, and Unsteady gait.  has a past surgical history that includes Coronary angioplasty with stent; Hernia repair; and Cardiac catheterization (06/2018). Presents to [facility] Emergency Department complaining of shortness of breath.  This is been a chronic problem for her however she has had subacute progressive worsening for the last 3 days.  Associated with minimally productive cough.Denies fever or chills.  Complains she has shortness of breath at rest as well as dyspnea with exertion.  Mild intermittent chest pain the last episode approximately 6:00 p.m. this evening.  No chest pain at this  time.  Positive for unintentional weight gain.  Previous history of simultaneous CHF exacerbation and COPD.  Reports compliance with home medication regimen.  Some mild relief with supplemental oxygen given in the ED.  She is on chronic anticoagulation for atrial fibrillation and history of pulmonary embolism and DVT.  Also complaining of peripheral edema in her lower extremities.  Denies syncope, cyanosis, or palpitations. No hemoptysis, stridor, or night sweats. No recent travel  or sick contacts.    In the emergency department routine laboratory studies, chest x-ray, cardiac enzymes, an EKG was obtained.  History and findings are consistent with CHF exacerbation and exam consistent with COPD, so she likely has an acute exacerbation of both chronic conditions.  She was given Lasix for the CHF and started on BiPAP for both CHF and COPD along with nebulizer treatments and steroids.  BiPAP was weaned off.     Hospital medicine has been asked to admit for further evaluation and treatment.     Overview/Hospital Course:   is a 70 yo female who was admitted to hospital for acute on chronic diastolic heart failure and COPD exacerbation. IV lasix , steroid, azithromycin started. PT/OT recommended HH. O2 saturation 93% on 2 L NC. At 1135 STROKE CODE as transport reported patient acutely became aphasic.  and . Patient did make urine on self during episode but she was aware. Patient to CT head and ICU for tele stroke evaluation.     Addendum 11/4/19 3717   Tele stroke concern for seizure.   MRI no acute stroke  Currently AAO x 4 while EEG in progress.   Follow up Neurology recommendations.       Interval History: See hospital course. Currently patient confuse and aphasic which is new. Patient was seen early this am AAO x 4.     Review of Systems   Unable to perform ROS: Mental status change     Objective:     Vital Signs (Most Recent):  Temp: 97 °F (36.1 °C) (11/04/19 1141)  Pulse: 103 (11/04/19 1141)  Resp: (!) 22 (11/04/19 1141)  BP: (!) 192/94 (11/04/19 1141)  SpO2: 95 % (11/04/19 1141) Vital Signs (24h Range):  Temp:  [97 °F (36.1 °C)-98.9 °F (37.2 °C)] 97 °F (36.1 °C)  Pulse:  [] 103  Resp:  [15-26] 22  SpO2:  [94 %-100 %] 95 %  BP: (117-192)/(58-94) 192/94     Weight: 76.7 kg (169 lb 1.5 oz)  Body mass index is 31.95 kg/m².    Intake/Output Summary (Last 24 hours) at 11/4/2019 1156  Last data filed at 11/4/2019 0800  Gross per 24 hour   Intake 360 ml   Output 150 ml    Net 210 ml      Physical Exam   Constitutional: She appears well-developed and well-nourished.   Aphasic and confuse to person, place time, situation   HENT:   Head: Atraumatic.   Eyes:   Starring and not following commands   Cardiovascular: Normal rate and regular rhythm.   Pulmonary/Chest: Effort normal and breath sounds normal.   95% on home 2 L NC.    Abdominal: Soft.   Musculoskeletal: She exhibits no edema.   Neurological: She is alert.   Skin: Skin is warm.   Psychiatric: She has a normal mood and affect.       Significant Labs: All pertinent labs within the past 24 hours have been reviewed.    Significant Imaging: I have reviewed and interpreted all pertinent imaging results/findings within the past 24 hours.      Assessment/Plan:      * Acute on chronic diastolic heart failure  IV lasix started on admit. COPD exacerbation contributing to symptoms.   Elevated troponin flat pattern and EKG NSR   Lung exam diminished and no edema  2 D echo pending. Continue IV lasix for now.       Aphasia  This new onset. . . Stroke code in progress. For CT head now and then ICU for stroke tele consult  Will follow up CT and stroke tele recommendations. ASA/on Eliquis/start statin  2 D echo pending  SP/PT/OT consult     Addendum 11/4/19 1995   Tele stroke concern for seizure.   MRI no acute stroke  2 D echo EF 70%, normal DD, and no WMA.  BP also improving . Seizure vs acute hypertensive encephalopathy???  Currently AAO x 4 while EEG in progress.   Follow up Neurology recommendations.       COPD (chronic obstructive pulmonary disease)  Lungs are diminished on exam. No further wheezing after steroid. Continue steroid and duoneb and supplemental oxygen to keep O2 88-92%.       Sleep apnea  Continue home CPAP      Hyperlipidemia  Lab Results   Component Value Date    LDLCALC 196.4 (H) 08/17/2019   not on statin??Start statin        Essential hypertension  Controlled overnght up until stroke code. STroke code in  progress. Permissive htn for now.       Paroxysmal atrial fibrillation  EKG NSR and Tele NSR. Continue home eliquis and diltiazem.     Chronic respiratory failure with hypoxia  See above COPD      History of deep vein thrombosis  On Eliquis      History of pulmonary embolism  On Eliquis for atrial fib.       Anemia of chronic disease  Stable. Add vitamin b 12       Tobacco abuse  Quit smoking 2 months ago. Continue smoking cessation.       Gastroesophageal reflux disease without esophagitis  Continue protonix      Controlled type 2 diabetes mellitus, without long-term current use of insulin  Lab Results   Component Value Date    HGBA1C 7.4 (H) 11/04/2019    HGBA1C 7.4 (H) 11/04/2019   SSI and add prandial basal accordingly         VTE Risk Mitigation (From admission, onward)         Ordered     apixaban tablet 5 mg  2 times daily      11/04/19 1230     IP VTE HIGH RISK PATIENT  Once      11/04/19 0529     Place sequential compression device  Until discontinued      11/04/19 0529     Place TOM hose  Until discontinued      11/04/19 0529                      Amy Castellanos NP  Department of Hospital Medicine   [facility]

## 2019-11-05 VITALS
HEART RATE: 72 BPM | RESPIRATION RATE: 16 BRPM | OXYGEN SATURATION: 99 % | DIASTOLIC BLOOD PRESSURE: 76 MMHG | TEMPERATURE: 98 F | WEIGHT: 169.06 LBS | HEIGHT: 61 IN | BODY MASS INDEX: 31.92 KG/M2 | SYSTOLIC BLOOD PRESSURE: 111 MMHG

## 2019-11-05 LAB
ALBUMIN SERPL BCP-MCNC: 3.1 G/DL (ref 3.5–5.2)
ALP SERPL-CCNC: 76 U/L (ref 55–135)
ALT SERPL W/O P-5'-P-CCNC: 8 U/L (ref 10–44)
ANION GAP SERPL CALC-SCNC: 7 MMOL/L (ref 8–16)
AST SERPL-CCNC: 9 U/L (ref 10–40)
BASOPHILS # BLD AUTO: 0.02 K/UL (ref 0–0.2)
BASOPHILS NFR BLD: 0.1 % (ref 0–1.9)
BILIRUB SERPL-MCNC: 0.2 MG/DL (ref 0.1–1)
BUN SERPL-MCNC: 17 MG/DL (ref 8–23)
CALCIUM SERPL-MCNC: 9.4 MG/DL (ref 8.7–10.5)
CHLORIDE SERPL-SCNC: 95 MMOL/L (ref 95–110)
CHOLEST SERPL-MCNC: 209 MG/DL (ref 120–199)
CHOLEST/HDLC SERPL: 5.1 {RATIO} (ref 2–5)
CO2 SERPL-SCNC: 38 MMOL/L (ref 23–29)
CREAT SERPL-MCNC: 1 MG/DL (ref 0.5–1.4)
DIFFERENTIAL METHOD: ABNORMAL
EOSINOPHIL # BLD AUTO: 0 K/UL (ref 0–0.5)
EOSINOPHIL NFR BLD: 0 % (ref 0–8)
ERYTHROCYTE [DISTWIDTH] IN BLOOD BY AUTOMATED COUNT: 16.7 % (ref 11.5–14.5)
EST. GFR  (AFRICAN AMERICAN): >60 ML/MIN/1.73 M^2
EST. GFR  (NON AFRICAN AMERICAN): 58 ML/MIN/1.73 M^2
FOLATE SERPL-MCNC: 11.3 NG/ML (ref 4–24)
GLUCOSE SERPL-MCNC: 386 MG/DL (ref 70–110)
HCT VFR BLD AUTO: 34.4 % (ref 37–48.5)
HDLC SERPL-MCNC: 41 MG/DL (ref 40–75)
HDLC SERPL: 19.6 % (ref 20–50)
HGB BLD-MCNC: 9.9 G/DL (ref 12–16)
IMM GRANULOCYTES # BLD AUTO: 0.12 K/UL (ref 0–0.04)
IMM GRANULOCYTES NFR BLD AUTO: 0.9 % (ref 0–0.5)
LDLC SERPL CALC-MCNC: 150 MG/DL (ref 63–159)
LYMPHOCYTES # BLD AUTO: 1.2 K/UL (ref 1–4.8)
LYMPHOCYTES NFR BLD: 8.5 % (ref 18–48)
MAGNESIUM SERPL-MCNC: 2.3 MG/DL (ref 1.6–2.6)
MCH RBC QN AUTO: 27.5 PG (ref 27–31)
MCHC RBC AUTO-ENTMCNC: 28.8 G/DL (ref 32–36)
MCV RBC AUTO: 96 FL (ref 82–98)
MONOCYTES # BLD AUTO: 0.4 K/UL (ref 0.3–1)
MONOCYTES NFR BLD: 2.6 % (ref 4–15)
NEUTROPHILS # BLD AUTO: 12.4 K/UL (ref 1.8–7.7)
NEUTROPHILS NFR BLD: 87.9 % (ref 38–73)
NONHDLC SERPL-MCNC: 168 MG/DL
NRBC BLD-RTO: 0 /100 WBC
PHOSPHATE SERPL-MCNC: 2.8 MG/DL (ref 2.7–4.5)
PLATELET # BLD AUTO: 355 K/UL (ref 150–350)
PMV BLD AUTO: 9.8 FL (ref 9.2–12.9)
POCT GLUCOSE: 364 MG/DL (ref 70–110)
POCT GLUCOSE: 412 MG/DL (ref 70–110)
POCT GLUCOSE: 463 MG/DL (ref 70–110)
POCT GLUCOSE: 466 MG/DL (ref 70–110)
POTASSIUM SERPL-SCNC: 4.2 MMOL/L (ref 3.5–5.1)
PROT SERPL-MCNC: 6.3 G/DL (ref 6–8.4)
RBC # BLD AUTO: 3.6 M/UL (ref 4–5.4)
SODIUM SERPL-SCNC: 140 MMOL/L (ref 136–145)
TRIGL SERPL-MCNC: 90 MG/DL (ref 30–150)
VIT B12 SERPL-MCNC: 311 PG/ML (ref 210–950)
WBC # BLD AUTO: 14.04 K/UL (ref 3.9–12.7)

## 2019-11-05 PROCEDURE — 83735 ASSAY OF MAGNESIUM: CPT

## 2019-11-05 PROCEDURE — 99214 PR OFFICE/OUTPT VISIT, EST, LEVL IV, 30-39 MIN: ICD-10-PCS | Mod: ,,, | Performed by: PSYCHIATRY & NEUROLOGY

## 2019-11-05 PROCEDURE — 25000003 PHARM REV CODE 250: Performed by: NURSE PRACTITIONER

## 2019-11-05 PROCEDURE — 94799 UNLISTED PULMONARY SVC/PX: CPT

## 2019-11-05 PROCEDURE — 94761 N-INVAS EAR/PLS OXIMETRY MLT: CPT

## 2019-11-05 PROCEDURE — 96376 TX/PRO/DX INJ SAME DRUG ADON: CPT

## 2019-11-05 PROCEDURE — 99900035 HC TECH TIME PER 15 MIN (STAT)

## 2019-11-05 PROCEDURE — 80053 COMPREHEN METABOLIC PANEL: CPT

## 2019-11-05 PROCEDURE — 36415 COLL VENOUS BLD VENIPUNCTURE: CPT

## 2019-11-05 PROCEDURE — 99214 OFFICE O/P EST MOD 30 MIN: CPT | Mod: ,,, | Performed by: PSYCHIATRY & NEUROLOGY

## 2019-11-05 PROCEDURE — 94660 CPAP INITIATION&MGMT: CPT

## 2019-11-05 PROCEDURE — 63600175 PHARM REV CODE 636 W HCPCS: Performed by: NURSE PRACTITIONER

## 2019-11-05 PROCEDURE — 80061 LIPID PANEL: CPT

## 2019-11-05 PROCEDURE — 25000003 PHARM REV CODE 250: Performed by: HOSPITALIST

## 2019-11-05 PROCEDURE — 27000190 HC CPAP FULL FACE MASK W/VALVE

## 2019-11-05 PROCEDURE — 82607 VITAMIN B-12: CPT

## 2019-11-05 PROCEDURE — 63600175 PHARM REV CODE 636 W HCPCS: Performed by: HOSPITALIST

## 2019-11-05 PROCEDURE — 27000221 HC OXYGEN, UP TO 24 HOURS

## 2019-11-05 PROCEDURE — 96372 THER/PROPH/DIAG INJ SC/IM: CPT

## 2019-11-05 PROCEDURE — 94640 AIRWAY INHALATION TREATMENT: CPT

## 2019-11-05 PROCEDURE — 85025 COMPLETE CBC W/AUTO DIFF WBC: CPT

## 2019-11-05 PROCEDURE — 82746 ASSAY OF FOLIC ACID SERUM: CPT

## 2019-11-05 PROCEDURE — 84100 ASSAY OF PHOSPHORUS: CPT

## 2019-11-05 PROCEDURE — G0378 HOSPITAL OBSERVATION PER HR: HCPCS

## 2019-11-05 PROCEDURE — 92523 SPEECH SOUND LANG COMPREHEN: CPT

## 2019-11-05 PROCEDURE — 25000242 PHARM REV CODE 250 ALT 637 W/ HCPCS: Performed by: NURSE PRACTITIONER

## 2019-11-05 RX ORDER — INSULIN ASPART 100 [IU]/ML
20 INJECTION, SOLUTION INTRAVENOUS; SUBCUTANEOUS ONCE
Status: COMPLETED | OUTPATIENT
Start: 2019-11-05 | End: 2019-11-05

## 2019-11-05 RX ADMIN — APIXABAN 5 MG: 5 TABLET, FILM COATED ORAL at 08:11

## 2019-11-05 RX ADMIN — INSULIN ASPART 20 UNITS: 100 INJECTION, SOLUTION INTRAVENOUS; SUBCUTANEOUS at 12:11

## 2019-11-05 RX ADMIN — INSULIN ASPART 10 UNITS: 100 INJECTION, SOLUTION INTRAVENOUS; SUBCUTANEOUS at 06:11

## 2019-11-05 RX ADMIN — FUROSEMIDE 40 MG: 10 INJECTION, SOLUTION INTRAVENOUS at 08:11

## 2019-11-05 RX ADMIN — ISOSORBIDE MONONITRATE 30 MG: 30 TABLET, EXTENDED RELEASE ORAL at 08:11

## 2019-11-05 RX ADMIN — IPRATROPIUM BROMIDE AND ALBUTEROL SULFATE 3 ML: .5; 3 SOLUTION RESPIRATORY (INHALATION) at 12:11

## 2019-11-05 RX ADMIN — PANTOPRAZOLE SODIUM 40 MG: 40 TABLET, DELAYED RELEASE ORAL at 08:11

## 2019-11-05 RX ADMIN — HYDRALAZINE HYDROCHLORIDE 50 MG: 25 TABLET, FILM COATED ORAL at 06:11

## 2019-11-05 RX ADMIN — AMIODARONE HYDROCHLORIDE 200 MG: 200 TABLET ORAL at 08:11

## 2019-11-05 RX ADMIN — GUAIFENESIN AND DEXTROMETHORPHAN 10 ML: 100; 10 SYRUP ORAL at 12:11

## 2019-11-05 RX ADMIN — ASPIRIN 81 MG: 81 TABLET, COATED ORAL at 08:11

## 2019-11-05 RX ADMIN — BENZONATATE 200 MG: 100 CAPSULE ORAL at 08:11

## 2019-11-05 RX ADMIN — DILTIAZEM HYDROCHLORIDE 180 MG: 180 CAPSULE, COATED, EXTENDED RELEASE ORAL at 08:11

## 2019-11-05 RX ADMIN — AZITHROMYCIN MONOHYDRATE 500 MG: 250 TABLET ORAL at 08:11

## 2019-11-05 RX ADMIN — METHYLPREDNISOLONE SODIUM SUCCINATE 80 MG: 125 INJECTION, POWDER, FOR SOLUTION INTRAMUSCULAR; INTRAVENOUS at 06:11

## 2019-11-05 RX ADMIN — SOTALOL HYDROCHLORIDE 80 MG: 80 TABLET ORAL at 08:11

## 2019-11-05 RX ADMIN — LISINOPRIL 40 MG: 20 TABLET ORAL at 08:11

## 2019-11-05 RX ADMIN — INSULIN ASPART 10 UNITS: 100 INJECTION, SOLUTION INTRAVENOUS; SUBCUTANEOUS at 11:11

## 2019-11-05 RX ADMIN — IPRATROPIUM BROMIDE AND ALBUTEROL SULFATE 3 ML: .5; 3 SOLUTION RESPIRATORY (INHALATION) at 08:11

## 2019-11-05 NOTE — PLAN OF CARE
Problem: Adult Inpatient Plan of Care  Goal: Plan of Care Review  Flowsheets (Taken 11/5/2019 0322)  Plan of Care Reviewed With: patient     Problem: Fall Injury Risk  Goal: Absence of Fall and Fall-Related Injury  Intervention: Identify and Manage Contributors to Fall Injury Risk  Flowsheets (Taken 11/5/2019 0322)  Self-Care Promotion: independence encouraged  Medication Review/Management: medications reviewed  Intervention: Promote Injury-Free Environment  Flowsheets (Taken 11/5/2019 0322)  Safety Promotion/Fall Prevention: Fall Risk reviewed with patient/family; lighting adjusted; medications reviewed; side rails raised x 2  Environmental Safety Modification: room near unit station; clutter free environment maintained; lighting adjusted     Problem: Diabetes Comorbidity  Goal: Blood Glucose Level Within Desired Range  Intervention: Maintain Glycemic Control  Flowsheets (Taken 11/5/2019 0322)  Glycemic Management: blood glucose monitoring     Problem: Skin Injury Risk Increased  Goal: Skin Health and Integrity  Intervention: Optimize Skin Protection  Flowsheets (Taken 11/5/2019 0322)  Skin Protection: --  Head of Bed (HOB): HOB at 60-90 degrees     Problem: Fluid Volume Excess  Goal: Fluid Balance  Intervention: Monitor and Manage Hypervolemia  Flowsheets (Taken 11/5/2019 0322)  Skin Protection: incontinence pads utilized  Fluid/Electrolyte Management: fluids restricted     Patient remained free of injury throughout the shift. Vital signs remained WNL. Complaints of back pain noted and treated with prn tylenol with relief noted. Neurology consulted. Plan to continue IV lasix, plan for EEG in the am, and await further input from the consulted provider. Patient updated on plan of care and verbalized understanding. Call light in reach, patient instructed to inform the nurse if anything is needed, and bed alarm activated to maintain patient safety. Patient stable and will continue to be monitored.

## 2019-11-05 NOTE — CONSULTS
[facility]  Neurology  Consult Note    Patient Name: Yasmeen Palm  MRN: [id number]  Admission Date: 11/3/2019  Hospital Length of Stay: 0 days  Code Status: Full Code   Attending Provider: Blanca Carvajal MD   Consulting Provider: Romeo Zavala MD  Primary Care Physician: Agnel Orourke Jr, MD  Principal Problem:Acute on chronic diastolic heart failure    Consults  Subjective:     Chief Complaint/Reason for consult: Aphasia    HPI: 70 y/o female with medical Hx as listed below comes to ED for shortness of breath and chest pain. Pt has been consulted for aphasia althgouh she denies any speech or visual disturbances headaches, vertigo, unilateral weakness/numbness. According to notes pt had an episode of lethargy/confusion. No unilateral motor symptoms reported.No previous Hx of stroke. Pt is on apixaban.    Past Medical History:   Diagnosis Date    Anticoagulant long-term use     Xarelto    Arthritis     Asthma     CHF (congestive heart failure)     COPD (chronic obstructive pulmonary disease)     Coronary artery disease     Deep vein thrombosis (DVT) of left lower extremity     Depression     Diabetes mellitus     GERD (gastroesophageal reflux disease)     Hypertension     MI (myocardial infarction) 08/2019    Obstructive sleep apnea on CPAP     On home oxygen therapy     Unsteady gait     uses either walker or 4 prong cane       Past Surgical History:   Procedure Laterality Date    CARDIAC CATHETERIZATION  06/2018    CORONARY ANGIOPLASTY WITH STENT PLACEMENT      HERNIA REPAIR      encapsulated umbilical hernia       Review of patient's allergies indicates:  No Known Allergies    Current Neurological Medications:     No current facility-administered medications on file prior to encounter.      Current Outpatient Medications on File Prior to Encounter   Medication Sig    apixaban (ELIQUIS) 5 mg Tab Take 5 mg by mouth 2 (two) times daily.    aspirin (ECOTRIN) 81 MG EC  tablet Take 81 mg by mouth once daily.    diltiaZEM (CARDIZEM CD) 180 MG 24 hr capsule Take 1 capsule (180 mg total) by mouth once daily. (Patient taking differently: Take 90 mg by mouth 3 (three) times daily. )    docusate sodium (COLACE) 100 MG capsule Take 1 capsule (100 mg total) by mouth 2 (two) times daily.    furosemide (LASIX) 40 MG tablet Take 40 mg by mouth once daily.     hydrALAZINE (APRESOLINE) 50 MG tablet Take 50 mg by mouth every 8 (eight) hours.     lisinopril (PRINIVIL,ZESTRIL) 40 MG tablet Take 1 tablet (40 mg total) by mouth once daily. Do not take this medication if blood pressure is below 130/80 mmHg and/or feeling dizzy after taking all other blood pressure meds    metFORMIN (GLUCOPHAGE) 1000 MG tablet Take 1 tablet (1,000 mg total) by mouth 2 (two) times daily with meals.    multivit-min-FA-lycopen-lutein (CENTRUM SILVER) 0.4-300-250 mg-mcg-mcg Tab Take 1 tablet by mouth once daily.    pantoprazole (PROTONIX) 40 MG tablet Take 40 mg by mouth once daily.    pregabalin (LYRICA) 75 MG capsule Take 75 mg by mouth 3 (three) times daily.    sotalol (BETAPACE) 120 MG Tab Take 80 mg by mouth 2 (two) times daily.    acetaminophen (TYLENOL) 500 MG tablet Take 1 tablet (500 mg total) by mouth every 8 (eight) hours as needed.    acetaminophen (TYLENOL) 500 MG tablet Take 500 mg by mouth.    albuterol (ACCUNEB) 1.25 mg/3 mL Nebu Inhale 1.25 mg into the lungs.    albuterol-ipratropium (DUO-NEB) 2.5 mg-0.5 mg/3 mL nebulizer solution Take 3 mLs by nebulization every 6 (six) hours. Rescue    ferrous gluconate (FERGON) 324 MG tablet Take 1 tablet (324 mg total) by mouth 3 (three) times daily with meals.    fluticasone propionate (FLOVENT HFA) 220 mcg/actuation inhaler Inhale 1 puff into the lungs 2 (two) times daily. Controller    gabapentin (NEURONTIN) 300 MG capsule Take 300 mg by mouth.    ipratropium-albuterol (COMBIVENT)  mcg/actuation inhaler Inhale 1 puff into the lungs every 6  "(six) hours as needed for Wheezing or Shortness of Breath. Rescue    isosorbide mononitrate (IMDUR) 30 MG 24 hr tablet Take 30 mg by mouth.    nitroGLYCERIN (NITROSTAT) 0.4 MG SL tablet     polyethylene glycol (GLYCOLAX) 17 gram PwPk Take 17 g by mouth 2 (two) times daily.    traMADol (ULTRAM) 50 mg tablet     umeclidinium brm/vilanterol tr (ANORO ELLIPTA INHL) Inhale into the lungs.      Family History     Problem Relation (Age of Onset)    Diabetes Father    Heart disease Mother    Hypertension Mother        Tobacco Use    Smoking status: Former Smoker     Packs/day: 0.50     Years: 55.00     Pack years: 27.50     Types: Cigarettes     Start date: 1963     Last attempt to quit: 2018     Years since quittin.8    Smokeless tobacco: Never Used    Tobacco comment: "States she quit smoking about 3 or 4 weeks ago"   Substance and Sexual Activity    Alcohol use: Not Currently     Alcohol/week: 1.0 standard drinks     Types: 1 Glasses of wine per week     Frequency: Never     Comment: socially    Drug use: No    Sexual activity: Never     Review of Systems   Constitutional: Negative for fever.   HENT: Negative for trouble swallowing.    Eyes: Negative for photophobia.   Respiratory: Positive for shortness of breath.    Cardiovascular: Negative for chest pain.   Gastrointestinal: Negative for abdominal pain.   Genitourinary: Negative for dysuria.   Musculoskeletal: Negative for back pain.   Neurological: Negative for headaches.   Psychiatric/Behavioral: Negative for confusion.     Objective:     Vital Signs (Most Recent):  Temp: 98 °F (36.7 °C) (19 1110)  Pulse: 81 (19 1110)  Resp: 17 (19 1110)  BP: 111/76 (19 1110)  SpO2: (!) 89 % (19 1110) Vital Signs (24h Range):  Temp:  [97.8 °F (36.6 °C)-98.2 °F (36.8 °C)] 98 °F (36.7 °C)  Pulse:  [74-88] 81  Resp:  [16-22] 17  SpO2:  [89 %-98 %] 89 %  BP: (103-151)/(52-76) 111/76     Weight: 76.7 kg (169 lb 1.5 oz)  Body mass " index is 31.95 kg/m².    Physical Exam   Constitutional: She is oriented to person, place, and time.   HENT:   Head: Normocephalic.   Eyes: Right eye exhibits no discharge. Left eye exhibits no discharge.   Neck: Normal range of motion.   Cardiovascular: Normal rate.   Pulmonary/Chest: She exhibits no tenderness.   Abdominal: Soft.   Musculoskeletal: She exhibits no deformity.   Neurological: She is oriented to person, place, and time. She has a normal Finger-Nose-Finger Test.   Skin: She is not diaphoretic.   Psychiatric: Her speech is normal.       NEUROLOGICAL EXAMINATION:     MENTAL STATUS   Oriented to person, place, and time.   Speech: speech is normal   Level of consciousness: alert    CRANIAL NERVES     CN III, IV, VI   Right pupil: Size: 2 mm. Shape: regular.   Left pupil: Size: 2 mm. Shape: regular.   Nystagmus: none   Ophthalmoparesis: none    CN V   Right facial sensation deficit: none  Left facial sensation deficit: none    CN VII   Right facial weakness: none  Left facial weakness: none    CN XI   Right trapezius strength: normal  Left trapezius strength: normal    CN XII   Tongue deviation: none    MOTOR EXAM        4/5 in UE's and LE's     GAIT AND COORDINATION      Coordination   Finger to nose coordination: normal    Tremor   Resting tremor: absent      Significant Labs:   CBC:   Recent Labs   Lab 11/04/19  0545 11/04/19  1402 11/05/19  0343   WBC 12.02 15.11* 14.04*   HGB 10.2* 10.2* 9.9*   HCT 36.1* 35.8* 34.4*    SEE COMMENT 355*     CMP:   Recent Labs   Lab 11/03/19  2357 11/04/19  0544 11/04/19  1402 11/05/19  0343   * 105 291* 386*    138 139 140   K 4.3 4.3 4.4 4.2   CL 99 96 96 95   CO2 31* 36* 33* 38*   BUN 11 10 15 17   CREATININE 0.7 0.7 0.8 1.0   CALCIUM 9.3 9.7 9.3 9.4   MG 1.5*  --   --  2.3   PROT 6.3 6.4 6.5 6.3   ALBUMIN 3.1* 3.1* 3.2* 3.1*   BILITOT 0.3 0.4 0.3 0.2   ALKPHOS 80 81 82 76   AST 13 13 12 9*   ALT 14 9* 10 8*   ANIONGAP 7* 6* 10 7*   EGFRNONAA >60  >60 >60 58*       Significant Imaging:  MRI brain  Impression       1. Periventricular white matter changes likely from chronic microvascular ischemia.  2. No acute infarctions.  3. Punctate hypo intensities in the thalamus and the basal ganglia and the left posterior temporal lobe, likely from hypertensive vasculopathy.      Electronically signed by: Jaskaran Garcia MD  Date: 11/04/2019  Time: 15:44         Assessment and Plan:     70 y/o female consulted for aphasia    1. Aphasia: her symptoms suggest an acute encephalopathy. No stroke on MRI. No focal findings on exam. DDx: seizures   EEG shows no seizures or clear epileptiform activity.   -Continue OAC. Statin. COPD/CHF management.   -OK to D/C home.       Active Diagnoses:    Diagnosis Date Noted POA    PRINCIPAL PROBLEM:  Acute on chronic diastolic heart failure [I50.33] 06/07/2019 Yes     Chronic    Aphasia [R47.01] 11/04/2019 Yes    Sleep apnea [G47.30] 11/04/2019 Yes    Essential hypertension [I10] 03/24/2019 Yes     Chronic    COPD (chronic obstructive pulmonary disease) [J44.9] 03/24/2019 Yes     Chronic    Hyperlipidemia [E78.5] 03/24/2019 Yes     Chronic    Paroxysmal atrial fibrillation [I48.0] 06/25/2018 Yes     Chronic    History of pulmonary embolism [Z86.711] 04/10/2017 Yes     Chronic    History of deep vein thrombosis [Z86.718] 04/10/2017 Not Applicable     Chronic    Chronic anticoagulation [Z79.01] 04/10/2017 Not Applicable     Chronic    Chronic respiratory failure with hypoxia [J96.11] 04/10/2017 Yes     Chronic    Anemia of chronic disease [D63.8] 09/27/2016 Yes     Chronic    Controlled type 2 diabetes mellitus, without long-term current use of insulin [E11.9] 12/14/2015 Yes     Chronic    Tobacco abuse [Z72.0] 12/14/2015 Yes     Chronic    Gastroesophageal reflux disease without esophagitis [K21.9] 12/14/2015 Yes     Chronic      Problems Resolved During this Admission:       VTE Risk Mitigation (From admission, onward)          Ordered     apixaban tablet 5 mg  2 times daily      11/04/19 1230     IP VTE HIGH RISK PATIENT  Once      11/04/19 0529     Place sequential compression device  Until discontinued      11/04/19 0529     Place TOM hose  Until discontinued      11/04/19 0529                Thank you for your consult. I will follow-up with patient. Please contact us if you have any additional questions.    Romeo Zavala MD  Neurology  [facility]

## 2019-11-05 NOTE — PLAN OF CARE
11/05/19 1145   Final Note   Assessment Type Final Discharge Note   Anticipated Discharge Disposition Home-Health   Hospital Follow Up  Appt(s) scheduled? Yes   Discharge plans and expectations educations in teach back method with documentation complete? Yes   Right Care Referral Info   Post Acute Recommendation Home-care   Referral Type home health    Facility Name [facility]    pts nurse Smiley notified that pt can d/c from CM standpoint.

## 2019-11-05 NOTE — PT/OT/SLP EVAL
"Speech Language Pathology Evaluation  Cognitive Communication    Patient Name:  Yasmeen Palm   MRN:  [id number]   W325/W325 A    Admitting Diagnosis: Acute on chronic diastolic heart failure    Recommendations:     Recommendations:                General Recommendations:  Speech/language therapy and Cognitive-linguistic therapy  Diet recommendations:  Regular, Thin   Aspiration Precautions: Standard aspiration precautions   General Precautions: Standard, fall, respiratory  Communication strategies:  none    History:     Past Medical History:   Diagnosis Date    Anticoagulant long-term use     Xarelto    Arthritis     Asthma     CHF (congestive heart failure)     COPD (chronic obstructive pulmonary disease)     Coronary artery disease     Deep vein thrombosis (DVT) of left lower extremity     Depression     Diabetes mellitus     GERD (gastroesophageal reflux disease)     Hypertension     MI (myocardial infarction) 08/2019    Obstructive sleep apnea on CPAP     On home oxygen therapy     Unsteady gait     uses either walker or 4 prong cane       Past Surgical History:   Procedure Laterality Date    CARDIAC CATHETERIZATION  06/2018    CORONARY ANGIOPLASTY WITH STENT PLACEMENT      HERNIA REPAIR      encapsulated umbilical hernia       Social History: Patient lives with . She was not working prior to admission.     Subjective     Patient awake, pleasant, and cooperative.   Patient states, "They come to your house and talk to you like you're stupid." -patient on previous  ST    Objective:     COGNITIVE STATUS:  Behavioral Observations: alert, appropriate and cooperative-  Memory:  · Immediate: WFL  · Short Term: WFL  · Long Term: WFL  Orientation: Patient oriented to name, situation, month, and place. She was off by 1 year for age, reported the year was 1920.   Attention: wfl.  Problem Solving: wfl  Insight Awareness: wfl  Sequencing:   · Functional Events: wfl  · Mental Manipulation: " wfl  Pragmatics: wfl  Simple Money/Time Management: impaired    LANGUAGE:   Receptive Language:  Simple y/n Questions: wfl  Complex y/n Questions: wfl  Identification: wfl  1 Step Directions: wfl  2 Step Directions: 100% acc, however delayed responses  Complex Directions: 100% acc, however delayed responses    Expressive Language:   Naming:   · Divergent: impaired; named 7 animals in 60 seconds given cues  · Convergent: delayed responses; 100% acc  · Confrontational: 100%  Automatic Speech: 100%  Sentence Completion: delayed responses; 100%  Responsive Speech: delayed responses; 100%  Repetition: delayed responses; 100%  Conversation: impaired; noted frequent word finding difficulties in conversation; patient reports frequent difficulties finding words    Motor Speech: possible Apraxia of Speech; further assessment required    Voice: wfl    Augmentative Alternative Communication: no    Reading: tba     Writing: tba    Visual-Spatial: tba      Treatment: SLP provided patient education on SLP role, noted impairments, and SLP role. All questions addressed. Patient verbalized understanding of all discussed and is in agreement with POC.      Assessment:   Yasmeen Palm is a 69 y.o. female with an SLP diagnosis of possible apraxia of speech,  and Cognitive-Linguistic Impairment.  ST will continue to follow. Patient would benefit from Home Health vs. Outpatient Speech Therapy s/p discharge.    Goals:   Multidisciplinary Problems     SLP Goals        Problem: SLP Goal    Goal Priority Disciplines Outcome   SLP Goal    Low SLP Ongoing, Progressing   Description:  Updated Short Term Goals:   1. Pt will tolerate regular with thin liquids without overt s/s of aspiration.  2. Pt will name 10 items in a concrete category in 60 seconds given min cues.   3. Pt will repeat 3+ syllable words with 90% acc given no repetitions.   4. Pt will answer orientation questions x4 with 100% acc.   5. Pt will complete complex word finding  activities with 90% acc min cues.   6. Pt will answer simple money/time management questions with 85% acc min cues.   7. Pt will participate in further assessment of reading, writing, visual spatial skills, and motor speech.   STG  1. Pt will tolerate regular with thin liquids without overt s/s of aspiration. -ongoing  2. Cognitive linguistic eval pending MRI -met                     Plan:   · Patient to be seen:  2 x/week, 3 x/week   · Plan of Care expires:     · Plan of Care reviewed with:  patient   · SLP Follow-Up:  Yes       Discharge recommendations:  Discharge Facility/Level of Care Needs: outpatient speech therapy   Barriers to Discharge:  None    Time Tracking:   SLP Treatment Date:   11/05/19  Speech Start Time:  0926  Speech Stop Time:  0946     Speech Total Time (min):  20 min    Billable Minutes: Eval 20     CARRIE Hicks, CCC-SLP  11/05/2019

## 2019-11-05 NOTE — PROCEDURES
DATE OF PROCEDURE:  11/04/2019    DURATION:  28 minutes 58 seconds.    ELECTROENCEPHALOGRAM REPORT    METHODOLOGY:  Electroencephalographic (EEG) recording is recorded with   electrodes placed according to the International 10-20 placement system.  Thirty   two (32) channels of digital signal (sampling rate of 512/sec), including T1   and T2, were simultaneously recorded from the scalp and may include EKG, EMG,   and/or eye monitors.  Recording band pass was 0.1 to 512 Hz.  Digital video   recording of the patient is simultaneously recorded with the EEG.  The patient   is instructed to report clinical symptoms which may occur during the recording   session.  EEG and video recording are stored and archived in digital format.    Activation procedures, which include photic stimulation, hyperventilation and   instructing patients to perform simple tasks, are done in selected patients.  The EEG is displayed on a monitor screen and can be reviewed using different   montages.  Computer assisted-analysis is employed to detect spike and   electrographic seizure activity.  The entire record is submitted for computer   analysis.  The entire recording is visually reviewed, and the times identified   by computer analysis as being spikes or seizures are reviewed again.  Compressed spectral analysis (CSA) is also performed on the activity recorded   from each individual channel.  This is displayed as a power display of   frequencies from 0 to 30 Hz over time.  The CSA is reviewed looking for   asymmetries in power between homologous areas of the scalp, then compared with   the original EEG recording.  [employer] software was also utilized in the review of this study.  This software   suite analyzes the EEG recording in multiple domains.  Coherence and rhythmicity   are computed to identify EEG sections which may contain organized seizures.    Each channel undergoes analysis to detect the presence of spike and sharp waves   which have  special and morphological characteristics of epileptic activity.  The   routine EEG recording is converted from special into frequency domain.  This is   then displayed comparing homologous areas to identify areas of significant   asymmetry.  Algorithm to identify non-cortically generated artifact is used to   separate artifact from the EEG.    EEG FINDINGS:  This study reveals disorganized mix of multiple frequencies   including abundant alpha and beta activity seen diffusely, but also with admixed   theta frequencies in the 5 to 7 Hz range and occasional delta frequencies seen   during the waking background.  At times, a 6 to 7 Hz posterior dominant rhythm   can be seen in fragments.  Diffuse beta activity is seen during much of the   waking record.  Intermittent rhythmic delta activity is occasionally seen during   waking and drowsiness.  Multifocal sharply contoured waves are occasionally   seen, but no clear distinguishable epileptiform discharges can be noted.  No   seizures were seen.  No focal slowing was seen.  EKG channel was regular.    INTERPRETATION:  Abnormal EEG due to diffuse slowing and disorganization of the   waking background, suggesting a mild-to-moderate encephalopathy without clear   focal features or epileptiform disturbances.      NBB/IN  dd: 11/05/2019 13:38:25 (CST)  td: 11/05/2019 14:02:06 (CST)  Doc ID   #[id number]  Job ID #[id number]    CC:

## 2019-11-05 NOTE — NURSING
Pt received D/C instructions and verbalized understanding. Pt IV and telemetry removed and stated that W/C van can be cancelled. Pt brother in law called on phone and pt was reminded to let him know do not forget her oxygen. Pt got off the phone w/ brother in law and refused to tell him stating that he doesn't know how to cut it on. Pt was informed that he does not need to cut it on he can just bring it. Pt stated no she will just go home.

## 2019-11-05 NOTE — PLAN OF CARE
11/05/19 1146   Post-Acute Status   Post-Acute Authorization Home Health/Hospice   Home Health/Hospice Status Set-up Complete   Discharge Delays (!) Patient and Family Barriers  (pt states she has no keys to her home and no one is able to come and get her till after her husbands 2pm appointment then her brother in law will get her after that)   1212- w/c van scheduled for transportation to home with oxygen for  now

## 2019-11-05 NOTE — NURSING
Received report from KINJAL Knox RN. Pt AAOx4 with no c/o pain. Telemetry monitor in place. Saline lock in place to site is clear. Pt informed of plan of care and safety maintained with bed low side rails up x2 with nurse call bell within reach

## 2019-11-05 NOTE — PLAN OF CARE
Problem: Fall Injury Risk  Goal: Absence of Fall and Fall-Related Injury  Outcome: Ongoing, Progressing  Intervention: Identify and Manage Contributors to Fall Injury Risk  Flowsheets (Taken 11/4/2019 1944)  Self-Care Promotion: independence encouraged; safe use of adaptive equipment encouraged  Medication Review/Management: medications reviewed  Intervention: Promote Injury-Free Environment  Flowsheets (Taken 11/4/2019 1944)  Safety Promotion/Fall Prevention: assistive device/personal item within reach; bed alarm set; commode/urinal/bedpan at bedside; Fall Risk reviewed with patient/family; lighting adjusted; medications reviewed; nonskid shoes/socks when out of bed; room near unit station; side rails raised x 2; instructed to call staff for mobility  Environmental Safety Modification: assistive device/personal items within reach; clutter free environment maintained; lighting adjusted; room near unit station; room organization consistent     Problem: Adult Inpatient Plan of Care  Goal: Plan of Care Review  Outcome: Ongoing, Progressing  Goal: Patient-Specific Goal (Individualization)  Outcome: Ongoing, Progressing  Goal: Absence of Hospital-Acquired Illness or Injury  Outcome: Ongoing, Progressing  Goal: Optimal Comfort and Wellbeing  Outcome: Ongoing, Progressing  Goal: Readiness for Transition of Care  Outcome: Ongoing, Progressing  Goal: Rounds/Family Conference  Outcome: Ongoing, Progressing     Problem: Diabetes Comorbidity  Goal: Blood Glucose Level Within Desired Range  Outcome: Ongoing, Progressing     Problem: Skin Injury Risk Increased  Goal: Skin Health and Integrity  Outcome: Ongoing, Progressing

## 2019-11-05 NOTE — DISCHARGE SUMMARY
[facility] Hospital Medicine  Discharge Summary      Patient Name: Yasmeen Palm  MRN: [id number]  Admission Date: 11/3/2019  Hospital Length of Stay: 0 days  Discharge Date and Time:  11/05/2019 11:28 AM  Attending Physician: Blanca Carvajal MD   Discharging Provider: GABRIELA Washington  Primary Care Provider: Angel Orourke Jr, MD      HPI:     Yasmeen Palm is a 69 y.o. female that (in part)  has a past medical history of Anticoagulant long-term use, Arthritis, Asthma, CHF (congestive heart failure), COPD (chronic obstructive pulmonary disease), Coronary artery disease, Deep vein thrombosis (DVT) of left lower extremity, Depression, Diabetes mellitus, GERD (gastroesophageal reflux disease), Hypertension, MI (myocardial infarction), Obstructive sleep apnea on CPAP, On home oxygen therapy, and Unsteady gait.  has a past surgical history that includes Coronary angioplasty with stent; Hernia repair; and Cardiac catheterization (06/2018). Presents to [facility] Emergency Department complaining of shortness of breath.  This is been a chronic problem for her however she has had subacute progressive worsening for the last 3 days.  Associated with minimally productive cough.Denies fever or chills.  Complains she has shortness of breath at rest as well as dyspnea with exertion.  Mild intermittent chest pain the last episode approximately 6:00 p.m. this evening.  No chest pain at this  time.  Positive for unintentional weight gain.  Previous history of simultaneous CHF exacerbation and COPD.  Reports compliance with home medication regimen.  Some mild relief with supplemental oxygen given in the ED.  She is on chronic anticoagulation for atrial fibrillation and history of pulmonary embolism and DVT.  Also complaining of peripheral edema in her lower extremities.  Denies syncope, cyanosis, or palpitations. No hemoptysis, stridor, or night sweats. No recent travel or sick  "contacts.    In the emergency department routine laboratory studies, chest x-ray, cardiac enzymes, an EKG was obtained.  History and findings are consistent with CHF exacerbation and exam consistent with COPD, so she likely has an acute exacerbation of both chronic conditions.  She was given Lasix for the CHF and started on BiPAP for both CHF and COPD along with nebulizer treatments and steroids.  BiPAP was weaned off.     Hospital medicine has been asked to admit for further evaluation and treatment.     * No surgery found *      Hospital Course:    is a 68 yo female who was admitted to hospital for acute on chronic diastolic heart failure and COPD exacerbation. IV lasix , steroid, azithromycin started. PT/OT recommended HH. O2 saturation 93% on 2 L NC. The patient was set for discharge and stable clinically when at about 1135 STROKE CODE was caused due to what was thought to be a "change in neuro status" and the transport personal reported that the patient acutely became aphasic. She was assessed at that time and her  and . Staff reports negative confusion and that the patient was calling out employees names. No change in bowel or bladder functions. CT head and MRI negative for bleed or stroke. She remained symptom free throughout the night. EEG obtained. There is high suspicion for malingering. Neurology was consulted and recommended monitoring overnight - with no new recommendations. She is already on apixaban. 2D echo showed LVEF 70% with normal diastolic function. Discharge to home with home health. Stable condition - fu with primary care in clinic.         Consults:   Consults (From admission, onward)        Status Ordering Provider     Inpatient consult to Neurology  Once     Provider:  Romeo Zavala MD    Acknowledged KAVYA GÓMEZ     Inpatient consult to Telemedicine-Stroke  Once     Provider:  (Not yet assigned)    Acknowledged TAMARA REYES          Sleep apnea  Continue " home CPAP      Aphasia  This new onset. . . Stroke code in progress. For CT head now and then ICU for stroke tele consult  Will follow up CT and stroke tele recommendations. ASA/on Eliquis/start statin  2 D echo pending  SP/PT/OT consult     Addendum 11/4/19 1646   Tele stroke concern for seizure.   MRI no acute stroke  2 D echo EF 70%, normal DD, and no WMA.  BP also improving . Seizure vs acute hypertensive encephalopathy???  Currently AAO x 4 while EEG in progress.   Follow up Neurology recommendations.         Final Active Diagnoses:    Diagnosis Date Noted POA    PRINCIPAL PROBLEM:  Acute on chronic diastolic heart failure [I50.33] 06/07/2019 Yes     Chronic    Essential hypertension [I10] 03/24/2019 Yes     Chronic    Chronic respiratory failure with hypoxia [J96.11] 04/10/2017 Yes     Chronic    Controlled type 2 diabetes mellitus, without long-term current use of insulin [E11.9] 12/14/2015 Yes     Chronic    History of pulmonary embolism [Z86.711] 04/10/2017 Yes     Chronic    Tobacco abuse [Z72.0] 12/14/2015 Yes     Chronic    Aphasia [R47.01] 11/04/2019 Yes    Sleep apnea [G47.30] 11/04/2019 Yes    COPD (chronic obstructive pulmonary disease) [J44.9] 03/24/2019 Yes     Chronic    Hyperlipidemia [E78.5] 03/24/2019 Yes     Chronic    Paroxysmal atrial fibrillation [I48.0] 06/25/2018 Yes     Chronic    History of deep vein thrombosis [Z86.718] 04/10/2017 Not Applicable     Chronic    Chronic anticoagulation [Z79.01] 04/10/2017 Not Applicable     Chronic    Anemia of chronic disease [D63.8] 09/27/2016 Yes     Chronic    Gastroesophageal reflux disease without esophagitis [K21.9] 12/14/2015 Yes     Chronic      Problems Resolved During this Admission:       Discharged Condition: stable    Disposition: Home-Health Care [facility]    Follow Up:  Follow-up Information     Braden Curiel DO   On 11/6/2019.    Why:  please keep already scheduled follow up appointment for November 6th at  scheduled time.  Contact information:  Braden Curiel, DO    [address] [place], [place] [address]    [id number]    [place] [address]    [phone]             Angel Orourke Jr, MD.    Specialty:  Family Medicine  Why:  call to schedule PCP follow up as needed  Contact information:  [address]  [address]  [facility] [address]  [phone]             [facility] [place].    Specialty:  Home Health Services  Why:  Home Health- please call number provided for questions or concerns regarding home health   Contact information:  [address]  [address]  [place] [address]  [phone]                 Patient Instructions:      Diet Cardiac     Diet diabetic     Diet diabetic     Notify your health care provider if you experience any of the following:  temperature >100.4     Notify your health care provider if you experience any of the following:  difficulty breathing or increased cough     Notify your health care provider if you experience any of the following:  increased confusion or weakness     Activity as tolerated     Activity as tolerated       Significant Diagnostic Studies: Labs:   CMP   Recent Labs   Lab 11/04/19  0544 11/04/19  1402 11/05/19  0343    139 140   K 4.3 4.4 4.2   CL 96 96 95   CO2 36* 33* 38*    291* 386*   BUN 10 15 17   CREATININE 0.7 0.8 1.0   CALCIUM 9.7 9.3 9.4   PROT 6.4 6.5 6.3   ALBUMIN 3.1* 3.2* 3.1*   BILITOT 0.4 0.3 0.2   ALKPHOS 81 82 76   AST 13 12 9*   ALT 9* 10 8*   ANIONGAP 6* 10 7*   ESTGFRAFRICA >60 >60 >60   EGFRNONAA >60 >60 58*   , CBC   Recent Labs   Lab 11/04/19  0545 11/04/19  1402 11/05/19  0343   WBC 12.02 15.11* 14.04*   HGB 10.2* 10.2* 9.9*   HCT 36.1* 35.8* 34.4*    SEE COMMENT 355*   , INR   Lab Results   Component Value Date    INR 1.1 11/04/2019    INR 1.0 11/03/2019    INR 1.0 08/28/2019   , Lipid Panel   Lab Results   Component Value Date    CHOL 209 (H) 11/05/2019    HDL 41 11/05/2019     LDLCALC 150.0 11/05/2019    TRIG 90 11/05/2019    CHOLHDL 19.6 (L) 11/05/2019   , Troponin   Recent Labs   Lab 11/04/19  1402   TROPONINI 0.011    and A1C:   Recent Labs   Lab 06/08/19  0351 07/01/19  0506 11/04/19  0545   HGBA1C 7.3*  7.3* 8.1* 7.4*  7.4*       Pending Diagnostic Studies:     None         Medications:  Reconciled Home Medications:      Medication List      START taking these medications    azithromycin 500 MG tablet  Commonly known as:  ZITHROMAX  Take 0.5 tablets (250 mg total) by mouth once daily. for 4 days     benzonatate 200 MG capsule  Commonly known as:  TESSALON  Take 1 capsule (200 mg total) by mouth 3 (three) times daily. for 10 days        CHANGE how you take these medications    ANORO ELLIPTA INHL  Inhale into the lungs.  What changed:  Another medication with the same name was removed. Continue taking this medication, and follow the directions you see here.     diltiaZEM 180 MG 24 hr capsule  Commonly known as:  CARDIZEM CD  Take 1 capsule (180 mg total) by mouth once daily.  What changed:    · how much to take  · when to take this     predniSONE 20 MG tablet  Commonly known as:  DELTASONE  Take 2 tablets (40 mg total) by mouth once daily. for 5 days  What changed:    · medication strength  · how much to take        CONTINUE taking these medications    * acetaminophen 500 MG tablet  Commonly known as:  TYLENOL  Take 1 tablet (500 mg total) by mouth every 8 (eight) hours as needed.     * acetaminophen 500 MG tablet  Commonly known as:  TYLENOL  Take 500 mg by mouth.     albuterol 1.25 mg/3 mL Nebu  Commonly known as:  ACCUNEB  Inhale 1.25 mg into the lungs.     amiodarone 200 MG Tab  Commonly known as:  PACERONE  Take 1 tablet (200 mg total) by mouth once daily.     aspirin 81 MG EC tablet  Commonly known as:  ECOTRIN  Take 81 mg by mouth once daily.     docusate sodium 100 MG capsule  Commonly known as:  COLACE  Take 1 capsule (100 mg total) by mouth 2 (two) times daily.     ELIQUIS  5 mg Tab  Generic drug:  apixaban  Take 5 mg by mouth 2 (two) times daily.     ferrous gluconate 324 MG tablet  Commonly known as:  FERGON  Take 1 tablet (324 mg total) by mouth 3 (three) times daily with meals.     fluticasone propionate 220 mcg/actuation inhaler  Commonly known as:  FLOVENT HFA  Inhale 1 puff into the lungs 2 (two) times daily. Controller     furosemide 40 MG tablet  Commonly known as:  LASIX  Take 40 mg by mouth once daily.     gabapentin 300 MG capsule  Commonly known as:  NEURONTIN  Take 300 mg by mouth.     hydrALAZINE 50 MG tablet  Commonly known as:  APRESOLINE  Take 50 mg by mouth every 8 (eight) hours.     * ipratropium-albuterol  mcg/actuation inhaler  Commonly known as:  COMBIVENT  Inhale 1 puff into the lungs every 6 (six) hours as needed for Wheezing or Shortness of Breath. Rescue     * albuterol-ipratropium 2.5 mg-0.5 mg/3 mL nebulizer solution  Commonly known as:  DUO-NEB  Take 3 mLs by nebulization every 6 (six) hours. Rescue     isosorbide mononitrate 30 MG 24 hr tablet  Commonly known as:  IMDUR  Take 30 mg by mouth.     lisinopril 40 MG tablet  Commonly known as:  PRINIVIL,ZESTRIL  Take 1 tablet (40 mg total) by mouth once daily. Do not take this medication if blood pressure is below 130/80 mmHg and/or feeling dizzy after taking all other blood pressure meds     metFORMIN 1000 MG tablet  Commonly known as:  GLUCOPHAGE  Take 1 tablet (1,000 mg total) by mouth 2 (two) times daily with meals.     multivit-min-FA-lycopen-lutein 0.4-300-250 mg-mcg-mcg Tab  Commonly known as:  CENTRUM SILVER  Take 1 tablet by mouth once daily.     nitroGLYCERIN 0.4 MG SL tablet  Commonly known as:  NITROSTAT     pantoprazole 40 MG tablet  Commonly known as:  PROTONIX  Take 40 mg by mouth once daily.     polyethylene glycol 17 gram Pwpk  Commonly known as:  GLYCOLAX  Take 17 g by mouth 2 (two) times daily.     pregabalin 75 MG capsule  Commonly known as:  LYRICA  Take 75 mg by mouth 3 (three)  times daily.     sotalol 120 MG Tab  Commonly known as:  BETAPACE  Take 80 mg by mouth 2 (two) times daily.     traMADol 50 mg tablet  Commonly known as:  ULTRAM         * This list has 4 medication(s) that are the same as other medications prescribed for you. Read the directions carefully, and ask your doctor or other care provider to review them with you.                Indwelling Lines/Drains at time of discharge:   Lines/Drains/Airways     None                 Time spent on the discharge of patient: 35 minutes  Patient was seen and examined on the date of discharge and determined to be suitable for discharge.       YOSVANY Faria, FNP-C  Hospitalist - Department of Hospital Medicine  [address], [place] [address]  Office [phone]; Pager [phone]

## 2019-11-06 PROCEDURE — G0180 MD CERTIFICATION HHA PATIENT: HCPCS | Mod: ,,, | Performed by: HOSPITALIST

## 2019-11-06 PROCEDURE — G0180 PR HOME HEALTH MD CERTIFICATION: ICD-10-PCS | Mod: ,,, | Performed by: HOSPITALIST

## 2019-11-06 NOTE — PT/OT/SLP DISCHARGE
Physical Therapy Discharge Summary    Name: Yasmeen Palm  MRN: [id number]   Principal Problem: Acute on chronic diastolic heart failure     Patient Discharged from acute Physical Therapy on 19.  Please refer to prior PT notes for functional status.     Assessment:     Patient appropriate for care in another setting.    Objective:     GOALS:   Multidisciplinary Problems     Physical Therapy Goals     Not on file          Multidisciplinary Problems (Resolved)        Problem: Physical Therapy Goal    Goal Priority Disciplines Outcome Goal Variances Interventions   Physical Therapy Goal   (Resolved)     PT, PT/OT Met     Description:  Goals to be met by: 19     Patient will increase functional independence with mobility by performin. Supine to sit with Modified [place]  2. Rolling to Left and Right with Modified [place]  3. Sit to stand transfer with Modified [place] using RW  4. Bed to chair transfer with Modified [place] using Rolling Walker  5. Gait  x50-100 feet with Modified [place] using Rolling Walker and O2  6. Lower extremity exercise program 3 sets x10 reps per handout, with independence                      Reasons for Discontinuation of Therapy Services  Transfer to alternate level of care.      Plan:     Patient Discharged to: Home with Home Health Service.    Estephania Resendiz, PT  2019

## 2019-11-06 NOTE — PROGRESS NOTES
"Pt's spouse Getachew Palm picked up her purse that was left by pt earlier in room at discharge. He declined opportunity to  check contents in purse. He stated " I wouldn't know what was missing anyway".    "

## 2019-11-09 LAB
BACTERIA BLD CULT: NORMAL
BACTERIA BLD CULT: NORMAL

## 2019-11-21 ENCOUNTER — EXTERNAL HOME HEALTH (OUTPATIENT)
Dept: HOME HEALTH SERVICES | Facility: HOSPITAL | Age: 69
End: 2019-11-21
Payer: MEDICARE

## 2019-11-29 ENCOUNTER — HOSPITAL ENCOUNTER (EMERGENCY)
Facility: HOSPITAL | Age: 69
Discharge: HOME | End: 2019-11-29
Attending: EMERGENCY MEDICINE
Payer: MEDICARE

## 2019-11-29 VITALS
HEIGHT: 64 IN | TEMPERATURE: 99 F | SYSTOLIC BLOOD PRESSURE: 122 MMHG | DIASTOLIC BLOOD PRESSURE: 60 MMHG | OXYGEN SATURATION: 96 % | HEART RATE: 93 BPM | RESPIRATION RATE: 18 BRPM | BODY MASS INDEX: 30.73 KG/M2 | WEIGHT: 180 LBS

## 2019-11-29 DIAGNOSIS — Z87.09 HISTORY OF COPD: ICD-10-CM

## 2019-11-29 DIAGNOSIS — N30.00 ACUTE CYSTITIS WITHOUT HEMATURIA: Primary | ICD-10-CM

## 2019-11-29 DIAGNOSIS — R07.9 CHEST PAIN: ICD-10-CM

## 2019-11-29 DIAGNOSIS — Z86.79 HISTORY OF CONGESTIVE HEART FAILURE: ICD-10-CM

## 2019-11-29 LAB
ALBUMIN SERPL BCP-MCNC: 3.1 G/DL (ref 3.5–5.2)
ALP SERPL-CCNC: 76 U/L (ref 55–135)
ALT SERPL W/O P-5'-P-CCNC: 8 U/L (ref 10–44)
ANION GAP SERPL CALC-SCNC: 7 MMOL/L (ref 8–16)
APTT BLDCRRT: 27.8 SEC (ref 21–32)
AST SERPL-CCNC: 11 U/L (ref 10–40)
BACTERIA #/AREA URNS HPF: ABNORMAL /HPF
BASOPHILS # BLD AUTO: 0.03 K/UL (ref 0–0.2)
BASOPHILS NFR BLD: 0.3 % (ref 0–1.9)
BILIRUB SERPL-MCNC: 0.2 MG/DL (ref 0.1–1)
BILIRUB UR QL STRIP: NEGATIVE
BNP SERPL-MCNC: 221 PG/ML (ref 0–99)
BUN SERPL-MCNC: 15 MG/DL (ref 8–23)
CALCIUM SERPL-MCNC: 9.3 MG/DL (ref 8.7–10.5)
CHLORIDE SERPL-SCNC: 107 MMOL/L (ref 95–110)
CLARITY UR: ABNORMAL
CO2 SERPL-SCNC: 33 MMOL/L (ref 23–29)
COLOR UR: ABNORMAL
CREAT SERPL-MCNC: 0.9 MG/DL (ref 0.5–1.4)
DIFFERENTIAL METHOD: ABNORMAL
EOSINOPHIL # BLD AUTO: 0.4 K/UL (ref 0–0.5)
EOSINOPHIL NFR BLD: 3.1 % (ref 0–8)
ERYTHROCYTE [DISTWIDTH] IN BLOOD BY AUTOMATED COUNT: 17 % (ref 11.5–14.5)
EST. GFR  (AFRICAN AMERICAN): >60 ML/MIN/1.73 M^2
EST. GFR  (NON AFRICAN AMERICAN): >60 ML/MIN/1.73 M^2
GLUCOSE SERPL-MCNC: 153 MG/DL (ref 70–110)
GLUCOSE UR QL STRIP: NEGATIVE
HCT VFR BLD AUTO: 34.6 % (ref 37–48.5)
HGB BLD-MCNC: 9.5 G/DL (ref 12–16)
HGB UR QL STRIP: ABNORMAL
HYALINE CASTS #/AREA URNS LPF: 0 /LPF
IMM GRANULOCYTES # BLD AUTO: 0.05 K/UL (ref 0–0.04)
IMM GRANULOCYTES NFR BLD AUTO: 0.4 % (ref 0–0.5)
INR PPP: 1 (ref 0.8–1.2)
KETONES UR QL STRIP: NEGATIVE
LEUKOCYTE ESTERASE UR QL STRIP: ABNORMAL
LYMPHOCYTES # BLD AUTO: 2 K/UL (ref 1–4.8)
LYMPHOCYTES NFR BLD: 18 % (ref 18–48)
MAGNESIUM SERPL-MCNC: 1.7 MG/DL (ref 1.6–2.6)
MCH RBC QN AUTO: 27.8 PG (ref 27–31)
MCHC RBC AUTO-ENTMCNC: 27.5 G/DL (ref 32–36)
MCV RBC AUTO: 101 FL (ref 82–98)
MICROSCOPIC COMMENT: ABNORMAL
MONOCYTES # BLD AUTO: 0.9 K/UL (ref 0.3–1)
MONOCYTES NFR BLD: 8.4 % (ref 4–15)
NEUTROPHILS # BLD AUTO: 7.8 K/UL (ref 1.8–7.7)
NEUTROPHILS NFR BLD: 69.8 % (ref 38–73)
NITRITE UR QL STRIP: POSITIVE
NRBC BLD-RTO: 0 /100 WBC
PH UR STRIP: 5 [PH] (ref 5–8)
PLATELET # BLD AUTO: 331 K/UL (ref 150–350)
PMV BLD AUTO: 10.3 FL (ref 9.2–12.9)
POTASSIUM SERPL-SCNC: 4 MMOL/L (ref 3.5–5.1)
PROT SERPL-MCNC: 6.5 G/DL (ref 6–8.4)
PROT UR QL STRIP: ABNORMAL
PROTHROMBIN TIME: 10.5 SEC (ref 9–12.5)
RBC # BLD AUTO: 3.42 M/UL (ref 4–5.4)
RBC #/AREA URNS HPF: >100 /HPF (ref 0–4)
SODIUM SERPL-SCNC: 147 MMOL/L (ref 136–145)
SP GR UR STRIP: 1.03 (ref 1–1.03)
TROPONIN I SERPL DL<=0.01 NG/ML-MCNC: 0.02 NG/ML (ref 0–0.03)
URN SPEC COLLECT METH UR: ABNORMAL
UROBILINOGEN UR STRIP-ACNC: NEGATIVE EU/DL
WBC # BLD AUTO: 11.14 K/UL (ref 3.9–12.7)
WBC #/AREA URNS HPF: >100 /HPF (ref 0–5)
WBC CLUMPS URNS QL MICRO: ABNORMAL

## 2019-11-29 PROCEDURE — 85025 COMPLETE CBC W/AUTO DIFF WBC: CPT

## 2019-11-29 PROCEDURE — 83735 ASSAY OF MAGNESIUM: CPT

## 2019-11-29 PROCEDURE — 83880 ASSAY OF NATRIURETIC PEPTIDE: CPT

## 2019-11-29 PROCEDURE — 96375 TX/PRO/DX INJ NEW DRUG ADDON: CPT

## 2019-11-29 PROCEDURE — 87088 URINE BACTERIA CULTURE: CPT

## 2019-11-29 PROCEDURE — 80053 COMPREHEN METABOLIC PANEL: CPT

## 2019-11-29 PROCEDURE — 85730 THROMBOPLASTIN TIME PARTIAL: CPT

## 2019-11-29 PROCEDURE — 99285 EMERGENCY DEPT VISIT HI MDM: CPT | Mod: 25

## 2019-11-29 PROCEDURE — 81000 URINALYSIS NONAUTO W/SCOPE: CPT

## 2019-11-29 PROCEDURE — 93005 ELECTROCARDIOGRAM TRACING: CPT

## 2019-11-29 PROCEDURE — 87077 CULTURE AEROBIC IDENTIFY: CPT

## 2019-11-29 PROCEDURE — 93010 ELECTROCARDIOGRAM REPORT: CPT | Mod: ,,, | Performed by: INTERNAL MEDICINE

## 2019-11-29 PROCEDURE — 96365 THER/PROPH/DIAG IV INF INIT: CPT

## 2019-11-29 PROCEDURE — 85610 PROTHROMBIN TIME: CPT

## 2019-11-29 PROCEDURE — 87186 SC STD MICRODIL/AGAR DIL: CPT | Mod: 59

## 2019-11-29 PROCEDURE — 84484 ASSAY OF TROPONIN QUANT: CPT

## 2019-11-29 PROCEDURE — 63600175 PHARM REV CODE 636 W HCPCS: Performed by: EMERGENCY MEDICINE

## 2019-11-29 PROCEDURE — 25000003 PHARM REV CODE 250: Performed by: EMERGENCY MEDICINE

## 2019-11-29 PROCEDURE — 93010 EKG 12-LEAD: ICD-10-PCS | Mod: ,,, | Performed by: INTERNAL MEDICINE

## 2019-11-29 PROCEDURE — 87086 URINE CULTURE/COLONY COUNT: CPT

## 2019-11-29 RX ORDER — CEPHALEXIN 500 MG/1
1000 CAPSULE ORAL EVERY 12 HOURS
Qty: 28 CAPSULE | Refills: 0 | Status: SHIPPED | OUTPATIENT
Start: 2019-11-29 | End: 2019-11-29 | Stop reason: SDUPTHER

## 2019-11-29 RX ORDER — FUROSEMIDE 10 MG/ML
80 INJECTION INTRAMUSCULAR; INTRAVENOUS
Status: COMPLETED | OUTPATIENT
Start: 2019-11-29 | End: 2019-11-29

## 2019-11-29 RX ORDER — CEPHALEXIN 500 MG/1
1000 CAPSULE ORAL EVERY 12 HOURS
Qty: 28 CAPSULE | Refills: 0 | Status: SHIPPED | OUTPATIENT
Start: 2019-11-29 | End: 2019-12-06

## 2019-11-29 RX ORDER — FUROSEMIDE 10 MG/ML
80 INJECTION INTRAMUSCULAR; INTRAVENOUS
Status: DISCONTINUED | OUTPATIENT
Start: 2019-11-29 | End: 2019-11-29 | Stop reason: HOSPADM

## 2019-11-29 RX ORDER — ACETAMINOPHEN 325 MG/1
650 TABLET ORAL
Status: COMPLETED | OUTPATIENT
Start: 2019-11-29 | End: 2019-11-29

## 2019-11-29 RX ADMIN — ACETAMINOPHEN 650 MG: 325 TABLET ORAL at 01:11

## 2019-11-29 RX ADMIN — CEFTRIAXONE 1 G: 1 INJECTION, SOLUTION INTRAVENOUS at 01:11

## 2019-11-29 RX ADMIN — FUROSEMIDE 80 MG: 10 INJECTION, SOLUTION INTRAVENOUS at 12:11

## 2019-11-29 NOTE — ED PROVIDER NOTES
Encounter Date: 11/29/2019    SCRIBE #1 NOTE: I, Sudha Resendiz, am scribing for, and in the presence of,  Braden Matos. I have scribed the following portions of the note - Other sections scribed: HPI, ROS.       History     Chief Complaint   Patient presents with    Shortness of Breath     EMS reports pt with shortness of breath worseing since yesterday. hx of MI and CHF. pt hypertensive on scene given 3SL nitro, 1in nitro paste and 324ASA. Pt denies pain at this time. EMS reports remarkable improvement of SOB after meds. pt on non-rebreather at this time. roomed to bed 3 immediately.      CC: Edema    HPI:  This is a 69 y.o. female with a PMHx of Diabetes Mellitus, Hyperlipidemia, CHF, COPD, and Hypertension. She presents to the Emergency Department with a cc of bilateral leg edema, worse in the left . Patient is on 3 liters O2 NC at home. She reports associated shortness of breath, coughing (productive white phlegm), chest pain, diaphoresis, and chest tightness that be began 2 days ago. Patient also notes dysuria and decreased urine . She reports one episode of vomiting that occurred 2 days ago. Patient denies nausea, diarrhea, and abdominal pain. She notes occasional alcohol consumption and is a former marijuana and cigarette smoker. Patient has a PSHx of Hernia repair and hand and foot surgery. NKDA        The history is provided by the patient.     Review of patient's allergies indicates:  No Known Allergies  Past Medical History:   Diagnosis Date    Anticoagulant long-term use     Xarelto    Arthritis     Asthma     CHF (congestive heart failure)     COPD (chronic obstructive pulmonary disease)     Coronary artery disease     Deep vein thrombosis (DVT) of left lower extremity     Depression     Diabetes mellitus     GERD (gastroesophageal reflux disease)     Hypertension     MI (myocardial infarction) 08/2019    Obstructive sleep apnea on CPAP     On home oxygen therapy     Unsteady gait      "uses either walker or 4 prong cane     Past Surgical History:   Procedure Laterality Date    CARDIAC CATHETERIZATION  2018    CORONARY ANGIOPLASTY WITH STENT PLACEMENT      FOOT SURGERY      HAND SURGERY      HERNIA REPAIR      encapsulated umbilical hernia     Family History   Problem Relation Age of Onset    Heart disease Mother     Hypertension Mother     Diabetes Father      Social History     Tobacco Use    Smoking status: Former Smoker     Packs/day: 0.50     Years: 55.00     Pack years: 27.50     Types: Cigarettes     Start date: 1961     Last attempt to quit: 2018     Years since quittin.9    Smokeless tobacco: Never Used    Tobacco comment: "States she quit smoking about 3 or 4 weeks ago"   Substance Use Topics    Alcohol use: Not Currently     Alcohol/week: 1.0 standard drinks     Types: 1 Glasses of wine per week     Frequency: Never     Comment: socially    Drug use: No     Review of Systems   Constitutional: Positive for diaphoresis (resolved). Negative for chills and fever.   HENT: Negative for ear pain and sore throat.    Eyes: Negative for pain, discharge, redness and itching.   Respiratory: Positive for cough (productive white phlegm) and shortness of breath.    Cardiovascular: Positive for chest pain and leg swelling.        (+) chest tightness   Gastrointestinal: Positive for vomiting (resolved). Negative for abdominal pain and diarrhea.   Genitourinary: Positive for decreased urine volume and dysuria.   Musculoskeletal: Negative for arthralgias.   Skin: Negative for rash.   Neurological: Negative for headaches.       Physical Exam     Initial Vitals [19 1132]   BP Pulse Resp Temp SpO2   138/74 92 (!) 40 -- 100 %      MAP       --         Physical Exam  The patient was examined specifically for the following:   General:No significant distress, Good color, Warm and dry. Head and neck:Scalp atraumatic, Neck supple. Neurological:Appropriate conversation, Gross " motor deficits. Eyes:Conjugate gaze, Clear corneas. ENT: No epistaxis. Cardiac: Regular rate and rhythm, Grossly normal heart tones. Pulmonary: Wheezing, Rales. Gastrointestinal: Abdominal tenderness, Abdominal distention. Musculoskeletal: Extremity deformity, Apparent pain with range of motion of the joints. Skin: Rash.   The findings on examination were normal except for the following:  The patient has distant breath sounds in both lung bases.  The patient is in no significant respiratory distress during the physical examination. Oxygen saturations are 100%.  Patient has pitting edema in the left lower extremity more than the right lower extremity.  The abdomen is nontender.  There is no guarding rebound mass or distention.   ED Course   Procedures  Labs Reviewed   COMPREHENSIVE METABOLIC PANEL - Abnormal; Notable for the following components:       Result Value    Sodium 147 (*)     CO2 33 (*)     Glucose 153 (*)     Albumin 3.1 (*)     ALT 8 (*)     Anion Gap 7 (*)     All other components within normal limits   B-TYPE NATRIURETIC PEPTIDE - Abnormal; Notable for the following components:     (*)     All other components within normal limits   CBC W/ AUTO DIFFERENTIAL - Abnormal; Notable for the following components:    RBC 3.42 (*)     Hemoglobin 9.5 (*)     Hematocrit 34.6 (*)     Mean Corpuscular Volume 101 (*)     Mean Corpuscular Hemoglobin Conc 27.5 (*)     RDW 17.0 (*)     Gran # (ANC) 7.8 (*)     Immature Grans (Abs) 0.05 (*)     All other components within normal limits   URINALYSIS, REFLEX TO URINE CULTURE - Abnormal; Notable for the following components:    Color, UA Red (*)     Appearance, UA Cloudy (*)     Protein, UA 2+ (*)     Occult Blood UA 3+ (*)     Nitrite, UA Positive (*)     Leukocytes, UA 2+ (*)     All other components within normal limits    Narrative:     Preferred Collection Type->Urine, Clean Catch   URINALYSIS MICROSCOPIC - Abnormal; Notable for the following components:     RBC, UA >100 (*)     WBC, UA >100 (*)     WBC Clumps, UA Moderate (*)     Bacteria Moderate (*)     All other components within normal limits    Narrative:     Preferred Collection Type->Urine, Clean Catch   CULTURE, URINE   APTT   PROTIME-INR   TROPONIN I   MAGNESIUM     EKG Readings: (Independently Interpreted)   This patient is in a sinus rhythm with a first-degree AV block.  There is poor R-wave progression across the precordium.  The patient has left axis deviation.  There are nonspecific ST segment and T-wave changes.  There is no definite evidence of acute myocardial infarction or malignant arrhythmia.     ECG Results          EKG 12-lead (In process)  Result time 11/29/19 12:38:34    In process by Interface, Lab In [facility] (11/29/19 12:38:34)                 Narrative:    Test Reason : R07.9,    Vent. Rate : 091 BPM     Atrial Rate : 091 BPM     P-R Int : 226 ms          QRS Dur : 084 ms      QT Int : 376 ms       P-R-T Axes : 068 -19 065 degrees     QTc Int : 462 ms    Sinus rhythm with 1st degree A-V block  Otherwise normal ECG  When compared with ECG of 04-NOV-2019 00:03,  Significant changes have occurred    Referred By: [email or web address]   SELF           Confirmed By:                   In process by Interface, Lab In [facility] (11/29/19 12:34:33)                 Narrative:    Test Reason : R07.9,    Vent. Rate : 091 BPM     Atrial Rate : 091 BPM     P-R Int : 226 ms          QRS Dur : 084 ms      QT Int : 376 ms       P-R-T Axes : 068 -19 065 degrees     QTc Int : 462 ms    Sinus rhythm with 1st degree A-V block  Otherwise normal ECG  When compared with ECG of 04-NOV-2019 00:03,  Significant changes have occurred    Referred By: [email or web address]   SELF           Confirmed By:                             Imaging Results          X-Ray Chest AP Portable (Final result)  Result time 11/29/19 12:55:34    Final result by Kleber Mcdonough MD (11/29/19 12:55:34)                 Impression:      1. No acute cardiopulmonary  process, hypoventilatory exam.      Electronically signed by: Kleber Mcdonough MD  Date:    11/29/2019  Time:    12:55             Narrative:    EXAMINATION:  XR CHEST AP PORTABLE    CLINICAL HISTORY:  chest pain;    TECHNIQUE:  Single frontal view of the chest was performed.    COMPARISON:  11/04/2019    FINDINGS:  The cardiomediastinal silhouette is not enlarged noting calcification of the aorta..  There is no pleural effusion.  The trachea is midline.  The lungs are symmetrically expanded bilaterally mildly coarse interstitial attenuation.  No large focal consolidation seen.  There is no pneumothorax.  The osseous structures are remarkable for degenerative changes..                              Medical decision making:  Given the above, this patient presents to the emergency room with a history of congestive heart failure complaining of shortness of breath. The patient had chest pain yesterday, but not today.  The patient also has a history of COPD.  There is no respiratory distress at the time of the physical examination. Oxygen saturations are 100%.  The patient does have a urinary tract infection.  I believe this patient can be discharged to her usual regimen.  The patient is maintained on Eliquis.  She has some chronic edema of the left lower extremity.  I will treat her for her urinary tract infection with Keflex.  I will have her follow up with primary care.                Scribe Attestation:   Scribe #1: I performed the above scribed service and the documentation accurately describes the services I performed. I attest to the accuracy of the note.                          Clinical Impression:       ICD-10-CM ICD-9-CM   1. Acute cystitis without hematuria N30.00 595.0   2. Chest pain R07.9 786.50   3. History of congestive heart failure Z86.79 V12.59   4. History of COPD Z87.09 V12.69     I personally performed the services described in this documentation. All medical record entries made by the scribe are at  my direction and in my presence. Signed, Dr. Shawna Matos MD  11/29/19 7233

## 2019-11-29 NOTE — DISCHARGE INSTRUCTIONS
ContinueYour Lasix, Eliquis, home nebulizer treatments.  Low-sodium diet.  Elevate your legs.  Keflex for your bladder infection.  Return immediately if you get worse or if new problems develop.  Please see your cardiologist this week.

## 2019-11-29 NOTE — ED NOTES
Patient stated she would be fine getting into her brothers car and going home without oxygen. I offered to put transport for her but patient refused. Patient moved to hallway to stay on oxygen until her ride arrives.

## 2019-11-29 NOTE — ED NOTES
"Patient was wet from urinating multiple times due to taking lasix prior to leaving. Patient was offered to be changed before leaving and patient refused and said "she was ready to go, just give me two green pads to put on the seat".   "

## 2019-11-29 NOTE — ED TRIAGE NOTES
69 y.o. Female presents to the ED with CC of SOB. Pt states she has left leg swelling and painful urination. Pt states she has SOB and cough since Wednesday. Pt states she had CP and chest tightness Wednesday. Pt denies fever and chills. Pt denies abdominal pain and N/V. Patient had a stent placement 18 years ago. Pt has hx MI, HTN, DM. Pt states she is on 3 L oxygen at home. EMS reports the patient was hypertensive on scene and gave the patient 3 SL nitro, 1 inch nitro pasta, and 324 ASA.

## 2019-12-01 LAB
BACTERIA UR CULT: ABNORMAL
BACTERIA UR CULT: ABNORMAL

## 2019-12-25 ENCOUNTER — HOSPITAL ENCOUNTER (OUTPATIENT)
Facility: HOSPITAL | Age: 69
Discharge: HOME | End: 2019-12-27
Attending: EMERGENCY MEDICINE | Admitting: HOSPITALIST
Payer: MEDICARE

## 2019-12-25 DIAGNOSIS — R31.0 HEMATURIA, GROSS: ICD-10-CM

## 2019-12-25 DIAGNOSIS — J44.9 CHRONIC OBSTRUCTIVE PULMONARY DISEASE, UNSPECIFIED COPD TYPE: Chronic | ICD-10-CM

## 2019-12-25 DIAGNOSIS — R39.89 SUSPECTED UTI: ICD-10-CM

## 2019-12-25 DIAGNOSIS — R06.03 RESPIRATORY DISTRESS: ICD-10-CM

## 2019-12-25 DIAGNOSIS — R31.0 GROSS HEMATURIA: ICD-10-CM

## 2019-12-25 DIAGNOSIS — R09.02 HYPOXIA: ICD-10-CM

## 2019-12-25 DIAGNOSIS — J44.1 COPD EXACERBATION: Primary | ICD-10-CM

## 2019-12-25 DIAGNOSIS — R06.02 SHORTNESS OF BREATH: ICD-10-CM

## 2019-12-25 LAB
ALBUMIN SERPL BCP-MCNC: 2.8 G/DL (ref 3.5–5.2)
ALP SERPL-CCNC: 103 U/L (ref 55–135)
ALT SERPL W/O P-5'-P-CCNC: 7 U/L (ref 10–44)
ANION GAP SERPL CALC-SCNC: 8 MMOL/L (ref 8–16)
APTT BLDCRRT: 32.5 SEC (ref 21–32)
AST SERPL-CCNC: 11 U/L (ref 10–40)
BASOPHILS # BLD AUTO: 0.05 K/UL (ref 0–0.2)
BASOPHILS NFR BLD: 0.5 % (ref 0–1.9)
BILIRUB SERPL-MCNC: 0.2 MG/DL (ref 0.1–1)
BNP SERPL-MCNC: 108 PG/ML (ref 0–99)
BUN SERPL-MCNC: 15 MG/DL (ref 8–23)
CALCIUM SERPL-MCNC: 9.5 MG/DL (ref 8.7–10.5)
CHLORIDE SERPL-SCNC: 107 MMOL/L (ref 95–110)
CO2 SERPL-SCNC: 27 MMOL/L (ref 23–29)
CREAT SERPL-MCNC: 0.8 MG/DL (ref 0.5–1.4)
DIFFERENTIAL METHOD: ABNORMAL
EOSINOPHIL # BLD AUTO: 0.3 K/UL (ref 0–0.5)
EOSINOPHIL NFR BLD: 2.6 % (ref 0–8)
ERYTHROCYTE [DISTWIDTH] IN BLOOD BY AUTOMATED COUNT: 16.2 % (ref 11.5–14.5)
EST. GFR  (AFRICAN AMERICAN): >60 ML/MIN/1.73 M^2
EST. GFR  (NON AFRICAN AMERICAN): >60 ML/MIN/1.73 M^2
GLUCOSE SERPL-MCNC: 92 MG/DL (ref 70–110)
HCT VFR BLD AUTO: 32.1 % (ref 37–48.5)
HGB BLD-MCNC: 9.1 G/DL (ref 12–16)
IMM GRANULOCYTES # BLD AUTO: 0.03 K/UL (ref 0–0.04)
IMM GRANULOCYTES NFR BLD AUTO: 0.3 % (ref 0–0.5)
INR PPP: 1 (ref 0.8–1.2)
LYMPHOCYTES # BLD AUTO: 2.6 K/UL (ref 1–4.8)
LYMPHOCYTES NFR BLD: 25.5 % (ref 18–48)
MCH RBC QN AUTO: 27.7 PG (ref 27–31)
MCHC RBC AUTO-ENTMCNC: 28.3 G/DL (ref 32–36)
MCV RBC AUTO: 98 FL (ref 82–98)
MONOCYTES # BLD AUTO: 1.2 K/UL (ref 0.3–1)
MONOCYTES NFR BLD: 11.5 % (ref 4–15)
NEUTROPHILS # BLD AUTO: 6.1 K/UL (ref 1.8–7.7)
NEUTROPHILS NFR BLD: 59.6 % (ref 38–73)
NRBC BLD-RTO: 0 /100 WBC
PLATELET # BLD AUTO: 310 K/UL (ref 150–350)
PMV BLD AUTO: 9.7 FL (ref 9.2–12.9)
POTASSIUM SERPL-SCNC: 4.7 MMOL/L (ref 3.5–5.1)
PROT SERPL-MCNC: 7 G/DL (ref 6–8.4)
PROTHROMBIN TIME: 10.7 SEC (ref 9–12.5)
RBC # BLD AUTO: 3.29 M/UL (ref 4–5.4)
SODIUM SERPL-SCNC: 142 MMOL/L (ref 136–145)
TROPONIN I SERPL DL<=0.01 NG/ML-MCNC: 0.01 NG/ML (ref 0–0.03)
WBC # BLD AUTO: 10.21 K/UL (ref 3.9–12.7)

## 2019-12-25 PROCEDURE — 27000190 HC CPAP FULL FACE MASK W/VALVE

## 2019-12-25 PROCEDURE — G0378 HOSPITAL OBSERVATION PER HR: HCPCS

## 2019-12-25 PROCEDURE — 83880 ASSAY OF NATRIURETIC PEPTIDE: CPT

## 2019-12-25 PROCEDURE — 85730 THROMBOPLASTIN TIME PARTIAL: CPT

## 2019-12-25 PROCEDURE — 96374 THER/PROPH/DIAG INJ IV PUSH: CPT

## 2019-12-25 PROCEDURE — 25000242 PHARM REV CODE 250 ALT 637 W/ HCPCS: Performed by: EMERGENCY MEDICINE

## 2019-12-25 PROCEDURE — 94660 CPAP INITIATION&MGMT: CPT

## 2019-12-25 PROCEDURE — 99291 CRITICAL CARE FIRST HOUR: CPT | Mod: 25

## 2019-12-25 PROCEDURE — 84484 ASSAY OF TROPONIN QUANT: CPT

## 2019-12-25 PROCEDURE — 94644 CONT INHLJ TX 1ST HOUR: CPT

## 2019-12-25 PROCEDURE — 93005 ELECTROCARDIOGRAM TRACING: CPT

## 2019-12-25 PROCEDURE — 63600175 PHARM REV CODE 636 W HCPCS: Performed by: EMERGENCY MEDICINE

## 2019-12-25 PROCEDURE — 80053 COMPREHEN METABOLIC PANEL: CPT

## 2019-12-25 PROCEDURE — 25000003 PHARM REV CODE 250: Performed by: EMERGENCY MEDICINE

## 2019-12-25 PROCEDURE — 85025 COMPLETE CBC W/AUTO DIFF WBC: CPT

## 2019-12-25 PROCEDURE — 85610 PROTHROMBIN TIME: CPT

## 2019-12-25 PROCEDURE — 94761 N-INVAS EAR/PLS OXIMETRY MLT: CPT

## 2019-12-25 PROCEDURE — 99900035 HC TECH TIME PER 15 MIN (STAT)

## 2019-12-25 PROCEDURE — 93010 EKG 12-LEAD: ICD-10-PCS | Mod: ,,, | Performed by: INTERNAL MEDICINE

## 2019-12-25 PROCEDURE — 93010 ELECTROCARDIOGRAM REPORT: CPT | Mod: ,,, | Performed by: INTERNAL MEDICINE

## 2019-12-25 RX ORDER — METHYLPREDNISOLONE SOD SUCC 125 MG
125 VIAL (EA) INJECTION
Status: COMPLETED | OUTPATIENT
Start: 2019-12-25 | End: 2019-12-25

## 2019-12-25 RX ORDER — IPRATROPIUM BROMIDE 0.5 MG/2.5ML
1 SOLUTION RESPIRATORY (INHALATION) CONTINUOUS
Status: DISCONTINUED | OUTPATIENT
Start: 2019-12-25 | End: 2019-12-27 | Stop reason: HOSPADM

## 2019-12-25 RX ORDER — ASPIRIN 325 MG
325 TABLET ORAL
Status: COMPLETED | OUTPATIENT
Start: 2019-12-25 | End: 2019-12-25

## 2019-12-25 RX ORDER — ALBUTEROL SULFATE 2.5 MG/.5ML
10 SOLUTION RESPIRATORY (INHALATION)
Status: COMPLETED | OUTPATIENT
Start: 2019-12-25 | End: 2019-12-25

## 2019-12-25 RX ADMIN — IPRATROPIUM BROMIDE 1 MG: 0.5 SOLUTION RESPIRATORY (INHALATION) at 08:12

## 2019-12-25 RX ADMIN — METHYLPREDNISOLONE SODIUM SUCCINATE 125 MG: 125 INJECTION, POWDER, FOR SOLUTION INTRAMUSCULAR; INTRAVENOUS at 08:12

## 2019-12-25 RX ADMIN — ASPIRIN 325 MG ORAL TABLET 325 MG: 325 PILL ORAL at 08:12

## 2019-12-25 RX ADMIN — ALBUTEROL SULFATE 10 MG: 2.5 SOLUTION RESPIRATORY (INHALATION) at 08:12

## 2019-12-26 PROBLEM — I25.10 CORONARY ARTERY DISEASE INVOLVING NATIVE CORONARY ARTERY WITHOUT ANGINA PECTORIS: Status: ACTIVE | Noted: 2019-12-26

## 2019-12-26 PROBLEM — R39.89 SUSPECTED UTI: Status: ACTIVE | Noted: 2019-12-26

## 2019-12-26 PROBLEM — R31.0 HEMATURIA, GROSS: Status: ACTIVE | Noted: 2019-12-26

## 2019-12-26 PROBLEM — R30.0 DYSURIA: Status: ACTIVE | Noted: 2019-12-26

## 2019-12-26 PROBLEM — Z86.79 HISTORY OF ATRIAL FIBRILLATION: Chronic | Status: ACTIVE | Noted: 2019-12-26

## 2019-12-26 PROBLEM — R31.0 GROSS HEMATURIA: Status: ACTIVE | Noted: 2019-12-26

## 2019-12-26 LAB
ANION GAP SERPL CALC-SCNC: 8 MMOL/L (ref 8–16)
BILIRUB UR QL STRIP: ABNORMAL
BUN SERPL-MCNC: 17 MG/DL (ref 8–23)
CALCIUM SERPL-MCNC: 10 MG/DL (ref 8.7–10.5)
CHLORIDE SERPL-SCNC: 106 MMOL/L (ref 95–110)
CLARITY UR: ABNORMAL
CO2 SERPL-SCNC: 28 MMOL/L (ref 23–29)
COLOR UR: ABNORMAL
CREAT SERPL-MCNC: 0.9 MG/DL (ref 0.5–1.4)
EST. GFR  (AFRICAN AMERICAN): >60 ML/MIN/1.73 M^2
EST. GFR  (NON AFRICAN AMERICAN): >60 ML/MIN/1.73 M^2
GLUCOSE SERPL-MCNC: 144 MG/DL (ref 70–110)
GLUCOSE UR QL STRIP: ABNORMAL
HGB UR QL STRIP: ABNORMAL
KETONES UR QL STRIP: ABNORMAL
LEUKOCYTE ESTERASE UR QL STRIP: ABNORMAL
MICROSCOPIC COMMENT: ABNORMAL
NITRITE UR QL STRIP: ABNORMAL
PH UR STRIP: ABNORMAL [PH] (ref 5–8)
POCT GLUCOSE: 112 MG/DL (ref 70–110)
POCT GLUCOSE: 176 MG/DL (ref 70–110)
POCT GLUCOSE: 206 MG/DL (ref 70–110)
POCT GLUCOSE: 278 MG/DL (ref 70–110)
POCT GLUCOSE: 318 MG/DL (ref 70–110)
POTASSIUM SERPL-SCNC: 5.2 MMOL/L (ref 3.5–5.1)
PROT UR QL STRIP: ABNORMAL
RBC #/AREA URNS HPF: >100 /HPF (ref 0–4)
SODIUM SERPL-SCNC: 142 MMOL/L (ref 136–145)
SP GR UR STRIP: ABNORMAL (ref 1–1.03)
TROPONIN I SERPL DL<=0.01 NG/ML-MCNC: 0.01 NG/ML (ref 0–0.03)
TROPONIN I SERPL DL<=0.01 NG/ML-MCNC: 0.01 NG/ML (ref 0–0.03)
URN SPEC COLLECT METH UR: ABNORMAL
UROBILINOGEN UR STRIP-ACNC: ABNORMAL EU/DL
WBC #/AREA URNS HPF: >100 /HPF (ref 0–5)

## 2019-12-26 PROCEDURE — 81000 URINALYSIS NONAUTO W/SCOPE: CPT

## 2019-12-26 PROCEDURE — 99900035 HC TECH TIME PER 15 MIN (STAT)

## 2019-12-26 PROCEDURE — 27000221 HC OXYGEN, UP TO 24 HOURS

## 2019-12-26 PROCEDURE — 99204 OFFICE O/P NEW MOD 45 MIN: CPT | Mod: ,,, | Performed by: UROLOGY

## 2019-12-26 PROCEDURE — 99204 PR OFFICE/OUTPT VISIT, NEW, LEVL IV, 45-59 MIN: ICD-10-PCS | Mod: ,,, | Performed by: UROLOGY

## 2019-12-26 PROCEDURE — 84484 ASSAY OF TROPONIN QUANT: CPT

## 2019-12-26 PROCEDURE — 25500020 PHARM REV CODE 255: Performed by: INTERNAL MEDICINE

## 2019-12-26 PROCEDURE — 96375 TX/PRO/DX INJ NEW DRUG ADDON: CPT

## 2019-12-26 PROCEDURE — 94660 CPAP INITIATION&MGMT: CPT

## 2019-12-26 PROCEDURE — G0378 HOSPITAL OBSERVATION PER HR: HCPCS

## 2019-12-26 PROCEDURE — 94640 AIRWAY INHALATION TREATMENT: CPT

## 2019-12-26 PROCEDURE — 25000003 PHARM REV CODE 250: Performed by: EMERGENCY MEDICINE

## 2019-12-26 PROCEDURE — 25000242 PHARM REV CODE 250 ALT 637 W/ HCPCS: Performed by: EMERGENCY MEDICINE

## 2019-12-26 PROCEDURE — 63600175 PHARM REV CODE 636 W HCPCS: Performed by: EMERGENCY MEDICINE

## 2019-12-26 PROCEDURE — 25000003 PHARM REV CODE 250: Performed by: HOSPITALIST

## 2019-12-26 PROCEDURE — 63600175 PHARM REV CODE 636 W HCPCS: Performed by: NURSE PRACTITIONER

## 2019-12-26 PROCEDURE — 96376 TX/PRO/DX INJ SAME DRUG ADON: CPT

## 2019-12-26 PROCEDURE — 80048 BASIC METABOLIC PNL TOTAL CA: CPT

## 2019-12-26 PROCEDURE — 94761 N-INVAS EAR/PLS OXIMETRY MLT: CPT

## 2019-12-26 PROCEDURE — 25000003 PHARM REV CODE 250: Performed by: UROLOGY

## 2019-12-26 PROCEDURE — 25000242 PHARM REV CODE 250 ALT 637 W/ HCPCS: Performed by: HOSPITALIST

## 2019-12-26 PROCEDURE — 36415 COLL VENOUS BLD VENIPUNCTURE: CPT

## 2019-12-26 PROCEDURE — 87086 URINE CULTURE/COLONY COUNT: CPT

## 2019-12-26 PROCEDURE — 96361 HYDRATE IV INFUSION ADD-ON: CPT

## 2019-12-26 RX ORDER — IBUPROFEN 200 MG
16 TABLET ORAL
Status: DISCONTINUED | OUTPATIENT
Start: 2019-12-26 | End: 2019-12-27 | Stop reason: HOSPADM

## 2019-12-26 RX ORDER — SOTALOL HYDROCHLORIDE 80 MG/1
80 TABLET ORAL 2 TIMES DAILY
Status: DISCONTINUED | OUTPATIENT
Start: 2019-12-26 | End: 2019-12-27 | Stop reason: HOSPADM

## 2019-12-26 RX ORDER — IBUPROFEN 200 MG
24 TABLET ORAL
Status: DISCONTINUED | OUTPATIENT
Start: 2019-12-26 | End: 2019-12-27 | Stop reason: HOSPADM

## 2019-12-26 RX ORDER — GLUCAGON 1 MG
1 KIT INJECTION
Status: DISCONTINUED | OUTPATIENT
Start: 2019-12-26 | End: 2019-12-26

## 2019-12-26 RX ORDER — PHENAZOPYRIDINE HYDROCHLORIDE 100 MG/1
100 TABLET, FILM COATED ORAL
Status: DISCONTINUED | OUTPATIENT
Start: 2019-12-26 | End: 2019-12-27 | Stop reason: HOSPADM

## 2019-12-26 RX ORDER — METHYLPREDNISOLONE SOD SUCC 125 MG
60 VIAL (EA) INJECTION EVERY 8 HOURS
Status: DISCONTINUED | OUTPATIENT
Start: 2019-12-26 | End: 2019-12-27

## 2019-12-26 RX ORDER — FLUTICASONE FUROATE AND VILANTEROL 200; 25 UG/1; UG/1
1 POWDER RESPIRATORY (INHALATION) DAILY
Status: DISCONTINUED | OUTPATIENT
Start: 2019-12-26 | End: 2019-12-26

## 2019-12-26 RX ORDER — FLUTICASONE PROPIONATE 220 UG/1
1 AEROSOL, METERED RESPIRATORY (INHALATION) 2 TIMES DAILY
Status: DISCONTINUED | OUTPATIENT
Start: 2019-12-26 | End: 2019-12-26

## 2019-12-26 RX ORDER — FUROSEMIDE 40 MG/1
40 TABLET ORAL DAILY
Status: DISCONTINUED | OUTPATIENT
Start: 2019-12-26 | End: 2019-12-27

## 2019-12-26 RX ORDER — IPRATROPIUM BROMIDE AND ALBUTEROL SULFATE 2.5; .5 MG/3ML; MG/3ML
3 SOLUTION RESPIRATORY (INHALATION) EVERY 6 HOURS
Status: DISCONTINUED | OUTPATIENT
Start: 2019-12-26 | End: 2019-12-27 | Stop reason: HOSPADM

## 2019-12-26 RX ORDER — SODIUM CHLORIDE 0.9 % (FLUSH) 0.9 %
10 SYRINGE (ML) INJECTION
Status: DISCONTINUED | OUTPATIENT
Start: 2019-12-26 | End: 2019-12-27 | Stop reason: HOSPADM

## 2019-12-26 RX ORDER — DOCUSATE SODIUM 100 MG/1
100 CAPSULE, LIQUID FILLED ORAL 2 TIMES DAILY
Status: DISCONTINUED | OUTPATIENT
Start: 2019-12-26 | End: 2019-12-27 | Stop reason: HOSPADM

## 2019-12-26 RX ORDER — POLYETHYLENE GLYCOL 3350 17 G/17G
17 POWDER, FOR SOLUTION ORAL 2 TIMES DAILY
Status: DISCONTINUED | OUTPATIENT
Start: 2019-12-26 | End: 2019-12-27 | Stop reason: HOSPADM

## 2019-12-26 RX ORDER — ACETAMINOPHEN 325 MG/1
650 TABLET ORAL EVERY 8 HOURS PRN
Status: DISCONTINUED | OUTPATIENT
Start: 2019-12-26 | End: 2019-12-27 | Stop reason: HOSPADM

## 2019-12-26 RX ORDER — ASPIRIN 81 MG/1
81 TABLET ORAL DAILY
Status: DISCONTINUED | OUTPATIENT
Start: 2019-12-26 | End: 2019-12-26

## 2019-12-26 RX ORDER — ALBUTEROL SULFATE 2.5 MG/.5ML
10 SOLUTION RESPIRATORY (INHALATION) EVERY 6 HOURS
Status: DISCONTINUED | OUTPATIENT
Start: 2019-12-26 | End: 2019-12-26

## 2019-12-26 RX ORDER — PANTOPRAZOLE SODIUM 40 MG/1
40 TABLET, DELAYED RELEASE ORAL DAILY
Status: DISCONTINUED | OUTPATIENT
Start: 2019-12-26 | End: 2019-12-27 | Stop reason: HOSPADM

## 2019-12-26 RX ORDER — MICONAZOLE NITRATE 2 %
POWDER (GRAM) TOPICAL 2 TIMES DAILY
Status: DISCONTINUED | OUTPATIENT
Start: 2019-12-26 | End: 2019-12-27 | Stop reason: HOSPADM

## 2019-12-26 RX ORDER — INSULIN ASPART 100 [IU]/ML
0-5 INJECTION, SOLUTION INTRAVENOUS; SUBCUTANEOUS
Status: DISCONTINUED | OUTPATIENT
Start: 2019-12-26 | End: 2019-12-26

## 2019-12-26 RX ORDER — INSULIN ASPART 100 [IU]/ML
0-5 INJECTION, SOLUTION INTRAVENOUS; SUBCUTANEOUS
Status: DISCONTINUED | OUTPATIENT
Start: 2019-12-26 | End: 2019-12-27 | Stop reason: HOSPADM

## 2019-12-26 RX ORDER — IPRATROPIUM BROMIDE 0.5 MG/2.5ML
1 SOLUTION RESPIRATORY (INHALATION) EVERY 6 HOURS
Status: DISCONTINUED | OUTPATIENT
Start: 2019-12-26 | End: 2019-12-26

## 2019-12-26 RX ORDER — AMIODARONE HYDROCHLORIDE 200 MG/1
200 TABLET ORAL DAILY
Status: DISCONTINUED | OUTPATIENT
Start: 2019-12-26 | End: 2019-12-27 | Stop reason: HOSPADM

## 2019-12-26 RX ORDER — METHYLPREDNISOLONE SOD SUCC 125 MG
125 VIAL (EA) INJECTION EVERY 6 HOURS
Status: DISCONTINUED | OUTPATIENT
Start: 2019-12-26 | End: 2019-12-26

## 2019-12-26 RX ORDER — DILTIAZEM HYDROCHLORIDE 120 MG/1
240 CAPSULE, COATED, EXTENDED RELEASE ORAL DAILY
Status: DISCONTINUED | OUTPATIENT
Start: 2019-12-26 | End: 2019-12-27 | Stop reason: HOSPADM

## 2019-12-26 RX ORDER — ONDANSETRON 2 MG/ML
4 INJECTION INTRAMUSCULAR; INTRAVENOUS EVERY 4 HOURS PRN
Status: DISCONTINUED | OUTPATIENT
Start: 2019-12-26 | End: 2019-12-27 | Stop reason: HOSPADM

## 2019-12-26 RX ORDER — ISOSORBIDE MONONITRATE 30 MG/1
30 TABLET, EXTENDED RELEASE ORAL DAILY
Status: DISCONTINUED | OUTPATIENT
Start: 2019-12-26 | End: 2019-12-27 | Stop reason: HOSPADM

## 2019-12-26 RX ORDER — FERROUS GLUCONATE 324(37.5)
324 TABLET ORAL
Status: DISCONTINUED | OUTPATIENT
Start: 2019-12-26 | End: 2019-12-27 | Stop reason: HOSPADM

## 2019-12-26 RX ORDER — GLUCAGON 1 MG
1 KIT INJECTION
Status: DISCONTINUED | OUTPATIENT
Start: 2019-12-26 | End: 2019-12-27 | Stop reason: HOSPADM

## 2019-12-26 RX ORDER — LISINOPRIL 20 MG/1
40 TABLET ORAL DAILY
Status: DISCONTINUED | OUTPATIENT
Start: 2019-12-26 | End: 2019-12-27 | Stop reason: HOSPADM

## 2019-12-26 RX ORDER — IBUPROFEN 200 MG
16 TABLET ORAL
Status: DISCONTINUED | OUTPATIENT
Start: 2019-12-26 | End: 2019-12-26

## 2019-12-26 RX ORDER — GABAPENTIN 300 MG/1
300 CAPSULE ORAL NIGHTLY
Status: DISCONTINUED | OUTPATIENT
Start: 2019-12-26 | End: 2019-12-27 | Stop reason: HOSPADM

## 2019-12-26 RX ORDER — HYDRALAZINE HYDROCHLORIDE 25 MG/1
50 TABLET, FILM COATED ORAL EVERY 8 HOURS
Status: DISCONTINUED | OUTPATIENT
Start: 2019-12-26 | End: 2019-12-27 | Stop reason: HOSPADM

## 2019-12-26 RX ORDER — IBUPROFEN 200 MG
24 TABLET ORAL
Status: DISCONTINUED | OUTPATIENT
Start: 2019-12-26 | End: 2019-12-26

## 2019-12-26 RX ORDER — KETOROLAC TROMETHAMINE 30 MG/ML
30 INJECTION, SOLUTION INTRAMUSCULAR; INTRAVENOUS EVERY 6 HOURS PRN
Status: DISCONTINUED | OUTPATIENT
Start: 2019-12-26 | End: 2019-12-27 | Stop reason: HOSPADM

## 2019-12-26 RX ORDER — METHYLPREDNISOLONE SOD SUCC 125 MG
60 VIAL (EA) INJECTION EVERY 6 HOURS
Status: DISCONTINUED | OUTPATIENT
Start: 2019-12-26 | End: 2019-12-26

## 2019-12-26 RX ADMIN — IPRATROPIUM BROMIDE AND ALBUTEROL SULFATE 3 ML: .5; 3 SOLUTION RESPIRATORY (INHALATION) at 01:12

## 2019-12-26 RX ADMIN — CEFTRIAXONE 1 G: 1 INJECTION, SOLUTION INTRAVENOUS at 03:12

## 2019-12-26 RX ADMIN — PANTOPRAZOLE SODIUM 40 MG: 40 TABLET, DELAYED RELEASE ORAL at 10:12

## 2019-12-26 RX ADMIN — POLYETHYLENE GLYCOL 3350 17 G: 17 POWDER, FOR SOLUTION ORAL at 09:12

## 2019-12-26 RX ADMIN — DILTIAZEM HYDROCHLORIDE 240 MG: 120 CAPSULE, COATED, EXTENDED RELEASE ORAL at 10:12

## 2019-12-26 RX ADMIN — PHENAZOPYRIDINE HYDROCHLORIDE 100 MG: 100 TABLET ORAL at 05:12

## 2019-12-26 RX ADMIN — METHYLPREDNISOLONE SODIUM SUCCINATE 60 MG: 125 INJECTION, POWDER, FOR SOLUTION INTRAMUSCULAR; INTRAVENOUS at 09:12

## 2019-12-26 RX ADMIN — IPRATROPIUM BROMIDE AND ALBUTEROL SULFATE 3 ML: .5; 3 SOLUTION RESPIRATORY (INHALATION) at 07:12

## 2019-12-26 RX ADMIN — FUROSEMIDE 40 MG: 40 TABLET ORAL at 10:12

## 2019-12-26 RX ADMIN — FLUTICASONE PROPIONATE 1 PUFF: 220 AEROSOL, METERED RESPIRATORY (INHALATION) at 03:12

## 2019-12-26 RX ADMIN — HYDRALAZINE HYDROCHLORIDE 50 MG: 25 TABLET, FILM COATED ORAL at 05:12

## 2019-12-26 RX ADMIN — IOHEXOL 125 ML: 350 INJECTION, SOLUTION INTRAVENOUS at 02:12

## 2019-12-26 RX ADMIN — FERROUS GLUCONATE TAB 324 MG (37.5 MG ELEMENTAL IRON) 324 MG: 324 (37.5 FE) TAB at 05:12

## 2019-12-26 RX ADMIN — APIXABAN 5 MG: 5 TABLET, FILM COATED ORAL at 09:12

## 2019-12-26 RX ADMIN — FERROUS GLUCONATE TAB 324 MG (37.5 MG ELEMENTAL IRON) 324 MG: 324 (37.5 FE) TAB at 10:12

## 2019-12-26 RX ADMIN — PREGABALIN 75 MG: 50 CAPSULE ORAL at 10:12

## 2019-12-26 RX ADMIN — METHYLPREDNISOLONE SODIUM SUCCINATE 125 MG: 125 INJECTION, POWDER, FOR SOLUTION INTRAMUSCULAR; INTRAVENOUS at 06:12

## 2019-12-26 RX ADMIN — APIXABAN 5 MG: 5 TABLET, FILM COATED ORAL at 10:12

## 2019-12-26 RX ADMIN — PREGABALIN 75 MG: 50 CAPSULE ORAL at 09:12

## 2019-12-26 RX ADMIN — FLUTICASONE PROPIONATE 1 PUFF: 220 AEROSOL, METERED RESPIRATORY (INHALATION) at 09:12

## 2019-12-26 RX ADMIN — GABAPENTIN 300 MG: 300 CAPSULE ORAL at 09:12

## 2019-12-26 RX ADMIN — METHYLPREDNISOLONE SODIUM SUCCINATE 125 MG: 125 INJECTION, POWDER, FOR SOLUTION INTRAMUSCULAR; INTRAVENOUS at 01:12

## 2019-12-26 RX ADMIN — METHYLPREDNISOLONE SODIUM SUCCINATE 125 MG: 125 INJECTION, POWDER, FOR SOLUTION INTRAMUSCULAR; INTRAVENOUS at 03:12

## 2019-12-26 RX ADMIN — PREGABALIN 75 MG: 50 CAPSULE ORAL at 03:12

## 2019-12-26 RX ADMIN — SOTALOL HYDROCHLORIDE 80 MG: 80 TABLET ORAL at 10:12

## 2019-12-26 RX ADMIN — ISOSORBIDE MONONITRATE 30 MG: 30 TABLET, EXTENDED RELEASE ORAL at 10:12

## 2019-12-26 RX ADMIN — FERROUS GLUCONATE TAB 324 MG (37.5 MG ELEMENTAL IRON) 324 MG: 324 (37.5 FE) TAB at 12:12

## 2019-12-26 RX ADMIN — ASPIRIN 81 MG: 81 TABLET, COATED ORAL at 10:12

## 2019-12-26 RX ADMIN — INSULIN ASPART 2 UNITS: 100 INJECTION, SOLUTION INTRAVENOUS; SUBCUTANEOUS at 11:12

## 2019-12-26 RX ADMIN — DOCUSATE SODIUM 100 MG: 100 CAPSULE, LIQUID FILLED ORAL at 09:12

## 2019-12-26 RX ADMIN — AMIODARONE HYDROCHLORIDE 200 MG: 200 TABLET ORAL at 10:12

## 2019-12-26 RX ADMIN — IPRATROPIUM BROMIDE AND ALBUTEROL SULFATE 3 ML: .5; 3 SOLUTION RESPIRATORY (INHALATION) at 08:12

## 2019-12-26 RX ADMIN — LISINOPRIL 40 MG: 20 TABLET ORAL at 10:12

## 2019-12-26 RX ADMIN — INSULIN ASPART 3 UNITS: 100 INJECTION, SOLUTION INTRAVENOUS; SUBCUTANEOUS at 04:12

## 2019-12-26 RX ADMIN — MICONAZOLE NITRATE: 20 POWDER TOPICAL at 09:12

## 2019-12-26 RX ADMIN — SOTALOL HYDROCHLORIDE 80 MG: 80 TABLET ORAL at 09:12

## 2019-12-26 RX ADMIN — INSULIN ASPART 2 UNITS: 100 INJECTION, SOLUTION INTRAVENOUS; SUBCUTANEOUS at 09:12

## 2019-12-26 RX ADMIN — KETOROLAC TROMETHAMINE 30 MG: 30 INJECTION, SOLUTION INTRAMUSCULAR at 06:12

## 2019-12-26 RX ADMIN — MICONAZOLE NITRATE: 20 POWDER TOPICAL at 10:12

## 2019-12-26 NOTE — ED PROVIDER NOTES
Encounter Date: 12/25/2019       History     Chief Complaint   Patient presents with    Shortness of Breath     x 2 days, hx of Asthma and CHF, given 1 Spray Nitro, sats on Home O2 upon EMS arrival 90%,  placed on CPAP up to 98%, hypertensive 190's systolic       70 yo female presents via [facility] with acute severe shortness of breath, chest pain, elevated BP, and low O2 sats. Patient developed shortness of breath acutely last night (Tuesday 12/24/19) which improved with CPAP overnight but then worsened today (Wednesday 12/25/19). Patient also reports substernal chest pain which occurred last night and tonight as well. EMS found patient with sats 90% on her home O2 via NC and SBP 190s. EMS started patient on CPAP and administered NTG SL spray x 1 with improvement in SBP to 160s. EKG no STEMI. Patient sats improved to 99% with EMS interventions.    History limited by respiratory distress.    Patient with multiple presentations and admissions for similar.     PMH: diastolic CHF, paroxysmal atrial fibrillation, CAD, MI, COPD, HTN, DM2, DVT LLE, chronic anticoagulation (Xarelto), chronic O2 therapy (3.5L), asthma, arthritis, GERD, LOYDA on CPAP, unsteady gait / uses walker     Psych: depression     PSH: coronary angioplasty with stent placement, hernia repair, hand surgery, foot surgery     6/27/18 Cardiac Cath  Diagnostic:        Patient has a right dominant coronary artery.      - Left Main Coronary Artery:             The LM has luminal irregularities. There is ROSA 3 flow.     - Left Anterior Descending Artery:             The LAD has luminal irregularities. There is ROSA 3 flow.     - Left Circumflex Artery:             The LCX has luminal irregularities. There is ROSA 3 flow.     - Right Coronary Artery:             The mid RCA has a 40% stenosis. There is ROSA 3 flow.     TTE 11/4/19  · Normal left ventricular systolic function. The estimated ejection fraction is 70%  · Concentric left ventricular  hypertrophy.  · Normal LV diastolic function.  · Normal right ventricular systolic function.  · Mild left atrial enlargement.    TTE 9/30/19    Normal cardiac dimensions.     Mild concentric left ventricular hypertrophy.     Normal left ventricular systolic function.     Left ventricular ejection fraction is estimated at 55-60 %.     There were no regional wall motion abnormalities.     Grade I/IV diastolic dysfunction.     Normal right ventricular systolic function.     Normal size aortic root and proximal ascending aorta.     Mitral annular calcification.  Otherwise normal cardiac valve structures.     Negative doppler study as above.     No atrial mass is noted on this study.     TTE 6/9/19  ·           Normal left ventricular systolic function. The estimated ejection fraction is 70%  ·           Moderate concentric left ventricular hypertrophy.  ·           Grade I (mild) left ventricular diastolic dysfunction consistent with impaired relaxation.  ·           Normal left atrial pressure.  ·           Moderate left atrial enlargement.  ·           Mild right atrial enlargement.  ·           Mild mitral regurgitation.  ·           Mild tricuspid regurgitation.  ·           The estimated PA systolic pressure is 80 mm Hg  ·           Intermediate central venous pressure (8 mm Hg).  ·           Pulmonary hypertension present.        Review of patient's allergies indicates:  No Known Allergies  Past Medical History:   Diagnosis Date    Anticoagulant long-term use     Xarelto    Arthritis     Asthma     CHF (congestive heart failure)     COPD (chronic obstructive pulmonary disease)     Coronary artery disease     Deep vein thrombosis (DVT) of left lower extremity     Depression     Diabetes mellitus     GERD (gastroesophageal reflux disease)     Hypertension     MI (myocardial infarction) 08/2019    Obstructive sleep apnea on CPAP     On home oxygen therapy     Unsteady gait     uses either walker or 4  "dee dee love     Past Surgical History:   Procedure Laterality Date    CARDIAC CATHETERIZATION  2018    CORONARY ANGIOPLASTY WITH STENT PLACEMENT      FOOT SURGERY      HAND SURGERY      HERNIA REPAIR      encapsulated umbilical hernia     Family History   Problem Relation Age of Onset    Heart disease Mother     Hypertension Mother     Diabetes Father      Social History     Tobacco Use    Smoking status: Former Smoker     Packs/day: 0.50     Years: 55.00     Pack years: 27.50     Types: Cigarettes     Start date: 1963     Last attempt to quit: 2018     Years since quittin.9    Smokeless tobacco: Never Used    Tobacco comment: "States she quit smoking about 3 or 4 weeks ago"   Substance Use Topics    Alcohol use: Not Currently     Alcohol/week: 1.0 standard drinks     Types: 1 Glasses of wine per week     Frequency: Never     Comment: socially    Drug use: No     Review of Systems   Constitutional: Negative for fever.   HENT: Negative for sore throat.    Eyes: Negative for discharge.   Respiratory: Positive for shortness of breath.    Cardiovascular: Positive for chest pain and leg swelling.   Gastrointestinal: Negative for vomiting.   Musculoskeletal: Negative for neck pain.   Neurological: Negative for dizziness.   Hematological: Bruises/bleeds easily.   Psychiatric/Behavioral: Negative for confusion.       Physical Exam     Initial Vitals   BP Pulse Resp Temp SpO2   19 2308 19   (!) 167/83 88 15 98.8 °F (37.1 °C) 98 %      MAP       --                Physical Exam    Nursing note and vitals reviewed.  Constitutional: She appears well-developed and well-nourished. She is not diaphoretic. She appears distressed.   Awake, alert, CPAP in place by EMS, increased work of breathing.   HENT:   Head: Normocephalic and atraumatic.   Mouth/Throat: Oropharynx is clear and moist.   Eyes: Conjunctivae and EOM are normal. Pupils are equal, " round, and reactive to light.   Neck: Normal range of motion. Neck supple. JVD present.   Cardiovascular: Normal rate, regular rhythm, normal heart sounds and intact distal pulses.   No murmur heard.  Pulmonary/Chest: She is in respiratory distress. She has wheezes (faint). She has no rhonchi. She has rales.   Tachypneic, CPAP in place, increased work of breathing.   Abdominal: Soft. There is no tenderness. There is no rebound and no guarding.   Genitourinary:   Genitourinary Comments: Wearing depends.   Musculoskeletal: Normal range of motion. She exhibits edema. She exhibits no tenderness.   Neurological: She is alert and oriented to person, place, and time. She has normal strength.   Moving all extremities.   Skin: Skin is warm and dry. There is erythema. There is pallor.   Psychiatric: She has a normal mood and affect.         ED Course   Critical Care  Date/Time: 12/25/2019 9:49 PM  Performed by: Katie Seaman MD  Authorized by: Katie Seaman MD   Direct patient critical care time: 12 minutes  Additional history critical care time: 8 minutes  Ordering / reviewing critical care time: 8 minutes  Documentation critical care time: 8 minutes  Consulting other physicians critical care time: 5 minutes  Total critical care time (exclusive of procedural time) : 41 minutes        Labs Reviewed   B-TYPE NATRIURETIC PEPTIDE - Abnormal; Notable for the following components:       Result Value     (*)     All other components within normal limits   COMPREHENSIVE METABOLIC PANEL - Abnormal; Notable for the following components:    Albumin 2.8 (*)     ALT 7 (*)     All other components within normal limits   CBC W/ AUTO DIFFERENTIAL - Abnormal; Notable for the following components:    RBC 3.29 (*)     Hemoglobin 9.1 (*)     Hematocrit 32.1 (*)     Mean Corpuscular Hemoglobin Conc 28.3 (*)     RDW 16.2 (*)     Mono # 1.2 (*)     All other components within normal limits   APTT - Abnormal; Notable for the  "following components:    aPTT 32.5 (*)     All other components within normal limits   TROPONIN I   PROTIME-INR     EKG Readings: (Independently Interpreted)   20:50: NSR, HR 85. Normal axis. 1st degree AV block. No ectopy. TWI in aVL. No STEMI. Morphology c/w 11/29/19.      ECG Results          EKG 12-lead (In process)  Result time 12/26/19 05:57:49    In process by Interface, Lab In [facility] (12/26/19 05:57:49)                 Narrative:    Test Reason : R06.02,    Vent. Rate : 085 BPM     Atrial Rate : 085 BPM     P-R Int : 226 ms          QRS Dur : 080 ms      QT Int : 382 ms       P-R-T Axes : 073 -10 072 degrees     QTc Int : 454 ms    Sinus rhythm with 1st degree A-V block  Otherwise normal ECG  When compared with ECG of 29-NOV-2019 11:35,  No significant change was found    Referred By: [email or web address]   SELF           Confirmed By:                   In process by Interface, Lab In [facility] (12/26/19 05:49:29)                 Narrative:    Test Reason : R06.02,    Vent. Rate : 085 BPM     Atrial Rate : 085 BPM     P-R Int : 226 ms          QRS Dur : 080 ms      QT Int : 382 ms       P-R-T Axes : 073 -10 072 degrees     QTc Int : 454 ms    Sinus rhythm with 1st degree A-V block  Otherwise normal ECG  When compared with ECG of 29-NOV-2019 11:35,  No significant change was found    Referred By: AAAREFERR   SELF           Confirmed By:                             Imaging Results          X-Ray Chest AP Portable (Final result)  Result time 12/25/19 21:11:25    Final result by Gopal Hawthorne MD (12/25/19 21:11:25)                 Impression:      No detrimental change when compared with 11/29/2019.      Electronically signed by: Gopal Hawthorne MD  Date:    12/25/2019  Time:    21:11             Narrative:    EXAMINATION:  XR CHEST AP PORTABLE    CLINICAL HISTORY:  Provided history is "shortness of breath;  ".    TECHNIQUE:  One view of the chest.    COMPARISON:  11/29/2019.    FINDINGS:  Cardiac wires overlie the " chest.  Cardiomediastinal silhouette is not significantly enlarged.  Atherosclerotic calcifications overlie the aortic arch.  There is mild left basilar subsegmental atelectasis.  No confluent area of consolidation.  No sizable pleural effusion.  No pneumothorax.  No detrimental change in lung aeration when compared with the prior study.                              X-Rays:   Independently Interpreted Readings:   Other Readings:  CXR mild hyperinflation c/w COPD, no significant pulmonary edema, no effusion, no cardiomegaly    Medical Decision Making:   History:   Old Medical Records: I decided to obtain old medical records.  Old Records Summarized: records from previous admission(s).  Initial Assessment:   69 y.o. Female with acute severe respiratory distress.  Differential Diagnosis:   Ddx includes CHF exacerbation, COPD exacerbation, ACS, PE, PNA, other.  Independently Interpreted Test(s):   I have ordered and independently interpreted X-rays - see prior notes.  I have ordered and independently interpreted EKG Reading(s) - see prior notes  Clinical Tests:   Lab Tests: Ordered and Reviewed  Radiological Study: Ordered and Reviewed  Medical Tests: Ordered and Reviewed  ED Management:  EKG no STEMI. Morphology c/w prior.    CXR without acute pulmonary edema.    Labs: Hgb 9.1, c/w prior. BUN / creatinine 15 / 0.8, reassuring. CMP reassuring. , top normal. Troponin negative.    Patient had NTG SL x 1 by EMS en route as well as CPAP. Switched to BIPAP on ED arrival. Had solumedrol 125mg IV as well as albuterol 10mg / ipratropium 1mg neb continuous. She had ASA in ED in case of ACS.    Patient reports improvement after above interventions but remains on BIPAP. Lungs still very tight to auscultation. Consequently I will place her in OBS as COPD exacerbation for repeat steroids, nebs, serial troponins, and further care.     I discussed patient with nocturnist Dr. Gentile and have written courtesy orders.  Other:   I  have discussed this case with another health care provider.                         Clinical Impression:       ICD-10-CM ICD-9-CM   1. COPD exacerbation J44.1 491.21   2. Shortness of breath R06.02 786.05   3. Hypoxia R09.02 799.02   4. Respiratory distress R06.03 786.09                             Katie Seaman MD  12/26/19 0906

## 2019-12-26 NOTE — SUBJECTIVE & OBJECTIVE
"Past Medical History:   Diagnosis Date    Anticoagulant long-term use     Xarelto    Arthritis     Asthma     CHF (congestive heart failure)     COPD (chronic obstructive pulmonary disease)     Coronary artery disease     Deep vein thrombosis (DVT) of left lower extremity     Depression     Diabetes mellitus     GERD (gastroesophageal reflux disease)     Hypertension     MI (myocardial infarction) 2019    Obstructive sleep apnea on CPAP     On home oxygen therapy     Unsteady gait     uses either walker or 4 prong cane       Past Surgical History:   Procedure Laterality Date    CARDIAC CATHETERIZATION  2018    CORONARY ANGIOPLASTY WITH STENT PLACEMENT      FOOT SURGERY      HAND SURGERY      HERNIA REPAIR      encapsulated umbilical hernia       Review of patient's allergies indicates:  No Known Allergies    Family History     Problem Relation (Age of Onset)    Diabetes Father    Heart disease Mother    Hypertension Mother          Tobacco Use    Smoking status: Former Smoker     Packs/day: 0.50     Years: 55.00     Pack years: 27.50     Types: Cigarettes     Start date: 1963     Last attempt to quit: 2018     Years since quittin.9    Smokeless tobacco: Never Used    Tobacco comment: "States she quit smoking about 3 or 4 weeks ago"   Substance and Sexual Activity    Alcohol use: Not Currently     Alcohol/week: 1.0 standard drinks     Types: 1 Glasses of wine per week     Frequency: Never     Comment: socially    Drug use: No    Sexual activity: Never       Review of Systems   Constitutional: Negative for appetite change, chills and fever.   HENT: Negative for congestion, sore throat and trouble swallowing.    Eyes: Negative for pain and itching.   Respiratory: Negative for cough and shortness of breath.    Cardiovascular: Negative for chest pain and leg swelling.   Gastrointestinal: Negative for abdominal distention, abdominal pain, constipation, diarrhea, nausea and " vomiting.   Genitourinary: Positive for dysuria and hematuria. Negative for difficulty urinating, flank pain, nocturia and urgency.   Musculoskeletal: Negative for back pain, neck pain and neck stiffness.   Skin: Negative for rash and wound.   Neurological: Negative for dizziness and seizures.   Hematological: Negative for adenopathy. Does not bruise/bleed easily.   Psychiatric/Behavioral: Negative for confusion. The patient is not nervous/anxious.    All other systems reviewed and are negative.      Objective:     Temp:  [97.1 °F (36.2 °C)-98.8 °F (37.1 °C)] 98.3 °F (36.8 °C)  Pulse:  [] 99  Resp:  [15-22] 18  SpO2:  [93 %-100 %] 100 %  BP: (122-167)/(58-85) 122/58     Body mass index is 28.57 kg/m².    Date 12/26/19 0700 - 12/27/19 0659   Shift 6135-4112 8513-8792 7903-6475 24 Hour Total   INTAKE   P.O. 240   240   Shift Total(mL/kg) 240(3.2)   240(3.2)   OUTPUT   Shift Total(mL/kg)       Weight (kg) 75.5 75.5 75.5 75.5          Drains     None                 Physical Exam   Vitals reviewed.  Constitutional: She is oriented to person, place, and time. She appears well-developed and well-nourished. No distress.   HENT:   Head: Normocephalic and atraumatic.   Neck: Normal range of motion.   Cardiovascular: Normal rate and regular rhythm.    Pulmonary/Chest: Effort normal and breath sounds normal. No respiratory distress.   Abdominal: Soft. She exhibits no distension and no mass. There is no tenderness. There is no rebound and no guarding.   Genitourinary:   Genitourinary Comments: SP tenderness   Musculoskeletal: Normal range of motion.   Neurological: She is alert and oriented to person, place, and time.   Skin: Skin is warm and dry. No rash noted. She is not diaphoretic. No erythema.     Psychiatric: She has a normal mood and affect. Her behavior is normal.       Significant Labs:    BMP:  Recent Labs   Lab 12/25/19 2031 12/26/19  0307    142   K 4.7 5.2*    106   CO2 27 28   BUN 15 17    CREATININE 0.8 0.9   CALCIUM 9.5 10.0       CBC:  Recent Labs   Lab 12/25/19  2031   WBC 10.21   HGB 9.1*   HCT 32.1*          Blood Culture: No results for input(s): LABBLOO in the last 168 hours.  Urine Culture: No results for input(s): LABURIN in the last 168 hours.  Urine Studies:   Recent Labs   Lab 12/26/19  0736   COLORU Red*   APPEARANCEUA Cloudy*   PHUR SEE COMMENT   SPECGRAV SEE COMMENT   PROTEINUA SEE COMMENT   GLUCUA SEE COMMENT   KETONESU SEE COMMENT   BILIRUBINUA SEE COMMENT   OCCULTUA SEE COMMENT   NITRITE SEE COMMENT   UROBILINOGEN SEE COMMENT   LEUKOCYTESUR SEE COMMENT   RBCUA >100*   WBCUA >100*       Significant Imaging:  All pertinent imaging results/findings from the past 24 hours have been reviewed.

## 2019-12-26 NOTE — ASSESSMENT & PLAN NOTE
- Suspect related to UTI based on symptoms   - UCx pending   - Recommend upper tract imaging - CT urogram ordered   - Cystoscopy as outpatient   - Okay to hold on further intervention for now

## 2019-12-26 NOTE — PLAN OF CARE
Pt compliant with BiPAP throughout evening.  No complaints of pain.  Pt SR with 1st degree heart block on telemetry.  At 0530, pt requesting to come off BiPAP, respiratory notified.  Instructed to place pt on NC, with same O2 on BiPAP, 5 L.    Pt incontinent at times, incontinence care provided PRN. Barrier cream applied to stage II on coccyx.  Pt's folds moist, yeasty Dr. Gentile notified.  Powder ordered BID.    0545 Pt c/o frequency and dysuria.  Pt placed on bed pan.  Pt had dark, red urine.  Dr. Gentile notified, UA ordered.      Bed alarm on, nonskid socks in place.  Bedside commode.

## 2019-12-26 NOTE — PROGRESS NOTES
Pt to room 313 from ER on [facility]. Placed on [place] BIPAP at this time. 10/5, 5L bled in. Medium full face mask. Tolerating well.

## 2019-12-26 NOTE — NURSING
Patient going to scheduled procedure as ordered (CT scan). Patient is AAOx4 without any discomfort. Via wheelchair

## 2019-12-26 NOTE — PROGRESS NOTES
[id number] [place] contacted Dr. Orourke's office at [phone]; spoke with Maryanne. Pt has a previously appt scheduled on 01/3/19 at 10:15 AM.

## 2019-12-26 NOTE — CONSULTS
[facility]  Urology  Consult Note    Patient Name: Yasmeen Palm  MRN: [id number]  Admission Date: 12/25/2019  Hospital Length of Stay: 0   Code Status: Full Code   Attending Provider: Layla Villegas MD   Consulting Provider: Griselda Bingham MD  Primary Care Physician: Angel Orourke Jr, MD  Principal Problem:<principal problem not specified>    Inpatient consult to Urology  Consult performed by: Griselda Bingham MD  Consult ordered by: Amy Castellanos NP          Subjective:     HPI:  70yo F admitted with COPD exacerbation. She was noted to have gross hematuria with c/o dysuria. She is on oral anticoagulation (Eliquis, ASA). +smoker. No recent upper tract imaging. She reports continued dysuria. She is unsure about the chronicity of hematuria as she has not seen it. She reports dysuria 1-2 months ago that was also associated with UTI. Denies h/o kidney problems, nephrolithiasis.    Past Medical History:   Diagnosis Date    Anticoagulant long-term use     Xarelto    Arthritis     Asthma     CHF (congestive heart failure)     COPD (chronic obstructive pulmonary disease)     Coronary artery disease     Deep vein thrombosis (DVT) of left lower extremity     Depression     Diabetes mellitus     GERD (gastroesophageal reflux disease)     Hypertension     MI (myocardial infarction) 08/2019    Obstructive sleep apnea on CPAP     On home oxygen therapy     Unsteady gait     uses either walker or 4 prong cane       Past Surgical History:   Procedure Laterality Date    CARDIAC CATHETERIZATION  06/2018    CORONARY ANGIOPLASTY WITH STENT PLACEMENT      FOOT SURGERY      HAND SURGERY      HERNIA REPAIR      encapsulated umbilical hernia       Review of patient's allergies indicates:  No Known Allergies    Family History     Problem Relation (Age of Onset)    Diabetes Father    Heart disease Mother    Hypertension Mother          Tobacco Use    Smoking status: Former Smoker  "    Packs/day: 0.50     Years: 55.00     Pack years: 27.50     Types: Cigarettes     Start date: 1963     Last attempt to quit: 2018     Years since quittin.9    Smokeless tobacco: Never Used    Tobacco comment: "States she quit smoking about 3 or 4 weeks ago"   Substance and Sexual Activity    Alcohol use: Not Currently     Alcohol/week: 1.0 standard drinks     Types: 1 Glasses of wine per week     Frequency: Never     Comment: socially    Drug use: No    Sexual activity: Never       Review of Systems   Constitutional: Negative for appetite change, chills and fever.   HENT: Negative for congestion, sore throat and trouble swallowing.    Eyes: Negative for pain and itching.   Respiratory: Negative for cough and shortness of breath.    Cardiovascular: Negative for chest pain and leg swelling.   Gastrointestinal: Negative for abdominal distention, abdominal pain, constipation, diarrhea, nausea and vomiting.   Genitourinary: Positive for dysuria and hematuria. Negative for difficulty urinating, flank pain, nocturia and urgency.   Musculoskeletal: Negative for back pain, neck pain and neck stiffness.   Skin: Negative for rash and wound.   Neurological: Negative for dizziness and seizures.   Hematological: Negative for adenopathy. Does not bruise/bleed easily.   Psychiatric/Behavioral: Negative for confusion. The patient is not nervous/anxious.    All other systems reviewed and are negative.      Objective:     Temp:  [97.1 °F (36.2 °C)-98.8 °F (37.1 °C)] 98.3 °F (36.8 °C)  Pulse:  [] 99  Resp:  [15-22] 18  SpO2:  [93 %-100 %] 100 %  BP: (122-167)/(58-85) 122/58     Body mass index is 28.57 kg/m².    Date 19 0700 - 19 0659   Shift 9799-6220 2088-2274 1950-7871 24 Hour Total   INTAKE   P.O. 240   240   Shift Total(mL/kg) 240(3.2)   240(3.2)   OUTPUT   Shift Total(mL/kg)       Weight (kg) 75.5 75.5 75.5 75.5          Drains     None                 Physical Exam   Vitals " reviewed.  Constitutional: She is oriented to person, place, and time. She appears well-developed and well-nourished. No distress.   HENT:   Head: Normocephalic and atraumatic.   Neck: Normal range of motion.   Cardiovascular: Normal rate and regular rhythm.    Pulmonary/Chest: Effort normal and breath sounds normal. No respiratory distress.   Abdominal: Soft. She exhibits no distension and no mass. There is no tenderness. There is no rebound and no guarding.   Genitourinary:   Genitourinary Comments: SP tenderness   Musculoskeletal: Normal range of motion.   Neurological: She is alert and oriented to person, place, and time.   Skin: Skin is warm and dry. No rash noted. She is not diaphoretic. No erythema.     Psychiatric: She has a normal mood and affect. Her behavior is normal.       Significant Labs:    BMP:  Recent Labs   Lab 12/25/19 2031 12/26/19  0307    142   K 4.7 5.2*    106   CO2 27 28   BUN 15 17   CREATININE 0.8 0.9   CALCIUM 9.5 10.0       CBC:  Recent Labs   Lab 12/25/19 2031   WBC 10.21   HGB 9.1*   HCT 32.1*          Blood Culture: No results for input(s): LABBLOO in the last 168 hours.  Urine Culture: No results for input(s): LABURIN in the last 168 hours.  Urine Studies:   Recent Labs   Lab 12/26/19  0736   COLORU Red*   APPEARANCEUA Cloudy*   PHUR SEE COMMENT   SPECGRAV SEE COMMENT   PROTEINUA SEE COMMENT   GLUCUA SEE COMMENT   KETONESU SEE COMMENT   BILIRUBINUA SEE COMMENT   OCCULTUA SEE COMMENT   NITRITE SEE COMMENT   UROBILINOGEN SEE COMMENT   LEUKOCYTESUR SEE COMMENT   RBCUA >100*   WBCUA >100*       Significant Imaging:  All pertinent imaging results/findings from the past 24 hours have been reviewed.                    Assessment and Plan:     Dysuria   - Suspect 2/2 UTI   - Will start Pyridium (pt requesting medication to help with dysuria)    Suspected UTI   - +hematuria, dysuria   - UCx pending   - Consider empiric abx while culture pending    Hematuria, gross   -  Suspect related to UTI based on symptoms   - UCx pending   - Recommend upper tract imaging - CT urogram ordered   - Cystoscopy as outpatient   - Okay to hold on further intervention for now        VTE Risk Mitigation (From admission, onward)         Ordered     apixaban tablet 5 mg  2 times daily      12/26/19 0046     IP VTE HIGH RISK PATIENT  Once      12/26/19 0046     Reason for No Pharmacological VTE Prophylaxis  Once     Question:  Reasons:  Answer:  Already adequately anticoagulated on oral Anticoagulants    12/26/19 0046     Place TOM hose  Until discontinued      12/26/19 0046     Place sequential compression device  Until discontinued      12/26/19 0046                Thank you for your consult. I will follow-up with patient. Please contact us if you have any additional questions.    Griselda Bingham MD  Urology  [facility]

## 2019-12-26 NOTE — PROGRESS NOTES
0700 Pt c/o increased vaginal pain, unrelieved by PRN Toradol.  Pt increasingly SOB on NC, BiPAP reapplied.  Pt attributing SOB with vaginal pain.  Amy notified.  Instructed to get UA and begin Rocephin.  Okay to straight cath for specimen.      Pt unable to void.  Straight cath performed, with Dedra HOBBS.  Small amount of bloody urine collected.  Specimen sent to lab.

## 2019-12-26 NOTE — ED TRIAGE NOTES
Pt presents to ED via EMS with c/o SOB for the past x 2 days. Pt has hx of Asthma and CHF was given 1 [place] Nitro with EMS, hypertensive 190s systolic upon EMS arrival.  Sats on Home O2 upon EMS arrival 90%,, placed on CPAP up to 98%. Pt in acute distress upon ED arrival. Will continue to be monitored.

## 2019-12-26 NOTE — SUBJECTIVE & OBJECTIVE
Past Medical History:   Diagnosis Date    Anticoagulant long-term use     Xarelto    Arthritis     Asthma     CHF (congestive heart failure)     COPD (chronic obstructive pulmonary disease)     Coronary artery disease     Deep vein thrombosis (DVT) of left lower extremity     Depression     Diabetes mellitus     GERD (gastroesophageal reflux disease)     Hypertension     MI (myocardial infarction) 08/2019    Obstructive sleep apnea on CPAP     On home oxygen therapy     Unsteady gait     uses either walker or 4 prong cane       Past Surgical History:   Procedure Laterality Date    CARDIAC CATHETERIZATION  06/2018    CORONARY ANGIOPLASTY WITH STENT PLACEMENT      FOOT SURGERY      HAND SURGERY      HERNIA REPAIR      encapsulated umbilical hernia       Review of patient's allergies indicates:  No Known Allergies    No current facility-administered medications on file prior to encounter.      Current Outpatient Medications on File Prior to Encounter   Medication Sig    acetaminophen (TYLENOL) 500 MG tablet Take 1 tablet (500 mg total) by mouth every 8 (eight) hours as needed.    acetaminophen (TYLENOL) 500 MG tablet Take 500 mg by mouth.    albuterol (ACCUNEB) 1.25 mg/3 mL Nebu Inhale 1.25 mg into the lungs.    albuterol-ipratropium (DUO-NEB) 2.5 mg-0.5 mg/3 mL nebulizer solution Take 3 mLs by nebulization every 6 (six) hours. Rescue    amiodarone (PACERONE) 200 MG Tab Take 1 tablet (200 mg total) by mouth once daily.    apixaban (ELIQUIS) 5 mg Tab Take 5 mg by mouth 2 (two) times daily.    aspirin (ECOTRIN) 81 MG EC tablet Take 81 mg by mouth once daily.    docusate sodium (COLACE) 100 MG capsule Take 1 capsule (100 mg total) by mouth 2 (two) times daily.    fluticasone propionate (FLOVENT HFA) 220 mcg/actuation inhaler Inhale 1 puff into the lungs 2 (two) times daily. Controller    furosemide (LASIX) 40 MG tablet Take 40 mg by mouth once daily.     gabapentin (NEURONTIN) 300 MG capsule  Take 300 mg by mouth.    hydrALAZINE (APRESOLINE) 50 MG tablet Take 50 mg by mouth every 8 (eight) hours.     ipratropium-albuterol (COMBIVENT)  mcg/actuation inhaler Inhale 1 puff into the lungs every 6 (six) hours as needed for Wheezing or Shortness of Breath. Rescue    lisinopril (PRINIVIL,ZESTRIL) 40 MG tablet Take 1 tablet (40 mg total) by mouth once daily. Do not take this medication if blood pressure is below 130/80 mmHg and/or feeling dizzy after taking all other blood pressure meds    metFORMIN (GLUCOPHAGE) 1000 MG tablet Take 1 tablet (1,000 mg total) by mouth 2 (two) times daily with meals.    multivit-min-FA-lycopen-lutein (CENTRUM SILVER) 0.4-300-250 mg-mcg-mcg Tab Take 1 tablet by mouth once daily.    nitroGLYCERIN (NITROSTAT) 0.4 MG SL tablet     pantoprazole (PROTONIX) 40 MG tablet Take 40 mg by mouth once daily.    polyethylene glycol (GLYCOLAX) 17 gram PwPk Take 17 g by mouth 2 (two) times daily.    pregabalin (LYRICA) 75 MG capsule Take 75 mg by mouth 3 (three) times daily.    sotalol (BETAPACE) 120 MG Tab Take 80 mg by mouth 2 (two) times daily.    traMADol (ULTRAM) 50 mg tablet     umeclidinium brm/vilanterol tr (ANORO ELLIPTA INHL) Inhale into the lungs.    diltiaZEM (CARDIZEM CD) 180 MG 24 hr capsule Take 1 capsule (180 mg total) by mouth once daily. (Patient taking differently: Take 90 mg by mouth 3 (three) times daily. )    ferrous gluconate (FERGON) 324 MG tablet Take 1 tablet (324 mg total) by mouth 3 (three) times daily with meals.    isosorbide mononitrate (IMDUR) 30 MG 24 hr tablet Take 30 mg by mouth.     Family History     Problem Relation (Age of Onset)    Diabetes Father    Heart disease Mother    Hypertension Mother        Tobacco Use    Smoking status: Former Smoker     Packs/day: 0.50     Years: 55.00     Pack years: 27.50     Types: Cigarettes     Start date: 1963     Last attempt to quit: 2018     Years since quittin.9    Smokeless tobacco:  "Never Used    Tobacco comment: "States she quit smoking about 3 or 4 weeks ago"   Substance and Sexual Activity    Alcohol use: Not Currently     Alcohol/week: 1.0 standard drinks     Types: 1 Glasses of wine per week     Frequency: Never     Comment: socially    Drug use: No    Sexual activity: Never     Review of Systems   Constitutional: Positive for activity change. Negative for appetite change, chills, diaphoresis, fatigue and fever.   Respiratory: Positive for cough, shortness of breath and wheezing.    Cardiovascular: Positive for leg swelling (chronic LLE edema). Negative for chest pain and palpitations.   Gastrointestinal: Negative.    Endocrine: Negative.    Genitourinary: Negative.    Musculoskeletal: Positive for arthralgias and gait problem.   Allergic/Immunologic: Negative.    Neurological:        Ambulates with rollator   Psychiatric/Behavioral: Negative.      Objective:     Vital Signs (Most Recent):  Temp: 98.3 °F (36.8 °C) (12/26/19 1122)  Pulse: 99 (12/26/19 1122)  Resp: 18 (12/26/19 1122)  BP: (!) 122/58 (12/26/19 1122)  SpO2: 100 % (12/26/19 1122) Vital Signs (24h Range):  Temp:  [97.1 °F (36.2 °C)-98.8 °F (37.1 °C)] 98.3 °F (36.8 °C)  Pulse:  [] 99  Resp:  [15-22] 18  SpO2:  [93 %-100 %] 100 %  BP: (122-167)/(58-85) 122/58     Weight: 75.5 kg (166 lb 7.2 oz)  Body mass index is 28.57 kg/m².    Physical Exam   Constitutional: She is oriented to person, place, and time. She appears well-developed and well-nourished. No distress.   HENT:   Head: Atraumatic.   Neck: Normal range of motion. Neck supple.   Cardiovascular: Normal rate and regular rhythm.   Pulmonary/Chest:   Breathing comfortable on 5 L NC   Abdominal: Soft. Bowel sounds are normal. She exhibits no distension.   Musculoskeletal: Normal range of motion. She exhibits no edema (LLE ).   Neurological: She is alert and oriented to person, place, and time.   Skin: Skin is warm and dry.           Significant Labs: All pertinent " labs within the past 24 hours have been reviewed.    Significant Imaging: I have reviewed and interpreted all pertinent imaging results/findings within the past 24 hours.

## 2019-12-26 NOTE — ASSESSMENT & PLAN NOTE
Urine cath showed WBC >100 RBC >100 neg nitrate, neg leukocytes. Urine culture pending.    Patient history smoking. No recent weight lost and previous episode  Start rocephin for UTI. Follow up on cultures.  Appreciate Urology consult . CT Urogram ordered. Cystoscopy as outaptient.

## 2019-12-26 NOTE — H&P
"[facility] Hospital Medicine  History & Physical    Patient Name: Yasmeen Palm  MRN: [id number]  Admission Date: 12/25/2019  Attending Physician: Layla Villegas MD   Primary Care Provider: Angel Orourke Jr, MD         Patient information was obtained from patient and ER records.     Subjective:     Principal Problem:COPD (chronic obstructive pulmonary disease)    Chief Complaint:   Chief Complaint   Patient presents with    Shortness of Breath     x 2 days, hx of Asthma and CHF, given 1 Spray Nitro, sats on Home O2 upon EMS arrival 90%,  placed on CPAP up to 98%, hypertensive 190's systolic          HPI: Mrs. Palm is 68 yo female with significant history hypertension, hyperlipidemia, COPD on home 3 L NC, smoker (quit 3 months ago) depression, DM, LOYDA, PE/DVT LLE, CAD sp MI, and PAF who came to hospital for shortness of breath that occurred at 12 pm yesterday. Used home inhaler and duoneb without relief. Reports Niece's baby had runny nose on 12/24. Denies chest pain, headaches and dizziness. Lives with .     In ED, was placed BiPAP FiO 40% but not able to tolerate so took BiPAP off.   Patient also noted to have gross hematuria. Patient reports suprapubic spasm for few days. Straight cath "small amount of bloody urine."     Past Medical History:   Diagnosis Date    Anticoagulant long-term use     Xarelto    Arthritis     Asthma     CHF (congestive heart failure)     COPD (chronic obstructive pulmonary disease)     Coronary artery disease     Deep vein thrombosis (DVT) of left lower extremity     Depression     Diabetes mellitus     GERD (gastroesophageal reflux disease)     Hypertension     MI (myocardial infarction) 08/2019    Obstructive sleep apnea on CPAP     On home oxygen therapy     Unsteady gait     uses either walker or 4 prong cane       Past Surgical History:   Procedure Laterality Date    CARDIAC CATHETERIZATION  06/2018    CORONARY ANGIOPLASTY WITH STENT " PLACEMENT      FOOT SURGERY      HAND SURGERY      HERNIA REPAIR      encapsulated umbilical hernia       Review of patient's allergies indicates:  No Known Allergies    No current facility-administered medications on file prior to encounter.      Current Outpatient Medications on File Prior to Encounter   Medication Sig    acetaminophen (TYLENOL) 500 MG tablet Take 1 tablet (500 mg total) by mouth every 8 (eight) hours as needed.    acetaminophen (TYLENOL) 500 MG tablet Take 500 mg by mouth.    albuterol (ACCUNEB) 1.25 mg/3 mL Nebu Inhale 1.25 mg into the lungs.    albuterol-ipratropium (DUO-NEB) 2.5 mg-0.5 mg/3 mL nebulizer solution Take 3 mLs by nebulization every 6 (six) hours. Rescue    amiodarone (PACERONE) 200 MG Tab Take 1 tablet (200 mg total) by mouth once daily.    apixaban (ELIQUIS) 5 mg Tab Take 5 mg by mouth 2 (two) times daily.    aspirin (ECOTRIN) 81 MG EC tablet Take 81 mg by mouth once daily.    docusate sodium (COLACE) 100 MG capsule Take 1 capsule (100 mg total) by mouth 2 (two) times daily.    fluticasone propionate (FLOVENT HFA) 220 mcg/actuation inhaler Inhale 1 puff into the lungs 2 (two) times daily. Controller    furosemide (LASIX) 40 MG tablet Take 40 mg by mouth once daily.     gabapentin (NEURONTIN) 300 MG capsule Take 300 mg by mouth.    hydrALAZINE (APRESOLINE) 50 MG tablet Take 50 mg by mouth every 8 (eight) hours.     ipratropium-albuterol (COMBIVENT)  mcg/actuation inhaler Inhale 1 puff into the lungs every 6 (six) hours as needed for Wheezing or Shortness of Breath. Rescue    lisinopril (PRINIVIL,ZESTRIL) 40 MG tablet Take 1 tablet (40 mg total) by mouth once daily. Do not take this medication if blood pressure is below 130/80 mmHg and/or feeling dizzy after taking all other blood pressure meds    metFORMIN (GLUCOPHAGE) 1000 MG tablet Take 1 tablet (1,000 mg total) by mouth 2 (two) times daily with meals.    multivit-min-FA-lycopen-lutein (CENTRUM SILVER)  "0.4-300-250 mg-mcg-mcg Tab Take 1 tablet by mouth once daily.    nitroGLYCERIN (NITROSTAT) 0.4 MG SL tablet     pantoprazole (PROTONIX) 40 MG tablet Take 40 mg by mouth once daily.    polyethylene glycol (GLYCOLAX) 17 gram PwPk Take 17 g by mouth 2 (two) times daily.    pregabalin (LYRICA) 75 MG capsule Take 75 mg by mouth 3 (three) times daily.    sotalol (BETAPACE) 120 MG Tab Take 80 mg by mouth 2 (two) times daily.    traMADol (ULTRAM) 50 mg tablet     umeclidinium brm/vilanterol tr (ANORO ELLIPTA INHL) Inhale into the lungs.    diltiaZEM (CARDIZEM CD) 180 MG 24 hr capsule Take 1 capsule (180 mg total) by mouth once daily. (Patient taking differently: Take 90 mg by mouth 3 (three) times daily. )    ferrous gluconate (FERGON) 324 MG tablet Take 1 tablet (324 mg total) by mouth 3 (three) times daily with meals.    isosorbide mononitrate (IMDUR) 30 MG 24 hr tablet Take 30 mg by mouth.     Family History     Problem Relation (Age of Onset)    Diabetes Father    Heart disease Mother    Hypertension Mother        Tobacco Use    Smoking status: Former Smoker     Packs/day: 0.50     Years: 55.00     Pack years: 27.50     Types: Cigarettes     Start date: 1963     Last attempt to quit: 2018     Years since quittin.9    Smokeless tobacco: Never Used    Tobacco comment: "States she quit smoking about 3 or 4 weeks ago"   Substance and Sexual Activity    Alcohol use: Not Currently     Alcohol/week: 1.0 standard drinks     Types: 1 Glasses of wine per week     Frequency: Never     Comment: socially    Drug use: No    Sexual activity: Never     Review of Systems   Constitutional: Positive for activity change. Negative for appetite change, chills, diaphoresis, fatigue and fever.   Respiratory: Positive for cough, shortness of breath and wheezing.    Cardiovascular: Positive for leg swelling (chronic LLE edema). Negative for chest pain and palpitations.   Gastrointestinal: Negative.    Endocrine: " Negative.    Genitourinary: Negative.    Musculoskeletal: Positive for arthralgias and gait problem.   Allergic/Immunologic: Negative.    Neurological:        Ambulates with rollator   Psychiatric/Behavioral: Negative.      Objective:     Vital Signs (Most Recent):  Temp: 98.3 °F (36.8 °C) (12/26/19 1122)  Pulse: 99 (12/26/19 1122)  Resp: 18 (12/26/19 1122)  BP: (!) 122/58 (12/26/19 1122)  SpO2: 100 % (12/26/19 1122) Vital Signs (24h Range):  Temp:  [97.1 °F (36.2 °C)-98.8 °F (37.1 °C)] 98.3 °F (36.8 °C)  Pulse:  [] 99  Resp:  [15-22] 18  SpO2:  [93 %-100 %] 100 %  BP: (122-167)/(58-85) 122/58     Weight: 75.5 kg (166 lb 7.2 oz)  Body mass index is 28.57 kg/m².    Physical Exam   Constitutional: She is oriented to person, place, and time. She appears well-developed and well-nourished. No distress.   HENT:   Head: Atraumatic.   Neck: Normal range of motion. Neck supple.   Cardiovascular: Normal rate and regular rhythm.   Pulmonary/Chest:   Breathing comfortable on 5 L NC   Abdominal: Soft. Bowel sounds are normal. She exhibits no distension.   Musculoskeletal: Normal range of motion. She exhibits no edema (LLE ).   Neurological: She is alert and oriented to person, place, and time.   Skin: Skin is warm and dry.           Significant Labs: All pertinent labs within the past 24 hours have been reviewed.    Significant Imaging: I have reviewed and interpreted all pertinent imaging results/findings within the past 24 hours.    Assessment/Plan:     * COPD (chronic obstructive pulmonary disease)  Symptoms improved with duoneb and steroid. On home anora  Continue duoneb and steroid. Add breo     Hematuria, gross  Urine cath showed WBC >100 RBC >100 neg nitrate, neg leukocytes. Urine culture pending.    Patient history smoking. No recent weight lost and previous episode  Start rocephin for UTI. Follow up on cultures.  Appreciate Urology consult . CT Urogram ordered. Cystoscopy as outaptient.     History of atrial  fibrillation  Current NSR and ST. Continue amiodarone, BB, Eliquis.      Coronary artery disease involving native coronary artery without angina pectoris  [facility] 2018 showed non obstructive CAD Mid RCA 40%. Denies chest pain.   Continue ASA, ACEI, statin.     Suspected UTI  See above #2.      Shortness of breath  See above #1.      Essential hypertension  Continue home antihypertensives-stotalol, imdur, diltiazem, lisinopril. Not on hydralazine.       History of pulmonary embolism  Continue home eliquis      Tobacco abuse  Quit smoking 3 months ago. Continue to encourage smoking cessation.     Controlled type 2 diabetes mellitus, without long-term current use of insulin  Lab Results   Component Value Date    HGBA1C 7.4 (H) 11/04/2019    HGBA1C 7.4 (H) 11/04/2019   SSI and add prandial and basal accordingly          VTE Risk Mitigation (From admission, onward)         Ordered     apixaban tablet 5 mg  2 times daily      12/26/19 0046     IP VTE HIGH RISK PATIENT  Once      12/26/19 0046     Reason for No Pharmacological VTE Prophylaxis  Once     Question:  Reasons:  Answer:  Already adequately anticoagulated on oral Anticoagulants    12/26/19 0046     Place TOM hose  Until discontinued      12/26/19 0046     Place sequential compression device  Until discontinued      12/26/19 0046                   Amy Castellanos NP  Department of Hospital Medicine   [facility]

## 2019-12-26 NOTE — ASSESSMENT & PLAN NOTE
Lab Results   Component Value Date    HGBA1C 7.4 (H) 11/04/2019    HGBA1C 7.4 (H) 11/04/2019   SSI and add prandial and basal accordingly

## 2019-12-26 NOTE — PLAN OF CARE
"ESPERANZA supervisor met with pt. To assess for help at home, preferences, and d/c planning needs. Pt. Is well known to staff due to past admissions to [facility].  Pt. was AAOx3, answered questions, but failed to give eye contact.  Pt. did admit to mild depression due to her chronic medical issues.  Pt. reported going to the grocery without problem or physical symptoms. Pt. stated she was in process of preparing her [place] dinner, but was very SOB and could not continue preparing her meal.  Pt. reported vague symptoms, as she stated "I just felt bad".  Pt. stated she had been receiving [facility] for the previous two weeks. Pt. reported she resides with her , Getachew, who has reported "memory problems".  Pt. has two adult children residing in [place], and they only visit once a year per pt. Pt. stated she has home O2, SC, and a bath bench.  ESPERANZA verified pt's preferences, and her Pharmacy preference is [facility].  ESPERANZA assessed no needs currently.  Appropriate  staff to follow this pt. For continued d/c planning as needed.        12/26/19 1204   Discharge Assessment   Assessment Type Discharge Planning Assessment   Confirmed/corrected address and phone number on facesheet? Yes   Assessment information obtained from? Patient   Prior to hospitilization cognitive status: Alert/Oriented   Prior to hospitalization functional status: Assistive Equipment   Current cognitive status: Alert/Oriented   Current Functional Status: Assistive Equipment   Lives With spouse   Able to Return to Prior Arrangements yes   Is patient able to care for self after discharge? Yes   Patient's perception of discharge disposition home or selfcare;home health   Readmission Within the Last 30 Days other (see comments)   Patient currently being followed by outpatient case management? No   Patient currently receives any other outside agency services? Yes   Name and contact number of agency or person providing outside services [facility]   Is it " the patient/care giver preference to resume care with the current outside agency? Yes   Equipment Currently Used at Home oxygen;walker, rolling;cane, straight   Part D Coverage [facility]   Do you have any problems affording any of your prescribed medications? No   Is the patient taking medications as prescribed? yes   Does the patient have transportation home? Yes   Does the patient receive services at the [facility]? No   Discharge Plan A Home with family   Discharge Plan B Home with family   DME Needed Upon Discharge  none   Patient/Family in Agreement with Plan yes       Preferred Pharmacy: [facility]  [facility].  [address] [place] [address] Helen.  KACEY Figueredo.   [phone]

## 2019-12-26 NOTE — ASSESSMENT & PLAN NOTE
[facility] 2018 showed non obstructive CAD Mid RCA 40%. Denies chest pain.   Continue ASA, ACEI, statin.

## 2019-12-26 NOTE — NURSING
Pt arrived to floor via stretcher.  Respiratory at bedside to place pt on BiPAP.  Pt noted to have stage II to coccyx.  Pt SR with 1st degree heart block on telemetry.

## 2019-12-27 VITALS
DIASTOLIC BLOOD PRESSURE: 57 MMHG | BODY MASS INDEX: 28.64 KG/M2 | WEIGHT: 167.75 LBS | HEART RATE: 77 BPM | OXYGEN SATURATION: 94 % | RESPIRATION RATE: 17 BRPM | HEIGHT: 64 IN | TEMPERATURE: 99 F | SYSTOLIC BLOOD PRESSURE: 109 MMHG

## 2019-12-27 LAB
ANION GAP SERPL CALC-SCNC: 5 MMOL/L (ref 8–16)
BASOPHILS # BLD AUTO: 0.01 K/UL (ref 0–0.2)
BASOPHILS NFR BLD: 0.1 % (ref 0–1.9)
BUN SERPL-MCNC: 28 MG/DL (ref 8–23)
CALCIUM SERPL-MCNC: 9.2 MG/DL (ref 8.7–10.5)
CHLORIDE SERPL-SCNC: 106 MMOL/L (ref 95–110)
CO2 SERPL-SCNC: 30 MMOL/L (ref 23–29)
CREAT SERPL-MCNC: 1.1 MG/DL (ref 0.5–1.4)
DIFFERENTIAL METHOD: ABNORMAL
EOSINOPHIL # BLD AUTO: 0 K/UL (ref 0–0.5)
EOSINOPHIL NFR BLD: 0 % (ref 0–8)
ERYTHROCYTE [DISTWIDTH] IN BLOOD BY AUTOMATED COUNT: 16.3 % (ref 11.5–14.5)
EST. GFR  (AFRICAN AMERICAN): >60 ML/MIN/1.73 M^2
EST. GFR  (NON AFRICAN AMERICAN): 52 ML/MIN/1.73 M^2
GLUCOSE SERPL-MCNC: 284 MG/DL (ref 70–110)
HCT VFR BLD AUTO: 30.2 % (ref 37–48.5)
HGB BLD-MCNC: 8.8 G/DL (ref 12–16)
IMM GRANULOCYTES # BLD AUTO: 0.08 K/UL (ref 0–0.04)
IMM GRANULOCYTES NFR BLD AUTO: 0.5 % (ref 0–0.5)
LYMPHOCYTES # BLD AUTO: 0.7 K/UL (ref 1–4.8)
LYMPHOCYTES NFR BLD: 4.7 % (ref 18–48)
MCH RBC QN AUTO: 27.6 PG (ref 27–31)
MCHC RBC AUTO-ENTMCNC: 29.1 G/DL (ref 32–36)
MCV RBC AUTO: 95 FL (ref 82–98)
MONOCYTES # BLD AUTO: 0.2 K/UL (ref 0.3–1)
MONOCYTES NFR BLD: 1.3 % (ref 4–15)
NEUTROPHILS # BLD AUTO: 14.3 K/UL (ref 1.8–7.7)
NEUTROPHILS NFR BLD: 93.4 % (ref 38–73)
NRBC BLD-RTO: 0 /100 WBC
PLATELET # BLD AUTO: 351 K/UL (ref 150–350)
PMV BLD AUTO: 9.7 FL (ref 9.2–12.9)
POCT GLUCOSE: 247 MG/DL (ref 70–110)
POCT GLUCOSE: 289 MG/DL (ref 70–110)
POTASSIUM SERPL-SCNC: 5 MMOL/L (ref 3.5–5.1)
RBC # BLD AUTO: 3.19 M/UL (ref 4–5.4)
SODIUM SERPL-SCNC: 141 MMOL/L (ref 136–145)
WBC # BLD AUTO: 15.25 K/UL (ref 3.9–12.7)

## 2019-12-27 PROCEDURE — 99900035 HC TECH TIME PER 15 MIN (STAT)

## 2019-12-27 PROCEDURE — 27000221 HC OXYGEN, UP TO 24 HOURS

## 2019-12-27 PROCEDURE — 94761 N-INVAS EAR/PLS OXIMETRY MLT: CPT

## 2019-12-27 PROCEDURE — 63600175 PHARM REV CODE 636 W HCPCS: Performed by: NURSE PRACTITIONER

## 2019-12-27 PROCEDURE — 99213 OFFICE O/P EST LOW 20 MIN: CPT | Mod: ,,, | Performed by: UROLOGY

## 2019-12-27 PROCEDURE — 96376 TX/PRO/DX INJ SAME DRUG ADON: CPT

## 2019-12-27 PROCEDURE — 25000003 PHARM REV CODE 250: Performed by: EMERGENCY MEDICINE

## 2019-12-27 PROCEDURE — 94660 CPAP INITIATION&MGMT: CPT

## 2019-12-27 PROCEDURE — 85025 COMPLETE CBC W/AUTO DIFF WBC: CPT

## 2019-12-27 PROCEDURE — 36415 COLL VENOUS BLD VENIPUNCTURE: CPT

## 2019-12-27 PROCEDURE — G0378 HOSPITAL OBSERVATION PER HR: HCPCS

## 2019-12-27 PROCEDURE — 96372 THER/PROPH/DIAG INJ SC/IM: CPT

## 2019-12-27 PROCEDURE — 25000003 PHARM REV CODE 250: Performed by: UROLOGY

## 2019-12-27 PROCEDURE — 25000242 PHARM REV CODE 250 ALT 637 W/ HCPCS: Performed by: HOSPITALIST

## 2019-12-27 PROCEDURE — 80048 BASIC METABOLIC PNL TOTAL CA: CPT

## 2019-12-27 PROCEDURE — 94640 AIRWAY INHALATION TREATMENT: CPT

## 2019-12-27 PROCEDURE — 99213 PR OFFICE/OUTPT VISIT, EST, LEVL III, 20-29 MIN: ICD-10-PCS | Mod: ,,, | Performed by: UROLOGY

## 2019-12-27 PROCEDURE — 25000003 PHARM REV CODE 250: Performed by: HOSPITALIST

## 2019-12-27 RX ORDER — PREDNISONE 20 MG/1
40 TABLET ORAL DAILY
Status: DISCONTINUED | OUTPATIENT
Start: 2019-12-27 | End: 2019-12-27 | Stop reason: HOSPADM

## 2019-12-27 RX ORDER — PREDNISONE 20 MG/1
40 TABLET ORAL DAILY
Qty: 10 TABLET | Refills: 0 | Status: SHIPPED | OUTPATIENT
Start: 2019-12-28 | End: 2020-01-02

## 2019-12-27 RX ORDER — NITROFURANTOIN (MACROCRYSTALS) 100 MG/1
100 CAPSULE ORAL EVERY 12 HOURS
Qty: 10 CAPSULE | Refills: 0 | Status: SHIPPED | OUTPATIENT
Start: 2019-12-27 | End: 2020-01-01

## 2019-12-27 RX ADMIN — ISOSORBIDE MONONITRATE 30 MG: 30 TABLET, EXTENDED RELEASE ORAL at 09:12

## 2019-12-27 RX ADMIN — PANTOPRAZOLE SODIUM 40 MG: 40 TABLET, DELAYED RELEASE ORAL at 09:12

## 2019-12-27 RX ADMIN — SOTALOL HYDROCHLORIDE 80 MG: 80 TABLET ORAL at 09:12

## 2019-12-27 RX ADMIN — DILTIAZEM HYDROCHLORIDE 240 MG: 120 CAPSULE, COATED, EXTENDED RELEASE ORAL at 09:12

## 2019-12-27 RX ADMIN — POLYETHYLENE GLYCOL 3350 17 G: 17 POWDER, FOR SOLUTION ORAL at 09:12

## 2019-12-27 RX ADMIN — PHENAZOPYRIDINE HYDROCHLORIDE 100 MG: 100 TABLET ORAL at 09:12

## 2019-12-27 RX ADMIN — PREDNISONE 40 MG: 20 TABLET ORAL at 09:12

## 2019-12-27 RX ADMIN — MICONAZOLE NITRATE: 20 POWDER TOPICAL at 09:12

## 2019-12-27 RX ADMIN — METHYLPREDNISOLONE SODIUM SUCCINATE 60 MG: 125 INJECTION, POWDER, FOR SOLUTION INTRAMUSCULAR; INTRAVENOUS at 05:12

## 2019-12-27 RX ADMIN — FERROUS GLUCONATE TAB 324 MG (37.5 MG ELEMENTAL IRON) 324 MG: 324 (37.5 FE) TAB at 12:12

## 2019-12-27 RX ADMIN — PHENAZOPYRIDINE HYDROCHLORIDE 100 MG: 100 TABLET ORAL at 12:12

## 2019-12-27 RX ADMIN — IPRATROPIUM BROMIDE AND ALBUTEROL SULFATE 3 ML: .5; 3 SOLUTION RESPIRATORY (INHALATION) at 12:12

## 2019-12-27 RX ADMIN — DOCUSATE SODIUM 100 MG: 100 CAPSULE, LIQUID FILLED ORAL at 09:12

## 2019-12-27 RX ADMIN — INSULIN ASPART 3 UNITS: 100 INJECTION, SOLUTION INTRAVENOUS; SUBCUTANEOUS at 12:12

## 2019-12-27 RX ADMIN — ACETAMINOPHEN 650 MG: 325 TABLET ORAL at 12:12

## 2019-12-27 RX ADMIN — APIXABAN 5 MG: 5 TABLET, FILM COATED ORAL at 09:12

## 2019-12-27 RX ADMIN — AMIODARONE HYDROCHLORIDE 200 MG: 200 TABLET ORAL at 09:12

## 2019-12-27 RX ADMIN — LISINOPRIL 40 MG: 20 TABLET ORAL at 09:12

## 2019-12-27 RX ADMIN — PREGABALIN 75 MG: 50 CAPSULE ORAL at 09:12

## 2019-12-27 RX ADMIN — IPRATROPIUM BROMIDE AND ALBUTEROL SULFATE 3 ML: .5; 3 SOLUTION RESPIRATORY (INHALATION) at 07:12

## 2019-12-27 RX ADMIN — FERROUS GLUCONATE TAB 324 MG (37.5 MG ELEMENTAL IRON) 324 MG: 324 (37.5 FE) TAB at 09:12

## 2019-12-27 RX ADMIN — HYDRALAZINE HYDROCHLORIDE 50 MG: 25 TABLET, FILM COATED ORAL at 05:12

## 2019-12-27 NOTE — PLAN OF CARE
Problem: Diabetes Comorbidity  Goal: Blood Glucose Level Within Desired Range  Outcome: Ongoing, Progressing  Intervention: Maintain Glycemic Control  Flowsheets (Taken 12/27/2019 0045)  Glycemic Management: blood glucose monitoring; supplemental insulin given

## 2019-12-27 NOTE — NURSING
Report given to oncoming nurse Bright RN, Patient is awake and alert. Bed is in lowest position, wheels locked, call light is in reach. 12 hour chart check completed

## 2019-12-27 NOTE — PROGRESS NOTES
Follow-up Information     Angel Orourke Jr, MD On 1/3/2020.    Specialty:  Family Medicine  Why:  Outpatient Services PCP Follow-Up Friday at 10:15 AM.  Contact information:  [address]  [address]  [place] [facility] [address]  [phone]             Haritha Leong NP On 1/10/2020.    Specialty:  Urology  Why:  For hospital follow up, Outpatient Services Urology Follow-Up Friday at 9:30 AM; placed on waiting list  Contact information:  [address]  [address]  [place] [address]  [phone]               PLEASE BRING TO ALL FOLLOW UP APPOINTMENTS:   1) A COPY YOUR DISCHARGE INSTRUCTIONS   2) ALL MEDICINES YOU ARE CURRENTLY TAKING IN THEIR ORIGINAL BOTTLES   3) IDENTIFICATION CARD   4) INSURANCE CARD    **PLEASE ARRIVE 15 MINUTES AHEAD OF SCHEDULED APPOINTMENT TIME   ++PLEASE CALL 24 HOURS IN ADVANCE IF YOU MUST RESCHEDULE YOUR APPOINTMENT DAY AND/OR TIME     [facility] CARE MANAGEMENT WRITTEN DISCHARGE INFORMATION    APPOINTMENTS AND RESOURCES TO HELP YOU MANAGE YOUR CARE AT HOME BASED ON YOUR PREFERENCES:  (If an appointment is not scheduled for you when you leave the hospital, call your doctor to schedule a follow up visit within a week)        Healthy Living Instructions to HELP MANAGE YOUR CARE AT HOME:  Things You are responsible for:  1.    Getting your prescriptions filled   2.    Taking your medications as directed, DO NOT MISS ANY DOSES!  3.    Following the diet and exercise recommended by your doctor  4.    Going to your follow-up doctor appointment. This is important because it allows the doctor to monitor your progress and determine if any changes need to made to your treatment plan.  5. If you have any questions about MANAGING YOUR CARE AT HOME Call the Nurse Care Line for 24/7 Assistance [phone]       Please answer any calls you may receive from [facility]. We want to continue to support you as you manage your healthcare needs. [facility] is happy to  have the opportunity to serve you.      Thank you for choosing [facility] for your healthcare needs!  Your [facility] Case Management Team,    Mag Claudio RN [place]  Registered Nurse Transition Navigator  [phone]

## 2019-12-27 NOTE — DISCHARGE SUMMARY
"[facility]  Discharge Summary      Patient Name: Yasmeen Palm  MRN: [id number]  Admission Date: 12/25/2019  Hospital Length of Stay: 0 days  Discharge Date and Time:  12/27/2019 12:20 PM  Attending Physician: Layla Villegas MD   Discharging Provider: Amy Gómez NP  Primary Care Provider: Angel Orourke Jr, MD      HPI:   Mrs. Palm is 68 yo female with significant history hypertension, hyperlipidemia, COPD on home 3 L NC, smoker (quit 3 months ago) depression, DM, LOYDA, PE/DVT LLE, CAD sp MI, and PAF who came to hospital for shortness of breath that occurred at 12 pm yesterday. Used home inhaler and duoneb without relief. Reports Niece's baby had runny nose on 12/24. Denies chest pain, headaches and dizziness. Lives with .     In ED, was placed BiPAP FiO 40% but not able to tolerate so took BiPAP off.   Patient also noted to have gross hematuria. Patient reports suprapubic spasm for few days. Straight cath "small amount of bloody urine."     * No surgery found *      Hospital Course:   Mrs. Palm is a 68 yo female who was admitted for COPD exacerbation and gross hematuria. Urology consulted. SOB wheezing significantly improved with IV steroid and duoneb. Hematuria felt possible due to UTI. CT Urogram showed "is a focal area of bladder wall thickening at the anterior right aspect of the bladder, which is could be due to cystitis or neoplasm." No further gross hematuria. Continue to hold ASA. Hemoglobin stable. Rocphen to Macrobid on discharge. Discharge on home prednisone 40 mg x 5 days. Patient stable for discharge home with close follow up with Urology for continue evaluation and cystoscope.      Consults:   Consults (From admission, onward)        Status Ordering Provider     Inpatient consult to Urology  Once     Provider:  Griselda Bingham MD    Completed AMY GÓMEZ          No new Assessment & Plan notes have been filed under this hospital " service since the last note was generated.  Service: Hospital Medicine    Final Active Diagnoses:    Diagnosis Date Noted POA    PRINCIPAL PROBLEM:  COPD (chronic obstructive pulmonary disease) [J44.9] 03/24/2019 Yes     Chronic    Hematuria, gross [R31.0] 12/26/2019 Yes    Suspected UTI [R39.89] 12/26/2019 Yes    Dysuria [R30.0] 12/26/2019 Yes    Gross hematuria [R31.0] 12/26/2019 Yes    Coronary artery disease involving native coronary artery without angina pectoris [I25.10] 12/26/2019 Yes    History of atrial fibrillation [Z86.79] 12/26/2019 Not Applicable     Chronic    Shortness of breath [R06.02] 12/25/2019 Yes    Essential hypertension [I10] 03/24/2019 Yes     Chronic    History of pulmonary embolism [Z86.711] 04/10/2017 Yes     Chronic    Controlled type 2 diabetes mellitus, without long-term current use of insulin [E11.9] 12/14/2015 Yes     Chronic    Tobacco abuse [Z72.0] 12/14/2015 Yes     Chronic      Problems Resolved During this Admission:       Discharged Condition: good    Disposition: Home or Self Care    Follow Up:  Follow-up Information     Angel Orourke Jr, MD On 1/3/2020.    Specialty:  Family Medicine  Why:  Outpatient Services PCP Follow-Up Friday at 10:15 AM.  Contact information:  [address]  [facility] [address] [phone]             Haritha Leong NP In 1 week.    Specialty:  Urology  Why:  For hospital follow up  Contact information:  [address]  [address]  [facility] [address]  [phone]                 Patient Instructions:      Diet Cardiac     Diet diabetic     Diet diabetic     Diet Cardiac     Notify your health care provider if you experience any of the following:  temperature >100.4     Notify your health care provider if you experience any of the following:  difficulty breathing or increased cough     Notify your health care provider if you experience any of the following:  increased confusion or  weakness       Significant Diagnostic Studies: Labs: All labs within the past 24 hours have been reviewed    Pending Diagnostic Studies:     None         Medications:  Reconciled Home Medications:      Medication List      START taking these medications    nitrofurantoin 100 MG capsule  Commonly known as:  MACRODANTIN  Take 1 capsule (100 mg total) by mouth every 12 (twelve) hours. for 5 days     predniSONE 20 MG tablet  Commonly known as:  DELTASONE  Take 2 tablets (40 mg total) by mouth once daily. for 5 days  Start taking on:  December 28, 2019        CHANGE how you take these medications    acetaminophen 500 MG tablet  Commonly known as:  TYLENOL  Take 1 tablet (500 mg total) by mouth every 8 (eight) hours as needed.  What changed:  Another medication with the same name was removed. Continue taking this medication, and follow the directions you see here.     diltiaZEM 180 MG 24 hr capsule  Commonly known as:  CARDIZEM CD  Take 1 capsule (180 mg total) by mouth once daily.  What changed:    · how much to take  · when to take this        CONTINUE taking these medications    albuterol 1.25 mg/3 mL Nebu  Commonly known as:  ACCUNEB  Inhale 1.25 mg into the lungs.     amiodarone 200 MG Tab  Commonly known as:  PACERONE  Take 1 tablet (200 mg total) by mouth once daily.     ANORO ELLIPTA INHL  Inhale into the lungs.     docusate sodium 100 MG capsule  Commonly known as:  COLACE  Take 1 capsule (100 mg total) by mouth 2 (two) times daily.     Eliquis 5 mg Tab  Generic drug:  apixaban  Take 5 mg by mouth 2 (two) times daily.     ferrous gluconate 324 MG tablet  Commonly known as:  FERGON  Take 1 tablet (324 mg total) by mouth 3 (three) times daily with meals.     fluticasone propionate 220 mcg/actuation inhaler  Commonly known as:  FLOVENT HFA  Inhale 1 puff into the lungs 2 (two) times daily. Controller     furosemide 40 MG tablet  Commonly known as:  LASIX  Take 40 mg by mouth once daily.     gabapentin 300 MG  capsule  Commonly known as:  NEURONTIN  Take 300 mg by mouth.     hydrALAZINE 50 MG tablet  Commonly known as:  APRESOLINE  Take 50 mg by mouth every 8 (eight) hours.     * ipratropium-albuterol  mcg/actuation inhaler  Commonly known as:  COMBIVENT  Inhale 1 puff into the lungs every 6 (six) hours as needed for Wheezing or Shortness of Breath. Rescue     * albuterol-ipratropium 2.5 mg-0.5 mg/3 mL nebulizer solution  Commonly known as:  DUO-NEB  Take 3 mLs by nebulization every 6 (six) hours. Rescue     isosorbide mononitrate 30 MG 24 hr tablet  Commonly known as:  IMDUR  Take 30 mg by mouth.     lisinopril 40 MG tablet  Commonly known as:  PRINIVIL,ZESTRIL  Take 1 tablet (40 mg total) by mouth once daily. Do not take this medication if blood pressure is below 130/80 mmHg and/or feeling dizzy after taking all other blood pressure meds     metFORMIN 1000 MG tablet  Commonly known as:  GLUCOPHAGE  Take 1 tablet (1,000 mg total) by mouth 2 (two) times daily with meals.     multivit-min-FA-lycopen-lutein 0.4-300-250 mg-mcg-mcg Tab  Commonly known as:  Centrum Silver  Take 1 tablet by mouth once daily.     nitroGLYCERIN 0.4 MG SL tablet  Commonly known as:  NITROSTAT     pantoprazole 40 MG tablet  Commonly known as:  PROTONIX  Take 40 mg by mouth once daily.     polyethylene glycol 17 gram Pwpk  Commonly known as:  GLYCOLAX  Take 17 g by mouth 2 (two) times daily.     pregabalin 75 MG capsule  Commonly known as:  LYRICA  Take 75 mg by mouth 3 (three) times daily.     sotalol 120 MG Tab  Commonly known as:  BETAPACE  Take 80 mg by mouth 2 (two) times daily.     traMADol 50 mg tablet  Commonly known as:  ULTRAM         * This list has 2 medication(s) that are the same as other medications prescribed for you. Read the directions carefully, and ask your doctor or other care provider to review them with you.            STOP taking these medications    aspirin 81 MG EC tablet  Commonly known as:  ECOTRIN             Indwelling Lines/Drains at time of discharge:   Lines/Drains/Airways     None                 Time spent on the discharge of patient: 30 minutes  Patient was seen and examined on the date of discharge and determined to be suitable for discharge.         Amy Castellanos NP  Department of Hospital Medicine  [facility]

## 2019-12-27 NOTE — HOSPITAL COURSE
"Mrs. Palm is a 70 yo female who was admitted for COPD exacerbation and gross hematuria. Urology consulted. SOB wheezing significantly improved with IV steroid and duoneb. Hematuria felt possible due to UTI. CT Urogram showed "is a focal area of bladder wall thickening at the anterior right aspect of the bladder, which is could be due to cystitis or neoplasm." No further gross hematuria. Continue to hold ASA. Hemoglobin stable. Rocphen to Macrobid on discharge. Discharge on home prednisone 40 mg x 5 days. Patient stable for discharge home with close follow up with Urology for continue evaluation and cystoscope.   "

## 2019-12-27 NOTE — PROGRESS NOTES
[facility]  Urology  Progress Note    Patient Name: Yasmeen Palm  MRN: [id number]  Admission Date: 12/25/2019  Hospital Length of Stay: 0 days  Code Status: Full Code   Attending Provider: Layla Villegas MD   Primary Care Physician: Angel Orourke Jr, MD    Subjective:     HPI:  68yo F admitted with COPD exacerbation. She was noted to have gross hematuria with c/o dysuria. She is on oral anticoagulation (Eliquis, ASA). +smoker. No recent upper tract imaging. She reports continued dysuria. She is unsure about the chronicity of hematuria as she has not seen it. She reports dysuria 1-2 months ago that was also associated with UTI. Denies h/o kidney problems, nephrolithiasis.    Interval History: Unsure if still having hematuria - she does note orange urine due to Pyridium. CT urogram yesterday showed asymmetric bladder wall thickness in anterior/superior bladder.     Review of Systems   Constitutional: Negative for appetite change, chills and fever.   HENT: Negative for congestion, sore throat and trouble swallowing.    Eyes: Negative for pain and itching.   Respiratory: Negative for cough and shortness of breath.    Cardiovascular: Negative for chest pain and leg swelling.   Gastrointestinal: Negative for abdominal distention, abdominal pain, constipation, diarrhea, nausea and vomiting.   Genitourinary: Positive for dysuria and hematuria. Negative for difficulty urinating, flank pain, nocturia and urgency.   Musculoskeletal: Negative for back pain, neck pain and neck stiffness.   Skin: Negative for rash and wound.   Neurological: Negative for dizziness and seizures.   Hematological: Negative for adenopathy. Does not bruise/bleed easily.   Psychiatric/Behavioral: Negative for confusion. The patient is not nervous/anxious.    All other systems reviewed and are negative.    Objective:     Temp:  [97.1 °F (36.2 °C)-98.6 °F (37 °C)] 97.5 °F (36.4 °C)  Pulse:  [] 72  Resp:  [18-20] 18  SpO2:   [94 %-100 %] 98 %  BP: (109-147)/(52-73) 125/60     Body mass index is 28.8 kg/m².           Drains     None                 Physical Exam   Vitals reviewed.  Constitutional: She is oriented to person, place, and time. She appears well-developed and well-nourished. No distress.   HENT:   Head: Normocephalic and atraumatic.   Neck: Normal range of motion.   Cardiovascular: Normal rate and regular rhythm.    Pulmonary/Chest: Effort normal and breath sounds normal. No respiratory distress.   Abdominal: Soft. She exhibits no distension and no mass. There is no tenderness. There is no rebound and no guarding.   Genitourinary:   Genitourinary Comments: SP tenderness   Musculoskeletal: Normal range of motion.   Neurological: She is alert and oriented to person, place, and time.   Skin: Skin is warm and dry. No rash noted. She is not diaphoretic. No erythema.     Psychiatric: She has a normal mood and affect. Her behavior is normal.       Significant Labs:    BMP:  Recent Labs   Lab 12/25/19  2031 12/26/19  0307 12/27/19  0506    142 141   K 4.7 5.2* 5.0    106 106   CO2 27 28 30*   BUN 15 17 28*   CREATININE 0.8 0.9 1.1   CALCIUM 9.5 10.0 9.2       CBC:   Recent Labs   Lab 12/25/19 2031   WBC 10.21   HGB 9.1*   HCT 32.1*          Blood Culture: No results for input(s): LABBLOO in the last 168 hours.  Urine Culture: No results for input(s): LABURIN in the last 168 hours.  Urine Studies:   Recent Labs   Lab 12/26/19  0736   COLORU Red*   APPEARANCEUA Cloudy*   PHUR SEE COMMENT   SPECGRAV SEE COMMENT   PROTEINUA SEE COMMENT   GLUCUA SEE COMMENT   KETONESU SEE COMMENT   BILIRUBINUA SEE COMMENT   OCCULTUA SEE COMMENT   NITRITE SEE COMMENT   UROBILINOGEN SEE COMMENT   LEUKOCYTESUR SEE COMMENT   RBCUA >100*   WBCUA >100*       Significant Imaging:  All pertinent imaging results/findings from the past 24 hours have been reviewed.                  Assessment/Plan:     Dysuria   - Suspect 2/2 UTI   - Pyridium (pt  requesting medication to help with dysuria)    Suspected UTI   - +hematuria, dysuria   - UCx pending   - Empiric abx started while culture pending    Hematuria, gross   - Suspect related to UTI based on symptoms   - UCx pending   - CT urogram showed asymmetric bladder wall thickness   - Cystoscopy as outpatient. May need bladder biopsy in future.   - Okay to hold on further intervention for now    Will sign off for now. Please call with further questions/concerns.    Follow up with Dr Bingham or Salo NP in 1 week to arrange cystoscopy.     VTE Risk Mitigation (From admission, onward)         Ordered     apixaban tablet 5 mg  2 times daily      12/26/19 0046     IP VTE HIGH RISK PATIENT  Once      12/26/19 0046     Reason for No Pharmacological VTE Prophylaxis  Once     Question:  Reasons:  Answer:  Already adequately anticoagulated on oral Anticoagulants    12/26/19 0046     Place TOM hose  Until discontinued      12/26/19 0046     Place sequential compression device  Until discontinued      12/26/19 0046                Griselda Bingham MD  Urology  [facility]

## 2019-12-27 NOTE — ASSESSMENT & PLAN NOTE
- Suspect related to UTI based on symptoms   - UCx pending   - CT urogram showed asymmetric bladder wall thickness   - Cystoscopy as outpatient. May need bladder biopsy in future.   - Okay to hold on further intervention for now

## 2019-12-27 NOTE — PLAN OF CARE
"   12/27/19 1235   Final Note   Assessment Type Final Discharge Note   Anticipated Discharge Disposition Home   What phone number can be called within the next 1-3 days to see how you are doing after discharge?   (Listed in chart)   Hospital Follow Up  Appt(s) scheduled? Yes   Discharge plans and expectations educations in teach back method with documentation complete? Yes   Right Care Referral Info   Post Acute Recommendation No Care     EDUCATION:  TN provided with educational information on COPD.  Information reviewed and placed in :My Healthcare Packet" to be brought home for pt  to use as resource after discharge.  Information included:  signs and symptoms to look for and call the doctor if experiencing, and symptoms that may indicate a medical emergency: CALL 911.  All questions answered.  Teach back method used. Patient stated, " I will contact the nurses ".    TN informed floor nurseNell, care management is complete and can proceed with discharge teaching.  "

## 2019-12-27 NOTE — SUBJECTIVE & OBJECTIVE
Interval History: Unsure if still having hematuria - she does note orange urine due to Pyridium. CT urogram yesterday showed asymmetric bladder wall thickness in anterior/superior bladder.     Review of Systems   Constitutional: Negative for appetite change, chills and fever.   HENT: Negative for congestion, sore throat and trouble swallowing.    Eyes: Negative for pain and itching.   Respiratory: Negative for cough and shortness of breath.    Cardiovascular: Negative for chest pain and leg swelling.   Gastrointestinal: Negative for abdominal distention, abdominal pain, constipation, diarrhea, nausea and vomiting.   Genitourinary: Positive for dysuria and hematuria. Negative for difficulty urinating, flank pain, nocturia and urgency.   Musculoskeletal: Negative for back pain, neck pain and neck stiffness.   Skin: Negative for rash and wound.   Neurological: Negative for dizziness and seizures.   Hematological: Negative for adenopathy. Does not bruise/bleed easily.   Psychiatric/Behavioral: Negative for confusion. The patient is not nervous/anxious.    All other systems reviewed and are negative.    Objective:     Temp:  [97.1 °F (36.2 °C)-98.6 °F (37 °C)] 97.5 °F (36.4 °C)  Pulse:  [] 72  Resp:  [18-20] 18  SpO2:  [94 %-100 %] 98 %  BP: (109-147)/(52-73) 125/60     Body mass index is 28.8 kg/m².           Drains     None                 Physical Exam   Vitals reviewed.  Constitutional: She is oriented to person, place, and time. She appears well-developed and well-nourished. No distress.   HENT:   Head: Normocephalic and atraumatic.   Neck: Normal range of motion.   Cardiovascular: Normal rate and regular rhythm.    Pulmonary/Chest: Effort normal and breath sounds normal. No respiratory distress.   Abdominal: Soft. She exhibits no distension and no mass. There is no tenderness. There is no rebound and no guarding.   Genitourinary:   Genitourinary Comments: SP tenderness   Musculoskeletal: Normal range of  motion.   Neurological: She is alert and oriented to person, place, and time.   Skin: Skin is warm and dry. No rash noted. She is not diaphoretic. No erythema.     Psychiatric: She has a normal mood and affect. Her behavior is normal.       Significant Labs:    BMP:  Recent Labs   Lab 12/25/19 2031 12/26/19  0307 12/27/19  0506    142 141   K 4.7 5.2* 5.0    106 106   CO2 27 28 30*   BUN 15 17 28*   CREATININE 0.8 0.9 1.1   CALCIUM 9.5 10.0 9.2       CBC:   Recent Labs   Lab 12/25/19 2031   WBC 10.21   HGB 9.1*   HCT 32.1*          Blood Culture: No results for input(s): LABBLOO in the last 168 hours.  Urine Culture: No results for input(s): LABURIN in the last 168 hours.  Urine Studies:   Recent Labs   Lab 12/26/19  0736   COLORU Red*   APPEARANCEUA Cloudy*   PHUR SEE COMMENT   SPECGRAV SEE COMMENT   PROTEINUA SEE COMMENT   GLUCUA SEE COMMENT   KETONESU SEE COMMENT   BILIRUBINUA SEE COMMENT   OCCULTUA SEE COMMENT   NITRITE SEE COMMENT   UROBILINOGEN SEE COMMENT   LEUKOCYTESUR SEE COMMENT   RBCUA >100*   WBCUA >100*       Significant Imaging:  All pertinent imaging results/findings from the past 24 hours have been reviewed.

## 2019-12-27 NOTE — NURSING
Pt discharge teaching was implemented along with paperwork. D/c IV sites, catheter intact. Pt AAOx4 VS were stable. Pt verbalizes understanding and no questions were asked. Asked pt if she wears home oxygen. Pt states she does but she doesn't need it for d/c because she only lives 10 minutes away. Educated pt on the importance of wearing oxygen, she states she'll be fine. NAD noted.

## 2019-12-28 LAB — BACTERIA UR CULT: NORMAL

## 2020-01-01 ENCOUNTER — HOSPITAL ENCOUNTER (EMERGENCY)
Facility: HOSPITAL | Age: 70
Discharge: HOME | End: 2020-01-01
Attending: EMERGENCY MEDICINE
Payer: MEDICARE

## 2020-01-01 VITALS
OXYGEN SATURATION: 100 % | TEMPERATURE: 99 F | DIASTOLIC BLOOD PRESSURE: 91 MMHG | RESPIRATION RATE: 22 BRPM | BODY MASS INDEX: 26.21 KG/M2 | HEIGHT: 67 IN | SYSTOLIC BLOOD PRESSURE: 187 MMHG | WEIGHT: 167 LBS | HEART RATE: 91 BPM

## 2020-01-01 DIAGNOSIS — I10 HYPERTENSION, UNSPECIFIED TYPE: ICD-10-CM

## 2020-01-01 DIAGNOSIS — J44.1 COPD EXACERBATION: Primary | ICD-10-CM

## 2020-01-01 LAB
ALBUMIN SERPL BCP-MCNC: 3.1 G/DL (ref 3.5–5.2)
ALLENS TEST: ABNORMAL
ALP SERPL-CCNC: 73 U/L (ref 55–135)
ALT SERPL W/O P-5'-P-CCNC: 6 U/L (ref 10–44)
ANION GAP SERPL CALC-SCNC: 13 MMOL/L (ref 8–16)
ANION GAP SERPL CALC-SCNC: 8 MMOL/L (ref 8–16)
AST SERPL-CCNC: 16 U/L (ref 10–40)
BASOPHILS # BLD AUTO: 0.01 K/UL (ref 0–0.2)
BASOPHILS NFR BLD: 0.1 % (ref 0–1.9)
BILIRUB SERPL-MCNC: 0.3 MG/DL (ref 0.1–1)
BNP SERPL-MCNC: 207 PG/ML (ref 0–99)
BUN SERPL-MCNC: 16 MG/DL (ref 8–23)
BUN SERPL-MCNC: 17 MG/DL (ref 8–23)
CALCIUM SERPL-MCNC: 8.9 MG/DL (ref 8.7–10.5)
CALCIUM SERPL-MCNC: 9.1 MG/DL (ref 8.7–10.5)
CHLORIDE SERPL-SCNC: 102 MMOL/L (ref 95–110)
CHLORIDE SERPL-SCNC: 99 MMOL/L (ref 95–110)
CO2 SERPL-SCNC: 28 MMOL/L (ref 23–29)
CO2 SERPL-SCNC: 31 MMOL/L (ref 23–29)
CREAT SERPL-MCNC: 0.7 MG/DL (ref 0.5–1.4)
CREAT SERPL-MCNC: 0.8 MG/DL (ref 0.5–1.4)
DELSYS: ABNORMAL
DIFFERENTIAL METHOD: ABNORMAL
EOSINOPHIL # BLD AUTO: 0 K/UL (ref 0–0.5)
EOSINOPHIL NFR BLD: 0.1 % (ref 0–8)
ERYTHROCYTE [DISTWIDTH] IN BLOOD BY AUTOMATED COUNT: 16.3 % (ref 11.5–14.5)
EST. GFR  (AFRICAN AMERICAN): >60 ML/MIN/1.73 M^2
EST. GFR  (AFRICAN AMERICAN): >60 ML/MIN/1.73 M^2
EST. GFR  (NON AFRICAN AMERICAN): >60 ML/MIN/1.73 M^2
EST. GFR  (NON AFRICAN AMERICAN): >60 ML/MIN/1.73 M^2
GLUCOSE SERPL-MCNC: 272 MG/DL (ref 70–110)
GLUCOSE SERPL-MCNC: 289 MG/DL (ref 70–110)
HCO3 UR-SCNC: 37.7 MMOL/L (ref 24–28)
HCT VFR BLD AUTO: 34.3 % (ref 37–48.5)
HGB BLD-MCNC: 10 G/DL (ref 12–16)
IMM GRANULOCYTES # BLD AUTO: 0.12 K/UL (ref 0–0.04)
IMM GRANULOCYTES NFR BLD AUTO: 0.9 % (ref 0–0.5)
LYMPHOCYTES # BLD AUTO: 0.9 K/UL (ref 1–4.8)
LYMPHOCYTES NFR BLD: 6.9 % (ref 18–48)
MAGNESIUM SERPL-MCNC: 1.8 MG/DL (ref 1.6–2.6)
MCH RBC QN AUTO: 27.9 PG (ref 27–31)
MCHC RBC AUTO-ENTMCNC: 29.2 G/DL (ref 32–36)
MCV RBC AUTO: 96 FL (ref 82–98)
MODE: ABNORMAL
MONOCYTES # BLD AUTO: 0.3 K/UL (ref 0.3–1)
MONOCYTES NFR BLD: 2.3 % (ref 4–15)
NEUTROPHILS # BLD AUTO: 12 K/UL (ref 1.8–7.7)
NEUTROPHILS NFR BLD: 89.7 % (ref 38–73)
NRBC BLD-RTO: 0 /100 WBC
PCO2 BLDA: 61.4 MMHG (ref 35–45)
PH SMN: 7.4 [PH] (ref 7.35–7.45)
PHOSPHATE SERPL-MCNC: 2.9 MG/DL (ref 2.7–4.5)
PLATELET # BLD AUTO: 383 K/UL (ref 150–350)
PMV BLD AUTO: 10.5 FL (ref 9.2–12.9)
PO2 BLDA: 66 MMHG (ref 40–60)
POC BE: 11 MMOL/L
POC SATURATED O2: 92 % (ref 95–100)
POC TCO2: 40 MMOL/L (ref 24–29)
POTASSIUM SERPL-SCNC: 4.8 MMOL/L (ref 3.5–5.1)
POTASSIUM SERPL-SCNC: 5.9 MMOL/L (ref 3.5–5.1)
PROT SERPL-MCNC: 7 G/DL (ref 6–8.4)
RBC # BLD AUTO: 3.58 M/UL (ref 4–5.4)
SAMPLE: ABNORMAL
SITE: ABNORMAL
SODIUM SERPL-SCNC: 140 MMOL/L (ref 136–145)
SODIUM SERPL-SCNC: 141 MMOL/L (ref 136–145)
SP02: 100
TROPONIN I SERPL DL<=0.01 NG/ML-MCNC: 0.01 NG/ML (ref 0–0.03)
TROPONIN I SERPL DL<=0.01 NG/ML-MCNC: 0.02 NG/ML (ref 0–0.03)
WBC # BLD AUTO: 13.34 K/UL (ref 3.9–12.7)

## 2020-01-01 PROCEDURE — 96361 HYDRATE IV INFUSION ADD-ON: CPT

## 2020-01-01 PROCEDURE — 83735 ASSAY OF MAGNESIUM: CPT

## 2020-01-01 PROCEDURE — 93010 ELECTROCARDIOGRAM REPORT: CPT | Mod: ,,, | Performed by: INTERNAL MEDICINE

## 2020-01-01 PROCEDURE — 82803 BLOOD GASES ANY COMBINATION: CPT

## 2020-01-01 PROCEDURE — 25000242 PHARM REV CODE 250 ALT 637 W/ HCPCS: Performed by: EMERGENCY MEDICINE

## 2020-01-01 PROCEDURE — 85025 COMPLETE CBC W/AUTO DIFF WBC: CPT

## 2020-01-01 PROCEDURE — 93005 ELECTROCARDIOGRAM TRACING: CPT

## 2020-01-01 PROCEDURE — 25000003 PHARM REV CODE 250: Performed by: EMERGENCY MEDICINE

## 2020-01-01 PROCEDURE — 84100 ASSAY OF PHOSPHORUS: CPT

## 2020-01-01 PROCEDURE — 94640 AIRWAY INHALATION TREATMENT: CPT

## 2020-01-01 PROCEDURE — 99900035 HC TECH TIME PER 15 MIN (STAT)

## 2020-01-01 PROCEDURE — 83880 ASSAY OF NATRIURETIC PEPTIDE: CPT

## 2020-01-01 PROCEDURE — 84484 ASSAY OF TROPONIN QUANT: CPT

## 2020-01-01 PROCEDURE — 80053 COMPREHEN METABOLIC PANEL: CPT

## 2020-01-01 PROCEDURE — 63600175 PHARM REV CODE 636 W HCPCS: Performed by: EMERGENCY MEDICINE

## 2020-01-01 PROCEDURE — 93010 EKG 12-LEAD: ICD-10-PCS | Mod: ,,, | Performed by: INTERNAL MEDICINE

## 2020-01-01 PROCEDURE — 99285 EMERGENCY DEPT VISIT HI MDM: CPT | Mod: 25

## 2020-01-01 PROCEDURE — 80048 BASIC METABOLIC PNL TOTAL CA: CPT

## 2020-01-01 PROCEDURE — 96374 THER/PROPH/DIAG INJ IV PUSH: CPT

## 2020-01-01 RX ORDER — METHYLPREDNISOLONE SOD SUCC 125 MG
125 VIAL (EA) INJECTION
Status: COMPLETED | OUTPATIENT
Start: 2020-01-01 | End: 2020-01-01

## 2020-01-01 RX ORDER — CLONIDINE HYDROCHLORIDE 0.2 MG/1
0.2 TABLET ORAL
COMMUNITY
Start: 2019-12-10 | End: 2020-12-09

## 2020-01-01 RX ORDER — HYDRALAZINE HYDROCHLORIDE 25 MG/1
50 TABLET, FILM COATED ORAL
Status: COMPLETED | OUTPATIENT
Start: 2020-01-01 | End: 2020-01-01

## 2020-01-01 RX ORDER — IPRATROPIUM BROMIDE AND ALBUTEROL SULFATE 2.5; .5 MG/3ML; MG/3ML
3 SOLUTION RESPIRATORY (INHALATION)
Status: COMPLETED | OUTPATIENT
Start: 2020-01-01 | End: 2020-01-01

## 2020-01-01 RX ORDER — ASPIRIN 325 MG
325 TABLET ORAL
Status: COMPLETED | OUTPATIENT
Start: 2020-01-01 | End: 2020-01-01

## 2020-01-01 RX ORDER — PREDNISONE 50 MG/1
50 TABLET ORAL DAILY
Qty: 5 TABLET | Refills: 0 | Status: SHIPPED | OUTPATIENT
Start: 2020-01-01 | End: 2020-01-06

## 2020-01-01 RX ADMIN — METHYLPREDNISOLONE SODIUM SUCCINATE 125 MG: 125 INJECTION, POWDER, FOR SOLUTION INTRAMUSCULAR; INTRAVENOUS at 02:01

## 2020-01-01 RX ADMIN — HYDRALAZINE HYDROCHLORIDE 50 MG: 25 TABLET, FILM COATED ORAL at 06:01

## 2020-01-01 RX ADMIN — IPRATROPIUM BROMIDE AND ALBUTEROL SULFATE 3 ML: .5; 3 SOLUTION RESPIRATORY (INHALATION) at 07:01

## 2020-01-01 RX ADMIN — SODIUM CHLORIDE 500 ML: 0.9 INJECTION, SOLUTION INTRAVENOUS at 04:01

## 2020-01-01 RX ADMIN — IPRATROPIUM BROMIDE AND ALBUTEROL SULFATE 3 ML: .5; 3 SOLUTION RESPIRATORY (INHALATION) at 02:01

## 2020-01-01 RX ADMIN — ASPIRIN 325 MG ORAL TABLET 325 MG: 325 PILL ORAL at 02:01

## 2020-01-01 NOTE — ED PROVIDER NOTES
Encounter Date: 1/1/2020    SCRIBE #1 NOTE: I, Yaron Echeverria, am scribing for, and in the presence of,  John Suazo MD. I have scribed the following portions of the note - Other sections scribed: HPI, ROS, PE.       History     Chief Complaint   Patient presents with    Dizziness     EMS reports pt c/o dizziness and shortness of breath since 5 am. deneis unilateral weakness, no slurred speech, denies change in meds. hx of CHF and DM.     This is a 69 y.o. Female with a PMHx of COPD, CHF, GERD, DVT, DM, MI, HTN who presents to the ED via EMS with c/o shortness of breath with associated dizziness since 5:00 am. She tried albuterol nebulizer at home with no relief. Pt denies any CP, fever, or any worsening or alleviating factors. Pt ambulatory with assistance. She uses 4L O2 at home and BiPap nightly. Pt notes she has had 2 blood transfusions in the past.  NKDA.       The history is provided by medical records and the EMS personnel.     Review of patient's allergies indicates:  No Known Allergies  Past Medical History:   Diagnosis Date    Anticoagulant long-term use     Xarelto    Arthritis     Asthma     CHF (congestive heart failure)     COPD (chronic obstructive pulmonary disease)     Coronary artery disease     Deep vein thrombosis (DVT) of left lower extremity     Depression     Diabetes mellitus     GERD (gastroesophageal reflux disease)     Hypertension     MI (myocardial infarction) 08/2019    Obstructive sleep apnea on CPAP     On home oxygen therapy     Unsteady gait     uses either walker or 4 prong cane     Past Surgical History:   Procedure Laterality Date    CARDIAC CATHETERIZATION  06/2018    CORONARY ANGIOPLASTY WITH STENT PLACEMENT      FOOT SURGERY      HAND SURGERY      HERNIA REPAIR      encapsulated umbilical hernia     Family History   Problem Relation Age of Onset    Heart disease Mother     Hypertension Mother     Diabetes Father      Social History     Tobacco Use     "Smoking status: Former Smoker     Packs/day: 0.50     Years: 55.00     Pack years: 27.50     Types: Cigarettes     Start date: 1963     Last attempt to quit: 2018     Years since quittin.0    Smokeless tobacco: Never Used    Tobacco comment: "States she quit smoking about 3 or 4 weeks ago"   Substance Use Topics    Alcohol use: Not Currently     Alcohol/week: 1.0 standard drinks     Types: 1 Glasses of wine per week     Frequency: Never     Comment: socially    Drug use: No     Review of Systems   Constitutional: Negative for chills and fever.   HENT: Negative for congestion.    Respiratory: Positive for shortness of breath. Negative for cough.    Cardiovascular: Negative for chest pain.   Gastrointestinal: Negative for abdominal pain, nausea and vomiting.   Genitourinary: Negative for dysuria.   Musculoskeletal: Negative for back pain.   Skin: Negative for rash.   Neurological: Positive for dizziness. Negative for syncope and light-headedness.   Psychiatric/Behavioral: Negative for confusion.   All other systems reviewed and are negative.      Physical Exam     Initial Vitals [20 1411]   BP Pulse Resp Temp SpO2   (!) 158/70 80 20 98.9 °F (37.2 °C) 95 %      MAP       --         Physical Exam    Nursing note and vitals reviewed.  Constitutional: She appears well-developed and well-nourished. She is not diaphoretic. No distress.   slightly tripoding   HENT:   Head: Normocephalic and atraumatic.   Eyes: Conjunctivae and EOM are normal. Pupils are equal, round, and reactive to light. Right eye exhibits no discharge. Left eye exhibits no discharge. No scleral icterus.   Neck: Normal range of motion. Neck supple. No tracheal deviation present. No JVD present.   Cardiovascular: Normal rate, regular rhythm, normal heart sounds and intact distal pulses. Exam reveals no gallop and no friction rub.    No murmur heard.  Pulmonary/Chest: No stridor. She is in respiratory distress (moderately increased " WOB). She has decreased breath sounds (coarse breath sounds. Pursed lip breathing noted. Unable to speak in complete sentences.). She has wheezes. She has no rhonchi. She has no rales.   Moderately increased WOB   Abdominal: Soft. Bowel sounds are normal. She exhibits no distension. There is no tenderness. There is no rebound and no guarding.   Musculoskeletal: Normal range of motion. She exhibits no edema or tenderness.   Neurological: She is alert and oriented to person, place, and time. She has normal strength. No cranial nerve deficit. GCS score is 15. GCS eye subscore is 4. GCS verbal subscore is 5. GCS motor subscore is 6.   CHUCHO with NGND's   Skin: Skin is warm and dry. Capillary refill takes less than 2 seconds. No rash and no abscess noted. No erythema. No pallor.   Psychiatric: She has a normal mood and affect. Her behavior is normal. Judgment and thought content normal.         ED Course   Procedures  Labs Reviewed   CBC W/ AUTO DIFFERENTIAL - Abnormal; Notable for the following components:       Result Value    WBC 13.34 (*)     RBC 3.58 (*)     Hemoglobin 10.0 (*)     Hematocrit 34.3 (*)     Mean Corpuscular Hemoglobin Conc 29.2 (*)     RDW 16.3 (*)     Platelets 383 (*)     Immature Granulocytes 0.9 (*)     Gran # (ANC) 12.0 (*)     Immature Grans (Abs) 0.12 (*)     Lymph # 0.9 (*)     Gran% 89.7 (*)     Lymph% 6.9 (*)     Mono% 2.3 (*)     All other components within normal limits   COMPREHENSIVE METABOLIC PANEL - Abnormal; Notable for the following components:    Potassium 5.9 (*)     Glucose 289 (*)     Albumin 3.1 (*)     ALT 6 (*)     All other components within normal limits   B-TYPE NATRIURETIC PEPTIDE - Abnormal; Notable for the following components:     (*)     All other components within normal limits   BASIC METABOLIC PANEL - Abnormal; Notable for the following components:    CO2 31 (*)     Glucose 272 (*)     All other components within normal limits   ISTAT PROCEDURE - Abnormal;  Notable for the following components:    POC PCO2 61.4 (*)     POC PO2 66 (*)     POC HCO3 37.7 (*)     POC SATURATED O2 92 (*)     POC TCO2 40 (*)     All other components within normal limits   TROPONIN I   MAGNESIUM   PHOSPHORUS   TROPONIN I     EKG Readings: (Independently Interpreted)   Initial Reading: No STEMI. Rhythm: Normal Sinus Rhythm. Heart Rate: 81. Ectopy: No Ectopy. Conduction: Normal. ST Segments: Normal ST Segments. T Waves: Normal. Axis: Left Axis Deviation. Clinical Impression: Normal Sinus Rhythm     ECG Results          EKG 12-lead (Final result)  Result time 01/02/20 19:21:14    Final result by Interface, Lab In [facility] (01/02/20 19:21:14)                 Narrative:    Test Reason : R07.9,    Vent. Rate : 081 BPM     Atrial Rate : 081 BPM     P-R Int : 170 ms          QRS Dur : 080 ms      QT Int : 384 ms       P-R-T Axes : 073 -30 054 degrees     QTc Int : 446 ms    Normal sinus rhythm  Left axis deviation  Abnormal ECG  When compared with ECG of 25-DEC-2019 20:50,  Significant changes have occurred  Confirmed by Magdiel Khan MD ([id number]) on 1/2/2020 7:21:02 PM    Referred By: [email or web address]   SELF           Confirmed By:Magdiel Khan MD                            Imaging Results          X-Ray Chest AP Portable (Final result)  Result time 01/01/20 15:31:07    Final result by Kleber Mcdonough MD (01/01/20 15:31:07)                 Impression:      1. No acute cardiopulmonary process, hypoventilatory exam.      Electronically signed by: Kleber Mcdonough MD  Date:    01/01/2020  Time:    15:31             Narrative:    EXAMINATION:  XR CHEST AP PORTABLE    CLINICAL HISTORY:  Chest Pain;    TECHNIQUE:  Single frontal view of the chest was performed.    COMPARISON:  12/25/2019    FINDINGS:  The cardiomediastinal silhouette is not enlarged, noting calcification of the aorta..  There is no pleural effusion.  The trachea is midline.  The lungs are symmetrically expanded bilaterally without evidence of acute  parenchymal process. No large focal consolidation seen.  There is mild bilateral basilar subsegmental atelectasis.  There is no pneumothorax.  The osseous structures are remarkable for degenerative change..                                 Medical Decision Making:   History:   Old Medical Records: I decided to obtain old medical records.  Independently Interpreted Test(s):   I have ordered and independently interpreted X-rays - see prior notes.  I have ordered and independently interpreted EKG Reading(s) - see prior notes            Scribe Attestation:   Scribe #1: I performed the above scribed service and the documentation accurately describes the services I performed. I attest to the accuracy of the note.      Pt arrived with moderately increased WOB but afebrile and non-toxic in appearance with VSS.  EKG revealed no acute findings concerning for ischemia, arrhythmia, heart block or any pathology to warrant cardiology consultation.  Labs unremarkable in comparison to previous studies except for hyperkalemia which resolved with IVF's.  Sx's improved with nebs and steroids.  Pt discharged and counseled on the need to return to the nearest emergency room if they experience any other concerning symptoms.  Pt counseled to F/U outpatient with a PCP over the next two to three days.    John Suazo MD                            Clinical Impression:       ICD-10-CM ICD-9-CM   1. COPD exacerbation J44.1 491.21   2. Hypertension, unspecified type I10 401.9             I, John Suazo, personally performed the services described in this documentation. All medical record entries made by the scribe were at my direction and in my presence.  I have reviewed the chart and agree that the record reflects my personal performance and is accurate and complete.                 John Suazo MD  01/05/20 1907       John Suazo MD  01/05/20 1929

## 2020-01-01 NOTE — SIGNIFICANT EVENT
Pt. Not placed on Bipap at this time per Dr. Suazo. Pt. Is breathing better. Bipap is on stand-by at this time.

## 2020-03-11 ENCOUNTER — HOSPITAL ENCOUNTER (OUTPATIENT)
Facility: HOSPITAL | Age: 70
Discharge: HOME | End: 2020-03-13
Attending: EMERGENCY MEDICINE | Admitting: HOSPITALIST
Payer: MEDICARE

## 2020-03-11 DIAGNOSIS — R06.02 SHORTNESS OF BREATH: ICD-10-CM

## 2020-03-11 DIAGNOSIS — I95.9 HYPOTENSION: ICD-10-CM

## 2020-03-11 DIAGNOSIS — N30.00 ACUTE CYSTITIS WITHOUT HEMATURIA: ICD-10-CM

## 2020-03-11 DIAGNOSIS — R05.9 COUGH: ICD-10-CM

## 2020-03-11 DIAGNOSIS — E86.0 DEHYDRATION: ICD-10-CM

## 2020-03-11 DIAGNOSIS — I95.9 HYPOTENSION, UNSPECIFIED HYPOTENSION TYPE: ICD-10-CM

## 2020-03-11 DIAGNOSIS — J44.1 COPD EXACERBATION: Primary | ICD-10-CM

## 2020-03-11 DIAGNOSIS — E16.2 HYPOGLYCEMIA: ICD-10-CM

## 2020-03-11 LAB
ALBUMIN SERPL BCP-MCNC: 3.1 G/DL (ref 3.5–5.2)
ALP SERPL-CCNC: 101 U/L (ref 55–135)
ALT SERPL W/O P-5'-P-CCNC: 79 U/L (ref 10–44)
ANION GAP SERPL CALC-SCNC: 15 MMOL/L (ref 8–16)
AST SERPL-CCNC: 128 U/L (ref 10–40)
BACTERIA #/AREA URNS HPF: ABNORMAL /HPF
BASOPHILS # BLD AUTO: 0.07 K/UL (ref 0–0.2)
BASOPHILS NFR BLD: 0.4 % (ref 0–1.9)
BILIRUB SERPL-MCNC: 0.1 MG/DL (ref 0.1–1)
BILIRUB UR QL STRIP: NEGATIVE
BNP SERPL-MCNC: 55 PG/ML (ref 0–99)
BUN SERPL-MCNC: 26 MG/DL (ref 8–23)
CALCIUM SERPL-MCNC: 9.3 MG/DL (ref 8.7–10.5)
CHLORIDE SERPL-SCNC: 105 MMOL/L (ref 95–110)
CLARITY UR: ABNORMAL
CO2 SERPL-SCNC: 21 MMOL/L (ref 23–29)
COLOR UR: YELLOW
CREAT SERPL-MCNC: 1.4 MG/DL (ref 0.5–1.4)
CTP QC/QA: YES
DIFFERENTIAL METHOD: ABNORMAL
EOSINOPHIL # BLD AUTO: 0.4 K/UL (ref 0–0.5)
EOSINOPHIL NFR BLD: 2.2 % (ref 0–8)
ERYTHROCYTE [DISTWIDTH] IN BLOOD BY AUTOMATED COUNT: 18.2 % (ref 11.5–14.5)
EST. GFR  (AFRICAN AMERICAN): 45 ML/MIN/1.73 M^2
EST. GFR  (NON AFRICAN AMERICAN): 39 ML/MIN/1.73 M^2
GLUCOSE SERPL-MCNC: 62 MG/DL (ref 70–110)
GLUCOSE UR QL STRIP: NEGATIVE
HCT VFR BLD AUTO: 35.9 % (ref 37–48.5)
HGB BLD-MCNC: 10.4 G/DL (ref 12–16)
HGB UR QL STRIP: ABNORMAL
HYALINE CASTS #/AREA URNS LPF: 0 /LPF
IMM GRANULOCYTES # BLD AUTO: 0.12 K/UL (ref 0–0.04)
IMM GRANULOCYTES NFR BLD AUTO: 0.7 % (ref 0–0.5)
KETONES UR QL STRIP: ABNORMAL
LACTATE SERPL-SCNC: 1.1 MMOL/L (ref 0.5–2.2)
LEUKOCYTE ESTERASE UR QL STRIP: ABNORMAL
LYMPHOCYTES # BLD AUTO: 2.5 K/UL (ref 1–4.8)
LYMPHOCYTES NFR BLD: 15.1 % (ref 18–48)
MCH RBC QN AUTO: 26.8 PG (ref 27–31)
MCHC RBC AUTO-ENTMCNC: 29 G/DL (ref 32–36)
MCV RBC AUTO: 93 FL (ref 82–98)
MICROSCOPIC COMMENT: ABNORMAL
MONOCYTES # BLD AUTO: 1 K/UL (ref 0.3–1)
MONOCYTES NFR BLD: 6.1 % (ref 4–15)
NEUTROPHILS # BLD AUTO: 12.5 K/UL (ref 1.8–7.7)
NEUTROPHILS NFR BLD: 75.5 % (ref 38–73)
NITRITE UR QL STRIP: NEGATIVE
NRBC BLD-RTO: 0 /100 WBC
PH UR STRIP: 5 [PH] (ref 5–8)
PLATELET # BLD AUTO: 392 K/UL (ref 150–350)
PMV BLD AUTO: 9.3 FL (ref 9.2–12.9)
POC MOLECULAR INFLUENZA A AGN: NEGATIVE
POC MOLECULAR INFLUENZA B AGN: NEGATIVE
POCT GLUCOSE: 108 MG/DL (ref 70–110)
POTASSIUM SERPL-SCNC: 4.2 MMOL/L (ref 3.5–5.1)
PROT SERPL-MCNC: 6.5 G/DL (ref 6–8.4)
PROT UR QL STRIP: ABNORMAL
RBC # BLD AUTO: 3.88 M/UL (ref 4–5.4)
RBC #/AREA URNS HPF: >100 /HPF (ref 0–4)
SODIUM SERPL-SCNC: 141 MMOL/L (ref 136–145)
SP GR UR STRIP: >1.03 (ref 1–1.03)
TROPONIN I SERPL DL<=0.01 NG/ML-MCNC: 0.02 NG/ML (ref 0–0.03)
URN SPEC COLLECT METH UR: ABNORMAL
UROBILINOGEN UR STRIP-ACNC: NEGATIVE EU/DL
WBC # BLD AUTO: 16.62 K/UL (ref 3.9–12.7)
WBC #/AREA URNS HPF: >100 /HPF (ref 0–5)

## 2020-03-11 PROCEDURE — 82962 GLUCOSE BLOOD TEST: CPT

## 2020-03-11 PROCEDURE — 93010 ELECTROCARDIOGRAM REPORT: CPT | Mod: ,,, | Performed by: INTERNAL MEDICINE

## 2020-03-11 PROCEDURE — 96361 HYDRATE IV INFUSION ADD-ON: CPT

## 2020-03-11 PROCEDURE — 85025 COMPLETE CBC W/AUTO DIFF WBC: CPT

## 2020-03-11 PROCEDURE — 99285 EMERGENCY DEPT VISIT HI MDM: CPT | Mod: 25

## 2020-03-11 PROCEDURE — 83605 ASSAY OF LACTIC ACID: CPT

## 2020-03-11 PROCEDURE — 63600175 PHARM REV CODE 636 W HCPCS: Performed by: EMERGENCY MEDICINE

## 2020-03-11 PROCEDURE — 87086 URINE CULTURE/COLONY COUNT: CPT

## 2020-03-11 PROCEDURE — 94761 N-INVAS EAR/PLS OXIMETRY MLT: CPT

## 2020-03-11 PROCEDURE — 87186 SC STD MICRODIL/AGAR DIL: CPT | Mod: 59

## 2020-03-11 PROCEDURE — 83880 ASSAY OF NATRIURETIC PEPTIDE: CPT

## 2020-03-11 PROCEDURE — 12000002 HC ACUTE/MED SURGE SEMI-PRIVATE ROOM

## 2020-03-11 PROCEDURE — 81000 URINALYSIS NONAUTO W/SCOPE: CPT

## 2020-03-11 PROCEDURE — 94640 AIRWAY INHALATION TREATMENT: CPT

## 2020-03-11 PROCEDURE — 80053 COMPREHEN METABOLIC PANEL: CPT

## 2020-03-11 PROCEDURE — G0378 HOSPITAL OBSERVATION PER HR: HCPCS

## 2020-03-11 PROCEDURE — 84484 ASSAY OF TROPONIN QUANT: CPT

## 2020-03-11 PROCEDURE — 87077 CULTURE AEROBIC IDENTIFY: CPT | Mod: 59

## 2020-03-11 PROCEDURE — 87088 URINE BACTERIA CULTURE: CPT

## 2020-03-11 PROCEDURE — 87502 INFLUENZA DNA AMP PROBE: CPT

## 2020-03-11 PROCEDURE — 96374 THER/PROPH/DIAG INJ IV PUSH: CPT

## 2020-03-11 PROCEDURE — 25000242 PHARM REV CODE 250 ALT 637 W/ HCPCS: Performed by: EMERGENCY MEDICINE

## 2020-03-11 PROCEDURE — 87040 BLOOD CULTURE FOR BACTERIA: CPT

## 2020-03-11 PROCEDURE — 93005 ELECTROCARDIOGRAM TRACING: CPT

## 2020-03-11 PROCEDURE — 93010 EKG 12-LEAD: ICD-10-PCS | Mod: ,,, | Performed by: INTERNAL MEDICINE

## 2020-03-11 RX ORDER — PREDNISONE 20 MG/1
40 TABLET ORAL DAILY
Qty: 8 TABLET | Refills: 0 | Status: SHIPPED | OUTPATIENT
Start: 2020-03-11 | End: 2020-03-13 | Stop reason: SDUPTHER

## 2020-03-11 RX ORDER — IPRATROPIUM BROMIDE AND ALBUTEROL SULFATE 2.5; .5 MG/3ML; MG/3ML
3 SOLUTION RESPIRATORY (INHALATION)
Status: COMPLETED | OUTPATIENT
Start: 2020-03-11 | End: 2020-03-11

## 2020-03-11 RX ORDER — NOREPINEPHRINE BITARTRATE/D5W 4MG/250ML
0.02 PLASTIC BAG, INJECTION (ML) INTRAVENOUS CONTINUOUS
Status: DISCONTINUED | OUTPATIENT
Start: 2020-03-12 | End: 2020-03-12

## 2020-03-11 RX ORDER — ONDANSETRON 2 MG/ML
4 INJECTION INTRAMUSCULAR; INTRAVENOUS
Status: COMPLETED | OUTPATIENT
Start: 2020-03-11 | End: 2020-03-11

## 2020-03-11 RX ORDER — DOXYCYCLINE 100 MG/1
100 CAPSULE ORAL 2 TIMES DAILY
Qty: 20 CAPSULE | Refills: 0 | Status: SHIPPED | OUTPATIENT
Start: 2020-03-11 | End: 2020-03-13 | Stop reason: HOSPADM

## 2020-03-11 RX ORDER — ONDANSETRON 4 MG/1
4 TABLET, FILM COATED ORAL EVERY 8 HOURS PRN
Qty: 12 TABLET | Refills: 0 | Status: SHIPPED | OUTPATIENT
Start: 2020-03-11 | End: 2020-03-13 | Stop reason: SDUPTHER

## 2020-03-11 RX ADMIN — IPRATROPIUM BROMIDE AND ALBUTEROL SULFATE 3 ML: .5; 3 SOLUTION RESPIRATORY (INHALATION) at 07:03

## 2020-03-11 RX ADMIN — ONDANSETRON HYDROCHLORIDE 4 MG: 2 SOLUTION INTRAMUSCULAR; INTRAVENOUS at 08:03

## 2020-03-11 RX ADMIN — SODIUM CHLORIDE, SODIUM LACTATE, POTASSIUM CHLORIDE, AND CALCIUM CHLORIDE 1000 ML: .6; .31; .03; .02 INJECTION, SOLUTION INTRAVENOUS at 08:03

## 2020-03-11 RX ADMIN — SODIUM CHLORIDE, SODIUM LACTATE, POTASSIUM CHLORIDE, AND CALCIUM CHLORIDE 1000 ML: .6; .31; .03; .02 INJECTION, SOLUTION INTRAVENOUS at 07:03

## 2020-03-11 NOTE — ED PROVIDER NOTES
Encounter Date: 3/11/2020    SCRIBE #1 NOTE: I, Shalonda Hopkins, am scribing for, and in the presence of,  Emre Gomez Jr., MD. I have scribed the following portions of the note - Other sections scribed: HPI and ROS.       History     Chief Complaint   Patient presents with    Shortness of Breath     EMS reports pt c/o shortness of breath x 6 days. worse today with a productive cough with clear sputum. pt on home o2 2L NC. Initial sats of 100%     CC: Shortness of Breath    HPI: This 70 y.o female, with a medical history of asthma, congestive heart failure, COPD, coronary artery disease, deep vein thrombosis of left lower extremity, diabetes mellitus, hypertension, myocardial infarction, and obstructive sleep apnea on CPAP, presents to the ED c/o acute, constant shortness of breath for the last 6x days. Pt states that she has also been experiencing a low grade fever (99 F), chills, decreased appetite, slight back pain, lower abdominal pain (only when urinating), intermittent nausea, and diarrhea. She reports use of Coricidin and Sandy Monson for treatment without any improvement. Additionally, pt notes that she took her last dose of prednisone 5-6 days ago. No recent sick contact, travel or exposure to COVID-19. Pt denies chest pain, leg swelling, emesis, constipation, blood in stool, or any other associated symptoms.     The history is provided by the patient.     Review of patient's allergies indicates:  No Known Allergies  Past Medical History:   Diagnosis Date    Anticoagulant long-term use     Xarelto    Arthritis     Asthma     CHF (congestive heart failure)     COPD (chronic obstructive pulmonary disease)     Coronary artery disease     Deep vein thrombosis (DVT) of left lower extremity     Depression     Diabetes mellitus     GERD (gastroesophageal reflux disease)     Hypertension     MI (myocardial infarction) 08/2019    Obstructive sleep apnea on CPAP     On home oxygen therapy      "Unsteady gait     uses either walker or 4 prong cane     Past Surgical History:   Procedure Laterality Date    CARDIAC CATHETERIZATION  2018    CORONARY ANGIOPLASTY WITH STENT PLACEMENT      FOOT SURGERY      HAND SURGERY      HERNIA REPAIR      encapsulated umbilical hernia     Family History   Problem Relation Age of Onset    Heart disease Mother     Hypertension Mother     Diabetes Father      Social History     Tobacco Use    Smoking status: Former Smoker     Packs/day: 0.50     Years: 55.00     Pack years: 27.50     Types: Cigarettes     Start date: 1963     Last attempt to quit: 2018     Years since quittin.1    Smokeless tobacco: Never Used    Tobacco comment: "States she quit smoking about 3 or 4 weeks ago"   Substance Use Topics    Alcohol use: Not Currently     Alcohol/week: 1.0 standard drinks     Types: 1 Glasses of wine per week     Frequency: Never     Comment: socially    Drug use: No     Review of Systems   Constitutional: Positive for appetite change (decreased), chills and fever (low thgthrthathdtheth:th th9th8th F).   Respiratory: Positive for shortness of breath.    Cardiovascular: Negative for chest pain and leg swelling.   Gastrointestinal: Positive for abdominal pain (lower; only when urinating), diarrhea and nausea. Negative for blood in stool, constipation and vomiting.   Musculoskeletal: Positive for back pain (slight).   All other systems reviewed and are negative.      Physical Exam     Initial Vitals [20 1702]   BP Pulse Resp Temp SpO2   (!) 144/100 77 (!) 25 97.8 °F (36.6 °C) 100 %      MAP       --         Physical Exam    Nursing note and vitals reviewed.  Constitutional: She appears well-developed and well-nourished. She is not diaphoretic. No distress.   HENT:   Head: Normocephalic and atraumatic.   Right Ear: External ear normal.   Left Ear: External ear normal.   Nose: Nose normal.   Mouth/Throat: Oropharynx is clear and moist.   Eyes: Conjunctivae and EOM are " normal. Pupils are equal, round, and reactive to light. Right eye exhibits no discharge. Left eye exhibits no discharge. No scleral icterus.   Neck: Normal range of motion. Neck supple.   Cardiovascular: Normal rate, regular rhythm, normal heart sounds and intact distal pulses.   No murmur heard.  Pulmonary/Chest: No respiratory distress. She has wheezes. She has no rhonchi. She has no rales.   Scattered deep expiratory wheezing noted.  No respiratory distress.  No rales or rhonchi.  The patient is on baseline 2 L nasal cannula oxygen.   Abdominal: Soft. Bowel sounds are normal. She exhibits no distension and no mass. There is no tenderness. There is no rebound and no guarding.   Patient's abdomen is soft and nondistended and nontender in all 4 quadrants.  No hernias or masses.   Musculoskeletal: Normal range of motion. She exhibits no edema or tenderness.   Neurological: She is alert and oriented to person, place, and time. She has normal strength. No cranial nerve deficit or sensory deficit.   Skin: Skin is warm and dry. No rash noted. No erythema. No pallor.   Psychiatric: She has a normal mood and affect. Her behavior is normal. Judgment and thought content normal.         ED Course   Procedures  Labs Reviewed   CBC W/ AUTO DIFFERENTIAL - Abnormal; Notable for the following components:       Result Value    WBC 16.62 (*)     RBC 3.88 (*)     Hemoglobin 10.4 (*)     Hematocrit 35.9 (*)     Mean Corpuscular Hemoglobin 26.8 (*)     Mean Corpuscular Hemoglobin Conc 29.0 (*)     RDW 18.2 (*)     Platelets 392 (*)     Immature Granulocytes 0.7 (*)     Gran # (ANC) 12.5 (*)     Immature Grans (Abs) 0.12 (*)     Gran% 75.5 (*)     Lymph% 15.1 (*)     All other components within normal limits   COMPREHENSIVE METABOLIC PANEL - Abnormal; Notable for the following components:    CO2 21 (*)     Glucose 62 (*)     BUN, Bld 26 (*)     Albumin 3.1 (*)      (*)     ALT 79 (*)     eGFR if  45 (*)     eGFR  if non  39 (*)     All other components within normal limits   URINALYSIS, REFLEX TO URINE CULTURE - Abnormal; Notable for the following components:    Appearance, UA Cloudy (*)     Specific Gravity, UA >1.030 (*)     Protein, UA 2+ (*)     Ketones, UA Trace (*)     Occult Blood UA 3+ (*)     Leukocytes, UA 3+ (*)     All other components within normal limits    Narrative:     Preferred Collection Type->Urine, Clean Catch   URINALYSIS MICROSCOPIC - Abnormal; Notable for the following components:    RBC, UA >100 (*)     WBC, UA >100 (*)     Bacteria Many (*)     All other components within normal limits    Narrative:     Preferred Collection Type->Urine, Clean Catch   CULTURE, URINE   CULTURE, BLOOD   CULTURE, BLOOD   B-TYPE NATRIURETIC PEPTIDE   TROPONIN I   LACTIC ACID, PLASMA   POCT INFLUENZA A/B MOLECULAR   POCT GLUCOSE     EKG Readings: (Independently Interpreted)   Initial Reading: No STEMI. Ectopy: No Ectopy.   EKG reviewed and interpreted by me shows sinus rhythm at a rate of 77.  AL, QRS intervals within normal limits.  The QTC is prolonged.  There is a left axis deviation.  There is poor R-wave progression.  There are no ST segment or T-wave ischemic findings.         Imaging Results          X-Ray Chest AP Portable (Final result)  Result time 03/11/20 18:32:52    Final result by Mushtaq Varma MD (03/11/20 18:32:52)                 Impression:      1. No acute cardiopulmonary process appreciated.      Electronically signed by: Mushtaq Varma  Date:    03/11/2020  Time:    18:32             Narrative:    EXAMINATION:  XR CHEST AP PORTABLE    CLINICAL HISTORY:  Shortness of breath    TECHNIQUE:  Single AP view of the chest.  Patient position in marked dextro rotation and exaggerated AP kyphotic view, limiting evaluation.    COMPARISON:  Chest radiograph 01/01/2020    FINDINGS:  Cardiac silhouette is within normal limits.  Widening of the mediastinal silhouette is likely projectional related to  suboptimal technique and patient positioning.    No focal consolidation, overt interstitial edema, sizable pleural effusion or pneumothorax.    Multilevel degenerative changes of the imaged spine.                              X-Rays:   Independently Interpreted Readings:   Other Readings:  Chest x-ray reveals no evidence of infiltrate or consolidation.  No pleural effusion or pulmonary edema.    Medical Decision Making:   ED Management:  This is the emergent evaluation of a 70-year-old female presents emergency department for evaluation of shortness of breath for the past 6 days.  Patient complains of chills.  She complains that she has also been experiencing pain in her lower abdomen only when she urinates.  Differential diagnosis at the time of initial evaluation included, but was not limited to:  COPD exacerbation, viral illness, pneumonia, CHF exacerbation, urinary tract infection.       This patient is afebrile.  She does have a leukocytosis but recently was on corticosteroids at home.  She has an oxygen saturation of 100% on her home 2 L of oxygen via nasal cannula.  This is baseline.  She is mildly hypotensive but this appears to be resolving with fluids.  Patient has a reported history of CHF but she does not appear to be hypervolemic and has a recent echocardiogram showing normal ejection fraction and no diastolic dysfunction.  Patient is receiving IV fluids.  She is receive Solu-Medrol and DuoNeb treatments.  She is eating without difficulty at this time.  Her laboratory evaluation has mild acute kidney injury likely related to decreased oral intake.  Additionally, she was noted to be hypoglycemic.  She is not lethargic or altered.  She will be given oral fluids and solids and blood glucose will be rechecked.  There is no evidence of pneumonia or sepsis.  Patient has no lactic acidosis.  Patient will be discharged home with instructions to follow up with her PCP in the next 2-3 days and return for any new  or worsening symptoms such as fever, shaking chills, chest pain, or worsening shortness of breath.  I will treat this patient with 4 more days of prednisone, doxycycline for COPD exacerbation with increased sputum, and instructions to use breathing treatments or nebulizing treatments every 4 hr as needed for shortness of breath or wheezing.    3/13/20, 15:35 - I reviewed the note from Dr. Seaman who assumed care of this patient when I left. Plan admit to discharge the patient but the patient reportedly continued to be hypotensive initially despite fluids. This did improve ultimately with fluid. However, given the patient's age and comorbidities and reported history of shortness of breath as well as leukocytosis, further workup was initiated. Urinalysis was positive for infection though the patient had no urinary symptoms or fever. Patient was appropriately admitted to the hospital by Dr. Seaman.            Scribe Attestation:   Scribe #1: I performed the above scribed service and the documentation accurately describes the services I performed. I attest to the accuracy of the note.                    PHYSICIAN CARE UPDATE    Patient received as signout from Dr. Gomez.     69yo female presents via EMS with acute onset shortness of breath, cough productive of white sputum, dysuria, decreased PO intake, subjective fevers/chills, all ongoing for about 5-6 days. Patient also reports back pain but states this is chronic. No travel history.    Patient is on home O2 at 3L and uses CPAP at night.     PMH: diastolic CHF, paroxysmal atrial fibrillation, CAD, MI, COPD, HTN, DM2, DVT LLE, chronic anticoagulation (Xarelto), chronic O2 therapy (3.5L), asthma, arthritis, GERD, LOYDA on CPAP, unsteady gait / uses walker    Psych: depression     PSH: coronary angioplasty with stent placement, hernia repair, hand surgery, foot surgery     6/27/18 Cardiac Cath  Diagnostic:        Patient has a right dominant coronary artery.       - Left Main Coronary Artery:             The LM has luminal irregularities. There is ROSA 3 flow.     - Left Anterior Descending Artery:             The LAD has luminal irregularities. There is ROSA 3 flow.     - Left Circumflex Artery:             The LCX has luminal irregularities. There is ROSA 3 flow.     - Right Coronary Artery:             The mid RCA has a 40% stenosis. There is ROSA 3 flow.     TTE 11/4/19  ·           Normal left ventricular systolic function. The estimated ejection fraction is 70%  ·           Concentric left ventricular hypertrophy.  ·           Normal LV diastolic function.  ·           Normal right ventricular systolic function.  ·           Mild left atrial enlargement.     TTE 9/30/19    Normal cardiac dimensions.     Mild concentric left ventricular hypertrophy.     Normal left ventricular systolic function.     Left ventricular ejection fraction is estimated at 55-60 %.     There were no regional wall motion abnormalities.     Grade I/IV diastolic dysfunction.     Normal right ventricular systolic function.     Normal size aortic root and proximal ascending aorta.     Mitral annular calcification.  Otherwise normal cardiac valve structures.     Negative doppler study as above.     No atrial mass is noted on this study.      TTE 6/9/19  ·           Normal left ventricular systolic function. The estimated ejection fraction is 70%  ·           Moderate concentric left ventricular hypertrophy.  ·           Grade I (mild) left ventricular diastolic dysfunction consistent with impaired relaxation.  ·           Normal left atrial pressure.  ·           Moderate left atrial enlargement.  ·           Mild right atrial enlargement.  ·           Mild mitral regurgitation.  ·           Mild tricuspid regurgitation.  ·           The estimated PA systolic pressure is 80 mm Hg  ·           Intermediate central venous pressure (8 mm Hg).  ·           Pulmonary hypertension present.    EKG 18:12:  NSR, HR 77. L axis. No ectopy. No STEMI.    CXR NAD.    Labs: WBC 16.6. BUN 26 / creatinine 1.4. Cardiac enzymes negative. Lactate normal. UA with 3+ leuks. Flu negative.    Patient had duonebs x 3 prior to my assessment. BPs were low and patient is now receiving NS 2L bolus. She had zofran 4mg IV for nausea.    Despite boluses (patient has now had >1L), patient remains hypotensive.     Due to persistent hypotension, I have admitted the patient for fluids, pressors, and further care as necessary. I have started rocephin for UTI. Patient will have doxycycline for respiratory symptoms as well.    I have discussed this case with nocturnist Dr. Dumont and have written courtesy orders.    Katie Seaman        Clinical Impression:       ICD-10-CM ICD-9-CM   1. COPD exacerbation J44.1 491.21   2. Shortness of breath R06.02 786.05   3. Cough R05 786.2   4. Hypoglycemia E16.2 251.2   5. Dehydration E86.0 276.51   6. Hypotension, unspecified hypotension type I95.9 458.9   7. Hypotension I95.9 458.9             ED Disposition Condition    Admit            I, Emre Gomez MD, personally performed the services described in this documentation. All medical record entries made by the scribe were at my direction and in my presence. I have reviewed the chart and agree that the record reflects my personal performance and is accurate and complete.               Emre Gomez Jr., MD  03/13/20 [id number]

## 2020-03-12 PROBLEM — N17.9 AKI (ACUTE KIDNEY INJURY): Status: ACTIVE | Noted: 2020-03-12

## 2020-03-12 PROBLEM — I95.9 HYPOTENSION: Status: RESOLVED | Noted: 2020-03-11 | Resolved: 2020-03-12

## 2020-03-12 PROBLEM — R74.01 TRANSAMINITIS: Status: ACTIVE | Noted: 2020-03-12

## 2020-03-12 LAB
ANION GAP SERPL CALC-SCNC: 9 MMOL/L (ref 8–16)
BUN SERPL-MCNC: 20 MG/DL (ref 8–23)
CALCIUM SERPL-MCNC: 8 MG/DL (ref 8.7–10.5)
CHLORIDE SERPL-SCNC: 108 MMOL/L (ref 95–110)
CO2 SERPL-SCNC: 22 MMOL/L (ref 23–29)
CREAT SERPL-MCNC: 1 MG/DL (ref 0.5–1.4)
EST. GFR  (AFRICAN AMERICAN): >60 ML/MIN/1.73 M^2
EST. GFR  (NON AFRICAN AMERICAN): 58 ML/MIN/1.73 M^2
ESTIMATED AVG GLUCOSE: 140 MG/DL (ref 68–131)
ESTIMATED AVG GLUCOSE: 140 MG/DL (ref 68–131)
GLUCOSE SERPL-MCNC: 76 MG/DL (ref 70–110)
GLUCOSE SERPL-MCNC: 81 MG/DL (ref 70–110)
HBA1C MFR BLD HPLC: 6.5 % (ref 4–5.6)
HBA1C MFR BLD HPLC: 6.5 % (ref 4–5.6)
POCT GLUCOSE: 151 MG/DL (ref 70–110)
POCT GLUCOSE: 183 MG/DL (ref 70–110)
POCT GLUCOSE: 76 MG/DL (ref 70–110)
POTASSIUM SERPL-SCNC: 3.7 MMOL/L (ref 3.5–5.1)
SODIUM SERPL-SCNC: 139 MMOL/L (ref 136–145)
TROPONIN I SERPL DL<=0.01 NG/ML-MCNC: 0.02 NG/ML (ref 0–0.03)

## 2020-03-12 PROCEDURE — 25000242 PHARM REV CODE 250 ALT 637 W/ HCPCS: Performed by: FAMILY MEDICINE

## 2020-03-12 PROCEDURE — 25000242 PHARM REV CODE 250 ALT 637 W/ HCPCS: Performed by: EMERGENCY MEDICINE

## 2020-03-12 PROCEDURE — 36569 INSJ PICC 5 YR+ W/O IMAGING: CPT

## 2020-03-12 PROCEDURE — G0378 HOSPITAL OBSERVATION PER HR: HCPCS

## 2020-03-12 PROCEDURE — 99900035 HC TECH TIME PER 15 MIN (STAT)

## 2020-03-12 PROCEDURE — 25000003 PHARM REV CODE 250: Performed by: HOSPITALIST

## 2020-03-12 PROCEDURE — 63600175 PHARM REV CODE 636 W HCPCS: Performed by: EMERGENCY MEDICINE

## 2020-03-12 PROCEDURE — 25000003 PHARM REV CODE 250: Performed by: EMERGENCY MEDICINE

## 2020-03-12 PROCEDURE — 84484 ASSAY OF TROPONIN QUANT: CPT

## 2020-03-12 PROCEDURE — 83036 HEMOGLOBIN GLYCOSYLATED A1C: CPT

## 2020-03-12 PROCEDURE — 63600175 PHARM REV CODE 636 W HCPCS: Performed by: FAMILY MEDICINE

## 2020-03-12 PROCEDURE — 11000001 HC ACUTE MED/SURG PRIVATE ROOM

## 2020-03-12 PROCEDURE — C1751 CATH, INF, PER/CENT/MIDLINE: HCPCS

## 2020-03-12 PROCEDURE — 80048 BASIC METABOLIC PNL TOTAL CA: CPT

## 2020-03-12 PROCEDURE — 94660 CPAP INITIATION&MGMT: CPT

## 2020-03-12 PROCEDURE — 94761 N-INVAS EAR/PLS OXIMETRY MLT: CPT

## 2020-03-12 PROCEDURE — 94640 AIRWAY INHALATION TREATMENT: CPT

## 2020-03-12 PROCEDURE — 27000221 HC OXYGEN, UP TO 24 HOURS

## 2020-03-12 PROCEDURE — 25000003 PHARM REV CODE 250: Performed by: FAMILY MEDICINE

## 2020-03-12 PROCEDURE — 27000190 HC CPAP FULL FACE MASK W/VALVE

## 2020-03-12 RX ORDER — LEVOFLOXACIN 5 MG/ML
750 INJECTION, SOLUTION INTRAVENOUS
Status: DISCONTINUED | OUTPATIENT
Start: 2020-03-12 | End: 2020-03-13

## 2020-03-12 RX ORDER — IBUPROFEN 200 MG
24 TABLET ORAL
Status: DISCONTINUED | OUTPATIENT
Start: 2020-03-12 | End: 2020-03-13 | Stop reason: HOSPADM

## 2020-03-12 RX ORDER — DOXYCYCLINE HYCLATE 100 MG
100 TABLET ORAL EVERY 12 HOURS
Status: DISCONTINUED | OUTPATIENT
Start: 2020-03-12 | End: 2020-03-12

## 2020-03-12 RX ORDER — FERROUS GLUCONATE 324(38)MG
324 TABLET ORAL
Status: DISCONTINUED | OUTPATIENT
Start: 2020-03-12 | End: 2020-03-12 | Stop reason: SDUPTHER

## 2020-03-12 RX ORDER — KETOROLAC TROMETHAMINE 30 MG/ML
15 INJECTION, SOLUTION INTRAMUSCULAR; INTRAVENOUS EVERY 6 HOURS PRN
Status: DISCONTINUED | OUTPATIENT
Start: 2020-03-12 | End: 2020-03-13 | Stop reason: HOSPADM

## 2020-03-12 RX ORDER — IPRATROPIUM BROMIDE AND ALBUTEROL SULFATE 2.5; .5 MG/3ML; MG/3ML
3 SOLUTION RESPIRATORY (INHALATION)
Status: DISCONTINUED | OUTPATIENT
Start: 2020-03-12 | End: 2020-03-13 | Stop reason: HOSPADM

## 2020-03-12 RX ORDER — GABAPENTIN 300 MG/1
300 CAPSULE ORAL NIGHTLY
Status: DISCONTINUED | OUTPATIENT
Start: 2020-03-12 | End: 2020-03-13 | Stop reason: HOSPADM

## 2020-03-12 RX ORDER — IBUPROFEN 200 MG
16 TABLET ORAL
Status: DISCONTINUED | OUTPATIENT
Start: 2020-03-12 | End: 2020-03-13 | Stop reason: HOSPADM

## 2020-03-12 RX ORDER — FLUTICASONE PROPIONATE 220 UG/1
1 AEROSOL, METERED RESPIRATORY (INHALATION) 2 TIMES DAILY
Status: DISCONTINUED | OUTPATIENT
Start: 2020-03-12 | End: 2020-03-13 | Stop reason: HOSPADM

## 2020-03-12 RX ORDER — POLYETHYLENE GLYCOL 3350 17 G/17G
17 POWDER, FOR SOLUTION ORAL 2 TIMES DAILY
Status: DISCONTINUED | OUTPATIENT
Start: 2020-03-12 | End: 2020-03-13 | Stop reason: HOSPADM

## 2020-03-12 RX ORDER — ONDANSETRON 2 MG/ML
4 INJECTION INTRAMUSCULAR; INTRAVENOUS EVERY 4 HOURS PRN
Status: DISCONTINUED | OUTPATIENT
Start: 2020-03-12 | End: 2020-03-13 | Stop reason: HOSPADM

## 2020-03-12 RX ORDER — INSULIN ASPART 100 [IU]/ML
0-5 INJECTION, SOLUTION INTRAVENOUS; SUBCUTANEOUS
Status: DISCONTINUED | OUTPATIENT
Start: 2020-03-12 | End: 2020-03-13 | Stop reason: HOSPADM

## 2020-03-12 RX ORDER — AMIODARONE HYDROCHLORIDE 200 MG/1
200 TABLET ORAL DAILY
Status: DISCONTINUED | OUTPATIENT
Start: 2020-03-12 | End: 2020-03-13 | Stop reason: HOSPADM

## 2020-03-12 RX ORDER — SODIUM CHLORIDE 0.9 % (FLUSH) 0.9 %
10 SYRINGE (ML) INJECTION
Status: DISCONTINUED | OUTPATIENT
Start: 2020-03-12 | End: 2020-03-13 | Stop reason: HOSPADM

## 2020-03-12 RX ORDER — SODIUM CHLORIDE 9 MG/ML
INJECTION, SOLUTION INTRAVENOUS CONTINUOUS
Status: DISCONTINUED | OUTPATIENT
Start: 2020-03-12 | End: 2020-03-12

## 2020-03-12 RX ORDER — METHYLPREDNISOLONE SOD SUCC 125 MG
80 VIAL (EA) INJECTION 2 TIMES DAILY
Status: DISCONTINUED | OUTPATIENT
Start: 2020-03-12 | End: 2020-03-13

## 2020-03-12 RX ORDER — FERROUS GLUCONATE 324(37.5)
324 TABLET ORAL
Status: DISCONTINUED | OUTPATIENT
Start: 2020-03-12 | End: 2020-03-13 | Stop reason: HOSPADM

## 2020-03-12 RX ORDER — FUROSEMIDE 40 MG/1
40 TABLET ORAL DAILY
Status: DISCONTINUED | OUTPATIENT
Start: 2020-03-12 | End: 2020-03-12

## 2020-03-12 RX ORDER — ACETAMINOPHEN 325 MG/1
650 TABLET ORAL EVERY 8 HOURS PRN
Status: DISCONTINUED | OUTPATIENT
Start: 2020-03-12 | End: 2020-03-13 | Stop reason: HOSPADM

## 2020-03-12 RX ORDER — GLUCAGON 1 MG
1 KIT INJECTION
Status: DISCONTINUED | OUTPATIENT
Start: 2020-03-12 | End: 2020-03-13 | Stop reason: HOSPADM

## 2020-03-12 RX ORDER — SODIUM CHLORIDE 9 MG/ML
INJECTION, SOLUTION INTRAVENOUS CONTINUOUS
Status: DISCONTINUED | OUTPATIENT
Start: 2020-03-12 | End: 2020-03-13 | Stop reason: HOSPADM

## 2020-03-12 RX ORDER — DOCUSATE SODIUM 100 MG/1
100 CAPSULE, LIQUID FILLED ORAL 2 TIMES DAILY
Status: DISCONTINUED | OUTPATIENT
Start: 2020-03-12 | End: 2020-03-13 | Stop reason: HOSPADM

## 2020-03-12 RX ORDER — PANTOPRAZOLE SODIUM 40 MG/1
40 TABLET, DELAYED RELEASE ORAL DAILY
Status: DISCONTINUED | OUTPATIENT
Start: 2020-03-12 | End: 2020-03-13 | Stop reason: HOSPADM

## 2020-03-12 RX ADMIN — APIXABAN 5 MG: 5 TABLET, FILM COATED ORAL at 10:03

## 2020-03-12 RX ADMIN — FERROUS GLUCONATE TAB 324 MG (37.5 MG ELEMENTAL IRON) 324 MG: 324 (37.5 FE) TAB at 01:03

## 2020-03-12 RX ADMIN — METHYLPREDNISOLONE SODIUM SUCCINATE 80 MG: 125 INJECTION, POWDER, FOR SOLUTION INTRAMUSCULAR; INTRAVENOUS at 10:03

## 2020-03-12 RX ADMIN — GABAPENTIN 300 MG: 300 CAPSULE ORAL at 03:03

## 2020-03-12 RX ADMIN — PREGABALIN 75 MG: 25 CAPSULE ORAL at 04:03

## 2020-03-12 RX ADMIN — DOCUSATE SODIUM 100 MG: 100 CAPSULE, LIQUID FILLED ORAL at 10:03

## 2020-03-12 RX ADMIN — PREGABALIN 75 MG: 25 CAPSULE ORAL at 10:03

## 2020-03-12 RX ADMIN — CEFTRIAXONE 1 G: 1 INJECTION, SOLUTION INTRAVENOUS at 03:03

## 2020-03-12 RX ADMIN — SODIUM CHLORIDE: 0.9 INJECTION, SOLUTION INTRAVENOUS at 08:03

## 2020-03-12 RX ADMIN — LEVOFLOXACIN 750 MG: 750 INJECTION, SOLUTION INTRAVENOUS at 10:03

## 2020-03-12 RX ADMIN — CEFTRIAXONE 1 G: 1 INJECTION, SOLUTION INTRAVENOUS at 04:03

## 2020-03-12 RX ADMIN — SODIUM CHLORIDE: 0.9 INJECTION, SOLUTION INTRAVENOUS at 03:03

## 2020-03-12 RX ADMIN — GABAPENTIN 300 MG: 300 CAPSULE ORAL at 10:03

## 2020-03-12 RX ADMIN — IPRATROPIUM BROMIDE AND ALBUTEROL SULFATE 3 ML: .5; 3 SOLUTION RESPIRATORY (INHALATION) at 07:03

## 2020-03-12 RX ADMIN — IPRATROPIUM BROMIDE AND ALBUTEROL SULFATE 3 ML: .5; 3 SOLUTION RESPIRATORY (INHALATION) at 11:03

## 2020-03-12 RX ADMIN — FERROUS GLUCONATE TAB 324 MG (37.5 MG ELEMENTAL IRON) 324 MG: 324 (37.5 FE) TAB at 05:03

## 2020-03-12 RX ADMIN — POLYETHYLENE GLYCOL 3350 17 G: 17 POWDER, FOR SOLUTION ORAL at 10:03

## 2020-03-12 RX ADMIN — FLUTICASONE PROPIONATE 1 PUFF: 220 AEROSOL, METERED RESPIRATORY (INHALATION) at 08:03

## 2020-03-12 RX ADMIN — DOXYCYCLINE HYCLATE 100 MG: 100 TABLET, COATED ORAL at 02:03

## 2020-03-12 RX ADMIN — KETOROLAC TROMETHAMINE 15 MG: 30 INJECTION, SOLUTION INTRAMUSCULAR at 03:03

## 2020-03-12 RX ADMIN — PANTOPRAZOLE SODIUM 40 MG: 40 TABLET, DELAYED RELEASE ORAL at 10:03

## 2020-03-12 RX ADMIN — AMIODARONE HYDROCHLORIDE 200 MG: 200 TABLET ORAL at 10:03

## 2020-03-12 RX ADMIN — FUROSEMIDE 40 MG: 40 TABLET ORAL at 10:03

## 2020-03-12 RX ADMIN — THERA TABS 1 TABLET: TAB at 02:03

## 2020-03-12 NOTE — H&P
[facility] Medicine  History & Physical    Patient Name: Yasmeen Palm  MRN: [id number]  Admission Date: 3/11/2020  Attending Physician: Emre Gomez Jr., *   Primary Care Provider: Angel Orourke Jr, MD         Patient information was obtained from ER records.     Subjective:     Principal Problem:<principal problem not specified>    Chief Complaint:   Chief Complaint   Patient presents with    Shortness of Breath     EMS reports pt c/o shortness of breath x 6 days. worse today with a productive cough with clear sputum. pt on home o2 2L NC. Initial sats of 100%        HPI: Pt is a 69 yo F with a h/o COPD, CAD, afib, DVT, DM2, LOYDA who presents to the ER for SOB and prodcutive cough x 6 days.  Pt's symptoms have been worsening and now she has chills, poor po intake/dec appetite.  She also has been on an outpatient regimen for COPD including prednisone.  She states she has been on the prednisone for 2-3 months and was on a tapering dose.  She denies any recent travels, fevers, chest pains, vision changes, dizziness, or palpitations.  In the ER pt was noted to be hypotensive as well and given IVF.      Past Medical History:   Diagnosis Date    Anticoagulant long-term use     Xarelto    Arthritis     Asthma     CHF (congestive heart failure)     COPD (chronic obstructive pulmonary disease)     Coronary artery disease     Deep vein thrombosis (DVT) of left lower extremity     Depression     Diabetes mellitus     GERD (gastroesophageal reflux disease)     Hypertension     MI (myocardial infarction) 08/2019    Obstructive sleep apnea on CPAP     On home oxygen therapy     Unsteady gait     uses either walker or 4 prong cane       Past Surgical History:   Procedure Laterality Date    CARDIAC CATHETERIZATION  06/2018    CORONARY ANGIOPLASTY WITH STENT PLACEMENT      FOOT SURGERY      HAND SURGERY      HERNIA REPAIR      encapsulated umbilical hernia       Review of  patient's allergies indicates:  No Known Allergies    No current facility-administered medications on file prior to encounter.      Current Outpatient Medications on File Prior to Encounter   Medication Sig    acetaminophen (TYLENOL) 500 MG tablet Take 1 tablet (500 mg total) by mouth every 8 (eight) hours as needed.    albuterol (ACCUNEB) 1.25 mg/3 mL Nebu Inhale 1.25 mg into the lungs.    albuterol-ipratropium (DUO-NEB) 2.5 mg-0.5 mg/3 mL nebulizer solution Take 3 mLs by nebulization every 6 (six) hours. Rescue    amiodarone (PACERONE) 200 MG Tab Take 1 tablet (200 mg total) by mouth once daily.    apixaban (ELIQUIS) 5 mg Tab Take 5 mg by mouth 2 (two) times daily.    cloNIDine (CATAPRES) 0.2 MG tablet Take 0.2 mg by mouth.    diltiaZEM (CARDIZEM CD) 180 MG 24 hr capsule Take 1 capsule (180 mg total) by mouth once daily. (Patient taking differently: Take 90 mg by mouth 3 (three) times daily. )    docusate sodium (COLACE) 100 MG capsule Take 1 capsule (100 mg total) by mouth 2 (two) times daily.    ferrous gluconate (FERGON) 324 MG tablet Take 1 tablet (324 mg total) by mouth 3 (three) times daily with meals.    fluticasone propionate (FLOVENT HFA) 220 mcg/actuation inhaler Inhale 1 puff into the lungs 2 (two) times daily. Controller    furosemide (LASIX) 40 MG tablet Take 40 mg by mouth once daily.     gabapentin (NEURONTIN) 300 MG capsule Take 300 mg by mouth.    hydrALAZINE (APRESOLINE) 50 MG tablet Take 50 mg by mouth every 8 (eight) hours.     isosorbide mononitrate (IMDUR) 30 MG 24 hr tablet Take 30 mg by mouth.    lisinopril (PRINIVIL,ZESTRIL) 40 MG tablet Take 1 tablet (40 mg total) by mouth once daily. Do not take this medication if blood pressure is below 130/80 mmHg and/or feeling dizzy after taking all other blood pressure meds    metFORMIN (GLUCOPHAGE) 1000 MG tablet Take 1 tablet (1,000 mg total) by mouth 2 (two) times daily with meals.    multivit-min-FA-lycopen-lutein (CENTRUM  "SILVER) 0.4-300-250 mg-mcg-mcg Tab Take 1 tablet by mouth once daily.    nitroGLYCERIN (NITROSTAT) 0.4 MG SL tablet     pantoprazole (PROTONIX) 40 MG tablet Take 40 mg by mouth once daily.    polyethylene glycol (GLYCOLAX) 17 gram PwPk Take 17 g by mouth 2 (two) times daily.    pregabalin (LYRICA) 75 MG capsule Take 75 mg by mouth 3 (three) times daily.    sotalol (BETAPACE) 120 MG Tab Take 80 mg by mouth 2 (two) times daily.    traMADol (ULTRAM) 50 mg tablet     umeclidinium brm/vilanterol tr (ANORO ELLIPTA INHL) Inhale into the lungs.     Family History     Problem Relation (Age of Onset)    Diabetes Father    Heart disease Mother    Hypertension Mother        Tobacco Use    Smoking status: Former Smoker     Packs/day: 0.50     Years: 55.00     Pack years: 27.50     Types: Cigarettes     Start date: 1963     Last attempt to quit: 2018     Years since quittin.1    Smokeless tobacco: Never Used    Tobacco comment: "States she quit smoking about 3 or 4 weeks ago"   Substance and Sexual Activity    Alcohol use: Not Currently     Alcohol/week: 1.0 standard drinks     Types: 1 Glasses of wine per week     Frequency: Never     Comment: socially    Drug use: No    Sexual activity: Never     Review of Systems   Constitutional: Positive for appetite change and fever. Negative for diaphoresis.   HENT: Negative for facial swelling and nosebleeds.    Eyes: Negative for pain and visual disturbance.   Respiratory: Positive for shortness of breath. Negative for cough and chest tightness.    Cardiovascular: Negative for chest pain and palpitations.   Gastrointestinal: Positive for nausea. Negative for diarrhea.   Endocrine: Negative for cold intolerance and heat intolerance.   Genitourinary: Negative for hematuria and urgency.   Musculoskeletal: Negative for arthralgias and back pain.   Skin: Negative for pallor and rash.   Neurological: Negative for syncope and speech difficulty.   Hematological: " Negative for adenopathy. Does not bruise/bleed easily.   Psychiatric/Behavioral: Negative for confusion and hallucinations.     Objective:     Vital Signs (Most Recent):  Temp: 98 °F (36.7 °C) (03/12/20 0332)  Pulse: 83 (03/12/20 0432)  Resp: 20 (03/12/20 0332)  BP: (!) 142/65 (03/12/20 0432)  SpO2: 99 % (03/12/20 0432) Vital Signs (24h Range):  Temp:  [97.8 °F (36.6 °C)-98 °F (36.7 °C)] 98 °F (36.7 °C)  Pulse:  [75-83] 83  Resp:  [18-28] 20  SpO2:  [96 %-100 %] 99 %  BP: ()/() 142/65     Weight: 90.7 kg (200 lb)  Body mass index is 33.28 kg/m².    Physical Exam   Constitutional: No distress.   HENT:   Head: Normocephalic and atraumatic.   Eyes: Pupils are equal, round, and reactive to light. EOM are normal.   Neck: Normal range of motion. No tracheal deviation present.   Cardiovascular: Normal rate and regular rhythm.   Pulmonary/Chest: She has wheezes. She has rales.   Abdominal: Soft. Bowel sounds are normal.   Musculoskeletal: Normal range of motion. She exhibits no deformity.   Neurological: She is alert. She exhibits normal muscle tone.   Skin: Skin is warm. Capillary refill takes 2 to 3 seconds. She is not diaphoretic. No pallor.   Psychiatric: She has a normal mood and affect. Her behavior is normal.   Vitals reviewed.       Significant Labs: All pertinent labs within the past 24 hours have been reviewed.    Significant Imaging: I have reviewed and interpreted all pertinent imaging results/findings within the past 24 hours.    Assessment/Plan:     COPD exacerbation  Duonebs, solumedrol, abx, oxygen support as warranted.  Of note patient has been on a steroid taper as outpatient for 2-3 months and was currently taking it.      Hypotension  Appears to be volume depleted. High urine specific gravity. Hold BP meds/lasix.  On IVF.        Leukocytosis  Cultures drawn in ED.  F/u blood and urine cultures.  UA abnormal.  Pt on levaquin to cover both COPD and UTI.  Continue.      MIRIAM (acute kidney  injury)  2/2 prerenal.  IVF.  Monitor.       Transaminitis  Mild elevation, monitor.  RUQ US if no resolution      Paroxysmal atrial fibrillation  Amiodarone, eliquis resumed      Chronic anticoagulation    Due to h/o Afib, DVT/PE.  Eliquis resumed    Controlled type 2 diabetes mellitus, without long-term current use of insulin  Accuchecks, SSI      VTE Risk Mitigation (From admission, onward)         Ordered     apixaban tablet 5 mg  2 times daily      03/12/20 0338     IP VTE HIGH RISK PATIENT  Once      03/12/20 0338     Place TOM hose  Until discontinued      03/12/20 0338     Place sequential compression device  Until discontinued      03/12/20 0338     Reason for No Pharmacological VTE Prophylaxis  Once     Question:  Reasons:  Answer:  Already adequately anticoagulated on oral Anticoagulants    03/12/20 0338                   Goran Dumnot MD - Family & Internal Medicine  [facility]  Ph: [phone]

## 2020-03-12 NOTE — ED NOTES
Spoke with dr lockwood via phone and pt able to go to regular admission unit. Waiting for orders. Pt has no further questions or concerns at this time. Will continue to monitor.

## 2020-03-12 NOTE — DISCHARGE INSTRUCTIONS
Please follow-up with your PCP in the next 2 days for re-evaluation of your symptoms.  Please use breathing treatments are nebulizing treatments every 4 hr as needed.  Please return if you develop new or worsening symptoms such as worsening shortness of breath or inability to take fluids by mouth or intractable vomiting.  Also return for any fever, chills, or weakness.  Steroids and antibiotics as prescribed.

## 2020-03-12 NOTE — ED NOTES
Pt resting comfortably at this time. Pt has no complaints. Pt waiting for a bed assignment. MD paged to discuss possibility of downgrading pt to regular admission bed instead of ICU. Monitor in place. Vs stable. Will continue to monitor.

## 2020-03-12 NOTE — ASSESSMENT & PLAN NOTE
Cultures drawn in ED.  F/u blood and urine cultures.  UA abnormal.  Pt on levaquin to cover both COPD and UTI.  Continue.

## 2020-03-12 NOTE — PROCEDURES
"Yasmeen Palm is a 70 y.o. female patient.    Temp: 97.8 °F (36.6 °C) (03/11/20 1702)  Pulse: 80 (03/12/20 0133)  Resp: (!) 21 (03/12/20 0133)  BP: (!) 129/58 (03/12/20 0133)  SpO2: 98 % (03/12/20 0133)  Weight: 90.7 kg (200 lb) (03/11/20 1702)  Height: 5' 5" (165.1 cm) (03/11/20 1702)    PICC  Date/Time: 3/12/2020 1:26 AM  Performed by: Hay Haskins RN  Supervising provider: Harriet Beth LPN  Consent Done: Yes  Time out: Immediately prior to procedure a time out was called to verify the correct patient, procedure, equipment, support staff and site/side marked as required  Indications: med administration and vascular access  Anesthesia: local infiltration  Local anesthetic: lidocaine 1% without epinephrine  Anesthetic Total (mL): 3  Preparation: skin prepped with ChloraPrep  Skin prep agent dried: skin prep agent completely dried prior to procedure  Sterile barriers: all five maximum sterile barriers used - cap, mask, sterile gown, sterile gloves, and large sterile sheet  Hand hygiene: hand hygiene performed prior to central venous catheter insertion  Location details: right basilic  Catheter type: double lumen  Catheter size: 5 Fr  Catheter Length: 38cm    Ultrasound guidance: yes  Vessel Caliber: medium and patent, compressibility normal  Vascular Doppler: not done  Needle advanced into vessel with real time Ultrasound guidance.  Image recorded and saved.  Sterile sheath used.  no esophageal manometryNumber of attempts: 1  Post-procedure: blood return through all ports, chlorhexidine patch and sterile dressing applied  Estimated blood loss (mL): 0  Specimens: No  Implants: No  Assessment: placement verified by x-ray  Tip Termination Explanation: see rad. report  Complications: none  Comments: Do not use until placement verified by MD Hay Haskins  3/12/2020  "

## 2020-03-12 NOTE — HPI
Pt is a 71 yo F with a h/o COPD, CAD, afib, DVT, DM2, LOYDA who presents to the ER for SOB and prodcutive cough x 6 days.  Pt's symptoms have been worsening and now she has chills, poor po intake/dec appetite.  She also has been on an outpatient regimen for COPD including prednisone.  She states she has been on the prednisone for 2-3 months and was on a tapering dose.  She denies any recent travels, fevers, chest pains, vision changes, dizziness, or palpitations.  In the ER pt was noted to be hypotensive as well and given IVF.

## 2020-03-12 NOTE — SUBJECTIVE & OBJECTIVE
Past Medical History:   Diagnosis Date    Anticoagulant long-term use     Xarelto    Arthritis     Asthma     CHF (congestive heart failure)     COPD (chronic obstructive pulmonary disease)     Coronary artery disease     Deep vein thrombosis (DVT) of left lower extremity     Depression     Diabetes mellitus     GERD (gastroesophageal reflux disease)     Hypertension     MI (myocardial infarction) 08/2019    Obstructive sleep apnea on CPAP     On home oxygen therapy     Unsteady gait     uses either walker or 4 prong cane       Past Surgical History:   Procedure Laterality Date    CARDIAC CATHETERIZATION  06/2018    CORONARY ANGIOPLASTY WITH STENT PLACEMENT      FOOT SURGERY      HAND SURGERY      HERNIA REPAIR      encapsulated umbilical hernia       Review of patient's allergies indicates:  No Known Allergies    No current facility-administered medications on file prior to encounter.      Current Outpatient Medications on File Prior to Encounter   Medication Sig    acetaminophen (TYLENOL) 500 MG tablet Take 1 tablet (500 mg total) by mouth every 8 (eight) hours as needed.    albuterol (ACCUNEB) 1.25 mg/3 mL Nebu Inhale 1.25 mg into the lungs.    albuterol-ipratropium (DUO-NEB) 2.5 mg-0.5 mg/3 mL nebulizer solution Take 3 mLs by nebulization every 6 (six) hours. Rescue    amiodarone (PACERONE) 200 MG Tab Take 1 tablet (200 mg total) by mouth once daily.    apixaban (ELIQUIS) 5 mg Tab Take 5 mg by mouth 2 (two) times daily.    cloNIDine (CATAPRES) 0.2 MG tablet Take 0.2 mg by mouth.    diltiaZEM (CARDIZEM CD) 180 MG 24 hr capsule Take 1 capsule (180 mg total) by mouth once daily. (Patient taking differently: Take 90 mg by mouth 3 (three) times daily. )    docusate sodium (COLACE) 100 MG capsule Take 1 capsule (100 mg total) by mouth 2 (two) times daily.    ferrous gluconate (FERGON) 324 MG tablet Take 1 tablet (324 mg total) by mouth 3 (three) times daily with meals.    fluticasone  "propionate (FLOVENT HFA) 220 mcg/actuation inhaler Inhale 1 puff into the lungs 2 (two) times daily. Controller    furosemide (LASIX) 40 MG tablet Take 40 mg by mouth once daily.     gabapentin (NEURONTIN) 300 MG capsule Take 300 mg by mouth.    hydrALAZINE (APRESOLINE) 50 MG tablet Take 50 mg by mouth every 8 (eight) hours.     isosorbide mononitrate (IMDUR) 30 MG 24 hr tablet Take 30 mg by mouth.    lisinopril (PRINIVIL,ZESTRIL) 40 MG tablet Take 1 tablet (40 mg total) by mouth once daily. Do not take this medication if blood pressure is below 130/80 mmHg and/or feeling dizzy after taking all other blood pressure meds    metFORMIN (GLUCOPHAGE) 1000 MG tablet Take 1 tablet (1,000 mg total) by mouth 2 (two) times daily with meals.    multivit-min-FA-lycopen-lutein (CENTRUM SILVER) 0.4-300-250 mg-mcg-mcg Tab Take 1 tablet by mouth once daily.    nitroGLYCERIN (NITROSTAT) 0.4 MG SL tablet     pantoprazole (PROTONIX) 40 MG tablet Take 40 mg by mouth once daily.    polyethylene glycol (GLYCOLAX) 17 gram PwPk Take 17 g by mouth 2 (two) times daily.    pregabalin (LYRICA) 75 MG capsule Take 75 mg by mouth 3 (three) times daily.    sotalol (BETAPACE) 120 MG Tab Take 80 mg by mouth 2 (two) times daily.    traMADol (ULTRAM) 50 mg tablet     umeclidinium brm/vilanterol tr (ANORO ELLIPTA INHL) Inhale into the lungs.     Family History     Problem Relation (Age of Onset)    Diabetes Father    Heart disease Mother    Hypertension Mother        Tobacco Use    Smoking status: Former Smoker     Packs/day: 0.50     Years: 55.00     Pack years: 27.50     Types: Cigarettes     Start date: 1963     Last attempt to quit: 2018     Years since quittin.1    Smokeless tobacco: Never Used    Tobacco comment: "States she quit smoking about 3 or 4 weeks ago"   Substance and Sexual Activity    Alcohol use: Not Currently     Alcohol/week: 1.0 standard drinks     Types: 1 Glasses of wine per week     Frequency: " Never     Comment: socially    Drug use: No    Sexual activity: Never     Review of Systems   Constitutional: Positive for appetite change and fever. Negative for diaphoresis.   HENT: Negative for facial swelling and nosebleeds.    Eyes: Negative for pain and visual disturbance.   Respiratory: Positive for shortness of breath. Negative for cough and chest tightness.    Cardiovascular: Negative for chest pain and palpitations.   Gastrointestinal: Positive for nausea. Negative for diarrhea.   Endocrine: Negative for cold intolerance and heat intolerance.   Genitourinary: Negative for hematuria and urgency.   Musculoskeletal: Negative for arthralgias and back pain.   Skin: Negative for pallor and rash.   Neurological: Negative for syncope and speech difficulty.   Hematological: Negative for adenopathy. Does not bruise/bleed easily.   Psychiatric/Behavioral: Negative for confusion and hallucinations.     Objective:     Vital Signs (Most Recent):  Temp: 98 °F (36.7 °C) (03/12/20 0332)  Pulse: 83 (03/12/20 0432)  Resp: 20 (03/12/20 0332)  BP: (!) 142/65 (03/12/20 0432)  SpO2: 99 % (03/12/20 0432) Vital Signs (24h Range):  Temp:  [97.8 °F (36.6 °C)-98 °F (36.7 °C)] 98 °F (36.7 °C)  Pulse:  [75-83] 83  Resp:  [18-28] 20  SpO2:  [96 %-100 %] 99 %  BP: ()/() 142/65     Weight: 90.7 kg (200 lb)  Body mass index is 33.28 kg/m².    Physical Exam   Constitutional: No distress.   HENT:   Head: Normocephalic and atraumatic.   Eyes: Pupils are equal, round, and reactive to light. EOM are normal.   Neck: Normal range of motion. No tracheal deviation present.   Cardiovascular: Normal rate and regular rhythm.   Pulmonary/Chest: She has wheezes. She has rales.   Abdominal: Soft. Bowel sounds are normal.   Musculoskeletal: Normal range of motion. She exhibits no deformity.   Neurological: She is alert. She exhibits normal muscle tone.   Skin: Skin is warm. Capillary refill takes 2 to 3 seconds. She is not diaphoretic. No  pallor.   Psychiatric: She has a normal mood and affect. Her behavior is normal.   Vitals reviewed.       Significant Labs: All pertinent labs within the past 24 hours have been reviewed.    Significant Imaging: I have reviewed and interpreted all pertinent imaging results/findings within the past 24 hours.

## 2020-03-12 NOTE — ED NOTES
Received report from Blanca HOBBS. Pt is resting awaiting admission orders. Md made aware of pt's blood pressure. No distress is noted at this time. Will continue to be monitored.

## 2020-03-12 NOTE — ED NOTES
Pt resting in bed comfortably. nad noted. resp even and unlabored. Patient has no complaints. Pt given breakfast tray. Monitor in place. Vs stable.will continue to monitor.

## 2020-03-12 NOTE — ASSESSMENT & PLAN NOTE
Duonebs, solumedrol, abx, oxygen support as warranted.  Of note patient has been on a steroid taper as outpatient for 2-3 months and was currently taking it.

## 2020-03-13 VITALS
WEIGHT: 200 LBS | SYSTOLIC BLOOD PRESSURE: 178 MMHG | TEMPERATURE: 97 F | OXYGEN SATURATION: 98 % | HEART RATE: 125 BPM | DIASTOLIC BLOOD PRESSURE: 85 MMHG | BODY MASS INDEX: 33.32 KG/M2 | HEIGHT: 65 IN | RESPIRATION RATE: 20 BRPM

## 2020-03-13 LAB
ALBUMIN SERPL BCP-MCNC: 2.8 G/DL (ref 3.5–5.2)
ALP SERPL-CCNC: 99 U/L (ref 55–135)
ALT SERPL W/O P-5'-P-CCNC: 79 U/L (ref 10–44)
ANION GAP SERPL CALC-SCNC: 10 MMOL/L (ref 8–16)
AST SERPL-CCNC: 59 U/L (ref 10–40)
BACTERIA UR CULT: ABNORMAL
BACTERIA UR CULT: ABNORMAL
BILIRUB DIRECT SERPL-MCNC: 0.1 MG/DL (ref 0.1–0.3)
BILIRUB SERPL-MCNC: 0.1 MG/DL (ref 0.1–1)
BUN SERPL-MCNC: 11 MG/DL (ref 8–23)
CALCIUM SERPL-MCNC: 8.2 MG/DL (ref 8.7–10.5)
CHLORIDE SERPL-SCNC: 108 MMOL/L (ref 95–110)
CO2 SERPL-SCNC: 21 MMOL/L (ref 23–29)
CREAT SERPL-MCNC: 1 MG/DL (ref 0.5–1.4)
CRP SERPL-MCNC: 22 MG/L (ref 0–8.2)
EST. GFR  (AFRICAN AMERICAN): >60 ML/MIN/1.73 M^2
EST. GFR  (NON AFRICAN AMERICAN): 58 ML/MIN/1.73 M^2
GLUCOSE SERPL-MCNC: 302 MG/DL (ref 70–110)
GLUCOSE SERPL-MCNC: 455 MG/DL (ref 70–110)
POCT GLUCOSE: 254 MG/DL (ref 70–110)
POCT GLUCOSE: 377 MG/DL (ref 70–110)
POCT GLUCOSE: 427 MG/DL (ref 70–110)
POCT GLUCOSE: 448 MG/DL (ref 70–110)
POTASSIUM SERPL-SCNC: 4.1 MMOL/L (ref 3.5–5.1)
PROCALCITONIN SERPL IA-MCNC: 15.41 NG/ML
PROT SERPL-MCNC: 6.3 G/DL (ref 6–8.4)
SODIUM SERPL-SCNC: 139 MMOL/L (ref 136–145)

## 2020-03-13 PROCEDURE — 80048 BASIC METABOLIC PNL TOTAL CA: CPT

## 2020-03-13 PROCEDURE — 25000003 PHARM REV CODE 250: Performed by: FAMILY MEDICINE

## 2020-03-13 PROCEDURE — 96372 THER/PROPH/DIAG INJ SC/IM: CPT | Performed by: EMERGENCY MEDICINE

## 2020-03-13 PROCEDURE — 25000003 PHARM REV CODE 250: Performed by: HOSPITALIST

## 2020-03-13 PROCEDURE — G0378 HOSPITAL OBSERVATION PER HR: HCPCS

## 2020-03-13 PROCEDURE — 63600175 PHARM REV CODE 636 W HCPCS: Performed by: HOSPITALIST

## 2020-03-13 PROCEDURE — 80076 HEPATIC FUNCTION PANEL: CPT

## 2020-03-13 PROCEDURE — 82947 ASSAY GLUCOSE BLOOD QUANT: CPT

## 2020-03-13 PROCEDURE — 94761 N-INVAS EAR/PLS OXIMETRY MLT: CPT

## 2020-03-13 PROCEDURE — 36415 COLL VENOUS BLD VENIPUNCTURE: CPT

## 2020-03-13 PROCEDURE — 25000242 PHARM REV CODE 250 ALT 637 W/ HCPCS: Performed by: HOSPITALIST

## 2020-03-13 PROCEDURE — 27000221 HC OXYGEN, UP TO 24 HOURS

## 2020-03-13 PROCEDURE — 94640 AIRWAY INHALATION TREATMENT: CPT

## 2020-03-13 PROCEDURE — 63600175 PHARM REV CODE 636 W HCPCS: Performed by: FAMILY MEDICINE

## 2020-03-13 PROCEDURE — 86140 C-REACTIVE PROTEIN: CPT

## 2020-03-13 PROCEDURE — 96375 TX/PRO/DX INJ NEW DRUG ADDON: CPT | Performed by: EMERGENCY MEDICINE

## 2020-03-13 PROCEDURE — 25000242 PHARM REV CODE 250 ALT 637 W/ HCPCS: Performed by: FAMILY MEDICINE

## 2020-03-13 PROCEDURE — 84145 PROCALCITONIN (PCT): CPT

## 2020-03-13 RX ORDER — AMOXICILLIN AND CLAVULANATE POTASSIUM 875; 125 MG/1; MG/1
1 TABLET, FILM COATED ORAL EVERY 12 HOURS
Status: DISCONTINUED | OUTPATIENT
Start: 2020-03-13 | End: 2020-03-13 | Stop reason: HOSPADM

## 2020-03-13 RX ORDER — PREDNISONE 20 MG/1
40 TABLET ORAL DAILY
Qty: 8 TABLET | Refills: 0 | Status: SHIPPED | OUTPATIENT
Start: 2020-03-13 | End: 2020-03-17

## 2020-03-13 RX ORDER — AMOXICILLIN AND CLAVULANATE POTASSIUM 875; 125 MG/1; MG/1
1 TABLET, FILM COATED ORAL EVERY 12 HOURS
Qty: 10 TABLET | Refills: 0 | Status: SHIPPED | OUTPATIENT
Start: 2020-03-13 | End: 2020-03-18

## 2020-03-13 RX ORDER — HYDRALAZINE HYDROCHLORIDE 20 MG/ML
10 INJECTION INTRAMUSCULAR; INTRAVENOUS EVERY 6 HOURS PRN
Status: DISCONTINUED | OUTPATIENT
Start: 2020-03-13 | End: 2020-03-13 | Stop reason: HOSPADM

## 2020-03-13 RX ORDER — IPRATROPIUM BROMIDE AND ALBUTEROL SULFATE 2.5; .5 MG/3ML; MG/3ML
3 SOLUTION RESPIRATORY (INHALATION)
Status: DISCONTINUED | OUTPATIENT
Start: 2020-03-13 | End: 2020-03-13 | Stop reason: HOSPADM

## 2020-03-13 RX ORDER — ONDANSETRON 4 MG/1
4 TABLET, FILM COATED ORAL EVERY 8 HOURS PRN
Qty: 20 TABLET | Refills: 0 | Status: SHIPPED | OUTPATIENT
Start: 2020-03-13

## 2020-03-13 RX ORDER — METOPROLOL TARTRATE 1 MG/ML
5 INJECTION, SOLUTION INTRAVENOUS EVERY 5 MIN PRN
Status: DISCONTINUED | OUTPATIENT
Start: 2020-03-13 | End: 2020-03-13 | Stop reason: HOSPADM

## 2020-03-13 RX ADMIN — PREGABALIN 75 MG: 25 CAPSULE ORAL at 04:03

## 2020-03-13 RX ADMIN — FERROUS GLUCONATE TAB 324 MG (37.5 MG ELEMENTAL IRON) 324 MG: 324 (37.5 FE) TAB at 06:03

## 2020-03-13 RX ADMIN — IPRATROPIUM BROMIDE AND ALBUTEROL SULFATE 3 ML: .5; 3 SOLUTION RESPIRATORY (INHALATION) at 08:03

## 2020-03-13 RX ADMIN — AMOXICILLIN AND CLAVULANATE POTASSIUM 1 TABLET: 875; 125 TABLET, FILM COATED ORAL at 11:03

## 2020-03-13 RX ADMIN — APIXABAN 5 MG: 5 TABLET, FILM COATED ORAL at 09:03

## 2020-03-13 RX ADMIN — THERA TABS 1 TABLET: TAB at 09:03

## 2020-03-13 RX ADMIN — PREGABALIN 75 MG: 25 CAPSULE ORAL at 09:03

## 2020-03-13 RX ADMIN — AMIODARONE HYDROCHLORIDE 200 MG: 200 TABLET ORAL at 09:03

## 2020-03-13 RX ADMIN — DOCUSATE SODIUM 100 MG: 100 CAPSULE, LIQUID FILLED ORAL at 10:03

## 2020-03-13 RX ADMIN — PANTOPRAZOLE SODIUM 40 MG: 40 TABLET, DELAYED RELEASE ORAL at 10:03

## 2020-03-13 RX ADMIN — FERROUS GLUCONATE TAB 324 MG (37.5 MG ELEMENTAL IRON) 324 MG: 324 (37.5 FE) TAB at 01:03

## 2020-03-13 RX ADMIN — METOPROLOL TARTRATE 5 MG: 5 INJECTION INTRAVENOUS at 02:03

## 2020-03-13 RX ADMIN — IPRATROPIUM BROMIDE AND ALBUTEROL SULFATE 3 ML: .5; 3 SOLUTION RESPIRATORY (INHALATION) at 12:03

## 2020-03-13 RX ADMIN — IPRATROPIUM BROMIDE AND ALBUTEROL SULFATE 3 ML: .5; 3 SOLUTION RESPIRATORY (INHALATION) at 03:03

## 2020-03-13 RX ADMIN — METHYLPREDNISOLONE SODIUM SUCCINATE 80 MG: 125 INJECTION, POWDER, FOR SOLUTION INTRAMUSCULAR; INTRAVENOUS at 10:03

## 2020-03-13 RX ADMIN — FERROUS GLUCONATE TAB 324 MG (37.5 MG ELEMENTAL IRON) 324 MG: 324 (37.5 FE) TAB at 08:03

## 2020-03-13 RX ADMIN — HYDRALAZINE HYDROCHLORIDE 10 MG: 20 INJECTION INTRAMUSCULAR; INTRAVENOUS at 01:03

## 2020-03-13 RX ADMIN — POLYETHYLENE GLYCOL 3350 17 G: 17 POWDER, FOR SOLUTION ORAL at 09:03

## 2020-03-13 RX ADMIN — INSULIN ASPART 5 UNITS: 100 INJECTION, SOLUTION INTRAVENOUS; SUBCUTANEOUS at 04:03

## 2020-03-13 RX ADMIN — IPRATROPIUM BROMIDE AND ALBUTEROL SULFATE 3 ML: .5; 3 SOLUTION RESPIRATORY (INHALATION) at 02:03

## 2020-03-13 RX ADMIN — SODIUM CHLORIDE: 0.9 INJECTION, SOLUTION INTRAVENOUS at 04:03

## 2020-03-13 RX ADMIN — FLUTICASONE PROPIONATE 1 PUFF: 220 AEROSOL, METERED RESPIRATORY (INHALATION) at 08:03

## 2020-03-13 NOTE — PLAN OF CARE
03/13/20 1124   Discharge Assessment   Assessment Type Discharge Planning Assessment   Confirmed/corrected address and phone number on facesheet? Yes   Assessment information obtained from? Medical Record   Expected Length of Stay (days)   (discharged)   Communicated expected length of stay with patient/caregiver yes   Prior to hospitilization cognitive status: Alert/Oriented   Prior to hospitalization functional status: Independent   Current cognitive status: Alert/Oriented   Current Functional Status: Independent   Lives With spouse   Able to Return to Prior Arrangements yes   Is patient able to care for self after discharge? Yes   Patient's perception of discharge disposition home or selfcare   Readmission Within the Last 30 Days no previous admission in last 30 days   Patient currently being followed by outpatient case management? No   Patient currently receives any other outside agency services? No   Equipment Currently Used at Home none   Part D Coverage [facility]   Do you have any problems affording any of your prescribed medications? No   Is the patient taking medications as prescribed? yes   Does the patient have transportation home? Yes   Transportation Anticipated family or friend will provide   Does the patient receive services at the [facility] Clinic? No   Discharge Plan A Home   DME Needed Upon Discharge  none   Patient/Family in Agreement with Plan yes

## 2020-03-13 NOTE — NURSING
Post lopressor pt's b/p is 150/70 hr 106 98% pt is back on cpap. Pt is breathing easier. No distress noted call light in reach bed alarm, set will continue to monitor. Bed alarm set

## 2020-03-13 NOTE — PLAN OF CARE
03/13/20 1132   Final Note   Assessment Type Final Discharge Note   Anticipated Discharge Disposition Home   Hospital Follow Up  Appt(s) scheduled? Yes   Discharge plans and expectations educations in teach back method with documentation complete? Yes   Right Care Referral Info   Post Acute Recommendation No Care   Post-Acute Status   Post-Acute Authorization Other   Other Status No Post-Acute Service Needs   Discharge Delays None known at this time   Nurse notified that all CM needs are met

## 2020-03-13 NOTE — PROGRESS NOTES
[facility] CASE MANAGEMENT                  WRITTEN DISCHARGE INFORMATION      APPOINTMENTS AND RESOURCES TO HELP YOU MANAGE YOUR CARE AT HOME BASED ON YOUR PREFERENCES:  (If an appointment is not scheduled for you when you leave the hospital, call your doctor to schedule a follow up visit within a week)    Follow-up Information     Angel Orourke Jr, MD. Schedule an appointment as soon as possible for a visit on 3/17/2020.    Specialty:  Family Medicine  Why:  @10:30am, for Hospital Follow up  Contact information:  [address]  [address] [facility] [address]  [phone]                   Healthy Living Instructions to HELP MANAGE YOUR CARE AT HOME:  Things You are responsible for:  1.    Getting your prescriptions filled   2.    Taking your medications as directed, DO NOT MISS ANY DOSES!  3.    Following the diet and exercise recommended by your doctor  4.    Going to your follow-up doctor appointment. This is important because it allows the doctor to monitor your progress and determine if any changes need to made to your treatment plan.  5. If you have any questions about MANAGING YOUR CARE AT HOME Call the Nurse Care Line for 24/7 Assistance [phone]       Please answer any calls you may receive from [facility]. We want to continue to support you as you manage your healthcare needs. [facility] is happy to have the opportunity to serve you.      Thank you for choosing [facility] for your healthcare needs!  Your [facility] Case Management Team,

## 2020-03-13 NOTE — NURSING
Pt requesting to take off cpap and wants o2 via n/c back on. Pt placed on o2 3l via n/c. No distress noted.

## 2020-03-13 NOTE — NURSING
In bed awake has no c/o pain/discomfort no distress noted call light in reach bed alarm set will continue to monitor. Pt awaiting to set up on cpap machine for the night.

## 2020-03-13 NOTE — NURSING
Am labs drawn from [facility] picc dl. Red lumen as ordered has good blood return flushes easy. Sent down to lab as ordered pt breathing easier no respuratory distress noted still on cpap at this time. Denies pain/discomfort. No distress noted bed alarm set will report and round with oncoming nurse. B/p has decreased 149/72 hr 99

## 2020-03-13 NOTE — NURSING
Handoff received from offgoing nurse, pt in bed aaox4 has on o2 3l via n.c no sob noted, decreased breath sounds upon auscultation no respirtory distress noted. Pulses palpable sr on telemetry active bowel sounds x4 quads. Has on adult diaper. Pt reports has a burning sensation when she urinates. She reports md aware. gen weakness noted call light in reach bed alarm set will continue to monitor.

## 2020-03-13 NOTE — NURSING
Pt using accessory muscles to breathe o2 increased to 4l via n/c pt opting not to wear cpap machine after instructing pt on rationale. Still continued to refuse. Will continue to closely monitor.o2 sats 97-98%

## 2020-03-13 NOTE — NURSING
Pt hr tachy 120's to low 130;s on telemetry monitor. Pt sob using accessory muscles to breathe. Adm prn lopressor 5mg iv as ordered pt ashley welll. Post b/p 193/88 hr 117. No distress noted pt requesting a breathing treatment resp thx notified. Encouraged pt to get back on cpap as ordered. Pt stated after treatment will consider getting back on cpap. No distress noted will continue to closely monitor. Bed alarm set.

## 2020-03-13 NOTE — NURSING
Diaper saturated withurine pt cl;eaned and assisted to comfortable position in bed ashley well. Peripheral iv to rfa removed with cath intact pressure drsg applied. Pt ashley well sergio dl picc patent/intact has good blood return cont iv fluids infusing well. Has no c/o pain/discomfort

## 2020-03-13 NOTE — NURSING
New orders noted for prn blood pressure iv meds. Prn hydralazine 10mg adm as ordered. ashley well post b/p is 206/98 hr 94. Will continue to closely monitor

## 2020-03-13 NOTE — PROGRESS NOTES
PICC line discontinued from right upper arm. 38CM length verified with insertion documentation. Pressure dressing applied. Tolerated well.

## 2020-03-13 NOTE — NURSING
In bed awake has no c/o pain/discomfort b/p is 209/102 hr 94 no obvious distress noted. Call light in reach bed alarm set. Dr sullivan notified via secure chat. Awaiting response.

## 2020-03-14 NOTE — NURSING
"Pt POCT  was 446,recheck it was 427, Stat jaime glucose per standing orders,lab called in critical Glucose 455 Dr. Carvajal notified and also notified of elevated bp she states "the pt is on steroids the BS will rise while on them,it ok for the pt to d/c home",pt left unit in w/c with pt transport d/c home her  is downstairs in car waiting on her,NADN,RX given to pt.  "

## 2020-03-14 NOTE — NURSING
"Pt alert able to make needs known ashley meds well,f/u appt and d/c instructions discussed and given to pt,PICC line removed by RN charge nurse pressure dressing applied,pt RX for Prednisone and Zofran not in chart,Wei Sweeney called and informed she states "she is in a meeting",will d/c pt when RX available.  "

## 2020-03-15 LAB
BACTERIA BLD CULT: ABNORMAL

## 2020-03-17 LAB — BACTERIA BLD CULT: NORMAL

## 2020-03-18 NOTE — DISCHARGE SUMMARY
[facility] Hospital Medicine  Discharge Summary      Patient Name: Yasmeen Palm  MRN: [id number]  Admission Date: 3/11/2020  Hospital Length of Stay: 2 days  Discharge Date and Time: 3/13/2020  Attending Physician: No att. providers found   Discharging Provider: Blanca Carvajal MD  Primary Care Provider: Angel Orourke Jr, MD      HPI:   Pt is a 69 yo F with a h/o COPD, CAD, afib, DVT, DM2, LOYDA who presents to the ER for SOB and prodcutive cough x 6 days.  Pt's symptoms have been worsening and now she has chills, poor po intake/dec appetite.  She also has been on an outpatient regimen for COPD including prednisone.  She states she has been on the prednisone for 2-3 months and was on a tapering dose.  She denies any recent travels, fevers, chest pains, vision changes, dizziness, or palpitations.  In the ER pt was noted to be hypotensive as well and given IVF.      * No surgery found *      Hospital Course:   Pt  Improved with IVF and antibiotics. Urine culture significant for proteus and E.coli. Will dc on augmentin x 5days.    Follow up PCP as scheduled.      Consults:     No new Assessment & Plan notes have been filed under this hospital service since the last note was generated.  Service: Hospital Medicine    Final Active Diagnoses:    Diagnosis Date Noted POA    PRINCIPAL PROBLEM:  COPD exacerbation [J44.1] 08/28/2019 Yes    Acute cystitis without hematuria [N30.00] 07/01/2019 Yes    MIRIAM (acute kidney injury) [N17.9] 03/12/2020 Yes    Transaminitis [R74.0] 03/12/2020 Yes    Leukocytosis [D72.829] 02/08/2019 Yes    Paroxysmal atrial fibrillation [I48.0] 06/25/2018 Yes     Chronic    Chronic anticoagulation [Z79.01] 04/10/2017 Not Applicable     Chronic    Controlled type 2 diabetes mellitus, without long-term current use of insulin [E11.9] 12/14/2015 Yes     Chronic      Problems Resolved During this Admission:    Diagnosis Date Noted Date Resolved POA    Hypotension [I95.9]  03/11/2020 03/12/2020 Yes       Discharged Condition: good    Disposition: Home or Self Care    Follow Up:  Follow-up Information     Angel Orourke Jr, MD. Schedule an appointment as soon as possible for a visit on 3/17/2020.    Specialty:  Family Medicine  Why:  @10:30am, for Hospital Follow up  Contact information:  [address]  [address] [facility] [address]  [phone]                 Patient Instructions:      Diet diabetic     Diet Cardiac     Notify your health care provider if you experience any of the following:  temperature >100.4     Notify your health care provider if you experience any of the following:  persistent nausea and vomiting or diarrhea     Notify your health care provider if you experience any of the following:  difficulty breathing or increased cough     Activity as tolerated           Pending Diagnostic Studies:     None         Medications:  Reconciled Home Medications:      Medication List      START taking these medications    amoxicillin-clavulanate 875-125mg 875-125 mg per tablet  Commonly known as:  AUGMENTIN  Take 1 tablet by mouth every 12 (twelve) hours. for 5 days     ondansetron 4 MG tablet  Commonly known as:  ZOFRAN  Take 1 tablet (4 mg total) by mouth every 8 (eight) hours as needed for Nausea.        CHANGE how you take these medications    diltiaZEM 180 MG 24 hr capsule  Commonly known as:  CARDIZEM CD  Take 1 capsule (180 mg total) by mouth once daily.  What changed:    · how much to take  · when to take this        CONTINUE taking these medications    acetaminophen 500 MG tablet  Commonly known as:  TYLENOL  Take 1 tablet (500 mg total) by mouth every 8 (eight) hours as needed.     albuterol-ipratropium 2.5 mg-0.5 mg/3 mL nebulizer solution  Commonly known as:  DUO-NEB  Take 3 mLs by nebulization every 6 (six) hours. Rescue     amiodarone 200 MG Tab  Commonly known as:  PACERONE  Take 1 tablet (200 mg total) by mouth once  daily.     ANORO ELLIPTA INHL  Inhale into the lungs.     cloNIDine 0.2 MG tablet  Commonly known as:  CATAPRES  Take 0.2 mg by mouth.     docusate sodium 100 MG capsule  Commonly known as:  COLACE  Take 1 capsule (100 mg total) by mouth 2 (two) times daily.     ELIQUIS 5 mg Tab  Generic drug:  apixaban  Take 5 mg by mouth 2 (two) times daily.     ferrous gluconate 324 MG tablet  Commonly known as:  FERGON  Take 1 tablet (324 mg total) by mouth 3 (three) times daily with meals.     fluticasone propionate 220 mcg/actuation inhaler  Commonly known as:  FLOVENT HFA  Inhale 1 puff into the lungs 2 (two) times daily. Controller     furosemide 40 MG tablet  Commonly known as:  LASIX  Take 40 mg by mouth once daily.     hydrALAZINE 50 MG tablet  Commonly known as:  APRESOLINE  Take 50 mg by mouth every 8 (eight) hours.     isosorbide mononitrate 30 MG 24 hr tablet  Commonly known as:  IMDUR  Take 30 mg by mouth.     lisinopriL 40 MG tablet  Commonly known as:  PRINIVIL,ZESTRIL  Take 1 tablet (40 mg total) by mouth once daily. Do not take this medication if blood pressure is below 130/80 mmHg and/or feeling dizzy after taking all other blood pressure meds     metFORMIN 1000 MG tablet  Commonly known as:  GLUCOPHAGE  Take 1 tablet (1,000 mg total) by mouth 2 (two) times daily with meals.     multivit-min-FA-lycopen-lutein 0.4-300-250 mg-mcg-mcg Tab  Commonly known as:  CENTRUM SILVER  Take 1 tablet by mouth once daily.     nitroGLYCERIN 0.4 MG SL tablet  Commonly known as:  NITROSTAT     pantoprazole 40 MG tablet  Commonly known as:  PROTONIX  Take 40 mg by mouth once daily.     polyethylene glycol 17 gram Pwpk  Commonly known as:  GLYCOLAX  Take 17 g by mouth 2 (two) times daily.     pregabalin 75 MG capsule  Commonly known as:  LYRICA  Take 75 mg by mouth 3 (three) times daily.     traMADoL 50 mg tablet  Commonly known as:  ULTRAM        STOP taking these medications    albuterol 1.25 mg/3 mL Nebu  Commonly known as:   ACCUNEB     gabapentin 300 MG capsule  Commonly known as:  NEURONTIN     sotaloL 120 MG Tab  Commonly known as:  BETAPACE        ASK your doctor about these medications    predniSONE 20 MG tablet  Commonly known as:  DELTASONE  Take 2 tablets (40 mg total) by mouth once daily. for 4 days  Ask about: Should I take this medication?            Indwelling Lines/Drains at time of discharge:   Lines/Drains/Airways     None                 Time spent on the discharge of patient: 30 minutes  Patient was seen and examined on the date of discharge and determined to be suitable for discharge.         Blanca Carvajal MD  Department of Hospital Medicine  [facility]

## 2020-03-18 NOTE — HOSPITAL COURSE
Pt  Improved with IVF and antibiotics. Urine culture significant for proteus and E.coli. Will dc on augmentin x 5days.    Follow up PCP as scheduled.

## 2020-05-31 ENCOUNTER — HOSPITAL ENCOUNTER (EMERGENCY)
Facility: HOSPITAL | Age: 70
Discharge: HOME | End: 2020-05-31
Attending: EMERGENCY MEDICINE
Payer: MEDICARE

## 2020-05-31 VITALS
RESPIRATION RATE: 21 BRPM | DIASTOLIC BLOOD PRESSURE: 66 MMHG | TEMPERATURE: 99 F | HEART RATE: 77 BPM | WEIGHT: 175 LBS | HEIGHT: 67 IN | OXYGEN SATURATION: 100 % | SYSTOLIC BLOOD PRESSURE: 142 MMHG | BODY MASS INDEX: 27.47 KG/M2

## 2020-05-31 DIAGNOSIS — D64.9 ANEMIA, UNSPECIFIED TYPE: ICD-10-CM

## 2020-05-31 DIAGNOSIS — N39.0 URINARY TRACT INFECTION WITHOUT HEMATURIA, SITE UNSPECIFIED: Primary | ICD-10-CM

## 2020-05-31 LAB
ALBUMIN SERPL BCP-MCNC: 2.7 G/DL (ref 3.5–5.2)
ALP SERPL-CCNC: 77 U/L (ref 55–135)
ALT SERPL W/O P-5'-P-CCNC: 6 U/L (ref 10–44)
ANION GAP SERPL CALC-SCNC: 10 MMOL/L (ref 8–16)
AST SERPL-CCNC: 15 U/L (ref 10–40)
BACTERIA #/AREA URNS HPF: ABNORMAL /HPF
BASOPHILS # BLD AUTO: 0.03 K/UL (ref 0–0.2)
BASOPHILS NFR BLD: 0.3 % (ref 0–1.9)
BILIRUB SERPL-MCNC: 0.2 MG/DL (ref 0.1–1)
BILIRUB UR QL STRIP: NEGATIVE
BUN SERPL-MCNC: 15 MG/DL (ref 8–23)
CALCIUM SERPL-MCNC: 9.3 MG/DL (ref 8.7–10.5)
CHLORIDE SERPL-SCNC: 103 MMOL/L (ref 95–110)
CLARITY UR: ABNORMAL
CO2 SERPL-SCNC: 27 MMOL/L (ref 23–29)
COLOR UR: ABNORMAL
CREAT SERPL-MCNC: 0.9 MG/DL (ref 0.5–1.4)
DIFFERENTIAL METHOD: ABNORMAL
EOSINOPHIL # BLD AUTO: 0.2 K/UL (ref 0–0.5)
EOSINOPHIL NFR BLD: 2.2 % (ref 0–8)
ERYTHROCYTE [DISTWIDTH] IN BLOOD BY AUTOMATED COUNT: 17.2 % (ref 11.5–14.5)
EST. GFR  (AFRICAN AMERICAN): >60 ML/MIN/1.73 M^2
EST. GFR  (NON AFRICAN AMERICAN): >60 ML/MIN/1.73 M^2
GLUCOSE SERPL-MCNC: 86 MG/DL (ref 70–110)
GLUCOSE UR QL STRIP: NEGATIVE
HCT VFR BLD AUTO: 28.7 % (ref 37–48.5)
HGB BLD-MCNC: 8 G/DL (ref 12–16)
HGB UR QL STRIP: ABNORMAL
HYALINE CASTS #/AREA URNS LPF: 0 /LPF
IMM GRANULOCYTES # BLD AUTO: 0.05 K/UL (ref 0–0.04)
IMM GRANULOCYTES NFR BLD AUTO: 0.5 % (ref 0–0.5)
KETONES UR QL STRIP: NEGATIVE
LACTATE SERPL-SCNC: 0.7 MMOL/L (ref 0.5–2.2)
LEUKOCYTE ESTERASE UR QL STRIP: ABNORMAL
LYMPHOCYTES # BLD AUTO: 1.4 K/UL (ref 1–4.8)
LYMPHOCYTES NFR BLD: 12.6 % (ref 18–48)
MCH RBC QN AUTO: 25.6 PG (ref 27–31)
MCHC RBC AUTO-ENTMCNC: 27.9 G/DL (ref 32–36)
MCV RBC AUTO: 92 FL (ref 82–98)
MICROSCOPIC COMMENT: ABNORMAL
MONOCYTES # BLD AUTO: 1.1 K/UL (ref 0.3–1)
MONOCYTES NFR BLD: 9.8 % (ref 4–15)
NEUTROPHILS # BLD AUTO: 8.1 K/UL (ref 1.8–7.7)
NEUTROPHILS NFR BLD: 74.6 % (ref 38–73)
NITRITE UR QL STRIP: POSITIVE
NRBC BLD-RTO: 0 /100 WBC
PH UR STRIP: 5 [PH] (ref 5–8)
PLATELET # BLD AUTO: 323 K/UL (ref 150–350)
PMV BLD AUTO: 9.6 FL (ref 9.2–12.9)
POTASSIUM SERPL-SCNC: 4.2 MMOL/L (ref 3.5–5.1)
PROT SERPL-MCNC: 6.4 G/DL (ref 6–8.4)
PROT UR QL STRIP: ABNORMAL
RBC # BLD AUTO: 3.12 M/UL (ref 4–5.4)
RBC #/AREA URNS HPF: >100 /HPF (ref 0–4)
SODIUM SERPL-SCNC: 140 MMOL/L (ref 136–145)
SP GR UR STRIP: 1.02 (ref 1–1.03)
URN SPEC COLLECT METH UR: ABNORMAL
UROBILINOGEN UR STRIP-ACNC: ABNORMAL EU/DL
WBC # BLD AUTO: 10.81 K/UL (ref 3.9–12.7)
WBC #/AREA URNS HPF: >100 /HPF (ref 0–5)
WBC CLUMPS URNS QL MICRO: ABNORMAL

## 2020-05-31 PROCEDURE — 87086 URINE CULTURE/COLONY COUNT: CPT

## 2020-05-31 PROCEDURE — 87040 BLOOD CULTURE FOR BACTERIA: CPT

## 2020-05-31 PROCEDURE — 87088 URINE BACTERIA CULTURE: CPT

## 2020-05-31 PROCEDURE — 99284 EMERGENCY DEPT VISIT MOD MDM: CPT | Mod: 25

## 2020-05-31 PROCEDURE — 83605 ASSAY OF LACTIC ACID: CPT

## 2020-05-31 PROCEDURE — 81003 URINALYSIS AUTO W/O SCOPE: CPT

## 2020-05-31 PROCEDURE — 96365 THER/PROPH/DIAG IV INF INIT: CPT

## 2020-05-31 PROCEDURE — 96366 THER/PROPH/DIAG IV INF ADDON: CPT

## 2020-05-31 PROCEDURE — 63600175 PHARM REV CODE 636 W HCPCS: Performed by: EMERGENCY MEDICINE

## 2020-05-31 PROCEDURE — 80053 COMPREHEN METABOLIC PANEL: CPT

## 2020-05-31 PROCEDURE — 87106 FUNGI IDENTIFICATION YEAST: CPT

## 2020-05-31 PROCEDURE — 85025 COMPLETE CBC W/AUTO DIFF WBC: CPT

## 2020-05-31 PROCEDURE — 81000 URINALYSIS NONAUTO W/SCOPE: CPT

## 2020-05-31 PROCEDURE — 96375 TX/PRO/DX INJ NEW DRUG ADDON: CPT

## 2020-05-31 RX ORDER — MORPHINE SULFATE 10 MG/ML
4 INJECTION INTRAMUSCULAR; INTRAVENOUS; SUBCUTANEOUS
Status: COMPLETED | OUTPATIENT
Start: 2020-05-31 | End: 2020-05-31

## 2020-05-31 RX ORDER — TRAMADOL HYDROCHLORIDE 50 MG/1
50 TABLET ORAL EVERY 6 HOURS PRN
Qty: 12 TABLET | Refills: 0 | Status: SHIPPED | OUTPATIENT
Start: 2020-05-31 | End: 2020-06-10

## 2020-05-31 RX ORDER — ONDANSETRON 2 MG/ML
4 INJECTION INTRAMUSCULAR; INTRAVENOUS
Status: COMPLETED | OUTPATIENT
Start: 2020-05-31 | End: 2020-05-31

## 2020-05-31 RX ORDER — AMOXICILLIN AND CLAVULANATE POTASSIUM 875; 125 MG/1; MG/1
1 TABLET, FILM COATED ORAL 2 TIMES DAILY
Qty: 20 TABLET | Refills: 0 | Status: SHIPPED | OUTPATIENT
Start: 2020-05-31 | End: 2020-06-10

## 2020-05-31 RX ADMIN — CEFTRIAXONE 1 G: 1 INJECTION, SOLUTION INTRAVENOUS at 09:05

## 2020-05-31 RX ADMIN — MORPHINE SULFATE 4 MG: 10 INJECTION INTRAVENOUS at 12:05

## 2020-05-31 RX ADMIN — ONDANSETRON HYDROCHLORIDE 4 MG: 2 SOLUTION INTRAMUSCULAR; INTRAVENOUS at 01:05

## 2020-05-31 NOTE — ED NOTES
Patient was given Morphine, and became Nauseated and lightheaded, notified provider, and additional orders noted.

## 2020-05-31 NOTE — ED PROVIDER NOTES
Encounter Date: 5/31/2020    SCRIBE #1 NOTE: I, Amarjit Nolan, am scribing for, and in the presence of, Rogelio Bolton MD.       History     Chief Complaint   Patient presents with    General Illness     EMS reports pt c/o generalized aches/body pain, lower back and side pain since yesterday.      Yasmeen Palm is a 70 year old female who presents to the Emergency Department complaining of generalized body pain. The patient also complains of a decreased urine output and burning sensation while urinating. She also states she experiences a subjective fever since 2 days ago and lower back pain and abdominal pain. The patient denies any shortness of breath, chest pain, or cough. She states that she was recently diagnosed with a urinary tract infection and prescribed antibiotics.     The history is provided by the patient. No  was used.     Review of patient's allergies indicates:  No Known Allergies  Past Medical History:   Diagnosis Date    Anticoagulant long-term use     Xarelto    Arthritis     Asthma     CHF (congestive heart failure)     COPD (chronic obstructive pulmonary disease)     Coronary artery disease     Deep vein thrombosis (DVT) of left lower extremity     Depression     Diabetes mellitus     GERD (gastroesophageal reflux disease)     Hypertension     MI (myocardial infarction) 08/2019    Obstructive sleep apnea on CPAP     On home oxygen therapy     Unsteady gait     uses either walker or 4 prong cane     Past Surgical History:   Procedure Laterality Date    CARDIAC CATHETERIZATION  06/2018    CORONARY ANGIOPLASTY WITH STENT PLACEMENT      FOOT SURGERY      HAND SURGERY      HERNIA REPAIR      encapsulated umbilical hernia     Family History   Problem Relation Age of Onset    Heart disease Mother     Hypertension Mother     Diabetes Father      Social History     Tobacco Use    Smoking status: Former Smoker     Packs/day: 0.50     Years: 55.00     Pack  "years: 27.50     Types: Cigarettes     Start date: 1963     Last attempt to quit: 2018     Years since quittin.4    Smokeless tobacco: Never Used    Tobacco comment: "States she quit smoking about 3 or 4 weeks ago"   Substance Use Topics    Alcohol use: Yes     Alcohol/week: 1.0 standard drinks     Types: 1 Glasses of wine per week     Frequency: Never     Comment: socially    Drug use: No     Review of Systems   Constitutional: Positive for fever.   HENT: Negative for sore throat.    Eyes: Negative for visual disturbance.   Respiratory: Negative for cough and shortness of breath.    Cardiovascular: Negative for chest pain.   Gastrointestinal: Positive for abdominal pain.   Genitourinary: Positive for difficulty urinating and dysuria.   Musculoskeletal: Positive for back pain (lower).   Neurological: Negative for headaches.       Physical Exam     Initial Vitals [20 0916]   BP Pulse Resp Temp SpO2   (!) 148/69 75 18 99.2 °F (37.3 °C) 100 %      MAP       --         Physical Exam    Constitutional: She appears well-developed and well-nourished.   HENT:   Head: Normocephalic and atraumatic.   Eyes: EOM are normal. Pupils are equal, round, and reactive to light.   Neck: Normal range of motion. Neck supple.   Cardiovascular: Normal rate and regular rhythm. Exam reveals no gallop and no friction rub.    No murmur heard.  Pulmonary/Chest: Breath sounds normal.   Abdominal: Soft. Bowel sounds are normal. There is no tenderness.   No CVATTP   Genitourinary: Rectal exam shows guaiac negative stool. Guaiac negative stool.   Musculoskeletal: Normal range of motion. She exhibits no edema.   No midline tenderness.    Neurological: She is alert and oriented to person, place, and time. GCS score is 15. GCS eye subscore is 4. GCS verbal subscore is 5. GCS motor subscore is 6.   Skin: Skin is warm and dry. Capillary refill takes less than 2 seconds.         ED Course   Procedures  Labs Reviewed   CBC W/ AUTO " DIFFERENTIAL - Abnormal; Notable for the following components:       Result Value    RBC 3.12 (*)     Hemoglobin 8.0 (*)     Hematocrit 28.7 (*)     Mean Corpuscular Hemoglobin 25.6 (*)     Mean Corpuscular Hemoglobin Conc 27.9 (*)     RDW 17.2 (*)     Gran # (ANC) 8.1 (*)     Immature Grans (Abs) 0.05 (*)     Mono # 1.1 (*)     Gran% 74.6 (*)     Lymph% 12.6 (*)     All other components within normal limits   COMPREHENSIVE METABOLIC PANEL - Abnormal; Notable for the following components:    Albumin 2.7 (*)     ALT 6 (*)     All other components within normal limits   URINALYSIS, REFLEX TO URINE CULTURE - Abnormal; Notable for the following components:    Color, UA Red (*)     Appearance, UA Cloudy (*)     Protein, UA 2+ (*)     Occult Blood UA 2+ (*)     Nitrite, UA Positive (*)     Urobilinogen, UA 4.0-6.0 (*)     Leukocytes, UA 1+ (*)     All other components within normal limits    Narrative:     Preferred Collection Type->Urine, Clean Catch   URINALYSIS MICROSCOPIC - Abnormal; Notable for the following components:    RBC, UA >100 (*)     WBC, UA >100 (*)     WBC Clumps, UA Moderate (*)     Bacteria Moderate (*)     All other components within normal limits    Narrative:     Preferred Collection Type->Urine, Clean Catch   CULTURE, BLOOD   CULTURE, BLOOD   CULTURE, URINE   LACTIC ACID, PLASMA          Imaging Results    None          Medical Decision Making:   Initial Assessment:   Past medical records queried and reviewed, including past hospital encounters for pyelonephritis, UTI, COPD, and chronic pain.     70 year old female presents to the ED complaining of generalized pain and illness. She complained of dysuria, lower back pain, lower abdominal pain, and fever. The patient was seen and examined. The history and physical exam was obtained. The nursing notes and vital signs were reviewed. Secondary to symptoms and exam findings, I ordered blood cultures, comprehensive metabolic panel, complete blood count,  lactic acid, and urinalysis.     Clinical Tests:   Lab Tests: Ordered and Reviewed    Patient with recurrent UTI's. Had extensive workup recently for same complaint at [facility]. No other etiology found.  Patient still has UTI. Levaquin failing as an outpatient. Patient has no fever, leukocytosis or other significant signs symptoms of sepsis or pyelonephritis other than bodyaches. Patient is on a blood thinner and her blood counts have been trending downward this month. Stool guiac negative her today. Patient reports no known bleeding. States she has had to have a blood transfusion before. No indication for blood transfusion at this time. Given rocephin IV 1 gram in the ED and discharge on 10 days of augmentin. Patient had Urine culture from 3/11/2020 that showed Pan Sensitive Proteus Mirabilis and Fluoroquinolone resistant E. Coli. Patient instructed to stop the levaquin.           Scribe Attestation:   Scribe #1: I performed the above scribed service and the documentation accurately describes the services I performed. I attest to the accuracy of the note.                          Clinical Impression:       ICD-10-CM ICD-9-CM   1. Urinary tract infection without hematuria, site unspecified N39.0 599.0   2. Anemia, unspecified type D64.9 285.9         Disposition:   Disposition: Discharged  Condition: Stable     ED Disposition Condition    Discharge Stable        ED Prescriptions     Medication Sig Dispense Start Date End Date Auth. Provider    traMADoL (ULTRAM) 50 mg tablet Take 1 tablet (50 mg total) by mouth every 6 (six) hours as needed for Pain. 12 tablet 5/31/2020 6/10/2020 Rogelio Bolton MD    amoxicillin-clavulanate 875-125mg (AUGMENTIN) 875-125 mg per tablet Take 1 tablet by mouth 2 (two) times daily. for 10 days 20 tablet 5/31/2020 6/10/2020 Rogelio Bolton MD        Follow-up Information     Follow up With Specialties Details Why Contact Info    Angel Orourke Jr., MD Family Medicine  call today  regarding your being anemic and blood counts dropping this week. [address]  [address]  [place] [address]  [phone]      Griselda Bingham MD Urology  call your urologist or if you do not have one call this one regarding close follow up for your current UTI and evaluation of recurrent Urinary Tract Infections. [address]  [address]  [facility] [address]  [phone]      [facility] Emergency Medicine  As needed, If symptoms worsen, fever greater than 100.4 degrees F [address] [place] [address] [phone]                        I, Rogelio Bolton, personally performed the services described in this documentation. All medical record entries made by the scribe were at my direction and in my presence. I have reviewed the chart and agree that the record reflects my personal performance and is accurate and complete.         Rogelio Bolton MD  05/31/20 6263

## 2020-05-31 NOTE — ED TRIAGE NOTES
Patient reports having lower back pain that hurts more when taking deep breaths and urinating. States having difficulty urinating, and pain to lower abdomen when bladder feels full. Has been see at [facility] over the past few weeks for the same problems without relief.

## 2020-06-02 LAB — BACTERIA UR CULT: ABNORMAL

## 2020-06-04 LAB
BACTERIA BLD CULT: NORMAL
BACTERIA BLD CULT: NORMAL

## 2020-06-30 NOTE — ASSESSMENT & PLAN NOTE
Appreciate nutrition recs.   NEW #22G STARTED IN RIGHT WRIST BY ARIANNE MAYFIELD. PT TOLERATED WELL.

## 2020-07-10 ENCOUNTER — DOCUMENT SCAN (OUTPATIENT)
Dept: HOME HEALTH SERVICES | Facility: HOSPITAL | Age: 70
End: 2020-07-10

## 2020-08-12 ENCOUNTER — DOCUMENT SCAN (OUTPATIENT)
Dept: HOME HEALTH SERVICES | Facility: HOSPITAL | Age: 70
End: 2020-08-12

## 2022-06-14 NOTE — PROGRESS NOTES
[facility] Hospital Medicine  Progress Note    Patient Name: Yasmeen Palm  MRN: [id number]  Patient Class: IP- Inpatient   Admission Date: 6/24/2018  Length of Stay: 24 days  Attending Physician: Viri Paredes MD  Primary Care Provider: Angel Orourke Jr, MD        Subjective:     Principal Problem:Sepsis    HPI:  Yasmeen Palm is a 68 y.o. female that (in part)  has a past medical history of Anticoagulant long-term use; Arthritis; Asthma; CHF (congestive heart failure); COPD (chronic obstructive pulmonary disease); Coronary artery disease; Depression; Diabetes mellitus; GERD (gastroesophageal reflux disease); and Hypertension.  has a past surgical history that includes Abdominal surgery; Cardiac surgery; and Hernia repair. Presents to [facility] Emergency Department complaining of chest pain .  She reports compliance with her home medication regimen, including Xarelto.     Description of symptoms  Location:  Substernal  Onset:  Acute onset 2 days ago  Character:  The patient remained; moderate severity  Frequency:  Daily  Duration:  each episode lasts several minutes at a time  Associated Symptoms:  Diaphoresis, shortness of breath, weakness and fatigue  Radiation:  Recent the chest  Exacerbating factors:  Worse on exertion  Relieving factors:  Minimal relief with supplemental oxygen     In the emergency department routine laboratory studies, chest x-ray, EKG, cardiac enzymes were obtained.  EKG findings were concerning the case was discussed with cardiologist, Dr. Pulido.  It was determined that her EKG was not consistent with STEMI and she has been chest pain-free since arrival.  She had been given Lovenox, aspirin, and plan was to continue trending troponins and monitor closely on telemetry.    Hospital Course:  Ms. Palm was admitted to the hospital originally on 6/24/18 for chest pain. She was later sent to the ICU with acute hypercapnic respiratory failure  Problem: Discharge Planning  Goal: Discharge to home or other facility with appropriate resources  6/14/2022 0953 by Omkar Luis RN  Outcome: Progressing  Flowsheets (Taken 6/14/2022 0757)  Discharge to home or other facility with appropriate resources:   Identify barriers to discharge with patient and caregiver   Identify discharge learning needs (meds, wound care, etc)     Problem: Cardiovascular - Adult  Goal: Maintains optimal cardiac output and hemodynamic stability  6/14/2022 0953 by Omkar Luis RN  Outcome: Progressing     Problem: Skin/Tissue Integrity - Adult  Goal: Incisions, wounds, or drain sites healing without S/S of infection  6/14/2022 0953 by Omkar Luis RN  Outcome: Progressing  Flowsheets (Taken 6/14/2022 0720)  Incisions, Wounds, or Drain Sites Healing Without Sign and Symptoms of Infection: ADMISSION and DAILY: Assess and document risk factors for pressure ulcer development     Problem: Musculoskeletal - Adult  Goal: Return mobility to safest level of function  6/14/2022 0953 by Omkar Luis RN  Outcome: Progressing  Flowsheets (Taken 6/14/2022 0757)  Return Mobility to Safest Level of Function: Assess patient stability and activity tolerance for standing, transferring and ambulating with or without assistive devices     Problem: Musculoskeletal - Adult  Goal: Maintain proper alignment of affected body part  6/14/2022 0953 by Omkar Luis RN  Outcome: Progressing  Flowsheets (Taken 6/14/2022 0757)  Maintain proper alignment of affected body part: Support and protect limb and body alignment per provider's orders     Problem: Musculoskeletal - Adult  Goal: Return ADL status to a safe level of function  6/14/2022 0953 by Omkar Luis RN  Outcome: Progressing  Flowsheets (Taken 6/14/2022 0757)  Return ADL Status to a Safe Level of Function: Assess activities of daily living deficits and provide assistive devices as needed     Problem: Infection - Adult  Goal: Absence of "the same day on BIPAP.  She quickly improved and was sent to the floor on 6/25.  Cards was consulted for NSTEMI with noted troponin increase and was planning on LHC on 6/26. However, the patient was sent back to the ICU on 6/26 with hypercapnic respiratory failure with a CO2 of > 100 and was intubated. Pulmonary was consulted. Pt clinically improved from respiratory standpoint and was extubated on 6/27 to NC, but she did complain of chest discomfort. Cardiology was notified and patient was taken for LHC on 6/27 which was only remarkable for non-obstructive CAD and did not require intervention. Pt was given IVF post procedure and the next day developed increased SOB and required BIPAP. Pt was treated with IV lasix with good response with much improved respiratory status after output of 1L. Pt also treated for COPD exacerbation with IV steroids, NEBS and doxycycline. ECHO performed on 6/25/2018 remarkable for EF=50-55% + grade 2 diastolic dysfunction. Pt diuresed and changed to maintenance PO lasix regimen. She as started on BIPAP due to increased pCO2 on ABG and lethargy. Pt will need BIPAP/home ventilator for use at home and was approved for the device prior to discharge.    On 7/13, she became febrile, lethargic, hypercapnic, with SOB and was in AFib with RVR. Transferred to ICU again, on BiPAP and amiodarone infusion. Clinically improved over the course of 3 days with IV diuresis 40 mg daily, solumedrol 40 mg TID, BiPAP and empiric Zosyn. Other than UA consistent with UTI, CT chest/abdomen/pelvis with IV showed no other potential source for infection. There was mention of a pneumocele with "debri" however unlikely this to be cause for sepsis and invasive treatment would be too high risk (discussed with pulmonologist). She was weaned to low flow NC to remain in place continuously. AFib with RVR was extremely difficult to control despite amiodarone infusion and treatment of sepsis. Metoprolol added and uptitrated, " infection at discharge  6/14/2022 0953 by Ta Stephen RN  Outcome: Progressing  Flowsheets (Taken 6/14/2022 0757)  Absence of infection at discharge: Assess and monitor for signs and symptoms of infection     Problem: Infection - Adult  Goal: Absence of infection during hospitalization  6/14/2022 0953 by Ta Stephen RN  Outcome: Progressing  Flowsheets (Taken 6/14/2022 0757)  Absence of infection during hospitalization: Assess and monitor for signs and symptoms of infection     Problem: Metabolic/Fluid and Electrolytes - Adult  Goal: Glucose maintained within prescribed range  6/14/2022 0953 by Ta Stephen RN  Outcome: Progressing  Flowsheets (Taken 6/14/2022 0757)  Glucose maintained within prescribed range: Assess for signs and symptoms of hyperglycemia and hypoglycemia     Problem: Hematologic - Adult  Goal: Maintains hematologic stability  6/14/2022 0953 by Ta Stephen RN  Outcome: Progressing  Flowsheets (Taken 6/14/2022 0757)  Maintains hematologic stability: Assess for signs and symptoms of bleeding or hemorrhage     Problem: ABCDS Injury Assessment  Goal: Absence of physical injury  6/14/2022 0953 by Ta Stephen RN  Outcome: Progressing  Flowsheets (Taken 6/14/2022 0720)  Absence of Physical Injury: Implement safety measures based on patient assessment     Problem: Pain  Goal: Verbalizes/displays adequate comfort level or baseline comfort level  6/14/2022 0953 by Ta Stephen RN  Outcome: Progressing     Problem: Safety - Adult  Goal: Free from fall injury  6/14/2022 0953 by Ta Stephen RN  Outcome: Progressing  Flowsheets (Taken 6/14/2022 0720)  Free From Fall Injury: Instruct family/caregiver on patient safety and currently on 100 mg BID with positive effect.     PT/OT consulted pending.     Interval History: No acute events     Review of Systems   Constitutional: Negative for chills and fever.   Respiratory: Negative for shortness of breath.    Cardiovascular: Negative for chest pain.     Objective:     Vital Signs (Most Recent):  Temp: 97.4 °F (36.3 °C) (07/18/18 0714)  Pulse: 71 (07/18/18 0857)  Resp: 20 (07/18/18 0857)  BP: (!) 157/76 (07/18/18 0714)  SpO2: 99 % (07/18/18 0857) Vital Signs (24h Range):  Temp:  [97.1 °F (36.2 °C)-99.2 °F (37.3 °C)] 97.4 °F (36.3 °C)  Pulse:  [63-88] 71  Resp:  [18-20] 20  SpO2:  [96 %-99 %] 99 %  BP: (118-159)/(56-78) 157/76     Weight: 79.1 kg (174 lb 6.1 oz)  Body mass index is 29.02 kg/m².    Intake/Output Summary (Last 24 hours) at 07/18/18 1017  Last data filed at 07/18/18 0300   Gross per 24 hour   Intake              400 ml   Output               40 ml   Net              360 ml      Physical Exam   Constitutional: She is oriented to person, place, and time. She appears well-developed. No distress.   HENT:   Head: Normocephalic and atraumatic.   Eyes: EOM are normal. Pupils are equal, round, and reactive to light.   Right eye injected with mild discharge   Neck: Normal range of motion. Neck supple.   Cardiovascular: Normal rate and regular rhythm.    Pulmonary/Chest:   Slight decrease at bases, no wheezing   Abdominal: Soft. Bowel sounds are normal.   Musculoskeletal: Normal range of motion. She exhibits no edema.   Neurological: She is alert and oriented to person, place, and time.   Skin: Skin is warm and dry. Capillary refill takes less than 2 seconds. She is not diaphoretic.   Psychiatric: She has a normal mood and affect. Her behavior is normal. Thought content normal.       Significant Labs: All pertinent labs within the past 24 hours have been reviewed.    Significant Imaging: I have reviewed and interpreted all pertinent imaging results/findings within the past 24  "hours.    Assessment/Plan:      * Sepsis    - Source likely urine as she is incontinent and UA was abnormal with very cloudy appearing urine.   - UCx with yeast which was thought to be more so a colonizer, since patient did improve with Zosyn which will be continued for now  - CTA of chest/abdomen showed a right pneumocele with "debri" which was unlikely to cause degree of clinical instability and invasive diagnosis/treatment would be of very high risk for this patient.   - No other potential source identified. Will continue with pip-tazo for another day, and will plan for stopping tomorrow depending on further clinical improvement.         Acute bacterial conjunctivitis of right eye    Erythromycin ointment to right eye started 7/18/2018.        Atrial fibrillation with RVR    - May have been secondary to development of sepsis and subsequent respiratory failure.   - Pt has been on amiodarone gtt and improving from sepsis standpoint and HR has greatly improved by increasing dose of metoprolol to 100 mg BID.   - Pt spontaneously converted to NSR last night and case discussed with Cardiology  - Amiodarone converted to PO 7/17/2018  - Pt stable for transfer to telemetry and she is already fully anticoagulated        Chest pain    - Likely MSK as it is reproducible  - Resolved        Hyperkalemia    - Resolved        Fall    - Slipped while getting up on 7/6  - Head CT and all x-ray are negative  - Resolved        Debility    - Resume PT/OT         Acute hypercapnic respiratory failure    - A recurrent issue during this admission, management as above        PAF (paroxysmal atrial fibrillation)    - As discussed above        Neuropathy    - No acute issues         Non-STEMI (non-ST elevated myocardial infarction)    - Followed by Cardiology  - s/p LHC on 6/27 only remarkable for non-obstructive CAD (40% RCA)  - Continue medical management as per Cardiology with ASA, statin, metoprolol and patient will not be placed on " plavix as she is also on Xarelto        Acute exacerbation of chronic obstructive pulmonary disease (COPD)    - Pt has been back and forth to ICU for hypercapnic respiratory failure.   - Underlying COPD (centrilobular emphysema seen on CT chest 6/2018) but also complicated with diastolic HF and Afib with RVR  - Pt was intubated at one point, but overall improved with trial of solumedrol 40 mg TID and another dose of IV lasix.   - Will continue with solumedrol 40 mg TID for today then de-escalate to prednisone 20 mg daily starting tomorrow.   - De-escalated furosemide to PO for maintenance diuresis.   - Continuous supplemental O2 via low flow NC for goal sats 88-93%, incentive spirometer and BiPAP QHS  - Pt has been approved for home BIPAP when ready for discharge        Elevated brain natriuretic peptide (BNP) level    - Stable with diuresis        Elevated troponin I level    - As discussed above        Acute diastolic CHF (congestive heart failure)    - Initially due to volume overload, but now likely due to sepsis + Afib RVR  - Last ECHO with EF=50-55% + grade 2 diastolic dysfunction on 6/26  - Stable and well compensated. Management as above        Obesity    - Weight reduction as outpatient after all acute issues addressed        Chronic anticoagulation    - Patient on Xarelto for PAF which has been resumed        History of deep vein thrombosis    - Resumed anticoagulation         History of pulmonary embolism    - Xarelto resumed   - CTA negative for PE        Anemia of chronic disease    - Pt with gradual trend down, but no overt source of bleeding  - Suspect iatrogenic given multiple blood draws over prolonged hospital stay daily        Malignant hypertension    - Pt on metoprolol for HR and BP control  - Will make further adjustments as needed        Tobacco abuse    - Smoking cessation counseling done        Gastroesophageal reflux disease without esophagitis    - Continue PPI        Type 2 diabetes  mellitus, controlled    - Complications of peripheral neuropathy.   - Increased basal and prandial insulin in setting of steroids  - Goal < 180        Coronary artery disease involving native coronary artery of native heart with angina pectoris    - [facility] on 6/27. Non obstructive CAD (40% RCA)  - On adequate cardioprotective regimen          VTE Risk Mitigation         Ordered     rivaroxaban tablet 20 mg  With dinner      06/27/18 7511              Viri Paredes MD  Department of Hospital Medicine   [facility]

## 2022-09-08 NOTE — ASSESSMENT & PLAN NOTE
Patient with HR sustained in 130's in early morning - received IV metoprolol X 2 doses with improvement  -restarted usual diltiazem & Imdur  -gentle IV fluids X 5 Hours (overdiuressing)  -PRN metoprolol 10 mg q5 minus X 2 HR>100  -Stop albuterol     Detail Level: Detailed

## 2022-10-25 NOTE — PLAN OF CARE
Problem: Patient Care Overview  Goal: Plan of Care Review  Plan of care reviewed with patient at bedside. All questions answered. Fall precautions are in place. Call light within reach. Bed in lowest position.  No complaints throughout the night. PICC intact, blood return noted. Patient received 1 unit of PRBCs. No transfusion reaction noted. Lungs remain clear and diminished. NSR. Will continue to monitor.          Yes

## 2022-11-14 NOTE — ASSESSMENT & PLAN NOTE
Erythromycin ointment to right eye started 7/18/2018. Improved. Continue x 7 days then stopped. Cold pack to right eye thereafter and advised patient to stop rubbing.   Detail Level: Simple Instructions: This plan will send the code FBSE to the PM system.  DO NOT or CHANGE the price. Price (Do Not Change): 0.00

## 2023-06-07 NOTE — ED NOTES
Bed: 16main  Expected date:   Expected time:   Means of arrival:   Comments:  EMS  
CODE STROKE PAGED OVERHEAD  
Dr diop notified of blood sugar, states to give d50  
cbg 77  
xr at bedside  
Alert-The patient is alert, awake and responds to voice. The patient is oriented to time, place, and person. The triage nurse is able to obtain subjective information.

## 2023-08-30 NOTE — ASSESSMENT & PLAN NOTE
- No acute issues    Pre-procedure indication: Patient continues to have pain after trials of physical therapy and non steriodal and/or steriod therapy and remains symptomatic.  Since patient has not responded to conservative therapy and with radiological and clinical evidence of pain pathology will proceed with inteventional treatment.    Preprocedure Dx:  cervical radiculopathy    Post-procedure Dx.  Cervical radiculopathy    Procedure performed:  C5-6 epidural steroid injection    Anesthesia: Local    Description of procedure: After informed consent was obtained and all the risks, benefits, alternatives were discussed with the patient. Patient was brought to the procedure room. Patient was placed in the prone position. Appropriate time out was called. The area was prepped and draped in the usual sterile manner. Utilizing fluoroscopy the landmarks were identified. Then using a 25-gauge 1-1/2 inch needle and 1% lidocaine skin wheal was raised over the entry point. Then using an 18-gauge 3-1/2 inch Tuohy needle was inserted and advanced under intermittent fluoroscopic evaluation with a loss-of-resistance technique. Upon entering the epidural space the needle was aspirated negative for heme and CSF.     Once posterior epidural space placement was confirmed, Omnipaque M300 contrast dye total volume of 0.5 mL was injected. This showed posterior epidural spread with no intrathecal uptake in the AP, lateral oblique, lateral views.   After proper confirmation of epidural spread this was followed by an injection of 2 mL of 0.25% Ropivicaine and 80 mg of Depo-Medrol. After injection the needle was removed. The area was dressed and clean. The patient was taken to the post procedure no immediate complication the patient tolerated procedure well.    Contrast: 2 ml's were drawn and 8 ml's were wasted              [FreeTextEntry1] : \par - use back up contraception for the next 7 days\par \par I spent the time noted on the day of this patient encounter preparing for, providing and documenting the above E/M service and counseling and educate patient on differential, workup, disease course, and treatment/management. Education was provided to the patient during this encounter. All questions and concerns were answered and addressed in detail.\par \par Savanna Bonds MD\par

## 2023-10-24 NOTE — PLAN OF CARE
Last OV: 10/25/2022  Next OV: Visit date not found    Next appointment due:1/17/24    Last fill:  Refills: Problem: Patient Care Overview  Goal: Plan of Care Review  Outcome: Ongoing (interventions implemented as appropriate)  Patient unable to be off O2. Discharge today after portable O2 tank brought by .

## 2024-03-04 NOTE — PROGRESS NOTES
Patient placed on BIPAP with settings as follows:  IPAP - 10/ EPAP - 5/ FIO2 - 40%.  Patient has no noted redness or breakdown to her skin at present time.   No

## 2025-03-19 NOTE — HPI
"Mrs. Palm is 68 yo female with significant history hypertension, hyperlipidemia, COPD on home 3 L NC, smoker (quit 3 months ago) depression, DM, LOYDA, PE/DVT LLE, CAD sp MI, and PAF who came to hospital for shortness of breath that occurred at 12 pm yesterday. Used home inhaler and duoneb without relief. Reports Niece's baby had runny nose on 12/24. Denies chest pain, headaches and dizziness. Lives with .     In ED, was placed BiPAP FiO 40% but not able to tolerate so took BiPAP off.   Patient also noted to have gross hematuria. Patient reports suprapubic spasm for few days. Straight cath "small amount of bloody urine."   "
68yo F admitted with COPD exacerbation. She was noted to have gross hematuria with c/o dysuria. She is on oral anticoagulation (Eliquis, ASA). +smoker. No recent upper tract imaging. She reports continued dysuria. She is unsure about the chronicity of hematuria as she has not seen it. She reports dysuria 1-2 months ago that was also associated with UTI. Denies h/o kidney problems, nephrolithiasis.  
19-Mar-2025 05:34